# Patient Record
Sex: FEMALE | Race: WHITE
[De-identification: names, ages, dates, MRNs, and addresses within clinical notes are randomized per-mention and may not be internally consistent; named-entity substitution may affect disease eponyms.]

---

## 2022-10-29 ENCOUNTER — HOSPITAL ENCOUNTER (EMERGENCY)
Dept: HOSPITAL 97 - ER | Age: 52
Discharge: HOME | End: 2022-10-29
Payer: COMMERCIAL

## 2022-10-29 VITALS — OXYGEN SATURATION: 97 % | TEMPERATURE: 97.7 F | DIASTOLIC BLOOD PRESSURE: 99 MMHG | SYSTOLIC BLOOD PRESSURE: 160 MMHG

## 2022-10-29 DIAGNOSIS — Z91.14: ICD-10-CM

## 2022-10-29 DIAGNOSIS — Z88.8: ICD-10-CM

## 2022-10-29 DIAGNOSIS — F17.210: ICD-10-CM

## 2022-10-29 DIAGNOSIS — Z88.0: ICD-10-CM

## 2022-10-29 DIAGNOSIS — F31.9: ICD-10-CM

## 2022-10-29 DIAGNOSIS — F41.9: Primary | ICD-10-CM

## 2022-10-29 PROCEDURE — 99283 EMERGENCY DEPT VISIT LOW MDM: CPT

## 2022-10-29 NOTE — ER
Nurse's Notes                                                                                     

 [facility]                                                                 

Name: Alka Judd                                                                               

Age: 52 yrs                                                                                       

Sex: Female                                                                                       

: 1970                                                                                   

MRN: [id number]                                                                                   

Arrival Date: 10/29/2022                                                                          

Time: 19:56                                                                                       

Account#: [id number]                                                                            

Bed DIS3                                                                                          

Private MD:                                                                                       

Diagnosis: Anxiety;Bipolar disorder;Medication non-compliance                                     

                                                                                                  

Presentation:                                                                                     

10/29                                                                                             

20:12 Chief complaint: Patient states: "I have been off my Lithium for five months, I missed  hb  

      by [facility] appointment, I am here for some Lithium to ge through until I can see         

      them please." Pt reports auditory and visual hallucinations, denies SI/HI. Coronavirus      

      screen: At this time, the client does not indicate any symptoms associated with             

      coronavirus-19. Ebola Screen: No symptoms or risks identified at this time. Risk            

      Assessment: Do you want to hurt yourself or someone else? Patient reports no desire to      

      harm self or others. Onset of symptoms was 2022.                                

20:12 Method Of Arrival: Ambulatory                                                           hb  

20:12 Acuity: TEMO 3                                                                           hb  

                                                                                                  

Triage Assessment:                                                                                

20:13 General: Appears in no apparent distress. Behavior is cooperative, anxious. Pain:       hb  

      Denies pain. Neuro: Level of Consciousness is awake, alert, obeys commands, Oriented to     

      person, place, time, situation. Cardiovascular: Patient's skin is warm and dry.             

      Respiratory: Respiratory effort is even, unlabored, Respiratory pattern is regular,         

      symmetrical.                                                                                

                                                                                                  

Historical:                                                                                       

- Allergies:                                                                                      

20:14 Risperdal;                                                                              hb  

20:14 PENICILLINS;                                                                            hb  

- PMHx:                                                                                           

20:14 Bipolar disorder;                                                                       hb  

                                                                                                  

- Immunization history:: Adult Immunizations up to date.                                          

- Social history:: Smoking status: Patient reports the use of cigarette tobacco                   

  products, smokes one pack cigarettes per day.                                                   

                                                                                                  

                                                                                                  

Screenin:39 Abuse screen: Denies threats or abuse. Denies injuries from another. Nutritional        hb  

      screening: No deficits noted. Tuberculosis screening: No symptoms or risk factors           

      identified. Fall Risk None identified.                                                      

                                                                                                  

Assessment:                                                                                       

20:39 General: SEE TRIAGE ASSESSMENT.                                                         hb  

                                                                                                  

Vital Signs:                                                                                      

20:12  / 99; Pulse 88; Resp 16; Temp 97.7; Pulse Ox 97% on R/A; Weight 97.52 kg; Height hb  

      5 ft. 2 in. (157.48 cm); Pain 0/10;                                                         

20:12 Body Mass Index 39.32 (97.52 kg, 157.48 cm)                                             hb  

                                                                                                  

ED Course:                                                                                        

19:56 Patient arrived in ED.                                                                  bp1 

20:05 Carmen Elder MD is Attending Physician.                                           sd2 

20:14 Triage completed.                                                                       hb  

20:14 Arm band placed on.                                                                     hb  

20:33 Patient's name was called from ER lobby. No response.                                   hb  

20:38 Lolly Hamilton, RN is Primary Nurse.                                                   hb  

20:39 Patient has correct armband on for positive identification.                             hb  

20:39 No provider procedures requiring assistance completed. Patient did not have IV access   hb  

      during this emergency room visit.                                                           

                                                                                                  

Administered Medications:                                                                         

20:38 Drug: Ativan (LORazepam) 1 mg Route: PO;                                                hb  

                                                                                                  

                                                                                                  

Medication:                                                                                       

20:39 VIS not applicable for this client.                                                     hb  

                                                                                                  

Outcome:                                                                                          

20:38 Discharge ordered by MD.                                                                sd2 

20:39 Discharged to home ambulatory.                                                          hb  

20:39 Condition: stable                                                                           

20:39 Discharge instructions given to patient, Instructed on discharge instructions, follow       

      up and referral plans. medication usage, Demonstrated understanding of instructions,        

      follow-up care, medications, Prescriptions given X 1.                                       

20:41 Patient left the ED.                                                                    hb  

                                                                                                  

Signatures:                                                                                       

Lolly Hamilton, RN                     RN                                                      

Julia Gaines                           bp1                                                  

Carmen Elder MD MD   sd2                                                  

                                                                                                  

**************************************************************************************************

## 2022-10-29 NOTE — EDPHYS
Physician Documentation                                                                           

 [facility]                                                                 

Name: Alka Judd                                                                               

Age: 52 yrs                                                                                       

Sex: Female                                                                                       

: 1970                                                                                   

MRN: [id number]                                                                                   

Arrival Date: 10/29/2022                                                                          

Time: 19:56                                                                                       

Account#: [id number]                                                                            

Bed DIS3                                                                                          

Private MD:                                                                                       

DB Physician Carmen Elder                                                                    

HPI:                                                                                              

10/29                                                                                             

20:33 This 52 yrs old Female presents to ER via Ambulatory with complaints of Hallucinations. sd2 

20:33 51 yo F presents with CC of wanting to be restarted on her lithium. Reports she was in  sd2 

      residential for 16 months on lithium which controlled her bipolar well. She has been out for 5     

      months and started reusing drugs and has been off her lithium during this time as well.     

      Reports last drug use was last week and she is no longer using. Reports her prior dose      

      was 450 mg twice daily. She missed her first appointment with [facility] and has            

      another appointment scheduled for 11/15. She is having some auditory and visual             

      hallucinations that are non-intrusive and has been anxious. She denies any SI or HI and     

      is able to speak to me rationally throughout our conversation..                             

                                                                                                  

Historical:                                                                                       

- Allergies:                                                                                      

20:14 Risperdal;                                                                              hb  

20:14 PENICILLINS;                                                                            hb  

- PMHx:                                                                                           

20:14 Bipolar disorder;                                                                       hb  

                                                                                                  

- Immunization history:: Adult Immunizations up to date.                                          

- Social history:: Smoking status: Patient reports the use of cigarette tobacco                   

  products, smokes one pack cigarettes per day.                                                   

                                                                                                  

                                                                                                  

ROS:                                                                                              

20:33 Constitutional: Negative for fever, chills, and weight loss, Eyes: Negative for injury, sd2 

      pain, redness, and discharge, Cardiovascular: Negative for chest pain, palpitations,        

      and edema, Respiratory: Negative for shortness of breath, cough, wheezing. Abdomen/GI:      

      Negative for abdominal pain, nausea, vomiting, diarrhea. MS/Extremity: Negative for         

      injury and deformity, Skin: Negative for injury, rash, and discoloration, Psych:            

      Negative for depression, suicide ideation, homicidal ideation, and positive for anxiety     

      and hallucinations.                                                                         

                                                                                                  

Exam:                                                                                             

20:33 Constitutional:  This is a well developed, well nourished patient who is awake, alert,  sd2 

      and in no acute distress. Head/Face:  Normocephalic, atraumatic. Eyes:  EOMI, normal        

      conjunctiva bilaterally Skin:  Warm, dry with normal turgor.  Normal color with no          

      rashes, no lesions, and no evidence of cellulitis. MS/ Extremity:  Pulses equal, no         

      cyanosis.  Neurovascular intact.  Full, normal range of motion. Ambulatory without          

      difficulty. Psych:  Awake, alert, with orientation to person, place and time.               

      Behavior, mood, and affect are within normal limits but patient does appear                 

      intermittently anxious and fidgety.                                                         

                                                                                                  

Vital Signs:                                                                                      

20:12  / 99; Pulse 88; Resp 16; Temp 97.7; Pulse Ox 97% on R/A; Weight 97.52 kg; Height hb  

      5 ft. 2 in. (157.48 cm); Pain 0/10;                                                         

20:12 Body Mass Index 39.32 (97.52 kg, 157.48 cm)                                             hb  

                                                                                                  

MDM:                                                                                              

20:32 Patient medically screened.                                                             sd2 

20:33 Differential Diagnosis anxiety, bipolar, SI, HI, substance abuse, medication            sd2 

      non-compliance among others. Data reviewed: vital signs, nurses notes. Counseling: I        

      had a detailed discussion with the patient and/or guardian regarding: the historical        

      points, exam findings, and any diagnostic results supporting the discharge/admit            

      diagnosis, the need for outpatient follow up, to return to the emergency department if      

      symptoms worsen or persist or if there are any questions or concerns that arise at          

      home. Medical screen evaluation completed. EMTALA emergency medical condition absent.       

      ED course: Pt is awake, alert and oriented x4. Speaking rationally although she does        

      endorse hallucinations. Unable to restart lithium from the ER due to the close              

      monitoring needed and patient has been off of the medication for a significant period       

      of time. Pt verbalizes understanding and will continue to call [facility] to see about      

      getting an earlier appointment. She does not appear to be a harm to herself or others       

      at this time. Will give Ativan to help with patient's anxiety and discharge with            

      hydroxyzine as a temporizing measure until patient can see [facility]. She verbalizes       

      understanding of strict return precautions and was given resources. She reports she         

      knows all of her crisis lines to call in case of further issue..                            

                                                                                                  

Administered Medications:                                                                         

20:38 Drug: Ativan (LORazepam) 1 mg Route: PO;                                                hb  

                                                                                                  

                                                                                                  

Disposition Summary:                                                                              

10/29/22 20:38                                                                                    

Discharge Ordered                                                                                 

      Location: Home                                                                          sd2 

      Problem: an acute exacerbation                                                          sd2 

      Symptoms: are unchanged                                                                 sd2 

      Condition: Stable                                                                       sd2 

      Diagnosis                                                                                   

        - Anxiety                                                                             sd2 

        - Bipolar disorder                                                                    sd2 

        - Medication non-compliance                                                           sd2 

      Followup:                                                                               sd2 

        - With: Private Physician                                                                  

        - When: 2 - 3 days                                                                         

        - Reason: Recheck today's complaints, Continuance of care, Re-evaluation by your           

      physician                                                                                   

      Discharge Instructions:                                                                     

        - Discharge Summary Sheet                                                             sd2 

        - Managing Bipolar Disorder                                                           sd2 

        - Managing Anxiety, Adult                                                             sd2 

      Forms:                                                                                      

        - Medication Reconciliation Form                                                      sd2 

        - Thank You Letter                                                                    sd2 

        - Antibiotic Education                                                                sd2 

        - Prescription Opioid Use                                                             sd2 

      Prescriptions:                                                                              

        - Hydroxyzine HCl 25 mg Oral Tablet                                                        

            - take 1 tablet by ORAL route every 6 hours As needed Take 1-2 tablets as needed  sd2 

      for anxiety; 20 tablet; Refills: 0, Product Selection Permitted                             

Signatures:                                                                                       

Lolly Hamilton RN RN                                                      

Carmen Elder MD MD   sd2                                                  

                                                                                                  

**************************************************************************************************

## 2022-10-29 NOTE — XMS REPORT
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   0.50-1.04                 



             4024931404)                                         

 

             TOTAL BILI (test code = 1.3 mg/dL    0.1-1.1      H            



             9216908468)                                         

 

             CALCIUM (test code = 4925619788) 9.5 mg/dL    8.6-10.6             

     

 

             T PROTEIN (test code = 0013063022) 7.4 g/dL     6.3-8.2            

       

 

             ALBUMIN (test code = 2085934065) 4.2 g/dL     3.5-5.0              

     

 

             ALK PHOS (test code = 9690411715) 100 U/L                    

      

 

             ALTv (test code = 1742-6) 16 U/L       5-35                      

 

             AST(SGOT) (test code = 6808143547) 27 U/L       13-40              

       

 

             eGFR (test code = 5553578695)              mL/min/1.73m2           

   

 

             ELENA (test code = ELENA) [employer] of Glomerular                    

       



                          Filtration Rate (GFR) and Staging                     

      



                          of Kidney Disease*                           



                          +-----------------------+--------                     

      



                          -------------+-------------------                     

      



                          ------+| GFR (mL/min/1.73 m2) ?|                      

     



                          With Kidney Damage ?| ?Without                        

   



                          Kidney                                 



                          Damage+-----------------------+--                     

      



                          -------------------+-------------                     

      



                          ------------+| ?>90 ? ? ? ? ? ? ?                     

      



                          ? ?| ?Stage one ? ? ? ? ?| ?                          

 



                          Normal ? ? ? ? ? ? ?                           



                          ?+-----------------------+-------                     

      



                          --------------+------------------                     

      



                          -------+| ?60-89 ? ? ? ? ? ? ? ?|                     

      



                          ?Stage two ? ? ? ? ?| ? Decreased                     

      



                          GFR ? ? ? ?                            



                          +-----------------------+--------                     

      



                          -------------+-------------------                     

      



                          ------+| ?30-59 ? ? ? ? ? ? ? ?|                      

     



                          ?Stage three ? ? ? ?| ? Stage                         

  



                          three ? ? ? ? ?                           



                          +-----------------------+--------                     

      



                          -------------+-------------------                     

      



                          ------+| ?15-29 ? ? ? ? ? ? ? ?|                      

     



                          ?Stage four ? ? ? ? | ? Stage                         

  



                          four ? ? ? ? ?                           



                          ?+-----------------------+-------                     

      



                          --------------+------------------                     

      



                          -------+| ?<15 (or dialysis) ? ?|                     

      



                          ?Stage five ? ? ? ? | ? Stage                         

  



                          five ? ? ? ? ?                           



                          ?+-----------------------+-------                     

      



                          --------------+------------------                     

      



                          -------+ *Each stage assumes the                      

     



                          associated GFR level has been in                      

     



                          effect for at least three months.                     

      



                          ?Stages 1 to 5, with or without                       

    



                          kidney disease, indicate chronic                      

     



                          kidney disease. Notes:                           



                          Determination of stages one and                       

    



                          two (with eGFR >59mL/min/1.73 m2)                     

      



                          requires estimation of kidney                         

  



                          damage for at least three months                      

     



                          as defined by structural or                           



                          functional abnormalities of the                       

    



                          kidney, manifested by                           



                          either:Pathological abnormalities                     

      



                          or Markers of kidney damage                           



                          (including abnormalities in the                       

    



                          composition of the blood or urine                     

      



                          or abnormalities in imaging                           



                          tests).                                

 

             Lab Interpretation (test code = Abnormal                           

    



             61162-2)                                            



[facility] WITH [id number] 19:22:40





             Test Item    Value        Reference Range Interpretation Comments

 

             WBC (test code =              See_Comment  H             [Automated



             6690-2)                                             message] The sy

stem



                                                                 which generated



                                                                 this result



                                                                 transmitted



                                                                 reference range

:



                                                                 4.30 - 11.10



                                                                 10*3/?L. The



                                                                 reference range

 was



                                                                 not used to



                                                                 interpret this



                                                                 result as



                                                                 normal/abnormal

.

 

             RBC (test code =              See_Comment                [Automated



             789-8)                                              message] The sy

stem



                                                                 which generated



                                                                 this result



                                                                 transmitted



                                                                 reference range

:



                                                                 3.93 - 5.25



                                                                 10*6/?L. The



                                                                 reference range

 was



                                                                 not used to



                                                                 interpret this



                                                                 result as



                                                                 normal/abnormal

.

 

             HGB (test code = 13.0 g/dL    11.6-15.0                 



             718-7)                                              

 

             HCT (test code = 38.4 %       35.7-45.2                 



             4544-3)                                             

 

             MCV (test code = 89.9 fL      80.6-95.5                 



             787-2)                                              

 

             MCH (test code = 30.4 pg      25.9-32.8                 



             785-6)                                              

 

             MCHC (test code = 33.9 g/dL    31.6-35.1                 



             786-4)                                              

 

             RDW-SD (test code = 39.3 fL      39.0-49.9                 



             05557-4)                                            

 

             RDW-CV (test code = 12.2 %       12.0-15.5                 



             788-0)                                              

 

             PLT (test code =              See_Comment                [Automated



             777-3)                                              message] The sy

stem



                                                                 which generated



                                                                 this result



                                                                 transmitted



                                                                 reference range

:



                                                                 166 - 358 10*3/

?L.



                                                                 The reference r

jihan



                                                                 was not used to



                                                                 interpret this



                                                                 result as



                                                                 normal/abnormal

.

 

             MPV (test code = 10.1 fL      9.5-12.9                  



             87274-8)                                            

 

             NRBC/100 WBC (test              See_Comment                [Automat

ed



             code = 3611395234)                                        message] 

The system



                                                                 which generated



                                                                 this result



                                                                 transmitted



                                                                 reference range

:



                                                                 0.0 - 10.0 /100



                                                                 WBCs. The refer

ence



                                                                 range was not u

sed



                                                                 to interpret th

is



                                                                 result as



                                                                 normal/abnormal

.

 

             NRBC x10^3 (test code <0.01        See_Comment                [Auto

mated



             = 3268676623)                                        message] The s

ystem



                                                                 which generated



                                                                 this result



                                                                 transmitted



                                                                 reference range

:



                                                                 10*3/?L. The



                                                                 reference range

 was



                                                                 not used to



                                                                 interpret this



                                                                 result as



                                                                 normal/abnormal

.

 

             GRAN MAT (NEUT) % 79.4 %                                 



             (test code = 770-8)                                        

 

             IMM GRAN % (test code 0.50 %                                 



             = 5846633676)                                        

 

             LYMPH % (test code = 9.5 %                                  



             736-9)                                              

 

             MONO % (test code = 9.7 %                                  



             5905-5)                                             

 

             EOS % (test code = 0.5 %                                  



             713-8)                                              

 

             BASO % (test code = 0.4 %                                  



             706-2)                                              

 

             GRAN MAT x10^3(ANC) 9.77 10*3/uL 1.88-7.09    H            



             (test code =                                        



             4552712747)                                         

 

             IMM GRAN x10^3 (test 0.06 10*3/uL 0.00-0.06                 



             code = 3859927399)                                        

 

             LYMPH x10^3 (test code 1.17 10*3/uL 1.32-3.29    L            



             = 731-0)                                            

 

             MONO x10^3 (test code 1.19 10*3/uL 0.33-0.92    H            



             = 742-7)                                            

 

             EOS x10^3 (test code = 0.06 10*3/uL 0.03-0.39                 



             711-2)                                              

 

             BASO x10^3 (test code 0.05 10*3/uL 0.01-0.07                 



             = 704-7)                                            

 

             Lab Interpretation Abnormal                               



             (test code = 48069-1)                                        



[facility] [id number] 05:36:00





             Test Item    Value        Reference Range Interpretation Comments

 

             SODIUM (test code = NA) 143 mmol/L   134-147      N            

 

             POTASSIUM (test code = 4.2 mmol/L   3.4-5.0      N            



             K)                                                  

 

             CHLORIDE (test code = 114 mmol/L   100-108      H            



             CL)                                                 

 

             CARBON DIOXIDE (test 24 mmol/L    21-32        N            



             code = CO2)                                         

 

             ANION GAP (test code = 5.0 GAP calc 4.0-15.0     N            



             GAP)                                                

 

             GLUCOSE (test code = 89 MG/DL            N            



             GLU)                                                

 

             BLOOD UREA NITROGEN 17 MG/DL     7-18         N            



             (test code = BUN)                                        

 

             GLOMERULAR FILTRATION >=60 max estimate >60                       



             RATE (test code = GFR) estGFR                                 

 

             CREATININE (test code = 0.9 MG/DL    0.6-1.0      N            



             CREAT)                                              

 

             CALCIUM (test code = CA) 8.3 MG/DL    8.5-10.1     L            



CBC W/AUTO [id number] 05:21:00





             Test Item    Value        Reference Range Interpretation Comments

 

             WHITE BLOOD CELL (test code = 15.2 K/mm3   3.5-11.0     H          

  



             WBC)                                                

 

             RED BLOOD CELL (test code = RBC) 3.67 M/mm3   4.70-6.10    L       

     

 

             HEMOGLOBIN (test code = HGB) 11.3 G/DL    10.4-14.9    N           

 

 

             HEMATOCRIT (test code = HCT) 35.5 %       31.5-44.1    N           

 

 

             MEAN CELL VOLUME (test code = 96.7 Fl      84.5-98.6    N          

  



             MCV)                                                

 

             MEAN CELL HGB (test code = MCH) 30.8 pg      27.0-34.2    N        

    

 

             MEAN CELL HGB CONCETRATION (test 31.8 G/DL    31.5-34.0    N       

     



             code = MCHC)                                        

 

             RED CELL DISTRIBUTION WIDTH (test 14.2 SD      11.5-14.5    N      

      



             code = RDW)                                         

 

             PLATELET COUNT (test code = PLT) 242.0 K/mm3  150-450      N       

     

 

             MEAN PLATELET VOLUME (test code = 10.20 fL     7.0-10.5     N      

      



             MPV)                                                

 

             NEUTROPHIL % (test code = NT%) 78.8 %       40-76        H         

   

 

             LYMPHOCYTE % (test code = LY%) 13.1 %       20.5-51.1    L         

   

 

             MONOCYTE % (test code = MO%) 6.2 %        1.7-9.3      N           

 

 

             EOSINOPHIL % (test code = EO%) 1.6 %        0.0-6.0      N         

   

 

             BASOPHIL % (test code = BA%) 0.3 %        0.0-2.0      N           

 

 

             NEUTROPHIL # (test code = NT#) 11.94 K/mm3  1.8-7.6      H         

   

 

             LYMPHOCYTE # (test code = LY#) 2.0 K/mm3    0.6-3.2      N         

   

 

             MONOCYTE # (test code = MO#) 0.9 K/mm3    0.3-1.1      N           

 

 

             EOSINOPHIL # (test code = EO#) 0.2 K/mm3    0.0-0.4      N         

   

 

             BASOPHIL # (test code = BA#) 0.1 K/mm3    0.0-0.1      N           

 

 

             MANUAL DIFF REQUIRED (test code = NO DIFF/SCN  CRITERIA            

      



             MDIFF)                                              



[id number] 14:09:00





             Test Item    Value        Reference Range Interpretation Comments

 

             LITHIUM (test 0.5 mmol/L   0.6-1.2      L             Detection Lopez

it = 0.1



             code = LITH)                                        <0.1 indicates 

None



                                                                 DetectedPerform

ed At: 



                                                                 [facility] [address]



                                                                 [id number] 

Kyle L MD



                                                                 Ph:[phone]



[id number] 13:35:00





             Test Item    Value        Reference Range Interpretation Comments

 

             TROPONIN-I (test 0.436 NG/ML  0.000-0.045  HH           Negative: <

/= 0.045



             code = TROPI)                                        Positive: >/= 

0.046



                                                                 Correlation wit

h serial



                                                                 results, other 

cardiac



                                                                 markers, and cl

inical



                                                                 findings is nec

essary to



                                                                 determine the c

linical



                                                                 significance of

 this



                                                                 result. Quantit

ative



                                                                 results using d

ifferent



                                                                 methodologies s

hould not



                                                                 be compared to 

one



                                                                 another as nume

rical



                                                                 results may daniel

yby



                                                                 method.



Completed by Nursing: [id number] 10:33:00





             Test Item    Value        Reference Range Interpretation Comments

 

             TROPONIN-I (test 0.360 NG/ML  0.000-0.045  HH           Negative: <

/= 0.045



             code = TROPI)                                        Positive: >/= 

0.046



                                                                 Correlation wit

h serial



                                                                 results, other 

cardiac



                                                                 markers, and cl

inical



                                                                 findings is nec

essary to



                                                                 determine the c

linical



                                                                 significance of

 this



                                                                 result. Quantit

ative



                                                                 results using d

ifferent



                                                                 methodologies s

hould not



                                                                 be compared to 

one



                                                                 another as nume

rical



                                                                 results may daniel

yby



                                                                 method.



Completed by Nursing: NOLast Dose Date: 20Las Dose Time: 1200TROPONIN-I
2020 07:23:00





             Test Item    Value        Reference Range Interpretation Comments

 

             TROPONIN-I (test 0.399 NG/ML  0.000-0.045  HH           Negative: <

/= 0.045



             code = TROPI)                                        Positive: >/= 

0.046



                                                                 Correlation wit

h serial



                                                                 results, other 

cardiac



                                                                 markers, and cl

inical



                                                                 findings is nec

essary to



                                                                 determine the c

linical



                                                                 significance of

 this



                                                                 result. Quantit

ative



                                                                 results using d

ifferent



                                                                 methodologies s

hould not



                                                                 be compared to 

one



                                                                 another as nume

rical



                                                                 results may daniel

yby



                                                                 method.



Completed by Nursing: UZHTQEKORN--98-28 05:00:00





             Test Item    Value        Reference Range Interpretation Comments

 

             TROPONIN-I (test 0.555 NG/ML  0.000-0.045  HH           Negative: <

/= 0.045



             code = TROPI)                                        Positive: >/= 

0.046



                                                                 Correlation wit

h serial



                                                                 results, other 

cardiac



                                                                 markers, and cl

inical



                                                                 findings is nec

essary to



                                                                 determine the c

linical



                                                                 significance of

 this



                                                                 result. Quantit

ative



                                                                 results using d

ifferent



                                                                 methodologies s

hould not



                                                                 be compared to 

one



                                                                 another as nume

rical



                                                                 results may daniel

yby



                                                                 method.



Completed by Nursing: NO- XR CHEST 1 -57-28 04:33:00 Name: TYLER EDGE Jal : 1970 Age/S: 49 / F [id number] [address] Unit #: [id number]
2 Loc: [place] [address] Phys: Kristy Quiros MD Acct: [id number] Dis Date: 
Status: REG ER PHONE #: [phone] Exam Date: 2020 043 FAX #: Reason: 
POST CENTRAL PLACEMENT; RIGHT IJ EXAMS:  CPT: [id number] XR CHEST 1 V 62214 
Fluoro Time: DAP (Gy m2): Air Kerma (mGy): HISTORY: Post central lineplacement, 
hypotension Location code: B2 FINDINGS: Frontal view of the chest demonstrates a
mildly enlarged cardiomediastinal silhouette. The trachea is midline. The lungs 
are clear. There is no effusion or pneumothorax. The bones are intact. Right IJ 
catheter in good position. IMPRESSION: Mild cardiomegaly, without acute 
pulmonary process. ** Electronically Signed by ISABEL March on 2020 at 0
433 ** Reported and signed by: Shree March M.D.  CC: Kristy Quiros MD PAGE 1 
Signed Report Name: TYLER EDGE Jal : 1970 Age/S: 49 / F 
[id number] [address] Unit #: [id number] Loc: [place] [address] Phys: 
Kristy Quiros MD Acct: [id number] Dis Date: Status: REG ER  PHONE #: [phone].
[id number] Exam Date: 2020 0432 FAX #: Reason: POST CENTRAL PLACEMENT; RIGHT IJ 
EXAMS: CPT: [id number] XR CHEST 1 V 61159 Fluoro Time: DAP (Gy m2): Air Kerma 
(mGy):  (Continued) Technologist: Duncan Ellis RT(R)(CT) Trnscb Date/Time: 
2020 (0433) [id number] Orig Print D/T: S: 2020 (0436)  PAGE 2 Signed 
ReportC W/AUTO [id number] 04:15:00





             Test Item    Value        Reference Range Interpretation Comments

 

             WHITE BLOOD CELL (test 24.2 K/mm3   3.5-11.0     H            



             code = WBC)                                         

 

             RED BLOOD CELL (test 3.75 M/mm3   4.70-6.10    L            



             code = RBC)                                         

 

             HEMOGLOBIN (test code 11.5 G/DL    10.4-14.9    N            



             = HGB)                                              

 

             HEMATOCRIT (test code 35.2 %       31.5-44.1    N            



             = HCT)                                              

 

             MEAN CELL VOLUME (test 93.9 Fl      84.5-98.6    N            



             code = MCV)                                         

 

             MEAN CELL HGB (test 30.7 pg      27.0-34.2    N            



             code = MCH)                                         

 

             MEAN CELL HGB 32.7 G/DL    31.5-34.0    N            



             CONCETRATION (test                                        



             code = MCHC)                                        

 

             RED CELL DISTRIBUTION 13.8 SD      11.5-14.5    N            



             WIDTH (test code =                                        



             RDW)                                                

 

             PLATELET COUNT (test 234.0 K/mm3  150-450      N            



             code = PLT)                                         

 

             MEAN PLATELET VOLUME 9.80 fL      7.0-10.5     N            



             (test code = MPV)                                        

 

             NEUTROPHIL % (test 82.9 %       40-76        H            



             code = NT%)                                         

 

             LYMPHOCYTE % (test 8.3 %        20.5-51.1    L            



             code = LY%)                                         

 

             MONOCYTE % (test code 7.3 %        1.7-9.3      N            



             = MO%)                                              

 

             EOSINOPHIL % (test 1.4 %        0.0-6.0      N            



             code = EO%)                                         

 

             BASOPHIL % (test code 0.1 %        0.0-2.0      N            



             = BA%)                                              

 

             NEUTROPHIL # (test 20.08 K/mm3  1.8-7.6      H            



             code = NT#)                                         

 

             LYMPHOCYTE # (test 2.0 K/mm3    0.6-3.2      N            



             code = LY#)                                         

 

             MONOCYTE # (test code 1.8 K/mm3    0.3-1.1      H            



             = MO#)                                              

 

             EOSINOPHIL # (test 0.3 K/mm3    0.0-0.4      N            



             code = EO#)                                         

 

             BASOPHIL # (test code 0.0 K/mm3    0.0-0.1      N            



             = BA#)                                              

 

             MANUAL DIFF REQUIRED NO DIFF/SCN  CRITERIA                  SLIDE R

EVIEW



             (test code = MDIFF)                                        CONSISTA

NT WITH AUTO



                                                                 DIFFERENTIAL.



BASIC METABOLIC [id number] 04:11:00





             Test Item    Value        Reference Range Interpretation Comments

 

             SODIUM (test code = NA) 135 mmol/L   134-147      N            

 

             POTASSIUM (test code = K) 4.0 mmol/L   3.4-5.0      N            

 

             CHLORIDE (test code = CL) 106 mmol/L   100-108      N            

 

             CARBON DIOXIDE (test code = CO2) 22 mmol/L    21-32        N       

     

 

             ANION GAP (test code = GAP) 7.0 GAP calc 4.0-15.0     N            

 

             GLUCOSE (test code = GLU) 97 MG/DL            N            

 

             BLOOD UREA NITROGEN (test code = 34 MG/DL     7-18         H       

     



             BUN)                                                

 

             GLOMERULAR FILTRATION RATE (test 39 estGFR    >60          L       

     



             code = GFR)                                         

 

             CREATININE (test code = CREAT) 1.5 MG/DL    0.6-1.0      H         

   

 

             CALCIUM (test code = CA) 8.9 MG/DL    8.5-10.1     N            



LACTIC [id number] 04:11:00





             Test Item    Value        Reference Range Interpretation Comments

 

             LACTIC ACID (test code = LACT) 0.8 mmol/L   0.4-2.0      N         

   



CBC W/AUTO [id number] 03:54:00





             Test Item    Value        Reference Range Interpretation Comments

 

             WHITE BLOOD CELL (test code = 24.2 K/mm3   3.5-11.0     H          

  



             WBC)                                                

 

             RED BLOOD CELL (test code = RBC) 3.75 M/mm3   4.70-6.10    L       

     

 

             HEMOGLOBIN (test code = HGB) 11.5 G/DL    10.4-14.9    N           

 

 

             HEMATOCRIT (test code = HCT) 35.2 %       31.5-44.1    N           

 

 

             MEAN CELL VOLUME (test code = 93.9 Fl      84.5-98.6    N          

  



             MCV)                                                

 

             MEAN CELL HGB (test code = MCH) 30.7 pg      27.0-34.2    N        

    

 

             MEAN CELL HGB CONCETRATION (test 32.7 G/DL    31.5-34.0    N       

     



             code = MCHC)                                        

 

             RED CELL DISTRIBUTION WIDTH (test 13.8 SD      11.5-14.5    N      

      



             code = RDW)                                         

 

             PLATELET COUNT (test code = PLT) 234.0 K/mm3  150-450      N       

     

 

             MEAN PLATELET VOLUME (test code = 9.80 fL      7.0-10.5     N      

      



             MPV)                                                

 

             NEUTROPHIL % (test code = NT%)  %           40-76        H         

   

 

             LYMPHOCYTE % (test code = LY%)  %           20.5-51.1    L         

   

 

             MONOCYTE % (test code = MO%)  %           1.7-9.3      N           

 

 

             EOSINOPHIL % (test code = EO%)  %           0.0-6.0      N         

   

 

             BASOPHIL % (test code = BA%)  %           0.0-2.0      N           

 

 

             NEUTROPHIL # (test code = NT#)  K/mm3       1.8-7.6      H         

   

 

             LYMPHOCYTE # (test code = LY#)  K/mm3       0.6-3.2      N         

   

 

             MONOCYTE # (test code = MO#)  K/mm3       0.3-1.1      H           

 

 

             EOSINOPHIL # (test code = EO#)  K/mm3       0.0-0.4      N         

   

 

             BASOPHIL # (test code = BA#)  K/mm3       0.0-0.1      N           

 

 

             MANUAL DIFF REQUIRED (test code =  DIFF/SCN    CRITERIA            

      



             MDIFF)                                              



Basic Metabolic [id number] 07:54:48





             Test Item    Value        Reference Range Interpretation Comments

 

             Sodium Level (test code = Sodium 142.0 mmol/L 135.0-145.0          

     



             Level)                                              

 

             Potassium Level (test code = 4.8 mmol/L   3.5-5.1                  

 



             Potassium Level)                                        

 

             Chloride Level (test code = 105 mmol/L                       



             Chloride Level)                                        

 

             CO2 (test code = CO2) 25 mmol/L    22-29                     

 

             Anion Gap (test code = Anion 12 mmol/L    7-16                     

 



             Gap)                                                

 

             BUN (test code = BUN) 19.60 mg/dL  6.00-20.00                

 

             Creatinine Level (test code = 0.80 mg/dL   0.50-0.90               

  



             Creatinine Level)                                        

 

             BUN/Creat Ratio (test code = 24                        N           

 



             BUN/Creat Ratio)                                        

 

             Glucose Level (test code = 86 mg/dL                         



             Glucose Level)                                        

 

             Calcium Level (test code = 10.1 mg/dL   8.3-10.5                  



             Calcium Level)                                        



Basic Metabolic [id number] 07:54:48





             Test Item    Value        Reference Range Interpretation Comments

 

             Sodium Level (test 142.0 mmol/L 135.0-145.0               



             code = Sodium Level)                                        

 

             Potassium Level 4.8 mmol/L   3.5-5.1                   



             (test code =                                        



             Potassium Level)                                        

 

             Chloride Level (test 105 mmol/L                       



             code = Chloride                                        



             Level)                                              

 

             CO2 (test code = 25 mmol/L    22-29                     



             CO2)                                                

 

             Anion Gap (test code 12 mmol/L    7-16                      



             = Anion Gap)                                        

 

             BUN (test code = 19.60 mg/dL  6.00-20.00                



             BUN)                                                

 

             Creatinine Level 0.80 mg/dL   0.50-0.90                 



             (test code =                                        



             Creatinine Level)                                        

 

             BUN/Creat Ratio 24                        N            



             (test code =                                        



             BUN/Creat Ratio)                                        

 

             Glucose Level (test 86 mg/dL                         



             code = Glucose                                        



             Level)                                              

 

             Calcium Level (test 10.1 mg/dL   8.3-10.5                  



             code = Calcium                                        



             Level)                                              

 

             eGFR AA (test code = >60                       N            eGFR (e

stimated



             eGFR AA)     mL/min/1.73 m2                           Glomerular



                                                                 Filtration Rate

) is



                                                                 an estimated va

lue,



                                                                 calculated from

 the



                                                                 patient's serum



                                                                 creatinine usin

g the



                                                                 MDRD equation. 

It is



                                                                 NOT the patient

's



                                                                 actual GFR. The

 eGFR



                                                                 provides a more



                                                                 clinically usef

ul



                                                                 measure of kidn

ey



                                                                 disease than se

rum



                                                                 creatinine



                                                                 alone.***This



                                                                 calculation rimma

es



                                                                 sex and race in

to



                                                                 account, if the



                                                                 information is



                                                                 provided. If th

e



                                                                 race is not



                                                                 provided, and t

he



                                                                 patient is



                                                                 -Crystal

n,



                                                                 multiply by 1.2

12.



                                                                 If sex is not



                                                                 provided, and t

he



                                                                 patient is fema

le,



                                                                 multiply by 0.7

42.



                                                                 Results for pat

ients



                                                                 <18 years of ag

e



                                                                 have not been



                                                                 validated by th

e



                                                                 MDRD study and



                                                                 should be



                                                                 interpreted wit

h



                                                                 caution. eGFR R

esult



                                                                 Interpretation:

eGFR



                                                                 > or = 60 is in

 the



                                                                 Normal RangeeGF

R <



                                                                 60 may mean kid

hang



                                                                 diseaseeGFR < 1

5 may



                                                                 mean kidney



                                                                 failure*** Rang

es



                                                                 recommended by 

the



                                                                 [employer]



                                                                 [employer],



                                                                 http://nkdep.ni

h.gov



Basic Metabolic [id number] 07:54:48





             Test Item    Value        Reference Range Interpretation Comments

 

             Sodium Level (test 142.0 mmol/L 135.0-145.0               



             code = Sodium Level)                                        

 

             Potassium Level 4.8 mmol/L   3.5-5.1                   



             (test code =                                        



             Potassium Level)                                        

 

             Chloride Level (test 105 mmol/L                       



             code = Chloride                                        



             Level)                                              

 

             CO2 (test code = 25 mmol/L    22-29                     



             CO2)                                                

 

             Anion Gap (test code 12 mmol/L    7-16                      



             = Anion Gap)                                        

 

             BUN (test code = 19.60 mg/dL  6.00-20.00                



             BUN)                                                

 

             Creatinine Level 0.80 mg/dL   0.50-0.90                 



             (test code =                                        



             Creatinine Level)                                        

 

             BUN/Creat Ratio 24                        N            



             (test code =                                        



             BUN/Creat Ratio)                                        

 

             Glucose Level (test 86 mg/dL                         



             code = Glucose                                        



             Level)                                              

 

             Calcium Level (test 10.1 mg/dL   8.3-10.5                  



             code = Calcium                                        



             Level)                                              

 

             eGFR AA (test code = >60                       N            eGFR (e

stimated



             eGFR AA)     mL/min/1.73 m2                           Glomerular



                                                                 Filtration Rate

) is



                                                                 an estimated va

lue,



                                                                 calculated from

 the



                                                                 patient's serum



                                                                 creatinine usin

g the



                                                                 MDRD equation. 

It is



                                                                 NOT the patient

's



                                                                 actual GFR. The

 eGFR



                                                                 provides a more



                                                                 clinically usef

ul



                                                                 measure of kidn

ey



                                                                 disease than se

rum



                                                                 creatinine



                                                                 alone.***This



                                                                 calculation rimma

es



                                                                 sex and race in

to



                                                                 account, if the



                                                                 information is



                                                                 provided. If th

e



                                                                 race is not



                                                                 provided, and t

he



                                                                 patient is



                                                                 -Crystal

n,



                                                                 multiply by 1.2

12.



                                                                 If sex is not



                                                                 provided, and t

he



                                                                 patient is fema

le,



                                                                 multiply by 0.7

42.



                                                                 Results for pat

ients



                                                                 <18 years of ag

e



                                                                 have not been



                                                                 validated by [facility]



                                                                 MDRD study and



                                                                 should be



                                                                 interpreted wit

h



                                                                 caution. eGFR R

esult



                                                                 Interpretation:

eGFR



                                                                 > or = 60 is in

 the



                                                                 Normal RangeeGF

R <



                                                                 60 may mean kid

hang



                                                                 diseaseeGFR < 1

5 may



                                                                 mean kidney



                                                                 failure*** Rang

es



                                                                 recommended by 

the



                                                                 [employer]



                                                                 [employer],



                                                                 http://nkdep.ni

h.gov

 

             eGFR Non-AA (test >60.00                    N            eGFR (sheba

mated



             code = eGFR Non-AA) mL/min/1.73 m2                           [facility]

ular



                                                                 Filtration Rate

) is



                                                                 an estimated va

lue,



                                                                 calculated from

 the



                                                                 patient's serum



                                                                 creatinine usin

g the



                                                                 MDRD equation. 

It is



                                                                 NOT the patient

's



                                                                 actual GFR. The

 eGFR



                                                                 provides a more



                                                                 clinically usef

ul



                                                                 measure of kidn

ey



                                                                 disease than se

rum



                                                                 creatinine



                                                                 alone.***This



                                                                 calculation rimma

es



                                                                 sex and race in

to



                                                                 account, if the



                                                                 information is



                                                                 provided. If th

e



                                                                 race is not



                                                                 provided, and t

he



                                                                 patient is



                                                                 -Crystal

n,



                                                                 multiply by 1.2

12.



                                                                 If sex is not



                                                                 provided, and t

he



                                                                 patient is fema

le,



                                                                 multiply by 0.7

42.



                                                                 Results for pat

ients



                                                                 <18 years of ag

e



                                                                 have not been



                                                                 validated by [facility]



                                                                 MDRD study and



                                                                 should be



                                                                 interpreted wit

h



                                                                 caution. eGFR R

esult



                                                                 Interpretation:

eGFR



                                                                 > or = 60 is in

 the



                                                                 Normal RangeeGF

R <



                                                                 60 may mean kid

hang



                                                                 diseaseeGFR < 1

5 may



                                                                 mean kidney



                                                                 failure*** Rang

es



                                                                 recommended by 

the



                                                                 [employer]



                                                                 [employer],



                                                                 http://nkdep.ni

h.gov



Complete Blood Count with [id number] 07:29:42





             Test Item    Value        Reference Range Interpretation Comments

 

             WBC (test code = WBC) 9.3 x10      4.4-10.5                  

 

             RBC (test code = RBC) 4.71 x10     3.75-5.20                 

 

             Hgb (test code = Hgb) 14.4 g/dL    12.2-14.8                 

 

             MCV (test code = MCV) 92.10 fL     80..00              

 

             Hct (test code = Hct) 43.4 %       36.5-44.4                 

 

             MCHC (test code = 33.20 g/dL   32.00-37.50               



             MCHC)                                               

 

             RDW CV (test code = 13.4 %       11.5-14.5                 



             RDW CV)                                             

 

             MCH (test code = MCH) 30.6 pg      27.0-32.5                 

 

             Platelets (test code = 325.0 x10    140.0-440.0               



             Platelets)                                          

 

             MPV (test code = MPV) 9.8 fL                    N            

 

             Slide Review (test Auto         Auto                      Result cr

eated by



             code = Slide Review)                                        [email or web address]_

SLIDE_REV_AUTO

 

             nRBC (test code = 0                         N            



             nRBC)                                               

 

             NRBC Abs (test code = 0.00 x10                  N            



             NRBC Abs)                                           

 

             IPF (test code = IPF) 0 %                       N            



Automated [id number] 07:29:42





             Test Item    Value        Reference Range Interpretation Comments

 

             Neutro Auto (test code = Neutro 66.2 %       36.0-70.0             

    



             Auto)                                               

 

             Lymph Auto (test code = Lymph Auto) 21.1 %       12.0-44.0         

        

 

             Mono Auto (test code = Mono Auto) 6.8 %        0.0-11.0            

      

 

             Eos, Auto (test code = Eos, Auto) 4.8 %        0.0-7.0             

      

 

             Basophil Auto (test code = Basophil 0.9 %        0.0-2.0           

        



             Auto)                                               

 

             Neutro Absolute (test code = Neutro 6.2 x10      1.6-7.4           

        



             Absolute)                                           

 

             Lymph Absolute (test code = Lymph 1.97 x10     .50-4.60            

      



             Absolute)                                           

 

             Mono Absolute (test code = Mono .63 x10      .00-1.20              

    



             Absolute)                                           

 

             Eos Absolute (test code = Eos 0.45 x10     0.00-0.74               

  



             Absolute)                                           

 

             Baso Absolute (test code = Baso 0.08 x10     0.00-0.21             

    



             Absolute)                                           



IG [id number] 07:29:42





             Test Item    Value        Reference Range Interpretation Comments

 

             IG (test code = IG) 0.2 %        0.0-5.0                   

 

             IG Abs (test code = IG Abs) 0 x10                     N            



Thyroid Stimulating [id number] 04:30:30





             Test Item    Value        Reference Range Interpretation Comments

 

             TSH (test code = TSH) 1.360 mIU/mL 0.270-4.200               



RPR [id number] 04:06:17





             Test Item    Value        Reference Range Interpretation Comments

 

             RPR Qual (test code = RPR Qual) Non-Reactive Non-Reactive          

    

 

             Reactive Control (test code = Reactive                             

  



             Reactive Control)                                        

 

             Weak Reactive Control (test Weak Reactive                          

 



             code = Weak Reactive Control)                                      

  

 

             Non-Reactive Control (test code Non-Reactive                       

    



             = Non-Reactive Control)                                        

 

             Lot # (test code = Lot #) [id number]                    N            

 

             Expiration Dt (test code = 10-                N            



             Expiration Dt)                                        



Hemoglobin A1c2019-11-15 18:37:54





             Test Item    Value        Reference Range Interpretation Comments

 

             Hemoglobin A1c (test code 4.7 %        4.8-5.9      L            No

n Diabetic



             = Hemoglobin A1c)                                        4.8-5.9%Di

abetic <7.0%



Lipid Panel2019-11-15 18:08:58





             Test Item    Value        Reference Range Interpretation Comments

 

             Cholesterol Total 189 mg/dL    0-200                     RISK OF HE

ART



             (test code =                                        DISEASEPublishe

d by



             Cholesterol Total)                                        American 

Heart



                                                                 Association Judith

lyte



                                                                 Optimal Borderl

ine



                                                                 Increased RiskC

HOL <200



                                                                 200-239 >240TRI

G <150



                                                                 150-199 >200HDL

 Male



                                                                 >60 <40HDL Fema

le >60



                                                                 <50LDL <100 130

-159



                                                                 >160LDL Near op

timal is



                                                                 100-129

 

             Triglycerides (test 112 mg/dL    9-200                     



             code = Triglycerides)                                        

 

             HDL (test code = HDL) 44 mg/dL     50-60        L            

 

             LDL (test code = LDL) 122 mg/dL    0-130                     The eq

uation being used



                                                                 in this calcula

tion is



                                                                 LDL = (Chol - H

DL) -



                                                                 (Trig / 5)

 

             VLDL (test code = 22 mg/dL     5-40                      The equati

on being used



             VLDL)                                               in this calcula

tion is



                                                                 VLDL = Trig / 5

 

             Chol/HDL (test code = 4.3 ratio    0.0-4.4                   



             Chol/HDL)                                           

 

             LDL/HDL Ratio (test 3                         N            The equa

tion being used



             code = LDL/HDL Ratio)                                        in thi

s calculation is



                                                                 LDL/HDL Ratio=L

DL



                                                                 Calc/HDL Chol



Complete Blood Count with Differential2019-11-15 07:51:57





             Test Item    Value        Reference Range Interpretation Comments

 

             WBC (test code = WBC) 10.6 x10     4.4-10.5     H            

 

             RBC (test code = RBC) 4.45 x10     3.75-5.20                 

 

             Hgb (test code = Hgb) 13.5 g/dL    12.2-14.8                 

 

             MCV (test code = MCV) 93.30 fL     80..00              

 

             Hct (test code = Hct) 41.5 %       36.5-44.4                 

 

             MCHC (test code = 32.50 g/dL   32.00-37.50               



             MCHC)                                               

 

             RDW CV (test code = 13.7 %       11.5-14.5                 



             RDW CV)                                             

 

             MCH (test code = MCH) 30.3 pg      27.0-32.5                 

 

             Platelets (test code = 356.0 x10    140.0-440.0               



             Platelets)                                          

 

             MPV (test code = MPV) 9.7 fL                    N            

 

             Slide Review (test Auto         Auto                      Result cr

eated by



             code = Slide Review)                                        GL_SJM_

SLIDE_REV_AUTO

 

             nRBC (test code = 0                         N            



             nRBC)                                               

 

             NRBC Abs (test code = 0.00 x10                  N            



             NRBC Abs)                                           

 

             IPF (test code = IPF) 0 %                       N            



Automated Differential2019-11-15 07:51:57





             Test Item    Value        Reference Range Interpretation Comments

 

             Neutro Auto (test code = Neutro 65.4 %       36.0-70.0             

    



             Auto)                                               

 

             Lymph Auto (test code = Lymph Auto) 23.5 %       12.0-44.0         

        

 

             Mono Auto (test code = Mono Auto) 5.7 %        0.0-11.0            

      

 

             Eos, Auto (test code = Eos, Auto) 4.4 %        0.0-7.0             

      

 

             Basophil Auto (test code = Basophil 0.8 %        0.0-2.0           

        



             Auto)                                               

 

             Neutro Absolute (test code = Neutro 6.9 x10      1.6-7.4           

        



             Absolute)                                           

 

             Lymph Absolute (test code = Lymph 2.49 x10     .50-4.60            

      



             Absolute)                                           

 

             Mono Absolute (test code = Mono .60 x10      .00-1.20              

    



             Absolute)                                           

 

             Eos Absolute (test code = Eos 0.47 x10     0.00-0.74               

  



             Absolute)                                           

 

             Baso Absolute (test code = Baso 0.08 x10     0.00-0.21             

    



             Absolute)                                           



IG Flags2019-11-15 07:51:57





             Test Item    Value        Reference Range Interpretation Comments

 

             IG (test code = IG) 0.2 %        0.0-5.0                   

 

             IG Abs (test code = IG Abs) 0 x10                     N            



Urine [id number] 10:18:52





             Test Item    Value        Reference Range Interpretation Comments

 

             ORGANISM (test code = Escherichia coli                           



             ORGANISM)                                           

 

             Amikacin (test code =                           S            



             Amik)                                               

 

             Ampicillin (test code =                           S            



             Amp)                                                

 

             Ampicillin/Sulbactam                           S            



             (test code = Amp/Sul)                                        

 

             Cefazolin (test code =                           S            



             Cefaz)                                              

 

             Cefepime (test code =                           S            



             Cefep)                                              

 

             Cefotaxime (test code =                           S            



             Cefo)                                               

 

             Ceftazidime (test code                           S            



             = Ceftaz)                                           

 

             Ceftriaxone (test code                           S            



             = Ceftri)                                           

 

             Cefuroxime (test code =                           S            



             Cefur)                                              

 

             Ciprofloxacin (test                           S            



             code = Cipro)                                        

 

             Gentamicin (test code =                           S            



             Gent)                                               

 

             Imipenem (test code =                           S            



             Imi)                                                

 

             Levofloxacin (test code                           S            



             = Levo)                                             

 

             Meropenem (test code =                           S            



             Sindi)                                               

 

             Nitrofurantoin (test                           S            



             code = Nitro)                                        

 

             Piperacillin/Tazobactam                           S            



             (test code = Pip/Blaine)                                        

 

             Tobramycin (test code =                           S            



             Tobra)                                              

 

             Trimethoprim/Sulfa                           S            



             (test code = SXT)                                        

 

             Final Report (test code >=100,000 cfu/ml                           



             = Final Report) Escherichia coli                           



                          >=100,000 cfu/ml Alpha                           



                          hemolytic                              



                          Streptococcus (not                           



                          Group D or                             



                          Enterococcus)                           



 C Urine Added by [id number] [id number] 09:18:25





             Test Item    Value        Reference Range Interpretation Comments

 

             Lithium Level (test code = 0.58 mmol/L  0.60-1.20    L            



             Lithium Level)                                        



Urine Drug [id number] 01:36:44





             Test Item    Value        Reference Range Interpretation Comments

 

             Amphetamine Screen Ur POSITIVE     Negative     A            



             (test code = Amphetamine                                        



             Screen Ur)                                          

 

             Barbiturate Screen Ur Negative     Negative                  



             (test code = Barbiturate                                        



             Screen Ur)                                          

 

             Benzodiazepines Ur (test Negative     Negative                  



             code = Benzodiazepines                                        



             Ur)                                                 

 

             Cocaine Screen Ur (test Negative     Negative                  



             code = Cocaine Screen                                        



             Ur)                                                 

 

             U Methadone Scr (test Negative     Negative                  



             code = U Methadone Scr)                                        

 

             Opiate Screen Ur (test Negative     Negative                  



             code = Opiate Screen Ur)                                        

 

             U PCP Scrn (test code = Negative     Negative                  



             U PCP Scrn)                                         

 

             Cannabinoid Screen Ur Negative     Negative                  



             (test code = Cannabinoid                                        



             Screen Ur)                                          

 

             U TCA (test code = U Negative     Negative                  The res

ults of all



             TCA)                                                drug screen elinor

ts are



                                                                 only preliminar

y.



                                                                 Clinical



                                                                 consideration a

nd



                                                                 professional ju

dgment



                                                                 should be appli

ed to



                                                                 any drug of abu

se



                                                                 test result,



                                                                 particularly wh

en



                                                                 preliminary pos

itive



                                                                 results are obt

ained.



                                                                 Please order a



                                                                 separate confir

matory



                                                                 test if desired

.



HCG Qualitative [id number] 01:36:43





             Test Item    Value        Reference Range Interpretation Comments

 

             hCG Ur (test code = Negative                               If the r

esult is



             hCG Ur)                                             "Negative" in p

atients



                                                                 suspected to be



                                                                 pregnant, recom

mend



                                                                 retest with a s

ample



                                                                 obtained 48 to 

72



                                                                 hours later, or

 by



                                                                 ordering a



                                                                 quantitative as

say. If



                                                                 the result is



                                                                 "Borderline" te

sting



                                                                 should be repea

gianfranco in



                                                                 48 to 72 hours.

 

             Lot # (test code = [id number]                    N            



             Lot #)                                              

 

             Expiration Dt (test 2020                N            



             code = Expiration Dt)                                        

 

             Neg Control (test Negative                               



             code = Neg Control)                                        

 

             Pos Control (test Positive                               



             code = Pos Control)                                        

 

             Internal QC (test Acceptable                             



             code = Internal QC)                                        



Urinalysis [id number] 01:36:03





             Test Item    Value        Reference Range Interpretation Comments

 

             UA WBC (test code = UA WBC) 0-5          0-5                       

 

             UA RBC (test code = UA RBC) None Seen    0-5                       

 

             UA Bacteria (test code = UA Moderate                  A            



             Bacteria)                                           

 

             UA Squam Epithelial (test code = UA 0-5                            

        



             Squam Epithelial)                                        



Comprehensive Metabolic [id number] 01:23:40





             Test Item    Value        Reference Range Interpretation Comments

 

             Sodium Level (test code = Sodium 139.0 mmol/L 135.0-145.0          

     



             Level)                                              

 

             Potassium Level (test code = 4.4 mmol/L   3.5-5.1                  

 



             Potassium Level)                                        

 

             Chloride Level (test code = 102 mmol/L                       



             Chloride Level)                                        

 

             CO2 (test code = CO2) 23 mmol/L    22-29                     

 

             Anion Gap (test code = Anion 14 mmol/L    7-16                     

 



             Gap)                                                

 

             BUN (test code = BUN) 9.10 mg/dL   6.00-20.00                

 

             Creatinine Level (test code = 1.00 mg/dL   0.50-0.90    H          

  



             Creatinine Level)                                        

 

             BUN/Creat Ratio (test code = 9                         N           

 



             BUN/Creat Ratio)                                        

 

             Glucose Level (test code = 115 mg/dL                        



             Glucose Level)                                        

 

             Calcium Level (test code = 10.1 mg/dL   8.3-10.5                  



             Calcium Level)                                        

 

             Alk Phos (test code = Alk Phos) 106 U/L             H        

    

 

             Bilirubin Total (test code = 0.4 mg/dL    0.1-0.9                  

 



             Bilirubin Total)                                        

 

             Albumin Level (test code = 4.6 g/dL     3.5-5.2                   



             Albumin Level)                                        

 

             Protein Total (test code = 7.7 g/dL     6.4-8.3                   



             Protein Total)                                        

 

             ALT (test code = ALT) 12 U/L       1-33                      

 

             AST (test code = AST) 18 U/L       1-32                      

 

             Globulin (test code = Globulin) 3.1 g/dL     2.9-3.1               

    

 

             A/G Ratio (test code = A/G 1.5 ratio                 N            



             Ratio)                                              



Comprehensive Metabolic [id number] 01:23:40





             Test Item    Value        Reference Range Interpretation Comments

 

             Sodium Level (test 139.0 mmol/L 135.0-145.0               



             code = Sodium Level)                                        

 

             Potassium Level 4.4 mmol/L   3.5-5.1                   



             (test code =                                        



             Potassium Level)                                        

 

             Chloride Level (test 102 mmol/L                       



             code = Chloride                                        



             Level)                                              

 

             CO2 (test code = 23 mmol/L    22-29                     



             CO2)                                                

 

             Anion Gap (test code 14 mmol/L    7-16                      



             = Anion Gap)                                        

 

             BUN (test code = 9.10 mg/dL   6.00-20.00                



             BUN)                                                

 

             Creatinine Level 1.00 mg/dL   0.50-0.90    H            



             (test code =                                        



             Creatinine Level)                                        

 

             BUN/Creat Ratio 9                         N            



             (test code =                                        



             BUN/Creat Ratio)                                        

 

             Glucose Level (test 115 mg/dL                        



             code = Glucose                                        



             Level)                                              

 

             Calcium Level (test 10.1 mg/dL   8.3-10.5                  



             code = Calcium                                        



             Level)                                              

 

             Alk Phos (test code 106 U/L             H            



             = Alk Phos)                                         

 

             Bilirubin Total 0.4 mg/dL    0.1-0.9                   



             (test code =                                        



             Bilirubin Total)                                        

 

             Albumin Level (test 4.6 g/dL     3.5-5.2                   



             code = Albumin                                        



             Level)                                              

 

             Protein Total (test 7.7 g/dL     6.4-8.3                   



             code = Protein                                        



             Total)                                              

 

             ALT (test code = 12 U/L       1-33                      



             ALT)                                                

 

             AST (test code = 18 U/L       1-32                      



             AST)                                                

 

             Globulin (test code 3.1 g/dL     2.9-3.1                   



             = Globulin)                                         

 

             A/G Ratio (test code 1.5 ratio                 N            



             = A/G Ratio)                                        

 

             eGFR AA (test code = >60                       N            eGFR (e

stimated



             eGFR AA)     mL/min/1.73 m2                           Glomerular



                                                                 Filtration Rate

) is



                                                                 an estimated va

lue,



                                                                 calculated from

 the



                                                                 patient's serum



                                                                 creatinine usin

g the



                                                                 MDRD equation. 

It is



                                                                 NOT the patient

's



                                                                 actual GFR. The

 eGFR



                                                                 provides a more



                                                                 clinically usef

ul



                                                                 measure of kidn

ey



                                                                 disease than se

rum



                                                                 creatinine



                                                                 alone.***This



                                                                 calculation rimma

es



                                                                 sex and race in

to



                                                                 account, if the



                                                                 information is



                                                                 provided. If th

e



                                                                 race is not



                                                                 provided, and t

he



                                                                 patient is



                                                                 -Crystal

n,



                                                                 multiply by 1.2

12.



                                                                 If sex is not



                                                                 provided, and t

he



                                                                 patient is fema

le,



                                                                 multiply by 0.7

42.



                                                                 Results for pat

ients



                                                                 <18 years of ag

e



                                                                 have not been



                                                                 validated by th

e



                                                                 MDRD study and



                                                                 should be



                                                                 interpreted wit

h



                                                                 caution. eGFR R

esult



                                                                 Interpretation:

eGFR



                                                                 > or = 60 is in

 the



                                                                 Normal RangeeGF

R <



                                                                 60 may mean kid

hang



                                                                 diseaseeGFR < 1

5 may



                                                                 mean kidney



                                                                 failure*** Rang

es



                                                                 recommended by 

the



                                                                 [employer]



                                                                 [employer],



                                                                 http://nkdep.ni

h.gov



Comprehensive Metabolic [id number] 01:23:40





             Test Item    Value        Reference Range Interpretation Comments

 

             Sodium Level (test 139.0 mmol/L 135.0-145.0               



             code = Sodium Level)                                        

 

             Potassium Level 4.4 mmol/L   3.5-5.1                   



             (test code =                                        



             Potassium Level)                                        

 

             Chloride Level (test 102 mmol/L                       



             code = Chloride                                        



             Level)                                              

 

             CO2 (test code = 23 mmol/L    22-29                     



             CO2)                                                

 

             Anion Gap (test code 14 mmol/L    7-16                      



             = Anion Gap)                                        

 

             BUN (test code = 9.10 mg/dL   6.00-20.00                



             BUN)                                                

 

             Creatinine Level 1.00 mg/dL   0.50-0.90    H            



             (test code =                                        



             Creatinine Level)                                        

 

             BUN/Creat Ratio 9                         N            



             (test code =                                        



             BUN/Creat Ratio)                                        

 

             Glucose Level (test 115 mg/dL                        



             code = Glucose                                        



             Level)                                              

 

             Calcium Level (test 10.1 mg/dL   8.3-10.5                  



             code = Calcium                                        



             Level)                                              

 

             Alk Phos (test code 106 U/L             H            



             = Alk Phos)                                         

 

             Bilirubin Total 0.4 mg/dL    0.1-0.9                   



             (test code =                                        



             Bilirubin Total)                                        

 

             Albumin Level (test 4.6 g/dL     3.5-5.2                   



             code = Albumin                                        



             Level)                                              

 

             Protein Total (test 7.7 g/dL     6.4-8.3                   



             code = Protein                                        



             Total)                                              

 

             ALT (test code = 12 U/L       1-33                      



             ALT)                                                

 

             AST (test code = 18 U/L       1-32                      



             AST)                                                

 

             Globulin (test code 3.1 g/dL     2.9-3.1                   



             = Globulin)                                         

 

             A/G Ratio (test code 1.5 ratio                 N            



             = A/G Ratio)                                        

 

             eGFR AA (test code = >60                       N            eGFR (e

stimated



             eGFR AA)     mL/min/1.73 m2                           Glomerular



                                                                 Filtration Rate

) is



                                                                 an estimated va

lue,



                                                                 calculated from

 the



                                                                 patient's serum



                                                                 creatinine usin

g the



                                                                 MDRD equation. 

It is



                                                                 NOT the patient

's



                                                                 actual GFR. The

 eGFR



                                                                 provides a more



                                                                 clinically usef

ul



                                                                 measure of kidn

ey



                                                                 disease than se

rum



                                                                 creatinine



                                                                 alone.***This



                                                                 calculation rimma

es



                                                                 sex and race in

to



                                                                 account, if the



                                                                 information is



                                                                 provided. If th

e



                                                                 race is not



                                                                 provided, and t

he



                                                                 patient is



                                                                 -Crystal

n,



                                                                 multiply by 1.2

12.



                                                                 If sex is not



                                                                 provided, and t

he



                                                                 patient is fema

le,



                                                                 multiply by 0.7

42.



                                                                 Results for pat

ients



                                                                 <18 years of ag

e



                                                                 have not been



                                                                 validated by th

e



                                                                 MDRD study and



                                                                 should be



                                                                 interpreted wit

h



                                                                 caution. eGFR R

esult



                                                                 Interpretation:

eGFR



                                                                 > or = 60 is in

 the



                                                                 Normal RangeeGF

R <



                                                                 60 may mean kid

hang



                                                                 diseaseeGFR < 1

5 may



                                                                 mean kidney



                                                                 failure*** Rang

es



                                                                 recommended by 

the



                                                                 [employer]



                                                                 [employer],



                                                                 http://nkdep.ni

h.gov

 

             eGFR Non-AA (test 58.45                     N            eGFR (sheba

mated



             code = eGFR Non-AA) mL/min/1.73 m2                           Glomer

ular



                                                                 Filtration Rate

) is



                                                                 an estimated va

lue,



                                                                 calculated from

 the



                                                                 patient's serum



                                                                 creatinine usin

g the



                                                                 MDRD equation. 

It is



                                                                 NOT the patient

's



                                                                 actual GFR. The

 eGFR



                                                                 provides a more



                                                                 clinically usef

ul



                                                                 measure of kidn

ey



                                                                 disease than se

rum



                                                                 creatinine



                                                                 alone.***This



                                                                 calculation rimma

es



                                                                 sex and race in

to



                                                                 account, if the



                                                                 information is



                                                                 provided. If th

e



                                                                 race is not



                                                                 provided, and t

he



                                                                 patient is



                                                                 -Crystal

n,



                                                                 multiply by 1.2

12.



                                                                 If sex is not



                                                                 provided, and t

he



                                                                 patient is fema

le,



                                                                 multiply by 0.7

42.



                                                                 Results for pat

ients



                                                                 <18 years of ag

e



                                                                 have not been



                                                                 validated by th

e



                                                                 MDRD study and



                                                                 should be



                                                                 interpreted wit

h



                                                                 caution. eGFR R

esult



                                                                 Interpretation:

eGFR



                                                                 > or = 60 is in

 the



                                                                 Normal RangeeGF

R <



                                                                 60 may mean kid

hang



                                                                 diseaseeGFR < 1

5 may



                                                                 mean kidney



                                                                 failure*** Rang

es



                                                                 recommended by 

the



                                                                 [employer]



                                                                 [employer],



                                                                 http://nkdep.ni

h.gov



Alcohol [id number] 01:23:31





             Test Item    Value        Reference Range Interpretation Comments

 

             Ethanol Level (test <0.00 g/dL   0.00-0.01                 [facility]

gianfranco 0.080 g/dL



             code = Ethanol                                        or more



             Level)                                              

 

             Ethanol Inst (test <0                        N            



             code = Ethanol Inst)                                        



Complete Blood Count with [id number] 00:56:12





             Test Item    Value        Reference Range Interpretation Comments

 

             WBC (test code = WBC) 19.4 x10     4.4-10.5     H            

 

             RBC (test code = RBC) 4.53 x10     3.75-5.20                 

 

             Hgb (test code = Hgb) 13.5 g/dL    12.2-14.8                 

 

             Hct (test code = Hct) 41.3 %       36.5-44.4                 

 

             MCV (test code = MCV) 91.20 fL     80..00              

 

             MCHC (test code = 32.70 g/dL   32.00-37.50               



             MCHC)                                               

 

             RDW CV (test code = 13.5 %       11.5-14.5                 



             RDW CV)                                             

 

             MCH (test code = MCH) 29.8 pg      27.0-32.5                 

 

             Platelets (test code = 462.0 x10    140.0-440.0  H            



             Platelets)                                          

 

             MPV (test code = MPV) 9.9 fL                    N            

 

             Slide Review (test Auto         Auto                      Result cr

eated by



             code = Slide Review)                                        [email or web address]_

[email or web address]_AUTO

 

             nRBC (test code = 0                         N            



             nRBC)                                               

 

             NRBC Abs (test code = 0.00 x10                  N            



             NRBC Abs)                                           

 

             IPF (test code = IPF) 0 %                       N            



Automated [id number] 00:56:12





             Test Item    Value        Reference Range Interpretation Comments

 

             Neutro Auto (test code = Neutro 83.7 %       36.0-70.0    H        

    



             Auto)                                               

 

             Lymph Auto (test code = Lymph Auto) 8.9 %        12.0-44.0    L    

        

 

             Mono Auto (test code = Mono Auto) 5.0 %        0.0-11.0            

      

 

             Eos, Auto (test code = Eos, Auto) 1.4 %        0.0-7.0             

      

 

             Basophil Auto (test code = Basophil 0.7 %        0.0-2.0           

        



             Auto)                                               

 

             Neutro Absolute (test code = Neutro 16.2 x10     1.6-7.4      H    

        



             Absolute)                                           

 

             Lymph Absolute (test code = Lymph 1.73 x10     .50-4.60            

      



             Absolute)                                           

 

             Mono Absolute (test code = Mono .96 x10      .00-1.20              

    



             Absolute)                                           

 

             Eos Absolute (test code = Eos 0.27 x10     0.00-0.74               

  



             Absolute)                                           

 

             Baso Absolute (test code = Baso 0.13 x10     0.00-0.21             

    



             Absolute)                                           



IG [id number] 00:56:12





             Test Item    Value        Reference Range Interpretation Comments

 

             IG (test code = IG) 0.3 %        0.0-5.0                   

 

             IG Abs (test code = IG Abs) 0 x10                     N            



Urinalysis with Culture, if [id number] 00:55:34





             Test Item    Value        Reference Range Interpretation Comments

 

             UA Color (test code = STRAW        Yellow                    



             UA Color)                                           

 

             UA Appear (test code = CLEAR        Clear                     



             UA Appear)                                          

 

             UA pH (test code = UA 5                         N            



             pH)                                                 

 

             UA Spec Grav (test code 1.001        1.001-1.035               



             = UA Spec Grav)                                        

 

             UA Glucose (test code = NEG          Negative                  



             UA Glucose)                                         

 

             UA Bili (test code = UA NEG          Negative                  



             Bili)                                               

 

             UA Ketones (test code = NEG          Negative                  



             UA Ketones)                                         

 

             UA Blood (test code = NEG          Negative                  



             UA Blood)                                           

 

             UA Protein (test code = NEG          Negative                  



             UA Protein)                                         

 

             UA Urobilinogen (test 0.2 mg/dL                 N            



             code = UA Urobilinogen)                                        

 

             UA Nitrite (test code = POS          Negative     A            



             UA Nitrite)                                         

 

             UA Leuk Est (test code 25 cells/mcL Negative     A            



             = UA Leuk Est)                                        

 

             UA Micro Ind? (test Indicated    Not Indicated A            Result 

created by



             code = UA Micro Ind?)                                        rule



                                                                 [email or web address]_MICRO

_IN



                                                                 D



Urine [id number] 08:13:23





             Test Item    Value        Reference Range Interpretation Comments

 

             ORGANISM (test code = Escherichia coli                           



             ORGANISM)                                           

 

             Amikacin (test code =                           S            



             Amik)                                               

 

             Ampicillin (test code =                           S            



             Amp)                                                

 

             Ampicillin/Sulbactam                           S            



             (test code = Amp/Sul)                                        

 

             Cefazolin (test code =                           S            



             Cefaz)                                              

 

             Cefepime (test code =                           S            



             Cefep)                                              

 

             Cefotaxime (test code =                           S            



             Cefo)                                               

 

             Ceftazidime (test code =                           S            



             Ceftaz)                                             

 

             Ceftriaxone (test code =                           S            



             Ceftri)                                             

 

             Cefuroxime (test code =                           S            



             Cefur)                                              

 

             Ciprofloxacin (test code                           S            



             = Cipro)                                            

 

             Gentamicin (test code =                           S            



             Gent)                                               

 

             Imipenem (test code =                           S            



             Imi)                                                

 

             Levofloxacin (test code                           S            



             = Levo)                                             

 

             Meropenem (test code =                           S            



             Sindi)                                               

 

             Nitrofurantoin (test                           S            



             code = Nitro)                                        

 

             Piperacillin/Tazobactam                           S            



             (test code = Pip/Blaine)                                        

 

             Tobramycin (test code =                           S            



             Tobra)                                              

 

             Trimethoprim/Sulfa (test                           S            



             code = SXT)                                         

 

             Final Report (test code 30,000 cfu/ml                           



             = Final Report) Escherichia coli                           



                          20,000 cfu/ml                           



                          Diphtheroids                           



 C Urine Added by [email or web address] Qualitative2019-09-15 04:57:25





             Test Item    Value        Reference Range Interpretation Comments

 

             RPR Qual (test code = RPR Qual) Non-Reactive Non-Reactive          

    

 

             Reactive Control (test code = Reactive                             

  



             Reactive Control)                                        

 

             Weak Reactive Control (test Weak Reactive                          

 



             code = Weak Reactive Control)                                      

  

 

             Non-Reactive Control (test code Non-Reactive                       

    



             = Non-Reactive Control)                                        

 

             Lot # (test code = Lot #) [id number]                    N            

 

             Expiration Dt (test code = 10.31.2020                N            



             Expiration Dt)                                        



Thyroid Stimulating Hormone2019-09-15 01:22:43





             Test Item    Value        Reference Range Interpretation Comments

 

             TSH (test code = TSH) 3.370 mIU/mL 0.270-4.200               



Lipid Panel2019-09-15 01:17:51





             Test Item    Value        Reference Range Interpretation Comments

 

             Cholesterol Total 168 mg/dL    0-200                     RISK OF HE

ART



             (test code =                                        DISEASEPublishe

d by



             Cholesterol Total)                                        American 

Heart



                                                                 Association Judith

lyte



                                                                 Optimal Borderl

ine



                                                                 Increased RiskC

HOL <200



                                                                 200-239 >240TRI

G <150



                                                                 150-199 >200HDL

 Male



                                                                 >60 <40HDL Fema

le >60



                                                                 <50LDL <100 130

-159



                                                                 >160LDL Near op

timal is



                                                                 100-129

 

             Triglycerides (test 108 mg/dL    9-200                     



             code = Triglycerides)                                        

 

             HDL (test code = HDL) 46 mg/dL     50-60        L            

 

             LDL (test code = LDL) 100 mg/dL    0-130                     The eq

uation being used



                                                                 in this calcula

tion is



                                                                 LDL = (Chol - H

DL) -



                                                                 (Trig / 5)

 

             VLDL (test code = 22 mg/dL     5-40                      The equati

on being used



             VLDL)                                               in this calcula

tion is



                                                                 VLDL = Trig / 5

 

             Chol/HDL (test code = 3.7 ratio    0.0-4.4                   



             Chol/HDL)                                           

 

             LDL/HDL Ratio (test 2                         N            The equa

tion being used



             code = LDL/HDL Ratio)                                        in thi

s calculation is



                                                                 LDL/HDL Ratio=L

DL



                                                                 Calc/HDL Chol



Lithium Level2019-09-15 01:17:51





             Test Item    Value        Reference Range Interpretation Comments

 

             Lithium Level (test code = 0.81 mmol/L  0.60-1.20                 



             Lithium Level)                                        



Comprehensive Metabolic Panel2019-09-15 00:04:54





             Test Item    Value        Reference Range Interpretation Comments

 

             Sodium Level (test code = Sodium 137.0 mmol/L 135.0-145.0          

     



             Level)                                              

 

             Potassium Level (test code = 4.0 mmol/L   3.5-5.1                  

 



             Potassium Level)                                        

 

             Chloride Level (test code = 103 mmol/L                       



             Chloride Level)                                        

 

             CO2 (test code = CO2) 23 mmol/L    22-29                     

 

             Anion Gap (test code = Anion 11 mmol/L    7-16                     

 



             Gap)                                                

 

             BUN (test code = BUN) 11.50 mg/dL  6.00-20.00                

 

             Creatinine Level (test code = 1.00 mg/dL   0.50-0.90    H          

  



             Creatinine Level)                                        

 

             BUN/Creat Ratio (test code = 12                        N           

 



             BUN/Creat Ratio)                                        

 

             Glucose Level (test code = 108 mg/dL                        



             Glucose Level)                                        

 

             Calcium Level (test code = 10.3 mg/dL   8.3-10.5                  



             Calcium Level)                                        

 

             Alk Phos (test code = Alk Phos) 93 U/L                       

    

 

             Bilirubin Total (test code = 0.5 mg/dL    0.1-0.9                  

 



             Bilirubin Total)                                        

 

             Albumin Level (test code = 4.4 g/dL     3.5-5.2                   



             Albumin Level)                                        

 

             Protein Total (test code = 7.2 g/dL     6.4-8.3                   



             Protein Total)                                        

 

             ALT (test code = ALT) 12 U/L       1-33                      

 

             AST (test code = AST) 17 U/L       1-32                      

 

             Globulin (test code = Globulin) 2.8 g/dL     2.9-3.1      L        

    

 

             A/G Ratio (test code = A/G 1.6 ratio                 N            



             Ratio)                                              



Alcohol Level2019-09-15 00:04:54





             Test Item    Value        Reference Range Interpretation Comments

 

             Ethanol Level (test <0.00 g/dL   0.00-0.01                 Intoxica

gianfranco 0.080 g/dL



             code = Ethanol                                        or more



             Level)                                              

 

             Ethanol Inst (test <0                        N            



             code = Ethanol Inst)                                        



Comprehensive Metabolic Panel2019-09-15 00:04:54





             Test Item    Value        Reference Range Interpretation Comments

 

             Sodium Level (test 137.0 mmol/L 135.0-145.0               



             code = Sodium Level)                                        

 

             Potassium Level 4.0 mmol/L   3.5-5.1                   



             (test code =                                        



             Potassium Level)                                        

 

             Chloride Level (test 103 mmol/L                       



             code = Chloride                                        



             Level)                                              

 

             CO2 (test code = 23 mmol/L    22-29                     



             CO2)                                                

 

             Anion Gap (test code 11 mmol/L    7-16                      



             = Anion Gap)                                        

 

             BUN (test code = 11.50 mg/dL  6.00-20.00                



             BUN)                                                

 

             Creatinine Level 1.00 mg/dL   0.50-0.90    H            



             (test code =                                        



             Creatinine Level)                                        

 

             BUN/Creat Ratio 12                        N            



             (test code =                                        



             BUN/Creat Ratio)                                        

 

             Glucose Level (test 108 mg/dL                        



             code = Glucose                                        



             Level)                                              

 

             Calcium Level (test 10.3 mg/dL   8.3-10.5                  



             code = Calcium                                        



             Level)                                              

 

             Alk Phos (test code 93 U/L                           



             = Alk Phos)                                         

 

             Bilirubin Total 0.5 mg/dL    0.1-0.9                   



             (test code =                                        



             Bilirubin Total)                                        

 

             Albumin Level (test 4.4 g/dL     3.5-5.2                   



             code = Albumin                                        



             Level)                                              

 

             Protein Total (test 7.2 g/dL     6.4-8.3                   



             code = Protein                                        



             Total)                                              

 

             ALT (test code = 12 U/L       1-33                      



             ALT)                                                

 

             AST (test code = 17 U/L       1-32                      



             AST)                                                

 

             Globulin (test code 2.8 g/dL     2.9-3.1      L            



             = Globulin)                                         

 

             A/G Ratio (test code 1.6 ratio                 N            



             = A/G Ratio)                                        

 

             eGFR AA (test code = >60                       N            eGFR (e

stimated



             eGFR AA)     mL/min/1.73 m2                           Glomerular



                                                                 Filtration Rate

) is



                                                                 an estimated va

lue,



                                                                 calculated from

 the



                                                                 patient's serum



                                                                 creatinine usin

g the



                                                                 MDRD equation. 

It is



                                                                 NOT the patient

's



                                                                 actual GFR. The

 eGFR



                                                                 provides a more



                                                                 clinically usef

ul



                                                                 measure of kidn

ey



                                                                 disease than se

rum



                                                                 creatinine



                                                                 alone.***This



                                                                 calculation rimma

es



                                                                 sex and race in

to



                                                                 account, if the



                                                                 information is



                                                                 provided. If th

e



                                                                 race is not



                                                                 provided, and t

he



                                                                 patient is



                                                                 -Crystal

n,



                                                                 multiply by 1.2

12.



                                                                 If sex is not



                                                                 provided, and t

he



                                                                 patient is fema

le,



                                                                 multiply by 0.7

42.



                                                                 Results for pat

ients



                                                                 <18 years of ag

e



                                                                 have not been



                                                                 validated by th

e



                                                                 MDRD study and



                                                                 should be



                                                                 interpreted wit

h



                                                                 caution. eGFR R

esult



                                                                 Interpretation:

eGFR



                                                                 > or = 60 is in

 the



                                                                 Normal RangeeGF

R <



                                                                 60 may mean kid

hang



                                                                 diseaseeGFR < 1

5 may



                                                                 mean kidney



                                                                 failure*** Rang

es



                                                                 recommended by 

the



                                                                 [employer]



                                                                 Foundation,



                                                                 http://nkdep.ni

h.gov



Comprehensive Metabolic Panel9-09-15 00:04:54





             Test Item    Value        Reference Range Interpretation Comments

 

             Sodium Level (test 137.0 mmol/L 135.0-145.0               



             code = Sodium Level)                                        

 

             Potassium Level 4.0 mmol/L   3.5-5.1                   



             (test code =                                        



             Potassium Level)                                        

 

             Chloride Level (test 103 mmol/L                       



             code = Chloride                                        



             Level)                                              

 

             CO2 (test code = 23 mmol/L    22-29                     



             CO2)                                                

 

             Anion Gap (test code 11 mmol/L    7-16                      



             = Anion Gap)                                        

 

             BUN (test code = 11.50 mg/dL  6.00-20.00                



             BUN)                                                

 

             Creatinine Level 1.00 mg/dL   0.50-0.90    H            



             (test code =                                        



             Creatinine Level)                                        

 

             BUN/Creat Ratio 12                        N            



             (test code =                                        



             BUN/Creat Ratio)                                        

 

             Glucose Level (test 108 mg/dL                        



             code = Glucose                                        



             Level)                                              

 

             Calcium Level (test 10.3 mg/dL   8.3-10.5                  



             code = Calcium                                        



             Level)                                              

 

             Alk Phos (test code 93 U/L                           



             = Alk Phos)                                         

 

             Bilirubin Total 0.5 mg/dL    0.1-0.9                   



             (test code =                                        



             Bilirubin Total)                                        

 

             Albumin Level (test 4.4 g/dL     3.5-5.2                   



             code = Albumin                                        



             Level)                                              

 

             Protein Total (test 7.2 g/dL     6.4-8.3                   



             code = Protein                                        



             Total)                                              

 

             ALT (test code = 12 U/L       1-33                      



             ALT)                                                

 

             AST (test code = 17 U/L       1-32                      



             AST)                                                

 

             Globulin (test code 2.8 g/dL     2.9-3.1      L            



             = Globulin)                                         

 

             A/G Ratio (test code 1.6 ratio                 N            



             = A/G Ratio)                                        

 

             eGFR AA (test code = >60                       N            eGFR (e

stimated



             eGFR AA)     mL/min/1.73 m2                           Glomerular



                                                                 Filtration Rate

) is



                                                                 an estimated va

lue,



                                                                 calculated from

 the



                                                                 patient's serum



                                                                 creatinine usin

g the



                                                                 MDRD equation. 

It is



                                                                 NOT the patient

's



                                                                 actual GFR. The

 eGFR



                                                                 provides a more



                                                                 clinically usef

ul



                                                                 measure of kidn

ey



                                                                 disease than se

rum



                                                                 creatinine



                                                                 alone.***This



                                                                 calculation rimma

es



                                                                 sex and race in

to



                                                                 account, if the



                                                                 information is



                                                                 provided. If th

e



                                                                 race is not



                                                                 provided, and t

he



                                                                 patient is



                                                                 -Crystal

n,



                                                                 multiply by 1.2

12.



                                                                 If sex is not



                                                                 provided, and t

he



                                                                 patient is fema

le,



                                                                 multiply by 0.7

42.



                                                                 Results for pat

ients



                                                                 <18 years of ag

e



                                                                 have not been



                                                                 validated by th

e



                                                                 MDRD study and



                                                                 should be



                                                                 interpreted wit

h



                                                                 caution. eGFR R

esult



                                                                 Interpretation:

eGFR



                                                                 > or = 60 is in

 the



                                                                 Normal RangeeGF

R <



                                                                 60 may mean kid

hang



                                                                 diseaseeGFR < 1

5 may



                                                                 mean kidney



                                                                 failure*** Rang

es



                                                                 recommended by 

the



                                                                 [employer]



                                                                 [employer],



                                                                 http://nkdep.ni

h.gov

 

             eGFR Non-AA (test 58.45                     N            eGFR (sheba

mated



             code = eGFR Non-AA) mL/min/1.73 m2                           Glomer

ular



                                                                 Filtration Rate

) is



                                                                 an estimated va

lue,



                                                                 calculated from

 the



                                                                 patient's serum



                                                                 creatinine usin

g the



                                                                 MDRD equation. 

It is



                                                                 NOT the patient

's



                                                                 actual GFR. The

 eGFR



                                                                 provides a more



                                                                 clinically usef

ul



                                                                 measure of kidn

ey



                                                                 disease than se

rum



                                                                 creatinine



                                                                 alone.***This



                                                                 calculation rimma

es



                                                                 sex and race in

to



                                                                 account, if the



                                                                 information is



                                                                 provided. If th

e



                                                                 race is not



                                                                 provided, and t

he



                                                                 patient is



                                                                 -Crystal

n,



                                                                 multiply by 1.2

12.



                                                                 If sex is not



                                                                 provided, and t

he



                                                                 patient is fema

le,



                                                                 multiply by 0.7

42.



                                                                 Results for pat

ients



                                                                 <18 years of ag

e



                                                                 have not been



                                                                 validated by th

e



                                                                 MDRD study and



                                                                 should be



                                                                 interpreted wit

h



                                                                 caution. eGFR R

esult



                                                                 Interpretation:

eGFR



                                                                 > or = 60 is in

 the



                                                                 Normal RangeeGF

R <



                                                                 60 may mean kid

hang



                                                                 diseaseeGFR < 1

5 may



                                                                 mean kidney



                                                                 failure*** Rang

es



                                                                 recommended by 

the



                                                                 [employer]



                                                                 [employer],



                                                                 http://nkdep.ni

h.gov



Urinalysis Microscopic2019-09-15 00:02:53





             Test Item    Value        Reference Range Interpretation Comments

 

             UA WBC (test code = UA WBC) 6-10         0-5          A            

 

             UA RBC (test code = UA RBC) 0-5          0-5                       

 

             UA Bacteria (test code = UA Bacteria) Many                      A  

          

 

             UA Squam Epithelial (test code = UA TNTC                      A    

        



             Squam Epithelial)                                        



Urine Drug [id number] 23:59:42





             Test Item    Value        Reference Range Interpretation Comments

 

             Amphetamine Screen Ur POSITIVE     Negative     A            



             (test code = Amphetamine                                        



             Screen Ur)                                          

 

             Barbiturate Screen Ur Negative     Negative                  



             (test code = Barbiturate                                        



             Screen Ur)                                          

 

             Benzodiazepines Ur (test POSITIVE     Negative     A            



             code = Benzodiazepines                                        



             Ur)                                                 

 

             Cocaine Screen Ur (test Negative     Negative                  



             code = Cocaine Screen                                        



             Ur)                                                 

 

             U Methadone Scr (test Negative     Negative                  



             code = U Methadone Scr)                                        

 

             Opiate Screen Ur (test Negative     Negative                  



             code = Opiate Screen Ur)                                        

 

             U PCP Scrn (test code = Negative     Negative                  



             U PCP Scrn)                                         

 

             Cannabinoid Screen Ur Negative     Negative                  



             (test code = Cannabinoid                                        



             Screen Ur)                                          

 

             U TCA (test code = U Negative     Negative                  The res

ults of all



             TCA)                                                drug screen elinor

ts are



                                                                 only preliminar

y.



                                                                 Clinical



                                                                 consideration a

nd



                                                                 professional ju

dgment



                                                                 should be appli

ed to



                                                                 any drug of abu

se



                                                                 test result,



                                                                 particularly wh

en



                                                                 preliminary pos

itive



                                                                 results are obt

ained.



                                                                 Please order a



                                                                 separate confir

matory



                                                                 test if desired

.



HCG Qualitative [id number] 23:54:43





             Test Item    Value        Reference Range Interpretation Comments

 

             hCG Ur (test code = Negative                               If the r

esult is



             hCG Ur)                                             "Negative" in p

atients



                                                                 suspected to be



                                                                 pregnant, recom

mend



                                                                 retest with a s

ample



                                                                 obtained 48 to 

72



                                                                 hours later, or

 by



                                                                 ordering a



                                                                 quantitative as

say. If



                                                                 the result is



                                                                 "Borderline" te

sting



                                                                 should be repea

gianfranco in



                                                                 48 to 72 hours.

 

             Lot # (test code = [id number]                    N            



             Lot #)                                              

 

             Expiration Dt (test [id number]                  N            



             code = Expiration Dt)                                        

 

             Neg Control (test Negative                               



             code = Neg Control)                                        

 

             Pos Control (test Positive                               



             code = Pos Control)                                        

 

             Internal QC (test Acceptable                             



             code = Internal QC)                                        



Urinalysis with Culture, if [id number] 23:52:04





             Test Item    Value        Reference Range Interpretation Comments

 

             UA Color (test code = UA YELLO        Yellow                    



             Color)                                              

 

             UA Appear (test code = SCLD         Clear        A            



             UA Appear)                                          

 

             UA pH (test code = UA 6.5                                    



             pH)                                                 

 

             UA Spec Grav (test code 1.011        1.001-1.035               



             = UA Spec Grav)                                        

 

             UA Glucose (test code = NEG          Negative                  



             UA Glucose)                                         

 

             UA Bili (test code = UA NEG          Negative                  



             Bili)                                               

 

             UA Ketones (test code = NEG          Negative                  



             UA Ketones)                                         

 

             UA Blood (test code = UA NEG          Negative                  



             Blood)                                              

 

             UA Protein (test code = NEG          Negative                  



             UA Protein)                                         

 

             UA Urobilinogen (test 1 mg/dL      >0.2         A            



             code = UA Urobilinogen)                                        

 

             UA Nitrite (test code = POS          Negative     A            



             UA Nitrite)                                         

 

             UA Leuk Est (test code = NEG          Negative                  



             UA Leuk Est)                                        

 

             UA Micro Ind? (test code Indicated    Not Indicated A            Re

sult created by



             = UA Micro Ind?)                                        rule



                                                                 [id number]

_IND



Automated [id number] 23:48:39





             Test Item    Value        Reference Range Interpretation Comments

 

             Neutro Auto (test code = Neutro 75.7 %       36.0-70.0    H        

    



             Auto)                                               

 

             Lymph Auto (test code = Lymph Auto) 14.1 %       12.0-44.0         

        

 

             Mono Auto (test code = Mono Auto) 7.6 %        0.0-11.0            

      

 

             Eos, Auto (test code = Eos, Auto) 1.8 %        0.0-7.0             

      

 

             Basophil Auto (test code = Basophil 0.5 %        0.0-2.0           

        



             Auto)                                               

 

             Neutro Absolute (test code = Neutro 12.7 x10     1.6-7.4      H    

        



             Absolute)                                           

 

             Lymph Absolute (test code = Lymph 2.38 x10     .50-4.60            

      



             Absolute)                                           

 

             Mono Absolute (test code = Mono 1.28 x10     .00-1.20     H        

    



             Absolute)                                           

 

             Eos Absolute (test code = Eos 0.31 x10     0.00-0.74               

  



             Absolute)                                           

 

             Baso Absolute (test code = Baso 0.09 x10     0.00-0.21             

    



             Absolute)                                           



IG [id number] 23:48:39





             Test Item    Value        Reference Range Interpretation Comments

 

             IG (test code = IG) 0.3 %        0.0-5.0                   

 

             IG Abs (test code = IG Abs) 0 x10                     N            



Complete Blood Count with [id number] 23:48:38





             Test Item    Value        Reference Range Interpretation Comments

 

             WBC (test code = WBC) 16.8 x10     4.4-10.5     H            

 

             RBC (test code = RBC) 4.06 x10     3.75-5.20                 

 

             Hgb (test code = Hgb) 12.7 g/dL    12.2-14.8                 

 

             MCV (test code = MCV) 94.10 fL     80..00              

 

             Hct (test code = Hct) 38.2 %       36.5-44.4                 

 

             MCHC (test code = 33.20 g/dL   32.00-37.50               



             MCHC)                                               

 

             RDW CV (test code = 13.2 %       11.5-14.5                 



             RDW CV)                                             

 

             MCH (test code = MCH) 31.3 pg      27.0-32.5                 

 

             Platelets (test code = 363.0 x10    140.0-440.0               



             Platelets)                                          

 

             MPV (test code = MPV) 10.0 fL                   N            

 

             Slide Review (test Auto         Auto                      Result cr

eated by



             code = Slide Review)                                        [email or web address]

[email or web address]_AUTO

 

             nRBC (test code = 0                         N            



             nRBC)                                               

 

             NRBC Abs (test code = 0.00 x10                  N            



             NRBC Abs)                                           

 

             IPF (test code = IPF) 0 %                       N            



RPR, [id number] 05:53:00





             Test Item    Value        Reference Range Interpretation Comments

 

             RPR (test code = RPR) Non-Reactive Non-Reactive N            



BHCG, Serum, [id number] 01:39:00





             Test Item    Value        Reference Range Interpretation Comments

 

             Preg Qual [Se] (test code = BSHCG) Negative     Negative     N     

       



BHCG, Serum, [id number] 01:09:00





             Test Item    Value        Reference Range Interpretation Comments

 

             B hCG, Quant (test <1 mIU/mL                              Weeks of 

Gestation



             code = BHCGQT)                                        Ranges (mIU/m

L)3 weeks



                                                                 5.40 - 72.04 we

eks 10.2



                                                                 - 7085 weeks 21

7 - 55254



                                                                 weeks 152 - 321

777 weeks



                                                                 4059 - 4271311 

weeks



                                                                 76941 - 5775298

 weeks



                                                                 78258 - 9588339

0 weeks



                                                                 09376 - 2279977

2 weeks



                                                                 08424 - 3908300

4 weeks



                                                                 51424 - 8499236

 weeks



                                                                 52570 - 1010298

 weeks



                                                                 1404 - 4535322 

weeks



                                                                 8240 - 8633678 

weeks



                                                                 1449 - 71188



Thyroid Stimulating Hormone (TSH)2017 01:09:00





             Test Item    Value        Reference Range Interpretation Comments

 

             TSH (test code = TSH) 0.93 mIU/mL  0.270-4.200  N            



Lipid [id number] 01:05:00





             Test Item    Value        Reference Range Interpretation Comments

 

             Cholesterol (test 163 mg/dL    0-200        N            



             code = CHOL)                                        

 

             Triglycerides (test 108 mg/dL    9-200        N            



             code = TRIG)                                        

 

             HDL (test code = 41 mg/dL     50-60        L            



             HDL)                                                

 

             Chol/HDL (test code 4.0 Ratio    0.0-4.4      N            



             = CHOLPHDL)                                         

 

             LDL, Calculated 100          0-130        N            (NOTE)RISK O

F HEART



             (test code = LDLC)                                        DISEASEPu

blished by



                                                                 [employer] [facility]



                                                                 AssociationAnal

yte



                                                                 Optimal Boderli

ne



                                                                 Increased RiskC

HOL <200



                                                                 200-239 >240TRI

G <150



                                                                 150-199 >200HDL

 Male:



                                                                 >60 <40HDL Fema

le: >60



                                                                 <50LDL <100  13

0-159



                                                                 >160LDL NEAR [facility] IS



                                                                 100-129

 

             VLDL (test code = 22 mg/dL     5-40         N            



             VLDL)                                               

 

             LDL/HDL (test code = 2                                      



             LDLPHDL)                                            



Comprehensive Metabolic [id number] 21:02:00





             Test Item    Value        Reference Range Interpretation Comments

 

             Sodium (test code = 140 mmol/L   135-145      N            



             NA)                                                 

 

             Potassium (test 3.6 mmol/L   3.5-5.1      N            



             code = K)                                           

 

             Chloride (test code 103 mmol/L          N            



             = CL)                                               

 

             Carbon Dioxide 26 mmol/L    22-29        N            



             (test code = CO2)                                        

 

             Glucose (test code 89 mg/dL            N            



             = GLU)                                              

 

             Blood Urea Nitrogen 13 mg/dL     6-20         N            



             (test code = BUN)                                        

 

             Creatinine (test 0.8 mg/dL    0.5-0.9      N            



             code = CREAT)                                        

 

             Calcium (test code 10.0 mg/dL   8.3-10.5     N            



             = CA)                                               

 

             Prot Total (test 7.0 g/dL     6.4-8.3      N            



             code = TP)                                          

 

             Albumin (test code 4.2 g/dL     3.5-5.2      N            



             = ALB)                                              

 

             A/G Ratio (test 1.5 Ratio                              



             code = AGRATIO)                                        

 

             Globulin (test code 2.8          2.9-3.1      L            



             = GLOB)                                             

 

             Bili Total (test 0.6 mg/dL    0.1-0.9      N            



             code = TBIL)                                        

 

             Alk Phos (test code 91 U/L              N            



             = APHOS)                                            

 

             AST (test code = 19 U/L       1-32         N            



             AST)                                                

 

             ALT (test code = 14 U/L       1-33         N            



             ALT)                                                

 

             BUN/Creatinine 16.3                                   



             Ratio (test code =                                        



             BCRATIO)                                            

 

             Anion Gap (test 11 mmol/L    7-16         N            



             code = AGAP)                                        

 

             Estimated GFR (test >60                                    eGFR (es

timated



             code = GFR)  mL/min/1.73m2                           Glomerular Nguyễn

tration



                                                                 Rate) is an est

imated



                                                                 value,calculate

d from



                                                                 the patient's s

harman



                                                                 creatinine usin

g the



                                                                 MDRD equation.I

t is



                                                                 NOT the patient

's



                                                                 actual GFR. The

 eGFR



                                                                 provides a more



                                                                 clinicallyusefu

l



                                                                 measure of kidn

ey



                                                                 disease than se

rum



                                                                 creatinine



                                                                 alone.***This



                                                                 calculation rimam

es sex



                                                                 and race into



                                                                 account, if the



                                                                 informationis



                                                                 provided. If th

e race



                                                                 is not provided

, and



                                                                 the patient



                                                                 isAfrican-Ameri

can,



                                                                 multiply by 1.2

12. If



                                                                 sex is not prov

ided,



                                                                 and thepatient 

is



                                                                 female, multipl

y by



                                                                 0.742. Results 

for



                                                                 patients <18 ye

ars



                                                                 ofage have not 

been



                                                                 validated by th

e MDRD



                                                                 study and shoul

d be



                                                                 interpretedwith



                                                                 caution.eGFR Re

sult



                                                                 Interpretation:

eGFR >



                                                                 or = 60 is in t

he



                                                                 Normal RangeeGF

R < 60



                                                                 may mean kidney



                                                                 diseaseeGFR < 1

5 may



                                                                 mean kidney



                                                                 failure***Range

s



                                                                 recommended by 

the



                                                                 [employer]



                                                                 Foundation,http

://nkd



                                                                 ep.nih.gov



Alcohol/Ethanol, [id number] 21:02:00





             Test Item    Value        Reference Range Interpretation Comments

 

             Alcohol, Ethyl <0.01 g/dL   0.00-0.01    N            Intoxicated 0

.080 g/dL



             (test code = ETOH)                                        or more



CBC with [id number] 20:35:00





             Test Item    Value        Reference Range Interpretation Comments

 

             WBC (test code = WBC) 10.3 K/cumm  4.4-10.5     N            

 

             RBC (test code = RBC) 4.37 M/cumm  3.75-5.20    N            

 

             Hemoglobin (test code = HGB) 13.1 gm/dL   12.2-14.8    N           

 

 

             Hematocrit (test code = HCT) 41.5 %       36.5-44.4    N           

 

 

             MCV (test code = MCV) 94.9 fL             N            

 

             MCH (test code = MCH) 30.1 pg      27.0-32.5    N            

 

             MCHC (test code = MCHC) 31.7 g/dL    32.0-37.5    L            

 

             RDW (test code = RDW) 12.9 %       11.5-14.5    N            

 

             Platelet Count (test code = 327 K/cumm   140-440      N            



             PLTCT)                                              

 

             MPV (test code = MPV) 7.0 fL                                 

 

             Diff Method (test code = DIFFM) Auto                               

    

 

             Neutrophil (test code = NEUT) 74.0 %       36-70        H          

  

 

             Lymphocyte (test code = LYMPH) 17.6 %       12-44        N         

   

 

             Monocyte (test code = MONO) 6.4 %        0-11         N            

 

             Eosinophil (test code = EOS) 1.5 %        0-7          N           

 

 

             Basophil (test code = BASO) 0.5 %        0-2          N            

 

             Neutro Abs (test code = ANEUT) 7.6 K/cumm   1.6-7.4      H         

   

 

             Lymph Abs (test code = ALYMPH) 1.8 K/cumm   0.5-4.6      N         

   

 

             Mono Abs (test code = AMONO) 0.7 K/cumm   0.0-1.2      N           

 

 

             Eos Abs (test code = AEOS) 0.16 K/cumm  0.00-0.74    N            

 

             Baso Abs (test code = ABASO) 0.1 K/cumm   0.00-0.21    N           

 



[id number] 20:33:00





             Test Item    Value        Reference Range Interpretation Comments

 

             Amphetamine (test code POSITIVE     Negative     A            For d

iagnostic purposes



             = AMPH)                                             only, positive 

results



                                                                 should always b

e



                                                                 assessedin



                                                                 conjunctionwith

 the



                                                                 patient's medic

al



                                                                 history,clinica

l



                                                                 examination and



                                                                 otherfindings.T

o



                                                                 fulfill legal



                                                                 requirements, a

 more



                                                                 specific altern

ate



                                                                 chemical method

must be



                                                                 used inorder to

 obtain



                                                                 a Confirmed judith

lytical



                                                                 result. GC/MS i

s the



                                                                 preferred confi

rmatory



                                                                 method.

 

             Barbiturates (test Negative     Negative     N            



             code = GENEVIEVE)                                        

 

             Benzodiazepine (test Negative     Negative     N            



             code = IRENE)                                        

 

             Cocaine (test code = Negative     Negative     N            



             COCA)                                               

 

             Methadone (test code = Negative     Negative     N            



             MTHD)                                               

 

             Opiates (test code = Negative     Negative     N            



             OPIA)                                               

 

             PCP (test code = PCP) Negative     Negative     N            

 

             Propoxyphene (test Negative     Negative     N            



             code = PROPOX)                                        

 

             THC (test code = THC) Negative     Negative     N            



Urinalysis [id number] 20:23:00





             Test Item    Value        Reference Range Interpretation Comments

 

             Color (test code = COLOR) Yellow       Yellow,Straw,Pl N           

 



                                       yellow                    

 

             Clarity (test code = Sl Cloudy    Clear        A            



             CLAR)                                               

 

             Specific Gravity (test 1.007        1.001-1.035  N            



             code = SPGR)                                        

 

             pH (test code = PH) 6.0          5.0-9.0      N            

 

             Ketone (test code = KET) Negative mg/dL Negative     N            

 

             Glucose (test code = Negative mg/dL Negative     N            



             GLUCUR)                                             

 

             Protein (test code = Negative mg/dL Negative     N            



             PROT)                                               

 

             Bilirubin (test code = Negative mg/dL Negative     N            



             BILI)                                               

 

             Occult Blood (test code = Moderate     Negative     A            



             UDOB)                                               

 

             Urobilinogen (test code = 0.2 mg/dL    0.2-1.0      N            



             UROB)                                               

 

             Nitrite (test code = NIT) Positive     Negative     A            

 

             Leuk Esterase (test code Moderate     Negative     A            



             = LEUK)                                             

 

             Micros Exam (test code = Indicated                              



             MEXAM)                                              

 

             Epithelial Cells (test 50+ /LPF     0-30         A            



             code = EPI)                                         

 

             WBC, Urine (test code = 41-50 /HPF   0-5          A            



             UWBC)                                               

 

             RBC, Urine (test code = 3-5 /HPF     0-5          A            



             URBC)                                               

 

             Bacteria (test code = Many /HPF                              



             BACT)                                               



[id number] 16:21:00





             Test Item    Value        Reference Range Interpretation Comments

 

             Lithium (test code = LI) 0.6 mmol/l   0.6-1.2                   



[facility] (Ultra Sensitive)2017 16:21:00





             Test Item    Value        Reference Range Interpretation Comments

 

             TSH (test code = TSH) 2.14 mIU/L   0.47-4.68                 



[facility]





             Test Item    Value        Reference Range Interpretation Comments

 

             Glucose (test code 77 mg/dl                         



             = GLU)                                              

 

             BUN (test code = 9.0 mg/dl    6.0-17.0                  



             BUN)                                                

 

             Creatinine (test 0.7 mg/dl    0.4-1.2                   



             code = CREA)                                        

 

             Sodium (test code = 139 mmol/l   137-145                   



             NA)                                                 

 

             Potassium (test 4.7 mmol/l   3.5-5.0                   



             code = K)                                           

 

             Chloride (test code 107 mmol/l                       



             = CL)                                               

 

             CO2 (test code = 22 mmol/l    22-30                     



             CO2)                                                

 

             Anion Gap (test 11                                     



             code = GAP)                                         

 

             Calcium (test code 9.8 mg/dl    8.4-10.2                  



             = CALC)                                             

 

             T Protein (test 8.0 gm/dl    5.1-8.7                   



             code = TP)                                          

 

             Albumin (test code 4.4 gm/dl    3.5-4.6                   



             = ALB)                                              

 

             A/G Ratio (test 1.2 %        1.1-2.2                   



             code = AGRAT)                                        

 

             AST (SGOT) (test 20 U/L       11-36                     



             code = AST)                                         

 

             ALT (SGPT) (test 29 U/L       11-40                     



             code = ALT)                                         

 

             Alkaline Phos (test 108 U/L                          



             code = ALKP)                                        

 

             Total Bilirubin 0.6 mg/dl    0.2-1.2                   



             (test code = TBIL)                                        

 

             Globulin (test code 3.6 gm/dl    2.3-3.5      H            



             = GLOBU)                                            

 

             Calcium, Corrected 9.5 mg/dl    8.4-10.2                  Various f

ormulas exist



             (test code =                                        for corrected s

harman



             CALCCORR)                                           calcium results

, each



                                                                 yielding differ

ent



                                                                 values. This co

rrected



                                                                 result was base

d on



                                                                 the formula: Co

rrected



                                                                 Calcium = Serum

Calcium



                                                                 + [0.8 * ( 4 -



                                                                 SerumAlbumin)]

 

             EGFR if  >60                                    



             American (test code mL/min/1.73m\                           



             = EGFRAA)    S\2                                    

 

             EGFR if Non- >60                                    Estimate

d Glomerular



             American (test code mL/min/1.73m\                           Filtrat

ion Rate (eGFR)



             = EGFRNA)    S\2                                    Reference Inter

vals



                                                                 Decision Points

 for 18



                                                                 years and older

 and



                                                                 average body ma

ss: >=



                                                                 60 Does not exc

lude



                                                                 kidney disease.

 30 -



                                                                 59 Suggests mod

erate



                                                                 chronic kidney 

disease



                                                                 and indicates t

he need



                                                                 for further



                                                                 investigation



                                                                 including asses

sment



                                                                 of proteinuria 

and



                                                                 cardiovascular



                                                                 factors. < 30 U

sually



                                                                 indicates a nee

d for



                                                                 referral for



                                                                 assessment and



                                                                 management of c

hronic



                                                                 kidney failure.



[facility]

## 2022-11-05 ENCOUNTER — HOSPITAL ENCOUNTER (EMERGENCY)
Dept: HOSPITAL 97 - ER | Age: 52
LOS: 1 days | Discharge: HOME | End: 2022-11-06
Payer: COMMERCIAL

## 2022-11-05 DIAGNOSIS — Z88.0: ICD-10-CM

## 2022-11-05 DIAGNOSIS — F17.210: ICD-10-CM

## 2022-11-05 DIAGNOSIS — F41.1: Primary | ICD-10-CM

## 2022-11-05 DIAGNOSIS — J20.9: ICD-10-CM

## 2022-11-05 DIAGNOSIS — R05.9: ICD-10-CM

## 2022-11-05 DIAGNOSIS — Z88.8: ICD-10-CM

## 2022-11-05 PROCEDURE — 99283 EMERGENCY DEPT VISIT LOW MDM: CPT

## 2022-11-05 NOTE — XMS REPORT
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                                                 [id number] [facility]

 

        2022         Outpatient                 [facility]     [id number]

 [place]



        15:13:02                                                 [id number] [facility]

 

        2020         Inpatient                 [facility]   YAMILA    [id number] 

[facility]



        03:17:00                                                 56      [facility]

 

        2022 Inpatient         SANCHEZ, [facility]    MED       

  [facility]



        19:24:00 19:40:00                 LEXI                         

 

        2022 Emergency E       HEATHER, [facility]     

   [facility]



        14:56:00 18:09:00                 DEMARIO                           

 

        2022 Emergency X       SINGER, [facility]    ERT     [id number]

15 [facility]



        12:56:00 15:05:00                 FLY brock [facility]

 

        2022 Emergency         Singer, [facility]    [id number]

76[address] [facility]



        12:56:00 15:05:00                 Fly HAMM 350.1.13.10         i

ty of



                                                [place] 4.2.7.2.686         [place]

[facility]  [phone]         Medi

staci



                                                        [address]             [place]

 

        2022 Office          Elena  [facility]    [id number]

13 [facility]



        10:00:00 10:30:00 Visit           [facility]  350.1.13.10         it

y of



                                                [place] 4.2.7.2.686         [place]

as



                                                SKYLAR?BLEA [phone]         Me

chloé ROQUE    [address]             [place]



                                                MEDICAL                 



                                                OFFICE                  



                                                BUILDING                 

 

        2022 Outpatient DIANA REYES  [facility]    [id number]

329 [facility]



        10:00:00 10:00:00                 ESCOBAR brock [facility]

 

        2022 Orders          Doctor  [place]    [id number]

07 [facility]



        00:00:00 00:00:00 Only            Unassigned, SHONA   350.1.13.10       

  ity of



                                        [facility] 4.2.7.2.686         [place]

as



                                                        [phone]         Medi

staci



                                                        [address]             [place]

 

        2020 Outpatient         ERNESTINE Ferrara   OUTD    [id number]

220 [facility]



        08:38:00 08:38:00                 [employer]                 75      Clear



                                                                        [facility]

 

        2019 Inpatient 1       Robin Barrios [facility]    PSY     [address]

[id number]



        23:01:00 13:16:00                 Robin Barrios                         

[facility]

 

        2017 Outpatient DIANA LOPEZ  [facility]    NUT     [id number]

[address] [facility]



        00:00:00 00:00:00                 VENKATA brock [facility]







Results







           Test Description Test Time  Test Comments Results    Result Comments 

Source









                    COMP. METABOLIC PANEL (64888) 2022 19:59:50 









                      Test Item  Value      Reference Range Interpretation Comme

nts









             NA (test code = 1084888342) 138 mmol/L   135-145                   

 

             K (test code = 6596147262) 4.3 mmol/L   3.5-5.0                   

 

             CL (test code = 9248458945) 102 mmol/L                       

 

             CO2 TOTAL (test code = 9621857146) 23 mmol/L    23-31              

       

 

             AGAP (test code = 1620254738)              2-16                    

  

 

             BUN (test code = 7450282728) 18 mg/dL     7-23                     

 

 

             GLUCOSE (test code = 3862352968) 110 mg/dL                   

     

 

             CREATININE (test code = 0.70 mg/dL   0.50-1.04                 



             2023206003)                                         

 

             TOTAL BILI (test code = 1.3 mg/dL    0.1-1.1      H            



             0123461148)                                         

 

             CALCIUM (test code = 8547235474) 9.5 mg/dL    8.6-10.6             

     

 

             T PROTEIN (test code = 9199701357) 7.4 g/dL     6.3-8.2            

       

 

             ALBUMIN (test code = 3787036061) 4.2 g/dL     3.5-5.0              

     

 

             ALK PHOS (test code = 0825167549) 100 U/L                    

      

 

             ALTv (test code = 1742-6) 16 U/L       5-35                      

 

             AST(SGOT) (test code = 2252216663) 27 U/L       13-40              

       

 

             eGFR (test code = 7100626014)              mL/min/1.73m2           

   

 

             ELENA (test code = ELENA) [employer] of Glomerular                    

       



                          Filtration Rate (GFR) and Staging                     

      



                          of Kidney Disease*                           



                          +-----------------------+--------                     

      



                          -------------+-------------------                     

      



                          ------+| GFR (mL/min/1.73 m2) ?|                      

     



                          With Kidney Damage ?| ?Without                        

   



                          Kidney                                 



                          Damage+-----------------------+--                     

      



                          -------------------+-------------                     

      



                          ------------+| ?>90 ? ? ? ? ? ? ?                     

      



                          ? ?| ?Stage one ? ? ? ? ?| ?                          

 



                          Normal ? ? ? ? ? ? ?                           



                          ?+-----------------------+-------                     

      



                          --------------+------------------                     

      



                          -------+| ?60-89 ? ? ? ? ? ? ? ?|                     

      



                          ?Stage two ? ? ? ? ?| ? Decreased                     

      



                          GFR ? ? ? ?                            



                          +-----------------------+--------                     

      



                          -------------+-------------------                     

      



                          ------+| ?30-59 ? ? ? ? ? ? ? ?|                      

     



                          ?Stage three ? ? ? ?| ? Stage                         

  



                          three ? ? ? ? ?                           



                          +-----------------------+--------                     

      



                          -------------+-------------------                     

      



                          ------+| ?15-29 ? ? ? ? ? ? ? ?|                      

     



                          ?Stage four ? ? ? ? | ? Stage                         

  



                          four ? ? ? ? ?                           



                          ?+-----------------------+-------                     

      



                          --------------+------------------                     

      



                          -------+| ?<15 (or dialysis) ? ?|                     

      



                          ?Stage five ? ? ? ? | ? Stage                         

  



                          five ? ? ? ? ?                           



                          ?+-----------------------+-------                     

      



                          --------------+------------------                     

      



                          -------+ *Each stage assumes the                      

     



                          associated GFR level has been in                      

     



                          effect for at least three months.                     

      



                          ?Stages 1 to 5, with or without                       

    



                          kidney disease, indicate chronic                      

     



                          kidney disease. Notes:                           



                          Determination of stages one and                       

    



                          two (with eGFR >59mL/min/1.73 m2)                     

      



                          requires estimation of kidney                         

  



                          damage for at least three months                      

     



                          as defined by structural or                           



                          functional abnormalities of the                       

    



                          kidney, manifested by                           



                          either:Pathological abnormalities                     

      



                          or Markers of kidney damage                           



                          (including abnormalities in the                       

    



                          composition of the blood or urine                     

      



                          or abnormalities in imaging                           



                          tests).                                

 

             Lab Interpretation (test code = Abnormal                           

    



             43618-9)                                            



[facility] WITH [id number] 19:22:40





             Test Item    Value        Reference Range Interpretation Comments

 

             WBC (test code =              See_Comment  H             [Automated



             5866-2)                                             message] The sy

stem



                                                                 which generated



                                                                 this result



                                                                 transmitted



                                                                 reference range

:



                                                                 4.30 - 11.10



                                                                 10*3/?L. The



                                                                 reference range

 was



                                                                 not used to



                                                                 interpret this



                                                                 result as



                                                                 normal/abnormal

.

 

             RBC (test code =              See_Comment                [Automated



             678-8)                                              message] The sy

stem



                                                                 which generated



                                                                 this result



                                                                 transmitted



                                                                 reference range

:



                                                                 3.93 - 5.25



                                                                 10*6/?L. The



                                                                 reference range

 was



                                                                 not used to



                                                                 interpret this



                                                                 result as



                                                                 normal/abnormal

.

 

             HGB (test code = 13.0 g/dL    11.6-15.0                 



             718-7)                                              

 

             HCT (test code = 38.4 %       35.7-45.2                 



             4544-3)                                             

 

             MCV (test code = 89.9 fL      80.6-95.5                 



             787-2)                                              

 

             MCH (test code = 30.4 pg      25.9-32.8                 



             785-6)                                              

 

             MCHC (test code = 33.9 g/dL    31.6-35.1                 



             786-4)                                              

 

             RDW-SD (test code = 39.3 fL      39.0-49.9                 



             08101-8)                                            

 

             RDW-CV (test code = 12.2 %       12.0-15.5                 



             788-0)                                              

 

             PLT (test code =              See_Comment                [Automated



             777-3)                                              message] The sy

stem



                                                                 which generated



                                                                 this result



                                                                 transmitted



                                                                 reference range

:



                                                                 166 - 358 10*3/

?L.



                                                                 The reference r

jihan



                                                                 was not used to



                                                                 interpret this



                                                                 result as



                                                                 normal/abnormal

.

 

             MPV (test code = 10.1 fL      9.5-12.9                  



             89698-6)                                            

 

             NRBC/100 WBC (test              See_Comment                [Automat

ed



             code = 5231446850)                                        message] 

The system



                                                                 which generated



                                                                 this result



                                                                 transmitted



                                                                 reference range

:



                                                                 0.0 - 10.0 /100



                                                                 WBCs. The refer

ence



                                                                 range was not u

sed



                                                                 to interpret th

is



                                                                 result as



                                                                 normal/abnormal

.

 

             NRBC x10^3 (test code <0.01        See_Comment                [Auto

mated



             = 8163436542)                                        message] The s

ystem



                                                                 which generated



                                                                 this result



                                                                 transmitted



                                                                 reference range

:



                                                                 10*3/?L. The



                                                                 reference range

 was



                                                                 not used to



                                                                 interpret this



                                                                 result as



                                                                 normal/abnormal

.

 

             GRAN MAT (NEUT) % 79.4 %                                 



             (test code = 770-8)                                        

 

             IMM GRAN % (test code 0.50 %                                 



             = 3197739920)                                        

 

             LYMPH % (test code = 9.5 %                                  



             736-9)                                              

 

             MONO % (test code = 9.7 %                                  



             5905-5)                                             

 

             EOS % (test code = 0.5 %                                  



             713-8)                                              

 

             BASO % (test code = 0.4 %                                  



             706-2)                                              

 

             GRAN MAT x10^3(ANC) 9.77 10*3/uL 1.88-7.09    H            



             (test code =                                        



             5003988460)                                         

 

             IMM GRAN x10^3 (test 0.06 10*3/uL 0.00-0.06                 



             code = 3556636441)                                        

 

             LYMPH x10^3 (test code 1.17 10*3/uL 1.32-3.29    L            



             = 731-0)                                            

 

             MONO x10^3 (test code 1.19 10*3/uL 0.33-0.92    H            



             = 742-7)                                            

 

             EOS x10^3 (test code = 0.06 10*3/uL 0.03-0.39                 



             711-2)                                              

 

             BASO x10^3 (test code 0.05 10*3/uL 0.01-0.07                 



             = 704-7)                                            

 

             Lab Interpretation Abnormal                               



             (test code = 77064-9)                                        



[facility] [id number] 05:36:00





             Test Item    Value        Reference Range Interpretation Comments

 

             SODIUM (test code = NA) 143 mmol/L   134-147      N            

 

             POTASSIUM (test code = 4.2 mmol/L   3.4-5.0      N            



             K)                                                  

 

             CHLORIDE (test code = 114 mmol/L   100-108      H            



             CL)                                                 

 

             CARBON DIOXIDE (test 24 mmol/L    21-32        N            



             code = CO2)                                         

 

             ANION GAP (test code = 5.0 GAP calc 4.0-15.0     N            



             GAP)                                                

 

             GLUCOSE (test code = 89 MG/DL            N            



             GLU)                                                

 

             BLOOD UREA NITROGEN 17 MG/DL     7-18         N            



             (test code = BUN)                                        

 

             GLOMERULAR FILTRATION >=60 max estimate >60                       



             RATE (test code = GFR) estGFR                                 

 

             CREATININE (test code = 0.9 MG/DL    0.6-1.0      N            



             CREAT)                                              

 

             CALCIUM (test code = CA) 8.3 MG/DL    8.5-10.1     L            



CBC W/AUTO [id number] 05:21:00





             Test Item    Value        Reference Range Interpretation Comments

 

             WHITE BLOOD CELL (test code = 15.2 K/mm3   3.5-11.0     H          

  



             WBC)                                                

 

             RED BLOOD CELL (test code = RBC) 3.67 M/mm3   4.70-6.10    L       

     

 

             HEMOGLOBIN (test code = HGB) 11.3 G/DL    10.4-14.9    N           

 

 

             HEMATOCRIT (test code = HCT) 35.5 %       31.5-44.1    N           

 

 

             MEAN CELL VOLUME (test code = 96.7 Fl      84.5-98.6    N          

  



             MCV)                                                

 

             MEAN CELL HGB (test code = MCH) 30.8 pg      27.0-34.2    N        

    

 

             MEAN CELL HGB CONCETRATION (test 31.8 G/DL    31.5-34.0    N       

     



             code = MCHC)                                        

 

             RED CELL DISTRIBUTION WIDTH (test 14.2 SD      11.5-14.5    N      

      



             code = RDW)                                         

 

             PLATELET COUNT (test code = PLT) 242.0 K/mm3  150-450      N       

     

 

             MEAN PLATELET VOLUME (test code = 10.20 fL     7.0-10.5     N      

      



             MPV)                                                

 

             NEUTROPHIL % (test code = NT%) 78.8 %       40-76        H         

   

 

             LYMPHOCYTE % (test code = LY%) 13.1 %       20.5-51.1    L         

   

 

             MONOCYTE % (test code = MO%) 6.2 %        1.7-9.3      N           

 

 

             EOSINOPHIL % (test code = EO%) 1.6 %        0.0-6.0      N         

   

 

             BASOPHIL % (test code = BA%) 0.3 %        0.0-2.0      N           

 

 

             NEUTROPHIL # (test code = NT#) 11.94 K/mm3  1.8-7.6      H         

   

 

             LYMPHOCYTE # (test code = LY#) 2.0 K/mm3    0.6-3.2      N         

   

 

             MONOCYTE # (test code = MO#) 0.9 K/mm3    0.3-1.1      N           

 

 

             EOSINOPHIL # (test code = EO#) 0.2 K/mm3    0.0-0.4      N         

   

 

             BASOPHIL # (test code = BA#) 0.1 K/mm3    0.0-0.1      N           

 

 

             MANUAL DIFF REQUIRED (test code = NO DIFF/SCN  CRITERIA            

      



             MDIFF)                                              



[id number] 14:09:00





             Test Item    Value        Reference Range Interpretation Comments

 

             LITHIUM (test 0.5 mmol/L   0.6-1.2      L             Detection Lopez

it = 0.1



             code = LITH)                                        <0.1 indicates 

None



                                                                 DetectedPerform

ed At: 



                                                                 [facility] [address]



                                                                 [id number] 

Jeffry KIMBROUGH MD



                                                                 Ph:[phone]



[id number] 13:35:00





             Test Item    Value        Reference Range Interpretation Comments

 

             TROPONIN-I (test 0.436 NG/ML  0.000-0.045  HH           Negative: <

/= 0.045



             code = TROPI)                                        Positive: >/= 

0.046



                                                                 Correlation wit

h serial



                                                                 results, other 

cardiac



                                                                 markers, and cl

inical



                                                                 findings is nec

essary to



                                                                 determine the c

linical



                                                                 significance of

 this



                                                                 result. Quantit

ative



                                                                 results using d

ifferent



                                                                 methodologies s

hould not



                                                                 be compared to 

one



                                                                 another as nume

rical



                                                                 results may daniel

yby



                                                                 method.



Completed by Nursing: RFZJULERDH--17-28 10:33:00





             Test Item    Value        Reference Range Interpretation Comments

 

             TROPONIN-I (test 0.360 NG/ML  0.000-0.045  HH           Negative: <

/= 0.045



             code = TROPI)                                        Positive: >/= 

0.046



                                                                 Correlation wit

h serial



                                                                 results, other 

cardiac



                                                                 markers, and cl

inical



                                                                 findings is nec

essary to



                                                                 determine the c

linical



                                                                 significance of

 this



                                                                 result. Quantit

ative



                                                                 results using d

ifferent



                                                                 methodologies s

hould not



                                                                 be compared to 

one



                                                                 another as nume

rical



                                                                 results may daniel

yby



                                                                 method.



Completed by Nursing: NOLast Dose Date: 20Last Dose Time: 1200TROPONIN-I
2020 07:23:00





             Test Item    Value        Reference Range Interpretation Comments

 

             TROPONIN-I (test 0.399 NG/ML  0.000-0.045  HH           Negative: <

/= 0.045



             code = TROPI)                                        Positive: >/= 

0.046



                                                                 Correlation wit

h serial



                                                                 results, other 

cardiac



                                                                 markers, and cl

inical



                                                                 findings is nec

essary to



                                                                 determine the c

linical



                                                                 significance of

 this



                                                                 result. Quantit

ative



                                                                 results using d

ifferent



                                                                 methodologies s

hould not



                                                                 be compared to 

one



                                                                 another as nume

rical



                                                                 results may daniel

yby



                                                                 method.



Completed by Nursing: HUVLHMDFRK--04-28 05:00:00





             Test Item    Value        Reference Range Interpretation Comments

 

             TROPONIN-I (test 0.555 NG/ML  0.000-0.045  HH           Negative: <

/= 0.045



             code = TROPI)                                        Positive: >/= 

0.046



                                                                 Correlation wit

h serial



                                                                 results, other 

cardiac



                                                                 markers, and cl

inical



                                                                 findings is nec

essary to



                                                                 determine the c

linical



                                                                 significance of

 this



                                                                 result. Quantit

ative



                                                                 results using d

ifferent



                                                                 methodologies s

hould not



                                                                 be compared to 

one



                                                                 another as nume

rical



                                                                 results may daniel

yby



                                                                 method.



Completed by Nursing: NO- XR CHEST 1 -52-28 04:33:00 Name: TYLER EDGE 
[facility] : 1970 Age/S: 49 / F [id number] Unit #: [id number]
2 Loc: [place] [address] Phys: Kristy Quiros MD Acct: [id number] Dis Date:  
Status: REG ER PHONE#: [phone] Exam Date: 2020 043 FAX #: Reason: 
POST CENTRAL PLACEMENT; RIGHT IJ EXAMS: CPT: [id number] XR CHEST 1 V 14154 Fluoro
Time: DAP (Gy m2):  Air Kerma (mGy): HISTORY: Post central line placement, 
hypotension Location code: B2 FINDINGS: Frontal view of the chest demonstrates a
mildly enlarged cardiomediastinal silhouette. The trachea is midline. The lungs 
are clear. There is no effusion or pneumothorax. The bones are intact. Right IJ 
catheter in good position.  IMPRESSION: Mild cardiomegaly, without acute 
pulmonary process. ** Electronically Signed by ISABEL March on 2020 at 
0433 ** Reported and signed by: Shree March M.D. CC: Kristy Quiros MD  PAGE 1 
Signed Report Name: TYLER EDGE [place] : 1970 Age/S: 49 / F 
[id number] Unit #: [id number] Loc: [place] [address] Phys: 
Kristy Quiros MD  Acct: [id number] Dis Date: Status: REG ER PHONE #: [phone]
[phone] Exam Date: 2020 FAX #:  Reason: POST CENTRAL PLACEMENT; RIGHT 
IJ EXAMS: CPT: [id number] XR CHEST 1 V 91776  Fluoro Time: DAP (Gy m2): Air Kerma
(mGy): (Continued) Technologist: Duncan Ellis, RT(R)(CT) Trnscb Date/Time: 
2020 (433) [id number] Orig Print D/T: S: 2020 (043) PAGE 2 Signed 
ReportCBC W/AUTO [id number] 04:15:00





             Test Item    Value        Reference Range Interpretation Comments

 

             WHITE BLOOD CELL (test 24.2 K/mm3   3.5-11.0     H            



             code = WBC)                                         

 

             RED BLOOD CELL (test 3.75 M/mm3   4.70-6.10    L            



             code = RBC)                                         

 

             HEMOGLOBIN (test code 11.5 G/DL    10.4-14.9    N            



             = HGB)                                              

 

             HEMATOCRIT (test code 35.2 %       31.5-44.1    N            



             = HCT)                                              

 

             MEAN CELL VOLUME (test 93.9 Fl      84.5-98.6    N            



             code = MCV)                                         

 

             MEAN CELL HGB (test 30.7 pg      27.0-34.2    N            



             code = MCH)                                         

 

             MEAN CELL HGB 32.7 G/DL    31.5-34.0    N            



             CONCETRATION (test                                        



             code = MCHC)                                        

 

             RED CELL DISTRIBUTION 13.8 SD      11.5-14.5    N            



             WIDTH (test code =                                        



             RDW)                                                

 

             PLATELET COUNT (test 234.0 K/mm3  150-450      N            



             code = PLT)                                         

 

             MEAN PLATELET VOLUME 9.80 fL      7.0-10.5     N            



             (test code = MPV)                                        

 

             NEUTROPHIL % (test 82.9 %       40-76        H            



             code = NT%)                                         

 

             LYMPHOCYTE % (test 8.3 %        20.5-51.1    L            



             code = LY%)                                         

 

             MONOCYTE % (test code 7.3 %        1.7-9.3      N            



             = MO%)                                              

 

             EOSINOPHIL % (test 1.4 %        0.0-6.0      N            



             code = EO%)                                         

 

             BASOPHIL % (test code 0.1 %        0.0-2.0      N            



             = BA%)                                              

 

             NEUTROPHIL # (test 20.08 K/mm3  1.8-7.6      H            



             code = NT#)                                         

 

             LYMPHOCYTE # (test 2.0 K/mm3    0.6-3.2      N            



             code = LY#)                                         

 

             MONOCYTE # (test code 1.8 K/mm3    0.3-1.1      H            



             = MO#)                                              

 

             EOSINOPHIL # (test 0.3 K/mm3    0.0-0.4      N            



             code = EO#)                                         

 

             BASOPHIL # (test code 0.0 K/mm3    0.0-0.1      N            



             = BA#)                                              

 

             MANUAL DIFF REQUIRED NO DIFF/SCN  CRITERIA                  SLIDE R

EVIEW



             (test code = MDIFF)                                        CONSISTA

NT WITH AUTO



                                                                 DIFFERENTIAL.



BASIC METABOLIC [id number] 04:11:00





             Test Item    Value        Reference Range Interpretation Comments

 

             SODIUM (test code = NA) 135 mmol/L   134-147      N            

 

             POTASSIUM (test code = K) 4.0 mmol/L   3.4-5.0      N            

 

             CHLORIDE (test code = CL) 106 mmol/L   100-108      N            

 

             CARBON DIOXIDE (test code = CO2) 22 mmol/L    21-32        N       

     

 

             ANION GAP (test code = GAP) 7.0 GAP calc 4.0-15.0     N            

 

             GLUCOSE (test code = GLU) 97 MG/DL            N            

 

             BLOOD UREA NITROGEN (test code = 34 MG/DL     7-18         H       

     



             BUN)                                                

 

             GLOMERULAR FILTRATION RATE (test 39 estGFR    >60          L       

     



             code = GFR)                                         

 

             CREATININE (test code = CREAT) 1.5 MG/DL    0.6-1.0      H         

   

 

             CALCIUM (test code = CA) 8.9 MG/DL    8.5-10.1     N            



LACTIC [id number] 04:11:00





             Test Item    Value        Reference Range Interpretation Comments

 

             LACTIC ACID (test code = LACT) 0.8 mmol/L   0.4-2.0      N         

   



CBC W/AUTO [id number] 03:54:00





             Test Item    Value        Reference Range Interpretation Comments

 

             WHITE BLOOD CELL (test code = 24.2 K/mm3   3.5-11.0     H          

  



             WBC)                                                

 

             RED BLOOD CELL (test code = RBC) 3.75 M/mm3   4.70-6.10    L       

     

 

             HEMOGLOBIN (test code = HGB) 11.5 G/DL    10.4-14.9    N           

 

 

             HEMATOCRIT (test code = HCT) 35.2 %       31.5-44.1    N           

 

 

             MEAN CELL VOLUME (test code = 93.9 Fl      84.5-98.6    N          

  



             MCV)                                                

 

             MEAN CELL HGB (test code = MCH) 30.7 pg      27.0-34.2    N        

    

 

             MEAN CELL HGB CONCETRATION (test 32.7 G/DL    31.5-34.0    N       

     



             code = MCHC)                                        

 

             RED CELL DISTRIBUTION WIDTH (test 13.8 SD      11.5-14.5    N      

      



             code = RDW)                                         

 

             PLATELET COUNT (test code = PLT) 234.0 K/mm3  150-450      N       

     

 

             MEAN PLATELET VOLUME (test code = 9.80 fL      7.0-10.5     N      

      



             MPV)                                                

 

             NEUTROPHIL % (test code = NT%)  %           40-76        H         

   

 

             LYMPHOCYTE % (test code = LY%)  %           20.5-51.1    L         

   

 

             MONOCYTE % (test code = MO%)  %           1.7-9.3      N           

 

 

             EOSINOPHIL % (test code = EO%)  %           0.0-6.0      N         

   

 

             BASOPHIL % (test code = BA%)  %           0.0-2.0      N           

 

 

             NEUTROPHIL # (test code = NT#)  K/mm3       1.8-7.6      H         

   

 

             LYMPHOCYTE # (test code = LY#)  K/mm3       0.6-3.2      N         

   

 

             MONOCYTE # (test code = MO#)  K/mm3       0.3-1.1      H           

 

 

             EOSINOPHIL # (test code = EO#)  K/mm3       0.0-0.4      N         

   

 

             BASOPHIL # (test code = BA#)  K/mm3       0.0-0.1      N           

 

 

             MANUAL DIFF REQUIRED (test code =  DIFF/SCN    CRITERIA            

      



             MDIFF)                                              



Basic Metabolic [id number] 07:54:48





             Test Item    Value        Reference Range Interpretation Comments

 

             Sodium Level (test code = Sodium 142.0 mmol/L 135.0-145.0          

     



             Level)                                              

 

             Potassium Level (test code = 4.8 mmol/L   3.5-5.1                  

 



             Potassium Level)                                        

 

             Chloride Level (test code = 105 mmol/L                       



             Chloride Level)                                        

 

             CO2 (test code = CO2) 25 mmol/L    22-29                     

 

             Anion Gap (test code = Anion 12 mmol/L    7-16                     

 



             Gap)                                                

 

             BUN (test code = BUN) 19.60 mg/dL  6.00-20.00                

 

             Creatinine Level (test code = 0.80 mg/dL   0.50-0.90               

  



             Creatinine Level)                                        

 

             BUN/Creat Ratio (test code = 24                        N           

 



             BUN/Creat Ratio)                                        

 

             Glucose Level (test code = 86 mg/dL                         



             Glucose Level)                                        

 

             Calcium Level (test code = 10.1 mg/dL   8.3-10.5                  



             Calcium Level)                                        



Basic Metabolic [id number] 07:54:48





             Test Item    Value        Reference Range Interpretation Comments

 

             Sodium Level (test 142.0 mmol/L 135.0-145.0               



             code = Sodium Level)                                        

 

             Potassium Level 4.8 mmol/L   3.5-5.1                   



             (test code =                                        



             Potassium Level)                                        

 

             Chloride Level (test 105 mmol/L                       



             code = Chloride                                        



             Level)                                              

 

             CO2 (test code = 25 mmol/L    22-29                     



             CO2)                                                

 

             Anion Gap (test code 12 mmol/L    7-16                      



             = Anion Gap)                                        

 

             BUN (test code = 19.60 mg/dL  6.00-20.00                



             BUN)                                                

 

             Creatinine Level 0.80 mg/dL   0.50-0.90                 



             (test code =                                        



             Creatinine Level)                                        

 

             BUN/Creat Ratio 24                        N            



             (test code =                                        



             BUN/Creat Ratio)                                        

 

             Glucose Level (test 86 mg/dL                         



             code = Glucose                                        



             Level)                                              

 

             Calcium Level (test 10.1 mg/dL   8.3-10.5                  



             code = Calcium                                        



             Level)                                              

 

             eGFR AA (test code = >60                       N            eGFR (e

stimated



             eGFR AA)     mL/min/1.73 m2                           Glomerular



                                                                 Filtration Rate

) is



                                                                 an estimated va

lue,



                                                                 calculated from

 the



                                                                 patient's serum



                                                                 creatinine usin

g the



                                                                 MDRD equation. 

It is



                                                                 NOT the patient

's



                                                                 actual GFR. The

 eGFR



                                                                 provides a more



                                                                 clinically usef

ul



                                                                 measure of kidn

ey



                                                                 disease than se

rum



                                                                 creatinine



                                                                 alone.***This



                                                                 calculation rimma

es



                                                                 sex and race in

to



                                                                 account, if the



                                                                 information is



                                                                 provided. If th

e



                                                                 race is not



                                                                 provided, and t

he



                                                                 patient is



                                                                 -Crystal

n,



                                                                 multiply by 1.2

12.



                                                                 If sex is not



                                                                 provided, and t

he



                                                                 patient is fema

le,



                                                                 multiply by 0.7

42.



                                                                 Results for pat

ients



                                                                 <18 years of ag

e



                                                                 have not been



                                                                 validated by th

e



                                                                 MDRD study and



                                                                 should be



                                                                 interpreted wit

h



                                                                 caution. eGFR R

esult



                                                                 Interpretation:

eGFR



                                                                 > or = 60 is in

 the



                                                                 Normal RangeeGF

R <



                                                                 60 may mean kid

hang



                                                                 diseaseeGFR < 1

5 may



                                                                 mean kidney



                                                                 failure*** Rang

es



                                                                 recommended by 

the



                                                                 [employer]



                                                                 Foundation,



                                                                 http://nkdep.ni

h.gov



Basic Metabolic [id number] 07:54:48





             Test Item    Value        Reference Range Interpretation Comments

 

             Sodium Level (test 142.0 mmol/L 135.0-145.0               



             code = Sodium Level)                                        

 

             Potassium Level 4.8 mmol/L   3.5-5.1                   



             (test code =                                        



             Potassium Level)                                        

 

             Chloride Level (test 105 mmol/L                       



             code = Chloride                                        



             Level)                                              

 

             CO2 (test code = 25 mmol/L    22-29                     



             CO2)                                                

 

             Anion Gap (test code 12 mmol/L    7-16                      



             = Anion Gap)                                        

 

             BUN (test code = 19.60 mg/dL  6.00-20.00                



             BUN)                                                

 

             Creatinine Level 0.80 mg/dL   0.50-0.90                 



             (test code =                                        



             Creatinine Level)                                        

 

             BUN/Creat Ratio 24                        N            



             (test code =                                        



             BUN/Creat Ratio)                                        

 

             Glucose Level (test 86 mg/dL                         



             code = Glucose                                        



             Level)                                              

 

             Calcium Level (test 10.1 mg/dL   8.3-10.5                  



             code = Calcium                                        



             Level)                                              

 

             eGFR AA (test code = >60                       N            eGFR (e

stimated



             eGFR AA)     mL/min/1.73 m2                           Glomerular



                                                                 Filtration Rate

) is



                                                                 an estimated va

lue,



                                                                 calculated from

 the



                                                                 patient's serum



                                                                 creatinine usin

g the



                                                                 MDRD equation. 

It is



                                                                 NOT the patient

's



                                                                 actual GFR. The

 eGFR



                                                                 provides a more



                                                                 clinically usef

ul



                                                                 measure of kidn

ey



                                                                 disease than se

rum



                                                                 creatinine



                                                                 alone.***This



                                                                 calculation rimma

es



                                                                 sex and race in

to



                                                                 account, if the



                                                                 information is



                                                                 provided. If th

e



                                                                 race is not



                                                                 provided, and t

he



                                                                 patient is



                                                                 -Crystal

n,



                                                                 multiply by 1.2

12.



                                                                 If sex is not



                                                                 provided, and t

he



                                                                 patient is fema

le,



                                                                 multiply by 0.7

42.



                                                                 Results for pat

ients



                                                                 <18 years of ag

e



                                                                 have not been



                                                                 validated by [facility]



                                                                 MDRD study and



                                                                 should be



                                                                 interpreted wit

h



                                                                 caution. eGFR R

esult



                                                                 Interpretation:

eGFR



                                                                 > or = 60 is in

 the



                                                                 Normal RangeeGF

R <



                                                                 60 may mean kid

hang



                                                                 diseaseeGFR < 1

5 may



                                                                 mean kidney



                                                                 failure*** Rang

es



                                                                 recommended by 

the



                                                                 [employer]



                                                                 Foundation,



                                                                 http://nkdep.ni

h.gov

 

             eGFR Non-AA (test >60.00                    N            eGFR (sheba

mated



             code = eGFR Non-AA) mL/min/1.73 m2                           Glomer

ular



                                                                 Filtration Rate

) is



                                                                 an estimated va

lue,



                                                                 calculated from

 the



                                                                 patient's serum



                                                                 creatinine usin

g the



                                                                 MDRD equation. 

It is



                                                                 NOT the patient

's



                                                                 actual GFR. The

 eGFR



                                                                 provides a more



                                                                 clinically usef

ul



                                                                 measure of kidn

ey



                                                                 disease than se

rum



                                                                 creatinine



                                                                 alone.***This



                                                                 calculation rimma

es



                                                                 sex and race in

to



                                                                 account, if the



                                                                 information is



                                                                 provided. If th

e



                                                                 race is not



                                                                 provided, and t

he



                                                                 patient is



                                                                 -Crystal

n,



                                                                 multiply by 1.2

12.



                                                                 If sex is not



                                                                 provided, and t

he



                                                                 patient is fema

le,



                                                                 multiply by 0.7

42.



                                                                 Results for pat

ients



                                                                 <18 years of ag

e



                                                                 have not been



                                                                 validated by [facility]



                                                                 MDRD study and



                                                                 should be



                                                                 interpreted wit

h



                                                                 caution. eGFR R

esult



                                                                 Interpretation:

eGFR



                                                                 > or = 60 is in

 the



                                                                 Normal RangeeGF

R <



                                                                 60 may mean kid

hang



                                                                 diseaseeGFR < 1

5 may



                                                                 mean kidney



                                                                 failure*** Rang

es



                                                                 recommended by 

the



                                                                 [employer]



                                                                 Foundation,



                                                                 http://nkdep.ni

h.gov



Complete Blood Count with [id number] 07:29:42





             Test Item    Value        Reference Range Interpretation Comments

 

             WBC (test code = WBC) 9.3 x10      4.4-10.5                  

 

             RBC (test code = RBC) 4.71 x10     3.75-5.20                 

 

             Hgb (test code = Hgb) 14.4 g/dL    12.2-14.8                 

 

             MCV (test code = MCV) 92.10 fL     80..00              

 

             Hct (test code = Hct) 43.4 %       36.5-44.4                 

 

             MCHC (test code = 33.20 g/dL   32.00-37.50               



             MCHC)                                               

 

             RDW CV (test code = 13.4 %       11.5-14.5                 



             RDW CV)                                             

 

             MCH (test code = MCH) 30.6 pg      27.0-32.5                 

 

             Platelets (test code = 325.0 x10    140.0-440.0               



             Platelets)                                          

 

             MPV (test code = MPV) 9.8 fL                    N            

 

             Slide Review (test Auto         Auto                      Result cr

eated by



             code = Slide Review)                                        [email or web address]_

SLIDE_REV_AUTO

 

             nRBC (test code = 0                         N            



             nRBC)                                               

 

             NRBC Abs (test code = 0.00 x10                  N            



             NRBC Abs)                                           

 

             IPF (test code = IPF) 0 %                       N            



Automated [id number] 07:29:42





             Test Item    Value        Reference Range Interpretation Comments

 

             Neutro Auto (test code = Neutro 66.2 %       36.0-70.0             

    



             Auto)                                               

 

             Lymph Auto (test code = Lymph Auto) 21.1 %       12.0-44.0         

        

 

             Mono Auto (test code = Mono Auto) 6.8 %        0.0-11.0            

      

 

             Eos, Auto (test code = Eos, Auto) 4.8 %        0.0-7.0             

      

 

             Basophil Auto (test code = Basophil 0.9 %        0.0-2.0           

        



             Auto)                                               

 

             Neutro Absolute (test code = Neutro 6.2 x10      1.6-7.4           

        



             Absolute)                                           

 

             Lymph Absolute (test code = Lymph 1.97 x10     .50-4.60            

      



             Absolute)                                           

 

             Mono Absolute (test code = Mono .63 x10      .00-1.20              

    



             Absolute)                                           

 

             Eos Absolute (test code = Eos 0.45 x10     0.00-0.74               

  



             Absolute)                                           

 

             Baso Absolute (test code = Baso 0.08 x10     0.00-0.21             

    



             Absolute)                                           



IG [id number] 07:29:42





             Test Item    Value        Reference Range Interpretation Comments

 

             IG (test code = IG) 0.2 %        0.0-5.0                   

 

             IG Abs (test code = IG Abs) 0 x10                     N            



Thyroid Stimulating [id number] 04:30:30





             Test Item    Value        Reference Range Interpretation Comments

 

             TSH (test code = TSH) 1.360 mIU/mL 0.270-4.200               



RPR [id number] 04:06:17





             Test Item    Value        Reference Range Interpretation Comments

 

             RPR Qual (test code = RPR Qual) Non-Reactive Non-Reactive          

    

 

             Reactive Control (test code = Reactive                             

  



             Reactive Control)                                        

 

             Weak Reactive Control (test Weak Reactive                          

 



             code = Weak Reactive Control)                                      

  

 

             Non-Reactive Control (test code Non-Reactive                       

    



             = Non-Reactive Control)                                        

 

             Lot # (test code = Lot #) [id number]                    N            

 

             Expiration Dt (test code = 10-                N            



             Expiration Dt)                                        



Hemoglobin A1c2019-11-15 18:37:54





             Test Item    Value        Reference Range Interpretation Comments

 

             Hemoglobin A1c (test code 4.7 %        4.8-5.9      L            No

n Diabetic



             = Hemoglobin A1c)                                        4.8-5.9%Di

abetic <7.0%



Lipid Panel2019-11-15 18:08:58





             Test Item    Value        Reference Range Interpretation Comments

 

             Cholesterol Total 189 mg/dL    0-200                     RISK OF HE

ART



             (test code =                                        DISEASEPublishe

d by



             Cholesterol Total)                                        American 

Heart



                                                                 Association Judith

lyte



                                                                 Optimal Borderl

ine



                                                                 Increased RiskC

HOL <200



                                                                 200-239 >240TRI

G <150



                                                                 150-199 >200HDL

 Male



                                                                 >60 <40HDL Fema

le >60



                                                                 <50LDL <100 130

-159



                                                                 >160LDL Near op

timal is



                                                                 100-129

 

             Triglycerides (test 112 mg/dL    9-200                     



             code = Triglycerides)                                        

 

             HDL (test code = HDL) 44 mg/dL     50-60        L            

 

             LDL (test code = LDL) 122 mg/dL    0-130                     The eq

uation being used



                                                                 in this calcula

tion is



                                                                 LDL = (Chol - H

DL) -



                                                                 (Trig / 5)

 

             VLDL (test code = 22 mg/dL     5-40                      The equati

on being used



             VLDL)                                               in this calcula

tion is



                                                                 VLDL = Trig / 5

 

             Chol/HDL (test code = 4.3 ratio    0.0-4.4                   



             Chol/HDL)                                           

 

             LDL/HDL Ratio (test 3                         N            The equa

tion being used



             code = LDL/HDL Ratio)                                        in thi

s calculation is



                                                                 LDL/HDL Ratio=L

DL



                                                                 Calc/HDL Chol



Complete Blood Count with Differential2019-11-15 07:51:57





             Test Item    Value        Reference Range Interpretation Comments

 

             WBC (test code = WBC) 10.6 x10     4.4-10.5     H            

 

             RBC (test code = RBC) 4.45 x10     3.75-5.20                 

 

             Hgb (test code = Hgb) 13.5 g/dL    12.2-14.8                 

 

             MCV (test code = MCV) 93.30 fL     80..00              

 

             Hct (test code = Hct) 41.5 %       36.5-44.4                 

 

             MCHC (test code = 32.50 g/dL   32.00-37.50               



             MCHC)                                               

 

             RDW CV (test code = 13.7 %       11.5-14.5                 



             RDW CV)                                             

 

             MCH (test code = MCH) 30.3 pg      27.0-32.5                 

 

             Platelets (test code = 356.0 x10    140.0-440.0               



             Platelets)                                          

 

             MPV (test code = MPV) 9.7 fL                    N            

 

             Slide Review (test Auto         Auto                      Result cr

eated by



             code = Slide Review)                                        GL_SJM_

SLIDE_REV_AUTO

 

             nRBC (test code = 0                         N            



             nRBC)                                               

 

             NRBC Abs (test code = 0.00 x10                  N            



             NRBC Abs)                                           

 

             IPF (test code = IPF) 0 %                       N            



Automated Differential2019-11-15 07:51:57





             Test Item    Value        Reference Range Interpretation Comments

 

             Neutro Auto (test code = Neutro 65.4 %       36.0-70.0             

    



             Auto)                                               

 

             Lymph Auto (test code = Lymph Auto) 23.5 %       12.0-44.0         

        

 

             Mono Auto (test code = Mono Auto) 5.7 %        0.0-11.0            

      

 

             Eos, Auto (test code = Eos, Auto) 4.4 %        0.0-7.0             

      

 

             Basophil Auto (test code = Basophil 0.8 %        0.0-2.0           

        



             Auto)                                               

 

             Neutro Absolute (test code = Neutro 6.9 x10      1.6-7.4           

        



             Absolute)                                           

 

             Lymph Absolute (test code = Lymph 2.49 x10     .50-4.60            

      



             Absolute)                                           

 

             Mono Absolute (test code = Mono .60 x10      .00-1.20              

    



             Absolute)                                           

 

             Eos Absolute (test code = Eos 0.47 x10     0.00-0.74               

  



             Absolute)                                           

 

             Baso Absolute (test code = Baso 0.08 x10     0.00-0.21             

    



             Absolute)                                           



IG Flags2019-11-15 07:51:57





             Test Item    Value        Reference Range Interpretation Comments

 

             IG (test code = IG) 0.2 %        0.0-5.0                   

 

             IG Abs (test code = IG Abs) 0 x10                     N            



Urine [id number] 10:18:52





             Test Item    Value        Reference Range Interpretation Comments

 

             ORGANISM (test code = Escherichia coli                           



             ORGANISM)                                           

 

             Amikacin (test code =                           S            



             Amik)                                               

 

             Ampicillin (test code =                           S            



             Amp)                                                

 

             Ampicillin/Sulbactam                           S            



             (test code = Amp/Sul)                                        

 

             Cefazolin (test code =                           S            



             Cefaz)                                              

 

             Cefepime (test code =                           S            



             Cefep)                                              

 

             Cefotaxime (test code =                           S            



             Cefo)                                               

 

             Ceftazidime (test code                           S            



             = Ceftaz)                                           

 

             Ceftriaxone (test code                           S            



             = Ceftri)                                           

 

             Cefuroxime (test code =                           S            



             Cefur)                                              

 

             Ciprofloxacin (test                           S            



             code = Cipro)                                        

 

             Gentamicin (test code =                           S            



             Gent)                                               

 

             Imipenem (test code =                           S            



             Imi)                                                

 

             Levofloxacin (test code                           S            



             = Levo)                                             

 

             Meropenem (test code =                           S            



             Sindi)                                               

 

             Nitrofurantoin (test                           S            



             code = Nitro)                                        

 

             Piperacillin/Tazobactam                           S            



             (test code = Pip/Blaine)                                        

 

             Tobramycin (test code =                           S            



             Tobra)                                              

 

             Trimethoprim/Sulfa                           S            



             (test code = SXT)                                        

 

             Final Report (test code >=100,000 cfu/ml                           



             = Final Report) Escherichia coli                           



                          >=100,000 cfu/ml Alpha                           



                          hemolytic                              



                          Streptococcus (not                           



                          Group D or                             



                          Enterococcus)                           



 C Urine Added by [id number] [id number] 09:18:25





             Test Item    Value        Reference Range Interpretation Comments

 

             Lithium Level (test code = 0.58 mmol/L  0.60-1.20    L            



             Lithium Level)                                        



Urine Drug [id number] 01:36:44





             Test Item    Value        Reference Range Interpretation Comments

 

             Amphetamine Screen Ur POSITIVE     Negative     A            



             (test code = Amphetamine                                        



             Screen Ur)                                          

 

             Barbiturate Screen Ur Negative     Negative                  



             (test code = Barbiturate                                        



             Screen Ur)                                          

 

             Benzodiazepines Ur (test Negative     Negative                  



             code = Benzodiazepines                                        



             Ur)                                                 

 

             Cocaine Screen Ur (test Negative     Negative                  



             code = Cocaine Screen                                        



             Ur)                                                 

 

             U Methadone Scr (test Negative     Negative                  



             code = U Methadone Scr)                                        

 

             Opiate Screen Ur (test Negative     Negative                  



             code = Opiate Screen Ur)                                        

 

             U PCP Scrn (test code = Negative     Negative                  



             U PCP Scrn)                                         

 

             Cannabinoid Screen Ur Negative     Negative                  



             (test code = Cannabinoid                                        



             Screen Ur)                                          

 

             U TCA (test code = U Negative     Negative                  The res

ults of all



             TCA)                                                drug screen elinor

ts are



                                                                 only preliminar

y.



                                                                 Clinical



                                                                 consideration a

nd



                                                                 professional ju

dgment



                                                                 should be appli

ed to



                                                                 any drug of abu

se



                                                                 test result,



                                                                 particularly wh

en



                                                                 preliminary pos

itive



                                                                 results are obt

ained.



                                                                 Please order a



                                                                 separate confir

matory



                                                                 test if desired

.



HCG Qualitative [id number] 01:36:43





             Test Item    Value        Reference Range Interpretation Comments

 

             hCG Ur (test code = Negative                               If the r

esult is



             hCG Ur)                                             "Negative" in p

atients



                                                                 suspected to be



                                                                 pregnant, recom

mend



                                                                 retest with a s

ample



                                                                 obtained 48 to 

72



                                                                 hours later, or

 by



                                                                 ordering a



                                                                 quantitative as

say. If



                                                                 the result is



                                                                 "Borderline" te

sting



                                                                 should be repea

gianfranco in



                                                                 48 to 72 hours.

 

             Lot # (test code = [id number]                    N            



             Lot #)                                              

 

             Expiration Dt (test 2020                N            



             code = Expiration Dt)                                        

 

             Neg Control (test Negative                               



             code = Neg Control)                                        

 

             Pos Control (test Positive                               



             code = Pos Control)                                        

 

             Internal QC (test Acceptable                             



             code = Internal QC)                                        



Urinalysis [id number] 01:36:03





             Test Item    Value        Reference Range Interpretation Comments

 

             UA WBC (test code = UA WBC) 0-5          0-5                       

 

             UA RBC (test code = UA RBC) None Seen    0-5                       

 

             UA Bacteria (test code = UA Moderate                  A            



             Bacteria)                                           

 

             UA Squam Epithelial (test code = UA 0-5                            

        



             Squam Epithelial)                                        



Comprehensive Metabolic [id number] 01:23:40





             Test Item    Value        Reference Range Interpretation Comments

 

             Sodium Level (test code = Sodium 139.0 mmol/L 135.0-145.0          

     



             Level)                                              

 

             Potassium Level (test code = 4.4 mmol/L   3.5-5.1                  

 



             Potassium Level)                                        

 

             Chloride Level (test code = 102 mmol/L                       



             Chloride Level)                                        

 

             CO2 (test code = CO2) 23 mmol/L    22-29                     

 

             Anion Gap (test code = Anion 14 mmol/L    7-16                     

 



             Gap)                                                

 

             BUN (test code = BUN) 9.10 mg/dL   6.00-20.00                

 

             Creatinine Level (test code = 1.00 mg/dL   0.50-0.90    H          

  



             Creatinine Level)                                        

 

             BUN/Creat Ratio (test code = 9                         N           

 



             BUN/Creat Ratio)                                        

 

             Glucose Level (test code = 115 mg/dL                        



             Glucose Level)                                        

 

             Calcium Level (test code = 10.1 mg/dL   8.3-10.5                  



             Calcium Level)                                        

 

             Alk Phos (test code = Alk Phos) 106 U/L             H        

    

 

             Bilirubin Total (test code = 0.4 mg/dL    0.1-0.9                  

 



             Bilirubin Total)                                        

 

             Albumin Level (test code = 4.6 g/dL     3.5-5.2                   



             Albumin Level)                                        

 

             Protein Total (test code = 7.7 g/dL     6.4-8.3                   



             Protein Total)                                        

 

             ALT (test code = ALT) 12 U/L       1-33                      

 

             AST (test code = AST) 18 U/L       1-32                      

 

             Globulin (test code = Globulin) 3.1 g/dL     2.9-3.1               

    

 

             A/G Ratio (test code = A/G 1.5 ratio                 N            



             Ratio)                                              



Comprehensive Metabolic [id number] 01:23:40





             Test Item    Value        Reference Range Interpretation Comments

 

             Sodium Level (test 139.0 mmol/L 135.0-145.0               



             code = Sodium Level)                                        

 

             Potassium Level 4.4 mmol/L   3.5-5.1                   



             (test code =                                        



             Potassium Level)                                        

 

             Chloride Level (test 102 mmol/L                       



             code = Chloride                                        



             Level)                                              

 

             CO2 (test code = 23 mmol/L    22-29                     



             CO2)                                                

 

             Anion Gap (test code 14 mmol/L    7-16                      



             = Anion Gap)                                        

 

             BUN (test code = 9.10 mg/dL   6.00-20.00                



             BUN)                                                

 

             Creatinine Level 1.00 mg/dL   0.50-0.90    H            



             (test code =                                        



             Creatinine Level)                                        

 

             BUN/Creat Ratio 9                         N            



             (test code =                                        



             BUN/Creat Ratio)                                        

 

             Glucose Level (test 115 mg/dL                        



             code = Glucose                                        



             Level)                                              

 

             Calcium Level (test 10.1 mg/dL   8.3-10.5                  



             code = Calcium                                        



             Level)                                              

 

             Alk Phos (test code 106 U/L             H            



             = Alk Phos)                                         

 

             Bilirubin Total 0.4 mg/dL    0.1-0.9                   



             (test code =                                        



             Bilirubin Total)                                        

 

             Albumin Level (test 4.6 g/dL     3.5-5.2                   



             code = Albumin                                        



             Level)                                              

 

             Protein Total (test 7.7 g/dL     6.4-8.3                   



             code = Protein                                        



             Total)                                              

 

             ALT (test code = 12 U/L       1-33                      



             ALT)                                                

 

             AST (test code = 18 U/L       1-32                      



             AST)                                                

 

             Globulin (test code 3.1 g/dL     2.9-3.1                   



             = Globulin)                                         

 

             A/G Ratio (test code 1.5 ratio                 N            



             = A/G Ratio)                                        

 

             eGFR AA (test code = >60                       N            eGFR (e

stimated



             eGFR AA)     mL/min/1.73 m2                           Glomerular



                                                                 Filtration Rate

) is



                                                                 an estimated va

lue,



                                                                 calculated from

 the



                                                                 patient's serum



                                                                 creatinine usin

g the



                                                                 MDRD equation. 

It is



                                                                 NOT the patient

's



                                                                 actual GFR. The

 eGFR



                                                                 provides a more



                                                                 clinically usef

ul



                                                                 measure of kidn

ey



                                                                 disease than se

rum



                                                                 creatinine



                                                                 alone.***This



                                                                 calculation rimma

es



                                                                 sex and race in

to



                                                                 account, if the



                                                                 information is



                                                                 provided. If th

e



                                                                 race is not



                                                                 provided, and t

he



                                                                 patient is



                                                                 -Crystal

n,



                                                                 multiply by 1.2

12.



                                                                 If sex is not



                                                                 provided, and t

he



                                                                 patient is fema

le,



                                                                 multiply by 0.7

42.



                                                                 Results for pat

ients



                                                                 <18 years of ag

e



                                                                 have not been



                                                                 validated by th

e



                                                                 MDRD study and



                                                                 should be



                                                                 interpreted wit

h



                                                                 caution. eGFR R

esult



                                                                 Interpretation:

eGFR



                                                                 > or = 60 is in

 the



                                                                 Normal RangeeGF

R <



                                                                 60 may mean kid

hang



                                                                 diseaseeGFR < 1

5 may



                                                                 mean kidney



                                                                 failure*** Rang

es



                                                                 recommended by 

the



                                                                 [employer]



                                                                 [employer],



                                                                 http://nkdep.ni

h.gov



Comprehensive Metabolic [id number] 01:23:40





             Test Item    Value        Reference Range Interpretation Comments

 

             Sodium Level (test 139.0 mmol/L 135.0-145.0               



             code = Sodium Level)                                        

 

             Potassium Level 4.4 mmol/L   3.5-5.1                   



             (test code =                                        



             Potassium Level)                                        

 

             Chloride Level (test 102 mmol/L                       



             code = Chloride                                        



             Level)                                              

 

             CO2 (test code = 23 mmol/L    22-29                     



             CO2)                                                

 

             Anion Gap (test code 14 mmol/L    7-16                      



             = Anion Gap)                                        

 

             BUN (test code = 9.10 mg/dL   6.00-20.00                



             BUN)                                                

 

             Creatinine Level 1.00 mg/dL   0.50-0.90    H            



             (test code =                                        



             Creatinine Level)                                        

 

             BUN/Creat Ratio 9                         N            



             (test code =                                        



             BUN/Creat Ratio)                                        

 

             Glucose Level (test 115 mg/dL                        



             code = Glucose                                        



             Level)                                              

 

             Calcium Level (test 10.1 mg/dL   8.3-10.5                  



             code = Calcium                                        



             Level)                                              

 

             Alk Phos (test code 106 U/L             H            



             = Alk Phos)                                         

 

             Bilirubin Total 0.4 mg/dL    0.1-0.9                   



             (test code =                                        



             Bilirubin Total)                                        

 

             Albumin Level (test 4.6 g/dL     3.5-5.2                   



             code = Albumin                                        



             Level)                                              

 

             Protein Total (test 7.7 g/dL     6.4-8.3                   



             code = Protein                                        



             Total)                                              

 

             ALT (test code = 12 U/L       1-33                      



             ALT)                                                

 

             AST (test code = 18 U/L       1-32                      



             AST)                                                

 

             Globulin (test code 3.1 g/dL     2.9-3.1                   



             = Globulin)                                         

 

             A/G Ratio (test code 1.5 ratio                 N            



             = A/G Ratio)                                        

 

             eGFR AA (test code = >60                       N            eGFR (e

stimated



             eGFR AA)     mL/min/1.73 m2                           Glomerular



                                                                 Filtration Rate

) is



                                                                 an estimated va

lue,



                                                                 calculated from

 the



                                                                 patient's serum



                                                                 creatinine usin

g the



                                                                 MDRD equation. 

It is



                                                                 NOT the patient

's



                                                                 actual GFR. The

 eGFR



                                                                 provides a more



                                                                 clinically usef

ul



                                                                 measure of kidn

ey



                                                                 disease than se

rum



                                                                 creatinine



                                                                 alone.***This



                                                                 calculation rimma

es



                                                                 sex and race in

to



                                                                 account, if the



                                                                 information is



                                                                 provided. If th

e



                                                                 race is not



                                                                 provided, and t

he



                                                                 patient is



                                                                 -Crystal

n,



                                                                 multiply by 1.2

12.



                                                                 If sex is not



                                                                 provided, and t

he



                                                                 patient is fema

le,



                                                                 multiply by 0.7

42.



                                                                 Results for pat

ients



                                                                 <18 years of ag

e



                                                                 have not been



                                                                 validated by [facility]



                                                                 MDRD study and



                                                                 should be



                                                                 interpreted wit

h



                                                                 caution. eGFR R

esult



                                                                 Interpretation:

eGFR



                                                                 > or = 60 is in

 the



                                                                 Normal RangeeGF

R <



                                                                 60 may mean kid

hang



                                                                 diseaseeGFR < 1

5 may



                                                                 mean kidney



                                                                 failure*** Rang

es



                                                                 recommended by 

the



                                                                 [employer]



                                                                 [employer],



                                                                 http://nkdep.ni

h.gov

 

             eGFR Non-AA (test 58.45                     N            eGFR (sheba

mated



             code = eGFR Non-AA) mL/min/1.73 m2                           Glomer

ular



                                                                 Filtration Rate

) is



                                                                 an estimated va

lue,



                                                                 calculated from

 the



                                                                 patient's serum



                                                                 creatinine usin

g the



                                                                 MDRD equation. 

It is



                                                                 NOT the patient

's



                                                                 actual GFR. The

 eGFR



                                                                 provides a more



                                                                 clinically usef

ul



                                                                 measure of kidn

ey



                                                                 disease than se

rum



                                                                 creatinine



                                                                 alone.***This



                                                                 calculation rimma

es



                                                                 sex and race in

to



                                                                 account, if the



                                                                 information is



                                                                 provided. If th

e



                                                                 race is not



                                                                 provided, and t

he



                                                                 patient is



                                                                 -Crystal

n,



                                                                 multiply by 1.2

12.



                                                                 If sex is not



                                                                 provided, and t

he



                                                                 patient is fema

le,



                                                                 multiply by 0.7

42.



                                                                 Results for pat

ients



                                                                 <18 years of ag

e



                                                                 have not been



                                                                 validated by [facility]



                                                                 MDRD study and



                                                                 should be



                                                                 interpreted wit

h



                                                                 caution. eGFR R

esult



                                                                 Interpretation:

eGFR



                                                                 > or = 60 is in

 the



                                                                 Normal RangeeGF

R <



                                                                 60 may mean kid

hang



                                                                 diseaseeGFR < 1

5 may



                                                                 mean kidney



                                                                 failure*** Rang

es



                                                                 recommended by 

the



                                                                 [employer]



                                                                 Foundation,



                                                                 http://nkdep.ni

h.gov



Alcohol [id number] 01:23:31





             Test Item    Value        Reference Range Interpretation Comments

 

             Ethanol Level (test <0.00 g/dL   0.00-0.01                 Intoxica

gianfranco 0.080 g/dL



             code = Ethanol                                        or more



             Level)                                              

 

             Ethanol Inst (test <0                        N            



             code = Ethanol Inst)                                        



Complete Blood Count with [id number] 00:56:12





             Test Item    Value        Reference Range Interpretation Comments

 

             WBC (test code = WBC) 19.4 x10     4.4-10.5     H            

 

             RBC (test code = RBC) 4.53 x10     3.75-5.20                 

 

             Hgb (test code = Hgb) 13.5 g/dL    12.2-14.8                 

 

             Hct (test code = Hct) 41.3 %       36.5-44.4                 

 

             MCV (test code = MCV) 91.20 fL     80..00              

 

             MCHC (test code = 32.70 g/dL   32.00-37.50               



             MCHC)                                               

 

             RDW CV (test code = 13.5 %       11.5-14.5                 



             RDW CV)                                             

 

             MCH (test code = MCH) 29.8 pg      27.0-32.5                 

 

             Platelets (test code = 462.0 x10    140.0-440.0  H            



             Platelets)                                          

 

             MPV (test code = MPV) 9.9 fL                    N            

 

             Slide Review (test Auto         Auto                      Result cr

eated by



             code = Slide Review)                                        [email or web address]

[email or web address]_AUTO

 

             nRBC (test code = 0                         N            



             nRBC)                                               

 

             NRBC Abs (test code = 0.00 x10                  N            



             NRBC Abs)                                           

 

             IPF (test code = IPF) 0 %                       N            



Automated [id number] 00:56:12





             Test Item    Value        Reference Range Interpretation Comments

 

             Neutro Auto (test code = Neutro 83.7 %       36.0-70.0    H        

    



             Auto)                                               

 

             Lymph Auto (test code = Lymph Auto) 8.9 %        12.0-44.0    L    

        

 

             Mono Auto (test code = Mono Auto) 5.0 %        0.0-11.0            

      

 

             Eos, Auto (test code = Eos, Auto) 1.4 %        0.0-7.0             

      

 

             Basophil Auto (test code = Basophil 0.7 %        0.0-2.0           

        



             Auto)                                               

 

             Neutro Absolute (test code = Neutro 16.2 x10     1.6-7.4      H    

        



             Absolute)                                           

 

             Lymph Absolute (test code = Lymph 1.73 x10     .50-4.60            

      



             Absolute)                                           

 

             Mono Absolute (test code = Mono .96 x10      .00-1.20              

    



             Absolute)                                           

 

             Eos Absolute (test code = Eos 0.27 x10     0.00-0.74               

  



             Absolute)                                           

 

             Baso Absolute (test code = Baso 0.13 x10     0.00-0.21             

    



             Absolute)                                           



IG [id number] 00:56:12





             Test Item    Value        Reference Range Interpretation Comments

 

             IG (test code = IG) 0.3 %        0.0-5.0                   

 

             IG Abs (test code = IG Abs) 0 x10                     N            



Urinalysis with Culture, if [id number] 00:55:34





             Test Item    Value        Reference Range Interpretation Comments

 

             UA Color (test code = STRAW        Yellow                    



             UA Color)                                           

 

             UA Appear (test code = CLEAR        Clear                     



             UA Appear)                                          

 

             UA pH (test code = UA 5                         N            



             pH)                                                 

 

             UA Spec Grav (test code 1.001        1.001-1.035               



             = UA Spec Grav)                                        

 

             UA Glucose (test code = NEG          Negative                  



             UA Glucose)                                         

 

             UA Bili (test code = UA NEG          Negative                  



             Bili)                                               

 

             UA Ketones (test code = NEG          Negative                  



             UA Ketones)                                         

 

             UA Blood (test code = NEG          Negative                  



             UA Blood)                                           

 

             UA Protein (test code = NEG          Negative                  



             UA Protein)                                         

 

             UA Urobilinogen (test 0.2 mg/dL                 N            



             code = UA Urobilinogen)                                        

 

             UA Nitrite (test code = POS          Negative     A            



             UA Nitrite)                                         

 

             UA Leuk Est (test code 25 cells/mcL Negative     A            



             = UA Leuk Est)                                        

 

             UA Micro Ind? (test Indicated    Not Indicated A            Result 

created by



             code = UA Micro Ind?)                                        rule



                                                                 [email or web address]_MICRO

_IN



                                                                 D



Urine [id number] 08:13:23





             Test Item    Value        Reference Range Interpretation Comments

 

             ORGANISM (test code = Escherichia coli                           



             ORGANISM)                                           

 

             Amikacin (test code =                           S            



             Amik)                                               

 

             Ampicillin (test code =                           S            



             Amp)                                                

 

             Ampicillin/Sulbactam                           S            



             (test code = Amp/Sul)                                        

 

             Cefazolin (test code =                           S            



             Cefaz)                                              

 

             Cefepime (test code =                           S            



             Cefep)                                              

 

             Cefotaxime (test code =                           S            



             Cefo)                                               

 

             Ceftazidime (test code =                           S            



             Ceftaz)                                             

 

             Ceftriaxone (test code =                           S            



             Ceftri)                                             

 

             Cefuroxime (test code =                           S            



             Cefur)                                              

 

             Ciprofloxacin (test code                           S            



             = Cipro)                                            

 

             Gentamicin (test code =                           S            



             Gent)                                               

 

             Imipenem (test code =                           S            



             Imi)                                                

 

             Levofloxacin (test code                           S            



             = Levo)                                             

 

             Meropenem (test code =                           S            



             Sindi)                                               

 

             Nitrofurantoin (test                           S            



             code = Nitro)                                        

 

             Piperacillin/Tazobactam                           S            



             (test code = Pip/Blaine)                                        

 

             Tobramycin (test code =                           S            



             Tobra)                                              

 

             Trimethoprim/Sulfa (test                           S            



             code = SXT)                                         

 

             Final Report (test code 30,000 cfu/ml                           



             = Final Report) Escherichia coli                           



                          20,000 cfu/ml                           



                          Diphtheroids                           



 C Urine Added by [email or web address] Qualitative2019-09-15 04:57:25





             Test Item    Value        Reference Range Interpretation Comments

 

             RPR Qual (test code = RPR Qual) Non-Reactive Non-Reactive          

    

 

             Reactive Control (test code = Reactive                             

  



             Reactive Control)                                        

 

             Weak Reactive Control (test Weak Reactive                          

 



             code = Weak Reactive Control)                                      

  

 

             Non-Reactive Control (test code Non-Reactive                       

    



             = Non-Reactive Control)                                        

 

             Lot # (test code = Lot #) [id number]                    N            

 

             Expiration Dt (test code = 10..                N            



             Expiration Dt)                                        



Thyroid Stimulating Hormone2019-09-15 01:22:43





             Test Item    Value        Reference Range Interpretation Comments

 

             TSH (test code = TSH) 3.370 mIU/mL 0.270-4.200               



Lipid Panel2019-09-15 01:17:51





             Test Item    Value        Reference Range Interpretation Comments

 

             Cholesterol Total 168 mg/dL    0-200                     RISK OF HE

ART



             (test code =                                        DISEASEPublishe

d by



             Cholesterol Total)                                        American 

Heart



                                                                 Association Judith

lyte



                                                                 Optimal Borderl

ine



                                                                 Increased RiskC

HOL <200



                                                                 200-239 >240TRI

G <150



                                                                 150-199 >200HDL

 Male



                                                                 >60 <40HDL Fema

le >60



                                                                 <50LDL <100 130

-159



                                                                 >160LDL Near op

timal is



                                                                 100-129

 

             Triglycerides (test 108 mg/dL    9-200                     



             code = Triglycerides)                                        

 

             HDL (test code = HDL) 46 mg/dL     50-60        L            

 

             LDL (test code = LDL) 100 mg/dL    0-130                     The eq

uation being used



                                                                 in this calcula

tion is



                                                                 LDL = (Chol - H

DL) -



                                                                 (Trig / 5)

 

             VLDL (test code = 22 mg/dL     5-40                      The equati

on being used



             VLDL)                                               in this calcula

tion is



                                                                 VLDL = Trig / 5

 

             Chol/HDL (test code = 3.7 ratio    0.0-4.4                   



             Chol/HDL)                                           

 

             LDL/HDL Ratio (test 2                         N            The equa

tion being used



             code = LDL/HDL Ratio)                                        in thi

s calculation is



                                                                 LDL/HDL Ratio=L

DL



                                                                 Calc/HDL Chol



Lithium Level2019-09-15 01:17:51





             Test Item    Value        Reference Range Interpretation Comments

 

             Lithium Level (test code = 0.81 mmol/L  0.60-1.20                 



             Lithium Level)                                        



Comprehensive Metabolic Panel2019-09-15 00:04:54





             Test Item    Value        Reference Range Interpretation Comments

 

             Sodium Level (test code = Sodium 137.0 mmol/L 135.0-145.0          

     



             Level)                                              

 

             Potassium Level (test code = 4.0 mmol/L   3.5-5.1                  

 



             Potassium Level)                                        

 

             Chloride Level (test code = 103 mmol/L                       



             Chloride Level)                                        

 

             CO2 (test code = CO2) 23 mmol/L    22-29                     

 

             Anion Gap (test code = Anion 11 mmol/L    7-16                     

 



             Gap)                                                

 

             BUN (test code = BUN) 11.50 mg/dL  6.00-20.00                

 

             Creatinine Level (test code = 1.00 mg/dL   0.50-0.90    H          

  



             Creatinine Level)                                        

 

             BUN/Creat Ratio (test code = 12                        N           

 



             BUN/Creat Ratio)                                        

 

             Glucose Level (test code = 108 mg/dL                        



             Glucose Level)                                        

 

             Calcium Level (test code = 10.3 mg/dL   8.3-10.5                  



             Calcium Level)                                        

 

             Alk Phos (test code = Alk Phos) 93 U/L                       

    

 

             Bilirubin Total (test code = 0.5 mg/dL    0.1-0.9                  

 



             Bilirubin Total)                                        

 

             Albumin Level (test code = 4.4 g/dL     3.5-5.2                   



             Albumin Level)                                        

 

             Protein Total (test code = 7.2 g/dL     6.4-8.3                   



             Protein Total)                                        

 

             ALT (test code = ALT) 12 U/L       1-33                      

 

             AST (test code = AST) 17 U/L       1-32                      

 

             Globulin (test code = Globulin) 2.8 g/dL     2.9-3.1      L        

    

 

             A/G Ratio (test code = A/G 1.6 ratio                 N            



             Ratio)                                              



Alcohol Level2019-09-15 00:04:54





             Test Item    Value        Reference Range Interpretation Comments

 

             Ethanol Level (test <0.00 g/dL   0.00-0.01                 [facility]tox[facility]

gianfranco 0.080 g/dL



             code = Ethanol                                        or more



             Level)                                              

 

             Ethanol Inst (test <0                        N            



             code = Ethanol Inst)                                        



Comprehensive Metabolic Panel2019-09-15 00:04:54





             Test Item    Value        Reference Range Interpretation Comments

 

             Sodium Level (test 137.0 mmol/L 135.0-145.0               



             code = Sodium Level)                                        

 

             Potassium Level 4.0 mmol/L   3.5-5.1                   



             (test code =                                        



             Potassium Level)                                        

 

             Chloride Level (test 103 mmol/L                       



             code = Chloride                                        



             Level)                                              

 

             CO2 (test code = 23 mmol/L    22-29                     



             CO2)                                                

 

             Anion Gap (test code 11 mmol/L    7-16                      



             = Anion Gap)                                        

 

             BUN (test code = 11.50 mg/dL  6.00-20.00                



             BUN)                                                

 

             Creatinine Level 1.00 mg/dL   0.50-0.90    H            



             (test code =                                        



             Creatinine Level)                                        

 

             BUN/Creat Ratio 12                        N            



             (test code =                                        



             BUN/Creat Ratio)                                        

 

             Glucose Level (test 108 mg/dL                        



             code = Glucose                                        



             Level)                                              

 

             Calcium Level (test 10.3 mg/dL   8.3-10.5                  



             code = Calcium                                        



             Level)                                              

 

             Alk Phos (test code 93 U/L                           



             = Alk Phos)                                         

 

             Bilirubin Total 0.5 mg/dL    0.1-0.9                   



             (test code =                                        



             Bilirubin Total)                                        

 

             Albumin Level (test 4.4 g/dL     3.5-5.2                   



             code = Albumin                                        



             Level)                                              

 

             Protein Total (test 7.2 g/dL     6.4-8.3                   



             code = Protein                                        



             Total)                                              

 

             ALT (test code = 12 U/L       1-33                      



             ALT)                                                

 

             AST (test code = 17 U/L       1-32                      



             AST)                                                

 

             Globulin (test code 2.8 g/dL     2.9-3.1      L            



             = Globulin)                                         

 

             A/G Ratio (test code 1.6 ratio                 N            



             = A/G Ratio)                                        

 

             eGFR AA (test code = >60                       N            eGFR (e

stimated



             eGFR AA)     mL/min/1.73 m2                           Glomerular



                                                                 Filtration Rate

) is



                                                                 an estimated va

lue,



                                                                 calculated from

 the



                                                                 patient's serum



                                                                 creatinine usin

g the



                                                                 MDRD equation. 

It is



                                                                 NOT the patient

's



                                                                 actual GFR. The

 eGFR



                                                                 provides a more



                                                                 clinically usef

ul



                                                                 measure of kidn

ey



                                                                 disease than se

rum



                                                                 creatinine



                                                                 alone.***This



                                                                 calculation rimma

es



                                                                 sex and race in

to



                                                                 account, if the



                                                                 information is



                                                                 provided. If th

e



                                                                 race is not



                                                                 provided, and t

he



                                                                 patient is



                                                                 -Crystal

n,



                                                                 multiply by 1.2

12.



                                                                 If sex is not



                                                                 provided, and t

he



                                                                 patient is fema

le,



                                                                 multiply by 0.7

42.



                                                                 Results for pat

ients



                                                                 <18 years of ag

e



                                                                 have not been



                                                                 validated by th

e



                                                                 MDRD study and



                                                                 should be



                                                                 interpreted wit

h



                                                                 caution. eGFR R

esult



                                                                 Interpretation:

eGFR



                                                                 > or = 60 is in

 the



                                                                 Normal RangeeGF

R <



                                                                 60 may mean kid

hang



                                                                 diseaseeGFR < 1

5 may



                                                                 mean kidney



                                                                 failure*** Rang

es



                                                                 recommended by 

the



                                                                 [employer]



                                                                 [employer],



                                                                 http://nkdep.ni

h.gov



Comprehensive Metabolic Panel2019-09-15 00:04:54





             Test Item    Value        Reference Range Interpretation Comments

 

             Sodium Level (test 137.0 mmol/L 135.0-145.0               



             code = Sodium Level)                                        

 

             Potassium Level 4.0 mmol/L   3.5-5.1                   



             (test code =                                        



             Potassium Level)                                        

 

             Chloride Level (test 103 mmol/L                       



             code = Chloride                                        



             Level)                                              

 

             CO2 (test code = 23 mmol/L    22-29                     



             CO2)                                                

 

             Anion Gap (test code 11 mmol/L    7-16                      



             = Anion Gap)                                        

 

             BUN (test code = 11.50 mg/dL  6.00-20.00                



             BUN)                                                

 

             Creatinine Level 1.00 mg/dL   0.50-0.90    H            



             (test code =                                        



             Creatinine Level)                                        

 

             BUN/Creat Ratio 12                        N            



             (test code =                                        



             BUN/Creat Ratio)                                        

 

             Glucose Level (test 108 mg/dL                        



             code = Glucose                                        



             Level)                                              

 

             Calcium Level (test 10.3 mg/dL   8.3-10.5                  



             code = Calcium                                        



             Level)                                              

 

             Alk Phos (test code 93 U/L                           



             = Alk Phos)                                         

 

             Bilirubin Total 0.5 mg/dL    0.1-0.9                   



             (test code =                                        



             Bilirubin Total)                                        

 

             Albumin Level (test 4.4 g/dL     3.5-5.2                   



             code = Albumin                                        



             Level)                                              

 

             Protein Total (test 7.2 g/dL     6.4-8.3                   



             code = Protein                                        



             Total)                                              

 

             ALT (test code = 12 U/L       1-33                      



             ALT)                                                

 

             AST (test code = 17 U/L       1-32                      



             AST)                                                

 

             Globulin (test code 2.8 g/dL     2.9-3.1      L            



             = Globulin)                                         

 

             A/G Ratio (test code 1.6 ratio                 N            



             = A/G Ratio)                                        

 

             eGFR AA (test code = >60                       N            eGFR (e

stimated



             eGFR AA)     mL/min/1.73 m2                           Glomerular



                                                                 Filtration Rate

) is



                                                                 an estimated va

lue,



                                                                 calculated from

 the



                                                                 patient's serum



                                                                 creatinine usin

g the



                                                                 MDRD equation. 

It is



                                                                 NOT the patient

's



                                                                 actual GFR. The

 eGFR



                                                                 provides a more



                                                                 clinically usef

ul



                                                                 measure of kidn

ey



                                                                 disease than se

rum



                                                                 creatinine



                                                                 alone.***This



                                                                 calculation rimma

es



                                                                 sex and race in

to



                                                                 account, if the



                                                                 information is



                                                                 provided. If th

e



                                                                 race is not



                                                                 provided, and t

he



                                                                 patient is



                                                                 -Crystal

n,



                                                                 multiply by 1.2

12.



                                                                 If sex is not



                                                                 provided, and t

he



                                                                 patient is fema

le,



                                                                 multiply by 0.7

42.



                                                                 Results for pat

ients



                                                                 <18 years of ag

e



                                                                 have not been



                                                                 validated by [facility]



                                                                 MDRD study and



                                                                 should be



                                                                 interpreted wit

h



                                                                 caution. eGFR R

esult



                                                                 Interpretation:

eGFR



                                                                 > or = 60 is in

 the



                                                                 Normal RangeeGF

R <



                                                                 60 may mean kid

hang



                                                                 diseaseeGFR < 1

5 may



                                                                 mean kidney



                                                                 failure*** Rang

es



                                                                 recommended by 

the



                                                                 [employer]



                                                                 [employer],



                                                                 http://nkdep.ni

h.gov

 

             eGFR Non-AA (test 58.45                     N            eGFR (sheba

mated



             code = eGFR Non-AA) mL/min/1.73 m2                           Glomer

ular



                                                                 Filtration Rate

) is



                                                                 an estimated va

lue,



                                                                 calculated from

 the



                                                                 patient's serum



                                                                 creatinine usin

g the



                                                                 MDRD equation. 

It is



                                                                 NOT the patient

's



                                                                 actual GFR. The

 eGFR



                                                                 provides a more



                                                                 clinically usef

ul



                                                                 measure of kidn

ey



                                                                 disease than se

rum



                                                                 creatinine



                                                                 alone.***This



                                                                 calculation rimma

es



                                                                 sex and race in

to



                                                                 account, if the



                                                                 information is



                                                                 provided. If th

e



                                                                 race is not



                                                                 provided, and t

he



                                                                 patient is



                                                                 -Crystal

n,



                                                                 multiply by 1.2

12.



                                                                 If sex is not



                                                                 provided, and t

he



                                                                 patient is fema

le,



                                                                 multiply by 0.7

42.



                                                                 Results for pat

ients



                                                                 <18 years of ag

e



                                                                 have not been



                                                                 validated by [facility]



                                                                 MDRD study and



                                                                 should be



                                                                 interpreted wit

h



                                                                 caution. eGFR R

esult



                                                                 Interpretation:

eGFR



                                                                 > or = 60 is in

 the



                                                                 Normal RangeeGF

R <



                                                                 60 may mean kid

hang



                                                                 diseaseeGFR < 1

5 may



                                                                 mean kidney



                                                                 failure*** Rang

es



                                                                 recommended by 

the



                                                                 [employer]



                                                                 [employer],



                                                                 http://nkdep.ni

h.gov



Urinalysis Microscopic2019-09-15 00:02:53





             Test Item    Value        Reference Range Interpretation Comments

 

             UA WBC (test code = UA WBC) 6-10         0-5          A            

 

             UA RBC (test code = UA RBC) 0-5          0-5                       

 

             UA Bacteria (test code = UA Bacteria) Many                      A  

          

 

             UA Squam Epithelial (test code = UA TNTC                      A    

        



             Squam Epithelial)                                        



Urine Drug [id number] 23:59:42





             Test Item    Value        Reference Range Interpretation Comments

 

             Amphetamine Screen Ur POSITIVE     Negative     A            



             (test code = Amphetamine                                        



             Screen Ur)                                          

 

             Barbiturate Screen Ur Negative     Negative                  



             (test code = Barbiturate                                        



             Screen Ur)                                          

 

             Benzodiazepines Ur (test POSITIVE     Negative     A            



             code = Benzodiazepines                                        



             Ur)                                                 

 

             Cocaine Screen Ur (test Negative     Negative                  



             code = Cocaine Screen                                        



             Ur)                                                 

 

             U Methadone Scr (test Negative     Negative                  



             code = U Methadone Scr)                                        

 

             Opiate Screen Ur (test Negative     Negative                  



             code = Opiate Screen Ur)                                        

 

             U PCP Scrn (test code = Negative     Negative                  



             U PCP Scrn)                                         

 

             Cannabinoid Screen Ur Negative     Negative                  



             (test code = Cannabinoid                                        



             Screen Ur)                                          

 

             U TCA (test code = U Negative     Negative                  The res

ults of all



             TCA)                                                drug screen elinor

ts are



                                                                 only preliminar

y.



                                                                 Clinical



                                                                 consideration a

nd



                                                                 professional ju

dgment



                                                                 should be appli

ed to



                                                                 any drug of abu

se



                                                                 test result,



                                                                 particularly wh

en



                                                                 preliminary pos

itive



                                                                 results are obt

ained.



                                                                 Please order a



                                                                 separate confir

matory



                                                                 test if desired

.



HCG Qualitative [id number] 23:54:43





             Test Item    Value        Reference Range Interpretation Comments

 

             hCG Ur (test code = Negative                               If the r

esult is



             hCG Ur)                                             "Negative" in p

atients



                                                                 suspected to be



                                                                 pregnant, recom

mend



                                                                 retest with a s

ample



                                                                 obtained 48 to 

72



                                                                 hours later, or

 by



                                                                 ordering a



                                                                 quantitative as

say. If



                                                                 the result is



                                                                 "Borderline" te

sting



                                                                 should be repea

gianfranco in



                                                                 48 to 72 hours.

 

             Lot # (test code = [id number]                    N            



             Lot #)                                              

 

             Expiration Dt (test 2020                  N            



             code = Expiration Dt)                                        

 

             Neg Control (test Negative                               



             code = Neg Control)                                        

 

             Pos Control (test Positive                               



             code = Pos Control)                                        

 

             Internal QC (test Acceptable                             



             code = Internal QC)                                        



Urinalysis with Culture, if [id number] 23:52:04





             Test Item    Value        Reference Range Interpretation Comments

 

             UA Color (test code = UA YELLO        Yellow                    



             Color)                                              

 

             UA Appear (test code = SCLD         Clear        A            



             UA Appear)                                          

 

             UA pH (test code = UA 6.5                                    



             pH)                                                 

 

             UA Spec Grav (test code 1.011        1.001-1.035               



             = UA Spec Grav)                                        

 

             UA Glucose (test code = NEG          Negative                  



             UA Glucose)                                         

 

             UA Bili (test code = UA NEG          Negative                  



             Bili)                                               

 

             UA Ketones (test code = NEG          Negative                  



             UA Ketones)                                         

 

             UA Blood (test code = UA NEG          Negative                  



             Blood)                                              

 

             UA Protein (test code = NEG          Negative                  



             UA Protein)                                         

 

             UA Urobilinogen (test 1 mg/dL      >0.2         A            



             code = UA Urobilinogen)                                        

 

             UA Nitrite (test code = POS          Negative     A            



             UA Nitrite)                                         

 

             UA Leuk Est (test code = NEG          Negative                  



             UA Leuk Est)                                        

 

             UA Micro Ind? (test code Indicated    Not Indicated A            Re

sult created by



             = UA Micro Ind?)                                        rule



                                                                 [id number]

_IND



Automated [id number] 23:48:39





             Test Item    Value        Reference Range Interpretation Comments

 

             Neutro Auto (test code = Neutro 75.7 %       36.0-70.0    H        

    



             Auto)                                               

 

             Lymph Auto (test code = Lymph Auto) 14.1 %       12.0-44.0         

        

 

             Mono Auto (test code = Mono Auto) 7.6 %        0.0-11.0            

      

 

             Eos, Auto (test code = Eos, Auto) 1.8 %        0.0-7.0             

      

 

             Basophil Auto (test code = Basophil 0.5 %        0.0-2.0           

        



             Auto)                                               

 

             Neutro Absolute (test code = Neutro 12.7 x10     1.6-7.4      H    

        



             Absolute)                                           

 

             Lymph Absolute (test code = Lymph 2.38 x10     .50-4.60            

      



             Absolute)                                           

 

             Mono Absolute (test code = Mono 1.28 x10     .00-1.20     H        

    



             Absolute)                                           

 

             Eos Absolute (test code = Eos 0.31 x10     0.00-0.74               

  



             Absolute)                                           

 

             Baso Absolute (test code = Baso 0.09 x10     0.00-0.21             

    



             Absolute)                                           



IG [id number] 23:48:39





             Test Item    Value        Reference Range Interpretation Comments

 

             IG (test code = IG) 0.3 %        0.0-5.0                   

 

             IG Abs (test code = IG Abs) 0 x10                     N            



Complete Blood Count with [id number] 23:48:38





             Test Item    Value        Reference Range Interpretation Comments

 

             WBC (test code = WBC) 16.8 x10     4.4-10.5     H            

 

             RBC (test code = RBC) 4.06 x10     3.75-5.20                 

 

             Hgb (test code = Hgb) 12.7 g/dL    12.2-14.8                 

 

             MCV (test code = MCV) 94.10 fL     80..00              

 

             Hct (test code = Hct) 38.2 %       36.5-44.4                 

 

             MCHC (test code = 33.20 g/dL   32.00-37.50               



             MCHC)                                               

 

             RDW CV (test code = 13.2 %       11.5-14.5                 



             RDW CV)                                             

 

             MCH (test code = MCH) 31.3 pg      27.0-32.5                 

 

             Platelets (test code = 363.0 x10    140.0-440.0               



             Platelets)                                          

 

             MPV (test code = MPV) 10.0 fL                   N            

 

             Slide Review (test Auto         Auto                      Result cr

eated by



             code = Slide Review)                                        [email or web address]_

SLIDE_REV_AUTO

 

             nRBC (test code = 0                         N            



             nRBC)                                               

 

             NRBC Abs (test code = 0.00 x10                  N            



             NRBC Abs)                                           

 

             IPF (test code = IPF) 0 %                       N            



RPR, [id number] 05:53:00





             Test Item    Value        Reference Range Interpretation Comments

 

             RPR (test code = RPR) Non-Reactive Non-Reactive N            



BHCG, Serum, [id number] 01:39:00





             Test Item    Value        Reference Range Interpretation Comments

 

             Preg Qual [Se] (test code = BSHCG) Negative     Negative     N     

       



BHCG, Serum, [id number] 01:09:00





             Test Item    Value        Reference Range Interpretation Comments

 

             B hCG, Quant (test <1 mIU/mL                              Weeks of 

Gestation



             code = BHCGQT)                                        Ranges (mIU/m

L)3 weeks



                                                                 5.40 - 72.04 we

eks 10.2



                                                                 - 7085 weeks 21

7 - 15723



                                                                 weeks  152 - 32

1777



                                                                 weeks 4059 - 15

[id number]



                                                                 weeks 49087 - 1

[id number]



                                                                 weeks 04241 - 1

[id number]



                                                                 weeks 72316 - 1

[id number]



                                                                 weeks 66741 - 2

[id number]



                                                                 weeks 21999 - 9

[id number]



                                                                 weeks 09043 - 6

[id number]



                                                                 weeks 8904 - 55

[id number]



                                                                 weeks 8240 - 51

91414



                                                                 weeks 9649 - 55

271



Thyroid Stimulating Hormone (TSH)2017 01:09:00





             Test Item    Value        Reference Range Interpretation Comments

 

             TSH (test code = TSH) 0.93 mIU/mL  0.270-4.200  N            



Lipid [id number] 01:05:00





             Test Item    Value        Reference Range Interpretation Comments

 

             Cholesterol (test 163 mg/dL    0-200        N            



             code = CHOL)                                        

 

             Triglycerides (test 108 mg/dL    9-200        N            



             code = TRIG)                                        

 

             HDL (test code = 41 mg/dL     50-60        L            



             HDL)                                                

 

             Chol/HDL (test code 4.0 Ratio    0.0-4.4      N            



             = CHOLPHDL)                                         

 

             LDL, Calculated 100          0-130        N            (NOTE)RISK O

F HEART



             (test code = LDLC)                                        DISEASEPu

blished by



                                                                 American Heart



                                                                 AssociationAnal

yte



                                                                 Optimal Boderli

ne



                                                                 Increased RiskC

HOL <200



                                                                 200-239 >240TRI

G  <150



                                                                 150-199 >200HDL

 Male:



                                                                 >60 <40HDL Fema

le: >60



                                                                 <50LDL <100 130

-159



                                                                 >160LDL NEAR OP

TIMAL IS



                                                                 100-129

 

             VLDL (test code = 22 mg/dL     5-40         N            



             VLDL)                                               

 

             LDL/HDL (test code = 2                                      



             LDLPHDL)                                            



Comprehensive Metabolic [id number] 21:02:00





             Test Item    Value        Reference Range Interpretation Comments

 

             Sodium (test code = 140 mmol/L   135-145      N            



             NA)                                                 

 

             Potassium (test 3.6 mmol/L   3.5-5.1      N            



             code = K)                                           

 

             Chloride (test code 103 mmol/L          N            



             = CL)                                               

 

             Carbon Dioxide 26 mmol/L    22-29        N            



             (test code = CO2)                                        

 

             Glucose (test code 89 mg/dL            N            



             = GLU)                                              

 

             Blood Urea Nitrogen 13 mg/dL     6-20         N            



             (test code = BUN)                                        

 

             Creatinine (test 0.8 mg/dL    0.5-0.9      N            



             code = CREAT)                                        

 

             Calcium (test code 10.0 mg/dL   8.3-10.5     N            



             = CA)                                               

 

             Prot Total (test 7.0 g/dL     6.4-8.3      N            



             code = TP)                                          

 

             Albumin (test code 4.2 g/dL     3.5-5.2      N            



             = ALB)                                              

 

             A/G Ratio (test 1.5 Ratio                              



             code = AGRATIO)                                        

 

             Globulin (test code 2.8          2.9-3.1      L            



             = GLOB)                                             

 

             Bili Total (test 0.6 mg/dL    0.1-0.9      N            



             code = TBIL)                                        

 

             Alk Phos (test code 91 U/L              N            



             = APHOS)                                            

 

             AST (test code = 19 U/L       1-32         N            



             AST)                                                

 

             ALT (test code = 14 U/L       1-33         N            



             ALT)                                                

 

             BUN/Creatinine 16.3                                   



             Ratio (test code =                                        



             BCRATIO)                                            

 

             Anion Gap (test 11 mmol/L    7-16         N            



             code = AGAP)                                        

 

             Estimated GFR (test >60                                    eGFR (es

timated



             code = GFR)  mL/min/1.73m2                           Glomerular Nguyễn

tration



                                                                 Rate) is an est

imated



                                                                 value,calculate

d from



                                                                 the patient's s

harman



                                                                 creatinine usin

g the



                                                                 MDRD equation.I

t is



                                                                 NOT the patient

's



                                                                 actual GFR. The

 eGFR



                                                                 provides a more



                                                                 clinicallyusefu

l



                                                                 measure of kidn

ey



                                                                 disease than se

rum



                                                                 creatinine



                                                                 alone.***This



                                                                 calculation rimma

es sex



                                                                 and race into



                                                                 account, if the



                                                                 informationis



                                                                 provided. If th

e race



                                                                 is not provided

, and



                                                                 the patient



                                                                 isAfrican-Ameri

can,



                                                                 multiply by 1.2

12. If



                                                                 sex is not prov

ided,



                                                                 and thepatient 

is



                                                                 female, multipl

y by



                                                                 0.742. Results 

for



                                                                 patients <18 ye

ars



                                                                 ofage have not 

been



                                                                 validated by th

e MDRD



                                                                 study and shoul

d be



                                                                 interpretedwith



                                                                 caution.eGFR Re

sult



                                                                 Interpretation:

eGFR >



                                                                 or = 60 is in t

he



                                                                 Normal RangeeGF

R < 60



                                                                 may mean kidney



                                                                 diseaseeGFR < 1

5 may



                                                                 mean kidney



                                                                 failure***Range

s



                                                                 recommended by 

the



                                                                 [employer]



                                                                 Foundation,http

://nkd



                                                                 ep.nih.gov



Alcohol/Ethanol, [id number] 21:02:00





             Test Item    Value        Reference Range Interpretation Comments

 

             Alcohol, Ethyl <0.01 g/dL   0.00-0.01    N            Intoxicated 0

.080 g/dL



             (test code = ETOH)                                        or more



CBC with [id number] 20:35:00





             Test Item    Value        Reference Range Interpretation Comments

 

             WBC (test code = WBC) 10.3 K/cumm  4.4-10.5     N            

 

             RBC (test code = RBC) 4.37 M/cumm  3.75-5.20    N            

 

             Hemoglobin (test code = HGB) 13.1 gm/dL   12.2-14.8    N           

 

 

             Hematocrit (test code = HCT) 41.5 %       36.5-44.4    N           

 

 

             MCV (test code = MCV) 94.9 fL             N            

 

             MCH (test code = MCH) 30.1 pg      27.0-32.5    N            

 

             MCHC (test code = MCHC) 31.7 g/dL    32.0-37.5    L            

 

             RDW (test code = RDW) 12.9 %       11.5-14.5    N            

 

             Platelet Count (test code = 327 K/cumm   140-440      N            



             PLTCT)                                              

 

             MPV (test code = MPV) 7.0 fL                                 

 

             Diff Method (test code = DIFFM) Auto                               

    

 

             Neutrophil (test code = NEUT) 74.0 %       36-70        H          

  

 

             Lymphocyte (test code = LYMPH) 17.6 %       12-44        N         

   

 

             Monocyte (test code = MONO) 6.4 %        0-11         N            

 

             Eosinophil (test code = EOS) 1.5 %        0-7          N           

 

 

             Basophil (test code = BASO) 0.5 %        0-2          N            

 

             Neutro Abs (test code = ANEUT) 7.6 K/cumm   1.6-7.4      H         

   

 

             Lymph Abs (test code = ALYMPH) 1.8 K/cumm   0.5-4.6      N         

   

 

             Mono Abs (test code = AMONO) 0.7 K/cumm   0.0-1.2      N           

 

 

             Eos Abs (test code = AEOS) 0.16 K/cumm  0.00-0.74    N            

 

             Baso Abs (test code = ABASO) 0.1 K/cumm   0.00-0.21    N           

 



[id number] 20:33:00





             Test Item    Value        Reference Range Interpretation Comments

 

             Amphetamine (test code POSITIVE     Negative     A            For d

iagnostic purposes



             = AMPH)                                             only, positive 

results



                                                                 should always b

e



                                                                 assessedin



                                                                 conjunctionwith

 the



                                                                 patient's medic

al



                                                                 history,clinica

l



                                                                 examination and



                                                                 otherfindings.T

o



                                                                 fulfill legal



                                                                 requirements, a

 more



                                                                 specific altern

ate



                                                                 chemical method

must be



                                                                 used inorder to

 obtain



                                                                 a Confirmed judith

lytical



                                                                 result. GC/MS i

s the



                                                                 preferred confi

rmatory



                                                                 method.

 

             Barbiturates (test Negative     Negative     N            



             code = GENEVIEVE)                                        

 

             Benzodiazepine (test Negative     Negative     N            



             code = IRENE)                                        

 

             Cocaine (test code = Negative     Negative     N            



             COCA)                                               

 

             Methadone (test code = Negative     Negative     N            



             MTHD)                                               

 

             Opiates (test code = Negative     Negative     N            



             OPIA)                                               

 

             PCP (test code = PCP) Negative     Negative     N            

 

             Propoxyphene (test Negative     Negative     N            



             code = PROPOX)                                        

 

             THC (test code = THC) Negative     Negative     N            



Urinalysis [id number] 20:23:00





             Test Item    Value        Reference Range Interpretation Comments

 

             Color (test code = COLOR) Yellow       Yellow,Straw,Pl N           

 



                                       yellow                    

 

             Clarity (test code = Sl Cloudy    Clear        A            



             CLAR)                                               

 

             Specific Gravity (test 1.007        1.001-1.035  N            



             code = SPGR)                                        

 

             pH (test code = PH) 6.0          5.0-9.0      N            

 

             Ketone (test code = KET) Negative mg/dL Negative     N            

 

             Glucose (test code = Negative mg/dL Negative     N            



             GLUCUR)                                             

 

             Protein (test code = Negative mg/dL Negative     N            



             PROT)                                               

 

             Bilirubin (test code = Negative mg/dL Negative     N            



             BILI)                                               

 

             Occult Blood (test code = Moderate     Negative     A            



             UDOB)                                               

 

             Urobilinogen (test code = 0.2 mg/dL    0.2-1.0      N            



             UROB)                                               

 

             Nitrite (test code = NIT) Positive     Negative     A            

 

             Leuk Esterase (test code Moderate     Negative     A            



             = LEUK)                                             

 

             Micros Exam (test code = Indicated                              



             MEXAM)                                              

 

             Epithelial Cells (test 50+ /LPF     0-30         A            



             code = EPI)                                         

 

             WBC, Urine (test code = 41-50 /HPF   0-5          A            



             UWBC)                                               

 

             RBC, Urine (test code = 3-5 /HPF     0-5          A            



             URBC)                                               

 

             Bacteria (test code = Many /HPF                              



             BACT)                                               



[id number] 16:21:00





             Test Item    Value        Reference Range Interpretation Comments

 

             Lithium (test code = LI) 0.6 mmol/l   0.6-1.2                   



[facility] (Ultra Sensitive)2017 16:21:00





             Test Item    Value        Reference Range Interpretation Comments

 

             TSH (test code = TSH) 2.14 mIU/L   0.47-4.68                 



[facility]





             Test Item    Value        Reference Range Interpretation Comments

 

             Glucose (test code 77 mg/dl                         



             = GLU)                                              

 

             BUN (test code = 9.0 mg/dl    6.0-17.0                  



             BUN)                                                

 

             Creatinine (test 0.7 mg/dl    0.4-1.2                   



             code = CREA)                                        

 

             Sodium (test code = 139 mmol/l   137-145                   



             NA)                                                 

 

             Potassium (test 4.7 mmol/l   3.5-5.0                   



             code = K)                                           

 

             Chloride (test code 107 mmol/l                       



             = CL)                                               

 

             CO2 (test code = 22 mmol/l    22-30                     



             CO2)                                                

 

             Anion Gap (test 11                                     



             code = GAP)                                         

 

             Calcium (test code 9.8 mg/dl    8.4-10.2                  



             = CALC)                                             

 

             T Protein (test 8.0 gm/dl    5.1-8.7                   



             code = TP)                                          

 

             Albumin (test code 4.4 gm/dl    3.5-4.6                   



             = ALB)                                              

 

             A/G Ratio (test 1.2 %        1.1-2.2                   



             code = AGRAT)                                        

 

             AST (SGOT) (test 20 U/L       11-36                     



             code = AST)                                         

 

             ALT (SGPT) (test 29 U/L       11-40                     



             code = ALT)                                         

 

             Alkaline Phos (test 108 U/L                          



             code = ALKP)                                        

 

             Total Bilirubin 0.6 mg/dl    0.2-1.2                   



             (test code = TBIL)                                        

 

             Globulin (test code 3.6 gm/dl    2.3-3.5      H            



             = GLOBU)                                            

 

             Calcium, Corrected 9.5 mg/dl    8.4-10.2                  Various f

ormulas exist



             (test code =                                        for corrected s

harman



             CALCCORR)                                           calcium results

, each



                                                                 yielding differ

ent



                                                                 values. This co

rrected



                                                                 result was base

d on



                                                                 the formula: Co

rrected



                                                                 Calcium = Serum

Calcium



                                                                 + [0.8 * ( 4 -



                                                                 SerumAlbumin)]

 

             EGFR if  >60                                    



             American (test code mL/min/1.73m\                           



             = EGFRAA)    S\2                                    

 

             EGFR if Non- >60                                    Estimate

d Glomerular



             American (test code mL/min/1.73m\                           Filtrat

ion Rate (eGFR)



             = EGFRNA)    S\2                                    Reference Inter

vals



                                                                 Decision Points

 for 18



                                                                 years and older

 and



                                                                 average body ma

ss:  >=



                                                                 60 Does not exc

lude



                                                                 kidney disease.

 30 -



                                                                 57 Suggests mod

erate



                                                                 chronic kidney 

disease



                                                                 and indicates t

he need



                                                                 for further



                                                                 investigation



                                                                 including asses

sment



                                                                 of proteinuria 

and



                                                                 cardiovascular



                                                                 factors. < 30 U

sually



                                                                 indicates a nee

d for



                                                                 referral for



                                                                 assessment and



                                                                 management of c

hronic



                                                                 kidney failure.



[facility]

## 2022-11-06 VITALS — TEMPERATURE: 98.4 F | OXYGEN SATURATION: 100 % | SYSTOLIC BLOOD PRESSURE: 137 MMHG | DIASTOLIC BLOOD PRESSURE: 87 MMHG

## 2022-11-06 NOTE — ER
Nurse's Notes                                                                                     

 [facility]                                                                 

Name: Alka Judd                                                                               

Age: 52 yrs                                                                                       

Sex: Female                                                                                       

: 1970                                                                                   

MRN: [id number]                                                                                   

Arrival Date: 2022                                                                          

Time: 23:16                                                                                       

Account#: [id number]                                                                            

Bed 11                                                                                            

Private MD:                                                                                       

Diagnosis: Cough;Acute bronchitis, unspecified;Generalized anxiety disorder                       

                                                                                                  

Presentation:                                                                                     

                                                                                             

23:23 Chief complaint: EMS states: [place] [facility] [place] and audio   hb  

      and visual hallucinations. Pt reported recent eviction, has no place to stay, and does      

      not have a vehicle. Denies SI/HI. Coronavirus screen: At this time, the client does not     

      indicate any symptoms associated with coronavirus-19. Ebola Screen: No symptoms or          

      risks identified at this time. Initial Sepsis Screen: Does the patient meet any 2           

      criteria? No. Patient's initial sepsis screen is negative. Does the patient have a          

      suspected source of infection? No. Patient's initial sepsis screen is negative. Risk        

      Assessment: Do you want to hurt yourself or someone else? Patient reports no desire to      

      harm self or others. Onset of symptoms was 2022.                               

23:23 Method Of Arrival: EMS: [facility]                                                    hb  

23:23 Acuity: TEMO 4                                                                           hb  

                                                                                                  

Triage Assessment:                                                                                

23:26 General: Appears in no apparent distress. Behavior is cooperative, anxious. Pain:       hb  

      Denies pain. Neuro: Level of Consciousness is awake, alert, obeys commands, Oriented to     

      person, place, time, situation. Cardiovascular: Patient's skin is warm and dry.             

      Respiratory: Respiratory effort is even, unlabored, Respiratory pattern is regular,         

      symmetrical.                                                                                

                                                                                                  

Historical:                                                                                       

- Allergies:                                                                                      

23:26 PENICILLINS;                                                                            hb  

23:26 Risperdal;                                                                              hb  

- PMHx:                                                                                           

23:26 Bipolar disorder;                                                                       hb  

                                                                                                  

- Immunization history:: Adult Immunizations up to date.                                          

- Social history:: Smoking status: Patient reports the use of cigarette tobacco                   

  products, smokes one-half pack cigarettes per day.                                              

                                                                                                  

                                                                                                  

Screenin:27 Abuse screen: Denies threats or abuse. Denies injuries from another. Nutritional        hb  

      screening: No deficits noted. Tuberculosis screening: No symptoms or risk factors           

      identified. Fall Risk None identified.                                                      

                                                                                                  

Assessment:                                                                                       

23:27 General: See triage assessment .                                                        hb  

23:58 General: Pt sleeping, no s/s of distress.                                               kb3 

                                                                                                  

Vital Signs:                                                                                      

23:23  / 87; Pulse 92; Resp 16; Temp 98.4; Pulse Ox 100% on R/A; Weight 97.52 kg;       hb  

      Height 5 ft. 2 in. (157.48 cm); Pain 0/10;                                                  

23:23 Body Mass Index 39.32 (97.52 kg, 157.48 cm)                                             hb  

                                                                                                  

ED Course:                                                                                        

23:16 Patient arrived in ED.                                                                  ja2 

23:26 Triage completed.                                                                       hb  

23:26 Arm band placed on.                                                                     hb  

23:27 Patient has correct armband on for positive identification.                             hb  

23:29 Yusra Cuenca, RN is Primary Nurse.                                                  kb3 

                                                                                             

00:16 Fred Valdez MD is Attending Physician.                                              kdr 

01:17 No provider procedures requiring assistance completed. Patient did not have IV access   tw5 

      during this emergency room visit.                                                           

                                                                                                  

Administered Medications:                                                                         

No medications were administered                                                                  

                                                                                                  

                                                                                                  

Medication:                                                                                       

                                                                                             

23:27 VIS not applicable for this client.                                                     hb  

                                                                                                  

Outcome:                                                                                          

                                                                                             

00:46 Discharge ordered by MD.                                                                kdr 

01:17 Discharged to home ambulatory.                                                          tw5 

01:17 Condition: good                                                                             

01:17 Discharge instructions given to patient, Instructed on discharge instructions, follow       

      up and referral plans. medication usage, Demonstrated understanding of instructions,        

      follow-up care, medications, Prescriptions given X 1.                                       

01:17 Patient left the ED.                                                                    tw5 

                                                                                                  

Signatures:                                                                                       

Fred Valdez MD MD   [facility]                                                  

Lolly Hamilton, RN                     JÚNIOR                                                      

Samantha Messer Tiffany                                tw5                                                  

Yusra Cuenca, RN                    RN   kb3                                                  

                                                                                                  

**************************************************************************************************

## 2022-11-06 NOTE — EDPHYS
Physician Documentation                                                                           

 [facility]                                                                 

Name: Alka Judd                                                                               

Age: 52 yrs                                                                                       

Sex: Female                                                                                       

: 1970                                                                                   

MRN: [id number]                                                                                   

Arrival Date: 2022                                                                          

Time: 23:16                                                                                       

Account#: [id number]                                                                            

Bed 11                                                                                            

Private MD:                                                                                       

ED Physician Fred Valdez                                                                       

HPI:                                                                                              

                                                                                             

05:08 This 52 yrs old Female presents to ER via EMS with complaints of Anxiety.               kdr 

05:08 Patient called 911 from [facility] where she had been standing when she became very anxious kdr 

      and claimed that she was having both audio and visual hallucinations. Patient was           

      reportedly recently evicted and currently has no place to stay. She also does not have      

      a vehicle. She denies suicidal or homicidal ideation. Patient simply states she had a       

      panic attack today. She also relates she has a smokers hack and cough with congestion.      

      Onset: The symptoms/episode began/occurred suddenly, just prior to arrival. Severity of     

      symptoms: At their worst the symptoms were moderate severe in the emergency department      

      the symptoms have improved markedly. The patient has not experienced similar symptoms       

      in the past. The patient has not recently seen a physician.                                 

                                                                                                  

Historical:                                                                                       

- Allergies:                                                                                      

                                                                                             

23:26 PENICILLINS;                                                                            hb  

23:26 Risperdal;                                                                              hb  

- PMHx:                                                                                           

23:26 Bipolar disorder;                                                                       hb  

                                                                                                  

- Immunization history:: Adult Immunizations up to date.                                          

- Social history:: Smoking status: Patient reports the use of cigarette tobacco                   

  products, smokes one-half pack cigarettes per day.                                              

                                                                                                  

                                                                                                  

ROS:                                                                                              

                                                                                             

05:08 Constitutional: Negative for fever, chills, and weight loss, Eyes: Negative for injury, kdr 

      pain, redness, and discharge, ENT: Negative for injury, pain, and discharge, Neck:          

      Negative for injury, pain, and swelling, Cardiovascular: Negative for chest pain,           

      palpitations, and edema, Respiratory: Negative for shortness of breath, cough,              

      wheezing, and pleuritic chest pain, Abdomen/GI: Negative for abdominal pain, nausea,        

      vomiting, diarrhea, and constipation, Back: Negative for injury and pain, : Negative      

      for injury, bleeding, discharge, and swelling, MS/Extremity: Negative for injury and        

      deformity, Skin: Negative for injury, rash, and discoloration, Neuro: Negative for          

      headache, weakness, numbness, tingling, and seizure activity. Allergy/Immunology:           

      Negative for hives, rash, and allergies, Endocrine: Negative for neck swelling,             

      polydipsia, polyuria, polyphagia, and marked weight changes, Hematologic/Lymphatic:         

      Negative for swollen nodes, abnormal bleeding, and unusual bruising.                        

      Psych: Positive for anxiety, depression.                                                    

                                                                                                  

Exam:                                                                                             

05:08 Constitutional:  This is a well developed, well nourished patient who is awake, alert,  kdr 

      and in no acute distress. Head/Face:  Normocephalic, atraumatic. Eyes:  Pupils equal        

      round and reactive to light, extra-ocular motions intact.  Lids and lashes normal.          

      Conjunctiva and sclera are non-icteric and not injected.  Cornea within normal limits.      

      Periorbital areas with no swelling, redness, or edema. Neck:  Trachea midline, no           

      thyromegaly or masses palpated, and no cervical lymphadenopathy.  Supple, full range of     

      motion without nuchal rigidity, or vertebral point tenderness.  No Meningismus.             

      Chest/axilla:  Normal chest wall appearance and motion.  Nontender with no deformity.       

      No lesions are appreciated. Cardiovascular:  Regular rate and rhythm with a normal S1       

      and S2.  No gallops, murmurs, or rubs.  Normal PMI, no JVD.  No pulse deficits.             

      Respiratory:  Lungs have equal breath sounds bilaterally, clear to auscultation and         

      percussion.  No rales, rhonchi or wheezes noted.  No increased work of breathing, no        

      retractions or nasal flaring. Abdomen/GI:  Soft, non-tender, with normal bowel sounds.      

      No distension or tympany.  No guarding or rebound.  No evidence of tenderness               

      throughout. Back:  No spinal tenderness.  No costovertebral tenderness.  Full range of      

      motion. Skin:  Warm, dry with normal turgor.  Normal color with no rashes, no lesions,      

      and no evidence of cellulitis. MS/ Extremity:  Pulses equal, no cyanosis.                   

      Neurovascular intact.  Full, normal range of motion. Neuro:  Awake and alert, GCS 15,       

      oriented to person, place, time, and situation.  Cranial nerves II-XII grossly intact.      

      Motor strength 5/5 in all extremities.  Sensory grossly intact.  Cerebellar exam            

      normal.  Normal gait. Psych:  Awake, alert, with orientation to person, place and time.     

       Behavior, mood, and affect are within normal limits.                                       

                                                                                                  

Vital Signs:                                                                                      

                                                                                             

23:23  / 87; Pulse 92; Resp 16; Temp 98.4; Pulse Ox 100% on R/A; Weight 97.52 kg;       hb  

      Height 5 ft. 2 in. (157.48 cm); Pain 0/10;                                                  

23:23 Body Mass Index 39.32 (97.52 kg, 157.48 cm)                                             hb  

                                                                                                  

MDM:                                                                                              

                                                                                             

00:46 Patient medically screened.                                                             kdr 

05:08 Data reviewed: vital signs, nurses notes, lab test result(s), radiologic studies.       kdr 

      Counseling: I had a detailed discussion with the patient and/or guardian regarding: the     

      historical points, exam findings, and any diagnostic results supporting the                 

      discharge/admit diagnosis, lab results, radiology results, the need for outpatient          

      follow up.                                                                                  

                                                                                                  

Administered Medications:                                                                         

No medications were administered                                                                  

                                                                                                  

                                                                                                  

Disposition Summary:                                                                              

22 00:46                                                                                    

Discharge Ordered                                                                                 

      Location: Home                                                                          kdr 

      Problem: new                                                                            kdr 

      Symptoms: have improved                                                                 kdr 

      Condition: Stable                                                                       kdr 

      Diagnosis                                                                                   

        - Cough                                                                               kdr 

        - Acute bronchitis, unspecified                                                       kdr 

        - Generalized anxiety disorder                                                        kdr 

      Followup:                                                                               kdr 

        - With: Private Physician                                                                  

        - When: 2 - 3 days                                                                         

        - Reason: If symptoms return, Further diagnostic work-up, Recheck today's complaints,      

      Continuance of care, Re-evaluation by your physician                                        

      Discharge Instructions:                                                                     

        - Discharge Summary Sheet                                                             kdr 

        - Acute Bronchitis, Adult                                                             kdr 

        - Panic Attack                                                                        kdr 

        - Cough, Adult, Easy-to-Read                                                          kdr 

      Forms:                                                                                      

        - Medication Reconciliation Form                                                      kdr 

        - Thank You Letter                                                                    kdr 

      Prescriptions:                                                                              

        - Ventolin HFA 90 mcg/actuation Inhalation HFA aerosol inhaler                             

            - inhale 2 puff by INHALATION route every 4 hours; 2 canister; Refills: 0,        kdr 

      Product Selection Permitted                                                                 

Signatures:                                                                                       

Fred Valdez MD MD   kdr                                                  

Lolly Hamilton, RN                     RN   hb                                                   

                                                                                                  

**************************************************************************************************

## 2022-11-08 ENCOUNTER — HOSPITAL ENCOUNTER (EMERGENCY)
Dept: HOSPITAL 97 - ER | Age: 52
Discharge: HOME | End: 2022-11-08
Payer: COMMERCIAL

## 2022-11-08 VITALS — DIASTOLIC BLOOD PRESSURE: 90 MMHG | SYSTOLIC BLOOD PRESSURE: 152 MMHG | OXYGEN SATURATION: 97 %

## 2022-11-08 VITALS — TEMPERATURE: 97.8 F

## 2022-11-08 DIAGNOSIS — F17.210: ICD-10-CM

## 2022-11-08 DIAGNOSIS — Z88.0: ICD-10-CM

## 2022-11-08 DIAGNOSIS — F41.9: Primary | ICD-10-CM

## 2022-11-08 PROCEDURE — 93005 ELECTROCARDIOGRAM TRACING: CPT

## 2022-11-08 PROCEDURE — 99284 EMERGENCY DEPT VISIT MOD MDM: CPT

## 2022-11-08 NOTE — ER
Nurse's Notes                                                                                     

 [facility]                                                                 

Name: Alka Judd                                                                               

Age: 52 yrs                                                                                       

Sex: Female                                                                                       

: 1970                                                                                   

MRN: [id number]                                                                                   

Arrival Date: 2022                                                                          

Time: 10:06                                                                                       

Account#: [id number]                                                                            

Bed 18                                                                                            

Private MD:                                                                                       

Diagnosis: Anxiety disorder, unspecified                                                          

                                                                                                  

Presentation:                                                                                     

                                                                                             

10:07 Chief complaint: Patient states: she had an anxiety attack this morning and is feeling  mb8 

      depressed. Denies SI or HI. Reports she is not on her psych meds due to no access to        

      them. Coronavirus screen: Vaccine status: Patient reports being unvaccinated. Ebola         

      Screen: Patient negative for fever greater than or equal to 101.5 degrees Fahrenheit,       

      and additional compatible Ebola Virus Disease symptoms Patient denies exposure to           

      infectious person. Patient denies travel to an Ebola-affected area in the 21 days           

      before illness onset. Initial Sepsis Screen: Does the patient meet any 2 criteria? No.      

      Patient's initial sepsis screen is negative. Does the patient have a suspected source       

      of infection? No. Patient's initial sepsis screen is negative. Risk Assessment: Do you      

      want to hurt yourself or someone else? Patient reports no desire to harm self or            

      others. Onset of symptoms is unknown.                                                       

10:07 Method Of Arrival: EMS: [facility]                                                    mb8 

10:07 Acuity: TEMO 3                                                                           mb8 

                                                                                                  

Triage Assessment:                                                                                

10:08 General: Appears in no apparent distress. Behavior is cooperative, appropriate for age, mb8 

      anxious. Pain: Denies pain.                                                                 

                                                                                                  

Historical:                                                                                       

- Allergies:                                                                                      

10:07 PENICILLINS;                                                                            mb8 

10:07 Risperdal;                                                                              mb8 

- PMHx:                                                                                           

10:07 Bipolar disorder; Hypertensive disorder;                                                mb8 

                                                                                                  

- Social history:: Smoking status: Patient reports the use of cigarette tobacco                   

  products.                                                                                       

- Family history:: not pertinent.                                                                 

- Hospitalizations: : No recent hospitalization is reported.                                      

                                                                                                  

                                                                                                  

Screening:                                                                                        

10:09 Abuse screen: Denies threats or abuse. Denies injuries from another. Nutritional        mb8 

      screening: No deficits noted. Tuberculosis screening: No symptoms or risk factors           

      identified. Fall Risk None identified.                                                      

                                                                                                  

Assessment:                                                                                       

10:09 General: Patient feeling anxious, denies CP or SOB. Cardiovascular: Denies chest pain,  mb8 

      shortness of breath.                                                                        

11:19 Reassessment: Patient and/or family updated on plan of care and expected duration. Pain mb8 

      level reassessed. Patient is alert, oriented x 3, equal unlabored respirations, skin        

      warm/dry/pink.                                                                              

                                                                                                  

Psych:                                                                                            

10:10 [place] Suicide Severity Screening: In the past month, have you wished you were dead   mb8 

      or wished you could go to sleep and not wake up? Patient responds "No." "In the past        

      month, have you actually had any thoughts of killing yourself?" Patient responds "no."      

      "In your lifetime, have you ever done anything, started to do anything, or prepared to      

      do anything to end your life?" Patient responds "no.". Subjective: Patient's mood is        

      anxious Delusions are denied, Hallucinations are denied. Objective: Patient is              

      restless, Speech is normal, Affect is appropriate. Pt denies substance abuse.               

                                                                                                  

Vital Signs:                                                                                      

10:06  / 83; Pulse 80; Resp 22; Temp 97.8; Pulse Ox 99% on R/A; Weight 95 kg; Height 5  mb8 

      ft. 2 in. (157.48 cm); Pain 0/10;                                                           

11:19  / 90; Pulse 76; Resp 18; Pulse Ox 97% on R/A;                                    mb8 

10:06 Body Mass Index 38.31 (95.00 kg, 157.48 cm)                                             mb8 

                                                                                                  

ED Course:                                                                                        

10:06 Patient arrived in ED.                                                                  mb8 

10:06 Jagjit De Los Santos MD is Attending Physician.                                                rn  

10:08 Triage completed.                                                                       mb8 

10:09 Arm band placed on.                                                                     mb8 

10:09 Patient has correct armband on for positive identification. Placed in gown. Bed in low  mb8 

      position. Call light in reach. Side rails up X2. Client placed on continuous cardiac        

      and pulse oximetry monitoring. NIBP monitoring applied.                                     

10:10 No provider procedures requiring assistance completed.                                  mb8 

10:25 Ace wrap to right wrist.                                                                mb8 

10:32 Irineo Grissom, RN is Primary Nurse.                                                    mb8 

11:19 Awaiting re-evaluation by ER provider.                                                  mb8 

12:24 Patient did not have IV access during this emergency room visit.                        mb8 

                                                                                                  

Administered Medications:                                                                         

10:25 Drug: Valium (diazepam) 2 mg Route: PO;                                                 mb8 

                                                                                                  

                                                                                                  

Medication:                                                                                       

10:09 VIS not applicable for this client.                                                     mb8 

                                                                                                  

Outcome:                                                                                          

11:55 Discharge ordered by MD.                                                                rn  

12:25 Discharged to home ambulatory.                                                          mb8 

12:25 Condition: stable                                                                           

12:25 Discharge instructions given to patient, Instructed on discharge instructions, follow       

      up and referral plans. medication usage, Demonstrated understanding of instructions,        

      follow-up care, medications, Prescriptions given X 1.                                       

12:25 Patient left the ED.                                                                    mb8 

                                                                                                  

Signatures:                                                                                       

Jagjit De Los Santos MD MD rn Bates, Michael, RN RN mb8                                                  

                                                                                                  

**************************************************************************************************

## 2022-11-08 NOTE — EDPHYS
Physician Documentation                                                                           

 [facility]                                                                 

Name: Alka Judd                                                                               

Age: 52 yrs                                                                                       

Sex: Female                                                                                       

: 1970                                                                                   

MRN: [id number]                                                                                   

Arrival Date: 2022                                                                          

Time: 10:06                                                                                       

Account#: [id number]                                                                            

Bed 18                                                                                            

Private MD:                                                                                       

ED Physician Jagjit De Los Santos                                                                         

HPI:                                                                                              

                                                                                             

11:52 This 52 yrs old Female presents to ER via EMS with complaints of Anxiety.               rn  

11:52 The patient presents to the emergency department with anxiety. Onset: The               rn  

      symptoms/episode began/occurred 4 month(s) ago. Severity of symptoms: At their worst        

      the symptoms were moderate in the emergency department the symptoms have improved. The      

      patient has experienced similar episodes in the past. The patient has not recently seen     

      a physician. Pt reports depression and anxiety for a long time, worse over last 4           

      months, no suicidal or homicidal ideations. Has appt with psychiatry but not for            

      another 2 weeks. .                                                                          

                                                                                                  

Historical:                                                                                       

- Allergies:                                                                                      

10:07 PENICILLINS;                                                                            mb8 

10:07 Risperdal;                                                                              mb8 

- PMHx:                                                                                           

10:07 Bipolar disorder; Hypertensive disorder;                                                mb8 

                                                                                                  

- Social history:: Smoking status: Patient reports the use of cigarette tobacco                   

  products.                                                                                       

- Family history:: not pertinent.                                                                 

- Hospitalizations: : No recent hospitalization is reported.                                      

                                                                                                  

                                                                                                  

ROS:                                                                                              

11:52 Constitutional: Negative for fever, chills, and weight loss, Eyes: Negative for injury, rn  

      pain, redness, and discharge, Neck: Negative for injury, pain, and swelling,                

      Cardiovascular: Negative for chest pain, palpitations, and edema, Respiratory: Negative     

      for shortness of breath, cough, wheezing, and pleuritic chest pain, Abdomen/GI:             

      Negative for abdominal pain, nausea, vomiting, diarrhea, and constipation, Back:            

      Negative for injury and pain, MS/Extremity: Negative for injury and deformity, Skin:        

      Negative for injury, rash, and discoloration, Neuro: Negative for headache, weakness,       

      numbness, tingling, and seizure.                                                            

                                                                                                  

Exam:                                                                                             

11:52 Constitutional:  This is a well developed, well nourished patient who is awake, alert,  rn  

      seems anxious Head/Face:  Normocephalic, atraumatic. Eyes:  Pupils equal round and          

      reactive to light, extra-ocular motions intact.  Lids and lashes normal.  Conjunctiva       

      and sclera are non-icteric and not injected.  Cornea within normal limits.  Periorbital     

      areas with no swelling, redness, or edema. Cardiovascular:  Regular rate and rhythm.        

      No pulse deficits. Respiratory:  No increased work of breathing, no retractions or          

      nasal flaring. Abdomen/GI:  Soft, non-tender Skin:  Warm, dry with normal turgor.           

      Normal color with no rashes, no lesions, and no evidence of cellulitis. MS/ Extremity:      

      Pulses equal, no cyanosis.  Neurovascular intact.  Full, normal range of motion.  Equal     

      circumference. Neuro:  Awake and alert, GCS 15, oriented to person, place, time, and        

      situation.  Cranial nerves II-XII grossly intact.  Motor strength 5/5 in all                

      extremities.  Sensory grossly intact.  Cerebellar exam normal.  Normal gait.                

12:38 ECG was reviewed by the Attending Physician.                                            rn  

                                                                                                  

Vital Signs:                                                                                      

10:06  / 83; Pulse 80; Resp 22; Temp 97.8; Pulse Ox 99% on R/A; Weight 95 kg; Height 5  mb8 

      ft. 2 in. (157.48 cm); Pain 0/10;                                                           

11:19  / 90; Pulse 76; Resp 18; Pulse Ox 97% on R/A;                                    mb8 

10:06 Body Mass Index 38.31 (95.00 kg, 157.48 cm)                                             mb8 

                                                                                                  

MDM:                                                                                              

10:06 Patient medically screened.                                                             rn  

11:52 Differential diagnosis: depression, anxiety. Data reviewed: vital signs, nurses notes,  rn  

      EKG, and as a result, I will discharge patient. Counseling: I had a detailed discussion     

      with the patient and/or guardian regarding: the historical points, exam findings, and       

      any diagnostic results supporting the discharge/admit diagnosis, the need for               

      outpatient follow up, to return to the emergency department if symptoms worsen or           

      persist or if there are any questions or concerns that arise at home. Response to           

      treatment: the patient's symptoms have markedly improved after treatment, and as a          

      result, I will discharge patient. Special discussion: I discussed with the                  

      patient/guardian in detail that at this point there is no indication for admission to       

      the hospital. It is understood, however, that if the symptoms persist or worsen the         

      patient needs to return immediately for re-evaluation. Special discussion: Based on the     

      history and exam findings, there is no indication for further emergent testing or           

      inpatient evaluation. I discussed with the patient/guardian the need to see the             

      psychiatrist for further evaluation of the symptoms.                                        

11:52 ED course: Pt does not feel like can wait for psychiatry appt, will prescribe           rn  

      anti-depressant. .                                                                          

                                                                                                  

                                                                                             

10:20 Order name: EKG; Complete Time: 10:21                                                   rn  

                                                                                             

10:20 Order name: Ace wrap-joint; Complete Time: 10:24                                        rn  

                                                                                             

10:20 Order name: EKG - Nurse/Tech; Complete Time: 10:45                                      rn  

                                                                                                  

EC:38 Rate is 74 beats/min. Rhythm is regular. QRS Axis is Normal. MD interval is normal. QRS rn  

      interval is normal. QT interval is normal. No Q waves. T waves are Normal. No ST            

      changes noted. Clinical impression: NSR w/ Non-specific ST/T Changes. Interpreted by        

      me. Reviewed by me.                                                                         

                                                                                                  

Administered Medications:                                                                         

10:25 Drug: Valium (diazepam) 2 mg Route: PO;                                                 mb8 

                                                                                                  

                                                                                                  

Disposition Summary:                                                                              

22 11:55                                                                                    

Discharge Ordered                                                                                 

      Location: Home                                                                          rn  

      Problem: an ongoing problem                                                             rn  

      Symptoms: have improved                                                                 rn  

      Condition: Stable                                                                       rn  

      Diagnosis                                                                                   

        - Anxiety disorder, unspecified                                                       rn  

      Followup:                                                                               rn  

        - With: Private Physician                                                                  

        - When: As needed                                                                          

        - Reason: Recheck today's complaints, Re-evaluation by your physician                      

      Discharge Instructions:                                                                     

        - Discharge Summary Sheet                                                             rn  

        - Generalized Anxiety Disorder, Adult                                                 rn  

        - Managing Anxiety, Adult                                                             rn  

      Forms:                                                                                      

        - Medication Reconciliation Form                                                      rn  

        - Thank You Letter                                                                    rn  

        - Antibiotic Education                                                                rn  

        - Prescription Opioid Use                                                             rn  

      Prescriptions:                                                                              

        - Celexa 20 mg Oral Tablet                                                                 

            - take 1 tablet by ORAL route once daily; 30 tablet; Refills: 0, Product          rn  

      Selection Permitted                                                                         

Signatures:                                                                                       

Jagjit De Los Santos MD MD rn Bates, Michael, RN RN   mb8                                                  

                                                                                                  

**************************************************************************************************

## 2022-11-08 NOTE — XMS REPORT
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              



                                       cervix (procedure)              



                                       [code = 756259808]              

 

             Future Scheduled Test 1998 00:00:00 Screening for            

  [facility]



                                       malignant neoplasm of              



                                       cervix (procedure)              



                                       [code = 109303515]              

 

             Future Scheduled Test 1998 00:00:00 Screening for            

  [facility]



                                       malignant neoplasm of              



                                       cervix (procedure)              



                                       [code = 361975596]              

 

             Future Scheduled Test 1998 00:00:00 Screening for            

  [facility]



                                       malignant neoplasm of              



                                       cervix (procedure)              



                                       [code = 308521919]              

 

             Future Scheduled Test 1998 00:00:00 Screening for            

  [facility]



                                       malignant neoplasm of              



                                       cervix (procedure)              



                                       [code = 669492204]              

 

             Future Scheduled Test 1969 00:00:00 COVID-19 Vaccine (#1)    

          [facility]



                                       [code = COVID-19              



                                       Vaccine (#1)]              

 

             Future Scheduled Test 1969 00:00:00 COVID-19 Vaccine (#1)    

          [facility]



                                       [code = COVID-19              



                                       Vaccine (#1)]              

 

             Future Scheduled Test 1969 00:00:00 COVID-19 Vaccine (#1)    

          [facility]



                                       [code = COVID-19              



                                       Vaccine (#1)]              







Encounters







        Start   End     Encounter Admission Attending Care    Care    Encounter 

Source



        Date/Time Date/Time Type    Type    Clinicians Facility Department ID   

   

 

        2022         Outpatient         SARATH, [facility]     [id number]

-2 [place]



        01:05:17                         MELANIE                  [id number] [facility]

 

        2022         Outpatient         VIRGIE,  [facility]     [id number]

 [place]



        03:15:41                         JOAQUIN                 [id number] [facility]

 

        [facility]     [id number]

 [place]



        15:19:55                         JOAQUIN                 [id number] [facility]

 

        2022         Outpatient                 [facility]     [id number]

 [place]



        15:28:25                                                 [id number] [facility]

 

        2022         Outpatient                 [facility]     [id number]

 [place]



        12:00:51                                                 [id number] [facility]

 

        2022         Outpatient                 [facility]     [id number]

 [place]



        15:13:02                                                 [id number] [facility]

 

        2020         Inpatient                 [facility]   YAMILA    [id number] 

[facility]



        03:17:00                                                 56      [facility]

 

        2022 Inpatient         SANCHEZ, [facility]    MED       

  [facility]



        19:24:00 19:40:00                 LEXI                         

 

        2022 Emergency E       HEATHER, [facility]     

   [facility]



        14:56:00 18:09:00                 DEMARIO                           

 

        2022 Emergency X       SINGER, [facility]    ERT     [id number]

15 [facility]



        12:56:00 15:05:00                 FLY brock [facility]

 

        2022 [facility]    [id number]

[address] [facility]



        12:56:00 15:05:00                 Fly HAMM 350.1.13.10         i

ty of



                                                JENNIFER 4.2.7.2.686         [facility]  [phone]         Medi

staci



                                                        [address]             [place]

 

        2022 [facility]          [facility]    [id number]

13 [facility]



        10:00:00 10:30:00 Visit           [facility]  350.1.13.10         it

y [facility]



                                                [facility] 4.2.7.2.686         [place]

as



                                                SKYLAR?BLEA [phone]         Me

[address]



                                                MEDICAL                 



                                                OFFICE                  



                                                BUILDING                 

 

        2022 Outpatient DIANA REYES  [facility]    [id number]

329 [facility]



        10:00:00 10:00:00                 [facility]

 

        2022 Orders          Doctor  [place]    [id number]

07 [facility]



        00:00:00 00:00:00 Only            Unassigned, SHONA   350.1.13.10       

  ity of



                                        [facility] 4.2.7.2.686         [place]

as



                                                        [phone]         Medi

staci



                                                        [address]             [address]

 

        2020 Outpatient         ERNESTINE Ferrara   [facility]    [id number]

220 [facility]



        08:38:00 08:38:00                 [employer]                 75      [facility]

 

        2019 Inpatient 1       Robin Barrios [facility]    PSY     [id number]



        23:01:00 13:16:00                 Robin Barrios                         

[facility]

 

        2017 Outpatient DIANA LOPEZ  [facility]    NUT     [id number]

565 [facility]



        00:00:00 00:00:00                 VENKATA                         [facility]







Results







           Test Description Test Time  Test Comments Results    Result Comments 

Source









                    COMP. METABOLIC PANEL (23781) 2022 19:59:50 









                      Test Item  Value      Reference Range Interpretation Comme

nts









             NA (test code = 6383118283) 138 mmol/L   135-145                   

 

             K (test code = 3908218094) 4.3 mmol/L   3.5-5.0                   

 

             CL (test code = 4598963723) 102 mmol/L                       

 

             CO2 TOTAL (test code = 1331677266) 23 mmol/L    23-31              

       

 

             AGAP (test code = 4306918537)              2-16                    

  

 

             BUN (test code = 7594342036) 18 mg/dL     7-23                     

 

 

             GLUCOSE (test code = 5501539452) 110 mg/dL                   

     

 

             CREATININE (test code = 0.70 mg/dL   0.50-1.04                 



             9722312637)                                         

 

             TOTAL BILI (test code = 1.3 mg/dL    0.1-1.1      H            



             8604708280)                                         

 

             CALCIUM (test code = 2223289795) 9.5 mg/dL    8.6-10.6             

     

 

             T PROTEIN (test code = 6914982285) 7.4 g/dL     6.3-8.2            

       

 

             ALBUMIN (test code = 2628459176) 4.2 g/dL     3.5-5.0              

     

 

             ALK PHOS (test code = 1392064546) 100 U/L                    

      

 

             ALTv (test code = 1742-6) 16 U/L       5-35                      

 

             AST(SGOT) (test code = 6921724278) 27 U/L       13-40              

       

 

             eGFR (test code = 2191153135)              mL/min/1.73m2           

   

 

             ELENA (test code = ELENA) [employer] of Glomerular                    

       



                          Filtration Rate (GFR) and Staging                     

      



                          of Kidney Disease*                           



                          +-----------------------+--------                     

      



                          -------------+-------------------                     

      



                          ------+| GFR (mL/min/1.73 m2) ?|                      

     



                          With Kidney Damage ?| ?Without                        

   



                          Kidney                                 



                          Damage+-----------------------+--                     

      



                          -------------------+-------------                     

      



                          ------------+| ?>90 ? ? ? ? ? ? ?                     

      



                          ? ?| ?Stage one ? ? ? ? ?| ?                          

 



                          Normal ? ? ? ? ? ? ?                           



                          ?+-----------------------+-------                     

      



                          --------------+------------------                     

      



                          -------+| ?60-89 ? ? ? ? ? ? ? ?|                     

      



                          ?Stage two ? ? ? ? ?| ? Decreased                     

      



                          GFR ? ? ? ?                            



                          +-----------------------+--------                     

      



                          -------------+-------------------                     

      



                          ------+| ?30-59 ? ? ? ? ? ? ? ?|                      

     



                          ?Stage three ? ? ? ?| ? Stage                         

  



                          three ? ? ? ? ?                           



                          +-----------------------+--------                     

      



                          -------------+-------------------                     

      



                          ------+| ?15-29 ? ? ? ? ? ? ? ?|                      

     



                          ?Stage four ? ? ? ? | ? Stage                         

  



                          four ? ? ? ? ?                           



                          ?+-----------------------+-------                     

      



                          --------------+------------------                     

      



                          -------+| ?<15 (or dialysis) ? ?|                     

      



                          ?Stage five ? ? ? ? | ? Stage                         

  



                          five ? ? ? ? ?                           



                          ?+-----------------------+-------                     

      



                          --------------+------------------                     

      



                          -------+ *Each stage assumes the                      

     



                          associated GFR level has been in                      

     



                          effect for at least three months.                     

      



                          ?Stages 1 to 5, with or without                       

    



                          kidney disease, indicate chronic                      

     



                          kidney disease. Notes:                           



                          Determination of stages one and                       

    



                          two (with eGFR >59mL/min/1.73 m2)                     

      



                          requires estimation of kidney                         

  



                          damage for at least three months                      

     



                          as defined by structural or                           



                          functional abnormalities of the                       

    



                          kidney, manifested by                           



                          either:Pathological abnormalities                     

      



                          or Markers of kidney damage                           



                          (including abnormalities in the                       

    



                          composition of the blood or urine                     

      



                          or abnormalities in imaging                           



                          tests).                                

 

             Lab Interpretation (test code = Abnormal                           

    



             91467-3)                                            



[facility] WITH [id number] 19:22:40





             Test Item    Value        Reference Range Interpretation Comments

 

             WBC (test code =              See_Comment  H             [Automated



             6690-2)                                             message] The sy

stem



                                                                 which generated



                                                                 this result



                                                                 transmitted



                                                                 reference range

:



                                                                 4.30 - 11.10



                                                                 10*3/?L. The



                                                                 reference range

 was



                                                                 not used to



                                                                 interpret this



                                                                 result as



                                                                 normal/abnormal

.

 

             RBC (test code =              See_Comment                [Automated



             789-8)                                              message] The sy

stem



                                                                 which generated



                                                                 this result



                                                                 transmitted



                                                                 reference range

:



                                                                 3.93 - 5.25



                                                                 10*6/?L. The



                                                                 reference range

 was



                                                                 not used to



                                                                 interpret this



                                                                 result as



                                                                 normal/abnormal

.

 

             HGB (test code = 13.0 g/dL    11.6-15.0                 



             718-7)                                              

 

             HCT (test code = 38.4 %       35.7-45.2                 



             4544-3)                                             

 

             MCV (test code = 89.9 fL      80.6-95.5                 



             787-2)                                              

 

             MCH (test code = 30.4 pg      25.9-32.8                 



             785-6)                                              

 

             MCHC (test code = 33.9 g/dL    31.6-35.1                 



             786-4)                                              

 

             RDW-SD (test code = 39.3 fL      39.0-49.9                 



             00164-4)                                            

 

             RDW-CV (test code = 12.2 %       12.0-15.5                 



             788-0)                                              

 

             PLT (test code =              See_Comment                [Automated



             777-3)                                              message] The sy

stem



                                                                 which generated



                                                                 this result



                                                                 transmitted



                                                                 reference range

:



                                                                 166 - 358 10*3/

?L.



                                                                 The reference r

jihan



                                                                 was not used to



                                                                 interpret this



                                                                 result as



                                                                 normal/abnormal

.

 

             MPV (test code = 10.1 fL      9.5-12.9                  



             70526-5)                                            

 

             NRBC/100 WBC (test              See_Comment                [Automat

ed



             code = 6503694325)                                        message] 

The system



                                                                 which generated



                                                                 this result



                                                                 transmitted



                                                                 reference range

:



                                                                 0.0 - 10.0 /100



                                                                 WBCs. The refer

ence



                                                                 range was not u

sed



                                                                 to interpret th

is



                                                                 result as



                                                                 normal/abnormal

.

 

             NRBC x10^3 (test code <0.01        See_Comment                [Auto

mated



             = 4586531014)                                        message] The s

ystem



                                                                 which generated



                                                                 this result



                                                                 transmitted



                                                                 reference range

:



                                                                 10*3/?L. The



                                                                 reference range

 was



                                                                 not used to



                                                                 interpret this



                                                                 result as



                                                                 normal/abnormal

.

 

             GRAN MAT (NEUT) % 79.4 %                                 



             (test code = 770-8)                                        

 

             IMM GRAN % (test code 0.50 %                                 



             = 2453504178)                                        

 

             LYMPH % (test code = 9.5 %                                  



             736-9)                                              

 

             MONO % (test code = 9.7 %                                  



             5905-5)                                             

 

             EOS % (test code = 0.5 %                                  



             713-8)                                              

 

             BASO % (test code = 0.4 %                                  



             706-2)                                              

 

             GRAN MAT x10^3(ANC) 9.77 10*3/uL 1.88-7.09    H            



             (test code =                                        



             2196641365)                                         

 

             IMM GRAN x10^3 (test 0.06 10*3/uL 0.00-0.06                 



             code = 6543895875)                                        

 

             LYMPH x10^3 (test code 1.17 10*3/uL 1.32-3.29    L            



             = 731-0)                                            

 

             MONO x10^3 (test code 1.19 10*3/uL 0.33-0.92    H            



             = 742-7)                                            

 

             EOS x10^3 (test code = 0.06 10*3/uL 0.03-0.39                 



             711-2)                                              

 

             BASO x10^3 (test code 0.05 10*3/uL 0.01-0.07                 



             = 704-7)                                            

 

             Lab Interpretation Abnormal                               



             (test code = 69433-7)                                        



[id number]





             Test Item    Value        Reference Range Interpretation Comments

 

             SODIUM (test code = NA) 143 mmol/L   134-147      N            

 

             POTASSIUM (test code = 4.2 mmol/L   3.4-5.0      N            



             K)                                                  

 

             CHLORIDE (test code = 114 mmol/L   100-108      H            



             CL)                                                 

 

             CARBON DIOXIDE (test 24 mmol/L    21-32        N            



             code = CO2)                                         

 

             ANION GAP (test code = 5.0 GAP calc 4.0-15.0     N            



             GAP)                                                

 

             GLUCOSE (test code = 89 MG/DL            N            



             GLU)                                                

 

             BLOOD UREA NITROGEN 17 MG/DL     7-18         N            



             (test code = BUN)                                        

 

             GLOMERULAR FILTRATION >=60 max estimate >60                       



             RATE (test code = GFR) estGFR                                 

 

             CREATININE (test code = 0.9 MG/DL    0.6-1.0      N            



             CREAT)                                              

 

             CALCIUM (test code = CA) 8.3 MG/DL    8.5-10.1     L            



CBC W/AUTO [id number] 05:21:00





             Test Item    Value        Reference Range Interpretation Comments

 

             WHITE BLOOD CELL (test code = 15.2 K/mm3   3.5-11.0     H          

  



             WBC)                                                

 

             RED BLOOD CELL (test code = RBC) 3.67 M/mm3   4.70-6.10    L       

     

 

             HEMOGLOBIN (test code = HGB) 11.3 G/DL    10.4-14.9    N           

 

 

             HEMATOCRIT (test code = HCT) 35.5 %       31.5-44.1    N           

 

 

             MEAN CELL VOLUME (test code = 96.7 Fl      84.5-98.6    N          

  



             MCV)                                                

 

             MEAN CELL HGB (test code = MCH) 30.8 pg      27.0-34.2    N        

    

 

             MEAN CELL HGB CONCETRATION (test 31.8 G/DL    31.5-34.0    N       

     



             code = MCHC)                                        

 

             RED CELL DISTRIBUTION WIDTH (test 14.2 SD      11.5-14.5    N      

      



             code = RDW)                                         

 

             PLATELET COUNT (test code = PLT) 242.0 K/mm3  150-450      N       

     

 

             MEAN PLATELET VOLUME (test code = 10.20 fL     7.0-10.5     N      

      



             MPV)                                                

 

             NEUTROPHIL % (test code = NT%) 78.8 %       40-76        H         

   

 

             LYMPHOCYTE % (test code = LY%) 13.1 %       20.5-51.1    L         

   

 

             MONOCYTE % (test code = MO%) 6.2 %        1.7-9.3      N           

 

 

             EOSINOPHIL % (test code = EO%) 1.6 %        0.0-6.0      N         

   

 

             BASOPHIL % (test code = BA%) 0.3 %        0.0-2.0      N           

 

 

             NEUTROPHIL # (test code = NT#) 11.94 K/mm3  1.8-7.6      H         

   

 

             LYMPHOCYTE # (test code = LY#) 2.0 K/mm3    0.6-3.2      N         

   

 

             MONOCYTE # (test code = MO#) 0.9 K/mm3    0.3-1.1      N           

 

 

             EOSINOPHIL # (test code = EO#) 0.2 K/mm3    0.0-0.4      N         

   

 

             BASOPHIL # (test code = BA#) 0.1 K/mm3    0.0-0.1      N           

 

 

             MANUAL DIFF REQUIRED (test code = NO DIFF/SCN  CRITERIA            

      



             MDIFF)                                              



[id number] 14:09:00





             Test Item    Value        Reference Range Interpretation Comments

 

             LITHIUM (test 0.5 mmol/L   0.6-1.2      L             Detection Lopez

it = 0.1



             code = LITH)                                        <0.1 indicates 

None



                                                                 DetectedPerform

ed At: [facility]



                                                                 [facility] [address]



                                                                 [id number] 

Jeffry KIMBROUGH MD



                                                                 Ph:[phone]



[id number]--[id number] 13:35:00





             Test Item    Value        Reference Range Interpretation Comments

 

             TROPONIN-I (test 0.436 NG/ML  0.000-0.045  HH           Negative: <

/= 0.045



             code = TROPI)                                        Positive: >/= 

0.046



                                                                 Correlation wit

h serial



                                                                 results, other 

cardiac



                                                                 markers, and cl

inical



                                                                 findings is nec

essary to



                                                                 determine the c

linical



                                                                 significance of

 this



                                                                 result. Quantit

ative



                                                                 results using d

ifferent



                                                                 methodologies s

hould not



                                                                 be compared to 

one



                                                                 another as nume

rical



                                                                 results may daniel

yby



                                                                 method.



Completed by Nursing: TAXYJAQCHH--22-28 10:33:00





             Test Item    Value        Reference Range Interpretation Comments

 

             TROPONIN-I (test 0.360 NG/ML  0.000-0.045  HH           Negative: <

/= 0.045



             code = TROPI)                                        Positive: >/= 

0.046



                                                                 Correlation wit

h serial



                                                                 results, other 

cardiac



                                                                 markers, and cl

inical



                                                                 findings is nec

essary to



                                                                 determine the c

linical



                                                                 significance of

 this



                                                                 result. Quantit

ative



                                                                 results using d

ifferent



                                                                 methodologies s

hould not



                                                                 be compared to 

one



                                                                 another as nume

rical



                                                                 results may daniel

yby



                                                                 method.



Completed by Nursing: NOLast Dose Date: 20Last Dose Time: 1200TROPONIN-I
2020 07:23:00





             Test Item    Value        Reference Range Interpretation Comments

 

             TROPONIN-I (test 0.399 NG/ML  0.000-0.045  HH           Negative: <

/= 0.045



             code = TROPI)                                        Positive: >/= 

0.046



                                                                 Correlation wit

h serial



                                                                 results, other 

cardiac



                                                                 markers, and cl

inical



                                                                 findings is nec

essary to



                                                                 determine the c

linical



                                                                 significance of

 this



                                                                 result. Quantit

ative



                                                                 results using d

ifferent



                                                                 methodologies s

hould not



                                                                 be compared to 

one



                                                                 another as nume

rical



                                                                 results may daniel

yby



                                                                 method.



Completed by Nursing: [id number]--[id number] 05:00:00





             Test Item    Value        Reference Range Interpretation Comments

 

             TROPONIN-I (test 0.555 NG/ML  0.000-0.045  HH           Negative: <

/= 0.045



             code = TROPI)                                        Positive: >/= 

0.046



                                                                 Correlation wit

h serial



                                                                 results, other 

cardiac



                                                                 markers, and cl

inical



                                                                 findings is nec

essary to



                                                                 determine the c

linical



                                                                 significance of

 this



                                                                 result. Quantit

ative



                                                                 results using d

ifferent



                                                                 methodologies s

hould not



                                                                 be compared to 

one



                                                                 another as nume

rical



                                                                 results may daniel

yby



                                                                 method.



Completed by Nursing: NO- XR CHEST 1 -60-22 04:33:00 Name: TLYER EDGE  
[facility] : 1970 Age/S: 49 / F [address] [facility] Unit #: [id number]
32 Loc: [place] [address] Phys: Kristy Quiros MD Acct: [id number] Dis Date: 
Status: REG ER PHONE#: [phone] Exam Date: 2020 FAX #: Reason: 
POST CENTRAL PLACEMENT; RIGHT IJ EXAMS: CPT: [id number] XR CHEST 1 V 43184 Fluoro
Time: DAP (Gy m2): Air Kerma (mGy): HISTORY: Post central lineplacement, 
hypotension  Location code: B2 FINDINGS: Frontal view of the chest demonstrates 
a mildly enlarged cardiomediastinal silhouette. The trachea is midline. The 
lungs are clear. There is no effusion or pneumothorax. The bones are intact. 
Right IJ catheter in good position. IMPRESSION: Mild cardiomegaly, without acute
pulmonary process. ** Electronically Signed by ISABEL March on 2020 at
0433 ** Reported and signed by: Shree March M.D. CC: Kristy Quiros MD PAGE 1 
Signed Report  Name: TYLER EDGE [facility] : 1970 Age/S: 49 / F 
[id number] [address] [place] Unit #: [id number] Loc: [place] [address] Phys: 
Kristy Quiros MD Acct: [id number] Dis Date: Status: REG ER PHONE #: [phone].
[phone] Exam Date: 2020 FAX #: Reason: POST CENTRAL PLACEMENT; RIGHT IJ 
EXAMS:  CPT: [id number] XR CHEST 1 V 22778 Fluoro Time: DAP (Gy m2): Air Kerma 
(mGy): (Continued) Technologist: Duncan Ellis, RT(R)(CT) Trnscb Date/Time: 
2020 (433) [id number] Orig Print D/T: S: 2020 (436)  PAGE 2 Signed 
ReportC W/AUTO [id number] 04:15:00





             Test Item    Value        Reference Range Interpretation Comments

 

             WHITE BLOOD CELL (test 24.2 K/mm3   3.5-11.0     H            



             code = WBC)                                         

 

             RED BLOOD CELL (test 3.75 M/mm3   4.70-6.10    L            



             code = RBC)                                         

 

             HEMOGLOBIN (test code 11.5 G/DL    10.4-14.9    N            



             = HGB)                                              

 

             HEMATOCRIT (test code 35.2 %       31.5-44.1    N            



             = HCT)                                              

 

             MEAN CELL VOLUME (test 93.9 Fl      84.5-98.6    N            



             code = MCV)                                         

 

             MEAN CELL HGB (test 30.7 pg      27.0-34.2    N            



             code = MCH)                                         

 

             MEAN CELL HGB 32.7 G/DL    31.5-34.0    N            



             CONCETRATION (test                                        



             code = MCHC)                                        

 

             RED CELL DISTRIBUTION 13.8 SD      11.5-14.5    N            



             WIDTH (test code =                                        



             RDW)                                                

 

             PLATELET COUNT (test 234.0 K/mm3  150-450      N            



             code = PLT)                                         

 

             MEAN PLATELET VOLUME 9.80 fL      7.0-10.5     N            



             (test code = MPV)                                        

 

             NEUTROPHIL % (test 82.9 %       40-76        H            



             code = NT%)                                         

 

             LYMPHOCYTE % (test 8.3 %        20.5-51.1    L            



             code = LY%)                                         

 

             MONOCYTE % (test code 7.3 %        1.7-9.3      N            



             = MO%)                                              

 

             EOSINOPHIL % (test 1.4 %        0.0-6.0      N            



             code = EO%)                                         

 

             BASOPHIL % (test code 0.1 %        0.0-2.0      N            



             = BA%)                                              

 

             NEUTROPHIL # (test 20.08 K/mm3  1.8-7.6      H            



             code = NT#)                                         

 

             LYMPHOCYTE # (test 2.0 K/mm3    0.6-3.2      N            



             code = LY#)                                         

 

             MONOCYTE # (test code 1.8 K/mm3    0.3-1.1      H            



             = MO#)                                              

 

             EOSINOPHIL # (test 0.3 K/mm3    0.0-0.4      N            



             code = EO#)                                         

 

             BASOPHIL # (test code 0.0 K/mm3    0.0-0.1      N            



             = BA#)                                              

 

             MANUAL DIFF REQUIRED NO DIFF/SCN  CRITERIA                  SLIDE R

EDNAW



             (test code = MDIFF)                                        CONSISTA

NT WITH AUTO



                                                                 DIFFERENTIAL.



BASIC METABOLIC [id number] 04:11:00





             Test Item    Value        Reference Range Interpretation Comments

 

             SODIUM (test code = NA) 135 mmol/L   134-147      N            

 

             POTASSIUM (test code = K) 4.0 mmol/L   3.4-5.0      N            

 

             CHLORIDE (test code = CL) 106 mmol/L   100-108      N            

 

             CARBON DIOXIDE (test code = CO2) 22 mmol/L    21-32        N       

     

 

             ANION GAP (test code = GAP) 7.0 GAP calc 4.0-15.0     N            

 

             GLUCOSE (test code = GLU) 97 MG/DL            N            

 

             BLOOD UREA NITROGEN (test code = 34 MG/DL     7-18         H       

     



             BUN)                                                

 

             GLOMERULAR FILTRATION RATE (test 39 estGFR    >60          L       

     



             code = GFR)                                         

 

             CREATININE (test code = CREAT) 1.5 MG/DL    0.6-1.0      H         

   

 

             CALCIUM (test code = CA) 8.9 MG/DL    8.5-10.1     N            



LACTIC [id number] 04:11:00





             Test Item    Value        Reference Range Interpretation Comments

 

             LACTIC ACID (test code = LACT) 0.8 mmol/L   0.4-2.0      N         

   



CBC W/AUTO [id number] 03:54:00





             Test Item    Value        Reference Range Interpretation Comments

 

             WHITE BLOOD CELL (test code = 24.2 K/mm3   3.5-11.0     H          

  



             WBC)                                                

 

             RED BLOOD CELL (test code = RBC) 3.75 M/mm3   4.70-6.10    L       

     

 

             HEMOGLOBIN (test code = HGB) 11.5 G/DL    10.4-14.9    N           

 

 

             HEMATOCRIT (test code = HCT) 35.2 %       31.5-44.1    N           

 

 

             MEAN CELL VOLUME (test code = 93.9 Fl      84.5-98.6    N          

  



             MCV)                                                

 

             MEAN CELL HGB (test code = MCH) 30.7 pg      27.0-34.2    N        

    

 

             MEAN CELL HGB CONCETRATION (test 32.7 G/DL    31.5-34.0    N       

     



             code = MCHC)                                        

 

             RED CELL DISTRIBUTION WIDTH (test 13.8 SD      11.5-14.5    N      

      



             code = RDW)                                         

 

             PLATELET COUNT (test code = PLT) 234.0 K/mm3  150-450      N       

     

 

             MEAN PLATELET VOLUME (test code = 9.80 fL      7.0-10.5     N      

      



             MPV)                                                

 

             NEUTROPHIL % (test code = NT%)  %           40-76        H         

   

 

             LYMPHOCYTE % (test code = LY%)  %           20.5-51.1    L         

   

 

             MONOCYTE % (test code = MO%)  %           1.7-9.3      N           

 

 

             EOSINOPHIL % (test code = EO%)  %           0.0-6.0      N         

   

 

             BASOPHIL % (test code = BA%)  %           0.0-2.0      N           

 

 

             NEUTROPHIL # (test code = NT#)  K/mm3       1.8-7.6      H         

   

 

             LYMPHOCYTE # (test code = LY#)  K/mm3       0.6-3.2      N         

   

 

             MONOCYTE # (test code = MO#)  K/mm3       0.3-1.1      H           

 

 

             EOSINOPHIL # (test code = EO#)  K/mm3       0.0-0.4      N         

   

 

             BASOPHIL # (test code = BA#)  K/mm3       0.0-0.1      N           

 

 

             MANUAL DIFF REQUIRED (test code =  DIFF/SCN    CRITERIA            

      



             MDIFF)                                              



Basic Metabolic [id number] 07:54:48





             Test Item    Value        Reference Range Interpretation Comments

 

             Sodium Level (test code = Sodium 142.0 mmol/L 135.0-145.0          

     



             Level)                                              

 

             Potassium Level (test code = 4.8 mmol/L   3.5-5.1                  

 



             Potassium Level)                                        

 

             Chloride Level (test code = 105 mmol/L                       



             Chloride Level)                                        

 

             CO2 (test code = CO2) 25 mmol/L    22-29                     

 

             Anion Gap (test code = Anion 12 mmol/L    7-16                     

 



             Gap)                                                

 

             BUN (test code = BUN) 19.60 mg/dL  6.00-20.00                

 

             Creatinine Level (test code = 0.80 mg/dL   0.50-0.90               

  



             Creatinine Level)                                        

 

             BUN/Creat Ratio (test code = 24                        N           

 



             BUN/Creat Ratio)                                        

 

             Glucose Level (test code = 86 mg/dL                         



             Glucose Level)                                        

 

             Calcium Level (test code = 10.1 mg/dL   8.3-10.5                  



             Calcium Level)                                        



Basic Metabolic [id number] 07:54:48





             Test Item    Value        Reference Range Interpretation Comments

 

             Sodium Level (test 142.0 mmol/L 135.0-145.0               



             code = Sodium Level)                                        

 

             Potassium Level 4.8 mmol/L   3.5-5.1                   



             (test code =                                        



             Potassium Level)                                        

 

             Chloride Level (test 105 mmol/L                       



             code = Chloride                                        



             Level)                                              

 

             CO2 (test code = 25 mmol/L    22-29                     



             CO2)                                                

 

             Anion Gap (test code 12 mmol/L    7-16                      



             = Anion Gap)                                        

 

             BUN (test code = 19.60 mg/dL  6.00-20.00                



             BUN)                                                

 

             Creatinine Level 0.80 mg/dL   0.50-0.90                 



             (test code =                                        



             Creatinine Level)                                        

 

             BUN/Creat Ratio 24                        N            



             (test code =                                        



             BUN/Creat Ratio)                                        

 

             Glucose Level (test 86 mg/dL                         



             code = Glucose                                        



             Level)                                              

 

             Calcium Level (test 10.1 mg/dL   8.3-10.5                  



             code = Calcium                                        



             Level)                                              

 

             eGFR AA (test code = >60                       N            eGFR (e

stimated



             eGFR AA)     mL/min/1.73 m2                           Glomerular



                                                                 Filtration Rate

) is



                                                                 an estimated va

lue,



                                                                 calculated from

 the



                                                                 patient's serum



                                                                 creatinine usin

g the



                                                                 MDRD equation. 

It is



                                                                 NOT the patient

's



                                                                 actual GFR. The

 eGFR



                                                                 provides a more



                                                                 clinically usef

ul



                                                                 measure of kidn

ey



                                                                 disease than se

rum



                                                                 creatinine



                                                                 alone.***This



                                                                 calculation rimma

es



                                                                 sex and race in

to



                                                                 account, if the



                                                                 information is



                                                                 provided. If th

e



                                                                 race is not



                                                                 provided, and t

he



                                                                 patient is



                                                                 -[place]rystal

n,



                                                                 multiply by 1.2

12.



                                                                 If sex is not



                                                                 provided, and t

he



                                                                 patient is fema

le,



                                                                 multiply by 0.7

42.



                                                                 Results for pat

ients



                                                                 <18 years of ag

e



                                                                 have not been



                                                                 validated by th

e



                                                                 MDRD study and



                                                                 should be



                                                                 interpreted wit

h



                                                                 caution. eGFR R

esult



                                                                 Interpretation:

eGFR



                                                                 > or = 60 is in

 the



                                                                 Normal RangeeGF

R <



                                                                 60 may mean kid

hang



                                                                 diseaseeGFR < 1

5 may



                                                                 mean kidney



                                                                 failure*** Rang

es



                                                                 recommended by 

the



                                                                 [employer]



                                                                 [employer],



                                                                 http://nkdep.ni

h.gov



Basic Metabolic [id number] 07:54:48





             Test Item    Value        Reference Range Interpretation Comments

 

             Sodium Level (test 142.0 mmol/L 135.0-145.0               



             code = Sodium Level)                                        

 

             Potassium Level 4.8 mmol/L   3.5-5.1                   



             (test code =                                        



             Potassium Level)                                        

 

             Chloride Level (test 105 mmol/L                       



             code = Chloride                                        



             Level)                                              

 

             CO2 (test code = 25 mmol/L    22-29                     



             CO2)                                                

 

             Anion Gap (test code 12 mmol/L    7-16                      



             = Anion Gap)                                        

 

             BUN (test code = 19.60 mg/dL  6.00-20.00                



             BUN)                                                

 

             Creatinine Level 0.80 mg/dL   0.50-0.90                 



             (test code =                                        



             Creatinine Level)                                        

 

             BUN/Creat Ratio 24                        N            



             (test code =                                        



             BUN/Creat Ratio)                                        

 

             Glucose Level (test 86 mg/dL                         



             code = Glucose                                        



             Level)                                              

 

             Calcium Level (test 10.1 mg/dL   8.3-10.5                  



             code = Calcium                                        



             Level)                                              

 

             eGFR AA (test code = >60                       N            eGFR (e

stimated



             eGFR AA)     mL/min/1.73 m2                           Glomerular



                                                                 Filtration Rate

) is



                                                                 an estimated va

lue,



                                                                 calculated from

 the



                                                                 patient's serum



                                                                 creatinine usin

g the



                                                                 MDRD equation. 

It is



                                                                 NOT the patient

's



                                                                 actual GFR. The

 eGFR



                                                                 provides a more



                                                                 clinically usef

ul



                                                                 measure of kidn

ey



                                                                 disease than se

rum



                                                                 creatinine



                                                                 alone.***This



                                                                 calculation rimma

es



                                                                 sex and race in

to



                                                                 account, if the



                                                                 information is



                                                                 provided. If th

e



                                                                 race is not



                                                                 provided, and t

he



                                                                 patient is



                                                                 -Crystal

n,



                                                                 multiply by 1.2

12.



                                                                 If sex is not



                                                                 provided, and t

he



                                                                 patient is fema

le,



                                                                 multiply by 0.7

42.



                                                                 Results for pat

ients



                                                                 <18 years of ag

e



                                                                 have not been



                                                                 validated by th

e



                                                                 MDRD study and



                                                                 should be



                                                                 interpreted wit

h



                                                                 caution. eGFR R

esult



                                                                 Interpretation:

eGFR



                                                                 > or = 60 is in

 the



                                                                 Normal RangeeGF

R <



                                                                 60 may mean kid

hang



                                                                 diseaseeGFR < 1

5 may



                                                                 mean kidney



                                                                 failure*** Rang

es



                                                                 recommended by 

the



                                                                 [employer]



                                                                 [employer],



                                                                 http://nkdep.ni

h.gov

 

             eGFR Non-AA (test >60.00                    N            eGFR (sheba

mated



             code = eGFR Non-AA) mL/min/1.73 m2                           Glomer

ular



                                                                 Filtration Rate

) is



                                                                 an estimated va

lue,



                                                                 calculated from

 the



                                                                 patient's serum



                                                                 creatinine usin

g the



                                                                 MDRD equation. 

It is



                                                                 NOT the patient

's



                                                                 actual GFR. The

 eGFR



                                                                 provides a more



                                                                 clinically usef

ul



                                                                 measure of kidn

ey



                                                                 disease than se

rum



                                                                 creatinine



                                                                 alone.***This



                                                                 calculation rimma

es



                                                                 sex and race in

to



                                                                 account, if the



                                                                 information is



                                                                 provided. If th

e



                                                                 race is not



                                                                 provided, and t

he



                                                                 patient is



                                                                 -Crystal

n,



                                                                 multiply by 1.2

12.



                                                                 If sex is not



                                                                 provided, and t

he



                                                                 patient is fema

le,



                                                                 multiply by 0.7

42.



                                                                 Results for pat

ients



                                                                 <18 years of ag

e



                                                                 have not been



                                                                 validated by th

e



                                                                 MDRD study and



                                                                 should be



                                                                 interpreted wit

h



                                                                 caution. eGFR R

esult



                                                                 Interpretation:

eGFR



                                                                 > or = 60 is in

 the



                                                                 Normal RangeeGF

R <



                                                                 60 may mean kid

hang



                                                                 diseaseeGFR < 1

5 may



                                                                 mean kidney



                                                                 failure*** Rang

es



                                                                 recommended by 

the



                                                                 [employer]



                                                                 [employer],



                                                                 http://nkdep.ni

h.gov



Complete Blood Count with [id number] 07:29:42





             Test Item    Value        Reference Range Interpretation Comments

 

             WBC (test code = WBC) 9.3 x10      4.4-10.5                  

 

             RBC (test code = RBC) 4.71 x10     3.75-5.20                 

 

             Hgb (test code = Hgb) 14.4 g/dL    12.2-14.8                 

 

             MCV (test code = MCV) 92.10 fL     80..00              

 

             Hct (test code = Hct) 43.4 %       36.5-44.4                 

 

             MCHC (test code = 33.20 g/dL   32.00-37.50               



             MCHC)                                               

 

             RDW CV (test code = 13.4 %       11.5-14.5                 



             RDW CV)                                             

 

             MCH (test code = MCH) 30.6 pg      27.0-32.5                 

 

             Platelets (test code = 325.0 x10    140.0-440.0               



             Platelets)                                          

 

             MPV (test code = MPV) 9.8 fL                    N            

 

             Slide Review (test Auto         Auto                      Result cr

eated by



             code = Slide Review)                                        [email or web address]_

SLIDE_REV_AUTO

 

             nRBC (test code = 0                         N            



             nRBC)                                               

 

             NRBC Abs (test code = 0.00 x10                  N            



             NRBC Abs)                                           

 

             IPF (test code = IPF) 0 %                       N            



Automated [id number] 07:29:42





             Test Item    Value        Reference Range Interpretation Comments

 

             Neutro Auto (test code = Neutro 66.2 %       36.0-70.0             

    



             Auto)                                               

 

             Lymph Auto (test code = Lymph Auto) 21.1 %       12.0-44.0         

        

 

             Mono Auto (test code = Mono Auto) 6.8 %        0.0-11.0            

      

 

             Eos, Auto (test code = Eos, Auto) 4.8 %        0.0-7.0             

      

 

             Basophil Auto (test code = Basophil 0.9 %        0.0-2.0           

        



             Auto)                                               

 

             Neutro Absolute (test code = Neutro 6.2 x10      1.6-7.4           

        



             Absolute)                                           

 

             Lymph Absolute (test code = Lymph 1.97 x10     .50-4.60            

      



             Absolute)                                           

 

             Mono Absolute (test code = Mono .63 x10      .00-1.20              

    



             Absolute)                                           

 

             Eos Absolute (test code = Eos 0.45 x10     0.00-0.74               

  



             Absolute)                                           

 

             Baso Absolute (test code = Baso 0.08 x10     0.00-0.21             

    



             Absolute)                                           



IG [id number] 07:29:42





             Test Item    Value        Reference Range Interpretation Comments

 

             IG (test code = IG) 0.2 %        0.0-5.0                   

 

             IG Abs (test code = IG Abs) 0 x10                     N            



Thyroid Stimulating [id number] 04:30:30





             Test Item    Value        Reference Range Interpretation Comments

 

             TSH (test code = TSH) 1.360 mIU/mL 0.270-4.200               



RPR [id number] 04:06:17





             Test Item    Value        Reference Range Interpretation Comments

 

             RPR Qual (test code = RPR Qual) Non-Reactive Non-Reactive          

    

 

             Reactive Control (test code = Reactive                             

  



             Reactive Control)                                        

 

             Weak Reactive Control (test Weak Reactive                          

 



             code = Weak Reactive Control)                                      

  

 

             Non-Reactive Control (test code Non-Reactive                       

    



             = Non-Reactive Control)                                        

 

             Lot # (test code = Lot #) [id number]                    N            

 

             Expiration Dt (test code = 10-                N            



             Expiration Dt)                                        



Hemoglobin A1c2019-11-15 18:37:54





             Test Item    Value        Reference Range Interpretation Comments

 

             Hemoglobin A1c (test code 4.7 %        4.8-5.9      L            No

n Diabetic



             = Hemoglobin A1c)                                        4.8-5.9%Di

abetic <7.0%



Lipid Panel2019-11-15 18:08:58





             Test Item    Value        Reference Range Interpretation Comments

 

             Cholesterol Total 189 mg/dL    0-200                     RISK OF HE

ART



             (test code =                                        DISEASEPublishe

d by



             Cholesterol Total)                                        American 

Heart



                                                                 Association Judith

lyte



                                                                 Optimal Borderl

ine



                                                                 Increased RiskC

HOL <200



                                                                 200-239 >240TRI

G <150



                                                                 150-199 >200HDL

 Male



                                                                 >60 <40HDL Fema

le >60



                                                                 <50LDL <100 130

-159



                                                                 >160LDL Near op

[facility]



                                                                 100-129

 

             Triglycerides (test 112 mg/dL    9-200                     



             code = Triglycerides)                                        

 

             HDL (test code = HDL) 44 mg/dL     50-60        L            

 

             LDL (test code = LDL) 122 mg/dL    0-130                     The eq

uation being used



                                                                 in this calcula

tion is



                                                                 LDL = (Chol - H

DL) -



                                                                 (Trig / 5)

 

             VLDL (test code = 22 mg/dL     5-40                      The equati

on being used



             VLDL)                                               in this calcula

tion is



                                                                 VLDL = Trig / 5

 

             Chol/HDL (test code = 4.3 ratio    0.0-4.4                   



             Chol/HDL)                                           

 

             LDL/HDL Ratio (test 3                         N            The equa

tion being used



             code = LDL/HDL Ratio)                                        in thi

s calculation is



                                                                 LDL/HDL Ratio=L

DL



                                                                 Calc/HDL Chol



Complete Blood Count with Differential2019-11-15 07:51:57





             Test Item    Value        Reference Range Interpretation Comments

 

             WBC (test code = WBC) 10.6 x10     4.4-10.5     H            

 

             RBC (test code = RBC) 4.45 x10     3.75-5.20                 

 

             Hgb (test code = Hgb) 13.5 g/dL    12.2-14.8                 

 

             MCV (test code = MCV) 93.30 fL     80..00              

 

             Hct (test code = Hct) 41.5 %       36.5-44.4                 

 

             MCHC (test code = 32.50 g/dL   32.00-37.50               



             MCHC)                                               

 

             RDW CV (test code = 13.7 %       11.5-14.5                 



             RDW CV)                                             

 

             MCH (test code = MCH) 30.3 pg      27.0-32.5                 

 

             Platelets (test code = 356.0 x10    140.0-440.0               



             Platelets)                                          

 

             MPV (test code = MPV) 9.7 fL                    N            

 

             Slide Review (test Auto         Auto                      Result cr

eated by



             code = Slide Review)                                        GL_SJM_

SLIDE_REV_AUTO

 

             nRBC (test code = 0                         N            



             nRBC)                                               

 

             NRBC Abs (test code = 0.00 x10                  N            



             NRBC Abs)                                           

 

             IPF (test code = IPF) 0 %                       N            



Automated Differential2019-11-15 07:51:57





             Test Item    Value        Reference Range Interpretation Comments

 

             Neutro Auto (test code = Neutro 65.4 %       36.0-70.0             

    



             Auto)                                               

 

             Lymph Auto (test code = Lymph Auto) 23.5 %       12.0-44.0         

        

 

             Mono Auto (test code = Mono Auto) 5.7 %        0.0-11.0            

      

 

             Eos, Auto (test code = Eos, Auto) 4.4 %        0.0-7.0             

      

 

             Basophil Auto (test code = Basophil 0.8 %        0.0-2.0           

        



             Auto)                                               

 

             Neutro Absolute (test code = Neutro 6.9 x10      1.6-7.4           

        



             Absolute)                                           

 

             Lymph Absolute (test code = Lymph 2.49 x10     .50-4.60            

      



             Absolute)                                           

 

             Mono Absolute (test code = Mono .60 x10      .00-1.20              

    



             Absolute)                                           

 

             Eos Absolute (test code = Eos 0.47 x10     0.00-0.74               

  



             Absolute)                                           

 

             Baso Absolute (test code = Baso 0.08 x10     0.00-0.21             

    



             Absolute)                                           



IG Flags2019-11-15 07:51:57





             Test Item    Value        Reference Range Interpretation Comments

 

             IG (test code = IG) 0.2 %        0.0-5.0                   

 

             IG Abs (test code = IG Abs) 0 x10                     N            



Urine [id number] 10:18:52





             Test Item    Value        Reference Range Interpretation Comments

 

             ORGANISM (test code = Escherichia coli                           



             ORGANISM)                                           

 

             Amikacin (test code =                           S            



             Amik)                                               

 

             Ampicillin (test code =                           S            



             Amp)                                                

 

             Ampicillin/Sulbactam                           S            



             (test code = Amp/Sul)                                        

 

             Cefazolin (test code =                           S            



             Cefaz)                                              

 

             Cefepime (test code =                           S            



             Cefep)                                              

 

             Cefotaxime (test code =                           S            



             Cefo)                                               

 

             Ceftazidime (test code                           S            



             = Ceftaz)                                           

 

             Ceftriaxone (test code                           S            



             = Ceftri)                                           

 

             Cefuroxime (test code =                           S            



             Cefur)                                              

 

             Ciprofloxacin (test                           S            



             code = Cipro)                                        

 

             Gentamicin (test code =                           S            



             Gent)                                               

 

             Imipenem (test code =                           S            



             Imi)                                                

 

             Levofloxacin (test code                           S            



             = Levo)                                             

 

             Meropenem (test code =                           S            



             Sindi)                                               

 

             Nitrofurantoin (test                           S            



             code = Nitro)                                        

 

             Piperacillin/Tazobactam                           S            



             (test code = Pip/Blaine)                                        

 

             Tobramycin (test code =                           S            



             Tobra)                                              

 

             Trimethoprim/Sulfa                           S            



             (test code = SXT)                                        

 

             Final Report (test code >=100,000 cfu/ml                           



             = Final Report) Escherichia coli                           



                          >=100,000 cfu/ml Alpha                           



                          hemolytic                              



                          Streptococcus (not                           



                          Group D or                             



                          Enterococcus)                           



 C Urine Added by [id number] 09:18:25





             Test Item    Value        Reference Range Interpretation Comments

 

             Lithium Level (test code = 0.58 mmol/L  0.60-1.20    L            



             Lithium Level)                                        



Urine Drug [id number] 01:36:44





             Test Item    Value        Reference Range Interpretation Comments

 

             Amphetamine Screen Ur POSITIVE     Negative     A            



             (test code = Amphetamine                                        



             Screen Ur)                                          

 

             Barbiturate Screen Ur Negative     Negative                  



             (test code = Barbiturate                                        



             Screen Ur)                                          

 

             Benzodiazepines Ur (test Negative     Negative                  



             code = Benzodiazepines                                        



             Ur)                                                 

 

             Cocaine Screen Ur (test Negative     Negative                  



             code = Cocaine Screen                                        



             Ur)                                                 

 

             U Methadone Scr (test Negative     Negative                  



             code = U Methadone Scr)                                        

 

             Opiate Screen Ur (test Negative     Negative                  



             code = Opiate Screen Ur)                                        

 

             U PCP Scrn (test code = Negative     Negative                  



             U PCP Scrn)                                         

 

             Cannabinoid Screen Ur Negative     Negative                  



             (test code = Cannabinoid                                        



             Screen Ur)                                          

 

             U TCA (test code = U Negative     Negative                  The res

ults of all



             TCA)                                                drug screen elinor

ts are



                                                                 only preliminar

y.



                                                                 Clinical



                                                                 consideration a

nd



                                                                 professional ju

dgment



                                                                 should be appli

ed to



                                                                 any drug of abu

se



                                                                 test result,



                                                                 particularly wh

en



                                                                 preliminary pos

itive



                                                                 results are obt

ained.



                                                                 Please order a



                                                                 separate confir

matory



                                                                 test if desired

.



HCG Qualitative [id number] 01:36:43





             Test Item    Value        Reference Range Interpretation Comments

 

             hCG Ur (test code = Negative                               If the r

esult is



             hCG Ur)                                             "Negative" in p

atients



                                                                 suspected to be



                                                                 pregnant, recom

mend



                                                                 retest with a s

ample



                                                                 obtained 48 to 

72



                                                                 hours later, or

 by



                                                                 ordering a



                                                                 quantitative as

say. If



                                                                 the result is



                                                                 "Borderline" te

sting



                                                                 should be repea

gianfranco in



                                                                 48 to 72 hours.

 

             Lot # (test code = [id number]                    N            



             Lot #)                                              

 

             Expiration Dt (test 2020                N            



             code = Expiration Dt)                                        

 

             Neg Control (test Negative                               



             code = Neg Control)                                        

 

             Pos Control (test Positive                               



             code = Pos Control)                                        

 

             Internal QC (test Acceptable                             



             code = Internal QC)                                        



Urinalysis [id number] 01:36:03





             Test Item    Value        Reference Range Interpretation Comments

 

             UA WBC (test code = UA WBC) 0-5          0-5                       

 

             UA RBC (test code = UA RBC) None Seen    0-5                       

 

             UA Bacteria (test code = UA Moderate                  A            



             Bacteria)                                           

 

             UA Squam Epithelial (test code = UA 0-5                            

        



             Squam Epithelial)                                        



Comprehensive Metabolic [id number] 01:23:40





             Test Item    Value        Reference Range Interpretation Comments

 

             Sodium Level (test code = Sodium 139.0 mmol/L 135.0-145.0          

     



             Level)                                              

 

             Potassium Level (test code = 4.4 mmol/L   3.5-5.1                  

 



             Potassium Level)                                        

 

             Chloride Level (test code = 102 mmol/L                       



             Chloride Level)                                        

 

             CO2 (test code = CO2) 23 mmol/L    22-29                     

 

             Anion Gap (test code = Anion 14 mmol/L    7-16                     

 



             Gap)                                                

 

             BUN (test code = BUN) 9.10 mg/dL   6.00-20.00                

 

             Creatinine Level (test code = 1.00 mg/dL   0.50-0.90    H          

  



             Creatinine Level)                                        

 

             BUN/Creat Ratio (test code = 9                         N           

 



             BUN/Creat Ratio)                                        

 

             Glucose Level (test code = 115 mg/dL                        



             Glucose Level)                                        

 

             Calcium Level (test code = 10.1 mg/dL   8.3-10.5                  



             Calcium Level)                                        

 

             Alk Phos (test code = Alk Phos) 106 U/L             H        

    

 

             Bilirubin Total (test code = 0.4 mg/dL    0.1-0.9                  

 



             Bilirubin Total)                                        

 

             Albumin Level (test code = 4.6 g/dL     3.5-5.2                   



             Albumin Level)                                        

 

             Protein Total (test code = 7.7 g/dL     6.4-8.3                   



             Protein Total)                                        

 

             ALT (test code = ALT) 12 U/L       1-33                      

 

             AST (test code = AST) 18 U/L       1-32                      

 

             Globulin (test code = Globulin) 3.1 g/dL     2.9-3.1               

    

 

             A/G Ratio (test code = A/G 1.5 ratio                 N            



             Ratio)                                              



Comprehensive Metabolic [id number] 01:23:40





             Test Item    Value        Reference Range Interpretation Comments

 

             Sodium Level (test 139.0 mmol/L 135.0-145.0               



             code = Sodium Level)                                        

 

             Potassium Level 4.4 mmol/L   3.5-5.1                   



             (test code =                                        



             Potassium Level)                                        

 

             Chloride Level (test 102 mmol/L                       



             code = Chloride                                        



             Level)                                              

 

             CO2 (test code = 23 mmol/L    22-29                     



             CO2)                                                

 

             Anion Gap (test code 14 mmol/L    7-16                      



             = Anion Gap)                                        

 

             BUN (test code = 9.10 mg/dL   6.00-20.00                



             BUN)                                                

 

             Creatinine Level 1.00 mg/dL   0.50-0.90    H            



             (test code =                                        



             Creatinine Level)                                        

 

             BUN/Creat Ratio 9                         N            



             (test code =                                        



             BUN/Creat Ratio)                                        

 

             Glucose Level (test 115 mg/dL                        



             code = Glucose                                        



             Level)                                              

 

             Calcium Level (test 10.1 mg/dL   8.3-10.5                  



             code = Calcium                                        



             Level)                                              

 

             Alk Phos (test code 106 U/L             H            



             = Alk Phos)                                         

 

             Bilirubin Total 0.4 mg/dL    0.1-0.9                   



             (test code =                                        



             Bilirubin Total)                                        

 

             Albumin Level (test 4.6 g/dL     3.5-5.2                   



             code = Albumin                                        



             Level)                                              

 

             Protein Total (test 7.7 g/dL     6.4-8.3                   



             code = Protein                                        



             Total)                                              

 

             ALT (test code = 12 U/L       1-33                      



             ALT)                                                

 

             AST (test code = 18 U/L       1-32                      



             AST)                                                

 

             Globulin (test code 3.1 g/dL     2.9-3.1                   



             = Globulin)                                         

 

             A/G Ratio (test code 1.5 ratio                 N            



             = A/G Ratio)                                        

 

             eGFR AA (test code = >60                       N            eGFR (e

stimated



             eGFR AA)     mL/min/1.73 m2                           Glomerular



                                                                 Filtration Rate

) is



                                                                 an estimated va

lue,



                                                                 calculated from

 the



                                                                 patient's serum



                                                                 creatinine usin

g the



                                                                 MDRD equation. 

It is



                                                                 NOT the patient

's



                                                                 actual GFR. The

 eGFR



                                                                 provides a more



                                                                 clinically usef

ul



                                                                 measure of kidn

ey



                                                                 disease than se

rum



                                                                 creatinine



                                                                 alone.***This



                                                                 calculation rimma

es



                                                                 sex and race in

to



                                                                 account, if the



                                                                 information is



                                                                 provided. If th

e



                                                                 race is not



                                                                 provided, and t

he



                                                                 patient is



                                                                 -Crystal

n,



                                                                 multiply by 1.2

12.



                                                                 If sex is not



                                                                 provided, and t

he



                                                                 patient is fema

le,



                                                                 multiply by 0.7

42.



                                                                 Results for pat

ients



                                                                 <18 years of ag

e



                                                                 have not been



                                                                 validated by th

e



                                                                 MDRD study and



                                                                 should be



                                                                 interpreted wit

h



                                                                 caution. eGFR R

esult



                                                                 Interpretation:

eGFR



                                                                 > or = 60 is in

 the



                                                                 Normal RangeeGF

R <



                                                                 60 may mean kid

hang



                                                                 diseaseeGFR < 1

5 may



                                                                 mean kidney



                                                                 failure*** Rang

es



                                                                 recommended by 

the



                                                                 [employer]



                                                                 [employer],



                                                                 http://nkdep.ni

h.gov



Comprehensive Metabolic [id number] 01:23:40





             Test Item    Value        Reference Range Interpretation Comments

 

             Sodium Level (test 139.0 mmol/L 135.0-145.0               



             code = Sodium Level)                                        

 

             Potassium Level 4.4 mmol/L   3.5-5.1                   



             (test code =                                        



             Potassium Level)                                        

 

             Chloride Level (test 102 mmol/L                       



             code = Chloride                                        



             Level)                                              

 

             CO2 (test code = 23 mmol/L    22-29                     



             CO2)                                                

 

             Anion Gap (test code 14 mmol/L    7-16                      



             = Anion Gap)                                        

 

             BUN (test code = 9.10 mg/dL   6.00-20.00                



             BUN)                                                

 

             Creatinine Level 1.00 mg/dL   0.50-0.90    H            



             (test code =                                        



             Creatinine Level)                                        

 

             BUN/Creat Ratio 9                         N            



             (test code =                                        



             BUN/Creat Ratio)                                        

 

             Glucose Level (test 115 mg/dL                        



             code = Glucose                                        



             Level)                                              

 

             Calcium Level (test 10.1 mg/dL   8.3-10.5                  



             code = Calcium                                        



             Level)                                              

 

             Alk Phos (test code 106 U/L             H            



             = Alk Phos)                                         

 

             Bilirubin Total 0.4 mg/dL    0.1-0.9                   



             (test code =                                        



             Bilirubin Total)                                        

 

             Albumin Level (test 4.6 g/dL     3.5-5.2                   



             code = Albumin                                        



             Level)                                              

 

             Protein Total (test 7.7 g/dL     6.4-8.3                   



             code = Protein                                        



             Total)                                              

 

             ALT (test code = 12 U/L       1-33                      



             ALT)                                                

 

             AST (test code = 18 U/L       1-32                      



             AST)                                                

 

             Globulin (test code 3.1 g/dL     2.9-3.1                   



             = Globulin)                                         

 

             A/G Ratio (test code 1.5 ratio                 N            



             = A/G Ratio)                                        

 

             eGFR AA (test code = >60                       N            eGFR (e

stimated



             eGFR AA)     mL/min/1.73 m2                           Glomerular



                                                                 Filtration Rate

) is



                                                                 an estimated va

lue,



                                                                 calculated from

 the



                                                                 patient's serum



                                                                 creatinine usin

g the



                                                                 MDRD equation. 

It is



                                                                 NOT the patient

's



                                                                 actual GFR. The

 eGFR



                                                                 provides a more



                                                                 clinically usef

ul



                                                                 measure of kidn

ey



                                                                 disease than se

rum



                                                                 creatinine



                                                                 alone.***This



                                                                 calculation rimma

es



                                                                 sex and race in

to



                                                                 account, if the



                                                                 information is



                                                                 provided. If th

e



                                                                 race is not



                                                                 provided, and t

he



                                                                 patient is



                                                                 -Crystal

n,



                                                                 multiply by 1.2

12.



                                                                 If sex is not



                                                                 provided, and t

he



                                                                 patient is fema

le,



                                                                 multiply by 0.7

42.



                                                                 Results for pat

ients



                                                                 <18 years of ag

e



                                                                 have not been



                                                                 validated by [facility]



                                                                 MDRD study and



                                                                 should be



                                                                 interpreted wit

h



                                                                 caution. eGFR R

esult



                                                                 Interpretation:

eGFR



                                                                 > or = 60 is in

 the



                                                                 Normal RangeeGF

R <



                                                                 60 may mean kid

hang



                                                                 diseaseeGFR < 1

5 may



                                                                 mean kidney



                                                                 failure*** Rang

es



                                                                 recommended by 

the



                                                                 [employer]



                                                                 Foundation,



                                                                 http://nkdep.ni

h.gov

 

             eGFR Non-AA (test 58.45                     N            eGFR (sheba

mated



             code = eGFR Non-AA) mL/min/1.73 m2                           Glomer

ular



                                                                 Filtration Rate

) is



                                                                 an estimated va

lue,



                                                                 calculated from

 the



                                                                 patient's serum



                                                                 creatinine usin

g the



                                                                 MDRD equation. 

It is



                                                                 NOT the patient

's



                                                                 actual GFR. The

 eGFR



                                                                 provides a more



                                                                 clinically usef

ul



                                                                 measure of kidn

ey



                                                                 disease than se

rum



                                                                 creatinine



                                                                 alone.***This



                                                                 calculation rimma

es



                                                                 sex and race in

to



                                                                 account, if the



                                                                 information is



                                                                 provided. If 

e



                                                                 race is not



                                                                 provided, and t

he



                                                                 patient is



                                                                 -Crystal

n,



                                                                 multiply by 1.2

12.



                                                                 If sex is not



                                                                 provided, and t

he



                                                                 patient is fema

le,



                                                                 multiply by 0.7

42.



                                                                 Results for pat

ients



                                                                 <18 years of ag

e



                                                                 have not been



                                                                 validated by [facility]



                                                                 MDRD study and



                                                                 should be



                                                                 interpreted wit

h



                                                                 caution. eGFR R

esult



                                                                 Interpretation:

eGFR



                                                                 > or = 60 is in

 the



                                                                 Normal RangeeGF

R <



                                                                 60 may mean kid

hang



                                                                 diseaseeGFR < 1

5 may



                                                                 mean kidney



                                                                 failure*** Rang

es



                                                                 recommended by 

the



                                                                 [employer]



                                                                 Foundation,



                                                                 http://nkdep.ni

h.gov



Alcohol [id number] 01:23:31





             Test Item    Value        Reference Range Interpretation Comments

 

             Ethanol Level (test <0.00 g/dL   0.00-0.01                 Intoxica

gianfranco 0.080 g/dL



             code = Ethanol                                        or more



             Level)                                              

 

             Ethanol Inst (test <0                        N            



             code = Ethanol Inst)                                        



Complete Blood Count with [id number] 00:56:12





             Test Item    Value        Reference Range Interpretation Comments

 

             WBC (test code = WBC) 19.4 x10     4.4-10.5     H            

 

             RBC (test code = RBC) 4.53 x10     3.75-5.20                 

 

             Hgb (test code = Hgb) 13.5 g/dL    12.2-14.8                 

 

             Hct (test code = Hct) 41.3 %       36.5-44.4                 

 

             MCV (test code = MCV) 91.20 fL     80..00              

 

             MCHC (test code = 32.70 g/dL   32.00-37.50               



             MCHC)                                               

 

             RDW CV (test code = 13.5 %       11.5-14.5                 



             RDW CV)                                             

 

             MCH (test code = MCH) 29.8 pg      27.0-32.5                 

 

             Platelets (test code = 462.0 x10    140.0-440.0  H            



             Platelets)                                          

 

             MPV (test code = MPV) 9.9 fL                    N            

 

             Slide Review (test Auto         Auto                      Result cr

eated by



             code = Slide Review)                                        [email or web address]

[email or web address]_AUTO

 

             nRBC (test code = 0                         N            



             nRBC)                                               

 

             NRBC Abs (test code = 0.00 x10                  N            



             NRBC Abs)                                           

 

             IPF (test code = IPF) 0 %                       N            



Automated [id number] 00:56:12





             Test Item    Value        Reference Range Interpretation Comments

 

             Neutro Auto (test code = Neutro 83.7 %       36.0-70.0    H        

    



             Auto)                                               

 

             Lymph Auto (test code = Lymph Auto) 8.9 %        12.0-44.0    L    

        

 

             Mono Auto (test code = Mono Auto) 5.0 %        0.0-11.0            

      

 

             Eos, Auto (test code = Eos, Auto) 1.4 %        0.0-7.0             

      

 

             Basophil Auto (test code = Basophil 0.7 %        0.0-2.0           

        



             Auto)                                               

 

             Neutro Absolute (test code = Neutro 16.2 x10     1.6-7.4      H    

        



             Absolute)                                           

 

             Lymph Absolute (test code = Lymph 1.73 x10     .50-4.60            

      



             Absolute)                                           

 

             Mono Absolute (test code = Mono .96 x10      .00-1.20              

    



             Absolute)                                           

 

             Eos Absolute (test code = Eos 0.27 x10     0.00-0.74               

  



             Absolute)                                           

 

             Baso Absolute (test code = Baso 0.13 x10     0.00-0.21             

    



             Absolute)                                           



IG [id number] 00:56:12





             Test Item    Value        Reference Range Interpretation Comments

 

             IG (test code = IG) 0.3 %        0.0-5.0                   

 

             IG Abs (test code = IG Abs) 0 x10                     N            



Urinalysis with Culture, if [id number] 00:55:34





             Test Item    Value        Reference Range Interpretation Comments

 

             UA Color (test code = STRAW        Yellow                    



             UA Color)                                           

 

             UA Appear (test code = CLEAR        Clear                     



             UA Appear)                                          

 

             UA pH (test code = UA 5                         N            



             pH)                                                 

 

             UA Spec Grav (test code 1.001        1.001-1.035               



             = UA Spec Grav)                                        

 

             UA Glucose (test code = NEG          Negative                  



             UA Glucose)                                         

 

             UA Bili (test code = UA NEG          Negative                  



             Bili)                                               

 

             UA Ketones (test code = NEG          Negative                  



             UA Ketones)                                         

 

             UA Blood (test code = NEG          Negative                  



             UA Blood)                                           

 

             UA Protein (test code = NEG          Negative                  



             UA Protein)                                         

 

             UA Urobilinogen (test 0.2 mg/dL                 N            



             code = UA Urobilinogen)                                        

 

             UA Nitrite (test code = POS          Negative     A            



             UA Nitrite)                                         

 

             UA Leuk Est (test code 25 cells/mcL Negative     A            



             = UA Leuk Est)                                        

 

             UA Micro Ind? (test Indicated    Not Indicated A            Result 

created by



             code = UA Micro Ind?)                                        rule



                                                                 [email or web address]

_IN



                                                                 D



Urine [id number] 08:13:23





             Test Item    Value        Reference Range Interpretation Comments

 

             ORGANISM (test code = Escherichia coli                           



             ORGANISM)                                           

 

             Amikacin (test code =                           S            



             Amik)                                               

 

             Ampicillin (test code =                           S            



             Amp)                                                

 

             Ampicillin/Sulbactam                           S            



             (test code = Amp/Sul)                                        

 

             Cefazolin (test code =                           S            



             Cefaz)                                              

 

             Cefepime (test code =                           S            



             Cefep)                                              

 

             Cefotaxime (test code =                           S            



             Cefo)                                               

 

             Ceftazidime (test code =                           S            



             Ceftaz)                                             

 

             Ceftriaxone (test code =                           S            



             Ceftri)                                             

 

             Cefuroxime (test code =                           S            



             Cefur)                                              

 

             Ciprofloxacin (test code                           S            



             = Cipro)                                            

 

             Gentamicin (test code =                           S            



             Gent)                                               

 

             Imipenem (test code =                           S            



             Imi)                                                

 

             Levofloxacin (test code                           S            



             = Levo)                                             

 

             Meropenem (test code =                           S            



             Sindi)                                               

 

             Nitrofurantoin (test                           S            



             code = Nitro)                                        

 

             Piperacillin/Tazobactam                           S            



             (test code = Pip/Blaine)                                        

 

             Tobramycin (test code =                           S            



             Tobra)                                              

 

             Trimethoprim/Sulfa (test                           S            



             code = SXT)                                         

 

             Final Report (test code 30,000 cfu/ml                           



             = Final Report) Escherichia coli                           



                          20,000 cfu/ml                           



                          Diphtheroids                           



 C Urine Added by [email or web address] Qualitative2019-09-15 04:57:25





             Test Item    Value        Reference Range Interpretation Comments

 

             RPR Qual (test code = RPR Qual) Non-Reactive Non-Reactive          

    

 

             Reactive Control (test code = Reactive                             

  



             Reactive Control)                                        

 

             Weak Reactive Control (test Weak Reactive                          

 



             code = Weak Reactive Control)                                      

  

 

             Non-Reactive Control (test code Non-Reactive                       

    



             = Non-Reactive Control)                                        

 

             Lot # (test code = Lot #) [id number]                    N            

 

             Expiration Dt (test code = 10.31.2020                N            



             Expiration Dt)                                        



Thyroid Stimulating Hormone2019-09-15 01:22:43





             Test Item    Value        Reference Range Interpretation Comments

 

             TSH (test code = TSH) 3.370 mIU/mL 0.270-4.200               



Lipid Panel2019-09-15 01:17:51





             Test Item    Value        Reference Range Interpretation Comments

 

             Cholesterol Total 168 mg/dL    0-200                     RISK OF HE

ART



             (test code =                                        DISEASEPublishe

d by



             Cholesterol Total)                                        American 

Heart



                                                                 Association Judith

lyte



                                                                 Optimal Borderl

ine



                                                                 Increased RiskC

HOL <200



                                                                 200-239 >240TRI

G <150



                                                                 150-199 >200HDL

 Male



                                                                 >60 <40HDL Fema

le >60



                                                                 <50LDL <100 130

-159



                                                                 >160LDL [facility] [place] is



                                                                 100-129

 

             Triglycerides (test 108 mg/dL    9-200                     



             code = Triglycerides)                                        

 

             HDL (test code = HDL) 46 mg/dL     50-60        L            

 

             LDL (test code = LDL) 100 mg/dL    0-130                     The eq

uation being used



                                                                 in this calcula

tion is



                                                                 LDL = (Chol - H

DL) -



                                                                 (Trig / 5)

 

             VLDL (test code = 22 mg/dL     5-40                      The equati

on being used



             VLDL)                                               in this calcula

tion is



                                                                 VLDL = Trig / 5

 

             Chol/HDL (test code = 3.7 ratio    0.0-4.4                   



             Chol/HDL)                                           

 

             LDL/HDL Ratio (test 2                         N            The equa

tion being used



             code = LDL/HDL Ratio)                                        in thi

s calculation is



                                                                 LDL/HDL Ratio=L

DL



                                                                 Calc/HDL Chol



Lithium Level2019-09-15 01:17:51





             Test Item    Value        Reference Range Interpretation Comments

 

             Lithium Level (test code = 0.81 mmol/L  0.60-1.20                 



             Lithium Level)                                        



Comprehensive Metabolic Panel2019-09-15 00:04:54





             Test Item    Value        Reference Range Interpretation Comments

 

             Sodium Level (test code = Sodium 137.0 mmol/L 135.0-145.0          

     



             Level)                                              

 

             Potassium Level (test code = 4.0 mmol/L   3.5-5.1                  

 



             Potassium Level)                                        

 

             Chloride Level (test code = 103 mmol/L                       



             Chloride Level)                                        

 

             CO2 (test code = CO2) 23 mmol/L    22-29                     

 

             Anion Gap (test code = Anion 11 mmol/L    7-16                     

 



             Gap)                                                

 

             BUN (test code = BUN) 11.50 mg/dL  6.00-20.00                

 

             Creatinine Level (test code = 1.00 mg/dL   0.50-0.90    H          

  



             Creatinine Level)                                        

 

             BUN/Creat Ratio (test code = 12                        N           

 



             BUN/Creat Ratio)                                        

 

             Glucose Level (test code = 108 mg/dL                        



             Glucose Level)                                        

 

             Calcium Level (test code = 10.3 mg/dL   8.3-10.5                  



             Calcium Level)                                        

 

             Alk Phos (test code = Alk Phos) 93 U/L                       

    

 

             Bilirubin Total (test code = 0.5 mg/dL    0.1-0.9                  

 



             Bilirubin Total)                                        

 

             Albumin Level (test code = 4.4 g/dL     3.5-5.2                   



             Albumin Level)                                        

 

             Protein Total (test code = 7.2 g/dL     6.4-8.3                   



             Protein Total)                                        

 

             ALT (test code = ALT) 12 U/L       1-33                      

 

             AST (test code = AST) 17 U/L       1-32                      

 

             Globulin (test code = Globulin) 2.8 g/dL     2.9-3.1      L        

    

 

             A/G Ratio (test code = A/G 1.6 ratio                 N            



             Ratio)                                              



Alcohol Level2019-09-15 00:04:54





             Test Item    Value        Reference Range Interpretation Comments

 

             Ethanol Level (test <0.00 g/dL   0.00-0.01                 [facility]tox[facility]

gianfranco 0.080 g/dL



             code = Ethanol                                        or more



             Level)                                              

 

             Ethanol Inst (test <0                        N            



             code = Ethanol Inst)                                        



Comprehensive Metabolic Panel2019-09-15 00:04:54





             Test Item    Value        Reference Range Interpretation Comments

 

             Sodium Level (test 137.0 mmol/L 135.0-145.0               



             code = Sodium Level)                                        

 

             Potassium Level 4.0 mmol/L   3.5-5.1                   



             (test code =                                        



             Potassium Level)                                        

 

             Chloride Level (test 103 mmol/L                       



             code = Chloride                                        



             Level)                                              

 

             CO2 (test code = 23 mmol/L    22-29                     



             CO2)                                                

 

             Anion Gap (test code 11 mmol/L    7-16                      



             = Anion Gap)                                        

 

             BUN (test code = 11.50 mg/dL  6.00-20.00                



             BUN)                                                

 

             Creatinine Level 1.00 mg/dL   0.50-0.90    H            



             (test code =                                        



             Creatinine Level)                                        

 

             BUN/Creat Ratio 12                        N            



             (test code =                                        



             BUN/Creat Ratio)                                        

 

             Glucose Level (test 108 mg/dL                        



             code = Glucose                                        



             Level)                                              

 

             Calcium Level (test 10.3 mg/dL   8.3-10.5                  



             code = Calcium                                        



             Level)                                              

 

             Alk Phos (test code 93 U/L                           



             = Alk Phos)                                         

 

             Bilirubin Total 0.5 mg/dL    0.1-0.9                   



             (test code =                                        



             Bilirubin Total)                                        

 

             Albumin Level (test 4.4 g/dL     3.5-5.2                   



             code = Albumin                                        



             Level)                                              

 

             Protein Total (test 7.2 g/dL     6.4-8.3                   



             code = Protein                                        



             Total)                                              

 

             ALT (test code = 12 U/L       1-33                      



             ALT)                                                

 

             AST (test code = 17 U/L       1-32                      



             AST)                                                

 

             Globulin (test code 2.8 g/dL     2.9-3.1      L            



             = Globulin)                                         

 

             A/G Ratio (test code 1.6 ratio                 N            



             = A/G Ratio)                                        

 

             eGFR AA (test code = >60                       N            eGFR (e

stimated



             eGFR AA)     mL/min/1.73 m2                           Glomerular



                                                                 Filtration Rate

) is



                                                                 an estimated va

lue,



                                                                 calculated from

 the



                                                                 patient's serum



                                                                 creatinine usin

g the



                                                                 MDRD equation. 

It is



                                                                 NOT the patient

's



                                                                 actual GFR. The

 eGFR



                                                                 provides a more



                                                                 clinically usef

ul



                                                                 measure of kidn

ey



                                                                 disease than se

rum



                                                                 creatinine



                                                                 alone.***This



                                                                 calculation rimma

es



                                                                 sex and race in

to



                                                                 account, if the



                                                                 information is



                                                                 provided. If th

e



                                                                 race is not



                                                                 provided, and t

he



                                                                 patient is



                                                                 -Crystal

n,



                                                                 multiply by 1.2

12.



                                                                 If sex is not



                                                                 provided, and t

he



                                                                 patient is fema

le,



                                                                 multiply by 0.7

42.



                                                                 Results for pat

ients



                                                                 <18 years of ag

e



                                                                 have not been



                                                                 validated by th

e



                                                                 MDRD study and



                                                                 should be



                                                                 interpreted wit

h



                                                                 caution. eGFR R

esult



                                                                 Interpretation:

eGFR



                                                                 > or = 60 is in

 the



                                                                 Normal RangeeGF

R <



                                                                 60 may mean kid

hang



                                                                 diseaseeGFR < 1

5 may



                                                                 mean kidney



                                                                 failure*** Rang

es



                                                                 recommended by 

the



                                                                 [employer]



                                                                 [employer],



                                                                 http://nkdep.ni

h.gov



Comprehensive Metabolic Panel9--15 00:04:54





             Test Item    Value        Reference Range Interpretation Comments

 

             Sodium Level (test 137.0 mmol/L 135.0-145.0               



             code = Sodium Level)                                        

 

             Potassium Level 4.0 mmol/L   3.5-5.1                   



             (test code =                                        



             Potassium Level)                                        

 

             Chloride Level (test 103 mmol/L                       



             code = Chloride                                        



             Level)                                              

 

             CO2 (test code = 23 mmol/L    22-29                     



             CO2)                                                

 

             Anion Gap (test code 11 mmol/L    7-16                      



             = Anion Gap)                                        

 

             BUN (test code = 11.50 mg/dL  6.00-20.00                



             BUN)                                                

 

             Creatinine Level 1.00 mg/dL   0.50-0.90    H            



             (test code =                                        



             Creatinine Level)                                        

 

             BUN/Creat Ratio 12                        N            



             (test code =                                        



             BUN/Creat Ratio)                                        

 

             Glucose Level (test 108 mg/dL                        



             code = Glucose                                        



             Level)                                              

 

             Calcium Level (test 10.3 mg/dL   8.3-10.5                  



             code = Calcium                                        



             Level)                                              

 

             Alk Phos (test code 93 U/L                           



             = Alk Phos)                                         

 

             Bilirubin Total 0.5 mg/dL    0.1-0.9                   



             (test code =                                        



             Bilirubin Total)                                        

 

             Albumin Level (test 4.4 g/dL     3.5-5.2                   



             code = Albumin                                        



             Level)                                              

 

             Protein Total (test 7.2 g/dL     6.4-8.3                   



             code = Protein                                        



             Total)                                              

 

             ALT (test code = 12 U/L       1-33                      



             ALT)                                                

 

             AST (test code = 17 U/L       1-32                      



             AST)                                                

 

             Globulin (test code 2.8 g/dL     2.9-3.1      L            



             = Globulin)                                         

 

             A/G Ratio (test code 1.6 ratio                 N            



             = A/G Ratio)                                        

 

             eGFR AA (test code = >60                       N            eGFR (e

stimated



             eGFR AA)     mL/min/1.73 m2                           Glomerular



                                                                 Filtration Rate

) is



                                                                 an estimated va

lue,



                                                                 calculated from

 the



                                                                 patient's serum



                                                                 creatinine usin

g the



                                                                 MDRD equation. 

It is



                                                                 NOT the patient

's



                                                                 actual GFR. The

 eGFR



                                                                 provides a more



                                                                 clinically usef

ul



                                                                 measure of kidn

ey



                                                                 disease than se

rum



                                                                 creatinine



                                                                 alone.***This



                                                                 calculation rimma

es



                                                                 sex and race in

to



                                                                 account, if the



                                                                 information is



                                                                 provided. If th

e



                                                                 race is not



                                                                 provided, and t

he



                                                                 patient is



                                                                 -Crystal

n,



                                                                 multiply by 1.2

12.



                                                                 If sex is not



                                                                 provided, and t

he



                                                                 patient is fema

le,



                                                                 multiply by 0.7

42.



                                                                 Results for pat

ients



                                                                 <18 years of ag

e



                                                                 have not been



                                                                 validated by th

e



                                                                 MDRD study and



                                                                 should be



                                                                 interpreted wit

h



                                                                 caution. eGFR R

esult



                                                                 Interpretation:

eGFR



                                                                 > or = 60 is in

 the



                                                                 Normal RangeeGF

R <



                                                                 60 may mean kid

hang



                                                                 diseaseeGFR < 1

5 may



                                                                 mean kidney



                                                                 failure*** Rang

es



                                                                 recommended by 

the



                                                                 [employer]



                                                                 [employer],



                                                                 http://nkdep.ni

h.gov

 

             eGFR Non-AA (test 58.45                     N            eGFR (sheba

mated



             code = eGFR Non-AA) mL/min/1.73 m2                           Glomer

ular



                                                                 Filtration Rate

) is



                                                                 an estimated va

lue,



                                                                 calculated from

 the



                                                                 patient's serum



                                                                 creatinine usin

g the



                                                                 MDRD equation. 

It is



                                                                 NOT the patient

's



                                                                 actual GFR. The

 eGFR



                                                                 provides a more



                                                                 clinically usef

ul



                                                                 measure of kidn

ey



                                                                 disease than se

rum



                                                                 creatinine



                                                                 alone.***This



                                                                 calculation rimma

es



                                                                 sex and race in

to



                                                                 account, if the



                                                                 information is



                                                                 provided. If 

e



                                                                 race is not



                                                                 provided, and t

he



                                                                 patient is



                                                                 -Crystal

n,



                                                                 multiply by 1.2

12.



                                                                 If sex is not



                                                                 provided, and t

he



                                                                 patient is fema

le,



                                                                 multiply by 0.7

42.



                                                                 Results for pat

ients



                                                                 <18 years of ag

e



                                                                 have not been



                                                                 validated by [facility]



                                                                 MDRD study and



                                                                 should be



                                                                 interpreted wit

h



                                                                 caution. eGFR R

esult



                                                                 Interpretation:

eGFR



                                                                 > or = 60 is in

 the



                                                                 Normal RangeeGF

R <



                                                                 60 may mean kid

hang



                                                                 diseaseeGFR < 1

5 may



                                                                 mean kidney



                                                                 failure*** Rang

es



                                                                 recommended by 

the



                                                                 [employer]



                                                                 [employer],



                                                                 http://nkdep.ni

h.gov



Urinalysis Microscopic2019-09-15 00:02:53





             Test Item    Value        Reference Range Interpretation Comments

 

             UA WBC (test code = UA WBC) 6-10         0-5          A            

 

             UA RBC (test code = UA RBC) 0-5          0-5                       

 

             UA Bacteria (test code = UA Bacteria) Many                      A  

          

 

             UA Squam Epithelial (test code = UA TNTC                      A    

        



             Squam Epithelial)                                        



Urine Drug [id number] 23:59:42





             Test Item    Value        Reference Range Interpretation Comments

 

             Amphetamine Screen Ur POSITIVE     Negative     A            



             (test code = Amphetamine                                        



             Screen Ur)                                          

 

             Barbiturate Screen Ur Negative     Negative                  



             (test code = Barbiturate                                        



             Screen Ur)                                          

 

             Benzodiazepines Ur (test POSITIVE     Negative     A            



             code = Benzodiazepines                                        



             Ur)                                                 

 

             Cocaine Screen Ur (test Negative     Negative                  



             code = Cocaine Screen                                        



             Ur)                                                 

 

             U Methadone Scr (test Negative     Negative                  



             code = U Methadone Scr)                                        

 

             Opiate Screen Ur (test Negative     Negative                  



             code = Opiate Screen Ur)                                        

 

             U PCP Scrn (test code = Negative     Negative                  



             U PCP Scrn)                                         

 

             Cannabinoid Screen Ur Negative     Negative                  



             (test code = Cannabinoid                                        



             Screen Ur)                                          

 

             U TCA (test code = U Negative     Negative                  The res

ults of all



             TCA)                                                drug screen elinor

ts are



                                                                 only preliminar

y.



                                                                 Clinical



                                                                 consideration a

nd



                                                                 professional ju

dgment



                                                                 should be appli

ed to



                                                                 any drug of abu

se



                                                                 test result,



                                                                 particularly wh

en



                                                                 preliminary pos

itive



                                                                 results are obt

ained.



                                                                 Please order a



                                                                 separate confir

matory



                                                                 test if desired

.



HCG Qualitative [id number] 23:54:43





             Test Item    Value        Reference Range Interpretation Comments

 

             hCG Ur (test code = Negative                               If the r

esult is



             hCG Ur)                                             "Negative" in p

atients



                                                                 suspected to be



                                                                 pregnant, recom

mend



                                                                 retest with a s

ample



                                                                 obtained 48 to 

72



                                                                 hours later, or

 by



                                                                 ordering a



                                                                 quantitative as

say. If



                                                                 the result is



                                                                 "Borderline" te

sting



                                                                 should be repea

gianfranco in



                                                                 48 to 72 hours.

 

             Lot # (test code = [id number]                    N            



             Lot #)                                              

 

             Expiration Dt (test [id number]                  N            



             code = Expiration Dt)                                        

 

             Neg Control (test Negative                               



             code = Neg Control)                                        

 

             Pos Control (test Positive                               



             code = Pos Control)                                        

 

             Internal QC (test Acceptable                             



             code = Internal QC)                                        



Urinalysis with Culture, if [id number] 23:52:04





             Test Item    Value        Reference Range Interpretation Comments

 

             UA Color (test code = UA YELLO        Yellow                    



             Color)                                              

 

             UA Appear (test code = SCLD         Clear        A            



             UA Appear)                                          

 

             UA pH (test code = UA 6.5                                    



             pH)                                                 

 

             UA Spec Grav (test code 1.011        1.001-1.035               



             = UA Spec Grav)                                        

 

             UA Glucose (test code = NEG          Negative                  



             UA Glucose)                                         

 

             UA Bili (test code = UA NEG          Negative                  



             Bili)                                               

 

             UA Ketones (test code = NEG          Negative                  



             UA Ketones)                                         

 

             UA Blood (test code = UA NEG          Negative                  



             Blood)                                              

 

             UA Protein (test code = NEG          Negative                  



             UA Protein)                                         

 

             UA Urobilinogen (test 1 mg/dL      >0.2         A            



             code = UA Urobilinogen)                                        

 

             UA Nitrite (test code = POS          Negative     A            



             UA Nitrite)                                         

 

             UA Leuk Est (test code = NEG          Negative                  



             UA Leuk Est)                                        

 

             UA Micro Ind? (test code Indicated    Not Indicated A            Re

sult created by



             = UA Micro Ind?)                                        rule



                                                                 [id number]

_IND



Automated [id number] 23:48:39





             Test Item    Value        Reference Range Interpretation Comments

 

             Neutro Auto (test code = Neutro 75.7 %       36.0-70.0    H        

    



             Auto)                                               

 

             Lymph Auto (test code = Lymph Auto) 14.1 %       12.0-44.0         

        

 

             Mono Auto (test code = Mono Auto) 7.6 %        0.0-11.0            

      

 

             Eos, Auto (test code = Eos, Auto) 1.8 %        0.0-7.0             

      

 

             Basophil Auto (test code = Basophil 0.5 %        0.0-2.0           

        



             Auto)                                               

 

             Neutro Absolute (test code = Neutro 12.7 x10     1.6-7.4      H    

        



             Absolute)                                           

 

             Lymph Absolute (test code = Lymph 2.38 x10     .50-4.60            

      



             Absolute)                                           

 

             Mono Absolute (test code = Mono 1.28 x10     .00-1.20     H        

    



             Absolute)                                           

 

             Eos Absolute (test code = Eos 0.31 x10     0.00-0.74               

  



             Absolute)                                           

 

             Baso Absolute (test code = Baso 0.09 x10     0.00-0.21             

    



             Absolute)                                           



IG [id number] 23:48:39





             Test Item    Value        Reference Range Interpretation Comments

 

             IG (test code = IG) 0.3 %        0.0-5.0                   

 

             IG Abs (test code = IG Abs) 0 x10                     N            



Complete Blood Count with [id number] 23:48:38





             Test Item    Value        Reference Range Interpretation Comments

 

             WBC (test code = WBC) 16.8 x10     4.4-10.5     H            

 

             RBC (test code = RBC) 4.06 x10     3.75-5.20                 

 

             Hgb (test code = Hgb) 12.7 g/dL    12.2-14.8                 

 

             MCV (test code = MCV) 94.10 fL     80..00              

 

             Hct (test code = Hct) 38.2 %       36.5-44.4                 

 

             MCHC (test code = 33.20 g/dL   32.00-37.50               



             MCHC)                                               

 

             RDW CV (test code = 13.2 %       11.5-14.5                 



             RDW CV)                                             

 

             MCH (test code = MCH) 31.3 pg      27.0-32.5                 

 

             Platelets (test code = 363.0 x10    140.0-440.0               



             Platelets)                                          

 

             MPV (test code = MPV) 10.0 fL                   N            

 

             Slide Review (test Auto         Auto                      Result cr

eated by



             code = Slide Review)                                        GL_SJM_

SLIDE_REV_AUTO

 

             nRBC (test code = 0                         N            



             nRBC)                                               

 

             NRBC Abs (test code = 0.00 x10                  N            



             NRBC Abs)                                           

 

             IPF (test code = IPF) 0 %                       N            



RPR, [id number] 05:53:00





             Test Item    Value        Reference Range Interpretation Comments

 

             RPR (test code = RPR) Non-Reactive Non-Reactive N            



BHCG, Serum, [id number] 01:39:00





             Test Item    Value        Reference Range Interpretation Comments

 

             Preg Qual [Se] (test code = BSHCG) Negative     Negative     N     

       



BHCG, Serum, [id number] 01:09:00





             Test Item    Value        Reference Range Interpretation Comments

 

             B hCG, Quant (test <1 mIU/mL                              Weeks of 

Gestation



             code = BHCGQT)                                        Ranges (mIU/m

L)3 weeks



                                                                 5.40 - 72.04 we

eks 10.2



                                                                 - 7085 weeks 21

7 - 28828



                                                                 weeks 152 - 321

777 weeks



                                                                 4059 - 0722260 

weeks



                                                                 08339 - 2670834

 weeks



                                                                 08674 - 9072558

0 weeks



                                                                 70660 - 8174160

2 weeks



                                                                 00436 - 2099649

4 weeks



                                                                 58013 - 0119320

 weeks



                                                                 93521 - 5573210

 weeks



                                                                 8904 - 0398658 

weeks



                                                                 8240 - 9495454 

weeks



                                                                 9649 - 97718



Thyroid Stimulating Hormone (TSH)2017 01:09:00





             Test Item    Value        Reference Range Interpretation Comments

 

             TSH (test code = TSH) 0.93 mIU/mL  0.270-4.200  N            



Lipid [id number] 01:05:00





             Test Item    Value        Reference Range Interpretation Comments

 

             Cholesterol (test 163 mg/dL    0-200        N            



             code = CHOL)                                        

 

             Triglycerides (test 108 mg/dL    9-200        N            



             code = TRIG)                                        

 

             HDL (test code = 41 mg/dL     50-60        L            



             HDL)                                                

 

             Chol/HDL (test code 4.0 Ratio    0.0-4.4      N            



             = CHOLPHDL)                                         

 

             LDL, Calculated 100          0-130        N            (NOTE)RISK O

F HEART



             (test code = LDLC)                                        DISEASEPu

blished by



                                                                 American Heart



                                                                 AssociationAnal

yte



                                                                 Optimal Boderli

ne



                                                                 Increased RiskC

HOL <200



                                                                 200-239 >240TRI

G <150



                                                                 150-199 >200HDL

 Male:



                                                                 >60  <40HDL Fem

ronny: >60



                                                                 <50LDL <100 130

-159



                                                                 >160LDL NEAR OP

TIMAL IS



                                                                 100-129

 

             VLDL (test code = 22 mg/dL     5-40         N            



             VLDL)                                               

 

             LDL/HDL (test code = 2                                      



             LDLPHDL)                                            



Comprehensive Metabolic [id number] 21:02:00





             Test Item    Value        Reference Range Interpretation Comments

 

             Sodium (test code = 140 mmol/L   135-145      N            



             NA)                                                 

 

             Potassium (test 3.6 mmol/L   3.5-5.1      N            



             code = K)                                           

 

             Chloride (test code 103 mmol/L          N            



             = CL)                                               

 

             Carbon Dioxide 26 mmol/L    22-29        N            



             (test code = CO2)                                        

 

             Glucose (test code 89 mg/dL            N            



             = GLU)                                              

 

             Blood Urea Nitrogen 13 mg/dL     6-20         N            



             (test code = BUN)                                        

 

             Creatinine (test 0.8 mg/dL    0.5-0.9      N            



             code = CREAT)                                        

 

             Calcium (test code 10.0 mg/dL   8.3-10.5     N            



             = CA)                                               

 

             Prot Total (test 7.0 g/dL     6.4-8.3      N            



             code = TP)                                          

 

             Albumin (test code 4.2 g/dL     3.5-5.2      N            



             = ALB)                                              

 

             A/G Ratio (test 1.5 Ratio                              



             code = AGRATIO)                                        

 

             Globulin (test code 2.8          2.9-3.1      L            



             = GLOB)                                             

 

             Bili Total (test 0.6 mg/dL    0.1-0.9      N            



             code = TBIL)                                        

 

             Alk Phos (test code 91 U/L              N            



             = APHOS)                                            

 

             AST (test code = 19 U/L       1-32         N            



             AST)                                                

 

             ALT (test code = 14 U/L       1-33         N            



             ALT)                                                

 

             BUN/Creatinine 16.3                                   



             Ratio (test code =                                        



             BCRATIO)                                            

 

             Anion Gap (test 11 mmol/L    7-16         N            



             code = AGAP)                                        

 

             Estimated GFR (test >60                                    eGFR (es

timated



             code = GFR)  mL/min/1.73m2                           Glomerular Nguyễn

tration



                                                                 Rate) is an est

imated



                                                                 value,calculate

d from



                                                                 the patient's s

harman



                                                                 creatinine usin

g the



                                                                 MDRD equation.I

t is



                                                                 NOT the patient

's



                                                                 actual GFR. The

 eGFR



                                                                 provides a more



                                                                 clinicallyusefu

l



                                                                 measure of kidn

ey



                                                                 disease than se

rum



                                                                 creatinine



                                                                 alone.***This



                                                                 calculation rimma

es sex



                                                                 and race into



                                                                 account, if the



                                                                 informationis



                                                                 provided. If th

e race



                                                                 is not provided

, and



                                                                 the patient



                                                                 isAfrican-Amraf

can,



                                                                 multiply by 1.2

12. If



                                                                 sex is not prov

ided,



                                                                 and thepatient 

is



                                                                 female, multipl

y by



                                                                 0.742. Results 

for



                                                                 patients <18 ye

ars



                                                                 ofage have not 

been



                                                                 validated by 

e MDRD



                                                                 study and renee

d be



                                                                 interpretedwith



                                                                 caution.eGFR Re

sult



                                                                 Interpretation:

eGFR >



                                                                 or = 60 is in t

he



                                                                 Normal RangeeGF

R < 60



                                                                 may mean kidney



                                                                 diseaseeGFR < 1

5 may



                                                                 mean kidney



                                                                 failure***Range

s



                                                                 recommended by 

the



                                                                 [employer]



                                                                 Foundation,http

://nkd



                                                                 ep.nih.gov



Alcohol/Ethanol, [id number] 21:02:00





             Test Item    Value        Reference Range Interpretation Comments

 

             Alcohol, Ethyl <0.01 g/dL   0.00-0.01    N            Intoxicated 0

.080 g/dL



             (test code = ETOH)                                        or more



CBC with [id number] 20:35:00





             Test Item    Value        Reference Range Interpretation Comments

 

             WBC (test code = WBC) 10.3 K/cumm  4.4-10.5     N            

 

             RBC (test code = RBC) 4.37 M/cumm  3.75-5.20    N            

 

             Hemoglobin (test code = HGB) 13.1 gm/dL   12.2-14.8    N           

 

 

             Hematocrit (test code = HCT) 41.5 %       36.5-44.4    N           

 

 

             MCV (test code = MCV) 94.9 fL             N            

 

             MCH (test code = MCH) 30.1 pg      27.0-32.5    N            

 

             MCHC (test code = MCHC) 31.7 g/dL    32.0-37.5    L            

 

             RDW (test code = RDW) 12.9 %       11.5-14.5    N            

 

             Platelet Count (test code = 327 K/cumm   140-440      N            



             PLTCT)                                              

 

             MPV (test code = MPV) 7.0 fL                                 

 

             Diff Method (test code = DIFFM) Auto                               

    

 

             Neutrophil (test code = NEUT) 74.0 %       36-70        H          

  

 

             Lymphocyte (test code = LYMPH) 17.6 %       12-44        N         

   

 

             Monocyte (test code = MONO) 6.4 %        0-11         N            

 

             Eosinophil (test code = EOS) 1.5 %        0-7          N           

 

 

             Basophil (test code = BASO) 0.5 %        0-2          N            

 

             Neutro Abs (test code = ANEUT) 7.6 K/cumm   1.6-7.4      H         

   

 

             Lymph Abs (test code = ALYMPH) 1.8 K/cumm   0.5-4.6      N         

   

 

             Mono Abs (test code = AMONO) 0.7 K/cumm   0.0-1.2      N           

 

 

             Eos Abs (test code = AEOS) 0.16 K/cumm  0.00-0.74    N            

 

             Baso Abs (test code = ABASO) 0.1 K/cumm   0.00-0.21    N           

 



[id number] 20:33:00





             Test Item    Value        Reference Range Interpretation Comments

 

             Amphetamine (test code POSITIVE     Negative     A            For d

iagnostic purposes



             = AMPH)                                             only, positive 

results



                                                                 should always b

e



                                                                 assessedin



                                                                 conjunctionwith

 the



                                                                 patient's medic

al



                                                                 history,clinica

l



                                                                 examination and



                                                                 otherfindings.T

o



                                                                 fulfill legal



                                                                 requirements, a

 more



                                                                 specific altern

ate



                                                                 chemical method

must be



                                                                 used inorder to

 obtain



                                                                 a Confirmed judith

lytical



                                                                 result. GC/MS i

s the



                                                                 preferred confi

rmatory



                                                                 method.

 

             Barbiturates (test Negative     Negative     N            



             code = GENEVIEVE)                                        

 

             Benzodiazepine (test Negative     Negative     N            



             code = IRENE)                                        

 

             Cocaine (test code = Negative     Negative     N            



             COCA)                                               

 

             Methadone (test code = Negative     Negative     N            



             MTHD)                                               

 

             Opiates (test code = Negative     Negative     N            



             OPIA)                                               

 

             PCP (test code = PCP) Negative     Negative     N            

 

             Propoxyphene (test Negative     Negative     N            



             code = PROPOX)                                        

 

             THC (test code = THC) Negative     Negative     N            



Urinalysis [id number] 20:23:00





             Test Item    Value        Reference Range Interpretation Comments

 

             Color (test code = COLOR) Yellow       Yellow,Straw,Pl N           

 



                                       yellow                    

 

             Clarity (test code = Sl Cloudy    Clear        A            



             CLAR)                                               

 

             Specific Gravity (test 1.007        1.001-1.035  N            



             code = SPGR)                                        

 

             pH (test code = PH) 6.0          5.0-9.0      N            

 

             Ketone (test code = KET) Negative mg/dL Negative     N            

 

             Glucose (test code = Negative mg/dL Negative     N            



             GLUCUR)                                             

 

             Protein (test code = Negative mg/dL Negative     N            



             PROT)                                               

 

             Bilirubin (test code = Negative mg/dL Negative     N            



             BILI)                                               

 

             Occult Blood (test code = Moderate     Negative     A            



             UDOB)                                               

 

             Urobilinogen (test code = 0.2 mg/dL    0.2-1.0      N            



             UROB)                                               

 

             Nitrite (test code = NIT) Positive     Negative     A            

 

             Leuk Esterase (test code Moderate     Negative     A            



             = LEUK)                                             

 

             Micros Exam (test code = Indicated                              



             MEXAM)                                              

 

             Epithelial Cells (test 50+ /LPF     0-30         A            



             code = EPI)                                         

 

             WBC, Urine (test code = 41-50 /HPF   0-5          A            



             UWBC)                                               

 

             RBC, Urine (test code = 3-5 /HPF     0-5          A            



             URBC)                                               

 

             Bacteria (test code = Many /HPF                              



             BACT)                                               



[id number] 16:21:00





             Test Item    Value        Reference Range Interpretation Comments

 

             Lithium (test code = LI) 0.6 mmol/l   0.6-1.2                   



[facility] (Ultra Sensitive)2017 16:21:00





             Test Item    Value        Reference Range Interpretation Comments

 

             TSH (test code = TSH) 2.14 mIU/L   0.47-4.68                 



[facility]





             Test Item    Value        Reference Range Interpretation Comments

 

             Glucose (test code 77 mg/dl                         



             = GLU)                                              

 

             BUN (test code = 9.0 mg/dl    6.0-17.0                  



             BUN)                                                

 

             Creatinine (test 0.7 mg/dl    0.4-1.2                   



             code = CREA)                                        

 

             Sodium (test code = 139 mmol/l   137-145                   



             NA)                                                 

 

             Potassium (test 4.7 mmol/l   3.5-5.0                   



             code = K)                                           

 

             Chloride (test code 107 mmol/l                       



             = CL)                                               

 

             CO2 (test code = 22 mmol/l    22-30                     



             CO2)                                                

 

             Anion Gap (test 11                                     



             code = GAP)                                         

 

             Calcium (test code 9.8 mg/dl    8.4-10.2                  



             = CALC)                                             

 

             T Protein (test 8.0 gm/dl    5.1-8.7                   



             code = TP)                                          

 

             Albumin (test code 4.4 gm/dl    3.5-4.6                   



             = ALB)                                              

 

             A/G Ratio (test 1.2 %        1.1-2.2                   



             code = AGRAT)                                        

 

             AST (SGOT) (test 20 U/L       11-36                     



             code = AST)                                         

 

             ALT (SGPT) (test 29 U/L       11-40                     



             code = ALT)                                         

 

             Alkaline Phos (test 108 U/L                          



             code = ALKP)                                        

 

             Total Bilirubin 0.6 mg/dl    0.2-1.2                   



             (test code = TBIL)                                        

 

             Globulin (test code 3.6 gm/dl    2.3-3.5      H            



             = GLOBU)                                            

 

             Calcium, Corrected 9.5 mg/dl    8.4-10.2                  Various f

ormulas exist



             (test code =                                        for corrected s

harman



             CALCCORR)                                           calcium results

, each



                                                                 yielding differ

ent



                                                                 values. This co

rrected



                                                                 result was base

d on



                                                                 the formula: Co

rrected



                                                                 Calcium = Serum

Calcium



                                                                 + [0.8 * ( 4 -



                                                                 SerumAlbumin)]

 

             EGFR if  >60                                    



             American (test code mL/min/1.73m\                           



             = EGFRAA)    S\2                                    

 

             EGFR if Non- >60                                    Estimate

d Glomerular



             American (test code mL/min/1.73m\                           Filtrat

ion Rate (eGFR)



             = EGFRNA)    S\2                                    Reference Inter

vals



                                                                 Decision Points

 for 18



                                                                 years and older

 and



                                                                 average body ma

ss: >=



                                                                 60 Does not exc

lude



                                                                 kidney disease.

 30 -



                                                                 59 Suggests mod

erate



                                                                 chronic kidney 

disease



                                                                 and indicates t

he need



                                                                 for further



                                                                 investigation



                                                                 including asses

sment



                                                                 of proteinuria 

and



                                                                 cardiovascular



                                                                 factors. < 30 U

sually



                                                                 indicates a nee

d for



                                                                 referral for



                                                                 assessment and



                                                                 management of c

hronic



                                                                 kidney failure.



[facility]

## 2022-11-10 NOTE — EKG
Test Date:    2022-11-08               Test Time:    10:32:47

Technician:   ALAN                                     

                                                     

MEASUREMENT RESULTS:                                       

Intervals:                                           

Rate:         74                                     

IN:           150                                    

QRSD:         82                                     

QT:           404                                    

QTc:          448                                    

Axis:                                                

P:            52                                     

IN:           150                                    

QRS:          51                                     

T:            60                                     

                                                     

INTERPRETIVE STATEMENTS:                                       

                                                     

Normal sinus rhythm

Possible Left atrial enlargement

Cannot rule out Anterior infarct, age undetermined

Abnormal ECG

Compared to ECG 07/16/2014 14:20:24

Myocardial infarct finding now present

T-wave abnormality no longer present



Electronically Signed On 11-10-22 06:31:00 CST by Ashok Orta

## 2022-11-15 ENCOUNTER — HOSPITAL ENCOUNTER (EMERGENCY)
Dept: HOSPITAL 97 - ER | Age: 52
Discharge: PSYCHIATRIC OR SUBSTANCE USE FACILITY | End: 2022-11-15
Payer: COMMERCIAL

## 2022-11-15 VITALS — TEMPERATURE: 98.2 F

## 2022-11-15 VITALS — DIASTOLIC BLOOD PRESSURE: 93 MMHG | SYSTOLIC BLOOD PRESSURE: 111 MMHG

## 2022-11-15 VITALS — OXYGEN SATURATION: 100 %

## 2022-11-15 DIAGNOSIS — Z88.8: ICD-10-CM

## 2022-11-15 DIAGNOSIS — F23: Primary | ICD-10-CM

## 2022-11-15 DIAGNOSIS — F31.9: ICD-10-CM

## 2022-11-15 DIAGNOSIS — F17.210: ICD-10-CM

## 2022-11-15 DIAGNOSIS — Z20.822: ICD-10-CM

## 2022-11-15 DIAGNOSIS — Z88.0: ICD-10-CM

## 2022-11-15 LAB
ALBUMIN SERPL BCP-MCNC: 3.8 G/DL (ref 3.4–5)
ALP SERPL-CCNC: 126 U/L (ref 45–117)
ALT SERPL W P-5'-P-CCNC: 23 U/L (ref 12–78)
AST SERPL W P-5'-P-CCNC: 20 U/L (ref 15–37)
BUN BLD-MCNC: 19 MG/DL (ref 7–18)
GLUCOSE SERPLBLD-MCNC: 103 MG/DL (ref 74–106)
HCT VFR BLD CALC: 43.3 % (ref 36–45)
INR BLD: 0.9
LYMPHOCYTES # SPEC AUTO: 1.2 K/UL (ref 0.7–4.9)
MCV RBC: 89.9 FL (ref 80–100)
METHAMPHET UR QL SCN: NEGATIVE
PMV BLD: 8.3 FL (ref 7.6–11.3)
POTASSIUM SERPL-SCNC: 4 MMOL/L (ref 3.5–5.1)
RBC # BLD: 4.82 M/UL (ref 3.86–4.86)
SP GR UR: 1.01 (ref 1–1.03)
THC SERPL-MCNC: NEGATIVE NG/ML

## 2022-11-15 PROCEDURE — 87811 SARS-COV-2 COVID19 W/OPTIC: CPT

## 2022-11-15 PROCEDURE — 80320 DRUG SCREEN QUANTALCOHOLS: CPT

## 2022-11-15 PROCEDURE — 99284 EMERGENCY DEPT VISIT MOD MDM: CPT

## 2022-11-15 PROCEDURE — 85025 COMPLETE CBC W/AUTO DIFF WBC: CPT

## 2022-11-15 PROCEDURE — 85610 PROTHROMBIN TIME: CPT

## 2022-11-15 PROCEDURE — 85730 THROMBOPLASTIN TIME PARTIAL: CPT

## 2022-11-15 PROCEDURE — 36415 COLL VENOUS BLD VENIPUNCTURE: CPT

## 2022-11-15 PROCEDURE — 80329 ANALGESICS NON-OPIOID 1 OR 2: CPT

## 2022-11-15 PROCEDURE — 80048 BASIC METABOLIC PNL TOTAL CA: CPT

## 2022-11-15 PROCEDURE — 93005 ELECTROCARDIOGRAM TRACING: CPT

## 2022-11-15 PROCEDURE — 80076 HEPATIC FUNCTION PANEL: CPT

## 2022-11-15 PROCEDURE — 81003 URINALYSIS AUTO W/O SCOPE: CPT

## 2022-11-15 PROCEDURE — 81025 URINE PREGNANCY TEST: CPT

## 2022-11-15 PROCEDURE — 80307 DRUG TEST PRSMV CHEM ANLYZR: CPT

## 2022-11-15 NOTE — ER
Nurse's Notes                                                                                     

 [facility]                                                                 

Name: Alka Judd                                                                               

Age: 52 yrs                                                                                       

Sex: Female                                                                                       

: 1970                                                                                   

MRN: [id number]                                                                                   

Arrival Date: 11/15/2022                                                                          

Time: 15:56                                                                                       

Account#: [id number]                                                                            

Bed 20                                                                                            

Private MD:                                                                                       

Diagnosis: Acute Psychosis - Auditory and Visual Hallucinations                                   

                                                                                                  

Presentation:                                                                                     

11/15                                                                                             

15:56 Chief complaint: EMS states: [place] [employer] stopped client when she was found running      kc6 

      [place] the [place] in [place]. Client told [place] [employer] that she saw three people           

      following her and stated she wants to be transferred to a mental health hospital.           

      Coronavirus screen: Vaccine status: Patient reports being unvaccinated. At this time,       

      the client does not indicate any symptoms associated with coronavirus-19. Ebola Screen:     

      No symptoms or risks identified at this time. Initial Sepsis Screen: Does the patient       

      meet any 2 criteria? No. Patient's initial sepsis screen is negative. Does the patient      

      have a suspected source of infection? No. Patient's initial sepsis screen is negative.      

      Risk Assessment: Do you want to hurt yourself or someone else? Patient reports no           

      desire to harm self or others. Onset of symptoms was November 15, 2022.                     

15:56 Method Of Arrival: EMS: [facility]                                                    kc6 

15:56 Acuity: TEMO 3                                                                           kc6 

                                                                                                  

Triage Assessment:                                                                                

15:59 General: Appears in no apparent distress. comfortable, Behavior is calm, cooperative,   kc6 

      appropriate for age. Pain: Denies pain. EENT: No signs and/or symptoms were reported        

      regarding the EENT system. Neuro: Olivares Agitation-Sedation Scale (RASS): 0 - Alert       

      and Calm Level of Consciousness is awake, alert, obeys commands, Oriented to person,        

      place, time, situation, Appropriate for age. Cardiovascular: Heart tones S1 S2 present      

      Capillary refill < 3 seconds. Respiratory: Airway is patent Trachea midline Respiratory     

      effort is even, unlabored, Respiratory pattern is regular, symmetrical, Breath sounds       

      are clear bilaterally. GI: No signs and/or symptoms were reported involving the             

      gastrointestinal system. : No signs and/or symptoms were reported regarding the           

      genitourinary system. Derm: No signs and/or symptoms reported regarding the                 

      dermatologic system. Skin is intact, Skin is pink, warm \T\ dry. Musculoskeletal: No        

      signs and/or symptoms reported regarding the musculoskeletal system. Circulation,           

      motion, and sensation intact. Capillary refill < 3 seconds, Range of motion: intact in      

      all extremities.                                                                            

                                                                                                  

Historical:                                                                                       

- Allergies:                                                                                      

16:31 PENICILLINS;                                                                            kc6 

16:31 Risperdal;                                                                              6 

- Home Meds:                                                                                      

17:18 None [Active];                                                                          kc6 

- PMHx:                                                                                           

17:18 Bipolar disorder; Hypertensive disorder;                                                kc6 

- PSHx:                                                                                           

17:18 righ hip surgery;                                                                       kc6 

                                                                                                  

- Immunization history:: Adult Immunizations not up to date, Client reports having NOT            

  received the Covid vaccine. Flu vaccine is not up to date.                                      

- Social history:: Smoking status: Patient reports the use of cigarette tobacco                   

  products, smokes two packs cigarettes per day. Patient uses alcohol, occasionally.              

                                                                                                  

                                                                                                  

Screenin:20 Abuse screen: Denies threats or abuse. Denies injuries from another. Nutritional        [facility] 

      screening: No deficits noted. Tuberculosis screening: No symptoms or risk factors           

      identified. Fall Risk No fall in past 12 months (0 pts). No secondary diagnosis (0          

      pts). IV access (20 points). Ambulatory Aid- None/Bed Rest/Nurse Assist (0 pts). Gait-      

      Normal/Bed Rest/Wheelchair (0 pts) Mental Status- Overestimates/Forgets Limitations (15     

      pts.). Total Torres Fall Scale indicates Low Risk Score (25-44 pts). Fall prevention         

      measures have been instituted. Side Rails Up X 2 Placed close to Nursing Station            

      Frequent Obs/Assesments occuring Family Present and informed to notify staff if they        

      need to leave bedside As available Patient and Family Educated on Fall Prevention           

      Program and strategies.                                                                     

                                                                                                  

Assessment:                                                                                       

16:56 Reassessment: Patient appears in no apparent distress at this time. No changes from     kc6 

      previously documented assessment. Patient and/or family updated on plan of care and         

      expected duration. Pain level reassessed. Patient is alert, oriented x 3, equal             

      unlabored respirations, skin warm/dry/pink. client sitting in bed calmly, but cursing       

      to self. report hearing auditory hallucinations.                                            

17:56 Reassessment: Patient appears in no apparent distress at this time. No changes from     kc6 

      previously documented assessment. Patient and/or family updated on plan of care and         

      expected duration. Pain level reassessed. Patient is alert, oriented x 3, equal             

      unlabored respirations, skin warm/dry/pink. client sitting in bed calmly, but cursing       

      to self. continues to hear auditory hallucinations.                                         

18:56 Reassessment: Patient appears in no apparent distress at this time. No changes from     [facility] 

      previously documented assessment. Patient and/or family updated on plan of care and         

      expected duration. Pain level reassessed. Patient is alert, oriented x 3, equal             

      unlabored respirations, skin warm/dry/pink. client sitting in bed calmly, but cursing       

      to self. continues to hear auditory hallucinations. accepted to [facility].             

19:14 General: Appears comfortable, Behavior is cooperative. Pain: Denies pain. Neuro: Level  kc6 

      of Consciousness is awake, obeys commands, Oriented to person, place, Reports               

      hallucinations. . Neuro: pt. talking to voices. . Cardiovascular: Patient's skin is         

      warm and dry. Respiratory: Respiratory effort is even, unlabored, Respiratory pattern       

      is regular, symmetrical. GI: No signs and/or symptoms were reported involving the           

      gastrointestinal system. Abdomen is flat, non-distended, Bowel sounds present X 4           

      quads. : No signs and/or symptoms were reported regarding the genitourinary system.       

      Derm: Skin is pink, warm \T\ dry. Musculoskeletal: Circulation, motion, and sensation       

      intact.                                                                                     

                                                                                                  

Vital Signs:                                                                                      

15:56  / 117; Pulse 96; Resp 18 S; Temp 98.2(O); Pulse Ox 98% on R/A; Weight 97.52 kg   kc 

      (R); Height 5 ft. 2 in. (157.48 cm) (R); Pain 0/10;                                         

17:25  / 124; Pulse 84; Resp 18 S; Pulse Ox 100% on R/A; Pain 0/10;                     kc6 

18:13  / 98; Pulse 92; Resp 18 S; Pulse Ox 100% on R/A; Pain 0/10;                      kc6 

19:18  / 93; Pulse 90; Resp 18 S; Pulse Ox 100% on R/A;                                 kc6 

15:56 Body Mass Index 39.32 (97.52 kg, 157.48 cm)                                             [facility] 

                                                                                                  

ED Course:                                                                                        

15:56 Patient arrived in ED.                                                                  6 

15:56 Lissa Lea, RN is Primary Nurse.                                                 6 

15:57 João Hollingsworth PA is PHCP.                                                              [facility] 

15:57 Jagjit De Los Santos MD is Attending Physician.                                                [facility] 

15:59 Triage completed.                                                                       kc6 

16:02 Arm band placed on.                                                                     kc6 

16:30 Inserted saline lock: 22 gauge in right antecubital area, using aseptic technique.      jd3 

      Blood collected.                                                                            

17:17 SARS RAPID Sent.                                                                        kc6 

17:17 Urine Drug Screen Sent.                                                                 kc6 

17:17 Salicylate Sent.                                                                        kc6 

17:21 Patient has correct armband on for positive identification. Bed in low position. Call   [facility] 

      light in reach. Side rails up X 1.                                                          

17:30 [facility]  

      behavioral.                                                                                 

19:33 No provider procedures requiring assistance completed. IV discontinued, intact,         ha1 

      bleeding controlled, No redness/swelling at site. Pressure dressing applied.                

                                                                                                  

Administered Medications:                                                                         

16:28 Drug: Ativan (LORazepam) 1 mg Route: PO;                                                kc6 

17:28 Follow up: Response: No adverse reaction; Anxiety unchanged; RASS: Alert and Calm (0)   kc6 

18:07 Drug: hydrALAZINE 10 mg Route: PO;                                                      kc6 

19:07 Follow up: Response: No adverse reaction; Blood pressure is lowered                     kc6 

19:20 Drug: Bactrim (trimethoprim-sulfamethoxazole) (160 mg-800 mg (DS) 1 tablet Route: PO;   ha1 

19:36 Follow up: Response: No adverse reaction                                                ha1 

                                                                                                  

                                                                                                  

Medication:                                                                                       

17:21 VIS not applicable for this client.                                                     kc6 

                                                                                                  

Outcome:                                                                                          

19:20 ER care complete, transfer ordered by MD.                                               stanley 

19:33 Discharged to [facility]. transported by [facility]                   ha1 

19:33 Condition: stable                                                                           

19:33 Discharge instructions given to patient, Instructed on the need for transfer,               

      Demonstrated understanding of instructions.                                                 

19:36 Patient left the ED.                                                                    ha1 

                                                                                                  

Signatures:                                                                                       

Sandrine Michelle Joel, PA PA jmm Davies, Jonathon, RN RN jd3 Ayala, Heidy, RN RN   ha1                                                  

Lissa Lea RN                   RN   kc                                                  

                                                                                                  

**************************************************************************************************

## 2022-11-15 NOTE — XMS REPORT
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 (procedure)              



                                       [code = 783712738]              

 

             Future Scheduled Test 2018 00:00:00 Screening for            

  [facility]



                                       malignant neoplasm of              



                                       colon (procedure)              



                                       [code = 000875110]              

 

             Future Scheduled Test 2008 00:00:00 Breast Cancer Scrn       

       [facility]



                                       (Yearly) [code =              



                                       Breast Cancer Scrn              



                                       (Yearly)]                 

 

             Future Scheduled Test 2008 00:00:00 Breast Cancer Scrn       

       [facility]



                                       (Yearly) [code =              



                                       Breast Cancer Scrn              



                                       (Yearly)]                 

 

             Future Scheduled Test 2008 00:00:00 Breast Cancer Scrn       

       [facility]



                                       (Yearly) [code =              



                                       Breast Cancer Scrn              



                                       (Yearly)]                 

 

             Future Scheduled Test 2008 00:00:00 Breast Cancer Scrn       

       [facility]



                                       (Yearly) [code =              



                                       Breast Cancer Scrn              



                                       (Yearly)]                 

 

             Future Scheduled Test 1998 00:00:00 Screening for            

  [facility]



                                       malignant neoplasm of              



                                       cervix (procedure)              



                                       [code = 976393388]              

 

             Future Scheduled Test 1998 00:00:00 Screening for            

  [facility]



                                       malignant neoplasm of              



                                       cervix (procedure)              



                                       [code = 523117405]              

 

             Future Scheduled Test 1998 00:00:00 Screening for            

  [facility]



                                       malignant neoplasm of              



                                       cervix (procedure)              



                                       [code = 438892559]              

 

             Future Scheduled Test 1998 00:00:00 Screening for            

  [facility]



                                       malignant neoplasm of              



                                       cervix (procedure)              



                                       [code = 305080982]              

 

             Future Scheduled Test 1998 00:00:00 Screening for            

  [facility]



                                       malignant neoplasm of              



                                       cervix (procedure)              



                                       [code = 473253688]              

 

             Future Scheduled Test 1998 00:00:00 Screening for            

  [facility]



                                       malignant neoplasm of              



                                       cervix (procedure)              



                                       [code = 178020357]              

 

             Future Scheduled Test 1998 00:00:00 Screening for            

  [facility]



                                       malignant neoplasm of              



                                       cervix (procedure)              



                                       [code = 615399316]              

 

             Future Scheduled Test 1998 00:00:00 Screening for            

  [facility]



                                       malignant neoplasm of              



                                       cervix (procedure)              



                                       [code = 576987795]              

 

             Future Scheduled Test 1969 00:00:00 COVID-19 Vaccine (#1)    

          [facility]



                                       [code = COVID-19              



                                       Vaccine (#1)]              

 

             Future Scheduled Test 1969 00:00:00 COVID-19 Vaccine (#1)    

          [facility]



                                       [code = COVID-19              



                                       Vaccine (#1)]              

 

             Future Scheduled Test 1969 00:00:00 COVID-19 Vaccine (#1)    

          [facility]



                                       [code = COVID-19              



                                       Vaccine (#1)]              

 

             Future Scheduled Test 1969 00:00:00 COVID-19 Vaccine (#1)    

          [facility]



                                       [code = COVID-19              



                                       Vaccine (#1)]              







Encounters







        Start   End     Encounter Admission Attending Care    Care    Encounter 

Source



        Date/Time Date/Time Type    Type    Clinicians Facility Department ID   

   

 

        2022         Outpatient         SARATH, [facility]     [id number]

-2 [place]



        01:05:17                         MELANIE                  [id number] [facility]

 

        2022         Outpatient         VIRGIE,  [facility]     [id number]

 [place]



        03:15:41                         JOAQUIN                 [id number] [facility]

 

        2022         Outpatient         VIRGIE,  [facility]     [id number]

 [place]



        15:19:55                         JOAQUIN                 [id number] [facility]

 

        2022         Outpatient                 [facility]     [id number]

 [place]



        15:28:25                                                 [id number] [facility]

 

        2022         Outpatient                 [facility]     [id number]

 [place]



        12:00:51                                                 [id number] [facility]

 

        2022         Outpatient                 [facility]     [id number]

 [place]



        15:13:02                                                 [id number] [facility]

 

        2020         Inpatient                 [facility]   YAMILA    [id number] 

[facility]



        03:17:00                                                 56      [facility]

 

        2022 Inpatient         [facility]    [facility]       

  [facility]



        19:24:00 19:40:00                 LEXI                         

 

        2022 Emergency E       HEATHER, [facility]     

   [facility]



        14:56:00 18:09:00                 DEMARIO                           

 

        2022 Emergency X       SINGER, [facility]    ERT     [id number]

15 [facility]



        12:56:00 15:05:00                 FLY brock of



                                                                        [facility]

 

        2022 [facility]    [id number]

[id number] [facility]



        12:56:00 15:05:00                 Fly HAMM 350.1.13.10         i

ty of



                                                JENNIFER 4.2.7.2.686         [place]

[facility]  [phone]         [address]

 

        2022 Office          Elena  [facility]    [id number]

13 [facility]



        10:00:00 10:30:00 Visit           [facility]  350.1.13.10         it

y of



                                                NORIS 4.2.7.2.686         Roly

as



                                                SKYLAR?BLEA [phone]         Me

chloé ORQUE    [address] [place] [address]



                                                MEDICAL                 



                                                OFFICE                  



                                                BUILDING                 

 

        2022 Outpatient DIANA REYES  [facility]    [id number]

[address] [facility]



        10:00:00 10:00:00                 [facility]CO[facility]

 

        2022 Orders          Doctor  [place]    [id number]

07 [facility]



        00:00:00 00:00:00 Only            Unassigned, SHONA   350.1.13.10       

  ity 



                                        [facility] 4.2.7.2.686         [place]

as



                                                        [phone]         [address]

 

        2020 Outpatient         Josias, HERMILACL   OUTD    [id number]

[facility]



        08:38:00 08:38:00                 Musaddiq                 73      [facility]

 

        2019 Inpatient 1       Robin Barrios [facility]    PSY     [address]

[id number]



        23:01:00 13:16:00                 oRbin Barrios                         

[facility]

 

        2017 Outpatient DIANA LOPEZ  [facility]    NUT     [id number]

[address] [facility]



        00:00:00 00:00:00                 VENKATA                         [facility]







Results







           Test Description Test Time  Test Comments Results    Result Comments 

Source









                    COMP. METABOLIC PANEL (51813) 2022 19:59:50 









                      Test Item  Value      Reference Range Interpretation Comme

nts









             NA (test code = 2173875523) 138 mmol/L   135-145                   

 

             K (test code = 2452806370) 4.3 mmol/L   3.5-5.0                   

 

             CL (test code = 2055064611) 102 mmol/L                       

 

             CO2 TOTAL (test code = 8467194076) 23 mmol/L    23-31              

       

 

             AGAP (test code = 7637098611)              2-16                    

  

 

             BUN (test code = 7112683795) 18 mg/dL     7-23                     

 

 

             GLUCOSE (test code = 5549324251) 110 mg/dL                   

     

 

             CREATININE (test code = 0.70 mg/dL   0.50-1.04                 



             6989729343)                                         

 

             TOTAL BILI (test code = 1.3 mg/dL    0.1-1.1      H            



             6609660980)                                         

 

             CALCIUM (test code = 7319651585) 9.5 mg/dL    8.6-10.6             

     

 

             T PROTEIN (test code = 6582153731) 7.4 g/dL     6.3-8.2            

       

 

             ALBUMIN (test code = 1685944947) 4.2 g/dL     3.5-5.0              

     

 

             ALK PHOS (test code = 5721002709) 100 U/L                    

      

 

             ALTv (test code = 1742-6) 16 U/L       5-35                      

 

             AST(SGOT) (test code = 1852895789) 27 U/L       13-40              

       

 

             eGFR (test code = 8486971191)              mL/min/1.73m2           

   

 

             ELENA (test code = ELENA) [employer] of [employer]omer[employer]                    

       



                          Filtration Rate (GFR) and Staging                     

      



                          of Kidney Disease*                           



                          +-----------------------+--------                     

      



                          -------------+-------------------                     

      



                          ------+| GFR (mL/min/1.73 m2) ?|                      

     



                          With Kidney Damage ?| ?Without                        

   



                          Kidney                                 



                          Damage+-----------------------+--                     

      



                          -------------------+-------------                     

      



                          ------------+| ?>90 ? ? ? ? ? ? ?                     

      



                          ? ?| ?Stage one ? ? ? ? ?| ?                          

 



                          Normal ? ? ? ? ? ? ?                           



                          ?+-----------------------+-------                     

      



                          --------------+------------------                     

      



                          -------+| ?60-89 ? ? ? ? ? ? ? ?|                     

      



                          ?Stage two ? ? ? ? ?| ? Decreased                     

      



                          GFR ? ? ? ?                            



                          +-----------------------+--------                     

      



                          -------------+-------------------                     

      



                          ------+| ?30-59 ? ? ? ? ? ? ? ?|                      

     



                          ?Stage three ? ? ? ?| ? Stage                         

  



                          three ? ? ? ? ?                           



                          +-----------------------+--------                     

      



                          -------------+-------------------                     

      



                          ------+| ?15-29 ? ? ? ? ? ? ? ?|                      

     



                          ?Stage four ? ? ? ? | ? Stage                         

  



                          four ? ? ? ? ?                           



                          ?+-----------------------+-------                     

      



                          --------------+------------------                     

      



                          -------+| ?<15 (or dialysis) ? ?|                     

      



                          ?Stage five ? ? ? ? | ? Stage                         

  



                          five ? ? ? ? ?                           



                          ?+-----------------------+-------                     

      



                          --------------+------------------                     

      



                          -------+ *Each stage assumes the                      

     



                          associated GFR level has been in                      

     



                          effect for at least three months.                     

      



                          ?Stages 1 to 5, with or without                       

    



                          kidney disease, indicate chronic                      

     



                          kidney disease. Notes:                           



                          Determination of stages one and                       

    



                          two (with eGFR >59mL/min/1.73 m2)                     

      



                          requires estimation of kidney                         

  



                          damage for at least three months                      

     



                          as defined by structural or                           



                          functional abnormalities of the                       

    



                          kidney, manifested by                           



                          either:Pathological abnormalities                     

      



                          or Markers of kidney damage                           



                          (including abnormalities in the                       

    



                          composition of the blood or urine                     

      



                          or abnormalities in imaging                           



                          tests).                                

 

             Lab Interpretation (test code = Abnormal                           

    



             66905-1)                                            



[facility] WITH [id number] 19:22:40





             Test Item    Value        Reference Range Interpretation Comments

 

             WBC (test code =              See_Comment  H             [Automated



             6690-2)                                             message] The sy

stem



                                                                 which generated



                                                                 this result



                                                                 transmitted



                                                                 reference range

:



                                                                 4.30 - 11.10



                                                                 10*3/?L. The



                                                                 reference range

 was



                                                                 not used to



                                                                 interpret this



                                                                 result as



                                                                 normal/abnormal

.

 

             RBC (test code =              See_Comment                [Automated



             789-8)                                              message] The sy

stem



                                                                 which generated



                                                                 this result



                                                                 transmitted



                                                                 reference range

:



                                                                 3.93 - 5.25



                                                                 10*6/?L. The



                                                                 reference range

 was



                                                                 not used to



                                                                 interpret this



                                                                 result as



                                                                 normal/abnormal

.

 

             HGB (test code = 13.0 g/dL    11.6-15.0                 



             718-7)                                              

 

             HCT (test code = 38.4 %       35.7-45.2                 



             4544-3)                                             

 

             MCV (test code = 89.9 fL      80.6-95.5                 



             787-2)                                              

 

             MCH (test code = 30.4 pg      25.9-32.8                 



             785-6)                                              

 

             MCHC (test code = 33.9 g/dL    31.6-35.1                 



             786-4)                                              

 

             RDW-SD (test code = 39.3 fL      39.0-49.9                 



             69861-0)                                            

 

             RDW-CV (test code = 12.2 %       12.0-15.5                 



             788-0)                                              

 

             PLT (test code =              See_Comment                [Automated



             777-3)                                              message] The sy

stem



                                                                 which generated



                                                                 this result



                                                                 transmitted



                                                                 reference range

:



                                                                 166 - 358 10*3/

?L.



                                                                 The reference r

jihan



                                                                 was not used to



                                                                 interpret this



                                                                 result as



                                                                 normal/abnormal

.

 

             MPV (test code = 10.1 fL      9.5-12.9                  



             81386-4)                                            

 

             NRBC/100 WBC (test              See_Comment                [Automat

ed



             code = 5454160320)                                        message] 

The system



                                                                 which generated



                                                                 this result



                                                                 transmitted



                                                                 reference range

:



                                                                 0.0 - 10.0 /100



                                                                 WBCs. The refer

ence



                                                                 range was not u

sed



                                                                 to interpret th

is



                                                                 result as



                                                                 normal/abnormal

.

 

             NRBC x10^3 (test code <0.01        See_Comment                [Auto

mated



             = 0946273899)                                        message] The s

ystem



                                                                 which generated



                                                                 this result



                                                                 transmitted



                                                                 reference range

:



                                                                 10*3/?L. The



                                                                 reference range

 was



                                                                 not used to



                                                                 interpret this



                                                                 result as



                                                                 normal/abnormal

.

 

             GRAN MAT (NEUT) % 79.4 %                                 



             (test code = 770-8)                                        

 

             IMM GRAN % (test code 0.50 %                                 



             = [id number])                                        

 

             LYMPH % (test code = 9.5 %                                  



             736-9)                                              

 

             MONO % (test code = 9.7 %                                  



             5905-5)                                             

 

             EOS % (test code = 0.5 %                                  



             713-8)                                              

 

             BASO % (test code = 0.4 %                                  



             706-2)                                              

 

             GRAN MAT x10^3(ANC) 9.77 10*3/uL 1.88-7.09    H            



             (test code =                                        



             5409281795)                                         

 

             IMM GRAN x10^3 (test 0.06 10*3/uL 0.00-0.06                 



             code = [id number])                                        

 

             LYMPH x10^3 (test code 1.17 10*3/uL 1.32-3.29    L            



             = 731-0)                                            

 

             MONO x10^3 (test code 1.19 10*3/uL 0.33-0.92    H            



             = 742-7)                                            

 

             EOS x10^3 (test code = 0.06 10*3/uL 0.03-0.39                 



             711-2)                                              

 

             BASO x10^3 (test code 0.05 10*3/uL 0.01-0.07                 



             = 704-7)                                            

 

             Lab Interpretation Abnormal                               



             (test code = 68985-9)                                        



[facility] [id number] 05:36:00





             Test Item    Value        Reference Range Interpretation Comments

 

             SODIUM (test code = NA) 143 mmol/L   134-147      N            

 

             POTASSIUM (test code = 4.2 mmol/L   3.4-5.0      N            



             K)                                                  

 

             CHLORIDE (test code = 114 mmol/L   100-108      H            



             CL)                                                 

 

             CARBON DIOXIDE (test 24 mmol/L    21-32        N            



             code = CO2)                                         

 

             ANION GAP (test code = 5.0 GAP calc 4.0-15.0     N            



             GAP)                                                

 

             GLUCOSE (test code = 89 MG/DL            N            



             GLU)                                                

 

             BLOOD UREA NITROGEN 17 MG/DL     7-18         N            



             (test code = BUN)                                        

 

             GLOMERULAR FILTRATION >=60 max estimate >60                       



             RATE (test code = GFR) estGFR                                 

 

             CREATININE (test code = 0.9 MG/DL    0.6-1.0      N            



             CREAT)                                              

 

             CALCIUM (test code = CA) 8.3 MG/DL    8.5-10.1     L            



CBC W/AUTO [id number] 05:21:00





             Test Item    Value        Reference Range Interpretation Comments

 

             WHITE BLOOD CELL (test code = 15.2 K/mm3   3.5-11.0     H          

  



             WBC)                                                

 

             RED BLOOD CELL (test code = RBC) 3.67 M/mm3   4.70-6.10    L       

     

 

             HEMOGLOBIN (test code = HGB) 11.3 G/DL    10.4-14.9    N           

 

 

             HEMATOCRIT (test code = HCT) 35.5 %       31.5-44.1    N           

 

 

             MEAN CELL VOLUME (test code = 96.7 Fl      84.5-98.6    N          

  



             MCV)                                                

 

             MEAN CELL HGB (test code = MCH) 30.8 pg      27.0-34.2    N        

    

 

             MEAN CELL HGB CONCETRATION (test 31.8 G/DL    31.5-34.0    N       

     



             code = MCHC)                                        

 

             RED CELL DISTRIBUTION WIDTH (test 14.2 SD      11.5-14.5    N      

      



             code = RDW)                                         

 

             PLATELET COUNT (test code = PLT) 242.0 K/mm3  150-450      N       

     

 

             MEAN PLATELET VOLUME (test code = 10.20 fL     7.0-10.5     N      

      



             MPV)                                                

 

             NEUTROPHIL % (test code = NT%) 78.8 %       40-76        H         

   

 

             LYMPHOCYTE % (test code = LY%) 13.1 %       20.5-51.1    L         

   

 

             MONOCYTE % (test code = MO%) 6.2 %        1.7-9.3      N           

 

 

             EOSINOPHIL % (test code = EO%) 1.6 %        0.0-6.0      N         

   

 

             BASOPHIL % (test code = BA%) 0.3 %        0.0-2.0      N           

 

 

             NEUTROPHIL # (test code = NT#) 11.94 K/mm3  1.8-7.6      H         

   

 

             LYMPHOCYTE # (test code = LY#) 2.0 K/mm3    0.6-3.2      N         

   

 

             MONOCYTE # (test code = MO#) 0.9 K/mm3    0.3-1.1      N           

 

 

             EOSINOPHIL # (test code = EO#) 0.2 K/mm3    0.0-0.4      N         

   

 

             BASOPHIL # (test code = BA#) 0.1 K/mm3    0.0-0.1      N           

 

 

             MANUAL DIFF REQUIRED (test code = NO DIFF/SCN  CRITERIA            

      



             MDIFF)                                              



[id number] 14:09:00





             Test Item    Value        Reference Range Interpretation Comments

 

             LITHIUM (test 0.5 mmol/L   0.6-1.2      L             Detection Lopez

it = 0.1



             code = LITH)                                        <0.1 indicates 

None



                                                                 DetectedPerform

ed At: 



                                                                 [facility]



                                                                 [id number] 

Jeffry KIMBROUGH MD



                                                                 Ph:[phone]



[id number]--[id number] 13:35:00





             Test Item    Value        Reference Range Interpretation Comments

 

             TROPONIN-I (test 0.436 NG/ML  0.000-0.045  HH           Negative: <

/= 0.045



             code = TROPI)                                        Positive: >/= 

0.046



                                                                 Correlation wit

h serial



                                                                 results, other 

cardiac



                                                                 markers, and cl

inical



                                                                 findings is nec

essary to



                                                                 determine the c

linical



                                                                 significance of

 this



                                                                 result. Quantit

ative



                                                                 results using d

ifferent



                                                                 methodologies s

hould not



                                                                 be compared to 

one



                                                                 another as nume

rical



                                                                 results may daniel

yby



                                                                 method.



Completed by Nursing: QPPBSICJXW--11-28 10:33:00





             Test Item    Value        Reference Range Interpretation Comments

 

             TROPONIN-I (test 0.360 NG/ML  0.000-0.045  HH           Negative: <

/= 0.045



             code = TROPI)                                        Positive: >/= 

0.046



                                                                 Correlation wit

h serial



                                                                 results, other 

cardiac



                                                                 markers, and cl

inical



                                                                 findings is nec

essary to



                                                                 determine the c

linical



                                                                 significance of

 this



                                                                 result. Quantit

ative



                                                                 results using d

ifferent



                                                                 methodologies s

hould not



                                                                 be compared to 

one



                                                                 another as nume

rical



                                                                 results may daniel

yby



                                                                 method.



Completed by Nursing: NOLast Dose Date: 20Las Dose Time: 1200TROPONIN-I
2020 07:23:00





             Test Item    Value        Reference Range Interpretation Comments

 

             TROPONIN-I (test 0.399 NG/ML  0.000-0.045  HH           Negative: <

/= 0.045



             code = TROPI)                                        Positive: >/= 

0.046



                                                                 Correlation wit

h serial



                                                                 results, other 

cardiac



                                                                 markers, and cl

inical



                                                                 findings is nec

essary to



                                                                 determine the c

linical



                                                                 significance of

 this



                                                                 result. Quantit

ative



                                                                 results using d

ifferent



                                                                 methodologies s

hould not



                                                                 be compared to 

one



                                                                 another as nume

rical



                                                                 results may daniel

yby



                                                                 method.



Completed by Nursing: [id number]--84-28 05:00:00





             Test Item    Value        Reference Range Interpretation Comments

 

             TROPONIN-I (test 0.555 NG/ML  0.000-0.045  HH           Negative: <

/= 0.045



             code = TROPI)                                        Positive: >/= 

0.046



                                                                 Correlation wit

h serial



                                                                 results, other 

cardiac



                                                                 markers, and cl

inical



                                                                 findings is nec

essary to



                                                                 determine the c

linical



                                                                 significance of

 this



                                                                 result. Quantit

ative



                                                                 results using d

ifferent



                                                                 methodologies s

hould not



                                                                 be compared to 

one



                                                                 another as nume

rical



                                                                 results may daniel

yby



                                                                 method.



Completed by Nursing: NO- XR CHEST 1 -26-50 04:33:00 Name: TYLER EDGE Essex : 1970 Age/S: 49 / F [id number] Unit #: [id number]
32 Loc: [place] [address]  Phys: Kristy Quiros MD Acct: [id number] Dis Date:
Status: REG ER PHONE #: [phone] Exam Date: 2020 FAX #: Reason: 
POST CENTRAL PLACEMENT; RIGHT IJ EXAMS: CPT:  [id number] XR CHEST 1 V 71686 
Fluoro Time: DAP (Gy m2): Air Kerma (mGy): HISTORY: Post central line placement,
hypotension Location code: B2 FINDINGS: Frontal view of the chest demonstrates a
mildlyenlarged cardiomediastinal silhouette. The trachea is midline. The lungs 
are  clear. There is no effusion or pneumothorax. The bones are intact. Right IJ
catheter in good position. IMPRESSION: Mild cardiomegaly, without acute 
pulmonary process.  ** Electronically Signed by ISABEL March on 2020at 
0433 ** Reported and signed by: Shree March M.D.  CC: Kristy Quiros MD PAGE 1 
Signed Report  Name: TYLER EDGE Essex : 1970 Age/S: 49 / F 
[id number] [address] Unit #: [id number] Loc: [place] [address] Phys: 
Kristy Quiros MD Acct: [id number] Dis Date: Status: REG ER  PHONE #: [phone]
[phone] Exam Date: 2020 FAX #: Reason: POST CENTRAL PLACEMENT; 
RIGHT IJ EXAMS:  CPT: [id number] XR CHEST 1 V 94842 Fluoro Time: DAP (Gy m2): Air
Kerma (mGy): (Continued) Technologist: Duncan Ellis, RT(R)(CT) Trnscb Date/Time: 
2020 (0433) DanielRK5 Orig Print D/T: S: 2020 (4414)  PAGE 2 Signed 
ReportCBC W/AUTO [id number] 04:15:00





             Test Item    Value        Reference Range Interpretation Comments

 

             WHITE BLOOD CELL (test 24.2 K/mm3   3.5-11.0     H            



             code = WBC)                                         

 

             RED BLOOD CELL (test 3.75 M/mm3   4.70-6.10    L            



             code = RBC)                                         

 

             HEMOGLOBIN (test code 11.5 G/DL    10.4-14.9    N            



             = HGB)                                              

 

             HEMATOCRIT (test code 35.2 %       31.5-44.1    N            



             = HCT)                                              

 

             MEAN CELL VOLUME (test 93.9 Fl      84.5-98.6    N            



             code = MCV)                                         

 

             MEAN CELL HGB (test 30.7 pg      27.0-34.2    N            



             code = MCH)                                         

 

             MEAN CELL HGB 32.7 G/DL    31.5-34.0    N            



             CONCETRATION (test                                        



             code = MCHC)                                        

 

             RED CELL DISTRIBUTION 13.8 SD      11.5-14.5    N            



             WIDTH (test code =                                        



             RDW)                                                

 

             PLATELET COUNT (test 234.0 K/mm3  150-450      N            



             code = PLT)                                         

 

             MEAN PLATELET VOLUME 9.80 fL      7.0-10.5     N            



             (test code = MPV)                                        

 

             NEUTROPHIL % (test 82.9 %       40-76        H            



             code = NT%)                                         

 

             LYMPHOCYTE % (test 8.3 %        20.5-51.1    L            



             code = LY%)                                         

 

             MONOCYTE % (test code 7.3 %        1.7-9.3      N            



             = MO%)                                              

 

             EOSINOPHIL % (test 1.4 %        0.0-6.0      N            



             code = EO%)                                         

 

             BASOPHIL % (test code 0.1 %        0.0-2.0      N            



             = BA%)                                              

 

             NEUTROPHIL # (test 20.08 K/mm3  1.8-7.6      H            



             code = NT#)                                         

 

             LYMPHOCYTE # (test 2.0 K/mm3    0.6-3.2      N            



             code = LY#)                                         

 

             MONOCYTE # (test code 1.8 K/mm3    0.3-1.1      H            



             = MO#)                                              

 

             EOSINOPHIL # (test 0.3 K/mm3    0.0-0.4      N            



             code = EO#)                                         

 

             BASOPHIL # (test code 0.0 K/mm3    0.0-0.1      N            



             = BA#)                                              

 

             MANUAL DIFF REQUIRED NO DIFF/SCN  CRITERIA                  SLIDE R

EVIEW



             (test code = MDIFF)                                        CONSISTA

NT WITH AUTO



                                                                 DIFFERENTIAL.



BASIC METABOLIC [id number] 04:11:00





             Test Item    Value        Reference Range Interpretation Comments

 

             SODIUM (test code = NA) 135 mmol/L   134-147      N            

 

             POTASSIUM (test code = K) 4.0 mmol/L   3.4-5.0      N            

 

             CHLORIDE (test code = CL) 106 mmol/L   100-108      N            

 

             CARBON DIOXIDE (test code = CO2) 22 mmol/L    21-32        N       

     

 

             ANION GAP (test code = GAP) 7.0 GAP calc 4.0-15.0     N            

 

             GLUCOSE (test code = GLU) 97 MG/DL            N            

 

             BLOOD UREA NITROGEN (test code = 34 MG/DL     7-18         H       

     



             BUN)                                                

 

             GLOMERULAR FILTRATION RATE (test 39 estGFR    >60          L       

     



             code = GFR)                                         

 

             CREATININE (test code = CREAT) 1.5 MG/DL    0.6-1.0      H         

   

 

             CALCIUM (test code = CA) 8.9 MG/DL    8.5-10.1     N            



LACTIC [id number] 04:11:00





             Test Item    Value        Reference Range Interpretation Comments

 

             LACTIC ACID (test code = LACT) 0.8 mmol/L   0.4-2.0      N         

   



CBC W/AUTO [id number] 03:54:00





             Test Item    Value        Reference Range Interpretation Comments

 

             WHITE BLOOD CELL (test code = 24.2 K/mm3   3.5-11.0     H          

  



             WBC)                                                

 

             RED BLOOD CELL (test code = RBC) 3.75 M/mm3   4.70-6.10    L       

     

 

             HEMOGLOBIN (test code = HGB) 11.5 G/DL    10.4-14.9    N           

 

 

             HEMATOCRIT (test code = HCT) 35.2 %       31.5-44.1    N           

 

 

             MEAN CELL VOLUME (test code = 93.9 Fl      84.5-98.6    N          

  



             MCV)                                                

 

             MEAN CELL HGB (test code = MCH) 30.7 pg      27.0-34.2    N        

    

 

             MEAN CELL HGB CONCETRATION (test 32.7 G/DL    31.5-34.0    N       

     



             code = MCHC)                                        

 

             RED CELL DISTRIBUTION WIDTH (test 13.8 SD      11.5-14.5    N      

      



             code = RDW)                                         

 

             PLATELET COUNT (test code = PLT) 234.0 K/mm3  150-450      N       

     

 

             MEAN PLATELET VOLUME (test code = 9.80 fL      7.0-10.5     N      

      



             MPV)                                                

 

             NEUTROPHIL % (test code = NT%)  %           40-76        H         

   

 

             LYMPHOCYTE % (test code = LY%)  %           20.5-51.1    L         

   

 

             MONOCYTE % (test code = MO%)  %           1.7-9.3      N           

 

 

             EOSINOPHIL % (test code = EO%)  %           0.0-6.0      N         

   

 

             BASOPHIL % (test code = BA%)  %           0.0-2.0      N           

 

 

             NEUTROPHIL # (test code = NT#)  K/mm3       1.8-7.6      H         

   

 

             LYMPHOCYTE # (test code = LY#)  K/mm3       0.6-3.2      N         

   

 

             MONOCYTE # (test code = MO#)  K/mm3       0.3-1.1      H           

 

 

             EOSINOPHIL # (test code = EO#)  K/mm3       0.0-0.4      N         

   

 

             BASOPHIL # (test code = BA#)  K/mm3       0.0-0.1      N           

 

 

             MANUAL DIFF REQUIRED (test code =  DIFF/SCN    CRITERIA            

      



             MDIFF)                                              



Basic Metabolic [id number] 07:54:48





             Test Item    Value        Reference Range Interpretation Comments

 

             Sodium Level (test code = Sodium 142.0 mmol/L 135.0-145.0          

     



             Level)                                              

 

             Potassium Level (test code = 4.8 mmol/L   3.5-5.1                  

 



             Potassium Level)                                        

 

             Chloride Level (test code = 105 mmol/L                       



             Chloride Level)                                        

 

             CO2 (test code = CO2) 25 mmol/L    22-29                     

 

             Anion Gap (test code = Anion 12 mmol/L    7-16                     

 



             Gap)                                                

 

             BUN (test code = BUN) 19.60 mg/dL  6.00-20.00                

 

             Creatinine Level (test code = 0.80 mg/dL   0.50-0.90               

  



             Creatinine Level)                                        

 

             BUN/Creat Ratio (test code = 24                        N           

 



             BUN/Creat Ratio)                                        

 

             Glucose Level (test code = 86 mg/dL                         



             Glucose Level)                                        

 

             Calcium Level (test code = 10.1 mg/dL   8.3-10.5                  



             Calcium Level)                                        



Basic Metabolic [id number] 07:54:48





             Test Item    Value        Reference Range Interpretation Comments

 

             Sodium Level (test 142.0 mmol/L 135.0-145.0               



             code = Sodium Level)                                        

 

             Potassium Level 4.8 mmol/L   3.5-5.1                   



             (test code =                                        



             Potassium Level)                                        

 

             Chloride Level (test 105 mmol/L                       



             code = Chloride                                        



             Level)                                              

 

             CO2 (test code = 25 mmol/L    22-29                     



             CO2)                                                

 

             Anion Gap (test code 12 mmol/L    7-16                      



             = Anion Gap)                                        

 

             BUN (test code = 19.60 mg/dL  6.00-20.00                



             BUN)                                                

 

             Creatinine Level 0.80 mg/dL   0.50-0.90                 



             (test code =                                        



             Creatinine Level)                                        

 

             BUN/Creat Ratio 24                        N            



             (test code =                                        



             BUN/Creat Ratio)                                        

 

             Glucose Level (test 86 mg/dL                         



             code = Glucose                                        



             Level)                                              

 

             Calcium Level (test 10.1 mg/dL   8.3-10.5                  



             code = Calcium                                        



             Level)                                              

 

             eGFR AA (test code = >60                       N            eGFR (e

stimated



             eGFR AA)     mL/min/1.73 m2                           Glomerular



                                                                 Filtration Rate

) is



                                                                 an estimated va

lue,



                                                                 calculated from

 the



                                                                 patient's serum



                                                                 creatinine usin

g the



                                                                 MDRD equation. 

It is



                                                                 NOT the patient

's



                                                                 actual GFR. The

 eGFR



                                                                 provides a more



                                                                 clinically usef

ul



                                                                 measure of kidn

ey



                                                                 disease than se

rum



                                                                 creatinine



                                                                 alone.***This



                                                                 calculation rimma

es



                                                                 sex and race in

to



                                                                 account, if the



                                                                 information is



                                                                 provided. If th

e



                                                                 race is not



                                                                 provided, and t

he



                                                                 patient is



                                                                 -Crystal

n,



                                                                 multiply by 1.2

12.



                                                                 If sex is not



                                                                 provided, and t

he



                                                                 patient is fema

le,



                                                                 multiply by 0.7

42.



                                                                 Results for pat

ients



                                                                 <18 years of ag

e



                                                                 have not been



                                                                 validated by th

e



                                                                 MDRD study and



                                                                 should be



                                                                 interpreted wit

h



                                                                 caution. eGFR R

esult



                                                                 Interpretation:

eGFR



                                                                 > or = 60 is in

 the



                                                                 Normal RangeeGF

R <



                                                                 60 may mean kid

hang



                                                                 diseaseeGFR < 1

5 may



                                                                 mean kidney



                                                                 failure*** Rang

es



                                                                 recommended by 

the



                                                                 [employer]



                                                                 [employer],



                                                                 http://nkdep.ni

h.gov



Basic Metabolic [id number] 07:54:48





             Test Item    Value        Reference Range Interpretation Comments

 

             Sodium Level (test 142.0 mmol/L 135.0-145.0               



             code = Sodium Level)                                        

 

             Potassium Level 4.8 mmol/L   3.5-5.1                   



             (test code =                                        



             Potassium Level)                                        

 

             Chloride Level (test 105 mmol/L                       



             code = Chloride                                        



             Level)                                              

 

             CO2 (test code = 25 mmol/L    22-29                     



             CO2)                                                

 

             Anion Gap (test code 12 mmol/L    7-16                      



             = Anion Gap)                                        

 

             BUN (test code = 19.60 mg/dL  6.00-20.00                



             BUN)                                                

 

             Creatinine Level 0.80 mg/dL   0.50-0.90                 



             (test code =                                        



             Creatinine Level)                                        

 

             BUN/Creat Ratio 24                        N            



             (test code =                                        



             BUN/Creat Ratio)                                        

 

             Glucose Level (test 86 mg/dL                         



             code = Glucose                                        



             Level)                                              

 

             Calcium Level (test 10.1 mg/dL   8.3-10.5                  



             code = Calcium                                        



             Level)                                              

 

             eGFR AA (test code = >60                       N            eGFR (e

stimated



             eGFR AA)     mL/min/1.73 m2                           Glomerular



                                                                 Filtration Rate

) is



                                                                 an estimated va

lue,



                                                                 calculated from

 the



                                                                 patient's serum



                                                                 creatinine usin

g the



                                                                 MDRD equation. 

It is



                                                                 NOT the patient

's



                                                                 actual GFR. The

 eGFR



                                                                 provides a more



                                                                 clinically usef

ul



                                                                 measure of kidn

ey



                                                                 disease than se

rum



                                                                 creatinine



                                                                 alone.***This



                                                                 calculation rimma

es



                                                                 sex and race in

to



                                                                 account, if the



                                                                 information is



                                                                 provided. If th

e



                                                                 race is not



                                                                 provided, and t

he



                                                                 patient is



                                                                 -Crystal

n,



                                                                 multiply by 1.2

12.



                                                                 If sex is not



                                                                 provided, and t

he



                                                                 patient is fema

le,



                                                                 multiply by 0.7

42.



                                                                 Results for pat

ients



                                                                 <18 years of ag

e



                                                                 have not been



                                                                 validated by [facility]



                                                                 MDRD study and



                                                                 should be



                                                                 interpreted wit

h



                                                                 caution. eGFR R

esult



                                                                 Interpretation:

eGFR



                                                                 > or = 60 is in

 the



                                                                 Normal RangeeGF

R <



                                                                 60 may mean kid

hang



                                                                 diseaseeGFR < 1

5 may



                                                                 mean kidney



                                                                 failure*** Rang

es



                                                                 recommended by 

the



                                                                 [employer]



                                                                 [employer],



                                                                 http://nkdep.ni

h.gov

 

             eGFR Non-AA (test >60.00                    N            eGFR (sheba

mated



             code = eGFR Non-AA) mL/min/1.73 m2                           Glomer

ular



                                                                 Filtration Rate

) is



                                                                 an estimated va

lue,



                                                                 calculated from

 the



                                                                 patient's serum



                                                                 creatinine usin

g the



                                                                 MDRD equation. 

It is



                                                                 NOT the patient

's



                                                                 actual GFR. The

 eGFR



                                                                 provides a more



                                                                 clinically usef

ul



                                                                 measure of kidn

ey



                                                                 disease than se

rum



                                                                 creatinine



                                                                 alone.***This



                                                                 calculation rimma

es



                                                                 sex and race in

to



                                                                 account, if the



                                                                 information is



                                                                 provided. If th

e



                                                                 race is not



                                                                 provided, and t

he



                                                                 patient is



                                                                 -Crystal

n,



                                                                 multiply by 1.2

12.



                                                                 If sex is not



                                                                 provided, and t

he



                                                                 patient is fema

le,



                                                                 multiply by 0.7

42.



                                                                 Results for pat

ients



                                                                 <18 years of ag

e



                                                                 have not been



                                                                 validated by [facility]



                                                                 MDRD study and



                                                                 should be



                                                                 interpreted wit

h



                                                                 caution. eGFR R

esult



                                                                 Interpretation:

eGFR



                                                                 > or = 60 is in

 the



                                                                 Normal RangeeGF

R <



                                                                 60 may mean kid

hang



                                                                 diseaseeGFR < 1

5 may



                                                                 mean kidney



                                                                 failure*** Rang

es



                                                                 recommended by 

the



                                                                 [employer]



                                                                 [employer],



                                                                 http://nkdep.ni

h.gov



Complete Blood Count with [id number] 07:29:42





             Test Item    Value        Reference Range Interpretation Comments

 

             WBC (test code = WBC) 9.3 x10      4.4-10.5                  

 

             RBC (test code = RBC) 4.71 x10     3.75-5.20                 

 

             Hgb (test code = Hgb) 14.4 g/dL    12.2-14.8                 

 

             MCV (test code = MCV) 92.10 fL     80..00              

 

             Hct (test code = Hct) 43.4 %       36.5-44.4                 

 

             MCHC (test code = 33.20 g/dL   32.00-37.50               



             MCHC)                                               

 

             RDW CV (test code = 13.4 %       11.5-14.5                 



             RDW CV)                                             

 

             MCH (test code = MCH) 30.6 pg      27.0-32.5                 

 

             Platelets (test code = 325.0 x10    140.0-440.0               



             Platelets)                                          

 

             MPV (test code = MPV) 9.8 fL                    N            

 

             Slide Review (test Auto         Auto                      Result cr

eated by



             code = Slide Review)                                        [email or web address]

SLIDE_REV_AUTO

 

             nRBC (test code = 0                         N            



             nRBC)                                               

 

             NRBC Abs (test code = 0.00 x10                  N            



             NRBC Abs)                                           

 

             IPF (test code = IPF) 0 %                       N            



Automated [id number] 07:29:42





             Test Item    Value        Reference Range Interpretation Comments

 

             Neutro Auto (test code = Neutro 66.2 %       36.0-70.0             

    



             Auto)                                               

 

             Lymph Auto (test code = Lymph Auto) 21.1 %       12.0-44.0         

        

 

             Mono Auto (test code = Mono Auto) 6.8 %        0.0-11.0            

      

 

             Eos, Auto (test code = Eos, Auto) 4.8 %        0.0-7.0             

      

 

             Basophil Auto (test code = Basophil 0.9 %        0.0-2.0           

        



             Auto)                                               

 

             Neutro Absolute (test code = Neutro 6.2 x10      1.6-7.4           

        



             Absolute)                                           

 

             Lymph Absolute (test code = Lymph 1.97 x10     .50-4.60            

      



             Absolute)                                           

 

             Mono Absolute (test code = Mono .63 x10      .00-1.20              

    



             Absolute)                                           

 

             Eos Absolute (test code = Eos 0.45 x10     0.00-0.74               

  



             Absolute)                                           

 

             Baso Absolute (test code = Baso 0.08 x10     0.00-0.21             

    



             Absolute)                                           



IG [id number] 07:29:42





             Test Item    Value        Reference Range Interpretation Comments

 

             IG (test code = IG) 0.2 %        0.0-5.0                   

 

             IG Abs (test code = IG Abs) 0 x10                     N            



Thyroid Stimulating [id number] 04:30:30





             Test Item    Value        Reference Range Interpretation Comments

 

             TSH (test code = TSH) 1.360 mIU/mL 0.270-4.200               



RPR [id number] 04:06:17





             Test Item    Value        Reference Range Interpretation Comments

 

             RPR Qual (test code = RPR Qual) Non-Reactive Non-Reactive          

    

 

             Reactive Control (test code = Reactive                             

  



             Reactive Control)                                        

 

             Weak Reactive Control (test Weak Reactive                          

 



             code = Weak Reactive Control)                                      

  

 

             Non-Reactive Control (test code Non-Reactive                       

    



             = Non-Reactive Control)                                        

 

             Lot # (test code = Lot #) [id number]                    N            

 

             Expiration Dt (test code = 10-                N            



             Expiration Dt)                                        



Hemoglobin A1c2019-11-15 18:37:54





             Test Item    Value        Reference Range Interpretation Comments

 

             Hemoglobin A1c (test code 4.7 %        4.8-5.9      L            No

n Diabetic



             = Hemoglobin A1c)                                        4.8-5.9%Di

abetic <7.0%



Lipid Panel2019-11-15 18:08:58





             Test Item    Value        Reference Range Interpretation Comments

 

             Cholesterol Total 189 mg/dL    0-200                     RISK OF HE

ART



             (test code =                                        DISEASEPublishe

d by



             Cholesterol Total)                                        American 

Heart



                                                                 Association Judith

lyte



                                                                 Optimal Borderl

ine



                                                                 Increased RiskC

HOL <200



                                                                 200-239 >240TRI

G <150



                                                                 150-199 >200HDL

 Male



                                                                 >60 <40HDL Fema

le >60



                                                                 <50LDL <100 130

-159



                                                                 >160LDL Near op

timal is



                                                                 100-129

 

             Triglycerides (test 112 mg/dL    9-200                     



             code = Triglycerides)                                        

 

             HDL (test code = HDL) 44 mg/dL     50-60        L            

 

             LDL (test code = LDL) 122 mg/dL    0-130                     The eq

uation being used



                                                                 in this calcula

tion is



                                                                 LDL = (Chol - H

DL) -



                                                                 (Trig / 5)

 

             VLDL (test code = 22 mg/dL     5-40                      The equati

on being used



             VLDL)                                               in this calcula

tion is



                                                                 VLDL = Trig / 5

 

             Chol/HDL (test code = 4.3 ratio    0.0-4.4                   



             Chol/HDL)                                           

 

             LDL/HDL Ratio (test 3                         N            The equa

tion being used



             code = LDL/HDL Ratio)                                        in thi

s calculation is



                                                                 LDL/HDL Ratio=L

DL



                                                                 Calc/HDL Chol



Complete Blood Count with Differential2019-11-15 07:51:57





             Test Item    Value        Reference Range Interpretation Comments

 

             WBC (test code = WBC) 10.6 x10     4.4-10.5     H            

 

             RBC (test code = RBC) 4.45 x10     3.75-5.20                 

 

             Hgb (test code = Hgb) 13.5 g/dL    12.2-14.8                 

 

             MCV (test code = MCV) 93.30 fL     80..00              

 

             Hct (test code = Hct) 41.5 %       36.5-44.4                 

 

             MCHC (test code = 32.50 g/dL   32.00-37.50               



             MCHC)                                               

 

             RDW CV (test code = 13.7 %       11.5-14.5                 



             RDW CV)                                             

 

             MCH (test code = MCH) 30.3 pg      27.0-32.5                 

 

             Platelets (test code = 356.0 x10    140.0-440.0               



             Platelets)                                          

 

             MPV (test code = MPV) 9.7 fL                    N            

 

             Slide Review (test Auto         Auto                      Result cr

eated by



             code = Slide Review)                                        [email or web address]_

SLIDE_REV_AUTO

 

             nRBC (test code = 0                         N            



             nRBC)                                               

 

             NRBC Abs (test code = 0.00 x10                  N            



             NRBC Abs)                                           

 

             IPF (test code = IPF) 0 %                       N            



Automated Differential2019-11-15 07:51:57





             Test Item    Value        Reference Range Interpretation Comments

 

             Neutro Auto (test code = Neutro 65.4 %       36.0-70.0             

    



             Auto)                                               

 

             Lymph Auto (test code = Lymph Auto) 23.5 %       12.0-44.0         

        

 

             Mono Auto (test code = Mono Auto) 5.7 %        0.0-11.0            

      

 

             Eos, Auto (test code = Eos, Auto) 4.4 %        0.0-7.0             

      

 

             Basophil Auto (test code = Basophil 0.8 %        0.0-2.0           

        



             Auto)                                               

 

             Neutro Absolute (test code = Neutro 6.9 x10      1.6-7.4           

        



             Absolute)                                           

 

             Lymph Absolute (test code = Lymph 2.49 x10     .50-4.60            

      



             Absolute)                                           

 

             Mono Absolute (test code = Mono .60 x10      .00-1.20              

    



             Absolute)                                           

 

             Eos Absolute (test code = Eos 0.47 x10     0.00-0.74               

  



             Absolute)                                           

 

             Baso Absolute (test code = Baso 0.08 x10     0.00-0.21             

    



             Absolute)                                           



IG [id number] 07:51:57





             Test Item    Value        Reference Range Interpretation Comments

 

             IG (test code = IG) 0.2 %        0.0-5.0                   

 

             IG Abs (test code = IG Abs) 0 x10                     N            



Urine [id number] 10:18:52





             Test Item    Value        Reference Range Interpretation Comments

 

             ORGANISM (test code = Escherichia coli                           



             ORGANISM)                                           

 

             Amikacin (test code =                           S            



             Amik)                                               

 

             Ampicillin (test code =                           S            



             Amp)                                                

 

             Ampicillin/Sulbactam                           S            



             (test code = Amp/Sul)                                        

 

             Cefazolin (test code =                           S            



             Cefaz)                                              

 

             Cefepime (test code =                           S            



             Cefep)                                              

 

             Cefotaxime (test code =                           S            



             Cefo)                                               

 

             Ceftazidime (test code                           S            



             = Ceftaz)                                           

 

             Ceftriaxone (test code                           S            



             = Ceftri)                                           

 

             Cefuroxime (test code =                           S            



             Cefur)                                              

 

             Ciprofloxacin (test                           S            



             code = Cipro)                                        

 

             Gentamicin (test code =                           S            



             Gent)                                               

 

             Imipenem (test code =                           S            



             Imi)                                                

 

             Levofloxacin (test code                           S            



             = Levo)                                             

 

             Meropenem (test code =                           S            



             Sindi)                                               

 

             Nitrofurantoin (test                           S            



             code = Nitro)                                        

 

             Piperacillin/Tazobactam                           S            



             (test code = Pip/Blaine)                                        

 

             Tobramycin (test code =                           S            



             Tobra)                                              

 

             Trimethoprim/Sulfa                           S            



             (test code = SXT)                                        

 

             Final Report (test code >=100,000 cfu/ml                           



             = Final Report) Escherichia coli                           



                          >=100,000 cfu/ml Alpha                           



                          hemolytic                              



                          Streptococcus (not                           



                          Group D or                             



                          Enterococcus)                           



 C Urine Added by [id number] 09:18:25





             Test Item    Value        Reference Range Interpretation Comments

 

             Lithium Level (test code = 0.58 mmol/L  0.60-1.20    L            



             Lithium Level)                                        



Urine Drug [id number] 01:36:44





             Test Item    Value        Reference Range Interpretation Comments

 

             Amphetamine Screen Ur POSITIVE     Negative     A            



             (test code = Amphetamine                                        



             Screen Ur)                                          

 

             Barbiturate Screen Ur Negative     Negative                  



             (test code = Barbiturate                                        



             Screen Ur)                                          

 

             Benzodiazepines Ur (test Negative     Negative                  



             code = Benzodiazepines                                        



             Ur)                                                 

 

             Cocaine Screen Ur (test Negative     Negative                  



             code = Cocaine Screen                                        



             Ur)                                                 

 

             U Methadone Scr (test Negative     Negative                  



             code = U Methadone Scr)                                        

 

             Opiate Screen Ur (test Negative     Negative                  



             code = Opiate Screen Ur)                                        

 

             U PCP Scrn (test code = Negative     Negative                  



             U PCP Scrn)                                         

 

             Cannabinoid Screen Ur Negative     Negative                  



             (test code = Cannabinoid                                        



             Screen Ur)                                          

 

             U TCA (test code = U Negative     Negative                  The res

ults of all



             TCA)                                                drug screen elinor

ts are



                                                                 only preliminar

y.



                                                                 Clinical



                                                                 consideration a

nd



                                                                 professional ju

dgment



                                                                 should be appli

ed to



                                                                 any drug of abu

se



                                                                 test result,



                                                                 particularly wh

en



                                                                 preliminary pos

itive



                                                                 results are obt

ained.



                                                                 Please order a



                                                                 separate confir

matory



                                                                 test if desired

.



HCG Qualitative [id number] 01:36:43





             Test Item    Value        Reference Range Interpretation Comments

 

             hCG Ur (test code = Negative                               If the r

esult is



             hCG Ur)                                             "Negative" in p

atients



                                                                 suspected to be



                                                                 pregnant, recom

mend



                                                                 retest with a s

ample



                                                                 obtained 48 to 

72



                                                                 hours later, or

 by



                                                                 ordering a



                                                                 quantitative as

say. If



                                                                 the result is



                                                                 "Borderline" te

sting



                                                                 should be repea

gianfranco in



                                                                 48 to 72 hours.

 

             Lot # (test code = [id number]                    N            



             Lot #)                                              

 

             Expiration Dt (test 2020                N            



             code = Expiration Dt)                                        

 

             Neg Control (test Negative                               



             code = Neg Control)                                        

 

             Pos Control (test Positive                               



             code = Pos Control)                                        

 

             Internal QC (test Acceptable                             



             code = Internal QC)                                        



Urinalysis [id number] 01:36:03





             Test Item    Value        Reference Range Interpretation Comments

 

             UA WBC (test code = UA WBC) 0-5          0-5                       

 

             UA RBC (test code = UA RBC) None Seen    0-5                       

 

             UA Bacteria (test code = UA Moderate                  A            



             Bacteria)                                           

 

             UA Squam Epithelial (test code = UA 0-5                            

        



             Squam Epithelial)                                        



Comprehensive Metabolic [id number] 01:23:40





             Test Item    Value        Reference Range Interpretation Comments

 

             Sodium Level (test code = Sodium 139.0 mmol/L 135.0-145.0          

     



             Level)                                              

 

             Potassium Level (test code = 4.4 mmol/L   3.5-5.1                  

 



             Potassium Level)                                        

 

             Chloride Level (test code = 102 mmol/L                       



             Chloride Level)                                        

 

             CO2 (test code = CO2) 23 mmol/L    22-29                     

 

             Anion Gap (test code = Anion 14 mmol/L    7-16                     

 



             Gap)                                                

 

             BUN (test code = BUN) 9.10 mg/dL   6.00-20.00                

 

             Creatinine Level (test code = 1.00 mg/dL   0.50-0.90    H          

  



             Creatinine Level)                                        

 

             BUN/Creat Ratio (test code = 9                         N           

 



             BUN/Creat Ratio)                                        

 

             Glucose Level (test code = 115 mg/dL                        



             Glucose Level)                                        

 

             Calcium Level (test code = 10.1 mg/dL   8.3-10.5                  



             Calcium Level)                                        

 

             Alk Phos (test code = Alk Phos) 106 U/L             H        

    

 

             Bilirubin Total (test code = 0.4 mg/dL    0.1-0.9                  

 



             Bilirubin Total)                                        

 

             Albumin Level (test code = 4.6 g/dL     3.5-5.2                   



             Albumin Level)                                        

 

             Protein Total (test code = 7.7 g/dL     6.4-8.3                   



             Protein Total)                                        

 

             ALT (test code = ALT) 12 U/L       1-33                      

 

             AST (test code = AST) 18 U/L       1-32                      

 

             Globulin (test code = Globulin) 3.1 g/dL     2.9-3.1               

    

 

             A/G Ratio (test code = A/G 1.5 ratio                 N            



             Ratio)                                              



Comprehensive Metabolic [id number] 01:23:40





             Test Item    Value        Reference Range Interpretation Comments

 

             Sodium Level (test 139.0 mmol/L 135.0-145.0               



             code = Sodium Level)                                        

 

             Potassium Level 4.4 mmol/L   3.5-5.1                   



             (test code =                                        



             Potassium Level)                                        

 

             Chloride Level (test 102 mmol/L                       



             code = Chloride                                        



             Level)                                              

 

             CO2 (test code = 23 mmol/L    22-29                     



             CO2)                                                

 

             Anion Gap (test code 14 mmol/L    7-16                      



             = Anion Gap)                                        

 

             BUN (test code = 9.10 mg/dL   6.00-20.00                



             BUN)                                                

 

             Creatinine Level 1.00 mg/dL   0.50-0.90    H            



             (test code =                                        



             Creatinine Level)                                        

 

             BUN/Creat Ratio 9                         N            



             (test code =                                        



             BUN/Creat Ratio)                                        

 

             Glucose Level (test 115 mg/dL                        



             code = Glucose                                        



             Level)                                              

 

             Calcium Level (test 10.1 mg/dL   8.3-10.5                  



             code = Calcium                                        



             Level)                                              

 

             Alk Phos (test code 106 U/L             H            



             = Alk Phos)                                         

 

             Bilirubin Total 0.4 mg/dL    0.1-0.9                   



             (test code =                                        



             Bilirubin Total)                                        

 

             Albumin Level (test 4.6 g/dL     3.5-5.2                   



             code = Albumin                                        



             Level)                                              

 

             Protein Total (test 7.7 g/dL     6.4-8.3                   



             code = Protein                                        



             Total)                                              

 

             ALT (test code = 12 U/L       1-33                      



             ALT)                                                

 

             AST (test code = 18 U/L       1-32                      



             AST)                                                

 

             Globulin (test code 3.1 g/dL     2.9-3.1                   



             = Globulin)                                         

 

             A/G Ratio (test code 1.5 ratio                 N            



             = A/G Ratio)                                        

 

             eGFR AA (test code = >60                       N            eGFR (e

stimated



             eGFR AA)     mL/min/1.73 m2                           Glomerular



                                                                 Filtration Rate

) is



                                                                 an estimated va

lue,



                                                                 calculated from

 the



                                                                 patient's serum



                                                                 creatinine usin

g the



                                                                 MDRD equation. 

It is



                                                                 NOT the patient

's



                                                                 actual GFR. The

 eGFR



                                                                 provides a more



                                                                 clinically usef

ul



                                                                 measure of kidn

ey



                                                                 disease than se

rum



                                                                 creatinine



                                                                 alone.***This



                                                                 calculation rimma

es



                                                                 sex and race in

to



                                                                 account, if the



                                                                 information is



                                                                 provided. If th

e



                                                                 race is not



                                                                 provided, and t

he



                                                                 patient is



                                                                 -Crystal

n,



                                                                 multiply by 1.2

12.



                                                                 If sex is not



                                                                 provided, and t

he



                                                                 patient is fema

le,



                                                                 multiply by 0.7

42.



                                                                 Results for pat

ients



                                                                 <18 years of ag

e



                                                                 have not been



                                                                 validated by th

e



                                                                 MDRD study and



                                                                 should be



                                                                 interpreted wit

h



                                                                 caution. eGFR R

esult



                                                                 Interpretation:

eGFR



                                                                 > or = 60 is in

 the



                                                                 Normal RangeeGF

R <



                                                                 60 may mean kid

hang



                                                                 diseaseeGFR < 1

5 may



                                                                 mean kidney



                                                                 failure*** Rang

es



                                                                 recommended by 

the



                                                                 [employer]



                                                                 Foundation,



                                                                 http://nkdep.ni

h.gov



Comprehensive Metabolic [id number] 01:23:40





             Test Item    Value        Reference Range Interpretation Comments

 

             Sodium Level (test 139.0 mmol/L 135.0-145.0               



             code = Sodium Level)                                        

 

             Potassium Level 4.4 mmol/L   3.5-5.1                   



             (test code =                                        



             Potassium Level)                                        

 

             Chloride Level (test 102 mmol/L                       



             code = Chloride                                        



             Level)                                              

 

             CO2 (test code = 23 mmol/L    22-29                     



             CO2)                                                

 

             Anion Gap (test code 14 mmol/L    7-16                      



             = Anion Gap)                                        

 

             BUN (test code = 9.10 mg/dL   6.00-20.00                



             BUN)                                                

 

             Creatinine Level 1.00 mg/dL   0.50-0.90    H            



             (test code =                                        



             Creatinine Level)                                        

 

             BUN/Creat Ratio 9                         N            



             (test code =                                        



             BUN/Creat Ratio)                                        

 

             Glucose Level (test 115 mg/dL                        



             code = Glucose                                        



             Level)                                              

 

             Calcium Level (test 10.1 mg/dL   8.3-10.5                  



             code = Calcium                                        



             Level)                                              

 

             Alk Phos (test code 106 U/L             H            



             = Alk Phos)                                         

 

             Bilirubin Total 0.4 mg/dL    0.1-0.9                   



             (test code =                                        



             Bilirubin Total)                                        

 

             Albumin Level (test 4.6 g/dL     3.5-5.2                   



             code = Albumin                                        



             Level)                                              

 

             Protein Total (test 7.7 g/dL     6.4-8.3                   



             code = Protein                                        



             Total)                                              

 

             ALT (test code = 12 U/L       1-33                      



             ALT)                                                

 

             AST (test code = 18 U/L       1-32                      



             AST)                                                

 

             Globulin (test code 3.1 g/dL     2.9-3.1                   



             = Globulin)                                         

 

             A/G Ratio (test code 1.5 ratio                 N            



             = A/G Ratio)                                        

 

             eGFR AA (test code = >60                       N            eGFR (e

stimated



             eGFR AA)     mL/min/1.73 m2                           Glomerular



                                                                 Filtration Rate

) is



                                                                 an estimated va

lue,



                                                                 calculated from

 the



                                                                 patient's serum



                                                                 creatinine usin

g the



                                                                 MDRD equation. 

It is



                                                                 NOT the patient

's



                                                                 actual GFR. The

 eGFR



                                                                 provides a more



                                                                 clinically usef

ul



                                                                 measure of kidn

ey



                                                                 disease than se

rum



                                                                 creatinine



                                                                 alone.***This



                                                                 calculation rimma

es



                                                                 sex and race in

to



                                                                 account, if the



                                                                 information is



                                                                 provided. If th

e



                                                                 race is not



                                                                 provided, and t

he



                                                                 patient is



                                                                 -Crystal

n,



                                                                 multiply by 1.2

12.



                                                                 If sex is not



                                                                 provided, and t

he



                                                                 patient is fema

le,



                                                                 multiply by 0.7

42.



                                                                 Results for pat

ients



                                                                 <18 years of ag

e



                                                                 have not been



                                                                 validated by th

e



                                                                 MDRD study and



                                                                 should be



                                                                 interpreted wit

h



                                                                 caution. eGFR R

esult



                                                                 Interpretation:

eGFR



                                                                 > or = 60 is in

 the



                                                                 Normal RangeeGF

R <



                                                                 60 may mean kid

hang



                                                                 diseaseeGFR < 1

5 may



                                                                 mean kidney



                                                                 failure*** Rang

es



                                                                 recommended by 

the



                                                                 [employer]



                                                                 Foundation,



                                                                 http://nkdep.ni

h.gov

 

             eGFR Non-AA (test 58.45                     N            eGFR (sheba

mated



             code = eGFR Non-AA) mL/min/1.73 m2                           Glomer

ular



                                                                 Filtration Rate

) is



                                                                 an estimated va

lue,



                                                                 calculated from

 the



                                                                 patient's serum



                                                                 creatinine usin

g the



                                                                 MDRD equation. 

It is



                                                                 NOT the patient

's



                                                                 actual GFR. The

 eGFR



                                                                 provides a more



                                                                 clinically usef

ul



                                                                 measure of kidn

ey



                                                                 disease than se

rum



                                                                 creatinine



                                                                 alone.***This



                                                                 calculation rimma

es



                                                                 sex and race in

to



                                                                 account, if the



                                                                 information is



                                                                 provided. If th

e



                                                                 race is not



                                                                 provided, and t

he



                                                                 patient is



                                                                 -Crystal

n,



                                                                 multiply by 1.2

12.



                                                                 If sex is not



                                                                 provided, and t

he



                                                                 patient is fema

le,



                                                                 multiply by 0.7

42.



                                                                 Results for pat

ients



                                                                 <18 years of ag

e



                                                                 have not been



                                                                 validated by th

e



                                                                 MDRD study and



                                                                 should be



                                                                 interpreted wit

h



                                                                 caution. eGFR R

esult



                                                                 Interpretation:

eGFR



                                                                 > or = 60 is in

 the



                                                                 Normal RangeeGF

R <



                                                                 60 may mean kid

hang



                                                                 diseaseeGFR < 1

5 may



                                                                 mean kidney



                                                                 failure*** Rang

es



                                                                 recommended by 

the



                                                                 [employer]



                                                                 Foundation,



                                                                 http://nkdep.ni

h.gov



Alcohol [id number] 01:23:31





             Test Item    Value        Reference Range Interpretation Comments

 

             Ethanol Level (test <0.00 g/dL   0.00-0.01                 [facility]

gianfranco 0.080 g/dL



             code = Ethanol                                        or more



             Level)                                              

 

             Ethanol Inst (test <0                        N            



             code = Ethanol Inst)                                        



Complete Blood Count with [id number] 00:56:12





             Test Item    Value        Reference Range Interpretation Comments

 

             WBC (test code = WBC) 19.4 x10     4.4-10.5     H            

 

             RBC (test code = RBC) 4.53 x10     3.75-5.20                 

 

             Hgb (test code = Hgb) 13.5 g/dL    12.2-14.8                 

 

             Hct (test code = Hct) 41.3 %       36.5-44.4                 

 

             MCV (test code = MCV) 91.20 fL     80..00              

 

             MCHC (test code = 32.70 g/dL   32.00-37.50               



             MCHC)                                               

 

             RDW CV (test code = 13.5 %       11.5-14.5                 



             RDW CV)                                             

 

             MCH (test code = MCH) 29.8 pg      27.0-32.5                 

 

             Platelets (test code = 462.0 x10    140.0-440.0  H            



             Platelets)                                          

 

             MPV (test code = MPV) 9.9 fL                    N            

 

             Slide Review (test Auto         Auto                      Result cr

eated by



             code = Slide Review)                                        [email or web address]

[email or web address]_AUTO

 

             nRBC (test code = 0                         N            



             nRBC)                                               

 

             NRBC Abs (test code = 0.00 x10                  N            



             NRBC Abs)                                           

 

             IPF (test code = IPF) 0 %                       N            



Automated [id number] 00:56:12





             Test Item    Value        Reference Range Interpretation Comments

 

             Neutro Auto (test code = Neutro 83.7 %       36.0-70.0    H        

    



             Auto)                                               

 

             Lymph Auto (test code = Lymph Auto) 8.9 %        12.0-44.0    L    

        

 

             Mono Auto (test code = Mono Auto) 5.0 %        0.0-11.0            

      

 

             Eos, Auto (test code = Eos, Auto) 1.4 %        0.0-7.0             

      

 

             Basophil Auto (test code = Basophil 0.7 %        0.0-2.0           

        



             Auto)                                               

 

             Neutro Absolute (test code = Neutro 16.2 x10     1.6-7.4      H    

        



             Absolute)                                           

 

             Lymph Absolute (test code = Lymph 1.73 x10     .50-4.60            

      



             Absolute)                                           

 

             Mono Absolute (test code = Mono .96 x10      .00-1.20              

    



             Absolute)                                           

 

             Eos Absolute (test code = Eos 0.27 x10     0.00-0.74               

  



             Absolute)                                           

 

             Baso Absolute (test code = Baso 0.13 x10     0.00-0.21             

    



             Absolute)                                           



IG [id number] 00:56:12





             Test Item    Value        Reference Range Interpretation Comments

 

             IG (test code = IG) 0.3 %        0.0-5.0                   

 

             IG Abs (test code = IG Abs) 0 x10                     N            



Urinalysis with Culture, if [id number] 00:55:34





             Test Item    Value        Reference Range Interpretation Comments

 

             UA Color (test code = STRAW        Yellow                    



             UA Color)                                           

 

             UA Appear (test code = CLEAR        Clear                     



             UA Appear)                                          

 

             UA pH (test code = UA 5                         N            



             pH)                                                 

 

             UA Spec Grav (test code 1.001        1.001-1.035               



             = UA Spec Grav)                                        

 

             UA Glucose (test code = NEG          Negative                  



             UA Glucose)                                         

 

             UA Bili (test code = UA NEG          Negative                  



             Bili)                                               

 

             UA Ketones (test code = NEG          Negative                  



             UA Ketones)                                         

 

             UA Blood (test code = NEG          Negative                  



             UA Blood)                                           

 

             UA Protein (test code = NEG          Negative                  



             UA Protein)                                         

 

             UA Urobilinogen (test 0.2 mg/dL                 N            



             code = UA Urobilinogen)                                        

 

             UA Nitrite (test code = POS          Negative     A            



             UA Nitrite)                                         

 

             UA Leuk Est (test code 25 cells/mcL Negative     A            



             = UA Leuk Est)                                        

 

             UA Micro Ind? (test Indicated    Not Indicated A            Result 

created by



             code = UA Micro Ind?)                                        rule



                                                                 [email or web address]_MICRO

_IN



                                                                 D



Urine [id number] 08:13:23





             Test Item    Value        Reference Range Interpretation Comments

 

             ORGANISM (test code = Escherichia coli                           



             ORGANISM)                                           

 

             Amikacin (test code =                           S            



             Amik)                                               

 

             Ampicillin (test code =                           S            



             Amp)                                                

 

             Ampicillin/Sulbactam                           S            



             (test code = Amp/Sul)                                        

 

             Cefazolin (test code =                           S            



             Cefaz)                                              

 

             Cefepime (test code =                           S            



             Cefep)                                              

 

             Cefotaxime (test code =                           S            



             Cefo)                                               

 

             Ceftazidime (test code =                           S            



             Ceftaz)                                             

 

             Ceftriaxone (test code =                           S            



             Ceftri)                                             

 

             Cefuroxime (test code =                           S            



             Cefur)                                              

 

             Ciprofloxacin (test code                           S            



             = Cipro)                                            

 

             Gentamicin (test code =                           S            



             Gent)                                               

 

             Imipenem (test code =                           S            



             Imi)                                                

 

             Levofloxacin (test code                           S            



             = Levo)                                             

 

             Meropenem (test code =                           S            



             Sindi)                                               

 

             Nitrofurantoin (test                           S            



             code = Nitro)                                        

 

             Piperacillin/Tazobactam                           S            



             (test code = Pip/Blaine)                                        

 

             Tobramycin (test code =                           S            



             Tobra)                                              

 

             Trimethoprim/Sulfa (test                           S            



             code = SXT)                                         

 

             Final Report (test code 30,000 cfu/ml                           



             = Final Report) Escherichia coli                           



                          20,000 cfu/ml                           



                          Diphtheroids                           



 C Urine Added by [email or web address] Qualitative2019-09-15 04:57:25





             Test Item    Value        Reference Range Interpretation Comments

 

             RPR Qual (test code = RPR Qual) Non-Reactive Non-Reactive          

    

 

             Reactive Control (test code = Reactive                             

  



             Reactive Control)                                        

 

             Weak Reactive Control (test Weak Reactive                          

 



             code = Weak Reactive Control)                                      

  

 

             Non-Reactive Control (test code Non-Reactive                       

    



             = Non-Reactive Control)                                        

 

             Lot # (test code = Lot #) [id number]                    N            

 

             Expiration Dt (test code = 10.31.2020                N            



             Expiration Dt)                                        



Thyroid Stimulating Hormone2019-09-15 01:22:43





             Test Item    Value        Reference Range Interpretation Comments

 

             TSH (test code = TSH) 3.370 mIU/mL 0.270-4.200               



Lipid Panel2019-09-15 01:17:51





             Test Item    Value        Reference Range Interpretation Comments

 

             Cholesterol Total 168 mg/dL    0-200                     RISK OF HE

ART



             (test code =                                        DISEASEPublishe

d by



             Cholesterol Total)                                        American 

Heart



                                                                 Association Judith

lyte



                                                                 Optimal Borderl

ine



                                                                 Increased RiskC

HOL <200



                                                                 200-239 >240TRI

G <150



                                                                 150-199 >200HDL

 Male



                                                                 >60 <40HDL Fema

le >60



                                                                 <50LDL <100 130

-159



                                                                 >160LDL Near op

timal is



                                                                 100-129

 

             Triglycerides (test 108 mg/dL    9-200                     



             code = Triglycerides)                                        

 

             HDL (test code = HDL) 46 mg/dL     50-60        L            

 

             LDL (test code = LDL) 100 mg/dL    0-130                     The eq

uation being used



                                                                 in this calcula

tion is



                                                                 LDL = (Chol - H

DL) -



                                                                 (Trig / 5)

 

             VLDL (test code = 22 mg/dL     5-40                      The equati

on being used



             VLDL)                                               in this calcula

tion is



                                                                 VLDL = Trig / 5

 

             Chol/HDL (test code = 3.7 ratio    0.0-4.4                   



             Chol/HDL)                                           

 

             LDL/HDL Ratio (test 2                         N            The equa

tion being used



             code = LDL/HDL Ratio)                                        in thi

s calculation is



                                                                 LDL/HDL Ratio=L

DL



                                                                 Calc/HDL Chol



Lithium Level2019-09-15 01:17:51





             Test Item    Value        Reference Range Interpretation Comments

 

             Lithium Level (test code = 0.81 mmol/L  0.60-1.20                 



             Lithium Level)                                        



Comprehensive Metabolic Panel2019-09-15 00:04:54





             Test Item    Value        Reference Range Interpretation Comments

 

             Sodium Level (test code = Sodium 137.0 mmol/L 135.0-145.0          

     



             Level)                                              

 

             Potassium Level (test code = 4.0 mmol/L   3.5-5.1                  

 



             Potassium Level)                                        

 

             Chloride Level (test code = 103 mmol/L                       



             Chloride Level)                                        

 

             CO2 (test code = CO2) 23 mmol/L    22-29                     

 

             Anion Gap (test code = Anion 11 mmol/L    7-16                     

 



             Gap)                                                

 

             BUN (test code = BUN) 11.50 mg/dL  6.00-20.00                

 

             Creatinine Level (test code = 1.00 mg/dL   0.50-0.90    H          

  



             Creatinine Level)                                        

 

             BUN/Creat Ratio (test code = 12                        N           

 



             BUN/Creat Ratio)                                        

 

             Glucose Level (test code = 108 mg/dL                        



             Glucose Level)                                        

 

             Calcium Level (test code = 10.3 mg/dL   8.3-10.5                  



             Calcium Level)                                        

 

             Alk Phos (test code = Alk Phos) 93 U/L                       

    

 

             Bilirubin Total (test code = 0.5 mg/dL    0.1-0.9                  

 



             Bilirubin Total)                                        

 

             Albumin Level (test code = 4.4 g/dL     3.5-5.2                   



             Albumin Level)                                        

 

             Protein Total (test code = 7.2 g/dL     6.4-8.3                   



             Protein Total)                                        

 

             ALT (test code = ALT) 12 U/L       1-33                      

 

             AST (test code = AST) 17 U/L       1-32                      

 

             Globulin (test code = Globulin) 2.8 g/dL     2.9-3.1      L        

    

 

             A/G Ratio (test code = A/G 1.6 ratio                 N            



             Ratio)                                              



Alcohol Level2019-09-15 00:04:54





             Test Item    Value        Reference Range Interpretation Comments

 

             Ethanol Level (test <0.00 g/dL   0.00-0.01                 [facility]

gianfranco 0.080 g/dL



             code = Ethanol                                        or more



             Level)                                              

 

             Ethanol Inst (test <0                        N            



             code = Ethanol Inst)                                        



Comprehensive Metabolic Panel2019-09-15 00:04:54





             Test Item    Value        Reference Range Interpretation Comments

 

             Sodium Level (test 137.0 mmol/L 135.0-145.0               



             code = Sodium Level)                                        

 

             Potassium Level 4.0 mmol/L   3.5-5.1                   



             (test code =                                        



             Potassium Level)                                        

 

             Chloride Level (test 103 mmol/L                       



             code = Chloride                                        



             Level)                                              

 

             CO2 (test code = 23 mmol/L    22-29                     



             CO2)                                                

 

             Anion Gap (test code 11 mmol/L    7-16                      



             = Anion Gap)                                        

 

             BUN (test code = 11.50 mg/dL  6.00-20.00                



             BUN)                                                

 

             Creatinine Level 1.00 mg/dL   0.50-0.90    H            



             (test code =                                        



             Creatinine Level)                                        

 

             BUN/Creat Ratio 12                        N            



             (test code =                                        



             BUN/Creat Ratio)                                        

 

             Glucose Level (test 108 mg/dL                        



             code = Glucose                                        



             Level)                                              

 

             Calcium Level (test 10.3 mg/dL   8.3-10.5                  



             code = Calcium                                        



             Level)                                              

 

             Alk Phos (test code 93 U/L                           



             = Alk Phos)                                         

 

             Bilirubin Total 0.5 mg/dL    0.1-0.9                   



             (test code =                                        



             Bilirubin Total)                                        

 

             Albumin Level (test 4.4 g/dL     3.5-5.2                   



             code = Albumin                                        



             Level)                                              

 

             Protein Total (test 7.2 g/dL     6.4-8.3                   



             code = Protein                                        



             Total)                                              

 

             ALT (test code = 12 U/L       1-33                      



             ALT)                                                

 

             AST (test code = 17 U/L       1-32                      



             AST)                                                

 

             Globulin (test code 2.8 g/dL     2.9-3.1      L            



             = Globulin)                                         

 

             A/G Ratio (test code 1.6 ratio                 N            



             = A/G Ratio)                                        

 

             eGFR AA (test code = >60                       N            eGFR (e

stimated



             eGFR AA)     mL/min/1.73 m2                           Glomerular



                                                                 Filtration Rate

) is



                                                                 an estimated va

lue,



                                                                 calculated from

 the



                                                                 patient's serum



                                                                 creatinine usin

g the



                                                                 MDRD equation. 

It is



                                                                 NOT the patient

's



                                                                 actual GFR. The

 eGFR



                                                                 provides a more



                                                                 clinically usef

ul



                                                                 measure of kidn

ey



                                                                 disease than se

rum



                                                                 creatinine



                                                                 alone.***This



                                                                 calculation rimma

es



                                                                 sex and race in

to



                                                                 account, if the



                                                                 information is



                                                                 provided. If th

e



                                                                 race is not



                                                                 provided, and t

he



                                                                 patient is



                                                                 -Crystal

n,



                                                                 multiply by 1.2

12.



                                                                 If sex is not



                                                                 provided, and t

he



                                                                 patient is fema

le,



                                                                 multiply by 0.7

42.



                                                                 Results for pat

ients



                                                                 <18 years of ag

e



                                                                 have not been



                                                                 validated by th

e



                                                                 MDRD study and



                                                                 should be



                                                                 interpreted wit

h



                                                                 caution. eGFR R

esult



                                                                 Interpretation:

eGFR



                                                                 > or = 60 is in

 the



                                                                 Normal RangeeGF

R <



                                                                 60 may mean kid

hang



                                                                 diseaseeGFR < 1

5 may



                                                                 mean kidney



                                                                 failure*** Rang

es



                                                                 recommended by 

the



                                                                 [employer]



                                                                 [employer],



                                                                 http://nkdep.ni

h.gov



Comprehensive Metabolic Panel2019-09-15 00:04:54





             Test Item    Value        Reference Range Interpretation Comments

 

             Sodium Level (test 137.0 mmol/L 135.0-145.0               



             code = Sodium Level)                                        

 

             Potassium Level 4.0 mmol/L   3.5-5.1                   



             (test code =                                        



             Potassium Level)                                        

 

             Chloride Level (test 103 mmol/L                       



             code = Chloride                                        



             Level)                                              

 

             CO2 (test code = 23 mmol/L    22-29                     



             CO2)                                                

 

             Anion Gap (test code 11 mmol/L    7-16                      



             = Anion Gap)                                        

 

             BUN (test code = 11.50 mg/dL  6.00-20.00                



             BUN)                                                

 

             Creatinine Level 1.00 mg/dL   0.50-0.90    H            



             (test code =                                        



             Creatinine Level)                                        

 

             BUN/Creat Ratio 12                        N            



             (test code =                                        



             BUN/Creat Ratio)                                        

 

             Glucose Level (test 108 mg/dL                        



             code = Glucose                                        



             Level)                                              

 

             Calcium Level (test 10.3 mg/dL   8.3-10.5                  



             code = Calcium                                        



             Level)                                              

 

             Alk Phos (test code 93 U/L                           



             = Alk Phos)                                         

 

             Bilirubin Total 0.5 mg/dL    0.1-0.9                   



             (test code =                                        



             Bilirubin Total)                                        

 

             Albumin Level (test 4.4 g/dL     3.5-5.2                   



             code = Albumin                                        



             Level)                                              

 

             Protein Total (test 7.2 g/dL     6.4-8.3                   



             code = Protein                                        



             Total)                                              

 

             ALT (test code = 12 U/L       1-33                      



             ALT)                                                

 

             AST (test code = 17 U/L       1-32                      



             AST)                                                

 

             Globulin (test code 2.8 g/dL     2.9-3.1      L            



             = Globulin)                                         

 

             A/G Ratio (test code 1.6 ratio                 N            



             = A/G Ratio)                                        

 

             eGFR AA (test code = >60                       N            eGFR (e

stimated



             eGFR AA)     mL/min/1.73 m2                           Glomerular



                                                                 Filtration Rate

) is



                                                                 an estimated va

lue,



                                                                 calculated from

 the



                                                                 patient's serum



                                                                 creatinine usin

g the



                                                                 MDRD equation. 

It is



                                                                 NOT the patient

's



                                                                 actual GFR. The

 eGFR



                                                                 provides a more



                                                                 clinically usef

ul



                                                                 measure of kidn

ey



                                                                 disease than se

rum



                                                                 creatinine



                                                                 alone.***This



                                                                 calculation rimma

es



                                                                 sex and race in

to



                                                                 account, if the



                                                                 information is



                                                                 provided. If th

e



                                                                 race is not



                                                                 provided, and t

he



                                                                 patient is



                                                                 -Crystal

n,



                                                                 multiply by 1.2

12.



                                                                 If sex is not



                                                                 provided, and t

he



                                                                 patient is fema

le,



                                                                 multiply by 0.7

42.



                                                                 Results for pat

ients



                                                                 <18 years of ag

e



                                                                 have not been



                                                                 validated by th

e



                                                                 MDRD study and



                                                                 should be



                                                                 interpreted wit

h



                                                                 caution. eGFR R

esult



                                                                 Interpretation:

eGFR



                                                                 > or = 60 is in

 the



                                                                 Normal RangeeGF

R <



                                                                 60 may mean kid

hang



                                                                 diseaseeGFR < 1

5 may



                                                                 mean kidney



                                                                 failure*** Rang

es



                                                                 recommended by 

the



                                                                 [employer]



                                                                 [employer],



                                                                 http://nkdep.ni

h.gov

 

             eGFR Non-AA (test 58.45                     N            eGFR (sheba

mated



             code = eGFR Non-AA) mL/min/1.73 m2                           Glomer

ular



                                                                 Filtration Rate

) is



                                                                 an estimated va

lue,



                                                                 calculated from

 the



                                                                 patient's serum



                                                                 creatinine usin

g the



                                                                 MDRD equation. 

It is



                                                                 NOT the patient

's



                                                                 actual GFR. The

 eGFR



                                                                 provides a more



                                                                 clinically usef

ul



                                                                 measure of kidn

ey



                                                                 disease than se

rum



                                                                 creatinine



                                                                 alone.***This



                                                                 calculation rimma

es



                                                                 sex and race in

to



                                                                 account, if the



                                                                 information is



                                                                 provided. If th

e



                                                                 race is not



                                                                 provided, and t

he



                                                                 patient is



                                                                 -Crystal

n,



                                                                 multiply by 1.2

12.



                                                                 If sex is not



                                                                 provided, and t

he



                                                                 patient is fema

le,



                                                                 multiply by 0.7

42.



                                                                 Results for pat

ients



                                                                 <18 years of ag

e



                                                                 have not been



                                                                 validated by th

e



                                                                 MDRD study and



                                                                 should be



                                                                 interpreted wit

h



                                                                 caution. eGFR R

esult



                                                                 Interpretation:

eGFR



                                                                 > or = 60 is in

 the



                                                                 Normal RangeeGF

R <



                                                                 60 may mean kid

hang



                                                                 diseaseeGFR < 1

5 may



                                                                 mean kidney



                                                                 failure*** Rang

es



                                                                 recommended by 

the



                                                                 [employer]



                                                                 [employer],



                                                                 http://nkdep.ni

h.gov



Urinalysis Microscopic2019-09-15 00:02:53





             Test Item    Value        Reference Range Interpretation Comments

 

             UA WBC (test code = UA WBC) 6-10         0-5          A            

 

             UA RBC (test code = UA RBC) 0-5          0-5                       

 

             UA Bacteria (test code = UA Bacteria) Many                      A  

          

 

             UA Squam Epithelial (test code = UA TNTC                      A    

        



             Squam Epithelial)                                        



Urine Drug [id number] 23:59:42





             Test Item    Value        Reference Range Interpretation Comments

 

             Amphetamine Screen Ur POSITIVE     Negative     A            



             (test code = Amphetamine                                        



             Screen Ur)                                          

 

             Barbiturate Screen Ur Negative     Negative                  



             (test code = Barbiturate                                        



             Screen Ur)                                          

 

             Benzodiazepines Ur (test POSITIVE     Negative     A            



             code = Benzodiazepines                                        



             Ur)                                                 

 

             Cocaine Screen Ur (test Negative     Negative                  



             code = Cocaine Screen                                        



             Ur)                                                 

 

             U Methadone Scr (test Negative     Negative                  



             code = U Methadone Scr)                                        

 

             Opiate Screen Ur (test Negative     Negative                  



             code = Opiate Screen Ur)                                        

 

             U PCP Scrn (test code = Negative     Negative                  



             U PCP Scrn)                                         

 

             Cannabinoid Screen Ur Negative     Negative                  



             (test code = Cannabinoid                                        



             Screen Ur)                                          

 

             U TCA (test code = U Negative     Negative                  The res

ults of all



             TCA)                                                drug screen elinor

ts are



                                                                 only preliminar

y.



                                                                 Clinical



                                                                 consideration a

nd



                                                                 professional ju

dgment



                                                                 should be appli

ed to



                                                                 any drug of abu

se



                                                                 test result,



                                                                 particularly wh

en



                                                                 preliminary pos

itive



                                                                 results are obt

ained.



                                                                 Please order a



                                                                 separate confir

matory



                                                                 test if desired

.



HCG Qualitative [id number] 23:54:43





             Test Item    Value        Reference Range Interpretation Comments

 

             hCG Ur (test code = Negative                               If the r

esult is



             hCG Ur)                                             "Negative" in p

atients



                                                                 suspected to be



                                                                 pregnant, recom

mend



                                                                 retest with a s

ample



                                                                 obtained 48 to 

72



                                                                 hours later, or

 by



                                                                 ordering a



                                                                 quantitative as

say. If



                                                                 the result is



                                                                 "Borderline" te

sting



                                                                 should be repea

gianfranco in



                                                                 48 to 72 hours.

 

             Lot # (test code = [id number]                    N            



             Lot #)                                              

 

             Expiration Dt (test 2020                  N            



             code = Expiration Dt)                                        

 

             Neg Control (test Negative                               



             code = Neg Control)                                        

 

             Pos Control (test Positive                               



             code = Pos Control)                                        

 

             Internal QC (test Acceptable                             



             code = Internal QC)                                        



Urinalysis with Culture, if [id number] 23:52:04





             Test Item    Value        Reference Range Interpretation Comments

 

             UA Color (test code = UA YELLO        Yellow                    



             Color)                                              

 

             UA Appear (test code = SCLD         Clear        A            



             UA Appear)                                          

 

             UA pH (test code = UA 6.5                                    



             pH)                                                 

 

             UA Spec Grav (test code 1.011        1.001-1.035               



             = UA Spec Grav)                                        

 

             UA Glucose (test code = NEG          Negative                  



             UA Glucose)                                         

 

             UA Bili (test code = UA NEG          Negative                  



             Bili)                                               

 

             UA Ketones (test code = NEG          Negative                  



             UA Ketones)                                         

 

             UA Blood (test code = UA NEG          Negative                  



             Blood)                                              

 

             UA Protein (test code = NEG          Negative                  



             UA Protein)                                         

 

             UA Urobilinogen (test 1 mg/dL      >0.2         A            



             code = UA Urobilinogen)                                        

 

             UA Nitrite (test code = POS          Negative     A            



             UA Nitrite)                                         

 

             UA Leuk Est (test code = NEG          Negative                  



             UA Leuk Est)                                        

 

             UA Micro Ind? (test code Indicated    Not Indicated A            Re

sult created by



             = UA Micro Ind?)                                        rule



                                                                 [id number]

_IND



Automated [id number] 23:48:39





             Test Item    Value        Reference Range Interpretation Comments

 

             Neutro Auto (test code = Neutro 75.7 %       36.0-70.0    H        

    



             Auto)                                               

 

             Lymph Auto (test code = Lymph Auto) 14.1 %       12.0-44.0         

        

 

             Mono Auto (test code = Mono Auto) 7.6 %        0.0-11.0            

      

 

             Eos, Auto (test code = Eos, Auto) 1.8 %        0.0-7.0             

      

 

             Basophil Auto (test code = Basophil 0.5 %        0.0-2.0           

        



             Auto)                                               

 

             Neutro Absolute (test code = Neutro 12.7 x10     1.6-7.4      H    

        



             Absolute)                                           

 

             Lymph Absolute (test code = Lymph 2.38 x10     .50-4.60            

      



             Absolute)                                           

 

             Mono Absolute (test code = Mono 1.28 x10     .00-1.20     H        

    



             Absolute)                                           

 

             Eos Absolute (test code = Eos 0.31 x10     0.00-0.74               

  



             Absolute)                                           

 

             Baso Absolute (test code = Baso 0.09 x10     0.00-0.21             

    



             Absolute)                                           



IG [id number] 23:48:39





             Test Item    Value        Reference Range Interpretation Comments

 

             IG (test code = IG) 0.3 %        0.0-5.0                   

 

             IG Abs (test code = IG Abs) 0 x10                     N            



Complete Blood Count with [id number] 23:48:38





             Test Item    Value        Reference Range Interpretation Comments

 

             WBC (test code = WBC) 16.8 x10     4.4-10.5     H            

 

             RBC (test code = RBC) 4.06 x10     3.75-5.20                 

 

             Hgb (test code = Hgb) 12.7 g/dL    12.2-14.8                 

 

             MCV (test code = MCV) 94.10 fL     80..00              

 

             Hct (test code = Hct) 38.2 %       36.5-44.4                 

 

             MCHC (test code = 33.20 g/dL   32.00-37.50               



             MCHC)                                               

 

             RDW CV (test code = 13.2 %       11.5-14.5                 



             RDW CV)                                             

 

             MCH (test code = MCH) 31.3 pg      27.0-32.5                 

 

             Platelets (test code = 363.0 x10    140.0-440.0               



             Platelets)                                          

 

             MPV (test code = MPV) 10.0 fL                   N            

 

             Slide Review (test Auto         Auto                      Result cr

eated by



             code = Slide Review)                                        GL_SJM_

SLIDE_REV_AUTO

 

             nRBC (test code = 0                         N            



             nRBC)                                               

 

             NRBC Abs (test code = 0.00 x10                  N            



             NRBC Abs)                                           

 

             IPF (test code = IPF) 0 %                       N            



RPR, [id number] 05:53:00





             Test Item    Value        Reference Range Interpretation Comments

 

             RPR (test code = RPR) Non-Reactive Non-Reactive N            



BHCG, Serum, [id number] 01:39:00





             Test Item    Value        Reference Range Interpretation Comments

 

             Preg Qual [Se] (test code = BSHCG) Negative     Negative     N     

       



BHCG, Serum, [id number] 01:09:00





             Test Item    Value        Reference Range Interpretation Comments

 

             B hCG, Quant (test <1 mIU/mL                              Weeks of 

Gestation



             code = BHCGQT)                                        Ranges (mIU/m

L)3 weeks



                                                                 5.40 - 72.04 we

eks 10.2



                                                                 - 7085 weeks 21

7 - 68487



                                                                 weeks 152 - 321

777 weeks



                                                                 4059 - 3035004 

weeks



                                                                 22311 - 5134642

 weeks



                                                                 17986 - 0474666

0 weeks



                                                                 11317 - 0556411

2 weeks



                                                                 19791 - 1385384

4 weeks



                                                                 29719 - 9781170

 weeks



                                                                 67286 - 9879514

 weeks



                                                                 4404 - 5044464 

weeks



                                                                 8240 - 0364458 

weeks



                                                                 0749 - 70613



Thyroid Stimulating Hormone (TSH)2017 01:09:00





             Test Item    Value        Reference Range Interpretation Comments

 

             TSH (test code = TSH) 0.93 mIU/mL  0.270-4.200  N            



Lipid [id number] 01:05:00





             Test Item    Value        Reference Range Interpretation Comments

 

             Cholesterol (test 163 mg/dL    0-200        N            



             code = CHOL)                                        

 

             Triglycerides (test 108 mg/dL    9-200        N            



             code = TRIG)                                        

 

             HDL (test code = 41 mg/dL     50-60        L            



             HDL)                                                

 

             Chol/HDL (test code 4.0 Ratio    0.0-4.4      N            



             = CHOLPHDL)                                         

 

             LDL, Calculated 100          0-130        N            (NOTE)RISK O

F HEART



             (test code = LDLC)                                        DISEASEPu

blished by



                                                                 American Heart



                                                                 AssociationAnal

yte



                                                                 Optimal Boderli

ne



                                                                 Increased RiskC

HOL <200



                                                                 200-239 >240TRI

G <150



                                                                 150-199 >200HDL

 Male:



                                                                 >60 <40HDL Fema

le: >60



                                                                 <50LDL <100 130

-159



                                                                 >160LDL NEAR [facility] IS



                                                                 100-129

 

             VLDL (test code = 22 mg/dL     5-40         N            



             VLDL)                                               

 

             LDL/HDL (test code = 2                                      



             LDLPHDL)                                            



Comprehensive Metabolic [id number] 21:02:00





             Test Item    Value        Reference Range Interpretation Comments

 

             Sodium (test code = 140 mmol/L   135-145      N            



             NA)                                                 

 

             Potassium (test 3.6 mmol/L   3.5-5.1      N            



             code = K)                                           

 

             Chloride (test code 103 mmol/L          N            



             = CL)                                               

 

             Carbon Dioxide 26 mmol/L    22-29        N            



             (test code = CO2)                                        

 

             Glucose (test code 89 mg/dL            N            



             = GLU)                                              

 

             Blood Urea Nitrogen 13 mg/dL     6-20         N            



             (test code = BUN)                                        

 

             Creatinine (test 0.8 mg/dL    0.5-0.9      N            



             code = CREAT)                                        

 

             Calcium (test code 10.0 mg/dL   8.3-10.5     N            



             = CA)                                               

 

             Prot Total (test 7.0 g/dL     6.4-8.3      N            



             code = TP)                                          

 

             Albumin (test code 4.2 g/dL     3.5-5.2      N            



             = ALB)                                              

 

             A/G Ratio (test 1.5 Ratio                              



             code = AGRATIO)                                        

 

             Globulin (test code 2.8          2.9-3.1      L            



             = GLOB)                                             

 

             Bili Total (test 0.6 mg/dL    0.1-0.9      N            



             code = TBIL)                                        

 

             Alk Phos (test code 91 U/L              N            



             = APHOS)                                            

 

             AST (test code = 19 U/L       1-32         N            



             AST)                                                

 

             ALT (test code = 14 U/L       1-33         N            



             ALT)                                                

 

             BUN/Creatinine 16.3                                   



             Ratio (test code =                                        



             BCRATIO)                                            

 

             Anion Gap (test 11 mmol/L    7-16         N            



             code = AGAP)                                        

 

             Estimated GFR (test >60                                    eGFR (es

timated



             code = GFR)  mL/min/1.73m2                           Glomerular Nguyễn

tration



                                                                 Rate) is an est

imated



                                                                 value,calculate

d from



                                                                 the patient's s

harman



                                                                 creatinine usin

g the



                                                                 MDRD equation.I

t is



                                                                 NOT the patient

's



                                                                 actual GFR. The

 eGFR



                                                                 provides a more



                                                                 clinicallyusefu

l



                                                                 measure of kidn

ey



                                                                 disease than se

rum



                                                                 creatinine



                                                                 alone.***This



                                                                 calculation rimma

es sex



                                                                 and race into



                                                                 account, if the



                                                                 informationis



                                                                 provided. If th

e race



                                                                 is not provided

, and



                                                                 the patient



                                                                 isAfrican-Ameri

can,



                                                                 multiply by 1.2

12. If



                                                                 sex is not prov

ided,



                                                                 and thepatient 

is



                                                                 female, multipl

y by



                                                                 0.742. Results 

for



                                                                 patients <18 ye

ars



                                                                 ofage have not 

been



                                                                 validated by th

e MDRD



                                                                 study and shoul

d be



                                                                 interpretedwith



                                                                 caution.eGFR Re

sult



                                                                 Interpretation:

eGFR >



                                                                 or = 60 is in t

he



                                                                 Normal RangeeGF

R < 60



                                                                 may mean kidney



                                                                 diseaseeGFR < 1

5 may



                                                                 mean kidney



                                                                 failure***Range

s



                                                                 recommended by 

the



                                                                 [employer]



                                                                 Foundation,http

://nkd



                                                                 ep.nih.gov



Alcohol/Ethanol, [id number] 21:02:00





             Test Item    Value        Reference Range Interpretation Comments

 

             Alcohol, Ethyl <0.01 g/dL   0.00-0.01    N            Intoxicated 0

.080 g/dL



             (test code = ETOH)                                        or more



CBC with [id number] 20:35:00





             Test Item    Value        Reference Range Interpretation Comments

 

             WBC (test code = WBC) 10.3 K/cumm  4.4-10.5     N            

 

             RBC (test code = RBC) 4.37 M/cumm  3.75-5.20    N            

 

             Hemoglobin (test code = HGB) 13.1 gm/dL   12.2-14.8    N           

 

 

             Hematocrit (test code = HCT) 41.5 %       36.5-44.4    N           

 

 

             MCV (test code = MCV) 94.9 fL             N            

 

             MCH (test code = MCH) 30.1 pg      27.0-32.5    N            

 

             MCHC (test code = MCHC) 31.7 g/dL    32.0-37.5    L            

 

             RDW (test code = RDW) 12.9 %       11.5-14.5    N            

 

             Platelet Count (test code = 327 K/cumm   140-440      N            



             PLTCT)                                              

 

             MPV (test code = MPV) 7.0 fL                                 

 

             Diff Method (test code = DIFFM) Auto                               

    

 

             Neutrophil (test code = NEUT) 74.0 %       36-70        H          

  

 

             Lymphocyte (test code = LYMPH) 17.6 %       12-44        N         

   

 

             Monocyte (test code = MONO) 6.4 %        0-11         N            

 

             Eosinophil (test code = EOS) 1.5 %        0-7          N           

 

 

             Basophil (test code = BASO) 0.5 %        0-2          N            

 

             Neutro Abs (test code = ANEUT) 7.6 K/cumm   1.6-7.4      H         

   

 

             Lymph Abs (test code = ALYMPH) 1.8 K/cumm   0.5-4.6      N         

   

 

             Mono Abs (test code = AMONO) 0.7 K/cumm   0.0-1.2      N           

 

 

             Eos Abs (test code = AEOS) 0.16 K/cumm  0.00-0.74    N            

 

             Baso Abs (test code = ABASO) 0.1 K/cumm   0.00-0.21    N           

 



[id number] 20:33:00





             Test Item    Value        Reference Range Interpretation Comments

 

             Amphetamine (test code POSITIVE     Negative     A            For d

iagnostic purposes



             = AMPH)                                             only, positive 

results



                                                                 should always b

e



                                                                 assessedin



                                                                 conjunctionwith

 the



                                                                 patient's medic

al



                                                                 history,clinica

l



                                                                 examination and



                                                                 otherfindings.T

o



                                                                 fulfill legal



                                                                 requirements, a

 more



                                                                 specific altern

ate



                                                                 chemical method

must be



                                                                 used inorder to

 obtain



                                                                 a Confirmed judith

lytical



                                                                 result. GC/MS i

s the



                                                                 preferred confi

rmatory



                                                                 method.

 

             Barbiturates (test Negative     Negative     N            



             code = GENEVIEVE)                                        

 

             Benzodiazepine (test Negative     Negative     N            



             code = IRENE)                                        

 

             Cocaine (test code = Negative     Negative     N            



             COCA)                                               

 

             Methadone (test code = Negative     Negative     N            



             MTHD)                                               

 

             Opiates (test code = Negative     Negative     N            



             OPIA)                                               

 

             PCP (test code = PCP) Negative     Negative     N            

 

             Propoxyphene (test Negative     Negative     N            



             code = PROPOX)                                        

 

             THC (test code = THC) Negative     Negative     N            



Urinalysis [id number] 20:23:00





             Test Item    Value        Reference Range Interpretation Comments

 

             Color (test code = COLOR) Yellow       Yellow,Straw,Pl N           

 



                                       yellow                    

 

             Clarity (test code = Sl Cloudy    Clear        A            



             CLAR)                                               

 

             Specific Gravity (test 1.007        1.001-1.035  N            



             code = SPGR)                                        

 

             pH (test code = PH) 6.0          5.0-9.0      N            

 

             Ketone (test code = KET) Negative mg/dL Negative     N            

 

             Glucose (test code = Negative mg/dL Negative     N            



             GLUCUR)                                             

 

             Protein (test code = Negative mg/dL Negative     N            



             PROT)                                               

 

             Bilirubin (test code = Negative mg/dL Negative     N            



             BILI)                                               

 

             Occult Blood (test code = Moderate     Negative     A            



             UDOB)                                               

 

             Urobilinogen (test code = 0.2 mg/dL    0.2-1.0      N            



             UROB)                                               

 

             Nitrite (test code = NIT) Positive     Negative     A            

 

             Leuk Esterase (test code Moderate     Negative     A            



             = LEUK)                                             

 

             Micros Exam (test code = Indicated                              



             MEXAM)                                              

 

             Epithelial Cells (test 50+ /LPF     0-30         A            



             code = EPI)                                         

 

             WBC, Urine (test code = 41-50 /HPF   0-5          A            



             UWBC)                                               

 

             RBC, Urine (test code = 3-5 /HPF     0-5          A            



             URBC)                                               

 

             Bacteria (test code = Many /HPF                              



             BACT)                                               



[id number] 16:21:00





             Test Item    Value        Reference Range Interpretation Comments

 

             Lithium (test code = LI) 0.6 mmol/l   0.6-1.2                   



[facility] (Ultra Sensitive)2017 16:21:00





             Test Item    Value        Reference Range Interpretation Comments

 

             TSH (test code = TSH) 2.14 mIU/L   0.47-4.68                 



[facility]





             Test Item    Value        Reference Range Interpretation Comments

 

             Glucose (test code 77 mg/dl                         



             = GLU)                                              

 

             BUN (test code = 9.0 mg/dl    6.0-17.0                  



             BUN)                                                

 

             Creatinine (test 0.7 mg/dl    0.4-1.2                   



             code = CREA)                                        

 

             Sodium (test code = 139 mmol/l   137-145                   



             NA)                                                 

 

             Potassium (test 4.7 mmol/l   3.5-5.0                   



             code = K)                                           

 

             Chloride (test code 107 mmol/l                       



             = CL)                                               

 

             CO2 (test code = 22 mmol/l    22-30                     



             CO2)                                                

 

             Anion Gap (test 11                                     



             code = GAP)                                         

 

             Calcium (test code 9.8 mg/dl    8.4-10.2                  



             = CALC)                                             

 

             T Protein (test 8.0 gm/dl    5.1-8.7                   



             code = TP)                                          

 

             Albumin (test code 4.4 gm/dl    3.5-4.6                   



             = ALB)                                              

 

             A/G Ratio (test 1.2 %        1.1-2.2                   



             code = AGRAT)                                        

 

             AST (SGOT) (test 20 U/L       11-36                     



             code = AST)                                         

 

             ALT (SGPT) (test 29 U/L       11-40                     



             code = ALT)                                         

 

             Alkaline Phos (test 108 U/L                          



             code = ALKP)                                        

 

             Total Bilirubin 0.6 mg/dl    0.2-1.2                   



             (test code = TBIL)                                        

 

             Globulin (test code 3.6 gm/dl    2.3-3.5      H            



             = GLOBU)                                            

 

             Calcium, Corrected 9.5 mg/dl    8.4-10.2                  Various f

ormulas exist



             (test code =                                        for corrected s

harman



             CALCCORR)                                           calcium results

, each



                                                                 yielding differ

ent



                                                                 values. This co

rrected



                                                                 result was base

d on



                                                                 the formula: Co

rrected



                                                                 Calcium = Serum

Calcium



                                                                 + [0.8 * ( 4 -



                                                                 SerumAlbumin)]

 

             EGFR if  >60                                    



             American (test code mL/min/1.73m\                           



             = EGFRAA)    S\2                                    

 

             EGFR if Non- >60                                    Estimate

d Glomerular



             American (test code mL/min/1.73m\                           Filtrat

ion Rate (eGFR)



             = EGFRNA)    S\2                                    Reference Inter

vals



                                                                 Decision Points

 for 18



                                                                 years and older

 and



                                                                 average body ma

ss: >=



                                                                 60 Does not exc

lude



                                                                 kidney disease.

 30 -



                                                                 59 Suggests mod

erate



                                                                 chronic kidney 

disease



                                                                 and indicates t

he need



                                                                 for further



                                                                 investigation



                                                                 including asses

sment



                                                                 of proteinuria 

and



                                                                 cardiovascular



                                                                 factors. < 30 U

sually



                                                                 indicates a nee

d for



                                                                 referral for



                                                                 assessment and



                                                                 management of c

hronic



                                                                 kidney failure.



[facility]

## 2022-11-15 NOTE — EDPHYS
Physician Documentation                                                                           

 [facility]                                                                 

Name: Alka Judd                                                                               

Age: 52 yrs                                                                                       

Sex: Female                                                                                       

: 1970                                                                                   

MRN: [id number]                                                                                   

Arrival Date: 11/15/2022                                                                          

Time: 15:56                                                                                       

Account#: [id number]                                                                            

Bed 20                                                                                            

Private MD:                                                                                       

ED Physician Jagjit De Los Santos                                                                         

HPI:                                                                                              

11/15                                                                                             

16:12 This 52 yrs old Female presents to ER via EMS with complaints of anxiety,               jmm 

      hallucinations.                                                                             

16:12 Onset: The symptoms/episode began/occurred gradually. This is a 52 year old female with jmm 

      a history of bipolar/anxiety that presents to the ED with complaints of anxiety and         

      hallucinations. Patient was picked up by PD after running across a street. Patient had      

      told PD she was being followed by 3 people. Patient states having similar episodes          

      previously. Patient has appt with [facility] on the . .                                 

                                                                                                  

Historical:                                                                                       

- Allergies:                                                                                      

16:31 PENICILLINS;                                                                            kc6 

16:31 Risperdal;                                                                              kc6 

- Home Meds:                                                                                      

17:18 None [Active];                                                                          kc6 

- PMHx:                                                                                           

17:18 Bipolar disorder; Hypertensive disorder;                                                kc6 

- PSHx:                                                                                           

17:18 righ hip surgery;                                                                       kc6 

                                                                                                  

- Immunization history:: Adult Immunizations not up to date, Client reports having NOT            

  received the Covid vaccine. Flu vaccine is not up to date.                                      

- Social history:: Smoking status: Patient reports the use of cigarette tobacco                   

  products, smokes two packs cigarettes per day. Patient uses alcohol, occasionally.              

                                                                                                  

                                                                                                  

ROS:                                                                                              

16:12 Constitutional: Negative for fever, chills, and weight loss, Cardiovascular: Negative   jmm 

      for chest pain, palpitations, and edema, Respiratory: Negative for shortness of breath,     

      cough, wheezing, and pleuritic chest pain.                                                  

16:12 Psych: Positive for anxiety, visual hallucinations.                                         

16:12 All other systems are negative.                                                             

                                                                                                  

Exam:                                                                                             

16:12 Constitutional:  This is a well developed, well nourished patient who is awake, alert,  jmm 

      and in no acute distress. Head/Face:  atraumatic. Eyes:  EOMI, no conjunctival erythema     

      appreciated ENT:  Moist Mucus Membranes Neck:  Trachea midline, Supple Chest/axilla:        

      Normal chest wall appearance and motion.   Cardiovascular:  Regular rate and rhythm.        

      No edema appreciated Respiratory:  Normal respirations, no respiratory distress             

      appreciated Abdomen/GI:  Non distended Back:  Normal ROM Skin:  General appearance          

      color normal MS/ Extremity:  Moves all extremities, no obvious deformities appreciated,     

      no edema noted to the lower extremities  Neuro:  Awake and alert                            

16:12 Psych: Behavior/mood is pleasant, cooperative, anxious, Delusions/hallucinations            

                                                                                                  

Vital Signs:                                                                                      

15:56  / 117; Pulse 96; Resp 18 S; Temp 98.2(O); Pulse Ox 98% on R/A; Weight 97.52 kg   kc6 

      (R); Height 5 ft. 2 in. (157.48 cm) (R); Pain 0/10;                                         

17:25  / 124; Pulse 84; Resp 18 S; Pulse Ox 100% on R/A; Pain 0/10;                     kc6 

18:13  / 98; Pulse 92; Resp 18 S; Pulse Ox 100% on R/A; Pain 0/10;                      kc6 

19:18  / 93; Pulse 90; Resp 18 S; Pulse Ox 100% on R/A;                                 kc6 

15:56 Body Mass Index 39.32 (97.52 kg, 157.48 cm)                                             [facility] 

                                                                                                  

MDM:                                                                                              

15:57 Patient medically screened.                                                             [facility] 

19:18 Data reviewed: vital signs, nurses notes. Counseling: I had a detailed discussion with  [facility] 

      the patient and/or guardian regarding: the historical points, exam findings, and any        

      diagnostic results supporting the discharge/admit diagnosis, lab results, the need to       

      transfer to another facility. ED course: [facility] accepted to [facility] for             

      psychosis. .                                                                                

                                                                                                  

11/15                                                                                             

15:57 Order name: Acetaminophen; Complete Time: 16:55                                         [facility] 

11/15                                                                                             

15:57 Order name: Basic Metabolic Panel; Complete Time: 16:55                                 [facility] 

11/15                                                                                             

15:57 Order name: CBC with Diff; Complete Time: 16:55                                         [facility] 

11/15                                                                                             

15:57 Order name: ETOH Level; Complete Time: 17:04                                            [facility] 

11/15                                                                                             

15:57 Order name: Hepatic Function; Complete Time: 16:55                                      [facility] 

11/15                                                                                             

15:57 Order name: PT-INR; Complete Time: 16:55                                                [facility] 

11/15                                                                                             

15:57 Order name: Ptt, Activated; Complete Time: 16:55                                        [facility] 

11/15                                                                                             

15:57 Order name: Salicylate; Complete Time: 18:31                                            [facility] 

11/15                                                                                             

15:57 Order name: Urine Drug Screen; Complete Time: 18:07                                     [facility] 

11/15                                                                                             

17:04 Order name: SARS RAPID; Complete Time: 18:07                                            [facility] 

11/15                                                                                             

17:16 Order name: Urine Dipstick-Ancillary; Complete Time: 17:17                              [facility]

11/15                                                                                             

17:18 Order name: Urine Pregnancy--Ancillary (enter results); Complete Time: 17:46            bd  

11/15                                                                                             

15:57 Order name: EKG - Nurse/Tech; Complete Time: 17:17                                      [facility] 

11/15                                                                                             

15:57 Order name: IV Saline Lock; Complete Time: 16:28                                        [facility] 

11/15                                                                                             

15:57 Order name: Labs collected and sent; Complete Time: 16:28                               [facility] 

11/15                                                                                             

15:57 Order name: Suicide Screening ([place]); Complete Time: 17:17                          [facility] 

11/15                                                                                             

15:57 Order name: Urine Dipstick-Ancillary (obtain specimen); Complete Time: 17:17            [facility] 

11/15                                                                                             

15:57 Order name: Urine Pregnancy Test (obtain specimen); Complete Time: 17:17                [facility] 

                                                                                                  

Administered Medications:                                                                         

16:28 Drug: Ativan (LORazepam) 1 mg Route: PO;                                                kc6 

17:28 Follow up: Response: No adverse reaction; Anxiety unchanged; RASS: Alert and Calm (0)   kc6 

18:07 Drug: hydrALAZINE 10 mg Route: PO;                                                      kc6 

19:07 Follow up: Response: No adverse reaction; Blood pressure is lowered                     kc6 

19:20 Drug: Bactrim (trimethoprim-sulfamethoxazole) (160 mg-800 mg (DS) 1 tablet Route: PO;   ha1 

19:36 Follow up: Response: No adverse reaction                                                ha1 

                                                                                                  

                                                                                                  

Disposition Summary:                                                                              

11/15/22 19:20                                                                                    

Transfer Ordered                                                                                  

      Transfer Location: [facility]                                                       jmm 

      Reason: Higher level of care                                                            jmm 

      Condition: Stable                                                                       jmm 

      Problem: an acute exacerbation                                                          jmm 

      Symptoms: are unchanged                                                                 jmm 

      Accepting Physician: [facility](11/15/22 19:36)                                     ha1 

      Diagnosis                                                                                   

        - Acute Psychosis - Auditory and Visual Hallucinations                                jmm 

      Forms:                                                                                      

        - Medication Reconciliation Form                                                      jmm 

        - SBAR form                                                                           jmm 

Signatures:                                                                                       

Dispatcher [employer]                           EDJoão Taylor PA PA jmm Ayala, Heidy RN                        RN   ha1                                                  

Lissa Lea RN                   RN   kc6                                                  

                                                                                                  

Corrections: (The following items were deleted from the chart)                                    

19:36 19:20 [facility] jmm                                                                ha1 

                                                                                                  

**************************************************************************************************

## 2022-11-19 NOTE — EKG
Test Date:    2022-11-15               Test Time:    17:17:22

Technician:   CASIE                                    

                                                     

MEASUREMENT RESULTS:                                       

Intervals:                                           

Rate:         75                                     

ME:           182                                    

QRSD:         82                                     

QT:           398                                    

QTc:          444                                    

Axis:                                                

P:            73                                     

ME:           182                                    

QRS:          63                                     

T:            62                                     

                                                     

INTERPRETIVE STATEMENTS:                                       

                                                     

Sinus rhythm with marked sinus arrhythmia

Right atrial enlargement

Cannot rule out Anterior infarct, age undetermined

Abnormal ECG

Compared to ECG 11/08/2022 10:32:47

No significant changes



Electronically Signed On 11-19-22 19:11:35 CST by Ashok Orta

## 2023-01-28 ENCOUNTER — HOSPITAL ENCOUNTER (EMERGENCY)
Dept: HOSPITAL 97 - ER | Age: 53
Discharge: HOME | End: 2023-01-28
Payer: COMMERCIAL

## 2023-01-28 DIAGNOSIS — N39.0: Primary | ICD-10-CM

## 2023-01-28 DIAGNOSIS — Z88.0: ICD-10-CM

## 2023-01-28 DIAGNOSIS — Z88.8: ICD-10-CM

## 2023-01-28 DIAGNOSIS — I10: ICD-10-CM

## 2023-01-28 LAB
ALBUMIN SERPL BCP-MCNC: 3.5 G/DL (ref 3.4–5)
ALP SERPL-CCNC: 114 U/L (ref 45–117)
ALT SERPL W P-5'-P-CCNC: 19 U/L (ref 13–56)
AST SERPL W P-5'-P-CCNC: 13 U/L (ref 15–37)
BUN BLD-MCNC: 12 MG/DL (ref 7–18)
GLUCOSE SERPLBLD-MCNC: 131 MG/DL (ref 74–106)
HCT VFR BLD CALC: 39.7 % (ref 36–45)
LIPASE SERPL-CCNC: 121 U/L (ref 73–393)
LYMPHOCYTES # SPEC AUTO: 1.4 K/UL (ref 0.7–4.9)
MCV RBC: 92.1 FL (ref 80–100)
PMV BLD: 8 FL (ref 7.6–11.3)
POTASSIUM SERPL-SCNC: 3.7 MMOL/L (ref 3.5–5.1)
RBC # BLD: 4.31 M/UL (ref 3.86–4.86)

## 2023-01-28 PROCEDURE — 96374 THER/PROPH/DIAG INJ IV PUSH: CPT

## 2023-01-28 PROCEDURE — 87086 URINE CULTURE/COLONY COUNT: CPT

## 2023-01-28 PROCEDURE — 83690 ASSAY OF LIPASE: CPT

## 2023-01-28 PROCEDURE — 36415 COLL VENOUS BLD VENIPUNCTURE: CPT

## 2023-01-28 PROCEDURE — 81003 URINALYSIS AUTO W/O SCOPE: CPT

## 2023-01-28 PROCEDURE — 87088 URINE BACTERIA CULTURE: CPT

## 2023-01-28 PROCEDURE — 87186 SC STD MICRODIL/AGAR DIL: CPT

## 2023-01-28 PROCEDURE — 85025 COMPLETE CBC W/AUTO DIFF WBC: CPT

## 2023-01-28 PROCEDURE — 80053 COMPREHEN METABOLIC PANEL: CPT

## 2023-01-28 PROCEDURE — 99284 EMERGENCY DEPT VISIT MOD MDM: CPT

## 2023-01-28 PROCEDURE — 96361 HYDRATE IV INFUSION ADD-ON: CPT

## 2023-01-28 PROCEDURE — 81015 MICROSCOPIC EXAM OF URINE: CPT

## 2023-01-28 PROCEDURE — 87077 CULTURE AEROBIC IDENTIFY: CPT

## 2023-01-28 PROCEDURE — 74177 CT ABD & PELVIS W/CONTRAST: CPT

## 2023-01-28 NOTE — ER
Nurse's Notes                                                                                     

 [facility]                                                                 

Name: Alka Judd                                                                               

Age: 52 yrs                                                                                       

Sex: Female                                                                                       

: 1970                                                                                   

MRN: [id number]                                                                                   

Arrival Date: 2023                                                                          

Time: 14:31                                                                                       

Account#: [id number]                                                                            

Bed 8                                                                                             

Private MD:                                                                                       

Diagnosis: UTI/ Urinary tract infection, site not specified;Abdominal pain, Generalized           

                                                                                                  

Presentation:                                                                                     

                                                                                             

14:37 Chief complaint: Abdominal swelling x 2 months, upper abdominal pain and indigestion x  hb  

      1 week. Reports she is postmenopausal but is concerned she may be pregnant. Coronavirus     

      screen: At this time, the client does not indicate any symptoms associated with             

      coronavirus-19. Ebola Screen: No symptoms or risks identified at this time. Initial         

      Sepsis Screen: Does the patient meet any 2 criteria? No. Patient's initial sepsis           

      screen is negative. Does the patient have a suspected source of infection? No.              

      Patient's initial sepsis screen is negative. Risk Assessment: Do you want to hurt           

      yourself or someone else? Patient reports no desire to harm self or others. Onset of        

      symptoms was 2023.                                                                 

14:37 Method Of Arrival: Ambulatory                                                           hb  

14:37 Acuity: TEMO 3                                                                           hb  

                                                                                                  

Historical:                                                                                       

- Allergies:                                                                                      

14:41 PENICILLINS;                                                                            mb9 

14:41 Risperdal;                                                                              mb9 

- Home Meds:                                                                                      

14:41 None [Active];                                                                          mb9 

- PMHx:                                                                                           

14:41 Bipolar disorder; Hypertensive disorder;                                                mb9 

- PSHx:                                                                                           

14:41 righ hip surgery;                                                                       mb9 

                                                                                                  

- Immunization history:: Adult Immunizations unknown.                                             

- Social history:: Smoking status: Patient reports the use of cigarette tobacco                   

  products.                                                                                       

                                                                                                  

                                                                                                  

Screenin:41 [facility] ED Fall Risk Assessment (Adult) History of falling in the last 3 months,       ph  

      including since admission No falls in past 3 months (0 pts) Confusion or Disorientation     

      No (0 pts) Intoxicated or Sedated No (0 pts) Impaired Gait No (0 pts) Mobility Assist       

      Device Used No (0 pt) Altered Elimination No (0 pt) Score/Fall Risk Level 0 - 2 = Low       

      Risk Oriented to surroundings, Maintained a safe environment, Hourly rounding (assess       

      needs \T\ fall precautionary measures) done. Abuse screen: Denies threats or abuse.         

      Denies injuries from another. Nutritional screening: No deficits noted. Tuberculosis        

      screening: No symptoms or risk factors identified.                                          

                                                                                                  

Assessment:                                                                                       

15:00 General: Appears in no apparent distress. comfortable, Behavior is cooperative,         ph  

      appropriate for age. Pain: Complains of pain in abdomen. Neuro: Level of Consciousness      

      is awake, alert, obeys commands, Oriented to person, place, time, situation.                

      Cardiovascular: Capillary refill < 3 seconds in bilateral fingers Patient's skin is         

      warm and dry. Respiratory: Airway is patent Respiratory effort is even, unlabored,          

      Respiratory pattern is regular, symmetrical. GI: Abdomen is non-distended, Reports          

      upper abdominal pain, Patient currently denies vomiting. Derm: Skin is healthy with         

      good turgor, Skin is pink, warm \T\ dry. Musculoskeletal: Circulation, motion, and          

      sensation intact. Range of motion: intact in all extremities.                               

16:00 Reassessment: Patient appears in no apparent distress at this time. Patient and/or      ph  

      family updated on plan of care and expected duration. Pain level reassessed. Patient is     

      alert, oriented x 3, equal unlabored respirations, skin warm/dry/pink.                      

17:00 Reassessment: Patient appears in no apparent distress at this time. Patient and/or      ph  

      family updated on plan of care and expected duration. Pain level reassessed. Patient is     

      alert, oriented x 3, equal unlabored respirations, skin warm/dry/pink.                      

18:13 Reassessment: Patient appears in no apparent distress at this time. Patient and/or      ph  

      family updated on plan of care and expected duration. Pain level reassessed. Patient is     

      alert, oriented x 3, equal unlabored respirations, skin warm/dry/pink.                      

                                                                                                  

Vital Signs:                                                                                      

14:37  / 79; Pulse 88; Resp 16; Temp 97.4; Pulse Ox 100% on R/A; Weight 100.24 kg;      hb  

      Height 5 ft. 2 in. (157.48 cm); Pain 7/10;                                                  

16:03  / 78; Pulse 78; Resp 18; Pulse Ox 100% on R/A;                                   ph  

18:09  / 106; Pulse 70; Resp 18; Pulse Ox 100% on R/A;                                  ph  

19:10  / 98; Pulse 71; Resp 18; Temp 97.9; Pulse Ox 100% on R/A;                        ph  

14:37 Body Mass Index 40.42 (100.24 kg, 157.48 cm)                                              

                                                                                                  

ED Course:                                                                                        

14:31 Patient arrived in ED.                                                                  rg4 

14:33 Calderon, Dorothy, RN is Primary Nurse.                                                    ph  

14:40 Triage completed.                                                                       hb  

14:40 Fred Valdez MD is Attending Physician.                                              kdr 

14:41 Arm band placed on.                                                                     mb9 

14:42 Patient has correct armband on for positive identification. Placed in gown. Bed in low  ph  

      position. Call light in reach. Side rails up X 1. Pulse ox on. NIBP on.                     

15:53 Initial lab(s) drawn, by me, sent to lab. Inserted saline lock: 20 gauge in right       ph  

      antecubital area, using aseptic technique. Blood collected.                                 

16:27 CT Abd/Pelvis - IV Contrast Only In Process Unspecified.                                EDMS

19:10 No provider procedures requiring assistance completed. IV discontinued, intact,         ph  

      bleeding controlled, No redness/swelling at site. Pressure dressing applied.                

                                                                                                  

Administered Medications:                                                                         

16:02 Drug: NS 0.9% 1000 ml Route: IV; Rate: 1 bolus; Site: right antecubital;                ph  

17:30 Follow up: Response: No adverse reaction; IV Status: Completed infusion; IV Intake:     ph  

      1000ml                                                                                      

16:02 Drug: Pepcid (famotidine) 20 mg Route: IVP; Site: right antecubital;                    ph  

19:10 Follow up: Response: No adverse reaction                                                ph  

                                                                                                  

                                                                                                  

Medication:                                                                                       

14:42 VIS not applicable for this client.                                                     ph  

                                                                                                  

Intake:                                                                                           

17:30 IV: 1000ml; Total: 1000ml.                                                              ph  

                                                                                                  

Outcome:                                                                                          

18:47 Discharge ordered by MD.                                                                kdr 

19:10 Discharged to home ambulatory.                                                          ph  

19:10 Condition: good                                                                             

19:10 Discharge instructions given to patient, Instructed on discharge instructions, follow       

      up and referral plans. medication usage, Demonstrated understanding of instructions,        

      follow-up care, medications, Prescriptions given X 1.                                       

19:11 Patient left the ED.                                                                    ph  

                                                                                                  

Addendum:                                                                                         

2023                                                                                        

     10:23 Addendum: Culture Results: Positive urine culture. Phone call Attempt #1 phone no       a
[phone]

           longer in service message received.                                                    

                                                                                                  

Signatures:                                                                                       

Dispatcher [employer]                           EDMS                                                 

Fred Valdez MD MD kdr Calderon, Audri, RN                     RN   aa5                                                  

Dorothy Calderon, RN                      RN                                                      

Lolly Hamilton RN RN hb Garcia, Rubi                                 rg4                                                  

Hanna Leon RN                 RN   mb9                                                  

                                                                                                  

**************************************************************************************************

## 2023-01-28 NOTE — RAD REPORT
EXAM DESCRIPTION:  CT - Abdomen   Pelvis W Contrast - 1/28/2023 4:25 pm

 

CLINICAL HISTORY:  Abdominal pain

 

COMPARISON:  none.

 

TECHNIQUE:  Computed axial tomography of the abdomen pelvis was obtained. 100 cc Isovue-300 was admin
istered intravenously. Oral contrast was not requested which limits evaluation of bowel and appendix

 

All CT scans are performed using dose optimization technique as appropriate and may include automated
 exposure control or mA/KV adjustment according to patient size.

 

FINDINGS:  A cholecystectomy

 

Right hip arthroplasty

 

The liver, spleen, pancreas, adrenal and kidneys appear unremarkable.

 

There is no evidence of diverticulitis.

 

No adnexal mass. Small umbilical hernia

 

IMPRESSION:  No acute abnormality is displayed.

## 2023-01-28 NOTE — EDPHYS
Physician Documentation                                                                           

 [facility]                                                                 

Name: Alka Judd                                                                               

Age: 52 yrs                                                                                       

Sex: Female                                                                                       

: 1970                                                                                   

MRN: [id number]                                                                                   

Arrival Date: 2023                                                                          

Time: 14:31                                                                                       

Account#: [id number]                                                                            

Bed 8                                                                                             

Private MD:                                                                                       

ED Physician Fred Valdez                                                                       

HPI:                                                                                              

                                                                                             

19:36 This 52 yrs old Female presents to ER via Ambulatory with complaints of Abdominal Pain. kdr 

19:36 Patient complains of abdominal swelling for the last 2 months and upper abdominal pain  kdr 

      and indigestion for about a week. She reports that she is postmenopausal but is             

      concerned that she may be pregnant.. Onset: The symptoms/episode began/occurred             

      gradually, 3 day(s) ago. Severity of symptoms: At their worst the symptoms were             

      moderate in the emergency department the symptoms have improved moderately. The patient     

      has experienced similar episodes in the past, a few times. The patient has not recently     

      seen a physician.                                                                           

                                                                                                  

Historical:                                                                                       

- Allergies:                                                                                      

14:41 PENICILLINS;                                                                            mb9 

14:41 Risperdal;                                                                              mb9 

- Home Meds:                                                                                      

14:41 None [Active];                                                                          mb9 

- PMHx:                                                                                           

14:41 Bipolar disorder; Hypertensive disorder;                                                mb9 

- PSHx:                                                                                           

14:41 righ hip surgery;                                                                       mb9 

                                                                                                  

- Immunization history:: Adult Immunizations unknown.                                             

- Social history:: Smoking status: Patient reports the use of cigarette tobacco                   

  products.                                                                                       

                                                                                                  

                                                                                                  

ROS:                                                                                              

19:36 Constitutional: Negative for fever, chills, and weight loss, Eyes: Negative for injury, kdr 

      pain, redness, and discharge, ENT: Negative for injury, pain, and discharge, Neck:          

      Negative for injury, pain, and swelling, Cardiovascular: Negative for chest pain,           

      palpitations, and edema, Respiratory: Negative for shortness of breath, cough,              

      wheezing, and pleuritic chest pain, Back: Negative for injury and pain, : Negative        

      for injury, bleeding, discharge, and swelling, MS/Extremity: Negative for injury and        

      deformity, Skin: Negative for injury, rash, and discoloration, Neuro: Negative for          

      headache, weakness, numbness, tingling, and seizure activity. Psych: Negative for           

      depression, anxiety, suicide ideation, homicidal ideation, and hallucinations,              

      Allergy/Immunology: Negative for hives, rash, and allergies, Endocrine: Negative for        

      neck swelling, polydipsia, polyuria, polyphagia, and marked weight changes,                 

      Hematologic/Lymphatic: Negative for swollen nodes, abnormal bleeding, and unusual           

      bruising.                                                                                   

19:36 Abdomen/GI: Positive for abdominal pain, nausea, abdominal cramps, Negative for             

      vomiting, diarrhea, abdominal distension, anorexia, dysphagia, hematemesis, black/tarry     

      stool, rectal pain, rectal bleeding, bowel incontinence.                                    

                                                                                                  

Exam:                                                                                             

19:36 Constitutional:  This is a well developed, well nourished patient who is awake, alert,  kdr 

      and in no acute distress. Head/Face:  Normocephalic, atraumatic. Eyes:  Pupils equal        

      round and reactive to light, extra-ocular motions intact.  Lids and lashes normal.          

      Conjunctiva and sclera are non-icteric and not injected.  Cornea within normal limits.      

      Periorbital areas with no swelling, redness, or edema. Neck:  Trachea midline, no           

      thyromegaly or masses palpated, and no cervical lymphadenopathy.  Supple, full range of     

      motion without nuchal rigidity, or vertebral point tenderness.  No Meningismus.             

      Chest/axilla:  Normal chest wall appearance and motion.  Nontender with no deformity.       

      No lesions are appreciated. Cardiovascular:  Regular rate and rhythm with a normal S1       

      and S2.  No gallops, murmurs, or rubs.  Normal PMI, no JVD.  No pulse deficits.             

      Respiratory:  Lungs have equal breath sounds bilaterally, clear to auscultation and         

      percussion.  No rales, rhonchi or wheezes noted.  No increased work of breathing, no        

      retractions or nasal flaring. Back:  No spinal tenderness.  No costovertebral               

      tenderness.  Full range of motion. Skin:  Warm, dry with normal turgor.  Normal color       

      with no rashes, no lesions, and no evidence of cellulitis. MS/ Extremity:  Pulses           

      equal, no cyanosis.  Neurovascular intact.  Full, normal range of motion. Neuro:  Awake     

      and alert, GCS 15, oriented to person, place, time, and situation.  Cranial nerves          

      II-XII grossly intact.  Motor strength 5/5 in all extremities.  Sensory grossly intact.     

       Cerebellar exam normal.  Normal gait. Psych:  Awake, alert, with orientation to            

      person, place and time.  Behavior, mood, and affect are within normal limits.               

19:36 Abdomen/GI: Inspection: obese Bowel sounds: active, diminished, in all quadrants,           

      Palpation: soft, nontender.                                                                 

                                                                                                  

Vital Signs:                                                                                      

14:37  / 79; Pulse 88; Resp 16; Temp 97.4; Pulse Ox 100% on R/A; Weight 100.24 kg;      hb  

      Height 5 ft. 2 in. (157.48 cm); Pain 7/10;                                                  

16:03  / 78; Pulse 78; Resp 18; Pulse Ox 100% on R/A;                                   ph  

18:09  / 106; Pulse 70; Resp 18; Pulse Ox 100% on R/A;                                  ph  

19:10  / 98; Pulse 71; Resp 18; Temp 97.9; Pulse Ox 100% on R/A;                        ph  

14:37 Body Mass Index 40.42 (100.24 kg, 157.48 cm)                                            hb  

                                                                                                  

MDM:                                                                                              

18:47 Patient medically screened.                                                             kdr 

19:36 Data reviewed: vital signs, nurses notes, lab test result(s), radiologic studies.       kdr 

                                                                                                  

                                                                                             

15:29 Order name: CBC with Diff; Complete Time: 18:45                                         kdr 

                                                                                             

15:29 Order name: CMP; Complete Time: 18:45                                                   kdr 

                                                                                             

15:29 Order name: Lipase; Complete Time: 18:45                                                kdr 

                                                                                             

15:41 Order name: Urine Microscopic Only; Complete Time: 18:45                                hb  

                                                                                             

15:41 Order name: Urine Culture                                                               hb  

                                                                                             

15:41 Order name: Urine Dipstick-Ancillary; Complete Time: 18:45                              EDMS

                                                                                             

15:29 Order name: CT Abd/Pelvis - IV Contrast Only; Complete Time: 18:45                      kdr 

                                                                                             

15:29 Order name: IV Saline Lock; Complete Time: 15:53                                        kdr 

                                                                                             

15:29 Order name: Labs collected and sent; Complete Time: 15:53                               kdr 

                                                                                                  

Administered Medications:                                                                         

16:02 Drug: NS 0.9% 1000 ml Route: IV; Rate: 1 bolus; Site: right antecubital;                ph  

17:30 Follow up: Response: No adverse reaction; IV Status: Completed infusion; IV Intake:     ph  

      1000ml                                                                                      

16:02 Drug: Pepcid (famotidine) 20 mg Route: IVP; Site: right antecubital;                    ph  

19:10 Follow up: Response: No adverse reaction                                                ph  

                                                                                                  

                                                                                                  

Disposition Summary:                                                                              

23 18:47                                                                                    

Discharge Ordered                                                                                 

      Location: Home                                                                          kdr 

      Problem: new                                                                            kdr 

      Symptoms: have improved                                                                 kdr 

      Condition: Stable                                                                       kdr 

      Diagnosis                                                                                   

        - UTI/ Urinary tract infection, site not specified                                    kdr 

        - Abdominal pain, Generalized                                                         kdr 

      Followup:                                                                               kdr 

        - With: Private Physician                                                                  

        - When: 2 - 3 days                                                                         

        - Reason: If symptoms return, Further diagnostic work-up, Recheck today's complaints,      

      Continuance of care, Re-evaluation by your physician                                        

      Discharge Instructions:                                                                     

        - Discharge Summary Sheet                                                             kdr 

        - Urinary Tract Infection, Adult, Easy-to-Read                                        kdr 

        - Abdominal Pain, Adult, Easy-to-Read                                                 kdr 

      Forms:                                                                                      

        - Medication Reconciliation Form                                                      kdr 

        - Thank You Letter                                                                    kdr 

        - Antibiotic Education                                                                kdr 

      Prescriptions:                                                                              

        - Bactrim -160 mg Oral Tablet                                                        

            - take 1 tablet by ORAL route every 12 hours for 7 days; 14 tablet; Refills: 0,   kdr 

      Product Selection Permitted                                                                 

Signatures:                                                                                       

Dispatcher [employer]                           EDMS                                                 

Fred Valdez MD MD kdr Hall, Patricia RN                      RN   Hanna Manning RN                 RN   mb9                                                  

                                                                                                  

**************************************************************************************************

## 2023-01-28 NOTE — XMS REPORT
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



                                                                 [place]

 

             Body weight  2022 15:29:00 90.719 kg                 Universi

ty [facility]

 

             BMI          2022 15:29:00 35.43 kg/m2               Universi

[facility]

 

             Height/Length 2022 08:36:33 160 cm                    



             Measured                                            

 

             Weight Dosing 2022 08:36:33 105.00 kg                 







Procedures







                Procedure       Date / Time Performed Performing Clinician [facility]

e

 

                ASSIGNMENT OF BENEFITS 2023 19:39:54 Doctor Unassigned, Nimisha

 [facility]



                                                Name            [facility]

 

                XR CHEST 1 VW   2022 18:39:34 Singer, [facility]

 

                XR PELVIS <3 VW 2022 18:39:34 Singer, [facility]

 

                URINALYSIS      2022 18:19:00 Singer, [facility]

 

                COMP. METABOLIC PANEL 2022 18:08:00 Fly Olmos

[facility]



                ([id number])                                         [facility]

 

                CBC WITH DIFF   2022 18:08:00 Singer, [facility]

 

                COVID-19 (ID NOW RAPID 2022 18:08:00 Fly Olmos

[facility]



                TESTING)                                        Medical Branch

 

                REFERRAL-       2022 05:01:00 Doctor UnassignedNimisha [facility]



                REQUEST/RESPONSE                 Name            Medical Branch

 

                [id number]         2020 00:00:00 CANAL.02        [facility]







Plan of Care







             Planned Activity Planned Date Details      Comments     Source

 

             Future Scheduled Test 2022-10-01 00:00:00 IMM Influenza            

  [facility]



                                       Seasonal (>/= 19 yrs)              



                                       [code = IMM Influenza              



                                       Seasonal (>/= 19              



                                       yrs)]                     

 

             Future Scheduled Test 2022-10-01 00:00:00 IMM Influenza            

  [facility]



                                       Seasonal (>/= 19 yrs)              



                                       [code = IMM Influenza              



                                       Seasonal (>/= 19              



                                       yrs)]                     

 

             Future Scheduled Test 2022-10-01 00:00:00 IMM Influenza            

  [facility]



                                       Seasonal (>/= 19 yrs)              



                                       [code = IMM Influenza              



                                       Seasonal (>/= 19              



                                       yrs)]                     

 

             Future Scheduled Test 2022-10-01 00:00:00 IMM Influenza            

  [facility]



                                       Seasonal (>/= 19 yrs)              



                                       [code = IMM Influenza              



                                       Seasonal (>/= 19              



                                       yrs)]                     

 

             Future Scheduled Test 2022-10-01 00:00:00 IMM Influenza            

  [facility]



                                       Seasonal (>/= 19 yrs)              



                                       [code = IMM Influenza              



                                       Seasonal (>/= 19              



                                       yrs)]                     

 

             Future Scheduled Test 2018 00:00:00 Screening for            

  [facility]



                                       malignant neoplasm of              



                                       colon (procedure)              



                                       [code = 691818150]              

 

             Future Scheduled Test 2018 00:00:00 Screening for            

  [facility]



                                       malignant neoplasm of              



                                       colon (procedure)              



                                       [code = 007609959]              

 

             Future Scheduled Test 2018 00:00:00 Screening for            

  [facility]



                                       malignant neoplasm of              



                                       colon (procedure)              



                                       [code = 962087467]              

 

             Future Scheduled Test 2018 00:00:00 Screening for            

  [facility]



                                       malignant neoplasm of              



                                       colon (procedure)              



                                       [code = 296457743]              

 

             Future Scheduled Test 2018 00:00:00 Screening for            

  [facility]



                                       malignant neoplasm of              



                                       colon (procedure)              



                                       [code = 451947382]              

 

             Future Scheduled Test 2008 00:00:00 Breast Cancer Scrn       

       [facility]



                                       (Yearly) [code =              



                                       Breast Cancer Scrn              



                                       (Yearly)]                 

 

             Future Scheduled Test 2008 00:00:00 Breast Cancer Scrn       

       [facility]



                                       (Yearly) [code =              



                                       Breast Cancer Scrn              



                                       (Yearly)]                 

 

             Future Scheduled Test 2008 00:00:00 Breast Cancer Scrn       

       [facility]



                                       (Yearly) [code =              



                                       Breast Cancer Scrn              



                                       (Yearly)]                 

 

             Future Scheduled Test 2008 00:00:00 Breast Cancer Scrn       

       [facility]



                                       (Yearly) [code =              



                                       Breast Cancer Scrn              



                                       (Yearly)]                 

 

             Future Scheduled Test 2008 00:00:00 Breast Cancer Scrn       

       [facility]



                                       (Yearly) [code =              



                                       Breast Cancer Scrn              



                                       (Yearly)]                 

 

             Future Scheduled Test 1998 00:00:00 Screening for            

  [facility]



                                       malignant neoplasm of              



                                       cervix (procedure)              



                                       [code = 667658196]              

 

             Future Scheduled Test 1998 00:00:00 Screening for            

  [facility]



                                       malignant neoplasm of              



                                       cervix (procedure)              



                                       [code = 247222455]              

 

             Future Scheduled Test 1998 00:00:00 Screening for            

  [facility]



                                       malignant neoplasm of              



                                       cervix (procedure)              



                                       [code = 465756435]              

 

             Future Scheduled Test 1998 00:00:00 Screening for            

  [facility]



                                       malignant neoplasm of              



                                       cervix (procedure)              



                                       [code = 151779153]              

 

             Future Scheduled Test 1998 00:00:00 Screening for            

  [facility]



                                       malignant neoplasm of              



                                       cervix (procedure)              



                                       [code = 822805163]              

 

             Future Scheduled Test 1998 00:00:00 Screening for            

  [facility]



                                       malignant neoplasm of              



                                       cervix (procedure)              



                                       [code = 743925908]              

 

             Future Scheduled Test 1998 00:00:00 Screening for            

  [facility]



                                       malignant neoplasm of              



                                       cervix (procedure)              



                                       [code = 894896138]              

 

             Future Scheduled Test 1998 00:00:00 Screening for            

  [facility]



                                       malignant neoplasm of              



                                       cervix (procedure)              



                                       [code = 874949731]              

 

             Future Scheduled Test 1998 00:00:00 Screening for            

  [facility]



                                       malignant neoplasm of              



                                       cervix (procedure)              



                                       [code = 298951906]              

 

             Future Scheduled Test 1998 00:00:00 Screening for            

  [facility]



                                       malignant neoplasm of              



                                       cervix (procedure)              



                                       [code = 196021277]              

 

             Future Scheduled Test 1969 00:00:00 COVID-19 Vaccine (#1)    

          [facility]



                                       [code = COVID-19              



                                       Vaccine (#1)]              

 

             Future Scheduled Test 1969 00:00:00 COVID-19 Vaccine (#1)    

          [facility]



                                       [code = COVID-19              



                                       Vaccine (#1)]              

 

             Future Scheduled Test 1969 00:00:00 COVID-19 Vaccine (#1)    

          [facility]



                                       [code = COVID-19              



                                       Vaccine (#1)]              

 

             Future Scheduled Test 1969 00:00:00 COVID-19 Vaccine (#1)    

          [facility]



                                       [code = COVID-19              



                                       Vaccine (#1)]              

 

             Future Scheduled Test 1969 00:00:00 COVID-19 Vaccine (#1)    

          [facility]



                                       [code = COVID-19              



                                       Vaccine (#1)]              







Encounters







        Start   End     Encounter Admission Attending Care    Care    Encounter 

Source



        Date/Time Date/Time Type    Type    Clinicians Facility Department ID   

   

 

        2022         Outpatient         SARATH, [facility]     [id number]

-2 [place]



        01:05:17                         MELANIE                  [id number] [facility]

 

        2022         Outpatient         VIRGIE,  [facility]     [id number]

 [place]



        03:15:41                         JOAQUIN                 [id number] [facility]

 

        2022         Outpatient         VIRGIE,  [facility]     [id number]

 [place]



        15:19:55                         JOAQUIN                 [id number] [facility]

 

        2022         Outpatient                 [facility]     [id number]

 [place]



        15:28:25                                                 [id number] [facility]

 

        2022         Outpatient                 [facility]     [id number]

 [place]



        12:00:51                                                 [id number] [facility]

 

        2022         Outpatient                 [facility]     [id number]

 [place]



        15:13:02                                                 [id number] [facility]

 

        2020         Inpatient                 [facility]   YAMILA    [id number] 

[facility]



        03:17:00                                                 56      [facility]

 

        2023 Outpatient DIANA NOLAN  [facility]    [id number]

467 [facility]



        14:00:00 14:00:00                 ANCELMO brock [facility]

 

        2023 Orders          Doctor  [facility]    [id number]

447 Univers



        00:00:00 00:00:00 Only            Unassigned, SHONA   350.1.13.10       

  ity of



                                        [place] [facility] [place] [facility]\"" 4.2.7.2.686         [place]

as



                                                        [phone]         [address]

 

        2022 Inpatient         SANCHEZ, [facility]    MED       

  [facility]



        19:24:00 19:40:00                 LEXI                         

 

        2022 Emergency E       HEATHER, [facility]     

   [facility]



        14:56:00 18:09:00                 DEMARIO                           

 

        2022 Emergency X       SINGER, [facility]    ERT     [id number]

15 Univers



        12:56:00 15:05:00                 FLY brock [facility]

 

        2022 Emergency         Singer, [facility]    [id number]

[id number] Univers



        12:56:00 15:05:00                 Fly HAMM 350.1.13.10         i

ty of



                                                JENNIFER 4.2.7.2.686         [place]

[facility]  [phone]         Medi

staci



                                                        [address]             [place]

 

        2022 Office          Elena  [facility]    [id number]

13 Univers



        10:00:00 10:30:00 Visit           [facility]  350.1.13.10         it

y of



                                                NORIS 4.2.7.2.686         [place]

as



                                                SKYLAR?BLEA [phone]         Me

dical



                                                [address]



                                                MEDICAL                 



                                                OFFICE                  



                                                BUILDING                 

 

        2022 Outpatient DIANA REYES  [facility]    [id number]

329 Univers



        10:00:00 10:00:00                 ESCOBAR                           [facility]

 

        2022 Orders          Doctor  [facility]    [id number]

07 Univers



        00:00:00 00:00:00 Only            Unassigned, SHONA   350.1.13.10       

  ity of



                                        [place] \[facility] [place] [facility]\"" 4.2.7.2.686         [place]

as



                                                        [phone]         Medi

staci



                                                        [address]             Branch

 

        2020 Outpatient         ERNESTINE Ferrara   [facility]    [id number]

[address] [facility]



        08:38:00 08:38:00                 [employer]                 75      [facility]

 

        2019 Inpatient 1       Robin Barrios [facility]    PSY     [address]

[id number]



        23:01:00 13:16:00                 Robin Barrios                         

[facility]

 

        2017 Outpatient DIANA LOPEZ  [facility]    NUT     [id number]

565 Univers



        00:00:00 00:00:00                 VENKATA                         jannie [facility]







Results







           Test Description Test Time  Test Comments Results    Result Comments 

Source









                    COMP. METABOLIC PANEL (58079) 2022 19:59:50 









                      Test Item  Value      Reference Range Interpretation Comme

nts









             NA (test code = 7532192243) 138 mmol/L   135-145                   

 

             K (test code = 4805291908) 4.3 mmol/L   3.5-5.0                   

 

             CL (test code = 8564419303) 102 mmol/L                       

 

             CO2 TOTAL (test code = 2075189198) 23 mmol/L    23-31              

       

 

             AGAP (test code = 6503956338)              2-16                    

  

 

             BUN (test code = 4774806463) 18 mg/dL     7-23                     

 

 

             GLUCOSE (test code = 4496264326) 110 mg/dL                   

     

 

             CREATININE (test code = 0.70 mg/dL   0.50-1.04                 



             [id number])                                         

 

             TOTAL BILI (test code = 1.3 mg/dL    0.1-1.1      H            



             7612949420)                                         

 

             CALCIUM (test code = 0503241278) 9.5 mg/dL    8.6-10.6             

     

 

             T PROTEIN (test code = 5267659016) 7.4 g/dL     6.3-8.2            

       

 

             ALBUMIN (test code = 7802083140) 4.2 g/dL     3.5-5.0              

     

 

             ALK PHOS (test code = 1670234588) 100 U/L                    

      

 

             ALTv (test code = 1742-6) 16 U/L       5-35                      

 

             AST(SGOT) (test code = 1920224191) 27 U/L       13-40              

       

 

             eGFR (test code = 7762929296)              mL/min/1.73m2           

   

 

             ELENA (test code = ELENA) Association of Glomerular                    

       



                          Filtration Rate (GFR) and Staging                     

      



                          of Kidney Disease*                           



                          +-----------------------+--------                     

      



                          -------------+-------------------                     

      



                          ------+| GFR (mL/min/1.73 m2) ?|                      

     



                          With Kidney Damage ?| ?Without                        

   



                          Kidney                                 



                          Damage+-----------------------+--                     

      



                          -------------------+-------------                     

      



                          ------------+| ?>90 ? ? ? ? ? ? ?                     

      



                          ? ?| ?Stage one ? ? ? ? ?| ?                          

 



                          Normal ? ? ? ? ? ? ?                           



                          ?+-----------------------+-------                     

      



                          --------------+------------------                     

      



                          -------+| ?60-89 ? ? ? ? ? ? ? ?|                     

      



                          ?Stage two ? ? ? ? ?| ? Decreased                     

      



                          GFR ? ? ? ?                            



                          +-----------------------+--------                     

      



                          -------------+-------------------                     

      



                          ------+| ?30-59 ? ? ? ? ? ? ? ?|                      

     



                          ?Stage three ? ? ? ?| ? Stage                         

  



                          three ? ? ? ? ?                           



                          +-----------------------+--------                     

      



                          -------------+-------------------                     

      



                          ------+| ?15-29 ? ? ? ? ? ? ? ?|                      

     



                          ?Stage four ? ? ? ? | ? Stage                         

  



                          four ? ? ? ? ?                           



                          ?+-----------------------+-------                     

      



                          --------------+------------------                     

      



                          -------+| ?<15 (or dialysis) ? ?|                     

      



                          ?Stage five ? ? ? ? | ? Stage                         

  



                          five ? ? ? ? ?                           



                          ?+-----------------------+-------                     

      



                          --------------+------------------                     

      



                          -------+ *Each stage assumes the                      

     



                          associated GFR level has been in                      

     



                          effect for at least three months.                     

      



                          ?Stages 1 to 5, with or without                       

    



                          kidney disease, indicate chronic                      

     



                          kidney disease. Notes:                           



                          Determination of stages one and                       

    



                          two (with eGFR >59mL/min/1.73 m2)                     

      



                          requires estimation of kidney                         

  



                          damage for at least three months                      

     



                          as defined by structural or                           



                          functional abnormalities of the                       

    



                          kidney, manifested by                           



                          either:Pathological abnormalities                     

      



                          or Markers of kidney damage                           



                          (including abnormalities in the                       

    



                          composition of the blood or urine                     

      



                          or abnormalities in imaging                           



                          tests).                                

 

             Lab Interpretation (test code = Abnormal                           

    



             42871-9)                                            



[facility] WITH [id number] 19:22:40





             Test Item    Value        Reference Range Interpretation Comments

 

             WBC (test code =              See_Comment  H             [Automated



             6690-2)                                             message] The sy

stem



                                                                 which generated



                                                                 this result



                                                                 transmitted



                                                                 reference range

:



                                                                 4.30 - 11.10



                                                                 10*3/?L. The



                                                                 reference range

 was



                                                                 not used to



                                                                 interpret this



                                                                 result as



                                                                 normal/abnormal

.

 

             RBC (test code =              See_Comment                [Automated



             789-8)                                              message] The sy

stem



                                                                 which generated



                                                                 this result



                                                                 transmitted



                                                                 reference range

:



                                                                 3.93 - 5.25



                                                                 10*6/?L. The



                                                                 reference range

 was



                                                                 not used to



                                                                 interpret this



                                                                 result as



                                                                 normal/abnormal

.

 

             HGB (test code = 13.0 g/dL    11.6-15.0                 



             718-7)                                              

 

             HCT (test code = 38.4 %       35.7-45.2                 



             4544-3)                                             

 

             MCV (test code = 89.9 fL      80.6-95.5                 



             787-2)                                              

 

             MCH (test code = 30.4 pg      25.9-32.8                 



             785-6)                                              

 

             MCHC (test code = 33.9 g/dL    31.6-35.1                 



             786-4)                                              

 

             RDW-SD (test code = 39.3 fL      39.0-49.9                 



             35322-5)                                            

 

             RDW-CV (test code = 12.2 %       12.0-15.5                 



             788-0)                                              

 

             PLT (test code =              See_Comment                [Automated



             777-3)                                              message] The sy

stem



                                                                 which generated



                                                                 this result



                                                                 transmitted



                                                                 reference range

:



                                                                 166 - 358 10*3/

?L.



                                                                 The reference r

jihan



                                                                 was not used to



                                                                 interpret this



                                                                 result as



                                                                 normal/abnormal

.

 

             MPV (test code = 10.1 fL      9.5-12.9                  



             30160-6)                                            

 

             NRBC/100 WBC (test              See_Comment                [Automat

ed



             code = 4987003953)                                        message] 

The system



                                                                 which generated



                                                                 this result



                                                                 transmitted



                                                                 reference range

:



                                                                 0.0 - 10.0 /100



                                                                 WBCs. The refer

ence



                                                                 range was not u

sed



                                                                 to interpret th

is



                                                                 result as



                                                                 normal/abnormal

.

 

             NRBC x10^3 (test code <0.01        See_Comment                [Auto

mated



             = [id number])                                        message] The s

ystem



                                                                 which generated



                                                                 this result



                                                                 transmitted



                                                                 reference range

:



                                                                 10*3/?L. The



                                                                 reference range

 was



                                                                 not used to



                                                                 interpret this



                                                                 result as



                                                                 normal/abnormal

.

 

             GRAN MAT (NEUT) % 79.4 %                                 



             (test code = 770-8)                                        

 

             IMM GRAN % (test code 0.50 %                                 



             = [id number])                                        

 

             LYMPH % (test code = 9.5 %                                  



             736-9)                                              

 

             MONO % (test code = 9.7 %                                  



             5905-5)                                             

 

             EOS % (test code = 0.5 %                                  



             713-8)                                              

 

             BASO % (test code = 0.4 %                                  



             706-2)                                              

 

             GRAN MAT x10^3(ANC) 9.77 10*3/uL 1.88-7.09    H            



             (test code =                                        



             2951627825)                                         

 

             IMM GRAN x10^3 (test 0.06 10*3/uL 0.00-0.06                 



             code = [id number])                                        

 

             LYMPH x10^3 (test code 1.17 10*3/uL 1.32-3.29    L            



             = 731-0)                                            

 

             MONO x10^3 (test code 1.19 10*3/uL 0.33-0.92    H            



             = 742-7)                                            

 

             EOS x10^3 (test code = 0.06 10*3/uL 0.03-0.39                 



             711-2)                                              

 

             BASO x10^3 (test code 0.05 10*3/uL 0.01-0.07                 



             = 704-7)                                            

 

             Lab Interpretation Abnormal                               



             (test code = 79439-7)                                        



[facility] [id number] 05:36:00





             Test Item    Value        Reference Range Interpretation Comments

 

             SODIUM (test code = NA) 143 mmol/L   134-147      N            

 

             POTASSIUM (test code = 4.2 mmol/L   3.4-5.0      N            



             K)                                                  

 

             CHLORIDE (test code = 114 mmol/L   100-108      H            



             CL)                                                 

 

             CARBON DIOXIDE (test 24 mmol/L    21-32        N            



             code = CO2)                                         

 

             ANION GAP (test code = 5.0 GAP calc 4.0-15.0     N            



             GAP)                                                

 

             GLUCOSE (test code = 89 MG/DL            N            



             GLU)                                                

 

             BLOOD UREA NITROGEN 17 MG/DL     7-18         N            



             (test code = BUN)                                        

 

             GLOMERULAR FILTRATION >=60 max estimate >60                       



             RATE (test code = GFR) estGFR                                 

 

             CREATININE (test code = 0.9 MG/DL    0.6-1.0      N            



             CREAT)                                              

 

             CALCIUM (test code = CA) 8.3 MG/DL    8.5-10.1     L            



CBC W/AUTO [id number] 05:21:00





             Test Item    Value        Reference Range Interpretation Comments

 

             WHITE BLOOD CELL (test code = 15.2 K/mm3   3.5-11.0     H          

  



             WBC)                                                

 

             RED BLOOD CELL (test code = RBC) 3.67 M/mm3   4.70-6.10    L       

     

 

             HEMOGLOBIN (test code = HGB) 11.3 G/DL    10.4-14.9    N           

 

 

             HEMATOCRIT (test code = HCT) 35.5 %       31.5-44.1    N           

 

 

             MEAN CELL VOLUME (test code = 96.7 Fl      84.5-98.6    N          

  



             MCV)                                                

 

             MEAN CELL HGB (test code = MCH) 30.8 pg      27.0-34.2    N        

    

 

             MEAN CELL HGB CONCETRATION (test 31.8 G/DL    31.5-34.0    N       

     



             code = MCHC)                                        

 

             RED CELL DISTRIBUTION WIDTH (test 14.2 SD      11.5-14.5    N      

      



             code = RDW)                                         

 

             PLATELET COUNT (test code = PLT) 242.0 K/mm3  150-450      N       

     

 

             MEAN PLATELET VOLUME (test code = 10.20 fL     7.0-10.5     N      

      



             MPV)                                                

 

             NEUTROPHIL % (test code = NT%) 78.8 %       40-76        H         

   

 

             LYMPHOCYTE % (test code = LY%) 13.1 %       20.5-51.1    L         

   

 

             MONOCYTE % (test code = MO%) 6.2 %        1.7-9.3      N           

 

 

             EOSINOPHIL % (test code = EO%) 1.6 %        0.0-6.0      N         

   

 

             BASOPHIL % (test code = BA%) 0.3 %        0.0-2.0      N           

 

 

             NEUTROPHIL # (test code = NT#) 11.94 K/mm3  1.8-7.6      H         

   

 

             LYMPHOCYTE # (test code = LY#) 2.0 K/mm3    0.6-3.2      N         

   

 

             MONOCYTE # (test code = MO#) 0.9 K/mm3    0.3-1.1      N           

 

 

             EOSINOPHIL # (test code = EO#) 0.2 K/mm3    0.0-0.4      N         

   

 

             BASOPHIL # (test code = BA#) 0.1 K/mm3    0.0-0.1      N           

 

 

             MANUAL DIFF REQUIRED (test code = NO DIFF/SCN  CRITERIA            

      



             MDIFF)                                              



[id number] 14:09:00





             Test Item    Value        Reference Range Interpretation Comments

 

             LITHIUM (test 0.5 mmol/L   0.6-1.2      L             Detection Lopez

it = 0.1



             code = LITH)                                        <0.1 indicates 

None



                                                                 DetectedPerform

ed At: HD



                                                                 [facility] [place] [place], [place]



                                                                 [id number] 

Jeffry KIMBROUGH MD



                                                                 Ph:[phone]



[id number] 13:35:00





             Test Item    Value        Reference Range Interpretation Comments

 

             TROPONIN-I (test 0.436 NG/ML  0.000-0.045  HH           Negative: <

/= 0.045



             code = TROPI)                                        Positive: >/= 

0.046



                                                                 Correlation wit

h serial



                                                                 results, other 

cardiac



                                                                 markers, and cl

inical



                                                                 findings is nec

essary to



                                                                 determine the c

linical



                                                                 significance of

 this



                                                                 result. Quantit

ative



                                                                 results using d

ifferent



                                                                 methodologies s

hould not



                                                                 be compared to 

one



                                                                 another as nume

rical



                                                                 results may daniel

yby



                                                                 method.



Completed by Nursing: AUNZJYAZYD--70-28 10:33:00





             Test Item    Value        Reference Range Interpretation Comments

 

             TROPONIN-I (test 0.360 NG/ML  0.000-0.045  HH           Negative: <

/= 0.045



             code = TROPI)                                        Positive: >/= 

0.046



                                                                 Correlation wit

h serial



                                                                 results, other 

cardiac



                                                                 markers, and cl

inical



                                                                 findings is nec

essary to



                                                                 determine the c

linical



                                                                 significance of

 this



                                                                 result. Quantit

ative



                                                                 results using d

ifferent



                                                                 methodologies s

hould not



                                                                 be compared to 

one



                                                                 another as nume

rical



                                                                 results may daniel

yby



                                                                 method.



Completed by Nursing: NOLast Dose Date: 20Last Dose Time: 1200TROPONIN-I
2020 07:23:00





             Test Item    Value        Reference Range Interpretation Comments

 

             TROPONIN-I (test 0.399 NG/ML  0.000-0.045  HH           Negative: <

/= 0.045



             code = TROPI)                                        Positive: >/= 

0.046



                                                                 Correlation wit

h serial



                                                                 results, other 

cardiac



                                                                 markers, and cl

inical



                                                                 findings is nec

essary to



                                                                 determine the c

linical



                                                                 significance of

 this



                                                                 result. Quantit

ative



                                                                 results using d

ifferent



                                                                 methodologies s

hould not



                                                                 be compared to 

one



                                                                 another as nume

rical



                                                                 results may daniel

yby



                                                                 method.



Completed by Nursing: PKVJADPJHC--75-28 05:00:00





             Test Item    Value        Reference Range Interpretation Comments

 

             TROPONIN-I (test 0.555 NG/ML  0.000-0.045  HH           Negative: <

/= 0.045



             code = TROPI)                                        Positive: >/= 

0.046



                                                                 Correlation wit

h serial



                                                                 results, other 

cardiac



                                                                 markers, and cl

inical



                                                                 findings is nec

essary to



                                                                 determine the c

linical



                                                                 significance of

 this



                                                                 result. Quantit

ative



                                                                 results using d

ifferent



                                                                 methodologies s

hould not



                                                                 be compared to 

one



                                                                 another as nume

rical



                                                                 results may daniel

yby



                                                                 method.



Completed by Nursing: NO- XR CHEST 1 -62-32 04:33:00 Name: TYLER EDGE 
[facility] ED : 1970 Age/S: 49 / F [id number] Unit #: [id number]
2 Loc: [place] [address] Phys: Kristy Quiros MD Acct: [id number] Dis Date: 
Status: REG ER PHONE #: [phone] Exam Date: 2020 0432 FAX #: Reason: 
POST CENTRAL PLACEMENT; RIGHT IJ EXAMS: CPT: [id number] XR CHEST 1 V 38507 Fluoro
Time: DAP (Gy m2): Air Kerma (mGy): HISTORY: Post central line placement, 
hypotension Location code: B2 FINDINGS: Frontal view of the chest demonstrates a
mildly enlarged cardiomediastinal silhouette. The trachea is midline. The lungs 
are clear. There is no effusion or pneumothorax. The bones are intact. Right IJ 
catheter in good position. IMPRESSION: Mild cardiomegaly, without acute 
pulmonary process. ** Electronically Signed by ISABEL March on 2020 at 
0433 ** Reported and signed by: Shree March M.D. CC: Kristy Quiros MD  PAGE 1 
Signed Report Name: TYLER EDGE [facility] ED : 1970 Age/S: 49 / F 
[id number] Unit #: [id number] Loc: [place] [address] Phys: 
Kristy Quiros MD  Acct: [id number] Dis Date: Status: REG ER PHONE #: 
[phone] Exam Date: 2020 FAX #:  Reason: POST CENTRAL PLACEMENT; 
RIGHT IJ EXAMS: CPT: [id number] XR CHEST 1 V 55313 Fluoro Time: DAP (Gy m2): Air 
Kerma (mGy): (Continued) Technologist: Duncan Ellis, RT(R)(CT) Trnscb Date/Time: 
2020 (433) DanielRK5 Orig Print D/T: S: 2020 (436) PAGE 2 Signed 
ReportC W/AUTO [id number] 04:15:00





             Test Item    Value        Reference Range Interpretation Comments

 

             WHITE BLOOD CELL (test 24.2 K/mm3   3.5-11.0     H            



             code = WBC)                                         

 

             RED BLOOD CELL (test 3.75 M/mm3   4.70-6.10    L            



             code = RBC)                                         

 

             HEMOGLOBIN (test code 11.5 G/DL    10.4-14.9    N            



             = HGB)                                              

 

             HEMATOCRIT (test code 35.2 %       31.5-44.1    N            



             = HCT)                                              

 

             MEAN CELL VOLUME (test 93.9 Fl      84.5-98.6    N            



             code = MCV)                                         

 

             MEAN CELL HGB (test 30.7 pg      27.0-34.2    N            



             code = MCH)                                         

 

             MEAN CELL HGB 32.7 G/DL    31.5-34.0    N            



             CONCETRATION (test                                        



             code = MCHC)                                        

 

             RED CELL DISTRIBUTION 13.8 SD      11.5-14.5    N            



             WIDTH (test code =                                        



             RDW)                                                

 

             PLATELET COUNT (test 234.0 K/mm3  150-450      N            



             code = PLT)                                         

 

             MEAN PLATELET VOLUME 9.80 fL      7.0-10.5     N            



             (test code = MPV)                                        

 

             NEUTROPHIL % (test 82.9 %       40-76        H            



             code = NT%)                                         

 

             LYMPHOCYTE % (test 8.3 %        20.5-51.1    L            



             code = LY%)                                         

 

             MONOCYTE % (test code 7.3 %        1.7-9.3      N            



             = MO%)                                              

 

             EOSINOPHIL % (test 1.4 %        0.0-6.0      N            



             code = EO%)                                         

 

             BASOPHIL % (test code 0.1 %        0.0-2.0      N            



             = BA%)                                              

 

             NEUTROPHIL # (test 20.08 K/mm3  1.8-7.6      H            



             code = NT#)                                         

 

             LYMPHOCYTE # (test 2.0 K/mm3    0.6-3.2      N            



             code = LY#)                                         

 

             MONOCYTE # (test code 1.8 K/mm3    0.3-1.1      H            



             = MO#)                                              

 

             EOSINOPHIL # (test 0.3 K/mm3    0.0-0.4      N            



             code = EO#)                                         

 

             BASOPHIL # (test code 0.0 K/mm3    0.0-0.1      N            



             = BA#)                                              

 

             MANUAL DIFF REQUIRED NO DIFF/SCN  CRITERIA                  SLIDE R

EDNAW



             (test code = MDIFF)                                        CONSISTA

NT WITH AUTO



                                                                 DIFFERENTIAL.



BASIC METABOLIC [id number] 04:11:00





             Test Item    Value        Reference Range Interpretation Comments

 

             SODIUM (test code = NA) 135 mmol/L   134-147      N            

 

             POTASSIUM (test code = K) 4.0 mmol/L   3.4-5.0      N            

 

             CHLORIDE (test code = CL) 106 mmol/L   100-108      N            

 

             CARBON DIOXIDE (test code = CO2) 22 mmol/L    21-32        N       

     

 

             ANION GAP (test code = GAP) 7.0 GAP calc 4.0-15.0     N            

 

             GLUCOSE (test code = GLU) 97 MG/DL            N            

 

             BLOOD UREA NITROGEN (test code = 34 MG/DL     7-18         H       

     



             BUN)                                                

 

             GLOMERULAR FILTRATION RATE (test 39 estGFR    >60          L       

     



             code = GFR)                                         

 

             CREATININE (test code = CREAT) 1.5 MG/DL    0.6-1.0      H         

   

 

             CALCIUM (test code = CA) 8.9 MG/DL    8.5-10.1     N            



LACTIC [id number] 04:11:00





             Test Item    Value        Reference Range Interpretation Comments

 

             LACTIC ACID (test code = LACT) 0.8 mmol/L   0.4-2.0      N         

   



CBC W/AUTO [id number] 03:54:00





             Test Item    Value        Reference Range Interpretation Comments

 

             WHITE BLOOD CELL (test code = 24.2 K/mm3   3.5-11.0     H          

  



             WBC)                                                

 

             RED BLOOD CELL (test code = RBC) 3.75 M/mm3   4.70-6.10    L       

     

 

             HEMOGLOBIN (test code = HGB) 11.5 G/DL    10.4-14.9    N           

 

 

             HEMATOCRIT (test code = HCT) 35.2 %       31.5-44.1    N           

 

 

             MEAN CELL VOLUME (test code = 93.9 Fl      84.5-98.6    N          

  



             MCV)                                                

 

             MEAN CELL HGB (test code = MCH) 30.7 pg      27.0-34.2    N        

    

 

             MEAN CELL HGB CONCETRATION (test 32.7 G/DL    31.5-34.0    N       

     



             code = MCHC)                                        

 

             RED CELL DISTRIBUTION WIDTH (test 13.8 SD      11.5-14.5    N      

      



             code = RDW)                                         

 

             PLATELET COUNT (test code = PLT) 234.0 K/mm3  150-450      N       

     

 

             MEAN PLATELET VOLUME (test code = 9.80 fL      7.0-10.5     N      

      



             MPV)                                                

 

             NEUTROPHIL % (test code = NT%)  %           40-76        H         

   

 

             LYMPHOCYTE % (test code = LY%)  %           20.5-51.1    L         

   

 

             MONOCYTE % (test code = MO%)  %           1.7-9.3      N           

 

 

             EOSINOPHIL % (test code = EO%)  %           0.0-6.0      N         

   

 

             BASOPHIL % (test code = BA%)  %           0.0-2.0      N           

 

 

             NEUTROPHIL # (test code = NT#)  K/mm3       1.8-7.6      H         

   

 

             LYMPHOCYTE # (test code = LY#)  K/mm3       0.6-3.2      N         

   

 

             MONOCYTE # (test code = MO#)  K/mm3       0.3-1.1      H           

 

 

             EOSINOPHIL # (test code = EO#)  K/mm3       0.0-0.4      N         

   

 

             BASOPHIL # (test code = BA#)  K/mm3       0.0-0.1      N           

 

 

             MANUAL DIFF REQUIRED (test code =  DIFF/SCN    CRITERIA            

      



             MDIFF)                                              



Basic Metabolic [id number] 07:54:48





             Test Item    Value        Reference Range Interpretation Comments

 

             Sodium Level (test code = Sodium 142.0 mmol/L 135.0-145.0          

     



             Level)                                              

 

             Potassium Level (test code = 4.8 mmol/L   3.5-5.1                  

 



             Potassium Level)                                        

 

             Chloride Level (test code = 105 mmol/L                       



             Chloride Level)                                        

 

             CO2 (test code = CO2) 25 mmol/L    22-29                     

 

             Anion Gap (test code = Anion 12 mmol/L    7-16                     

 



             Gap)                                                

 

             BUN (test code = BUN) 19.60 mg/dL  6.00-20.00                

 

             Creatinine Level (test code = 0.80 mg/dL   0.50-0.90               

  



             Creatinine Level)                                        

 

             BUN/Creat Ratio (test code = 24                        N           

 



             BUN/Creat Ratio)                                        

 

             Glucose Level (test code = 86 mg/dL                         



             Glucose Level)                                        

 

             Calcium Level (test code = 10.1 mg/dL   8.3-10.5                  



             Calcium Level)                                        



Basic Metabolic [id number] 07:54:48





             Test Item    Value        Reference Range Interpretation Comments

 

             Sodium Level (test 142.0 mmol/L 135.0-145.0               



             code = Sodium Level)                                        

 

             Potassium Level 4.8 mmol/L   3.5-5.1                   



             (test code =                                        



             Potassium Level)                                        

 

             Chloride Level (test 105 mmol/L                       



             code = Chloride                                        



             Level)                                              

 

             CO2 (test code = 25 mmol/L    22-29                     



             CO2)                                                

 

             Anion Gap (test code 12 mmol/L    7-16                      



             = Anion Gap)                                        

 

             BUN (test code = 19.60 mg/dL  6.00-20.00                



             BUN)                                                

 

             Creatinine Level 0.80 mg/dL   0.50-0.90                 



             (test code =                                        



             Creatinine Level)                                        

 

             BUN/Creat Ratio 24                        N            



             (test code =                                        



             BUN/Creat Ratio)                                        

 

             Glucose Level (test 86 mg/dL                         



             code = Glucose                                        



             Level)                                              

 

             Calcium Level (test 10.1 mg/dL   8.3-10.5                  



             code = Calcium                                        



             Level)                                              

 

             eGFR AA (test code = >60                       N            eGFR (e

stimated



             eGFR AA)     mL/min/1.73 m2                           Glomerular



                                                                 Filtration Rate

) is



                                                                 an estimated va

lue,



                                                                 calculated from

 the



                                                                 patient's serum



                                                                 creatinine usin

g the



                                                                 MDRD equation. 

It is



                                                                 NOT the patient

's



                                                                 actual GFR. The

 eGFR



                                                                 provides a more



                                                                 clinically usef

ul



                                                                 measure of kidn

ey



                                                                 disease than se

rum



                                                                 creatinine



                                                                 alone.***This



                                                                 calculation rimma

es



                                                                 sex and race in

to



                                                                 account, if the



                                                                 information is



                                                                 provided. If th

e



                                                                 race is not



                                                                 provided, and t

he



                                                                 patient is



                                                                 -Crystal

n,



                                                                 multiply by 1.2

12.



                                                                 If sex is not



                                                                 provided, and t

he



                                                                 patient is fema

le,



                                                                 multiply by 0.7

42.



                                                                 Results for pat

ients



                                                                 <18 years of ag

e



                                                                 have not been



                                                                 validated by th

e



                                                                 MDRD study and



                                                                 should be



                                                                 interpreted wit

h



                                                                 caution. eGFR R

esult



                                                                 Interpretation:

eGFR



                                                                 > or = 60 is in

 the



                                                                 Normal RangeeGF

R <



                                                                 60 may mean kid

hang



                                                                 diseaseeGFR < 1

5 may



                                                                 mean kidney



                                                                 failure*** Rang

es



                                                                 recommended by 

the



                                                                 [employer]



                                                                 [employer],



                                                                 http://nkdep.ni

h.gov



Basic Metabolic [id number] 07:54:48





             Test Item    Value        Reference Range Interpretation Comments

 

             Sodium Level (test 142.0 mmol/L 135.0-145.0               



             code = Sodium Level)                                        

 

             Potassium Level 4.8 mmol/L   3.5-5.1                   



             (test code =                                        



             Potassium Level)                                        

 

             Chloride Level (test 105 mmol/L                       



             code = Chloride                                        



             Level)                                              

 

             CO2 (test code = 25 mmol/L    22-29                     



             CO2)                                                

 

             Anion Gap (test code 12 mmol/L    7-16                      



             = Anion Gap)                                        

 

             BUN (test code = 19.60 mg/dL  6.00-20.00                



             BUN)                                                

 

             Creatinine Level 0.80 mg/dL   0.50-0.90                 



             (test code =                                        



             Creatinine Level)                                        

 

             BUN/Creat Ratio 24                        N            



             (test code =                                        



             BUN/Creat Ratio)                                        

 

             Glucose Level (test 86 mg/dL                         



             code = Glucose                                        



             Level)                                              

 

             Calcium Level (test 10.1 mg/dL   8.3-10.5                  



             code = Calcium                                        



             Level)                                              

 

             eGFR AA (test code = >60                       N            eGFR (e

stimated



             eGFR AA)     mL/min/1.73 m2                           Glomerular



                                                                 Filtration Rate

) is



                                                                 an estimated va

lue,



                                                                 calculated from

 the



                                                                 patient's serum



                                                                 creatinine usin

g the



                                                                 MDRD equation. 

It is



                                                                 NOT the patient

's



                                                                 actual GFR. The

 eGFR



                                                                 provides a more



                                                                 clinically usef

ul



                                                                 measure of kidn

ey



                                                                 disease than se

rum



                                                                 creatinine



                                                                 alone.***This



                                                                 calculation rimma

es



                                                                 sex and race in

to



                                                                 account, if the



                                                                 information is



                                                                 provided. If th

e



                                                                 race is not



                                                                 provided, and t

he



                                                                 patient is



                                                                 -Crystal

n,



                                                                 multiply by 1.2

12.



                                                                 If sex is not



                                                                 provided, and t

he



                                                                 patient is fema

le,



                                                                 multiply by 0.7

42.



                                                                 Results for pat

ients



                                                                 <18 years of ag

e



                                                                 have not been



                                                                 validated by th

e



                                                                 MDRD study and



                                                                 should be



                                                                 interpreted wit

h



                                                                 caution. eGFR R

esult



                                                                 Interpretation:

eGFR



                                                                 > or = 60 is in

 the



                                                                 Normal RangeeGF

R <



                                                                 60 may mean kid

hang



                                                                 diseaseeGFR < 1

5 may



                                                                 mean kidney



                                                                 failure*** Rang

es



                                                                 recommended by 

the



                                                                 [employer]



                                                                 [employer],



                                                                 http://nkdep.ni

h.gov

 

             eGFR Non-AA (test >60.00                    N            eGFR (sheba

mated



             code = eGFR Non-AA) mL/min/1.73 m2                           Glomer

ular



                                                                 Filtration Rate

) is



                                                                 an estimated va

lue,



                                                                 calculated from

 the



                                                                 patient's serum



                                                                 creatinine usin

g the



                                                                 MDRD equation. 

It is



                                                                 NOT the patient

's



                                                                 actual GFR. The

 eGFR



                                                                 provides a more



                                                                 clinically usef

ul



                                                                 measure of kidn

ey



                                                                 disease than se

rum



                                                                 creatinine



                                                                 alone.***This



                                                                 calculation rimma

es



                                                                 sex and race in

to



                                                                 account, if the



                                                                 information is



                                                                 provided. If th

e



                                                                 race is not



                                                                 provided, and t

he



                                                                 patient is



                                                                 -Crystal

n,



                                                                 multiply by 1.2

12.



                                                                 If sex is not



                                                                 provided, and t

he



                                                                 patient is fema

le,



                                                                 multiply by 0.7

42.



                                                                 Results for pat

ients



                                                                 <18 years of ag

e



                                                                 have not been



                                                                 validated by th

e



                                                                 MDRD study and



                                                                 should be



                                                                 interpreted wit

h



                                                                 caution. eGFR R

esult



                                                                 Interpretation:

eGFR



                                                                 > or = 60 is in

 the



                                                                 Normal RangeeGF

R <



                                                                 60 may mean kid

hang



                                                                 diseaseeGFR < 1

5 may



                                                                 mean kidney



                                                                 failure*** Rang

es



                                                                 recommended by 

the



                                                                 [employer]



                                                                 [employer],



                                                                 http://nkdep.ni

h.gov



Complete Blood Count with [id number] 07:29:42





             Test Item    Value        Reference Range Interpretation Comments

 

             WBC (test code = WBC) 9.3 x10      4.4-10.5                  

 

             RBC (test code = RBC) 4.71 x10     3.75-5.20                 

 

             Hgb (test code = Hgb) 14.4 g/dL    12.2-14.8                 

 

             MCV (test code = MCV) 92.10 fL     80..00              

 

             Hct (test code = Hct) 43.4 %       36.5-44.4                 

 

             MCHC (test code = 33.20 g/dL   32.00-37.50               



             MCHC)                                               

 

             RDW CV (test code = 13.4 %       11.5-14.5                 



             RDW CV)                                             

 

             MCH (test code = MCH) 30.6 pg      27.0-32.5                 

 

             Platelets (test code = 325.0 x10    140.0-440.0               



             Platelets)                                          

 

             MPV (test code = MPV) 9.8 fL                    N            

 

             Slide Review (test Auto         Auto                      Result cr

eated by



             code = Slide Review)                                        [id number]

[id number]_REV_AUTO

 

             nRBC (test code = 0                         N            



             nRBC)                                               

 

             NRBC Abs (test code = 0.00 x10                  N            



             NRBC Abs)                                           

 

             IPF (test code = IPF) 0 %                       N            



Automated [id number] 07:29:42





             Test Item    Value        Reference Range Interpretation Comments

 

             Neutro Auto (test code = Neutro 66.2 %       36.0-70.0             

    



             Auto)                                               

 

             Lymph Auto (test code = Lymph Auto) 21.1 %       12.0-44.0         

        

 

             Mono Auto (test code = Mono Auto) 6.8 %        0.0-11.0            

      

 

             Eos, Auto (test code = Eos, Auto) 4.8 %        0.0-7.0             

      

 

             Basophil Auto (test code = Basophil 0.9 %        0.0-2.0           

        



             Auto)                                               

 

             Neutro Absolute (test code = Neutro 6.2 x10      1.6-7.4           

        



             Absolute)                                           

 

             Lymph Absolute (test code = Lymph 1.97 x10     .50-4.60            

      



             Absolute)                                           

 

             Mono Absolute (test code = Mono .63 x10      .00-1.20              

    



             Absolute)                                           

 

             Eos Absolute (test code = Eos 0.45 x10     0.00-0.74               

  



             Absolute)                                           

 

             Baso Absolute (test code = Baso 0.08 x10     0.00-0.21             

    



             Absolute)                                           



IG [id number] 07:29:42





             Test Item    Value        Reference Range Interpretation Comments

 

             IG (test code = IG) 0.2 %        0.0-5.0                   

 

             IG Abs (test code = IG Abs) 0 x10                     N            



Thyroid Stimulating [id number] 04:30:30





             Test Item    Value        Reference Range Interpretation Comments

 

             TSH (test code = TSH) 1.360 mIU/mL 0.270-4.200               



RPR [id number] 04:06:17





             Test Item    Value        Reference Range Interpretation Comments

 

             RPR Qual (test code = RPR Qual) Non-Reactive Non-Reactive          

    

 

             Reactive Control (test code = Reactive                             

  



             Reactive Control)                                        

 

             Weak Reactive Control (test Weak Reactive                          

 



             code = Weak Reactive Control)                                      

  

 

             Non-Reactive Control (test code Non-Reactive                       

    



             = Non-Reactive Control)                                        

 

             Lot # (test code = Lot #) [id number]                    N            

 

             Expiration Dt (test code = 10-                N            



             Expiration Dt)                                        



Hemoglobin A1c2019-11-15 18:37:54





             Test Item    Value        Reference Range Interpretation Comments

 

             Hemoglobin A1c (test code 4.7 %        4.8-5.9      L            No

n Diabetic



             = Hemoglobin A1c)                                        4.8-5.9%Di

abetic <7.0%



Lipid Panel2019-11-15 18:08:58





             Test Item    Value        Reference Range Interpretation Comments

 

             Cholesterol Total 189 mg/dL    0-200                     RISK OF HE

ART



             (test code =                                        DISEASEPublishe

d by



             Cholesterol Total)                                        American 

Heart



                                                                 Association Judith

lyte



                                                                 Optimal Borderl

ine



                                                                 Increased RiskC

HOL <200



                                                                 200-239 >240TRI

G <150



                                                                 150-199 >200HDL

 Male



                                                                 >60 <40HDL Fema

le >60



                                                                 <50LDL <100 130

-159



                                                                 >160LDL Near op

timal is



                                                                 100-129

 

             Triglycerides (test 112 mg/dL    9-200                     



             code = Triglycerides)                                        

 

             HDL (test code = HDL) 44 mg/dL     50-60        L            

 

             LDL (test code = LDL) 122 mg/dL    0-130                     The eq

uation being used



                                                                 in this calcula

tion is



                                                                 LDL = (Chol - H

DL) -



                                                                 (Trig / 5)

 

             VLDL (test code = 22 mg/dL     5-40                      The equati

on being used



             VLDL)                                               in this calcula

tion is



                                                                 VLDL = Trig / 5

 

             Chol/HDL (test code = 4.3 ratio    0.0-4.4                   



             Chol/HDL)                                           

 

             LDL/HDL Ratio (test 3                         N            The equa

tion being used



             code = LDL/HDL Ratio)                                        in thi

s calculation is



                                                                 LDL/HDL Ratio=L

DL



                                                                 Calc/HDL Chol



Complete Blood Count with Differential2019-11-15 07:51:57





             Test Item    Value        Reference Range Interpretation Comments

 

             WBC (test code = WBC) 10.6 x10     4.4-10.5     H            

 

             RBC (test code = RBC) 4.45 x10     3.75-5.20                 

 

             Hgb (test code = Hgb) 13.5 g/dL    12.2-14.8                 

 

             MCV (test code = MCV) 93.30 fL     80..00              

 

             Hct (test code = Hct) 41.5 %       36.5-44.4                 

 

             MCHC (test code = 32.50 g/dL   32.00-37.50               



             MCHC)                                               

 

             RDW CV (test code = 13.7 %       11.5-14.5                 



             RDW CV)                                             

 

             MCH (test code = MCH) 30.3 pg      27.0-32.5                 

 

             Platelets (test code = 356.0 x10    140.0-440.0               



             Platelets)                                          

 

             MPV (test code = MPV) 9.7 fL                    N            

 

             Slide Review (test Auto         Auto                      Result cr

eated by



             code = Slide Review)                                        [email or web address]

[email or web address]

 

             nRBC (test code = 0                         N            



             nRBC)                                               

 

             NRBC Abs (test code = 0.00 x10                  N            



             NRBC Abs)                                           

 

             IPF (test code = IPF) 0 %                       N            



Automated Differential2019-11-15 07:51:57





             Test Item    Value        Reference Range Interpretation Comments

 

             Neutro Auto (test code = Neutro 65.4 %       36.0-70.0             

    



             Auto)                                               

 

             Lymph Auto (test code = Lymph Auto) 23.5 %       12.0-44.0         

        

 

             Mono Auto (test code = Mono Auto) 5.7 %        0.0-11.0            

      

 

             Eos, Auto (test code = Eos, Auto) 4.4 %        0.0-7.0             

      

 

             Basophil Auto (test code = Basophil 0.8 %        0.0-2.0           

        



             Auto)                                               

 

             Neutro Absolute (test code = Neutro 6.9 x10      1.6-7.4           

        



             Absolute)                                           

 

             Lymph Absolute (test code = Lymph 2.49 x10     .50-4.60            

      



             Absolute)                                           

 

             Mono Absolute (test code = Mono .60 x10      .00-1.20              

    



             Absolute)                                           

 

             Eos Absolute (test code = Eos 0.47 x10     0.00-0.74               

  



             Absolute)                                           

 

             Baso Absolute (test code = Baso 0.08 x10     0.00-0.21             

    



             Absolute)                                           



IG Flags2019-11-15 07:51:57





             Test Item    Value        Reference Range Interpretation Comments

 

             IG (test code = IG) 0.2 %        0.0-5.0                   

 

             IG Abs (test code = IG Abs) 0 x10                     N            



Urine [id number] 10:18:52





             Test Item    Value        Reference Range Interpretation Comments

 

             ORGANISM (test code = Escherichia coli                           



             ORGANISM)                                           

 

             Amikacin (test code =                           S            



             Amik)                                               

 

             Ampicillin (test code =                           S            



             Amp)                                                

 

             Ampicillin/Sulbactam                           S            



             (test code = Amp/Sul)                                        

 

             Cefazolin (test code =                           S            



             Cefaz)                                              

 

             Cefepime (test code =                           S            



             Cefep)                                              

 

             Cefotaxime (test code =                           S            



             Cefo)                                               

 

             Ceftazidime (test code                           S            



             = Ceftaz)                                           

 

             Ceftriaxone (test code                           S            



             = Ceftri)                                           

 

             Cefuroxime (test code =                           S            



             Cefur)                                              

 

             Ciprofloxacin (test                           S            



             code = Cipro)                                        

 

             Gentamicin (test code =                           S            



             Gent)                                               

 

             Imipenem (test code =                           S            



             Imi)                                                

 

             Levofloxacin (test code                           S            



             = Levo)                                             

 

             Meropenem (test code =                           S            



             Sindi)                                               

 

             Nitrofurantoin (test                           S            



             code = Nitro)                                        

 

             Piperacillin/Tazobactam                           S            



             (test code = Pip/Blaine)                                        

 

             Tobramycin (test code =                           S            



             Tobra)                                              

 

             Trimethoprim/Sulfa                           S            



             (test code = SXT)                                        

 

             Final Report (test code >=100,000 cfu/ml                           



             = Final Report) Escherichia coli                           



                          >=100,000 cfu/ml Alpha                           



                          hemolytic                              



                          Streptococcus (not                           



                          Group D or                             



                          Enterococcus)                           



 C Urine Added by [email or web address] [id number] 09:18:25





             Test Item    Value        Reference Range Interpretation Comments

 

             Lithium Level (test code = 0.58 mmol/L  0.60-1.20    L            



             Lithium Level)                                        



Urine Drug [id number] 01:36:44





             Test Item    Value        Reference Range Interpretation Comments

 

             Amphetamine Screen Ur POSITIVE     Negative     A            



             (test code = Amphetamine                                        



             Screen Ur)                                          

 

             Barbiturate Screen Ur Negative     Negative                  



             (test code = Barbiturate                                        



             Screen Ur)                                          

 

             Benzodiazepines Ur (test Negative     Negative                  



             code = Benzodiazepines                                        



             Ur)                                                 

 

             Cocaine Screen Ur (test Negative     Negative                  



             code = Cocaine Screen                                        



             Ur)                                                 

 

             U Methadone Scr (test Negative     Negative                  



             code = U Methadone Scr)                                        

 

             Opiate Screen Ur (test Negative     Negative                  



             code = Opiate Screen Ur)                                        

 

             U PCP Scrn (test code = Negative     Negative                  



             U PCP Scrn)                                         

 

             Cannabinoid Screen Ur Negative     Negative                  



             (test code = Cannabinoid                                        



             Screen Ur)                                          

 

             U TCA (test code = U Negative     Negative                  The res

ults of all



             TCA)                                                drug screen elinor

ts are



                                                                 only preliminar

y.



                                                                 Clinical



                                                                 consideration a

nd



                                                                 professional ju

dgment



                                                                 should be appli

ed to



                                                                 any drug of abu

se



                                                                 test result,



                                                                 particularly wh

en



                                                                 preliminary pos

itive



                                                                 results are obt

ained.



                                                                 Please order a



                                                                 separate confir

matory



                                                                 test if desired

.



HCG Qualitative [id number] 01:36:43





             Test Item    Value        Reference Range Interpretation Comments

 

             hCG Ur (test code = Negative                               If the r

esult is



             hCG Ur)                                             "Negative" in p

atients



                                                                 suspected to be



                                                                 pregnant, recom

mend



                                                                 retest with a s

ample



                                                                 obtained 48 to 

72



                                                                 hours later, or

 by



                                                                 ordering a



                                                                 quantitative as

say. If



                                                                 the result is



                                                                 "Borderline" te

sting



                                                                 should be repea

gianfranco in



                                                                 48 to 72 hours.

 

             Lot # (test code = [id number]                    N            



             Lot #)                                              

 

             Expiration Dt (test 2020                N            



             code = Expiration Dt)                                        

 

             Neg Control (test Negative                               



             code = Neg Control)                                        

 

             Pos Control (test Positive                               



             code = Pos Control)                                        

 

             Internal QC (test Acceptable                             



             code = Internal QC)                                        



Urinalysis [id number] 01:36:03





             Test Item    Value        Reference Range Interpretation Comments

 

             UA WBC (test code = UA WBC) 0-5          0-5                       

 

             UA RBC (test code = UA RBC) None Seen    0-5                       

 

             UA Bacteria (test code = UA Moderate                  A            



             Bacteria)                                           

 

             UA Squam Epithelial (test code = UA 0-5                            

        



             Squam Epithelial)                                        



Comprehensive Metabolic [id number] 01:23:40





             Test Item    Value        Reference Range Interpretation Comments

 

             Sodium Level (test code = Sodium 139.0 mmol/L 135.0-145.0          

     



             Level)                                              

 

             Potassium Level (test code = 4.4 mmol/L   3.5-5.1                  

 



             Potassium Level)                                        

 

             Chloride Level (test code = 102 mmol/L                       



             Chloride Level)                                        

 

             CO2 (test code = CO2) 23 mmol/L    22-29                     

 

             Anion Gap (test code = Anion 14 mmol/L    7-16                     

 



             Gap)                                                

 

             BUN (test code = BUN) 9.10 mg/dL   6.00-20.00                

 

             Creatinine Level (test code = 1.00 mg/dL   0.50-0.90    H          

  



             Creatinine Level)                                        

 

             BUN/Creat Ratio (test code = 9                         N           

 



             BUN/Creat Ratio)                                        

 

             Glucose Level (test code = 115 mg/dL                        



             Glucose Level)                                        

 

             Calcium Level (test code = 10.1 mg/dL   8.3-10.5                  



             Calcium Level)                                        

 

             Alk Phos (test code = Alk Phos) 106 U/L             H        

    

 

             Bilirubin Total (test code = 0.4 mg/dL    0.1-0.9                  

 



             Bilirubin Total)                                        

 

             Albumin Level (test code = 4.6 g/dL     3.5-5.2                   



             Albumin Level)                                        

 

             Protein Total (test code = 7.7 g/dL     6.4-8.3                   



             Protein Total)                                        

 

             ALT (test code = ALT) 12 U/L       1-33                      

 

             AST (test code = AST) 18 U/L       1-32                      

 

             Globulin (test code = Globulin) 3.1 g/dL     2.9-3.1               

    

 

             A/G Ratio (test code = A/G 1.5 ratio                 N            



             Ratio)                                              



Comprehensive Metabolic [id number] 01:23:40





             Test Item    Value        Reference Range Interpretation Comments

 

             Sodium Level (test 139.0 mmol/L 135.0-145.0               



             code = Sodium Level)                                        

 

             Potassium Level 4.4 mmol/L   3.5-5.1                   



             (test code =                                        



             Potassium Level)                                        

 

             Chloride Level (test 102 mmol/L                       



             code = Chloride                                        



             Level)                                              

 

             CO2 (test code = 23 mmol/L    22-29                     



             CO2)                                                

 

             Anion Gap (test code 14 mmol/L    7-16                      



             = Anion Gap)                                        

 

             BUN (test code = 9.10 mg/dL   6.00-20.00                



             BUN)                                                

 

             Creatinine Level 1.00 mg/dL   0.50-0.90    H            



             (test code =                                        



             Creatinine Level)                                        

 

             BUN/Creat Ratio 9                         N            



             (test code =                                        



             BUN/Creat Ratio)                                        

 

             Glucose Level (test 115 mg/dL                        



             code = Glucose                                        



             Level)                                              

 

             Calcium Level (test 10.1 mg/dL   8.3-10.5                  



             code = Calcium                                        



             Level)                                              

 

             Alk Phos (test code 106 U/L             H            



             = Alk Phos)                                         

 

             Bilirubin Total 0.4 mg/dL    0.1-0.9                   



             (test code =                                        



             Bilirubin Total)                                        

 

             Albumin Level (test 4.6 g/dL     3.5-5.2                   



             code = Albumin                                        



             Level)                                              

 

             Protein Total (test 7.7 g/dL     6.4-8.3                   



             code = Protein                                        



             Total)                                              

 

             ALT (test code = 12 U/L       1-33                      



             ALT)                                                

 

             AST (test code = 18 U/L       1-32                      



             AST)                                                

 

             Globulin (test code 3.1 g/dL     2.9-3.1                   



             = Globulin)                                         

 

             A/G Ratio (test code 1.5 ratio                 N            



             = A/G Ratio)                                        

 

             eGFR AA (test code = >60                       N            eGFR (e

stimated



             eGFR AA)     mL/min/1.73 m2                           Glomerular



                                                                 Filtration Rate

) is



                                                                 an estimated va

lue,



                                                                 calculated from

 the



                                                                 patient's serum



                                                                 creatinine usin

g the



                                                                 MDRD equation. 

It is



                                                                 NOT the patient

's



                                                                 actual GFR. The

 eGFR



                                                                 provides a more



                                                                 clinically usef

ul



                                                                 measure of kidn

ey



                                                                 disease than se

rum



                                                                 creatinine



                                                                 alone.***This



                                                                 calculation rimma

es



                                                                 sex and race in

to



                                                                 account, if the



                                                                 information is



                                                                 provided. If th

e



                                                                 race is not



                                                                 provided, and t

he



                                                                 patient is



                                                                 -Crystal

n,



                                                                 multiply by 1.2

12.



                                                                 If sex is not



                                                                 provided, and t

he



                                                                 patient is fema

le,



                                                                 multiply by 0.7

42.



                                                                 Results for pat

ients



                                                                 <18 years of ag

e



                                                                 have not been



                                                                 validated by th

e



                                                                 MDRD study and



                                                                 should be



                                                                 interpreted wit

h



                                                                 caution. eGFR R

esult



                                                                 Interpretation:

eGFR



                                                                 > or = 60 is in

 the



                                                                 Normal RangeeGF

R <



                                                                 60 may mean kid

hang



                                                                 diseaseeGFR < 1

5 may



                                                                 mean kidney



                                                                 failure*** Rang

es



                                                                 recommended by 

the



                                                                 [employer]



                                                                 [employer],



                                                                 http://nkdep.ni

h.gov



Comprehensive Metabolic [id number] 01:23:40





             Test Item    Value        Reference Range Interpretation Comments

 

             Sodium Level (test 139.0 mmol/L 135.0-145.0               



             code = Sodium Level)                                        

 

             Potassium Level 4.4 mmol/L   3.5-5.1                   



             (test code =                                        



             Potassium Level)                                        

 

             Chloride Level (test 102 mmol/L                       



             code = Chloride                                        



             Level)                                              

 

             CO2 (test code = 23 mmol/L    22-29                     



             CO2)                                                

 

             Anion Gap (test code 14 mmol/L    7-16                      



             = Anion Gap)                                        

 

             BUN (test code = 9.10 mg/dL   6.00-20.00                



             BUN)                                                

 

             Creatinine Level 1.00 mg/dL   0.50-0.90    H            



             (test code =                                        



             Creatinine Level)                                        

 

             BUN/Creat Ratio 9                         N            



             (test code =                                        



             BUN/Creat Ratio)                                        

 

             Glucose Level (test 115 mg/dL                        



             code = Glucose                                        



             Level)                                              

 

             Calcium Level (test 10.1 mg/dL   8.3-10.5                  



             code = Calcium                                        



             Level)                                              

 

             Alk Phos (test code 106 U/L             H            



             = Alk Phos)                                         

 

             Bilirubin Total 0.4 mg/dL    0.1-0.9                   



             (test code =                                        



             Bilirubin Total)                                        

 

             Albumin Level (test 4.6 g/dL     3.5-5.2                   



             code = Albumin                                        



             Level)                                              

 

             Protein Total (test 7.7 g/dL     6.4-8.3                   



             code = Protein                                        



             Total)                                              

 

             ALT (test code = 12 U/L       1-33                      



             ALT)                                                

 

             AST (test code = 18 U/L       1-32                      



             AST)                                                

 

             Globulin (test code 3.1 g/dL     2.9-3.1                   



             = Globulin)                                         

 

             A/G Ratio (test code 1.5 ratio                 N            



             = A/G Ratio)                                        

 

             eGFR AA (test code = >60                       N            eGFR (e

stimated



             eGFR AA)     mL/min/1.73 m2                           Glomerular



                                                                 Filtration Rate

) is



                                                                 an estimated va

lue,



                                                                 calculated from

 the



                                                                 patient's serum



                                                                 creatinine usin

g the



                                                                 MDRD equation. 

It is



                                                                 NOT the patient

's



                                                                 actual GFR. The

 eGFR



                                                                 provides a more



                                                                 clinically usef

ul



                                                                 measure of kidn

ey



                                                                 disease than se

rum



                                                                 creatinine



                                                                 alone.***This



                                                                 calculation rimma

es



                                                                 sex and race in

to



                                                                 account, if the



                                                                 information is



                                                                 provided. If th

e



                                                                 race is not



                                                                 provided, and t

he



                                                                 patient is



                                                                 -Crystal

n,



                                                                 multiply by 1.2

12.



                                                                 If sex is not



                                                                 provided, and t

he



                                                                 patient is fema

le,



                                                                 multiply by 0.7

42.



                                                                 Results for pat

ients



                                                                 <18 years of ag

e



                                                                 have not been



                                                                 validated by [facility]



                                                                 MDRD study and



                                                                 should be



                                                                 interpreted wit

h



                                                                 caution. eGFR R

esult



                                                                 Interpretation:

eGFR



                                                                 > or = 60 is in

 the



                                                                 Normal RangeeGF

R <



                                                                 60 may mean kid

hang



                                                                 diseaseeGFR < 1

5 may



                                                                 mean kidney



                                                                 failure*** Rang

es



                                                                 recommended by 

the



                                                                 [employer]



                                                                 [employer],



                                                                 http://nkdep.ni

h.gov

 

             eGFR Non-AA (test 58.45                     N            eGFR (sheba

mated



             code = eGFR Non-AA) mL/min/1.73 m2                           Glomer

ular



                                                                 Filtration Rate

) is



                                                                 an estimated va

lue,



                                                                 calculated from

 the



                                                                 patient's serum



                                                                 creatinine usin

g the



                                                                 MDRD equation. 

It is



                                                                 NOT the patient

's



                                                                 actual GFR. The

 eGFR



                                                                 provides a more



                                                                 clinically usef

ul



                                                                 measure of kidn

ey



                                                                 disease than se

rum



                                                                 creatinine



                                                                 alone.***This



                                                                 calculation rimma

es



                                                                 sex and race in

to



                                                                 account, if the



                                                                 information is



                                                                 provided. If 

e



                                                                 race is not



                                                                 provided, and t

he



                                                                 patient is



                                                                 -Crystal

n,



                                                                 multiply by 1.2

12.



                                                                 If sex is not



                                                                 provided, and t

he



                                                                 patient is fema

le,



                                                                 multiply by 0.7

42.



                                                                 Results for pat

ients



                                                                 <18 years of ag

e



                                                                 have not been



                                                                 validated by [facility]



                                                                 MDRD study and



                                                                 should be



                                                                 interpreted wit

h



                                                                 caution. eGFR R

esult



                                                                 Interpretation:

eGFR



                                                                 > or = 60 is in

 the



                                                                 Normal RangeeGF

R <



                                                                 60 may mean kid

hang



                                                                 diseaseeGFR < 1

5 may



                                                                 mean kidney



                                                                 failure*** Rang

es



                                                                 recommended by 

the



                                                                 [employer]



                                                                 [employer],



                                                                 http://nkdep.ni

h.gov



Alcohol [id number] 01:23:31





             Test Item    Value        Reference Range Interpretation Comments

 

             Ethanol Level (test <0.00 g/dL   0.00-0.01                 [facility]

gianfranco 0.080 g/dL



             code = Ethanol                                        or more



             Level)                                              

 

             Ethanol Inst (test <0                        N            



             code = Ethanol Inst)                                        



Complete Blood Count with [id number] 00:56:12





             Test Item    Value        Reference Range Interpretation Comments

 

             WBC (test code = WBC) 19.4 x10     4.4-10.5     H            

 

             RBC (test code = RBC) 4.53 x10     3.75-5.20                 

 

             Hgb (test code = Hgb) 13.5 g/dL    12.2-14.8                 

 

             Hct (test code = Hct) 41.3 %       36.5-44.4                 

 

             MCV (test code = MCV) 91.20 fL     80..00              

 

             MCHC (test code = 32.70 g/dL   32.00-37.50               



             MCHC)                                               

 

             RDW CV (test code = 13.5 %       11.5-14.5                 



             RDW CV)                                             

 

             MCH (test code = MCH) 29.8 pg      27.0-32.5                 

 

             Platelets (test code = 462.0 x10    140.0-440.0  H            



             Platelets)                                          

 

             MPV (test code = MPV) 9.9 fL                    N            

 

             Slide Review (test Auto         Auto                      Result cr

eated by



             code = Slide Review)                                        [email or web address]

[email or web address]_AUTO

 

             nRBC (test code = 0                         N            



             nRBC)                                               

 

             NRBC Abs (test code = 0.00 x10                  N            



             NRBC Abs)                                           

 

             IPF (test code = IPF) 0 %                       N            



Automated [id number] 00:56:12





             Test Item    Value        Reference Range Interpretation Comments

 

             Neutro Auto (test code = Neutro 83.7 %       36.0-70.0    H        

    



             Auto)                                               

 

             Lymph Auto (test code = Lymph Auto) 8.9 %        12.0-44.0    L    

        

 

             Mono Auto (test code = Mono Auto) 5.0 %        0.0-11.0            

      

 

             Eos, Auto (test code = Eos, Auto) 1.4 %        0.0-7.0             

      

 

             Basophil Auto (test code = Basophil 0.7 %        0.0-2.0           

        



             Auto)                                               

 

             Neutro Absolute (test code = Neutro 16.2 x10     1.6-7.4      H    

        



             Absolute)                                           

 

             Lymph Absolute (test code = Lymph 1.73 x10     .50-4.60            

      



             Absolute)                                           

 

             Mono Absolute (test code = Mono .96 x10      .00-1.20              

    



             Absolute)                                           

 

             Eos Absolute (test code = Eos 0.27 x10     0.00-0.74               

  



             Absolute)                                           

 

             Baso Absolute (test code = Baso 0.13 x10     0.00-0.21             

    



             Absolute)                                           



IG [id number] 00:56:12





             Test Item    Value        Reference Range Interpretation Comments

 

             IG (test code = IG) 0.3 %        0.0-5.0                   

 

             IG Abs (test code = IG Abs) 0 x10                     N            



Urinalysis with Culture, if [id number] 00:55:34





             Test Item    Value        Reference Range Interpretation Comments

 

             UA Color (test code = STRAW        Yellow                    



             UA Color)                                           

 

             UA Appear (test code = CLEAR        Clear                     



             UA Appear)                                          

 

             UA pH (test code = UA 5                         N            



             pH)                                                 

 

             UA Spec Grav (test code 1.001        1.001-1.035               



             = UA Spec Grav)                                        

 

             UA Glucose (test code = NEG          Negative                  



             UA Glucose)                                         

 

             UA Bili (test code = UA NEG          Negative                  



             Bili)                                               

 

             UA Ketones (test code = NEG          Negative                  



             UA Ketones)                                         

 

             UA Blood (test code = NEG          Negative                  



             UA Blood)                                           

 

             UA Protein (test code = NEG          Negative                  



             UA Protein)                                         

 

             UA Urobilinogen (test 0.2 mg/dL                 N            



             code = UA Urobilinogen)                                        

 

             UA Nitrite (test code = POS          Negative     A            



             UA Nitrite)                                         

 

             UA Leuk Est (test code 25 cells/mcL Negative     A            



             = UA Leuk Est)                                        

 

             UA Micro Ind? (test Indicated    Not Indicated A            Result 

created by



             code = UA Micro Ind?)                                        rule



                                                                 [email or web address]

_IN



                                                                 D



Urine [id number] 08:13:23





             Test Item    Value        Reference Range Interpretation Comments

 

             ORGANISM (test code = Escherichia coli                           



             ORGANISM)                                           

 

             Amikacin (test code =                           S            



             Amik)                                               

 

             Ampicillin (test code =                           S            



             Amp)                                                

 

             Ampicillin/Sulbactam                           S            



             (test code = Amp/Sul)                                        

 

             Cefazolin (test code =                           S            



             Cefaz)                                              

 

             Cefepime (test code =                           S            



             Cefep)                                              

 

             Cefotaxime (test code =                           S            



             Cefo)                                               

 

             Ceftazidime (test code =                           S            



             Ceftaz)                                             

 

             Ceftriaxone (test code =                           S            



             Ceftri)                                             

 

             Cefuroxime (test code =                           S            



             Cefur)                                              

 

             Ciprofloxacin (test code                           S            



             = Cipro)                                            

 

             Gentamicin (test code =                           S            



             Gent)                                               

 

             Imipenem (test code =                           S            



             Imi)                                                

 

             Levofloxacin (test code                           S            



             = Levo)                                             

 

             Meropenem (test code =                           S            



             Sindi)                                               

 

             Nitrofurantoin (test                           S            



             code = Nitro)                                        

 

             Piperacillin/Tazobactam                           S            



             (test code = Pip/Blaine)                                        

 

             Tobramycin (test code =                           S            



             Tobra)                                              

 

             Trimethoprim/Sulfa (test                           S            



             code = SXT)                                         

 

             Final Report (test code 30,000 cfu/ml                           



             = Final Report) Escherichia coli                           



                          20,000 cfu/ml                           



                          Diphtheroids                           



 C Urine Added by [email or web address] Qualitative2019-09-15 04:57:25





             Test Item    Value        Reference Range Interpretation Comments

 

             RPR Qual (test code = RPR Qual) Non-Reactive Non-Reactive          

    

 

             Reactive Control (test code = Reactive                             

  



             Reactive Control)                                        

 

             Weak Reactive Control (test Weak Reactive                          

 



             code = Weak Reactive Control)                                      

  

 

             Non-Reactive Control (test code Non-Reactive                       

    



             = Non-Reactive Control)                                        

 

             Lot # (test code = Lot #) [id number]                    N            

 

             Expiration Dt (test code = 10                N            



             Expiration Dt)                                        



Thyroid Stimulating Hormone2019-09-15 01:22:43





             Test Item    Value        Reference Range Interpretation Comments

 

             TSH (test code = TSH) 3.370 mIU/mL 0.270-4.200               



Lipid Panel2019-09-15 01:17:51





             Test Item    Value        Reference Range Interpretation Comments

 

             Cholesterol Total 168 mg/dL    0-200                     RISK OF HE

ART



             (test code =                                        DISEASEPublishe

d by



             Cholesterol Total)                                        American 

Heart



                                                                 Association Judith

lyte



                                                                 Optimal Borderl

ine



                                                                 Increased RiskC

HOL <200



                                                                 200-239 >240TRI

G <150



                                                                 150-199 >200HDL

 Male



                                                                 >60 <40HDL Fema

le >60



                                                                 <50LDL <100 130

-159



                                                                 >160LDL [facility] is



                                                                 100-129

 

             Triglycerides (test 108 mg/dL    9-200                     



             code = Triglycerides)                                        

 

             HDL (test code = HDL) 46 mg/dL     50-60        L            

 

             LDL (test code = LDL) 100 mg/dL    0-130                     The eq

uation being used



                                                                 in this calcula

tion is



                                                                 LDL = (Chol - H

DL) -



                                                                 (Trig / 5)

 

             VLDL (test code = 22 mg/dL     5-40                      The equati

on being used



             VLDL)                                               in this calcula

tion is



                                                                 VLDL = Trig / 5

 

             Chol/HDL (test code = 3.7 ratio    0.0-4.4                   



             Chol/HDL)                                           

 

             LDL/HDL Ratio (test 2                         N            The equa

tion being used



             code = LDL/HDL Ratio)                                        in thi

s calculation is



                                                                 LDL/HDL Ratio=L

DL



                                                                 Calc/HDL Chol



Lithium Level2019-09-15 01:17:51





             Test Item    Value        Reference Range Interpretation Comments

 

             Lithium Level (test code = 0.81 mmol/L  0.60-1.20                 



             Lithium Level)                                        



Comprehensive Metabolic Panel2019-09-15 00:04:54





             Test Item    Value        Reference Range Interpretation Comments

 

             Sodium Level (test code = Sodium 137.0 mmol/L 135.0-145.0          

     



             Level)                                              

 

             Potassium Level (test code = 4.0 mmol/L   3.5-5.1                  

 



             Potassium Level)                                        

 

             Chloride Level (test code = 103 mmol/L                       



             Chloride Level)                                        

 

             CO2 (test code = CO2) 23 mmol/L    22-29                     

 

             Anion Gap (test code = Anion 11 mmol/L    7-16                     

 



             Gap)                                                

 

             BUN (test code = BUN) 11.50 mg/dL  6.00-20.00                

 

             Creatinine Level (test code = 1.00 mg/dL   0.50-0.90    H          

  



             Creatinine Level)                                        

 

             BUN/Creat Ratio (test code = 12                        N           

 



             BUN/Creat Ratio)                                        

 

             Glucose Level (test code = 108 mg/dL                        



             Glucose Level)                                        

 

             Calcium Level (test code = 10.3 mg/dL   8.3-10.5                  



             Calcium Level)                                        

 

             Alk Phos (test code = Alk Phos) 93 U/L                       

    

 

             Bilirubin Total (test code = 0.5 mg/dL    0.1-0.9                  

 



             Bilirubin Total)                                        

 

             Albumin Level (test code = 4.4 g/dL     3.5-5.2                   



             Albumin Level)                                        

 

             Protein Total (test code = 7.2 g/dL     6.4-8.3                   



             Protein Total)                                        

 

             ALT (test code = ALT) 12 U/L       1-33                      

 

             AST (test code = AST) 17 U/L       1-32                      

 

             Globulin (test code = Globulin) 2.8 g/dL     2.9-3.1      L        

    

 

             A/G Ratio (test code = A/G 1.6 ratio                 N            



             Ratio)                                              



Alcohol Level2019-09-15 00:04:54





             Test Item    Value        Reference Range Interpretation Comments

 

             Ethanol Level (test <0.00 g/dL   0.00-0.01                 Intoxica

gianfranco 0.080 g/dL



             code = Ethanol                                        or more



             Level)                                              

 

             Ethanol Inst (test <0                        N            



             code = Ethanol Inst)                                        



Comprehensive Metabolic Panel2019-09-15 00:04:54





             Test Item    Value        Reference Range Interpretation Comments

 

             Sodium Level (test 137.0 mmol/L 135.0-145.0               



             code = Sodium Level)                                        

 

             Potassium Level 4.0 mmol/L   3.5-5.1                   



             (test code =                                        



             Potassium Level)                                        

 

             Chloride Level (test 103 mmol/L                       



             code = Chloride                                        



             Level)                                              

 

             CO2 (test code = 23 mmol/L    22-29                     



             CO2)                                                

 

             Anion Gap (test code 11 mmol/L    7-16                      



             = Anion Gap)                                        

 

             BUN (test code = 11.50 mg/dL  6.00-20.00                



             BUN)                                                

 

             Creatinine Level 1.00 mg/dL   0.50-0.90    H            



             (test code =                                        



             Creatinine Level)                                        

 

             BUN/Creat Ratio 12                        N            



             (test code =                                        



             BUN/Creat Ratio)                                        

 

             Glucose Level (test 108 mg/dL                        



             code = Glucose                                        



             Level)                                              

 

             Calcium Level (test 10.3 mg/dL   8.3-10.5                  



             code = Calcium                                        



             Level)                                              

 

             Alk Phos (test code 93 U/L                           



             = Alk Phos)                                         

 

             Bilirubin Total 0.5 mg/dL    0.1-0.9                   



             (test code =                                        



             Bilirubin Total)                                        

 

             Albumin Level (test 4.4 g/dL     3.5-5.2                   



             code = Albumin                                        



             Level)                                              

 

             Protein Total (test 7.2 g/dL     6.4-8.3                   



             code = Protein                                        



             Total)                                              

 

             ALT (test code = 12 U/L       1-33                      



             ALT)                                                

 

             AST (test code = 17 U/L       1-32                      



             AST)                                                

 

             Globulin (test code 2.8 g/dL     2.9-3.1      L            



             = Globulin)                                         

 

             A/G Ratio (test code 1.6 ratio                 N            



             = A/G Ratio)                                        

 

             eGFR AA (test code = >60                       N            eGFR (e

stimated



             eGFR AA)     mL/min/1.73 m2                           Glomerular



                                                                 Filtration Rate

) is



                                                                 an estimated va

lue,



                                                                 calculated from

 the



                                                                 patient's serum



                                                                 creatinine usin

g the



                                                                 MDRD equation. 

It is



                                                                 NOT the patient

's



                                                                 actual GFR. The

 eGFR



                                                                 provides a more



                                                                 clinically usef

ul



                                                                 measure of kidn

ey



                                                                 disease than se

rum



                                                                 creatinine



                                                                 alone.***This



                                                                 calculation rimma

es



                                                                 sex and race in

to



                                                                 account, if the



                                                                 information is



                                                                 provided. If th

e



                                                                 race is not



                                                                 provided, and t

he



                                                                 patient is



                                                                 -Crystal

n,



                                                                 multiply by 1.2

12.



                                                                 If sex is not



                                                                 provided, and t

he



                                                                 patient is fema

le,



                                                                 multiply by 0.7

42.



                                                                 Results for pat

ients



                                                                 <18 years of ag

e



                                                                 have not been



                                                                 validated by th

e



                                                                 MDRD study and



                                                                 should be



                                                                 interpreted wit

h



                                                                 caution. eGFR R

esult



                                                                 Interpretation:

eGFR



                                                                 > or = 60 is in

 the



                                                                 Normal RangeeGF

R <



                                                                 60 may mean kid

hang



                                                                 diseaseeGFR < 1

5 may



                                                                 mean kidney



                                                                 failure*** Rang

es



                                                                 recommended by 

the



                                                                 [employer]



                                                                 Foundation,



                                                                 http://nkdep.ni

h.gov



Comprehensive Metabolic Panel915 00:04:54





             Test Item    Value        Reference Range Interpretation Comments

 

             Sodium Level (test 137.0 mmol/L 135.0-145.0               



             code = Sodium Level)                                        

 

             Potassium Level 4.0 mmol/L   3.5-5.1                   



             (test code =                                        



             Potassium Level)                                        

 

             Chloride Level (test 103 mmol/L                       



             code = Chloride                                        



             Level)                                              

 

             CO2 (test code = 23 mmol/L    22-29                     



             CO2)                                                

 

             Anion Gap (test code 11 mmol/L    7-16                      



             = Anion Gap)                                        

 

             BUN (test code = 11.50 mg/dL  6.00-20.00                



             BUN)                                                

 

             Creatinine Level 1.00 mg/dL   0.50-0.90    H            



             (test code =                                        



             Creatinine Level)                                        

 

             BUN/Creat Ratio 12                        N            



             (test code =                                        



             BUN/Creat Ratio)                                        

 

             Glucose Level (test 108 mg/dL                        



             code = Glucose                                        



             Level)                                              

 

             Calcium Level (test 10.3 mg/dL   8.3-10.5                  



             code = Calcium                                        



             Level)                                              

 

             Alk Phos (test code 93 U/L                           



             = Alk Phos)                                         

 

             Bilirubin Total 0.5 mg/dL    0.1-0.9                   



             (test code =                                        



             Bilirubin Total)                                        

 

             Albumin Level (test 4.4 g/dL     3.5-5.2                   



             code = Albumin                                        



             Level)                                              

 

             Protein Total (test 7.2 g/dL     6.4-8.3                   



             code = Protein                                        



             Total)                                              

 

             ALT (test code = 12 U/L       1-33                      



             ALT)                                                

 

             AST (test code = 17 U/L       1-32                      



             AST)                                                

 

             Globulin (test code 2.8 g/dL     2.9-3.1      L            



             = Globulin)                                         

 

             A/G Ratio (test code 1.6 ratio                 N            



             = A/G Ratio)                                        

 

             eGFR AA (test code = >60                       N            eGFR (e

stimated



             eGFR AA)     mL/min/1.73 m2                           Glomerular



                                                                 Filtration Rate

) is



                                                                 an estimated va

lue,



                                                                 calculated from

 the



                                                                 patient's serum



                                                                 creatinine usin

g the



                                                                 MDRD equation. 

It is



                                                                 NOT the patient

's



                                                                 actual GFR. The

 eGFR



                                                                 provides a more



                                                                 clinically usef

ul



                                                                 measure of kidn

ey



                                                                 disease than se

rum



                                                                 creatinine



                                                                 alone.***This



                                                                 calculation rimma

es



                                                                 sex and race in

to



                                                                 account, if the



                                                                 information is



                                                                 provided. If th

e



                                                                 race is not



                                                                 provided, and t

he



                                                                 patient is



                                                                 -Crystal

n,



                                                                 multiply by 1.2

12.



                                                                 If sex is not



                                                                 provided, and t

he



                                                                 patient is fema

le,



                                                                 multiply by 0.7

42.



                                                                 Results for pat

ients



                                                                 <18 years of ag

e



                                                                 have not been



                                                                 validated by th

e



                                                                 MDRD study and



                                                                 should be



                                                                 interpreted wit

h



                                                                 caution. eGFR R

esult



                                                                 Interpretation:

eGFR



                                                                 > or = 60 is in

 the



                                                                 Normal RangeeGF

R <



                                                                 60 may mean kid

hang



                                                                 diseaseeGFR < 1

5 may



                                                                 mean kidney



                                                                 failure*** Rang

es



                                                                 recommended by 

the



                                                                 [employer]



                                                                 [employer],



                                                                 http://nkdep.ni

h.gov

 

             eGFR Non-AA (test 58.45                     N            eGFR (sheba

mated



             code = eGFR Non-AA) mL/min/1.73 m2                           Glomer

ular



                                                                 Filtration Rate

) is



                                                                 an estimated va

lue,



                                                                 calculated from

 the



                                                                 patient's serum



                                                                 creatinine usin

g the



                                                                 MDRD equation. 

It is



                                                                 NOT the patient

's



                                                                 actual GFR. The

 eGFR



                                                                 provides a more



                                                                 clinically usef

ul



                                                                 measure of kidn

ey



                                                                 disease than se

rum



                                                                 creatinine



                                                                 alone.***This



                                                                 calculation rimma

es



                                                                 sex and race in

to



                                                                 account, if the



                                                                 information is



                                                                 provided. If 

e



                                                                 race is not



                                                                 provided, and t

he



                                                                 patient is



                                                                 -Crystal

n,



                                                                 multiply by 1.2

12.



                                                                 If sex is not



                                                                 provided, and t

he



                                                                 patient is fema

le,



                                                                 multiply by 0.7

42.



                                                                 Results for pat

ients



                                                                 <18 years of ag

e



                                                                 have not been



                                                                 validated by th

e



                                                                 MDRD study and



                                                                 should be



                                                                 interpreted wit

h



                                                                 caution. eGFR R

esult



                                                                 Interpretation:

eGFR



                                                                 > or = 60 is in

 the



                                                                 Normal RangeeGF

R <



                                                                 60 may mean kid

hang



                                                                 diseaseeGFR < 1

5 may



                                                                 mean kidney



                                                                 failure*** Rang

es



                                                                 recommended by 

the



                                                                 [employer]



                                                                 [employer],



                                                                 http://nkdep.ni

h.gov



Urinalysis Microscopic2019-09-15 00:02:53





             Test Item    Value        Reference Range Interpretation Comments

 

             UA WBC (test code = UA WBC) 6-10         0-5          A            

 

             UA RBC (test code = UA RBC) 0-5          0-5                       

 

             UA Bacteria (test code = UA Bacteria) Many                      A  

          

 

             UA Squam Epithelial (test code = UA TNTC                      A    

        



             Squam Epithelial)                                        



Urine Drug [id number] 23:59:42





             Test Item    Value        Reference Range Interpretation Comments

 

             Amphetamine Screen Ur POSITIVE     Negative     A            



             (test code = Amphetamine                                        



             Screen Ur)                                          

 

             Barbiturate Screen Ur Negative     Negative                  



             (test code = Barbiturate                                        



             Screen Ur)                                          

 

             Benzodiazepines Ur (test POSITIVE     Negative     A            



             code = Benzodiazepines                                        



             Ur)                                                 

 

             Cocaine Screen Ur (test Negative     Negative                  



             code = Cocaine Screen                                        



             Ur)                                                 

 

             U Methadone Scr (test Negative     Negative                  



             code = U Methadone Scr)                                        

 

             Opiate Screen Ur (test Negative     Negative                  



             code = Opiate Screen Ur)                                        

 

             U PCP Scrn (test code = Negative     Negative                  



             U PCP Scrn)                                         

 

             Cannabinoid Screen Ur Negative     Negative                  



             (test code = Cannabinoid                                        



             Screen Ur)                                          

 

             U TCA (test code = U Negative     Negative                  The res

ults of all



             TCA)                                                drug screen elinor

ts are



                                                                 only preliminar

y.



                                                                 Clinical



                                                                 consideration a

nd



                                                                 professional ju

dgrosa



                                                                 should be appli

ed to



                                                                 any drug of abu

se



                                                                 test result,



                                                                 particularly wh

en



                                                                 preliminary pos

itive



                                                                 results are obt

ained.



                                                                 Please order a



                                                                 separate confir

matory



                                                                 test if desired

.



HCG Qualitative [id number] 23:54:43





             Test Item    Value        Reference Range Interpretation Comments

 

             hCG Ur (test code = Negative                               If the r

esult is



             hCG Ur)                                             "Negative" in p

atients



                                                                 suspected to be



                                                                 pregnant, recom

mend



                                                                 retest with a s

ample



                                                                 obtained 48 to 

72



                                                                 hours later, or

 by



                                                                 ordering a



                                                                 quantitative as

say. If



                                                                 the result is



                                                                 "Borderline" te

sting



                                                                 should be repea

gianfranco in



                                                                 48 to 72 hours.

 

             Lot # (test code = [id number]                    N            



             Lot #)                                              

 

             Expiration Dt (test [id number]                  N            



             code = Expiration Dt)                                        

 

             Neg Control (test Negative                               



             code = Neg Control)                                        

 

             Pos Control (test Positive                               



             code = Pos Control)                                        

 

             Internal QC (test Acceptable                             



             code = Internal QC)                                        



Urinalysis with Culture, if [id number] 23:52:04





             Test Item    Value        Reference Range Interpretation Comments

 

             UA Color (test code = UA YELLO        Yellow                    



             Color)                                              

 

             UA Appear (test code = SCLD         Clear        A            



             UA Appear)                                          

 

             UA pH (test code = UA 6.5                                    



             pH)                                                 

 

             UA Spec Grav (test code 1.011        1.001-1.035               



             = UA Spec Grav)                                        

 

             UA Glucose (test code = NEG          Negative                  



             UA Glucose)                                         

 

             UA Bili (test code = UA NEG          Negative                  



             Bili)                                               

 

             UA Ketones (test code = NEG          Negative                  



             UA Ketones)                                         

 

             UA Blood (test code = UA NEG          Negative                  



             Blood)                                              

 

             UA Protein (test code = NEG          Negative                  



             UA Protein)                                         

 

             UA Urobilinogen (test 1 mg/dL      >0.2         A            



             code = UA Urobilinogen)                                        

 

             UA Nitrite (test code = POS          Negative     A            



             UA Nitrite)                                         

 

             UA Leuk Est (test code = NEG          Negative                  



             UA Leuk Est)                                        

 

             UA Micro Ind? (test code Indicated    Not Indicated A            Re

sult created by



             = UA Micro Ind?)                                        rule



                                                                 [id number]

_IND



Automated [id number] 23:48:39





             Test Item    Value        Reference Range Interpretation Comments

 

             Neutro Auto (test code = Neutro 75.7 %       36.0-70.0    H        

    



             Auto)                                               

 

             Lymph Auto (test code = Lymph Auto) 14.1 %       12.0-44.0         

        

 

             Mono Auto (test code = Mono Auto) 7.6 %        0.0-11.0            

      

 

             Eos, Auto (test code = Eos, Auto) 1.8 %        0.0-7.0             

      

 

             Basophil Auto (test code = Basophil 0.5 %        0.0-2.0           

        



             Auto)                                               

 

             Neutro Absolute (test code = Neutro 12.7 x10     1.6-7.4      H    

        



             Absolute)                                           

 

             Lymph Absolute (test code = Lymph 2.38 x10     .50-4.60            

      



             Absolute)                                           

 

             Mono Absolute (test code = Mono 1.28 x10     .00-1.20     H        

    



             Absolute)                                           

 

             Eos Absolute (test code = Eos 0.31 x10     0.00-0.74               

  



             Absolute)                                           

 

             Baso Absolute (test code = Baso 0.09 x10     0.00-0.21             

    



             Absolute)                                           



IG [id number] 23:48:39





             Test Item    Value        Reference Range Interpretation Comments

 

             IG (test code = IG) 0.3 %        0.0-5.0                   

 

             IG Abs (test code = IG Abs) 0 x10                     N            



Complete Blood Count with [id number] 23:48:38





             Test Item    Value        Reference Range Interpretation Comments

 

             WBC (test code = WBC) 16.8 x10     4.4-10.5     H            

 

             RBC (test code = RBC) 4.06 x10     3.75-5.20                 

 

             Hgb (test code = Hgb) 12.7 g/dL    12.2-14.8                 

 

             MCV (test code = MCV) 94.10 fL     80..00              

 

             Hct (test code = Hct) 38.2 %       36.5-44.4                 

 

             MCHC (test code = 33.20 g/dL   32.00-37.50               



             MCHC)                                               

 

             RDW CV (test code = 13.2 %       11.5-14.5                 



             RDW CV)                                             

 

             MCH (test code = MCH) 31.3 pg      27.0-32.5                 

 

             Platelets (test code = 363.0 x10    140.0-440.0               



             Platelets)                                          

 

             MPV (test code = MPV) 10.0 fL                   N            

 

             Slide Review (test Auto         Auto                      Result cr

eated by



             code = Slide Review)                                        GL_SJM_

SLIDE_REV_AUTO

 

             nRBC (test code = 0                         N            



             nRBC)                                               

 

             NRBC Abs (test code = 0.00 x10                  N            



             NRBC Abs)                                           

 

             IPF (test code = IPF) 0 %                       N            



RPR, [id number] 05:53:00





             Test Item    Value        Reference Range Interpretation Comments

 

             RPR (test code = RPR) Non-Reactive Non-Reactive N            



BHCG, Serum, [id number] 01:39:00





             Test Item    Value        Reference Range Interpretation Comments

 

             Preg Qual [Se] (test code = BSHCG) Negative     Negative     N     

       



BHCG, Serum, [id number] 01:09:00





             Test Item    Value        Reference Range Interpretation Comments

 

             B hCG, Quant (test <1 mIU/mL                              Weeks of 

Gestation



             code = BHCGQT)                                        Ranges (mIU/m

L)3 weeks



                                                                 5.40 - 72.04 we

eks 10.2



                                                                 - 7085 weeks 21

7 - 26908



                                                                 weeks  152 - 32

1777



                                                                 weeks 4059 - 15

32623



                                                                 weeks 32011 - 1

[id number]



                                                                 weeks 64789 - 1

[id number]



                                                                 weeks 56365 - 1

[id number]



                                                                 weeks 86389 - 2

[id number]



                                                                 weeks 51501 - 9

[id number]



                                                                 weeks 73378 - 6

081674



                                                                 weeks 8904 - 55

74526



                                                                 weeks 8240 - 51

80556



                                                                 weeks 9649 - 55

271



Thyroid Stimulating Hormone (TSH)2017 01:09:00





             Test Item    Value        Reference Range Interpretation Comments

 

             TSH (test code = TSH) 0.93 mIU/mL  0.270-4.200  N            



Lipid [id number] 01:05:00





             Test Item    Value        Reference Range Interpretation Comments

 

             Cholesterol (test 163 mg/dL    0-200        N            



             code = CHOL)                                        

 

             Triglycerides (test 108 mg/dL    9-200        N            



             code = TRIG)                                        

 

             HDL (test code = 41 mg/dL     50-60        L            



             HDL)                                                

 

             Chol/HDL (test code 4.0 Ratio    0.0-4.4      N            



             = CHOLPHDL)                                         

 

             LDL, Calculated 100          0-130        N            (NOTE)RISK O

F HEART



             (test code = LDLC)                                        DISEASEPu

blished by



                                                                 American Heart



                                                                 AssociationAnal

yte



                                                                 Optimal Boderli

ne



                                                                 Increased RiskC

HOL <200



                                                                 200-239 >240TRI

G  <150



                                                                 150-199 >200HDL

 Male:



                                                                 >60 <40HDL Fema

le: >60



                                                                 <50LDL <100 130

-159



                                                                 >160LDL NEAR OP

TIMAL IS



                                                                 100-129

 

             VLDL (test code = 22 mg/dL     5-40         N            



             VLDL)                                               

 

             LDL/HDL (test code = 2                                      



             LDLPHDL)                                            



Comprehensive Metabolic [id number] 21:02:00





             Test Item    Value        Reference Range Interpretation Comments

 

             Sodium (test code = 140 mmol/L   135-145      N            



             NA)                                                 

 

             Potassium (test 3.6 mmol/L   3.5-5.1      N            



             code = K)                                           

 

             Chloride (test code 103 mmol/L          N            



             = CL)                                               

 

             Carbon Dioxide 26 mmol/L    22-29        N            



             (test code = CO2)                                        

 

             Glucose (test code 89 mg/dL            N            



             = GLU)                                              

 

             Blood Urea Nitrogen 13 mg/dL     6-20         N            



             (test code = BUN)                                        

 

             Creatinine (test 0.8 mg/dL    0.5-0.9      N            



             code = CREAT)                                        

 

             Calcium (test code 10.0 mg/dL   8.3-10.5     N            



             = CA)                                               

 

             Prot Total (test 7.0 g/dL     6.4-8.3      N            



             code = TP)                                          

 

             Albumin (test code 4.2 g/dL     3.5-5.2      N            



             = ALB)                                              

 

             A/G Ratio (test 1.5 Ratio                              



             code = AGRATIO)                                        

 

             Globulin (test code 2.8          2.9-3.1      L            



             = GLOB)                                             

 

             Bili Total (test 0.6 mg/dL    0.1-0.9      N            



             code = TBIL)                                        

 

             Alk Phos (test code 91 U/L              N            



             = APHOS)                                            

 

             AST (test code = 19 U/L       1-32         N            



             AST)                                                

 

             ALT (test code = 14 U/L       1-33         N            



             ALT)                                                

 

             BUN/Creatinine 16.3                                   



             Ratio (test code =                                        



             BCRATIO)                                            

 

             Anion Gap (test 11 mmol/L    7-16         N            



             code = AGAP)                                        

 

             Estimated GFR (test >60                                    eGFR (es

timated



             code = GFR)  mL/min/1.73m2                           Glomerular Nguyễn

tration



                                                                 Rate) is an est

imated



                                                                 value,calculate

d from



                                                                 the patient's s

harman



                                                                 creatinine usin

g the



                                                                 MDRD equation.I

t is



                                                                 NOT the patient

's



                                                                 actual GFR. The

 eGFR



                                                                 provides a more



                                                                 clinicallyusefu

l



                                                                 measure of kidn

ey



                                                                 disease than se

rum



                                                                 creatinine



                                                                 alone.***This



                                                                 calculation rimma

es sex



                                                                 and race into



                                                                 account, if the



                                                                 informationis



                                                                 provided. If th

e race



                                                                 is not provided

, and



                                                                 the patient



                                                                 isAfrican-Ameri

can,



                                                                 multiply by 1.2

12. If



                                                                 sex is not prov

ided,



                                                                 and thepatient 

is



                                                                 female, multipl

y by



                                                                 0.742. Results 

for



                                                                 patients <18 ye

ars



                                                                 ofage have not 

been



                                                                 validated by [place] MDRD



                                                                 study and renee dowd be



                                                                 interpretedwith



                                                                 caution.eGFR Re

sult



                                                                 Interpretation:

eGFR >



                                                                 or = 60 is in t

he



                                                                 Normal RangeeGF

R < 60



                                                                 may mean kidney



                                                                 diseaseeGFR < 1

5 may



                                                                 mean kidney



                                                                 failure***Range

s



                                                                 recommended by 

the



                                                                 [employer]



                                                                 Foundation,http

://nkd



                                                                 ep.nih.gov



Alcohol/Ethanol, [id number] 21:02:00





             Test Item    Value        Reference Range Interpretation Comments

 

             Alcohol, Ethyl <0.01 g/dL   0.00-0.01    N            Intoxicated 0

.080 g/dL



             (test code = ETOH)                                        or more



CBC with [id number] 20:35:00





             Test Item    Value        Reference Range Interpretation Comments

 

             WBC (test code = WBC) 10.3 K/cumm  4.4-10.5     N            

 

             RBC (test code = RBC) 4.37 M/cumm  3.75-5.20    N            

 

             Hemoglobin (test code = HGB) 13.1 gm/dL   12.2-14.8    N           

 

 

             Hematocrit (test code = HCT) 41.5 %       36.5-44.4    N           

 

 

             MCV (test code = MCV) 94.9 fL             N            

 

             MCH (test code = MCH) 30.1 pg      27.0-32.5    N            

 

             MCHC (test code = MCHC) 31.7 g/dL    32.0-37.5    L            

 

             RDW (test code = RDW) 12.9 %       11.5-14.5    N            

 

             Platelet Count (test code = 327 K/cumm   140-440      N            



             PLTCT)                                              

 

             MPV (test code = MPV) 7.0 fL                                 

 

             Diff Method (test code = DIFFM) Auto                               

    

 

             Neutrophil (test code = NEUT) 74.0 %       36-70        H          

  

 

             Lymphocyte (test code = LYMPH) 17.6 %       12-44        N         

   

 

             Monocyte (test code = MONO) 6.4 %        0-11         N            

 

             Eosinophil (test code = EOS) 1.5 %        0-7          N           

 

 

             Basophil (test code = BASO) 0.5 %        0-2          N            

 

             Neutro Abs (test code = ANEUT) 7.6 K/cumm   1.6-7.4      H         

   

 

             Lymph Abs (test code = ALYMPH) 1.8 K/cumm   0.5-4.6      N         

   

 

             Mono Abs (test code = AMONO) 0.7 K/cumm   0.0-1.2      N           

 

 

             Eos Abs (test code = AEOS) 0.16 K/cumm  0.00-0.74    N            

 

             Baso Abs (test code = ABASO) 0.1 K/cumm   0.00-0.21    N           

 



[id number] 20:33:00





             Test Item    Value        Reference Range Interpretation Comments

 

             Amphetamine (test code POSITIVE     Negative     A            For d

iagnostic purposes



             = AMPH)                                             only, positive 

results



                                                                 should always b

e



                                                                 assessedin



                                                                 conjunctionwith

 the



                                                                 patient's medic

al



                                                                 history,clinica

l



                                                                 examination and



                                                                 otherfindings.T

o



                                                                 fulfill legal



                                                                 requirements, a

 more



                                                                 specific altern

ate



                                                                 chemical method

must be



                                                                 used inorder to

 obtain



                                                                 a Confirmed judith

lytical



                                                                 result. GC/MS i

s the



                                                                 preferred confi

rmatory



                                                                 method.

 

             Barbiturates (test Negative     Negative     N            



             code = GENEVIEVE)                                        

 

             Benzodiazepine (test Negative     Negative     N            



             code = IRENE)                                        

 

             Cocaine (test code = Negative     Negative     N            



             COCA)                                               

 

             Methadone (test code = Negative     Negative     N            



             MTHD)                                               

 

             Opiates (test code = Negative     Negative     N            



             OPIA)                                               

 

             PCP (test code = PCP) Negative     Negative     N            

 

             Propoxyphene (test Negative     Negative     N            



             code = PROPOX)                                        

 

             THC (test code = THC) Negative     Negative     N            



Urinalysis [id number] 20:23:00





             Test Item    Value        Reference Range Interpretation Comments

 

             Color (test code = COLOR) Yellow       Yellow,Straw,Pl N           

 



                                       yellow                    

 

             Clarity (test code = Sl Cloudy    Clear        A            



             CLAR)                                               

 

             Specific Gravity (test 1.007        1.001-1.035  N            



             code = SPGR)                                        

 

             pH (test code = PH) 6.0          5.0-9.0      N            

 

             Ketone (test code = KET) Negative mg/dL Negative     N            

 

             Glucose (test code = Negative mg/dL Negative     N            



             GLUCUR)                                             

 

             Protein (test code = Negative mg/dL Negative     N            



             PROT)                                               

 

             Bilirubin (test code = Negative mg/dL Negative     N            



             BILI)                                               

 

             Occult Blood (test code = Moderate     Negative     A            



             UDOB)                                               

 

             Urobilinogen (test code = 0.2 mg/dL    0.2-1.0      N            



             UROB)                                               

 

             Nitrite (test code = NIT) Positive     Negative     A            

 

             Leuk Esterase (test code Moderate     Negative     A            



             = LEUK)                                             

 

             Micros Exam (test code = Indicated                              



             MEXAM)                                              

 

             Epithelial Cells (test 50+ /LPF     0-30         A            



             code = EPI)                                         

 

             WBC, Urine (test code = 41-50 /HPF   0-5          A            



             UWBC)                                               

 

             RBC, Urine (test code = 3-5 /HPF     0-5          A            



             URBC)                                               

 

             Bacteria (test code = Many /HPF                              



             BACT)                                               



[id number] 16:21:00





             Test Item    Value        Reference Range Interpretation Comments

 

             Lithium (test code = LI) 0.6 mmol/l   0.6-1.2                   



[facility] (Ultra Sensitive)2017 16:21:00





             Test Item    Value        Reference Range Interpretation Comments

 

             TSH (test code = TSH) 2.14 mIU/L   0.47-4.68                 



[facility]





             Test Item    Value        Reference Range Interpretation Comments

 

             Glucose (test code 77 mg/dl                         



             = GLU)                                              

 

             BUN (test code = 9.0 mg/dl    6.0-17.0                  



             BUN)                                                

 

             Creatinine (test 0.7 mg/dl    0.4-1.2                   



             code = CREA)                                        

 

             Sodium (test code = 139 mmol/l   137-145                   



             NA)                                                 

 

             Potassium (test 4.7 mmol/l   3.5-5.0                   



             code = K)                                           

 

             Chloride (test code 107 mmol/l                       



             = CL)                                               

 

             CO2 (test code = 22 mmol/l    22-30                     



             CO2)                                                

 

             Anion Gap (test 11                                     



             code = GAP)                                         

 

             Calcium (test code 9.8 mg/dl    8.4-10.2                  



             = CALC)                                             

 

             T Protein (test 8.0 gm/dl    5.1-8.7                   



             code = TP)                                          

 

             Albumin (test code 4.4 gm/dl    3.5-4.6                   



             = ALB)                                              

 

             A/G Ratio (test 1.2 %        1.1-2.2                   



             code = AGRAT)                                        

 

             AST (SGOT) (test 20 U/L       11-36                     



             code = AST)                                         

 

             ALT (SGPT) (test 29 U/L       11-40                     



             code = ALT)                                         

 

             Alkaline Phos (test 108 U/L                          



             code = ALKP)                                        

 

             Total Bilirubin 0.6 mg/dl    0.2-1.2                   



             (test code = TBIL)                                        

 

             Globulin (test code 3.6 gm/dl    2.3-3.5      H            



             = GLOBU)                                            

 

             Calcium, Corrected 9.5 mg/dl    8.4-10.2                  Various f

ormulas exist



             (test code =                                        for corrected s

harman



             CALCCORR)                                           calcium results

, each



                                                                 yielding differ

ent



                                                                 values. This co

rrected



                                                                 result was base

d on



                                                                 the formula: Co

rrected



                                                                 Calcium = Serum

Calcium



                                                                 + [0.8 * ( 4 -



                                                                 SerumAlbumin)]

 

             EGFR if  >60                                    



             American (test code mL/min/1.73m\                           



             = EGFRAA)    S\2                                    

 

             EGFR if Non- >60                                    Estimate

d Glomerular



             American (test code mL/min/1.73m\                           Filtrat

ion Rate (eGFR)



             = EGFRNA)    S\2                                    Reference Inter

vals



                                                                 Decision Points

 for 18



                                                                 years and older

 and



                                                                 average body ma

ss: >=



                                                                 60 Does not exc

lude



                                                                 kidney disease.

 30 -



                                                                 59 Suggests mod

erate



                                                                 chronic kidney 

disease



                                                                 and indicates t

he need



                                                                 for further



                                                                 investigation



                                                                 including asses

sment



                                                                 of proteinuria 

and



                                                                 cardiovascular



                                                                 factors. < 30 U

sually



                                                                 indicates a nee

d for



                                                                 referral for



                                                                 assessment and



                                                                 management of c

hronic



                                                                 kidney failure.



[facility]

## 2023-01-29 ENCOUNTER — HOSPITAL ENCOUNTER (EMERGENCY)
Dept: HOSPITAL 97 - ER | Age: 53
Discharge: HOME | End: 2023-01-29
Payer: COMMERCIAL

## 2023-01-29 VITALS — DIASTOLIC BLOOD PRESSURE: 78 MMHG | TEMPERATURE: 98.3 F | OXYGEN SATURATION: 98 % | SYSTOLIC BLOOD PRESSURE: 130 MMHG

## 2023-01-29 DIAGNOSIS — F31.9: Primary | ICD-10-CM

## 2023-01-29 DIAGNOSIS — Z88.0: ICD-10-CM

## 2023-01-29 DIAGNOSIS — Z20.2: ICD-10-CM

## 2023-01-29 DIAGNOSIS — N39.0: ICD-10-CM

## 2023-01-29 DIAGNOSIS — F17.210: ICD-10-CM

## 2023-01-29 DIAGNOSIS — I10: ICD-10-CM

## 2023-01-29 DIAGNOSIS — Z88.8: ICD-10-CM

## 2023-01-29 LAB
METHAMPHET UR QL SCN: NEGATIVE
THC SERPL-MCNC: NEGATIVE NG/ML

## 2023-01-29 PROCEDURE — 96372 THER/PROPH/DIAG INJ SC/IM: CPT

## 2023-01-29 PROCEDURE — 99283 EMERGENCY DEPT VISIT LOW MDM: CPT

## 2023-01-29 PROCEDURE — 80307 DRUG TEST PRSMV CHEM ANLYZR: CPT
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
## 2023-01-29 NOTE — EDPHYS
Physician Documentation                                                                           

 [facility]                                                                 

Name: Alka Judd                                                                               

Age: 52 yrs                                                                                       

Sex: Female                                                                                       

: 1970                                                                                   

MRN: [id number]                                                                                   

Arrival Date: 2023                                                                          

Time: 16:07                                                                                       

Account#: [id number]                                                                            

Bed 13                                                                                            

Private MD:                                                                                       

ED Physician Raúl Chavez                                                                      

HPI:                                                                                              

                                                                                             

19:10 This 52 yrs old  Female presents to ER via EMS with complaints of Psych        suleiman 

      Problem.                                                                                    

19:10 The patient presents to the emergency department with depression, bipolar. Onset: The   suleiman 

      symptoms/episode began/occurred 5 day(s) ago. Past psychiatric history: Prior               

      diagnosis: bipolar disorder. Associated signs and symptoms: The patient has no apparent     

      associated signs or symptoms. Severity of symptoms: At their worst the symptoms were        

      mild in the emergency department the symptoms are unchanged.                                

                                                                                                  

OB/GYN:                                                                                           

16:18 LMP N/A - Irregular menses                                                              ko1 

                                                                                                  

Historical:                                                                                       

- Allergies:                                                                                      

16:18 PENICILLINS;                                                                            ko1 

16:18 Risperdal;                                                                              ko1 

- PMHx:                                                                                           

16:18 Bipolar disorder; Hypertensive disorder;                                                ko1 

- PSHx:                                                                                           

16:18 righ hip surgery;                                                                       ko1 

                                                                                                  

- Immunization history:: Adult Immunizations unknown.                                             

- Social history:: Smoking status: Patient reports the use of cigarette tobacco                   

  products, smokes one-half pack cigarettes per day.                                              

                                                                                                  

                                                                                                  

ROS:                                                                                              

19:10 Constitutional: Negative for fever, chills, and weight loss, Eyes: Negative for injury, suleiman 

      pain, redness, and discharge, ENT: Negative for injury, pain, and discharge, Neck:          

      Negative for injury, pain, and swelling, Cardiovascular: Negative for chest pain,           

      palpitations, and edema, Respiratory: Negative for shortness of breath, cough,              

      wheezing, and pleuritic chest pain, Abdomen/GI: Negative for abdominal pain, nausea,        

      vomiting, diarrhea, and constipation, Back: Negative for injury and pain, MS/Extremity:     

      Negative for injury and deformity, Skin: Negative for injury, rash, and discoloration,      

      Neuro: Negative for headache, weakness, numbness, tingling, and seizure,                    

      Allergy/Immunology: Negative for hives, rash, and allergies, Endocrine: Negative for        

      neck swelling, polydipsia, polyuria, polyphagia, and marked weight changes,                 

      Hematologic/Lymphatic: Negative for swollen nodes, abnormal bleeding, and unusual           

      bruising.                                                                                   

19:10 : Positive for urinary symptoms, pelvic pain, burning with urination, foul smelling       

      urine, vaginal discharge, vaginal itching.                                                  

                                                                                                  

Exam:                                                                                             

19:10 Constitutional:  This is a well developed, well nourished patient who is awake, alert,  suleiman 

      and in no acute distress. Head/Face:  Normocephalic, atraumatic. Eyes:  Pupils equal        

      round and reactive to light, extra-ocular motions intact.  Lids and lashes normal.          

      Conjunctiva and sclera are non-icteric and not injected.  Cornea within normal limits.      

      Periorbital areas with no swelling, redness, or edema. ENT:  Nares patent. No nasal         

      discharge, no septal abnormalities noted.  Tympanic membranes are normal and external       

      auditory canals are clear.  Oropharynx with no redness, swelling, or masses, exudates,      

      or evidence of obstruction, uvula midline.  Mucous membranes moist. Neck:  Trachea          

      midline, no thyromegaly or masses palpated, and no cervical lymphadenopathy.  Supple,       

      full range of motion without nuchal rigidity, or vertebral point tenderness.  No            

      Meningismus. Chest/axilla:  Normal chest wall appearance and motion.  Nontender with no     

      deformity.  No lesions are appreciated. Cardiovascular:  Regular rate and rhythm with a     

      normal S1 and S2.  No gallops, murmurs, or rubs.  Normal PMI, no JVD.  No pulse             

      deficits. Respiratory:  Lungs have equal breath sounds bilaterally, clear to                

      auscultation and percussion.  No rales, rhonchi or wheezes noted.  No increased work of     

      breathing, no retractions or nasal flaring. Abdomen/GI:  Soft, non-tender, with normal      

      bowel sounds.  No distension or tympany.  No guarding or rebound.  No evidence of           

      tenderness throughout. Back:  No spinal tenderness.  No costovertebral tenderness.          

      Full range of motion. Skin:  Warm, dry with normal turgor.  Normal color with no            

      rashes, no lesions, and no evidence of cellulitis. MS/ Extremity:  Pulses equal, no         

      cyanosis.  Neurovascular intact.  Full, normal range of motion. Neuro:  Awake and           

      alert, GCS 15, oriented to person, place, time, and situation.  Cranial nerves II-XII       

      grossly intact.  Motor strength 5/5 in all extremities.  Sensory grossly intact.            

      Cerebellar exam normal.  Normal gait. Psych:  Awake, alert, with orientation to person,     

      place and time.  Behavior, mood, and affect are within normal limits.                       

                                                                                                  

Vital Signs:                                                                                      

16:14  / 85; Pulse 86; Resp 16; Temp 98.4; Pulse Ox 95% ; Weight 100.24 kg; Height 5    ko1 

      ft. 2 in. (157.48 cm);                                                                      

20:20  / 78; Pulse 80; Resp 19; Temp 98.3; Pulse Ox 98% ; Pain 0/10;                    ke1 

16:14 Body Mass Index 40.42 (100.24 kg, 157.48 cm)                                            ko1 

                                                                                                  

MDM:                                                                                              

17:51 Patient medically screened.                                                             suleiman 

17:51 Patient medically screened.                                                             suleiman 

19:13 Differential diagnosis: psychosis secondary to non-compliance, cervicitis, Neoplasm.    [facility] 

      Data reviewed: vital signs, nurses notes, lab test result(s), radiologic studies, CT        

      scan. Consideration of Admission/Observation Patient was admitted/placed on                 

      observation. Escalation of care including admission/observation considered. I               

      considered the following discharge prescriptions or medication management in the            

      emergency department Medications were administered in the Emergency Department. See         

      MAR. Test considered but Not performed: Labs: no repeat labs today. Care significantly      

      affected by the following chronic conditions: Hypertension, bipolar.                        

                                                                                                  

                                                                                             

17:52 Order name: Urine Drug Screen                                                           [facility] 

                                                                                             

19:26 Order name: Urine Culture                                                               [facility] 

                                                                                             

17:52 Order name: IV Saline Lock                                                              [facility] 

                                                                                             

17:52 Order name: Labs collected and sent                                                     [facility] 

                                                                                             

17:52 Order name: Suicide Screening ([place])                                                [facility] 

                                                                                             

17:52 Order name: Urine Dipstick-Ancillary (obtain specimen)                                  [facility] 

                                                                                             

17:53 Order name: Urine Pregnancy Test (obtain specimen)                                      [facility] 

                                                                                             

19:10 Order name: Pelvic Exam Setup; Complete Time: 19:17                                     [facility] 

                                                                                             

20:13 Order name: Misc. Order: if pregnancy test is negative, dc home                         [facility] 

                                                                                                  

Administered Medications:                                                                         

19:32 Not Given (Physician Discretion): NS 0.9% 1000 ml IV at 1 bolus Per protocol; 1000 mL   ke1 

      bolus                                                                                       

19:32 Drug: Rocephin (cefTRIAXone) 1 grams Route: IM; Site: left deltoid;                     ke1 

19:32 Drug: Flagyl (metroNIDAZOLE) 2 grams Route: PO;                                         ke1 

19:32 Drug: Doxycycline 200 mg Route: PO;                                                     ke1 

                                                                                                  

                                                                                                  

Disposition Summary:                                                                              

23 19:19                                                                                    

Discharge Ordered                                                                                 

      Location: Home                                                                          [facility] 

      Problem: new                                                                            suleiman 

      Symptoms: have improved                                                                 suleiman 

      Condition: Stable                                                                       [facility] 

      Diagnosis                                                                                   

        - UTI/ Urinary tract infection, site not specified                                    suleiman 

        - Contact with and (suspected) exposure to infections with a predominantly sexual     suleiman 

      mode of transmission                                                                        

        - Bipolar disorder, unspecified                                                       suleiman 

      Followup:                                                                               suleiman 

        - With: Private Physician                                                                  

        - When: 2 - 3 days                                                                         

        - Reason: Recheck today's complaints, Continuance of care, Re-evaluation by your           

      physician                                                                                   

      Followup:                                                                               suleiman 

        - With: Tika Patel MD                                                              

        - When: 2 - 3 days                                                                         

        - Reason: Recheck today's complaints, Continuance of care, Re-evaluation by your           

      physician                                                                                   

      Followup:                                                                               suleiman 

        - With: Usman Montgomery MD                                                             

        - When: 2 - 3 days                                                                         

        - Reason: Recheck today's complaints, Re-evaluation by your physician                      

      Discharge Instructions:                                                                     

        - Discharge Summary Sheet                                                             suleiman 

        - Dysuria                                                                             suleiman 

        - Urinary Tract Infection, Adult                                                      [facility] 

        - Urinary Tract Infection, Adult, Easy-to-Read                                        [facility] 

        - Preventing Sexually Transmitted Infections, Adult                                   [facility] 

      Forms:                                                                                      

        - Medication Reconciliation Form                                                      [facility] 

        - Thank You Letter                                                                    suleiman 

        - Antibiotic Education                                                                [facility] 

        - Prescription Opioid Use                                                             [facility] 

      Prescriptions:                                                                              

        - Cipro 250 mg Oral Tablet                                                                 

            - take 1 tablet by ORAL route every 12 hours; 14 tablet; Refills: 0, Product      [facility] 

      Selection Permitted                                                                         

        - Doxycycline Hyclate 100 mg Oral Tablet                                                   

            - take 1 tablet by ORAL route every 12 hours; 14 tablet; Refills: 0, Product      [facility] 

      Selection Permitted                                                                         

        - Bactrim -160 mg Oral Tablet                                                        

            - take 1 tablet by ORAL route every 12 hours for 7 days; 14 tablet; Refills: 0,   [facility] 

      Product Selection Permitted                                                                 

Signatures:                                                                                       

Dispatcher [employer]                           Raúl Reed MD MD cha Ebrottie, Kouassi RN                   RN   ke1                                                  

Nallely Jiménez RN                       RN   ko1                                                  

                                                                                                  

Corrections: (The following items were deleted from the chart)                                    

19:33 17:52 EKG - Nurse/Tech ordered. suleiman                                                     ke1 

                                                                                                  

**************************************************************************************************

## 2023-01-29 NOTE — XMS REPORT
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                   [facility]

 

             Respiratory rate 2022 19:30:00 11 /min                   [facility]

 

             Oxygen saturation in 2022 19:30:00 95 /min                   

[facility]



             Arterial blood by                                        [place] [facility]

staci



             Pulse oximetry                                        Br[facility]

 

             Body temperature 2022 17:54:00 37.22 Danisha                 [facility]

 

             Body height  2022 17:54:00 157.5 cm                  [facility]

 

             Body weight  2022 17:54:00 90.719 kg                 [facility]

 

             BMI          2022 17:54:00 36.58 kg/m2               [facility]

 

             Body height  2022 15:29:00 160 cm                    [facility]

 

             Body weight  2022 15:29:00 90.719 kg                 [facility]

 

             BMI          2022 15:29:00 35.43 kg/m2               [facility]

 

             Height/Length 2022 08:36:33 160 cm                    



             Measured                                            

 

             Weight Dosing 2022 08:36:33 105.00 kg                 







Procedures







                Procedure       Date / Time Performed Performing Clinician [facility]

e

 

                ASSIGNMENT OF BENEFITS 2023 19:39:54 Doctor Unassigned, No

 [facility]

 

                XR CHEST 1 VW   [facility]39[facility]34 [facility]

 

                XR PELVIS <3 VW 2022 18:39:34 [facility]

 

                URINALYSIS      2022 18:19:00 Singer [facility]

 

                COMP. METABOLIC PANEL 2022 18:08:00 Fly Olmos

[facility]



                ([id number])                                         [facility] Branch

 

                CBC WITH DIFF   2022 18:08:00 Fly Olmos o

f [place]



                                                                [facility]

 

                COVID-19 (ID NOW RAPID 2022 18:08:00 Fly Olmos

[facility]



                TESTING)                                        Medical Branch

 

                REFERRAL-       2022 05:01:00 Doctor Unassigned, Nimisha [facility]



                REQUEST/RESPONSE                 Name            Medical Branch

 

                [id number]         2020 00:00:00 CANAL.02        [facility]







Plan of Care







             Planned Activity Planned Date Details      Comments     Source

 

             Future Scheduled Test 2022-10-01 00:00:00 IMM Influenza            

  [facility]



                                       Seasonal (>/= 19 yrs)              



                                       [code = IMM Influenza              



                                       Seasonal (>/= 19              



                                       yrs)]                     

 

             Future Scheduled Test 2022-10-01 00:00:00 IMM Influenza            

  [facility]



                                       Seasonal (>/= 19 yrs)              



                                       [code = IMM Influenza              



                                       Seasonal (>/= 19              



                                       yrs)]                     

 

             Future Scheduled Test 2022-10-01 00:00:00 IMM Influenza            

  [facility]



                                       Seasonal (>/= 19 yrs)              



                                       [code = IMM Influenza              



                                       Seasonal (>/= 19              



                                       yrs)]                     

 

             Future Scheduled Test 2022-10-01 00:00:00 IMM Influenza            

  [facility]



                                       Seasonal (>/= 19 yrs)              



                                       [code = IMM Influenza              



                                       Seasonal (>/= 19              



                                       yrs)]                     

 

             Future Scheduled Test 2022-10-01 00:00:00 IMM Influenza            

  [facility]



                                       Seasonal (>/= 19 yrs)              



                                       [code = IMM Influenza              



                                       Seasonal (>/= 19              



                                       yrs)]                     

 

             Future Scheduled Test 2022-10-01 00:00:00 IMM Influenza            

  [facility]



                                       Seasonal (>/= 19 yrs)              



                                       [code = IMM Influenza              



                                       Seasonal (>/= 19              



                                       yrs)]                     

 

             Future Scheduled Test 2018 00:00:00 Screening for            

  [facility]



                                       malignant neoplasm of              



                                       colon (procedure)              



                                       [code = 622247081]              

 

             Future Scheduled Test 2018 00:00:00 Screening for            

  [facility]



                                       malignant neoplasm of              



                                       colon (procedure)              



                                       [code = 009483573]              

 

             Future Scheduled Test 2018 00:00:00 Screening for            

  [facility]



                                       malignant neoplasm of              



                                       colon (procedure)              



                                       [code = 090172880]              

 

             Future Scheduled Test 2018 00:00:00 Screening for            

  [facility]



                                       malignant neoplasm of              



                                       colon (procedure)              



                                       [code = 987558547]              

 

             Future Scheduled Test 2018 00:00:00 Screening for            

  [facility]



                                       malignant neoplasm of              



                                       colon (procedure)              



                                       [code = 446502770]              

 

             Future Scheduled Test 2018 00:00:00 Screening for            

  [facility]



                                       malignant neoplasm of              



                                       colon (procedure)              



                                       [code = 498245057]              

 

             Future Scheduled Test 2008 00:00:00 Breast Cancer Scrn       

       [facility]



                                       (Yearly) [code =              



                                       Breast Cancer Scrn              



                                       (Yearly)]                 

 

             Future Scheduled Test 2008 00:00:00 Breast Cancer Scrn       

       [facility]



                                       (Yearly) [code =              



                                       Breast Cancer Scrn              



                                       (Yearly)]                 

 

             Future Scheduled Test 2008 00:00:00 Breast Cancer Scrn       

       [facility]



                                       (Yearly) [code =              



                                       Breast Cancer Scrn              



                                       (Yearly)]                 

 

             Future Scheduled Test 2008 00:00:00 Breast Cancer Scrn       

       [facility]



                                       (Yearly) [code =              



                                       Breast Cancer Scrn              



                                       (Yearly)]                 

 

             Future Scheduled Test 2008 00:00:00 Breast Cancer Scrn       

       [facility]



                                       (Yearly) [code =              



                                       Breast Cancer Scrn              



                                       (Yearly)]                 

 

             Future Scheduled Test 2008 00:00:00 Breast Cancer Scrn       

       [facility]



                                       (Yearly) [code =              



                                       Breast Cancer Scrn              



                                       (Yearly)]                 

 

             Future Scheduled Test 1998 00:00:00 Screening for            

  [facility]



                                       malignant neoplasm of              



                                       cervix (procedure)              



                                       [code = 922992463]              

 

             Future Scheduled Test 1998 00:00:00 Screening for            

  [facility]



                                       malignant neoplasm of              



                                       cervix (procedure)              



                                       [code = 171073277]              

 

             Future Scheduled Test 1998 00:00:00 Screening for            

  [facility]



                                       malignant neoplasm of              



                                       cervix (procedure)              



                                       [code = 716658955]              

 

             Future Scheduled Test 1998 00:00:00 Screening for            

  [facility]



                                       malignant neoplasm of              



                                       cervix (procedure)              



                                       [code = 339167680]              

 

             Future Scheduled Test 1998 00:00:00 Screening for            

  [facility]



                                       malignant neoplasm of              



                                       cervix (procedure)              



                                       [code = 840971547]              

 

             Future Scheduled Test 1998 00:00:00 Screening for            

  [facility]



                                       malignant neoplasm of              



                                       cervix (procedure)              



                                       [code = 326340415]              

 

             Future Scheduled Test 1998 00:00:00 Screening for            

  [facility]



                                       malignant neoplasm of              



                                       cervix (procedure)              



                                       [code = 473892567]              

 

             Future Scheduled Test 1998 00:00:00 Screening for            

  [facility]



                                       malignant neoplasm of              



                                       cervix (procedure)              



                                       [code = 373454232]              

 

             Future Scheduled Test 1998 00:00:00 Screening for            

  [facility]



                                       malignant neoplasm of              



                                       cervix (procedure)              



                                       [code = 142178726]              

 

             Future Scheduled Test 1998 00:00:00 Screening for            

  [facility]



                                       malignant neoplasm of              



                                       cervix (procedure)              



                                       [code = 854183049]              

 

             Future Scheduled Test 1998 00:00:00 Screening for            

  [facility]



                                       malignant neoplasm of              



                                       cervix (procedure)              



                                       [code = 333440482]              

 

             Future Scheduled Test 1998 00:00:00 Screening for            

  [facility]



                                       malignant neoplasm of              



                                       cervix (procedure)              



                                       [code = 216732186]              

 

             Future Scheduled Test 1969 00:00:00 COVID-19 Vaccine (#1)    

          [facility]



                                       [code = COVID-19              



                                       Vaccine (#1)]              

 

             Future Scheduled Test 1969 00:00:00 COVID-19 Vaccine (#1)    

          [facility]



                                       [code = COVID-19              



                                       Vaccine (#1)]              

 

             Future Scheduled Test 1969 00:00:00 COVID-19 Vaccine (#1)    

          [facility]



                                       [code = COVID-19              



                                       Vaccine (#1)]              

 

             Future Scheduled Test 1969 00:00:00 COVID-19 Vaccine (#1)    

          [facility]



                                       [code = COVID-19              



                                       Vaccine (#1)]              

 

             Future Scheduled Test 1969 00:00:00 COVID-19 Vaccine (#1)    

          [facility]



                                       [code = COVID-19              



                                       Vaccine (#1)]              

 

             Future Scheduled Test 1969 00:00:00 COVID-19 Vaccine (#1)    

          [facility]



                                       [code = COVID-19              



                                       Vaccine (#1)]              







Encounters







        Start   End     Encounter Admission Attending Care    Care    Encounter 

Source



        Date/Time Date/Time Type    Type    Clinicians Facility Department ID   

   

 

        2022         Outpatient         SARATH, [facility]     [id number]

-2 [place]



        01:05:17                         MELANIE                  [id number] [facility]

 

        2022         Outpatient         VIRGIE,  [facility]     [id number]

 [place]



        03:15:41                         JOAQUIN                 [id number] [facility]

 

        2022         Outpatient         VIRGIE,  [facility]     [id number]

 [place]



        15:19:55                         JOAQUIN                 [id number] [facility]

 

        2022         Outpatient                 [facility]     [id number]

 [place]



        15:28:25                                                 [id number] [facility]

 

        2022         Outpatient                 [facility]     [id number]

 [place]



        12:00:51                                                 [id number] [facility]

 

        2022         Outpatient                 [facility]     [id number]

 [place]



        15:13:02                                                 [id number] [facility]

 

        2020         Inpatient                 [facility]   YAMILA    [id number] 

[facility]



        03:17:00                                                 58      [facility]

 

        2023 Outpatient DIANA NOLAN,  [facility]    [id number]

[address] [facility]



        14:00:00 14:00:00                 ANCELMO beltranjannie [facility]

 

        2023 Orders          Doctor  [place]    [id number]

[address] [facility]



        00:00:00 00:00:00 Only            Unassigned, SHONA   350.1.13.10       

  ity of



                                        [facility] 4.2.7.2.686         [place]oly

as



                                                        [phone]         Medi

staci



                                                        [address]             [address]

 

        2022 Inpatient         SANCHEZ, [facility]    MED       

  [facility]



        19:24:00 19:40:00                 LEXI                         

 

        2022 Emergency E       HEATHER, [facility]     

   [facility]



        14:56:00 18:09:00                 DMEARIO                           

 

        2022 Emergency X       [facility]    [facility]     [id number]

15 [facility]



        12:56:00 15:05:00                 FLY brock [facility]

 

        2022 [facility]    [id number]

[id number] [facility]



        12:56:00 15:05:00                 Fly HAMM 350.1.13.10         i

ty [facility]



                                                [facility] 4.2.7.2.686         [place]

[facility]  [phone]         Medi

staci



                                                        [address]             [address]

 

        2022 Office          Amy  [facility]    [id number]

13 [facility]



        10:00:00 10:30:00 Visit           [facility]  350.1.13.10         it

y of



                                                NORIS 4.2.7.2.686         [place]

as



                                                SKYLAR?BLEA [phone]         Me

chloé



                                                [address]



                                                MEDICAL                 



                                                OFFICE                  



                                                BUILDING                 

 

        2022 Outpatient DIANA       AMY  [facility]    [id number]

[address] [facility]



        10:00:00 10:00:00                 ESCOBAR                           [facility]

 

        2022 Orders          Doctor  [place]    [id number]

07 [facility]



        00:00:00 00:00:00 Only            Unassigned, SHONA   350.1.13.10       

  ity of



                                        [facility] 4.2.7.2.686         [place]

as



                                                        [phone]         [address]

 

        2020 Outpatient         Josias, HERMILACL   OUTD    [id number]

220 [facility]



        08:38:00 08:38:00                 Musaddiq                 75      [facility]

 

        2019 Inpatient 1       Robin Barrios [facility]    PSY     [address]

[id number]



        23:01:00 13:16:00                 Robin Barrios                         

[facility]

 

        [facility]     [id number]

[address] [facility]



        00:00:00 00:00:00                 VENKATA                         [facility]







Results







           Test Description Test Time  Test Comments Results    Result Comments 

Source









                    COMP. METABOLIC PANEL (83877) 2022 19:59:50 









                      Test Item  Value      Reference Range Interpretation Comme

nts









             NA (test code = 8342468305) 138 mmol/L   135-145                   

 

             K (test code = 7202036604) 4.3 mmol/L   3.5-5.0                   

 

             CL (test code = 8285559202) 102 mmol/L                       

 

             CO2 TOTAL (test code = 6034630172) 23 mmol/L    23-31              

       

 

             AGAP (test code = 6888746974)              2-16                    

  

 

             BUN (test code = 5713726451) 18 mg/dL     7-23                     

 

 

             GLUCOSE (test code = 7873280389) 110 mg/dL                   

     

 

             CREATININE (test code = 0.70 mg/dL   0.50-1.04                 



             [id number])                                         

 

             TOTAL BILI (test code = 1.3 mg/dL    0.1-1.1      H            



             [id number])                                         

 

             CALCIUM (test code = 1701968442) 9.5 mg/dL    8.6-10.6             

     

 

             T PROTEIN (test code = 1926004537) 7.4 g/dL     6.3-8.2            

       

 

             ALBUMIN (test code = 0948908811) 4.2 g/dL     3.5-5.0              

     

 

             ALK PHOS (test code = 4549153362) 100 U/L                    

      

 

             ALTv (test code = 1742-6) 16 U/L       5-35                      

 

             AST(SGOT) (test code = 3706135681) 27 U/L       13-40              

       

 

             eGFR (test code = 9041197636)              mL/min/1.73m2           

   

 

             ELENA (test code = ELENA) Association of Glomerular                    

       



                          Filtration Rate (GFR) and Staging                     

      



                          of Kidney Disease*                           



                          +-----------------------+--------                     

      



                          -------------+-------------------                     

      



                          ------+| GFR (mL/min/1.73 m2) ?|                      

     



                          With Kidney Damage ?| ?Without                        

   



                          Kidney                                 



                          Damage+-----------------------+--                     

      



                          -------------------+-------------                     

      



                          ------------+| ?>90 ? ? ? ? ? ? ?                     

      



                          ? ?| ?Stage one ? ? ? ? ?| ?                          

 



                          Normal ? ? ? ? ? ? ?                           



                          ?+-----------------------+-------                     

      



                          --------------+------------------                     

      



                          -------+| ?60-89 ? ? ? ? ? ? ? ?|                     

      



                          ?Stage two ? ? ? ? ?| ? Decreased                     

      



                          GFR ? ? ? ?                            



                          +-----------------------+--------                     

      



                          -------------+-------------------                     

      



                          ------+| ?30-59 ? ? ? ? ? ? ? ?|                      

     



                          ?Stage three ? ? ? ?| ? Stage                         

  



                          three ? ? ? ? ?                           



                          +-----------------------+--------                     

      



                          -------------+-------------------                     

      



                          ------+| ?15-29 ? ? ? ? ? ? ? ?|                      

     



                          ?Stage four ? ? ? ? | ? Stage                         

  



                          four ? ? ? ? ?                           



                          ?+-----------------------+-------                     

      



                          --------------+------------------                     

      



                          -------+| ?<15 (or dialysis) ? ?|                     

      



                          ?Stage five ? ? ? ? | ? Stage                         

  



                          five ? ? ? ? ?                           



                          ?+-----------------------+-------                     

      



                          --------------+------------------                     

      



                          -------+ *Each stage assumes the                      

     



                          associated GFR level has been in                      

     



                          effect for at least three months.                     

      



                          ?Stages 1 to 5, with or without                       

    



                          kidney disease, indicate chronic                      

     



                          kidney disease. Notes:                           



                          Determination of stages one and                       

    



                          two (with eGFR >59mL/min/1.73 m2)                     

      



                          requires estimation of kidney                         

  



                          damage for at least three months                      

     



                          as defined by structural or                           



                          functional abnormalities of the                       

    



                          kidney, manifested by                           



                          either:Pathological abnormalities                     

      



                          or Markers of kidney damage                           



                          (including abnormalities in the                       

    



                          composition of the blood or urine                     

      



                          or abnormalities in imaging                           



                          tests).                                

 

             Lab Interpretation (test code = Abnormal                           

    



             30060-5)                                            



[facility] WITH [id number] 19:22:40





             Test Item    Value        Reference Range Interpretation Comments

 

             WBC (test code =              See_Comment  H             [Automated



             2690-2)                                             message] The sy

stem



                                                                 which generated



                                                                 this result



                                                                 transmitted



                                                                 reference range

:



                                                                 4.30 - 11.10



                                                                 10*3/?L. The



                                                                 reference range

 was



                                                                 not used to



                                                                 interpret this



                                                                 result as



                                                                 normal/abnormal

.

 

             RBC (test code =              See_Comment                [Automated



             789-8)                                              message] The sy

stem



                                                                 which generated



                                                                 this result



                                                                 transmitted



                                                                 reference range

:



                                                                 3.93 - 5.25



                                                                 10*6/?L. The



                                                                 reference range

 was



                                                                 not used to



                                                                 interpret this



                                                                 result as



                                                                 normal/abnormal

.

 

             HGB (test code = 13.0 g/dL    11.6-15.0                 



             718-7)                                              

 

             HCT (test code = 38.4 %       35.7-45.2                 



             4544-3)                                             

 

             MCV (test code = 89.9 fL      80.6-95.5                 



             787-2)                                              

 

             MCH (test code = 30.4 pg      25.9-32.8                 



             785-6)                                              

 

             MCHC (test code = 33.9 g/dL    31.6-35.1                 



             786-4)                                              

 

             RDW-SD (test code = 39.3 fL      39.0-49.9                 



             52735-5)                                            

 

             RDW-CV (test code = 12.2 %       12.0-15.5                 



             788-0)                                              

 

             PLT (test code =              See_Comment                [Automated



             777-3)                                              message] The sy

stem



                                                                 which generated



                                                                 this result



                                                                 transmitted



                                                                 reference range

:



                                                                 166 - 358 10*3/

?L.



                                                                 The reference r

jihan



                                                                 was not used to



                                                                 interpret this



                                                                 result as



                                                                 normal/abnormal

.

 

             MPV (test code = 10.1 fL      9.5-12.9                  



             98276-5)                                            

 

             NRBC/100 WBC (test              See_Comment                [Automat

ed



             code = [id number])                                        message] 

The system



                                                                 which generated



                                                                 this result



                                                                 transmitted



                                                                 reference range

:



                                                                 0.0 - 10.0 /100



                                                                 WBCs. The refer

ence



                                                                 range was not u

sed



                                                                 to interpret th

is



                                                                 result as



                                                                 normal/abnormal

.

 

             NRBC x10^3 (test code <0.01        See_Comment                [Auto

mated



             = [id number])                                        message] The s

ystem



                                                                 which generated



                                                                 this result



                                                                 transmitted



                                                                 reference range

:



                                                                 10*3/?L. The



                                                                 reference range

 was



                                                                 not used to



                                                                 interpret this



                                                                 result as



                                                                 normal/abnormal

.

 

             GRAN MAT (NEUT) % 79.4 %                                 



             (test code = 770-8)                                        

 

             IMM GRAN % (test code 0.50 %                                 



             = 2757717928)                                        

 

             LYMPH % (test code = 9.5 %                                  



             736-9)                                              

 

             MONO % (test code = 9.7 %                                  



             5905-5)                                             

 

             EOS % (test code = 0.5 %                                  



             713-8)                                              

 

             BASO % (test code = 0.4 %                                  



             706-2)                                              

 

             GRAN MAT x10^3(ANC) 9.77 10*3/uL 1.88-7.09    H            



             (test code =                                        



             8078038080)                                         

 

             IMM GRAN x10^3 (test 0.06 10*3/uL 0.00-0.06                 



             code = [id number])                                        

 

             LYMPH x10^3 (test code 1.17 10*3/uL 1.32-3.29    L            



             = 731-0)                                            

 

             MONO x10^3 (test code 1.19 10*3/uL 0.33-0.92    H            



             = 742-7)                                            

 

             EOS x10^3 (test code = 0.06 10*3/uL 0.03-0.39                 



             711-2)                                              

 

             BASO x10^3 (test code 0.05 10*3/uL 0.01-0.07                 



             = 704-7)                                            

 

             Lab Interpretation Abnormal                               



             (test code = 32322-6)                                        



[facility] METABOLIC [id number] 05:36:00





             Test Item    Value        Reference Range Interpretation Comments

 

             SODIUM (test code = NA) 143 mmol/L   134-147      N            

 

             POTASSIUM (test code = 4.2 mmol/L   3.4-5.0      N            



             K)                                                  

 

             CHLORIDE (test code = 114 mmol/L   100-108      H            



             CL)                                                 

 

             CARBON DIOXIDE (test 24 mmol/L    21-32        N            



             code = CO2)                                         

 

             ANION GAP (test code = 5.0 GAP calc 4.0-15.0     N            



             GAP)                                                

 

             GLUCOSE (test code = 89 MG/DL            N            



             GLU)                                                

 

             BLOOD UREA NITROGEN 17 MG/DL     7-18         N            



             (test code = BUN)                                        

 

             GLOMERULAR FILTRATION >=60 max estimate >60                       



             RATE (test code = GFR) estGFR                                 

 

             CREATININE (test code = 0.9 MG/DL    0.6-1.0      N            



             CREAT)                                              

 

             CALCIUM (test code = CA) 8.3 MG/DL    8.5-10.1     L            



CBC W/AUTO [id number] 05:21:00





             Test Item    Value        Reference Range Interpretation Comments

 

             WHITE BLOOD CELL (test code = 15.2 K/mm3   3.5-11.0     H          

  



             WBC)                                                

 

             RED BLOOD CELL (test code = RBC) 3.67 M/mm3   4.70-6.10    L       

     

 

             HEMOGLOBIN (test code = HGB) 11.3 G/DL    10.4-14.9    N           

 

 

             HEMATOCRIT (test code = HCT) 35.5 %       31.5-44.1    N           

 

 

             MEAN CELL VOLUME (test code = 96.7 Fl      84.5-98.6    N          

  



             MCV)                                                

 

             MEAN CELL HGB (test code = MCH) 30.8 pg      27.0-34.2    N        

    

 

             MEAN CELL HGB CONCETRATION (test 31.8 G/DL    31.5-34.0    N       

     



             code = MCHC)                                        

 

             RED CELL DISTRIBUTION WIDTH (test 14.2 SD      11.5-14.5    N      

      



             code = RDW)                                         

 

             PLATELET COUNT (test code = PLT) 242.0 K/mm3  150-450      N       

     

 

             MEAN PLATELET VOLUME (test code = 10.20 fL     7.0-10.5     N      

      



             MPV)                                                

 

             NEUTROPHIL % (test code = NT%) 78.8 %       40-76        H         

   

 

             LYMPHOCYTE % (test code = LY%) 13.1 %       20.5-51.1    L         

   

 

             MONOCYTE % (test code = MO%) 6.2 %        1.7-9.3      N           

 

 

             EOSINOPHIL % (test code = EO%) 1.6 %        0.0-6.0      N         

   

 

             BASOPHIL % (test code = BA%) 0.3 %        0.0-2.0      N           

 

 

             NEUTROPHIL # (test code = NT#) 11.94 K/mm3  1.8-7.6      H         

   

 

             LYMPHOCYTE # (test code = LY#) 2.0 K/mm3    0.6-3.2      N         

   

 

             MONOCYTE # (test code = MO#) 0.9 K/mm3    0.3-1.1      N           

 

 

             EOSINOPHIL # (test code = EO#) 0.2 K/mm3    0.0-0.4      N         

   

 

             BASOPHIL # (test code = BA#) 0.1 K/mm3    0.0-0.1      N           

 

 

             MANUAL DIFF REQUIRED (test code = NO DIFF/SCN  CRITERIA            

      



             MDIFF)                                              



[id number] 14:09:00





             Test Item    Value        Reference Range Interpretation Comments

 

             LITHIUM (test 0.5 mmol/L   0.6-1.2      L             Detection Lopez

it = 0.1



             code = LITH)                                        <0.1 indicates 

None



                                                                 DetectedPerform

ed At: 



                                                                 [facility] [address]



                                                                 [id number] 

Kyle L MD



                                                                 Ph:[phone]



YOHMKHLZ--06-28 13:35:00





             Test Item    Value        Reference Range Interpretation Comments

 

             TROPONIN-I (test 0.436 NG/ML  0.000-0.045  HH           Negative: <

/= 0.045



             code = TROPI)                                        Positive: >/= 

0.046



                                                                 Correlation wit

h serial



                                                                 results, other 

cardiac



                                                                 markers, and cl

inical



                                                                 findings is nec

essary to



                                                                 determine the c

linical



                                                                 significance of

 this



                                                                 result. Quantit

ative



                                                                 results using d

ifferent



                                                                 methodologies s

hould not



                                                                 be compared to 

one



                                                                 another as nume

rical



                                                                 results may daniel

yby



                                                                 method.



Completed by Nursing: JYBXKTNMLD--30-28 10:33:00





             Test Item    Value        Reference Range Interpretation Comments

 

             TROPONIN-I (test 0.360 NG/ML  0.000-0.045  HH           Negative: <

/= 0.045



             code = TROPI)                                        Positive: >/= 

0.046



                                                                 Correlation wit

h serial



                                                                 results, other 

cardiac



                                                                 markers, and cl

inical



                                                                 findings is nec

essary to



                                                                 determine the c

linical



                                                                 significance of

 this



                                                                 result. Quantit

ative



                                                                 results using d

ifferent



                                                                 methodologies s

hould not



                                                                 be compared to 

one



                                                                 another as nume

rical



                                                                 results may daniel

yby



                                                                 method.



Completed by Nursing: NOLast Dose Date: 20Last Dose Time: 1200TROPONIN-I
2020 07:23:00





             Test Item    Value        Reference Range Interpretation Comments

 

             TROPONIN-I (test 0.399 NG/ML  0.000-0.045  HH           Negative: <

/= 0.045



             code = TROPI)                                        Positive: >/= 

0.046



                                                                 Correlation wit

h serial



                                                                 results, other 

cardiac



                                                                 markers, and cl

inical



                                                                 findings is nec

essary to



                                                                 determine the c

linical



                                                                 significance of

 this



                                                                 result. Quantit

ative



                                                                 results using d

ifferent



                                                                 methodologies s

hould not



                                                                 be compared to 

one



                                                                 another as nume

rical



                                                                 results may daniel

yby



                                                                 method.



Completed by Nursing: ETOGIXBPTS--27-28 05:00:00





             Test Item    Value        Reference Range Interpretation Comments

 

             TROPONIN-I (test 0.555 NG/ML  0.000-0.045  HH           Negative: <

/= 0.045



             code = TROPI)                                        Positive: >/= 

0.046



                                                                 Correlation wit

h serial



                                                                 results, other 

cardiac



                                                                 markers, and cl

inical



                                                                 findings is nec

essary to



                                                                 determine the c

linical



                                                                 significance of

 this



                                                                 result. Quantit

ative



                                                                 results using d

ifferent



                                                                 methodologies s

hould not



                                                                 be compared to 

one



                                                                 another as nume

rical



                                                                 results may daniel

yby



                                                                 method.



Completed by Nursing: NO- XR CHEST 1 -90-47 04:33:00 Name: TYLER EDGE Nazlini : 1970 Age/S: 49 / F [id number] [address] Unit #: [id number]
2 Loc: [place] [address] Phys: Kristy Quiros MD Acct: [id number] Dis Date: 
Status: REG ER PHONE #: [phone] Exam Date: 2020 FAX #: Reason: 
POST CENTRAL PLACEMENT; RIGHT IJ EXAMS: CPT: [id number] XR CHEST 1 V 79193 Fluoro
Time: DAP (Gy m2): Air Kerma (mGy): HISTORY: Post central lineplacement, 
hypotension Location code: B2 FINDINGS: Frontal view of the chest demonstrates a
mildly enlarged cardiomediastinal silhouette. The trachea is midline. The lungs 
are clear. There is no effusion or pneumothorax. The bones are intact. Right IJ 
catheter in good position. IMPRESSION: Mild cardiomegaly, without acute 
pulmonary process. ** Electronically Signed by ISABEL Mrach on 2020 at 0
433 ** Reported and signed by: Shree March M.D. CC: Kristy Quiros MD PAGE 1 
Signed Report  Name: TYLER EDGE Nazlini : 1970 Age/S: 49 / F 
[id number] [address] Unit #: [id number] Loc: [place] [address] Phys: 
Kristy Quiros MD Acct: [id number] Dis Date: Status: REG ER PHONE #: [phone]
128 Exam Date: 2020 FAX #: Reason: POST CENTRAL PLACEMENT; RIGHT IJ 
EXAMS:  CPT: [id number] XR CHEST 1 V 92238 Fluoro Time: DAP (Gy m2): Air Kerma 
(mGy): (Continued) Technologist: Duncan Ellis RT(R)(CT) Trnscb Date/Time: 
2020 (433) DanielRK5 Orig Print D/T: S: 2020 (8436)  PAGE 2 Signed 
ReportCBC W/AUTO [id number] 04:15:00





             Test Item    Value        Reference Range Interpretation Comments

 

             WHITE BLOOD CELL (test 24.2 K/mm3   3.5-11.0     H            



             code = WBC)                                         

 

             RED BLOOD CELL (test 3.75 M/mm3   4.70-6.10    L            



             code = RBC)                                         

 

             HEMOGLOBIN (test code 11.5 G/DL    10.4-14.9    N            



             = HGB)                                              

 

             HEMATOCRIT (test code 35.2 %       31.5-44.1    N            



             = HCT)                                              

 

             MEAN CELL VOLUME (test 93.9 Fl      84.5-98.6    N            



             code = MCV)                                         

 

             MEAN CELL HGB (test 30.7 pg      27.0-34.2    N            



             code = MCH)                                         

 

             MEAN CELL HGB 32.7 G/DL    31.5-34.0    N            



             CONCETRATION (test                                        



             code = MCHC)                                        

 

             RED CELL DISTRIBUTION 13.8 SD      11.5-14.5    N            



             WIDTH (test code =                                        



             RDW)                                                

 

             PLATELET COUNT (test 234.0 K/mm3  150-450      N            



             code = PLT)                                         

 

             MEAN PLATELET VOLUME 9.80 fL      7.0-10.5     N            



             (test code = MPV)                                        

 

             NEUTROPHIL % (test 82.9 %       40-76        H            



             code = NT%)                                         

 

             LYMPHOCYTE % (test 8.3 %        20.5-51.1    L            



             code = LY%)                                         

 

             MONOCYTE % (test code 7.3 %        1.7-9.3      N            



             = MO%)                                              

 

             EOSINOPHIL % (test 1.4 %        0.0-6.0      N            



             code = EO%)                                         

 

             BASOPHIL % (test code 0.1 %        0.0-2.0      N            



             = BA%)                                              

 

             NEUTROPHIL # (test 20.08 K/mm3  1.8-7.6      H            



             code = NT#)                                         

 

             LYMPHOCYTE # (test 2.0 K/mm3    0.6-3.2      N            



             code = LY#)                                         

 

             MONOCYTE # (test code 1.8 K/mm3    0.3-1.1      H            



             = MO#)                                              

 

             EOSINOPHIL # (test 0.3 K/mm3    0.0-0.4      N            



             code = EO#)                                         

 

             BASOPHIL # (test code 0.0 K/mm3    0.0-0.1      N            



             = BA#)                                              

 

             MANUAL DIFF REQUIRED NO DIFF/SCN  CRITERIA                  SLIDE R

EVIEW



             (test code = MDIFF)                                        CONSISTA

NT WITH AUTO



                                                                 DIFFERENTIAL.



BASIC METABOLIC [id number] 04:11:00





             Test Item    Value        Reference Range Interpretation Comments

 

             SODIUM (test code = NA) 135 mmol/L   134-147      N            

 

             POTASSIUM (test code = K) 4.0 mmol/L   3.4-5.0      N            

 

             CHLORIDE (test code = CL) 106 mmol/L   100-108      N            

 

             CARBON DIOXIDE (test code = CO2) 22 mmol/L    21-32        N       

     

 

             ANION GAP (test code = GAP) 7.0 GAP calc 4.0-15.0     N            

 

             GLUCOSE (test code = GLU) 97 MG/DL            N            

 

             BLOOD UREA NITROGEN (test code = 34 MG/DL     7-18         H       

     



             BUN)                                                

 

             GLOMERULAR FILTRATION RATE (test 39 estGFR    >60          L       

     



             code = GFR)                                         

 

             CREATININE (test code = CREAT) 1.5 MG/DL    0.6-1.0      H         

   

 

             CALCIUM (test code = CA) 8.9 MG/DL    8.5-10.1     N            



LACTIC [id number] 04:11:00





             Test Item    Value        Reference Range Interpretation Comments

 

             LACTIC ACID (test code = LACT) 0.8 mmol/L   0.4-2.0      N         

   



CBC W/AUTO [id number] 03:54:00





             Test Item    Value        Reference Range Interpretation Comments

 

             WHITE BLOOD CELL (test code = 24.2 K/mm3   3.5-11.0     H          

  



             WBC)                                                

 

             RED BLOOD CELL (test code = RBC) 3.75 M/mm3   4.70-6.10    L       

     

 

             HEMOGLOBIN (test code = HGB) 11.5 G/DL    10.4-14.9    N           

 

 

             HEMATOCRIT (test code = HCT) 35.2 %       31.5-44.1    N           

 

 

             MEAN CELL VOLUME (test code = 93.9 Fl      84.5-98.6    N          

  



             MCV)                                                

 

             MEAN CELL HGB (test code = MCH) 30.7 pg      27.0-34.2    N        

    

 

             MEAN CELL HGB CONCETRATION (test 32.7 G/DL    31.5-34.0    N       

     



             code = MCHC)                                        

 

             RED CELL DISTRIBUTION WIDTH (test 13.8 SD      11.5-14.5    N      

      



             code = RDW)                                         

 

             PLATELET COUNT (test code = PLT) 234.0 K/mm3  150-450      N       

     

 

             MEAN PLATELET VOLUME (test code = 9.80 fL      7.0-10.5     N      

      



             MPV)                                                

 

             NEUTROPHIL % (test code = NT%)  %           40-76        H         

   

 

             LYMPHOCYTE % (test code = LY%)  %           20.5-51.1    L         

   

 

             MONOCYTE % (test code = MO%)  %           1.7-9.3      N           

 

 

             EOSINOPHIL % (test code = EO%)  %           0.0-6.0      N         

   

 

             BASOPHIL % (test code = BA%)  %           0.0-2.0      N           

 

 

             NEUTROPHIL # (test code = NT#)  K/mm3       1.8-7.6      H         

   

 

             LYMPHOCYTE # (test code = LY#)  K/mm3       0.6-3.2      N         

   

 

             MONOCYTE # (test code = MO#)  K/mm3       0.3-1.1      H           

 

 

             EOSINOPHIL # (test code = EO#)  K/mm3       0.0-0.4      N         

   

 

             BASOPHIL # (test code = BA#)  K/mm3       0.0-0.1      N           

 

 

             MANUAL DIFF REQUIRED (test code =  DIFF/SCN    CRITERIA            

      



             MDIFF)                                              



Basic Metabolic [id number] 07:54:48





             Test Item    Value        Reference Range Interpretation Comments

 

             Sodium Level (test code = Sodium 142.0 mmol/L 135.0-145.0          

     



             Level)                                              

 

             Potassium Level (test code = 4.8 mmol/L   3.5-5.1                  

 



             Potassium Level)                                        

 

             Chloride Level (test code = 105 mmol/L                       



             Chloride Level)                                        

 

             CO2 (test code = CO2) 25 mmol/L    22-29                     

 

             Anion Gap (test code = Anion 12 mmol/L    7-16                     

 



             Gap)                                                

 

             BUN (test code = BUN) 19.60 mg/dL  6.00-20.00                

 

             Creatinine Level (test code = 0.80 mg/dL   0.50-0.90               

  



             Creatinine Level)                                        

 

             BUN/Creat Ratio (test code = 24                        N           

 



             BUN/Creat Ratio)                                        

 

             Glucose Level (test code = 86 mg/dL                         



             Glucose Level)                                        

 

             Calcium Level (test code = 10.1 mg/dL   8.3-10.5                  



             Calcium Level)                                        



Basic Metabolic [id number] 07:54:48





             Test Item    Value        Reference Range Interpretation Comments

 

             Sodium Level (test 142.0 mmol/L 135.0-145.0               



             code = Sodium Level)                                        

 

             Potassium Level 4.8 mmol/L   3.5-5.1                   



             (test code =                                        



             Potassium Level)                                        

 

             Chloride Level (test 105 mmol/L                       



             code = Chloride                                        



             Level)                                              

 

             CO2 (test code = 25 mmol/L    22-29                     



             CO2)                                                

 

             Anion Gap (test code 12 mmol/L    7-16                      



             = Anion Gap)                                        

 

             BUN (test code = 19.60 mg/dL  6.00-20.00                



             BUN)                                                

 

             Creatinine Level 0.80 mg/dL   0.50-0.90                 



             (test code =                                        



             Creatinine Level)                                        

 

             BUN/Creat Ratio 24                        N            



             (test code =                                        



             BUN/Creat Ratio)                                        

 

             Glucose Level (test 86 mg/dL                         



             code = Glucose                                        



             Level)                                              

 

             Calcium Level (test 10.1 mg/dL   8.3-10.5                  



             code = Calcium                                        



             Level)                                              

 

             eGFR AA (test code = >60                       N            eGFR (e

stimated



             eGFR AA)     mL/min/1.73 m2                           Glomerular



                                                                 Filtration Rate

) is



                                                                 an estimated va

lue,



                                                                 calculated from

 the



                                                                 patient's serum



                                                                 creatinine usin

g the



                                                                 MDRD equation. 

It is



                                                                 NOT the patient

's



                                                                 actual GFR. The

 eGFR



                                                                 provides a more



                                                                 clinically usef

ul



                                                                 measure of kidn

ey



                                                                 disease than se

rum



                                                                 creatinine



                                                                 alone.***This



                                                                 calculation rimma

es



                                                                 sex and race in

to



                                                                 account, if the



                                                                 information is



                                                                 provided. If th

e



                                                                 race is not



                                                                 provided, and t

he



                                                                 patient is



                                                                 -Crystal

n,



                                                                 multiply by 1.2

12.



                                                                 If sex is not



                                                                 provided, and t

he



                                                                 patient is fema

le,



                                                                 multiply by 0.7

42.



                                                                 Results for pat

ients



                                                                 <18 years of ag

e



                                                                 have not been



                                                                 validated by th

e



                                                                 MDRD study and



                                                                 should be



                                                                 interpreted wit

h



                                                                 caution. eGFR R

esult



                                                                 Interpretation:

eGFR



                                                                 > or = 60 is in

 the



                                                                 Normal RangeeGF

R <



                                                                 60 may mean kid

hang



                                                                 diseaseeGFR < 1

5 may



                                                                 mean kidney



                                                                 failure*** Rang

es



                                                                 recommended by 

the



                                                                 [employer]



                                                                 [employer],



                                                                 http://nkdep.ni

h.gov



Basic Metabolic [id number] 07:54:48





             Test Item    Value        Reference Range Interpretation Comments

 

             Sodium Level (test 142.0 mmol/L 135.0-145.0               



             code = Sodium Level)                                        

 

             Potassium Level 4.8 mmol/L   3.5-5.1                   



             (test code =                                        



             Potassium Level)                                        

 

             Chloride Level (test 105 mmol/L                       



             code = Chloride                                        



             Level)                                              

 

             CO2 (test code = 25 mmol/L    22-29                     



             CO2)                                                

 

             Anion Gap (test code 12 mmol/L    7-16                      



             = Anion Gap)                                        

 

             BUN (test code = 19.60 mg/dL  6.00-20.00                



             BUN)                                                

 

             Creatinine Level 0.80 mg/dL   0.50-0.90                 



             (test code =                                        



             Creatinine Level)                                        

 

             BUN/Creat Ratio 24                        N            



             (test code =                                        



             BUN/Creat Ratio)                                        

 

             Glucose Level (test 86 mg/dL                         



             code = Glucose                                        



             Level)                                              

 

             Calcium Level (test 10.1 mg/dL   8.3-10.5                  



             code = Calcium                                        



             Level)                                              

 

             eGFR AA (test code = >60                       N            eGFR (e

stimated



             eGFR AA)     mL/min/1.73 m2                           Glomerular



                                                                 Filtration Rate

) is



                                                                 an estimated va

lue,



                                                                 calculated from

 the



                                                                 patient's serum



                                                                 creatinine usin

g the



                                                                 MDRD equation. 

It is



                                                                 NOT the patient

's



                                                                 actual GFR. The

 eGFR



                                                                 provides a more



                                                                 clinically usef

ul



                                                                 measure of kidn

ey



                                                                 disease than se

rum



                                                                 creatinine



                                                                 alone.***This



                                                                 calculation rimma

es



                                                                 sex and race in

to



                                                                 account, if the



                                                                 information is



                                                                 provided. If th

e



                                                                 race is not



                                                                 provided, and t

he



                                                                 patient is



                                                                 -Crystal

n,



                                                                 multiply by 1.2

12.



                                                                 If sex is not



                                                                 provided, and t

he



                                                                 patient is fema

le,



                                                                 multiply by 0.7

42.



                                                                 Results for pat

ients



                                                                 <18 years of ag

e



                                                                 have not been



                                                                 validated by [facility]



                                                                 MDRD study and



                                                                 should be



                                                                 interpreted wit

h



                                                                 caution. eGFR R

esult



                                                                 Interpretation:

eGFR



                                                                 > or = 60 is in

 the



                                                                 Normal RangeeGF

R <



                                                                 60 may mean kid

hang



                                                                 diseaseeGFR < 1

5 may



                                                                 mean kidney



                                                                 failure*** Rang

es



                                                                 recommended by 

the



                                                                 [employer]



                                                                 [employer],



                                                                 http://nkdep.ni

h.gov

 

             eGFR Non-AA (test >60.00                    N            eGFR (sheba

mated



             code = eGFR Non-AA) mL/min/1.73 m2                           Glomer

ular



                                                                 Filtration Rate

) is



                                                                 an estimated va

lue,



                                                                 calculated from

 the



                                                                 patient's serum



                                                                 creatinine usin

g the



                                                                 MDRD equation. 

It is



                                                                 NOT the patient

's



                                                                 actual GFR. The

 eGFR



                                                                 provides a more



                                                                 clinically usef

ul



                                                                 measure of kidn

ey



                                                                 disease than se

rum



                                                                 creatinine



                                                                 alone.***This



                                                                 calculation rimma

es



                                                                 sex and race in

to



                                                                 account, if the



                                                                 information is



                                                                 provided. If th

e



                                                                 race is not



                                                                 provided, and t

he



                                                                 patient is



                                                                 -Crystal

n,



                                                                 multiply by 1.2

12.



                                                                 If sex is not



                                                                 provided, and t

he



                                                                 patient is fema

le,



                                                                 multiply by 0.7

42.



                                                                 Results for pat

ients



                                                                 <18 years of ag

e



                                                                 have not been



                                                                 validated by [facility]



                                                                 MDRD study and



                                                                 should be



                                                                 interpreted wit

h



                                                                 caution. eGFR R

esult



                                                                 Interpretation:

eGFR



                                                                 > or = 60 is in

 the



                                                                 Normal RangeeGF

R <



                                                                 60 may mean kid

hang



                                                                 diseaseeGFR < 1

5 may



                                                                 mean kidney



                                                                 failure*** Rang

es



                                                                 recommended by 

the



                                                                 [employer]



                                                                 [employer],



                                                                 http://nkdep.ni

h.gov



Complete Blood Count with [id number] 07:29:42





             Test Item    Value        Reference Range Interpretation Comments

 

             WBC (test code = WBC) 9.3 x10      4.4-10.5                  

 

             RBC (test code = RBC) 4.71 x10     3.75-5.20                 

 

             Hgb (test code = Hgb) 14.4 g/dL    12.2-14.8                 

 

             MCV (test code = MCV) 92.10 fL     80..00              

 

             Hct (test code = Hct) 43.4 %       36.5-44.4                 

 

             MCHC (test code = 33.20 g/dL   32.00-37.50               



             MCHC)                                               

 

             RDW CV (test code = 13.4 %       11.5-14.5                 



             RDW CV)                                             

 

             MCH (test code = MCH) 30.6 pg      27.0-32.5                 

 

             Platelets (test code = 325.0 x10    140.0-440.0               



             Platelets)                                          

 

             MPV (test code = MPV) 9.8 fL                    N            

 

             Slide Review (test Auto         Auto                      Result cr

eated by



             code = Slide Review)                                        [email or web address]_

SLIDE_REV_AUTO

 

             nRBC (test code = 0                         N            



             nRBC)                                               

 

             NRBC Abs (test code = 0.00 x10                  N            



             NRBC Abs)                                           

 

             IPF (test code = IPF) 0 %                       N            



Automated [id number] 07:29:42





             Test Item    Value        Reference Range Interpretation Comments

 

             Neutro Auto (test code = Neutro 66.2 %       36.0-70.0             

    



             Auto)                                               

 

             Lymph Auto (test code = Lymph Auto) 21.1 %       12.0-44.0         

        

 

             Mono Auto (test code = Mono Auto) 6.8 %        0.0-11.0            

      

 

             Eos, Auto (test code = Eos, Auto) 4.8 %        0.0-7.0             

      

 

             Basophil Auto (test code = Basophil 0.9 %        0.0-2.0           

        



             Auto)                                               

 

             Neutro Absolute (test code = Neutro 6.2 x10      1.6-7.4           

        



             Absolute)                                           

 

             Lymph Absolute (test code = Lymph 1.97 x10     .50-4.60            

      



             Absolute)                                           

 

             Mono Absolute (test code = Mono .63 x10      .00-1.20              

    



             Absolute)                                           

 

             Eos Absolute (test code = Eos 0.45 x10     0.00-0.74               

  



             Absolute)                                           

 

             Baso Absolute (test code = Baso 0.08 x10     0.00-0.21             

    



             Absolute)                                           



IG [id number] 07:29:42





             Test Item    Value        Reference Range Interpretation Comments

 

             IG (test code = IG) 0.2 %        0.0-5.0                   

 

             IG Abs (test code = IG Abs) 0 x10                     N            



Thyroid Stimulating [id number] 04:30:30





             Test Item    Value        Reference Range Interpretation Comments

 

             TSH (test code = TSH) 1.360 mIU/mL 0.270-4.200               



RPR [id number] 04:06:17





             Test Item    Value        Reference Range Interpretation Comments

 

             RPR Qual (test code = RPR Qual) Non-Reactive Non-Reactive          

    

 

             Reactive Control (test code = Reactive                             

  



             Reactive Control)                                        

 

             Weak Reactive Control (test Weak Reactive                          

 



             code = Weak Reactive Control)                                      

  

 

             Non-Reactive Control (test code Non-Reactive                       

    



             = Non-Reactive Control)                                        

 

             Lot # (test code = Lot #) [id number]                    N            

 

             Expiration Dt (test code = 10-                N            



             Expiration Dt)                                        



Hemoglobin A1c2019-11-15 18:37:54





             Test Item    Value        Reference Range Interpretation Comments

 

             Hemoglobin A1c (test code 4.7 %        4.8-5.9      L            No

n Diabetic



             = Hemoglobin A1c)                                        4.8-5.9%Di

abetic <7.0%



Lipid Panel2019-11-15 18:08:58





             Test Item    Value        Reference Range Interpretation Comments

 

             Cholesterol Total 189 mg/dL    0-200                     RISK OF HE

ART



             (test code =                                        DISEASEPublishe

d by



             Cholesterol Total)                                        American 

Heart



                                                                 Association Judith

lyte



                                                                 Optimal Borderl

ine



                                                                 Increased RiskC

HOL <200



                                                                 200-239 >240TRI

G <150



                                                                 150-199 >200HDL

 Male



                                                                 >60 <40HDL Fema

le >60



                                                                 <50LDL <100 130

-159



                                                                 >160LDL Near op

timal is



                                                                 100-129

 

             Triglycerides (test 112 mg/dL    9-200                     



             code = Triglycerides)                                        

 

             HDL (test code = HDL) 44 mg/dL     50-60        L            

 

             LDL (test code = LDL) 122 mg/dL    0-130                     The eq

uation being used



                                                                 in this calcula

tion is



                                                                 LDL = (Chol - H

DL) -



                                                                 (Trig / 5)

 

             VLDL (test code = 22 mg/dL     5-40                      The equati

on being used



             VLDL)                                               in this calcula

tion is



                                                                 VLDL = Trig / 5

 

             Chol/HDL (test code = 4.3 ratio    0.0-4.4                   



             Chol/HDL)                                           

 

             LDL/HDL Ratio (test 3                         N            The equa

tion being used



             code = LDL/HDL Ratio)                                        in thi

s calculation is



                                                                 LDL/HDL Ratio=L

DL



                                                                 Calc/HDL Chol



Complete Blood Count with Differential2019-11-15 07:51:57





             Test Item    Value        Reference Range Interpretation Comments

 

             WBC (test code = WBC) 10.6 x10     4.4-10.5     H            

 

             RBC (test code = RBC) 4.45 x10     3.75-5.20                 

 

             Hgb (test code = Hgb) 13.5 g/dL    12.2-14.8                 

 

             MCV (test code = MCV) 93.30 fL     80..00              

 

             Hct (test code = Hct) 41.5 %       36.5-44.4                 

 

             MCHC (test code = 32.50 g/dL   32.00-37.50               



             MCHC)                                               

 

             RDW CV (test code = 13.7 %       11.5-14.5                 



             RDW CV)                                             

 

             MCH (test code = MCH) 30.3 pg      27.0-32.5                 

 

             Platelets (test code = 356.0 x10    140.0-440.0               



             Platelets)                                          

 

             MPV (test code = MPV) 9.7 fL                    N            

 

             Slide Review (test Auto         Auto                      Result cr

eated by



             code = Slide Review)                                        GL_SJM_

SLIDE_REV_AUTO

 

             nRBC (test code = 0                         N            



             nRBC)                                               

 

             NRBC Abs (test code = 0.00 x10                  N            



             NRBC Abs)                                           

 

             IPF (test code = IPF) 0 %                       N            



Automated Differential2019-11-15 07:51:57





             Test Item    Value        Reference Range Interpretation Comments

 

             Neutro Auto (test code = Neutro 65.4 %       36.0-70.0             

    



             Auto)                                               

 

             Lymph Auto (test code = Lymph Auto) 23.5 %       12.0-44.0         

        

 

             Mono Auto (test code = Mono Auto) 5.7 %        0.0-11.0            

      

 

             Eos, Auto (test code = Eos, Auto) 4.4 %        0.0-7.0             

      

 

             Basophil Auto (test code = Basophil 0.8 %        0.0-2.0           

        



             Auto)                                               

 

             Neutro Absolute (test code = Neutro 6.9 x10      1.6-7.4           

        



             Absolute)                                           

 

             Lymph Absolute (test code = Lymph 2.49 x10     .50-4.60            

      



             Absolute)                                           

 

             Mono Absolute (test code = Mono .60 x10      .00-1.20              

    



             Absolute)                                           

 

             Eos Absolute (test code = Eos 0.47 x10     0.00-0.74               

  



             Absolute)                                           

 

             Baso Absolute (test code = Baso 0.08 x10     0.00-0.21             

    



             Absolute)                                           



IG Flags2019-11-15 07:51:57





             Test Item    Value        Reference Range Interpretation Comments

 

             IG (test code = IG) 0.2 %        0.0-5.0                   

 

             IG Abs (test code = IG Abs) 0 x10                     N            



Urine [id number] 10:18:52





             Test Item    Value        Reference Range Interpretation Comments

 

             ORGANISM (test code = Escherichia coli                           



             ORGANISM)                                           

 

             Amikacin (test code =                           S            



             Amik)                                               

 

             Ampicillin (test code =                           S            



             Amp)                                                

 

             Ampicillin/Sulbactam                           S            



             (test code = Amp/Sul)                                        

 

             Cefazolin (test code =                           S            



             Cefaz)                                              

 

             Cefepime (test code =                           S            



             Cefep)                                              

 

             Cefotaxime (test code =                           S            



             Cefo)                                               

 

             Ceftazidime (test code                           S            



             = Ceftaz)                                           

 

             Ceftriaxone (test code                           S            



             = Ceftri)                                           

 

             Cefuroxime (test code =                           S            



             Cefur)                                              

 

             Ciprofloxacin (test                           S            



             code = Cipro)                                        

 

             Gentamicin (test code =                           S            



             Gent)                                               

 

             Imipenem (test code =                           S            



             Imi)                                                

 

             Levofloxacin (test code                           S            



             = Levo)                                             

 

             Meropenem (test code =                           S            



             Sindi)                                               

 

             Nitrofurantoin (test                           S            



             code = Nitro)                                        

 

             Piperacillin/Tazobactam                           S            



             (test code = Pip/Blaine)                                        

 

             Tobramycin (test code =                           S            



             Tobra)                                              

 

             Trimethoprim/Sulfa                           S            



             (test code = SXT)                                        

 

             Final Report (test code >=100,000 cfu/ml                           



             = Final Report) Escherichia coli                           



                          >=100,000 cfu/ml Alpha                           



                          hemolytic                              



                          Streptococcus (not                           



                          Group D or                             



                          Enterococcus)                           



 C Urine Added by [email or web address] [id number] 09:18:25





             Test Item    Value        Reference Range Interpretation Comments

 

             Lithium Level (test code = 0.58 mmol/L  0.60-1.20    L            



             Lithium Level)                                        



Urine Drug [id number] 01:36:44





             Test Item    Value        Reference Range Interpretation Comments

 

             Amphetamine Screen Ur POSITIVE     Negative     A            



             (test code = Amphetamine                                        



             Screen Ur)                                          

 

             Barbiturate Screen Ur Negative     Negative                  



             (test code = Barbiturate                                        



             Screen Ur)                                          

 

             Benzodiazepines Ur (test Negative     Negative                  



             code = Benzodiazepines                                        



             Ur)                                                 

 

             Cocaine Screen Ur (test Negative     Negative                  



             code = Cocaine Screen                                        



             Ur)                                                 

 

             U Methadone Scr (test Negative     Negative                  



             code = U Methadone Scr)                                        

 

             Opiate Screen Ur (test Negative     Negative                  



             code = Opiate Screen Ur)                                        

 

             U PCP Scrn (test code = Negative     Negative                  



             U PCP Scrn)                                         

 

             Cannabinoid Screen Ur Negative     Negative                  



             (test code = Cannabinoid                                        



             Screen Ur)                                          

 

             U TCA (test code = U Negative     Negative                  The res

ults of all



             TCA)                                                drug screen elinor

ts are



                                                                 only preliminar

y.



                                                                 Clinical



                                                                 consideration a

nd



                                                                 professional ju

dgment



                                                                 should be appli

ed to



                                                                 any drug of abu

se



                                                                 test result,



                                                                 particularly wh

en



                                                                 preliminary pos

itive



                                                                 results are obt

ained.



                                                                 Please order a



                                                                 separate confir

matory



                                                                 test if desired

.



HCG Qualitative [id number] 01:36:43





             Test Item    Value        Reference Range Interpretation Comments

 

             hCG Ur (test code = Negative                               If the r

esult is



             hCG Ur)                                             "Negative" in p

atients



                                                                 suspected to be



                                                                 pregnant, recom

mend



                                                                 retest with a s

ample



                                                                 obtained 48 to 

72



                                                                 hours later, or

 by



                                                                 ordering a



                                                                 quantitative as

say. If



                                                                 the result is



                                                                 "Borderline" te

sting



                                                                 should be repea

gianfranco in



                                                                 48 to 72 hours.

 

             Lot # (test code = [id number]                    N            



             Lot #)                                              

 

             Expiration Dt (test 2020                N            



             code = Expiration Dt)                                        

 

             Neg Control (test Negative                               



             code = Neg Control)                                        

 

             Pos Control (test Positive                               



             code = Pos Control)                                        

 

             Internal QC (test Acceptable                             



             code = Internal QC)                                        



Urinalysis [id number] 01:36:03





             Test Item    Value        Reference Range Interpretation Comments

 

             UA WBC (test code = UA WBC) 0-5          0-5                       

 

             UA RBC (test code = UA RBC) None Seen    0-5                       

 

             UA Bacteria (test code = UA Moderate                  A            



             Bacteria)                                           

 

             UA Squam Epithelial (test code = UA 0-5                            

        



             Squam Epithelial)                                        



Comprehensive Metabolic [id number] 01:23:40





             Test Item    Value        Reference Range Interpretation Comments

 

             Sodium Level (test code = Sodium 139.0 mmol/L 135.0-145.0          

     



             Level)                                              

 

             Potassium Level (test code = 4.4 mmol/L   3.5-5.1                  

 



             Potassium Level)                                        

 

             Chloride Level (test code = 102 mmol/L                       



             Chloride Level)                                        

 

             CO2 (test code = CO2) 23 mmol/L    22-29                     

 

             Anion Gap (test code = Anion 14 mmol/L    7-16                     

 



             Gap)                                                

 

             BUN (test code = BUN) 9.10 mg/dL   6.00-20.00                

 

             Creatinine Level (test code = 1.00 mg/dL   0.50-0.90    H          

  



             Creatinine Level)                                        

 

             BUN/Creat Ratio (test code = 9                         N           

 



             BUN/Creat Ratio)                                        

 

             Glucose Level (test code = 115 mg/dL                        



             Glucose Level)                                        

 

             Calcium Level (test code = 10.1 mg/dL   8.3-10.5                  



             Calcium Level)                                        

 

             Alk Phos (test code = Alk Phos) 106 U/L             H        

    

 

             Bilirubin Total (test code = 0.4 mg/dL    0.1-0.9                  

 



             Bilirubin Total)                                        

 

             Albumin Level (test code = 4.6 g/dL     3.5-5.2                   



             Albumin Level)                                        

 

             Protein Total (test code = 7.7 g/dL     6.4-8.3                   



             Protein Total)                                        

 

             ALT (test code = ALT) 12 U/L       1-33                      

 

             AST (test code = AST) 18 U/L       1-32                      

 

             Globulin (test code = Globulin) 3.1 g/dL     2.9-3.1               

    

 

             A/G Ratio (test code = A/G 1.5 ratio                 N            



             Ratio)                                              



Comprehensive Metabolic [id number] 01:23:40





             Test Item    Value        Reference Range Interpretation Comments

 

             Sodium Level (test 139.0 mmol/L 135.0-145.0               



             code = Sodium Level)                                        

 

             Potassium Level 4.4 mmol/L   3.5-5.1                   



             (test code =                                        



             Potassium Level)                                        

 

             Chloride Level (test 102 mmol/L                       



             code = Chloride                                        



             Level)                                              

 

             CO2 (test code = 23 mmol/L    22-29                     



             CO2)                                                

 

             Anion Gap (test code 14 mmol/L    7-16                      



             = Anion Gap)                                        

 

             BUN (test code = 9.10 mg/dL   6.00-20.00                



             BUN)                                                

 

             Creatinine Level 1.00 mg/dL   0.50-0.90    H            



             (test code =                                        



             Creatinine Level)                                        

 

             BUN/Creat Ratio 9                         N            



             (test code =                                        



             BUN/Creat Ratio)                                        

 

             Glucose Level (test 115 mg/dL                        



             code = Glucose                                        



             Level)                                              

 

             Calcium Level (test 10.1 mg/dL   8.3-10.5                  



             code = Calcium                                        



             Level)                                              

 

             Alk Phos (test code 106 U/L             H            



             = Alk Phos)                                         

 

             Bilirubin Total 0.4 mg/dL    0.1-0.9                   



             (test code =                                        



             Bilirubin Total)                                        

 

             Albumin Level (test 4.6 g/dL     3.5-5.2                   



             code = Albumin                                        



             Level)                                              

 

             Protein Total (test 7.7 g/dL     6.4-8.3                   



             code = Protein                                        



             Total)                                              

 

             ALT (test code = 12 U/L       1-33                      



             ALT)                                                

 

             AST (test code = 18 U/L       1-32                      



             AST)                                                

 

             Globulin (test code 3.1 g/dL     2.9-3.1                   



             = Globulin)                                         

 

             A/G Ratio (test code 1.5 ratio                 N            



             = A/G Ratio)                                        

 

             eGFR AA (test code = >60                       N            eGFR (e

stimated



             eGFR AA)     mL/min/1.73 m2                           Glomerular



                                                                 Filtration Rate

) is



                                                                 an estimated va

lue,



                                                                 calculated from

 the



                                                                 patient's serum



                                                                 creatinine usin

g the



                                                                 MDRD equation. 

It is



                                                                 NOT the patient

's



                                                                 actual GFR. The

 eGFR



                                                                 provides a more



                                                                 clinically usef

ul



                                                                 measure of kidn

ey



                                                                 disease than se

rum



                                                                 creatinine



                                                                 alone.***This



                                                                 calculation rimma

es



                                                                 sex and race in

to



                                                                 account, if the



                                                                 information is



                                                                 provided. If th

e



                                                                 race is not



                                                                 provided, and t

he



                                                                 patient is



                                                                 -Crystal

n,



                                                                 multiply by 1.2

12.



                                                                 If sex is not



                                                                 provided, and t

he



                                                                 patient is fema

le,



                                                                 multiply by 0.7

42.



                                                                 Results for pat

ients



                                                                 <18 years of ag

e



                                                                 have not been



                                                                 validated by th

e



                                                                 MDRD study and



                                                                 should be



                                                                 interpreted wit

h



                                                                 caution. eGFR R

esult



                                                                 Interpretation:

eGFR



                                                                 > or = 60 is in

 the



                                                                 Normal RangeeGF

R <



                                                                 60 may mean kid

hang



                                                                 diseaseeGFR < 1

5 may



                                                                 mean kidney



                                                                 failure*** Rang

es



                                                                 recommended by 

the



                                                                 [employer]



                                                                 [employer],



                                                                 http://nkdep.ni

h.gov



Comprehensive Metabolic [id number] 01:23:40





             Test Item    Value        Reference Range Interpretation Comments

 

             Sodium Level (test 139.0 mmol/L 135.0-145.0               



             code = Sodium Level)                                        

 

             Potassium Level 4.4 mmol/L   3.5-5.1                   



             (test code =                                        



             Potassium Level)                                        

 

             Chloride Level (test 102 mmol/L                       



             code = Chloride                                        



             Level)                                              

 

             CO2 (test code = 23 mmol/L    22-29                     



             CO2)                                                

 

             Anion Gap (test code 14 mmol/L    7-16                      



             = Anion Gap)                                        

 

             BUN (test code = 9.10 mg/dL   6.00-20.00                



             BUN)                                                

 

             Creatinine Level 1.00 mg/dL   0.50-0.90    H            



             (test code =                                        



             Creatinine Level)                                        

 

             BUN/Creat Ratio 9                         N            



             (test code =                                        



             BUN/Creat Ratio)                                        

 

             Glucose Level (test 115 mg/dL                        



             code = Glucose                                        



             Level)                                              

 

             Calcium Level (test 10.1 mg/dL   8.3-10.5                  



             code = Calcium                                        



             Level)                                              

 

             Alk Phos (test code 106 U/L             H            



             = Alk Phos)                                         

 

             Bilirubin Total 0.4 mg/dL    0.1-0.9                   



             (test code =                                        



             Bilirubin Total)                                        

 

             Albumin Level (test 4.6 g/dL     3.5-5.2                   



             code = Albumin                                        



             Level)                                              

 

             Protein Total (test 7.7 g/dL     6.4-8.3                   



             code = Protein                                        



             Total)                                              

 

             ALT (test code = 12 U/L       1-33                      



             ALT)                                                

 

             AST (test code = 18 U/L       1-32                      



             AST)                                                

 

             Globulin (test code 3.1 g/dL     2.9-3.1                   



             = Globulin)                                         

 

             A/G Ratio (test code 1.5 ratio                 N            



             = A/G Ratio)                                        

 

             eGFR AA (test code = >60                       N            eGFR (e

stimated



             eGFR AA)     mL/min/1.73 m2                           Glomerular



                                                                 Filtration Rate

) is



                                                                 an estimated va

lue,



                                                                 calculated from

 the



                                                                 patient's serum



                                                                 creatinine usin

g the



                                                                 MDRD equation. 

It is



                                                                 NOT the patient

's



                                                                 actual GFR. The

 eGFR



                                                                 provides a more



                                                                 clinically usef

ul



                                                                 measure of kidn

ey



                                                                 disease than se

rum



                                                                 creatinine



                                                                 alone.***This



                                                                 calculation rimma

es



                                                                 sex and race in

to



                                                                 account, if the



                                                                 information is



                                                                 provided. If th

e



                                                                 race is not



                                                                 provided, and t

he



                                                                 patient is



                                                                 -Crystal

n,



                                                                 multiply by 1.2

12.



                                                                 If sex is not



                                                                 provided, and t

he



                                                                 patient is fema

le,



                                                                 multiply by 0.7

42.



                                                                 Results for pat

ients



                                                                 <18 years of ag

e



                                                                 have not been



                                                                 validated by th

e



                                                                 MDRD study and



                                                                 should be



                                                                 interpreted wit

h



                                                                 caution. eGFR R

esult



                                                                 Interpretation:

eGFR



                                                                 > or = 60 is in

 the



                                                                 Normal RangeeGF

R <



                                                                 60 may mean kid

hang



                                                                 diseaseeGFR < 1

5 may



                                                                 mean kidney



                                                                 failure*** Rang

es



                                                                 recommended by 

the



                                                                 [employer]



                                                                 [employer],



                                                                 http://nkdep.ni

h.gov

 

             eGFR Non-AA (test 58.45                     N            eGFR (sheba

mated



             code = eGFR Non-AA) mL/min/1.73 m2                           Glomer

ular



                                                                 Filtration Rate

) is



                                                                 an estimated va

lue,



                                                                 calculated from

 the



                                                                 patient's serum



                                                                 creatinine usin

g the



                                                                 MDRD equation. 

It is



                                                                 NOT the patient

's



                                                                 actual GFR. The

 eGFR



                                                                 provides a more



                                                                 clinically usef

ul



                                                                 measure of kidn

ey



                                                                 disease than se

rum



                                                                 creatinine



                                                                 alone.***This



                                                                 calculation rimma

es



                                                                 sex and race in

to



                                                                 account, if the



                                                                 information is



                                                                 provided. If th

e



                                                                 race is not



                                                                 provided, and t

he



                                                                 patient is



                                                                 -Crystal

n,



                                                                 multiply by 1.2

12.



                                                                 If sex is not



                                                                 provided, and t

he



                                                                 patient is fema

le,



                                                                 multiply by 0.7

42.



                                                                 Results for pat

ients



                                                                 <18 years of ag

e



                                                                 have not been



                                                                 validated by th

e



                                                                 MDRD study and



                                                                 should be



                                                                 interpreted wit

h



                                                                 caution. eGFR R

esult



                                                                 Interpretation:

eGFR



                                                                 > or = 60 is in

 the



                                                                 Normal RangeeGF

R <



                                                                 60 may mean kid

hang



                                                                 diseaseeGFR < 1

5 may



                                                                 mean kidney



                                                                 failure*** Rang

es



                                                                 recommended by 

the



                                                                 [employer]



                                                                 [employer],



                                                                 http://nkdep.ni

h.gov



Alcohol [id number] 01:23:31





             Test Item    Value        Reference Range Interpretation Comments

 

             Ethanol Level (test <0.00 g/dL   0.00-0.01                 [facility]

gianfranco 0.080 g/dL



             code = Ethanol                                        or more



             Level)                                              

 

             Ethanol Inst (test <0                        N            



             code = Ethanol Inst)                                        



Complete Blood Count with [id number] 00:56:12





             Test Item    Value        Reference Range Interpretation Comments

 

             WBC (test code = WBC) 19.4 x10     4.4-10.5     H            

 

             RBC (test code = RBC) 4.53 x10     3.75-5.20                 

 

             Hgb (test code = Hgb) 13.5 g/dL    12.2-14.8                 

 

             Hct (test code = Hct) 41.3 %       36.5-44.4                 

 

             MCV (test code = MCV) 91.20 fL     80..00              

 

             MCHC (test code = 32.70 g/dL   32.00-37.50               



             MCHC)                                               

 

             RDW CV (test code = 13.5 %       11.5-14.5                 



             RDW CV)                                             

 

             MCH (test code = MCH) 29.8 pg      27.0-32.5                 

 

             Platelets (test code = 462.0 x10    140.0-440.0  H            



             Platelets)                                          

 

             MPV (test code = MPV) 9.9 fL                    N            

 

             Slide Review (test Auto         Auto                      Result cr

eated by



             code = Slide Review)                                        [email or web address]

[email or web address]_AUTO

 

             nRBC (test code = 0                         N            



             nRBC)                                               

 

             NRBC Abs (test code = 0.00 x10                  N            



             NRBC Abs)                                           

 

             IPF (test code = IPF) 0 %                       N            



Automated [id number] 00:56:12





             Test Item    Value        Reference Range Interpretation Comments

 

             Neutro Auto (test code = Neutro 83.7 %       36.0-70.0    H        

    



             Auto)                                               

 

             Lymph Auto (test code = Lymph Auto) 8.9 %        12.0-44.0    L    

        

 

             Mono Auto (test code = Mono Auto) 5.0 %        0.0-11.0            

      

 

             Eos, Auto (test code = Eos, Auto) 1.4 %        0.0-7.0             

      

 

             Basophil Auto (test code = Basophil 0.7 %        0.0-2.0           

        



             Auto)                                               

 

             Neutro Absolute (test code = Neutro 16.2 x10     1.6-7.4      H    

        



             Absolute)                                           

 

             Lymph Absolute (test code = Lymph 1.73 x10     .50-4.60            

      



             Absolute)                                           

 

             Mono Absolute (test code = Mono .96 x10      .00-1.20              

    



             Absolute)                                           

 

             Eos Absolute (test code = Eos 0.27 x10     0.00-0.74               

  



             Absolute)                                           

 

             Baso Absolute (test code = Baso 0.13 x10     0.00-0.21             

    



             Absolute)                                           



IG [id number] 00:56:12





             Test Item    Value        Reference Range Interpretation Comments

 

             IG (test code = IG) 0.3 %        0.0-5.0                   

 

             IG Abs (test code = IG Abs) 0 x10                     N            



Urinalysis with Culture, if [id number] 00:55:34





             Test Item    Value        Reference Range Interpretation Comments

 

             UA Color (test code = STRAW        Yellow                    



             UA Color)                                           

 

             UA Appear (test code = CLEAR        Clear                     



             UA Appear)                                          

 

             UA pH (test code = UA 5                         N            



             pH)                                                 

 

             UA Spec Grav (test code 1.001        1.001-1.035               



             = UA Spec Grav)                                        

 

             UA Glucose (test code = NEG          Negative                  



             UA Glucose)                                         

 

             UA Bili (test code = UA NEG          Negative                  



             Bili)                                               

 

             UA Ketones (test code = NEG          Negative                  



             UA Ketones)                                         

 

             UA Blood (test code = NEG          Negative                  



             UA Blood)                                           

 

             UA Protein (test code = NEG          Negative                  



             UA Protein)                                         

 

             UA Urobilinogen (test 0.2 mg/dL                 N            



             code = UA Urobilinogen)                                        

 

             UA Nitrite (test code = POS          Negative     A            



             UA Nitrite)                                         

 

             UA Leuk Est (test code 25 cells/mcL Negative     A            



             = UA Leuk Est)                                        

 

             UA Micro Ind? (test Indicated    Not Indicated A            Result 

created by



             code = UA Micro Ind?)                                        rule



                                                                 [email or web address]

_IN



                                                                 D



Urine [id number] 08:13:23





             Test Item    Value        Reference Range Interpretation Comments

 

             ORGANISM (test code = Escherichia coli                           



             ORGANISM)                                           

 

             Amikacin (test code =                           S            



             Amik)                                               

 

             Ampicillin (test code =                           S            



             Amp)                                                

 

             Ampicillin/Sulbactam                           S            



             (test code = Amp/Sul)                                        

 

             Cefazolin (test code =                           S            



             Cefaz)                                              

 

             Cefepime (test code =                           S            



             Cefep)                                              

 

             Cefotaxime (test code =                           S            



             Cefo)                                               

 

             Ceftazidime (test code =                           S            



             Ceftaz)                                             

 

             Ceftriaxone (test code =                           S            



             Ceftri)                                             

 

             Cefuroxime (test code =                           S            



             Cefur)                                              

 

             Ciprofloxacin (test code                           S            



             = Cipro)                                            

 

             Gentamicin (test code =                           S            



             Gent)                                               

 

             Imipenem (test code =                           S            



             Imi)                                                

 

             Levofloxacin (test code                           S            



             = Levo)                                             

 

             Meropenem (test code =                           S            



             Sindi)                                               

 

             Nitrofurantoin (test                           S            



             code = Nitro)                                        

 

             Piperacillin/Tazobactam                           S            



             (test code = Pip/Blaine)                                        

 

             Tobramycin (test code =                           S            



             Tobra)                                              

 

             Trimethoprim/Sulfa (test                           S            



             code = SXT)                                         

 

             Final Report (test code 30,000 cfu/ml                           



             = Final Report) Escherichia coli                           



                          20,000 cfu/ml                           



                          Diphtheroids                           



 C Urine Added by [email or web address] Qualitative2019-09-15 04:57:25





             Test Item    Value        Reference Range Interpretation Comments

 

             RPR Qual (test code = RPR Qual) Non-Reactive Non-Reactive          

    

 

             Reactive Control (test code = Reactive                             

  



             Reactive Control)                                        

 

             Weak Reactive Control (test Weak Reactive                          

 



             code = Weak Reactive Control)                                      

  

 

             Non-Reactive Control (test code Non-Reactive                       

    



             = Non-Reactive Control)                                        

 

             Lot # (test code = Lot #) [id number]                    N            

 

             Expiration Dt (test code = 10.31.2020                N            



             Expiration Dt)                                        



Thyroid Stimulating Hormone2019-09-15 01:22:43





             Test Item    Value        Reference Range Interpretation Comments

 

             TSH (test code = TSH) 3.370 mIU/mL 0.270-4.200               



Lipid Panel2019-09-15 01:17:51





             Test Item    Value        Reference Range Interpretation Comments

 

             Cholesterol Total 168 mg/dL    0-200                     RISK OF HE

ART



             (test code =                                        DISEASEPublishe

d by



             Cholesterol Total)                                        American 

Heart



                                                                 Association Judith

lyte



                                                                 Optimal Borderl

ine



                                                                 Increased RiskC

HOL <200



                                                                 200-239 >240TRI

G <150



                                                                 150-199 >200HDL

 Male



                                                                 >60 <40HDL Fema

le >60



                                                                 <50LDL <100 130

-159



                                                                 >160LDL Near op

timal is



                                                                 100-129

 

             Triglycerides (test 108 mg/dL    9-200                     



             code = Triglycerides)                                        

 

             HDL (test code = HDL) 46 mg/dL     50-60        L            

 

             LDL (test code = LDL) 100 mg/dL    0-130                     The eq

uation being used



                                                                 in this calcula

tion is



                                                                 LDL = (Chol - H

DL) -



                                                                 (Trig / 5)

 

             VLDL (test code = 22 mg/dL     5-40                      The equati

on being used



             VLDL)                                               in this calcula

tion is



                                                                 VLDL = Trig / 5

 

             Chol/HDL (test code = 3.7 ratio    0.0-4.4                   



             Chol/HDL)                                           

 

             LDL/HDL Ratio (test 2                         N            The equa

tion being used



             code = LDL/HDL Ratio)                                        in thi

s calculation is



                                                                 LDL/HDL Ratio=L

DL



                                                                 Calc/HDL Chol



Lithium Level2019-09-15 01:17:51





             Test Item    Value        Reference Range Interpretation Comments

 

             Lithium Level (test code = 0.81 mmol/L  0.60-1.20                 



             Lithium Level)                                        



Comprehensive Metabolic Panel2019-09-15 00:04:54





             Test Item    Value        Reference Range Interpretation Comments

 

             Sodium Level (test code = Sodium 137.0 mmol/L 135.0-145.0          

     



             Level)                                              

 

             Potassium Level (test code = 4.0 mmol/L   3.5-5.1                  

 



             Potassium Level)                                        

 

             Chloride Level (test code = 103 mmol/L                       



             Chloride Level)                                        

 

             CO2 (test code = CO2) 23 mmol/L    22-29                     

 

             Anion Gap (test code = Anion 11 mmol/L    7-16                     

 



             Gap)                                                

 

             BUN (test code = BUN) 11.50 mg/dL  6.00-20.00                

 

             Creatinine Level (test code = 1.00 mg/dL   0.50-0.90    H          

  



             Creatinine Level)                                        

 

             BUN/Creat Ratio (test code = 12                        N           

 



             BUN/Creat Ratio)                                        

 

             Glucose Level (test code = 108 mg/dL                        



             Glucose Level)                                        

 

             Calcium Level (test code = 10.3 mg/dL   8.3-10.5                  



             Calcium Level)                                        

 

             Alk Phos (test code = Alk Phos) 93 U/L                       

    

 

             Bilirubin Total (test code = 0.5 mg/dL    0.1-0.9                  

 



             Bilirubin Total)                                        

 

             Albumin Level (test code = 4.4 g/dL     3.5-5.2                   



             Albumin Level)                                        

 

             Protein Total (test code = 7.2 g/dL     6.4-8.3                   



             Protein Total)                                        

 

             ALT (test code = ALT) 12 U/L       1-33                      

 

             AST (test code = AST) 17 U/L       1-32                      

 

             Globulin (test code = Globulin) 2.8 g/dL     2.9-3.1      L        

    

 

             A/G Ratio (test code = A/G 1.6 ratio                 N            



             Ratio)                                              



Alcohol Level2019-09-15 00:04:54





             Test Item    Value        Reference Range Interpretation Comments

 

             Ethanol Level (test <0.00 g/dL   0.00-0.01                 Intoxica

gianfranco 0.080 g/dL



             code = Ethanol                                        or more



             Level)                                              

 

             Ethanol Inst (test <0                        N            



             code = Ethanol Inst)                                        



Comprehensive Metabolic Panel2019-09-15 00:04:54





             Test Item    Value        Reference Range Interpretation Comments

 

             Sodium Level (test 137.0 mmol/L 135.0-145.0               



             code = Sodium Level)                                        

 

             Potassium Level 4.0 mmol/L   3.5-5.1                   



             (test code =                                        



             Potassium Level)                                        

 

             Chloride Level (test 103 mmol/L                       



             code = Chloride                                        



             Level)                                              

 

             CO2 (test code = 23 mmol/L    22-29                     



             CO2)                                                

 

             Anion Gap (test code 11 mmol/L    7-16                      



             = Anion Gap)                                        

 

             BUN (test code = 11.50 mg/dL  6.00-20.00                



             BUN)                                                

 

             Creatinine Level 1.00 mg/dL   0.50-0.90    H            



             (test code =                                        



             Creatinine Level)                                        

 

             BUN/Creat Ratio 12                        N            



             (test code =                                        



             BUN/Creat Ratio)                                        

 

             Glucose Level (test 108 mg/dL                        



             code = Glucose                                        



             Level)                                              

 

             Calcium Level (test 10.3 mg/dL   8.3-10.5                  



             code = Calcium                                        



             Level)                                              

 

             Alk Phos (test code 93 U/L                           



             = Alk Phos)                                         

 

             Bilirubin Total 0.5 mg/dL    0.1-0.9                   



             (test code =                                        



             Bilirubin Total)                                        

 

             Albumin Level (test 4.4 g/dL     3.5-5.2                   



             code = Albumin                                        



             Level)                                              

 

             Protein Total (test 7.2 g/dL     6.4-8.3                   



             code = Protein                                        



             Total)                                              

 

             ALT (test code = 12 U/L       1-33                      



             ALT)                                                

 

             AST (test code = 17 U/L       1-32                      



             AST)                                                

 

             Globulin (test code 2.8 g/dL     2.9-3.1      L            



             = Globulin)                                         

 

             A/G Ratio (test code 1.6 ratio                 N            



             = A/G Ratio)                                        

 

             eGFR AA (test code = >60                       N            eGFR (e

stimated



             eGFR AA)     mL/min/1.73 m2                           Glomerular



                                                                 Filtration Rate

) is



                                                                 an estimated va

lue,



                                                                 calculated from

 the



                                                                 patient's serum



                                                                 creatinine usin

g the



                                                                 MDRD equation. 

It is



                                                                 NOT the patient

's



                                                                 actual GFR. The

 eGFR



                                                                 provides a more



                                                                 clinically usef

ul



                                                                 measure of kidn

ey



                                                                 disease than se

rum



                                                                 creatinine



                                                                 alone.***This



                                                                 calculation rimma

es



                                                                 sex and race in

to



                                                                 account, if the



                                                                 information is



                                                                 provided. If th

e



                                                                 race is not



                                                                 provided, and t

he



                                                                 patient is



                                                                 -Crystal

n,



                                                                 multiply by 1.2

12.



                                                                 If sex is not



                                                                 provided, and t

he



                                                                 patient is fema

le,



                                                                 multiply by 0.7

42.



                                                                 Results for pat

ients



                                                                 <18 years of ag

e



                                                                 have not been



                                                                 validated by th

e



                                                                 MDRD study and



                                                                 should be



                                                                 interpreted wit

h



                                                                 caution. eGFR R

esult



                                                                 Interpretation:

eGFR



                                                                 > or = 60 is in

 the



                                                                 Normal RangeeGF

R <



                                                                 60 may mean kid

hang



                                                                 diseaseeGFR < 1

5 may



                                                                 mean kidney



                                                                 failure*** Rang

es



                                                                 recommended by 

the



                                                                 [employer]



                                                                 Foundation,



                                                                 http://nkdep.ni

h.gov



Comprehensive Metabolic Panel2019-09-15 00:04:54





             Test Item    Value        Reference Range Interpretation Comments

 

             Sodium Level (test 137.0 mmol/L 135.0-145.0               



             code = Sodium Level)                                        

 

             Potassium Level 4.0 mmol/L   3.5-5.1                   



             (test code =                                        



             Potassium Level)                                        

 

             Chloride Level (test 103 mmol/L                       



             code = Chloride                                        



             Level)                                              

 

             CO2 (test code = 23 mmol/L    22-29                     



             CO2)                                                

 

             Anion Gap (test code 11 mmol/L    7-16                      



             = Anion Gap)                                        

 

             BUN (test code = 11.50 mg/dL  6.00-20.00                



             BUN)                                                

 

             Creatinine Level 1.00 mg/dL   0.50-0.90    H            



             (test code =                                        



             Creatinine Level)                                        

 

             BUN/Creat Ratio 12                        N            



             (test code =                                        



             BUN/Creat Ratio)                                        

 

             Glucose Level (test 108 mg/dL                        



             code = Glucose                                        



             Level)                                              

 

             Calcium Level (test 10.3 mg/dL   8.3-10.5                  



             code = Calcium                                        



             Level)                                              

 

             Alk Phos (test code 93 U/L                           



             = Alk Phos)                                         

 

             Bilirubin Total 0.5 mg/dL    0.1-0.9                   



             (test code =                                        



             Bilirubin Total)                                        

 

             Albumin Level (test 4.4 g/dL     3.5-5.2                   



             code = Albumin                                        



             Level)                                              

 

             Protein Total (test 7.2 g/dL     6.4-8.3                   



             code = Protein                                        



             Total)                                              

 

             ALT (test code = 12 U/L       1-33                      



             ALT)                                                

 

             AST (test code = 17 U/L       1-32                      



             AST)                                                

 

             Globulin (test code 2.8 g/dL     2.9-3.1      L            



             = Globulin)                                         

 

             A/G Ratio (test code 1.6 ratio                 N            



             = A/G Ratio)                                        

 

             eGFR AA (test code = >60                       N            eGFR (e

stimated



             eGFR AA)     mL/min/1.73 m2                           Glomerular



                                                                 Filtration Rate

) is



                                                                 an estimated va

lue,



                                                                 calculated from

 the



                                                                 patient's serum



                                                                 creatinine usin

g the



                                                                 MDRD equation. 

It is



                                                                 NOT the patient

's



                                                                 actual GFR. The

 eGFR



                                                                 provides a more



                                                                 clinically usef

ul



                                                                 measure of kidn

ey



                                                                 disease than se

rum



                                                                 creatinine



                                                                 alone.***This



                                                                 calculation rimma

es



                                                                 sex and race in

to



                                                                 account, if the



                                                                 information is



                                                                 provided. If th

e



                                                                 race is not



                                                                 provided, and t

he



                                                                 patient is



                                                                 -Crystal

n,



                                                                 multiply by 1.2

12.



                                                                 If sex is not



                                                                 provided, and t

he



                                                                 patient is fema

le,



                                                                 multiply by 0.7

42.



                                                                 Results for pat

ients



                                                                 <18 years of ag

e



                                                                 have not been



                                                                 validated by th

e



                                                                 MDRD study and



                                                                 should be



                                                                 interpreted wit

h



                                                                 caution. eGFR R

esult



                                                                 Interpretation:

eGFR



                                                                 > or = 60 is in

 the



                                                                 Normal RangeeGF

R <



                                                                 60 may mean kid

hang



                                                                 diseaseeGFR < 1

5 may



                                                                 mean kidney



                                                                 failure*** Rang

es



                                                                 recommended by 

the



                                                                 [employer]



                                                                 [employer],



                                                                 http://nkdep.ni

h.gov

 

             eGFR Non-AA (test 58.45                     N            eGFR (sheba

mated



             code = eGFR Non-AA) mL/min/1.73 m2                           Glomer

ular



                                                                 Filtration Rate

) is



                                                                 an estimated va

lue,



                                                                 calculated from

 the



                                                                 patient's serum



                                                                 creatinine usin

g the



                                                                 MDRD equation. 

It is



                                                                 NOT the patient

's



                                                                 actual GFR. The

 eGFR



                                                                 provides a more



                                                                 clinically usef

ul



                                                                 measure of kidn

ey



                                                                 disease than se

rum



                                                                 creatinine



                                                                 alone.***This



                                                                 calculation rimma

es



                                                                 sex and race in

to



                                                                 account, if the



                                                                 information is



                                                                 provided. If th

e



                                                                 race is not



                                                                 provided, and t

he



                                                                 patient is



                                                                 -Crystal

n,



                                                                 multiply by 1.2

12.



                                                                 If sex is not



                                                                 provided, and t

he



                                                                 patient is fema

le,



                                                                 multiply by 0.7

42.



                                                                 Results for pat

ients



                                                                 <18 years of ag

e



                                                                 have not been



                                                                 validated by th

e



                                                                 MDRD study and



                                                                 should be



                                                                 interpreted wit

h



                                                                 caution. eGFR R

esult



                                                                 Interpretation:

eGFR



                                                                 > or = 60 is in

 the



                                                                 Normal RangeeGF

R <



                                                                 60 may mean kid

hang



                                                                 diseaseeGFR < 1

5 may



                                                                 mean kidney



                                                                 failure*** Rang

es



                                                                 recommended by 

the



                                                                 [employer]



                                                                 [employer],



                                                                 http://nkdep.ni

h.gov



Urinalysis Microscopic2019-09-15 00:02:53





             Test Item    Value        Reference Range Interpretation Comments

 

             UA WBC (test code = UA WBC) 6-10         0-5          A            

 

             UA RBC (test code = UA RBC) 0-5          0-5                       

 

             UA Bacteria (test code = UA Bacteria) Many                      A  

          

 

             UA Squam Epithelial (test code = UA TNTC                      A    

        



             Squam Epithelial)                                        



Urine Drug [id number] 23:59:42





             Test Item    Value        Reference Range Interpretation Comments

 

             Amphetamine Screen Ur POSITIVE     Negative     A            



             (test code = Amphetamine                                        



             Screen Ur)                                          

 

             Barbiturate Screen Ur Negative     Negative                  



             (test code = Barbiturate                                        



             Screen Ur)                                          

 

             Benzodiazepines Ur (test POSITIVE     Negative     A            



             code = Benzodiazepines                                        



             Ur)                                                 

 

             Cocaine Screen Ur (test Negative     Negative                  



             code = Cocaine Screen                                        



             Ur)                                                 

 

             U Methadone Scr (test Negative     Negative                  



             code = U Methadone Scr)                                        

 

             Opiate Screen Ur (test Negative     Negative                  



             code = Opiate Screen Ur)                                        

 

             U PCP Scrn (test code = Negative     Negative                  



             U PCP Scrn)                                         

 

             Cannabinoid Screen Ur Negative     Negative                  



             (test code = Cannabinoid                                        



             Screen Ur)                                          

 

             U TCA (test code = U Negative     Negative                  The res

ults of all



             TCA)                                                drug screen elinor

ts are



                                                                 only preliminar

y.



                                                                 Clinical



                                                                 consideration a

nd



                                                                 professional ju

dgment



                                                                 should be appli

ed to



                                                                 any drug of abu

se



                                                                 test result,



                                                                 particularly wh

en



                                                                 preliminary pos

itive



                                                                 results are obt

ained.



                                                                 Please order a



                                                                 separate confir

matory



                                                                 test if desired

.



HCG Qualitative [id number] 23:54:43





             Test Item    Value        Reference Range Interpretation Comments

 

             hCG Ur (test code = Negative                               If the r

esult is



             hCG Ur)                                             "Negative" in p

atients



                                                                 suspected to be



                                                                 pregnant, recom

mend



                                                                 retest with a s

ample



                                                                 obtained 48 to 

72



                                                                 hours later, or

 by



                                                                 ordering a



                                                                 quantitative as

say. If



                                                                 the result is



                                                                 "Borderline" te

sting



                                                                 should be repea

gianfranco in



                                                                 48 to 72 hours.

 

             Lot # (test code = [id number]                    N            



             Lot #)                                              

 

             Expiration Dt (test [id number]                  N            



             code = Expiration Dt)                                        

 

             Neg Control (test Negative                               



             code = Neg Control)                                        

 

             Pos Control (test Positive                               



             code = Pos Control)                                        

 

             Internal QC (test Acceptable                             



             code = Internal QC)                                        



Urinalysis with Culture, if [id number] 23:52:04





             Test Item    Value        Reference Range Interpretation Comments

 

             UA Color (test code = UA YELLO        Yellow                    



             Color)                                              

 

             UA Appear (test code = SCLD         Clear        A            



             UA Appear)                                          

 

             UA pH (test code = UA 6.5                                    



             pH)                                                 

 

             UA Spec Grav (test code 1.011        1.001-1.035               



             = UA Spec Grav)                                        

 

             UA Glucose (test code = NEG          Negative                  



             UA Glucose)                                         

 

             UA Bili (test code = UA NEG          Negative                  



             Bili)                                               

 

             UA Ketones (test code = NEG          Negative                  



             UA Ketones)                                         

 

             UA Blood (test code = UA NEG          Negative                  



             Blood)                                              

 

             UA Protein (test code = NEG          Negative                  



             UA Protein)                                         

 

             UA Urobilinogen (test 1 mg/dL      >0.2         A            



             code = UA Urobilinogen)                                        

 

             UA Nitrite (test code = POS          Negative     A            



             UA Nitrite)                                         

 

             UA Leuk Est (test code = NEG          Negative                  



             UA Leuk Est)                                        

 

             UA Micro Ind? (test code Indicated    Not Indicated A            Re

sult created by



             = UA Micro Ind?)                                        rule



                                                                 [id number]

_IND



Automated [id number] 23:48:39





             Test Item    Value        Reference Range Interpretation Comments

 

             Neutro Auto (test code = Neutro 75.7 %       36.0-70.0    H        

    



             Auto)                                               

 

             Lymph Auto (test code = Lymph Auto) 14.1 %       12.0-44.0         

        

 

             Mono Auto (test code = Mono Auto) 7.6 %        0.0-11.0            

      

 

             Eos, Auto (test code = Eos, Auto) 1.8 %        0.0-7.0             

      

 

             Basophil Auto (test code = Basophil 0.5 %        0.0-2.0           

        



             Auto)                                               

 

             Neutro Absolute (test code = Neutro 12.7 x10     1.6-7.4      H    

        



             Absolute)                                           

 

             Lymph Absolute (test code = Lymph 2.38 x10     .50-4.60            

      



             Absolute)                                           

 

             Mono Absolute (test code = Mono 1.28 x10     .00-1.20     H        

    



             Absolute)                                           

 

             Eos Absolute (test code = Eos 0.31 x10     0.00-0.74               

  



             Absolute)                                           

 

             Baso Absolute (test code = Baso 0.09 x10     0.00-0.21             

    



             Absolute)                                           



IG [id number] 23:48:39





             Test Item    Value        Reference Range Interpretation Comments

 

             IG (test code = IG) 0.3 %        0.0-5.0                   

 

             IG Abs (test code = IG Abs) 0 x10                     N            



Complete Blood Count with [id number] 23:48:38





             Test Item    Value        Reference Range Interpretation Comments

 

             WBC (test code = WBC) 16.8 x10     4.4-10.5     H            

 

             RBC (test code = RBC) 4.06 x10     3.75-5.20                 

 

             Hgb (test code = Hgb) 12.7 g/dL    12.2-14.8                 

 

             MCV (test code = MCV) 94.10 fL     80..00              

 

             Hct (test code = Hct) 38.2 %       36.5-44.4                 

 

             MCHC (test code = 33.20 g/dL   32.00-37.50               



             MCHC)                                               

 

             RDW CV (test code = 13.2 %       11.5-14.5                 



             RDW CV)                                             

 

             MCH (test code = MCH) 31.3 pg      27.0-32.5                 

 

             Platelets (test code = 363.0 x10    140.0-440.0               



             Platelets)                                          

 

             MPV (test code = MPV) 10.0 fL                   N            

 

             Slide Review (test Auto         Auto                      Result cr

eated by



             code = Slide Review)                                        [email or web address]_

SLIDE_REV_AUTO

 

             nRBC (test code = 0                         N            



             nRBC)                                               

 

             NRBC Abs (test code = 0.00 x10                  N            



             NRBC Abs)                                           

 

             IPF (test code = IPF) 0 %                       N            



RPR, [id number] 05:53:00





             Test Item    Value        Reference Range Interpretation Comments

 

             RPR (test code = RPR) Non-Reactive Non-Reactive N            



BHCG, Serum, [id number] 01:39:00





             Test Item    Value        Reference Range Interpretation Comments

 

             Preg Qual [Se] (test code = BSHCG) Negative     Negative     N     

       



BHCG, Serum, [id number] 01:09:00





             Test Item    Value        Reference Range Interpretation Comments

 

             B hCG, Quant (test <1 mIU/mL                              Weeks of 

Gestation



             code = BHCGQT)                                        Ranges (mIU/m

L)3 weeks



                                                                 5.40 - 72.04 we

eks 10.2



                                                                 - 7085 weeks 21

7 - 52599



                                                                 weeks 152 - 321

777 weeks



                                                                 4059 - 0587805 

weeks



                                                                 21835 - 7666001

 weeks



                                                                 19461 - 5900189

0 weeks



                                                                 78203 - 4498540

2 weeks



                                                                 21885 - 1403247

4 weeks



                                                                 13619 - 5520636

 weeks



                                                                 67593 - 6844536

 weeks



                                                                 0104 - 8550400 

weeks



                                                                 8240 - 1793565 

weeks



                                                                 3149 - 09275



Thyroid Stimulating Hormone (TSH)2017 01:09:00





             Test Item    Value        Reference Range Interpretation Comments

 

             TSH (test code = TSH) 0.93 mIU/mL  0.270-4.200  N            



Lipid [id number] 01:05:00





             Test Item    Value        Reference Range Interpretation Comments

 

             Cholesterol (test 163 mg/dL    0-200        N            



             code = CHOL)                                        

 

             Triglycerides (test 108 mg/dL    9-200        N            



             code = TRIG)                                        

 

             HDL (test code = 41 mg/dL     50-60        L            



             HDL)                                                

 

             Chol/HDL (test code 4.0 Ratio    0.0-4.4      N            



             = CHOLPHDL)                                         

 

             LDL, Calculated 100          0-130        N            (NOTE)RISK O

F HEART



             (test code = LDLC)                                        DISEASEPu

blished by



                                                                 American Heart



                                                                 AssociationAnal

yte



                                                                 Optimal Boderli

ne



                                                                 Increased RiskC

HOL  <200



                                                                 200-239 >240TRI

G <150



                                                                 150-199 >200HDL

 Male:



                                                                 >60  <40HDL Fem

ronyn:



                                                                 &gt;60 <50LDL <

100



                                                                 130-159 >160LDL

 NEAR



                                                                 OPTIMAL -

129

 

             VLDL (test code = 22 mg/dL     5-40         N            



             VLDL)                                               

 

             LDL/HDL (test code = 2                                      



             LDLPHDL)                                            



Comprehensive Metabolic [id number] 21:02:00





             Test Item    Value        Reference Range Interpretation Comments

 

             Sodium (test code = 140 mmol/L   135-145      N            



             NA)                                                 

 

             Potassium (test 3.6 mmol/L   3.5-5.1      N            



             code = K)                                           

 

             Chloride (test code 103 mmol/L          N            



             = CL)                                               

 

             Carbon Dioxide 26 mmol/L    22-29        N            



             (test code = CO2)                                        

 

             Glucose (test code 89 mg/dL            N            



             = GLU)                                              

 

             Blood Urea Nitrogen 13 mg/dL     6-20         N            



             (test code = BUN)                                        

 

             Creatinine (test 0.8 mg/dL    0.5-0.9      N            



             code = CREAT)                                        

 

             Calcium (test code 10.0 mg/dL   8.3-10.5     N            



             = CA)                                               

 

             Prot Total (test 7.0 g/dL     6.4-8.3      N            



             code = TP)                                          

 

             Albumin (test code 4.2 g/dL     3.5-5.2      N            



             = ALB)                                              

 

             A/G Ratio (test 1.5 Ratio                              



             code = AGRATIO)                                        

 

             Globulin (test code 2.8          2.9-3.1      L            



             = GLOB)                                             

 

             Bili Total (test 0.6 mg/dL    0.1-0.9      N            



             code = TBIL)                                        

 

             Alk Phos (test code 91 U/L              N            



             = APHOS)                                            

 

             AST (test code = 19 U/L       1-32         N            



             AST)                                                

 

             ALT (test code = 14 U/L       1-33         N            



             ALT)                                                

 

             BUN/Creatinine 16.3                                   



             Ratio (test code =                                        



             BCRATIO)                                            

 

             Anion Gap (test 11 mmol/L    7-16         N            



             code = AGAP)                                        

 

             Estimated GFR (test >60                                    eGFR (es

timated



             code = GFR)  mL/min/1.73m2                           Glomerular [facility]guy[facility]n

tration



                                                                 Rate) is an est

imated



                                                                 value,calculate

d from



                                                                 the patient's s

harman



                                                                 creatinine usin

g the



                                                                 MDRD equation.I

t is



                                                                 NOT the patient

's



                                                                 actual GFR. The

 eGFR



                                                                 provides a more



                                                                 clinicallyusefu

l



                                                                 measure of kidn

ey



                                                                 disease than se

rum



                                                                 creatinine



                                                                 alone.***This



                                                                 calculation rimma

es sex



                                                                 and race into



                                                                 account, if the



                                                                 informationis



                                                                 provided. If th

e race



                                                                 is not provided

, and



                                                                 the patient



                                                                 isAfrican-Ameri

can,



                                                                 multiply by 1.2

12. If



                                                                 sex is not prov

ided,



                                                                 and thepatient 

is



                                                                 female, multipl

y by



                                                                 0.742. Results 

for



                                                                 patients <18 ye

ars



                                                                 ofage have not 

been



                                                                 validated by th

e MDRD



                                                                 study and shoul

d be



                                                                 interpretedwith



                                                                 caution.eGFR Re

sult



                                                                 Interpretation:

eGFR >



                                                                 or = 60 is in t

he



                                                                 Normal RangeeGF

R < 60



                                                                 may mean kidney



                                                                 diseaseeGFR < 1

5 may



                                                                 mean kidney



                                                                 failure***Range

s



                                                                 recommended by 

the



                                                                 [employer]



                                                                 Foundation,http

://nkd



                                                                 ep.nih.gov



Alcohol/Ethanol, [id number] 21:02:00





             Test Item    Value        Reference Range Interpretation Comments

 

             Alcohol, Ethyl <0.01 g/dL   0.00-0.01    N            Intoxicated 0

.080 g/dL



             (test code = ETOH)                                        or more



CBC with [id number] 20:35:00





             Test Item    Value        Reference Range Interpretation Comments

 

             WBC (test code = WBC) 10.3 K/cumm  4.4-10.5     N            

 

             RBC (test code = RBC) 4.37 M/cumm  3.75-5.20    N            

 

             Hemoglobin (test code = HGB) 13.1 gm/dL   12.2-14.8    N           

 

 

             Hematocrit (test code = HCT) 41.5 %       36.5-44.4    N           

 

 

             MCV (test code = MCV) 94.9 fL             N            

 

             MCH (test code = MCH) 30.1 pg      27.0-32.5    N            

 

             MCHC (test code = MCHC) 31.7 g/dL    32.0-37.5    L            

 

             RDW (test code = RDW) 12.9 %       11.5-14.5    N            

 

             Platelet Count (test code = 327 K/cumm   140-440      N            



             PLTCT)                                              

 

             MPV (test code = MPV) 7.0 fL                                 

 

             Diff Method (test code = DIFFM) Auto                               

    

 

             Neutrophil (test code = NEUT) 74.0 %       36-70        H          

  

 

             Lymphocyte (test code = LYMPH) 17.6 %       12-44        N         

   

 

             Monocyte (test code = MONO) 6.4 %        0-11         N            

 

             Eosinophil (test code = EOS) 1.5 %        0-7          N           

 

 

             Basophil (test code = BASO) 0.5 %        0-2          N            

 

             Neutro Abs (test code = ANEUT) 7.6 K/cumm   1.6-7.4      H         

   

 

             Lymph Abs (test code = ALYMPH) 1.8 K/cumm   0.5-4.6      N         

   

 

             Mono Abs (test code = AMONO) 0.7 K/cumm   0.0-1.2      N           

 

 

             Eos Abs (test code = AEOS) 0.16 K/cumm  0.00-0.74    N            

 

             Baso Abs (test code = ABASO) 0.1 K/cumm   0.00-0.21    N           

 



[id number] 20:33:00





             Test Item    Value        Reference Range Interpretation Comments

 

             Amphetamine (test code POSITIVE     Negative     A            For d

iagnostic purposes



             = AMPH)                                             only, positive 

results



                                                                 should always b

e



                                                                 assessedin



                                                                 conjunctionwith

 the



                                                                 patient's medic

al



                                                                 history,clinica

l



                                                                 examination and



                                                                 otherfindings.T

o



                                                                 fulfill legal



                                                                 requirements, a

 more



                                                                 specific altern

ate



                                                                 chemical method

must be



                                                                 used inorder to

 obtain



                                                                 a Confirmed judith

lytical



                                                                 result. GC/MS i

s the



                                                                 preferred confi

rmatory



                                                                 method.

 

             Barbiturates (test Negative     Negative     N            



             code = GENEVIEVE)                                        

 

             Benzodiazepine (test Negative     Negative     N            



             code = IRENE)                                        

 

             Cocaine (test code = Negative     Negative     N            



             COCA)                                               

 

             Methadone (test code = Negative     Negative     N            



             MTHD)                                               

 

             Opiates (test code = Negative     Negative     N            



             OPIA)                                               

 

             PCP (test code = PCP) Negative     Negative     N            

 

             Propoxyphene (test Negative     Negative     N            



             code = PROPOX)                                        

 

             THC (test code = THC) Negative     Negative     N            



Urinalysis [id number] 20:23:00





             Test Item    Value        Reference Range Interpretation Comments

 

             Color (test code = COLOR) Yellow       Yellow,Straw,Pl N           

 



                                       yellow                    

 

             Clarity (test code = Sl Cloudy    Clear        A            



             CLAR)                                               

 

             Specific Gravity (test 1.007        1.001-1.035  N            



             code = SPGR)                                        

 

             pH (test code = PH) 6.0          5.0-9.0      N            

 

             Ketone (test code = KET) Negative mg/dL Negative     N            

 

             Glucose (test code = Negative mg/dL Negative     N            



             GLUCUR)                                             

 

             Protein (test code = Negative mg/dL Negative     N            



             PROT)                                               

 

             Bilirubin (test code = Negative mg/dL Negative     N            



             BILI)                                               

 

             Occult Blood (test code = Moderate     Negative     A            



             UDOB)                                               

 

             Urobilinogen (test code = 0.2 mg/dL    0.2-1.0      N            



             UROB)                                               

 

             Nitrite (test code = NIT) Positive     Negative     A            

 

             Leuk Esterase (test code Moderate     Negative     A            



             = LEUK)                                             

 

             Micros Exam (test code = Indicated                              



             MEXAM)                                              

 

             Epithelial Cells (test 50+ /LPF     0-30         A            



             code = EPI)                                         

 

             WBC, Urine (test code = 41-50 /HPF   0-5          A            



             UWBC)                                               

 

             RBC, Urine (test code = 3-5 /HPF     0-5          A            



             URBC)                                               

 

             Bacteria (test code = Many /HPF                              



             BACT)                                               



[id number] 16:21:00





             Test Item    Value        Reference Range Interpretation Comments

 

             Lithium (test code = LI) 0.6 mmol/l   0.6-1.2                   



[facility] [place] (Ultra Sensitive)2017 16:21:00





             Test Item    Value        Reference Range Interpretation Comments

 

             TSH (test code = TSH) 2.14 mIU/L   0.47-4.68                 



[facility]





             Test Item    Value        Reference Range Interpretation Comments

 

             Glucose (test code 77 mg/dl                         



             = GLU)                                              

 

             BUN (test code = 9.0 mg/dl    6.0-17.0                  



             BUN)                                                

 

             Creatinine (test 0.7 mg/dl    0.4-1.2                   



             code = CREA)                                        

 

             Sodium (test code = 139 mmol/l   137-145                   



             NA)                                                 

 

             Potassium (test 4.7 mmol/l   3.5-5.0                   



             code = K)                                           

 

             Chloride (test code 107 mmol/l                       



             = CL)                                               

 

             CO2 (test code = 22 mmol/l    22-30                     



             CO2)                                                

 

             Anion Gap (test 11                                     



             code = GAP)                                         

 

             Calcium (test code 9.8 mg/dl    8.4-10.2                  



             = CALC)                                             

 

             T Protein (test 8.0 gm/dl    5.1-8.7                   



             code = TP)                                          

 

             Albumin (test code 4.4 gm/dl    3.5-4.6                   



             = ALB)                                              

 

             A/G Ratio (test 1.2 %        1.1-2.2                   



             code = AGRAT)                                        

 

             AST (SGOT) (test 20 U/L       11-36                     



             code = AST)                                         

 

             ALT (SGPT) (test 29 U/L       11-40                     



             code = ALT)                                         

 

             Alkaline Phos (test 108 U/L                          



             code = ALKP)                                        

 

             Total Bilirubin 0.6 mg/dl    0.2-1.2                   



             (test code = TBIL)                                        

 

             Globulin (test code 3.6 gm/dl    2.3-3.5      H            



             = GLOBU)                                            

 

             Calcium, Corrected 9.5 mg/dl    8.4-10.2                  Various f

ormulas exist



             (test code =                                        for corrected s

harman



             CALCCORR)                                           calcium results

, each



                                                                 yielding differ

ent



                                                                 values. This co

rrected



                                                                 result was base

d on



                                                                 the formula: Co

rrected



                                                                 Calcium = Serum

Calcium



                                                                 + [0.8 * ( 4 -



                                                                 SerumAlbumin)]

 

             EGFR if  >60                                    



             American (test code mL/min/1.73m\                           



             = EGFRAA)    S\2                                    

 

             EGFR if Non- >60                                    Estimate

d Glomerular



             American (test code mL/min/1.73m\                           Filtrat

ion Rate (eGFR)



             = EGFRNA)    S\2                                    Reference Inter

vals



                                                                 Decision Points

 for 18



                                                                 years and older

 and



                                                                 average body ma

ss: >=



                                                                 60 Does not exc

lude



                                                                 kidney disease.

 30 -



                                                                 59 Suggests mod

erate



                                                                 chronic kidney 

disease



                                                                 and indicates t

he need



                                                                 for further



                                                                 investigation



                                                                 including asses

sment



                                                                 of proteinuria 

and



                                                                 cardiovascular



                                                                 factors. < 30 U

sually



                                                                 indicates a nee

d for



                                                                 referral for



                                                                 assessment and



                                                                 management of c

hronic



                                                                 kidney failure.



[facility]

## 2023-02-02 ENCOUNTER — HOSPITAL ENCOUNTER (EMERGENCY)
Dept: HOSPITAL 97 - ER | Age: 53
Discharge: PSYCHIATRIC OR SUBSTANCE USE FACILITY | End: 2023-02-02
Payer: COMMERCIAL

## 2023-02-02 VITALS — SYSTOLIC BLOOD PRESSURE: 101 MMHG | TEMPERATURE: 98.1 F | OXYGEN SATURATION: 97 % | DIASTOLIC BLOOD PRESSURE: 62 MMHG

## 2023-02-02 DIAGNOSIS — R45.851: Primary | ICD-10-CM

## 2023-02-02 DIAGNOSIS — Z20.822: ICD-10-CM

## 2023-02-02 DIAGNOSIS — F17.210: ICD-10-CM

## 2023-02-02 DIAGNOSIS — F31.9: ICD-10-CM

## 2023-02-02 DIAGNOSIS — F15.10: ICD-10-CM

## 2023-02-02 LAB
ALBUMIN SERPL BCP-MCNC: 4 G/DL (ref 3.4–5)
ALP SERPL-CCNC: 122 U/L (ref 45–117)
ALT SERPL W P-5'-P-CCNC: 25 U/L (ref 13–56)
AST SERPL W P-5'-P-CCNC: 25 U/L (ref 15–37)
BUN BLD-MCNC: 24 MG/DL (ref 7–18)
GLUCOSE SERPLBLD-MCNC: 95 MG/DL (ref 74–106)
HCT VFR BLD CALC: 42.4 % (ref 36–45)
LYMPHOCYTES # SPEC AUTO: 2 K/UL (ref 0.7–4.9)
MCV RBC: 92.9 FL (ref 80–100)
METHAMPHET UR QL SCN: POSITIVE
PMV BLD: 8.3 FL (ref 7.6–11.3)
POTASSIUM SERPL-SCNC: 4.1 MMOL/L (ref 3.5–5.1)
RBC # BLD: 4.57 M/UL (ref 3.86–4.86)
THC SERPL-MCNC: NEGATIVE NG/ML

## 2023-02-02 PROCEDURE — 36415 COLL VENOUS BLD VENIPUNCTURE: CPT

## 2023-02-02 PROCEDURE — 93005 ELECTROCARDIOGRAM TRACING: CPT

## 2023-02-02 PROCEDURE — 80053 COMPREHEN METABOLIC PANEL: CPT

## 2023-02-02 PROCEDURE — 87811 SARS-COV-2 COVID19 W/OPTIC: CPT

## 2023-02-02 PROCEDURE — 99285 EMERGENCY DEPT VISIT HI MDM: CPT

## 2023-02-02 PROCEDURE — 81003 URINALYSIS AUTO W/O SCOPE: CPT

## 2023-02-02 PROCEDURE — 85025 COMPLETE CBC W/AUTO DIFF WBC: CPT

## 2023-02-02 PROCEDURE — 80307 DRUG TEST PRSMV CHEM ANLYZR: CPT

## 2023-02-02 NOTE — XMS REPORT
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           



                                                  needed for           



                                                  Pain.           

 

     ibuprofen            Yes       Dental 800mg      Take 1           Jeff

ris



     (MOTRIN)      7-16                abscess           tablet by           Hea

lth



     800 mg      00:00:                               mouth           



     tablet      00                                 every 8           



                                                  hours as           



                                                  needed for           



                                                  Pain.           

 

     ibuprofen            Yes       Dental 800mg      Take 1           Jeff

ris



     (MOTRIN)      7-16                abscess           tablet by           Hea

lth



     800 mg      00:00:                               mouth           



     tablet      00                                 every 8           



                                                  hours as           



                                                  needed for           



                                                  Pain.           







Vital Signs







             Vital Name   Observation Time Observation Value Comments     Source

 

             Systolic blood 2022 19:30:00 176 mm[Hg]                [facility]

[facility]

 

             Diastolic [facility] 2022 19:30:00 94 [facility]Hg[facility]                 [facility]

 

             Heart rate   2022 19:30:00 76 /min                   [facility]

 

             Respiratory rate 2022 19:30:00 11 /min                   [facility]

 

             Oxygen saturation in 2022 19:30:00 95 /min                   

[facility]



             Arterial blood by                                        [place] [facility]

staci



             Pulse oximetry                                        Branch

 

             Body temperature 2022 17:54:00 37.22 Danisha                 [facility]

 

             Body height  2022 17:54:00 157.5 cm                  [facility]

 

             Body weight  2022 17:54:00 90.719 kg                 [facility]

 

             BMI          2022 17:54:00 36.58 kg/m2               [facility]

 

             Body height  2022 15:29:00 160 cm                    [facility]

 

             Body weight  2022 15:29:00 90.719 kg                 [facility]

 

             BMI          2022 15:29:00 35.43 kg/m2               [facility]

 

             Height/Length 2022 08:36:33 160 cm                    



             Measured                                            

 

             Weight Dosing 2022 08:36:33 105.00 kg                 







Procedures







                Procedure       Date / Time Performed Performing Clinician Sourc

e

 

                ASSIGNMENT OF BENEFITS 2023 19:39:54 Doctor Unassigned, No

 [facility]



                                                Name            Medical Branch

 

                XR CHEST 1 VW   2022 18:39:34 Singer, [facility]

 

                XR PELVIS <3 VW 2022 18:39:34 Singer, [facility]

 

                URINALYSIS      2022 18:19:00 Singer, [facility]

 

                COMP. METABOLIC PANEL 2022 18:08:00 Fly Olmos [facility]

[facility] [place]



                ([id number])                                         [facility] Branch

 

                [facility]   [facility]

 

                COVID-19 (ID NOW RAPID 2022 18:08:00 [facility]



                TESTING)                                        [facility] Branch

 

                REFERRAL-       2022 05:01:00 Doctor Unassigned, Nimisha [facility]



                REQUEST/RESPONSE                 Name            Medical Branch

 

                [id number]         2020 00:00:00 CANAL.02        [facility]







Plan of Care







             Planned Activity Planned Date Details      Comments     Source

 

             Future Scheduled Test 2022-10-01 00:00:00 IMM Influenza            

  [facility]



                                       Seasonal (>/= 19 yrs)              



                                       [code = IMM Influenza              



                                       Seasonal (>/= 19              



                                       yrs)]                     

 

             Future Scheduled Test 2022-10-01 00:00:00 IMM Influenza            

  [facility]



                                       Seasonal (>/= 19 yrs)              



                                       [code = IMM Influenza              



                                       Seasonal (>/= 19              



                                       yrs)]                     

 

             Future Scheduled Test 2022-10-01 00:00:00 IMM Influenza            

  [facility]



                                       Seasonal (>/= 19 yrs)              



                                       [code = IMM Influenza              



                                       Seasonal (>/= 19              



                                       yrs)]                     

 

             Future Scheduled Test 2022-10-01 00:00:00 IMM Influenza            

  [facility]



                                       Seasonal (>/= 19 yrs)              



                                       [code = IMM Influenza              



                                       Seasonal (>/= 19              



                                       yrs)]                     

 

             Future Scheduled Test 2022-10-01 00:00:00 IMM Influenza            

  [facility]



                                       Seasonal (>/= 19 yrs)              



                                       [code = IMM Influenza              



                                       Seasonal (>/= 19              



                                       yrs)]                     

 

             Future Scheduled Test 2022-10-01 00:00:00 IMM Influenza            

  [facility]



                                       Seasonal (>/= 19 yrs)              



                                       [code = IMM Influenza              



                                       Seasonal (>/= 19              



                                       yrs)]                     

 

             Future Scheduled Test 2022-10-01 00:00:00 IMM Influenza            

  [facility]



                                       Seasonal (>/= 19 yrs)              



                                       [code = IMM Influenza              



                                       Seasonal (>/= 19              



                                       yrs)]                     

 

             Future Scheduled Test 2018 00:00:00 Screening for            

  [facility]



                                       malignant neoplasm of              



                                       colon (procedure)              



                                       [code = 945524384]              

 

             Future Scheduled Test 2018 00:00:00 Screening for            

  [facility]



                                       malignant neoplasm of              



                                       colon (procedure)              



                                       [code = 525652290]              

 

             Future Scheduled Test 2018 00:00:00 Screening for            

  [facility]



                                       malignant neoplasm of              



                                       colon (procedure)              



                                       [code = 247052907]              

 

             Future Scheduled Test 2018 00:00:00 Screening for            

  [facility]



                                       malignant neoplasm of              



                                       colon (procedure)              



                                       [code = 177690352]              

 

             Future Scheduled Test 2018 00:00:00 Screening for            

  [facility]



                                       malignant neoplasm of              



                                       colon (procedure)              



                                       [code = 955505386]              

 

             Future Scheduled Test 2018 00:00:00 Screening for            

  [facility]



                                       malignant neoplasm of              



                                       colon (procedure)              



                                       [code = 546813188]              

 

             Future Scheduled Test 2018 00:00:00 Screening for            

  [facility]



                                       malignant neoplasm of              



                                       colon (procedure)              



                                       [code = 749932914]              

 

             Future Scheduled Test 2008 00:00:00 Breast Cancer Scrn       

       [facility]



                                       (Yearly) [code =              



                                       Breast Cancer Scrn              



                                       (Yearly)]                 

 

             Future Scheduled Test 2008 00:00:00 Breast Cancer Scrn       

       [facility]



                                       (Yearly) [code =              



                                       Breast Cancer Scrn              



                                       (Yearly)]                 

 

             Future Scheduled Test 2008 00:00:00 Breast Cancer Scrn       

       [facility]



                                       (Yearly) [code =              



                                       Breast Cancer Scrn              



                                       (Yearly)]                 

 

             Future Scheduled Test 2008 00:00:00 Breast Cancer Scrn       

       [facility]



                                       (Yearly) [code =              



                                       Breast Cancer Scrn              



                                       (Yearly)]                 

 

             Future Scheduled Test 2008 00:00:00 Breast Cancer Scrn       

       [facility]



                                       (Yearly) [code =              



                                       Breast Cancer Scrn              



                                       (Yearly)]                 

 

             Future Scheduled Test 2008 00:00:00 Breast Cancer Scrn       

       [facility]



                                       (Yearly) [code =              



                                       Breast Cancer Scrn              



                                       (Yearly)]                 

 

             Future Scheduled Test 2008 00:00:00 Breast Cancer Scrn       

       [facility]



                                       (Yearly) [code =              



                                       Breast Cancer Scrn              



                                       (Yearly)]                 

 

             Future Scheduled Test 1998 00:00:00 Screening for            

  [facility]



                                       malignant neoplasm of              



                                       cervix (procedure)              



                                       [code = 843478939]              

 

             Future Scheduled Test 1998 00:00:00 Screening for            

  [facility]



                                       malignant neoplasm of              



                                       cervix (procedure)              



                                       [code = 951351837]              

 

             Future Scheduled Test 1998 00:00:00 Screening for            

  [facility]



                                       malignant neoplasm of              



                                       cervix (procedure)              



                                       [code = 973750647]              

 

             Future Scheduled Test 1998 00:00:00 Screening for            

  [facility]



                                       malignant neoplasm of              



                                       cervix (procedure)              



                                       [code = 129182355]              

 

             Future Scheduled Test 1998 00:00:00 Screening for            

  [facility]



                                       malignant neoplasm of              



                                       cervix (procedure)              



                                       [code = 452298260]              

 

             Future Scheduled Test 1998 00:00:00 Screening for            

  [facility]



                                       malignant neoplasm of              



                                       cervix (procedure)              



                                       [code = 356854003]              

 

             Future Scheduled Test 1998 00:00:00 Screening for            

  [facility]



                                       malignant neoplasm of              



                                       cervix (procedure)              



                                       [code = 110455258]              

 

             Future Scheduled Test 1998 00:00:00 Screening for            

  [facility]



                                       malignant neoplasm of              



                                       cervix (procedure)              



                                       [code = 138310428]              

 

             Future Scheduled Test 1998 00:00:00 Screening for            

  [facility]



                                       malignant neoplasm of              



                                       cervix (procedure)              



                                       [code = 324399659]              

 

             Future Scheduled Test 1998 00:00:00 Screening for            

  [facility]



                                       malignant neoplasm of              



                                       cervix (procedure)              



                                       [code = 546687177]              

 

             Future Scheduled Test 1998 00:00:00 Screening for            

  [facility]



                                       malignant neoplasm of              



                                       cervix (procedure)              



                                       [code = 559930131]              

 

             Future Scheduled Test 1998 00:00:00 Screening for            

  [facility]



                                       malignant neoplasm of              



                                       cervix (procedure)              



                                       [code = 455214134]              

 

             Future Scheduled Test 1998 00:00:00 Screening for            

  [facility]



                                       malignant neoplasm of              



                                       cervix (procedure)              



                                       [code = 825576294]              

 

             Future Scheduled Test 1998 00:00:00 Screening for            

  [facility]



                                       malignant neoplasm of              



                                       cervix (procedure)              



                                       [code = 413335199]              

 

             Future Scheduled Test 1969 00:00:00 COVID-19 Vaccine (#1)    

          [facility]



                                       [code = COVID-19              



                                       Vaccine (#1)]              

 

             Future Scheduled Test 1969 00:00:00 COVID-19 Vaccine (#1)    

          [facility]



                                       [code = COVID-19              



                                       Vaccine (#1)]              

 

             Future Scheduled Test 1969 00:00:00 COVID-19 Vaccine (#1)    

          [facility]



                                       [code = COVID-19              



                                       Vaccine (#1)]              

 

             Future Scheduled Test 1969 00:00:00 COVID-19 Vaccine (#1)    

          [facility]



                                       [code = COVID-19              



                                       Vaccine (#1)]              

 

             Future Scheduled Test 1969 00:00:00 COVID-19 Vaccine (#1)    

          [facility]



                                       [code = COVID-19              



                                       Vaccine (#1)]              

 

             Future Scheduled Test 1969 00:00:00 COVID-19 Vaccine (#1)    

          [facility]



                                       [code = COVID-19              



                                       Vaccine (#1)]              

 

             Future Scheduled Test 1969 00:00:00 COVID-19 Vaccine (#1)    

          [facility]



                                       [code = COVID-19              



                                       Vaccine (#1)]              







Encounters







        Start   End     Encounter Admission Attending Care    Care    Encounter 

Source



        Date/Time Date/Time Type    Type    Clinicians Facility Department ID   

   

 

        2022         Outpatient         SARATH [facility]     [id number]

-2 [place]



        01:05:17                         MELANIE                  [id number] [facility]

 

        2022         Outpatient         VIRGIE,  [facility]     [id number]

 [place]



        03:15:41                         JOAQUIN                 [id number] [facility]

 

        2022         Outpatient         VIRGIE,  [facility]     [id number]

 [place]



        15:19:55                         JOAQUIN                 [id number] [facility]

 

        2022         Outpatient                 [facility]     [id number]

 [place]



        15:28:25                                                 [id number] [facility]

 

        2022         Outpatient                 [facility]     [id number]

 [place]



        12:00:51                                                 [id number] [facility]

 

        2022         Outpatient                 [facility]     [id number]

 [place]



        15:13:02                                                 [id number] [facility]

 

        2020         Inpatient                 [facility]   YAMILA    [id number] 

[facility]



        03:17:00                                                 58      [facility]

 

        2023 Outpatient DIANA NOLAN  [facility]    [id number]

467 [facility]



        14:00:00 14:00:00                 ANCELMO brock [facility]

 

        2023 Orders          Doctor GARIBAY    [id number]

447 [facility]



        00:00:00 00:00:00 Only            Unassigned, SHONA   [id number]       

  ity of



                                        [facility] [id number]         Roly

as



                                                        [phone]         Medi

staci



                                                        [address]             [place]

 

        2022 Inpatient         SANCHEZ [facility]    MED       

  [facility]



        19:24:00 19:40:00                 LEXI                         

 

        2022 Emergency E       HEATHER, [facility]     

   [facility]



        14:56:00 18:09:00                 DEMARIO                           

 

        2022 [facility]     [id number]

15 [facility]



        12:56:00 15:05:00                 FLY                         delmy [facility]

 

        2022 Emergency         [facility]    [id number]

[id number] [facility]



        12:56:00 15:05:00                 [facility] [id number]         i

ty [facility] [id number]         [place]

[facility]  [phone]         Medi

staci



                                                        [address]             [place]

 

        2022 Office          [facility]    [id number]

13 Univers



        10:00:00 10:30:00 Visit           [facility]  [id number]         it

y of



                                                [place] [id number]         Roly

as



                                                SKYLAR?BLEA [phone]         [address]



                                                MEDICAL                 



                                                OFFICE                  



                                                [facility]                 

 

        2022 Outpatient R       AMY  [facility]    [id number]

329 Univers



        10:00:00 10:00:00                 [facility]

 

        2022 Orders          Doctor GARIBAY    [id number]

07 Univers



        00:00:00 00:00:00 Only            Unassigned, SHONA   [id number]       

  ity of



                                        [facility] [id number]         [place]

as



                                                        [phone]         Medi

staci



                                                        [address]             [place]

 

        2020 Outpatient         ERNESTINE Ferrara   [facility]    [id number]

220 [facility]



        08:38:00 08:38:00                 [employer]                 75      [facility]

 

        2019 Inpatient 1       Robin Barrios [facility]    PSY     [id number]



        23:01:00 13:16:00                 Robin Barrios                         

[facility]

 

        2017 Outpatient DIANA LOPEZ  [facility]    NUT     [id number]

565 [facility]



        00:00:00 00:00:00                 VENKATA brock of



                                                                        [facility]







Results







           Test Description Test Time  Test Comments Results    Result Comments 

Source









                    COMP. METABOLIC PANEL (44445) 2022 19:59:50 









                      Test Item  Value      Reference Range Interpretation Comme

nts









             NA (test code = 6088285537) 138 mmol/L   135-145                   

 

             K (test code = 9373619091) 4.3 mmol/L   3.5-5.0                   

 

             CL (test code = 9637801989) 102 mmol/L                       

 

             CO2 TOTAL (test code = 4932820814) 23 mmol/L    23-31              

       

 

             AGAP (test code = 9762214054)              2-16                    

  

 

             BUN (test code = 4325310971) 18 mg/dL     7-23                     

 

 

             GLUCOSE (test code = 0511766464) 110 mg/dL                   

     

 

             CREATININE (test code = 0.70 mg/dL   0.50-1.04                 



             6406993011)                                         

 

             TOTAL BILI (test code = 1.3 mg/dL    0.1-1.1      H            



             5823924065)                                         

 

             CALCIUM (test code = 9626875941) 9.5 mg/dL    8.6-10.6             

     

 

             T PROTEIN (test code = 9595936945) 7.4 g/dL     6.3-8.2            

       

 

             ALBUMIN (test code = 9724417694) 4.2 g/dL     3.5-5.0              

     

 

             ALK PHOS (test code = 1530349384) 100 U/L                    

      

 

             ALTv (test code = 1742-6) 16 U/L       5-35                      

 

             AST(SGOT) (test code = 5518479758) 27 U/L       13-40              

       

 

             eGFR (test code = 9077561580)              mL/min/1.73m2           

   

 

             ELENA (test code = ELENA) [employer]                    

       



                          Filtration Rate (GFR) and Staging                     

      



                          of Kidney Disease*                           



                          +-----------------------+--------                     

      



                          -------------+-------------------                     

      



                          ------+| GFR (mL/min/1.73 m2) ?|                      

     



                          With Kidney Damage ?| ?Without                        

   



                          Kidney                                 



                          Damage+-----------------------+--                     

      



                          -------------------+-------------                     

      



                          ------------+| ?>90 ? ? ? ? ? ? ?                     

      



                          ? ?| ?Stage one ? ? ? ? ?| ?                          

 



                          Normal ? ? ? ? ? ? ?                           



                          ?+-----------------------+-------                     

      



                          --------------+------------------                     

      



                          -------+| ?60-89 ? ? ? ? ? ? ? ?|                     

      



                          ?Stage two ? ? ? ? ?| ? Decreased                     

      



                          GFR ? ? ? ?                            



                          +-----------------------+--------                     

      



                          -------------+-------------------                     

      



                          ------+| ?30-59 ? ? ? ? ? ? ? ?|                      

     



                          ?Stage three ? ? ? ?| ? Stage                         

  



                          three ? ? ? ? ?                           



                          +-----------------------+--------                     

      



                          -------------+-------------------                     

      



                          ------+| ?15-29 ? ? ? ? ? ? ? ?|                      

     



                          ?Stage four ? ? ? ? | ? Stage                         

  



                          four ? ? ? ? ?                           



                          ?+-----------------------+-------                     

      



                          --------------+------------------                     

      



                          -------+| ?<15 (or dialysis) ? ?|                     

      



                          ?Stage five ? ? ? ? | ? Stage                         

  



                          five ? ? ? ? ?                           



                          ?+-----------------------+-------                     

      



                          --------------+------------------                     

      



                          -------+ *Each stage assumes the                      

     



                          associated GFR level has been in                      

     



                          effect for at least three months.                     

      



                          ?Stages 1 to 5, with or without                       

    



                          kidney disease, indicate chronic                      

     



                          kidney disease. Notes:                           



                          Determination of stages one and                       

    



                          two (with eGFR >59mL/min/1.73 m2)                     

      



                          requires estimation of kidney                         

  



                          damage for at least three months                      

     



                          as defined by structural or                           



                          functional abnormalities of the                       

    



                          kidney, manifested by                           



                          either:Pathological abnormalities                     

      



                          or Markers of kidney damage                           



                          (including abnormalities in the                       

    



                          composition of the blood or urine                     

      



                          or abnormalities in imaging                           



                          tests).                                

 

             Lab Interpretation (test code = Abnormal                           

    



             64236-6)                                            



[facility] WITH [id number] 19:22:40





             Test Item    Value        Reference Range Interpretation Comments

 

             WBC (test code =              See_Comment  H             [Automated



             2265-2)                                             message] The sy

stem



                                                                 which generated



                                                                 this result



                                                                 transmitted



                                                                 reference range

:



                                                                 4.30 - 11.10



                                                                 10*3/?L. The



                                                                 reference range

 was



                                                                 not used to



                                                                 interpret this



                                                                 result as



                                                                 normal/abnormal

.

 

             RBC (test code =              See_Comment                [Automated



             733-8)                                              message] The sy

stem



                                                                 which generated



                                                                 this result



                                                                 transmitted



                                                                 reference range

:



                                                                 3.93 - 5.25



                                                                 10*6/?L. The



                                                                 reference range

 was



                                                                 not used to



                                                                 interpret this



                                                                 result as



                                                                 normal/abnormal

.

 

             HGB (test code = 13.0 g/dL    11.6-15.0                 



             718-7)                                              

 

             HCT (test code = 38.4 %       35.7-45.2                 



             4544-3)                                             

 

             MCV (test code = 89.9 fL      80.6-95.5                 



             787-2)                                              

 

             MCH (test code = 30.4 pg      25.9-32.8                 



             785-6)                                              

 

             MCHC (test code = 33.9 g/dL    31.6-35.1                 



             786-4)                                              

 

             RDW-SD (test code = 39.3 fL      39.0-49.9                 



             01189-0)                                            

 

             RDW-CV (test code = 12.2 %       12.0-15.5                 



             788-0)                                              

 

             PLT (test code =              See_Comment                [Automated



             777-3)                                              message] The sy

stem



                                                                 which generated



                                                                 this result



                                                                 transmitted



                                                                 reference range

:



                                                                 166 - 358 10*3/

?L.



                                                                 The reference r

jihan



                                                                 was not used to



                                                                 interpret this



                                                                 result as



                                                                 normal/abnormal

.

 

             MPV (test code = 10.1 fL      9.5-12.9                  



             99977-4)                                            

 

             NRBC/100 WBC (test              See_Comment                [Automat

ed



             code = 8910445653)                                        message] 

The system



                                                                 which generated



                                                                 this result



                                                                 transmitted



                                                                 reference range

:



                                                                 0.0 - 10.0 /100



                                                                 WBCs. The refer

ence



                                                                 range was not u

sed



                                                                 to interpret th

is



                                                                 result as



                                                                 normal/abnormal

.

 

             NRBC x10^3 (test code <0.01        See_Comment                [Auto

mated



             = 2339156310)                                        message] The s

ystem



                                                                 which generated



                                                                 this result



                                                                 transmitted



                                                                 reference range

:



                                                                 10*3/?L. The



                                                                 reference range

 was



                                                                 not used to



                                                                 interpret this



                                                                 result as



                                                                 normal/abnormal

.

 

             GRAN MAT (NEUT) % 79.4 %                                 



             (test code = 770-8)                                        

 

             IMM GRAN % (test code 0.50 %                                 



             = 2219061245)                                        

 

             LYMPH % (test code = 9.5 %                                  



             736-9)                                              

 

             MONO % (test code = 9.7 %                                  



             5905-5)                                             

 

             EOS % (test code = 0.5 %                                  



             713-8)                                              

 

             BASO % (test code = 0.4 %                                  



             706-2)                                              

 

             GRAN MAT x10^3(ANC) 9.77 10*3/uL 1.88-7.09    H            



             (test code =                                        



             0413713506)                                         

 

             IMM GRAN x10^3 (test 0.06 10*3/uL 0.00-0.06                 



             code = 3882027079)                                        

 

             LYMPH x10^3 (test code 1.17 10*3/uL 1.32-3.29    L            



             = 731-0)                                            

 

             MONO x10^3 (test code 1.19 10*3/uL 0.33-0.92    H            



             = 742-7)                                            

 

             EOS x10^3 (test code = 0.06 10*3/uL 0.03-0.39                 



             711-2)                                              

 

             BASO x10^3 (test code 0.05 10*3/uL 0.01-0.07                 



             = 704-7)                                            

 

             Lab Interpretation Abnormal                               



             (test code = 95120-0)                                        



[facility] [id number] 05[facility]36[facility]00





             Test Item    Value        Reference Range Interpretation Comments

 

             SODIUM (test code = NA) 143 mmol/L   134-147      N            

 

             POTASSIUM (test code = 4.2 mmol/L   3.4-5.0      N            



             K)                                                  

 

             CHLORIDE (test code = 114 mmol/L   100-108      H            



             CL)                                                 

 

             CARBON DIOXIDE (test 24 mmol/L    21-32        N            



             code = CO2)                                         

 

             ANION GAP (test code = 5.0 GAP calc 4.0-15.0     N            



             GAP)                                                

 

             GLUCOSE (test code = 89 MG/DL            N            



             GLU)                                                

 

             BLOOD UREA NITROGEN 17 MG/DL     7-18         N            



             (test code = BUN)                                        

 

             GLOMERULAR FILTRATION >=60 max estimate >60                       



             RATE (test code = GFR) estGFR                                 

 

             CREATININE (test code = 0.9 MG/DL    0.6-1.0      N            



             CREAT)                                              

 

             CALCIUM (test code = CA) 8.3 MG/DL    8.5-10.1     L            



CBC W/AUTO [id number] 05:21:00





             Test Item    Value        Reference Range Interpretation Comments

 

             WHITE BLOOD CELL (test code = 15.2 K/mm3   3.5-11.0     H          

  



             WBC)                                                

 

             RED BLOOD CELL (test code = RBC) 3.67 M/mm3   4.70-6.10    L       

     

 

             HEMOGLOBIN (test code = HGB) 11.3 G/DL    10.4-14.9    N           

 

 

             HEMATOCRIT (test code = HCT) 35.5 %       31.5-44.1    N           

 

 

             MEAN CELL VOLUME (test code = 96.7 Fl      84.5-98.6    N          

  



             MCV)                                                

 

             MEAN CELL HGB (test code = MCH) 30.8 pg      27.0-34.2    N        

    

 

             MEAN CELL HGB CONCETRATION (test 31.8 G/DL    31.5-34.0    N       

     



             code = MCHC)                                        

 

             RED CELL DISTRIBUTION WIDTH (test 14.2 SD      11.5-14.5    N      

      



             code = RDW)                                         

 

             PLATELET COUNT (test code = PLT) 242.0 K/mm3  150-450      N       

     

 

             MEAN PLATELET VOLUME (test code = 10.20 fL     7.0-10.5     N      

      



             MPV)                                                

 

             NEUTROPHIL % (test code = NT%) 78.8 %       40-76        H         

   

 

             LYMPHOCYTE % (test code = LY%) 13.1 %       20.5-51.1    L         

   

 

             MONOCYTE % (test code = MO%) 6.2 %        1.7-9.3      N           

 

 

             EOSINOPHIL % (test code = EO%) 1.6 %        0.0-6.0      N         

   

 

             BASOPHIL % (test code = BA%) 0.3 %        0.0-2.0      N           

 

 

             NEUTROPHIL # (test code = NT#) 11.94 K/mm3  1.8-7.6      H         

   

 

             LYMPHOCYTE # (test code = LY#) 2.0 K/mm3    0.6-3.2      N         

   

 

             MONOCYTE # (test code = MO#) 0.9 K/mm3    0.3-1.1      N           

 

 

             EOSINOPHIL # (test code = EO#) 0.2 K/mm3    0.0-0.4      N         

   

 

             BASOPHIL # (test code = BA#) 0.1 K/mm3    0.0-0.1      N           

 

 

             MANUAL DIFF REQUIRED (test code = NO DIFF/SCN  CRITERIA            

      



             MDIFF)                                              



[id number] 14:09:00





             Test Item    Value        Reference Range Interpretation Comments

 

             LITHIUM (test 0.5 mmol/L   0.6-1.2      L             Detection Lopez

it = 0.1



             code = LITH)                                        <0.1 indicates 

None



                                                                 DetectedPerform

ed At: 



                                                                 [facility] [address]



                                                                 [id number] 

Jeffry KIMBROUGH MD



                                                                 Ph:[phone]



ZCXYJOLF--09-28 13:35:00





             Test Item    Value        Reference Range Interpretation Comments

 

             TROPONIN-I (test 0.436 NG/ML  0.000-0.045             Negative: <

/= 0.045



             code = TROPI)                                        Positive: >/= 

0.046



                                                                 Correlation wit

h serial



                                                                 results, other 

cardiac



                                                                 markers, and cl

inical



                                                                 findings is nec

essary to



                                                                 determine the c

linical



                                                                 significance of

 this



                                                                 result. Quantit

ative



                                                                 results using d

ifferent



                                                                 methodologies s

hould not



                                                                 be compared to 

one



                                                                 another as nume

rical



                                                                 results may daniel

yby



                                                                 method.



Completed by Nursing: ECZPCDYHNC--10-28 10:33:00





             Test Item    Value        Reference Range Interpretation Comments

 

             TROPONIN-I (test 0.360 NG/ML  0.000-0.045             Negative: <

/= 0.045



             code = TROPI)                                        Positive: >/= 

0.046



                                                                 Correlation wit

h serial



                                                                 results, other 

cardiac



                                                                 markers, and cl

inical



                                                                 findings is nec

essary to



                                                                 determine the c

linical



                                                                 significance of

 this



                                                                 result. Quantit

ative



                                                                 results using d

ifferent



                                                                 methodologies s

hould not



                                                                 be compared to 

one



                                                                 another as nume

rical



                                                                 results may daniel

yby



                                                                 method.



Completed by Nursing: NOLast Dose Date: 20Las Dose Time: 1200TROPONIN-I
2020 07:23:00





             Test Item    Value        Reference Range Interpretation Comments

 

             TROPONIN-I (test 0.399 NG/ML  0.000-0.045  HH           Negative: <

/= 0.045



             code = TROPI)                                        Positive: >/= 

0.046



                                                                 Correlation wit

h serial



                                                                 results, other 

cardiac



                                                                 markers, and cl

inical



                                                                 findings is nec

essary to



                                                                 determine the c

linical



                                                                 significance of

 this



                                                                 result. Quantit

ative



                                                                 results using d

ifferent



                                                                 methodologies s

hould not



                                                                 be compared to 

one



                                                                 another as nume

rical



                                                                 results may daniel

yby



                                                                 method.



Completed by Nursing: DJOHIWUZZD--98-28 05:00:00





             Test Item    Value        Reference Range Interpretation Comments

 

             TROPONIN-I (test 0.555 NG/ML  0.000-0.045  HH           Negative: <

/= 0.045



             code = TROPI)                                        Positive: >/= 

0.046



                                                                 Correlation wit

h serial



                                                                 results, other 

cardiac



                                                                 markers, and cl

inical



                                                                 findings is nec

essary to



                                                                 determine the c

linical



                                                                 significance of

 this



                                                                 result. Quantit

ative



                                                                 results using d

ifferent



                                                                 methodologies s

hould not



                                                                 be compared to 

one



                                                                 another as nume

rical



                                                                 results may daniel

yby



                                                                 method.



Completed by Nursing: NO- XR CHEST 1 -57-23 04:33:00 Name: TYLER EDGE 
Formerly [facility] : 1970 Age/S: 49 / F [id number]  Unit #: [id number]
32 Loc: [place] [address] Phys: Kritsy Quiros MD Acct: [id number] Dis Date: 
Status: REG ER PHONE#: [phone] Exam Date: 2020 0432 FAX #: Reason: 
POST CENTRAL PLACEMENT; RIGHT IJ  EXAMS: CPT: [id number] XR CHEST 1 V 42097 
Fluoro Time: DAP (Gy m2): Air Kerma (mGy): HISTORY: Post central line placement,
hypotension Location code: B2 FINDINGS: Frontal view of the chest demonstrates a
mildly enlarged  cardiomediastinal silhouette. The trachea is midline. The lungs
are clear. There is no effusion or pneumothorax. The bones are intact. Right IJ 
catheter in good position.  IMPRESSION: Mild cardiomegaly, without acute 
pulmonary process. ** Electronically Signed by ISABEL March on 2020 at 
0433 ** Reported and signed by: Shree March M.D. CC: Kristy Quiros MD  PAGE 1 
Signed Report Name: TYLER EDGE [facility]ly [facility] : 1970 Age/S: 49  / F 
[id number] Unit #: [id number] Loc:[place] [address] Phys: 
Kristy Quiros MD Acct: [id number] Dis Date: Status: REG ER PHONE #: [phone]
[phone] Exam Date: 2020 FAX #: Reason: POST CENTRAL PLACEMENT; RIGHT 
IJ EXAMS: CPT: [id number] XR CHEST 1 V 82219 Fluoro Time: DAP (Gy m2): Air Kerma 
(mGy): (Continued) Technologist: Duncan Ellis, RT(R)(CT) Trnscb Date/Time: 
2020 (433) DanielRK5  Orig Print D/T: S: 2020 (436) PAGE 2 Signed 
ReportCBC W/AUTO [id number] 04:15:00





             Test Item    Value        Reference Range Interpretation Comments

 

             WHITE BLOOD CELL (test 24.2 K/mm3   3.5-11.0     H            



             code = WBC)                                         

 

             RED BLOOD CELL (test 3.75 M/mm3   4.70-6.10    L            



             code = RBC)                                         

 

             HEMOGLOBIN (test code 11.5 G/DL    10.4-14.9    N            



             = HGB)                                              

 

             HEMATOCRIT (test code 35.2 %       31.5-44.1    N            



             = HCT)                                              

 

             MEAN CELL VOLUME (test 93.9 Fl      84.5-98.6    N            



             code = MCV)                                         

 

             MEAN CELL HGB (test 30.7 pg      27.0-34.2    N            



             code = MCH)                                         

 

             MEAN CELL HGB 32.7 G/DL    31.5-34.0    N            



             CONCETRATION (test                                        



             code = MCHC)                                        

 

             RED CELL DISTRIBUTION 13.8 SD      11.5-14.5    N            



             WIDTH (test code =                                        



             RDW)                                                

 

             PLATELET COUNT (test 234.0 K/mm3  150-450      N            



             code = PLT)                                         

 

             MEAN PLATELET VOLUME 9.80 fL      7.0-10.5     N            



             (test code = MPV)                                        

 

             NEUTROPHIL % (test 82.9 %       40-76        H            



             code = NT%)                                         

 

             LYMPHOCYTE % (test 8.3 %        20.5-51.1    L            



             code = LY%)                                         

 

             MONOCYTE % (test code 7.3 %        1.7-9.3      N            



             = MO%)                                              

 

             EOSINOPHIL % (test 1.4 %        0.0-6.0      N            



             code = EO%)                                         

 

             BASOPHIL % (test code 0.1 %        0.0-2.0      N            



             = BA%)                                              

 

             NEUTROPHIL # (test 20.08 K/mm3  1.8-7.6      H            



             code = NT#)                                         

 

             LYMPHOCYTE # (test 2.0 K/mm3    0.6-3.2      N            



             code = LY#)                                         

 

             MONOCYTE # (test code 1.8 K/mm3    0.3-1.1      H            



             = MO#)                                              

 

             EOSINOPHIL # (test 0.3 K/mm3    0.0-0.4      N            



             code = EO#)                                         

 

             BASOPHIL # (test code 0.0 K/mm3    0.0-0.1      N            



             = BA#)                                              

 

             MANUAL DIFF REQUIRED NO DIFF/SCN  CRITERIA                  SLIDE R

EVIEW



             (test code = MDIFF)                                        CONSISTA

NT WITH AUTO



                                                                 DIFFERENTIAL.



BASIC METABOLIC [id number] 04:11:00





             Test Item    Value        Reference Range Interpretation Comments

 

             SODIUM (test code = NA) 135 mmol/L   134-147      N            

 

             POTASSIUM (test code = K) 4.0 mmol/L   3.4-5.0      N            

 

             CHLORIDE (test code = CL) 106 mmol/L   100-108      N            

 

             CARBON DIOXIDE (test code = CO2) 22 mmol/L    21-32        N       

     

 

             ANION GAP (test code = GAP) 7.0 GAP calc 4.0-15.0     N            

 

             GLUCOSE (test code = GLU) 97 MG/DL            N            

 

             BLOOD UREA NITROGEN (test code = 34 MG/DL     7-18         H       

     



             BUN)                                                

 

             GLOMERULAR FILTRATION RATE (test 39 estGFR    >60          L       

     



             code = GFR)                                         

 

             CREATININE (test code = CREAT) 1.5 MG/DL    0.6-1.0      H         

   

 

             CALCIUM (test code = CA) 8.9 MG/DL    8.5-10.1     N            



LACTIC [id number] 04:11:00





             Test Item    Value        Reference Range Interpretation Comments

 

             LACTIC ACID (test code = LACT) 0.8 mmol/L   0.4-2.0      N         

   



CBC W/AUTO [id number] 03:54:00





             Test Item    Value        Reference Range Interpretation Comments

 

             WHITE BLOOD CELL (test code = 24.2 K/mm3   3.5-11.0     H          

  



             WBC)                                                

 

             RED BLOOD CELL (test code = RBC) 3.75 M/mm3   4.70-6.10    L       

     

 

             HEMOGLOBIN (test code = HGB) 11.5 G/DL    10.4-14.9    N           

 

 

             HEMATOCRIT (test code = HCT) 35.2 %       31.5-44.1    N           

 

 

             MEAN CELL VOLUME (test code = 93.9 Fl      84.5-98.6    N          

  



             MCV)                                                

 

             MEAN CELL HGB (test code = MCH) 30.7 pg      27.0-34.2    N        

    

 

             MEAN CELL HGB CONCETRATION (test 32.7 G/DL    31.5-34.0    N       

     



             code = MCHC)                                        

 

             RED CELL DISTRIBUTION WIDTH (test 13.8 SD      11.5-14.5    N      

      



             code = RDW)                                         

 

             PLATELET COUNT (test code = PLT) 234.0 K/mm3  150-450      N       

     

 

             MEAN PLATELET VOLUME (test code = 9.80 fL      7.0-10.5     N      

      



             MPV)                                                

 

             NEUTROPHIL % (test code = NT%)  %           40-76        H         

   

 

             LYMPHOCYTE % (test code = LY%)  %           20.5-51.1    L         

   

 

             MONOCYTE % (test code = MO%)  %           1.7-9.3      N           

 

 

             EOSINOPHIL % (test code = EO%)  %           0.0-6.0      N         

   

 

             BASOPHIL % (test code = BA%)  %           0.0-2.0      N           

 

 

             NEUTROPHIL # (test code = NT#)  K/mm3       1.8-7.6      H         

   

 

             LYMPHOCYTE # (test code = LY#)  K/mm3       0.6-3.2      N         

   

 

             MONOCYTE # (test code = MO#)  K/mm3       0.3-1.1      H           

 

 

             EOSINOPHIL # (test code = EO#)  K/mm3       0.0-0.4      N         

   

 

             BASOPHIL # (test code = BA#)  K/mm3       0.0-0.1      N           

 

 

             MANUAL DIFF REQUIRED (test code =  DIFF/SCN    CRITERIA            

      



             MDIFF)                                              



Basic Metabolic [id number] 07:54:48





             Test Item    Value        Reference Range Interpretation Comments

 

             Sodium Level (test code = Sodium 142.0 mmol/L 135.0-145.0          

     



             Level)                                              

 

             Potassium Level (test code = 4.8 mmol/L   3.5-5.1                  

 



             Potassium Level)                                        

 

             Chloride Level (test code = 105 mmol/L                       



             Chloride Level)                                        

 

             CO2 (test code = CO2) 25 mmol/L    22-29                     

 

             Anion Gap (test code = Anion 12 mmol/L    7-16                     

 



             Gap)                                                

 

             BUN (test code = BUN) 19.60 mg/dL  6.00-20.00                

 

             Creatinine Level (test code = 0.80 mg/dL   0.50-0.90               

  



             Creatinine Level)                                        

 

             BUN/Creat Ratio (test code = 24                        N           

 



             BUN/Creat Ratio)                                        

 

             Glucose Level (test code = 86 mg/dL                         



             Glucose Level)                                        

 

             Calcium Level (test code = 10.1 mg/dL   8.3-10.5                  



             Calcium Level)                                        



Basic Metabolic [id number] 07:54:48





             Test Item    Value        Reference Range Interpretation Comments

 

             Sodium Level (test 142.0 mmol/L 135.0-145.0               



             code = Sodium Level)                                        

 

             Potassium Level 4.8 mmol/L   3.5-5.1                   



             (test code =                                        



             Potassium Level)                                        

 

             Chloride Level (test 105 mmol/L                       



             code = Chloride                                        



             Level)                                              

 

             CO2 (test code = 25 mmol/L    22-29                     



             CO2)                                                

 

             Anion Gap (test code 12 mmol/L    7-16                      



             = Anion Gap)                                        

 

             BUN (test code = 19.60 mg/dL  6.00-20.00                



             BUN)                                                

 

             Creatinine Level 0.80 mg/dL   0.50-0.90                 



             (test code =                                        



             Creatinine Level)                                        

 

             BUN/Creat Ratio 24                        N            



             (test code =                                        



             BUN/Creat Ratio)                                        

 

             Glucose Level (test 86 mg/dL                         



             code = Glucose                                        



             Level)                                              

 

             Calcium Level (test 10.1 mg/dL   8.3-10.5                  



             code = Calcium                                        



             Level)                                              

 

             eGFR AA (test code = >60                       N            eGFR (e

stimated



             eGFR AA)     mL/min/1.73 m2                           Glomerular



                                                                 Filtration Rate

) is



                                                                 an estimated va

lue,



                                                                 calculated from

 the



                                                                 patient's serum



                                                                 creatinine usin

g the



                                                                 MDRD equation. 

It is



                                                                 NOT the patient

's



                                                                 actual GFR. The

 eGFR



                                                                 provides a more



                                                                 clinically usef

ul



                                                                 measure of kidn

ey



                                                                 disease than se

rum



                                                                 creatinine



                                                                 alone.***This



                                                                 calculation rimma

es



                                                                 sex and race in

to



                                                                 account, if the



                                                                 information is



                                                                 provided. If th

e



                                                                 race is not



                                                                 provided, and t

he



                                                                 patient is



                                                                 -Crystal

n,



                                                                 multiply by 1.2

12.



                                                                 If sex is not



                                                                 provided, and t

he



                                                                 patient is fema

le,



                                                                 multiply by 0.7

42.



                                                                 Results for pat

ients



                                                                 <18 years of ag

e



                                                                 have not been



                                                                 validated by th

e



                                                                 MDRD study and



                                                                 should be



                                                                 interpreted wit

h



                                                                 caution. eGFR R

esult



                                                                 Interpretation:

eGFR



                                                                 > or = 60 is in

 the



                                                                 Normal RangeeGF

R <



                                                                 60 may mean kid

hang



                                                                 diseaseeGFR < 1

5 may



                                                                 mean kidney



                                                                 failure*** Rang

es



                                                                 recommended by 

the



                                                                 [employer]



                                                                 Foundation,



                                                                 http://nkdep.ni

h.gov



Basic Metabolic [id number] 07:54:48





             Test Item    Value        Reference Range Interpretation Comments

 

             Sodium Level (test 142.0 mmol/L 135.0-145.0               



             code = Sodium Level)                                        

 

             Potassium Level 4.8 mmol/L   3.5-5.1                   



             (test code =                                        



             Potassium Level)                                        

 

             Chloride Level (test 105 mmol/L                       



             code = Chloride                                        



             Level)                                              

 

             CO2 (test code = 25 mmol/L    22-29                     



             CO2)                                                

 

             Anion Gap (test code 12 mmol/L    7-16                      



             = Anion Gap)                                        

 

             BUN (test code = 19.60 mg/dL  6.00-20.00                



             BUN)                                                

 

             Creatinine Level 0.80 mg/dL   0.50-0.90                 



             (test code =                                        



             Creatinine Level)                                        

 

             BUN/Creat Ratio 24                        N            



             (test code =                                        



             BUN/Creat Ratio)                                        

 

             Glucose Level (test 86 mg/dL                         



             code = Glucose                                        



             Level)                                              

 

             Calcium Level (test 10.1 mg/dL   8.3-10.5                  



             code = Calcium                                        



             Level)                                              

 

             eGFR AA (test code = >60                       N            eGFR (e

stimated



             eGFR AA)     mL/min/1.73 m2                           Glomerular



                                                                 Filtration Rate

) is



                                                                 an estimated va

lue,



                                                                 calculated from

 the



                                                                 patient's serum



                                                                 creatinine usin

g the



                                                                 MDRD equation. 

It is



                                                                 NOT the patient

's



                                                                 actual GFR. The

 eGFR



                                                                 provides a more



                                                                 clinically usef

ul



                                                                 measure of kidn

ey



                                                                 disease than se

rum



                                                                 creatinine



                                                                 alone.***This



                                                                 calculation rimma

es



                                                                 sex and race in

to



                                                                 account, if the



                                                                 information is



                                                                 provided. If th

e



                                                                 race is not



                                                                 provided, and t

he



                                                                 patient is



                                                                 -Crystal

n,



                                                                 multiply by 1.2

12.



                                                                 If sex is not



                                                                 provided, and t

he



                                                                 patient is fema

le,



                                                                 multiply by 0.7

42.



                                                                 Results for pat

ients



                                                                 <18 years of ag

e



                                                                 have not been



                                                                 validated by [facility]



                                                                 MDRD study and



                                                                 should be



                                                                 interpreted wit

h



                                                                 caution. eGFR R

esult



                                                                 Interpretation:

eGFR



                                                                 > or = 60 is in

 the



                                                                 Normal RangeeGF

R <



                                                                 60 may mean kid

hang



                                                                 diseaseeGFR < 1

5 may



                                                                 mean kidney



                                                                 failure*** Rang

es



                                                                 recommended by 

the



                                                                 [employer]



                                                                 Foundation,



                                                                 http://nkdep.ni

h.gov

 

             eGFR Non-AA (test >60.00                    N            eGFR (sheba

mated



             code = eGFR Non-AA) mL/min/1.73 m2                           Glomer

ular



                                                                 Filtration Rate

) is



                                                                 an estimated va

lue,



                                                                 calculated from

 the



                                                                 patient's serum



                                                                 creatinine usin

g the



                                                                 MDRD equation. 

It is



                                                                 NOT the patient

's



                                                                 actual GFR. The

 eGFR



                                                                 provides a more



                                                                 clinically usef

ul



                                                                 measure of kidn

ey



                                                                 disease than se

rum



                                                                 creatinine



                                                                 alone.***This



                                                                 calculation rimma

es



                                                                 sex and race in

to



                                                                 account, if the



                                                                 information is



                                                                 provided. If th

e



                                                                 race is not



                                                                 provided, and t

he



                                                                 patient is



                                                                 -Crystal

n,



                                                                 multiply by 1.2

12.



                                                                 If sex is not



                                                                 provided, and t

he



                                                                 patient is fema

le,



                                                                 multiply by 0.7

42.



                                                                 Results for pat

ients



                                                                 <18 years of ag

e



                                                                 have not been



                                                                 validated by [facility]



                                                                 MDRD study and



                                                                 should be



                                                                 interpreted wit

h



                                                                 caution. eGFR R

esult



                                                                 Interpretation:

eGFR



                                                                 > or = 60 is in

 the



                                                                 Normal RangeeGF

R <



                                                                 60 may mean kid

hang



                                                                 diseaseeGFR < 1

5 may



                                                                 mean kidney



                                                                 failure*** Rang

es



                                                                 recommended by 

the



                                                                 [employer]



                                                                 Foundation,



                                                                 http://nkdep.ni

h.gov



Complete Blood Count with [id number] 07:29:42





             Test Item    Value        Reference Range Interpretation Comments

 

             WBC (test code = WBC) 9.3 x10      4.4-10.5                  

 

             RBC (test code = RBC) 4.71 x10     3.75-5.20                 

 

             Hgb (test code = Hgb) 14.4 g/dL    12.2-14.8                 

 

             MCV (test code = MCV) 92.10 fL     80..00              

 

             Hct (test code = Hct) 43.4 %       36.5-44.4                 

 

             MCHC (test code = 33.20 g/dL   32.00-37.50               



             MCHC)                                               

 

             RDW CV (test code = 13.4 %       11.5-14.5                 



             RDW CV)                                             

 

             MCH (test code = MCH) 30.6 pg      27.0-32.5                 

 

             Platelets (test code = 325.0 x10    140.0-440.0               



             Platelets)                                          

 

             MPV (test code = MPV) 9.8 fL                    N            

 

             Slide Review (test Auto         Auto                      Result cr

eated by



             code = Slide Review)                                        [email or web address]_

SLIDE_REV_AUTO

 

             nRBC (test code = 0                         N            



             nRBC)                                               

 

             NRBC Abs (test code = 0.00 x10                  N            



             NRBC Abs)                                           

 

             IPF (test code = IPF) 0 %                       N            



Automated [id number] 07:29:42





             Test Item    Value        Reference Range Interpretation Comments

 

             Neutro Auto (test code = Neutro 66.2 %       36.0-70.0             

    



             Auto)                                               

 

             Lymph Auto (test code = Lymph Auto) 21.1 %       12.0-44.0         

        

 

             Mono Auto (test code = Mono Auto) 6.8 %        0.0-11.0            

      

 

             Eos, Auto (test code = Eos, Auto) 4.8 %        0.0-7.0             

      

 

             Basophil Auto (test code = Basophil 0.9 %        0.0-2.0           

        



             Auto)                                               

 

             Neutro Absolute (test code = Neutro 6.2 x10      1.6-7.4           

        



             Absolute)                                           

 

             Lymph Absolute (test code = Lymph 1.97 x10     .50-4.60            

      



             Absolute)                                           

 

             Mono Absolute (test code = Mono .63 x10      .00-1.20              

    



             Absolute)                                           

 

             Eos Absolute (test code = Eos 0.45 x10     0.00-0.74               

  



             Absolute)                                           

 

             Baso Absolute (test code = Baso 0.08 x10     0.00-0.21             

    



             Absolute)                                           



IG [id number] 07:29:42





             Test Item    Value        Reference Range Interpretation Comments

 

             IG (test code = IG) 0.2 %        0.0-5.0                   

 

             IG Abs (test code = IG Abs) 0 x10                     N            



Thyroid Stimulating [id number] 04:30:30





             Test Item    Value        Reference Range Interpretation Comments

 

             TSH (test code = TSH) 1.360 mIU/mL 0.270-4.200               



RPR [id number] 04:06:17





             Test Item    Value        Reference Range Interpretation Comments

 

             RPR Qual (test code = RPR Qual) Non-Reactive Non-Reactive          

    

 

             Reactive Control (test code = Reactive                             

  



             Reactive Control)                                        

 

             Weak Reactive Control (test Weak Reactive                          

 



             code = Weak Reactive Control)                                      

  

 

             Non-Reactive Control (test code Non-Reactive                       

    



             = Non-Reactive Control)                                        

 

             Lot # (test code = Lot #) [id number]                    N            

 

             Expiration Dt (test code = 10-                N            



             Expiration Dt)                                        



Hemoglobin A1c2019-11-15 18:37:54





             Test Item    Value        Reference Range Interpretation Comments

 

             Hemoglobin A1c (test code 4.7 %        4.8-5.9      L            No

n Diabetic



             = Hemoglobin A1c)                                        4.8-5.9%Di

abetic <7.0%



Lipid Panel2019-11-15 18:08:58





             Test Item    Value        Reference Range Interpretation Comments

 

             Cholesterol Total 189 mg/dL    0-200                     RISK OF HE

ART



             (test code =                                        DISEASEPublishe

d by



             Cholesterol Total)                                        American 

Heart



                                                                 Association Judith

lyte



                                                                 Optimal Borderl

ine



                                                                 Increased RiskC

HOL <200



                                                                 200-239 >240TRI

G <150



                                                                 150-199 >200HDL

 Male



                                                                 >60 <40HDL Fema

le >60



                                                                 <50LDL <100 130

-159



                                                                 >160LDL [facility] is



                                                                 100-129

 

             Triglycerides (test 112 mg/dL    9-200                     



             code = Triglycerides)                                        

 

             HDL (test code = HDL) 44 mg/dL     50-60        L            

 

             LDL (test code = LDL) 122 mg/dL    0-130                     The eq

uation being used



                                                                 in this calcula

tion is



                                                                 LDL = (Chol - H

DL) -



                                                                 (Trig / 5)

 

             VLDL (test code = 22 mg/dL     5-40                      The equati

on being used



             VLDL)                                               in this calcula

tion is



                                                                 VLDL = Trig / 5

 

             Chol/HDL (test code = 4.3 ratio    0.0-4.4                   



             Chol/HDL)                                           

 

             LDL/HDL Ratio (test 3                         N            The equa

tion being used



             code = LDL/HDL Ratio)                                        in thi

s calculation is



                                                                 LDL/HDL Ratio=L

DL



                                                                 Calc/HDL Chol



Complete Blood Count with Differential2019-11-15 07:51:57





             Test Item    Value        Reference Range Interpretation Comments

 

             WBC (test code = WBC) 10.6 x10     4.4-10.5     H            

 

             RBC (test code = RBC) 4.45 x10     3.75-5.20                 

 

             Hgb (test code = Hgb) 13.5 g/dL    12.2-14.8                 

 

             MCV (test code = MCV) 93.30 fL     80..00              

 

             Hct (test code = Hct) 41.5 %       36.5-44.4                 

 

             MCHC (test code = 32.50 g/dL   32.00-37.50               



             MCHC)                                               

 

             RDW CV (test code = 13.7 %       11.5-14.5                 



             RDW CV)                                             

 

             MCH (test code = MCH) 30.3 pg      27.0-32.5                 

 

             Platelets (test code = 356.0 x10    140.0-440.0               



             Platelets)                                          

 

             MPV (test code = MPV) 9.7 fL                    N            

 

             Slide Review (test Auto         Auto                      Result cr

eated by



             code = Slide Review)                                        GL_SJM_

SLIDE_REV_AUTO

 

             nRBC (test code = 0                         N            



             nRBC)                                               

 

             NRBC Abs (test code = 0.00 x10                  N            



             NRBC Abs)                                           

 

             IPF (test code = IPF) 0 %                       N            



Automated Differential2019-11-15 07:51:57





             Test Item    Value        Reference Range Interpretation Comments

 

             Neutro Auto (test code = Neutro 65.4 %       36.0-70.0             

    



             Auto)                                               

 

             Lymph Auto (test code = Lymph Auto) 23.5 %       12.0-44.0         

        

 

             Mono Auto (test code = Mono Auto) 5.7 %        0.0-11.0            

      

 

             Eos, Auto (test code = Eos, Auto) 4.4 %        0.0-7.0             

      

 

             Basophil Auto (test code = Basophil 0.8 %        0.0-2.0           

        



             Auto)                                               

 

             Neutro Absolute (test code = Neutro 6.9 x10      1.6-7.4           

        



             Absolute)                                           

 

             Lymph Absolute (test code = Lymph 2.49 x10     .50-4.60            

      



             Absolute)                                           

 

             Mono Absolute (test code = Mono .60 x10      .00-1.20              

    



             Absolute)                                           

 

             Eos Absolute (test code = Eos 0.47 x10     0.00-0.74               

  



             Absolute)                                           

 

             Baso Absolute (test code = Baso 0.08 x10     0.00-0.21             

    



             Absolute)                                           



IG Flags2019-11-15 07:51:57





             Test Item    Value        Reference Range Interpretation Comments

 

             IG (test code = IG) 0.2 %        0.0-5.0                   

 

             IG Abs (test code = IG Abs) 0 x10                     N            



Urine [id number] 10:18:52





             Test Item    Value        Reference Range Interpretation Comments

 

             ORGANISM (test code = Escherichia coli                           



             ORGANISM)                                           

 

             Amikacin (test code =                           S            



             Amik)                                               

 

             Ampicillin (test code =                           S            



             Amp)                                                

 

             Ampicillin/Sulbactam                           S            



             (test code = Amp/Sul)                                        

 

             Cefazolin (test code =                           S            



             Cefaz)                                              

 

             Cefepime (test code =                           S            



             Cefep)                                              

 

             Cefotaxime (test code =                           S            



             Cefo)                                               

 

             Ceftazidime (test code                           S            



             = Ceftaz)                                           

 

             Ceftriaxone (test code                           S            



             = Ceftri)                                           

 

             Cefuroxime (test code =                           S            



             Cefur)                                              

 

             Ciprofloxacin (test                           S            



             code = Cipro)                                        

 

             Gentamicin (test code =                           S            



             Gent)                                               

 

             Imipenem (test code =                           S            



             Imi)                                                

 

             Levofloxacin (test code                           S            



             = Levo)                                             

 

             Meropenem (test code =                           S            



             Sindi)                                               

 

             Nitrofurantoin (test                           S            



             code = Nitro)                                        

 

             Piperacillin/Tazobactam                           S            



             (test code = Pip/Blaine)                                        

 

             Tobramycin (test code =                           S            



             Tobra)                                              

 

             Trimethoprim/Sulfa                           S            



             (test code = SXT)                                        

 

             Final Report (test code >=100,000 cfu/ml                           



             = Final Report) Escherichia coli                           



                          >=100,000 cfu/ml Alpha                           



                          hemolytic                              



                          Streptococcus (not                           



                          Group D or                             



                          Enterococcus)                           



 C Urine Added by [email or web address] [id number] 09:18:25





             Test Item    Value        Reference Range Interpretation Comments

 

             Lithium Level (test code = 0.58 mmol/L  0.60-1.20    L            



             Lithium Level)                                        



Urine Drug [id number] 01:36:44





             Test Item    Value        Reference Range Interpretation Comments

 

             Amphetamine Screen Ur POSITIVE     Negative     A            



             (test code = Amphetamine                                        



             Screen Ur)                                          

 

             Barbiturate Screen Ur Negative     Negative                  



             (test code = Barbiturate                                        



             Screen Ur)                                          

 

             Benzodiazepines Ur (test Negative     Negative                  



             code = Benzodiazepines                                        



             Ur)                                                 

 

             Cocaine Screen Ur (test Negative     Negative                  



             code = Cocaine Screen                                        



             Ur)                                                 

 

             U Methadone Scr (test Negative     Negative                  



             code = U Methadone Scr)                                        

 

             Opiate Screen Ur (test Negative     Negative                  



             code = Opiate Screen Ur)                                        

 

             U PCP Scrn (test code = Negative     Negative                  



             U PCP Scrn)                                         

 

             Cannabinoid Screen Ur Negative     Negative                  



             (test code = Cannabinoid                                        



             Screen Ur)                                          

 

             U TCA (test code = U Negative     Negative                  The res

ults of all



             TCA)                                                drug screen elinor

ts are



                                                                 only preliminar

y.



                                                                 Clinical



                                                                 consideration a

nd



                                                                 professional ju

dgment



                                                                 should be appli

ed to



                                                                 any drug of abu

se



                                                                 test result,



                                                                 particularly wh

en



                                                                 preliminary pos

itive



                                                                 results are obt

ained.



                                                                 Please order a



                                                                 separate confir

matory



                                                                 test if desired

.



HCG Qualitative [id number] 01:36:43





             Test Item    Value        Reference Range Interpretation Comments

 

             hCG Ur (test code = Negative                               If the r

esult is



             hCG Ur)                                             "Negative" in p

atients



                                                                 suspected to be



                                                                 pregnant, recom

mend



                                                                 retest with a s

ample



                                                                 obtained 48 to 

72



                                                                 hours later, or

 by



                                                                 ordering a



                                                                 quantitative as

say. If



                                                                 the result is



                                                                 "Borderline" te

sting



                                                                 should be repea

gianfranco in



                                                                 48 to 72 hours.

 

             Lot # (test code = [id number]                    N            



             Lot #)                                              

 

             Expiration Dt (test 2020                N            



             code = Expiration Dt)                                        

 

             Neg Control (test Negative                               



             code = Neg Control)                                        

 

             Pos Control (test Positive                               



             code = Pos Control)                                        

 

             Internal QC (test Acceptable                             



             code = Internal QC)                                        



Urinalysis [id number] 01:36:03





             Test Item    Value        Reference Range Interpretation Comments

 

             UA WBC (test code = UA WBC) 0-5          0-5                       

 

             UA RBC (test code = UA RBC) None Seen    0-5                       

 

             UA Bacteria (test code = UA Moderate                  A            



             Bacteria)                                           

 

             UA Squam Epithelial (test code = UA 0-5                            

        



             Squam Epithelial)                                        



Comprehensive Metabolic [id number] 01:23:40





             Test Item    Value        Reference Range Interpretation Comments

 

             Sodium Level (test code = Sodium 139.0 mmol/L 135.0-145.0          

     



             Level)                                              

 

             Potassium Level (test code = 4.4 mmol/L   3.5-5.1                  

 



             Potassium Level)                                        

 

             Chloride Level (test code = 102 mmol/L                       



             Chloride Level)                                        

 

             CO2 (test code = CO2) 23 mmol/L    22-29                     

 

             Anion Gap (test code = Anion 14 mmol/L    7-16                     

 



             Gap)                                                

 

             BUN (test code = BUN) 9.10 mg/dL   6.00-20.00                

 

             Creatinine Level (test code = 1.00 mg/dL   0.50-0.90    H          

  



             Creatinine Level)                                        

 

             BUN/Creat Ratio (test code = 9                         N           

 



             BUN/Creat Ratio)                                        

 

             Glucose Level (test code = 115 mg/dL                        



             Glucose Level)                                        

 

             Calcium Level (test code = 10.1 mg/dL   8.3-10.5                  



             Calcium Level)                                        

 

             Alk Phos (test code = Alk Phos) 106 U/L             H        

    

 

             Bilirubin Total (test code = 0.4 mg/dL    0.1-0.9                  

 



             Bilirubin Total)                                        

 

             Albumin Level (test code = 4.6 g/dL     3.5-5.2                   



             Albumin Level)                                        

 

             Protein Total (test code = 7.7 g/dL     6.4-8.3                   



             Protein Total)                                        

 

             ALT (test code = ALT) 12 U/L       1-33                      

 

             AST (test code = AST) 18 U/L       1-32                      

 

             Globulin (test code = Globulin) 3.1 g/dL     2.9-3.1               

    

 

             A/G Ratio (test code = A/G 1.5 ratio                 N            



             Ratio)                                              



Comprehensive Metabolic [id number] 01:23:40





             Test Item    Value        Reference Range Interpretation Comments

 

             Sodium Level (test 139.0 mmol/L 135.0-145.0               



             code = Sodium Level)                                        

 

             Potassium Level 4.4 mmol/L   3.5-5.1                   



             (test code =                                        



             Potassium Level)                                        

 

             Chloride Level (test 102 mmol/L                       



             code = Chloride                                        



             Level)                                              

 

             CO2 (test code = 23 mmol/L    22-29                     



             CO2)                                                

 

             Anion Gap (test code 14 mmol/L    7-16                      



             = Anion Gap)                                        

 

             BUN (test code = 9.10 mg/dL   6.00-20.00                



             BUN)                                                

 

             Creatinine Level 1.00 mg/dL   0.50-0.90    H            



             (test code =                                        



             Creatinine Level)                                        

 

             BUN/Creat Ratio 9                         N            



             (test code =                                        



             BUN/Creat Ratio)                                        

 

             Glucose Level (test 115 mg/dL                        



             code = Glucose                                        



             Level)                                              

 

             Calcium Level (test 10.1 mg/dL   8.3-10.5                  



             code = Calcium                                        



             Level)                                              

 

             Alk Phos (test code 106 U/L             H            



             = Alk Phos)                                         

 

             Bilirubin Total 0.4 mg/dL    0.1-0.9                   



             (test code =                                        



             Bilirubin Total)                                        

 

             Albumin Level (test 4.6 g/dL     3.5-5.2                   



             code = Albumin                                        



             Level)                                              

 

             Protein Total (test 7.7 g/dL     6.4-8.3                   



             code = Protein                                        



             Total)                                              

 

             ALT (test code = 12 U/L       1-33                      



             ALT)                                                

 

             AST (test code = 18 U/L       1-32                      



             AST)                                                

 

             Globulin (test code 3.1 g/dL     2.9-3.1                   



             = Globulin)                                         

 

             A/G Ratio (test code 1.5 ratio                 N            



             = A/G Ratio)                                        

 

             eGFR AA (test code = >60                       N            eGFR (e

stimated



             eGFR AA)     mL/min/1.73 m2                           Glomerular



                                                                 Filtration Rate

) is



                                                                 an estimated va

lue,



                                                                 calculated from

 the



                                                                 patient's serum



                                                                 creatinine usin

g the



                                                                 MDRD equation. 

It is



                                                                 NOT the patient

's



                                                                 actual GFR. The

 eGFR



                                                                 provides a more



                                                                 clinically usef

ul



                                                                 measure of kidn

ey



                                                                 disease than se

rum



                                                                 creatinine



                                                                 alone.***This



                                                                 calculation rimma

es



                                                                 sex and race in

to



                                                                 account, if the



                                                                 information is



                                                                 provided. If th

e



                                                                 race is not



                                                                 provided, and t

he



                                                                 patient is



                                                                 -Crystal

n,



                                                                 multiply by 1.2

12.



                                                                 If sex is not



                                                                 provided, and t

he



                                                                 patient is fema

le,



                                                                 multiply by 0.7

42.



                                                                 Results for pat

ients



                                                                 <18 years of ag

e



                                                                 have not been



                                                                 validated by th

e



                                                                 MDRD study and



                                                                 should be



                                                                 interpreted wit

h



                                                                 caution. eGFR R

esult



                                                                 Interpretation:

eGFR



                                                                 > or = 60 is in

 the



                                                                 Normal RangeeGF

R <



                                                                 60 may mean kid

hang



                                                                 diseaseeGFR < 1

5 may



                                                                 mean kidney



                                                                 failure*** Rang

es



                                                                 recommended by 

the



                                                                 [employer]



                                                                 [employer],



                                                                 http://nkdep.ni

h.gov



Comprehensive Metabolic [id number] 01:23:40





             Test Item    Value        Reference Range Interpretation Comments

 

             Sodium Level (test 139.0 mmol/L 135.0-145.0               



             code = Sodium Level)                                        

 

             Potassium Level 4.4 mmol/L   3.5-5.1                   



             (test code =                                        



             Potassium Level)                                        

 

             Chloride Level (test 102 mmol/L                       



             code = Chloride                                        



             Level)                                              

 

             CO2 (test code = 23 mmol/L    22-29                     



             CO2)                                                

 

             Anion Gap (test code 14 mmol/L    7-16                      



             = Anion Gap)                                        

 

             BUN (test code = 9.10 mg/dL   6.00-20.00                



             BUN)                                                

 

             Creatinine Level 1.00 mg/dL   0.50-0.90    H            



             (test code =                                        



             Creatinine Level)                                        

 

             BUN/Creat Ratio 9                         N            



             (test code =                                        



             BUN/Creat Ratio)                                        

 

             Glucose Level (test 115 mg/dL                        



             code = Glucose                                        



             Level)                                              

 

             Calcium Level (test 10.1 mg/dL   8.3-10.5                  



             code = Calcium                                        



             Level)                                              

 

             Alk Phos (test code 106 U/L             H            



             = Alk Phos)                                         

 

             Bilirubin Total 0.4 mg/dL    0.1-0.9                   



             (test code =                                        



             Bilirubin Total)                                        

 

             Albumin Level (test 4.6 g/dL     3.5-5.2                   



             code = Albumin                                        



             Level)                                              

 

             Protein Total (test 7.7 g/dL     6.4-8.3                   



             code = Protein                                        



             Total)                                              

 

             ALT (test code = 12 U/L       1-33                      



             ALT)                                                

 

             AST (test code = 18 U/L       1-32                      



             AST)                                                

 

             Globulin (test code 3.1 g/dL     2.9-3.1                   



             = Globulin)                                         

 

             A/G Ratio (test code 1.5 ratio                 N            



             = A/G Ratio)                                        

 

             eGFR AA (test code = >60                       N            eGFR (e

stimated



             eGFR AA)     mL/min/1.73 m2                           Glomerular



                                                                 Filtration Rate

) is



                                                                 an estimated va

lue,



                                                                 calculated from

 the



                                                                 patient's serum



                                                                 creatinine usin

g the



                                                                 MDRD equation. 

It is



                                                                 NOT the patient

's



                                                                 actual GFR. The

 eGFR



                                                                 provides a more



                                                                 clinically usef

ul



                                                                 measure of kidn

ey



                                                                 disease than se

rum



                                                                 creatinine



                                                                 alone.***This



                                                                 calculation rimma

es



                                                                 sex and race in

to



                                                                 account, if the



                                                                 information is



                                                                 provided. If th

e



                                                                 race is not



                                                                 provided, and t

he



                                                                 patient is



                                                                 -Crystal

n,



                                                                 multiply by 1.2

12.



                                                                 If sex is not



                                                                 provided, and t

he



                                                                 patient is fema

le,



                                                                 multiply by 0.7

42.



                                                                 Results for pat

ients



                                                                 <18 years of ag

e



                                                                 have not been



                                                                 validated by [facility]



                                                                 MDRD study and



                                                                 should be



                                                                 interpreted wit

h



                                                                 caution. eGFR R

esult



                                                                 Interpretation:

eGFR



                                                                 > or = 60 is in

 the



                                                                 Normal RangeeGF

R <



                                                                 60 may mean kid

hang



                                                                 diseaseeGFR < 1

5 may



                                                                 mean kidney



                                                                 failure*** Rang

es



                                                                 recommended by 

the



                                                                 [employer]



                                                                 [employer],



                                                                 http://nkdep.ni

h.gov

 

             eGFR Non-AA (test 58.45                     N            eGFR (sheba

mated



             code = eGFR Non-AA) mL/min/1.73 m2                           Glomer

ular



                                                                 Filtration Rate

) is



                                                                 an estimated va

lue,



                                                                 calculated from

 the



                                                                 patient's serum



                                                                 creatinine usin

g the



                                                                 MDRD equation. 

It is



                                                                 NOT the patient

's



                                                                 actual GFR. The

 eGFR



                                                                 provides a more



                                                                 clinically usef

ul



                                                                 measure of kidn

ey



                                                                 disease than se

rum



                                                                 creatinine



                                                                 alone.***This



                                                                 calculation rimma

es



                                                                 sex and race in

to



                                                                 account, if the



                                                                 information is



                                                                 provided. If th

e



                                                                 race is not



                                                                 provided, and t

he



                                                                 patient is



                                                                 -Crystal

n,



                                                                 multiply by 1.2

12.



                                                                 If sex is not



                                                                 provided, and t

he



                                                                 patient is fema

le,



                                                                 multiply by 0.7

42.



                                                                 Results for pat

ients



                                                                 <18 years of ag

e



                                                                 have not been



                                                                 validated by [facility]



                                                                 MDRD study and



                                                                 should be



                                                                 interpreted wit

h



                                                                 caution. eGFR R

esult



                                                                 Interpretation:

eGFR



                                                                 > or = 60 is in

 the



                                                                 Normal RangeeGF

R <



                                                                 60 may mean kid

hang



                                                                 diseaseeGFR < 1

5 may



                                                                 mean kidney



                                                                 failure*** Rang

es



                                                                 recommended by 

the



                                                                 [employer]



                                                                 [employer],



                                                                 http://nkdep.ni

h.gov



Alcohol [id number] 01:23:31





             Test Item    Value        Reference Range Interpretation Comments

 

             Ethanol Level (test <0.00 g/dL   0.00-0.01                 [facility]

gianfranco 0.080 g/dL



             code = Ethanol                                        or more



             Level)                                              

 

             Ethanol Inst (test <0                        N            



             code = Ethanol Inst)                                        



Complete Blood Count with [id number] 00:56:12





             Test Item    Value        Reference Range Interpretation Comments

 

             WBC (test code = WBC) 19.4 x10     4.4-10.5     H            

 

             RBC (test code = RBC) 4.53 x10     3.75-5.20                 

 

             Hgb (test code = Hgb) 13.5 g/dL    12.2-14.8                 

 

             Hct (test code = Hct) 41.3 %       36.5-44.4                 

 

             MCV (test code = MCV) 91.20 fL     80..00              

 

             MCHC (test code = 32.70 g/dL   32.00-37.50               



             MCHC)                                               

 

             RDW CV (test code = 13.5 %       11.5-14.5                 



             RDW CV)                                             

 

             MCH (test code = MCH) 29.8 pg      27.0-32.5                 

 

             Platelets (test code = 462.0 x10    140.0-440.0  H            



             Platelets)                                          

 

             MPV (test code = MPV) 9.9 fL                    N            

 

             Slide Review (test Auto         Auto                      Result cr

eated by



             code = Slide Review)                                        [email or web address]

[email or web address]_AUTO

 

             nRBC (test code = 0                         N            



             nRBC)                                               

 

             NRBC Abs (test code = 0.00 x10                  N            



             NRBC Abs)                                           

 

             IPF (test code = IPF) 0 %                       N            



Automated [id number] 00:56:12





             Test Item    Value        Reference Range Interpretation Comments

 

             Neutro Auto (test code = Neutro 83.7 %       36.0-70.0    H        

    



             Auto)                                               

 

             Lymph Auto (test code = Lymph Auto) 8.9 %        12.0-44.0    L    

        

 

             Mono Auto (test code = Mono Auto) 5.0 %        0.0-11.0            

      

 

             Eos, Auto (test code = Eos, Auto) 1.4 %        0.0-7.0             

      

 

             Basophil Auto (test code = Basophil 0.7 %        0.0-2.0           

        



             Auto)                                               

 

             Neutro Absolute (test code = Neutro 16.2 x10     1.6-7.4      H    

        



             Absolute)                                           

 

             Lymph Absolute (test code = Lymph 1.73 x10     .50-4.60            

      



             Absolute)                                           

 

             Mono Absolute (test code = Mono .96 x10      .00-1.20              

    



             Absolute)                                           

 

             Eos Absolute (test code = Eos 0.27 x10     0.00-0.74               

  



             Absolute)                                           

 

             Baso Absolute (test code = Baso 0.13 x10     0.00-0.21             

    



             Absolute)                                           



IG [id number] 00:56:12





             Test Item    Value        Reference Range Interpretation Comments

 

             IG (test code = IG) 0.3 %        0.0-5.0                   

 

             IG Abs (test code = IG Abs) 0 x10                     N            



Urinalysis with Culture, if [id number] 00:55:34





             Test Item    Value        Reference Range Interpretation Comments

 

             UA Color (test code = STRAW        Yellow                    



             UA Color)                                           

 

             UA Appear (test code = CLEAR        Clear                     



             UA Appear)                                          

 

             UA pH (test code = UA 5                         N            



             pH)                                                 

 

             UA Spec Grav (test code 1.001        1.001-1.035               



             = UA Spec Grav)                                        

 

             UA Glucose (test code = NEG          Negative                  



             UA Glucose)                                         

 

             UA Bili (test code = UA NEG          Negative                  



             Bili)                                               

 

             UA Ketones (test code = NEG          Negative                  



             UA Ketones)                                         

 

             UA Blood (test code = NEG          Negative                  



             UA Blood)                                           

 

             UA Protein (test code = NEG          Negative                  



             UA Protein)                                         

 

             UA Urobilinogen (test 0.2 mg/dL                 N            



             code = UA Urobilinogen)                                        

 

             UA Nitrite (test code = POS          Negative     A            



             UA Nitrite)                                         

 

             UA Leuk Est (test code 25 cells/mcL Negative     A            



             = UA Leuk Est)                                        

 

             UA Micro Ind? (test Indicated    Not Indicated A            Result 

created by



             code = UA Micro Ind?)                                        rule



                                                                 [email or web address]

_IN



                                                                 D



Urine [id number] 08:13:23





             Test Item    Value        Reference Range Interpretation Comments

 

             ORGANISM (test code = Escherichia coli                           



             ORGANISM)                                           

 

             Amikacin (test code =                           S            



             Amik)                                               

 

             Ampicillin (test code =                           S            



             Amp)                                                

 

             Ampicillin/Sulbactam                           S            



             (test code = Amp/Sul)                                        

 

             Cefazolin (test code =                           S            



             Cefaz)                                              

 

             Cefepime (test code =                           S            



             Cefep)                                              

 

             Cefotaxime (test code =                           S            



             Cefo)                                               

 

             Ceftazidime (test code =                           S            



             Ceftaz)                                             

 

             Ceftriaxone (test code =                           S            



             Ceftri)                                             

 

             Cefuroxime (test code =                           S            



             Cefur)                                              

 

             Ciprofloxacin (test code                           S            



             = Cipro)                                            

 

             Gentamicin (test code =                           S            



             Gent)                                               

 

             Imipenem (test code =                           S            



             Imi)                                                

 

             Levofloxacin (test code                           S            



             = Levo)                                             

 

             Meropenem (test code =                           S            



             Sindi)                                               

 

             Nitrofurantoin (test                           S            



             code = Nitro)                                        

 

             Piperacillin/Tazobactam                           S            



             (test code = Pip/Blaine)                                        

 

             Tobramycin (test code =                           S            



             Tobra)                                              

 

             Trimethoprim/Sulfa (test                           S            



             code = SXT)                                         

 

             Final Report (test code 30,000 cfu/ml                           



             = Final Report) Escherichia coli                           



                          20,000 cfu/ml                           



                          Diphtheroids                           



 C Urine Added by [email or web address] Qualitative2019-09-15 04:57:25





             Test Item    Value        Reference Range Interpretation Comments

 

             RPR Qual (test code = RPR Qual) Non-Reactive Non-Reactive          

    

 

             Reactive Control (test code = Reactive                             

  



             Reactive Control)                                        

 

             Weak Reactive Control (test Weak Reactive                          

 



             code = Weak Reactive Control)                                      

  

 

             Non-Reactive Control (test code Non-Reactive                       

    



             = Non-Reactive Control)                                        

 

             Lot # (test code = Lot #) [id number]                    N            

 

             Expiration Dt (test code = 10.31.2020                N            



             Expiration Dt)                                        



Thyroid Stimulating Hormone2019-09-15 01:22:43





             Test Item    Value        Reference Range Interpretation Comments

 

             TSH (test code = TSH) 3.370 mIU/mL 0.270-4.200               



Lipid Panel2019-09-15 01:17:51





             Test Item    Value        Reference Range Interpretation Comments

 

             Cholesterol Total 168 mg/dL    0-200                     RISK OF HE

ART



             (test code =                                        DISEASEPublishe

d by



             Cholesterol Total)                                        American 

Heart



                                                                 Association Judith

lyte



                                                                 Optimal Borderl

ine



                                                                 Increased RiskC

HOL <200



                                                                 200-239 >240TRI

G <150



                                                                 150-199 >200HDL

 Male



                                                                 >60 <40HDL Fema

le >60



                                                                 <50LDL <100 130

-159



                                                                 >160LDL Near op

timal is



                                                                 100-129

 

             Triglycerides (test 108 mg/dL    9-200                     



             code = Triglycerides)                                        

 

             HDL (test code = HDL) 46 mg/dL     50-60        L            

 

             LDL (test code = LDL) 100 mg/dL    0-130                     The eq

uation being used



                                                                 in this calcula

tion is



                                                                 LDL = (Chol - H

DL) -



                                                                 (Trig / 5)

 

             VLDL (test code = 22 mg/dL     5-40                      The equati

on being used



             VLDL)                                               in this calcula

tion is



                                                                 VLDL = Trig / 5

 

             Chol/HDL (test code = 3.7 ratio    0.0-4.4                   



             Chol/HDL)                                           

 

             LDL/HDL Ratio (test 2                         N            The equa

tion being used



             code = LDL/HDL Ratio)                                        in thi

s calculation is



                                                                 LDL/HDL Ratio=L

DL



                                                                 Calc/HDL Chol



Lithium Level2019-09-15 01:17:51





             Test Item    Value        Reference Range Interpretation Comments

 

             Lithium Level (test code = 0.81 mmol/L  0.60-1.20                 



             Lithium Level)                                        



Comprehensive Metabolic Panel2019-09-15 00:04:54





             Test Item    Value        Reference Range Interpretation Comments

 

             Sodium Level (test code = Sodium 137.0 mmol/L 135.0-145.0          

     



             Level)                                              

 

             Potassium Level (test code = 4.0 mmol/L   3.5-5.1                  

 



             Potassium Level)                                        

 

             Chloride Level (test code = 103 mmol/L                       



             Chloride Level)                                        

 

             CO2 (test code = CO2) 23 mmol/L    22-29                     

 

             Anion Gap (test code = Anion 11 mmol/L    7-16                     

 



             Gap)                                                

 

             BUN (test code = BUN) 11.50 mg/dL  6.00-20.00                

 

             Creatinine Level (test code = 1.00 mg/dL   0.50-0.90    H          

  



             Creatinine Level)                                        

 

             BUN/Creat Ratio (test code = 12                        N           

 



             BUN/Creat Ratio)                                        

 

             Glucose Level (test code = 108 mg/dL                        



             Glucose Level)                                        

 

             Calcium Level (test code = 10.3 mg/dL   8.3-10.5                  



             Calcium Level)                                        

 

             Alk Phos (test code = Alk Phos) 93 U/L                       

    

 

             Bilirubin Total (test code = 0.5 mg/dL    0.1-0.9                  

 



             Bilirubin Total)                                        

 

             Albumin Level (test code = 4.4 g/dL     3.5-5.2                   



             Albumin Level)                                        

 

             Protein Total (test code = 7.2 g/dL     6.4-8.3                   



             Protein Total)                                        

 

             ALT (test code = ALT) 12 U/L       1-33                      

 

             AST (test code = AST) 17 U/L       1-32                      

 

             Globulin (test code = Globulin) 2.8 g/dL     2.9-3.1      L        

    

 

             A/G Ratio (test code = A/G 1.6 ratio                 N            



             Ratio)                                              



Alcohol Level2019-09-15 00:04:54





             Test Item    Value        Reference Range Interpretation Comments

 

             Ethanol Level (test <0.00 g/dL   0.00-0.01                 Intoxica

gianfranco 0.080 g/dL



             code = Ethanol                                        or more



             Level)                                              

 

             Ethanol Inst (test <0                        N            



             code = Ethanol Inst)                                        



Comprehensive Metabolic Panel2019-09-15 00:04:54





             Test Item    Value        Reference Range Interpretation Comments

 

             Sodium Level (test 137.0 mmol/L 135.0-145.0               



             code = Sodium Level)                                        

 

             Potassium Level 4.0 mmol/L   3.5-5.1                   



             (test code =                                        



             Potassium Level)                                        

 

             Chloride Level (test 103 mmol/L                       



             code = Chloride                                        



             Level)                                              

 

             CO2 (test code = 23 mmol/L    22-29                     



             CO2)                                                

 

             Anion Gap (test code 11 mmol/L    7-16                      



             = Anion Gap)                                        

 

             BUN (test code = 11.50 mg/dL  6.00-20.00                



             BUN)                                                

 

             Creatinine Level 1.00 mg/dL   0.50-0.90    H            



             (test code =                                        



             Creatinine Level)                                        

 

             BUN/Creat Ratio 12                        N            



             (test code =                                        



             BUN/Creat Ratio)                                        

 

             Glucose Level (test 108 mg/dL                        



             code = Glucose                                        



             Level)                                              

 

             Calcium Level (test 10.3 mg/dL   8.3-10.5                  



             code = Calcium                                        



             Level)                                              

 

             Alk Phos (test code 93 U/L                           



             = Alk Phos)                                         

 

             Bilirubin Total 0.5 mg/dL    0.1-0.9                   



             (test code =                                        



             Bilirubin Total)                                        

 

             Albumin Level (test 4.4 g/dL     3.5-5.2                   



             code = Albumin                                        



             Level)                                              

 

             Protein Total (test 7.2 g/dL     6.4-8.3                   



             code = Protein                                        



             Total)                                              

 

             ALT (test code = 12 U/L       1-33                      



             ALT)                                                

 

             AST (test code = 17 U/L       1-32                      



             AST)                                                

 

             Globulin (test code 2.8 g/dL     2.9-3.1      L            



             = Globulin)                                         

 

             A/G Ratio (test code 1.6 ratio                 N            



             = A/G Ratio)                                        

 

             eGFR AA (test code = >60                       N            eGFR (e

stimated



             eGFR AA)     mL/min/1.73 m2                           Glomerular



                                                                 Filtration Rate

) is



                                                                 an estimated va

lue,



                                                                 calculated from

 the



                                                                 patient's serum



                                                                 creatinine usin

g the



                                                                 MDRD equation. 

It is



                                                                 NOT the patient

's



                                                                 actual GFR. The

 eGFR



                                                                 provides a more



                                                                 clinically usef

ul



                                                                 measure of kidn

ey



                                                                 disease than se

rum



                                                                 creatinine



                                                                 alone.***This



                                                                 calculation rimma

es



                                                                 sex and race in

to



                                                                 account, if the



                                                                 information is



                                                                 provided. If th

e



                                                                 race is not



                                                                 provided, and t

he



                                                                 patient is



                                                                 -Crystal

n,



                                                                 multiply by 1.2

12.



                                                                 If sex is not



                                                                 provided, and t

he



                                                                 patient is fema

le,



                                                                 multiply by 0.7

42.



                                                                 Results for pat

ients



                                                                 <18 years of ag

e



                                                                 have not been



                                                                 validated by 

e



                                                                 MDRD study and



                                                                 should be



                                                                 interpreted wit

h



                                                                 caution. eGFR R

esult



                                                                 Interpretation:

eGFR



                                                                 > or = 60 is in

 the



                                                                 Normal RangeeGF

R <



                                                                 60 may mean kid

hang



                                                                 diseaseeGFR < 1

5 may



                                                                 mean kidney



                                                                 failure*** Rang

es



                                                                 recommended by 

the



                                                                 [employer]



                                                                 Foundation,



                                                                 http://nkdep.ni

h.gov



Comprehensive Metabolic Panel2019-09-15 00:04:54





             Test Item    Value        Reference Range Interpretation Comments

 

             Sodium Level (test 137.0 mmol/L 135.0-145.0               



             code = Sodium Level)                                        

 

             Potassium Level 4.0 mmol/L   3.5-5.1                   



             (test code =                                        



             Potassium Level)                                        

 

             Chloride Level (test 103 mmol/L                       



             code = Chloride                                        



             Level)                                              

 

             CO2 (test code = 23 mmol/L    22-29                     



             CO2)                                                

 

             Anion Gap (test code 11 mmol/L    7-16                      



             = Anion Gap)                                        

 

             BUN (test code = 11.50 mg/dL  6.00-20.00                



             BUN)                                                

 

             Creatinine Level 1.00 mg/dL   0.50-0.90    H            



             (test code =                                        



             Creatinine Level)                                        

 

             BUN/Creat Ratio 12                        N            



             (test code =                                        



             BUN/Creat Ratio)                                        

 

             Glucose Level (test 108 mg/dL                        



             code = Glucose                                        



             Level)                                              

 

             Calcium Level (test 10.3 mg/dL   8.3-10.5                  



             code = Calcium                                        



             Level)                                              

 

             Alk Phos (test code 93 U/L                           



             = Alk Phos)                                         

 

             Bilirubin Total 0.5 mg/dL    0.1-0.9                   



             (test code =                                        



             Bilirubin Total)                                        

 

             Albumin Level (test 4.4 g/dL     3.5-5.2                   



             code = Albumin                                        



             Level)                                              

 

             Protein Total (test 7.2 g/dL     6.4-8.3                   



             code = Protein                                        



             Total)                                              

 

             ALT (test code = 12 U/L       1-33                      



             ALT)                                                

 

             AST (test code = 17 U/L       1-32                      



             AST)                                                

 

             Globulin (test code 2.8 g/dL     2.9-3.1      L            



             = Globulin)                                         

 

             A/G Ratio (test code 1.6 ratio                 N            



             = A/G Ratio)                                        

 

             eGFR AA (test code = >60                       N            eGFR (e

stimated



             eGFR AA)     mL/min/1.73 m2                           Glomerular



                                                                 Filtration Rate

) is



                                                                 an estimated va

lue,



                                                                 calculated from

 the



                                                                 patient's serum



                                                                 creatinine usin

g the



                                                                 MDRD equation. 

It is



                                                                 NOT the patient

's



                                                                 actual GFR. The

 eGFR



                                                                 provides a more



                                                                 clinically usef

ul



                                                                 measure of kidn

ey



                                                                 disease than se

rum



                                                                 creatinine



                                                                 alone.***This



                                                                 calculation rimma

es



                                                                 sex and race in

to



                                                                 account, if the



                                                                 information is



                                                                 provided. If th

e



                                                                 race is not



                                                                 provided, and t

he



                                                                 patient is



                                                                 -Crystal

n,



                                                                 multiply by 1.2

12.



                                                                 If sex is not



                                                                 provided, and t

he



                                                                 patient is fema

le,



                                                                 multiply by 0.7

42.



                                                                 Results for pat

ients



                                                                 <18 years of ag

e



                                                                 have not been



                                                                 validated by [facility]



                                                                 MDRD study and



                                                                 should be



                                                                 interpreted wit

h



                                                                 caution. eGFR R

esult



                                                                 Interpretation:

eGFR



                                                                 > or = 60 is in

 the



                                                                 Normal RangeeGF

R <



                                                                 60 may mean kid

hang



                                                                 diseaseeGFR < 1

5 may



                                                                 mean kidney



                                                                 failure*** Rang

es



                                                                 recommended by 

the



                                                                 [employer]



                                                                 Foundation,



                                                                 http://nkdep.ni

h.gov

 

             eGFR Non-AA (test 58.45                     N            eGFR (sheba

mated



             code = eGFR Non-AA) mL/min/1.73 m2                           Glomer

ular



                                                                 Filtration Rate

) is



                                                                 an estimated va

lue,



                                                                 calculated from

 the



                                                                 patient's serum



                                                                 creatinine usin

g the



                                                                 MDRD equation. 

It is



                                                                 NOT the patient

's



                                                                 actual GFR. The

 eGFR



                                                                 provides a more



                                                                 clinically usef

ul



                                                                 measure of kidn

ey



                                                                 disease than se

rum



                                                                 creatinine



                                                                 alone.***This



                                                                 calculation rimma

es



                                                                 sex and race in

to



                                                                 account, if the



                                                                 information is



                                                                 provided. If th

e



                                                                 race is not



                                                                 provided, and t

he



                                                                 patient is



                                                                 -Crystal

n,



                                                                 multiply by 1.2

12.



                                                                 If sex is not



                                                                 provided, and t

he



                                                                 patient is fema

le,



                                                                 multiply by 0.7

42.



                                                                 Results for pat

ients



                                                                 <18 years of ag

e



                                                                 have not been



                                                                 validated by [facility]



                                                                 MDRD study and



                                                                 should be



                                                                 interpreted wit

h



                                                                 caution. eGFR R

esult



                                                                 Interpretation:

eGFR



                                                                 > or = 60 is in

 the



                                                                 Normal RangeeGF

R <



                                                                 60 may mean kid

hang



                                                                 diseaseeGFR < 1

5 may



                                                                 mean kidney



                                                                 failure*** Rang

es



                                                                 recommended by 

the



                                                                 [employer]



                                                                 Foundation,



                                                                 http://nkdep.ni

h.gov



Urinalysis [id number] 00:02:53





             Test Item    Value        Reference Range Interpretation Comments

 

             UA WBC (test code = UA WBC) 6-10         0-5          A            

 

             UA RBC (test code = UA RBC) 0-5          0-5                       

 

             UA Bacteria (test code = UA Bacteria) Many                      A  

          

 

             UA Squam Epithelial (test code = UA TNTC                      A    

        



             Squam Epithelial)                                        



Urine Drug [id number] 23:59:42





             Test Item    Value        Reference Range Interpretation Comments

 

             Amphetamine Screen Ur POSITIVE     Negative     A            



             (test code = Amphetamine                                        



             Screen Ur)                                          

 

             Barbiturate Screen Ur Negative     Negative                  



             (test code = Barbiturate                                        



             Screen Ur)                                          

 

             Benzodiazepines Ur (test POSITIVE     Negative     A            



             code = Benzodiazepines                                        



             Ur)                                                 

 

             Cocaine Screen Ur (test Negative     Negative                  



             code = Cocaine Screen                                        



             Ur)                                                 

 

             U Methadone Scr (test Negative     Negative                  



             code = U Methadone Scr)                                        

 

             Opiate Screen Ur (test Negative     Negative                  



             code = Opiate Screen Ur)                                        

 

             U PCP Scrn (test code = Negative     Negative                  



             U PCP Scrn)                                         

 

             Cannabinoid Screen Ur Negative     Negative                  



             (test code = Cannabinoid                                        



             Screen Ur)                                          

 

             U TCA (test code = U Negative     Negative                  The res

ults of all



             TCA)                                                drug screen elinor

ts are



                                                                 only preliminar

y.



                                                                 Clinical



                                                                 consideration a

nd



                                                                 professional ju

dgment



                                                                 should be appli

ed to



                                                                 any drug of abu

se



                                                                 test result,



                                                                 particularly wh

en



                                                                 preliminary pos

itive



                                                                 results are obt

ained.



                                                                 Please order a



                                                                 separate confir

matory



                                                                 test if desired

.



HCG Qualitative [id number] 23:54:43





             Test Item    Value        Reference Range Interpretation Comments

 

             hCG Ur (test code = Negative                               If the r

esult is



             hCG Ur)                                             "Negative" in p

atients



                                                                 suspected to be



                                                                 pregnant, recom

mend



                                                                 retest with a s

ample



                                                                 obtained 48 to 

72



                                                                 hours later, or

 by



                                                                 ordering a



                                                                 quantitative as

say. If



                                                                 the result is



                                                                 "Borderline" te

sting



                                                                 should be repea

gianfranco in



                                                                 48 to 72 hours.

 

             Lot # (test code = [id number]                    N            



             Lot #)                                              

 

             Expiration Dt (test [id number]                  N            



             code = Expiration Dt)                                        

 

             Neg Control (test Negative                               



             code = Neg Control)                                        

 

             Pos Control (test Positive                               



             code = Pos Control)                                        

 

             Internal QC (test Acceptable                             



             code = Internal QC)                                        



Urinalysis with Culture, if [id number] 23:52:04





             Test Item    Value        Reference Range Interpretation Comments

 

             UA Color (test code = UA YELLO        Yellow                    



             Color)                                              

 

             UA Appear (test code = SCLD         Clear        A            



             UA Appear)                                          

 

             UA pH (test code = UA 6.5                                    



             pH)                                                 

 

             UA Spec Grav (test code 1.011        1.001-1.035               



             = UA Spec Grav)                                        

 

             UA Glucose (test code = NEG          Negative                  



             UA Glucose)                                         

 

             UA Bili (test code = UA NEG          Negative                  



             Bili)                                               

 

             UA Ketones (test code = NEG          Negative                  



             UA Ketones)                                         

 

             UA Blood (test code = UA NEG          Negative                  



             Blood)                                              

 

             UA Protein (test code = NEG          Negative                  



             UA Protein)                                         

 

             UA Urobilinogen (test 1 mg/dL      >0.2         A            



             code = UA Urobilinogen)                                        

 

             UA Nitrite (test code = POS          Negative     A            



             UA Nitrite)                                         

 

             UA Leuk Est (test code = NEG          Negative                  



             UA Leuk Est)                                        

 

             UA Micro Ind? (test code Indicated    Not Indicated A            Re

sult created by



             = UA Micro Ind?)                                        rule



                                                                 [id number]

_IND



Automated [id number] 23:48:39





             Test Item    Value        Reference Range Interpretation Comments

 

             Neutro Auto (test code = Neutro 75.7 %       36.0-70.0    H        

    



             Auto)                                               

 

             Lymph Auto (test code = Lymph Auto) 14.1 %       12.0-44.0         

        

 

             Mono Auto (test code = Mono Auto) 7.6 %        0.0-11.0            

      

 

             Eos, Auto (test code = Eos, Auto) 1.8 %        0.0-7.0             

      

 

             Basophil Auto (test code = Basophil 0.5 %        0.0-2.0           

        



             Auto)                                               

 

             Neutro Absolute (test code = Neutro 12.7 x10     1.6-7.4      H    

        



             Absolute)                                           

 

             Lymph Absolute (test code = Lymph 2.38 x10     .50-4.60            

      



             Absolute)                                           

 

             Mono Absolute (test code = Mono 1.28 x10     .00-1.20     H        

    



             Absolute)                                           

 

             Eos Absolute (test code = Eos 0.31 x10     0.00-0.74               

  



             Absolute)                                           

 

             Baso Absolute (test code = Baso 0.09 x10     0.00-0.21             

    



             Absolute)                                           



IG [id number] 23:48:39





             Test Item    Value        Reference Range Interpretation Comments

 

             IG (test code = IG) 0.3 %        0.0-5.0                   

 

             IG Abs (test code = IG Abs) 0 x10                     N            



Complete Blood Count with [id number] 23:48:38





             Test Item    Value        Reference Range Interpretation Comments

 

             WBC (test code = WBC) 16.8 x10     4.4-10.5     H            

 

             RBC (test code = RBC) 4.06 x10     3.75-5.20                 

 

             Hgb (test code = Hgb) 12.7 g/dL    12.2-14.8                 

 

             MCV (test code = MCV) 94.10 fL     80..00              

 

             Hct (test code = Hct) 38.2 %       36.5-44.4                 

 

             MCHC (test code = 33.20 g/dL   32.00-37.50               



             MCHC)                                               

 

             RDW CV (test code = 13.2 %       11.5-14.5                 



             RDW CV)                                             

 

             MCH (test code = MCH) 31.3 pg      27.0-32.5                 

 

             Platelets (test code = 363.0 x10    140.0-440.0               



             Platelets)                                          

 

             MPV (test code = MPV) 10.0 fL                   N            

 

             Slide Review (test Auto         Auto                      Result cr

eated by



             code = Slide Review)                                        [email or web address]_

SLIDE_REV_AUTO

 

             nRBC (test code = 0                         N            



             nRBC)                                               

 

             NRBC Abs (test code = 0.00 x10                  N            



             NRBC Abs)                                           

 

             IPF (test code = IPF) 0 %                       N            



RPR, [id number] 05:53:00





             Test Item    Value        Reference Range Interpretation Comments

 

             RPR (test code = RPR) Non-Reactive Non-Reactive N            



BHCG, Serum, [id number] 01:39:00





             Test Item    Value        Reference Range Interpretation Comments

 

             Preg Qual [Se] (test code = BSHCG) Negative     Negative     N     

       



BHCG, Serum, [id number] 01:09:00





             Test Item    Value        Reference Range Interpretation Comments

 

             B hCG, Quant (test <1 mIU/mL                              Weeks of 

Gestation



             code = BHCGQT)                                        Ranges (mIU/m

L)3 weeks



                                                                 5.40 - 72.04 we

eks 10.2



                                                                 - 7085 weeks 21

7 - 04213



                                                                 weeks 152 - 321

777 weeks



                                                                 4059 - 5094050 

weeks



                                                                 10429 - 5955792

 weeks



                                                                 91243 - 6976274

0 weeks



                                                                 50770 - 8726396

2 weeks



                                                                 37166 - 6985886

4 weeks



                                                                 67527 - 3005645

 weeks



                                                                 71818 - 1247562

 weeks



                                                                 5504 - 0529938 

weeks



                                                                 7040 - 7124579 

weeks



                                                                 8365 - 31737



Thyroid Stimulating Hormone (TSH)2017 01:09:00





             Test Item    Value        Reference Range Interpretation Comments

 

             TSH (test code = TSH) 0.93 mIU/mL  0.270-4.200  N            



Lipid [id number] 01:05:00





             Test Item    Value        Reference Range Interpretation Comments

 

             Cholesterol (test 163 mg/dL    0-200        N            



             code = CHOL)                                        

 

             Triglycerides (test 108 mg/dL    9-200        N            



             code = TRIG)                                        

 

             HDL (test code = 41 mg/dL     50-60        L            



             HDL)                                                

 

             Chol/HDL (test code 4.0 Ratio    0.0-4.4      N            



             = CHOLPHDL)                                         

 

             LDL, Calculated 100          0-130        N            (NOTE)RISK O

F HEART



             (test code = LDLC)                                        DISEASEPu

blished by



                                                                 American Heart



                                                                 AssociationAnal

yte



                                                                 Optimal Boderli

ne



                                                                 Increased RiskC

HOL <200



                                                                 200-239 >240TRI

G <150



                                                                 150-199 >200HDL

 Male:



                                                                 >60 <40HDL Fema

le: >60



                                                                 <50LDL <100 130

-159



                                                                 >160LDL NEAR OP

TIMAL IS



                                                                 100-129

 

             VLDL (test code = 22 mg/dL     5-40         N            



             VLDL)                                               

 

             LDL/HDL (test code = 2                                      



             LDLPHDL)                                            



Comprehensive Metabolic [id number] 21:02:00





             Test Item    Value        Reference Range Interpretation Comments

 

             Sodium (test code = 140 mmol/L   135-145      N            



             NA)                                                 

 

             Potassium (test 3.6 mmol/L   3.5-5.1      N            



             code = K)                                           

 

             Chloride (test code 103 mmol/L          N            



             = CL)                                               

 

             Carbon Dioxide 26 mmol/L    22-29        N            



             (test code = CO2)                                        

 

             Glucose (test code 89 mg/dL            N            



             = GLU)                                              

 

             Blood Urea Nitrogen 13 mg/dL     6-20         N            



             (test code = BUN)                                        

 

             Creatinine (test 0.8 mg/dL    0.5-0.9      N            



             code = CREAT)                                        

 

             Calcium (test code 10.0 mg/dL   8.3-10.5     N            



             = CA)                                               

 

             Prot Total (test 7.0 g/dL     6.4-8.3      N            



             code = TP)                                          

 

             Albumin (test code 4.2 g/dL     3.5-5.2      N            



             = ALB)                                              

 

             A/G Ratio (test 1.5 Ratio                              



             code = AGRATIO)                                        

 

             Globulin (test code 2.8          2.9-3.1      L            



             = GLOB)                                             

 

             Bili Total (test 0.6 mg/dL    0.1-0.9      N            



             code = TBIL)                                        

 

             Alk Phos (test code 91 U/L              N            



             = APHOS)                                            

 

             AST (test code = 19 U/L       1-32         N            



             AST)                                                

 

             ALT (test code = 14 U/L       1-33         N            



             ALT)                                                

 

             BUN/Creatinine 16.3                                   



             Ratio (test code =                                        



             BCRATIO)                                            

 

             Anion Gap (test 11 mmol/L    7-16         N            



             code = AGAP)                                        

 

             Estimated GFR (test >60                                    eGFR (es

timated



             code = GFR)  mL/min/1.73m2                           Glomerular Nguyễn

tration



                                                                 Rate) is an est

imated



                                                                 value,calculate

d from



                                                                 the patient's s

harman



                                                                 creatinine usin

g the



                                                                 MDRD equation.I

t is



                                                                 NOT the patient

's



                                                                 actual GFR. The

 eGFR



                                                                 provides a more



                                                                 clinicallyusefu

l



                                                                 measure of kidn

ey



                                                                 disease than se

rum



                                                                 creatinine



                                                                 alone.***This



                                                                 calculation rimma

es sex



                                                                 and race into



                                                                 account, if the



                                                                 informationis



                                                                 provided. If th

e race



                                                                 is not provided

, and



                                                                 the patient



                                                                 isAfrican-Ameri

can,



                                                                 multiply by 1.2

12. If



                                                                 sex is not prov

ided,



                                                                 and thepatient 

is



                                                                 female, multipl

y by



                                                                 0.742. Results 

for



                                                                 patients <18 ye

ars



                                                                 ofage have not 

been



                                                                 validated by th

e MDRD



                                                                 study and shoul

d be



                                                                 interpretedwith



                                                                 caution.eGFR Re

sult



                                                                 Interpretation:

eGFR >



                                                                 or = 60 is in t

he



                                                                 Normal RangeeGF

R < 60



                                                                 may mean kidney



                                                                 diseaseeGFR < 1

5 may



                                                                 mean kidney



                                                                 failure***Range

s



                                                                 recommended by 

the



                                                                 [employer]



                                                                 Foundation,http

://nkd



                                                                 ep.nih.gov



Alcohol/Ethanol, [id number] 21:02:00





             Test Item    Value        Reference Range Interpretation Comments

 

             Alcohol, Ethyl <0.01 g/dL   0.00-0.01    N            Intoxicated 0

.080 g/dL



             (test code = ETOH)                                        or more



CBC with [id number] 20:35:00





             Test Item    Value        Reference Range Interpretation Comments

 

             WBC (test code = WBC) 10.3 K/cumm  4.4-10.5     N            

 

             RBC (test code = RBC) 4.37 M/cumm  3.75-5.20    N            

 

             Hemoglobin (test code = HGB) 13.1 gm/dL   12.2-14.8    N           

 

 

             Hematocrit (test code = HCT) 41.5 %       36.5-44.4    N           

 

 

             MCV (test code = MCV) 94.9 fL             N            

 

             MCH (test code = MCH) 30.1 pg      27.0-32.5    N            

 

             MCHC (test code = MCHC) 31.7 g/dL    32.0-37.5    L            

 

             RDW (test code = RDW) 12.9 %       11.5-14.5    N            

 

             Platelet Count (test code = 327 K/cumm   140-440      N            



             PLTCT)                                              

 

             MPV (test code = MPV) 7.0 fL                                 

 

             Diff Method (test code = DIFFM) Auto                               

    

 

             Neutrophil (test code = NEUT) 74.0 %       36-70        H          

  

 

             Lymphocyte (test code = LYMPH) 17.6 %       12-44        N         

   

 

             Monocyte (test code = MONO) 6.4 %        0-11         N            

 

             Eosinophil (test code = EOS) 1.5 %        0-7          N           

 

 

             Basophil (test code = BASO) 0.5 %        0-2          N            

 

             Neutro Abs (test code = ANEUT) 7.6 K/cumm   1.6-7.4      H         

   

 

             Lymph Abs (test code = ALYMPH) 1.8 K/cumm   0.5-4.6      N         

   

 

             Mono Abs (test code = AMONO) 0.7 K/cumm   0.0-1.2      N           

 

 

             Eos Abs (test code = AEOS) 0.16 K/cumm  0.00-0.74    N            

 

             Baso Abs (test code = ABASO) 0.1 K/cumm   0.00-0.21    N           

 



[id number] 20:33:00





             Test Item    Value        Reference Range Interpretation Comments

 

             Amphetamine (test code POSITIVE     Negative     A            For d

iagnostic purposes



             = AMPH)                                             only, positive 

results



                                                                 should always b

e



                                                                 assessedin



                                                                 conjunctionwith

 the



                                                                 patient's medic

al



                                                                 history,clinica

l



                                                                 examination and



                                                                 otherfindings.T

o



                                                                 fulfill legal



                                                                 requirements, a

 more



                                                                 specific altern

ate



                                                                 chemical method

must be



                                                                 used inorder to

 obtain



                                                                 a Confirmed judith

lytical



                                                                 result. GC/MS i

s the



                                                                 preferred confi

rmatory



                                                                 method.

 

             Barbiturates (test Negative     Negative     N            



             code = GENEVIEVE)                                        

 

             Benzodiazepine (test Negative     Negative     N            



             code = IRENE)                                        

 

             Cocaine (test code = Negative     Negative     N            



             COCA)                                               

 

             Methadone (test code = Negative     Negative     N            



             MTHD)                                               

 

             Opiates (test code = Negative     Negative     N            



             OPIA)                                               

 

             PCP (test code = PCP) Negative     Negative     N            

 

             Propoxyphene (test Negative     Negative     N            



             code = PROPOX)                                        

 

             THC (test code = THC) Negative     Negative     N            



Urinalysis [id number] 20:23:00





             Test Item    Value        Reference Range Interpretation Comments

 

             Color (test code = COLOR) Yellow       Yellow,Straw,Pl N           

 



                                       yellow                    

 

             Clarity (test code = Sl Cloudy    Clear        A            



             CLAR)                                               

 

             Specific Gravity (test 1.007        1.001-1.035  N            



             code = SPGR)                                        

 

             pH (test code = PH) 6.0          5.0-9.0      N            

 

             Ketone (test code = KET) Negative mg/dL Negative     N            

 

             Glucose (test code = Negative mg/dL Negative     N            



             GLUCUR)                                             

 

             Protein (test code = Negative mg/dL Negative     N            



             PROT)                                               

 

             Bilirubin (test code = Negative mg/dL Negative     N            



             BILI)                                               

 

             Occult Blood (test code = Moderate     Negative     A            



             UDOB)                                               

 

             Urobilinogen (test code = 0.2 mg/dL    0.2-1.0      N            



             UROB)                                               

 

             Nitrite (test code = NIT) Positive     Negative     A            

 

             Leuk Esterase (test code Moderate     Negative     A            



             = LEUK)                                             

 

             Micros Exam (test code = Indicated                              



             MEXAM)                                              

 

             Epithelial Cells (test 50+ /LPF     0-30         A            



             code = EPI)                                         

 

             WBC, Urine (test code = 41-50 /HPF   0-5          A            



             UWBC)                                               

 

             RBC, Urine (test code = 3-5 /HPF     0-5          A            



             URBC)                                               

 

             Bacteria (test code = Many /HPF                              



             BACT)                                               



[id number] 16:21:00





             Test Item    Value        Reference Range Interpretation Comments

 

             Lithium (test code = LI) 0.6 mmol/l   0.6-1.2                   



[facility] ([facility])2017 16:21:00





             Test Item    Value        Reference Range Interpretation Comments

 

             TSH (test code = TSH) 2.14 mIU/L   0.47-4.68                 



[facility]





             Test Item    Value        Reference Range Interpretation Comments

 

             Glucose (test code 77 mg/dl                         



             = GLU)                                              

 

             BUN (test code = 9.0 mg/dl    6.0-17.0                  



             BUN)                                                

 

             Creatinine (test 0.7 mg/dl    0.4-1.2                   



             code = CREA)                                        

 

             Sodium (test code = 139 mmol/l   137-145                   



             NA)                                                 

 

             Potassium (test 4.7 mmol/l   3.5-5.0                   



             code = K)                                           

 

             Chloride (test code 107 mmol/l                       



             = CL)                                               

 

             CO2 (test code = 22 mmol/l    22-30                     



             CO2)                                                

 

             Anion Gap (test 11                                     



             code = GAP)                                         

 

             Calcium (test code 9.8 mg/dl    8.4-10.2                  



             = CALC)                                             

 

             T Protein (test 8.0 gm/dl    5.1-8.7                   



             code = TP)                                          

 

             Albumin (test code 4.4 gm/dl    3.5-4.6                   



             = ALB)                                              

 

             A/G Ratio (test 1.2 %        1.1-2.2                   



             code = AGRAT)                                        

 

             AST (SGOT) (test 20 U/L       11-36                     



             code = AST)                                         

 

             ALT (SGPT) (test 29 U/L       11-40                     



             code = ALT)                                         

 

             Alkaline Phos (test 108 U/L                          



             code = ALKP)                                        

 

             Total Bilirubin 0.6 mg/dl    0.2-1.2                   



             (test code = TBIL)                                        

 

             Globulin (test code 3.6 gm/dl    2.3-3.5      H            



             = GLOBU)                                            

 

             Calcium, Corrected 9.5 mg/dl    8.4-10.2                  Various f

ormulas exist



             (test code =                                        for corrected s

harman



             CALCCORR)                                           calcium results

, each



                                                                 yielding differ

ent



                                                                 values. This co

rrected



                                                                 result was base

d on



                                                                 the formula: Co

rrected



                                                                 Calcium = Serum

Calcium



                                                                 + [0.8 * ( 4 -



                                                                 SerumAlbumin)]

 

             EGFR if  >60                                    



             American (test code mL/min/1.73m\                           



             = EGFRAA)    S\2                                    

 

             EGFR if Non- >60                                    Estimate

d Glomerular



             American (test code mL/min/1.73m\                           Filtrat

ion Rate (eGFR)



             = EGFRNA)    S\2                                    Reference Inter

vals



                                                                 Decision Points

 for 18



                                                                 years and older

 and



                                                                 average body ma

ss: >=



                                                                 60 Does not exc

lude



                                                                 kidney disease.

 30 -



                                                                 59 Suggests mod

erate



                                                                 chronic kidney 

disease



                                                                 and indicates t

he need



                                                                 for further



                                                                 investigation



                                                                 including asses

sment



                                                                 of proteinuria 

and



                                                                 cardiovascular



                                                                 factors. < 30 U

sually



                                                                 indicates a nee

d for



                                                                 referral for



                                                                 assessment  and



                                                                 management of c

hronic



                                                                 kidney failure.



[facility]

## 2023-02-02 NOTE — EDPHYS
Physician Documentation                                                                           

 [facility]                                                                 

Name: Alka Judd                                                                               

Age: 52 yrs                                                                                       

Sex: Female                                                                                       

: 1970                                                                                   

MRN: [id number]                                                                                   

Arrival Date: 2023                                                                          

Time: 00:15                                                                                       

Account#: [id number]                                                                            

Bed 17                                                                                            

Private MD:                                                                                       

ED Physician Chi Tong                                                                         

HPI:                                                                                              

                                                                                             

01:22 This 52 yrs old Female presents to ER via Ambulatory with complaints of Mental          rt  

      Eveluation.                                                                                 

01:22 Patient presents to the ED on an emergency intermediate order for suicidal ideation.       rt  

      Patient reportedly stated that she did not want to live anymore and that she wished         

      that the police would kill her. She attributed this to her breaking up with her             

      significant other. The patient denies suicidal thoughts in the ED but the officer           

      confirms that she was recently suicidal. Denies homicidal ideation or physical              

      complaints. Symptoms are moderate in severity, no other aggravating or alleviating          

      factors..                                                                                   

                                                                                                  

OB/GYN:                                                                                           

01:07 LMP N/A - Post-menopause                                                                ll3 

                                                                                                  

Historical:                                                                                       

- Allergies:                                                                                      

01:07 PENICILLINS;                                                                            ll3 

01:07 Risperdal;                                                                              ll3 

- PMHx:                                                                                           

01:07 Bipolar disorder; Hypertensive disorder;                                                ll3 

- PSHx:                                                                                           

01:07 rig hip surgery;                                                                       ll3 

                                                                                                  

- Immunization history:: Client reports having NOT received the Covid vaccine.                    

- Social history:: Smoking status: Patient reports the use of cigarette tobacco                   

  products, smokes one pack cigarettes per day.                                                   

- Family history:: not pertinent.                                                                 

                                                                                                  

                                                                                                  

ROS:                                                                                              

01:22 Constitutional: Negative for fever, chills, and weight loss, Eyes: Negative for injury, rt  

      pain, redness, and discharge, Cardiovascular: Negative for chest pain, palpitations,        

      and edema, Respiratory: Negative for shortness of breath, cough, wheezing, and              

      pleuritic chest pain, Abdomen/GI: Negative for abdominal pain, nausea, vomiting,            

      diarrhea, and constipation, MS/Extremity: Negative for injury and deformity, Skin:          

      Negative for injury, rash, and discoloration, Neuro: Negative for headache, weakness,       

      numbness, tingling, and seizure.                                                            

01:22 Psych: Positive for depression, suicidal ideation, Negative for                             

                                                                                                  

Exam:                                                                                             

01:22 Constitutional:  This is a well developed, well nourished patient who is awake, alert,  rt  

      and in no acute distress. Head/Face:  Normocephalic, atraumatic. Chest/axilla:  Normal      

      chest wall appearance and motion.  Nontender with no deformity.  No lesions are             

      appreciated. Cardiovascular:  Regular rate and rhythm with a normal S1 and S2.  No          

      gallops, murmurs, or rubs.  Normal PMI, no JVD.  No pulse deficits. Respiratory:  Lungs     

      have equal breath sounds bilaterally, clear to auscultation and percussion.  No rales,      

      rhonchi or wheezes noted.  No increased work of breathing, no retractions or nasal          

      flaring. Abdomen/GI:  Soft, non-tender, with normal bowel sounds.  No distension or         

      tympany.  No guarding or rebound.  No evidence of tenderness throughout. MS/ Extremity:     

       Pulses equal, no cyanosis.  Neurovascular intact.  Full, normal range of motion.           

      Neuro:  Awake and alert, GCS 15, oriented to person, place, time, and situation.            

      Cranial nerves II-XII grossly intact.  Motor strength 5/5 in all extremities.  Sensory      

      grossly intact.  Cerebellar exam normal.  Normal gait.                                      

01:22 Psych: Denies SI in the room, fidgeting, appears to be responding to internal stimuli.      

01:35 ECG was reviewed by the Attending Physician.                                            rt  

                                                                                                  

Vital Signs:                                                                                      

01:03 Pulse 78; Resp 18; Temp 97.9(O); Pulse Ox 100% on R/A; Weight 102.97 kg (R); Height 5   ll3 

      ft. 2 in. (157.48 cm) (R); Pain 0/10;                                                       

02:03  / 98; Pulse 78; Resp 18; Temp 98.0; Pulse Ox 99% ;                               ds4 

08:09 BP 96 / 55; Pulse 77; Resp 18 S; Temp 98(O); Pulse Ox 99% ; Pain 8/10;                  db  

11:45  / 64; Pulse 84; Resp 18; Temp 98.2(O); Pulse Ox 99% on R/A;                      db  

16:45  / 62; Pulse 70; Resp 18; Temp 98.1; Pulse Ox 97% on R/A;                         db  

01:03 Body Mass Index 41.52 (102.97 kg, 157.48 cm)                                            ll3 

                                                                                                  

MDM:                                                                                              

01:07 Patient medically screened.                                                             rt  

07:00 Transition of care: Care assumed from Markos Urbina MD.                               ms3 

08:37 Differential diagnosis: drug withdrawal. acute psychotic break, depression, psychosis   ms3 

      secondary to non-compliance. Data reviewed: vital signs, nurses notes, lab test             

      result(s). Management of patient was discussed with the following: Behavioral Health        

      Provider: [facility]. I considered the following discharge prescriptions or medication      

      management in the emergency department Medications were administered in the Emergency       

      Department. See MAR. Historians other than the Patient: Law enforcement: .                  

                                                                                                  

                                                                                             

01:16 Order name: CBC with Diff; Complete Time: 03:02                                         rt  

                                                                                             

01:16 Order name: CMP; Complete Time: 03:02                                                   rt  

                                                                                             

01:16 Order name: UDS; Complete Time: 03:38                                                   rt  

                                                                                             

01:16 Order name: Alcohol Level; Complete Time: 03:02                                         rt  

                                                                                             

01:16 Order name: Acetaminophen; Complete Time: 03:02                                         rt  

                                                                                             

01:16 Order name: Salicylate; Complete Time: 03:02                                            rt  

                                                                                             

01:16 Order name: Urine Dipstick-Ancillary (obtain specimen); Complete Time: 03:12            rt  

                                                                                             

01:16 Order name: SARS RAPID; Complete Time: 03:02                                            rt  

                                                                                             

03:10 Order name: Urine Dipstick-Ancillary; Complete Time: 03:38                              EDMS

                                                                                             

06:47 Order name: Diet Finger Food; Complete Time: 06:47                                      ha1 

                                                                                             

07:12 Order name: EKG Electrocardiogram                                                       EDMS

                                                                                             

10:22 Order name: Diet Finger Food; Complete Time: 10:22                                      db  

                                                                                             

15:27 Order name: Diet Finger Food; Complete Time: 15:27                                      db  

                                                                                                  

EC:35 Rate is 66 beats/min. Rhythm is regular, Normal Sinus Rhythm with No ectopy. QRS Axis   rt  

      is Normal. CO interval is normal. QRS interval is normal. QT interval is normal. No Q       

      waves. T waves are Normal. No ST changes noted.                                             

                                                                                                  

Administered Medications:                                                                         

06:10 Not Given (Patient Refused): Geodon (ziprasidone) 10 mg IM once                         kd3 

                                                                                                  

                                                                                                  

Disposition Summary:                                                                              

23 08:36                                                                                    

Transfer Ordered                                                                                  

      Transfer Location: [facility]                                                       ms3 

      Reason: Higher level of care                                                            ms3 

      Condition: Stable                                                                       ms3 

      Problem: new                                                                            ms3 

      Symptoms: are unchanged                                                                 ms3 

      Accepting Physician: (23 19:23)                                                 [id number] 

      Diagnosis                                                                                   

        - Suicidal ideations                                                                  ms3 

        - Amphetamine abuse                                                                   ms3 

      Forms:                                                                                      

        - Medication Reconciliation Form                                                      ms3 

        - SBAR form                                                                           ms3 

Signatures:                                                                                       

Dispatcher [employer]                           Michael Toledo RN                       RN   jb4                                                  

Chi Tong DO DO ms3                                                  

Tosha Cabrera RN                      RN   ll3                                                  

Markos Urbina MD MD   rt                                                   

Ellen Amado RN   kd3                                                  

                                                                                                  

Corrections: (The following items were deleted from the chart)                                    

19:23 08:36 Dr [email or web address]                                                                            [email or web address] 

                                                                                                  

**************************************************************************************************

## 2023-02-02 NOTE — ER
Nurse's Notes                                                                                     

 [facility]                                                                 

Name: Alka Judd                                                                               

Age: 52 yrs                                                                                       

Sex: Female                                                                                       

: 1970                                                                                   

MRN: [id number]                                                                                   

Arrival Date: 2023                                                                          

Time: 00:15                                                                                       

Account#: [id number]                                                                            

Bed 17                                                                                            

Private MD:                                                                                       

Diagnosis: Suicidal ideations;Amphetamine abuse                                                   

                                                                                                  

Presentation:                                                                                     

                                                                                             

01:03 Chief complaint: Patient states: "I feel like I want to take my life right now, I just  ll3 

      broke up with my boyfriend", Told [place] [employer] that she no longer wanted to be in this          

      world anymore, PD states  placed pt on an ADRIAN. Coronavirus screen: Vaccine status:     

      Patient reports being unvaccinated. At this time, the client does not indicate any          

      symptoms associated with coronavirus-19. Ebola Screen: No symptoms or risks identified      

      at this time. Initial Sepsis Screen: Does the patient meet any 2 criteria? No.              

      Patient's initial sepsis screen is negative. Does the patient have a suspected source       

      of infection? No. Patient's initial sepsis screen is negative. Risk Assessment: Do you      

      want to hurt yourself or someone else? Patient reports desire/thoughts of hurting           

      themselves or someone else. Provider notified. Onset of symptoms was 2023      

      at 23:30.                                                                                   

01:03 Method Of Arrival: Ambulatory                                                           ll3 

01:03 Acuity: TEMO 3                                                                           ll3 

                                                                                                  

Triage Assessment:                                                                                

01:07 General: Appears comfortable, Behavior is appropriate for age, agitated, anxious. Pain: ll3 

      Denies pain. Neuro: Level of Consciousness is awake, alert, obeys commands, Oriented to     

      person, place, time, situation. Respiratory: Respiratory effort is even, unlabored,         

      Respiratory pattern is regular, symmetrical. Derm: Wound noted palmar aspect of distal      

      phalanx of left thumb.                                                                      

                                                                                                  

OB/GYN:                                                                                           

01:07 LMP N/A - Post-menopause                                                                ll3 

                                                                                                  

Historical:                                                                                       

- Allergies:                                                                                      

01:07 PENICILLINS;                                                                            ll3 

01:07 Risperdal;                                                                              ll3 

- PMHx:                                                                                           

01:07 Bipolar disorder; Hypertensive disorder;                                                ll3 

- PSHx:                                                                                           

01:07 righ hip surgery;                                                                       ll3 

                                                                                                  

- Immunization history:: Client reports having NOT received the Covid vaccine.                    

- Social history:: Smoking status: Patient reports the use of cigarette tobacco                   

  products, smokes one pack cigarettes per day.                                                   

- Family history:: not pertinent.                                                                 

                                                                                                  

                                                                                                  

Screenin:07 Abuse screen: Denies threats or abuse. Denies injuries from another. Nutritional        ha1 

      screening: No deficits noted. Tuberculosis screening: No symptoms or risk factors           

      identified.                                                                                 

01:07 [facility] ED Fall Risk Assessment (Adult).                                               ha1 

                                                                                                  

Assessment:                                                                                       

01:07 General: Appears uncomfortable, Behavior is cooperative, Reports hearing voices. Pain:  ha1 

      Denies pain. Neuro: Level of Consciousness is awake, alert, obeys commands, Oriented to     

      person, place, Reports feeling like wanting to harm herself.                                

01:07 Cardiovascular: Capillary refill < 3 seconds Patient's skin is warm and dry.            ha1 

      Respiratory: Airway is patent Respiratory effort is even, unlabored, Respiratory            

      pattern is regular, symmetrical. GI: No signs and/or symptoms were reported involving       

      the gastrointestinal system. Abdomen is round non-distended. : No signs and/or            

      symptoms were reported regarding the genitourinary system. EENT: No signs and/or            

      symptoms were reported regarding the EENT system. Derm: Skin is pink, warm \T\ dry.         

      Musculoskeletal: Circulation, motion, and sensation intact. Range of motion: intact in      

      all extremities.                                                                            

02:00 Reassessment: Patient and/or family updated on plan of care and expected duration. Pain ha1 

      level reassessed. Patient is alert, oriented x 3, equal unlabored respirations, skin        

      warm/dry/pink. Patient denies pain at this time.                                            

03:00 Reassessment: eyes closed. Respiratory: Respiratory effort is even, unlabored,          ha1 

      Respiratory pattern is regular, symmetrical.                                                

04:00 Reassessment: eyes closed. Respiratory: Respiratory effort is even, unlabored,          ha1 

      Respiratory pattern is regular, symmetrical.                                                

05:00 Reassessment: eyes closed. Respiratory: Respiratory effort is even, unlabored,          ha1 

      Respiratory pattern is regular, symmetrical.                                                

07:15 Reassessment: Patient appears in no apparent distress at this time. Patient and/or      db  

      family updated on plan of care and expected duration. Pain level reassessed. Patient is     

      alert, oriented x 3, equal unlabored respirations, skin warm/dry/pink. patient states       

      it burns.                                                                                   

08:42 Reassessment: [facility]  at patient bedside.                                   db  

11:25 Reassessment: Patient appears in no apparent distress at this time. Patient and/or      db  

      family updated on plan of care and expected duration. Pain level reassessed. Patient is     

      alert, oriented x 3, equal unlabored respirations, skin warm/dry/pink. General: Appears     

      in no apparent distress. comfortable, Behavior is calm, cooperative. Pain: Denies pain.     

      Neuro: No deficits noted. Level of Consciousness is awake, alert, obeys commands,           

      Oriented to person, place, time, situation, patient reoriented to location.                 

12:30 Reassessment: Patient appears in no apparent distress at this time. No changes from     db  

      previously documented assessment. Patient and/or family updated on plan of care and         

      expected duration. Pain level reassessed. Patient is alert, oriented x 3, equal             

      unlabored respirations, skin warm/dry/pink. patient has small thumb wound to right          

      thumb. patient states does not know how she got it. Placed bandaid on it.                   

16:46 Reassessment: Patient appears in no apparent distress at this time. Patient talking to  db  

      self in room.                                                                               

16:49 Reassessment: [facility] will take patient tomorrow. Bed will be            

      available tomorrow.                                                                         

17:32 Reassessment: patient randomly yelling "go away" "that's my money" then goes back to      

      sleep. No one is in room. Patient is having auditory hallucinations.                        

                                                                                                  

Psych:                                                                                            

08:12 [place] Suicide Severity Screening: In the past month, have you wished you were dead   db  

      or wished you could go to sleep and not wake up? Patient responds "No." "In the past        

      month, have you actually had any thoughts of killing yourself?" Patient responds "no."      

      "In your lifetime, have you ever done anything, started to do anything, or prepared to      

      do anything to end your life?" Patient responds "yes." Patient reports suicidal intent      

      within 3 past months. patient denies. Subjective: Patient's mood is irritable.              

      Objective: Patient is cooperative, irritable, Speech is normal. Interventions: Searched     

      person for dangerous items. upon shift change patient in street clothes. patient            

      searched. Safety Checks: sitter for patient. Patient uses methamphetamines. Commitment:     

      Commitment papers completed.                                                                

10:23 Interventions: Removed personal items and placed in bag. Patient placed in hospital     db  

      gown. Searched person for dangerous items. Patient reassessed during use of restraints.     

      Patient is physically safe. Safety Checks: Personal items have been removed. Door is        

      open. No visitors are present at this time.                                                 

                                                                                                  

Vital Signs:                                                                                      

01:03 Pulse 78; Resp 18; Temp 97.9(O); Pulse Ox 100% on R/A; Weight 102.97 kg (R); Height 5   ll3 

      ft. 2 in. (157.48 cm) (R); Pain 0/10;                                                       

02:03  / 98; Pulse 78; Resp 18; Temp 98.0; Pulse Ox 99% ;                               ds4 

08:09 BP 96 / 55; Pulse 77; Resp 18 S; Temp 98(O); Pulse Ox 99% ; Pain 8/10;                  db  

11:45  / 64; Pulse 84; Resp 18; Temp 98.2(O); Pulse Ox 99% on R/A;                      db  

16:45  / 62; Pulse 70; Resp 18; Temp 98.1; Pulse Ox 97% on R/A;                         db  

01:03 Body Mass Index 41.52 (102.97 kg, 157.48 cm)                                            ll3 

                                                                                                  

ED Course:                                                                                        

00:15 Patient arrived in ED.                                                                  ja2 

00:41 Markos Urbina MD is Attending Physician.                                            rt  

01:07 Triage completed.                                                                       ll3 

01:07 Arm band placed on right wrist.                                                         ll3 

01:12 Uzma Guevara RN is Primary Nurse.                                                      ha1 

03:12 UDS Sent.                                                                               ds4 

08:09 Patient has correct armband on for positive identification. Bed in low position. Call   db  

      light in reach. Side rails up X 1.                                                          

08:34 Attending Physician role handed off by Markos Urbina MD                             ms3 

08:34 Chi Tong DO is Attending Physician.                                                ms3 

11:23 Report given to JÚNIOR Nguyen at [facility].                                          db  

11:46 Inserted saline lock: 20 gauge in right antecubital area, using aseptic technique.      db  

      ,using aseptic technique. placed by previous shift RN.                                      

14:59 Pt clinical information faxed to [facility]. [facility]    em1 

      returned our call and conducted nurse to nurse and doc to doc and placement was             

      approved with AOC given at 1135. Pt was asked to sign her transfer paper work and she       

      refused, stating "It's [employer] or nothing." [facility] was called at 1200 to         

      inquire about pt clinical review and [facility] intake department said they could     

      have a bed for her by the morning of 2/3/23, however, as of this time there has been no     

      nurse to nurse or doc to doc report given.                                                  

16:40 Transfer warrant obtained to transport pt to [facility] via [facility]         em1 

      [facility]                              

16:43 Report given to JÚNIOR Beck at [facility].                                        db  

18:30 [facility] deputy to arrive in 30 minutes.                               em1 

19:21 No provider procedures requiring assistance completed. IV discontinued, intact,         ha1 

      bleeding controlled, No redness/swelling at site. Pressure dressing applied.                

                                                                                                  

Administered Medications:                                                                         

06:10 Not Given (Patient Refused): Geodon (ziprasidone) 10 mg IM once                         kd3 

                                                                                                  

                                                                                                  

Medication:                                                                                       

08:09 VIS not applicable for this client.                                                     db  

                                                                                                  

Outcome:                                                                                          

08:36 ER care complete, transfer ordered by MD.                                               ms3 

19:22 Transferred Transfer form completed.                                                    ha1 

19:22 Condition: stable                                                                           

19:23 Patient left the ED.                                                                    jb4 

                                                                                                  

Signatures:                                                                                       

Lennox Solano                               em1                                                  

Jerry Stephens                             ds4                                                  

Michael Osorio, RN                       RN   jb4                                                  

Chi Tong DO DO   ms3                                                  

Samantha Messer                           ja2                                                  

Tosha Caberra RN                      RN   ll3                                                  

Uzma Guevara RN                        RN   ha1                                                  

Kira Hebert, JÚNIOR                    RN   db                                                   

Markos Urbina MD MD   rt                                                   

Ellen Amado RN   kd3                                                  

                                                                                                  

**************************************************************************************************

## 2023-02-24 ENCOUNTER — HOSPITAL ENCOUNTER (EMERGENCY)
Dept: HOSPITAL 97 - ER | Age: 53
Discharge: HOME | End: 2023-02-24
Payer: COMMERCIAL

## 2023-02-24 VITALS — SYSTOLIC BLOOD PRESSURE: 123 MMHG | OXYGEN SATURATION: 97 % | DIASTOLIC BLOOD PRESSURE: 55 MMHG

## 2023-02-24 VITALS — TEMPERATURE: 97.9 F

## 2023-02-24 DIAGNOSIS — Z88.0: ICD-10-CM

## 2023-02-24 DIAGNOSIS — F17.210: ICD-10-CM

## 2023-02-24 DIAGNOSIS — Z88.8: ICD-10-CM

## 2023-02-24 DIAGNOSIS — M79.605: Primary | ICD-10-CM

## 2023-02-24 PROCEDURE — 99284 EMERGENCY DEPT VISIT MOD MDM: CPT

## 2023-02-24 NOTE — ER
Nurse's Notes                                                                                     

 [facility]                                                                 

Name: Alka Judd                                                                               

Age: 52 yrs                                                                                       

Sex: Female                                                                                       

: 1970                                                                                   

MRN: [id number]                                                                                   

Arrival Date: 2023                                                                          

Time: 04:47                                                                                       

Account#: [id number]                                                                            

Bed 4                                                                                             

Private MD:                                                                                       

Diagnosis: Pain in left leg                                                                       

                                                                                                  

Presentation:                                                                                     

                                                                                             

04:47 Chief complaint: EMS states: pt states that she was just walking really fast and she    kd3 

      feels like she might have pulled a muscle in the left leg. She feels better when she        

      stands on it. Coronavirus screen: Vaccine status: Patient reports being unvaccinated.       

      [place] Screen: No symptoms or risks identified at this time. Initial Sepsis Screen: Does     

      the patient meet any 2 criteria? No. Patient's initial sepsis screen is negative. Does      

      the patient have a suspected source of infection? No. Patient's initial sepsis screen       

      is negative. Risk Assessment: Do you want to hurt yourself or someone else? Patient         

      reports no desire to harm self or others. Onset of symptoms was 2023.          

04:47 Method Of Arrival: EMS: [facility]                                                       kd3 

04:47 Acuity: TEMO 4                                                                           kd3 

                                                                                                  

Triage Assessment:                                                                                

04:49 General: Appears in no apparent distress. Behavior is calm, cooperative. Pain:          kd3 

      Complains of pain in left femoral area and left hip. Neuro: Level of Consciousness is       

      awake, alert, obeys commands, Oriented to person, place, time, situation. Respiratory:      

      Airway is patent Trachea midline Respiratory effort is even, unlabored, Respiratory         

      pattern is regular, symmetrical. Musculoskeletal: Circulation, motion, and sensation        

      intact. Capillary refill < 3 seconds, in bilateral toes.                                    

                                                                                                  

Historical:                                                                                       

- Allergies:                                                                                      

04:49 PENICILLINS;                                                                            kd3 

04:49 Risperdal;                                                                              kd3 

- PMHx:                                                                                           

04:49 Bipolar disorder; Hypertensive disorder;                                                kd3 

- PSHx:                                                                                           

04:49 righ hip surgery;                                                                       kd3 

                                                                                                  

- Immunization history:: Adult Immunizations up to date.                                          

- Social history:: Smoking status: Patient reports the use of cigarette tobacco                   

  products, smokes one pack cigarettes per day.                                                   

- Family history:: not pertinent.                                                                 

- Hospitalizations: : No recent hospitalization is reported.                                      

                                                                                                  

                                                                                                  

Screenin:09 [facility] ED Fall Risk Assessment (Adult) Score/Fall Risk Level 0 - 2 = Low Risk. Abuse  as6 

      screen: Denies threats or abuse. Denies injuries from another. Nutritional screening:       

      No deficits noted. Tuberculosis screening: No symptoms or risk factors identified.          

                                                                                                  

Vital Signs:                                                                                      

04:47  / 81; Pulse 78; Resp 19; Temp 97.9(O); Pulse Ox 99% ; Height 5 ft. 2 in. (157.48 kd3 

      cm); Pain 8/10;                                                                             

04:47 Weight 97.52 kg;                                                                        kd3 

06:09  / 55; Pulse 71; Resp 18 S; Pulse Ox 97% on R/A;                                  as6 

                                                                                                  

ED Course:                                                                                        

04:47 Patient arrived in ED.                                                                  kd3 

04:48 Jagjit De Los Santos MD is Attending Physician.                                                rn  

04:49 Triage completed.                                                                       kd3 

04:49 Arm band placed on.                                                                     kd3 

05:00 Ellen Amado, RN is Primary Nurse.                                                    kd3 

05:20 XRAY Hip LEFT 2 view In Process Unspecified.                                            EDMS

06:09 Bed in low position. Call light in reach. Side rails up X2.                             as6 

06:09 No provider procedures requiring assistance completed. Patient did not have IV access   as6 

      during this emergency room visit.                                                           

                                                                                                  

Administered Medications:                                                                         

05:02 Drug: HYDROcodone-acetaminophen 5 mg-325 mg 1 tabs Route: PO;                           kd3 

06:09 Follow up: Response: No adverse reaction                                                as6 

                                                                                                  

                                                                                                  

Medication:                                                                                       

06:09 VIS not applicable for this client.                                                     as6 

                                                                                                  

Outcome:                                                                                          

06:00 Discharge ordered by MD.                                                                rn  

06:09 Discharged to home ambulatory.                                                          as6 

06:09 Condition: stable                                                                           

06:09 Discharge instructions given to patient, Instructed on discharge instructions, follow       

      up and referral plans. medication usage, Demonstrated understanding of instructions,        

      follow-up care, medications, Prescriptions given X 2.                                       

06:10 Patient left the ED.                                                                    as6 

                                                                                                  

Signatures:                                                                                       

Dispatcher [employer]                           EDMS                                                 

Jagjit De Los Santos MD MD rn Slawson, Ashby, RN RN   as6                                                  

Ellen Amado, JÚNIOR CALLEJAS   kd3                                                  

                                                                                                  

**************************************************************************************************

## 2023-02-24 NOTE — EDPHYS
Physician Documentation                                                                           

 [facility]                                                                 

Name: Alka Judd                                                                               

Age: 52 yrs                                                                                       

Sex: Female                                                                                       

: 1970                                                                                   

MRN: [id number]                                                                                   

Arrival Date: 2023                                                                          

Time: 04:47                                                                                       

Account#: [id number]                                                                            

Bed 4                                                                                             

Private MD:                                                                                       

ED Physician Jagjit De Los Santos                                                                         

HPI:                                                                                              

                                                                                             

05:02 This 52 yrs old Female presents to ER via EMS with complaints of Leg Pain.              rn  

05:02 The patient presents with pain. The complaints affect the left upper thigh. Onset: The  rn  

      symptoms/episode began/occurred yesterday. Modifying factors: The symptoms are              

      alleviated by nothing. the symptoms are aggravated by movement, weight bearing.             

      Associated signs and symptoms: Pertinent negatives fever, swelling, warmth, weakness.       

      Severity of symptoms: At their worst the symptoms were moderate, in the emergency           

      department the symptoms are unchanged. The patient has not experienced similar symptoms     

      in the past. The patient has not recently seen a physician. Pt reports has chronic          

      right leg pain, now starting to have left leg pain. NO fall or direct trauma. No fever.     

      .                                                                                           

                                                                                                  

Historical:                                                                                       

- Allergies:                                                                                      

04:49 PENICILLINS;                                                                            kd3 

04:49 Risperdal;                                                                              kd3 

- PMHx:                                                                                           

04:49 Bipolar disorder; Hypertensive disorder;                                                kd3 

- PSHx:                                                                                           

04:49 righ hip surgery;                                                                       kd3 

                                                                                                  

- Immunization history:: Adult Immunizations up to date.                                          

- Social history:: Smoking status: Patient reports the use of cigarette tobacco                   

  products, smokes one pack cigarettes per day.                                                   

- Family history:: not pertinent.                                                                 

- Hospitalizations: : No recent hospitalization is reported.                                      

                                                                                                  

                                                                                                  

ROS:                                                                                              

05:02 Constitutional: Negative for fever, chills, and weight loss, Cardiovascular: Negative   rn  

      for chest pain, palpitations, and edema, Respiratory: Negative for shortness of breath,     

      cough, wheezing, and pleuritic chest pain, Abdomen/GI: Negative for abdominal pain,         

      nausea, vomiting, diarrhea, and constipation, Back: Negative for injury and pain,           

      MS/Extremity: + left hip pain Skin: Negative for injury, rash, and discoloration,           

      Neuro: Negative for headache, weakness, numbness, tingling, and seizure.                    

                                                                                                  

Exam:                                                                                             

05:02 Constitutional:  This is a well developed, well nourished patient who is awake, alert,  rn  

      and in no acute distress. Cardiovascular:  Regular rate and rhythm.  No pulse deficits.     

      Respiratory:  No increased work of breathing, no retractions or nasal flaring.              

      Abdomen/GI:  soft, non-tender Skin:  Warm, dry with normal turgor.  Normal color with       

      no rashes, no lesions, and no evidence of cellulitis. MS/ Extremity:  Pulses equal, no      

      cyanosis.  Neurovascular intact. Painful ROM left hip/groin.  Neuro:  Awake and alert,      

      GCS 15                                                                                      

                                                                                                  

Vital Signs:                                                                                      

04:47  / 81; Pulse 78; Resp 19; Temp 97.9(O); Pulse Ox 99% ; Height 5 ft. 2 in. (157.48 kd3 

      cm); Pain 8/10;                                                                             

04:47 Weight 97.52 kg;                                                                        kd3 

06:09  / 55; Pulse 71; Resp 18 S; Pulse Ox 97% on R/A;                                  as6 

                                                                                                  

MDM:                                                                                              

04:48 Patient medically screened.                                                             rn  

05:58 Differential diagnosis: closed fracture, tendonitis, muscle strain, arthritis,          rn  

      compensatory pain. Data reviewed: vital signs, nurses notes, radiologic studies, plain      

      films, and as a result, I will discharge patient. Independent interpretation of the         

      following test(s) in the Emergency Department X-Ray: My interpretation is Xray left hip     

      images neg for fracture/dislocation. Counseling: I had a detailed discussion with the       

      patient and/or guardian regarding: the historical points, exam findings, and any            

      diagnostic results supporting the discharge/admit diagnosis, radiology results, the         

      need for outpatient follow up, to return to the emergency department if symptoms worsen     

      or persist or if there are any questions or concerns that arise at home. Response to        

      treatment: the patient's symptoms have mildly improved after treatment, and as a            

      result, I will discharge patient. Special discussion: I discussed with the                  

      patient/guardian in detail that at this point there is no indication for admission to       

      the hospital. It is understood, however, that if the symptoms persist or worsen the         

      patient needs to return immediately for re-evaluation.                                      

                                                                                                  

                                                                                             

04:58 Order name: XRAY Hip LEFT 2 view                                                        rn  

                                                                                                  

Administered Medications:                                                                         

05:02 Drug: HYDROcodone-acetaminophen 5 mg-325 mg 1 tabs Route: PO;                           kd3 

06:09 Follow up: Response: No adverse reaction                                                as6 

                                                                                                  

                                                                                                  

Disposition Summary:                                                                              

23 06:00                                                                                    

Discharge Ordered                                                                                 

      Location: Home                                                                          rn  

      Problem: new                                                                            rn  

      Symptoms: have improved                                                                 rn  

      Condition: Stable                                                                       rn  

      Diagnosis                                                                                   

        - Pain in left leg                                                                    rn  

      Followup:                                                                               rn  

        - With: Private Physician                                                                  

        - When: As needed                                                                          

        - Reason: Recheck today's complaints, Re-evaluation by your physician                      

      Discharge Instructions:                                                                     

        - Discharge Summary Sheet                                                             rn  

        - Musculoskeletal Pain                                                                rn  

        - Heat Therapy                                                                        rn  

      Forms:                                                                                      

        - Medication Reconciliation Form                                                      rn  

        - Thank You Letter                                                                    rn  

        - Antibiotic Education                                                                rn  

        - Prescription Opioid Use                                                             rn  

      Prescriptions:                                                                              

        - Ibuprofen 800 mg Oral Tablet                                                             

            - take 1 tablet by ORAL route every 12 hours As needed take with food; 20 tablet; rn  

      Refills: 0, Product Selection Permitted                                                     

        - Cyclobenzaprine 5 mg Oral Tablet                                                         

            - take 1 tablet by ORAL route 3 times per day As needed; 15 tablet; Refills: 0,   rn  

      Product Selection Permitted                                                                 

Signatures:                                                                                       

Dispatcher [employer]                           Jagjit Reis MD MD rn Doucette, Kyli, RN                      RN   kd3                                                  

Ramone Montgomery RN   as6                                                  

                                                                                                  

**************************************************************************************************

## 2023-02-24 NOTE — XMS REPORT
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                          (four)           Hos

chris



     NE-DM)      00   :00                           times a           l



     6.25-15                                         day as           



     mg/5 mL                                         needed for           



     syrup                                         cough for           



                                                  up to 30           



                                                  days.           

 

     ibuprofen      2023- Yes            600mg Q6H  Take 1           [facility]

[facility]



     (ADVIL) 600      16 -19                          tablet           st



     MG tablet      00:00: 04:59                          (600 mg           [facility]

[facility]



               00   :00                           total) by           l



                                                  mouth           



                                                  every 6           



                                                  (six)           



                                                  hours as           



                                                  needed for           



                                                  mild pain,           



                                                  headaches           



                                                  or fever           



                                                  for up to           



                                                  30 days.           

 

     omeprazole      2023- Yes            40mg QD   Take 1           [facility]



     (PriLOSEC)      2-15 03-17                          capsule           [facility]



     40 MG      00:00: 23:59                          (40 mg           [facility]



     capsule      00   :00                           total) by           Center



                                                  mouth           



                                                  daily for           



                                                  30 days.           

 

     omeprazole      2023- No             40mg QD   Take 1           [facility]



     (PriLOSEC)      2-15 02-15                          capsule           [facility]



     40 MG      00:00: 00:00                          (40 mg           Medical



     capsule      00   :00                           total) by           Center



                                                  mouth           



                                                  daily for           



                                                  30 days.           

 

     Pepcid 20            Yes                      20 mg = 1           [facility]

[facility]



     mg oral      2-14                               tab, PO,           l



     tablet      22:50:                               BID, # 60           Daniel

n



               00                                 tab, 0           



                                                  Refill(s)           

 

     Bentyl 10            Yes                      10 mg = 1           [facility]

[facility]



     mg oral      2-14                               cap, PO,           l



     capsule      22:50:                               QID-Before           [facility]

ruddy



               00                                 Meals, #           



                                                  40 cap, 0           



                                                  Refill(s)           

 

     ondansetron      2022- No             4mg       4 mg, Slow          

 [facility]



     (ZOFRAN      3-31 03-31                          IV Push,           ity of



     (PF))      20:15: 19:31                          ONCE, 1           [place]



     injection 4      00   :00                           dose, On           Medi

staci



     mg                                           Thu            [facility]



                                                  3/31/22 at           



                                                  1515,           



                                                  Routine           

 

     morpHINE      2022- No             4mg       4 mg, Slow           Un

donita



     injection 4      3-31 03-31                          IV Push,           ity

 of



     mg        20:15: 19:33                          ONCE, 1           [place]



               00   :00                           dose, On           Medical



                                                  Thu            [facility]



                                                  3/31/22 at           



                                                  1515, STAT           

 

     No known            No                                      [facility]



     medications      3-31                                              ity of



               11:41:                                              [place]



               00                                                Medical



                                                                 [facility]

 

     lithium            Yes                      2 (two)           [facility]



     carbonate      3-31                               times a           ity of



      mg      10:29:                               day            [place]



     SR tablet      56                                                [facility]

 

     ziprasidone            Yes                      1 (one)           Uni

vers



     80 mg      3-31                               time each           ity of



     capsule      10:29:                               day            [place]



               56                                                [facility]

 

     lithium            Yes                      2 (two)           [facility]



     carbonate      3-31                               times a           ity of



      mg      10:29:                               day            [place]



     SR tablet      56                                                [facility]

 

     ziprasidone            Yes                      1 (one)           Uni

vers



     80 mg      3-31                               time each           ity of



     capsule      10:29:                               day            [address]

 

     lithium            Yes                      2 (two)           [facility]



     carbonate      3-31                               times a           ity of



      mg      10:29:                               day            [place]



     SR tablet      56                                                [facility]

 

     ziprasidone      0      Yes                      1 (one)           Uni

vers



     80 mg      3-31                               time each           ity of



     capsule      10:29:                               day            [address]

 

     atorvastati      2021- No             10mg      Take 10 mg          

 [facility]



     n 10 mg      - 11-17                          by mouth.           ity of



     tablet      00:00: 05:59                                         [place]



               00   :00                                          [facility]

 

     atorvastati      2021- No             10mg      Take 10 mg          

 [facility]



     n 10 mg       11-17                          by mouth.           ity of



     tablet      00:00: 05:59                                         [place]



               00   :00                                          [facility]

 

     lithium            Yes            150mg Q.[id number] Take 150          

 [facility]



     carbonate      7-16                          [id number] mg by           [facility]



     ([facility])      20:13:                          3D   mouth 3           



     150 mg      54                                 times           



     capsule                                         daily with           



                                                  meals.           

 

     DULoxetine      0      Yes                 QD   Take by           [facility]

is



     (CYMBALTA)      7-16                               mouth           Health



     20 mg      20:13:                               daily.           



     delayed      54                                                



     release                                                        



     capsule                                                        

 

     lithium      0      Yes            150mg Q.[id number] Take 150          

 [place]



     carbonate      7-16                          [id number] mg by           [facility]



     (ESLI)      20:13:                          3D   mouth 3           



     150 mg      54                                 times           



     capsule                                         daily with           



                                                  meals.           

 

     DULoxetine      -0      Yes                 QD   Take by           [facility]

is



     (CYMBALTA)      7-16                               mouth           Health



     20 mg      20:13:                               daily.           



     delayed      54                                                



     release                                                        



     capsule                                                        

 

     lithium      -0      Yes            150mg Q.[id number] Take 150          

 [facility]



     carbonate      7-16                          [id number] mg by           [facility]



     (ESKALI)      20:13:                          3D   mouth 3           



     150 mg      54                                 times           



     capsule                                         daily with           



                                                  meals.           

 

     DULoxetine      -0      Yes                 QD   Take by           [facility]

is



     (CYMBALTA)      7-16                               mouth           Health



     20 mg      20:13:                               daily.           



     delayed      54                                                



     release                                                        



     capsule                                                        

 

     lithium            Yes            150mg Q.[id number] Take 150          

 [facility]



     carbonate      7-16                          [id number] mg by           [facility]



     (ESKALITH)      20:13:                          3D   mouth 3           



     150 mg      54                                 times           



     capsule                                         daily with           



                                                  meals.           

 

     DULoxetine      -0      Yes                 QD   Take by           [facility]

is



     (CYMBALTA)      7-16                               mouth           Health



     20 mg      20:13:                               daily.           



     delayed      54                                                



     release                                                        



     capsule                                                        

 

     lithium            Yes            150mg Q.[id number] Take 150          

 [facility]



     carbonate      7-16                          [id number] mg by           [facility]



     (ESKALITH)      20:13:                          3D   mouth 3           



     150 mg      54                                 times           



     capsule                                         daily with           



                                                  meals.           

 

     DULoxetine      -0      Yes                 QD   Take by           [facility]

is



     (CYMBALTA)      7-16                               mouth           Health



     20 mg      20:13:                               daily.           



     delayed      54                                                



     release                                                        



     capsule                                                        

 

     lithium            Yes            150mg Q.[id number] Take 150          

 [facility]



     carbonate      7-16                          [id number] mg by           [facility]



     (ESKALITH)      20:13:                          3D   mouth 3           



     150 mg      54                                 times           



     capsule                                         daily with           



                                                  meals.           

 

     DULoxetine      -0      Yes                 QD   Take by           [facility]

is



     (CYMBALTA)      7-16                               mouth           Health



     20 mg      20:13:                               daily.           



     delayed      54                                                



     release                                                        



     capsule                                                        

 

     lithium            Yes            150mg Q.[id number] Take 150          

 [facility]



     carbonate      7-16                          [id number] mg by           [facility]



     (ESKALITH)      20:13:                          3D   mouth 3           



     150 mg      54                                 times           



     capsule                                         daily with           



                                                  meals.           

 

     DULoxetine      -0      Yes                 QD   Take by           [facility]

is



     (CYMBALTA)      7-16                               mouth           Health



     20 mg      20:13:                               daily.           



     delayed      54                                                



     release                                                        



     capsule                                                        

 

     lithium            Yes            150mg Q.[id number] Take 150          

 [place]



     carbonate      7-16                          [id number] mg by           [facility]



     (ESKALITH)      20:13:                          3D   mouth 3           



     150 mg      54                                 times           



     capsule                                         daily with           



                                                  meals.           

 

     DULoxetine      -0      Yes                 QD   Take by           [facility]

is



     (CYMBALTA)      7-16                               mouth           Health



     20 mg      20:13:                               daily.           



     delayed      54                                                



     release                                                        



     capsule                                                        

 

     ibuprofen            Yes       Dental 800mg      Take 1           [facility]

ris



     (MOTRIN)      7-16                abscess           tablet by           [facility]



     800 mg      00:00:                               mouth           



     tablet      00                                 every 8           



                                                  hours as           



                                                  needed for           



                                                  Pain.           

 

     ibuprofen            Yes       Dental 800mg      Take 1           [facility]

ris



     (MOTRIN)      7-16                abscess           tablet by           [facility]

[facility]



     800 mg      00:00:                               mouth           



     tablet      00                                 every 8           



                                                  hours as           



                                                  needed for           



                                                  Pain.           

 

     ibuprofen            Yes       Dental 800mg      Take 1           [facility]

ris



     (MOTRIN)      7-16                abscess           tablet by           [facility]

[facility]



     800 mg      00:00:                               mouth           



     tablet      00                                 every 8           



                                                  hours as           



                                                  needed for           



                                                  Pain.           

 

     ibuprofen            Yes       Dental 800mg      Take 1           [facility]

ris



     (MOTRIN)      7-16                abscess           tablet by           [facility]

[facility]



     800 mg      00:00:                               mouth           



     tablet      00                                 every 8           



                                                  hours as           



                                                  needed for           



                                                  Pain.           

 

     ibuprofen            Yes       Dental 800mg      Take 1           [facility]

ris



     (MOTRIN)      7-16                abscess           tablet by           [facility]

[facility]



     800 mg      00:00:                               mouth           



     tablet      00                                 every 8           



                                                  hours as           



                                                  needed for           



                                                  Pain.           

 

     ibuprofen            Yes       Dental 800mg      Take 1           [facility]

ris



     (MOTRIN)      7-16                abscess           tablet by           [facility]

l[facility]



     800 mg      00:00:                               mouth           



     tablet      00                                 every 8           



                                                  hours as           



                                                  needed for           



                                                  Pain.           

 

     ibuprofen            Yes       Dental 800mg      Take 1           [facility]

ris



     (MOTRIN)      7-16                abscess           tablet by           [facility]

[facility]



     800 mg      00:00:                               mouth           



     tablet      00                                 every 8           



                                                  hours as           



                                                  needed for           



                                                  Pain.           

 

     ibuprofen            Yes       Dental 800mg      Take 1           [facility]

ris



     (MOTRIN)      7-16                abscess           tablet by           [facility]

[facility]



     800 mg      00:00:                               mouth           



     tablet      00                                 every 8           



                                                  hours as           



                                                  needed for           



                                                  Pain.           







Vital Signs







             Vital Name   Observation Time Observation Value Comments     Source

 

             HEIGHT       2023-02-15 16:56:00 157.5 cm                  

 

             WEIGHT       2023-02-15 16:56:00 100.245 kg                

 

             HEIGHT       2023-02-15 16:56:00 157.5 cm                  

 

             WEIGHT       2023-02-15 16:56:00 100.245 kg                

 

             Systolic blood 2022 19:30:00 176 mm[Hg]                Univer

sity of



             pressure                                            [facility]

 

             Diastolic blood 2022 19:30:00 94 mm[Hg]                 Unive

rsity of



             [facility]

 

             Heart rate   2022 19:30:00 76 /min                   [facility]

[facility]

 

             Respiratory rate 2022 19:30:00 11 /min                   [facility]

[facility]

 

             Oxygen saturation in 2022 19:30:00 95 /min                   

[facility]



             Arterial blood by                                        [place] [facility]

st[facility]



             Pulse oximetry                                        [facility]

 

             Body temperature 2022 17:54:00 37.22 Danisha                 [facility]

 

             Body height  2022 17:54:00 157.5 cm                  [facility]

[facility]

 

             Body weight  2022 17:54:00 90.719 kg                 [facility]

[facility]

 

             BMI          2022 17:54:00 36.58 kg/m2               [facility]

[facility]

 

             Body height  2022 15:29:00 160 cm                    [facility]

[facility]

 

             Body weight  2022 15:29:00 90.719 kg                 [facility]

[facility]

 

             BMI          2022 15:29:00 35.43 kg/m2               [facility]

[facility]

 

             Height/Length 2022 08:36:33 160 cm                    



             Measured                                            

 

             Weight Dosing 2022 08:36:33 105.00 kg                 

 

             Body height  2023 08:42:00 157.5 cm                  [facility]

 

             Body weight  2023 08:42:00 100.245 kg                [facility]

 

             BMI          2023 08:42:00 40.42 kg/m2               [facility]

 

             Systolic blood 2023 08:29:26 114 mm[Hg]                [facility]



             pressure                                            

 

             Diastolic blood 2023 08:29:26 76 mm[Hg]                 [facility]



             pressure                                            

 

             Heart rate   2023 08:29:26 86 /min                   [facility]

 

             Body temperature 2023 08:29:26 36.78 Danisha                 [facility] [place]

 

             Respiratory rate 2023 08:29:26 18 /min                   [facility] [place]

 

             Oxygen saturation in 2023 08:29:26 97 /min                   

[facility]



             Arterial blood by                                        



             Pulse oximetry                                        

 

             Systolic blood 2023-02-15 22:01:00 124 mm[Hg]                [facility]

 

             Diastolic blood 2023-02-15 22:01:00 66 mm[Hg]                 [facility]

[facility]

 

             Heart rate   2023-02-15 22:01:00 88 /min                   [facility]

 

             Body temperature 2023-02-15 22:01:00 37.17 Danisha                 [facility]

 

             Respiratory rate 2023-02-15 22:01:00 16 /min                   [facility]

 

             Oxygen saturation in 2023-02-15 22:01:00 100 /min                  

[facility]



             Arterial blood by                                        Medical Ce

nter



             Pulse oximetry                                        

 

             Body height  2023-02-15 16:56:00 157.5 cm                  [facility]

 

             Body weight  2023-02-15 16:56:00 100.245 kg                [facility]

 

             BMI          2023-02-15 16:56:00 40.42 kg/m2               [facility]

 

             Heart Rate   2023 22:58:16                           [facility]

 

             Systolic (mm Hg) 2023 22:58:00                           [facility]

[facility]

 

             Diastolic (mm Hg) 2023 22:58:00                           [facility]

 

             Temperature Oral (F) 2023 18:24:49 98.5 F                    

[facility]

 

             Height       2023 14:59:00 5 [ft_i]                  [facility]

 [place]

 

             BMI Calculated 2023 14:59:00                           [facility]

[facility]

 

             Weight       2023 14:59:00                           [facility]

 [facility]







Procedures







                Procedure       Date / Time     Performing Clinician Source



                                Performed                       

 

                COVID-19, INFLUENZA 2023 10:39:00 Alejo Escalante [facility]



                A&B, AND RSV                    [facility]          



                QUALITATIVE RT-PCR                                 

 

                XR CHEST 1 VW PORTABLE 2023 09:16:40 Alejo Escalante [facility]

[facility]



                                                Gianni          

 

                ASSIGNMENT OF BENEFITS 2023 19:39:54 Doctor Unassigned, No

 [facility]



                                                Name            Medical Branch

 

                XR CHEST 1 VW   2022 18:39:34 Singer, [facility]

 

                XR PELVIS <3 VW 2022 18:39:34 Singer, [facility]

 

                URINALYSIS      2022 18:19:00 Singer, [facility]

 

                COMP. METABOLIC PANEL 2022 18:08:00 [facility]

[facility] [place]



                ([id number])                                         Medical Branch

 

                CBC WITH DIFF   2022 18:08:00 Singer, [facility]

 

                COVID-19 (ID NOW RAPID 2022 18:08:00 Jack Olmos

[facility]



                TESTING)                                        Medical Branch

 

                REFERRAL-       2022 05:01:00 Doctor Unassigned, [facility] [place]



                REQUEST/RESPONSE                 Name            Medical Branch

 

                [id number]         2020 00:00:00 CANAL.02        [facility]







Plan of Care







             Planned Activity Planned Date Details      Comments     Source

 

             Future Scheduled 2023   COVID-19 VACCINE (#1)              [facility]



             Test         19:28:05     [code = COVID-19              



                                       VACCINE (#1)]              

 

             Future Scheduled 2023   Pneumococcal Vaccine:              [facility]



             Test         19:28:05     Pediatrics (0 to 5              



                                       Years) and At-Risk              



                                       Patients (6 to 64              



                                       Years) (1 - PCV) [code              



                                       = Pneumococcal              



                                       Vaccine: Pediatrics (0              



                                       to 5 Years) and              



                                       At-Risk Patients (6 to              



                                       64 Years) (1 - PCV)]              

 

             Future Scheduled 2023   Hepatitis C screening              [facility]



             Test         19:28:05     (procedure) [code =              



                                       311792167]                

 

             Future Scheduled 2023   COLONOSCOPY SCREENING              [facility]



             Test         19:28:05     [code = COLONOSCOPY              



                                       SCREENING]                

 

             Future Scheduled 2023   SHINGLES VACCINES (1              Met

[facility]



             Test         19:28:05     of 2) [code = SHINGLES              



                                       VACCINES (1 of 2)]              

 

             Future Scheduled 2023   [facility]FLU[facility]ZA VACCINE              [facility]



             Test         19:28:05     [code = INFLUENZA              



                                       VACCINE]                  

 

             Future Scheduled 2023   Medicare IPPE (WELCOME              C

[facility]



             Test         00:00:00     TO MEDICARE) [code =              Medical

 Center



                                       Medicare IPPE (WELCOME              



                                       TO MEDICARE)]              

 

             Future Scheduled 2023   DEPRESSION SCREENING              [facility]



             Test         00:00:00     (12+) [code =              Medical Center



                                       DEPRESSION SCREENING              



                                       (12+)]                    

 

             Future Scheduled 2022-10-01   IMM Influenza Seasonal              [facility]



             Test         00:00:00     (>/= 19 yrs) [code =              



                                       IMM Influenza Seasonal              



                                       (>/= 19 yrs)]              

 

             Future Scheduled 2022-10-01   IMM Influenza Seasonal              [facility]

[facility]



             Test         00:00:00     (>/= 19 yrs) [code =              



                                       IMM Influenza Seasonal              



                                       (>/= 19 yrs)]              

 

             Future Scheduled 2022-10-01   IMM Influenza Seasonal              [facility]



             Test         00:00:00     (>/= 19 yrs) [code =              



                                       IMM Influenza Seasonal              



                                       (>/= 19 yrs)]              

 

             Future Scheduled 2022-10-01   IMM Influenza Seasonal              [facility]

[facility]



             Test         00:00:00     (>/= 19 yrs) [code =              



                                       IMM Influenza Seasonal              



                                       (>/= 19 yrs)]              

 

             Future Scheduled 2022-10-01   IMM Influenza Seasonal              [facility]



             Test         00:00:00     (>/= 19 yrs) [code =              



                                       IMM Influenza Seasonal              



                                       (>/= 19 yrs)]              

 

             Future Scheduled 2022-10-01   IMM Influenza Seasonal              [facility]



             Test         00:00:00     (>/= 19 yrs) [code =              



                                       IMM Influenza Seasonal              



                                       (>/= 19 yrs)]              

 

             Future Scheduled 2022-10-01   IMM Influenza Seasonal              [facility]



             Test         00:00:00     (>/= 19 yrs) [code =              



                                       IMM Influenza Seasonal              



                                       (>/= 19 yrs)]              

 

             Future Scheduled 2022-10-01   IMM Influenza Seasonal              [facility]



             Test         00:00:00     (>/= 19 yrs) [code =              



                                       IMM Influenza Seasonal              



                                       (>/= 19 yrs)]              

 

             Future Scheduled 2022   INFLUENZA VACCINE (#1)              [facility]

[facility]



             Test         00:00:00     [code = INFLUENZA              Medical Ce

nter



                                       VACCINE (#1)]              

 

             Future Scheduled 2018   Screening for              [facility]

[facility]



             Test         00:00:00     malignant neoplasm of              



                                       colon (procedure)              



                                       [code = 010357358]              

 

             Future Scheduled 2018   Screening for              [facility]

[facility]



             Test         00:00:00     malignant neoplasm of              



                                       colon (procedure)              



                                       [code = 364664345]              

 

             Future Scheduled 2018   Screening for              [facility]

[facility]



             Test         00:00:00     malignant neoplasm of              



                                       colon (procedure)              



                                       [code = 234345341]              

 

             Future Scheduled 2018   Screening for              [facility]

[facility]



             Test         00:00:00     malignant neoplasm of              



                                       colon (procedure)              



                                       [code = 271715975]              

 

             Future Scheduled 2018   Screening for              [facility]

[facility]



             Test         00:00:00     malignant neoplasm of              



                                       colon (procedure)              



                                       [code = 937475515]              

 

             Future Scheduled 2018   Screening for              [facility]

[facility]



             Test         00:00:00     malignant neoplasm of              



                                       colon (procedure)              



                                       [code = 504360721]              

 

             Future Scheduled 2018   Screening for              [facility]

[facility]



             Test         00:00:00     malignant neoplasm of              



                                       colon (procedure)              



                                       [code = 608977239]              

 

             Future Scheduled 2018   SHINGLES VACCINES (1              [facility]



             Test         00:00:00     of 2) [code = SHINGLES              Medic

al Center



                                       VACCINES (1 of 2)]              

 

             Future Scheduled 2018   Screening for              [facility]

[facility]



             Test         00:00:00     malignant neoplasm of              



                                       colon (procedure)              



                                       [code = 686006097]              

 

             Future Scheduled 2013   Lipid panel               [facility]

s



             Test         00:00:00     (procedure) [code =              [facility] 

Center



                                       [id number]]                 

 

             Future Scheduled 2008   Breast Cancer Scrn              [facility]



             Test         00:00:00     (Yearly) [code =              



                                       Breast Cancer Scrn              



                                       (Yearly)]                 

 

             Future Scheduled 2008   Breast Cancer Scrn              [facility]



             Test         00:00:00     (Yearly) [code =              



                                       Breast Cancer Scrn              



                                       (Yearly)]                 

 

             Future Scheduled 2008   Breast Cancer Scrn              [facility]



             Test         00:00:00     (Yearly) [code =              



                                       Breast Cancer Scrn              



                                       (Yearly)]                 

 

             Future Scheduled 2008   Breast Cancer Scrn              [facility]



             Test         00:00:00     (Yearly) [code =              



                                       Breast Cancer Scrn              



                                       (Yearly)]                 

 

             Future Scheduled 2008   Breast Cancer Scrn              [facility]



             Test         00:00:00     (Yearly) [code =              



                                       Breast Cancer Scrn              



                                       (Yearly)]                 

 

             Future Scheduled 2008   Breast Cancer Scrn              [facility]



             Test         00:00:00     (Yearly) [code =              



                                       Breast Cancer Scrn              



                                       (Yearly)]                 

 

             Future Scheduled 2008   Breast Cancer Scrn              [facility]



             Test         00:00:00     (Yearly) [code =              



                                       Breast Cancer Scrn              



                                       (Yearly)]                 

 

             Future Scheduled 2008   Breast Cancer Scrn              [facility]



             Test         00:00:00     (Yearly) [code =              



                                       Breast Cancer Scrn              



                                       (Yearly)]                 

 

             Future Scheduled 1998   Screening for              [facility]

[facility]



             Test         00:00:00     malignant neoplasm of              



                                       cervix (procedure)              



                                       [code = 075736787]              

 

             Future Scheduled 1998   Screening for              [facility]

[facility]



             Test         00:00:00     malignant neoplasm of              



                                       cervix (procedure)              



                                       [code = 677992004]              

 

             Future Scheduled 1998   Screening for              [facility]

[facility]



             Test         00:00:00     malignant neoplasm of              



                                       cervix (procedure)              



                                       [code = 183834338]              

 

             Future Scheduled 1998   Screening for              [facility]

[facility]



             Test         00:00:00     malignant neoplasm of              



                                       cervix (procedure)              



                                       [code = 949319650]              

 

             Future Scheduled 1998   Screening for              [facility]

[facility]



             Test         00:00:00     malignant neoplasm of              



                                       cervix (procedure)              



                                       [code = 473880545]              

 

             Future Scheduled 1998   Screening for              [facility]

[facility]



             Test         00:00:00     malignant neoplasm of              



                                       cervix (procedure)              



                                       [code = 156705068]              

 

             Future Scheduled 1998   Screening for              [facility]

[facility]



             Test         00:00:00     malignant neoplasm of              



                                       cervix (procedure)              



                                       [code = 667533486]              

 

             Future Scheduled 1998   Screening for              [facility]

[facility]



             Test         00:00:00     malignant neoplasm of              



                                       cervix (procedure)              



                                       [code = 235498952]              

 

             Future Scheduled 1998   Screening for              [facility]

[facility]



             Test         00:00:00     malignant neoplasm of              



                                       cervix (procedure)              



                                       [code = 506120845]              

 

             Future Scheduled 1998   Screening for              [facility]

[facility]



             Test         00:00:00     malignant neoplasm of              



                                       cervix (procedure)              



                                       [code = 201229351]              

 

             Future Scheduled 1998   Screening for              [facility]

[facility]



             Test         00:00:00     malignant neoplasm of              



                                       cervix (procedure)              



                                       [code = 661730647]              

 

             Future Scheduled 1998   Screening for              [facility]

[facility]



             Test         00:00:00     malignant neoplasm of              



                                       cervix (procedure)              



                                       [code = 060644418]              

 

             Future Scheduled 1998   Screening for              [facility]

[facility]



             Test         00:00:00     malignant neoplasm of              



                                       cervix (procedure)              



                                       [code = 620315758]              

 

             Future Scheduled 1998   Screening for              [facility]

[facility]



             Test         00:00:00     malignant neoplasm of              



                                       cervix (procedure)              



                                       [code = 352122190]              

 

             Future Scheduled 1998   Screening for              [facility]

[facility]



             Test         00:00:00     malignant neoplasm of              



                                       cervix (procedure)              



                                       [code = 862513675]              

 

             Future Scheduled 1998   Screening for              [facility]

[facility]



             Test         00:00:00     malignant neoplasm of              



                                       cervix (procedure)              



                                       [code = 296064580]              

 

             Future Scheduled 1989   Screening for              [facility]

es



             Test         00:00:00     malignant neoplasm of              [facility]

[facility] Center



                                       cervix (procedure)              



                                       [code = 859062805]              

 

             Future Scheduled 1987   DTAP/TDAP/TD VACCINES              [facility]



             Test         00:00:00     (1 - Tdap) [code =              Medical C

enter



                                       DTAP/TDAP/TD VACCINES              



                                       (1 - Tdap)]               

 

             Future Scheduled 1986   HEPATITIS C SCREENING              [facility]



             Test         00:00:00     [code = HEPATITIS C              Medical 

Center



                                       SCREENING]                

 

             Future Scheduled 1980   Tobacco Cessation              [facility]



             Test         00:00:00     Counseling and              Medical Cente

r



                                       Screening (12+) [code              



                                       = Tobacco Cessation              



                                       Counseling and              



                                       Screening (12+)]              

 

             Future Scheduled 1974   PNEUMOCOCCAL VACCINE              [facility]



             Test         00:00:00     0-64 YRS (1 - PCV)              Medical C

enter



                                       [code = PNEUMOCOCCAL              



                                       VACCINE 0-64 YRS (1 -              



                                       PCV)]                     

 

             Future Scheduled 1969   COVID-19 Vaccine (#1)              [facility]



             Test         00:00:00     [code = COVID-19              



                                       Vaccine (#1)]              

 

             Future Scheduled 1969   COVID-19 Vaccine (#1)              [facility]



             Test         00:00:00     [code = COVID-19              



                                       Vaccine (#1)]              

 

             Future Scheduled 1969   COVID-19 Vaccine (#1)              [facility]



             Test         00:00:00     [code = COVID-19              



                                       Vaccine (#1)]              

 

             Future Scheduled 1969   COVID-19 Vaccine (#1)              [facility]



             Test         00:00:00     [code = COVID-19              



                                       Vaccine (#1)]              

 

             Future Scheduled 1969   COVID-19 Vaccine (#1)              [facility]



             Test         00:00:00     [code = COVID-19              



                                       Vaccine (#1)]              

 

             Future Scheduled 1969   COVID-19 Vaccine (#1)              [facility]



             Test         00:00:00     [code = COVID-19              



                                       Vaccine (#1)]              

 

             Future Scheduled 1969   COVID-19 Vaccine (#1)              [facility]



             Test         00:00:00     [code = COVID-19              



                                       Vaccine (#1)]              

 

             Future Scheduled 1969   COVID-19 Vaccine (#1)              [facility]



             Test         00:00:00     [code = COVID-19              



                                       Vaccine (#1)]              

 

             Future Scheduled 1969   COVID-19 VACCINE (#1)              [facility]



             Test         00:00:00     [code = COVID-19              [facility]

ter



                                       VACCINE (#1)]              

 

             Future Scheduled 1968   Screening for              [facility]

es



             Test         00:00:00     malignant neoplasm of              [facility]

l Center



                                       breast (procedure)              



                                       [code = 479316863]              

 

             Future Scheduled 1968   CT Colon[facility]              [facility]



             Test         00:00:00     (combo) [code = CT              Medical C

enter



                                       Colonography (combo)]              

 

             Future Scheduled 1968   Screening for              [facility]

es



             Test         00:00:00     malignant neoplasm of              [facility]

l Center



                                       colon (procedure)              



                                       [code = 555959531]              

 

             Future Scheduled 1968   Screening for              [facility]

es



             Test         00:00:00     malignant neoplasm of              [facility]

l Center



                                       colon (procedure)              



                                       [code = 774712449]              

 

             Future Scheduled 1968   Screening for              [facility]

es



             Test         00:00:00     malignant neoplasm of              [facility]

l Center



                                       colon (procedure)              



                                       [code = [id number]]              

 

             Future Scheduled 1968   Screening for              [facility]

es



             Test         00:00:00     malignant neoplasm of              [facility]

[facility] Center



                                       colon (procedure)              



                                       [code = 878973501]              

 

             Future Scheduled 1968   Sigmoidoscopy [code =              [facility]

[facility]



             Test         00:00:00     Sigmoidoscopy]              [facility]

r







Encounters







        Start   End     Encounter Admission Attending Care    Care    Encounter 

Source



        Date/Time Date/Time Type    Type    Clinicians Facility Department ID   

   

 

        2023         Emergency                 HFD     [facility]     [id number] 

PIERRE -



        09:45:36                                                         [facility]



                                                                        ent

 

        2022         Outpatient         SARATH, [facility]     [id number]

-2 [place]



        01:05:17                         MELANIE                  [id number] [facility]

 

        2022         Outpatient         VIRGIE,  [facility]     [id number]

 [place]



        03:15:41                         JOAQUIN                 [id number] [facility]

 

        2022         Outpatient         VIRGIE,  [facility]     [id number]

 [place]



        15:19:55                         JOAQUIN                 [id number] [facility]

 

        2022         Outpatient                 [facility]     [id number]

 [place]



        15:28:25                                                 [id number] [facility]

 

        2022         Outpatient                 [facility]     [id number]

 [place]



        12:00:51                                                 [id number] [facility]

 

        2022         Outpatient                 [facility]     [id number]

 [place]



        15:13:02                                                 [id number] [facility]

 

        2020         Inpatient                 [facility]    [id number] 

[facility]



        03:17:00                                                 58      [facility]

 

        2023 Emergency         Boris, 1.2.840.1 [id number]

[id number] Methodi



        02:46:00 05:35:00                 Alejo 83059.1.1         [address]     [address]



                                        Gianni  3.430.2.7                 Hospit

a



                                                .3.539946                 l



                                                .8                      

 

        2023-02-15 2023 Emergency ER      [facility]IN [facility]IT[facility]ER [facility]    Emergency 20

[id number] [facility]



        17:58:00 00:07:00                                                 

 

        2023-02-15 2023 Emergency         Justin Whitaker [facility]   [id number]

[id number] [facility]



        17:58:00 00:07:00                 [facility]

 

        2023 [facility]     [facility]     [id number]

[id number]



        00:00:00 00:00:00                 ALEJO                 931     [address]

di



                                                                        st

 

        2023 Documentat         Dangelo, 1.2.840.1 [id number]

[id number] Methodi



        00:00:00 00:00:00 ion             Arnaldo 51813.1.1         [address]     st



                                                3.430.2.7                 Hospit

a



                                                .3.523390                 l



                                                .8                      

 

        2023 Travel                  1.2.840.1 1.2.070.363 [id number]

[id number] Methodi



        00:00:00 00:00:00                         46364.1.1 350.1.13.43 977     

st



                                                3.430.2.7 0.2.7.3.698         Ho

spita



                                                .3.420945 084.8           l



                                                .8                      

 

        2023 2023-02-15 Emergency Emergency John Melton [facility]   JM0

[id number] 

[facility]



        19:56:00 06:17:00                                         79      

 

        2023 Emergency                 [facility] [id number]

975 [facility]



        14:56:57 23:07:00                                 [address]

 

        2023 Emergency                 [facility]   [id number]

[id number] [facility]



        19:56:00 19:56:00                                         79      

 

        2023 Outpatient         Swati [facility]   [id number]

[id number] 



        08:56:57 17:07:00                 Ishan                  Cecilia Guzman                          

 

        2023 Emergency E       SWATI [facility]    7500   

 [facility]



        08:56:00 17:07:00                 ISHAN                          

 

        2023 Outpatient R       OVIDIO  [facility]    [id number]

[id number] [facility]



        14:00:00 14:00:00                 ANCELMO brock [facility]

 

        2023 Orders          Doctor  [place]    1.2.840.114 [id number]

[id number] [facility]



        00:00:00 00:00:00 Only            Unassigned, SHONA   350.1.13.10       

  ity of



                                        [facility] 4.2.7.2.686         Roly

as



                                                        [phone]         Medi

staci



                                                        009             Branch

 

        2022 Inpatient         SANCHEZ, [facility]    MED       

  [facility]



        19:24:00 19:40:00                 LEXI                         

 

        2022 Emergency E       HEATHER, [facility]     

   [facility]



        14:56:00 18:09:00                 DEMARIO                           

 

        2022 [facility]     [id number]

[id number] [facility]



        12:56:00 15:05:00                 JACK brock [facility]

 

        2022 [facility]         [facility]    [id number] [id number]

7614 [facility]



        12:56:00 15:05:00                 Jack HAMM 350.1.13.10         i

ty of



                                                [place] 4.2.7.2.686         [facility]  [phone]         Medi

staci



                                                        084             Branch

 

        2022 Office          Elena  [facility]    1.2.840.114 [id number]

13 [facility]



        10:00:00 10:30:00 Visit           [facility]  350.1.13.10         it

y of



                                                NORIS 4.2.7.2.686         Roly

as



                                                SKYLAR?BLEA [phone]         Me

chloé



                                                [address]



                                                MEDICAL                 



                                                OFFICE                  



                                                BUILDING                 

 

        2022 Outpatient DIANA REYES  [facility]    [id number]

[id number] [facility]



        10:00:00 10:00:00                 ESCOBAR                           [facility]

 

        2022 Orders          Doctor  [place]    1.2.840.114 [id number]

07 [facility]



        00:00:00 00:00:00 Only            Unassigned, SHONA   350.1.13.10       

  ity of



                                        [facility] 4.2.7.2.686         Roly

as



                                                        [phone]         [address]

 

        2020 Outpatient         [facility]    [id number]

[id number] [facility]



        08:38:00 08:38:00                 Musaddiq                 75      [facility]

 

        2019 Inpatient 1       Robin Barrios [facility]    PSY     12

[id number]



        23:01:00 13:16:00                 Robin Barrios                         

[facility]

 

        2017 Outpatient DIANA LOPEZ  [facility]    NUT     [id number]

565 [facility]



        00:00:00 00:00:00                 VENKATA                         [facility]







Results







           Test Description Test Time  Test Comments Results    Result Comments 

Source









                    Influenza virus A and B and 2023 05:23:37 









                      Test Item  Value      Reference Range Interpretation Comme

nts









             SARS-CoV-2 (COVID-19) RNA [Presence] in Respiratory specimen by CECIL

 Detected                               



             with probe detection (test code = 22947-6)                         

               

 

             Whether patient resides in a congregate care setting (test code = N

o                                     



             80714-3)                                            

 

             Date and time of symptom onset (test code = 33812-7) Unknown       

                         

 

             Whether the patient was hospitalized for condition of interest (elinor

t No                                     



             code = 91374-0)                                        

 

             Whether the patient was admitted to intensive care unit (ICU) for N

o                                     



             condition of interest (test code = 36911-4)                        

                

 

             Whether patient is employed in a healthcare setting (test code = No

                                     



             17954-6)                                            

 

             Whether the patient has symptoms related to condition of interest N

o                                     



             (test code = 51298-2)                                        

 

             Pregnancy status (test code = 82810-3) No                          

           



MAX ROMO, Urinalysis Rflx Cult/[id number] 20:35:00





             Test Item    Value        Reference Range Interpretation Comments

 

             Color,Urine (test code = UCOL) Yellow       Yellow                 

   

 

             Clarity,Urine (test code = Clear        Clear                     



             UCLAR)                                              

 

             Ph, Urine (test code = UPH) 6.5          5.0-9.0      N            

 

             Specific Gravity,Urine (test 1.015        1.005-1.030  N           

 



             code = USG)                                         

 

             Blood,Urine (test code = UBLD) Negative mg/dL Negative             

     

 

             Protein,Urine (test code = Negative mg/dL Negative                 

 



             UPRO)                                               

 

             Glucose,Urine (UA) (test code Negative mg/dL Negative              

    



             = UGLU)                                             

 

             Ketones,Urine (test code = Negative mg/dL Negative                 

 



             UKET)                                               

 

             Nitrate,Urine (test code = Negative     Negative                  



             UNIT)                                               

 

             Bilirubin,Urine (test code = Negative mg/dL Negative               

   



             UBIL)                                               

 

             Urobilinogen,Urine (test code 1.0 E.U./dL  Normal                  

  



             = UURO)                                             

 

             Leukocyte Esterase,Urine (test Trace mg/dL  Negative     A         

   



             code = ULEU)                                        



Drug Screen,[id number] 20:35:00





             Test Item    Value        Reference Range Interpretation Comments

 

             PCP Phencyclidine Screen,Urine (test Negative     Negative         

         



             code = PCPU)                                        

 

             Amphetamine Screen,Urine (test code Negative     Negative          

        



             = AMPU)                                             

 

             Methadone Screen,Urine (test code = Negative     Negative          

        



             METHU)                                              

 

             Opiate Screen,Urine (test code = Negative     Negative             

     



             UOPIS)                                              

 

             Barbituates Screen,Urine (test code Negative     Negative          

        



             = BARBU)                                            

 

             Benzodiazepines Screen,Urine (test Negative     Negative           

       



             code = UBENZS)                                        

 

             Cocaine Screen,Urine (test code = Negative     Negative            

      



             UCOCS)                                              

 

             Cannabinoid Screen,Urine (test code Negative     Negative          

        



             = UTHCS)                                            

 

             Propoxyphene Screen, Urine (test Negative     Negative             

     



             code = UPROP)                                        



Urine [id number] 20:35:00





             Test Item    Value        Reference Range Interpretation Comments

 

             RBC,Urine (test code = URBC.NP) 0-2 /HPF     0-2                   

    

 

             WBC,Urine (test code = UWBC.NP) 0-5 /HPF     0-5                   

    

 

             Bacteria,Urine (test code = Few /HPF     None Seen    A            



             UBACT.NP)                                           

 

             Squamous Epithelial Cell,Urine 6-10 /HPF    0-5          A         

   



             (test code = USQEPI.NP)                                        

 

             Amorphous Crystals,Urine (test code Few /HPF     None Seen    A    

        



             = UAMSE)                                            

 

             Hyaline Casts,Urine (test code = 0-5 /HPF     None Seen    A       

     



             [facility].XX)                                          



Complete Blood Count Auto [id number] 20:19:00





             Test Item    Value        Reference Range Interpretation Comments

 

             White Blood Count (test code = 7.9 x10 3/uL 4.4-10.5     N         

   



             WBCT)                                               

 

             Red Blood Count (test code = 4.27 x10 6/uL 3.75-5.20    N          

  



             RBC)                                                

 

             Hemoglobin (test code = HGBT) 12.9 g/dL    12.2-14.8    N          

  

 

             Hematocrit (test code = HCTT) 40.2 %       36.5-44.4    N          

  

 

             Mean Corpuscular Volume (test 94.10 fL     80..00 N          

  



             code = MCV)                                         

 

             Mean Corpuscular Hemoglobin 30.2 pg      27.0-32.5    N            



             (test code = MCH)                                        

 

             Mean Corpuscular HGB Conc 32.10 g/dL   32.00-37.50  N            



             (test code = MCHC)                                        

 

             RDW Coefficient of Variation 12.4 %       11.5-14.5    N           

 



             (test code = RDWCV)                                        

 

             Platelet Count (test code = 211.0 x10 3/uL 140.0-440.0  N          

  



             PLTT)                                               

 

             Mean Platelet Volume (test 11.1 fL                                



             code = MPV)                                         

 

             Immature Granulocytes % (Auto) 0.3 %        0.0-5.0      N         

   



             (test code = IMMGRAN%)                                        

 

             Neutrophils % (Auto) (test 75.8 %       36.0-70.0    H            



             code = NE%)                                         

 

             Lymphocytes % (Auto) (test 10.6 %       12.0-44.0    L            



             code = LY%)                                         

 

             Monocytes % (Auto) (test code 11.8 %       0.0-11.0     H          

  



             = MO%)                                              

 

             Eosinophils % (Auto) (test 0.9 %        0.0-7.0      N            



             code = EO%)                                         

 

             Basophils % (Auto) (test code 0.6 %        0.0-2.0      N          

  



             = BA%)                                              

 

             Immature Granulocytes # (Auto) 0.02 x10 3/uL                       

    



             (test code = IMMGRAN#)                                        

 

             Neutrophils # (Auto) (test 6.0 x10 3/uL 1.6-7.4      N            



             code = NE#)                                         

 

             Lymphocytes # (Auto) (test 0.83 x10 3/uL 0.50-4.60    N            



             code = LY#)                                         

 

             Monocytes # (Auto) (test code 0.93 x10 3/uL 0.00-1.20    N         

   



             = MO#)                                              

 

             Eosinophils # (Auto) (test 0.07 x10 3/uL 0.00-0.74    N            



             code = EO#)                                         

 

             Basophils # (Auto) (test code 0.05 x10 3/uL 0.00-0.21    N         

   



             = BA#)                                              

 

             nRBC Abs (test code = NRBCA) 0                                     

 

 

             nRBC Pct (test code = NRBCP) 0 %                                   

 



Comprehensive Metabolic [id number] 20:19:00





             Test Item    Value        Reference Range Interpretation Comments

 

             SODIUM (test code = NA) 142.0 mmol/L 136.0-145.0  N            

 

             Potassium,K (test code = 4.2 mmol/L   3.0-5.1      N            



             K)                                                  

 

             Chloride (test code = 110 mmol/L          H            



             CL)                                                 

 

             Carbon Dioxide (test 26 mmol/L    20-31        N            



             code = CO2)                                         

 

             Anion Gap (test code = 6 mmol/L     5-15         N            



             GAP)                                                

 

             Blood Urea Nitrogen 17 mg/dL     9-23         N            



             (test code = BUN)                                        

 

             Creatinine (test code = 0.88 mg/dL   0.55-1.02    N            



             CREATT)                                             

 

             Creatinine Clr Calc 80.94 mL/min                           



             Pharmacy (test code =                                        



             CRCLPHA)                                            

 

             Estimated Glomerular 78           See_Comment  L            Reporte

d eGFR is



             Filt Rate (test code =                                        based

 on the



             EGFR.XX)                                            CKD-EPI 



                                                                 equation thatdo

es



                                                                 not use a race



                                                                 coefficient.



                                                                 Additional



                                                                 information can

be



                                                                 found



                                                                 at:[id number]_c

cb_



                                                                 egfr_summary_fl

andry



                                                                 5.pdf (kidney.o

rg)



                                                                 [Automated



                                                                 message] The



                                                                 system which



                                                                 generated this



                                                                 result transmit

gianfranco



                                                                 reference range

:



                                                                 >=90



                                                                 ml/min/1.73m2. 

The



                                                                 reference range



                                                                 was not used to



                                                                 interpret this



                                                                 result as



                                                                 normal/abnormal

.

 

             BUN/Creatinine Ratio 19 ratio     10-20        N            



             (test code = BCRATIO)                                        

 

             Glucose (test code = 92 mg/dL            N            



             GLU)                                                

 

             Osmolality,Calculated 295.0                                  



             (test code = OSMOC)                                        

 

             Calcium (test code = CA) 9.1 mg/dL    8.3-10.6     N            

 

             Bilirubin,Total (test 0.3 mg/dL    0.2-1.1      N            



             code = BILIT)                                        

 

             Aspartate Amino 22 U/L       0-34         N            



             Transferase (test code =                                        



             AST)                                                

 

             Alanine Aminotransferase 15 U/L       10-49        N            



             (test code = ALT)                                        

 

             Total Protein (test code 6.9 g/dL     5.7-8.2      N            



             = TP)                                               

 

             Albumin Level (test code 3.9 g/dL     3.2-4.8      N            



             = ALB)                                              

 

             Globulin (test code = 3.0 mg/dL    2.3-3.5      N            



             GLOB)                                               

 

             Albumin/Globulin Ratio 1.3 ratio    0.8-2.0      N            



             (test code = AGRATIO)                                        

 

             Alkaline Phosphatase 109 U/L             N            



             (test code = ALP)                                        



Ethanol [id number] 20:19:00





             Test Item    Value        Reference Range Interpretation Comments

 

             Ethanol (test code < 3 mg/dL                              The pharm

acological



             = ETOH)                                             response to blo

od alcohol



                                                                 levels mayvary 

from



                                                                 individual to i

ndividual.



                                                                 The fatal larisa

ntrationhas



                                                                 been reported t

o be



                                                                 >400mg/dL.



HCG, Serum Qual (LAB)2023 20:19:00





             Test Item    Value        Reference Range Interpretation Comments

 

             HCG, Serum Qual (LAB) (test code = Negative     Negative           

       



             HCGQ)                                               



URINE AND [id number] 15:40:00





             Test Item    Value        Reference Range Interpretation Comments

 

             UA Sq Epi (test code = UA Sq Epi) Many /LPF                        

      



[facility] AND [id number] 15:40:00





             Test Item    Value        Reference Range Interpretation Comments

 

             UA WBC (test code = 9            See_Comment                [Automa

gianfranco message] The



             UA WBC)                                             system which ge

nerated this



                                                                 result transmit

gianfranco



                                                                 reference range

: <=5. The



                                                                 reference range

 was not



                                                                 used to interpr

et this



                                                                 result as xenia

l/abnormal.



[facility] [id number] 15:40:00





             Test Item    Value        Reference Range Interpretation Comments

 

             UA Bacteria (test code = UA Occasional /HPF                        

   



             Bacteria)                                           



[facility] [id number] 15:40:00





             Test Item    Value        Reference Range Interpretation Comments

 

             UA Amorph Delmis (test code = Occasional /HPF                        

   



             UA Amorph Delmis)                                        



[facility] [id number] 15:40:00





             Test Item    Value        Reference Range Interpretation Comments

 

             UA CaOx Delmis (test code = UA Occasional /HPF                       

    



             CaOx Delmis)                                          



[facility] AND [id number] 15:40:00





             Test Item    Value        Reference Range Interpretation Comments

 

             UA Color (test code = Yellow *NA*(23                          

 



             UA Color)    9:40 AM)                               



[facility] [id number] 15:40:00





             Test Item    Value        Reference Range Interpretation Comments

 

             UA Turbidity (test code = Clear (23 9:40                      

     



             UA Turbidity) AM)                                    



[facility] [id number] 15:40:00





             Test Item    Value        Reference Range Interpretation Comments

 

             UA Spec Grav (test code = UA Spec 1.010 1                          

      



             Grav)                                               



[facility] AND [id number] 15:40:00





             Test Item    Value        Reference Range Interpretation Comments

 

             UA pH (test code = UA pH) 6.0 1        5.0-8.0                   



[facility] [id number] 15:40:00





             Test Item    Value        Reference Range Interpretation Comments

 

             UA Protein (test code Negative (23 9:40                       

    



             = UA Protein) AM)                                    



[facility] AND [id number] 15:40:00





             Test Item    Value        Reference Range Interpretation Comments

 

             UA Glucose (test code Negative (23 9:40                       

    



             = UA Glucose) AM)                                    



[facility] AND [id number] 15:40:00





             Test Item    Value        Reference Range Interpretation Comments

 

             UA Ketones (test code Negative *NA*(23                        

   



             = UA Ketones) 9:40 AM)                               



[facility] AND [id number] 15:40:00





             Test Item    Value        Reference Range Interpretation Comments

 

             UA Bili (test code = Negative *NA*(23                         

  



             UA Bili)     9:40 AM)                               



[facility] AND [id number] 15:40:00





             Test Item    Value        Reference Range Interpretation Comments

 

             UA Blood (test code = Negative (23 9:40                       

    



             UA Blood)    AM)                                    



[facility] AND [id number] 15:40:00





             Test Item    Value        Reference Range Interpretation Comments

 

             UA Urobilinogen (test code = UA 0.2          0.1-1.0               

    



             Urobilinogen)                                        



[facility] [id number] 15:40:00





             Test Item    Value        Reference Range Interpretation Comments

 

             UA Nitrite (test code Negative (23 9:40                       

    



             = UA Nitrite) AM)                                    



[facility] [id number] 15:40:00





             Test Item    Value        Reference Range Interpretation Comments

 

             UA Leuk Est (test code Small *ABN*(23                         

  



             = UA Leuk Est) 9:40 AM)                               



[facility] [id number] 15:40:00





             Test Item    Value        Reference Range Interpretation Comments

 

             UA RBC (test code = 2            See_Comment                [Automa

gianfranco message] The



             UA RBC)                                             system which ge

nerated this



                                                                 result transmit

gianfranco



                                                                 reference range

: <=2. The



                                                                 reference range

 was not



                                                                 used to interpr

et this



                                                                 result as xenia

l/abnormal.



[id number]:24





             Test Item    Value        Reference Range Interpretation Comments

 

             Glucose Lvl (test code = Glucose Lvl) 93           70-99           

          



[id number] 15:25:24





             Test Item    Value        Reference Range Interpretation Comments

 

             BUN (test code = BUN) 19           7-                      



[facility] HospitalOtnrppwSWQIGNKKX1094-67-85 15:25:24





             Test Item    Value        Reference Range Interpretation Comments

 

             Creatinine Lvl (test code = Creatinine 0.85         0.50-1.40      

           



             Lvl)                                                



[facility] HospitalAgprwjpUDCZGDJYU6699-56-19 15:25:24





             Test Item    Value        Reference Range Interpretation Comments

 

             Sodium Lvl (test code = Sodium Lvl) 142          135-145           

        



[facility] HospitalIpczsoiJJORMSCSL1904-22-78 15:25:24





             Test Item    Value        Reference Range Interpretation Comments

 

             Potassium Lvl (test code = Potassium 4.4          3.5-5.1          

         



             Lvl)                                                



[facility] HospitalEotkmreBXDTBYRAM3789-58-24 15:25:24





             Test Item    Value        Reference Range Interpretation Comments

 

             Chloride Lvl (test code = Chloride Lvl) 108                  

            



[facility] HospitalQnovzgnLEVMVLUWH9097-56-96 15:25:24





             Test Item    Value        Reference Range Interpretation Comments

 

             CO2 (test code = CO2) 28           24-32                     



[facility] Hospi[id number] 15:25:24





             Test Item    Value        Reference Range Interpretation Comments

 

             Calcium Lvl (test code = Calcium Lvl) 10.0         8.5-10.5        

          



[facility] HospitalWwzxvgyQCAHEPYGI6777-02-25 15:25:24





             Test Item    Value        Reference Range Interpretation Comments

 

             AGAP (test code = AGAP) 10.4         10.0-20.0                 



[facility] Ho[id number] 15:25:24





             Test Item    Value        Reference Range Interpretation Comments

 

             eGFR (test code = eGFR) 81                                     



[facility] HospitalWxljoqoPQSMLZMVS6079-83-04 15:25:24





             Test Item    Value        Reference Range Interpretation Comments

 

             Lipase Lvl (test code = Lipase Lvl) 123                      

        



[facility]ysgdvuIIBGUYXJK7056-95-94 15:25:24





             Test Item    Value        Reference Range Interpretation Comments

 

             Total Protein (test code = Total 8.0          6.4-8.4              

     



             Protein)                                            



[facility] HospitalXuojojdWPQPBQCUJ5552-26-57 15:25:24





             Test Item    Value        Reference Range Interpretation Comments

 

             Albumin Lvl (test code = Albumin Lvl) 3.7          3.5-5.0         

          



[facility] HospitalNauakvgLUKCMDGDM1986-04-17 15:25:24





             Test Item    Value        Reference Range Interpretation Comments

 

             Globulin (test code = Globulin) 4.3          2.7-4.2               

    



[facility] HospitalYuauxnvTWWIZFMMK3039-83-81 15:25:24





             Test Item    Value        Reference Range Interpretation Comments

 

             A/G Ratio (test code = A/G Ratio) 0.9 1        0.7-1.6             

      



[facility] HospitalOiapzukWVBQDHWPH3406-75-26 15:25:24





             Test Item    Value        Reference Range Interpretation Comments

 

             ALANINE AMINOTRANSFERASE 20           See_Comment                [A

utomated message]



             (test code = ALANINE                                        The sys

tem which



             AMINOTRANSFERASE)                                        generated 

this result



                                                                 transmitted ref

erence



                                                                 range: <=65. Th

e



                                                                 reference range

 was



                                                                 not used to int

erpret



                                                                 this result as



                                                                 normal/abnormal

.



[facility] Ho[id number] 15:25:24





             Test Item    Value        Reference Range Interpretation Comments

 

             AST (test code = AST) 19           See_Comment                [Auto

mated message] The



                                                                 system which ge

nerated this



                                                                 result transmit

gianfranco



                                                                 reference range

: <=37. The



                                                                 reference range

 was not



                                                                 used to interpr

et this



                                                                 result as xenia

l/abnormal.



[facility] [id number] 15:25:24





             Test Item    Value        Reference Range Interpretation Comments

 

             Alk Phos (test code = Alk Phos) 123                          

    



[facility] HospitalObiuvnhEOYYGMAUT1666-64-12 15:25:24





             Test Item    Value        Reference Range Interpretation Comments

 

             Bili Total (test code = Bili Total) 0.3          0.2-1.3           

        



[facility] HospitalOnpeuogXYDJXCNVJ6630-85-06 15:25:24





             Test Item    Value        Reference Range Interpretation Comments

 

             Bili Direct (test code no gt        See_Comment                [Aut

omated message] The



             = Bili Direct)                                        system which 

generated



                                                                 this result tra

nsmitted



                                                                 reference range

: <=0.3.



                                                                 The reference r

jihan was



                                                                 not used to int

erpret this



                                                                 result as xenia

l/abnormal.



[facility] [id number] 15:25:24





             Test Item    Value        Reference Range Interpretation Comments

 

             Bili Indirect Unable to    See_Comment                [Automated



             (test code = Bili Calculate                              message] T

he system



             Indirect)                                           which generated



                                                                 this result



                                                                 transmitted



                                                                 reference range

:



                                                                 <=1.0. The



                                                                 reference range

 was



                                                                 not used to



                                                                 interpret this



                                                                 result as



                                                                 normal/abnormal

.



[id number] 15:25:24





             Test Item    Value        Reference Range Interpretation Comments

 

             WBC X 10x3 (test code = WBC X 10x3) 9.4          3.7-10.4          

        



[place] [id number] 15:25:24





             Test Item    Value        Reference Range Interpretation Comments

 

             RBC X 10x6 (test code = RBC X 10x6) 4.59         4.20-5.40         

        



[place] [id number] 15:25:24





             Test Item    Value        Reference Range Interpretation Comments

 

             Hgb (test code = Hgb) 14.1         12.0-16.0                 



[facility] CenterZvjgtoyDYSINFCQNP9625-71-77 15:25:24





             Test Item    Value        Reference Range Interpretation Comments

 

             Hct (test code = Hct) 42.8         36.0-48.0                 



[facility] CenterKvhhtzsJBYXFTJJTO9946-49-74 15:25:24





             Test Item    Value        Reference Range Interpretation Comments

 

             MCV (test code = MCV) 93.2         80.0-98.0                 



[place] [id number] 15:25:24





             Test Item    Value        Reference Range Interpretation Comments

 

             MCH (test code = MCH) 30.8 pg      27.0-31.0                 



[facility] CenterStfqgrwAPFVNPCKGB4025-46-05 15:25:24





             Test Item    Value        Reference Range Interpretation Comments

 

             MCHC (test code = MCHC) 33.0         32.0-36.0                 



[place] [id number] 15:25:24





             Test Item    Value        Reference Range Interpretation Comments

 

             RDW (test code = RDW) 13.0         11.5-14.5                 



[facility] CenterKwljtapPSBHBCYGLZ2542-47-18 15:25:24





             Test Item    Value        Reference Range Interpretation Comments

 

             Platelet (test code = Platelet) 200          133-450               

    



[facility] CenterVnbyyeqODEDIVABEI0461-65-53 15:25:24





             Test Item    Value        Reference Range Interpretation Comments

 

             MPV (test code = MPV) 8.6          7.4-10.4                  



[place] [address]-02-14 15:25:24





             Test Item    Value        Reference Range Interpretation Comments

 

             Segs (test code = Segs) 86.0         45.0-75.0                 



[place] [address]-02-14 15:25:24





             Test Item    Value        Reference Range Interpretation Comments

 

             Lymphocytes (test code = Lymphocytes) 5.5          20.0-40.0       

          



[place] [address]-02-14 15:25:24





             Test Item    Value        Reference Range Interpretation Comments

 

             Monocytes (test code = Monocytes) 7.0          2.0-12.0            

      



[place] [address]-02-14 15:25:24





             Test Item    Value        Reference Range Interpretation Comments

 

             Eosinophils (test code = 1.0          See_Comment                [A

utomated message] The



             Eosinophils)                                        system which ge

nerated



                                                                 this result tra

nsmitted



                                                                 reference range

: <=4.0.



                                                                 The reference r

jihan was



                                                                 not used to int

erpret



                                                                 this result as



                                                                 normal/abnormal

.



[place] [address]-02-14 15:25:24





             Test Item    Value        Reference Range Interpretation Comments

 

             Basophils (test code = 0.5          See_Comment                [Aut

omated message] The



             Basophils)                                          system which ge

nerated



                                                                 this result tra

nsmitted



                                                                 reference range

: <=1.0.



                                                                 The reference r

jihan was



                                                                 not used to int

erpret



                                                                 this result as



                                                                 normal/abnormal

.



[facility] CenterZchbxgpCGCEKRUJZP0299-06-94 15:25:24





             Test Item    Value        Reference Range Interpretation Comments

 

             Neutrophils # (test code = Neutrophils 8.1          1.5-8.1        

           



             #)                                                  



[place] [address]-02-14 15:25:24





             Test Item    Value        Reference Range Interpretation Comments

 

             Lymphocytes # (test code = Lymphocytes 0.5          1.0-5.5        

           



             #)                                                  



[place] [address]-02-14 15:25:24





             Test Item    Value        Reference Range Interpretation Comments

 

             Monocytes # (test code 0.7          See_Comment                [Aut

omated message] The



             = Monocytes #)                                        system which 

generated



                                                                 this result tra

nsmitted



                                                                 reference range

: <=0.8.



                                                                 The reference r

jihan was



                                                                 not used to int

erpret



                                                                 this result as



                                                                 normal/abnormal

.



[place] [address]-02-14 15:25:24





             Test Item    Value        Reference Range Interpretation Comments

 

             Eosinophils # (test code 0.1          See_Comment                [A

utomated message] The



             = Eosinophils #)                                        system [place] generated



                                                                 this result tra

nsmitted



                                                                 reference range

: <=0.5.



                                                                 The reference r

jihan was



                                                                 not used to int

erpret



                                                                 this result as



                                                                 normal/abnormal

.



[facility] METABOLIC PANEL (10000)2022 19:59:50





             Test Item    Value        Reference Range Interpretation Comments

 

             NA (test code = 138 mmol/L   135-145                   



             [id number])                                         

 

             K (test code = 4.3 mmol/L   3.5-5.0                   



             [id number])                                         

 

             CL (test code = 102 mmol/L                       



             1256334227)                                         

 

             CO2 TOTAL (test code = 23 mmol/L    23-31                     



             [id number])                                         

 

             AGAP (test code =              2-16                      



             [id number])                                         

 

             BUN (test code = 18 mg/dL     7-23                      



             [id number])                                         

 

             GLUCOSE (test code = 110 mg/dL                        



             [id number])                                         

 

             CREATININE (test code = 0.70 mg/dL   0.50-1.04                 



             [id number])                                         

 

             TOTAL BILI (test code = 1.3 mg/dL    0.1-1.1      H            



             [id number])                                         

 

             CALCIUM (test code = 9.5 mg/dL    8.6-10.6                  



             [id number])                                         

 

             T PROTEIN (test code = 7.4 g/dL     6.3-8.2                   



             8536794462)                                         

 

             ALBUMIN (test code = 4.2 g/dL     3.5-5.0                   



             [id number])                                         

 

             ALK PHOS (test code = 100 U/L                          



             [id number])                                         

 

             ALTv (test code = 16 U/L       5-35                      



             1742-6)                                             

 

             AST(SGOT) (test code = 27 U/L       13-40                     



             [id number])                                         

 

             eGFR (test code =              mL/min/1.73m2              



             [id number])                                         

 

             ELENA (test code = ELENA) Association of                           



                          Glomerular Filtration                           



                          Rate (GFR) and Staging                           



                          of Kidney Disease*                           



                          +---------------------                           



                          --+-------------------                           



                          --+-------------------                           



                          ------+| GFR                           



                          (mL/min/1.73 m2) ?|                           



                          With Kidney Damage ?|                           



                          ?Without Kidney                           



                          Damage+---------------                           



                          --------+-------------                           



                          --------+-------------                           



                          ------------+| ?>90 ?                           



                          ? ? ? ? ? ? ? ?|                           



                          ?Stage one ? ? ? ? ?|                           



                          ? Normal ? ? ? ? ? ? ?                           



                          ?+--------------------                           



                          ---+------------------                           



                          ---+------------------                           



                          -------+| ?60-89 ? ? ?                           



                          ? ? ? ? ?| ?Stage two                           



                          ? ? ? ? ?| ? Decreased                           



                          GFR ? ? ? ?                            



                          +---------------------                           



                          --+-------------------                           



                          --+-------------------                           



                          ------+| ?30-59 ? ? ?                           



                          ? ? ? ? ?| ?Stage                           



                          three ? ? ? ?| ? Stage                           



                          three ? ? ? ? ?                           



                          +---------------------                           



                          --+-------------------                           



                          --+-------------------                           



                          ------+| ?15-29 ? ? ?                           



                          ? ? ? ? ?| ?Stage four                           



                          ? ? ? ? | ? Stage four                           



                          ? ? ? ? ?                              



                          ?+--------------------                           



                          ---+------------------                           



                          ---+------------------                           



                          -------+| ?<15 (or                           



                          dialysis) ? ?| ?Stage                           



                          five ? ? ? ? | ? Stage                           



                          five ? ? ? ? ?                           



                          ?+--------------------                           



                          ---+------------------                           



                          ---+------------------                           



                          -------+ *Each stage                           



                          assumes the associated                           



                          GFR level has been in                           



                          effect for at least                           



                          three months. ?Stages                           



                          1 to 5, with or                           



                          without kidney                           



                          disease, indicate                           



                          chronic kidney                           



                          disease. Notes:                           



                          Determination of                           



                          stages one and two                           



                          (with eGFR                             



                          >59mL/min/1.73 m2)                           



                          requires estimation of                           



                          kidney damage for at                           



                          least three months as                           



                          defined by structural                           



                          or functional                           



                          abnormalities of the                           



                          kidney, manifested by                           



                          either:Pathological                           



                          abnormalities or                           



                          Markers of kidney                           



                          damage (including                           



                          abnormalities in the                           



                          composition of the                           



                          blood or urine or                           



                          abnormalities in                           



                          imaging tests).                           

 

             Lab Interpretation Abnormal                               



             (test code = 27404-6)                                        



[facility] WITH [id number] 19:22:40





             Test Item    Value        Reference Range Interpretation Comments

 

             WBC (test code =              See_Comment  H             [Automated



             1471-2)                                             message] The sy

stem



                                                                 which generated



                                                                 this result



                                                                 transmitted



                                                                 reference range

:



                                                                 4.30 - 11.10



                                                                 10*3/?L. The



                                                                 reference range

 was



                                                                 not used to



                                                                 interpret this



                                                                 result as



                                                                 normal/abnormal

.

 

             RBC (test code =              See_Comment                [Automated



             583-8)                                              message] The sy

stem



                                                                 which generated



                                                                 this result



                                                                 transmitted



                                                                 reference range

:



                                                                 3.93 - 5.25



                                                                 10*6/?L. The



                                                                 reference range

 was



                                                                 not used to



                                                                 interpret this



                                                                 result as



                                                                 normal/abnormal

.

 

             HGB (test code = 13.0 g/dL    11.6-15.0                 



             718-7)                                              

 

             HCT (test code = 38.4 %       35.7-45.2                 



             4544-3)                                             

 

             MCV (test code = 89.9 fL      80.6-95.5                 



             787-2)                                              

 

             MCH (test code = 30.4 pg      25.9-32.8                 



             785-6)                                              

 

             MCHC (test code = 33.9 g/dL    31.6-35.1                 



             786-4)                                              

 

             RDW-SD (test code = 39.3 fL      39.0-49.9                 



             10677-5)                                            

 

             RDW-CV (test code = 12.2 %       12.0-15.5                 



             788-0)                                              

 

             PLT (test code =              See_Comment                [Automated



             777-3)                                              message] The sy

stem



                                                                 which generated



                                                                 this result



                                                                 transmitted



                                                                 reference range

:



                                                                 166 - 358 10*3/

?L.



                                                                 The reference r

jihan



                                                                 was not used to



                                                                 interpret this



                                                                 result as



                                                                 normal/abnormal

.

 

             MPV (test code = 10.1 fL      9.5-12.9                  



             24012-9)                                            

 

             NRBC/100 WBC (test              See_Comment                [Automat

ed



             code = [id number])                                        message] 

The system



                                                                 which generated



                                                                 this result



                                                                 transmitted



                                                                 reference range

:



                                                                 0.0 - 10.0 /100



                                                                 WBCs. The refer

ence



                                                                 range was not u

sed



                                                                 to interpret th

is



                                                                 result as



                                                                 normal/abnormal

.

 

             NRBC x10^3 (test code <0.01        See_Comment                [Auto

mated



             = [id number])                                        message] The s

ystem



                                                                 which generated



                                                                 this result



                                                                 transmitted



                                                                 reference range

:



                                                                 10*3/?L. The



                                                                 reference range

 was



                                                                 not used to



                                                                 interpret this



                                                                 result as



                                                                 normal/abnormal

.

 

             GRAN MAT (NEUT) % 79.4 %                                 



             (test code = 770-8)                                        

 

             IMM GRAN % (test code 0.50 %                                 



             = 2965252985)                                        

 

             LYMPH % (test code = 9.5 %                                  



             736-9)                                              

 

             MONO % (test code = 9.7 %                                  



             5905-5)                                             

 

             EOS % (test code = 0.5 %                                  



             713-8)                                              

 

             BASO % (test code = 0.4 %                                  



             706-2)                                              

 

             GRAN MAT x10^3(ANC) 9.77 10*3/uL 1.88-7.09    H            



             (test code =                                        



             [id number])                                         

 

             IMM GRAN x10^3 (test 0.06 10*3/uL 0.00-0.06                 



             code = 4403205556)                                        

 

             LYMPH x10^3 (test code 1.17 10*3/uL 1.32-3.29    L            



             = 731-0)                                            

 

             MONO x10^3 (test code 1.19 10*3/uL 0.33-0.92    H            



             = 742-7)                                            

 

             EOS x10^3 (test code = 0.06 10*3/uL 0.03-0.39                 



             711-2)                                              

 

             BASO x10^3 (test code 0.05 10*3/uL 0.01-0.07                 



             = 704-7)                                            

 

             Lab Interpretation Abnormal                               



             (test code = 67680-8)                                        



[id number]





             Test Item    Value        Reference Range Interpretation Comments

 

             SODIUM (test code = NA) 143 mmol/L   134-147      N            

 

             POTASSIUM (test code = 4.2 mmol/L   3.4-5.0      N            



             K)                                                  

 

             CHLORIDE (test code = 114 mmol/L   100-108      H            



             CL)                                                 

 

             CARBON DIOXIDE (test 24 mmol/L    21-32        N            



             code = CO2)                                         

 

             ANION GAP (test code = 5.0 GAP calc 4.0-15.0     N            



             GAP)                                                

 

             GLUCOSE (test code = 89 MG/DL            N            



             GLU)                                                

 

             BLOOD UREA NITROGEN 17 MG/DL     7-18         N            



             (test code = BUN)                                        

 

             GLOMERULAR FILTRATION >=60 max estimate >60                       



             RATE (test code = GFR) estGFR                                 

 

             CREATININE (test code = 0.9 MG/DL    0.6-1.0      N            



             CREAT)                                              

 

             CALCIUM (test code = CA) 8.3 MG/DL    8.5-10.1     L            



CBC W/AUTO [id number] 05:21:00





             Test Item    Value        Reference Range Interpretation Comments

 

             WHITE BLOOD CELL (test code = 15.2 K/mm3   3.5-11.0     H          

  



             WBC)                                                

 

             RED BLOOD CELL (test code = RBC) 3.67 M/mm3   4.70-6.10    L       

     

 

             HEMOGLOBIN (test code = HGB) 11.3 G/DL    10.4-14.9    N           

 

 

             HEMATOCRIT (test code = HCT) 35.5 %       31.5-44.1    N           

 

 

             MEAN CELL VOLUME (test code = 96.7 Fl      84.5-98.6    N          

  



             MCV)                                                

 

             MEAN CELL HGB (test code = MCH) 30.8 pg      27.0-34.2    N        

    

 

             MEAN CELL HGB CONCETRATION (test 31.8 G/DL    31.5-34.0    N       

     



             code = MCHC)                                        

 

             RED CELL DISTRIBUTION WIDTH (test 14.2 SD      11.5-14.5    N      

      



             code = RDW)                                         

 

             PLATELET COUNT (test code = PLT) 242.0 K/mm3  150-450      N       

     

 

             MEAN PLATELET VOLUME (test code = 10.20 fL     7.0-10.5     N      

      



             MPV)                                                

 

             NEUTROPHIL % (test code = NT%) 78.8 %       40-76        H         

   

 

             LYMPHOCYTE % (test code = LY%) 13.1 %       20.5-51.1    L         

   

 

             MONOCYTE % (test code = MO%) 6.2 %        1.7-9.3      N           

 

 

             EOSINOPHIL % (test code = EO%) 1.6 %        0.0-6.0      N         

   

 

             BASOPHIL % (test code = BA%) 0.3 %        0.0-2.0      N           

 

 

             NEUTROPHIL # (test code = NT#) 11.94 K/mm3  1.8-7.6      H         

   

 

             LYMPHOCYTE # (test code = LY#) 2.0 K/mm3    0.6-3.2      N         

   

 

             MONOCYTE # (test code = MO#) 0.9 K/mm3    0.3-1.1      N           

 

 

             EOSINOPHIL # (test code = EO#) 0.2 K/mm3    0.0-0.4      N         

   

 

             BASOPHIL # (test code = BA#) 0.1 K/mm3    0.0-0.1      N           

 

 

             MANUAL DIFF REQUIRED (test code = NO DIFF/SCN  CRITERIA            

      



             MDIFF)                                              



[id number] 14:09:00





             Test Item    Value        Reference Range Interpretation Comments

 

             LITHIUM (test 0.5 mmol/L   0.6-1.2      L             Detection Lopez

it = 0.1



             code = LITH)                                        <0.1 indicates 

None



                                                                 DetectedPerform

ed At: 



                                                                 [facility] [address]



                                                                 [id number] 

Jeffry KIMBROUGH MD



                                                                 Ph:[phone]



EDSTYXQO--[id number] 13:35:00





             Test Item    Value        Reference Range Interpretation Comments

 

             TROPONIN-I (test 0.436 NG/ML  0.000-0.045             Negative: <

/= 0.045



             code = TROPI)                                        Positive: >/= 

0.046



                                                                 Correlation wit

h serial



                                                                 results, other 

cardiac



                                                                 markers, and cl

inical



                                                                 findings is nec

essary to



                                                                 determine the c

linical



                                                                 significance of

 this



                                                                 result. Quantit

ative



                                                                 results using d

ifferent



                                                                 methodologies s

hould not



                                                                 be compared to 

one



                                                                 another as nume

rical



                                                                 results may daniel

yby



                                                                 method.



Completed by Nursing: JQHELWFHLE--47-28 10:33:00





             Test Item    Value        Reference Range Interpretation Comments

 

             TROPONIN-I (test 0.360 NG/ML  0.000-0.045  HH           Negative: <

/= 0.045



             code = TROPI)                                        Positive: >/= 

0.046



                                                                 Correlation wit

h serial



                                                                 results, other 

cardiac



                                                                 markers, and cl

inical



                                                                 findings is nec

essary to



                                                                 determine the c

linical



                                                                 significance of

 this



                                                                 result. Quantit

ative



                                                                 results using d

ifferent



                                                                 methodologies s

hould not



                                                                 be compared to 

one



                                                                 another as nume

rical



                                                                 results may daniel

yby



                                                                 method.



Completed by Nursing: NOLast Dose Date: 20Last Dose Time: 1200TROPONIN-I
2020 07:23:00





             Test Item    Value        Reference Range Interpretation Comments

 

             TROPONIN-I (test 0.399 NG/ML  0.000-0.045  HH           Negative: <

/= 0.045



             code = TROPI)                                        Positive: >/= 

0.046



                                                                 Correlation wit

h serial



                                                                 results, other 

cardiac



                                                                 markers, and cl

inical



                                                                 findings is nec

essary to



                                                                 determine the c

linical



                                                                 significance of

 this



                                                                 result. Quantit

ative



                                                                 results using d

ifferent



                                                                 methodologies s

hould not



                                                                 be compared to 

one



                                                                 another as nume

rical



                                                                 results may daniel

yby



                                                                 method.



Completed by Nursing: GSOAXUPCEV--23-28 05:00:00





             Test Item    Value        Reference Range Interpretation Comments

 

             TROPONIN-I (test 0.555 NG/ML  0.000-0.045  HH           Negative: <

/= 0.045



             code = TROPI)                                        Positive: >/= 

0.046



                                                                 Correlation wit

h serial



                                                                 results, other 

cardiac



                                                                 markers, and cl

inical



                                                                 findings is nec

essary to



                                                                 determine the c

linical



                                                                 significance of

 this



                                                                 result. Quantit

ative



                                                                 results using d

ifferent



                                                                 methodologies s

hould not



                                                                 be compared to 

one



                                                                 another as nume

rical



                                                                 results may daniel

yby



                                                                 method.



Completed by Nursing: NO- XR CHEST 1 -37-34 04:33:00 Name: TYLER EDGE  
[facility] : 1970 Age/S: 49 / F [id number] Unit #: [id number]
32 Loc: [place] [address] Phys: Kristy Quiros MD Acct: [id number] Dis Date: 
Status: REG ER PHONE#: [phone] Exam Date: 2020 0432 FAX #: Reason: 
POST CENTRAL PLACEMENT; RIGHT IJ EXAMS: CPT: [id number] XR CHEST 1 V 06479 Fluoro
Time: DAP (Gy m2): Air Kerma (mGy): HISTORY: Post central lineplacement, 
hypotension  Location code: B2 FINDINGS: Frontal view of the chest demonstrates 
a mildly enlarged cardiomediastinal silhouette. The trachea is midline. The 
lungs are clear. There is no effusion or pneumothorax. The bones are intact. 
Right IJ catheter in good position. IMPRESSION: Mild cardiomegaly, without acute
pulmonary process. ** Electronically Signed by ISABEL March on 2020 at
0433 ** Reported and signed by: Shree March M.D. CC: Kristy Quiros MD PAGE 1 
Signed Report  [facility] 1970 Age/S: 49 / F 
[id number] Unit #: [id number] Loc: [place] [address] Phys: 
Kristy Quiros MD Acct: [id number] Dis Date: Status: REG ER  PHONE #: [phone]
[phone] Exam Date: 2020 FAX #: Reason: POST CENTRAL PLACEMENT; RIGHT IJ
EXAMS:  CPT: [id number] XR CHEST 1 V 75176 Fluoro Time: DAP (Gy m2): Air Kerma 
(mGy): (Continued) Technologist: Duncan Ellis,RT(R)(CT) Trnscb Date/Time: 2020
(433) [id number] Orig Print D/T: S: 2020 (0436)  PAGE 2Signed ReportCBC 
W/AUTO [id number] 04:15:00





             Test Item    Value        Reference Range Interpretation Comments

 

             WHITE BLOOD CELL (test 24.2 K/mm3   3.5-11.0     H            



             code = WBC)                                         

 

             RED BLOOD CELL (test 3.75 M/mm3   4.70-6.10    L            



             code = RBC)                                         

 

             HEMOGLOBIN (test code 11.5 G/DL    10.4-14.9    N            



             = HGB)                                              

 

             HEMATOCRIT (test code 35.2 %       31.5-44.1    N            



             = HCT)                                              

 

             MEAN CELL VOLUME (test 93.9 Fl      84.5-98.6    N            



             code = MCV)                                         

 

             MEAN CELL HGB (test 30.7 pg      27.0-34.2    N            



             code = MCH)                                         

 

             MEAN CELL HGB 32.7 G/DL    31.5-34.0    N            



             CONCETRATION (test                                        



             code = MCHC)                                        

 

             RED CELL DISTRIBUTION 13.8 SD      11.5-14.5    N            



             WIDTH (test code =                                        



             RDW)                                                

 

             PLATELET COUNT (test 234.0 K/mm3  150-450      N            



             code = PLT)                                         

 

             MEAN PLATELET VOLUME 9.80 fL      7.0-10.5     N            



             (test code = MPV)                                        

 

             NEUTROPHIL % (test 82.9 %       40-76        H            



             code = NT%)                                         

 

             LYMPHOCYTE % (test 8.3 %        20.5-51.1    L            



             code = LY%)                                         

 

             MONOCYTE % (test code 7.3 %        1.7-9.3      N            



             = MO%)                                              

 

             EOSINOPHIL % (test 1.4 %        0.0-6.0      N            



             code = EO%)                                         

 

             BASOPHIL % (test code 0.1 %        0.0-2.0      N            



             = BA%)                                              

 

             NEUTROPHIL # (test 20.08 K/mm3  1.8-7.6      H            



             code = NT#)                                         

 

             LYMPHOCYTE # (test 2.0 K/mm3    0.6-3.2      N            



             code = LY#)                                         

 

             MONOCYTE # (test code 1.8 K/mm3    0.3-1.1      H            



             = MO#)                                              

 

             EOSINOPHIL # (test 0.3 K/mm3    0.0-0.4      N            



             code = EO#)                                         

 

             BASOPHIL # (test code 0.0 K/mm3    0.0-0.1      N            



             = BA#)                                              

 

             MANUAL DIFF REQUIRED NO DIFF/SCN  CRITERIA                  SLIDE R

EVIEW



             (test code = MDIFF)                                        CONSISTA

NT WITH AUTO



                                                                 DIFFERENTIAL.



BASIC METABOLIC [id number] 04:11:00





             Test Item    Value        Reference Range Interpretation Comments

 

             SODIUM (test code = NA) 135 mmol/L   134-147      N            

 

             POTASSIUM (test code = K) 4.0 mmol/L   3.4-5.0      N            

 

             CHLORIDE (test code = CL) 106 mmol/L   100-108      N            

 

             CARBON DIOXIDE (test code = CO2) 22 mmol/L    21-32        N       

     

 

             ANION GAP (test code = GAP) 7.0 GAP calc 4.0-15.0     N            

 

             GLUCOSE (test code = GLU) 97 MG/DL            N            

 

             BLOOD UREA NITROGEN (test code = 34 MG/DL     7-18         H       

     



             BUN)                                                

 

             GLOMERULAR FILTRATION RATE (test 39 estGFR    >60          L       

     



             code = GFR)                                         

 

             CREATININE (test code = CREAT) 1.5 MG/DL    0.6-1.0      H         

   

 

             CALCIUM (test code = CA) 8.9 MG/DL    8.5-10.1     N            



LACTIC [id number] 04:11:00





             Test Item    Value        Reference Range Interpretation Comments

 

             LACTIC ACID (test code = LACT) 0.8 mmol/L   0.4-2.0      N         

   



CBC W/AUTO [id number] 03:54:00





             Test Item    Value        Reference Range Interpretation Comments

 

             WHITE BLOOD CELL (test code = 24.2 K/mm3   3.5-11.0     H          

  



             WBC)                                                

 

             RED BLOOD CELL (test code = RBC) 3.75 M/mm3   4.70-6.10    L       

     

 

             HEMOGLOBIN (test code = HGB) 11.5 G/DL    10.4-14.9    N           

 

 

             HEMATOCRIT (test code = HCT) 35.2 %       31.5-44.1    N           

 

 

             MEAN CELL VOLUME (test code = 93.9 Fl      84.5-98.6    N          

  



             MCV)                                                

 

             MEAN CELL HGB (test code = MCH) 30.7 pg      27.0-34.2    N        

    

 

             MEAN CELL HGB CONCETRATION (test 32.7 G/DL    31.5-34.0    N       

     



             code = MCHC)                                        

 

             RED CELL DISTRIBUTION WIDTH (test 13.8 SD      11.5-14.5    N      

      



             code = RDW)                                         

 

             PLATELET COUNT (test code = PLT) 234.0 K/mm3  150-450      N       

     

 

             MEAN PLATELET VOLUME (test code = 9.80 fL      7.0-10.5     N      

      



             MPV)                                                

 

             NEUTROPHIL % (test code = NT%)  %           40-76        H         

   

 

             LYMPHOCYTE % (test code = LY%)  %           20.5-51.1    L         

   

 

             MONOCYTE % (test code = MO%)  %           1.7-9.3      N           

 

 

             EOSINOPHIL % (test code = EO%)  %           0.0-6.0      N         

   

 

             BASOPHIL % (test code = BA%)  %           0.0-2.0      N           

 

 

             NEUTROPHIL # (test code = NT#)  K/mm3       1.8-7.6      H         

   

 

             LYMPHOCYTE # (test code = LY#)  K/mm3       0.6-3.2      N         

   

 

             MONOCYTE # (test code = MO#)  K/mm3       0.3-1.1      H           

 

 

             EOSINOPHIL # (test code = EO#)  K/mm3       0.0-0.4      N         

   

 

             BASOPHIL # (test code = BA#)  K/mm3       0.0-0.1      N           

 

 

             MANUAL DIFF REQUIRED (test code =  DIFF/SCN    CRITERIA            

      



             MDIFF)                                              



Basic Metabolic [id number] 07:54:48





             Test Item    Value        Reference Range Interpretation Comments

 

             Sodium Level (test code = Sodium 142.0 mmol/L 135.0-145.0          

     



             Level)                                              

 

             Potassium Level (test code = 4.8 mmol/L   3.5-5.1                  

 



             Potassium Level)                                        

 

             Chloride Level (test code = 105 mmol/L                       



             Chloride Level)                                        

 

             CO2 (test code = CO2) 25 mmol/L    22-29                     

 

             Anion Gap (test code = Anion 12 mmol/L    7-16                     

 



             Gap)                                                

 

             BUN (test code = BUN) 19.60 mg/dL  6.00-20.00                

 

             Creatinine Level (test code = 0.80 mg/dL   0.50-0.90               

  



             Creatinine Level)                                        

 

             BUN/Creat Ratio (test code = 24                        N           

 



             BUN/Creat Ratio)                                        

 

             Glucose Level (test code = 86 mg/dL                         



             Glucose Level)                                        

 

             Calcium Level (test code = 10.1 mg/dL   8.3-10.5                  



             Calcium Level)                                        



Basic Metabolic [id number] 07:54:48





             Test Item    Value        Reference Range Interpretation Comments

 

             Sodium Level (test 142.0 mmol/L 135.0-145.0               



             code = Sodium Level)                                        

 

             Potassium Level 4.8 mmol/L   3.5-5.1                   



             (test code =                                        



             Potassium Level)                                        

 

             Chloride Level (test 105 mmol/L                       



             code = Chloride                                        



             Level)                                              

 

             CO2 (test code = 25 mmol/L    22-29                     



             CO2)                                                

 

             Anion Gap (test code 12 mmol/L    7-16                      



             = Anion Gap)                                        

 

             BUN (test code = 19.60 mg/dL  6.00-20.00                



             BUN)                                                

 

             Creatinine Level 0.80 mg/dL   0.50-0.90                 



             (test code =                                        



             Creatinine Level)                                        

 

             BUN/Creat Ratio 24                        N            



             (test code =                                        



             BUN/Creat Ratio)                                        

 

             Glucose Level (test 86 mg/dL                         



             code = Glucose                                        



             Level)                                              

 

             Calcium Level (test 10.1 mg/dL   8.3-10.5                  



             code = Calcium                                        



             Level)                                              

 

             eGFR AA (test code = >60                       N            eGFR (e

stimated



             eGFR AA)     mL/min/1.73 m2                           Glomerular



                                                                 Filtration Rate

) is



                                                                 an estimated va

lue,



                                                                 calculated from

 the



                                                                 patient's serum



                                                                 creatinine usin

g the



                                                                 MDRD equation. 

It is



                                                                 NOT the patient

's



                                                                 actual GFR. The

 eGFR



                                                                 provides a more



                                                                 clinically usef

ul



                                                                 measure of kidn

ey



                                                                 disease than se

rum



                                                                 creatinine



                                                                 alone.***This



                                                                 calculation rimma

es



                                                                 sex and race in

to



                                                                 account, if the



                                                                 information is



                                                                 provided. If th

e



                                                                 race is not



                                                                 provided, and t

he



                                                                 patient is



                                                                 -Crystal

n,



                                                                 multiply by 1.2

12.



                                                                 If sex is not



                                                                 provided, and t

he



                                                                 patient is fema

le,



                                                                 multiply by 0.7

42.



                                                                 Results for pat

ients



                                                                 <18 years of ag

e



                                                                 have not been



                                                                 validated by [facility]



                                                                 MDRD study and



                                                                 should be



                                                                 interpreted wit

h



                                                                 caution. eGFR R

esult



                                                                 Interpretation:

eGFR



                                                                 > or = 60 is in

 the



                                                                 Normal RangeeGF

R <



                                                                 60 may mean kid

hang



                                                                 diseaseeGFR < 1

5 may



                                                                 mean kidney



                                                                 failure*** Rang

es



                                                                 recommended by 

the



                                                                 [employer]



                                                                 Foundation,



                                                                 http://nkdep.ni

h.gov



Basic Metabolic [id number] 07:54:48





             Test Item    Value        Reference Range Interpretation Comments

 

             Sodium Level (test 142.0 mmol/L 135.0-145.0               



             code = Sodium Level)                                        

 

             Potassium Level 4.8 mmol/L   3.5-5.1                   



             (test code =                                        



             Potassium Level)                                        

 

             Chloride Level (test 105 mmol/L                       



             code = Chloride                                        



             Level)                                              

 

             CO2 (test code = 25 mmol/L    22-29                     



             CO2)                                                

 

             Anion Gap (test code 12 mmol/L    7-16                      



             = Anion Gap)                                        

 

             BUN (test code = 19.60 mg/dL  6.00-20.00                



             BUN)                                                

 

             Creatinine Level 0.80 mg/dL   0.50-0.90                 



             (test code =                                        



             Creatinine Level)                                        

 

             BUN/Creat Ratio 24                        N            



             (test code =                                        



             BUN/Creat Ratio)                                        

 

             Glucose Level (test 86 mg/dL                         



             code = Glucose                                        



             Level)                                              

 

             Calcium Level (test 10.1 mg/dL   8.3-10.5                  



             code = Calcium                                        



             Level)                                              

 

             eGFR AA (test code = >60                       N            eGFR (e

stimated



             eGFR AA)     mL/min/1.73 m2                           Glomerular



                                                                 Filtration Rate

) is



                                                                 an estimated va

lue,



                                                                 calculated from

 the



                                                                 patient's serum



                                                                 creatinine usin

g the



                                                                 MDRD equation. 

It is



                                                                 NOT the patient

's



                                                                 actual GFR. The

 eGFR



                                                                 provides a more



                                                                 clinically usef

ul



                                                                 measure of kidn

ey



                                                                 disease than se

rum



                                                                 creatinine



                                                                 alone.***This



                                                                 calculation rimma

es



                                                                 sex and race in

to



                                                                 account, if the



                                                                 information is



                                                                 provided. If th

e



                                                                 race is not



                                                                 provided, and t

he



                                                                 patient is



                                                                 -Crystal

n,



                                                                 multiply by 1.2

12.



                                                                 If sex is not



                                                                 provided, and t

he



                                                                 patient is fema

le,



                                                                 multiply by 0.7

42.



                                                                 Results for pat

ients



                                                                 <18 years of ag

e



                                                                 have not been



                                                                 validated by [facility]



                                                                 MDRD study and



                                                                 should be



                                                                 interpreted wit

h



                                                                 caution. eGFR R

esult



                                                                 Interpretation:

eGFR



                                                                 > or = 60 is in

 the



                                                                 Normal RangeeGF

R <



                                                                 60 may mean kid

hang



                                                                 diseaseeGFR < 1

5 may



                                                                 mean kidney



                                                                 failure*** Rang

es



                                                                 recommended by 

the



                                                                 [employer]



                                                                 [employer],



                                                                 http://nkdep.ni

h.gov

 

             eGFR Non-AA (test >60.00                    N            eGFR (sheba

mated



             code = eGFR Non-AA) mL/min/1.73 m2                           Glomer

ular



                                                                 Filtration Rate

) is



                                                                 an estimated va

lue,



                                                                 calculated from

 the



                                                                 patient's serum



                                                                 creatinine usin

g the



                                                                 MDRD equation. 

It is



                                                                 NOT the patient

's



                                                                 actual GFR. The

 eGFR



                                                                 provides a more



                                                                 clinically usef

ul



                                                                 measure of kidn

ey



                                                                 disease than se

rum



                                                                 creatinine



                                                                 alone.***This



                                                                 calculation rimma

es



                                                                 sex and race in

to



                                                                 account, if the



                                                                 information is



                                                                 provided. If 

e



                                                                 race is not



                                                                 provided, and t

he



                                                                 patient is



                                                                 -Crystal

n,



                                                                 multiply by 1.2

12.



                                                                 If sex is not



                                                                 provided, and t

he



                                                                 patient is fema

le,



                                                                 multiply by 0.7

42.



                                                                 Results for pat

ients



                                                                 <18 years of ag

e



                                                                 have not been



                                                                 validated by 

e



                                                                 MDRD study and



                                                                 should be



                                                                 interpreted wit





                                                                 caution. eGFR R

esult



                                                                 Interpretation:

eGFR



                                                                 > or = 60 is in

 the



                                                                 Normal RangeeGF

R <



                                                                 60 may mean kid

hang



                                                                 diseaseeGFR < 1

5 may



                                                                 mean kidney



                                                                 failure*** Rang

es



                                                                 recommended by 

the



                                                                 [employer]



                                                                 Foundation,



                                                                 http://nkdep.ni

h.gov



Complete Blood Count with [id number] 07:29:42





             Test Item    Value        Reference Range Interpretation Comments

 

             WBC (test code = WBC) 9.3 x10      4.4-10.5                  

 

             RBC (test code = RBC) 4.71 x10     3.75-5.20                 

 

             Hgb (test code = Hgb) 14.4 g/dL    12.2-14.8                 

 

             MCV (test code = MCV) 92.10 fL     80..00              

 

             Hct (test code = Hct) 43.4 %       36.5-44.4                 

 

             MCHC (test code = 33.20 g/dL   32.00-37.50               



             MCHC)                                               

 

             RDW CV (test code = 13.4 %       11.5-14.5                 



             RDW CV)                                             

 

             MCH (test code = MCH) 30.6 pg      27.0-32.5                 

 

             Platelets (test code = 325.0 x10    140.0-440.0               



             Platelets)                                          

 

             MPV (test code = MPV) 9.8 fL                    N            

 

             Slide Review (test Auto         Auto                      Result cr

eated by



             code = Slide Review)                                        [email or web address]_

SLIDE_REV_AUTO

 

             nRBC (test code = 0                         N            



             nRBC)                                               

 

             NRBC Abs (test code = 0.00 x10                  N            



             NRBC Abs)                                           

 

             IPF (test code = IPF) 0 %                       N            



Automated [id number] 07:29:42





             Test Item    Value        Reference Range Interpretation Comments

 

             Neutro Auto (test code = Neutro 66.2 %       36.0-70.0             

    



             Auto)                                               

 

             Lymph Auto (test code = Lymph Auto) 21.1 %       12.0-44.0         

        

 

             Mono Auto (test code = Mono Auto) 6.8 %        0.0-11.0            

      

 

             Eos, Auto (test code = Eos, Auto) 4.8 %        0.0-7.0             

      

 

             Basophil Auto (test code = Basophil 0.9 %        0.0-2.0           

        



             Auto)                                               

 

             Neutro Absolute (test code = Neutro 6.2 x10      1.6-7.4           

        



             Absolute)                                           

 

             Lymph Absolute (test code = Lymph 1.97 x10     .50-4.60            

      



             Absolute)                                           

 

             Mono Absolute (test code = Mono .63 x10      .00-1.20              

    



             Absolute)                                           

 

             Eos Absolute (test code = Eos 0.45 x10     0.00-0.74               

  



             Absolute)                                           

 

             Baso Absolute (test code = Baso 0.08 x10     0.00-0.21             

    



             Absolute)                                           



IG [id number] 07:29:42





             Test Item    Value        Reference Range Interpretation Comments

 

             IG (test code = IG) 0.2 %        0.0-5.0                   

 

             IG Abs (test code = IG Abs) 0 x10                     N            



Thyroid Stimulating [id number] 04:30:30





             Test Item    Value        Reference Range Interpretation Comments

 

             TSH (test code = TSH) 1.360 mIU/mL 0.270-4.200               



RPR [id number] 04:06:17





             Test Item    Value        Reference Range Interpretation Comments

 

             RPR Qual (test code = RPR Qual) Non-Reactive Non-Reactive          

    

 

             Reactive Control (test code = Reactive                             

  



             Reactive Control)                                        

 

             Weak Reactive Control (test Weak Reactive                          

 



             code = Weak Reactive Control)                                      

  

 

             Non-Reactive Control (test code Non-Reactive                       

    



             = Non-Reactive Control)                                        

 

             Lot # (test code = Lot #) [id number]                    N            

 

             Expiration Dt (test code = 10-                N            



             Expiration Dt)                                        



Hemoglobin A1c2019-11-15 18:37:54





             Test Item    Value        Reference Range Interpretation Comments

 

             Hemoglobin A1c (test code 4.7 %        4.8-5.9      L            No

n Diabetic



             = Hemoglobin A1c)                                        4.8-5.9%Di

abetic <7.0%



Lipid Panel2019-11-15 18:08:58





             Test Item    Value        Reference Range Interpretation Comments

 

             Cholesterol Total 189 mg/dL    0-200                     RISK OF HE

ART



             (test code =                                        DISEASEPublishe

d by



             Cholesterol Total)                                        American 

Heart



                                                                 Association Judith

lyte



                                                                 Optimal Borderl

ine



                                                                 Increased RiskC

HOL <200



                                                                 200-239 >240TRI

G <150



                                                                 150-199 >200HDL

 Male



                                                                 >60 <40HDL Fema

le >60



                                                                 <50LDL <100 130

-159



                                                                 >160LDL Near op

timal is



                                                                 100-129

 

             Triglycerides (test 112 mg/dL    9-200                     



             code = Triglycerides)                                        

 

             HDL (test code = HDL) 44 mg/dL     50-60        L            

 

             LDL (test code = LDL) 122 mg/dL    0-130                     The eq

uation being used



                                                                 in this calcula

tion is



                                                                 LDL = (Chol - H

DL) -



                                                                 (Trig / 5)

 

             VLDL (test code = 22 mg/dL     5-40                      The equati

on being used



             VLDL)                                               in this calcula

tion is



                                                                 VLDL = Trig / 5

 

             Chol/HDL (test code = 4.3 ratio    0.0-4.4                   



             Chol/HDL)                                           

 

             LDL/HDL Ratio (test 3                         N            The equa

tion being used



             code = LDL/HDL Ratio)                                        in thi

s calculation is



                                                                 LDL/HDL Ratio=L

DL



                                                                 Calc/HDL Chol



Complete Blood Count with Differential2019-11-15 07:51:57





             Test Item    Value        Reference Range Interpretation Comments

 

             WBC (test code = WBC) 10.6 x10     4.4-10.5     H            

 

             RBC (test code = RBC) 4.45 x10     3.75-5.20                 

 

             Hgb (test code = Hgb) 13.5 g/dL    12.2-14.8                 

 

             MCV (test code = MCV) 93.30 fL     80..00              

 

             Hct (test code = Hct) 41.5 %       36.5-44.4                 

 

             MCHC (test code = 32.50 g/dL   32.00-37.50               



             MCHC)                                               

 

             RDW CV (test code = 13.7 %       11.5-14.5                 



             RDW CV)                                             

 

             MCH (test code = MCH) 30.3 pg      27.0-32.5                 

 

             Platelets (test code = 356.0 x10    140.0-440.0               



             Platelets)                                          

 

             MPV (test code = MPV) 9.7 fL                    N            

 

             Slide Review (test Auto         Auto                      Result cr

eated by



             code = Slide Review)                                        GL_SJM_

SLIDE_REV_AUTO

 

             nRBC (test code = 0                         N            



             nRBC)                                               

 

             NRBC Abs (test code = 0.00 x10                  N            



             NRBC Abs)                                           

 

             IPF (test code = IPF) 0 %                       N            



Automated Differential2019-11-15 07:51:57





             Test Item    Value        Reference Range Interpretation Comments

 

             Neutro Auto (test code = Neutro 65.4 %       36.0-70.0             

    



             Auto)                                               

 

             Lymph Auto (test code = Lymph Auto) 23.5 %       12.0-44.0         

        

 

             Mono Auto (test code = Mono Auto) 5.7 %        0.0-11.0            

      

 

             Eos, Auto (test code = Eos, Auto) 4.4 %        0.0-7.0             

      

 

             Basophil Auto (test code = Basophil 0.8 %        0.0-2.0           

        



             Auto)                                               

 

             Neutro Absolute (test code = Neutro 6.9 x10      1.6-7.4           

        



             Absolute)                                           

 

             Lymph Absolute (test code = Lymph 2.49 x10     .50-4.60            

      



             Absolute)                                           

 

             Mono Absolute (test code = Mono .60 x10      .00-1.20              

    



             Absolute)                                           

 

             Eos Absolute (test code = Eos 0.47 x10     0.00-0.74               

  



             Absolute)                                           

 

             Baso Absolute (test code = Baso 0.08 x10     0.00-0.21             

    



             Absolute)                                           



IG Flags2019-11-15 07:51:57





             Test Item    Value        Reference Range Interpretation Comments

 

             IG (test code = IG) 0.2 %        0.0-5.0                   

 

             IG Abs (test code = IG Abs) 0 x10                     N            



Urine [id number] 10:18:52





             Test Item    Value        Reference Range Interpretation Comments

 

             ORGANISM (test code = Escherichia coli                           



             ORGANISM)                                           

 

             Amikacin (test code =                           S            



             Amik)                                               

 

             Ampicillin (test code =                           S            



             Amp)                                                

 

             Ampicillin/Sulbactam                           S            



             (test code = Amp/Sul)                                        

 

             Cefazolin (test code =                           S            



             Cefaz)                                              

 

             Cefepime (test code =                           S            



             Cefep)                                              

 

             Cefotaxime (test code =                           S            



             Cefo)                                               

 

             Ceftazidime (test code                           S            



             = Ceftaz)                                           

 

             Ceftriaxone (test code                           S            



             = Ceftri)                                           

 

             Cefuroxime (test code =                           S            



             Cefur)                                              

 

             Ciprofloxacin (test                           S            



             code = Cipro)                                        

 

             Gentamicin (test code =                           S            



             Gent)                                               

 

             Imipenem (test code =                           S            



             Imi)                                                

 

             Levofloxacin (test code                           S            



             = Levo)                                             

 

             Meropenem (test code =                           S            



             Sindi)                                               

 

             Nitrofurantoin (test                           S            



             code = Nitro)                                        

 

             Piperacillin/Tazobactam                           S            



             (test code = Pip/Blaine)                                        

 

             Tobramycin (test code =                           S            



             Tobra)                                              

 

             Trimethoprim/Sulfa                           S            



             (test code = SXT)                                        

 

             Final Report (test code >=100,000 cfu/ml                           



             = Final Report) Escherichia coli                           



                          >=100,000 cfu/ml Alpha                           



                          hemolytic                              



                          Streptococcus (not                           



                          Group D or                             



                          Enterococcus)                           



 C Urine Added by [email or web address] [id number] 09:18:25





             Test Item    Value        Reference Range Interpretation Comments

 

             Lithium Level (test code = 0.58 mmol/L  0.60-1.20    L            



             Lithium Level)                                        



Urine Drug [id number] 01:36:44





             Test Item    Value        Reference Range Interpretation Comments

 

             Amphetamine Screen Ur POSITIVE     Negative     A            



             (test code = Amphetamine                                        



             Screen Ur)                                          

 

             Barbiturate Screen Ur Negative     Negative                  



             (test code = Barbiturate                                        



             Screen Ur)                                          

 

             Benzodiazepines Ur (test Negative     Negative                  



             code = Benzodiazepines                                        



             Ur)                                                 

 

             Cocaine Screen Ur (test Negative     Negative                  



             code = Cocaine Screen                                        



             Ur)                                                 

 

             U Methadone Scr (test Negative     Negative                  



             code = U Methadone Scr)                                        

 

             Opiate Screen Ur (test Negative     Negative                  



             code = Opiate Screen Ur)                                        

 

             U PCP Scrn (test code = Negative     Negative                  



             U PCP Scrn)                                         

 

             Cannabinoid Screen Ur Negative     Negative                  



             (test code = Cannabinoid                                        



             Screen Ur)                                          

 

             U TCA (test code = U Negative     Negative                  The res

ults of all



             TCA)                                                drug screen elinor

ts are



                                                                 only preliminar

y.



                                                                 Clinical



                                                                 consideration a

nd



                                                                 professional ju

dgment



                                                                 should be appli

ed to



                                                                 any drug of abu

se



                                                                 test result,



                                                                 particularly wh

en



                                                                 preliminary pos

itive



                                                                 results are obt

ained.



                                                                 Please order a



                                                                 separate confir

matory



                                                                 test if desired

.



HCG Qualitative [id number] 01:36:43





             Test Item    Value        Reference Range Interpretation Comments

 

             hCG Ur (test code = Negative                               If the r

esult is



             hCG Ur)                                             "Negative" in p

atients



                                                                 suspected to be



                                                                 pregnant, recom

mend



                                                                 retest with a s

ample



                                                                 obtained 48 to 

72



                                                                 hours later, or

 by



                                                                 ordering a



                                                                 quantitative as

say. If



                                                                 the result is



                                                                 "Borderline" te

sting



                                                                 should be repea

gianfranco in



                                                                 48 to 72 hours.

 

             Lot # (test code = [id number]                    N            



             Lot #)                                              

 

             Expiration Dt (test 2020                N            



             code = Expiration Dt)                                        

 

             Neg Control (test Negative                               



             code = Neg Control)                                        

 

             Pos Control (test Positive                               



             code = Pos Control)                                        

 

             Internal QC (test Acceptable                             



             code = Internal QC)                                        



Urinalysis [id number] 01:36:03





             Test Item    Value        Reference Range Interpretation Comments

 

             UA WBC (test code = UA WBC) 0-5          0-5                       

 

             UA RBC (test code = UA RBC) None Seen    0-5                       

 

             UA Bacteria (test code = UA Moderate                  A            



             Bacteria)                                           

 

             UA Squam Epithelial (test code = UA 0-5                            

        



             Squam Epithelial)                                        



Comprehensive Metabolic [id number] 01:23:40





             Test Item    Value        Reference Range Interpretation Comments

 

             Sodium Level (test code = Sodium 139.0 mmol/L 135.0-145.0          

     



             Level)                                              

 

             Potassium Level (test code = 4.4 mmol/L   3.5-5.1                  

 



             Potassium Level)                                        

 

             Chloride Level (test code = 102 mmol/L                       



             Chloride Level)                                        

 

             CO2 (test code = CO2) 23 mmol/L    22-29                     

 

             Anion Gap (test code = Anion 14 mmol/L    7-16                     

 



             Gap)                                                

 

             BUN (test code = BUN) 9.10 mg/dL   6.00-20.00                

 

             Creatinine Level (test code = 1.00 mg/dL   0.50-0.90    H          

  



             Creatinine Level)                                        

 

             BUN/Creat Ratio (test code = 9                         N           

 



             BUN/Creat Ratio)                                        

 

             Glucose Level (test code = 115 mg/dL                        



             Glucose Level)                                        

 

             Calcium Level (test code = 10.1 mg/dL   8.3-10.5                  



             Calcium Level)                                        

 

             Alk Phos (test code = Alk Phos) 106 U/L             H        

    

 

             Bilirubin Total (test code = 0.4 mg/dL    0.1-0.9                  

 



             Bilirubin Total)                                        

 

             Albumin Level (test code = 4.6 g/dL     3.5-5.2                   



             Albumin Level)                                        

 

             Protein Total (test code = 7.7 g/dL     6.4-8.3                   



             Protein Total)                                        

 

             ALT (test code = ALT) 12 U/L       1-33                      

 

             AST (test code = AST) 18 U/L       1-32                      

 

             Globulin (test code = Globulin) 3.1 g/dL     2.9-3.1               

    

 

             A/G Ratio (test code = A/G 1.5 ratio                 N            



             Ratio)                                              



Comprehensive Metabolic [id number] 01:23:40





             Test Item    Value        Reference Range Interpretation Comments

 

             Sodium Level (test 139.0 mmol/L 135.0-145.0               



             code = Sodium Level)                                        

 

             Potassium Level 4.4 mmol/L   3.5-5.1                   



             (test code =                                        



             Potassium Level)                                        

 

             Chloride Level (test 102 mmol/L                       



             code = Chloride                                        



             Level)                                              

 

             CO2 (test code = 23 mmol/L    22-29                     



             CO2)                                                

 

             Anion Gap (test code 14 mmol/L    7-16                      



             = Anion Gap)                                        

 

             BUN (test code = 9.10 mg/dL   6.00-20.00                



             BUN)                                                

 

             Creatinine Level 1.00 mg/dL   0.50-0.90    H            



             (test code =                                        



             Creatinine Level)                                        

 

             BUN/Creat Ratio 9                         N            



             (test code =                                        



             BUN/Creat Ratio)                                        

 

             Glucose Level (test 115 mg/dL                        



             code = Glucose                                        



             Level)                                              

 

             Calcium Level (test 10.1 mg/dL   8.3-10.5                  



             code = Calcium                                        



             Level)                                              

 

             Alk Phos (test code 106 U/L             H            



             = Alk Phos)                                         

 

             Bilirubin Total 0.4 mg/dL    0.1-0.9                   



             (test code =                                        



             Bilirubin Total)                                        

 

             Albumin Level (test 4.6 g/dL     3.5-5.2                   



             code = Albumin                                        



             Level)                                              

 

             Protein Total (test 7.7 g/dL     6.4-8.3                   



             code = Protein                                        



             Total)                                              

 

             ALT (test code = 12 U/L       1-33                      



             ALT)                                                

 

             AST (test code = 18 U/L       1-32                      



             AST)                                                

 

             Globulin (test code 3.1 g/dL     2.9-3.1                   



             = Globulin)                                         

 

             A/G Ratio (test code 1.5 ratio                 N            



             = A/G Ratio)                                        

 

             eGFR AA (test code = >60                       N            eGFR (e

stimated



             eGFR AA)     mL/min/1.73 m2                           Glomerular



                                                                 Filtration Rate

) is



                                                                 an estimated va

lue,



                                                                 calculated from

 the



                                                                 patient's serum



                                                                 creatinine usin

g the



                                                                 MDRD equation. 

It is



                                                                 NOT the patient

's



                                                                 actual GFR. The

 eGFR



                                                                 provides a more



                                                                 clinically usef

ul



                                                                 measure of kidn

ey



                                                                 disease than se

rum



                                                                 creatinine



                                                                 alone.***This



                                                                 calculation rimma

es



                                                                 sex and race in

to



                                                                 account, if the



                                                                 information is



                                                                 provided. If th

e



                                                                 race is not



                                                                 provided, and t

he



                                                                 patient is



                                                                 -Crystal

n,



                                                                 multiply by 1.2

12.



                                                                 If sex is not



                                                                 provided, and t

he



                                                                 patient is fema

le,



                                                                 multiply by 0.7

42.



                                                                 Results for pat

ients



                                                                 <18 years of ag

e



                                                                 have not been



                                                                 validated by 

e



                                                                 MDRD study and



                                                                 should be



                                                                 interpreted wit

h



                                                                 caution. eGFR R

esult



                                                                 Interpretation:

eGFR



                                                                 > or = 60 is in

 the



                                                                 Normal RangeeGF

R <



                                                                 60 may mean kid

hang



                                                                 diseaseeGFR < 1

5 may



                                                                 mean kidney



                                                                 failure*** Rang

es



                                                                 recommended by 

the



                                                                 [employer]



                                                                 [employer],



                                                                 http://nkdep.ni

h.gov



Comprehensive Metabolic [id number] 01:23:40





             Test Item    Value        Reference Range Interpretation Comments

 

             Sodium Level (test 139.0 mmol/L 135.0-145.0               



             code = Sodium Level)                                        

 

             Potassium Level 4.4 mmol/L   3.5-5.1                   



             (test code =                                        



             Potassium Level)                                        

 

             Chloride Level (test 102 mmol/L                       



             code = Chloride                                        



             Level)                                              

 

             CO2 (test code = 23 mmol/L    22-29                     



             CO2)                                                

 

             Anion Gap (test code 14 mmol/L    7-16                      



             = Anion Gap)                                        

 

             BUN (test code = 9.10 mg/dL   6.00-20.00                



             BUN)                                                

 

             Creatinine Level 1.00 mg/dL   0.50-0.90    H            



             (test code =                                        



             Creatinine Level)                                        

 

             BUN/Creat Ratio 9                         N            



             (test code =                                        



             BUN/Creat Ratio)                                        

 

             Glucose Level (test 115 mg/dL                        



             code = Glucose                                        



             Level)                                              

 

             Calcium Level (test 10.1 mg/dL   8.3-10.5                  



             code = Calcium                                        



             Level)                                              

 

             Alk Phos (test code 106 U/L             H            



             = Alk Phos)                                         

 

             Bilirubin Total 0.4 mg/dL    0.1-0.9                   



             (test code =                                        



             Bilirubin Total)                                        

 

             Albumin Level (test 4.6 g/dL     3.5-5.2                   



             code = Albumin                                        



             Level)                                              

 

             Protein Total (test 7.7 g/dL     6.4-8.3                   



             code = Protein                                        



             Total)                                              

 

             ALT (test code = 12 U/L       1-33                      



             ALT)                                                

 

             AST (test code = 18 U/L       1-32                      



             AST)                                                

 

             Globulin (test code 3.1 g/dL     2.9-3.1                   



             = Globulin)                                         

 

             A/G Ratio (test code 1.5 ratio                 N            



             = A/G Ratio)                                        

 

             eGFR AA (test code = >60                       N            eGFR (e

stimated



             eGFR AA)     mL/min/1.73 m2                           Glomerular



                                                                 Filtration Rate

) is



                                                                 an estimated va

lue,



                                                                 calculated from

 the



                                                                 patient's serum



                                                                 creatinine usin

g the



                                                                 MDRD equation. 

It is



                                                                 NOT the patient

's



                                                                 actual GFR. The

 eGFR



                                                                 provides a more



                                                                 clinically usef

ul



                                                                 measure of kidn

ey



                                                                 disease than se

rum



                                                                 creatinine



                                                                 alone.***This



                                                                 calculation rimma

es



                                                                 sex and race in

to



                                                                 account, if the



                                                                 information is



                                                                 provided. If th

e



                                                                 race is not



                                                                 provided, and t

he



                                                                 patient is



                                                                 -Crystal

n,



                                                                 multiply by 1.2

12.



                                                                 If sex is not



                                                                 provided, and t

he



                                                                 patient is fema

le,



                                                                 multiply by 0.7

42.



                                                                 Results for pat

ients



                                                                 <18 years of ag

e



                                                                 have not been



                                                                 validated by [facility]



                                                                 MDRD study and



                                                                 should be



                                                                 interpreted wit

h



                                                                 caution. eGFR R

esult



                                                                 Interpretation:

eGFR



                                                                 > or = 60 is in

 the



                                                                 Normal RangeeGF

R <



                                                                 60 may mean kid

hang



                                                                 diseaseeGFR < 1

5 may



                                                                 mean kidney



                                                                 failure*** Rang

es



                                                                 recommended by 

the



                                                                 [employer]



                                                                 [employer],



                                                                 http://nkdep.ni

h.gov

 

             eGFR Non-AA (test 58.45                     N            eGFR (sheba

mated



             code = eGFR Non-AA) mL/min/1.73 m2                           Glomer

ular



                                                                 Filtration Rate

) is



                                                                 an estimated va

lue,



                                                                 calculated from

 the



                                                                 patient's serum



                                                                 creatinine usin

g the



                                                                 MDRD equation. 

It is



                                                                 NOT the patient

's



                                                                 actual GFR. The

 eGFR



                                                                 provides a more



                                                                 clinically usef

ul



                                                                 measure of kidn

ey



                                                                 disease than se

rum



                                                                 creatinine



                                                                 alone.***This



                                                                 calculation rimma

es



                                                                 sex and race in

to



                                                                 account, if the



                                                                 information is



                                                                 provided. If th

e



                                                                 race is not



                                                                 provided, and t

he



                                                                 patient is



                                                                 -Crystal

n,



                                                                 multiply by 1.2

12.



                                                                 If sex is not



                                                                 provided, and t

he



                                                                 patient is fema

le,



                                                                 multiply by 0.7

42.



                                                                 Results for pat

ients



                                                                 <18 years of ag

e



                                                                 have not been



                                                                 validated by [facility]



                                                                 MDRD study and



                                                                 should be



                                                                 interpreted wit

h



                                                                 caution. eGFR R

esult



                                                                 Interpretation:

eGFR



                                                                 > or = 60 is in

 the



                                                                 Normal RangeeGF

R <



                                                                 60 may mean kid

hang



                                                                 diseaseeGFR < 1

5 may



                                                                 mean kidney



                                                                 failure*** Rang

es



                                                                 recommended by 

the



                                                                 [employer]



                                                                 [employer],



                                                                 http://nkdep.ni

h.gov



Alcohol [id number] 01:23:31





             Test Item    Value        Reference Range Interpretation Comments

 

             Ethanol Level (test <0.00 g/dL   0.00-0.01                 [facility]

gianfranco 0.080 g/dL



             code = Ethanol                                        or more



             Level)                                              

 

             Ethanol Inst (test <0                        N            



             code = Ethanol Inst)                                        



Complete Blood Count with [id number] 00:56:12





             Test Item    Value        Reference Range Interpretation Comments

 

             WBC (test code = WBC) 19.4 x10     4.4-10.5     H            

 

             RBC (test code = RBC) 4.53 x10     3.75-5.20                 

 

             Hgb (test code = Hgb) 13.5 g/dL    12.2-14.8                 

 

             Hct (test code = Hct) 41.3 %       36.5-44.4                 

 

             MCV (test code = MCV) 91.20 fL     80..00              

 

             MCHC (test code = 32.70 g/dL   32.00-37.50               



             MCHC)                                               

 

             RDW CV (test code = 13.5 %       11.5-14.5                 



             RDW CV)                                             

 

             MCH (test code = MCH) 29.8 pg      27.0-32.5                 

 

             Platelets (test code = 462.0 x10    140.0-440.0  H            



             Platelets)                                          

 

             MPV (test code = MPV) 9.9 fL                    N            

 

             Slide Review (test Auto         Auto                      Result cr

eated by



             code = Slide Review)                                        [email or web address]

[email or web address]_AUTO

 

             nRBC (test code = 0                         N            



             nRBC)                                               

 

             NRBC Abs (test code = 0.00 x10                  N            



             NRBC Abs)                                           

 

             IPF (test code = IPF) 0 %                       N            



Automated [id number] 00:56:12





             Test Item    Value        Reference Range Interpretation Comments

 

             Neutro Auto (test code = Neutro 83.7 %       36.0-70.0    H        

    



             Auto)                                               

 

             Lymph Auto (test code = Lymph Auto) 8.9 %        12.0-44.0    L    

        

 

             Mono Auto (test code = Mono Auto) 5.0 %        0.0-11.0            

      

 

             Eos, Auto (test code = Eos, Auto) 1.4 %        0.0-7.0             

      

 

             Basophil Auto (test code = Basophil 0.7 %        0.0-2.0           

        



             Auto)                                               

 

             Neutro Absolute (test code = Neutro 16.2 x10     1.6-7.4      H    

        



             Absolute)                                           

 

             Lymph Absolute (test code = Lymph 1.73 x10     .50-4.60            

      



             Absolute)                                           

 

             Mono Absolute (test code = Mono .96 x10      .00-1.20              

    



             Absolute)                                           

 

             Eos Absolute (test code = Eos 0.27 x10     0.00-0.74               

  



             Absolute)                                           

 

             Baso Absolute (test code = Baso 0.13 x10     0.00-0.21             

    



             Absolute)                                           



IG [id number] 00:56:12





             Test Item    Value        Reference Range Interpretation Comments

 

             IG (test code = IG) 0.3 %        0.0-5.0                   

 

             IG Abs (test code = IG Abs) 0 x10                     N            



Urinalysis with Culture, if [id number] 00:55:34





             Test Item    Value        Reference Range Interpretation Comments

 

             UA Color (test code = STRAW        Yellow                    



             UA Color)                                           

 

             UA Appear (test code = CLEAR        Clear                     



             UA Appear)                                          

 

             UA pH (test code = UA 5                         N            



             pH)                                                 

 

             UA Spec Grav (test code 1.001        1.001-1.035               



             = UA Spec Grav)                                        

 

             UA Glucose (test code = NEG          Negative                  



             UA Glucose)                                         

 

             UA Bili (test code = UA NEG          Negative                  



             Bili)                                               

 

             UA Ketones (test code = NEG          Negative                  



             UA Ketones)                                         

 

             UA Blood (test code = NEG          Negative                  



             UA Blood)                                           

 

             UA Protein (test code = NEG          Negative                  



             UA Protein)                                         

 

             UA Urobilinogen (test 0.2 mg/dL                 N            



             code = UA Urobilinogen)                                        

 

             UA Nitrite (test code = POS          Negative     A            



             UA Nitrite)                                         

 

             UA Leuk Est (test code 25 cells/mcL Negative     A            



             = UA Leuk Est)                                        

 

             UA Micro Ind? (test Indicated    Not Indicated A            Result 

created by



             code = UA Micro Ind?)                                        rule



                                                                 [email or web address]

_IN



                                                                 D



Urine [id number] 08:13:23





             Test Item    Value        Reference Range Interpretation Comments

 

             ORGANISM (test code = Escherichia coli                           



             ORGANISM)                                           

 

             Amikacin (test code =                           S            



             Amik)                                               

 

             Ampicillin (test code =                           S            



             Amp)                                                

 

             Ampicillin/Sulbactam                           S            



             (test code = Amp/Sul)                                        

 

             Cefazolin (test code =                           S            



             Cefaz)                                              

 

             Cefepime (test code =                           S            



             Cefep)                                              

 

             Cefotaxime (test code =                           S            



             Cefo)                                               

 

             Ceftazidime (test code =                           S            



             Ceftaz)                                             

 

             Ceftriaxone (test code =                           S            



             Ceftri)                                             

 

             Cefuroxime (test code =                           S            



             Cefur)                                              

 

             Ciprofloxacin (test code                           S            



             = Cipro)                                            

 

             Gentamicin (test code =                           S            



             Gent)                                               

 

             Imipenem (test code =                           S            



             Imi)                                                

 

             Levofloxacin (test code                           S            



             = Levo)                                             

 

             Meropenem (test code =                           S            



             Sindi)                                               

 

             Nitrofurantoin (test                           S            



             code = Nitro)                                        

 

             Piperacillin/Tazobactam                           S            



             (test code = Pip/Blaine)                                        

 

             Tobramycin (test code =                           S            



             Tobra)                                              

 

             Trimethoprim/Sulfa (test                           S            



             code = SXT)                                         

 

             Final Report (test code 30,000 cfu/ml                           



             = Final Report) Escherichia coli                           



                          20,000 cfu/ml                           



                          Diphtheroids                           



 C Urine Added by [email or web address] Qualitative2019-09-15 04:57:25





             Test Item    Value        Reference Range Interpretation Comments

 

             RPR Qual (test code = RPR Qual) Non-Reactive Non-Reactive          

    

 

             Reactive Control (test code = Reactive                             

  



             Reactive Control)                                        

 

             Weak Reactive Control (test Weak Reactive                          

 



             code = Weak Reactive Control)                                      

  

 

             Non-Reactive Control (test code Non-Reactive                       

    



             = Non-Reactive Control)                                        

 

             Lot # (test code = Lot #) [id number]                    N            

 

             Expiration Dt (test code = 10                N            



             Expiration Dt)                                        



Thyroid Stimulating Hormone2019-09-15 01:22:43





             Test Item    Value        Reference Range Interpretation Comments

 

             TSH (test code = TSH) 3.370 mIU/mL 0.270-4.200               



Lipid Panel2019-09-15 01:17:51





             Test Item    Value        Reference Range Interpretation Comments

 

             Cholesterol Total 168 mg/dL    0-200                     RISK OF HE

ART



             (test code =                                        DISEASEPublishe

d by



             Cholesterol Total)                                        American 

Heart



                                                                 Association Judith

lyte



                                                                 Optimal Borderl

ine



                                                                 Increased RiskC

HOL <200



                                                                 200-239 >240TRI

G <150



                                                                 150-199 >200HDL

 Male



                                                                 >60 <40HDL Fema

le >60



                                                                 <50LDL <100 130

-159



                                                                 >160LDL Near [facility] is



                                                                 100-129

 

             Triglycerides (test 108 mg/dL    9-200                     



             code = Triglycerides)                                        

 

             HDL (test code = HDL) 46 mg/dL     50-60        L            

 

             LDL (test code = LDL) 100 mg/dL    0-130                     The eq

uation being used



                                                                 in this calcula

tion is



                                                                 LDL = (Chol - H

DL) -



                                                                 (Trig / 5)

 

             VLDL (test code = 22 mg/dL     5-40                      The equati

on being used



             VLDL)                                               in this calcula

tion is



                                                                 VLDL = Trig / 5

 

             Chol/HDL (test code = 3.7 ratio    0.0-4.4                   



             Chol/HDL)                                           

 

             LDL/HDL Ratio (test 2                         N            The equa

tion being used



             code = LDL/HDL Ratio)                                        in thi

s calculation is



                                                                 LDL/HDL Ratio=L

DL



                                                                 Calc/HDL Chol



Lithium Level2019-09-15 01:17:51





             Test Item    Value        Reference Range Interpretation Comments

 

             Lithium Level (test code = 0.81 mmol/L  0.60-1.20                 



             Lithium Level)                                        



Comprehensive Metabolic Panel2019-09-15 00:04:54





             Test Item    Value        Reference Range Interpretation Comments

 

             Sodium Level (test code = Sodium 137.0 mmol/L 135.0-145.0          

     



             Level)                                              

 

             Potassium Level (test code = 4.0 mmol/L   3.5-5.1                  

 



             Potassium Level)                                        

 

             Chloride Level (test code = 103 mmol/L                       



             Chloride Level)                                        

 

             CO2 (test code = CO2) 23 mmol/L    22-29                     

 

             Anion Gap (test code = Anion 11 mmol/L    7-16                     

 



             Gap)                                                

 

             BUN (test code = BUN) 11.50 mg/dL  6.00-20.00                

 

             Creatinine Level (test code = 1.00 mg/dL   0.50-0.90    H          

  



             Creatinine Level)                                        

 

             BUN/Creat Ratio (test code = 12                        N           

 



             BUN/Creat Ratio)                                        

 

             Glucose Level (test code = 108 mg/dL                        



             Glucose Level)                                        

 

             Calcium Level (test code = 10.3 mg/dL   8.3-10.5                  



             Calcium Level)                                        

 

             Alk Phos (test code = Alk Phos) 93 U/L                       

    

 

             Bilirubin Total (test code = 0.5 mg/dL    0.1-0.9                  

 



             Bilirubin Total)                                        

 

             Albumin Level (test code = 4.4 g/dL     3.5-5.2                   



             Albumin Level)                                        

 

             Protein Total (test code = 7.2 g/dL     6.4-8.3                   



             Protein Total)                                        

 

             ALT (test code = ALT) 12 U/L       1-33                      

 

             AST (test code = AST) 17 U/L       1-32                      

 

             Globulin (test code = Globulin) 2.8 g/dL     2.9-3.1      L        

    

 

             A/G Ratio (test code = A/G 1.6 ratio                 N            



             Ratio)                                              



Alcohol Level2019-09-15 00:04:54





             Test Item    Value        Reference Range Interpretation Comments

 

             Ethanol Level (test <0.00 g/dL   0.00-0.01                 Intoxica

gianfranco 0.080 g/dL



             code = Ethanol                                        or more



             Level)                                              

 

             Ethanol Inst (test <0                        N            



             code = Ethanol Inst)                                        



Comprehensive Metabolic Panel2019-09-15 00:04:54





             Test Item    Value        Reference Range Interpretation Comments

 

             Sodium Level (test 137.0 mmol/L 135.0-145.0               



             code = Sodium Level)                                        

 

             Potassium Level 4.0 mmol/L   3.5-5.1                   



             (test code =                                        



             Potassium Level)                                        

 

             Chloride Level (test 103 mmol/L                       



             code = Chloride                                        



             Level)                                              

 

             CO2 (test code = 23 mmol/L    22-29                     



             CO2)                                                

 

             Anion Gap (test code 11 mmol/L    7-16                      



             = Anion Gap)                                        

 

             BUN (test code = 11.50 mg/dL  6.00-20.00                



             BUN)                                                

 

             Creatinine Level 1.00 mg/dL   0.50-0.90    H            



             (test code =                                        



             Creatinine Level)                                        

 

             BUN/Creat Ratio 12                        N            



             (test code =                                        



             BUN/Creat Ratio)                                        

 

             Glucose Level (test 108 mg/dL                        



             code = Glucose                                        



             Level)                                              

 

             Calcium Level (test 10.3 mg/dL   8.3-10.5                  



             code = Calcium                                        



             Level)                                              

 

             Alk Phos (test code 93 U/L                           



             = Alk Phos)                                         

 

             Bilirubin Total 0.5 mg/dL    0.1-0.9                   



             (test code =                                        



             Bilirubin Total)                                        

 

             Albumin Level (test 4.4 g/dL     3.5-5.2                   



             code = Albumin                                        



             Level)                                              

 

             Protein Total (test 7.2 g/dL     6.4-8.3                   



             code = Protein                                        



             Total)                                              

 

             ALT (test code = 12 U/L       1-33                      



             ALT)                                                

 

             AST (test code = 17 U/L       1-32                      



             AST)                                                

 

             Globulin (test code 2.8 g/dL     2.9-3.1      L            



             = Globulin)                                         

 

             A/G Ratio (test code 1.6 ratio                 N            



             = A/G Ratio)                                        

 

             eGFR AA (test code = >60                       N            eGFR (e

stimated



             eGFR AA)     mL/min/1.73 m2                           Glomerular



                                                                 Filtration Rate

) is



                                                                 an estimated va

lue,



                                                                 calculated from

 the



                                                                 patient's serum



                                                                 creatinine usin

g the



                                                                 MDRD equation. 

It is



                                                                 NOT the patient

's



                                                                 actual GFR. The

 eGFR



                                                                 provides a more



                                                                 clinically usef

ul



                                                                 measure of kidn

ey



                                                                 disease than se

rum



                                                                 creatinine



                                                                 alone.***This



                                                                 calculation rimma

es



                                                                 sex and race in

to



                                                                 account, if the



                                                                 information is



                                                                 provided. If th

e



                                                                 race is not



                                                                 provided, and t

he



                                                                 patient is



                                                                 -Crystal

n,



                                                                 multiply by 1.2

12.



                                                                 If sex is not



                                                                 provided, and t

he



                                                                 patient is fema

le,



                                                                 multiply by 0.7

42.



                                                                 Results for pat

ients



                                                                 <18 years of ag

e



                                                                 have not been



                                                                 validated by 

e



                                                                 MDRD study and



                                                                 should be



                                                                 interpreted wit

h



                                                                 caution. eGFR R

esult



                                                                 Interpretation:

eGFR



                                                                 > or = 60 is in

 the



                                                                 Normal RangeeGF

R <



                                                                 60 may mean kid

hang



                                                                 diseaseeGFR < 1

5 may



                                                                 mean kidney



                                                                 failure*** Rang

es



                                                                 recommended by 

the



                                                                 [employer]



                                                                 Foundation,



                                                                 http://nkdep.ni

h.gov



Comprehensive Metabolic Panel2019-09-15 00:04:54





             Test Item    Value        Reference Range Interpretation Comments

 

             Sodium Level (test 137.0 mmol/L 135.0-145.0               



             code = Sodium Level)                                        

 

             Potassium Level 4.0 mmol/L   3.5-5.1                   



             (test code =                                        



             Potassium Level)                                        

 

             Chloride Level (test 103 mmol/L                       



             code = Chloride                                        



             Level)                                              

 

             CO2 (test code = 23 mmol/L    22-29                     



             CO2)                                                

 

             Anion Gap (test code 11 mmol/L    7-16                      



             = Anion Gap)                                        

 

             BUN (test code = 11.50 mg/dL  6.00-20.00                



             BUN)                                                

 

             Creatinine Level 1.00 mg/dL   0.50-0.90    H            



             (test code =                                        



             Creatinine Level)                                        

 

             BUN/Creat Ratio 12                        N            



             (test code =                                        



             BUN/Creat Ratio)                                        

 

             Glucose Level (test 108 mg/dL                        



             code = Glucose                                        



             Level)                                              

 

             Calcium Level (test 10.3 mg/dL   8.3-10.5                  



             code = Calcium                                        



             Level)                                              

 

             Alk Phos (test code 93 U/L                           



             = Alk Phos)                                         

 

             Bilirubin Total 0.5 mg/dL    0.1-0.9                   



             (test code =                                        



             Bilirubin Total)                                        

 

             Albumin Level (test 4.4 g/dL     3.5-5.2                   



             code = Albumin                                        



             Level)                                              

 

             Protein Total (test 7.2 g/dL     6.4-8.3                   



             code = Protein                                        



             Total)                                              

 

             ALT (test code = 12 U/L       1-33                      



             ALT)                                                

 

             AST (test code = 17 U/L       1-32                      



             AST)                                                

 

             Globulin (test code 2.8 g/dL     2.9-3.1      L            



             = Globulin)                                         

 

             A/G Ratio (test code 1.6 ratio                 N            



             = A/G Ratio)                                        

 

             eGFR AA (test code = >60                       N            eGFR (e

stimated



             eGFR AA)     mL/min/1.73 m2                           Glomerular



                                                                 Filtration Rate

) is



                                                                 an estimated va

lue,



                                                                 calculated from

 the



                                                                 patient's serum



                                                                 creatinine usin

g the



                                                                 MDRD equation. 

It is



                                                                 NOT the patient

's



                                                                 actual GFR. The

 eGFR



                                                                 provides a more



                                                                 clinically usef

ul



                                                                 measure of kidn

ey



                                                                 disease than se

rum



                                                                 creatinine



                                                                 alone.***This



                                                                 calculation rimma

es



                                                                 sex and race in

to



                                                                 account, if the



                                                                 information is



                                                                 provided. If th

e



                                                                 race is not



                                                                 provided, and t

he



                                                                 patient is



                                                                 -Crystal

n,



                                                                 multiply by 1.2

12.



                                                                 If sex is not



                                                                 provided, and t

he



                                                                 patient is fema

le,



                                                                 multiply by 0.7

42.



                                                                 Results for pat

ients



                                                                 <18 years of ag

e



                                                                 have not been



                                                                 validated by [facility]



                                                                 MDRD study and



                                                                 should be



                                                                 interpreted wit

h



                                                                 caution. eGFR R

esult



                                                                 Interpretation:

eGFR



                                                                 > or = 60 is in

 the



                                                                 Normal RangeeGF

R <



                                                                 60 may mean kid

hang



                                                                 diseaseeGFR < 1

5 may



                                                                 mean kidney



                                                                 failure*** Rang

es



                                                                 recommended by 

the



                                                                 [employer]



                                                                 [employer],



                                                                 http://nkdep.ni

h.gov

 

             eGFR Non-AA (test 58.45                     N            eGFR (sheba

mated



             code = eGFR Non-AA) mL/min/1.73 m2                           Glomer

ular



                                                                 Filtration Rate

) is



                                                                 an estimated va

lue,



                                                                 calculated from

 the



                                                                 patient's serum



                                                                 creatinine usin

g the



                                                                 MDRD equation. 

It is



                                                                 NOT the patient

's



                                                                 actual GFR. The

 eGFR



                                                                 provides a more



                                                                 clinically usef

ul



                                                                 measure of kidn

ey



                                                                 disease than se

rum



                                                                 creatinine



                                                                 alone.***This



                                                                 calculation rimma

es



                                                                 sex and race in

to



                                                                 account, if the



                                                                 information is



                                                                 provided. If th

e



                                                                 race is not



                                                                 provided, and t

he



                                                                 patient is



                                                                 -Crystal

n,



                                                                 multiply by 1.2

12.



                                                                 If sex is not



                                                                 provided, and t

he



                                                                 patient is fema

le,



                                                                 multiply by 0.7

42.



                                                                 Results for pat

ients



                                                                 <18 years of ag

e



                                                                 have not been



                                                                 validated by [facility]



                                                                 MDRD study and



                                                                 should be



                                                                 interpreted wit

h



                                                                 caution. eGFR R

esult



                                                                 Interpretation:

eGFR



                                                                 > or = 60 is in

 the



                                                                 Normal RangeeGF

R <



                                                                 60 may mean kid

hang



                                                                 diseaseeGFR < 1

5 may



                                                                 mean kidney



                                                                 failure*** Rang

es



                                                                 recommended by 

the



                                                                 [employer]



                                                                 [employer],



                                                                 http://nkdep.ni

h.gov



Urinalysis Microscopic2019-09-15 00:02:53





             Test Item    Value        Reference Range Interpretation Comments

 

             UA WBC (test code = UA WBC) 6-10         0-5          A            

 

             UA RBC (test code = UA RBC) 0-5          0-5                       

 

             UA Bacteria (test code = UA Bacteria) Many                      A  

          

 

             UA Squam Epithelial (test code = UA TNTC                      A    

        



             Squam Epithelial)                                        



Urine Drug [id number] 23:59:42





             Test Item    Value        Reference Range Interpretation Comments

 

             Amphetamine Screen Ur POSITIVE     Negative     A            



             (test code = Amphetamine                                        



             Screen Ur)                                          

 

             Barbiturate Screen Ur Negative     Negative                  



             (test code = Barbiturate                                        



             Screen Ur)                                          

 

             Benzodiazepines Ur (test POSITIVE     Negative     A            



             code = Benzodiazepines                                        



             Ur)                                                 

 

             Cocaine Screen Ur (test Negative     Negative                  



             code = Cocaine Screen                                        



             Ur)                                                 

 

             U Methadone Scr (test Negative     Negative                  



             code = U Methadone Scr)                                        

 

             Opiate Screen Ur (test Negative     Negative                  



             code = Opiate Screen Ur)                                        

 

             U PCP Scrn (test code = Negative     Negative                  



             U PCP Scrn)                                         

 

             Cannabinoid Screen Ur Negative     Negative                  



             (test code = Cannabinoid                                        



             Screen Ur)                                          

 

             U TCA (test code = U Negative     Negative                  The res

ults of all



             TCA)                                                drug screen elinor

ts are



                                                                 only preliminar

y.



                                                                 Clinical



                                                                 consideration a

nd



                                                                 professional ju

dgment



                                                                 should be appli

ed to



                                                                 any drug of abu

se



                                                                 test result,



                                                                 particularly wh

en



                                                                 preliminary pos

itive



                                                                 results are obt

ained.



                                                                 Please order a



                                                                 separate confir

matory



                                                                 test if desired

.



HCG Qualitative [id number] 23:54:43





             Test Item    Value        Reference Range Interpretation Comments

 

             hCG Ur (test code = Negative                               If the r

esult is



             hCG Ur)                                             "Negative" in p

atients



                                                                 suspected to be



                                                                 pregnant, recom

mend



                                                                 retest with a s

ample



                                                                 obtained 48 to 

72



                                                                 hours later, or

 by



                                                                 ordering a



                                                                 quantitative as

say. If



                                                                 the result is



                                                                 "Borderline" te

sting



                                                                 should be repea

gianfranco in



                                                                 48 to 72 hours.

 

             Lot # (test code = [id number]                    N            



             Lot #)                                              

 

             Expiration Dt (test [id number]                  N            



             code = Expiration Dt)                                        

 

             Neg Control (test Negative                               



             code = Neg Control)                                        

 

             Pos Control (test Positive                               



             code = Pos Control)                                        

 

             Internal QC (test Acceptable                             



             code = Internal QC)                                        



Urinalysis with Culture, if [id number] 23:52:04





             Test Item    Value        Reference Range Interpretation Comments

 

             UA Color (test code = UA YELLO        Yellow                    



             Color)                                              

 

             UA Appear (test code = SCLD         Clear        A            



             UA Appear)                                          

 

             UA pH (test code = UA 6.5                                    



             pH)                                                 

 

             UA Spec Grav (test code 1.011        1.001-1.035               



             = UA Spec Grav)                                        

 

             UA Glucose (test code = NEG          Negative                  



             UA Glucose)                                         

 

             UA Bili (test code = UA NEG          Negative                  



             Bili)                                               

 

             UA Ketones (test code = NEG          Negative                  



             UA Ketones)                                         

 

             UA Blood (test code = UA NEG          Negative                  



             Blood)                                              

 

             UA Protein (test code = NEG          Negative                  



             UA Protein)                                         

 

             UA Urobilinogen (test 1 mg/dL      >0.2         A            



             code = UA Urobilinogen)                                        

 

             UA Nitrite (test code = POS          Negative     A            



             UA Nitrite)                                         

 

             UA Leuk Est (test code = NEG          Negative                  



             UA Leuk Est)                                        

 

             UA Micro Ind? (test code Indicated    Not Indicated A            Re

sult created by



             = UA Micro Ind?)                                        rule



                                                                 [id number]

_IND



Automated [id number] 23:48:39





             Test Item    Value        Reference Range Interpretation Comments

 

             Neutro Auto (test code = Neutro 75.7 %       36.0-70.0    H        

    



             Auto)                                               

 

             Lymph Auto (test code = Lymph Auto) 14.1 %       12.0-44.0         

        

 

             Mono Auto (test code = Mono Auto) 7.6 %        0.0-11.0            

      

 

             Eos, Auto (test code = Eos, Auto) 1.8 %        0.0-7.0             

      

 

             Basophil Auto (test code = Basophil 0.5 %        0.0-2.0           

        



             Auto)                                               

 

             Neutro Absolute (test code = Neutro 12.7 x10     1.6-7.4      H    

        



             Absolute)                                           

 

             Lymph Absolute (test code = Lymph 2.38 x10     .50-4.60            

      



             Absolute)                                           

 

             Mono Absolute (test code = Mono 1.28 x10     .00-1.20     H        

    



             Absolute)                                           

 

             Eos Absolute (test code = Eos 0.31 x10     0.00-0.74               

  



             Absolute)                                           

 

             Baso Absolute (test code = Baso 0.09 x10     0.00-0.21             

    



             Absolute)                                           



IG [id number] 23:48:39





             Test Item    Value        Reference Range Interpretation Comments

 

             IG (test code = IG) 0.3 %        0.0-5.0                   

 

             IG Abs (test code = IG Abs) 0 x10                     N            



Complete Blood Count with [id number] 23:48:38





             Test Item    Value        Reference Range Interpretation Comments

 

             WBC (test code = WBC) 16.8 x10     4.4-10.5     H            

 

             RBC (test code = RBC) 4.06 x10     3.75-5.20                 

 

             Hgb (test code = Hgb) 12.7 g/dL    12.2-14.8                 

 

             MCV (test code = MCV) 94.10 fL     80..00              

 

             Hct (test code = Hct) 38.2 %       36.5-44.4                 

 

             MCHC (test code = 33.20 g/dL   32.00-37.50               



             MCHC)                                               

 

             RDW CV (test code = 13.2 %       11.5-14.5                 



             RDW CV)                                             

 

             MCH (test code = MCH) 31.3 pg      27.0-32.5                 

 

             Platelets (test code = 363.0 x10    140.0-440.0               



             Platelets)                                          

 

             MPV (test code = MPV) 10.0 fL                   N            

 

             Slide Review (test Auto         Auto                      Result cr

eated by



             code = Slide Review)                                        [email or web address]

SLIDE_REV_AUTO

 

             nRBC (test code = 0                         N            



             nRBC)                                               

 

             NRBC Abs (test code = 0.00 x10                  N            



             NRBC Abs)                                           

 

             IPF (test code = IPF) 0 %                       N            



RPR, [id number] 05:53:00





             Test Item    Value        Reference Range Interpretation Comments

 

             RPR (test code = RPR) Non-Reactive Non-Reactive N            



BHCG, Serum, [id number] 01:39:00





             Test Item    Value        Reference Range Interpretation Comments

 

             Preg Qual [Se] (test code = BSHCG) Negative     Negative     N     

       



BHCG, Serum, [id number] 01:09:00





             Test Item    Value        Reference Range Interpretation Comments

 

             B hCG, Quant (test <1 mIU/mL                              Weeks of 

Gestation



             code = BHCGQT)                                        Ranges (mIU/m

L)3 weeks



                                                                 5.40 - 72.04 we

eks 10.2



                                                                 - 7085 weeks 21

7 - 21137



                                                                 weeks  152 - 32

1777



                                                                 weeks 4059 - 15

[id number]



                                                                 weeks 47448 - 1

[id number]



                                                                 weeks 13181 - 1

[id number]



                                                                 weeks 26940 - 1

[id number]



                                                                 weeks 13373 - 2

[id number]



                                                                 weeks 34505 - 9

[id number]



                                                                 weeks 32254 - 6

[id number]



                                                                 weeks 8904 - 55

87060



                                                                 weeks 8240 - 51

15027



                                                                 weeks 9649 - 55

271



Thyroid Stimulating Hormone (TSH)2017 01:09:00





             Test Item    Value        Reference Range Interpretation Comments

 

             TSH (test code = TSH) 0.93 mIU/mL  0.270-4.200  N            



Lipid [id number] 01:05:00





             Test Item    Value        Reference Range Interpretation Comments

 

             Cholesterol (test 163 mg/dL    0-200        N            



             code = CHOL)                                        

 

             Triglycerides (test 108 mg/dL    9-200        N            



             code = TRIG)                                        

 

             HDL (test code = 41 mg/dL     50-60        L            



             HDL)                                                

 

             Chol/HDL (test code 4.0 Ratio    0.0-4.4      N            



             = CHOLPHDL)                                         

 

             LDL, Calculated 100          0-130        N            (NOTE)RISK O

F HEART



             (test code = LDLC)                                        DISEASEPu

blished by



                                                                 American Heart



                                                                 AssociationAnal

yte



                                                                 Optimal Boderli

ne



                                                                 Increased RiskC

HOL <200



                                                                 200-239 >240TRI

G <150



                                                                 150-199 >200HDL

 Male:



                                                                 >60 <40HDL Fema

le: >60



                                                                 <50LDL <100 130

-159



                                                                 >160LDL NEAR OP

TIMAL IS



                                                                 100-129

 

             VLDL (test code = 22 mg/dL     5-40         N            



             VLDL)                                               

 

             LDL/HDL (test code = 2                                      



             LDLPHDL)                                            



Comprehensive Metabolic [id number] 21:02:00





             Test Item    Value        Reference Range Interpretation Comments

 

             Sodium (test code = 140 mmol/L   135-145      N            



             NA)                                                 

 

             Potassium (test 3.6 mmol/L   3.5-5.1      N            



             code = K)                                           

 

             Chloride (test code 103 mmol/L          N            



             = CL)                                               

 

             Carbon Dioxide 26 mmol/L    22-29        N            



             (test code = CO2)                                        

 

             Glucose (test code 89 mg/dL            N            



             = GLU)                                              

 

             Blood Urea Nitrogen 13 mg/dL     6-20         N            



             (test code = BUN)                                        

 

             Creatinine (test 0.8 mg/dL    0.5-0.9      N            



             code = CREAT)                                        

 

             Calcium (test code 10.0 mg/dL   8.3-10.5     N            



             = CA)                                               

 

             Prot Total (test 7.0 g/dL     6.4-8.3      N            



             code = TP)                                          

 

             Albumin (test code 4.2 g/dL     3.5-5.2      N            



             = ALB)                                              

 

             A/G Ratio (test 1.5 Ratio                              



             code = AGRATIO)                                        

 

             Globulin (test code 2.8          2.9-3.1      L            



             = GLOB)                                             

 

             Bili Total (test 0.6 mg/dL    0.1-0.9      N            



             code = TBIL)                                        

 

             Alk Phos (test code 91 U/L              N            



             = APHOS)                                            

 

             AST (test code = 19 U/L       1-32         N            



             AST)                                                

 

             ALT (test code = 14 U/L       1-33         N            



             ALT)                                                

 

             BUN/Creatinine 16.3                                   



             Ratio (test code =                                        



             BCRATIO)                                            

 

             Anion Gap (test 11 mmol/L    7-16         N            



             code = AGAP)                                        

 

             Estimated GFR (test >60                                    eGFR (es

timated



             code = GFR)  mL/min/1.73m2                           Glomerular Nguyễn

tration



                                                                 Rate) is an est

imated



                                                                 value,calculate

d from



                                                                 the patient's s

harman



                                                                 creatinine usin

g the



                                                                 MDRD equation.I

t is



                                                                 NOT the patient

's



                                                                 actual GFR. The

 eGFR



                                                                 provides a more



                                                                 clinicallyusefu

l



                                                                 measure of kidn

ey



                                                                 disease than se

rum



                                                                 creatinine



                                                                 alone.***This



                                                                 calculation rimma

es sex



                                                                 and race into



                                                                 account, if the



                                                                 informationis



                                                                 provided. If th

e race



                                                                 is not provided

, and



                                                                 the patient



                                                                 isAfrican-Ameri

can,



                                                                 multiply by 1.2

12. If



                                                                 sex is not prov

ided,



                                                                 and thepatient 

is



                                                                 female, multipl

y by



                                                                 0.742. Results 

for



                                                                 patients <18 ye

ars



                                                                 ofage have not 

been



                                                                 validated by 

e MDRD



                                                                 study and maria eugeniaul

d be



                                                                 interpretedwith



                                                                 caution.eGFR Re

sult



                                                                 Interpretation:

eGFR >



                                                                 or = 60 is in t

he



                                                                 Normal RangeeGF

R < 60



                                                                 may mean kidney



                                                                 diseaseeGFR < 1

5 may



                                                                 mean kidney



                                                                 failure***Range

s



                                                                 recommended by 

the



                                                                 [employer]



                                                                 Foundation,http

://nkd



                                                                 ep.nih.gov



Alcohol/Ethanol, [id number] 21:02:00





             Test Item    Value        Reference Range Interpretation Comments

 

             Alcohol, Ethyl <0.01 g/dL   0.00-0.01    N            Intoxicated 0

.080 g/dL



             (test code = ETOH)                                        or more



CBC with [id number] 20:35:00





             Test Item    Value        Reference Range Interpretation Comments

 

             WBC (test code = WBC) 10.3 K/cumm  4.4-10.5     N            

 

             RBC (test code = RBC) 4.37 M/cumm  3.75-5.20    N            

 

             Hemoglobin (test code = HGB) 13.1 gm/dL   12.2-14.8    N           

 

 

             Hematocrit (test code = HCT) 41.5 %       36.5-44.4    N           

 

 

             MCV (test code = MCV) 94.9 fL             N            

 

             MCH (test code = MCH) 30.1 pg      27.0-32.5    N            

 

             MCHC (test code = MCHC) 31.7 g/dL    32.0-37.5    L            

 

             RDW (test code = RDW) 12.9 %       11.5-14.5    N            

 

             Platelet Count (test code = 327 K/cumm   140-440      N            



             PLTCT)                                              

 

             MPV (test code = MPV) 7.0 fL                                 

 

             Diff Method (test code = DIFFM) Auto                               

    

 

             Neutrophil (test code = NEUT) 74.0 %       36-70        H          

  

 

             Lymphocyte (test code = LYMPH) 17.6 %       12-44        N         

   

 

             Monocyte (test code = MONO) 6.4 %        0-11         N            

 

             Eosinophil (test code = EOS) 1.5 %        0-7          N           

 

 

             Basophil (test code = BASO) 0.5 %        0-2          N            

 

             Neutro Abs (test code = ANEUT) 7.6 K/cumm   1.6-7.4      H         

   

 

             Lymph Abs (test code = ALYMPH) 1.8 K/cumm   0.5-4.6      N         

   

 

             Mono Abs (test code = AMONO) 0.7 K/cumm   0.0-1.2      N           

 

 

             Eos Abs (test code = AEOS) 0.16 K/cumm  0.00-0.74    N            

 

             Baso Abs (test code = ABASO) 0.1 K/cumm   0.00-0.21    N           

 



[id number] 20:33:00





             Test Item    Value        Reference Range Interpretation Comments

 

             Amphetamine (test code POSITIVE     Negative     A            For d

iagnostic purposes



             = AMPH)                                             only, positive 

results



                                                                 should always b

e



                                                                 assessedin



                                                                 conjunctionwith

 the



                                                                 patient's medic

al



                                                                 history,clinica

l



                                                                 examination and



                                                                 otherfindings.T

o



                                                                 fulfill legal



                                                                 requirements, a

 more



                                                                 specific altern

ate



                                                                 chemical method

must be



                                                                 used inorder to

 obtain



                                                                 a Confirmed judith

lytical



                                                                 result. GC/MS i

s the



                                                                 preferred confi

rmatory



                                                                 method.

 

             Barbiturates (test Negative     Negative     N            



             code = GENEVIEVE)                                        

 

             Benzodiazepine (test Negative     Negative     N            



             code = IRENE)                                        

 

             Cocaine (test code = Negative     Negative     N            



             COCA)                                               

 

             Methadone (test code = Negative     Negative     N            



             MTHD)                                               

 

             Opiates (test code = Negative     Negative     N            



             OPIA)                                               

 

             PCP (test code = PCP) Negative     Negative     N            

 

             Propoxyphene (test Negative     Negative     N            



             code = PROPOX)                                        

 

             THC (test code = THC) Negative     Negative     N            



Urinalysis [id number] 20:23:00





             Test Item    Value        Reference Range Interpretation Comments

 

             Color (test code = COLOR) Yellow       Yellow,Straw,Pl N           

 



                                       yellow                    

 

             Clarity (test code = Sl Cloudy    Clear        A            



             CLAR)                                               

 

             Specific Gravity (test 1.007        1.001-1.035  N            



             code = SPGR)                                        

 

             pH (test code = PH) 6.0          5.0-9.0      N            

 

             Ketone (test code = KET) Negative mg/dL Negative     N            

 

             Glucose (test code = Negative mg/dL Negative     N            



             GLUCUR)                                             

 

             Protein (test code = Negative mg/dL Negative     N            



             PROT)                                               

 

             Bilirubin (test code = Negative mg/dL Negative     N            



             BILI)                                               

 

             Occult Blood (test code = Moderate     Negative     A            



             UDOB)                                               

 

             Urobilinogen (test code = 0.2 mg/dL    0.2-1.0      N            



             UROB)                                               

 

             Nitrite (test code = NIT) Positive     Negative     A            

 

             Leuk Esterase (test code Moderate     Negative     A            



             = LEUK)                                             

 

             Micros Exam (test code = Indicated                              



             MEXAM)                                              

 

             Epithelial Cells (test 50+ /LPF     0-30         A            



             code = EPI)                                         

 

             WBC, Urine (test code = 41-50 /HPF   0-5          A            



             UWBC)                                               

 

             RBC, Urine (test code = 3-5 /HPF     0-5          A            



             URBC)                                               

 

             Bacteria (test code = Many /HPF                              



             BACT)                                               



[id number] 16:21:00





             Test Item    Value        Reference Range Interpretation Comments

 

             Lithium (test code = LI) 0.6 mmol/l   0.6-1.2                   



[facility] (Ultra Sensitive)2017 16:21:00





             Test Item    Value        Reference Range Interpretation Comments

 

             TSH (test code = TSH) 2.14 mIU/L   0.47-4.68                 



[facility]





             Test Item    Value        Reference Range Interpretation Comments

 

             Glucose (test code 77 mg/dl                         



             = GLU)                                              

 

             BUN (test code = 9.0 mg/dl    6.0-17.0                  



             BUN)                                                

 

             Creatinine (test 0.7 mg/dl    0.4-1.2                   



             code = CREA)                                        

 

             Sodium (test code = 139 mmol/l   137-145                   



             NA)                                                 

 

             Potassium (test 4.7 mmol/l   3.5-5.0                   



             code = K)                                           

 

             Chloride (test code 107 mmol/l                       



             = CL)                                               

 

             CO2 (test code = 22 mmol/l    22-30                     



             CO2)                                                

 

             Anion Gap (test 11                                     



             code = GAP)                                         

 

             Calcium (test code 9.8 mg/dl    8.4-10.2                  



             = CALC)                                             

 

             T Protein (test 8.0 gm/dl    5.1-8.7                   



             code = TP)                                          

 

             Albumin (test code 4.4 gm/dl    3.5-4.6                   



             = ALB)                                              

 

             A/G Ratio (test 1.2 %        1.1-2.2                   



             code = AGRAT)                                        

 

             AST (SGOT) (test 20 U/L       11-36                     



             code = AST)                                         

 

             ALT (SGPT) (test 29 U/L       11-40                     



             code = ALT)                                         

 

             Alkaline Phos (test 108 U/L                          



             code = ALKP)                                        

 

             Total Bilirubin 0.6 mg/dl    0.2-1.2                   



             (test code = TBIL)                                        

 

             Globulin (test code 3.6 gm/dl    2.3-3.5      H            



             = GLOBU)                                            

 

             Calcium, Corrected 9.5 mg/dl    8.4-10.2                  Various f

ormulas exist



             (test code =                                        for corrected s

harman



             CALCCORR)                                           calcium results

, each



                                                                 yielding differ

ent



                                                                 values. This co

rrected



                                                                 result was base

d on



                                                                 the formula: Co

rrected



                                                                 Calcium = Serum

Calcium



                                                                 + [0.8 * ( 4 -



                                                                 SerumAlbumin)]

 

             EGFR if  >60                                    



             American (test code mL/min/1.73m\                           



             = EGFRAA)    S\2                                    

 

             EGFR if Non- >60                                    Estimate

d Glomerular



             American (test code mL/min/1.73m\                           Filtrat

ion Rate (eGFR)



             = EGFRNA)    S\2                                    Reference Inter

vals



                                                                 Decision Points

 for 18



                                                                 years and older

 and



                                                                 average body ma

ss: >=



                                                                 60 Does not exc

lude



                                                                 kidney disease.

 30 -



                                                                 59 Suggests mod

erate



                                                                 chronic kidney 

disease



                                                                 and indicates t

he need



                                                                 for further



                                                                 investigation



                                                                 including asses

sment



                                                                 of proteinuria 

and



                                                                 cardiovascular



                                                                 factors. < 30 U

sually



                                                                 indicates a nee

d for



                                                                 referral for



                                                                 assessment and



                                                                 management of c

hronic



                                                                 kidney failure.



[facility] [place]

## 2023-02-24 NOTE — RAD REPORT
EXAM DESCRIPTION:  RAD - Hip Left 2 View - 2/24/2023 5:18 am

 

CLINICAL HISTORY:  The patient is 52 years old and is Female; PAIN

 

TECHNIQUE:  Two views of the left hip with pelvis when performed.

 

COMPARISON:  No relevant prior studies available.

 

FINDINGS:  Bones/joints:   No significant arthropathy.

         No acute fracture.   No dislocation.

   Soft tissues:   Unremarkable.

 

IMPRESSION:  No acute osseous findings.

 

Electronically signed by:   Carmen Crespo MD   2/24/2023 5:25 AM CST Workstation: [id number]

 

 

Due to temporary technical issues with the PACS/Fluency reporting system, reports are being signed by
 the in house radiologists without review as a courtesy to insure prompt reporting. The interpreting 
radiologist is fully responsible for the content of the report.

## 2023-04-19 ENCOUNTER — HOSPITAL ENCOUNTER (INPATIENT)
Dept: HOSPITAL 97 - ER | Age: 53
LOS: 3 days | Discharge: HOME | DRG: 758 | End: 2023-04-22
Attending: HOSPITALIST | Admitting: HOSPITALIST
Payer: COMMERCIAL

## 2023-04-19 VITALS — BODY MASS INDEX: 33.9 KG/M2

## 2023-04-19 DIAGNOSIS — Z88.0: ICD-10-CM

## 2023-04-19 DIAGNOSIS — Z59.00: ICD-10-CM

## 2023-04-19 DIAGNOSIS — F31.9: ICD-10-CM

## 2023-04-19 DIAGNOSIS — A59.01: Primary | ICD-10-CM

## 2023-04-19 DIAGNOSIS — I10: ICD-10-CM

## 2023-04-19 DIAGNOSIS — N30.01: ICD-10-CM

## 2023-04-19 DIAGNOSIS — B37.9: ICD-10-CM

## 2023-04-19 LAB
ALBUMIN SERPL BCP-MCNC: 3 G/DL (ref 3.4–5)
ALP SERPL-CCNC: 122 U/L (ref 45–117)
ALT SERPL W P-5'-P-CCNC: 17 U/L (ref 13–56)
AST SERPL W P-5'-P-CCNC: 11 U/L (ref 15–37)
BLD SMEAR INTERP: (no result)
BUN BLD-MCNC: 11 MG/DL (ref 7–18)
GLUCOSE SERPLBLD-MCNC: 107 MG/DL (ref 74–106)
HCT VFR BLD CALC: 38.4 % (ref 36–45)
LYMPHOCYTES # SPEC AUTO: 2 K/UL (ref 0.7–4.9)
MCV RBC: 92.2 FL (ref 80–100)
MORPHOLOGY BLD-IMP: (no result)
PMV BLD: 8.4 FL (ref 7.6–11.3)
POTASSIUM SERPL-SCNC: 3.9 MEQ/L (ref 3.5–5.1)
RBC # BLD: 4.17 M/UL (ref 3.86–4.86)

## 2023-04-19 PROCEDURE — 83735 ASSAY OF MAGNESIUM: CPT

## 2023-04-19 PROCEDURE — 87086 URINE CULTURE/COLONY COUNT: CPT

## 2023-04-19 PROCEDURE — 96367 TX/PROPH/DG ADDL SEQ IV INF: CPT

## 2023-04-19 PROCEDURE — 87210 SMEAR WET MOUNT SALINE/INK: CPT

## 2023-04-19 PROCEDURE — 84100 ASSAY OF PHOSPHORUS: CPT

## 2023-04-19 PROCEDURE — 74177 CT ABD & PELVIS W/CONTRAST: CPT

## 2023-04-19 PROCEDURE — 96375 TX/PRO/DX INJ NEW DRUG ADDON: CPT

## 2023-04-19 PROCEDURE — 81001 URINALYSIS AUTO W/SCOPE: CPT

## 2023-04-19 PROCEDURE — 96372 THER/PROPH/DIAG INJ SC/IM: CPT

## 2023-04-19 PROCEDURE — 87590 N.GONORRHOEAE DNA DIR PROB: CPT

## 2023-04-19 PROCEDURE — 99285 EMERGENCY DEPT VISIT HI MDM: CPT

## 2023-04-19 PROCEDURE — 87255 GENET VIRUS ISOLATE HSV: CPT

## 2023-04-19 PROCEDURE — 96365 THER/PROPH/DIAG IV INF INIT: CPT

## 2023-04-19 PROCEDURE — 87088 URINE BACTERIA CULTURE: CPT

## 2023-04-19 PROCEDURE — 85025 COMPLETE CBC W/AUTO DIFF WBC: CPT

## 2023-04-19 PROCEDURE — 36415 COLL VENOUS BLD VENIPUNCTURE: CPT

## 2023-04-19 PROCEDURE — 80053 COMPREHEN METABOLIC PANEL: CPT

## 2023-04-19 PROCEDURE — 80048 BASIC METABOLIC PNL TOTAL CA: CPT

## 2023-04-19 PROCEDURE — 81025 URINE PREGNANCY TEST: CPT

## 2023-04-19 PROCEDURE — 87490 CHLMYD TRACH DNA DIR PROBE: CPT

## 2023-04-19 RX ADMIN — SODIUM CHLORIDE SCH MLS: 0.9 INJECTION, SOLUTION INTRAVENOUS at 23:00

## 2023-04-19 SDOH — ECONOMIC STABILITY - HOUSING INSECURITY: HOMELESSNESS UNSPECIFIED: Z59.00

## 2023-04-19 NOTE — ER
Nurse's Notes                                                                                     

 [facility]                                                                 

Name: Alka Judd                                                                               

Age: 52 yrs                                                                                       

Sex: Female                                                                                       

: 1970                                                                                   

MRN: [id number]                                                                                   

Arrival Date: 2023                                                                          

Time: 17:21                                                                                       

Account#: [id number]                                                                            

Bed 20                                                                                            

Private MD:                                                                                       

Diagnosis: Other specified noninflammatory disorders of vagina;Vaginitis, vulvitis and            

  vulvovaginitis in diseases classified elsewhere;UTI/ Urinary tract infection, site              

  not specified                                                                                   

                                                                                                  

Presentation:                                                                                     

                                                                                             

17:43 Chief complaint: Patient states: vaginal irritation x 4-5 days. Pt states, "I think I   ss  

      have syphilis. It's oozing really bad.". Coronavirus screen: Client denies travel out       

      of the [place] in the last 14 days. Ebola Screen: Patient denies exposure to infectious        

      person. Patient denies travel to an Ebola-affected area in the 21 days before illness       

      onset. Initial Sepsis Screen: Does the patient meet any 2 criteria? No. Patient's           

      initial sepsis screen is negative. Does the patient have a suspected source of              

      infection? No. Patient's initial sepsis screen is negative. Risk Assessment: Do you         

      want to hurt yourself or someone else? Patient reports no desire to harm self or            

      others. Onset of symptoms was April 15, 2023.                                               

17:43 Method Of Arrival: Ambulatory                                                           ss  

17:43 Acuity: TEMO 3                                                                           ss  

                                                                                                  

Historical:                                                                                       

- Allergies:                                                                                      

17:45 PENICILLINS;                                                                            ss  

17:45 Risperdal;                                                                              ss  

- PMHx:                                                                                           

17:45 Bipolar disorder; Hypertensive disorder;                                                ss  

- PSHx:                                                                                           

17:45 righ hip surgery;                                                                       ss  

                                                                                                  

- Family history:: not pertinent.                                                                 

                                                                                                  

                                                                                                  

Screenin:14 [facility] ED Fall Risk Assessment (Adult) History of falling in the last 3 months,       mb9 

      including since admission No falls in past 3 months (0 pts) Confusion or Disorientation     

      No (0 pts) Intoxicated or Sedated No (0 pts) Impaired Gait No (0 pts) Mobility Assist       

      Device Used No (0 pt) Altered Elimination No (0 pt) Score/Fall Risk Level 0 - 2 = Low       

      Risk Oriented to surroundings, Maintained a safe environment, Educated pt \T\ family on     

      fall prevention, incl call for assistance when getting out of bed. Abuse screen: Denies     

      threats or abuse. Nutritional screening: No deficits noted. Tuberculosis screening: No      

      symptoms or risk factors identified.                                                        

                                                                                                  

Assessment:                                                                                       

18:15 General: Appears uncomfortable, Behavior is cooperative. Pain: Complains of pain in     mb9 

      pelvis. Neuro: Level of Consciousness is awake, alert, obeys commands, Oriented to          

      person, place, time, situation, Appropriate for age. Respiratory: Airway is patent          

      Respiratory effort is even, unlabored, Respiratory pattern is regular, symmetrical. :     

      Urine is cloudy, Reports discharge, from vagina that is green, white, yellow. : :       

      Blisters noted at urinary meatus on labia at vaginal opening. Derm: Skin is pink, warm      

      \T\ dry.                                                                                    

                                                                                                  

Vital Signs:                                                                                      

17:43  / 64; Pulse 84; Resp 24; Temp 98.6(TE); Pulse Ox 99% on R/A; Weight 95.25 kg;    ss  

      Height 5 ft. 2 in. ; Pain 10/10;                                                            

20:40  / 67; Pulse 85; Resp 18; Temp 98.2(O); Pulse Ox 98% on R/A;                      ha1 

21:15 BP 99 / 65; Pulse 80; Resp 17 S; Pulse Ox 96% on R/A;                                   ha1 

17:43 Body Mass Index 38.41 (95.25 kg, 157.48 cm)                                             ss  

17:43 Pain Scale: Adult                                                                       ss  

                                                                                                  

ED Course:                                                                                        

17:24 Patient arrived in ED.                                                                  mr  

17:25 Raúl Chavez MD is Attending Physician.                                             suleiman 

17:40 Hanna Leon RN is Primary Nurse.                                               mb9 

17:45 Triage completed.                                                                       ss  

17:45 Arm band placed on right wrist.                                                         ss  

17:45 Placed in gown. Bed in low position. Call light in reach. Side rails up X 1. Client     mb9 

      placed on continuous cardiac and pulse oximetry monitoring. NIBP monitoring applied.        

      Cardiac monitor on.                                                                         

18:17 PREGU Sent.                                                                             mb9 

18:17 Urinalysis w/ reflexes Sent.                                                            mb9 

19:41 Elliott Mena MD is Hospitalizing Provider.                                           suleiman 

19:50 Assist provider with pelvic exam: Set up pelvic tray. Performed by Raúl Chavez MD    mb9 

      Patient tolerated well.                                                                     

20:03 Armand Villa is Hospitalizing Provider.                                               sb4 

20:13 Wet Prep Sent.                                                                          mb9 

20:13 GC (GONORR/CHLAMYDIA) Probe Sent.                                                       mb9 

20:35 Report received from JÚNIOR Shook.                                                      ha1 

20:51 Inserted saline lock: 22 gauge in left forearm, using aseptic technique. Blood          ds4 

      collected.                                                                                  

21:49 CT Abd/Pelvis - IV Contrast Only In Process Unspecified.                                EDMS

22:00 Patient admitted, IV remains in place.                                                  ha1 

                                                                                             

07:36 Primary Nurse role handed off by Hanna Leon, RN                                em1 

                                                                                                  

Administered Medications:                                                                         

                                                                                             

18:10 Drug: metroNIDAZOLE PO 2 grams Route: PO;                                               mb9 

20:14 Follow up: Response: No adverse reaction                                                mb9 

18:12 Drug: Doxycycline  mg Route: PO;                                                  mb9 

20:14 Follow up: Response: No adverse reaction                                                mb9 

18:16 Drug: AZITHromycin PO 1 grams Route: PO;                                                mb9 

20:14 Follow up: Response: No adverse reaction                                                mb9 

18:17 Drug: Rocephin (cefTRIAXone)  mg Route: IM; Site: left gluteus;                   mb9 

20:14 Follow up: Response: No adverse reaction                                                mb9 

20:50 Drug: NS 0.9% IV 1000 ml Route: IV; Rate: 1 bolus; Site: left forearm;                  ha1 

22:00 Follow up: Response: No adverse reaction; IV Status: Completed infusion; IV Intake:     ha1 

      1000ml                                                                                      

20:52 Drug: morphine IVP or IV 4 mg Route: IVP; Infused Over: 4 mins; Site: left forearm;     ha1 

21:15 Follow up: Response: No adverse reaction; Pain is decreased; RASS: Alert and Calm (0)   ha1 

20:55 Drug: Ondansetron IVP 4 mg Route: IVP; Site: left forearm;                              ha1 

21:30 Follow up: Response: No adverse reaction                                                ha1 

21:00 Drug: Fluconazole  mg Route: PO;                                                  ha1 

21:30 Follow up: Response: No adverse reaction                                                ha1 

21:00 Drug: Rocephin IV 1 grams Route: IV; Rate: per protocol; Site: left forearm;            ha1 

21:30 Follow up: Response: No adverse reaction; IV Status: Completed infusion; IV Intake: [id number] 

21:10 Drug: Valtrex PO 1000 mg Route: PO;                                                     ha1 

21:30 Follow up: Response: No adverse reaction                                                ha1 

21:35 Drug: Clindamycin IVPB 900 mg Route: IVPB; Infused Over: 30 mins; Site: left forearm;   ha1 

22:00 Follow up: Response: No adverse reaction; IV Status: Completed infusion; IV Intake:     ha1 

      100ml                                                                                       

                                                                                                  

                                                                                                  

Medication:                                                                                       

22:00 VIS not applicable for this client.                                                     ha1 

                                                                                                  

Intake:                                                                                           

21:30 IV: 50ml; Total: 50ml.                                                                  ha1 

22:00 IV: 100ml; Total: 150ml.                                                                ha1 

22:00 IV: 1000ml; Total: 1150ml.                                                              ha1 

                                                                                                  

Outcome:                                                                                          

19:42 Decision to Hospitalize by Provider.                                                    [facility] 

22:00 Admitted to ER Hold.  Please see [facility] for further documentation.                    ha1 

22:00 Condition: stable                                                                           

22:00 Discharge instructions given to patient, Instructed on the need for admit, Demonstrated ha1 

      understanding of instructions.                                                              

                                                                                             

16:58 Admitted to Med/surg accompanied by tech, via wheelchair, room 213, with chart, Report  eh3 

      called to  [place]                                                                           

16:58 Patient left the ED.                                                                    [facility] 

                                                                                                  

Signatures:                                                                                       

Dispatcher [employer]                           EDMS                                                 

Raúl Chavez MD MD cha Rivera, Hanna Solano, Lennox                               em1                                                  

Lalita Hoffmann, RN                      RN                                                      

Jerry Stephens                             4                                                  

Meliza Calderon RN RN   3                                                  

Uzma Guevara, RN                        RN   ha1                                                  

Marita Castro, PA-C                     PA-C [email or web address]                                                  

Hanna Leon, RN                 RN   mb9                                                  

                                                                                                  

Corrections: (The following items were deleted from the chart)                                    

                                                                                             

19:33 18:16 No provider procedures requiring assistance completed. mb9                        mb9 

19:33 18:16 Patient did not have IV access during this emergency room visit. mb9              mb9 

19:51 18:15 : diana                                                                           mb9 

                                                                                                  

**************************************************************************************************

## 2023-04-19 NOTE — P.HP
Certification for Inpatient


Patient admitted to: Observation


With expected LOS: <2 Midnights


Patient will require the following post-hospital care: None


Practitioner: I am a practitioner with admitting privileges, knowledge of 

patient current condition, hospital course, and medical plan of care.


Services: Services provided to patient in accordance with Admission requirements

found in Title 42 Section 412.3 of the Code of Federal Regulations





Patient History


Date of Service: 04/19/23


Reason for admission: UTI, Vulvovaginitis


History of Present Illness: 


Patient is a 52 year old female with past medical history of hypertension and 

bipolar disorder who presented to the emergency department with complaints of 

vaginal pain, itching, swelling, and discharge. No significant lab 

abnormalities. CT abdomen pelvis negative. Cellulitus noted to external 

genitalia with cervicitis and foul yellow discharge on speculum exam. Wet prep 

positive for trichomonas. She received several medications in the emergency 

department- doxycycline, metronidazole, azithromycin, ceftriaxone, valacyclovir,

clindamycin, and fluconazole. Patient is homeless and has very poor hygeine. ED 

provider wishes to admit patient for further management. 





Allergies





Penicillins Allergy (Severe, Verified 05/24/13 16:03)


   Anaphylaxis





Home medications list reviewed: Yes





- Past Medical/Surgical History


Diabetic: No


-: Hypertension


-: Bipolar Disorder


-: Right hip arthroplasty


Psychosocial/ Personal History: Patient is homeless.





- Family History


Family History: Reviewed- Non-Contributory





- Social History


Place of Residence: Homeless





Review of Systems


Genitourinary: Dysuria, Frequency, As per HPI





Physical Examination





- Vital Signs


Temperature: 98.2 F


Blood Pressure: 103/67


Pulse: 85


Respirations: 18


Pulse Ox (%): 98





- Physical Exam


General: Alert, In no apparent distress


HEENT: Atraumatic, EOMI, Sclerae nonicteric


Neck: Supple, 2+ carotid pulse no bruit


Respiratory: Clear to auscultation bilaterally, Normal air movement


Cardiovascular: Regular rate/rhythm, Normal S1 S2


Gastrointestinal: Normal bowel sounds, No tenderness


Musculoskeletal: No tenderness


Integumentary: No rashes


Neurological: Normal speech, Normal affect





- Studies


Laboratory Data (last 24 hrs)





04/19/23 20:43: Sodium 138, Potassium 3.9, BUN 11, Creatinine 0.89, Glucose 107 

H, Total Bilirubin 0.5, AST 11 L, ALT 17, Alkaline Phosphatase 122 H


04/19/23 20:43: WBC 10.60, Hgb 13.1, Hct 38.4, Plt Count 278





Microbiology Data (last 24 hrs): 








04/19/23 19:57   Cervical   Wet Prep - Final








Assessment and Plan





- Problems (Diagnosis)


(1) UTI (urinary tract infection)


Current Visit: Yes   Status: Acute   


Qualifiers: 


   Urinary tract infection type: acute cystitis   Hematuria presence: with 

hematuria   Qualified Code(s): N30.01 - Acute cystitis with hematuria   





(2) Vulvovaginitis


Current Visit: Yes   Status: Acute   





(3) Trichomonas infection


Current Visit: Yes   Status: Acute   





(4) Hypertension


Current Visit: Yes   Status: Chronic   


Qualifiers: 


   Hypertension type: primary hypertension   Qualified Code(s): I10 - Essential 

(primary) hypertension   





(5) Bipolar disorder


Current Visit: Yes   Status: Chronic   


Qualifiers: 


   Active/Remission status: remission status unspecified   Qualified Code(s): 

F31.9 - Bipolar disorder, unspecified   





- Plan


Sepsis criteria is not met at this time.


Continue metronidazole for trichomonas. 


Continue rocephin for UTI. Urine culture obtained.


Pend gonorrhea, chlamydia, HSV testing.


Wet prep negative for yeast. 


Dr. Patel consulted.


Continue home medications.





Discharge Plan: Home


Plan to discharge in: 24 Hours





- Advance Directives


Does patient have a Living Will: No


Does patient have a Durable POA for Healthcare: No





- Code Status/Comfort Care


Code Status Assessed: Yes


Code Status: Full Code


Physician Review: Patient Assessed, Agree with Above Assessment and Plan


Critical Care: No


Time Spent Managing Pts Care (In Minutes): 50

## 2023-04-19 NOTE — EDPHYS
Physician Documentation                                                                           

 [facility]                                                                 

Name: Alka Judd                                                                               

Age: 52 yrs                                                                                       

Sex: Female                                                                                       

: 1970                                                                                   

MRN: [id number]                                                                                   

Arrival Date: 2023                                                                          

Time: 17:21                                                                                       

Account#: [id number]                                                                            

Bed 20                                                                                            

Private MD:                                                                                       

ED Physician Raúl Chavez                                                                      

HPI:                                                                                              

                                                                                             

19:31 This 52 yrs old  Female presents to ER via Ambulatory with complaints of STD   suleiman 

      Exposure.                                                                                   

19:31 The patient presents with cellulitis of the groin. Description: erythematous. Onset:    suleiman 

      The symptoms/episode began/occurred 1 week(s) ago. Possible cause(s): unknown. The          

      patient presents with a possible exposure to a sexually transmitted disease, urinary        

      symptoms. Onset: The symptoms/episode began/occurred 1 week(s) ago. Modifying factors:      

      The symptoms are alleviated by nothing, the symptoms are aggravated by movement,            

      pressure, walking, urinating. Associated signs and symptoms: Pertinent positives:           

      dysuria, urinary frequency, vaginal bleeding, vaginal discharge. Severity of symptoms:      

      At their worst the symptoms were moderate, in the emergency department the symptoms are     

      unchanged. The patient is sexually active, unk. It is unknown whether or not the            

      patient has had similar symptoms in the past.                                               

                                                                                                  

Historical:                                                                                       

- Allergies:                                                                                      

17:45 PENICILLINS;                                                                            ss  

17:45 Risperdal;                                                                              ss  

- PMHx:                                                                                           

17:45 Bipolar disorder; Hypertensive disorder;                                                ss  

- PSHx:                                                                                           

17:45 righ hip surgery;                                                                       ss  

                                                                                                  

- Family history:: not pertinent.                                                                 

                                                                                                  

                                                                                                  

ROS:                                                                                              

19:31 Constitutional: Negative for fever, chills, and weight loss, Eyes: Negative for injury, suleiman 

      pain, redness, and discharge, ENT: Negative for injury, pain, and discharge, Neck:          

      Negative for injury, pain, and swelling, Cardiovascular: Negative for chest pain,           

      palpitations, and edema, Respiratory: Negative for shortness of breath, cough,              

      wheezing, and pleuritic chest pain, Abdomen/GI: Negative for abdominal pain, nausea,        

      vomiting, diarrhea, and constipation, Back: Negative for injury and pain, MS/Extremity:     

      Negative for injury and deformity, Neuro: Negative for headache, weakness, numbness,        

      tingling, and seizure, Psych: Negative for depression, anxiety, suicide ideation,           

      homicidal ideation, and hallucinations, Allergy/Immunology: Negative for hives, rash,       

      and allergies, Endocrine: Negative for neck swelling, polydipsia, polyuria, polyphagia,     

      and marked weight changes, Hematologic/Lymphatic: Negative for swollen nodes, abnormal      

      bleeding, and unusual bruising.                                                             

19:31 : Positive for urinary symptoms, urinary frequency, small amounts, hematuria, burning     

      with urination, difficulty urinating, vaginal itching.                                      

19:31 Skin: Positive for cellulitis, erythema, rash, swelling, of the mons pubis, prepuce,        

      perineum, right labia majora and left labia majora.                                         

                                                                                                  

Exam:                                                                                             

19:36 Constitutional:  This is a well developed, well nourished patient who is awake, alert,  suleiman 

      and in no acute distress. Head/Face:  Normocephalic, atraumatic. Eyes:  Pupils equal        

      round and reactive to light, extra-ocular motions intact.  Lids and lashes normal.          

      Conjunctiva and sclera are non-icteric and not injected.  Cornea within normal limits.      

      Periorbital areas with no swelling, redness, or edema. ENT:  Nares patent. No nasal         

      discharge, no septal abnormalities noted.  Tympanic membranes are normal and external       

      auditory canals are clear.  Oropharynx with no redness, swelling, or masses, exudates,      

      or evidence of obstruction, uvula midline.  Mucous membranes moist. Neck:  Trachea          

      midline, no thyromegaly or masses palpated, and no cervical lymphadenopathy.  Supple,       

      full range of motion without nuchal rigidity, or vertebral point tenderness.  No            

      Meningismus. Chest/axilla:  Normal chest wall appearance and motion.  Nontender with no     

      deformity.  No lesions are appreciated. Cardiovascular:  Regular rate and rhythm with a     

      normal S1 and S2.  No gallops, murmurs, or rubs.  Normal PMI, no JVD.  No pulse             

      deficits. Respiratory:  Lungs have equal breath sounds bilaterally, clear to                

      auscultation and percussion.  No rales, rhonchi or wheezes noted.  No increased work of     

      breathing, no retractions or nasal flaring. Abdomen/GI:  Soft, non-tender, with normal      

      bowel sounds.  No distension or tympany.  No guarding or rebound.  No evidence of           

      tenderness throughout. Back:  No spinal tenderness.  No costovertebral tenderness.          

      Full range of motion. MS/ Extremity:  Pulses equal, no cyanosis.  Neurovascular intact.     

       Full, normal range of motion. Neuro:  Awake and alert, GCS 15, oriented to person,         

      place, time, and situation.  Cranial nerves II-XII grossly intact.  Motor strength 5/5      

      in all extremities.  Sensory grossly intact.  Cerebellar exam normal.  Normal gait.         

      Psych:  Awake, alert, with orientation to person, place and time.  Behavior, mood, and      

      affect are within normal limits.                                                            

19:36 : CVA tenderness, is absent, Pelvic Exam: External exam: erythema is noted,               

      excoriation noted, reveals ulcerations on external genitalia, Bladder: is normal,           

      Sexual behavior: the patient is sexually active, unk.                                       

19:36 : Pelvic Exam: Speculum exam: cervicitis present, os that is closed, discharge,           

      white, the nurse was present for the exam.                                                  

19:36 Skin: Appearance: Color: erythematous, Temperature: warm, Moisture: normal moisture,        

      petechiae, not noted, ecchymosis, not noted.                                                

19:36 Neuro: Orientation: is normal, appropriate for stated age, no acute changes, Mentation:     

      is normal, appropriate for stated age, no acute changes, Cerebellar function: is            

      grossly normal, Motor: moves all fours, Sensation: is normal, Gait: seizure activity,       

      is not displayed by the patient.                                                            

19:50 : Pelvic Exam: Speculum exam: THICK YELLOW, FOUL.                                     [facility] 

                                                                                                  

Vital Signs:                                                                                      

17:43  / 64; Pulse 84; Resp 24; Temp 98.6(TE); Pulse Ox 99% on R/A; Weight 95.25 kg;    ss  

      Height 5 ft. 2 in. ; Pain 10/10;                                                            

20:40  / 67; Pulse 85; Resp 18; Temp 98.2(O); Pulse Ox 98% on R/A;                      ha1 

21:15 BP 99 / 65; Pulse 80; Resp 17 S; Pulse Ox 96% on R/A;                                   ha1 

17:43 Body Mass Index 38.41 (95.25 kg, 157.48 cm)                                               

17:43 Pain Scale: Adult                                                                       ss  

                                                                                                  

MDM:                                                                                              

17:25 Patient medically screened.                                                             [facility] 

19:39 Differential diagnosis: abscess, cellulitis, candida infection, malignancy, nonspecific suleiman 

      abdominal pain, urinary tract infection, vaginosis. Data reviewed: vital signs, nurses      

      notes, lab test result(s), radiologic studies, CT scan. Consideration of                    

      Admission/Observation Patient was admitted/placed on observation. Escalation of care        

      including admission/observation considered. I considered the following discharge            

      prescriptions or medication management in the emergency department Medications were         

      administered in the Emergency Department. See MAR. Test considered but Not performed:       

      Ultrasound no pelvic usg. Care significantly affected by the following chronic              

      conditions: Hypertension, bipolar. Counseling: I had a detailed discussion with the         

      patient and/or guardian regarding: the historical points, exam findings, and any            

      diagnostic results supporting the discharge/admit diagnosis, lab results, radiology         

      results, the need for further work-up and treatment in the hospital.                        

19:41 Management of patient was discussed with the following: Hospitalist: TANVI CASTRO.      [facility] 

                                                                                                  

                                                                                             

17:26 Order name: Urinalysis w/ reflexes; Complete Time: 19:27                                [facility] 

                                                                                             

17:26 Order name: PREGU; Complete Time: 19:27                                                 [facility] 

                                                                                             

18:40 Order name: Urine Culture                                                               [facility]

                                                                                             

19:28 Order name: CBC with Diff; Complete Time: 23:17                                         [facility] 

                                                                                             

19:28 Order name: Comprehensive Metabolic Panel; Complete Time: 21:37                         [facility] 

                                                                                             

19:53 Order name: GC (GONORR/CHLAMYDIA) Probe                                                 [facility] 

                                                                                             

19:53 Order name: Wet Prep; Complete Time: 21:37                                              [facility] 

                                                                                             

20:47 Order name: HSV Culture and Typing                                                      [facility]

                                                                                             

22:21 Order name: CBC Smear Scan; Complete Time: 23:17                                        [facility]

                                                                                             

02:43 Order name: CBC with Automated Diff                                                     [facility]

                                                                                             

02:57 Order name: Basic Metabolic Panel                                                       [facility]

                                                                                             

02:57 Order name: Phosphorus                                                                  [facility]

                                                                                             

02:57 Order name: Magnesium                                                                   [facility]

                                                                                             

19:37 Order name: CT Abd/Pelvis - IV Contrast Only; Complete Time: 22:12                      [facility] 

                                                                                             

19:39 Order name: Misc. Order: zinc oxide barrier oint; Complete Time: 06:37                  [facility] 

                                                                                             

19:39 Order name: Misc. Order: shower; Complete Time: 20:13                                   [facility] 

                                                                                                  

Administered Medications:                                                                         

18:10 Drug: metroNIDAZOLE PO 2 grams Route: PO;                                               mb9 

20:14 Follow up: Response: No adverse reaction                                                mb9 

18:12 Drug: Doxycycline  mg Route: PO;                                                  mb9 

20:14 Follow up: Response: No adverse reaction                                                mb9 

18:16 Drug: AZITHromycin PO 1 grams Route: PO;                                                mb9 

20:14 Follow up: Response: No adverse reaction                                                mb9 

18:17 Drug: Rocephin (cefTRIAXone)  mg Route: IM; Site: left gluteus;                   mb9 

20:14 Follow up: Response: No adverse reaction                                                mb9 

20:50 Drug: NS 0.9% IV 1000 ml Route: IV; Rate: 1 bolus; Site: left forearm;                  ha1 

22:00 Follow up: Response: No adverse reaction; IV Status: Completed infusion; IV Intake:     ha1 

      1000ml                                                                                      

20:52 Drug: morphine IVP or IV 4 mg Route: IVP; Infused Over: 4 mins; Site: left forearm;     ha1 

21:15 Follow up: Response: No adverse reaction; Pain is decreased; RASS: Alert and Calm (0)   ha1 

20:55 Drug: Ondansetron IVP 4 mg Route: IVP; Site: left forearm;                              ha1 

21:30 Follow up: Response: No adverse reaction                                                ha1 

21:00 Drug: Fluconazole  mg Route: PO;                                                  ha1 

21:30 Follow up: Response: No adverse reaction                                                ha1 

21:00 Drug: Rocephin IV 1 grams Route: IV; Rate: per protocol; Site: left forearm;            ha1 

21:30 Follow up: Response: No adverse reaction; IV Status: Completed infusion; IV Intake: [id number] 

21:10 Drug: Valtrex PO 1000 mg Route: PO;                                                     ha1 

21:30 Follow up: Response: No adverse reaction                                                ha1 

21:35 Drug: Clindamycin IVPB 900 mg Route: IVPB; Infused Over: 30 mins; Site: left forearm;   ha1 

22:00 Follow up: Response: No adverse reaction; IV Status: Completed infusion; IV Intake:     ha1 

      100ml                                                                                       

                                                                                                  

                                                                                                  

Disposition Summary:                                                                              

23 19:42                                                                                    

Hospitalization Ordered                                                                           

      Hospitalization Status: Observation                                                     suleiman 

      Condition: Fair                                                                         suleiman 

      Problem: new                                                                            suleiman 

      Symptoms: have improved                                                                 suleiman 

      Bed/Room Type: Standard                                                                 suleiman 

      Provider: Armand Villa(23 20:03)                                                sb4 

      Location: Telemetry/[facility] (observation)(23 14:55)                               dw  

      Room Assignment: [facility](23 14:55)                                                    dw  

      Diagnosis                                                                                   

        - Other specified noninflammatory disorders of vagina                                 suleiman 

        - Vaginitis, vulvitis and vulvovaginitis in diseases classified elsewhere             suleiman 

        - UTI/ Urinary tract infection, site not specified                                    suleiman 

      Forms:                                                                                      

        - Medication Reconciliation Form                                                      suleiman 

        - SBAR form                                                                           [facility] 

Signatures:                                                                                       

Dispatcher [employer]                           EDMS                                                 

Treasure Roberts, RN                        RN   dw                                                   

Raúl Chavez MD MD cha Martinez, Lennox                               em1                                                  

Lalita Hoffmann, Clary Mancia RN, RN RN   cg                                                   

Uzma Guevara, RN                        RN   ha1                                                  

Tanvi Castro, PADUSTY                     PADUSTY sb4                                                  

Hanna Leon RN                 RN   mb9                                                  

                                                                                                  

Corrections: (The following items were deleted from the chart)                                    

20:03 19:42 Elliott Mena suleiman                                                                sb4 

20:31 19:53 Miscellaneous Lab Test+R.LAB.BRZ ordered. EDMS                                    EDMS

20:46 20:31 HSV Culture and Typing ordered. EDMS                                              EDMS

20:50 19:42 Telemetry/MedSurg (observation) [facility]  

20:50 19:42 [facility]  

                                                                                             

14:55  20:50 [facility] ER HOLD cg                                                             em1 

                                                                                             

14:55  20:50 ERHOLD- cg                                                                  em1 

                                                                                             

14:55 14:55 Telemetry/MedSurg (observation) em1                                               dw  

14:55 14:55 213 em1                                                                           dw  

                                                                                                  

**************************************************************************************************

## 2023-04-19 NOTE — XMS REPORT
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           



     delayed      54                                                



     release                                                        



     capsule                                                        

 

     lithium      2014-0      Yes            150mg [id number] Take 150          

 [facility]



     carbonate      7-16                          [id number] mg by           [facility]



     (ESKALITH)      20:13:                          3D   mouth 3           



     150 mg      54                                 times           



     capsule                                         daily with           



                                                  meals.           

 

     DULoxetine      2014-0      Yes                 QD   Take by           Joan

is



     (CYMBALTA)      7-16                               mouth           Health



     20 mg      20:13:                               daily.           



     delayed      54                                                



     release                                                        



     capsule                                                        

 

     lithium      2014-0      Yes            150mg [id number] Take 150          

 [facility]



     carbonate      7-16                          [id number] mg by           [facility]



     (ESKALITH)      20:13:                          3D   mouth 3           



     150 mg      54                                 times           



     capsule                                         daily with           



                                                  meals.           

 

     DULoxetine            Yes                 QD   Take by           [facility]



     (CYMBALTA)      7-16                               mouth           Health



     20 mg      20:13:                               daily.           



     delayed      54                                                



     release                                                        



     capsule                                                        

 

     ibuprofen            Yes       Dental 800mg      Take 1           Jeff

ris



     (MOTRIN)      7-16                abscess           tablet by           [facility]



     800 mg      00:00:                               mouth           



     tablet      00                                 every 8           



                                                  hours as           



                                                  needed for           



                                                  Pain.           

 

     ibuprofen            Yes       Dental 800mg      Take 1           Jeff

ris



     (MOTRIN)      7-16                abscess           tablet by           [facility]



     800 mg      00:00:                               mouth           



     tablet      00                                 every 8           



                                                  hours as           



                                                  needed for           



                                                  Pain.           

 

     ibuprofen            Yes       Dental 800mg      Take 1           Jeff

ris



     (MOTRIN)      7-16                abscess           tablet by           [facility]



     800 mg      00:00:                               mouth           



     tablet      00                                 every 8           



                                                  hours as           



                                                  needed for           



                                                  Pain.           

 

     ibuprofen            Yes       Dental 800mg      Take 1           Jeff

ris



     (MOTRIN)      7-16                abscess           tablet by           [facility]



     800 mg      00:00:                               mouth           



     tablet      00                                 every 8           



                                                  hours as           



                                                  needed for           



                                                  Pain.           

 

     ibuprofen            Yes       Dental 800mg      Take 1           Jeff

ris



     (MOTRIN)      7-16                abscess           tablet by           [facility]



     800 mg      00:00:                               mouth           



     tablet      00                                 every 8           



                                                  hours as           



                                                  needed for           



                                                  Pain.           

 

     ibuprofen            Yes       Dental 800mg      Take 1           Jeff

ris



     (MOTRIN)      7-16                abscess           tablet by           [facility]



     800 mg      00:00:                               mouth           



     tablet      00                                 every 8           



                                                  hours as           



                                                  needed for           



                                                  Pain.           

 

     ibuprofen            Yes       Dental 800mg      Take 1           Jeff

ris



     (MOTRIN)      7-16                abscess           tablet by           [facility]



     800 mg      00:00:                               mouth           



     tablet      00                                 every 8           



                                                  hours as           



                                                  needed for           



                                                  Pain.           

 

     ibuprofen            Yes       Dental 800mg      Take 1           Jeff

ris



     (MOTRIN)      7-16                abscess           tablet by           [facility]



     800 mg      00:00:                               mouth           



     tablet      00                                 every 8           



                                                  hours as           



                                                  needed for           



                                                  Pain.           







Vital Signs







             Vital Name   Observation Time Observation Value Comments     Source

 

             Systolic blood 2023 20:00:00 152 mm[Hg]                [facility]

[facility]

 

             Diastolic blood 2023 20:00:00 64 mm[Hg]                 Unive

[facility]

 

             Heart rate   2023 20:00:00 60 /min                   [facility]

 

             Respiratory rate 2023 20:00:00 21 /min                   [facility]

[facility]



                                                                 [facility]



                                                                 Branch

 

             Oxygen saturation in 2023 20:00:00 96 /min                   

[facility]



             Arterial blood by                                        [facility]

[facility]



             Pulse oximetry                                        Branch

 

             Body temperature 2023 15:06:00 37.72 Danisha                 [facility]

[facility]



                                                                 [facility]



                                                                 Branch

 

             Body height  2023 15:06:00 157.5 cm                  [facility]

[facility]



                                                                 [facility]



                                                                 Branch

 

             Body weight  2023 15:06:00 113.399 kg                [facility]

[facility]



                                                                 [facility]



                                                                 Branch

 

             BMI          2023 15:06:00 45.73 kg/m2               [facility]

[facility]



                                                                 [facility]



                                                                 Branch

 

             HEIGHT       2023-02-15 16:56:00 157.5 cm                  

 

             WEIGHT       2023-02-15 16:56:00 100.245 kg                

 

             HEIGHT       2023-02-15 16:56:00 157.5 cm                  

 

             WEIGHT       2023-02-15 16:56:00 100.245 kg                

 

             HEIGHT       2023-02-15 16:56:00 157.5 cm                  

 

             WEIGHT       2023-02-15 16:56:00 100.245 kg                

 

             Systolic blood 2022 19:30:00 176 mm[Hg]                Univer

sity of



             pressure                                            [facility]



                                                                 Branch

 

             Diastolic blood 2022 19:30:00 94 mm[Hg]                 Unive

rsity of



             pressure                                            [facility]



                                                                 Branch

 

             Heart rate   2022 19:30:00 76 /min                   [facility]

ty of



                                                                 [facility]



                                                                 Branch

 

             Respiratory rate 2022 19:30:00 11 /min                   Univ

ersity of



                                                                 [facility]

 

             Oxygen saturation in 2022 19:30:00 95 /min                   

[facility]



             Arterial blood by                                        [facility]

staci



             Pulse oximetry                                        Branch

 

             Body temperature 2022 17:54:00 37.22 Danisha                 Un[facility]

[facility] of



                                                                 [facility]



                                                                 Branch

 

             Body height  2022 17:54:00 157.5 cm                  [facility]

ty of



                                                                 [facility]



                                                                 Branch

 

             Body weight  2022 17:54:00 90.719 kg                 [facility]

ty of



                                                                 [facility]



                                                                 Branch

 

             BMI          2022 17:54:00 36.58 kg/m2               [facility]

[facility] of



                                                                 [facility]



                                                                 Branch

 

             Body height  2022 15:29:00 160 cm                    [facility]

[facility]



                                                                 [facility]



                                                                 Branch

 

             Body weight  2022 15:29:00 90.719 kg                 [facility]

 

             BMI          2022 15:29:00 35.43 kg/m2               [facility]

 

             Height/Length 2022 08:36:33 160 cm                    



             Measured                                            

 

             Weight Dosing 2022 08:36:33 105.00 kg                 

 

             Body height  2023 08:42:00 157.5 cm                  [facility]

 

             Body weight  2023 08:42:00 100.245 kg                [facility]

 

             BMI          2023 08:42:00 40.42 kg/m2               [facility]

 

             Systolic blood 2023 08:29:26 114 mm[Hg]                [facility]



             pressure                                            

 

             Diastolic blood 2023 08:29:26 76 mm[Hg]                 [facility]



             pressure                                            

 

             Heart rate   2023 08:29:26 86 /min                   [facility]

 

             Body temperature 2023 08:29:26 36.78 Danisha                 [facility]

 

             Respiratory rate 2023 08:29:26 18 /min                   [facility]

 

             Oxygen saturation in 2023 08:29:26 97 /min                   

[facility]



             Arterial blood by                                        



             Pulse oximetry                                        

 

             Systolic blood 2023-02-15 22:01:00 124 mm[Hg]                [facility]

 

             Diastolic blood 2023-02-15 22:01:00 66 mm[Hg]                 [facility]

 

             Heart rate   2023-02-15 22:01:00 88 /min                   [facility]

 

             Body temperature 2023-02-15 22:01:00 37.17 Danisha                 [facility]

 

             Respiratory rate 2023-02-15 22:01:00 16 /min                   [facility]

 

             Oxygen saturation in 2023-02-15 22:01:00 100 /min                  

[facility]



             Arterial blood by                                        Medical Ce

nter



             Pulse oximetry                                        

 

             Body height  2023-02-15 16:56:00 157.5 cm                  [facility]

 

             Body weight  2023-02-15 16:56:00 100.245 kg                [facility]

 

             BMI          2023-02-15 16:56:00 40.42 kg/m2               [facility]

 

             Heart Rate   2023 22:58:16                           [facility]

 

             Systolic (mm Hg) 2023 22:58:00                           Danis

riaaure SchreiberElie

 

             Diastolic (mm Hg) 2023 22:58:00                           [facility]

 

             Temperature Oral (F) 2023 18:24:49 98.5 F                    

Sergio Delgadillo

 

             Height       2023 14:59:00 5 [ft_i]                  [facility]

 [facility]

 

             BMI Calculated 2023 14:59:00                           [facility]

al [facility]

 

             Weight       2023 14:59:00                           Sergio Delgadillo







Procedures







                Procedure       Date / Time     Performing Clinician Source



                                Performed                       

 

                CT              2023 16:56:55 Singer, [facility]



                MAXILLOFACIAL/MANDIBLE                                 Medical B

ranch



                W CONTRAST                                      

 

                LACTIC ACID WHOLE BLOOD 2023 15:57:00 Singer, [facility]

 

                COMP. METABOLIC PANEL 2023 15:56:00 Singer, Jack [facility]

[facility] [place]



                ([id number])                                         [facility]

 

                CBC WITH DIFF   2023 15:56:00 Singer, [facility]

 

                COVID-19, INFLUENZA 2023 10:39:00 Alejo Escalante [facility]



                A&B, AND RSV                    Gianni          



                QUALITATIVE RT-PCR                                 

 

                XR CHEST 1 VW PORTABLE 2023 09:16:40 Alejo Escalante [facility]



                                                Gianni          

 

                ASSIGNMENT OF BENEFITS 2023 19:39:54 Doctor Unassigned, No

 [facility]



                                                Name            Medical Branch

 

                XR CHEST 1 VW   [facility]22 18[facility]39[facility]

 

                XR PELVIS <3 VW 2022 18:39:34 [facility]

 

                URINALYSIS      2022 18:19:00 [facility]

 

                COMP. METABOLIC PANEL 2022 18:08:00 Singer, Jack [facility]

[facility] [place]



                ([id number])                                         [facility]

 

                CBC WITH DIFF   2022 18:08[facility]

 

                COVID-19 (ID NOW RAPID 2022 18:08:00 Jack Olmos

[facility]



                TESTING)                                        Medical Branch

 

                REFERRAL-       2022 05:01:00 Doctor Unassigned, Nimisha Elam

[facility]



                REQUEST/RESPONSE                 Name            Medical Branch

 

                [id number]         2020 00:00:00 CANAL.02        [facility] [place]







Plan of Care







             Planned Activity Planned Date Details      Comments     Source

 

             Future Scheduled 2023   INFLUENZA VACCINE              [facility]



             Test         00:00:00     (Season Ended) [code =              [facility]



                                       INFLUENZA VACCINE              



                                       (Season Ended)]              

 

             Future Scheduled 2023   COVID-19 VACCINE (#1)              [facility]



             Test         20:12:18     [code = COVID-19              



                                       VACCINE (#1)]              

 

             Future Scheduled 2023   Pneumococcal Vaccine:              [facility]



             Test         20:12:18     Pediatrics (0 to 5              



                                       Years) and At-Risk              



                                       Patients (6 to 64              



                                       Years) (1 - PCV) [code              



                                       = Pneumococcal              



                                       Vaccine: Pediatrics (0              



                                       to 5 Years) and              



                                       At-Risk Patients (6 to              



                                       64 Years) (1 - PCV)]              

 

             Future Scheduled 2023   Hepatitis C screening              [facility]



             Test         20:12:18     (procedure) [code =              



                                       [id number]]                

 

             Future Scheduled 2023   COLONOSCOPY SCREENING              [facility]



             Test         20:12:18     [code = COLONOSCOPY              



                                       SCREENING]                

 

             Future Scheduled 2023   SHINGLES VACCINES (1              Met

[facility]



             Test         20:12:18     of 2) [code = SHINGLES              



                                       VACCINES (1 of 2)]              

 

             Future Scheduled 2023   INFLUENZA VACCINE              [facility]



             Test         20:12:18     [code = INFLUENZA              



                                       VACCINE]                  

 

             Future Scheduled 2023   COVID-19 VACCINE (#1)              [facility]



             Test         19:28:05     [code = COVID-19              



                                       VACCINE (#1)]              

 

             Future Scheduled 2023   Pneumococcal Vaccine:              [facility]



             Test         19:28:05     Pediatrics (0 to 5              



                                       Years) and At-Risk              



                                       Patients (6 to 64              



                                       Years) (1 - PCV) [code              



                                       = Pneumococcal              



                                       Vaccine: Pediatrics (0              



                                       to 5 Years) and              



                                       At-Risk Patients (6 to              



                                       64 Years) (1 - PCV)]              

 

             Future Scheduled 2023   Hepatitis C screening              [facility]



             Test         19:28:05     (procedure) [code =              



                                       [id number]]                

 

             Future Scheduled 2023   COLONOSCOPY SCREENING              [facility]



             Test         19:28:05     [code = COLONOSCOPY              



                                       SCREENING]                

 

             Future Scheduled 2023   SHINGLES VACCINES (1              Met

[facility]



             Test         19:28:05     of 2) [code = SHINGLES              



                                       VACCINES (1 of 2)]              

 

             Future Scheduled 2023   INFLUENZA VACCINE              [facility]

[facility]



             Test         19:28:05     [code = INFLUENZA              



                                       VACCINE]                  

 

             Future Scheduled 2023   Medicare IPPE (WELCOME              C

[facility]



             Test         00:00:00     TO MEDICARE) [code =              Medical

 Center



                                       Medicare IPPE (WELCOME              



                                       TO MEDICARE)]              

 

             Future Scheduled 2023   Medicare IPPE (WELCOME              C

[facility]



             Test         00:00:00     TO MEDICARE) [code =              Medical

 Center



                                       Medicare IPPE (WELCOME              



                                       TO MEDICARE)]              

 

             Future Scheduled 2023   DEPRESSION SCREENING              [facility]



             Test         00:00:00     (12+) [code =              Medical Center



                                       DEPRESSION SCREENING              



                                       (12+)]                    

 

             Future Scheduled 2023   DEPRESSION SCREENING              [facility]



             Test         00:00:00     (12+) [code =              Medical Center



                                       DEPRESSION SCREENING              



                                       (12+)]                    

 

             Future Scheduled 2022-10-01   IMM Influenza Seasonal              [facility]



             Test         00:00:00     (>/= 19 yrs) [code =              



                                       IMM Influenza Seasonal              



                                       (>/= 19 yrs)]              

 

             Future Scheduled 2022-10-01   IMM Influenza Seasonal              [facility]

[facility]



             Test         00:00:00     (>/= 19 yrs) [code =              



                                       IMM Influenza Seasonal              



                                       (>/= 19 yrs)]              

 

             Future Scheduled 2022-10-01   IMM Influenza Seasonal              [facility]



             Test         00:00:00     (>/= 19 yrs) [code =              



                                       IMM Influenza Seasonal              



                                       (>/= 19 yrs)]              

 

             Future Scheduled 2022-10-01   IMM Influenza Seasonal              [facility]

[facility]



             Test         00:00:00     (>/= 19 yrs) [code =              



                                       IMM Influenza Seasonal              



                                       (>/= 19 yrs)]              

 

             Future Scheduled 2022-10-01   IMM Influenza Seasonal              [facility]

[facility]



             Test         00:00:00     (>/= 19 yrs) [code =              



                                       IMM Influenza Seasonal              



                                       (>/= 19 yrs)]              

 

             Future Scheduled 2022-10-01   IMM Influenza Seasonal              [facility]

[facility]



             Test         00:00:00     (>/= 19 yrs) [code =              



                                       IMM Influenza Seasonal              



                                       (>/= 19 yrs)]              

 

             Future Scheduled 2022-10-01   IMM Influenza Seasonal              [facility]

[facility]



             Test         00:00:00     (>/= 19 yrs) [code =              



                                       IMM Influenza Seasonal              



                                       (>/= 19 yrs)]              

 

             Future Scheduled 2022-10-01   IMM Influenza Seasonal              [facility]

[facility]



             Test         00:00:00     (>/= 19 yrs) [code =              



                                       IMM Influenza Seasonal              



                                       (>/= 19 yrs)]              

 

             Future Scheduled 2022   INFLUENZA VACCINE (#1)              [facility]

[facility]



             Test         00:00:00     [code = INFLUENZA              Medical Ce

nter



                                       VACCINE (#1)]              

 

             Future Scheduled 2018   Screening for              [facility]



             Test         00:00:00     malignant neoplasm of              



                                       colon (procedure)              



                                       [code = 308815016]              

 

             Future Scheduled 2018   Screening for              [facility]

[facility]



             Test         00:00:00     malignant neoplasm of              



                                       colon (procedure)              



                                       [code = 962796093]              

 

             Future Scheduled 2018   Screening for              [facility]

[facility]



             Test         00:00:00     malignant neoplasm of              



                                       colon (procedure)              



                                       [code = 563254884]              

 

             Future Scheduled 2018   Screening for              [facility]

[facility]



             Test         00:00:00     malignant neoplasm of              



                                       colon (procedure)              



                                       [code = 908144848]              

 

             Future Scheduled 2018   Screening for              [facility]

[facility]



             Test         00:00:00     malignant neoplasm of              



                                       colon (procedure)              



                                       [code = 961792661]              

 

             Future Scheduled 2018   Screening for              [facility]

[facility]



             Test         00:00:00     malignant neoplasm of              



                                       colon (procedure)              



                                       [code = 336679224]              

 

             Future Scheduled 2018   Screening for              [facility]

[facility]



             Test         00:00:00     malignant neoplasm of              



                                       colon (procedure)              



                                       [code = 829370366]              

 

             Future Scheduled 2018   Screening for              [facility]

[facility]



             Test         00:00:00     malignant neoplasm of              



                                       colon (procedure)              



                                       [code = 672355750]              

 

             Future Scheduled 2018   SHINGLES VACCINES (1              [facility]



             Test         00:00:00     of 2) [code = SHINGLES              [facility]

[facility]



                                       VACCINES (1 of 2)]              

 

             Future Scheduled 2018   SHINGLES VACCINES (1              [facility]



             Test         00:00:00     of 2) [code = SHINGLES              [facility]

[facility] Center



                                       VACCINES (1 of 2)]              

 

             Future Scheduled 2013   Lipid panel               [facility]

s



             Test         00:00:00     (procedure) [code =              [facility] 

Center



                                       [id number]]                 

 

             Future Scheduled 2013   Lipid panel               [facility]

s



             Test         00:00:00     (procedure) [code =              [facility] 

Center



                                       [id number]]                 

 

             Future Scheduled 2008   Breast Cancer Scrn              [facility]



             Test         00:00:00     (Yearly) [code =              



                                       Breast Cancer Scrn              



                                       (Yearly)]                 

 

             Future Scheduled 2008   Breast Cancer Scrn              [facility]



             Test         00:00:00     (Yearly) [code =              



                                       Breast Cancer Scrn              



                                       (Yearly)]                 

 

             Future Scheduled 2008   Breast Cancer Scrn              [facility]



             Test         00:00:00     (Yearly) [code =              



                                       Breast Cancer Scrn              



                                       (Yearly)]                 

 

             Future Scheduled 2008   Breast Cancer Scrn              [facility]



             Test         00:00:00     (Yearly) [code =              



                                       Breast Cancer Scrn              



                                       (Yearly)]                 

 

             Future Scheduled 2008   Breast Cancer Scrn              [facility]



             Test         00:00:00     (Yearly) [code =              



                                       Breast Cancer Scrn              



                                       (Yearly)]                 

 

             Future Scheduled 2008   Breast Cancer Scrn              [facility]



             Test         00:00:00     (Yearly) [code =              



                                       Breast Cancer Scrn              



                                       (Yearly)]                 

 

             Future Scheduled 2008   Breast Cancer Scrn              [facility]



             Test         00:00:00     (Yearly) [code =              



                                       Breast Cancer Scrn              



                                       (Yearly)]                 

 

             Future Scheduled 2008   Breast Cancer Scrn              [facility]



             Test         00:00:00     (Yearly) [code =              



                                       Breast Cancer Scrn              



                                       (Yearly)]                 

 

             Future Scheduled 1998   Screening for              [facility]

[facility]



             Test         00:00:00     malignant neoplasm of              



                                       cervix (procedure)              



                                       [code = 228868997]              

 

             Future Scheduled 1998   Screening for              [facility]

[facility]



             Test         00:00:00     malignant neoplasm of              



                                       cervix (procedure)              



                                       [code = 509915090]              

 

             Future Scheduled 1998   Screening for              [facility]

[facility]



             Test         00:00:00     malignant neoplasm of              



                                       cervix (procedure)              



                                       [code = 015947594]              

 

             Future Scheduled 1998   Screening for              [facility]

[facility]



             Test         00:00:00     malignant neoplasm of              



                                       cervix (procedure)              



                                       [code = 715195777]              

 

             Future Scheduled 1998   Screening for              [facility]

[facility]



             Test         00:00:00     malignant neoplasm of              



                                       cervix (procedure)              



                                       [code = 260631740]              

 

             Future Scheduled 1998   Screening for              [facility]

[facility]



             Test         00:00:00     malignant neoplasm of              



                                       cervix (procedure)              



                                       [code = 778150473]              

 

             Future Scheduled 1998   Screening for              [facility]

[facility]



             Test         00:00:00     malignant neoplasm of              



                                       cervix (procedure)              



                                       [code = 060191153]              

 

             Future Scheduled 1998   Screening for              [facility]

[facility]



             Test         00:00:00     malignant neoplasm of              



                                       cervix (procedure)              



                                       [code = 023798074]              

 

             Future Scheduled 1998   Screening for              [facility]

[facility]



             Test         00:00:00     malignant neoplasm of              



                                       cervix (procedure)              



                                       [code = 340102695]              

 

             Future Scheduled 1998   Screening for              [facility]

[facility]



             Test         00:00:00     malignant neoplasm of              



                                       cervix (procedure)              



                                       [code = 920713122]              

 

             Future Scheduled 1998   Screening for              [facility]

[facility]



             Test         00:00:00     malignant neoplasm of              



                                       cervix (procedure)              



                                       [code = 468634640]              

 

             Future Scheduled 1998   Screening for              [facility]

[facility]



             Test         00:00:00     malignant neoplasm of              



                                       cervix (procedure)              



                                       [code = 314013316]              

 

             Future Scheduled 1998   Screening for              [facility]

[facility]



             Test         00:00:00     malignant neoplasm of              



                                       cervix (procedure)              



                                       [code = 799484136]              

 

             Future Scheduled 1998   Screening for              [facility]

[facility]



             Test         00:00:00     malignant neoplasm of              



                                       cervix (procedure)              



                                       [code = 942271783]              

 

             Future Scheduled 1998   Screening for              [facility]

[facility]



             Test         00:00:00     malignant neoplasm of              



                                       cervix (procedure)              



                                       [code = 159981255]              

 

             Future Scheduled 1998   Screening for              [facility]

[facility]



             Test         00:00:00     malignant neoplasm of              



                                       cervix (procedure)              



                                       [code = 647625434]              

 

             Future Scheduled 1989   Screening for              [facility]

es



             Test         00:00:00     malignant neoplasm of              [facility]

[facility]



                                       cervix (procedure)              



                                       [code = 248041315]              

 

             Future Scheduled 1989   Screening for              [facility]

es



             Test         00:00:00     malignant neoplasm of              [facility]

[facility]



                                       cervix (procedure)              



                                       [code = 056405003]              

 

             Future Scheduled 1987   DTAP/TDAP/TD VACCINES              [facility]



             Test         00:00:00     (1 - Tdap) [code =              Medical C

enter



                                       DTAP/TDAP/TD VACCINES              



                                       (1 - Tdap)]               

 

             Future Scheduled 1987   DTAP/TDAP/TD VACCINES              [facility]



             Test         00:00:00     (1 - Tdap) [code =              Medical C

enter



                                       DTAP/TDAP/TD VACCINES              



                                       (1 - Tdap)]               

 

             Future Scheduled 1986   HEPATITIS C SCREENING              [facility]



             Test         00:00:00     [code = HEPATITIS C              Medical 

Center



                                       SCREENING]                

 

             Future Scheduled 1986   HEPATITIS C SCREENING              [facility]



             Test         00:00:00     [code = HEPATITIS C              Medical 

Center



                                       SCREENING]                

 

             Future Scheduled 1980   Tobacco Cessation              [facility]



             Test         00:00:00     Counseling and              [facility]

r



                                       Screening (12+) [code              



                                       = Tobacco Cessation              



                                       Counseling and              



                                       Screening (12+)]              

 

             Future Scheduled 1980   Tobacco Cessation              [facility]



             Test         00:00:00     Counseling and              [facility] Cent[facility]

r



                                       Screening (12+) [code              



                                       = Tobacco Cessation              



                                       Counseling and              



                                       Screening (12+)]              

 

             Future Scheduled 1974   PNEUMOCOCCAL VACCINE              [facility]



             Test         00:00:00     0-64 YRS (1 - PCV)              Medical C

enter



                                       [code = PNEUMOCOCCAL              



                                       VACCINE 0-64 YRS (1 -              



                                       PCV)]                     

 

             Future Scheduled 1974   PNEUMOCOCCAL VACCINE              [facility]



             Test         00:00:00     0-64 YRS (1 - PCV)              Medical C

enter



                                       [code = PNEUMOCOCCAL              



                                       VACCINE 0-64 YRS (1 -              



                                       PCV)]                     

 

             Future Scheduled 1969   COVID-19 Vaccine (#1)              [facility]



             Test         00:00:00     [code = COVID-19              



                                       Vaccine (#1)]              

 

             Future Scheduled 1969   COVID-19 Vaccine (#1)              [facility]



             Test         00:00:00     [code = COVID-19              



                                       Vaccine (#1)]              

 

             Future Scheduled 1969   COVID-19 Vaccine (#1)              [facility]



             Test         00:00:00     [code = COVID-19              



                                       Vaccine (#1)]              

 

             Future Scheduled 1969   COVID-19 Vaccine (#1)              [facility]



             Test         00:00:00     [code = COVID-19              



                                       Vaccine (#1)]              

 

             Future Scheduled 1969   COVID-19 Vaccine (#1)              [facility]



             Test         00:00:00     [code = COVID-19              



                                       Vaccine (#1)]              

 

             Future Scheduled 1969   COVID-19 Vaccine (#1)              [facility]



             Test         00:00:00     [code = COVID-19              



                                       Vaccine (#1)]              

 

             Future Scheduled 1969   COVID-19 Vaccine (#1)              [facility]



             Test         00:00:00     [code = COVID-19              



                                       Vaccine (#1)]              

 

             Future Scheduled 1969   COVID-19 Vaccine (#1)              [facility]



             Test         00:00:00     [code = COVID-19              



                                       Vaccine (#1)]              

 

             Future Scheduled 1969   COVID-19 VACCINE (#1)              [facility]



             Test         00:00:00     [code = COVID-19              [facility]

ter



                                       VACCINE (#1)]              

 

             Future Scheduled 1969   COVID-19 VACCINE (#1)              [facility]



             Test         00:00:00     [code = COVID-19              [facility]

ter



                                       VACCINE (#1)]              

 

             Future Scheduled 1968   Screening for              [facility]

es



             Test         00:00:00     malignant neoplasm of              [facility]

[facility] Center



                                       breast (procedure)              



                                       [code = 876548169]              

 

             Future Scheduled 1968   [facility]



             Test         00:00:00     (combo) [code = CT              Medical C

enter



                                       Colonography (combo)]              

 

             Future Scheduled 1968   Screening for              [facility]

es



             Test         00:00:00     malignant neoplasm of              [facility]

[facility]



                                       colon (procedure)              



                                       [code = 389655983]              

 

             Future Scheduled 1968   Screening for              [facility]

es



             Test         00:00:00     malignant neoplasm of              [facility]

[facility]



                                       colon (procedure)              



                                       [code = 052474275]              

 

             Future Scheduled 1968   Screening for              [facility]

es



             Test         00:00:00     malignant neoplasm of              [facility]

[facility]



                                       colon (procedure)              



                                       [code = 880791515]              

 

             Future Scheduled 1968   Screening for              [facility]

es



             Test         00:00:00     malignant neoplasm of              [facility]

[facility]



                                       colon (procedure)              



                                       [code = 498006238]              

 

             Future Scheduled 1968   Sigmoidoscopy [code =              [facility]



             Test         00:00:00     Sigmoidoscopy]              [facility] Cent[facility]

r

 

             Future Scheduled 1968   Screening for              [facility]

es



             Test         00:00:00     malignant neoplasm of              [facility]

[facility]



                                       breast (procedure)              



                                       [code = 427494601]              

 

             Future Scheduled 1968   CT [facility]



             Test         00:00:00     (combo) [code = CT              Medical C

enter



                                       Colonography (combo)]              

 

             Future Scheduled 1968   Screening for              [facility]

es



             Test         00:00:00     malignant neoplasm of              [facility]

[facility]



                                       colon (procedure)              



                                       [code = 030636446]              

 

             Future Scheduled 1968   Screening for              [facility]

es



             Test         00:00:00     malignant neoplasm of              [facility]

[facility]



                                       colon (procedure)              



                                       [code = 525662461]              

 

             Future Scheduled 1968   Screening for              [facility]

es



             Test         00:00:00     malignant neoplasm of              [facility]

[facility]



                                       colon (procedure)              



                                       [code = 789735767]              

 

             Future Scheduled 1968   Screening for              [facility]

es



             Test         00:00:00     malignant neoplasm of              [facility]

l Center



                                       colon (procedure)              



                                       [code = 269479891]              

 

             Future Scheduled 1968   Sigmoidoscopy [code =              [facility]



             Test         00:00:00     Sigmoidoscopy]              [facility] Cent[facility]

r







Encounters







        Start   End     Encounter Admission Attending Care    Care    Encounter 

Source



        Date/Time Date/Time Type    Type    Clinicians Facility Department ID   

   

 

        2023         Outpatient                 [facility]     [id number]

 [place]



        14:16:19                                                 [id number] [facility]

 

        2023         Emergency                 [facility]     [id number] 

[facility] -



        09:45:36                                                         [facility]



                                                                        ent

 

        2022         Outpatient         SARATH, [facility]     [id number]

-2 [place]



        01:05:17                         MELANIE                  [id number] [facility]

 

        2022         Outpatient         VIRGIE,  [facility]     [id number]

 [place]



        03:15:41                         JOAQUIN                 [id number] [facility]

 

        2022         Outpatient         VIRGIE,  [facility]     [id number]

 [place]



        15:19:55                         JOAQUIN                 [id number] [facility]

 

        2022         Outpatient                 [facility]     [id number]

 [place]



        15:28:25                                                 [id number] [facility]

 

        2022         Outpatient                 [facility]     [id number]

 [place]



        12:00:51                                                 [id number] [facility]

 

        2022         Outpatient                 [facility]     [id number]

 [place]



        15:13:02                                                 [id number] [facility]

 

        2020         Inpatient                 [facility]    [id number] 

[facility]



        03:17:00                                                 [address] [place] [address]

 

        2023 Emergency X       SINGER, [facility]    ERT     [id number]

81 Univers



        09:01:00 14:52:00                 JACK brock of



                                                                        [facility]

 

        2023 Emergency         [facility]    1.2.429.302 [id number]

[id number] Univers



        09:01:00 14:52:00                 Jack HAMM 350.1.13.10         i

[facility] 4.2.7.2.686         [facility]  [phone]         [address]

 

        2023 Emergency         Boris, 1.2.840.1 [id number]

[id number] Methodi



        02:46:00 05:35:00                 Alejo 92339.1.1         [address]



                                        Gianni  3.430.2.7                 Hospit

a



                                                .3.972110                 l



                                                .8                      

 

        2023-02-15 2023 Emergency ER      EVELYN JUSTIN [facility]    Emergency 20

[id number] SLE



        17:58:00 00:07:00                                                 

 

        2023-02-15 2023 Emergency         Justin Whitaker [facility]   [id number]

[id number] [facility]



        17:58:00 00:07:00                 [facility]

 

        2023-02-15 2023 Emergency         Justin Whitaker [facility]   [id number]

[id number] [facility]



        17:58:00 00:07:00                 [facility]

 

        2023 Emergency         [facility]     064     [id number]

[address]



        00:00:00 00:00:00                 ALEJO                 [address]     [address]

di



                                                                        st

 

        2023 Documentat         Dangelo, 1.2.840.1 [id number]

[id number] Methodi



        00:00:00 00:00:00 ion             Arnaldo 43768.1.1         [address]     st



                                                3.430.2.7                 Hospit

a



                                                .3.205643                 l



                                                .8                      

 

        2023 Travel                  1.2.840.1 1.2.041.951 6785

[id number] Methodi



        00:00:00 00:00:00                         94379.1.1 350.1.13.43 977     

st



                                                3.430.2.7 0.2.7.3.698         Ho

spita



                                                .3.752190 084.8           l



                                                .8                      

 

        2023 Documentat         Dangelo, 1.2.840.1 [id number]

[id number] Methodi



        00:00:00 00:00:00 ion             Smithosh 94643.1.1         [address]     st



                                                3.430.2.7                 Hospit

a



                                                .3.166382                 l



                                                .8                      

 

        2023 Travel                  1.2.840.1 1.2.087.993 4970

[id number] Methodi



        00:00:00 00:00:00                         05117.1.1 350.1.13.43 977     

st



                                                3.430.2.7 0.2.7.3.698         Ho

spita



                                                .3.566035 084.8           l



                                                .8                      

 

        2023 2023-02-15 Emergency Emergency John Melton [facility]   JM0

[id number] 

[facility]



        19:56:00 06:17:00                                         79      

 

        2023 Emergency                 [facility] [id number]

[id number]



        14:56:57 23:07:00                                 [address]

 

        2023 Emergency                 [facility] [id number]

[id number]



        14:56:57 23:07:00                                 [address]

 

        2023 Emergency                 [facility]   [id number]

[id number] [facility]



        19:56:00 19:56:00                                         79      

 

        2023 Outpatient         Mateo [facility]   [id number]

[id number] 



        08:56:57 17:07:00                 Ishan Guzman                          

 

        2023 Emergency ANGIE LONGORIA [facility]    7500   

 [facility]



        08:56:00 17:07:00                 ISHAN                          

 

        2023 Outpatient DIANA NOLAN  [facility]    [id number]

[id number] Univers



        14:00:00 14:00:00                 ANCELMO brock [facility]

 

        2023 Orders          Doctor  [place]    1.2.840.114 [id number]

447 Univers



        00:00:00 00:00:00 Only            Unassigned, SHONA   350.1.13.10       

  ity of



                                        [facility] 4.2.7.2.686         Roly

as



                                                        [phone]         [address]

 

        2022 Inpatient         SANCHEZ, [facility]    MED       

  [facility]



        19:24:00 19:40:00                 LEXI                         

 

        2022 Emergency E       HEATHER, [facility]     

   [facility]



        14:56:00 18:09:00                 DEMARIO                           

 

        2022 Emergency X       SINGER, [facility]    ERT     [id number]

15 Univers



        12:56:00 15:05:00                 JACK brock [facility]

 

        2022 Emergency         Singer, [facility]    1.2.592.955 [id number]

7614 Univers



        12:56:00 15:05:00                 Jack HAMM 350.1.13.10         i

ty of



                                                [place] 4.2.7.2.686         [facility]  [phone]         Medi

staci



                                                        [address]             [place]

 

        2022 Office          [facility]    1.2.840.114 [id number]

13 Univers



        10:00:00 10:30:00 Visit           [facility]  350.1.13.10         it

y of



                                                [place] 4.2.7.2.686         Roly

as



                                                SKYLAR?BLEA [phone]         Me

chloé



                                                [address]



                                                MEDICAL                 



                                                OFFICE                  



                                                [facility]                 

 

        2022 Outpatient R       AMY  [facility]    [id number]

329 Univers



        10:00:00 10:00:00                 ESCOBAR brock [facility]

 

        2022 Orders          Doctor GARIBAY    1.2.840.114 [id number]

07 Univers



        00:00:00 00:00:00 Only            Unassigned, SHONA   350.1.13.10       

  ity of



                                        [facility] 4.2.7.2.686         Roly

as



                                                        [phone]         [address]

 

        2020 Outpatient         ERNESTINE Ferrara   [facility]    [id number]

220 [facility]



        08:38:00 08:38:00                 [employer]                 75      Clear



                                                                        [facility]

 

        2019 Inpatient 1       Robin Barrios [place] [facility]    PSY     12

[id number] St[address]



        23:01:00 13:16:00                 Diogo Barrioskeel                         

[facility]

 

        2017 Outpatient DIANA LOPEZ  [facility]    NUT     [id number]

565 Univers



        00:00:00 00:00:00                 VENKATA brock [facility]







Results







           Test Description Test Time  Test Comments Results    Result Comments 

Source









                    COMP. METABOLIC PANEL (13731) 2023 16:28:41 









                      Test Item  Value      Reference Range Interpretation Comme

nts









             NA (test code = 3190378676) 137 mmol/L   135-145                   

 

             K (test code = 1432005799) 4.1 mmol/L   3.5-5.0                   

 

             CL (test code = 5424110096) 104 mmol/L                       

 

             CO2 TOTAL (test code = 5238084137) 24 mmol/L    23-31              

       

 

             AGAP (test code = 4248761572) 9            2-16                    

  

 

             BUN (test code = 4890713671) 14 mg/dL     7-23                     

 

 

             GLUCOSE (test code = 5042354538) 114 mg/dL           H       

     

 

             CREATININE (test code = 0.79 mg/dL   0.50-1.04                 



             2079950554)                                         

 

             TOTAL BILI (test code = 1.3 mg/dL    0.1-1.1      H            



             0305792586)                                         

 

             CALCIUM (test code = 5319564535) 9.5 mg/dL    8.6-10.6             

     

 

             T PROTEIN (test code = 9427545758) 7.7 g/dL     6.3-8.2            

       

 

             ALBUMIN (test code = 9944680943) 4.2 g/dL     3.5-5.0              

     

 

             ALK PHOS (test code = 0445252165) 102 U/L                    

      

 

             ALTv (test code = 1742-6) 15 U/L       5-35                      

 

             AST(SGOT) (test code = 6243815102) 19 U/L       13-40              

       

 

             eGFR (test code = 4085926573) 75.8         mL/min/1.73m2           

   

 

             ELENA (test code = ELENA) Association of [employer]omerular                    

       



                          Filtration Rate (GFR) and Staging                     

      



                          of Kidney Disease*                           



                          +-----------------------+--------                     

      



                          -------------+-------------------                     

      



                          ------+| GFR (mL/min/1.73 m2) ?|                      

     



                          With Kidney Damage ?| ?Without                        

   



                          Kidney                                 



                          Damage+-----------------------+--                     

      



                          -------------------+-------------                     

      



                          ------------+| ?>90 ? ? ? ? ? ? ?                     

      



                          ? ?| ?Stage one ? ? ? ? ?| ?                          

 



                          Normal ? ? ? ? ? ? ?                           



                          ?+-----------------------+-------                     

      



                          --------------+------------------                     

      



                          -------+| ?60-89 ? ? ? ? ? ? ? ?|                     

      



                          ?Stage two ? ? ? ? ?| ? Decreased                     

      



                          GFR ? ? ? ?                            



                          +-----------------------+--------                     

      



                          -------------+-------------------                     

      



                          ------+| ?30-59 ? ? ? ? ? ? ? ?|                      

     



                          ?Stage three ? ? ? ?| ? Stage                         

  



                          three ? ? ? ? ?                           



                          +-----------------------+--------                     

      



                          -------------+-------------------                     

      



                          ------+| ?15-29 ? ? ? ? ? ? ? ?|                      

     



                          ?Stage four ? ? ? ? | ? Stage                         

  



                          four ? ? ? ? ?                           



                          ?+-----------------------+-------                     

      



                          --------------+------------------                     

      



                          -------+| ?<15 (or dialysis) ? ?|                     

      



                          ?Stage five ? ? ? ? | ? Stage                         

  



                          five ? ? ? ? ?                           



                          ?+-----------------------+-------                     

      



                          --------------+------------------                     

      



                          -------+ *Each stage assumes the                      

     



                          associated GFR level has been in                      

     



                          effect for at least three months.                     

      



                          ?Stages 1 to 5, with or without                       

    



                          kidney disease, indicate chronic                      

     



                          kidney disease. Notes:                           



                          Determination of stages one and                       

    



                          two (with eGFR >59mL/min/1.73 m2)                     

      



                          requires estimation of kidney                         

  



                          damage for at least three months                      

     



                          as defined by structural or                           



                          functional abnormalities of the                       

    



                          kidney, manifested by                           



                          either:Pathological abnormalities                     

      



                          or Markers of kidney damage                           



                          (including abnormalities in the                       

    



                          composition of the blood or urine                     

      



                          or abnormalities in imaging                           



                          tests).                                

 

             Lab Interpretation (test code = Abnormal                           

    



             67597-9)                                            



[facility] WITH [id number] 16:26:23





             Test Item    Value        Reference Range Interpretation Comments

 

             WBC (test code = 14.08        See_Comment  H             [Automated



             7906-2)                                             message] The



                                                                 system which



                                                                 generated this



                                                                 result transmit

gianfranco



                                                                 reference range

:



                                                                 4.30 - 11.10



                                                                 10*3/?L. The



                                                                 reference range



                                                                 was not used to



                                                                 interpret this



                                                                 result as



                                                                 normal/abnormal

.

 

             RBC (test code = 4.39         See_Comment                [Automated



             865-1)                                              message] The



                                                                 system which



                                                                 generated this



                                                                 result transmit

gianfranco



                                                                 reference range

:



                                                                 3.93 - 5.25



                                                                 10*6/?L. The



                                                                 reference range



                                                                 was not used to



                                                                 interpret this



                                                                 result as



                                                                 normal/abnormal

.

 

             HGB (test code = 13.6 g/dL    11.6-15.0                 



             718-7)                                              

 

             HCT (test code = 40.5 %       35.7-45.2                 



             4544-3)                                             

 

             MCV (test code = 92.3 fL      80.6-95.5                 



             787-2)                                              

 

             MCH (test code = 31.0 pg      25.9-32.8                 



             785-6)                                              

 

             MCHC (test code = 33.6 g/dL    31.6-35.1                 



             786-4)                                              

 

             RDW-SD (test code = 41.2 fL      39.0-49.9                 



             87047-2)                                            

 

             RDW-CV (test code = 12.2 %       12.0-15.5                 



             788-0)                                              

 

             PLT (test code = 221          See_Comment                [Automated



             777-3)                                              message] The



                                                                 system which



                                                                 generated this



                                                                 result transmit

gianfranco



                                                                 reference range

:



                                                                 166 - 358 10*3/

?L.



                                                                 The reference



                                                                 range was not u

sed



                                                                 to interpret th

is



                                                                 result as



                                                                 normal/abnormal

.

 

             MPV (test code = 10.1 fL      9.5-12.9                  



             23726-6)                                            

 

             NRBC/100 WBC (test 0.0          See_Comment                [Automat

ed



             code = 7232130751)                                        message] 

The



                                                                 system which



                                                                 generated this



                                                                 result transmit

gianfranco



                                                                 reference range

:



                                                                 0.0 - 10.0 /100



                                                                 WBCs. The



                                                                 reference range



                                                                 was not used to



                                                                 interpret this



                                                                 result as



                                                                 normal/abnormal

.

 

             NRBC x10^3 (test code              See_Comment                [Auto

mated



             = 5497955946)                                        message] The



                                                                 system which



                                                                 generated this



                                                                 result transmit

gianfranco



                                                                 reference range

:



                                                                 10*3/?L. The



                                                                 reference range



                                                                 was not used to



                                                                 interpret this



                                                                 result as



                                                                 normal/abnormal

.

 

             GRAN MAT (NEUT) % 86.9 %                                 



             (test code = 770-8)                                        

 

             IMM GRAN % (test code 0.60 %                                 



             = 5606186008)                                        

 

             LYMPH % (test code = 7.6 %                                  



             736-9)                                              

 

             MONO % (test code = 3.8 %                                  



             5905-5)                                             

 

             EOS % (test code = 0.6 %                                  



             713-8)                                              

 

             BASO % (test code = 0.5 %                                  



             706-2)                                              

 

             GRAN MAT x10^3(ANC) 12.23 10*3/uL 1.88-7.09    H            



             (test code =                                        



             7553107979)                                         

 

             IMM GRAN x10^3 (test 0.09 10*3/uL 0.00-0.06    H            



             code = 2135182971)                                        

 

             LYMPH x10^3 (test code 1.07 10*3/uL 1.32-3.29    L            



             = 731-0)                                            

 

             MONO x10^3 (test code 0.54 10*3/uL 0.33-0.92                 



             = 742-7)                                            

 

             EOS x10^3 (test code = 0.08 10*3/uL 0.03-0.39                 



             711-2)                                              

 

             BASO x10^3 (test code 0.07 10*3/uL 0.01-0.07                 



             = 704-7)                                            

 

             Lab Interpretation Abnormal                               



             (test code = 77679-7)                                        



[facility] [id number] 05:23:37





             Test Item    Value        Reference Range Interpretation Comments

 

             SARS-CoV-2 (COVID-19) RNA [Presence] Detected                      

         



             in Respiratory specimen by CECIL with                                

        



             probe detection (test code =                                       

 



             12253-6)                                            

 

             Whether patient resides in a No                                    

 



             congregate care setting (test code =                               

         



             70832-6)                                            

 

             Date and time of symptom onset (test Unknown                       

         



             code = 37076-0)                                        

 

             Whether the patient was hospitalized No                            

         



             for condition of interest (test code                               

         



             = 97626-6)                                          

 

             Whether the patient was admitted to No                             

        



             intensive care unit (ICU) for                                      

  



             condition of interest (test code =                                 

       



             09353-0)                                            

 

             Whether patient is employed in a No                                

     



             healthcare setting (test code =                                    

    



             23033-4)                                            

 

             Whether the patient has symptoms No                                

     



             related to condition of interest                                   

     



             (test code = 62711-0)                                        

 

             Pregnancy status (test code = No                                   

  



             89883-2)                                            



MAX ROMO, Urinalysis Rflx Cult/[id number] 20:35:00





             Test Item    Value        Reference Range Interpretation Comments

 

             Color,Urine (test code = UCOL) Yellow       Yellow                 

   

 

             Clarity,Urine (test code = Clear        Clear                     



             UCLAR)                                              

 

             Ph, Urine (test code = UPH) 6.5          5.0-9.0      N            

 

             Specific Gravity,Urine (test 1.015        1.005-1.030  N           

 



             code = USG)                                         

 

             Blood,Urine (test code = UBLD) Negative mg/dL Negative             

     

 

             Protein,Urine (test code = Negative mg/dL Negative                 

 



             UPRO)                                               

 

             Glucose,Urine (UA) (test code Negative mg/dL Negative              

    



             = UGLU)                                             

 

             Ketones,Urine (test code = Negative mg/dL Negative                 

 



             UKET)                                               

 

             Nitrate,Urine (test code = Negative     Negative                  



             UNIT)                                               

 

             Bilirubin,Urine (test code = Negative mg/dL Negative               

   



             UBIL)                                               

 

             Urobilinogen,Urine (test code 1.0 E.U./dL  Normal                  

  



             = UURO)                                             

 

             Leukocyte Esterase,Urine (test Trace mg/dL  Negative     A         

   



             code = ULEU)                                        



Drug Screen,[id number] 20:35:00





             Test Item    Value        Reference Range Interpretation Comments

 

             PCP Phencyclidine Screen,Urine (test Negative     Negative         

         



             code = PCPU)                                        

 

             Amphetamine Screen,Urine (test code Negative     Negative          

        



             = AMPU)                                             

 

             Methadone Screen,Urine (test code = Negative     Negative          

        



             METHU)                                              

 

             Opiate Screen,Urine (test code = Negative     Negative             

     



             UOPIS)                                              

 

             Barbituates Screen,Urine (test code Negative     Negative          

        



             = BARBU)                                            

 

             Benzodiazepines Screen,Urine (test Negative     Negative           

       



             code = UBENZS)                                        

 

             Cocaine Screen,Urine (test code = Negative     Negative            

      



             UCOCS)                                              

 

             Cannabinoid Screen,Urine (test code Negative     Negative          

        



             = UTHCS)                                            

 

             Propoxyphene Screen, Urine (test Negative     Negative             

     



             code = UPROP)                                        



Urine [id number] 20:35:00





             Test Item    Value        Reference Range Interpretation Comments

 

             RBC,Urine (test code = URBC.NP) 0-2 /HPF     0-2                   

    

 

             WBC,Urine (test code = UWBC.NP) 0-5 /HPF     0-5                   

    

 

             Bacteria,Urine (test code = Few /HPF     None Seen    A            



             UBACT.NP)                                           

 

             Squamous Epithelial Cell,Urine 6-10 /HPF    0-5          A         

   



             (test code = USQEPI.NP)                                        

 

             Amorphous Crystals,Urine (test code Few /HPF     None Seen    A    

        



             = UAMSE)                                            

 

             Hyaline Casts,Urine (test code = 0-5 /HPF     None Seen    A       

     



             UHYALC.XX)                                          



Complete Blood Count Auto [id number] 20:19:00





             Test Item    Value        Reference Range Interpretation Comments

 

             White Blood Count (test code = 7.9 x10 3/uL 4.4-10.5     N         

   



             WBCT)                                               

 

             Red Blood Count (test code = 4.27 x10 6/uL 3.75-5.20    N          

  



             RBC)                                                

 

             Hemoglobin (test code = HGBT) 12.9 g/dL    12.2-14.8    N          

  

 

             Hematocrit (test code = HCTT) 40.2 %       36.5-44.4    N          

  

 

             Mean Corpuscular Volume (test 94.10 fL     80..00 N          

  



             code = MCV)                                         

 

             Mean Corpuscular Hemoglobin 30.2 pg      27.0-32.5    N            



             (test code = MCH)                                        

 

             Mean Corpuscular HGB Conc 32.10 g/dL   32.00-37.50  N            



             (test code = MCHC)                                        

 

             RDW Coefficient of Variation 12.4 %       11.5-14.5    N           

 



             (test code = RDWCV)                                        

 

             Platelet Count (test code = 211.0 x10 3/uL 140.0-440.0  N          

  



             PLTT)                                               

 

             Mean Platelet Volume (test 11.1 fL                                



             code = MPV)                                         

 

             Immature Granulocytes % (Auto) 0.3 %        0.0-5.0      N         

   



             (test code = IMMGRAN%)                                        

 

             Neutrophils % (Auto) (test 75.8 %       36.0-70.0    H            



             code = NE%)                                         

 

             Lymphocytes % (Auto) (test 10.6 %       12.0-44.0    L            



             code = LY%)                                         

 

             Monocytes % (Auto) (test code 11.8 %       0.0-11.0     H          

  



             = MO%)                                              

 

             Eosinophils % (Auto) (test 0.9 %        0.0-7.0      N            



             code = EO%)                                         

 

             Basophils % (Auto) (test code 0.6 %        0.0-2.0      N          

  



             = BA%)                                              

 

             Immature Granulocytes # (Auto) 0.02 x10 3/uL                       

    



             (test code = IMMGRAN#)                                        

 

             Neutrophils # (Auto) (test 6.0 x10 3/uL 1.6-7.4      N            



             code = NE#)                                         

 

             Lymphocytes # (Auto) (test 0.83 x10 3/uL 0.50-4.60    N            



             code = LY#)                                         

 

             Monocytes # (Auto) (test code 0.93 x10 3/uL 0.00-1.20    N         

   



             = MO#)                                              

 

             Eosinophils # (Auto) (test 0.07 x10 3/uL 0.00-0.74    N            



             code = EO#)                                         

 

             Basophils # (Auto) (test code 0.05 x10 3/uL 0.00-0.21    N         

   



             = BA#)                                              

 

             nRBC Abs (test code = NRBCA) 0                                     

 

 

             nRBC Pct (test code = NRBCP) 0 %                                   

 



Comprehensive Metabolic [id number] 20:19:00





             Test Item    Value        Reference Range Interpretation Comments

 

             SODIUM (test code = NA) 142.0 mmol/L 136.0-145.0  N            

 

             Potassium,K (test code = 4.2 mmol/L   3.0-5.1      N            



             K)                                                  

 

             Chloride (test code = 110 mmol/L          H            



             CL)                                                 

 

             Carbon Dioxide (test 26 mmol/L    20-31        N            



             code = CO2)                                         

 

             Anion Gap (test code = 6 mmol/L     5-15         N            



             GAP)                                                

 

             Blood Urea Nitrogen 17 mg/dL     9-23         N            



             (test code = BUN)                                        

 

             Creatinine (test code = 0.88 mg/dL   0.55-1.02    N            



             CREATT)                                             

 

             Creatinine Clr Calc 80.94 mL/min                           



             Pharmacy (test code =                                        



             CRCLPHA)                                            

 

             Estimated Glomerular 78           See_Comment  L            Reporte

d eGFR is



             Filt Rate (test code =                                        based

 on the



             EGFR.XX)                                            CKD-EPI 



                                                                 equation thatdo

es



                                                                 not use a race



                                                                 coefficient.



                                                                 Additional



                                                                 information can

be



                                                                 found



                                                                 at:[id number]

cb_



                                                                 egfr_summary_fl

andry



                                                                 5.pdf (kidney.o

rg)



                                                                 [Automated



                                                                 message] The



                                                                 system which



                                                                 generated this



                                                                 result transmit

gianfranco



                                                                 reference range

:



                                                                 >=90



                                                                 ml/min/1.73m2. 

The



                                                                 reference range



                                                                 was not used to



                                                                 interpret this



                                                                 result as



                                                                 normal/abnormal

.

 

             BUN/Creatinine Ratio 19 ratio     10-20        N            



             (test code = BCRATIO)                                        

 

             Glucose (test code = 92 mg/dL            N            



             GLU)                                                

 

             Osmolality,Calculated 295.0                                  



             (test code = OSMOC)                                        

 

             Calcium (test code = CA) 9.1 mg/dL    8.3-10.6     N            

 

             Bilirubin,Total (test 0.3 mg/dL    0.2-1.1      N            



             code = BILIT)                                        

 

             Aspartate Amino 22 U/L       0-34         N            



             Transferase (test code =                                        



             AST)                                                

 

             Alanine Aminotransferase 15 U/L       10-49        N            



             (test code = ALT)                                        

 

             Total Protein (test code 6.9 g/dL     5.7-8.2      N            



             = TP)                                               

 

             Albumin Level (test code 3.9 g/dL     3.2-4.8      N            



             = ALB)                                              

 

             Globulin (test code = 3.0 mg/dL    2.3-3.5      N            



             GLOB)                                               

 

             Albumin/Globulin Ratio 1.3 ratio    0.8-2.0      N            



             (test code = AGRATIO)                                        

 

             Alkaline Phosphatase 109 U/L             N            



             (test code = ALP)                                        



Ethanol [id number] 20:19:00





             Test Item    Value        Reference Range Interpretation Comments

 

             Ethanol (test code < 3 mg/dL                              The pharm

acological



             = ETOH)                                             response to blo

od alcohol



                                                                 levels mayvary 

from



                                                                 individual to i

ndividual.



                                                                 The fatal larisa

ntrationhas



                                                                 been reported t

o be



                                                                 >400mg/dL.



HCG, Serum Qual (LAB)2023 20:19:00





             Test Item    Value        Reference Range Interpretation Comments

 

             HCG, Serum Qual (LAB) (test code = Negative     Negative           

       



             HCGQ)                                               



URINE AND [id number] 15:40:00





             Test Item    Value        Reference Range Interpretation Comments

 

             UA Sq Epi (test code = UA Sq Epi) Many /LPF                        

      



[facility] AND [id number] 15:40:00





             Test Item    Value        Reference Range Interpretation Comments

 

             UA WBC (test code = 9            See_Comment                [Automa

gianfranco message] The



             UA WBC)                                             system which ge

nerated this



                                                                 result transmit

gianfranco



                                                                 reference range

: <=5. The



                                                                 reference range

 was not



                                                                 used to interpr

et this



                                                                 result as xenia

l/abnormal.



[facility] [id number] 15:40:00





             Test Item    Value        Reference Range Interpretation Comments

 

             UA Bacteria (test code = UA Occasional /HPF                        

   



             Bacteria)                                           



[facility] [id number] 15:40:00





             Test Item    Value        Reference Range Interpretation Comments

 

             UA Amorph Delmis (test code = Occasional /HPF                        

   



             UA Amorph Delmis)                                        



[facility] AND [id number] 15:40:00





             Test Item    Value        Reference Range Interpretation Comments

 

             UA CaOx Delmis (test code = UA Occasional /HPF                       

    



             CaOx Delmis)                                          



[facility] AND [id number] 15:40:00





             Test Item    Value        Reference Range Interpretation Comments

 

             UA Color (test code = Yellow *NA*(23                          

 



             UA Color)    9:40 AM)                               



[facility] [id number] 15:40:00





             Test Item    Value        Reference Range Interpretation Comments

 

             UA Turbidity (test code = Clear (23 9:40                      

     



             UA Turbidity) AM)                                    



[facility] AND [id number] 15:40:00





             Test Item    Value        Reference Range Interpretation Comments

 

             UA Spec Grav (test code = UA Spec 1.010 1                          

      



             Grav)                                               



[facility] [id number] 15:40:00





             Test Item    Value        Reference Range Interpretation Comments

 

             UA pH (test code = UA pH) 6.0 1        5.0-8.0                   



[facility] [id number] 15:40:00





             Test Item    Value        Reference Range Interpretation Comments

 

             UA Protein (test code Negative (23 9:40                       

    



             = UA Protein) AM)                                    



[facility] [id number] 15:40:00





             Test Item    Value        Reference Range Interpretation Comments

 

             UA Glucose (test code Negative (23 9:40                       

    



             = UA Glucose) AM)                                    



[facility] [id number] 15:40:00





             Test Item    Value        Reference Range Interpretation Comments

 

             UA Ketones (test code Negative *NA*(23                        

   



             = UA Ketones) 9:40 AM)                               



[facility] [id number] 15:40:00





             Test Item    Value        Reference Range Interpretation Comments

 

             UA Bili (test code = Negative *NA*(23                         

  



             UA Bili)     9:40 AM)                               



[facility] [id number] 15:40:00





             Test Item    Value        Reference Range Interpretation Comments

 

             UA Blood (test code = Negative (23 9:40                       

    



             UA Blood)    AM)                                    



[facility] AND [id number] 15:40:00





             Test Item    Value        Reference Range Interpretation Comments

 

             UA Urobilinogen (test code = UA 0.2          0.1-1.0               

    



             Urobilinogen)                                        



[facility] AND [id number] 15:40:00





             Test Item    Value        Reference Range Interpretation Comments

 

             UA Nitrite (test code Negative (23 9:40                       

    



             = UA Nitrite) AM)                                    



[facility] AND [id number] 15:40:00





             Test Item    Value        Reference Range Interpretation Comments

 

             UA Leuk Est (test code Small *ABN*(23                         

  



             = UA Leuk Est) 9:40 AM)                               



[facility] AND [id number] 15:40:00





             Test Item    Value        Reference Range Interpretation Comments

 

             UA RBC (test code = 2            See_Comment                [Automa

gianfranco message] The



             UA RBC)                                             system which ge

nerated this



                                                                 result transmit

gianfranco



                                                                 reference range

: <=2. The



                                                                 reference range

 was not



                                                                 used to interpr

et this



                                                                 result as xenia

l/abnormal.



[facility] AND [id number] 15:40:00





             Test Item    Value        Reference Range Interpretation Comments

 

             UA Sq Epi (test code = UA Sq Epi) Many /LPF                        

      



[facility] AND [id number] 15:40:00





             Test Item    Value        Reference Range Interpretation Comments

 

             UA WBC (test code = 9            See_Comment                [Automa

gianfranco message] The



             UA WBC)                                             system which ge

nerated this



                                                                 result transmit

gianfranco



                                                                 reference range

: <=5. The



                                                                 reference range

 was not



                                                                 used to interpr

et this



                                                                 result as xenia

l/abnormal.



[facility] AND [id number] 15:40:00





             Test Item    Value        Reference Range Interpretation Comments

 

             UA Bacteria (test code = UA Occasional /HPF                        

   



             Bacteria)                                           



[facility] AND [id number] 15:40:00





             Test Item    Value        Reference Range Interpretation Comments

 

             UA Amorph Delmis (test code = Occasional /HPF                        

   



             UA Amorph Delmis)                                        



[facility] AND [id number] 15:40:00





             Test Item    Value        Reference Range Interpretation Comments

 

             UA CaOx Delmis (test code = UA Occasional /HPF                       

    



             CaOx Delmis)                                          



[facility] AND [id number] 15:40:00





             Test Item    Value        Reference Range Interpretation Comments

 

             UA Color (test code = Yellow *NA*(23                          

 



             UA Color)    9:40 AM)                               



[facility] AND [id number] 15:40:00





             Test Item    Value        Reference Range Interpretation Comments

 

             UA Turbidity (test code = Clear (23 9:40                      

     



             UA Turbidity) AM)                                    



[facility] AND [id number] 15:40:00





             Test Item    Value        Reference Range Interpretation Comments

 

             UA Spec Grav (test code = UA Spec 1.010 1                          

      



             Grav)                                               



[facility] AND [id number] 15:40:00





             Test Item    Value        Reference Range Interpretation Comments

 

             UA pH (test code = UA pH) 6.0 1        5.0-8.0                   



[facility] AND [id number] 15:40:00





             Test Item    Value        Reference Range Interpretation Comments

 

             UA Protein (test code Negative (23 9:40                       

    



             = UA Protein) AM)                                    



[facility] AND [id number] 15:40:00





             Test Item    Value        Reference Range Interpretation Comments

 

             UA Glucose (test code Negative (23 9:40                       

    



             = UA Glucose) AM)                                    



[facility] AND [id number] 15:40:00





             Test Item    Value        Reference Range Interpretation Comments

 

             UA Ketones (test code Negative *NA*(23                        

   



             = UA Ketones) 9:40 AM)                               



[facility] AND [id number] 15:40:00





             Test Item    Value        Reference Range Interpretation Comments

 

             UA Bili (test code = Negative *NA*(23                         

  



             UA Bili)     9:40 AM)                               



[facility] [id number] 15:40:00





             Test Item    Value        Reference Range Interpretation Comments

 

             UA Blood (test code = Negative (23 9:40                       

    



             UA Blood)    AM)                                    



[facility] [id number] 15:40:00





             Test Item    Value        Reference Range Interpretation Comments

 

             UA Urobilinogen (test code = UA 0.2          0.1-1.0               

    



             Urobilinogen)                                        



[facility] [id number] 15:40:00





             Test Item    Value        Reference Range Interpretation Comments

 

             UA Nitrite (test code Negative (23 9:40                       

    



             = UA Nitrite) AM)                                    



[facility] [id number] 15:40:00





             Test Item    Value        Reference Range Interpretation Comments

 

             UA Leuk Est (test code Small *ABN*(23                         

  



             = UA Leuk Est) 9:40 AM)                               



[facility] [id number] 15:40:00





             Test Item    Value        Reference Range Interpretation Comments

 

             UA RBC (test code = 2            See_Comment                [Automa

gianfranco message] The



             UA RBC)                                             system which ge

nerated this



                                                                 result transmit

gianfranco



                                                                 reference range

: <=2. The



                                                                 reference range

 was not



                                                                 used to interpr

et this



                                                                 result as xenia

l/abnormal.



[facility] [id number] 15:40:00





             Test Item    Value        Reference Range Interpretation Comments

 

             UA Sq Epi (test code = UA Sq Epi) Many /LPF                        

      



[facility] [id number] 15:40:00





             Test Item    Value        Reference Range Interpretation Comments

 

             UA WBC (test code = 9            See_Comment                [Automa

gianfranco message] The



             UA WBC)                                             system which ge

nerated this



                                                                 result transmit

gianfranco



                                                                 reference range

: <=5. The



                                                                 reference range

 was not



                                                                 used to interpr

et this



                                                                 result as xenia

l/abnormal.



[facility] [id number] 15:40:00





             Test Item    Value        Reference Range Interpretation Comments

 

             UA Bacteria (test code = UA Occasional /HPF                        

   



             Bacteria)                                           



[facility] [id number] 15:40:00





             Test Item    Value        Reference Range Interpretation Comments

 

             UA Amorph Delmis (test code = Occasional /HPF                        

   



             UA Amorph Delmis)                                        



[facility] [id number] 15:40:00





             Test Item    Value        Reference Range Interpretation Comments

 

             UA CaOx Delmis (test code = UA Occasional /HPF                       

    



             CaOx Delmis)                                          



[facility] AND [id number] 15:40:00





             Test Item    Value        Reference Range Interpretation Comments

 

             UA Color (test code = Yellow *NA*(23                          

 



             UA Color)    9:40 AM)                               



[facility] AND [id number] 15:40:00





             Test Item    Value        Reference Range Interpretation Comments

 

             UA Turbidity (test code = Clear (23 9:40                      

     



             UA Turbidity) AM)                                    



[facility] AND [id number] 15:40:00





             Test Item    Value        Reference Range Interpretation Comments

 

             UA Spec Grav (test code = UA Spec 1.010 1                          

      



             Grav)                                               



[facility] AND [id number] 15:40:00





             Test Item    Value        Reference Range Interpretation Comments

 

             UA pH (test code = UA pH) 6.0 1        5.0-8.0                   



[facility] AND [id number] 15:40:00





             Test Item    Value        Reference Range Interpretation Comments

 

             UA Protein (test code Negative (23 9:40                       

    



             = UA Protein) AM)                                    



[facility] AND [id number] 15:40:00





             Test Item    Value        Reference Range Interpretation Comments

 

             UA Glucose (test code Negative (23 9:40                       

    



             = UA Glucose) AM)                                    



[facility] AND [id number] 15:40:00





             Test Item    Value        Reference Range Interpretation Comments

 

             UA Ketones (test code Negative *NA*(23                        

   



             = UA Ketones) 9:40 AM)                               



[facility] AND [id number] 15:40:00





             Test Item    Value        Reference Range Interpretation Comments

 

             UA Bili (test code = Negative *NA*(23                         

  



             UA Bili)     9:40 AM)                               



[facility] AND [id number] 15:40:00





             Test Item    Value        Reference Range Interpretation Comments

 

             UA Blood (test code = Negative (23 9:40                       

    



             UA Blood)    AM)                                    



[facility] AND [id number] 15:40:00





             Test Item    Value        Reference Range Interpretation Comments

 

             UA Urobilinogen (test code = UA 0.2          0.1-1.0               

    



             Urobilinogen)                                        



[facility] AND [id number] 15:40:00





             Test Item    Value        Reference Range Interpretation Comments

 

             UA Nitrite (test code Negative (23 9:40                       

    



             = UA Nitrite) AM)                                    



[facility] AND [id number] 15:40:00





             Test Item    Value        Reference Range Interpretation Comments

 

             UA Leuk Est (test code Small *ABN*(23                         

  



             = UA Leuk Est) 9:40 AM)                               



[facility] [id number] 15:40:00





             Test Item    Value        Reference Range Interpretation Comments

 

             UA RBC (test code = 2            See_Comment                [Automa

gianfranco message] The



             UA RBC)                                             system which ge

nerated this



                                                                 result transmit

gianfranco



                                                                 reference range

: <=2. The



                                                                 reference range

 was not



                                                                 used to interpr

et this



                                                                 result as xenia

l/abnormal.



[id number] 15:25:24





             Test Item    Value        Reference Range Interpretation Comments

 

             Potassium Lvl (test code = Potassium 4.4          3.5-5.1          

         



             Lvl)                                                



[facility] [id number] 15:25:24





             Test Item    Value        Reference Range Interpretation Comments

 

             Chloride Lvl (test code = Chloride Lvl) 108                  

            



[facility] [id number] 15:25:24





             Test Item    Value        Reference Range Interpretation Comments

 

             CO2 (test code = CO2) 28           24-32                     



[facility] [id number] 15:25:24





             Test Item    Value        Reference Range Interpretation Comments

 

             Calcium Lvl (test code = Calcium Lvl) 10.0         8.5-10.5        

          



[facility] [id number] 15:25:24





             Test Item    Value        Reference Range Interpretation Comments

 

             AGAP (test code = AGAP) 10.4         10.0-20.0                 



[facility] HospitalGvqtljrZHJITNOQP3028-28-10 15:25:24





             Test Item    Value        Reference Range Interpretation Comments

 

             eGFR (test code = eGFR) 81                                     



[facility] Ho[id number] 15:25:24





             Test Item    Value        Reference Range Interpretation Comments

 

             Lipase Lvl (test code = Lipase Lvl) 123                      

        



[facility] [id number] 15:25:24





             Test Item    Value        Reference Range Interpretation Comments

 

             Total Protein (test code = Total 8.0          6.4-8.4              

     



             Protein)                                            



[facility] [id number] 15:25:24





             Test Item    Value        Reference Range Interpretation Comments

 

             Albumin Lvl (test code = Albumin Lvl) 3.7          3.5-5.0         

          



[id number] 15[facility]25:24





             Test Item    Value        Reference Range Interpretation Comments

 

             Globulin (test code = Globulin) 4.3          2.7-4.2               

    



[facility] [id number] 15:25:24





             Test Item    Value        Reference Range Interpretation Comments

 

             A/G Ratio (test code = A/G Ratio) 0.9 1        0.7-1.6             

      



[facility] [id number] 15:25:24





             Test Item    Value        Reference Range Interpretation Comments

 

             ALANINE AMINOTRANSFERASE 20           See_Comment                [A

utomated message]



             (test code = ALANINE                                        The sys

tem which



             AMINOTRANSFERASE)                                        generated 

this result



                                                                 transmitted ref

erence



                                                                 range: <=65. Th

e



                                                                 reference range

 was



                                                                 not used to int

erpret



                                                                 this result as



                                                                 normal/abnormal

.



[facility] [id number] 15:25:24





             Test Item    Value        Reference Range Interpretation Comments

 

             AST (test code = AST) 19           See_Comment                [Auto

mated message] The



                                                                 system which ge

nerated this



                                                                 result transmit

gianfranco



                                                                 reference range

: <=37. The



                                                                 reference range

 was not



                                                                 used to interpr

et this



                                                                 result as xenia

l/abnormal.



[facility] [id number] 15:25:24





             Test Item    Value        Reference Range Interpretation Comments

 

             Alk Phos (test code = Alk Phos) 123                          

    



[facility] [id number] 15:25:24





             Test Item    Value        Reference Range Interpretation Comments

 

             Bili Total (test code = Bili Total) 0.3          0.2-1.3           

        



[facility] [id number] 15:25:24





             Test Item    Value        Reference Range Interpretation Comments

 

             Bili Direct (test code no gt        See_Comment                [Aut

omated message] The



             = Bili Direct)                                        system which 

generated



                                                                 this result tra

nsmitted



                                                                 reference range

: <=0.3.



                                                                 The reference r

jihan was



                                                                 not used to int

erpret this



                                                                 result as xenia

l/abnormal.



[facility] [id number] 15:25:24





             Test Item    Value        Reference Range Interpretation Comments

 

             Bili Indirect Unable to    See_Comment                [Automated



             (test code = Bili Calculate                              message] T

he system



             Indirect)                                           which generated



                                                                 this result



                                                                 transmitted



                                                                 reference range

:



                                                                 <=1.0. The



                                                                 reference range

 was



                                                                 not used to



                                                                 interpret this



                                                                 result as



                                                                 normal/abnormal

.



[facility] CenterOohazbgDVLEDCQQDI1267-39-23 15:25:24





             Test Item    Value        Reference Range Interpretation Comments

 

             WBC X 10x3 (test code = WBC X 10x3) 9.4          3.7-10.4          

        



[id number] 15:25:24





             Test Item    Value        Reference Range Interpretation Comments

 

             RBC X 10x6 (test code = RBC X 10x6) 4.59         4.20-5.40         

        



[facility] Ho[id number] 15:25:24





             Test Item    Value        Reference Range Interpretation Comments

 

             Hgb (test code = Hgb) 14.1         12.0-16.0                 



[place] 198633-02-14 15:25:24





             Test Item    Value        Reference Range Interpretation Comments

 

             Hct (test code = Hct) 42.8         36.0-48.0                 



[facility] HospitalKfrtztvZBTTKAVXVB2138-06-58 15:25:24





             Test Item    Value        Reference Range Interpretation Comments

 

             MCV (test code = MCV) 93.2         80.0-98.0                 



[facility] HospitalUafqjncXSDMZGISRM8096-77-04 15:25:24





             Test Item    Value        Reference Range Interpretation Comments

 

             MCH (test code = MCH) 30.8 pg      27.0-31.0                 



[facility] HospitalFxpydscSUUVQCJHWT6425-46-60 15:25:24





             Test Item    Value        Reference Range Interpretation Comments

 

             MCHC (test code = MCHC) 33.0         32.0-36.0                 



[facility] HospitalVplktflSXYHEMGNQT2511-94-24 15:25:24





             Test Item    Value        Reference Range Interpretation Comments

 

             RDW (test code = RDW) 13.0         11.5-14.5                 



[facility] HospitalDfzbounAEIWZFZZRY0092-29-10 15:25:24





             Test Item    Value        Reference Range Interpretation Comments

 

             Platelet (test code = Platelet) 200          133-450               

    



[facility] HospitalNuxjfyaMWHJSBIKIR2428-68-01 15:25:24





             Test Item    Value        Reference Range Interpretation Comments

 

             MPV (test code = MPV) 8.6          7.4-10.4                  



[facility] HospitalNdlibxbPNWBYOMUNC4882-69-99 15:25:24





             Test Item    Value        Reference Range Interpretation Comments

 

             Segs (test code = Segs) 86.0         45.0-75.0                 



[facility] HospitalThcvrbqFIRJSLIRSG8083-39-56 15:25:24





             Test Item    Value        Reference Range Interpretation Comments

 

             Lymphocytes (test code = Lymphocytes) 5.5          20.0-40.0       

          



[facility] HospitalLcitlfoLYWHJWXUOV2692-61-09 15:25:24





             Test Item    Value        Reference Range Interpretation Comments

 

             Monocytes (test code = Monocytes) 7.0          2.0-12.0            

      



[place] 198633-02-14 15:25:24





             Test Item    Value        Reference Range Interpretation Comments

 

             Eosinophils (test code = 1.0          See_Comment                [A

utomated message] The



             Eosinophils)                                        system which ge

nerated



                                                                 this result tra

nsmitted



                                                                 reference range

: <=4.0.



                                                                 The reference r

jihan was



                                                                 not used to int

erpret



                                                                 this result as



                                                                 normal/abnormal

.



[facility] HospitalGruvhclWBCYLZHRNX5441-16-30 15:25:24





             Test Item    Value        Reference Range Interpretation Comments

 

             Basophils (test code = 0.5          See_Comment                [Aut

omated message] The



             Basophils)                                          system which ge

nerated



                                                                 this result tra

nsmitted



                                                                 reference range

: <=1.0.



                                                                 The reference r

jihan was



                                                                 not used to int

erpret



                                                                 this result as



                                                                 normal/abnormal

.



[facility] HospitalBudhdphZXBWLPCTKV4778-08-08 15:25:24





             Test Item    Value        Reference Range Interpretation Comments

 

             Neutrophils # (test code = Neutrophils 8.1          1.5-8.1        

           



             #)                                                  



[facility] HospitalLdefvwrSZPNLRZGVV7428-35-93 15:25:24





             Test Item    Value        Reference Range Interpretation Comments

 

             Lymphocytes # (test code = Lymphocytes 0.5          1.0-5.5        

           



             #)                                                  



[facility] HospitalXgqnoltAJQYVJMPJN7091-01-97 15:25:24





             Test Item    Value        Reference Range Interpretation Comments

 

             Monocytes # (test code 0.7          See_Comment                [Aut

omated message] The



             = Monocytes #)                                        system which 

generated



                                                                 this result tra

nsmitted



                                                                 reference range

: <=0.8.



                                                                 The reference r

jihan was



                                                                 not used to int

erpret



                                                                 this result as



                                                                 normal/abnormal

.



[place] 198633-02-14 15:25:24





             Test Item    Value        Reference Range Interpretation Comments

 

             Eosinophils # (test code 0.1          See_Comment                [A

utomated message] The



             = Eosinophils #)                                        system whic

h generated



                                                                 this result tra

nsmitted



                                                                 reference range

: <=0.5.



                                                                 The reference r

jihan was



                                                                 not used to int

erpret



                                                                 this result as



                                                                 normal/abnormal

.



[place] 328933-02-14 15:25:24





             Test Item    Value        Reference Range Interpretation Comments

 

             Glucose Lvl (test code = Glucose Lvl) 93           70-99           

          



[place] 328933-02-14 15:25:24





             Test Item    Value        Reference Range Interpretation Comments

 

             BUN (test code = BUN) 19           7-22                      



[facility]FvecqslXEGBTPAFU0427-97-51 [facility]25:24





             Test Item    Value        Reference Range Interpretation Comments

 

             Creatinine Lvl (test code = Creatinine 0.85         0.50-1.40      

           



             Lvl)                                                



[facility] HospitalGiitqrzZCNBLFLRG6933-74-59 15:25:24





             Test Item    Value        Reference Range Interpretation Comments

 

             Sodium Lvl (test code = Sodium Lvl) 142          135-145           

        



[facility] HospitalSkzvdwrLTABORTGB3744-83-92 15:25:24





             Test Item    Value        Reference Range Interpretation Comments

 

             Potassium Lvl (test code = Potassium 4.4          3.5-5.1          

         



             Lvl)                                                



[facility] HospitalDushqceESGKJUFQD9785-50-17 15:25:24





             Test Item    Value        Reference Range Interpretation Comments

 

             Chloride Lvl (test code = Chloride Lvl) 108                  

            



[facility] HospitalBwqenzhWAXFZKLLI5517-51-44 15:25:24





             Test Item    Value        Reference Range Interpretation Comments

 

             CO2 (test code = CO2) 28           24-32                     



[facility] HospitalQrkirsyZRCRITEOU1543-26-42 15:25:24





             Test Item    Value        Reference Range Interpretation Comments

 

             Calcium Lvl (test code = Calcium Lvl) 10.0         8.5-10.5        

          



[facility] HospitalYaeqbhmFLZNOWJBD1406-96-92 15:25:24





             Test Item    Value        Reference Range Interpretation Comments

 

             AGAP (test code = AGAP) 10.4         10.0-20.0                 



[facility] HospitalVgekrmvARXRXGLGI4075-56-27 15:25:24





             Test Item    Value        Reference Range Interpretation Comments

 

             eGFR (test code = eGFR) 81                                     



[facility] HospitalBiwdzrdKIXUDJUUU8645-80-39 15:25:24





             Test Item    Value        Reference Range Interpretation Comments

 

             Lipase Lvl (test code = Lipase Lvl) 123                      

        



[facility] HospitalNearguiCARSTXOHW4276-43-02 15:25:24





             Test Item    Value        Reference Range Interpretation Comments

 

             Total Protein (test code = Total 8.0          6.4-8.4              

     



             Protein)                                            



[facility] HospitalVzixqfvVYCARWXBX7347-55-23 15:25:24





             Test Item    Value        Reference Range Interpretation Comments

 

             Albumin Lvl (test code = Albumin Lvl) 3.7          3.5-5.0         

          



[facility] HospitalDwqwoxgMROEPLAYI8874-78-62 15:25:24





             Test Item    Value        Reference Range Interpretation Comments

 

             Globulin (test code = Globulin) 4.3          2.7-4.2               

    



[facility] HospitalGkjhoerQCYGTWFXL3623-46-09 15:25:24





             Test Item    Value        Reference Range Interpretation Comments

 

             A/G Ratio (test code = A/G Ratio) 0.9 1        0.7-1.6             

      



[facility] CenterBcdhfhsWLKMDGKFS8185-19-[facility] 15:25:24





             Test Item    Value        Reference Range Interpretation Comments

 

             ALANINE AMINOTRANSFERASE 20           See_Comment                [A

utomated message]



             (test code = ALANINE                                        The sys

tem which



             AMINOTRANSFERASE)                                        generated 

this result



                                                                 transmitted ref

erence



                                                                 range: <=65. Th

e



                                                                 reference range

 was



                                                                 not used to int

erpret



                                                                 this result as



                                                                 normal/abnormal

.



[facility] CenterPnwhwcaCFUQTVPPS8060-77-72 15:25:24





             Test Item    Value        Reference Range Interpretation Comments

 

             AST (test code = AST) 19           See_Comment                [Auto

mated message] The



                                                                 system which ge

nerated this



                                                                 result transmit

gianfranco



                                                                 reference range

: <=37. The



                                                                 reference range

 was not



                                                                 used to interpr

et this



                                                                 result as xenia

l/abnormal.



[facility] CenterEjcnhswQWQDUMNNY4633-86-51 15:25:24





             Test Item    Value        Reference Range Interpretation Comments

 

             Alk Phos (test code = Alk Phos) 123                          

    



[facility] CenterGpvxgccRQGHNOFJU5089-82-95 15:25:24





             Test Item    Value        Reference Range Interpretation Comments

 

             Bili Total (test code = Bili Total) 0.3          0.2-1.3           

        



[facility] CenterJpqyhlfNMGMOURQT8840-86-82 15:25:24





             Test Item    Value        Reference Range Interpretation Comments

 

             Bili Direct (test code no gt        See_Comment                [Aut

omated message] The



             = Bili Direct)                                        system which 

generated



                                                                 this result tra

nsmitted



                                                                 reference range

: <=0.3.



                                                                 The reference r

jihan was



                                                                 not used to int

erpret this



                                                                 result as xenia

l/abnormal.



[facility] CenterNhnxrwgRJEGTYBBT6029-41-24 15:25:24





             Test Item    Value        Reference Range Interpretation Comments

 

             Bili Indirect Unable to    See_Comment                [Automated



             (test code = Bili Calculate                              message] T

he system



             Indirect)                                           which generated



                                                                 this result



                                                                 transmitted



                                                                 reference range

:



                                                                 <=1.0. The



                                                                 reference range

 was



                                                                 not used to



                                                                 interpret this



                                                                 result as



                                                                 normal/abnormal

.



[facility] CenterWlthtxhVDTEVUQDLR6705-11-79 15:25:24





             Test Item    Value        Reference Range Interpretation Comments

 

             WBC X 10x3 (test code = WBC X 10x3) 9.4          3.7-10.4          

        



[facility] CenterWfteiucTWJOVHYXOC6953-52-94 15:25:24





             Test Item    Value        Reference Range Interpretation Comments

 

             RBC X 10x6 (test code = RBC X 10x6) 4.59         4.20-5.40         

        



[place] 198633-02-14 15:25:24





             Test Item    Value        Reference Range Interpretation Comments

 

             Hgb (test code = Hgb) 14.1         12.0-16.0                 



[place] 198633-02-14 15:25:24





             Test Item    Value        Reference Range Interpretation Comments

 

             Hct (test code = Hct) 42.8         36.0-48.0                 



[facility] HospitalNwhafalPDMKRNXHYS6228-87-06 15:25:24





             Test Item    Value        Reference Range Interpretation Comments

 

             MCV (test code = MCV) 93.2         80.0-98.0                 



[place] 198633-02-14 15:25:24





             Test Item    Value        Reference Range Interpretation Comments

 

             MCH (test code = MCH) 30.8 pg      27.0-31.0                 



[facility] HospitalTvcvufgXXBSHBORGA3759-29-07 15:25:24





             Test Item    Value        Reference Range Interpretation Comments

 

             MCHC (test code = MCHC) 33.0         32.0-36.0                 



[facility] HospitalOfotmykQFBJPZKXAS7968-20-98 15:25:24





             Test Item    Value        Reference Range Interpretation Comments

 

             RDW (test code = RDW) 13.0         11.5-14.5                 



[facility] HospitalRrcvhkbWRISIXPSUE7534-79-99 15:25:24





             Test Item    Value        Reference Range Interpretation Comments

 

             Platelet (test code = Platelet) 200          133-450               

    



[facility] HospitalVmwwfqjHVDWWBSMMV6374-82-58 15:25:24





             Test Item    Value        Reference Range Interpretation Comments

 

             MPV (test code = MPV) 8.6          7.4-10.4                  



[facility] HospitalIrutntcBRJRPWJKOV2206-38-63 15:25:24





             Test Item    Value        Reference Range Interpretation Comments

 

             Segs (test code = Segs) 86.0         45.0-75.0                 



[place] 198633-02-14 15:25:24





             Test Item    Value        Reference Range Interpretation Comments

 

             Lymphocytes (test code = Lymphocytes) 5.5          20.0-40.0       

          



[place] 198633-02-14 15:25:24





             Test Item    Value        Reference Range Interpretation Comments

 

             Monocytes (test code = Monocytes) 7.0          2.0-12.0            

      



[facility] HospitalRsjknizYFRKAAKZMX1170-76-57 15:25:24





             Test Item    Value        Reference Range Interpretation Comments

 

             Eosinophils (test code = 1.0          See_Comment                [A

utomated message] The



             Eosinophils)                                        system which ge

nerated



                                                                 this result tra

nsmitted



                                                                 reference range

: <=4.0.



                                                                 The reference r

jihan was



                                                                 not used to int

erpret



                                                                 this result as



                                                                 normal/abnormal

.



[facility] HospitalWkzqhckBIRNGAUEAJ0456-36-47 15:25:24





             Test Item    Value        Reference Range Interpretation Comments

 

             Basophils (test code = 0.5          See_Comment                [Aut

omated message] The



             Basophils)                                          system which ge

nerated



                                                                 this result tra

nsmitted



                                                                 reference range

: <=1.0.



                                                                 The reference r

jihan was



                                                                 not used to int

erpret



                                                                 this result as



                                                                 normal/abnormal

.



[facility] HospitalEdolxyeLVQASPNRQE6693-30-80 15:25:24





             Test Item    Value        Reference Range Interpretation Comments

 

             Neutrophils # (test code = Neutrophils 8.1          1.5-8.1        

           



             #)                                                  



[facility] HospitalSynlqmeOPKFUJLPJD4614-55-98 15:25:24





             Test Item    Value        Reference Range Interpretation Comments

 

             Lymphocytes # (test code = Lymphocytes 0.5          1.0-5.5        

           



             #)                                                  



[facility] HospitalEwkwhfxKDRFHQBTWO8447-25-55 15:25:24





             Test Item    Value        Reference Range Interpretation Comments

 

             Monocytes # (test code 0.7          See_Comment                [Aut

omated message] The



             = Monocytes #)                                        system which 

generated



                                                                 this result tra

nsmitted



                                                                 reference range

: <=0.8.



                                                                 The reference r

jihan was



                                                                 not used to int

erpret



                                                                 this result as



                                                                 normal/abnormal

.



[facility] HospitalLdfbpvtAOOMEFKSIU4663-52-12 15:25:24





             Test Item    Value        Reference Range Interpretation Comments

 

             Eosinophils # (test code 0.1          See_Comment                [A

utomated message] The



             = Eosinophils #)                                        system [facility]

h generated



                                                                 this result tra

nsmitted



                                                                 reference range

: <=0.5.



                                                                 The reference r

jihan was



                                                                 not used to int

erpret



                                                                 this result as



                                                                 normal/abnormal

.



[facility]YuskheyOSQDFNIDX2527-[facility]:25:24





             Test Item    Value        Reference Range Interpretation Comments

 

             Glucose Lvl (test code = Glucose Lvl) 93           70-99           

          



[facility] HospitalXvuqvvhZUGCENQOT9225-42-02 15:25:24





             Test Item    Value        Reference Range Interpretation Comments

 

             BUN (test code = BUN) 19           7-22                      



[facility] HospitalNytijpgIOORIAUGW4677-39-11 15:25:24





             Test Item    Value        Reference Range Interpretation Comments

 

             Creatinine Lvl (test code = Creatinine 0.85         0.50-1.40      

           



             Lvl)                                                



[facility] HospitalPudrqrmBPXAHQVAG2451-02-39 15:25:24





             Test Item    Value        Reference Range Interpretation Comments

 

             Sodium Lvl (test code = Sodium Lvl) 142          135-145           

        



[facility] HospitalLnxmznlILFNJWADA2057-09-91 15:25:24





             Test Item    Value        Reference Range Interpretation Comments

 

             Potassium Lvl (test code = Potassium 4.4          3.5-5.1          

         



             Lvl)                                                



[facility] HospitalFccjnduCDSYFFCGP3211-89-13 15:25:24





             Test Item    Value        Reference Range Interpretation Comments

 

             Chloride Lvl (test code = Chloride Lvl) 108                  

            



[facility] HospitalWazxafpVODKSATFZ6296-00-89 15:25:24





             Test Item    Value        Reference Range Interpretation Comments

 

             CO2 (test code = CO2) 28           24-32                     



[facility] HospitalQxxszulQPTBDSTTW1460-29-41 15:25:24





             Test Item    Value        Reference Range Interpretation Comments

 

             Calcium Lvl (test code = Calcium Lvl) 10.0         8.5-10.5        

          



[facility] HospitalDhosolsBKGUDWZUZ9297-90-14 15:25:24





             Test Item    Value        Reference Range Interpretation Comments

 

             AGAP (test code = AGAP) 10.4         10.0-20.0                 



[facility] HospitalNhiobcdWKMFODDXP4903-81-62 15:25:24





             Test Item    Value        Reference Range Interpretation Comments

 

             eGFR (test code = eGFR) 81                                     



[facility] HospitalOdnvdytGMQRTYNTR5461-57-26 15:25:24





             Test Item    Value        Reference Range Interpretation Comments

 

             Lipase Lvl (test code = Lipase Lvl) 123                      

        



[facility] HospitalYbehccsTEADTWXHC5939-82-29 15:25:24





             Test Item    Value        Reference Range Interpretation Comments

 

             Total Protein (test code = Total 8.0          6.4-8.4              

     



             Protein)                                            



[facility] HospitalYjeqknfHHFZJRPVU7631-99-79 15:25:24





             Test Item    Value        Reference Range Interpretation Comments

 

             Albumin Lvl (test code = Albumin Lvl) 3.7          3.5-5.0         

          



[facility] HospitalBwslhivNOXYHWKXK6943-27-63 15:25:24





             Test Item    Value        Reference Range Interpretation Comments

 

             Globulin (test code = Globulin) 4.3          2.7-4.2               

    



[place] 328933-02-14 15:25:24





             Test Item    Value        Reference Range Interpretation Comments

 

             A/G Ratio (test code = A/G Ratio) 0.9 1        0.7-1.6             

      



[place] 328933-02-14 15:25:24





             Test Item    Value        Reference Range Interpretation Comments

 

             ALANINE AMINOTRANSFERASE 20           See_Comment                [A

utomated message]



             (test code = ALANINE                                        The sys

tem which



             AMINOTRANSFERASE)                                        generated 

this result



                                                                 transmitted ref

erence



                                                                 range: <=65. Th

e



                                                                 reference range

 was



                                                                 not used to int

erpret



                                                                 this result as



                                                                 normal/abnormal

.



[facility] CenterDatvusbKDLLZOQPE8826-49-06 15:25:24





             Test Item    Value        Reference Range Interpretation Comments

 

             AST (test code = AST) 19           See_Comment                [Auto

mated message] The



                                                                 system which ge

nerated this



                                                                 result transmit

gianfranco



                                                                 reference range

: <=37. The



                                                                 reference range

 was not



                                                                 used to interpr

et this



                                                                 result as xenia

l/abnormal.



[facility] CenterFgrzmjqOATLWGVZE7998-54-15 15:25:24





             Test Item    Value        Reference Range Interpretation Comments

 

             Alk Phos (test code = Alk Phos) 123                          

    



[facility] CenterUfoaftuQUTPIRRDH9119-96-78 15:25:24





             Test Item    Value        Reference Range Interpretation Comments

 

             Bili Total (test code = Bili Total) 0.3          0.2-1.3           

        



[facility] CenterCfmwudjIADIZRDUA4646-12-04 15:25:24





             Test Item    Value        Reference Range Interpretation Comments

 

             Bili Direct (test code no gt        See_Comment                [Aut

omated message] The



             = Bili Direct)                                        system which 

generated



                                                                 this result tra

nsmitted



                                                                 reference range

: <=0.3.



                                                                 The reference r

jihan was



                                                                 not used to int

erpret this



                                                                 result as xenia

l/abnormal.



[facility] CenterFbysurcKQZOVTGNN4657-71-16 15:25:24





             Test Item    Value        Reference Range Interpretation Comments

 

             Bili Indirect Unable to    See_Comment                [Automated



             (test code = Bili Calculate                              message] T

he system



             Indirect)                                           which generated



                                                                 this result



                                                                 transmitted



                                                                 reference range

:



                                                                 <=1.0. The



                                                                 reference range

 was



                                                                 not used to



                                                                 interpret this



                                                                 result as



                                                                 normal/abnormal

.



[facility] CenterEdtezdkOJCPUMLWIB0740-23-40 15:25:24





             Test Item    Value        Reference Range Interpretation Comments

 

             WBC X 10x3 (test code = WBC X 10x3) 9.4          3.7-10.4          

        



[facility]40[facility]24





             Test Item    Value        Reference Range Interpretation Comments

 

             RBC X 10x6 (test code = RBC X 10x6) 4.59         4.20-5.40         

        



[facility]udlVDYJOOWQFS4033[facility]18-69 15:25:24





             Test Item    Value        Reference Range Interpretation Comments

 

             Hgb (test code = Hgb) 14.1         12.0-16.0                 



[facility]Wg[facility]obbUMHFYETOFL7110-40-42 15:25:24





             Test Item    Value        Reference Range Interpretation Comments

 

             Hct (test code = Hct) 42.8         36.0-48.0                 



[facility] HospitalSqtxnhdWRDGPUUJPC4727-22-07 15:25:24





             Test Item    Value        Reference Range Interpretation Comments

 

             MCV (test code = MCV) 93.2         80.0-98.0                 



[facility] HospitalWnxahyyYNFAULQFDM7124-87-18 15:25:24





             Test Item    Value        Reference Range Interpretation Comments

 

             MCH (test code = MCH) 30.8 pg      27.0-31.0                 



[facility] HospitalUjwtkhmMHUXLYJJYL3976-89-37 15:25:24





             Test Item    Value        Reference Range Interpretation Comments

 

             MCHC (test code = MCHC) 33.0         32.0-36.0                 



[facility] HospitalWptpkvqIBKTLGARZH9476-11-40 15:25:24





             Test Item    Value        Reference Range Interpretation Comments

 

             RDW (test code = RDW) 13.0         11.5-14.5                 



[facility] HospitalVdhqzxiJKPLMSMUEV6287-12-14 15:25:24





             Test Item    Value        Reference Range Interpretation Comments

 

             Platelet (test code = Platelet) 200          133-450               

    



[facility] HospitalAgmhujeITFAQEOLMS0340-98-44 15:25:24





             Test Item    Value        Reference Range Interpretation Comments

 

             MPV (test code = MPV) 8.6          7.4-10.4                  



[facility] HospitalVfjgfnePXPZHAHWXR4097-77-98 15:25:24





             Test Item    Value        Reference Range Interpretation Comments

 

             Segs (test code = Segs) 86.0         45.0-75.0                 



[facility] HospitalSvjdxewWOUYKYEUVH7924-54-49 15:25:24





             Test Item    Value        Reference Range Interpretation Comments

 

             Lymphocytes (test code = Lymphocytes) 5.5          20.0-40.0       

          



[place] 198633-02-14 15:25:24





             Test Item    Value        Reference Range Interpretation Comments

 

             Monocytes (test code = Monocytes) 7.0          2.0-12.0            

      



[facility] HospitalUaybvgsQVPEGNHEZZ0992-35-42 15:25:24





             Test Item    Value        Reference Range Interpretation Comments

 

             Eosinophils (test code = 1.0          See_Comment                [A

utomated message] The



             Eosinophils)                                        system which ge

nerated



                                                                 this result tra

nsmitted



                                                                 reference range

: <=4.0.



                                                                 The reference r

jihan was



                                                                 not used to int

erpret



                                                                 this result as



                                                                 normal/abnormal

.



[place] 198633-02-14 15:25:24





             Test Item    Value        Reference Range Interpretation Comments

 

             Basophils (test code = 0.5          See_Comment                [Aut

omated message] The



             Basophils)                                          system which ge

nerated



                                                                 this result tra

nsmitted



                                                                 reference range

: <=1.0.



                                                                 The reference r

jihan was



                                                                 not used to int

erpret



                                                                 this result as



                                                                 normal/abnormal

.



[facility] HospitalLmhkdheFYHCAYIKBL5056-34-23 15:25:24





             Test Item    Value        Reference Range Interpretation Comments

 

             Neutrophils # (test code = Neutrophils 8.1          1.5-8.1        

           



             #)                                                  



[facility] HospitalOigjfxdMTKIXPWEAC5943-10-13 15:25:24





             Test Item    Value        Reference Range Interpretation Comments

 

             Lymphocytes # (test code = Lymphocytes 0.5          1.0-5.5        

           



             #)                                                  



[facility] HospitalXawwzzdYCEHBWVAMG2344-80-13 15:25:24





             Test Item    Value        Reference Range Interpretation Comments

 

             Monocytes # (test code 0.7          See_Comment                [Aut

omated message] The



             = Monocytes #)                                        system which 

generated



                                                                 this result tra

nsmitted



                                                                 reference range

: <=0.8.



                                                                 The reference r

jihan was



                                                                 not used to int

erpret



                                                                 this result as



                                                                 normal/abnormal

.



[facility] HospitalEibejrkNOWDZJKRJS1456-03-18 15:25:24





             Test Item    Value        Reference Range Interpretation Comments

 

             Eosinophils # (test code 0.1          See_Comment                [A

utomated message] The



             = Eosinophils #)                                        system whic

h generated



                                                                 this result tra

nsmitted



                                                                 reference range

: <=0.5.



                                                                 The reference r

jihan was



                                                                 not used to int

erpret



                                                                 this result as



                                                                 normal/abnormal

.



[facility]FuezastFEWJOLOHU8654[facility]71-31 [facility]:24





             Test Item    Value        Reference Range Interpretation Comments

 

             Glucose Lvl (test code = Glucose Lvl) 93           70-99           

          



[facility] HospitalNlicnezYTLFLSMXY8523-80-59 15:25:24





             Test Item    Value        Reference Range Interpretation Comments

 

             BUN (test code = BUN) 19           7-22                      



[place] [address]-02-14 15:25:24





             Test Item    Value        Reference Range Interpretation Comments

 

             Creatinine Lvl (test code = Creatinine 0.85         0.50-1.40      

           



             Lvl)                                                



[facility] HospitalNzuqzdvWURTOLZAK0782-48-32 15:25:24





             Test Item    Value        Reference Range Interpretation Comments

 

             Sodium Lvl (test code = Sodium Lvl) 142          135-145           

        



[facility] METABOLIC PANEL ([id number])2022 19:59:50





             Test Item    Value        Reference Range Interpretation Comments

 

             NA (test code = 138 mmol/L   135-145                   



             [id number])                                         

 

             K (test code = 4.3 mmol/L   3.5-5.0                   



             5124856164)                                         

 

             CL (test code = 102 mmol/L                       



             6552499454)                                         

 

             CO2 TOTAL (test code = 23 mmol/L    23-31                     



             8187443608)                                         

 

             AGAP (test code =              2-16                      



             0540735943)                                         

 

             BUN (test code = 18 mg/dL     7-23                      



             0489576466)                                         

 

             GLUCOSE (test code = 110 mg/dL                        



             2285833820)                                         

 

             CREATININE (test code = 0.70 mg/dL   0.50-1.04                 



             7060477746)                                         

 

             TOTAL BILI (test code = 1.3 mg/dL    0.1-1.1      H            



             2325221601)                                         

 

             CALCIUM (test code = 9.5 mg/dL    8.6-10.6                  



             2207892278)                                         

 

             T PROTEIN (test code = 7.4 g/dL     6.3-8.2                   



             3579560421)                                         

 

             ALBUMIN (test code = 4.2 g/dL     3.5-5.0                   



             8037503537)                                         

 

             ALK PHOS (test code = 100 U/L                          



             3753666118)                                         

 

             ALTv (test code = 16 U/L       5-35                      



             2-6)                                             

 

             AST(SGOT) (test code = 27 U/L       13-40                     



             9203462255)                                         

 

             eGFR (test code =              mL/min/1.73m2              



             9157100783)                                         

 

             ELENA (test code = ELENA) Association of                           



                          Glomerular Filtration                           



                          Rate (GFR) and Staging                           



                          of Kidney Disease*                           



                          +---------------------                           



                          --+-------------------                           



                          --+-------------------                           



                          ------+| GFR                           



                          (mL/min/1.73 m2) ?|                           



                          With Kidney Damage ?|                           



                          ?Without Kidney                           



                          Damage+---------------                           



                          --------+-------------                           



                          --------+-------------                           



                          ------------+| ?>90 ?                           



                          ? ? ? ? ? ? ? ?|                           



                          ?Stage one ? ? ? ? ?|                           



                          ? Normal ? ? ? ? ? ? ?                           



                          ?+--------------------                           



                          ---+------------------                           



                          ---+------------------                           



                          -------+| ?60-89 ? ? ?                           



                          ? ? ? ? ?| ?Stage two                           



                          ? ? ? ? ?| ? Decreased                           



                          GFR ? ? ? ?                            



                          +---------------------                           



                          --+-------------------                           



                          --+-------------------                           



                          ------+| ?30-59 ? ? ?                           



                          ? ? ? ? ?| ?Stage                           



                          three ? ? ? ?| ? Stage                           



                          three ? ? ? ? ?                           



                          +---------------------                           



                          --+-------------------                           



                          --+-------------------                           



                          ------+| ?15-29 ? ? ?                           



                          ? ? ? ? ?| ?Stage four                           



                          ? ? ? ? | ? Stage four                           



                          ? ? ? ? ?                              



                          ?+--------------------                           



                          ---+------------------                           



                          ---+------------------                           



                          -------+| ?<15 (or                           



                          dialysis) ? ?| ?Stage                           



                          five ? ? ? ? | ? Stage                           



                          five ? ? ? ? ?                           



                          ?+--------------------                           



                          ---+------------------                           



                          ---+------------------                           



                          -------+ *Each stage                           



                          assumes the associated                           



                          GFR level has been in                           



                          effect for at least                           



                          three months. ?Stages                           



                          1 to 5, with or                           



                          without kidney                           



                          disease, indicate                           



                          chronic kidney                           



                          disease. Notes:                           



                          Determination of                           



                          stages one and two                           



                          (with eGFR                             



                          >59mL/min/1.73 m2)                           



                          requires estimation of                           



                          kidney damage for at                           



                          least three months as                           



                          defined by structural                           



                          or functional                           



                          abnormalities of the                           



                          kidney, manifested by                           



                          either:Pathological                           



                          abnormalities or                           



                          Markers of kidney                           



                          damage (including                           



                          abnormalities in the                           



                          composition of the                           



                          blood or urine or                           



                          abnormalities in                           



                          imaging tests).                           

 

             Lab Interpretation Abnormal                               



             (test code = 70150-6)                                        



[facility] WITH HOOO2684-01-13 19:22:40





             Test Item    Value        Reference Range Interpretation Comments

 

             WBC (test code =              See_Comment  H             [Automated



             1849-2)                                             message] The sy

stem



                                                                 which generated



                                                                 this result



                                                                 transmitted



                                                                 reference range

:



                                                                 4.30 - 11.10



                                                                 10*3/?L. The



                                                                 reference range

 was



                                                                 not used to



                                                                 interpret this



                                                                 result as



                                                                 normal/abnormal

.

 

             RBC (test code =              See_Comment                [Automated



             559-8)                                              message] The sy

stem



                                                                 which generated



                                                                 this result



                                                                 transmitted



                                                                 reference range

:



                                                                 3.93 - 5.25



                                                                 10*6/?L. The



                                                                 reference range

 was



                                                                 not used to



                                                                 interpret this



                                                                 result as



                                                                 normal/abnormal

.

 

             HGB (test code = 13.0 g/dL    11.6-15.0                 



             718-7)                                              

 

             HCT (test code = 38.4 %       35.7-45.2                 



             4544-3)                                             

 

             MCV (test code = 89.9 fL      80.6-95.5                 



             787-2)                                              

 

             MCH (test code = 30.4 pg      25.9-32.8                 



             785-6)                                              

 

             MCHC (test code = 33.9 g/dL    31.6-35.1                 



             786-4)                                              

 

             RDW-SD (test code = 39.3 fL      39.0-49.9                 



             86405-9)                                            

 

             RDW-CV (test code = 12.2 %       12.0-15.5                 



             788-0)                                              

 

             PLT (test code =              See_Comment                [Automated



             777-3)                                              message] The sy

stem



                                                                 which generated



                                                                 this result



                                                                 transmitted



                                                                 reference range

:



                                                                 166 - 358 10*3/

?L.



                                                                 The reference r

jihan



                                                                 was not used to



                                                                 interpret this



                                                                 result as



                                                                 normal/abnormal

.

 

             MPV (test code = 10.1 fL      9.5-12.9                  



             59081-7)                                            

 

             NRBC/100 WBC (test              See_Comment                [Automat

ed



             code = [id number])                                        message] 

The system



                                                                 which generated



                                                                 this result



                                                                 transmitted



                                                                 reference range

:



                                                                 0.0 - 10.0 /100



                                                                 WBCs. The refer

ence



                                                                 range was not u

sed



                                                                 to interpret th

is



                                                                 result as



                                                                 normal/abnormal

.

 

             NRBC x10^3 (test code <0.01        See_Comment                [Auto

mated



             = [id number])                                        message] The s

ystem



                                                                 which generated



                                                                 this result



                                                                 transmitted



                                                                 reference range

:



                                                                 10*3/?L. The



                                                                 reference range

 was



                                                                 not used to



                                                                 interpret this



                                                                 result as



                                                                 normal/abnormal

.

 

             GRAN MAT (NEUT) % 79.4 %                                 



             (test code = 770-8)                                        

 

             IMM GRAN % (test code 0.50 %                                 



             = [id number])                                        

 

             LYMPH % (test code = 9.5 %                                  



             736-9)                                              

 

             MONO % (test code = 9.7 %                                  



             5905-5)                                             

 

             EOS % (test code = 0.5 %                                  



             713-8)                                              

 

             BASO % (test code = 0.4 %                                  



             706-2)                                              

 

             GRAN MAT x10^3(ANC) 9.77 10*3/uL 1.88-7.09    H            



             (test code =                                        



             5123552030)                                         

 

             IMM GRAN x10^3 (test 0.06 10*3/uL 0.00-0.06                 



             code = 1443529520)                                        

 

             LYMPH x10^3 (test code 1.17 10*3/uL 1.32-3.29    L            



             = 731-0)                                            

 

             MONO x10^3 (test code 1.19 10*3/uL 0.33-0.92    H            



             = 742-7)                                            

 

             EOS x10^3 (test code = 0.06 10*3/uL 0.03-0.39                 



             711-2)                                              

 

             BASO x10^3 (test code 0.05 10*3/uL 0.01-0.07                 



             = 704-7)                                            

 

             Lab Interpretation Abnormal                               



             (test code = 14065-3)                                        



[facility] [id number] 05:36:00





             Test Item    Value        Reference Range Interpretation Comments

 

             SODIUM (test code = NA) 143 mmol/L   134-147      N            

 

             POTASSIUM (test code = 4.2 mmol/L   3.4-5.0      N            



             K)                                                  

 

             CHLORIDE (test code = 114 mmol/L   100-108      H            



             CL)                                                 

 

             CARBON DIOXIDE (test 24 mmol/L    21-32        N            



             code = CO2)                                         

 

             ANION GAP (test code = 5.0 GAP calc 4.0-15.0     N            



             GAP)                                                

 

             GLUCOSE (test code = 89 MG/DL            N            



             GLU)                                                

 

             BLOOD UREA NITROGEN 17 MG/DL     7-18         N            



             (test code = BUN)                                        

 

             GLOMERULAR FILTRATION >=60 max estimate >60                       



             RATE (test code = GFR) estGFR                                 

 

             CREATININE (test code = 0.9 MG/DL    0.6-1.0      N            



             CREAT)                                              

 

             CALCIUM (test code = CA) 8.3 MG/DL    8.5-10.1     L            



CBC W/AUTO [id number] 05:21:00





             Test Item    Value        Reference Range Interpretation Comments

 

             WHITE BLOOD CELL (test code = 15.2 K/mm3   3.5-11.0     H          

  



             WBC)                                                

 

             RED BLOOD CELL (test code = RBC) 3.67 M/mm3   4.70-6.10    L       

     

 

             HEMOGLOBIN (test code = HGB) 11.3 G/DL    10.4-14.9    N           

 

 

             HEMATOCRIT (test code = HCT) 35.5 %       31.5-44.1    N           

 

 

             MEAN CELL VOLUME (test code = 96.7 Fl      84.5-98.6    N          

  



             MCV)                                                

 

             MEAN CELL HGB (test code = MCH) 30.8 pg      27.0-34.2    N        

    

 

             MEAN CELL HGB CONCETRATION (test 31.8 G/DL    31.5-34.0    N       

     



             code = MCHC)                                        

 

             RED CELL DISTRIBUTION WIDTH (test 14.2 SD      11.5-14.5    N      

      



             code = RDW)                                         

 

             PLATELET COUNT (test code = PLT) 242.0 K/mm3  150-450      N       

     

 

             MEAN PLATELET VOLUME (test code = 10.20 fL     7.0-10.5     N      

      



             MPV)                                                

 

             NEUTROPHIL % (test code = NT%) 78.8 %       40-76        H         

   

 

             LYMPHOCYTE % (test code = LY%) 13.1 %       20.5-51.1    L         

   

 

             MONOCYTE % (test code = MO%) 6.2 %        1.7-9.3      N           

 

 

             EOSINOPHIL % (test code = EO%) 1.6 %        0.0-6.0      N         

   

 

             BASOPHIL % (test code = BA%) 0.3 %        0.0-2.0      N           

 

 

             NEUTROPHIL # (test code = NT#) 11.94 K/mm3  1.8-7.6      H         

   

 

             LYMPHOCYTE # (test code = LY#) 2.0 K/mm3    0.6-3.2      N         

   

 

             MONOCYTE # (test code = MO#) 0.9 K/mm3    0.3-1.1      N           

 

 

             EOSINOPHIL # (test code = EO#) 0.2 K/mm3    0.0-0.4      N         

   

 

             BASOPHIL # (test code = BA#) 0.1 K/mm3    0.0-0.1      N           

 

 

             MANUAL DIFF REQUIRED (test code = NO DIFF/SCN  CRITERIA            

      



             MDIFF)                                              



[id number] 14:09:00





             Test Item    Value        Reference Range Interpretation Comments

 

             LITHIUM (test 0.5 mmol/L   0.6-1.2      L             Detection Lopez

it = 0.1



             code = LITH)                                        <0.1 indicates 

None



                                                                 DetectedPerform

ed At: [facility]



                                                                 [facility] [address]



                                                                 [id number] 

Jeffry KIMBROUGH MD



                                                                 Ph:[phone]



IREAMVOA--[id number] 13:35:00





             Test Item    Value        Reference Range Interpretation Comments

 

             TROPONIN-I (test 0.436 NG/ML  0.000-0.045  HH           Negative: <

/= 0.045



             code = TROPI)                                        Positive: >/= 

0.046



                                                                 Correlation wit

h serial



                                                                 results, other 

cardiac



                                                                 markers, and cl

inical



                                                                 findings is nec

essary to



                                                                 determine the c

linical



                                                                 significance of

 this



                                                                 result. Quantit

ative



                                                                 results using d

ifferent



                                                                 methodologies s

hould not



                                                                 be compared to 

one



                                                                 another as nume

rical



                                                                 results may daniel

yby



                                                                 method.



Completed by Nursing: OWSLJSCCTE--88-28 10:33:00





             Test Item    Value        Reference Range Interpretation Comments

 

             TROPONIN-I (test 0.360 NG/ML  0.000-0.045  HH           Negative: <

/= 0.045



             code = TROPI)                                        Positive: >/= 

0.046



                                                                 Correlation wit

h serial



                                                                 results, other 

cardiac



                                                                 markers, and cl

inical



                                                                 findings is nec

essary to



                                                                 determine the c

linical



                                                                 significance of

 this



                                                                 result. Quantit

ative



                                                                 results using d

ifferent



                                                                 methodologies s

hould not



                                                                 be compared to 

one



                                                                 another as nume

rical



                                                                 results may daniel

yby



                                                                 method.



Completed by Nursing: NOLast Dose Date: 20Last Dose Time: 1200TROPONIN-I
2020 07:23:00





             Test Item    Value        Reference Range Interpretation Comments

 

             TROPONIN-I (test 0.399 NG/ML  0.000-0.045  HH           Negative: <

/= 0.045



             code = TROPI)                                        Positive: >/= 

0.046



                                                                 Correlation wit

h serial



                                                                 results, other 

cardiac



                                                                 markers, and cl

inical



                                                                 findings is nec

essary to



                                                                 determine the c

linical



                                                                 significance of

 this



                                                                 result. Quantit

ative



                                                                 results using d

ifferent



                                                                 methodologies s

hould not



                                                                 be compared to 

one



                                                                 another as nume

rical



                                                                 results may daniel

yby



                                                                 method.



Completed by Nursing: ZKRMZECQUO--[id number] 05:00:00





             Test Item    Value        Reference Range Interpretation Comments

 

             TROPONIN-I (test 0.555 NG/ML  0.000-0.045  HH           Negative: <

/= 0.045



             code = TROPI)                                        Positive: >/= 

0.046



                                                                 Correlation wit

h serial



                                                                 results, other 

cardiac



                                                                 markers, and cl

inical



                                                                 findings is nec

essary to



                                                                 determine the c

linical



                                                                 significance of

 this



                                                                 result. Quantit

ative



                                                                 results using d

ifferent



                                                                 methodologies s

hould not



                                                                 be compared to 

one



                                                                 another as nume

rical



                                                                 results may daniel

yby



                                                                 method.



Completed by Nursing: NO- XR CHEST 1 -12-25 04:33:00 Name: TYLER EDGE 
[facility] : 1970 Age/S: 49 / F [id number] Unit #: [id number]
2 Loc: [place] [address] Phys: Kristy Quiros MD  Acct: [id number] Dis Date: 
Status: REG ER PHONE#: [phone] Exam Date: 2020 0432 FAX #: Reason: 
POST CENTRAL PLACEMENT; RIGHT IJ EXAMS: CPT: [id number] XR CHEST 1 V 05532  
Fluoro Time: DAP (Gy m2): Air Kerma (mGy): HISTORY: Post central line placement,
hypotension Location code: B2 FINDINGS: Frontal view of the chest demonstrates a
mildly enlarged cardiomediastinal silhouette. The trachea is midline. The lungs 
are clear. There is no effusion or pneumothorax. The bones are intact.  Right IJ
catheter in good position. IMPRESSION: Mild cardiomegaly, without acute 
pulmonary process. ** Electronically Signed by ISABEL March on 2020 at 
0433 ** Reported and signed by: Shree March M.D. CC: Kristy Quiros MD  PAGE 1 
Signed Report Name: [place]ER ED[place]land : 1970 Age/S: 49 / F 
[id number] [address] Unit #: [id number] Loc: [place] [address] Phys: 
Kristy Quiros MD Acct: [id number] Dis Date: Status: REG ER PHONE #: [phone]
.[phone] Exam Date: 2020  FAX #: Reason: POST CENTRAL PLACEMENT; RIGHT 
IJ EXAMS: CPT: [id number] XR CHEST 1 V  80247 Fluoro Time: DAP (Gy m2): Air Kerma
(mGy): (Continued) Technologist: Duncan Ellis, RT(R)(CT)  Trnscb Date/Time: 
2020 (433) [id number] Orig Print D/T: S: 2020 (436) PAGE 2 Signed 
ReportCBC W/AUTO [id number] 04:15:00





             Test Item    Value        Reference Range Interpretation Comments

 

             WHITE BLOOD CELL (test 24.2 K/mm3   3.5-11.0     H            



             code = WBC)                                         

 

             RED BLOOD CELL (test 3.75 M/mm3   4.70-6.10    L            



             code = RBC)                                         

 

             HEMOGLOBIN (test code 11.5 G/DL    10.4-14.9    N            



             = HGB)                                              

 

             HEMATOCRIT (test code 35.2 %       31.5-44.1    N            



             = HCT)                                              

 

             MEAN CELL VOLUME (test 93.9 Fl      84.5-98.6    N            



             code = MCV)                                         

 

             MEAN CELL HGB (test 30.7 pg      27.0-34.2    N            



             code = MCH)                                         

 

             MEAN CELL HGB 32.7 G/DL    31.5-34.0    N            



             CONCETRATION (test                                        



             code = MCHC)                                        

 

             RED CELL DISTRIBUTION 13.8 SD      11.5-14.5    N            



             WIDTH (test code =                                        



             RDW)                                                

 

             PLATELET COUNT (test 234.0 K/mm3  150-450      N            



             code = PLT)                                         

 

             MEAN PLATELET VOLUME 9.80 fL      7.0-10.5     N            



             (test code = MPV)                                        

 

             NEUTROPHIL % (test 82.9 %       40-76        H            



             code = NT%)                                         

 

             LYMPHOCYTE % (test 8.3 %        20.5-51.1    L            



             code = LY%)                                         

 

             MONOCYTE % (test code 7.3 %        1.7-9.3      N            



             = MO%)                                              

 

             EOSINOPHIL % (test 1.4 %        0.0-6.0      N            



             code = EO%)                                         

 

             BASOPHIL % (test code 0.1 %        0.0-2.0      N            



             = BA%)                                              

 

             NEUTROPHIL # (test 20.08 K/mm3  1.8-7.6      H            



             code = NT#)                                         

 

             LYMPHOCYTE # (test 2.0 K/mm3    0.6-3.2      N            



             code = LY#)                                         

 

             MONOCYTE # (test code 1.8 K/mm3    0.3-1.1      H            



             = MO#)                                              

 

             EOSINOPHIL # (test 0.3 K/mm3    0.0-0.4      N            



             code = EO#)                                         

 

             BASOPHIL # (test code 0.0 K/mm3    0.0-0.1      N            



             = BA#)                                              

 

             MANUAL DIFF REQUIRED NO DIFF/SCN  CRITERIA                  SLIDE R

EVIEW



             (test code = MDIFF)                                        CONSISTA

NT WITH AUTO



                                                                 DIFFERENTIAL.



BASIC METABOLIC [id number] 04:11:00





             Test Item    Value        Reference Range Interpretation Comments

 

             SODIUM (test code = NA) 135 mmol/L   134-147      N            

 

             POTASSIUM (test code = K) 4.0 mmol/L   3.4-5.0      N            

 

             CHLORIDE (test code = CL) 106 mmol/L   100-108      N            

 

             CARBON DIOXIDE (test code = CO2) 22 mmol/L    21-32        N       

     

 

             ANION GAP (test code = GAP) 7.0 GAP calc 4.0-15.0     N            

 

             GLUCOSE (test code = GLU) 97 MG/DL            N            

 

             BLOOD UREA NITROGEN (test code = 34 MG/DL     7-18         H       

     



             BUN)                                                

 

             GLOMERULAR FILTRATION RATE (test 39 estGFR    >60          L       

     



             code = GFR)                                         

 

             CREATININE (test code = CREAT) 1.5 MG/DL    0.6-1.0      H         

   

 

             CALCIUM (test code = CA) 8.9 MG/DL    8.5-10.1     N            



LACTIC [id number] 04:11:00





             Test Item    Value        Reference Range Interpretation Comments

 

             LACTIC ACID (test code = LACT) 0.8 mmol/L   0.4-2.0      N         

   



CBC W/AUTO [id number] 03:54:00





             Test Item    Value        Reference Range Interpretation Comments

 

             WHITE BLOOD CELL (test code = 24.2 K/mm3   3.5-11.0     H          

  



             WBC)                                                

 

             RED BLOOD CELL (test code = RBC) 3.75 M/mm3   4.70-6.10    L       

     

 

             HEMOGLOBIN (test code = HGB) 11.5 G/DL    10.4-14.9    N           

 

 

             HEMATOCRIT (test code = HCT) 35.2 %       31.5-44.1    N           

 

 

             MEAN CELL VOLUME (test code = 93.9 Fl      84.5-98.6    N          

  



             MCV)                                                

 

             MEAN CELL HGB (test code = MCH) 30.7 pg      27.0-34.2    N        

    

 

             MEAN CELL HGB CONCETRATION (test 32.7 G/DL    31.5-34.0    N       

     



             code = MCHC)                                        

 

             RED CELL DISTRIBUTION WIDTH (test 13.8 SD      11.5-14.5    N      

      



             code = RDW)                                         

 

             PLATELET COUNT (test code = PLT) 234.0 K/mm3  150-450      N       

     

 

             MEAN PLATELET VOLUME (test code = 9.80 fL      7.0-10.5     N      

      



             MPV)                                                

 

             NEUTROPHIL % (test code = NT%)  %           40-76        H         

   

 

             LYMPHOCYTE % (test code = LY%)  %           20.5-51.1    L         

   

 

             MONOCYTE % (test code = MO%)  %           1.7-9.3      N           

 

 

             EOSINOPHIL % (test code = EO%)  %           0.0-6.0      N         

   

 

             BASOPHIL % (test code = BA%)  %           0.0-2.0      N           

 

 

             NEUTROPHIL # (test code = NT#)  K/mm3       1.8-7.6      H         

   

 

             LYMPHOCYTE # (test code = LY#)  K/mm3       0.6-3.2      N         

   

 

             MONOCYTE # (test code = MO#)  K/mm3       0.3-1.1      H           

 

 

             EOSINOPHIL # (test code = EO#)  K/mm3       0.0-0.4      N         

   

 

             BASOPHIL # (test code = BA#)  K/mm3       0.0-0.1      N           

 

 

             MANUAL DIFF REQUIRED (test code =  DIFF/SCN    CRITERIA            

      



             MDIFF)                                              



Basic Metabolic [id number] 07:54:48





             Test Item    Value        Reference Range Interpretation Comments

 

             Sodium Level (test code = Sodium 142.0 mmol/L 135.0-145.0          

     



             Level)                                              

 

             Potassium Level (test code = 4.8 mmol/L   3.5-5.1                  

 



             Potassium Level)                                        

 

             Chloride Level (test code = 105 mmol/L                       



             Chloride Level)                                        

 

             CO2 (test code = CO2) 25 mmol/L    22-29                     

 

             Anion Gap (test code = Anion 12 mmol/L    7-16                     

 



             Gap)                                                

 

             BUN (test code = BUN) 19.60 mg/dL  6.00-20.00                

 

             Creatinine Level (test code = 0.80 mg/dL   0.50-0.90               

  



             Creatinine Level)                                        

 

             BUN/Creat Ratio (test code = 24                        N           

 



             BUN/Creat Ratio)                                        

 

             Glucose Level (test code = 86 mg/dL                         



             Glucose Level)                                        

 

             Calcium Level (test code = 10.1 mg/dL   8.3-10.5                  



             Calcium Level)                                        



Basic Metabolic [id number] 07:54:48





             Test Item    Value        Reference Range Interpretation Comments

 

             Sodium Level (test 142.0 mmol/L 135.0-145.0               



             code = Sodium Level)                                        

 

             Potassium Level 4.8 mmol/L   3.5-5.1                   



             (test code =                                        



             Potassium Level)                                        

 

             Chloride Level (test 105 mmol/L                       



             code = Chloride                                        



             Level)                                              

 

             CO2 (test code = 25 mmol/L    22-29                     



             CO2)                                                

 

             Anion Gap (test code 12 mmol/L    7-16                      



             = Anion Gap)                                        

 

             BUN (test code = 19.60 mg/dL  6.00-20.00                



             BUN)                                                

 

             Creatinine Level 0.80 mg/dL   0.50-0.90                 



             (test code =                                        



             Creatinine Level)                                        

 

             BUN/Creat Ratio 24                        N            



             (test code =                                        



             BUN/Creat Ratio)                                        

 

             Glucose Level (test 86 mg/dL                         



             code = Glucose                                        



             Level)                                              

 

             Calcium Level (test 10.1 mg/dL   8.3-10.5                  



             code = Calcium                                        



             Level)                                              

 

             eGFR AA (test code = >60                       N            eGFR (e

stimated



             eGFR AA)     mL/min/1.73 m2                           Glomerular



                                                                 Filtration Rate

) is



                                                                 an estimated va

lue,



                                                                 calculated from

 the



                                                                 patient's serum



                                                                 creatinine usin

g the



                                                                 MDRD equation. 

It is



                                                                 NOT the patient

's



                                                                 actual GFR. The

 eGFR



                                                                 provides a more



                                                                 clinically usef

ul



                                                                 measure of kidn

ey



                                                                 disease than se

rum



                                                                 creatinine



                                                                 alone.***This



                                                                 calculation rimma

es



                                                                 sex and race in

to



                                                                 account, if the



                                                                 information is



                                                                 provided. If th

e



                                                                 race is not



                                                                 provided, and t

he



                                                                 patient is



                                                                 -Crystal

n,



                                                                 multiply by 1.2

12.



                                                                 If sex is not



                                                                 provided, and t

he



                                                                 patient is fema

le,



                                                                 multiply by 0.7

42.



                                                                 Results for pat

ients



                                                                 <18 years of ag

e



                                                                 have not been



                                                                 validated by th

e



                                                                 MDRD study and



                                                                 should be



                                                                 interpreted wit

h



                                                                 caution. eGFR R

esult



                                                                 Interpretation:

eGFR



                                                                 > or = 60 is in

 the



                                                                 Normal RangeeGF

R <



                                                                 60 may mean kid

hang



                                                                 diseaseeGFR < 1

5 may



                                                                 mean kidney



                                                                 failure*** Rang

es



                                                                 recommended by 

the



                                                                 [employer]



                                                                 [employer],



                                                                 http://nkdep.ni

h.gov



Basic Metabolic [id number] 07:54:48





             Test Item    Value        Reference Range Interpretation Comments

 

             Sodium Level (test 142.0 mmol/L 135.0-145.0               



             code = Sodium Level)                                        

 

             Potassium Level 4.8 mmol/L   3.5-5.1                   



             (test code =                                        



             Potassium Level)                                        

 

             Chloride Level (test 105 mmol/L                       



             code = Chloride                                        



             Level)                                              

 

             CO2 (test code = 25 mmol/L    22-29                     



             CO2)                                                

 

             Anion Gap (test code 12 mmol/L    7-16                      



             = Anion Gap)                                        

 

             BUN (test code = 19.60 mg/dL  6.00-20.00                



             BUN)                                                

 

             Creatinine Level 0.80 mg/dL   0.50-0.90                 



             (test code =                                        



             Creatinine Level)                                        

 

             BUN/Creat Ratio 24                        N            



             (test code =                                        



             BUN/Creat Ratio)                                        

 

             Glucose Level (test 86 mg/dL                         



             code = Glucose                                        



             Level)                                              

 

             Calcium Level (test 10.1 mg/dL   8.3-10.5                  



             code = Calcium                                        



             Level)                                              

 

             eGFR AA (test code = >60                       N            eGFR (e

stimated



             eGFR AA)     mL/min/1.73 m2                           Glomerular



                                                                 Filtration Rate

) is



                                                                 an estimated va

lue,



                                                                 calculated from

 the



                                                                 patient's serum



                                                                 creatinine usin

g the



                                                                 MDRD equation. 

It is



                                                                 NOT the patient

's



                                                                 actual GFR. The

 eGFR



                                                                 provides a more



                                                                 clinically usef

ul



                                                                 measure of kidn

ey



                                                                 disease than se

rum



                                                                 creatinine



                                                                 alone.***This



                                                                 calculation rimma

es



                                                                 sex and race in

to



                                                                 account, if the



                                                                 information is



                                                                 provided. If th

e



                                                                 race is not



                                                                 provided, and t

he



                                                                 patient is



                                                                 -Crystal

n,



                                                                 multiply by 1.2

12.



                                                                 If sex is not



                                                                 provided, and t

he



                                                                 patient is fema

le,



                                                                 multiply by 0.7

42.



                                                                 Results for pat

ients



                                                                 <18 years of ag

e



                                                                 have not been



                                                                 validated by th

e



                                                                 MDRD study and



                                                                 should be



                                                                 interpreted wit

h



                                                                 caution. eGFR R

esult



                                                                 Interpretation:

eGFR



                                                                 > or = 60 is in

 the



                                                                 Normal RangeeGF

R <



                                                                 60 may mean kid

hang



                                                                 diseaseeGFR < 1

5 may



                                                                 mean kidney



                                                                 failure*** Rang

es



                                                                 recommended by 

the



                                                                 [employer]



                                                                 [employer],



                                                                 http://nkdep.ni

h.gov

 

             eGFR Non-AA (test >60.00                    N            eGFR (sheba

mated



             code = eGFR Non-AA) mL/min/1.73 m2                           Glomer

ular



                                                                 Filtration Rate

) is



                                                                 an estimated va

lue,



                                                                 calculated from

 the



                                                                 patient's serum



                                                                 creatinine usin

g the



                                                                 MDRD equation. 

It is



                                                                 NOT the patient

's



                                                                 actual GFR. The

 eGFR



                                                                 provides a more



                                                                 clinically usef

ul



                                                                 measure of kidn

ey



                                                                 disease than se

rum



                                                                 creatinine



                                                                 alone.***This



                                                                 calculation rimma

es



                                                                 sex and race in

to



                                                                 account, if the



                                                                 information is



                                                                 provided. If th

e



                                                                 race is not



                                                                 provided, and t

he



                                                                 patient is



                                                                 -Crystal

n,



                                                                 multiply by 1.2

12.



                                                                 If sex is not



                                                                 provided, and t

he



                                                                 patient is fema

le,



                                                                 multiply by 0.7

42.



                                                                 Results for pat

ients



                                                                 <18 years of ag

e



                                                                 have not been



                                                                 validated by 

e



                                                                 MDRD study and



                                                                 should be



                                                                 interpreted wit

h



                                                                 caution. eGFR R

esult



                                                                 Interpretation:

eGFR



                                                                 > or = 60 is in

 the



                                                                 Normal RangeeGF

R <



                                                                 60 may mean kid

hang



                                                                 diseaseeGFR < 1

5 may



                                                                 mean kidney



                                                                 failure*** Rang

es



                                                                 recommended by 

the



                                                                 [employer]



                                                                 [employer],



                                                                 http://nkdep.ni

h.gov



Complete Blood Count with [id number] 07:29:42





             Test Item    Value        Reference Range Interpretation Comments

 

             WBC (test code = WBC) 9.3 x10      4.4-10.5                  

 

             RBC (test code = RBC) 4.71 x10     3.75-5.20                 

 

             Hgb (test code = Hgb) 14.4 g/dL    12.2-14.8                 

 

             MCV (test code = MCV) 92.10 fL     80..00              

 

             Hct (test code = Hct) 43.4 %       36.5-44.4                 

 

             MCHC (test code = 33.20 g/dL   32.00-37.50               



             MCHC)                                               

 

             RDW CV (test code = 13.4 %       11.5-14.5                 



             RDW CV)                                             

 

             MCH (test code = MCH) 30.6 pg      27.0-32.5                 

 

             Platelets (test code = 325.0 x10    140.0-440.0               



             Platelets)                                          

 

             MPV (test code = MPV) 9.8 fL                    N            

 

             Slide Review (test Auto         Auto                      Result cr

eated by



             code = Slide Review)                                        [email or web address]

SLIDE_REV_AUTO

 

             nRBC (test code = 0                         N            



             nRBC)                                               

 

             NRBC Abs (test code = 0.00 x10                  N            



             NRBC Abs)                                           

 

             IPF (test code = IPF) 0 %                       N            



Automated [id number] 07:29:42





             Test Item    Value        Reference Range Interpretation Comments

 

             Neutro Auto (test code = Neutro 66.2 %       36.0-70.0             

    



             Auto)                                               

 

             Lymph Auto (test code = Lymph Auto) 21.1 %       12.0-44.0         

        

 

             Mono Auto (test code = Mono Auto) 6.8 %        0.0-11.0            

      

 

             Eos, Auto (test code = Eos, Auto) 4.8 %        0.0-7.0             

      

 

             Basophil Auto (test code = Basophil 0.9 %        0.0-2.0           

        



             Auto)                                               

 

             Neutro Absolute (test code = Neutro 6.2 x10      1.6-7.4           

        



             Absolute)                                           

 

             Lymph Absolute (test code = Lymph 1.97 x10     .50-4.60            

      



             Absolute)                                           

 

             Mono Absolute (test code = Mono .63 x10      .00-1.20              

    



             Absolute)                                           

 

             Eos Absolute (test code = Eos 0.45 x10     0.00-0.74               

  



             Absolute)                                           

 

             Baso Absolute (test code = Baso 0.08 x10     0.00-0.21             

    



             Absolute)                                           



IG [id number] 07:29:42





             Test Item    Value        Reference Range Interpretation Comments

 

             IG (test code = IG) 0.2 %        0.0-5.0                   

 

             IG Abs (test code = IG Abs) 0 x10                     N            



Thyroid Stimulating [id number] 04:30:30





             Test Item    Value        Reference Range Interpretation Comments

 

             TSH (test code = TSH) 1.360 mIU/mL 0.270-4.200               



RPR [id number] 04:06:17





             Test Item    Value        Reference Range Interpretation Comments

 

             RPR Qual (test code = RPR Qual) Non-Reactive Non-Reactive          

    

 

             Reactive Control (test code = Reactive                             

  



             Reactive Control)                                        

 

             Weak Reactive Control (test Weak Reactive                          

 



             code = Weak Reactive Control)                                      

  

 

             Non-Reactive Control (test code Non-Reactive                       

    



             = Non-Reactive Control)                                        

 

             Lot # (test code = Lot #) [id number]                    N            

 

             Expiration Dt (test code = 10-                N            



             Expiration Dt)                                        



Hemoglobin A1c2019-11-15 18:37:54





             Test Item    Value        Reference Range Interpretation Comments

 

             Hemoglobin A1c (test code 4.7 %        4.8-5.9      L            No

n Diabetic



             = Hemoglobin A1c)                                        4.8-5.9%Di

abetic <7.0%



Lipid Panel2019-11-15 18:08:58





             Test Item    Value        Reference Range Interpretation Comments

 

             Cholesterol Total 189 mg/dL    0-200                     RISK OF HE

ART



             (test code =                                        DISEASEPublishe

d by



             Cholesterol Total)                                        [employer] 

Heart



                                                                 Association Judith

lyte



                                                                 Optimal Borderl

ine



                                                                 Increased RiskC

HOL <200



                                                                 200-239 >240TRI

G <150



                                                                 150-199 >200HDL

 Male



                                                                 >60 <40HDL Fema

le >60



                                                                 <50LDL <100 130

-159



                                                                 >160LDL Near op

timal is



                                                                 100-129

 

             Triglycerides (test 112 mg/dL    9-200                     



             code = Triglycerides)                                        

 

             HDL (test code = HDL) 44 mg/dL     50-60        L            

 

             LDL (test code = LDL) 122 mg/dL    0-130                     The eq

uation being used



                                                                 in this calcula

tion is



                                                                 LDL = (Chol - H

DL) -



                                                                 (Trig / 5)

 

             VLDL (test code = 22 mg/dL     5-40                      The equati

on being used



             VLDL)                                               in this calcula

tion is



                                                                 VLDL = Trig / 5

 

             Chol/HDL (test code = 4.3 ratio    0.0-4.4                   



             Chol/HDL)                                           

 

             LDL/HDL Ratio (test 3                         N            The equa

tion being used



             code = LDL/HDL Ratio)                                        in thi

s calculation is



                                                                 LDL/HDL Ratio=L

DL



                                                                 Calc/HDL Chol



Complete Blood Count with Differential2019-11-15 07:51:57





             Test Item    Value        Reference Range Interpretation Comments

 

             WBC (test code = WBC) 10.6 x10     4.4-10.5     H            

 

             RBC (test code = RBC) 4.45 x10     3.75-5.20                 

 

             Hgb (test code = Hgb) 13.5 g/dL    12.2-14.8                 

 

             MCV (test code = MCV) 93.30 fL     80..00              

 

             Hct (test code = Hct) 41.5 %       36.5-44.4                 

 

             MCHC (test code = 32.50 g/dL   32.00-37.50               



             MCHC)                                               

 

             RDW CV (test code = 13.7 %       11.5-14.5                 



             RDW CV)                                             

 

             MCH (test code = MCH) 30.3 pg      27.0-32.5                 

 

             Platelets (test code = 356.0 x10    140.0-440.0               



             Platelets)                                          

 

             MPV (test code = MPV) 9.7 fL                    N            

 

             Slide Review (test Auto         Auto                      Result cr

eated by



             code = Slide Review)                                        GL_SJM_

SLIDE_REV_AUTO

 

             nRBC (test code = 0                         N            



             nRBC)                                               

 

             NRBC Abs (test code = 0.00 x10                  N            



             NRBC Abs)                                           

 

             IPF (test code = IPF) 0 %                       N            



Automated Differential2019-11-15 07:51:57





             Test Item    Value        Reference Range Interpretation Comments

 

             Neutro Auto (test code = Neutro 65.4 %       36.0-70.0             

    



             Auto)                                               

 

             Lymph Auto (test code = Lymph Auto) 23.5 %       12.0-44.0         

        

 

             Mono Auto (test code = Mono Auto) 5.7 %        0.0-11.0            

      

 

             Eos, Auto (test code = Eos, Auto) 4.4 %        0.0-7.0             

      

 

             Basophil Auto (test code = Basophil 0.8 %        0.0-2.0           

        



             Auto)                                               

 

             Neutro Absolute (test code = Neutro 6.9 x10      1.6-7.4           

        



             Absolute)                                           

 

             Lymph Absolute (test code = Lymph 2.49 x10     .50-4.60            

      



             Absolute)                                           

 

             Mono Absolute (test code = Mono .60 x10      .00-1.20              

    



             Absolute)                                           

 

             Eos Absolute (test code = Eos 0.47 x10     0.00-0.74               

  



             Absolute)                                           

 

             Baso Absolute (test code = Baso 0.08 x10     0.00-0.21             

    



             Absolute)                                           



IG [id number] 07:51:57





             Test Item    Value        Reference Range Interpretation Comments

 

             IG (test code = IG) 0.2 %        0.0-5.0                   

 

             IG Abs (test code = IG Abs) 0 x10                     N            



Urine [id number] 10:18:52





             Test Item    Value        Reference Range Interpretation Comments

 

             ORGANISM (test code = Escherichia coli                           



             ORGANISM)                                           

 

             Amikacin (test code =                           S            



             Amik)                                               

 

             Ampicillin (test code =                           S            



             Amp)                                                

 

             Ampicillin/Sulbactam                           S            



             (test code = Amp/Sul)                                        

 

             Cefazolin (test code =                           S            



             Cefaz)                                              

 

             Cefepime (test code =                           S            



             Cefep)                                              

 

             Cefotaxime (test code =                           S            



             Cefo)                                               

 

             Ceftazidime (test code                           S            



             = Ceftaz)                                           

 

             Ceftriaxone (test code                           S            



             = Ceftri)                                           

 

             Cefuroxime (test code =                           S            



             Cefur)                                              

 

             Ciprofloxacin (test                           S            



             code = Cipro)                                        

 

             Gentamicin (test code =                           S            



             Gent)                                               

 

             Imipenem (test code =                           S            



             Imi)                                                

 

             Levofloxacin (test code                           S            



             = Levo)                                             

 

             Meropenem (test code =                           S            



             Sindi)                                               

 

             Nitrofurantoin (test                           S            



             code = Nitro)                                        

 

             Piperacillin/Tazobactam                           S            



             (test code = Pip/Blaine)                                        

 

             Tobramycin (test code =                           S            



             Tobra)                                              

 

             Trimethoprim/Sulfa                           S            



             (test code = SXT)                                        

 

             Final Report (test code >=100,000 cfu/ml                           



             = Final Report) Escherichia coli                           



                          >=100,000 cfu/ml Alpha                           



                          hemolytic                              



                          Streptococcus (not                           



                          Group D or                             



                          Enterococcus)                           



 C Urine Added by [id number] 09:18:25





             Test Item    Value        Reference Range Interpretation Comments

 

             Lithium Level (test code = 0.58 mmol/L  0.60-1.20    L            



             Lithium Level)                                        



Urine Drug [id number] 01:36:44





             Test Item    Value        Reference Range Interpretation Comments

 

             Amphetamine Screen Ur POSITIVE     Negative     A            



             (test code = Amphetamine                                        



             Screen Ur)                                          

 

             Barbiturate Screen Ur Negative     Negative                  



             (test code = Barbiturate                                        



             Screen Ur)                                          

 

             Benzodiazepines Ur (test Negative     Negative                  



             code = Benzodiazepines                                        



             Ur)                                                 

 

             Cocaine Screen Ur (test Negative     Negative                  



             code = Cocaine Screen                                        



             Ur)                                                 

 

             U Methadone Scr (test Negative     Negative                  



             code = U Methadone Scr)                                        

 

             Opiate Screen Ur (test Negative     Negative                  



             code = Opiate Screen Ur)                                        

 

             U PCP Scrn (test code = Negative     Negative                  



             U PCP Scrn)                                         

 

             Cannabinoid Screen Ur Negative     Negative                  



             (test code = Cannabinoid                                        



             Screen Ur)                                          

 

             U TCA (test code = U Negative     Negative                  The res

ults of all



             TCA)                                                drug screen elinor

ts are



                                                                 only preliminar

y.



                                                                 Clinical



                                                                 consideration a

nd



                                                                 professional ju

dgment



                                                                 should be appli

ed to



                                                                 any drug of abu

se



                                                                 test result,



                                                                 particularly wh

en



                                                                 preliminary pos

itive



                                                                 results are obt

ained.



                                                                 Please order a



                                                                 separate confir

matory



                                                                 test if desired

.



HCG Qualitative [id number] 01:36:43





             Test Item    Value        Reference Range Interpretation Comments

 

             hCG Ur (test code = Negative                               If the r

esult is



             hCG Ur)                                             "Negative" in p

atients



                                                                 suspected to be



                                                                 pregnant, recom

mend



                                                                 retest with a s

ample



                                                                 obtained 48 to 

72



                                                                 hours later, or

 by



                                                                 ordering a



                                                                 quantitative as

say. If



                                                                 the result is



                                                                 "Borderline" te

sting



                                                                 should be repea

gianfranco in



                                                                 48 to 72 hours.

 

             Lot # (test code = [id number]                    N            



             Lot #)                                              

 

             Expiration Dt (test 2020                N            



             code = Expiration Dt)                                        

 

             Neg Control (test Negative                               



             code = Neg Control)                                        

 

             Pos Control (test Positive                               



             code = Pos Control)                                        

 

             Internal QC (test Acceptable                             



             code = Internal QC)                                        



Urinalysis [id number] 01:36:03





             Test Item    Value        Reference Range Interpretation Comments

 

             UA WBC (test code = UA WBC) 0-5          0-5                       

 

             UA RBC (test code = UA RBC) None Seen    0-5                       

 

             UA Bacteria (test code = UA Moderate                  A            



             Bacteria)                                           

 

             UA Squam Epithelial (test code = UA 0-5                            

        



             Squam Epithelial)                                        



Comprehensive Metabolic [id number] 01:23:40





             Test Item    Value        Reference Range Interpretation Comments

 

             Sodium Level (test code = Sodium 139.0 mmol/L 135.0-145.0          

     



             Level)                                              

 

             Potassium Level (test code = 4.4 mmol/L   3.5-5.1                  

 



             Potassium Level)                                        

 

             Chloride Level (test code = 102 mmol/L                       



             Chloride Level)                                        

 

             CO2 (test code = CO2) 23 mmol/L    22-29                     

 

             Anion Gap (test code = Anion 14 mmol/L    7-16                     

 



             Gap)                                                

 

             BUN (test code = BUN) 9.10 mg/dL   6.00-20.00                

 

             Creatinine Level (test code = 1.00 mg/dL   0.50-0.90    H          

  



             Creatinine Level)                                        

 

             BUN/Creat Ratio (test code = 9                         N           

 



             BUN/Creat Ratio)                                        

 

             Glucose Level (test code = 115 mg/dL                        



             Glucose Level)                                        

 

             Calcium Level (test code = 10.1 mg/dL   8.3-10.5                  



             Calcium Level)                                        

 

             Alk Phos (test code = Alk Phos) 106 U/L             H        

    

 

             Bilirubin Total (test code = 0.4 mg/dL    0.1-0.9                  

 



             Bilirubin Total)                                        

 

             Albumin Level (test code = 4.6 g/dL     3.5-5.2                   



             Albumin Level)                                        

 

             Protein Total (test code = 7.7 g/dL     6.4-8.3                   



             Protein Total)                                        

 

             ALT (test code = ALT) 12 U/L       1-33                      

 

             AST (test code = AST) 18 U/L       1-32                      

 

             Globulin (test code = Globulin) 3.1 g/dL     2.9-3.1               

    

 

             A/G Ratio (test code = A/G 1.5 ratio                 N            



             Ratio)                                              



Comprehensive Metabolic [id number] 01:23:40





             Test Item    Value        Reference Range Interpretation Comments

 

             Sodium Level (test 139.0 mmol/L 135.0-145.0               



             code = Sodium Level)                                        

 

             Potassium Level 4.4 mmol/L   3.5-5.1                   



             (test code =                                        



             Potassium Level)                                        

 

             Chloride Level (test 102 mmol/L                       



             code = Chloride                                        



             Level)                                              

 

             CO2 (test code = 23 mmol/L    22-29                     



             CO2)                                                

 

             Anion Gap (test code 14 mmol/L    7-16                      



             = Anion Gap)                                        

 

             BUN (test code = 9.10 mg/dL   6.00-20.00                



             BUN)                                                

 

             Creatinine Level 1.00 mg/dL   0.50-0.90    H            



             (test code =                                        



             Creatinine Level)                                        

 

             BUN/Creat Ratio 9                         N            



             (test code =                                        



             BUN/Creat Ratio)                                        

 

             Glucose Level (test 115 mg/dL                        



             code = Glucose                                        



             Level)                                              

 

             Calcium Level (test 10.1 mg/dL   8.3-10.5                  



             code = Calcium                                        



             Level)                                              

 

             Alk Phos (test code 106 U/L             H            



             = Alk Phos)                                         

 

             Bilirubin Total 0.4 mg/dL    0.1-0.9                   



             (test code =                                        



             Bilirubin Total)                                        

 

             Albumin Level (test 4.6 g/dL     3.5-5.2                   



             code = Albumin                                        



             Level)                                              

 

             Protein Total (test 7.7 g/dL     6.4-8.3                   



             code = Protein                                        



             Total)                                              

 

             ALT (test code = 12 U/L       1-33                      



             ALT)                                                

 

             AST (test code = 18 U/L       1-32                      



             AST)                                                

 

             Globulin (test code 3.1 g/dL     2.9-3.1                   



             = Globulin)                                         

 

             A/G Ratio (test code 1.5 ratio                 N            



             = A/G Ratio)                                        

 

             eGFR AA (test code = >60                       N            eGFR (e

stimated



             eGFR AA)     mL/min/1.73 m2                           Glomerular



                                                                 Filtration Rate

) is



                                                                 an estimated va

lue,



                                                                 calculated from

 the



                                                                 patient's serum



                                                                 creatinine usin

g the



                                                                 MDRD equation. 

It is



                                                                 NOT the patient

's



                                                                 actual GFR. The

 eGFR



                                                                 provides a more



                                                                 clinically usef

ul



                                                                 measure of kidn

ey



                                                                 disease than se

rum



                                                                 creatinine



                                                                 alone.***This



                                                                 calculation rimma

es



                                                                 sex and race in

to



                                                                 account, if the



                                                                 information is



                                                                 provided. If th

e



                                                                 race is not



                                                                 provided, and t

he



                                                                 patient is



                                                                 -Crystal

n,



                                                                 multiply by 1.2

12.



                                                                 If sex is not



                                                                 provided, and t

he



                                                                 patient is fema

le,



                                                                 multiply by 0.7

42.



                                                                 Results for pat

ients



                                                                 <18 years of ag

e



                                                                 have not been



                                                                 validated by th

e



                                                                 MDRD study and



                                                                 should be



                                                                 interpreted wit

h



                                                                 caution. eGFR R

esult



                                                                 Interpretation:

eGFR



                                                                 > or = 60 is in

 the



                                                                 Normal RangeeGF

R <



                                                                 60 may mean kid

hang



                                                                 diseaseeGFR < 1

5 may



                                                                 mean kidney



                                                                 failure*** Rang

es



                                                                 recommended by 

the



                                                                 [employer]



                                                                 [employer],



                                                                 http://nkdep.ni

h.gov



Comprehensive Metabolic [id number] 01:23:40





             Test Item    Value        Reference Range Interpretation Comments

 

             Sodium Level (test 139.0 mmol/L 135.0-145.0               



             code = Sodium Level)                                        

 

             Potassium Level 4.4 mmol/L   3.5-5.1                   



             (test code =                                        



             Potassium Level)                                        

 

             Chloride Level (test 102 mmol/L                       



             code = Chloride                                        



             Level)                                              

 

             CO2 (test code = 23 mmol/L    22-29                     



             CO2)                                                

 

             Anion Gap (test code 14 mmol/L    7-16                      



             = Anion Gap)                                        

 

             BUN (test code = 9.10 mg/dL   6.00-20.00                



             BUN)                                                

 

             Creatinine Level 1.00 mg/dL   0.50-0.90    H            



             (test code =                                        



             Creatinine Level)                                        

 

             BUN/Creat Ratio 9                         N            



             (test code =                                        



             BUN/Creat Ratio)                                        

 

             Glucose Level (test 115 mg/dL                        



             code = Glucose                                        



             Level)                                              

 

             Calcium Level (test 10.1 mg/dL   8.3-10.5                  



             code = Calcium                                        



             Level)                                              

 

             Alk Phos (test code 106 U/L             H            



             = Alk Phos)                                         

 

             Bilirubin Total 0.4 mg/dL    0.1-0.9                   



             (test code =                                        



             Bilirubin Total)                                        

 

             Albumin Level (test 4.6 g/dL     3.5-5.2                   



             code = Albumin                                        



             Level)                                              

 

             Protein Total (test 7.7 g/dL     6.4-8.3                   



             code = Protein                                        



             Total)                                              

 

             ALT (test code = 12 U/L       1-33                      



             ALT)                                                

 

             AST (test code = 18 U/L       1-32                      



             AST)                                                

 

             Globulin (test code 3.1 g/dL     2.9-3.1                   



             = Globulin)                                         

 

             A/G Ratio (test code 1.5 ratio                 N            



             = A/G Ratio)                                        

 

             eGFR AA (test code = >60                       N            eGFR (e

stimated



             eGFR AA)     mL/min/1.73 m2                           Glomerular



                                                                 Filtration Rate

) is



                                                                 an estimated va

lue,



                                                                 calculated from

 the



                                                                 patient's serum



                                                                 creatinine usin

g the



                                                                 MDRD equation. 

It is



                                                                 NOT the patient

's



                                                                 actual GFR. The

 eGFR



                                                                 provides a more



                                                                 clinically usef

ul



                                                                 measure of kidn

ey



                                                                 disease than se

rum



                                                                 creatinine



                                                                 alone.***This



                                                                 calculation rimma

es



                                                                 sex and race in

to



                                                                 account, if the



                                                                 information is



                                                                 provided. If th

e



                                                                 race is not



                                                                 provided, and t

he



                                                                 patient is



                                                                 -Crystal

n,



                                                                 multiply by 1.2

12.



                                                                 If sex is not



                                                                 provided, and t

he



                                                                 patient is fema

le,



                                                                 multiply by 0.7

42.



                                                                 Results for pat

ients



                                                                 <18 years of ag

e



                                                                 have not been



                                                                 validated by [facility]



                                                                 MDRD study and



                                                                 should be



                                                                 interpreted wit

h



                                                                 caution. eGFR R

esult



                                                                 Interpretation:

eGFR



                                                                 > or = 60 is in

 the



                                                                 Normal RangeeGF

R <



                                                                 60 may mean kid

hang



                                                                 diseaseeGFR < 1

5 may



                                                                 mean kidney



                                                                 failure*** Rang

es



                                                                 recommended by 

the



                                                                 [employer]



                                                                 Foundation,



                                                                 http://nkdep.ni

h.gov

 

             eGFR Non-AA (test 58.45                     N            eGFR (sheba

mated



             code = eGFR Non-AA) mL/min/1.73 m2                           Glomer

ular



                                                                 Filtration Rate

) is



                                                                 an estimated va

lue,



                                                                 calculated from

 the



                                                                 patient's serum



                                                                 creatinine usin

g the



                                                                 MDRD equation. 

It is



                                                                 NOT the patient

's



                                                                 actual GFR. The

 eGFR



                                                                 provides a more



                                                                 clinically usef

ul



                                                                 measure of kidn

ey



                                                                 disease than se

rum



                                                                 creatinine



                                                                 alone.***This



                                                                 calculation rimma

es



                                                                 sex and race in

to



                                                                 account, if the



                                                                 information is



                                                                 provided. If th

e



                                                                 race is not



                                                                 provided, and t

he



                                                                 patient is



                                                                 -Crystal

n,



                                                                 multiply by 1.2

12.



                                                                 If sex is not



                                                                 provided, and t

he



                                                                 patient is fema

le,



                                                                 multiply by 0.7

42.



                                                                 Results for pat

ients



                                                                 <18 years of ag

e



                                                                 have not been



                                                                 validated by [facility]



                                                                 MDRD study and



                                                                 should be



                                                                 interpreted wit

h



                                                                 caution. eGFR R

esult



                                                                 Interpretation:

eGFR



                                                                 > or = 60 is in

 the



                                                                 Normal RangeeGF

R <



                                                                 60 may mean kid

hang



                                                                 diseaseeGFR < 1

5 may



                                                                 mean kidney



                                                                 failure*** Rang

es



                                                                 recommended by 

the



                                                                 [employer]



                                                                 Foundation,



                                                                 http://nkdep.ni

h.gov



Alcohol [id number] 01:23:31





             Test Item    Value        Reference Range Interpretation Comments

 

             Ethanol Level (test <0.00 g/dL   0.00-0.01                 [facility]

gianfranco 0.080 g/dL



             code = Ethanol                                        or more



             Level)                                              

 

             Ethanol Inst (test <0                        N            



             code = Ethanol Inst)                                        



Complete Blood Count with [id number] 00:56:12





             Test Item    Value        Reference Range Interpretation Comments

 

             WBC (test code = WBC) 19.4 x10     4.4-10.5     H            

 

             RBC (test code = RBC) 4.53 x10     3.75-5.20                 

 

             Hgb (test code = Hgb) 13.5 g/dL    12.2-14.8                 

 

             Hct (test code = Hct) 41.3 %       36.5-44.4                 

 

             MCV (test code = MCV) 91.20 fL     80..00              

 

             MCHC (test code = 32.70 g/dL   32.00-37.50               



             MCHC)                                               

 

             RDW CV (test code = 13.5 %       11.5-14.5                 



             RDW CV)                                             

 

             MCH (test code = MCH) 29.8 pg      27.0-32.5                 

 

             Platelets (test code = 462.0 x10    140.0-440.0  H            



             Platelets)                                          

 

             MPV (test code = MPV) 9.9 fL                    N            

 

             Slide Review (test Auto         Auto                      Result cr

eated by



             code = Slide Review)                                        [email or web address]

[email or web address]_AUTO

 

             nRBC (test code = 0                         N            



             nRBC)                                               

 

             NRBC Abs (test code = 0.00 x10                  N            



             NRBC Abs)                                           

 

             IPF (test code = IPF) 0 %                       N            



Automated [id number] 00:56:12





             Test Item    Value        Reference Range Interpretation Comments

 

             Neutro Auto (test code = Neutro 83.7 %       36.0-70.0    H        

    



             Auto)                                               

 

             Lymph Auto (test code = Lymph Auto) 8.9 %        12.0-44.0    L    

        

 

             Mono Auto (test code = Mono Auto) 5.0 %        0.0-11.0            

      

 

             Eos, Auto (test code = Eos, Auto) 1.4 %        0.0-7.0             

      

 

             Basophil Auto (test code = Basophil 0.7 %        0.0-2.0           

        



             Auto)                                               

 

             Neutro Absolute (test code = Neutro 16.2 x10     1.6-7.4      H    

        



             Absolute)                                           

 

             Lymph Absolute (test code = Lymph 1.73 x10     .50-4.60            

      



             Absolute)                                           

 

             Mono Absolute (test code = Mono .96 x10      .00-1.20              

    



             Absolute)                                           

 

             Eos Absolute (test code = Eos 0.27 x10     0.00-0.74               

  



             Absolute)                                           

 

             Baso Absolute (test code = Baso 0.13 x10     0.00-0.21             

    



             Absolute)                                           



IG [id number] 00:56:12





             Test Item    Value        Reference Range Interpretation Comments

 

             IG (test code = IG) 0.3 %        0.0-5.0                   

 

             IG Abs (test code = IG Abs) 0 x10                     N            



Urinalysis with Culture, if [id number] 00:55:34





             Test Item    Value        Reference Range Interpretation Comments

 

             UA Color (test code = STRAW        Yellow                    



             UA Color)                                           

 

             UA Appear (test code = CLEAR        Clear                     



             UA Appear)                                          

 

             UA pH (test code = UA 5                         N            



             pH)                                                 

 

             UA Spec Grav (test code 1.001        1.001-1.035               



             = UA Spec Grav)                                        

 

             UA Glucose (test code = NEG          Negative                  



             UA Glucose)                                         

 

             UA Bili (test code = UA NEG          Negative                  



             Bili)                                               

 

             UA Ketones (test code = NEG          Negative                  



             UA Ketones)                                         

 

             UA Blood (test code = NEG          Negative                  



             UA Blood)                                           

 

             UA Protein (test code = NEG          Negative                  



             UA Protein)                                         

 

             UA Urobilinogen (test 0.2 mg/dL                 N            



             code = UA Urobilinogen)                                        

 

             UA Nitrite (test code = POS          Negative     A            



             UA Nitrite)                                         

 

             UA Leuk Est (test code 25 cells/mcL Negative     A            



             = UA Leuk Est)                                        

 

             UA Micro Ind? (test Indicated    Not Indicated A            Result 

created by



             code = UA Micro Ind?)                                        rule



                                                                 [email or web address]_MICRO

_IN



                                                                 D



Urine [id number] 08:13:23





             Test Item    Value        Reference Range Interpretation Comments

 

             ORGANISM (test code = Escherichia coli                           



             ORGANISM)                                           

 

             Amikacin (test code =                           S            



             Amik)                                               

 

             Ampicillin (test code =                           S            



             Amp)                                                

 

             Ampicillin/Sulbactam                           S            



             (test code = Amp/Sul)                                        

 

             Cefazolin (test code =                           S            



             Cefaz)                                              

 

             Cefepime (test code =                           S            



             Cefep)                                              

 

             Cefotaxime (test code =                           S            



             Cefo)                                               

 

             Ceftazidime (test code =                           S            



             Ceftaz)                                             

 

             Ceftriaxone (test code =                           S            



             Ceftri)                                             

 

             Cefuroxime (test code =                           S            



             Cefur)                                              

 

             Ciprofloxacin (test code                           S            



             = Cipro)                                            

 

             Gentamicin (test code =                           S            



             Gent)                                               

 

             Imipenem (test code =                           S            



             Imi)                                                

 

             Levofloxacin (test code                           S            



             = Levo)                                             

 

             Meropenem (test code =                           S            



             Sindi)                                               

 

             Nitrofurantoin (test                           S            



             code = Nitro)                                        

 

             Piperacillin/Tazobactam                           S            



             (test code = Pip/Blaine)                                        

 

             Tobramycin (test code =                           S            



             Tobra)                                              

 

             Trimethoprim/Sulfa (test                           S            



             code = SXT)                                         

 

             Final Report (test code 30,000 cfu/ml                           



             = Final Report) Escherichia coli                           



                          20,000 cfu/ml                           



                          Diphtheroids                           



 C Urine Added by [email or web address] Qualitative2019-09-15 04:57:25





             Test Item    Value        Reference Range Interpretation Comments

 

             RPR Qual (test code = RPR Qual) Non-Reactive Non-Reactive          

    

 

             Reactive Control (test code = Reactive                             

  



             Reactive Control)                                        

 

             Weak Reactive Control (test Weak Reactive                          

 



             code = Weak Reactive Control)                                      

  

 

             Non-Reactive Control (test code Non-Reactive                       

    



             = Non-Reactive Control)                                        

 

             Lot # (test code = Lot #) [id number]                    N            

 

             Expiration Dt (test code = 10.31.2020                N            



             Expiration Dt)                                        



Thyroid Stimulating Hormone2019-09-15 01:22:43





             Test Item    Value        Reference Range Interpretation Comments

 

             TSH (test code = TSH) 3.370 mIU/mL 0.270-4.200               



Lipid Panel2019-09-15 01:17:51





             Test Item    Value        Reference Range Interpretation Comments

 

             Cholesterol Total 168 mg/dL    0-200                     RISK OF HE

ART



             (test code =                                        DISEASEPublishe

d by



             Cholesterol Total)                                        American 

Heart



                                                                 Association Judith

lyte



                                                                 Optimal Borderl

ine



                                                                 Increased RiskC

HOL <200



                                                                 200-239 >240TRI

G <150



                                                                 150-199 >200HDL

 Male



                                                                 >60 <40HDL Fema

le >60



                                                                 <50LDL <100 130

-159



                                                                 >160LDL Near op

[facility] is



                                                                 100-129

 

             Triglycerides (test 108 mg/dL    9-200                     



             code = Triglycerides)                                        

 

             HDL (test code = HDL) 46 mg/dL     50-60        L            

 

             LDL (test code = LDL) 100 mg/dL    0-130                     The eq

uation being used



                                                                 in this calcula

tion is



                                                                 LDL = (Chol - H

DL) -



                                                                 (Trig / 5)

 

             VLDL (test code = 22 mg/dL     5-40                      The equati

on being used



             VLDL)                                               in this calcula

tion is



                                                                 VLDL = Trig / 5

 

             Chol/HDL (test code = 3.7 ratio    0.0-4.4                   



             Chol/HDL)                                           

 

             LDL/HDL Ratio (test 2                         N            The equa

tion being used



             code = LDL/HDL Ratio)                                        in thi

s calculation is



                                                                 LDL/HDL Ratio=L

DL



                                                                 Calc/HDL Chol



Lithium Level2019-09-15 01:17:51





             Test Item    Value        Reference Range Interpretation Comments

 

             Lithium Level (test code = 0.81 mmol/L  0.60-1.20                 



             Lithium Level)                                        



Comprehensive Metabolic Panel2019-09-15 00:04:54





             Test Item    Value        Reference Range Interpretation Comments

 

             Sodium Level (test code = Sodium 137.0 mmol/L 135.0-145.0          

     



             Level)                                              

 

             Potassium Level (test code = 4.0 mmol/L   3.5-5.1                  

 



             Potassium Level)                                        

 

             Chloride Level (test code = 103 mmol/L                       



             Chloride Level)                                        

 

             CO2 (test code = CO2) 23 mmol/L    22-29                     

 

             Anion Gap (test code = Anion 11 mmol/L    7-16                     

 



             Gap)                                                

 

             BUN (test code = BUN) 11.50 mg/dL  6.00-20.00                

 

             Creatinine Level (test code = 1.00 mg/dL   0.50-0.90    H          

  



             Creatinine Level)                                        

 

             BUN/Creat Ratio (test code = 12                        N           

 



             BUN/Creat Ratio)                                        

 

             Glucose Level (test code = 108 mg/dL                        



             Glucose Level)                                        

 

             Calcium Level (test code = 10.3 mg/dL   8.3-10.5                  



             Calcium Level)                                        

 

             Alk Phos (test code = Alk Phos) 93 U/L                       

    

 

             Bilirubin Total (test code = 0.5 mg/dL    0.1-0.9                  

 



             Bilirubin Total)                                        

 

             Albumin Level (test code = 4.4 g/dL     3.5-5.2                   



             Albumin Level)                                        

 

             Protein Total (test code = 7.2 g/dL     6.4-8.3                   



             Protein Total)                                        

 

             ALT (test code = ALT) 12 U/L       1-33                      

 

             AST (test code = AST) 17 U/L       1-32                      

 

             Globulin (test code = Globulin) 2.8 g/dL     2.9-3.1      L        

    

 

             A/G Ratio (test code = A/G 1.6 ratio                 N            



             Ratio)                                              



Alcohol Level2019-09-15 00:04:54





             Test Item    Value        Reference Range Interpretation Comments

 

             Ethanol Level (test <0.00 g/dL   0.00-0.01                 [facility]

gianfranco 0.080 g/dL



             code = Ethanol                                        or more



             Level)                                              

 

             Ethanol Inst (test <0                        N            



             code = Ethanol Inst)                                        



Comprehensive Metabolic Panel2019-09-15 00:04:54





             Test Item    Value        Reference Range Interpretation Comments

 

             Sodium Level (test 137.0 mmol/L 135.0-145.0               



             code = Sodium Level)                                        

 

             Potassium Level 4.0 mmol/L   3.5-5.1                   



             (test code =                                        



             Potassium Level)                                        

 

             Chloride Level (test 103 mmol/L                       



             code = Chloride                                        



             Level)                                              

 

             CO2 (test code = 23 mmol/L    22-29                     



             CO2)                                                

 

             Anion Gap (test code 11 mmol/L    7-16                      



             = Anion Gap)                                        

 

             BUN (test code = 11.50 mg/dL  6.00-20.00                



             BUN)                                                

 

             Creatinine Level 1.00 mg/dL   0.50-0.90    H            



             (test code =                                        



             Creatinine Level)                                        

 

             BUN/Creat Ratio 12                        N            



             (test code =                                        



             BUN/Creat Ratio)                                        

 

             Glucose Level (test 108 mg/dL                        



             code = Glucose                                        



             Level)                                              

 

             Calcium Level (test 10.3 mg/dL   8.3-10.5                  



             code = Calcium                                        



             Level)                                              

 

             Alk Phos (test code 93 U/L                           



             = Alk Phos)                                         

 

             Bilirubin Total 0.5 mg/dL    0.1-0.9                   



             (test code =                                        



             Bilirubin Total)                                        

 

             Albumin Level (test 4.4 g/dL     3.5-5.2                   



             code = Albumin                                        



             Level)                                              

 

             Protein Total (test 7.2 g/dL     6.4-8.3                   



             code = Protein                                        



             Total)                                              

 

             ALT (test code = 12 U/L       1-33                      



             ALT)                                                

 

             AST (test code = 17 U/L       1-32                      



             AST)                                                

 

             Globulin (test code 2.8 g/dL     2.9-3.1      L            



             = Globulin)                                         

 

             A/G Ratio (test code 1.6 ratio                 N            



             = A/G Ratio)                                        

 

             eGFR AA (test code = >60                       N            eGFR (e

stimated



             eGFR AA)     mL/min/1.73 m2                           Glomerular



                                                                 Filtration Rate

) is



                                                                 an estimated va

lue,



                                                                 calculated from

 the



                                                                 patient's serum



                                                                 creatinine usin

g the



                                                                 MDRD equation. 

It is



                                                                 NOT the patient

's



                                                                 actual GFR. The

 eGFR



                                                                 provides a more



                                                                 clinically usef

ul



                                                                 measure of kidn

ey



                                                                 disease than se

rum



                                                                 creatinine



                                                                 alone.***This



                                                                 calculation rimma

es



                                                                 sex and race in

to



                                                                 account, if the



                                                                 information is



                                                                 provided. If th

e



                                                                 race is not



                                                                 provided, and t

he



                                                                 patient is



                                                                 -Crystal

n,



                                                                 multiply by 1.2

12.



                                                                 If sex is not



                                                                 provided, and t

he



                                                                 patient is fema

le,



                                                                 multiply by 0.7

42.



                                                                 Results for pat

ients



                                                                 <18 years of ag

e



                                                                 have not been



                                                                 validated by th

e



                                                                 MDRD study and



                                                                 should be



                                                                 interpreted wit

h



                                                                 caution. eGFR R

esult



                                                                 Interpretation:

eGFR



                                                                 > or = 60 is in

 the



                                                                 Normal RangeeGF

R <



                                                                 60 may mean kid

hang



                                                                 diseaseeGFR < 1

5 may



                                                                 mean kidney



                                                                 failure*** Rang

es



                                                                 recommended by 

the



                                                                 [employer]



                                                                 [employer],



                                                                 http://nkdep.ni

h.gov



Comprehensive Metabolic Panel2019-09-15 00:04:54





             Test Item    Value        Reference Range Interpretation Comments

 

             Sodium Level (test 137.0 mmol/L 135.0-145.0               



             code = Sodium Level)                                        

 

             Potassium Level 4.0 mmol/L   3.5-5.1                   



             (test code =                                        



             Potassium Level)                                        

 

             Chloride Level (test 103 mmol/L                       



             code = Chloride                                        



             Level)                                              

 

             CO2 (test code = 23 mmol/L    22-29                     



             CO2)                                                

 

             Anion Gap (test code 11 mmol/L    7-16                      



             = Anion Gap)                                        

 

             BUN (test code = 11.50 mg/dL  6.00-20.00                



             BUN)                                                

 

             Creatinine Level 1.00 mg/dL   0.50-0.90    H            



             (test code =                                        



             Creatinine Level)                                        

 

             BUN/Creat Ratio 12                        N            



             (test code =                                        



             BUN/Creat Ratio)                                        

 

             Glucose Level (test 108 mg/dL                        



             code = Glucose                                        



             Level)                                              

 

             Calcium Level (test 10.3 mg/dL   8.3-10.5                  



             code = Calcium                                        



             Level)                                              

 

             Alk Phos (test code 93 U/L                           



             = Alk Phos)                                         

 

             Bilirubin Total 0.5 mg/dL    0.1-0.9                   



             (test code =                                        



             Bilirubin Total)                                        

 

             Albumin Level (test 4.4 g/dL     3.5-5.2                   



             code = Albumin                                        



             Level)                                              

 

             Protein Total (test 7.2 g/dL     6.4-8.3                   



             code = Protein                                        



             Total)                                              

 

             ALT (test code = 12 U/L       1-33                      



             ALT)                                                

 

             AST (test code = 17 U/L       1-32                      



             AST)                                                

 

             Globulin (test code 2.8 g/dL     2.9-3.1      L            



             = Globulin)                                         

 

             A/G Ratio (test code 1.6 ratio                 N            



             = A/G Ratio)                                        

 

             eGFR AA (test code = >60                       N            eGFR (e

stimated



             eGFR AA)     mL/min/1.73 m2                           Glomerular



                                                                 Filtration Rate

) is



                                                                 an estimated va

lue,



                                                                 calculated from

 the



                                                                 patient's serum



                                                                 creatinine usin

g the



                                                                 MDRD equation. 

It is



                                                                 NOT the patient

's



                                                                 actual GFR. The

 eGFR



                                                                 provides a more



                                                                 clinically usef

ul



                                                                 measure of kidn

ey



                                                                 disease than se

rum



                                                                 creatinine



                                                                 alone.***This



                                                                 calculation rimma

es



                                                                 sex and race in

to



                                                                 account, if the



                                                                 information is



                                                                 provided. If th

e



                                                                 race is not



                                                                 provided, and t

he



                                                                 patient is



                                                                 -Crystal

n,



                                                                 multiply by 1.2

12.



                                                                 If sex is not



                                                                 provided, and t

he



                                                                 patient is fema

le,



                                                                 multiply by 0.7

42.



                                                                 Results for pat

ients



                                                                 <18 years of ag

e



                                                                 have not been



                                                                 validated by [facility]



                                                                 MDRD study and



                                                                 should be



                                                                 interpreted wit

h



                                                                 caution. eGFR R

esult



                                                                 Interpretation:

eGFR



                                                                 > or = 60 is in

 the



                                                                 Normal RangeeGF

R <



                                                                 60 may mean kid

hang



                                                                 diseaseeGFR < 1

5 may



                                                                 mean kidney



                                                                 failure*** Rang

es



                                                                 recommended by 

the



                                                                 [employer]



                                                                 [employer],



                                                                 http://nkdep.ni

h.gov

 

             eGFR Non-AA (test 58.45                     N            eGFR (sheba

mated



             code = eGFR Non-AA) mL/min/1.73 m2                           Glomer

ular



                                                                 Filtration Rate

) is



                                                                 an estimated va

lue,



                                                                 calculated from

 the



                                                                 patient's serum



                                                                 creatinine usin

g the



                                                                 MDRD equation. 

It is



                                                                 NOT the patient

's



                                                                 actual GFR. The

 eGFR



                                                                 provides a more



                                                                 clinically usef

ul



                                                                 measure of kidn

ey



                                                                 disease than se

rum



                                                                 creatinine



                                                                 alone.***This



                                                                 calculation rimma

es



                                                                 sex and race in

to



                                                                 account, if the



                                                                 information is



                                                                 provided. If 

e



                                                                 race is not



                                                                 provided, and t

he



                                                                 patient is



                                                                 -Crystal

n,



                                                                 multiply by 1.2

12.



                                                                 If sex is not



                                                                 provided, and t

he



                                                                 patient is fema

le,



                                                                 multiply by 0.7

42.



                                                                 Results for pat

ients



                                                                 <18 years of ag

e



                                                                 have not been



                                                                 validated by [facility]



                                                                 MDRD study and



                                                                 should be



                                                                 interpreted wit

h



                                                                 caution. eGFR R

esult



                                                                 Interpretation:

eGFR



                                                                 > or = 60 is in

 the



                                                                 Normal RangeeGF

R <



                                                                 60 may mean kid

hang



                                                                 diseaseeGFR < 1

5 may



                                                                 mean kidney



                                                                 failure*** Rang

es



                                                                 recommended by 

the



                                                                 [employer]



                                                                 [employer],



                                                                 http://nkdep.ni

h.gov



Urinalysis Microscopic2019-09-15 00:02:53





             Test Item    Value        Reference Range Interpretation Comments

 

             UA WBC (test code = UA WBC) 6-10         0-5          A            

 

             UA RBC (test code = UA RBC) 0-5          0-5                       

 

             UA Bacteria (test code = UA Bacteria) Many                      A  

          

 

             UA Squam Epithelial (test code = UA TNTC                      A    

        



             Squam Epithelial)                                        



Urine Drug [id number] 23:59:42





             Test Item    Value        Reference Range Interpretation Comments

 

             Amphetamine Screen Ur POSITIVE     Negative     A            



             (test code = Amphetamine                                        



             Screen Ur)                                          

 

             Barbiturate Screen Ur Negative     Negative                  



             (test code = Barbiturate                                        



             Screen Ur)                                          

 

             Benzodiazepines Ur (test POSITIVE     Negative     A            



             code = Benzodiazepines                                        



             Ur)                                                 

 

             Cocaine Screen Ur (test Negative     Negative                  



             code = Cocaine Screen                                        



             Ur)                                                 

 

             U Methadone Scr (test Negative     Negative                  



             code = U Methadone Scr)                                        

 

             Opiate Screen Ur (test Negative     Negative                  



             code = Opiate Screen Ur)                                        

 

             U PCP Scrn (test code = Negative     Negative                  



             U PCP Scrn)                                         

 

             Cannabinoid Screen Ur Negative     Negative                  



             (test code = Cannabinoid                                        



             Screen Ur)                                          

 

             U TCA (test code = U Negative     Negative                  The res

ults of all



             TCA)                                                drug screen elinor

ts are



                                                                 only preliminar

y.



                                                                 Clinical



                                                                 consideration a

nd



                                                                 professional ju

dgment



                                                                 should be appli

ed to



                                                                 any drug of abu

se



                                                                 test result,



                                                                 particularly wh

en



                                                                 preliminary pos

itive



                                                                 results are obt

ained.



                                                                 Please order a



                                                                 separate confir

matory



                                                                 test if desired

.



HCG Qualitative [id number] 23:54:43





             Test Item    Value        Reference Range Interpretation Comments

 

             hCG Ur (test code = Negative                               If the r

esult is



             hCG Ur)                                             "Negative" in p

atients



                                                                 suspected to be



                                                                 pregnant, recom

mend



                                                                 retest with a s

ample



                                                                 obtained 48 to 

72



                                                                 hours later, or

 by



                                                                 ordering a



                                                                 quantitative as

say. If



                                                                 the result is



                                                                 "Borderline" te

sting



                                                                 should be repea

gianfranco in



                                                                 48 to 72 hours.

 

             Lot # (test code = [id number]                    N            



             Lot #)                                              

 

             Expiration Dt (test 2020                  N            



             code = Expiration Dt)                                        

 

             Neg Control (test Negative                               



             code = Neg Control)                                        

 

             Pos Control (test Positive                               



             code = Pos Control)                                        

 

             Internal QC (test Acceptable                             



             code = Internal QC)                                        



Urinalysis with Culture, if [id number] 23:52:04





             Test Item    Value        Reference Range Interpretation Comments

 

             UA Color (test code = UA YELLO        Yellow                    



             Color)                                              

 

             UA Appear (test code = SCLD         Clear        A            



             UA Appear)                                          

 

             UA pH (test code = UA 6.5                                    



             pH)                                                 

 

             UA Spec Grav (test code 1.011        1.001-1.035               



             = UA Spec Grav)                                        

 

             UA Glucose (test code = NEG          Negative                  



             UA Glucose)                                         

 

             UA Bili (test code = UA NEG          Negative                  



             Bili)                                               

 

             UA Ketones (test code = NEG          Negative                  



             UA Ketones)                                         

 

             UA Blood (test code = UA NEG          Negative                  



             Blood)                                              

 

             UA Protein (test code = NEG          Negative                  



             UA Protein)                                         

 

             UA Urobilinogen (test 1 mg/dL      >0.2         A            



             code = UA Urobilinogen)                                        

 

             UA Nitrite (test code = POS          Negative     A            



             UA Nitrite)                                         

 

             UA Leuk Est (test code = NEG          Negative                  



             UA Leuk Est)                                        

 

             UA Micro Ind? (test code Indicated    Not Indicated A            Re

sult created by



             = UA Micro Ind?)                                        rule



                                                                 [id number]

_IND



Automated [id number] 23:48:39





             Test Item    Value        Reference Range Interpretation Comments

 

             Neutro Auto (test code = Neutro 75.7 %       36.0-70.0    H        

    



             Auto)                                               

 

             Lymph Auto (test code = Lymph Auto) 14.1 %       12.0-44.0         

        

 

             Mono Auto (test code = Mono Auto) 7.6 %        0.0-11.0            

      

 

             Eos, Auto (test code = Eos, Auto) 1.8 %        0.0-7.0             

      

 

             Basophil Auto (test code = Basophil 0.5 %        0.0-2.0           

        



             Auto)                                               

 

             Neutro Absolute (test code = Neutro 12.7 x10     1.6-7.4      H    

        



             Absolute)                                           

 

             Lymph Absolute (test code = Lymph 2.38 x10     .50-4.60            

      



             Absolute)                                           

 

             Mono Absolute (test code = Mono 1.28 x10     .00-1.20     H        

    



             Absolute)                                           

 

             Eos Absolute (test code = Eos 0.31 x10     0.00-0.74               

  



             Absolute)                                           

 

             Baso Absolute (test code = Baso 0.09 x10     0.00-0.21             

    



             Absolute)                                           



IG [id number] 23:48:39





             Test Item    Value        Reference Range Interpretation Comments

 

             IG (test code = IG) 0.3 %        0.0-5.0                   

 

             IG Abs (test code = IG Abs) 0 x10                     N            



Complete Blood Count with [id number] 23:48:38





             Test Item    Value        Reference Range Interpretation Comments

 

             WBC (test code = WBC) 16.8 x10     4.4-10.5     H            

 

             RBC (test code = RBC) 4.06 x10     3.75-5.20                 

 

             Hgb (test code = Hgb) 12.7 g/dL    12.2-14.8                 

 

             MCV (test code = MCV) 94.10 fL     80..00              

 

             Hct (test code = Hct) 38.2 %       36.5-44.4                 

 

             MCHC (test code = 33.20 g/dL   32.00-37.50               



             MCHC)                                               

 

             RDW CV (test code = 13.2 %       11.5-14.5                 



             RDW CV)                                             

 

             MCH (test code = MCH) 31.3 pg      27.0-32.5                 

 

             Platelets (test code = 363.0 x10    140.0-440.0               



             Platelets)                                          

 

             MPV (test code = MPV) 10.0 fL                   N            

 

             Slide Review (test Auto         Auto                      Result cr

eated by



             code = Slide Review)                                        GL_SJM_

SLIDE_REV_AUTO

 

             nRBC (test code = 0                         N            



             nRBC)                                               

 

             NRBC Abs (test code = 0.00 x10                  N            



             NRBC Abs)                                           

 

             IPF (test code = IPF) 0 %                       N            



RPR, [id number] 05:53:00





             Test Item    Value        Reference Range Interpretation Comments

 

             RPR (test code = RPR) Non-Reactive Non-Reactive N            



BHCG, Serum, [id number] 01:39:00





             Test Item    Value        Reference Range Interpretation Comments

 

             Preg Qual [Se] (test code = BSHCG) Negative     Negative     N     

       



BHCG, Serum, [id number] 01:09:00





             Test Item    Value        Reference Range Interpretation Comments

 

             B hCG, Quant (test <1 mIU/mL                              Weeks of 

Gestation



             code = BHCGQT)                                        Ranges (mIU/m

L)3 weeks



                                                                 5.40 - 72.04 we

eks 10.2



                                                                 - 7085 weeks 21

7 - 43378



                                                                 weeks 152 - 321

777 weeks



                                                                 4059 - 9018992 

weeks



                                                                 97072 - 1874990

 weeks



                                                                 71771 - 2701548

0 weeks



                                                                 66560 - 9926488

2 weeks



                                                                 14577 - 0914918

4 weeks



                                                                 70700 - 0871652

 weeks



                                                                 80798 - 5118839

 weeks



                                                                 8904 - 4015995 

weeks



                                                                 8240 - 8740069 

weeks



                                                                 9649 - 56060



Thyroid Stimulating Hormone (TSH)2017 01:09:00





             Test Item    Value        Reference Range Interpretation Comments

 

             TSH (test code = TSH) 0.93 mIU/mL  0.270-4.200  N            



Lipid [id number] 01:05:00





             Test Item    Value        Reference Range Interpretation Comments

 

             Cholesterol (test 163 mg/dL    0-200        N            



             code = CHOL)                                        

 

             Triglycerides (test 108 mg/dL    9-200        N            



             code = TRIG)                                        

 

             HDL (test code = 41 mg/dL     50-60        L            



             HDL)                                                

 

             Chol/HDL (test code 4.0 Ratio    0.0-4.4      N            



             = CHOLPHDL)                                         

 

             LDL, Calculated 100          0-130        N            (NOTE)RISK O

F HEART



             (test code = LDLC)                                        DISEASEPu

blished by



                                                                 American Heart



                                                                 AssociationAnal

yte



                                                                 Optimal Boderli

ne



                                                                 Increased RiskC

HOL <200



                                                                 200-239  >240TR

IG <150



                                                                 150-199 >200HDL

 Male:



                                                                 >60 <40HDL Fema

le: >60



                                                                 <50LDL <100 130

-159



                                                                 >160LDL NEAR OP

TIMAL IS



                                                                 100-129

 

             VLDL (test code = 22 mg/dL     5-40         N            



             VLDL)                                               

 

             LDL/HDL (test code = 2                                      



             LDLPHDL)                                            



Comprehensive Metabolic [id number] 21:02:00





             Test Item    Value        Reference Range Interpretation Comments

 

             Sodium (test code = 140 mmol/L   135-145      N            



             NA)                                                 

 

             Potassium (test 3.6 mmol/L   3.5-5.1      N            



             code = K)                                           

 

             Chloride (test code 103 mmol/L          N            



             = CL)                                               

 

             Carbon Dioxide 26 mmol/L    22-29        N            



             (test code = CO2)                                        

 

             Glucose (test code 89 mg/dL            N            



             = GLU)                                              

 

             Blood Urea Nitrogen 13 mg/dL     6-20         N            



             (test code = BUN)                                        

 

             Creatinine (test 0.8 mg/dL    0.5-0.9      N            



             code = CREAT)                                        

 

             Calcium (test code 10.0 mg/dL   8.3-10.5     N            



             = CA)                                               

 

             Prot Total (test 7.0 g/dL     6.4-8.3      N            



             code = TP)                                          

 

             Albumin (test code 4.2 g/dL     3.5-5.2      N            



             = ALB)                                              

 

             A/G Ratio (test 1.5 Ratio                              



             code = AGRATIO)                                        

 

             Globulin (test code 2.8          2.9-3.1      L            



             = GLOB)                                             

 

             Bili Total (test 0.6 mg/dL    0.1-0.9      N            



             code = TBIL)                                        

 

             Alk Phos (test code 91 U/L              N            



             = APHOS)                                            

 

             AST (test code = 19 U/L       1-32         N            



             AST)                                                

 

             ALT (test code = 14 U/L       1-33         N            



             ALT)                                                

 

             BUN/Creatinine 16.3                                   



             Ratio (test code =                                        



             BCRATIO)                                            

 

             Anion Gap (test 11 mmol/L    7-16         N            



             code = AGAP)                                        

 

             Estimated GFR (test >60                                    eGFR (es

timated



             code = GFR)  mL/min/1.73m2                           Glomerular Nguyễn

tration



                                                                 Rate) is an est

imated



                                                                 value,calculate

d from



                                                                 the patient's s

harman



                                                                 creatinine usin

g the



                                                                 MDRD equation.I

t is



                                                                 NOT the patient

's



                                                                 actual GFR. The

 eGFR



                                                                 provides a more



                                                                 clinicallyusefu

l



                                                                 measure of kidn

ey



                                                                 disease than se

rum



                                                                 creatinine



                                                                 alone.***This



                                                                 calculation rimma

es sex



                                                                 and race into



                                                                 account, if the



                                                                 informationis



                                                                 provided. If th

e race



                                                                 is not provided

, and



                                                                 the patient



                                                                 isAfrican-Ameri

can,



                                                                 multiply by 1.2

12. If



                                                                 sex is not prov

ided,



                                                                 and thepatient 

is



                                                                 female, multipl

y by



                                                                 0.742. Results 

for



                                                                 patients <18 ye

ars



                                                                 ofage have not 

been



                                                                 validated by 

e MDRD



                                                                 study and renee dowd be



                                                                 interpretedwith



                                                                 caution.eGFR Re

sult



                                                                 Interpretation:

eGFR >



                                                                 or = 60 is in t

he



                                                                 Normal RangeeGF

R < 60



                                                                 may mean kidney



                                                                 diseaseeGFR < 1

5 may



                                                                 mean kidney



                                                                 failure***Range

s



                                                                 recommended by 

the



                                                                 [employer]



                                                                 Foundation,http

://nkd



                                                                 ep.nih.gov



Alcohol/Ethanol, [id number] 21:02:00





             Test Item    Value        Reference Range Interpretation Comments

 

             Alcohol, Ethyl <0.01 g/dL   0.00-0.01    N            Intoxicated 0

.080 g/dL



             (test code = ETOH)                                        or more



CBC with [id number] 20:35:00





             Test Item    Value        Reference Range Interpretation Comments

 

             WBC (test code = WBC) 10.3 K/cumm  4.4-10.5     N            

 

             RBC (test code = RBC) 4.37 M/cumm  3.75-5.20    N            

 

             Hemoglobin (test code = HGB) 13.1 gm/dL   12.2-14.8    N           

 

 

             Hematocrit (test code = HCT) 41.5 %       36.5-44.4    N           

 

 

             MCV (test code = MCV) 94.9 fL             N            

 

             MCH (test code = MCH) 30.1 pg      27.0-32.5    N            

 

             MCHC (test code = MCHC) 31.7 g/dL    32.0-37.5    L            

 

             RDW (test code = RDW) 12.9 %       11.5-14.5    N            

 

             Platelet Count (test code = 327 K/cumm   140-440      N            



             PLTCT)                                              

 

             MPV (test code = MPV) 7.0 fL                                 

 

             Diff Method (test code = DIFFM) Auto                               

    

 

             Neutrophil (test code = NEUT) 74.0 %       36-70        H          

  

 

             Lymphocyte (test code = LYMPH) 17.6 %       12-44        N         

   

 

             Monocyte (test code = MONO) 6.4 %        0-11         N            

 

             Eosinophil (test code = EOS) 1.5 %        0-7          N           

 

 

             Basophil (test code = BASO) 0.5 %        0-2          N            

 

             Neutro Abs (test code = ANEUT) 7.6 K/cumm   1.6-7.4      H         

   

 

             Lymph Abs (test code = ALYMPH) 1.8 K/cumm   0.5-4.6      N         

   

 

             Mono Abs (test code = AMONO) 0.7 K/cumm   0.0-1.2      N           

 

 

             Eos Abs (test code = AEOS) 0.16 K/cumm  0.00-0.74    N            

 

             Baso Abs (test code = ABASO) 0.1 K/cumm   0.00-0.21    N           

 



[id number] 20:33:00





             Test Item    Value        Reference Range Interpretation Comments

 

             Amphetamine (test code POSITIVE     Negative     A            For d

iagnostic purposes



             = AMPH)                                             only, positive 

results



                                                                 should always b

e



                                                                 assessedin



                                                                 conjunctionwith

 the



                                                                 patient's medic

al



                                                                 history,clinica

l



                                                                 examination and



                                                                 otherfindings.T

o



                                                                 fulfill legal



                                                                 requirements, a

 more



                                                                 specific altern

ate



                                                                 chemical method

must be



                                                                 used inorder to

 obtain



                                                                 a Confirmed judith

lytical



                                                                 result. GC/MS i

s the



                                                                 preferred confi

rmatory



                                                                 method.

 

             Barbiturates (test Negative     Negative     N            



             code = GENEVIEVE)                                        

 

             Benzodiazepine (test Negative     Negative     N            



             code = IRENE)                                        

 

             Cocaine (test code = Negative     Negative     N            



             COCA)                                               

 

             Methadone (test code = Negative     Negative     N            



             MTHD)                                               

 

             Opiates (test code = Negative     Negative     N            



             OPIA)                                               

 

             PCP (test code = PCP) Negative     Negative     N            

 

             Propoxyphene (test Negative     Negative     N            



             code = PROPOX)                                        

 

             THC (test code = THC) Negative     Negative     N            



Urinalysis [id number] 20:23:00





             Test Item    Value        Reference Range Interpretation Comments

 

             Color (test code = COLOR) Yellow       Yellow,Straw,Pl N           

 



                                       yellow                    

 

             Clarity (test code = Sl Cloudy    Clear        A            



             CLAR)                                               

 

             Specific Gravity (test 1.007        1.001-1.035  N            



             code = SPGR)                                        

 

             pH (test code = PH) 6.0          5.0-9.0      N            

 

             Ketone (test code = KET) Negative mg/dL Negative     N            

 

             Glucose (test code = Negative mg/dL Negative     N            



             GLUCUR)                                             

 

             Protein (test code = Negative mg/dL Negative     N            



             PROT)                                               

 

             Bilirubin (test code = Negative mg/dL Negative     N            



             BILI)                                               

 

             Occult Blood (test code = Moderate     Negative     A            



             UDOB)                                               

 

             Urobilinogen (test code = 0.2 mg/dL    0.2-1.0      N            



             UROB)                                               

 

             Nitrite (test code = NIT) Positive     Negative     A            

 

             Leuk Esterase (test code Moderate     Negative     A            



             = LEUK)                                             

 

             Micros Exam (test code = Indicated                              



             MEXAM)                                              

 

             Epithelial Cells (test 50+ /LPF     0-30         A            



             code = EPI)                                         

 

             WBC, Urine (test code = 41-50 /HPF   0-5          A            



             UWBC)                                               

 

             RBC, Urine (test code = 3-5 /HPF     0-5          A            



             URBC)                                               

 

             Bacteria (test code = Many /HPF                              



             BACT)                                               



[id number] 16:21:00





             Test Item    Value        Reference Range Interpretation Comments

 

             Lithium (test code = LI) 0.6 mmol/l   0.6-1.2                   



[facility]– [place] (Ultra Sensitive)2017 16:21:00





             Test Item    Value        Reference Range Interpretation Comments

 

             TSH (test code = TSH) 2.14 mIU/L   0.47-4.68                 



[facility]





             Test Item    Value        Reference Range Interpretation Comments

 

             Glucose (test code 77 mg/dl                         



             = GLU)                                              

 

             BUN (test code = 9.0 mg/dl    6.0-17.0                  



             BUN)                                                

 

             Creatinine (test 0.7 mg/dl    0.4-1.2                   



             code = CREA)                                        

 

             Sodium (test code = 139 mmol/l   137-145                   



             NA)                                                 

 

             Potassium (test 4.7 mmol/l   3.5-5.0                   



             code = K)                                           

 

             Chloride (test code 107 mmol/l                       



             = CL)                                               

 

             CO2 (test code = 22 mmol/l    22-30                     



             CO2)                                                

 

             Anion Gap (test 11                                     



             code = GAP)                                         

 

             Calcium (test code 9.8 mg/dl    8.4-10.2                  



             = CALC)                                             

 

             T Protein (test 8.0 gm/dl    5.1-8.7                   



             code = TP)                                          

 

             Albumin (test code 4.4 gm/dl    3.5-4.6                   



             = ALB)                                              

 

             A/G Ratio (test 1.2 %        1.1-2.2                   



             code = AGRAT)                                        

 

             AST (SGOT) (test 20 U/L       11-36                     



             code = AST)                                         

 

             ALT (SGPT) (test 29 U/L       11-40                     



             code = ALT)                                         

 

             Alkaline Phos (test 108 U/L                          



             code = ALKP)                                        

 

             Total Bilirubin 0.6 mg/dl    0.2-1.2                   



             (test code = TBIL)                                        

 

             Globulin (test code 3.6 gm/dl    2.3-3.5      H            



             = GLOBU)                                            

 

             Calcium, Corrected 9.5 mg/dl    8.4-10.2                  Various f

ormulas exist



             (test code =                                        for corrected s

harman



             CALCCORR)                                           calcium results

, each



                                                                 yielding differ

ent



                                                                 values. This co

rrected



                                                                 result was base

d on



                                                                 the formula: Co

rrected



                                                                 Calcium = Serum

Calcium



                                                                 + [0.8 * ( 4 -



                                                                 SerumAlbumin)]

 

             EGFR if  >60                                    



             American (test code mL/min/1.73m\                           



             = EGFRAA)    S\2                                    

 

             EGFR if Non- >60                                    Estimate

d Glomerular



             American (test code mL/min/1.73m\                           Filtrat

ion Rate (eGFR)



             = EGFRNA)    S\2                                    Reference Inter

vals



                                                                 Decision Points

 for 18



                                                                 years and older

 and



                                                                 average body ma

ss: >=



                                                                 60 Does not exc

lude



                                                                 kidney disease.

 30 -



                                                                 59 Suggests mod

erate



                                                                 chronic kidney 

disease



                                                                 and indicates t

he need



                                                                 for further



                                                                 investigation



                                                                 including asses

sment



                                                                 of proteinuria 

and



                                                                 cardiovascular



                                                                 factors. < 30 U

sually



                                                                 indicates a nee

d for



                                                                 referral for



                                                                 assessment and



                                                                 management of c

hronic



                                                                 kidney failure.



[facility] [place] [facility]– [place]

## 2023-04-19 NOTE — RAD REPORT
EXAM DESCRIPTION:  CTAbdomen   Pelvis W Contrast - 4/19/2023 9:47 pm

 

CLINICAL HISTORY:

ABD PAIN

 

COMPARISON:  CT 01/28/2023

 

TECHNIQUE:  CT of the abdomen and pelvis was performed.

 

All CT scans are performed using dose optimization technique as appropriate and may include automated
 exposure control or mA/KV adjustment according to patient size.

 

FINDINGS:  Lower chest: No acute abnormality.

Liver: No acute abnormality or suspicious lesions.

Biliary: No biliary ductal dilatation. Cholecystectomy

Stomach: Distended otherwise unremarkable.

Duodenum: No significant focal abnormality.

Pancreas: No significant abnormality.

Spleen: No significant abnormality.

Adrenal: No suspicious lesions.

Kidney/ureter: No hydronephrosis. No renal calculi.

Retroperitoneum: No retroperitoneal adenopathy.

Vascular: No aneurysm. Atherosclerosis.

Bowel: Diverticulosis. No evidence of acute diverticulitis. Normal appendix..

Peritoneum: No ascites or free air.

Bladder: Grossly unremarkable.

Reproductive: No adnexal masses.

Bones: No acute fracture. Right hip arthroplasty.

Other: n/a

 

IMPRESSION:  No acute intra-abdominal or pelvic finding. Normal appendix.

## 2023-04-20 LAB
BUN BLD-MCNC: 12 MG/DL (ref 7–18)
GLUCOSE SERPLBLD-MCNC: 107 MG/DL (ref 74–106)
HCT VFR BLD CALC: 33 % (ref 36–45)
LYMPHOCYTES # SPEC AUTO: 1.6 K/UL (ref 0.7–4.9)
MAGNESIUM SERPL-MCNC: 1.8 MG/DL (ref 1.6–2.4)
MCV RBC: 91.9 FL (ref 80–100)
PMV BLD: 7.7 FL (ref 7.6–11.3)
POTASSIUM SERPL-SCNC: 3.7 MEQ/L (ref 3.5–5.1)
RBC # BLD: 3.59 M/UL (ref 3.86–4.86)

## 2023-04-20 RX ADMIN — Medication SCH ML: at 08:40

## 2023-04-20 RX ADMIN — Medication SCH: at 21:00

## 2023-04-20 RX ADMIN — SODIUM CHLORIDE SCH MLS: 0.9 INJECTION, SOLUTION INTRAVENOUS at 09:00

## 2023-04-20 RX ADMIN — SODIUM CHLORIDE SCH MLS: 0.9 INJECTION, SOLUTION INTRAVENOUS at 21:33

## 2023-04-20 RX ADMIN — CEFTRIAXONE SCH MLS: 1 INJECTION, POWDER, FOR SOLUTION INTRAMUSCULAR; INTRAVENOUS at 08:40

## 2023-04-20 NOTE — P.PN
Subjective


Date of Service: 04/20/23


Chief Complaint: UTI, Vulvovaginitis


Patient has no new complain.


No recorded fever since admit.


Good oral intake.








Physical Examination





- Vital Signs


Temperature: 98.4 F


Blood Pressure: 115/67


Pulse: 69


Respirations: 18


Pulse Ox (%): 99





- Studies


Laboratory Data (last 24 hrs)





04/19/23 20:43: Sodium 138, Potassium 3.9, BUN 11, Creatinine 0.89, Glucose 107 

H, Total Bilirubin 0.5, AST 11 L, ALT 17, Alkaline Phosphatase 122 H


04/19/23 20:43: WBC 10.60, Hgb 13.1, Hct 38.4, Plt Count 278





Microbiology Data (last 24 hrs): 








04/19/23 19:57   Cervical   Wet Prep - Final








Assessment And Plan





- Plan


 Physical Exam


General: Alert, In no apparent distress, morbidly obese.


Neck: Supple.


Respiratory: Clear to auscultation bilaterally, Normal air movement


Cardiovascular: Regular rate/rhythm, Normal S1 S2


Gastrointestinal: Normal bowel sounds, No tenderness


Musculoskeletal: No tenderness


Integumentary: No rashes


Neurological: Normal speech, Normal affect








Plan:


Continue current antibiotics.


Supportive measures-pain management as needed.


Obs& GYN Dr. Patel consulted regarding the vulvovaginitis.


Follow-up STD screen results


Activity as tolerated.


Social service consulted for homelessness.

## 2023-04-21 RX ADMIN — Medication SCH ML: at 08:04

## 2023-04-21 RX ADMIN — SODIUM CHLORIDE SCH MLS: 0.9 INJECTION, SOLUTION INTRAVENOUS at 07:40

## 2023-04-21 RX ADMIN — Medication SCH: at 21:00

## 2023-04-21 RX ADMIN — SODIUM CHLORIDE SCH MLS: 0.9 INJECTION, SOLUTION INTRAVENOUS at 17:26

## 2023-04-21 RX ADMIN — CEFTRIAXONE SCH MLS: 1 INJECTION, POWDER, FOR SOLUTION INTRAMUSCULAR; INTRAVENOUS at 08:02

## 2023-04-21 NOTE — P.PN
Subjective


Date of Service: 04/21/23


Chief Complaint: UTI, Vulvovaginitis


No new complaint.











Physical Examination





- Vital Signs


Temperature: 97.7 F


Blood Pressure: 123/65


Pulse: 64


Respirations: 16


Pulse Ox (%): 97





- Studies


Microbiology Data (last 24 hrs): 








04/19/23 18:08   Clean Catch Urine   [place] Count - Final


                            <10,000 CFU/ML.


04/19/23 18:08   Clean Catch Urine    - Final


                            MIXED KARMEN.








Assessment And Plan





- Plan


 Physical Exam


General: Alert, In no apparent distress, morbidly obese.


Respiratory: Clear to auscultation bilaterally, Normal air movement


Cardiovascular: Regular rate/rhythm, Normal S1 S2


Gastrointestinal: Normal bowel sounds, No tenderness


Musculoskeletal: No tenderness


Neurological: Normal speech, Normal affect








Plan:


Continue current antibiotics.


Supportive measures-pain management as needed.


Obs& GYN Dr. Patel consulted regarding the vulvovaginitis.  Awaiting her 

input


Follow-up STD screen results


Activity as tolerated.

## 2023-04-22 VITALS — DIASTOLIC BLOOD PRESSURE: 71 MMHG | SYSTOLIC BLOOD PRESSURE: 126 MMHG | TEMPERATURE: 97.8 F

## 2023-04-22 VITALS — OXYGEN SATURATION: 96 %

## 2023-04-22 RX ADMIN — SODIUM CHLORIDE SCH MLS: 0.9 INJECTION, SOLUTION INTRAVENOUS at 05:41

## 2023-04-22 RX ADMIN — Medication SCH ML: at 08:08

## 2023-04-22 RX ADMIN — SODIUM CHLORIDE SCH: 0.9 INJECTION, SOLUTION INTRAVENOUS at 01:00

## 2023-04-22 RX ADMIN — CEFTRIAXONE SCH MLS: 1 INJECTION, POWDER, FOR SOLUTION INTRAMUSCULAR; INTRAVENOUS at 08:07

## 2023-04-22 NOTE — P.DS
Admission Date: 04/22/23


Discharge Date: 04/22/23


Discharge Condition: FAIR


Reason for Admission: UTI, Vulvovaginitis





- Problems


(1) Trichomonas infection


Current Visit: Yes   Status: Acute   





(2) Vulvovaginitis


Current Visit: Yes   Status: Acute   





(3) Hypertension


Current Visit: Yes   Status: Chronic   


Qualifiers: 


   Hypertension type: primary hypertension   Qualified Code(s): I10 - Essential 

(primary) hypertension   


Brief History of Present Illness: 


Patient is a 52 year old female with past medical history of hypertension and 

bipolar disorder who presented to the emergency department with complaints of 

vaginal pain, itching, swelling, and discharge. No significant lab 

abnormalities. CT abdomen pelvis negative. Cellulitus noted to external 

genitalia with cervicitis and foul yellow discharge on speculum exam. Wet prep 

positive for trichomonas. She received several medications in the emergency 

department- doxycycline, metronidazole, azithromycin, ceftriaxone, valacyclovir,

clindamycin, and fluconazole. Patient is homeless and has very poor hygeine.  

Patient admitted for further management.








Hospital Course: 


Patient admitted to the medical floor and treated with oral Flagyl and IV 

Rocephin.  Cervical wet prep was positive for trichomonas.  Chlamydia, Neisseria

gonococcal and HSV screen are still pending.  Patient states that she feels 

better.  She has been asymptomatic during the hospital stay.  Case discussed 

with gynecology Dr. Patel who recommended metronidazole 2 g and Zithromax 1 g

which were given in the ED. patient is clinically stable.  She is discharged to 

continue oral metronidazole.  She is also discharged with a single dose of 

fluconazole to treat any possible candidiasis.





Vital Signs/Physical Exam: 














Temp Pulse Resp BP Pulse Ox


 


 97.8 F   60   18   126/71   96 


 


 04/22/23 08:00  04/22/23 08:00  04/22/23 08:00  04/22/23 08:00  04/22/23 08:00








General: Alert, In no apparent distress, Oriented x3


HEENT: Mucous membr. moist/pink


Neck: JVD not distended


Respiratory: Clear to auscultation bilaterally, Normal air movement


Cardiovascular: Regular rate/rhythm, Normal S1 S2


Gastrointestinal: Soft and benign, Non-distended


Musculoskeletal: No swelling, No tenderness


Integumentary: No cyanosis


Neurological: Normal strength at 5/5 x4 extr


Laboratory Data at Discharge: 














WBC  7.10 thou/uL (4.3-10.9)   04/20/23  02:05    


 


Hgb  11.5 g/dL (12.0-15.0)  L D 04/20/23  02:05    


 


Hct  33.0 % (36.0-45.0)  L  04/20/23  02:05    


 


Plt Count  289 thou/uL (152-406)   04/20/23  02:05    


 


Sodium  140 mEq/L (136-145)   04/20/23  02:05    


 


Potassium  3.7 mEq/L (3.5-5.1)   04/20/23  02:05    


 


BUN  12 mg/dL (7-18)   04/20/23  02:05    


 


Creatinine  0.86 mg/dL (0.55-1.02)   04/20/23  02:05    


 


Glucose  107 mg/dL ()  H  04/20/23  02:05    


 


Phosphorus  3.4 mg/dL (2.5-4.9)   04/20/23  02:05    


 


Magnesium  1.8 mg/dL (1.6-2.4)   04/20/23  02:05    


 


Total Bilirubin  0.5 mg/dL (0.2-1.0)   04/19/23  20:43    


 


AST  11 U/L (15-37)  L  04/19/23  20:43    


 


ALT  17 U/L (13-56)   04/19/23  20:43    


 


Alkaline Phosphatase  122 U/L ()  H  04/19/23  20:43    








Home Medications: 








Fluconazole 200 mg PO ONCE #1 tab 04/22/23 


metroNIDAZOLE [Flagyl*] 500 mg PO Q8H #15 tab 04/22/23 





New Medications: 


metroNIDAZOLE [Flagyl*] 500 mg PO Q8H #15 tab


Fluconazole 200 mg PO ONCE #1 tab


Followup: 


NONE,NONE [Primary Care Provider] - 


Time spent managing pt's care (in minutes): 28

## 2023-05-01 ENCOUNTER — HOSPITAL ENCOUNTER (EMERGENCY)
Dept: HOSPITAL 97 - ER | Age: 53
Discharge: HOME | End: 2023-05-01
Payer: COMMERCIAL

## 2023-05-01 VITALS — SYSTOLIC BLOOD PRESSURE: 121 MMHG | OXYGEN SATURATION: 99 % | DIASTOLIC BLOOD PRESSURE: 81 MMHG

## 2023-05-01 DIAGNOSIS — Z71.6: ICD-10-CM

## 2023-05-01 DIAGNOSIS — Z88.8: ICD-10-CM

## 2023-05-01 DIAGNOSIS — Z88.0: ICD-10-CM

## 2023-05-01 DIAGNOSIS — F41.9: ICD-10-CM

## 2023-05-01 DIAGNOSIS — Z72.0: ICD-10-CM

## 2023-05-01 DIAGNOSIS — N39.0: ICD-10-CM

## 2023-05-01 DIAGNOSIS — J06.9: Primary | ICD-10-CM

## 2023-05-01 DIAGNOSIS — F31.9: ICD-10-CM

## 2023-05-01 LAB
ALBUMIN SERPL BCP-MCNC: 3.5 G/DL (ref 3.4–5)
ALP SERPL-CCNC: 102 U/L (ref 45–117)
ALT SERPL W P-5'-P-CCNC: 22 U/L (ref 13–56)
AST SERPL W P-5'-P-CCNC: 15 U/L (ref 15–37)
BUN BLD-MCNC: 14 MG/DL (ref 7–18)
GLUCOSE SERPLBLD-MCNC: 156 MG/DL (ref 74–106)
HCT VFR BLD CALC: 41.3 % (ref 36–45)
INR BLD: 1.02
LYMPHOCYTES # SPEC AUTO: 1.6 K/UL (ref 0.7–4.9)
MAGNESIUM SERPL-MCNC: 1.8 MG/DL (ref 1.6–2.4)
MCV RBC: 90.1 FL (ref 80–100)
METHAMPHET UR QL SCN: NEGATIVE
NT-PROBNP SERPL-MCNC: 413 PG/ML (ref ?–125)
PMV BLD: 7.4 FL (ref 7.6–11.3)
POTASSIUM SERPL-SCNC: 3.7 MEQ/L (ref 3.5–5.1)
RBC # BLD: 4.59 M/UL (ref 3.86–4.86)
THC SERPL-MCNC: NEGATIVE NG/ML
TROPONIN I SERPL HS-MCNC: 15.1 PG/ML (ref ?–58.9)

## 2023-05-01 PROCEDURE — 36415 COLL VENOUS BLD VENIPUNCTURE: CPT

## 2023-05-01 PROCEDURE — 81025 URINE PREGNANCY TEST: CPT

## 2023-05-01 PROCEDURE — 84484 ASSAY OF TROPONIN QUANT: CPT

## 2023-05-01 PROCEDURE — 96361 HYDRATE IV INFUSION ADD-ON: CPT

## 2023-05-01 PROCEDURE — 71045 X-RAY EXAM CHEST 1 VIEW: CPT

## 2023-05-01 PROCEDURE — 85379 FIBRIN DEGRADATION QUANT: CPT

## 2023-05-01 PROCEDURE — 85730 THROMBOPLASTIN TIME PARTIAL: CPT

## 2023-05-01 PROCEDURE — 85610 PROTHROMBIN TIME: CPT

## 2023-05-01 PROCEDURE — 83880 ASSAY OF NATRIURETIC PEPTIDE: CPT

## 2023-05-01 PROCEDURE — 80076 HEPATIC FUNCTION PANEL: CPT

## 2023-05-01 PROCEDURE — 83735 ASSAY OF MAGNESIUM: CPT

## 2023-05-01 PROCEDURE — 80307 DRUG TEST PRSMV CHEM ANLYZR: CPT

## 2023-05-01 PROCEDURE — 99284 EMERGENCY DEPT VISIT MOD MDM: CPT

## 2023-05-01 PROCEDURE — 85025 COMPLETE CBC W/AUTO DIFF WBC: CPT

## 2023-05-01 PROCEDURE — 93005 ELECTROCARDIOGRAM TRACING: CPT

## 2023-05-01 PROCEDURE — 81001 URINALYSIS AUTO W/SCOPE: CPT

## 2023-05-01 PROCEDURE — 80048 BASIC METABOLIC PNL TOTAL CA: CPT

## 2023-05-01 PROCEDURE — 96365 THER/PROPH/DIAG IV INF INIT: CPT

## 2023-05-01 NOTE — RAD REPORT
EXAM DESCRIPTION:  [place] Single View5/1/2023 1:54 pm

 

CLINICAL HISTORY:  DYSPNEA

 

COMPARISON:  CHEST SINGLE VIEW dated 7/16/2014; CHEST PA AND LAT 2 VIEW dated 11/11/2013; CHEST PA AN
D LAT 2 VIEW dated 5/5/2008

 

TECHNIQUE:   Portable AP view of the chest.

 

FINDINGS:  The lungs are clear. No pneumothorax or effusion. The cardiomediastinal contours are unrem
arkable.

 

IMPRESSION:  No acute cardiopulmonary process.

## 2023-05-01 NOTE — XMS REPORT
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                               day            [place]



     SR tablet      56                                                [facility]

 

     ziprasidone            Yes                      1 (one)           Uni

vers



     80 mg      3-31                               time each           ity of



     capsule      10:29:                               day            [address]

 

     lithium            Yes                      2 (two)           [facility]



     carbonate      3-31                               times a           ity of



      mg      10:29:                               day            [place]



     SR tablet      [address] [place] [address]

 

     ziprasidone            Yes                      1 (one)           Uni

vers



     80 mg      3-31                               time each           ity of



     capsule      10:29:                               day            [place]



               56                                                Medical



                                                                 Branch

 

     lithium      -0      Yes                      2 (two)           [facility]



     carbonate      3-31                               times a           ity of



      mg      10:29:                               day            [place]



     SR tablet      56                                                Medical



                                                                 Branch

 

     ziprasidone            Yes                      1 (one)           Uni

vers



     80 mg      3-31                               time each           ity of



     capsule      10:29:                               day            [place]



               56                                                Medical



                                                                 Branch

 

     atorvastati      2021- No             10mg      Take 10 mg          

 [facility]



     n 10 mg       11-17                          by mouth.           ity of



     tablet      00:00: 05:59                                         [place]



               00   :00                                          [facility]

 

     atorvastati      2021- No             10mg      Take 10 mg          

 [facility]



     n 10 mg      - 11-17                          by mouth.           ity of



     tablet      00:00: 05:59                                         [place]



               00   :00                                          [facility]

 

     lithium            Yes            150mg Q.[id number] Take 150          

 [facility]



     carbonate      7-16                          [id number] mg by           [facility]



     (ESKALITH)      20:13:                          3D   mouth 3           



     150 mg      54                                 times           



     capsule                                         daily with           



                                                  meals.           

 

     DULoxetine      -0      Yes                 QD   Take by           [facility]

is



     (CYMBALTA)      7-16                               mouth           Health



     20 mg      20:13:                               daily.           



     delayed      54                                                



     release                                                        



     capsule                                                        

 

     lithium            Yes            150mg Q.[id number] Take 150          

 [facility]



     carbonate      7-16                          [id number] mg by           [facility]



     (ESKALITH)      20:13:                          3D   mouth 3           



     150 mg      54                                 times           



     capsule                                         daily with           



                                                  meals.           

 

     DULoxetine      -0      Yes                 QD   Take by           [facility]

is



     (CYMBALTA)      7-16                               mouth           Health



     20 mg      20:13:                               daily.           



     delayed      54                                                



     release                                                        



     capsule                                                        

 

     lithium            Yes            150mg Q.[id number] Take 150          

 [facility]



     carbonate      7-16                          [id number] mg by           [facility]



     (ESKALITH)      20:13:                          3D   mouth 3           



     150 mg      54                                 times           



     capsule                                         daily with           



                                                  meals.           

 

     DULoxetine      -0      Yes                 QD   Take by           [facility]

is



     (CYMBALTA)      7-16                               mouth           Health



     20 mg      20:13:                               daily.           



     delayed      54                                                



     release                                                        



     capsule                                                        

 

     lithium      0      Yes            150mg Q.[id number] Take 150          

 [facility]



     carbonate      7-16                          [id number] mg by           [facility]



     (ESKALITH)      20:13:                          3D   mouth 3           



     150 mg      54                                 times           



     capsule                                         daily with           



                                                  meals.           

 

     DULoxetine      2014-0      Yes                 QD   Take by           [facility]

is



     (CYMBALTA)      7-16                               mouth           Health



     20 mg      20:13:                               daily.           



     delayed      54                                                



     release                                                        



     capsule                                                        

 

     lithium      -0      Yes            150mg Q.[id number] Take 150          

 [facility]



     carbonate      7-16                          [id number] mg by           [facility]



     (ESKALITH)      20:13:                          3D   mouth 3           



     150 mg      54                                 times           



     capsule                                         daily with           



                                                  meals.           

 

     DULoxetine      2014-0      Yes                 QD   Take by           [facility]

is



     (CYMBALTA)      7-16                               mouth           Health



     20 mg      20:13:                               daily.           



     delayed      54                                                



     release                                                        



     capsule                                                        

 

     lithium      -0      Yes            150mg Q.[id number] Take 150          

 [facility]



     carbonate      7-16                          [id number] mg by           [facility]



     (ESKALITH)      20:13:                          3D   mouth 3           



     150 mg      54                                 times           



     capsule                                         daily with           



                                                  meals.           

 

     DULoxetine      -0      Yes                 QD   Take by           [facility]

is



     (CYMBALTA)      7-16                               mouth           Health



     20 mg      20:13:                               daily.           



     delayed      54                                                



     release                                                        



     capsule                                                        

 

     lithium      -0      Yes            150mg Q.[id number] Take 150          

 [facility]



     carbonate      7-16                          [id number] mg by           [facility]



     (ESKALITH)      20:13:                          3D   mouth 3           



     150 mg      54                                 times           



     capsule                                         daily with           



                                                  meals.           

 

     DULoxetine      -0      Yes                 QD   Take by           [facility]

is



     (CYMBALTA)      7-16                               mouth           Health



     20 mg      20:13:                               daily.           



     delayed      54                                                



     release                                                        



     capsule                                                        

 

     lithium      -0      Yes            150mg Q.[id number] Take 150          

 [facility]



     carbonate      7-16                          [id number] mg by           [facility]



     (ESKALITH)      20:13:                          3D   mouth 3           



     150 mg      54                                 times           



     capsule                                         daily with           



                                                  meals.           

 

     DULoxetine      -0      Yes                 QD   Take by           [facility]

is



     (CYMBALTA)      7-16                               mouth           Health



     20 mg      20:13:                               daily.           



     delayed      54                                                



     release                                                        



     capsule                                                        

 

     ibuprofen      -0      Yes       Dental 800mg      Take 1           [facility]

ris



     (MOTRIN)      7-16                abscess           tablet by           [facility]



     800 mg      00:00:                               mouth           



     tablet      00                                 every 8           



                                                  hours as           



                                                  needed for           



                                                  Pain.           

 

     ibuprofen      -0      Yes       Dental 800mg      Take 1           [facility]

ris



     (MOTRIN)      7-16                abscess           tablet by           [facility]



     800 mg      00:00:                               mouth           



     tablet      00                                 every 8           



                                                  hours as           



                                                  needed for           



                                                  Pain.           

 

     ibuprofen      -0      Yes       Dental 800mg      Take 1           [facility]

ris



     (MOTRIN)      7-16                abscess           tablet by           [facility]



     800 mg      00:00:                               mouth           



     tablet      00                                 every 8           



                                                  hours as           



                                                  needed for           



                                                  Pain.           

 

     ibuprofen            Yes       Dental 800mg      Take 1           [facility]

ris



     (MOTRIN)      7-16                abscess           tablet by           [facility]

[facility]



     800 mg      00:00:                               mouth           



     tablet      00                                 every 8           



                                                  hours as           



                                                  needed for           



                                                  Pain.           

 

     ibuprofen            Yes       Dental 800mg      Take 1           [facility]

ris



     (MOTRIN)      7-16                abscess           tablet by           [facility]

[facility]



     800 mg      00:00:                               mouth           



     tablet      00                                 every 8           



                                                  hours as           



                                                  needed for           



                                                  Pain.           

 

     ibuprofen            Yes       Dental 800mg      Take 1           [facility]

ris



     (MOTRIN)      7-16                abscess           tablet by           [facility]

[facility]



     800 mg      00:00:                               mouth           



     tablet      00                                 every 8           



                                                  hours as           



                                                  needed for           



                                                  Pain.           

 

     ibuprofen            Yes       Dental 800mg      Take 1           [facility]

ris



     (MOTRIN)      7-16                abscess           tablet by           [facility]

[facility]



     800 mg      00:00:                               mouth           



     tablet      00                                 every 8           



                                                  hours as           



                                                  needed for           



                                                  Pain.           

 

     ibuprofen            Yes       Dental 800mg      Take 1           [facility]

ris



     (MOTRIN)      7-16                abscess           tablet by           [facility]

lth



     800 mg      00:00:                               mouth           



     tablet      00                                 every 8           



                                                  hours as           



                                                  needed for           



                                                  Pain.           

 

     ibuprofen            Yes       Dental 800mg      Take 1           [facility]

ris



     (MOTRIN)      7-16                abscess           tablet by           [facility]

lth



     800 mg      00:00:                               mouth           



     tablet      00                                 every 8           



                                                  hours as           



                                                  needed for           



                                                  Pain.           

 

     ibuprofen            Yes       Dental 800mg      Take 1           [facility]

ris



     (MOTRIN)      7-16                abscess           tablet by           [facility]

l[facility]



     800 mg      00:00:                               mouth           



     tablet      00                                 every 8           



                                                  hours as           



                                                  needed for           



                                                  Pain.           







Vital Signs







             Vital Name   Observation Time Observation Value Comments     Source

 

             Systolic blood 2023 20:00:00 152 mm[Hg]                [facility]

 

             Diastolic blood 2023 20:00:00 64 mm[Hg]                 [facility]

[facility]

 

             Heart rate   2023 20:00:00 60 /min                   [facility]

 

             Respiratory rate 2023 20:00:00 21 /min                   [facility]

 

             Oxygen saturation in 2023 20:00:00 96 /min                   

[facility]



             Arterial blood by                                        [place] [facility]

staci



             Pulse oximetry                                        Branch

 

             Body temperature 2023 15:06:00 37.72 Danisha                 [facility]

 

             Body height  2023 15:06:00 157.5 cm                  [facility]

 

             Body weight  2023 15:06:00 113.399 kg                [facility]

ty of



                                                                 [place] [facility]



                                                                 Branch

 

             BMI          2023 15:06:00 45.73 kg/m2               Universi

ty of



                                                                 [facility]



                                                                 Branch

 

             HEIGHT       2023-02-15 16:56:00 157.5 cm                  

 

             WEIGHT       2023-02-15 16:56:00 100.245 kg                

 

             HEIGHT       2023-02-15 16:56:00 157.5 cm                  

 

             WEIGHT       2023-02-15 16:56:00 100.245 kg                

 

             HEIGHT       2023-02-15 16:56:00 157.5 cm                  

 

             WEIGHT       2023-02-15 16:56:00 100.245 kg                

 

             Systolic blood 2022 19:30:00 176 mm[Hg]                Univer

sity of



             pressure                                            [facility]

 

             Diastolic blood 2022 19:30:00 94 mm[Hg]                 Unive

[facility]



             [facility]

 

             Heart rate   2022 19:30:00 76 /min                   Universi

ty of



                                                                 [facility]

 

             Respiratory rate 2022 19:30:00 11 /min                   [facility]

 

             Oxygen saturation in 2022 19:30:00 95 /min                   

[facility]



             Arterial blood by                                        [place] [facility]

staci



             Pulse oximetry                                        [place]

 

             Body temperature 2022 17:54:00 37.22 Danisha                 [facility]

[facility]



                                                                 [facility]

 

             Body height  2022 17:54:00 157.5 cm                  Universi

ty of



                                                                 [facility]

 

             Body weight  2022 17:54:00 90.719 kg                 Universi

ty of



                                                                 [facility]

 

             BMI          2022 17:54:00 36.58 kg/m2               Universi

ty of



                                                                 [facility]

 

             Body height  2022 15:29:00 160 cm                    Universi

ty of



                                                                 [facility]



                                                                 Branch

 

             Body weight  2022 15:29:00 90.719 kg                 Universi

ty of



                                                                 [facility]



                                                                 Branch

 

             BMI          2022 15:29:00 35.43 kg/m2               Universi

ty of



                                                                 [facility]

 

             Height/Length 2022 08:36:33 160 cm                    



             Measured                                            

 

             Weight Dosing 2022 08:36:33 105.00 kg                 

 

             Body height  2023 08:42:00 157.5 cm                  [facility]

 

             Body weight  2023 08:42:00 100.245 kg                [facility]

 

             BMI          2023 08:42:00 40.42 kg/m2               [facility]

 

             Systolic blood 2023 08:29:26 114 mm[Hg]                [facility]



             pressure                                            

 

             Diastolic blood 2023 08:29:26 76 mm[Hg]                 [facility]



             pressure                                            

 

             Heart rate   2023 08:29:26 86 /min                   [facility]

 

             Body temperature 2023 08:29:26 36.78 Danisha                 [facility]

 

             Respiratory rate 2023 08:29:26 18 /min                   [facility]

 

             Oxygen saturation in 2023 08:29:26 97 /min                   

[facility]



             Arterial blood by                                        



             Pulse oximetry                                        

 

             Systolic blood 2023-02-15 22:01:00 124 mm[Hg]                [facility]

 

             Diastolic blood 2023-02-15 22:01:00 66 mm[Hg]                 [facility]

 

             Heart rate   2023-02-15 22:01:00 88 /min                   [facility]

 

             Body temperature 2023-02-15 22:01:00 37.17 Danisha                 [facility]

 

             Respiratory rate 2023-02-15 22:01:00 16 /min                   [facility]

 

             Oxygen saturation in 2023-02-15 22:01:00 100 /min                  

[facility]



             Arterial blood by                                        Medical Ce

nter



             Pulse oximetry                                        

 

             Body height  2023-02-15 16:56:00 157.5 cm                  [facility]

 

             Body weight  2023-02-15 16:56:00 100.245 kg                [facility]

 

             BMI          2023-02-15 16:56:00 40.42 kg/m2               [facility]

 

             Heart Rate   2023 22:58:16                           [facility]

 

             Systolic (mm Hg) 2023 22:58:00                           [facility]

[facility] [place]

 

             Diastolic (mm Hg) 2023 22:58:00                           [facility]

[facility]

 

             Temperature Oral (F) 2023 18:24:49 98.5 F                    

[facility]

 

             Height       2023 14:59:00 5 [ft_i]                  [facility]

 [facility]

 

             BMI Calculated 2023 14:59:00                           [facility]

 

             Weight       2023 14:59:00                           [facility]







Procedures







                Procedure       Date / Time     Performing Clinician Source



                                Performed                       

 

                CT              2023 16:56:55 Singer, [facility]



                MAXILLOFACIAL/MANDIBLE                                 Medical B

ranch



                W CONTRAST                                      

 

                LACTIC ACID WHOLE BLOOD 2023 15:57:00 Singer, [facility]

 

                COMP. METABOLIC PANEL 2023 15:56:00 Singer, Jack [facility]

[facility] [place]



                ([id number])                                         [facility]

 

                CBC WITH DIFF   2023 15:56:00 Singer, [facility]

 

                COVID-19, INFLUENZA 2023 10:39:00 Alejo Escalante [facility]



                A&B, AND RSV                    Gianni          



                QUALITATIVE RT-PCR                                 

 

                XR CHEST 1 VW PORTABLE 2023 09:16:40 Alejo Escalante [facility]



                                                Gianni          

 

                ASSIGNMENT OF BENEFITS 2023 19:39:54 Doctor Unassigned, Nimisha

 [facility]



                                                Name            Medical Branch

 

                XR CHEST 1 VW   2022 18:39:34 Singer, [facility]

 

                XR PELVIS <3 VW 2022 18:39:34 [facility]

 

                URINALYSIS      2022 18:19:00 Singer, [facility]

 

                COMP. METABOLIC PANEL 2022 18:08:00 Singer, [facility]

[facility] [place]



                ([id number])                                         Medical Branch

 

                CBC WITH DIFF   2022 18:08:00 Singer, [facility]

 

                COVID-19 (ID NOW RAPID 2022 18:08:00 Singer, [facility]



                TESTING)                                        Medical Branch

 

                REFERRAL-       2022 05:01:00 Doctor Unassigned, Nimisha [facility]



                REQUEST/RESPONSE                 Name            Medical Branch

 

                [id number]         2020 00:00:00 CANAL.02        [facility]







Plan of Care







             Planned Activity Planned Date Details      Comments     Source

 

             Future Scheduled 2023-10-01   IMM Influenza Seasonal              [facility]

[facility]



             Test         00:00:00     (>/= 19 yrs) [code =              



                                       IMM Influenza Seasonal              



                                       (>/= 19 yrs)]              

 

             Future Scheduled 2023   INFL[facility]ENZA VACCINE              [facility]



             Test         00:00:00     (Season Ended) [code =              [facility]

[facility] Center



                                       INFLUENZA VACCINE              



                                       (Season Ended)]              

 

             Future Scheduled 2023   INFLUENZA VACCINE              [facility]



             Test         00:00:00     (Season Ended) [code =              Medic

al Center



                                       INFLUENZA VACCINE              



                                       (Season Ended)]              

 

             Future Scheduled 2023   INFLUENZA VACCINE              [facility]



             Test         00:00:00     (Season Ended) [code =              Medic

al Center



                                       INFLUENZA VACCINE              



                                       (Season Ended)]              

 

             Future Scheduled 2023   COVID-19 VACCINE (#1)              [facility]



             Test         20:12:18     [code = COVID-19              



                                       VACCINE (#1)]              

 

             Future Scheduled 2023   Pneumococcal Vaccine:              [facility]



             Test         20:12:18     Pediatrics (0 to 5              



                                       Years) and At-Risk              



                                       Patients (6 to 64              



                                       Years) (1 - PCV) [code              



                                       = Pneumococcal              



                                       Vaccine: Pediatrics (0              



                                       to 5 Years) and              



                                       At-Risk Patients (6 to              



                                       64 Years) (1 - PCV)]              

 

             Future Scheduled 2023   Hepatitis C screening              [facility]



             Test         20:12:18     (procedure) [code =              



                                       927906881]                

 

             Future Scheduled 2023   COLONOSCOPY SCREENING              [facility]



             Test         20:12:18     [code = COLONOSCOPY              



                                       SCREENING]                

 

             Future Scheduled 2023   SHINGLES VACCINES (1              [facility]



             Test         20:12:18     of 2) [code = SHINGLES              



                                       VACCINES (1 of 2)]              

 

             Future Scheduled 2023   [facility]ACC[facility]              [facility]



             Test         20:12:18     [code = INFLUENZA              



                                       VACCINE]                  

 

             Future Scheduled 2023   COVID-19 VACCINE (#1)              [facility]



             Test         20:12:18     [code = COVID-19              



                                       VACCINE (#1)]              

 

             Future Scheduled 2023   Pneumococcal Vaccine:              [facility]



             Test         20:12:18     Pediatrics (0 to 5              



                                       Years) and At-Risk              



                                       Patients (6 to 64              



                                       Years) (1 - PCV) [code              



                                       = Pneumococcal              



                                       Vaccine: Pediatrics (0              



                                       to 5 Years) and              



                                       At-Risk Patients (6 to              



                                       64 Years) (1 - PCV)]              

 

             Future Scheduled 2023   [facility] C [facility]              [facility]



             Test         20:12:18     (procedure) [code =              



                                       [id number]]                

 

             Future Scheduled 2023   [facility] SCREENING              [facility]



             Test         20:12:18     [code = COLONOSCOPY              



                                       SCREENING]                

 

             Future Scheduled 2023   [facility]INGLES VACCINES (1              [facility]



             Test         20:12:18     of 2) [code = SHINGLES              



                                       VACCINES (1 of 2)]              

 

             Future Scheduled 2023   INFLUENZA VACCINE              [facility]



             Test         20:12:18     [code = INFLUENZA              



                                       VACCINE]                  

 

             Future Scheduled 2023   COVID-19 VACCINE (#1)              [facility]



             Test         19:28:05     [code = COVID-19              



                                       VACCINE (#1)]              

 

             Future Scheduled 2023   Pneumococcal Vaccine:              [facility]



             Test         19:28:05     Pediatrics (0 to 5              



                                       Years) and At-Risk              



                                       Patients (6 to 64              



                                       Years) (1 - PCV) [code              



                                       = Pneumococcal              



                                       Vaccine: Pediatrics (0              



                                       to 5 Years) and              



                                       At-Risk Patients (6 to              



                                       64 Years) (1 - PCV)]              

 

             Future Scheduled 2023   Hepatitis C screening              [facility]



             Test         19:28:05     (procedure) [code =              



                                       [id number]]                

 

             Future Scheduled 2023   COLONOSCOPY SCREENING              [facility]



             Test         19:28:05     [code = COLONOSCOPY              



                                       SCREENING]                

 

             Future Scheduled 2023   SHINGLES VACCINES (1              [facility]

[facility]



             Test         19:28:05     of 2) [code = SHINGLES              



                                       VACCINES (1 of 2)]              

 

             Future Scheduled 2023   INFLUENZA VACCINE              [facility]



             Test         19:28:05     [code = INFLUENZA              



                                       VACCINE]                  

 

             Future Scheduled 2023   Medicare IPPE (WELCOME              [facility]

[facility]



             Test         00:00:00     TO MEDICARE) [code =              Medical

 Center



                                       Medicare IPPE (WELCOME              



                                       TO MEDICARE)]              

 

             Future Scheduled 2023   Medicare IPPE (WELCOME              [facility]

[facility]



             Test         00:00:00     TO MEDICARE) [code =              Medical

 Center



                                       Medicare IPPE (WELCOME              



                                       TO MEDICARE)]              

 

             Future Scheduled 2023   Medicare IPPE (WELCOME              [facility]

[facility]



             Test         00:00:00     TO MEDICARE) [code =              Medical

 Center



                                       Medicare IPPE (WELCOME              



                                       TO MEDICARE)]              

 

             Future Scheduled 2023   Medicare IPPE (WELCOME              [facility]

[facility]



             Test         00:00:00     TO MEDICARE) [code =              Medical

 Center



                                       Medicare IPPE (WELCOME              



                                       TO MEDICARE)]              

 

             Future Scheduled 2023   DEPRESSION SCREENING              [facility]



             Test         00:00:00     (12+) [code =              Medical Center



                                       DEPRESSION SCREENING              



                                       (12+)]                    

 

             Future Scheduled 2023   DEPRESSION SCREENING              [facility]



             Test         00:00:00     (12+) [code =              Medical Center



                                       DEPRESSION SCREENING              



                                       (12+)]                    

 

             Future Scheduled 2023   DEPRESSION SCREENING              [facility]



             Test         00:00:00     (12+) [code =              Medical Center



                                       DEPRESSION SCREENING              



                                       (12+)]                    

 

             Future Scheduled 2023   DEPRESSION SCREENING              [facility]



             Test         00:00:00     (12+) [code =              Medical Center



                                       DEPRESSION SCREENING              



                                       (12+)]                    

 

             Future Scheduled 2022-10-01   IMM Influenza Seasonal              [facility]

[facility]



             Test         00:00:00     (>/= 19 yrs) [code =              



                                       IMM Influenza Seasonal              



                                       (>/= 19 yrs)]              

 

             Future Scheduled 2022-10-01   IMM Influenza Seasonal              [facility]

[facility]



             Test         00:00:00     (>/= 19 yrs) [code =              



                                       IMM Influenza Seasonal              



                                       (>/= 19 yrs)]              

 

             Future Scheduled 2022-10-01   IMM Influenza Seasonal              [facility]



             Test         00:00:00     (>/= 19 yrs) [code =              



                                       IMM Influenza Seasonal              



                                       (>/= 19 yrs)]              

 

             Future Scheduled 2022-10-01   IMM Influenza Seasonal              [facility]



             Test         00:00:00     (>/= 19 yrs) [code =              



                                       IMM Influenza Seasonal              



                                       (>/= 19 yrs)]              

 

             Future Scheduled 2022-10-01   IMM Influenza Seasonal              [facility]



             Test         00:00:00     (>/= 19 yrs) [code =              



                                       IMM Influenza Seasonal              



                                       (>/= 19 yrs)]              

 

             Future Scheduled 2022-10-01   IMM Influenza Seasonal              [facility]



             Test         00:00:00     (>/= 19 yrs) [code =              



                                       IMM Influenza Seasonal              



                                       (>/= 19 yrs)]              

 

             Future Scheduled 2022-10-01   IMM Influenza Seasonal              [facility]



             Test         00:00:00     (>/= 19 yrs) [code =              



                                       IMM Influenza Seasonal              



                                       (>/= 19 yrs)]              

 

             Future Scheduled 2022-10-01   IMM Influenza Seasonal              [facility]



             Test         00:00:00     (>/= 19 yrs) [code =              



                                       IMM Influenza Seasonal              



                                       (>/= 19 yrs)]              

 

             Future Scheduled 2022-10-01   IMM Influenza Seasonal              [facility]



             Test         00:00:00     (>/= 19 yrs) [code =              



                                       IMM Influenza Seasonal              



                                       (>/= 19 yrs)]              

 

             Future Scheduled 2022   INFLUENZA VACCINE (#1)              [facility]

[facility]



             Test         00:00:00     [code = INFLUENZA              Medical Ce

nter



                                       VACCINE (#1)]              

 

             Future Scheduled 2018   Screening for              [facility]

[facility]



             Test         00:00:00     malignant neoplasm of              



                                       colon (procedure)              



                                       [code = [id number]]              

 

             Future Scheduled 2018   SHINGLES VACCINES (1              [facility]



             Test         00:00:00     of 2) [code = SHINGLES              Medic

al Center



                                       VACCINES (1 of 2)]              

 

             Future Scheduled 2018   Screening for              [facility]

[facility]



             Test         00:00:00     malignant neoplasm of              



                                       colon (procedure)              



                                       [code = 452453216]              

 

             Future Scheduled 2018   Screening for              [facility]

[facility]



             Test         00:00:00     malignant neoplasm of              



                                       colon (procedure)              



                                       [code = 566224430]              

 

             Future Scheduled 2018   Screening for              [facility]

[facility]



             Test         00:00:00     malignant neoplasm of              



                                       colon (procedure)              



                                       [code = 399740576]              

 

             Future Scheduled 2018   Screening for              [facility]

[facility]



             Test         00:00:00     malignant neoplasm of              



                                       colon (procedure)              



                                       [code = 511556717]              

 

             Future Scheduled 2018   Screening for              [facility]

[facility]



             Test         00:00:00     malignant neoplasm of              



                                       colon (procedure)              



                                       [code = 651166412]              

 

             Future Scheduled 2018   Screening for              [facility]

[facility]



             Test         00:00:00     malignant neoplasm of              



                                       colon (procedure)              



                                       [code = 846379734]              

 

             Future Scheduled 2018   Screening for              [facility]

[facility]



             Test         00:00:00     malignant neoplasm of              



                                       colon (procedure)              



                                       [code = 609150760]              

 

             Future Scheduled 2018   Screening for              [facility]

[facility]



             Test         00:00:00     malignant neoplasm of              



                                       colon (procedure)              



                                       [code = 342330553]              

 

             Future Scheduled 2018   Screening for              [facility]

[facility]



             Test         00:00:00     malignant neoplasm of              



                                       colon (procedure)              



                                       [code = 103753447]              

 

             Future Scheduled 2018   SHINGLES VACCINES (1              [facility]



             Test         00:00:00     of 2) [code = SHINGLES              Medic

al Center



                                       VACCINES (1 of 2)]              

 

             Future Scheduled 2018   SHINGLES VACCINES (1              [facility]



             Test         00:00:00     of 2) [code = SHINGLES              Medic

al Center



                                       VACCINES (1 of 2)]              

 

             Future Scheduled 2018   SHINGLES VACCINES (1              [facility]



             Test         00:00:00     of 2) [code = SHINGLES              Medic

al Center



                                       VACCINES (1 of 2)]              

 

             Future Scheduled 2013   Lipid panel               [facility]

s



             Test         00:00:00     (procedure) [code =              [facility]



                                       [id number]]                 

 

             Future Scheduled 2013   Lipid panel               [facility]

s



             Test         00:00:00     (procedure) [code =              [facility]



                                       [id number]]                 

 

             Future Scheduled 2013   Lipid panel               [facility]

s



             Test         00:00:00     (procedure) [code =              [facility]



                                       [id number]]                 

 

             Future Scheduled 2013   Lipid panel               [facility]

s



             Test         00:00:00     (procedure) [code =              [facility]



                                       [id number]]                 

 

             Future Scheduled 2008   Breast Cancer Scrn              [facility]



             Test         00:00:00     (Yearly) [code =              



                                       Breast Cancer Scrn              



                                       (Yearly)]                 

 

             Future Scheduled 2008   Breast Cancer Scrn              [facility]



             Test         00:00:00     (Yearly) [code =              



                                       Breast Cancer Scrn              



                                       (Yearly)]                 

 

             Future Scheduled 2008   Breast Cancer Scrn              [facility]



             Test         00:00:00     (Yearly) [code =              



                                       Breast Cancer Scrn              



                                       (Yearly)]                 

 

             Future Scheduled 2008   Breast Cancer Scrn              [facility]



             Test         00:00:00     (Yearly) [code =              



                                       Breast Cancer Scrn              



                                       (Yearly)]                 

 

             Future Scheduled 2008   Breast Cancer Scrn              [facility]



             Test         00:00:00     (Yearly) [code =              



                                       Breast Cancer Scrn              



                                       (Yearly)]                 

 

             Future Scheduled 2008   Breast Cancer Scrn              [facility]



             Test         00:00:00     (Yearly) [code =              



                                       Breast Cancer Scrn              



                                       (Yearly)]                 

 

             Future Scheduled 2008   Breast Cancer Scrn              [facility]



             Test         00:00:00     (Yearly) [code =              



                                       Breast Cancer Scrn              



                                       (Yearly)]                 

 

             Future Scheduled 2008   Breast Cancer Scrn              [facility]



             Test         00:00:00     (Yearly) [code =              



                                       Breast Cancer Scrn              



                                       (Yearly)]                 

 

             Future Scheduled 2008   Breast Cancer Scrn              [facility]



             Test         00:00:00     (Yearly) [code =              



                                       Breast Cancer Scrn              



                                       (Yearly)]                 

 

             Future Scheduled 2008   Breast Cancer Scrn              [facility]



             Test         00:00:00     (Yearly) [code =              



                                       Breast Cancer Scrn              



                                       (Yearly)]                 

 

             Future Scheduled 1998   Screening for              [facility]

[facility]



             Test         00:00:00     malignant neoplasm of              



                                       cervix (procedure)              



                                       [code = 788313483]              

 

             Future Scheduled 1998   Screening for              [facility]

[facility]



             Test         00:00:00     malignant neoplasm of              



                                       cervix (procedure)              



                                       [code = 397552474]              

 

             Future Scheduled 1998   Screening for              [facility]

[facility]



             Test         00:00:00     malignant neoplasm of              



                                       cervix (procedure)              



                                       [code = 980098144]              

 

             Future Scheduled 1998   Screening for              [facility]

[facility]



             Test         00:00:00     malignant neoplasm of              



                                       cervix (procedure)              



                                       [code = 610501895]              

 

             Future Scheduled 1998   Screening for              [facility]

[facility]



             Test         00:00:00     malignant neoplasm of              



                                       cervix (procedure)              



                                       [code = 263978607]              

 

             Future Scheduled 1998   Screening for              [facility]

[facility]



             Test         00:00:00     malignant neoplasm of              



                                       cervix (procedure)              



                                       [code = 531686980]              

 

             Future Scheduled 1998   Screening for              [facility]

[facility]



             Test         00:00:00     malignant neoplasm of              



                                       cervix (procedure)              



                                       [code = 956594449]              

 

             Future Scheduled 1998   Screening for              [facility]

[facility]



             Test         00:00:00     malignant neoplasm of              



                                       cervix (procedure)              



                                       [code = 493202220]              

 

             Future Scheduled 1998   Screening for              [facility]

[facility]



             Test         00:00:00     malignant neoplasm of              



                                       cervix (procedure)              



                                       [code = 488707937]              

 

             Future Scheduled 1998   Screening for              [facility]

[facility]



             Test         00:00:00     malignant neoplasm of              



                                       cervix (procedure)              



                                       [code = 399270711]              

 

             Future Scheduled 1998   Screening for              [facility]

[facility]



             Test         00:00:00     malignant neoplasm of              



                                       cervix (procedure)              



                                       [code = 672745274]              

 

             Future Scheduled 1998   Screening for              [facility]

[facility]



             Test         00:00:00     malignant neoplasm of              



                                       cervix (procedure)              



                                       [code = 416764193]              

 

             Future Scheduled 1998   Screening for              [facility]

[facility]



             Test         00:00:00     malignant neoplasm of              



                                       cervix (procedure)              



                                       [code = 599366044]              

 

             Future Scheduled 1998   Screening for              [facility]

[facility]



             Test         00:00:00     malignant neoplasm of              



                                       cervix (procedure)              



                                       [code = 178944003]              

 

             Future Scheduled 1998   Screening for              [facility]

[facility]



             Test         00:00:00     malignant neoplasm of              



                                       cervix (procedure)              



                                       [code = 457139871]              

 

             Future Scheduled 1998   Screening for              [facility]

[facility]



             Test         00:00:00     malignant neoplasm of              



                                       cervix (procedure)              



                                       [code = 253220159]              

 

             Future Scheduled 1998   Screening for              [facility]

[facility]



             Test         00:00:00     malignant neoplasm of              



                                       cervix (procedure)              



                                       [code = 250191598]              

 

             Future Scheduled 1998   Screening for              [facility]

[facility]



             Test         00:00:00     malignant neoplasm of              



                                       cervix (procedure)              



                                       [code = 894332479]              

 

             Future Scheduled 1998   Screening for              [facility]

[facility]



             Test         00:00:00     malignant neoplasm of              



                                       cervix (procedure)              



                                       [code = 067163508]              

 

             Future Scheduled 1998   Screening for              [facility]

[facility]



             Test         00:00:00     malignant neoplasm of              



                                       cervix (procedure)              



                                       [code = 709492267]              

 

             Future Scheduled 1989   Screening for              [facility]

es



             Test         00:00:00     [facility]



                                       cervix (procedure)              



                                       [code = 641577213]              

 

             Future Scheduled 1989   Screening for              [facility]

es



             Test         00:00:00     malignant neoplasm of              [facility]



                                       cervix (procedure)              



                                       [code = 318022945]              

 

             Future Scheduled 1989   Screening for              [facility]

es



             Test         00:00:00     malignant neoplasm of              [facility]



                                       cervix (procedure)              



                                       [code = 262204464]              

 

             Future Scheduled 1989   Screening for              [facility]

es



             Test         00:00:00     malignant neoplasm of              [facility]



                                       cervix (procedure)              



                                       [code = 485765875]              

 

             Future Scheduled 1987   DTAP/TDAP/TD VACCINES              [facility]



             Test         00:00:00     (1 - Tdap) [code =              [facility]

enter



                                       DTAP/TDAP/TD VACCINES              



                                       (1 - Tdap)]               

 

             Future Scheduled 1987   DTAP/TDAP/TD VACCINES              [facility]



             Test         00:00:00     (1 - Tdap) [code =              [facility]

enter



                                       DTAP/TDAP/TD VACCINES              



                                       (1 - Tdap)]               

 

             Future Scheduled 1987   DTAP/TDAP/TD VACCINES              [facility]



             Test         00:00:00     (1 - Tdap) [code =              [facility]

enter



                                       DTAP/TDAP/TD VACCINES              



                                       (1 - Tdap)]               

 

             Future Scheduled 1987   DTAP/TDAP/TD VACCINES              [facility]



             Test         00:00:00     (1 - Tdap) [code =              [facility]

enter



                                       DTAP/TDAP/TD VACCINES              



                                       (1 - Tdap)]               

 

             Future Scheduled 1986   HEPATITIS C SCREENING              [facility]



             Test         00:00:00     [code = HEPATITIS C              [facility] 

[facility]



                                       SCREENING]                

 

             Future Scheduled 1986   HEPATITIS C SCREENING              [facility]



             Test         00:00:00     [code = HEPATITIS C              [facility] 

Center



                                       SCREENING]                

 

             Future Scheduled 1986   HEPATITIS C SCREENING              [facility]



             Test         00:00:00     [code = HEPATITIS C              [facility] 

Center



                                       SCREENING]                

 

             Future Scheduled 1986   HEPATITIS C SCREENING              [facility]



             Test         00:00:00     [code = HEPATITIS C              [facility] 

Center



                                       SCREENING]                

 

             Future Scheduled 1980   Tobacco Cessation              [facility]



             Test         00:00:00     [facility]sel[facility] and              [facility]

r



                                       Screening (12+) [code              



                                       = Tobacco Cessation              



                                       Counseling and              



                                       Screening (12+)]              

 

             Future Scheduled 1980   Tobacco Cessation              [facility]



             Test         00:00:00     Counseling and              [facility]

r



                                       Screening (12+) [code              



                                       = Tobacco Cessation              



                                       Counseling and              



                                       Screening (12+)]              

 

             Future Scheduled 1980   Tobacco Cessation              [facility]



             Test         00:00:00     Counseling and              [facility]

r



                                       Screening (12+) [code              



                                       = Tobacco Cessation              



                                       Counseling and              



                                       Screening (12+)]              

 

             Future Scheduled 1980   Tobacco Cessation              [facility]



             Test         00:00:00     Counseling and              [facility]

r



                                       Screening (12+) [code              



                                       = Tobacco Cessation              



                                       Counseling and              



                                       Screening (12+)]              

 

             Future Scheduled 1974   PNEUMOCOCCAL VACCINE              [facility]



             Test         00:00:00     0-64 YRS (1 - PCV)              [facility]

enter



                                       [code = PNEUMOCOCCAL              



                                       VACCINE 0-64 YRS (1 -              



                                       PCV)]                     

 

             Future Scheduled 1974   PNEUMOCOCCAL VACCINE              [facility]



             Test         00:00:00     0-64 YRS (1 - PCV)              [facility]

enter



                                       [code = PNEUMOCOCCAL              



                                       VACCINE 0-64 YRS (1 -              



                                       PCV)]                     

 

             Future Scheduled 1974   PNEUMOCOCCAL VACCINE              [facility]



             Test         00:00:00     0-64 YRS (1 - PCV)              [facility]

enter



                                       [code = PNEUMOCOCCAL              



                                       VACCINE 0-64 YRS (1 -              



                                       PCV)]                     

 

             Future Scheduled 1974   PNEUMOCOCCAL VACCINE              [facility]



             Test         00:00:00     0-64 YRS (1 - PCV)              [facility]

enter



                                       [code = PNEUMOCOCCAL              



                                       VACCINE 0-64 YRS (1 -              



                                       PCV)]                     

 

             Future Scheduled 1969   COVID-19 Vaccine (#1)              [facility]



             Test         00:00:00     [code = COVID-19              



                                       Vaccine (#1)]              

 

             Future Scheduled 1969   COVID-19 Vaccine (#1)              [facility]



             Test         00:00:00     [code = COVID-19              



                                       Vaccine (#1)]              

 

             Future Scheduled 1969   COVID-19 Vaccine (#1)              [facility]



             Test         00:00:00     [code = COVID-19              



                                       Vaccine (#1)]              

 

             Future Scheduled 1969   COVID-19 Vaccine (#1)              [facility]



             Test         00:00:00     [code = COVID-19              



                                       Vaccine (#1)]              

 

             Future Scheduled 1969   COVID-19 Vaccine (#1)              [facility]



             Test         00:00:00     [code = COVID-19              



                                       Vaccine (#1)]              

 

             Future Scheduled 1969   COVID-19 Vaccine (#1)              [facility]



             Test         00:00:00     [code = COVID-19              



                                       Vaccine (#1)]              

 

             Future Scheduled 1969   COVID-19 Vaccine (#1)              [facility]



             Test         00:00:00     [code = COVID-19              



                                       Vaccine (#1)]              

 

             Future Scheduled 1969   COVID-19 VACCINE (#1)              [facility]



             Test         00:00:00     [code = COVID-19              [facility]

[facility]



                                       VACCINE (#1)]              

 

             Future Scheduled 1969   COVID-19 Vaccine (#1)              [facility]



             Test         00:00:00     [code = COVID-19              



                                       Vaccine (#1)]              

 

             Future Scheduled 1969   COVID-19 Vaccine (#1)              [facility]



             Test         00:00:00     [code = COVID-19              



                                       Vaccine (#1)]              

 

             Future Scheduled 1969   COVID-19 Vaccine (#1)              [facility]



             Test         00:00:00     [code = COVID-19              



                                       Vaccine (#1)]              

 

             Future Scheduled 1969   COVID-19 VACCINE (#1)              [facility]



             Test         00:00:00     [code = COVID-19              [facility]

[facility]



                                       VACCINE (#1)]              

 

             Future Scheduled 1969   COVID-19 VACCINE (#1)              [facility]



             Test         00:00:00     [code = COVID-19              [facility]

[facility]



                                       VACCINE (#1)]              

 

             Future Scheduled 1969   COVID-19 VACCINE (#1)              [facility]



             Test         00:00:00     [code = COVID-19              [facility]

[facility]



                                       VACCINE (#1)]              

 

             Future Scheduled 1968   Screening for              [facility]

es



             Test         00:00:00     malignant neoplasm of              [facility]



                                       colon (procedure)              



                                       [code = 171070467]              

 

             Future Scheduled 1968   Screening for              [facility]

es



             Test         00:00:00     malignant neoplasm of              [facility]



                                       colon (procedure)              



                                       [code = 894181558]              

 

             Future Scheduled 1968   Screening for              [facility]

es



             Test         00:00:00     malignant neoplasm of              [facility]



                                       colon (procedure)              



                                       [code = 969052700]              

 

             Future Scheduled 1968   Screening for              [facility]

es



             Test         00:00:00     malignant neoplasm of              [facility]



                                       colon (procedure)              



                                       [code = 324324317]              

 

             Future Scheduled 1968   Sigmoidoscopy [code =              [facility]



             Test         00:00:00     Sigmoidoscopy]              [facility]

r

 

             Future Scheduled 1968   Screening for              [facility]

es



             Test         00:00:00     malignant neoplasm of              [facility]



                                       breast (procedure)              



                                       [code = 294232793]              

 

             Future Scheduled 1968   CT [facility]ography              [facility]



             Test         00:00:00     (combo) [code = CT              [facility]

enter



                                       Colonography (combo)]              

 

             Future Scheduled 1968   Screening for              [facility]

es



             Test         00:00:00     malignant neoplasm of              [facility]



                                       colon (procedure)              



                                       [code = 889371985]              

 

             Future Scheduled 1968   Screening for              [facility]

es



             Test         00:00:00     malignant neoplasm of              [facility]



                                       colon (procedure)              



                                       [code = 966586069]              

 

             Future Scheduled 1968   Screening for              [facility]

es



             Test         00:00:00     malignant neoplasm of              [facility]



                                       colon (procedure)              



                                       [code = 451181050]              

 

             Future Scheduled 1968   Screening for              [facility]

es



             Test         00:00:00     malignant neoplasm of              [facility]



                                       colon (procedure)              



                                       [code = 959122924]              

 

             Future Scheduled 1968   Sigmoidoscopy [code =              [facility]



             Test         00:00:00     Sigmoidoscopy]              [facility]

r

 

             Future Scheduled 1968   Screening for              [facility]

es



             Test         00:00:00     malignant neoplasm of              [facility]



                                       breast (procedure)              



                                       [code = 986972041]              

 

             Future Scheduled 1968   CT [facility]ography              [facility]



             Test         00:00:00     (combo) [code = CT              [facility]

enter



                                       Colonography (combo)]              

 

             Future Scheduled 1968   Screening for              [facility]

es



             Test         00:00:00     malignant neoplasm of              [facility]



                                       colon (procedure)              



                                       [code = 192079474]              

 

             Future Scheduled 1968   Screening for              [facility]

es



             Test         00:00:00     malignant neoplasm of              [facility]



                                       colon (procedure)              



                                       [code = 425747175]              

 

             Future Scheduled 1968   Screening for              [facility]

es



             Test         00:00:00     malignant neoplasm of              [facility]



                                       colon (procedure)              



                                       [code = 824715844]              

 

             Future Scheduled 1968   Screening for              [facility]

es



             Test         00:00:00     malignant neoplasm of              [facility]



                                       colon (procedure)              



                                       [code = 248387708]              

 

             Future Scheduled 1968   Sigmoidoscopy [code =              [facility]



             Test         00:00:00     Sigmoidoscopy]              [facility]

r

 

             Future Scheduled 1968   Screening for              [facility]

es



             Test         00:00:00     malignant neoplasm of              [facility]



                                       breast (procedure)              



                                       [code = 996835416]              

 

             Future Scheduled 1968   CT Colonography              [facility]



             Test         00:00:00     (combo) [code = CT              [facility]

enter



                                       Colonography (combo)]              

 

             Future Scheduled 1968   Screening for              [facility]

es



             Test         00:00:00     malignant neoplasm of              [facility]



                                       colon (procedure)              



                                       [code = 166819373]              

 

             Future Scheduled 1968   Screening for              [facility]

es



             Test         00:00:00     malignant neoplasm of              [facility]



                                       colon (procedure)              



                                       [code = 239717671]              

 

             Future Scheduled 1968   Screening for              [facility]

es



             Test         00:00:00     malignant neoplasm of              [facility]



                                       colon (procedure)              



                                       [code = 291456639]              

 

             Future Scheduled 1968   Screening for              [facility]

es



             Test         00:00:00     malignant neoplasm of              [facility]



                                       colon (procedure)              



                                       [code = 833442610]              

 

             Future Scheduled 1968   Sigmoidoscopy [code =              [facility]



             Test         00:00:00     Sigmoidoscopy]              [facility]

r

 

             Future Scheduled 1968   Screening for              [facility]

es



             Test         00:00:00     malignant neoplasm of              [facility]



                                       breast (procedure)              



                                       [code = 486819588]              

 

             Future Scheduled 1968   CT Colonography              [facility]



             Test         00:00:00     (combo) [code = CT              [facility]

enter



                                       Colonography (combo)]              







Encounters







        Start   End     Encounter Admission Attending Care    Care    Encounter 

Source



        Date/Time Date/Time Type    Type    Clinicians Facility Department ID   

   

 

        2023         Outpatient                 [facility]     [id number]

 [place]



        14:16:19                                                 [id number] [facility]

 

        2023         [facility]gency                 [facility]     [id number] 

[facility] -



        09:45:36                                                         [facility]



                                                                        ent

 

        2022         Outpatient         SARATH, [facility]     [id number]

-2 [place]



        01:05:17                         MELANIE                  [id number] [facility]

 

        2022         Outpatient         VIRGIE,  [facility]     [id number]

 [place]



        03:15:41                         JOAQUIN                 [id number] [facility]

 

        2022         Outpatient         VIRGIE,  [facility]     [id number]

 [place]



        15:19:55                         JOAQUIN                 [id number] [facility]

 

        2022         Outpatient                 [facility]     [id number]

 [place]



        15:28:25                                                 [id number] [facility]

 

        2022         Outpatient                 [facility]     [id number]

 [place]



        12:00:51                                                 [id number] [facility]

 

        2022         Outpatient                 [facility]     [id number]

 [place]



        15:13:02                                                 [id number] [facility]

 

        2020         Inpatient                 [facility]   YAMILA    [id number] 

[facility]



        03:17:00                                                 58      [facility]

 

        2023 Emergency X       SINGER, [facility]    ERT     [id number]

81 Univers



        09:01:00 14:52:00                 JACK brock of



                                                                        [facility]

 

        2023 [facility]         [facility]    [id number]

[id number] [facility]



        09:01:00 14:52:00                 Jack HAMM 350.1.13.10         i

[facility] 4.2.7.2.686         [facility]  [phone]         [address]

 

        2023 Emergency         Yosvany, 1.2.840.1 [id number]

[id number] Methodi



        02:46:00 05:35:00                 Alejo 52956.1.1         [address]



                                        Gianni  3.430.2.7                 Hospit

a



                                                .3.220180                 l



                                                .8                      

 

        2023 Emergency         [place], 1.2.840.1 [id number] 210

[id number] Methodi



        02:46:00 05:35:00                 Alejo 21288.1.1         [address]     [address]



                                        Gianni  3.430.2.7                 Hospit

a



                                                .3.211637                 l



                                                .8                      

 

        2023-02-15 2023 Emergency ER      [facility]ERMAN JUSTIN [facility]    Emergency 20

[id number] [facility]



        17:58:00 00:07:00                                                 

 

        2023-02-15 2023 Emergency         ShermanJustin [facility]   [id number]

[id number] [facility]



        17:58:00 00:07:00                 [facility]

 

        2023-02-15 2023 Emergency         Sherman Justin [facility]   [id number]

[id number] [facility]



        17:58:00 00:07:00                 [facility]

 

        2023 Documentat         Dangelo, 1.2.840.1 [id number]

[id number] Methodi



        00:00:00 00:00:00 ion             Arnaldo 16301.1.1         [address]     [address]



                                                3.430.2.7                 Hospit

a



                                                .3.026149                 l



                                                .8                      

 

        2023 Travel                  1.2.840.1 1.2.272.348 0438

[id number] Methodi



        00:00:00 00:00:00                         25165.1.1 350.1.13.43 977     

st



                                                3.430.2.7 0.2.7.3.698         Ho

spita



                                                .3.347517 084.8           l



                                                .8                      

 

        2023 Documentat         Dangelo, 1.2.840.1 [id number] 210

[id number] Methodi



        00:00:00 00:00:00 ion             Smithosh 37975.1.1         [address]     [address]



                                                3.430.2.7                 Hospit

a



                                                .3.771664                 l



                                                .8                      

 

        2023 Travel                  1.2.840.1 1.2.372.023 7437

[id number] Methodi



        00:00:00 00:00:00                         88963.1.1 350.1.13.43 977     

st



                                                3.430.2.7 0.2.7.3.698         Ho

spita



                                                .3.979445 084.8           l



                                                .8                      

 

        2023 2023-02-15 Emergency Emergency [facility]   JM0

[id number] 

[facility]



        19:56:00 06:17:00                                         79      

 

        2023 Emergency                 [facility] [id number]

[address]



        14:56:57 23:07:00                                 [address]

 

        2023 Emergency                 [facility] [id number]

[address]



        14:56:57 23:07:00                                 [address]

 

        2023 Emergency                 [facility]   [id number]

62 [facility]



        19:56:00 19:56:00                                         79      

 

        2023 Outpatient         Swati PANCHITO   [facility]   [id number]

7975 



        08:56:57 17:07:00                 Ishan                  00      



                                        Thomas                          

 

        2023 Emergency E       SWATI [facility]    7500   

 [facility]



        08:56:00 17:07:00                 ISHAN                          

 

        2023 Outpatient DIANA NOLAN  [facility]    [id number]

467 Univers



        14:00:00 14:00:00                 ANCELMO beltranjannie [facility]

 

        2023 Orders          Doctor GARIBAY    1.2.840.114 [id number]

447 Univers



        00:00:00 00:00:00 Only            Unassigned, SHONA   350.1.13.10       

  ity of



                                        [facility] 4.2.7.2.686         Roly

as



                                                        [phone]         Medi

staci



                                                        009             Branch

 

        2022 Inpatient         SANCHEZ, [facility]    MED       

  [facility]



        19:24:00 19:40:00                 LEXI                         

 

        2022 Emergency E       HEATHER, [facility]     

   [facility]



        14:56:00 18:09:00                 DEMARIO                           

 

        2022 [facility]     [id number]

15 Univers



        12:56:00 15:05:00                 JACK brock [facility]

 

        2022 Emergency         [facility]    1.2.192.402 1165

7614 Univers



        12:56:00 15:05:00                 Jack HAMM 350.1.13.10         i

ty of



                                                [place] 4.2.7.2.686         [facility]

[facility]  [phone]         Medi

staci



                                                        084             [place]

 

        2022 Office          [facility]    1.2.840.114 [id number]

13 Univers



        10:00:00 10:30:00 Visit           [facility]  350.1.13.10         it

y of



                                                [facility] 4.2.7.2.686         Roly

as



                                                SKYLAR?BLEA [phone]         [address]



                                                MEDICAL                 



                                                OFFICE                  



                                                BUILDING                 

 

        [facility]    [id number]

329 [facility]



        10:00:00 10:00:00                 ESCOBAR brock [facility]

 

        2022 Orders          Doctor GARIBAY    1.2.840.114 [id number]

07 Univers



        00:00:00 00:00:00 Only            UnassignedSHONA   350.1.13.10       

  ity of



                                        [facility] 4.2.7.2.686         Roly

as



                                                        [phone]         Medi

staci



                                                        009             Branch

 

        2020 Outpatient         ERNESTINE Ferrara   OUTD    [id number]

220 [facility]



        08:38:00 08:38:00                 [employer]                 75      Clear



                                                                        [facility]

 

        2019 Inpatient 1       Robin Barrios [facility]    PSY     12

[id number]



        23:01:00 13:16:00                 Robin Barrios                         

[facility]

 

        2017 Outpatient DIANA LOPEZ  [facility]    NUT     [id number]

565 Univers



        00:00:00 00:00:00                 VENKATA brock [facility]







Results







           Test Description Test Time  Test Comments Results    Result Comments 

Source









                    COMP. METABOLIC PANEL (47220) 2023 16:28:41 









                      Test Item  Value      Reference Range Interpretation Comme

nts









             NA (test code = 7236420748) 137 mmol/L   135-145                   

 

             K (test code = 3427040351) 4.1 mmol/L   3.5-5.0                   

 

             CL (test code = 7680051724) 104 mmol/L                       

 

             CO2 TOTAL (test code = 7072203732) 24 mmol/L    23-31              

       

 

             AGAP (test code = 4006309962) 9            2-16                    

  

 

             BUN (test code = 2376853718) 14 mg/dL     7-23                     

 

 

             GLUCOSE (test code = 0047169853) 114 mg/dL           H       

     

 

             CREATININE (test code = 0.79 mg/dL   0.50-1.04                 



             1532334806)                                         

 

             TOTAL BILI (test code = 1.3 mg/dL    0.1-1.1      H            



             4122116419)                                         

 

             CALCIUM (test code = 1010625954) 9.5 mg/dL    8.6-10.6             

     

 

             T PROTEIN (test code = 9153544113) 7.7 g/dL     6.3-8.2            

       

 

             ALBUMIN (test code = 6932455655) 4.2 g/dL     3.5-5.0              

     

 

             ALK PHOS (test code = 5826667915) 102 U/L                    

      

 

             ALTv (test code = 1742-6) 15 U/L       5-35                      

 

             AST(SGOT) (test code = 9618078477) 19 U/L       13-40              

       

 

             eGFR (test code = 7126602199) 75.8         mL/min/1.73m2           

   

 

             ELENA (test code = ELENA) [employer] of Glomerular                    

       



                          Filtration Rate (GFR) and Staging                     

      



                          of Kidney Disease*                           



                          +-----------------------+--------                     

      



                          -------------+-------------------                     

      



                          ------+| GFR (mL/min/1.73 m2) ?|                      

     



                          With Kidney Damage ?| ?Without                        

   



                          Kidney                                 



                          Damage+-----------------------+--                     

      



                          -------------------+-------------                     

      



                          ------------+| ?>90 ? ? ? ? ? ? ?                     

      



                          ? ?| ?Stage one ? ? ? ? ?| ?                          

 



                          Normal ? ? ? ? ? ? ?                           



                          ?+-----------------------+-------                     

      



                          --------------+------------------                     

      



                          -------+| ?60-89 ? ? ? ? ? ? ? ?|                     

      



                          ?Stage two ? ? ? ? ?| ? Decreased                     

      



                          GFR ? ? ? ?                            



                          +-----------------------+--------                     

      



                          -------------+-------------------                     

      



                          ------+| ?30-59 ? ? ? ? ? ? ? ?|                      

     



                          ?Stage three ? ? ? ?| ? Stage                         

  



                          three ? ? ? ? ?                           



                          +-----------------------+--------                     

      



                          -------------+-------------------                     

      



                          ------+| ?15-29 ? ? ? ? ? ? ? ?|                      

     



                          ?Stage four ? ? ? ? | ? Stage                         

  



                          four ? ? ? ? ?                           



                          ?+-----------------------+-------                     

      



                          --------------+------------------                     

      



                          -------+| ?<15 (or dialysis) ? ?|                     

      



                          ?Stage five ? ? ? ? | ? Stage                         

  



                          five ? ? ? ? ?                           



                          ?+-----------------------+-------                     

      



                          --------------+------------------                     

      



                          -------+ *Each stage assumes the                      

     



                          associated GFR level has been in                      

     



                          effect for at least three months.                     

      



                          ?Stages 1 to 5, with or without                       

    



                          kidney disease, indicate chronic                      

     



                          kidney disease. Notes:                           



                          Determination of stages one and                       

    



                          two (with eGFR >59mL/min/1.73 m2)                     

      



                          requires estimation of kidney                         

  



                          damage for at least three months                      

     



                          as defined by structural or                           



                          functional abnormalities of the                       

    



                          kidney, manifested by                           



                          either:Pathological abnormalities                     

      



                          or Markers of kidney damage                           



                          (including abnormalities in the                       

    



                          composition of the blood or urine                     

      



                          or abnormalities in imaging                           



                          tests).                                

 

             Lab Interpretation (test code = Abnormal                           

    



             63785-0)                                            



[facility] WITH [id number] 16:26:23





             Test Item    Value        Reference Range Interpretation Comments

 

             WBC (test code = 14.08        See_Comment  H             [Automated



             5306-2)                                             message] The



                                                                 system which



                                                                 generated this



                                                                 result transmit

gianfranco



                                                                 reference range

:



                                                                 4.30 - 11.10



                                                                 10*3/?L. The



                                                                 reference range



                                                                 was not used to



                                                                 interpret this



                                                                 result as



                                                                 normal/abnormal

.

 

             RBC (test code = 4.39         See_Comment                [Automated



             675-8)                                              message] The



                                                                 system which



                                                                 generated this



                                                                 result transmit

gianfranco



                                                                 reference range

:



                                                                 3.93 - 5.25



                                                                 10*6/?L. The



                                                                 reference range



                                                                 was not used to



                                                                 interpret this



                                                                 result as



                                                                 normal/abnormal

.

 

             HGB (test code = 13.6 g/dL    11.6-15.0                 



             718-7)                                              

 

             HCT (test code = 40.5 %       35.7-45.2                 



             4544-3)                                             

 

             MCV (test code = 92.3 fL      80.6-95.5                 



             787-2)                                              

 

             MCH (test code = 31.0 pg      25.9-32.8                 



             785-6)                                              

 

             MCHC (test code = 33.6 g/dL    31.6-35.1                 



             786-4)                                              

 

             RDW-SD (test code = 41.2 fL      39.0-49.9                 



             63974-4)                                            

 

             RDW-CV (test code = 12.2 %       12.0-15.5                 



             788-0)                                              

 

             PLT (test code = 221          See_Comment                [Automated



             777-3)                                              message] The



                                                                 system which



                                                                 generated this



                                                                 result transmit

gianfranco



                                                                 reference range

:



                                                                 166 - 358 10*3/

?L.



                                                                 The reference



                                                                 range was not u

sed



                                                                 to interpret th

is



                                                                 result as



                                                                 normal/abnormal

.

 

             MPV (test code = 10.1 fL      9.5-12.9                  



             18747-5)                                            

 

             NRBC/100 WBC (test 0.0          See_Comment                [Automat

ed



             code = 3529579232)                                        message] 

The



                                                                 system which



                                                                 generated this



                                                                 result transmit

gianfranco



                                                                 reference range

:



                                                                 0.0 - 10.0 /100



                                                                 WBCs. The



                                                                 reference range



                                                                 was not used to



                                                                 interpret this



                                                                 result as



                                                                 normal/abnormal

.

 

             NRBC x10^3 (test code              See_Comment                [Auto

mated



             = 2793513438)                                        message] The



                                                                 system which



                                                                 generated this



                                                                 result transmit

gianfranco



                                                                 reference range

:



                                                                 10*3/?L. The



                                                                 reference range



                                                                 was not used to



                                                                 interpret this



                                                                 result as



                                                                 normal/abnormal

.

 

             GRAN MAT (NEUT) % 86.9 %                                 



             (test code = 770-8)                                        

 

             IMM GRAN % (test code 0.60 %                                 



             = 5185066913)                                        

 

             LYMPH % (test code = 7.6 %                                  



             736-9)                                              

 

             MONO % (test code = 3.8 %                                  



             5905-5)                                             

 

             EOS % (test code = 0.6 %                                  



             713-8)                                              

 

             BASO % (test code = 0.5 %                                  



             706-2)                                              

 

             GRAN MAT x10^3(ANC) 12.23 10*3/uL 1.88-7.09    H            



             (test code =                                        



             [id number])                                         

 

             IMM GRAN x10^3 (test 0.09 10*3/uL 0.00-0.06    H            



             code = 1799733249)                                        

 

             LYMPH x10^3 (test code 1.07 10*3/uL 1.32-3.29    L            



             = 731-0)                                            

 

             MONO x10^3 (test code 0.54 10*3/uL 0.33-0.92                 



             = 742-7)                                            

 

             EOS x10^3 (test code = 0.08 10*3/uL 0.03-0.39                 



             711-2)                                              

 

             BASO x10^3 (test code 0.07 10*3/uL 0.01-0.07                 



             = 704-7)                                            

 

             Lab Interpretation Abnormal                               



             (test code = 24921-1)                                        



[id number] 05:23:37





             Test Item    Value        Reference Range Interpretation Comments

 

             SARS-CoV-2 (COVID-19) RNA [Presence] Detected                      

         



             in Respiratory specimen by CECIL with                                

        



             probe detection (test code =                                       

 



             48368-8)                                            

 

             Whether patient resides in a No                                    

 



             congregate care setting (test code =                               

         



             73610-0)                                            

 

             Date and time of symptom onset (test Unknown                       

         



             code = 65029-1)                                        

 

             Whether the patient was hospitalized No                            

         



             for condition of interest (test code                               

         



             = 50904-5)                                          

 

             Whether the patient was admitted to No                             

        



             intensive care unit (ICU) for                                      

  



             condition of interest (test code =                                 

       



             89973-1)                                            

 

             Whether patient is employed in a No                                

     



             healthcare setting (test code =                                    

    



             91465-0)                                            

 

             Whether the patient has symptoms No                                

     



             related to condition of interest                                   

     



             (test code = 30547-5)                                        

 

             Pregnancy status (test code = No                                   

  



             46199-9)                                            



MAX LÓPEZ WESTDrug Screen,[id number] 20:35:00





             Test Item    Value        Reference Range Interpretation Comments

 

             PCP Phencyclidine Screen,Urine (test Negative     Negative         

         



             code = PCPU)                                        

 

             Amphetamine Screen,Urine (test code Negative     Negative          

        



             = AMPU)                                             

 

             Methadone Screen,Urine (test code = Negative     Negative          

        



             METHU)                                              

 

             Opiate Screen,Urine (test code = Negative     Negative             

     



             UOPIS)                                              

 

             Barbituates Screen,Urine (test code Negative     Negative          

        



             = BARBU)                                            

 

             Benzodiazepines Screen,Urine (test Negative     Negative           

       



             code = UBENZS)                                        

 

             Cocaine Screen,Urine (test code = Negative     Negative            

      



             UCOCS)                                              

 

             Cannabinoid Screen,Urine (test code Negative     Negative          

        



             = UTHCS)                                            

 

             Propoxyphene Screen, Urine (test Negative     Negative             

     



             code = UPROP)                                        



Urine [id number] 20:35:00





             Test Item    Value        Reference Range Interpretation Comments

 

             RBC,Urine (test code = URBC.NP) 0-2 /HPF     0-2                   

    

 

             WBC,Urine (test code = UWBC.NP) 0-5 /HPF     0-5                   

    

 

             Bacteria,Urine (test code = Few /HPF     None Seen    A            



             UBACT.NP)                                           

 

             Squamous Epithelial Cell,Urine 6-10 /HPF    0-5          A         

   



             (test code = USQEPI.NP)                                        

 

             Amorphous Crystals,Urine (test code Few /HPF     None Seen    A    

        



             = UAMSE)                                            

 

             Hyaline Casts,Urine (test code = 0-5 /HPF     None Seen    A       

     



             UHYALC.XX)                                          



UA, Urinalysis Rflx Cult/[id number] 20:35:00





             Test Item    Value        Reference Range Interpretation Comments

 

             Color,Urine (test code = UCOL) Yellow       Yellow                 

   

 

             Clarity,Urine (test code = Clear        Clear                     



             UCLAR)                                              

 

             Ph, Urine (test code = UPH) 6.5          5.0-9.0      N            

 

             Specific Gravity,Urine (test 1.015        1.005-1.030  N           

 



             code = USG)                                         

 

             Blood,Urine (test code = UBLD) Negative mg/dL Negative             

     

 

             Protein,Urine (test code = Negative mg/dL Negative                 

 



             UPRO)                                               

 

             Glucose,Urine (UA) (test code Negative mg/dL Negative              

    



             = UGLU)                                             

 

             Ketones,Urine (test code = Negative mg/dL Negative                 

 



             UKET)                                               

 

             Nitrate,Urine (test code = Negative     Negative                  



             UNIT)                                               

 

             Bilirubin,Urine (test code = Negative mg/dL Negative               

   



             UBIL)                                               

 

             Urobilinogen,Urine (test code 1.0 E.U./dL  Normal                  

  



             = UURO)                                             

 

             Leukocyte Esterase,Urine (test Trace mg/dL  Negative     A         

   



             code = ULEU)                                        



Comprehensive Metabolic [id number] 20:19:00





             Test Item    Value        Reference Range Interpretation Comments

 

             SODIUM (test code = NA) 142.0 mmol/L 136.0-145.0  N            

 

             Potassium,K (test code = 4.2 mmol/L   3.0-5.1      N            



             K)                                                  

 

             Chloride (test code = 110 mmol/L          H            



             CL)                                                 

 

             Carbon Dioxide (test 26 mmol/L    20-31        N            



             code = CO2)                                         

 

             Anion Gap (test code = 6 mmol/L     5-15         N            



             GAP)                                                

 

             Blood Urea Nitrogen 17 mg/dL     9-23         N            



             (test code = BUN)                                        

 

             Creatinine (test code = 0.88 mg/dL   0.55-1.02    N            



             CREATT)                                             

 

             Creatinine Clr Calc 80.94 mL/min                           



             Pharmacy (test code =                                        



             CRCLPHA)                                            

 

             Estimated Glomerular 78           See_Comment  L            Reporte

d eGFR is



             Filt Rate (test code =                                        based

 on the



             EGFR.XX)                                            CKD-EPI 



                                                                 equation thatdo

es



                                                                 not use a race



                                                                 coefficient.



                                                                 Additional



                                                                 information can

be



                                                                 found



                                                                 at:[id number]_u

cb_



                                                                 egfr_summary_fl

andry



                                                                 5.pdf (kidney.o

rg)



                                                                 [Automated



                                                                 message] The



                                                                 system which



                                                                 generated this



                                                                 result transmit

gianfranco



                                                                 reference range

:



                                                                 >=90



                                                                 ml/min/1.73m2. 

The



                                                                 reference range



                                                                 was not used to



                                                                 interpret this



                                                                 result as



                                                                 normal/abnormal

.

 

             BUN/Creatinine Ratio 19 ratio     10-20        N            



             (test code = BCRATIO)                                        

 

             Glucose (test code = 92 mg/dL            N            



             GLU)                                                

 

             Osmolality,Calculated 295.0                                  



             (test code = OSMOC)                                        

 

             Calcium (test code = CA) 9.1 mg/dL    8.3-10.6     N            

 

             Bilirubin,Total (test 0.3 mg/dL    0.2-1.1      N            



             code = BILIT)                                        

 

             Aspartate Amino 22 U/L       0-34         N            



             Transferase (test code =                                        



             AST)                                                

 

             Alanine Aminotransferase 15 U/L       10-49        N            



             (test code = ALT)                                        

 

             Total Protein (test code 6.9 g/dL     5.7-8.2      N            



             = TP)                                               

 

             Albumin Level (test code 3.9 g/dL     3.2-4.8      N            



             = ALB)                                              

 

             Globulin (test code = 3.0 mg/dL    2.3-3.5      N            



             GLOB)                                               

 

             Albumin/Globulin Ratio 1.3 ratio    0.8-2.0      N            



             (test code = AGRATIO)                                        

 

             Alkaline Phosphatase 109 U/L             N            



             (test code = ALP)                                        



Ethanol [id number] 20:19:00





             Test Item    Value        Reference Range Interpretation Comments

 

             Ethanol (test code < 3 mg/dL                              The pharm

acological



             = ETOH)                                             response to blo

od alcohol



                                                                 levels mayvary 

from



                                                                 individual to i

ndividual.



                                                                 The fatal larisa

ntrationhas



                                                                 been reported t

o be



                                                                 >400mg/dL.



HCG, Serum Qual (LAB)2023 20:19:00





             Test Item    Value        Reference Range Interpretation Comments

 

             HCG, Serum Qual (LAB) (test code = Negative     Negative           

       



             HCGQ)                                               



Complete Blood Count Auto [id number] 20:19:00





             Test Item    Value        Reference Range Interpretation Comments

 

             White Blood Count (test code = 7.9 x10 3/uL 4.4-10.5     N         

   



             WBCT)                                               

 

             Red Blood Count (test code = 4.27 x10 6/uL 3.75-5.20    N          

  



             RBC)                                                

 

             Hemoglobin (test code = HGBT) 12.9 g/dL    12.2-14.8    N          

  

 

             Hematocrit (test code = HCTT) 40.2 %       36.5-44.4    N          

  

 

             Mean Corpuscular Volume (test 94.10 fL     80..00 N          

  



             code = MCV)                                         

 

             Mean Corpuscular Hemoglobin 30.2 pg      27.0-32.5    N            



             (test code = MCH)                                        

 

             Mean Corpuscular HGB Conc 32.10 g/dL   32.00-37.50  N            



             (test code = MCHC)                                        

 

             RDW Coefficient of Variation 12.4 %       11.5-14.5    N           

 



             (test code = RDWCV)                                        

 

             Platelet Count (test code = 211.0 x10 3/uL 140.0-440.0  N          

  



             PLTT)                                               

 

             Mean Platelet Volume (test 11.1 fL                                



             code = MPV)                                         

 

             Immature Granulocytes % (Auto) 0.3 %        0.0-5.0      N         

   



             (test code = IMMGRAN%)                                        

 

             Neutrophils % (Auto) (test 75.8 %       36.0-70.0    H            



             code = NE%)                                         

 

             Lymphocytes % (Auto) (test 10.6 %       12.0-44.0    L            



             code = LY%)                                         

 

             Monocytes % (Auto) (test code 11.8 %       0.0-11.0     H          

  



             = MO%)                                              

 

             Eosinophils % (Auto) (test 0.9 %        0.0-7.0      N            



             code = EO%)                                         

 

             Basophils % (Auto) (test code 0.6 %        0.0-2.0      N          

  



             = BA%)                                              

 

             Immature Granulocytes # (Auto) 0.02 x10 3/uL                       

    



             (test code = IMMGRAN#)                                        

 

             Neutrophils # (Auto) (test 6.0 x10 3/uL 1.6-7.4      N            



             code = NE#)                                         

 

             Lymphocytes # (Auto) (test 0.83 x10 3/uL 0.50-4.60    N            



             code = LY#)                                         

 

             Monocytes # (Auto) (test code 0.93 x10 3/uL 0.00-1.20    N         

   



             = MO#)                                              

 

             Eosinophils # (Auto) (test 0.07 x10 3/uL 0.00-0.74    N            



             code = EO#)                                         

 

             Basophils # (Auto) (test code 0.05 x10 3/uL 0.00-0.21    N         

   



             = BA#)                                              

 

             nRBC Abs (test code = NRBCA) 0                                     

 

 

             nRBC Pct (test code = NRBCP) 0 %                                   

 



URINE AND [id number] 15:40:00





             Test Item    Value        Reference Range Interpretation Comments

 

             UA Sq Epi (test code = UA Sq Epi) Many /LPF                        

      



[facility] [id number] 15:40:00





             Test Item    Value        Reference Range Interpretation Comments

 

             UA WBC (test code = 9            See_Comment                [Automa

gianfranco message] The



             UA WBC)                                             system which ge

nerated this



                                                                 result transmit

gianfranco



                                                                 reference range

: <=5. The



                                                                 reference range

 was not



                                                                 used to interpr

et this



                                                                 result as xenia

l/abnormal.



[facility] [id number] 15:40:00





             Test Item    Value        Reference Range Interpretation Comments

 

             UA Bacteria (test code = UA Occasional /HPF                        

   



             Bacteria)                                           



[facility] [id number] 15:40:00





             Test Item    Value        Reference Range Interpretation Comments

 

             UA Amorph Delmis (test code = Occasional /HPF                        

   



             UA Amorph Delmis)                                        



[facility] [id number] 15:40:00





             Test Item    Value        Reference Range Interpretation Comments

 

             UA CaOx Delmis (test code = UA Occasional /HPF                       

    



             CaOx Delmis)                                          



[facility] [id number] 15:40:00





             Test Item    Value        Reference Range Interpretation Comments

 

             UA Color (test code = Yellow *NA*(23                          

 



             UA Color)    9:40 AM)                               



[facility] [id number] 15:40:00





             Test Item    Value        Reference Range Interpretation Comments

 

             UA Turbidity (test code = Clear (23 9:40                      

     



             UA Turbidity) AM)                                    



[facility] [id number] 15:40:00





             Test Item    Value        Reference Range Interpretation Comments

 

             UA Spec Grav (test code = UA Spec 1.010 1                          

      



             Grav)                                               



[facility] [id number] 15:40:00





             Test Item    Value        Reference Range Interpretation Comments

 

             UA pH (test code = UA pH) 6.0 1        5.0-8.0                   



[facility] [id number] 15:40:00





             Test Item    Value        Reference Range Interpretation Comments

 

             UA Protein (test code Negative (23 9:40                       

    



             = UA Protein) AM)                                    



[facility] AND [id number] 15:40:00





             Test Item    Value        Reference Range Interpretation Comments

 

             UA Glucose (test code Negative (23 9:40                       

    



             = UA Glucose) AM)                                    



[facility] AND [id number] 15:40:00





             Test Item    Value        Reference Range Interpretation Comments

 

             UA Ketones (test code Negative *NA*(23                        

   



             = UA Ketones) 9:40 AM)                               



[facility] AND [id number] 15:40:00





             Test Item    Value        Reference Range Interpretation Comments

 

             UA Bili (test code = Negative *NA*(23                         

  



             UA Bili)     9:40 AM)                               



[facility] AND [id number] 15:40:00





             Test Item    Value        Reference Range Interpretation Comments

 

             UA Blood (test code = Negative (23 9:40                       

    



             UA Blood)    AM)                                    



[facility] AND [id number] 15:40:00





             Test Item    Value        Reference Range Interpretation Comments

 

             UA Urobilinogen (test code = UA 0.2          0.1-1.0               

    



             Urobilinogen)                                        



[facility] AND [id number] 15:40:00





             Test Item    Value        Reference Range Interpretation Comments

 

             UA Nitrite (test code Negative (23 9:40                       

    



             = UA Nitrite) AM)                                    



[facility] AND [id number] 15:40:00





             Test Item    Value        Reference Range Interpretation Comments

 

             UA Leuk Est (test code Small *ABN*(23                         

  



             = UA Leuk Est) 9:40 AM)                               



[facility] AND [id number] 15:40:00





             Test Item    Value        Reference Range Interpretation Comments

 

             UA RBC (test code = 2            See_Comment                [Automa

gianfranco message] The



             UA RBC)                                             system which ge

nerated this



                                                                 result transmit

gianfranco



                                                                 reference range

: <=2. The



                                                                 reference range

 was not



                                                                 used to interpr

et this



                                                                 result as xenia

l/abnormal.



[facility] AND [id number] 15:40:00





             Test Item    Value        Reference Range Interpretation Comments

 

             UA Sq Epi (test code = UA Sq Epi) Many /LPF                        

      



[facility] AND [id number] 15:40:00





             Test Item    Value        Reference Range Interpretation Comments

 

             UA WBC (test code = 9            See_Comment                [Automa

gianfranco message] The



             UA WBC)                                             system which ge

nerated this



                                                                 result transmit

gianfranco



                                                                 reference range

: <=5. The



                                                                 reference range

 was not



                                                                 used to interpr

et this



                                                                 result as xenia

l/abnormal.



[facility] [id number] 15:40:00





             Test Item    Value        Reference Range Interpretation Comments

 

             UA Bacteria (test code = UA Occasional /HPF                        

   



             Bacteria)                                           



[facility] [id number] 15:40:00





             Test Item    Value        Reference Range Interpretation Comments

 

             UA Amorph Delmis (test code = Occasional /HPF                        

   



             UA Amorph Delmis)                                        



[facility] [id number] 15:40:00





             Test Item    Value        Reference Range Interpretation Comments

 

             UA CaOx Delmis (test code = UA Occasional /HPF                       

    



             CaOx Delmis)                                          



[facility] [id number] 15:40:00





             Test Item    Value        Reference Range Interpretation Comments

 

             UA Color (test code = Yellow *NA*(23                          

 



             UA Color)    9:40 AM)                               



[facility] [id number] 15:40:00





             Test Item    Value        Reference Range Interpretation Comments

 

             UA Turbidity (test code = Clear (23 9:40                      

     



             UA Turbidity) AM)                                    



[facility] [id number] 15:40:00





             Test Item    Value        Reference Range Interpretation Comments

 

             UA Spec Grav (test code = UA Spec 1.010 1                          

      



             Grav)                                               



[facility] [id number] 15:40:00





             Test Item    Value        Reference Range Interpretation Comments

 

             UA Sq Epi (test code = UA Sq Epi) Many /LPF                        

      



[facility] [id number] 15:40:00





             Test Item    Value        Reference Range Interpretation Comments

 

             UA pH (test code = UA pH) 6.0 1        5.0-8.0                   



[facility] [id number] 15:40:00





             Test Item    Value        Reference Range Interpretation Comments

 

             UA Protein (test code Negative (23 9:40                       

    



             = UA Protein) AM)                                    



[facility] [id number] 15:40:00





             Test Item    Value        Reference Range Interpretation Comments

 

             UA Glucose (test code Negative (23 9:40                       

    



             = UA Glucose) AM)                                    



[facility] [id number] 15:40:00





             Test Item    Value        Reference Range Interpretation Comments

 

             UA Ketones (test code Negative *NA*(23                        

   



             = UA Ketones) 9:40 AM)                               



[facility] [id number] 15:40:00





             Test Item    Value        Reference Range Interpretation Comments

 

             UA Bili (test code = Negative *NA*(23                         

  



             UA Bili)     9:40 AM)                               



[facility] [id number] 15:40:00





             Test Item    Value        Reference Range Interpretation Comments

 

             UA Blood (test code = Negative (23 9:40                       

    



             UA Blood)    AM)                                    



[facility] [id number] 15:40:00





             Test Item    Value        Reference Range Interpretation Comments

 

             UA Urobilinogen (test code = UA 0.2          0.1-1.0               

    



             Urobilinogen)                                        



[facility] [id number] 15:40:00





             Test Item    Value        Reference Range Interpretation Comments

 

             UA Nitrite (test code Negative (23 9:40                       

    



             = UA Nitrite) AM)                                    



[facility] [id number] 15:40:00





             Test Item    Value        Reference Range Interpretation Comments

 

             UA Leuk Est (test code Small *ABN*(23                         

  



             = UA Leuk Est) 9:40 AM)                               



[facility] [id number] 15:40:00





             Test Item    Value        Reference Range Interpretation Comments

 

             UA RBC (test code = 2            See_Comment                [Automa

gianfranco message] The



             UA RBC)                                             system which ge

nerated this



                                                                 result transmit

gianfranco



                                                                 reference range

: <=2. The



                                                                 reference range

 was not



                                                                 used to interpr

et this



                                                                 result as xenia

l/abnormal.



[facility] [id number] 15:40:00





             Test Item    Value        Reference Range Interpretation Comments

 

             UA WBC (test code = 9            See_Comment                [Automa

gianfranco message] The



             UA WBC)                                             system which ge

nerated this



                                                                 result transmit

gianfranco



                                                                 reference range

: <=5. The



                                                                 reference range

 was not



                                                                 used to interpr

et this



                                                                 result as xenia

l/abnormal.



[facility] [id number] 15:40:00





             Test Item    Value        Reference Range Interpretation Comments

 

             UA Bacteria (test code = UA Occasional /HPF                        

   



             Bacteria)                                           



[facility] [id number] 15:40:00





             Test Item    Value        Reference Range Interpretation Comments

 

             UA Amorph Delmis (test code = Occasional /HPF                        

   



             UA Amorph Delmis)                                        



[facility] [id number] 15:40:00





             Test Item    Value        Reference Range Interpretation Comments

 

             UA CaOx Delmis (test code = UA Occasional /HPF                       

    



             CaOx Delmis)                                          



[facility] [id number] 15:40:00





             Test Item    Value        Reference Range Interpretation Comments

 

             UA Color (test code = Yellow *NA*(23                          

 



             UA Color)    9:40 AM)                               



[facility] [id number] 15:40:00





             Test Item    Value        Reference Range Interpretation Comments

 

             UA Sq Epi (test code = UA Sq Epi) Many /LPF                        

      



[facility] [id number] 15:40:00





             Test Item    Value        Reference Range Interpretation Comments

 

             UA WBC (test code = 9            See_Comment                [Automa

gianfranco message] The



             UA WBC)                                             system which ge

nerated this



                                                                 result transmit

gianfranco



                                                                 reference range

: <=5. The



                                                                 reference range

 was not



                                                                 used to interpr

et this



                                                                 result as xenia

l/abnormal.



[facility] [id number] 15:40:00





             Test Item    Value        Reference Range Interpretation Comments

 

             UA Bacteria (test code = UA Occasional /HPF                        

   



             Bacteria)                                           



[facility] [id number] 15:40:00





             Test Item    Value        Reference Range Interpretation Comments

 

             UA Amorph Delmis (test code = Occasional /HPF                        

   



             UA Amorph Delmis)                                        



[facility] [id number] 15:40:00





             Test Item    Value        Reference Range Interpretation Comments

 

             UA CaOx Delmis (test code = UA Occasional /HPF                       

    



             CaOx Delmis)                                          



[facility] [id number] 15:40:00





             Test Item    Value        Reference Range Interpretation Comments

 

             UA Color (test code = Yellow *NA*(23                          

 



             UA Color)    9:40 AM)                               



[facility] [id number] 15:40:00





             Test Item    Value        Reference Range Interpretation Comments

 

             UA Turbidity (test code = Clear (23 9:40                      

     



             UA Turbidity) AM)                                    



[facility] [id number] 15:40:00





             Test Item    Value        Reference Range Interpretation Comments

 

             UA Spec Grav (test code = UA Spec 1.010 1                          

      



             Grav)                                               



[facility] [id number] 15:40:00





             Test Item    Value        Reference Range Interpretation Comments

 

             UA Turbidity (test code = Clear (23 9:40                      

     



             UA Turbidity) AM)                                    



[facility] [id number] 15:40:00





             Test Item    Value        Reference Range Interpretation Comments

 

             UA pH (test code = UA pH) 6.0 1        5.0-8.0                   



[facility] [id number] 15:40:00





             Test Item    Value        Reference Range Interpretation Comments

 

             UA Protein (test code Negative (23 9:40                       

    



             = UA Protein) AM)                                    



[facility] [id number] 15:40:00





             Test Item    Value        Reference Range Interpretation Comments

 

             UA Glucose (test code Negative (23 9:40                       

    



             = UA Glucose) AM)                                    



[facility] [id number] 15:40:00





             Test Item    Value        Reference Range Interpretation Comments

 

             UA Ketones (test code Negative *NA*(23                        

   



             = UA Ketones) 9:40 AM)                               



[facility] [id number] 15:40:00





             Test Item    Value        Reference Range Interpretation Comments

 

             UA Bili (test code = Negative *NA*(23                         

  



             UA Bili)     9:40 AM)                               



[facility] [id number] 15:40:00





             Test Item    Value        Reference Range Interpretation Comments

 

             UA Blood (test code = Negative (23 9:40                       

    



             UA Blood)    AM)                                    



[facility] [id number] 15:40:00





             Test Item    Value        Reference Range Interpretation Comments

 

             UA Urobilinogen (test code = UA 0.2          0.1-1.0               

    



             Urobilinogen)                                        



[facility] [id number] 15:40:00





             Test Item    Value        Reference Range Interpretation Comments

 

             UA Nitrite (test code Negative (23 9:40                       

    



             = UA Nitrite) AM)                                    



[facility] [id number] 15:40:00





             Test Item    Value        Reference Range Interpretation Comments

 

             UA Leuk Est (test code Small *ABN*(23                         

  



             = UA Leuk Est) 9:40 AM)                               



[facility] [id number] 15:40:00





             Test Item    Value        Reference Range Interpretation Comments

 

             UA RBC (test code = 2            See_Comment                [Automa

gianfranco message] The



             UA RBC)                                             system which ge

nerated this



                                                                 result transmit

gianfranco



                                                                 reference range

: <=2. The



                                                                 reference range

 was not



                                                                 used to interpr

et this



                                                                 result as xenia

l/abnormal.



[facility] [id number] 15:40:00





             Test Item    Value        Reference Range Interpretation Comments

 

             UA Spec Grav (test code = UA Spec 1.010 1                          

      



             Grav)                                               



[facility] [id number] 15:40:00





             Test Item    Value        Reference Range Interpretation Comments

 

             UA pH (test code = UA pH) 6.0 1        5.0-8.0                   



[facility] [id number] 15:40:00





             Test Item    Value        Reference Range Interpretation Comments

 

             UA Protein (test code Negative (23 9:40                       

    



             = UA Protein) AM)                                    



[facility] [id number] 15:40:00





             Test Item    Value        Reference Range Interpretation Comments

 

             UA Glucose (test code Negative (23 9:40                       

    



             = UA Glucose) AM)                                    



[facility] [id number] 15:40:00





             Test Item    Value        Reference Range Interpretation Comments

 

             UA Ketones (test code Negative *NA*(23                        

   



             = UA Ketones) 9:40 AM)                               



[facility] [id number] 15:40:00





             Test Item    Value        Reference Range Interpretation Comments

 

             UA Bili (test code = Negative *NA*(23                         

  



             UA Bili)     9:40 AM)                               



[facility] [id number] 15:40:00





             Test Item    Value        Reference Range Interpretation Comments

 

             UA Blood (test code = Negative (23 9:40                       

    



             UA Blood)    AM)                                    



[facility] [id number] 15:40:00





             Test Item    Value        Reference Range Interpretation Comments

 

             UA Urobilinogen (test code = UA 0.2          0.1-1.0               

    



             Urobilinogen)                                        



[facility] [id number] 15:40:00





             Test Item    Value        Reference Range Interpretation Comments

 

             UA Nitrite (test code Negative (23 9:40                       

    



             = UA Nitrite) AM)                                    



[facility] [id number] 15:40:00





             Test Item    Value        Reference Range Interpretation Comments

 

             UA Leuk Est (test code Small *ABN*(23                         

  



             = UA Leuk Est) 9:40 AM)                               



[facility] [id number] 15:40:00





             Test Item    Value        Reference Range Interpretation Comments

 

             UA RBC (test code = 2            See_Comment                [Automa

gianfranco message] The



             UA RBC)                                             system which ge

nerated this



                                                                 result transmit

gianfranco



                                                                 reference range

: <=2. The



                                                                 reference range

 was not



                                                                 used to interpr

et this



                                                                 result as xenia

l/abnormal.



[facility] [id number] 15:40:00





             Test Item    Value        Reference Range Interpretation Comments

 

             UA Sq Epi (test code = UA Sq Epi) Many /LPF                        

      



[facility] [id number] 15:40:00





             Test Item    Value        Reference Range Interpretation Comments

 

             UA WBC (test code = 9            See_Comment                [Automa

gianfranco message] The



             UA WBC)                                             system which ge

nerated this



                                                                 result transmit

gianfranco



                                                                 reference range

: <=5. The



                                                                 reference range

 was not



                                                                 used to interpr

et this



                                                                 result as xenia

l/abnormal.



[facility] [id number] 15:40:00





             Test Item    Value        Reference Range Interpretation Comments

 

             UA Bacteria (test code = UA Occasional /HPF                        

   



             Bacteria)                                           



[facility] [id number] 15:40:00





             Test Item    Value        Reference Range Interpretation Comments

 

             UA Amorph Delmis (test code = Occasional /HPF                        

   



             UA Amorph Delmis)                                        



[facility] [id number] 15:40:00





             Test Item    Value        Reference Range Interpretation Comments

 

             UA CaOx Delmis (test code = UA Occasional /HPF                       

    



             CaOx Delmis)                                          



[facility] [id number] 15:40:00





             Test Item    Value        Reference Range Interpretation Comments

 

             UA Color (test code = Yellow *NA*(23                          

 



             UA Color)    9:40 AM)                               



[facility] [id number] 15:40:00





             Test Item    Value        Reference Range Interpretation Comments

 

             UA Turbidity (test code = Clear (23 9:40                      

     



             UA Turbidity) AM)                                    



[facility] [id number] 15:40:00





             Test Item    Value        Reference Range Interpretation Comments

 

             UA Spec Grav (test code = UA Spec 1.010 1                          

      



             Grav)                                               



[facility] [id number] 15:40:00





             Test Item    Value        Reference Range Interpretation Comments

 

             UA pH (test code = UA pH) 6.0 1        5.0-8.0                   



[facility] [id number] 15:40:00





             Test Item    Value        Reference Range Interpretation Comments

 

             UA Protein (test code Negative (23 9:40                       

    



             = UA Protein) AM)                                    



[facility] [id number] 15:40:00





             Test Item    Value        Reference Range Interpretation Comments

 

             UA Glucose (test code Negative (23 9:40                       

    



             = UA Glucose) AM)                                    



[facility] [id number] 15:40:00





             Test Item    Value        Reference Range Interpretation Comments

 

             UA Ketones (test code Negative *NA*(23                        

   



             = UA Ketones) 9:40 AM)                               



[facility] [id number] 15:40:00





             Test Item    Value        Reference Range Interpretation Comments

 

             UA Bili (test code = Negative *NA*(23                         

  



             UA Bili)     9:40 AM)                               



[facility] [id number] 15:40:00





             Test Item    Value        Reference Range Interpretation Comments

 

             UA Blood (test code = Negative (23 9:40                       

    



             UA Blood)    AM)                                    



[facility] [id number] 15:40:00





             Test Item    Value        Reference Range Interpretation Comments

 

             UA Urobilinogen (test code = UA 0.2          0.1-1.0               

    



             Urobilinogen)                                        



[facility] [id number] 15:40:00





             Test Item    Value        Reference Range Interpretation Comments

 

             UA Nitrite (test code Negative (23 9:40                       

    



             = UA Nitrite) AM)                                    



[facility] [id number] 15:40:00





             Test Item    Value        Reference Range Interpretation Comments

 

             UA Leuk Est (test code Small *ABN*(23                         

  



             = UA Leuk Est) 9:40 AM)                               



[facility] [id number] 15:40:00





             Test Item    Value        Reference Range Interpretation Comments

 

             UA RBC (test code = 2            See_Comment                [Automa

gianfranco message] The



             UA RBC)                                             system which ge

nerated this



                                                                 result transmit

gianfranco



                                                                 reference range

: <=2. The



                                                                 reference range

 was not



                                                                 used to interpr

et this



                                                                 result as xenia

l/abnormal.



[facility] HospitalMdbstdvBHMKAZOCJ9984-13-92 15:25:24





             Test Item    Value        Reference Range Interpretation Comments

 

             Glucose Lvl (test code = Glucose Lvl) 93           70-99           

          



[facility]Jccbf[facility]CNQZDEPPO4432-53-00 15:25:24





             Test Item    Value        Reference Range Interpretation Comments

 

             BUN (test code = BUN) 19           7-22                      



[facility] HospitalCdnomhfAWGGXJNMV0649-37-27 15:25:24





             Test Item    Value        Reference Range Interpretation Comments

 

             Creatinine Lvl (test code = Creatinine 0.85         0.50-1.40      

           



             Lvl)                                                



[facility] HospitalFualltrRAKBLYQCQ0710-39-80 15:25:24





             Test Item    Value        Reference Range Interpretation Comments

 

             Sodium Lvl (test code = Sodium Lvl) 142          135-145           

        



[facility] HospitalFyomsukLUTRJNSBQ8769-30-70 15:25:24





             Test Item    Value        Reference Range Interpretation Comments

 

             Potassium Lvl (test code = Potassium 4.4          3.5-5.1          

         



             Lvl)                                                



[facility] HospitalHkqxicuFLOUSOSXD0368-76-26 15:25:24





             Test Item    Value        Reference Range Interpretation Comments

 

             Chloride Lvl (test code = Chloride Lvl) 108                  

            



[facility] HospitalDywpybdKPRHNSGIC7763-80-80 15:25:24





             Test Item    Value        Reference Range Interpretation Comments

 

             CO2 (test code = CO2) 28           24-32                     



[facility] HospitalOlhcmkeVVIVXMIAR7639-20-35 15:25:24





             Test Item    Value        Reference Range Interpretation Comments

 

             Calcium Lvl (test code = Calcium Lvl) 10.0         8.5-10.5        

          



[facility] HospitalLvuoqxbQVJOSAQIS6328-95-20 15:25:24





             Test Item    Value        Reference Range Interpretation Comments

 

             AGAP (test code = AGAP) 10.4         10.0-20.0                 



[facility] HospitalZnfkxcrEYOKVIJPM2489-75-77 15:25:24





             Test Item    Value        Reference Range Interpretation Comments

 

             eGFR (test code = eGFR) 81                                     



[facility] HospitalQvmmxhdNFFBMYHOH6079-82-86 15:25:24





             Test Item    Value        Reference Range Interpretation Comments

 

             Lipase Lvl (test code = Lipase Lvl) 123                      

        



[facility] HospitalXydounsFDXXCZEMY5046-93-15 15:25:24





             Test Item    Value        Reference Range Interpretation Comments

 

             Total Protein (test code = Total 8.0          6.4-8.4              

     



             Protein)                                            



[facility] HospitalWuftnoyVBOOHLPHX5633-99-64 15:25:24





             Test Item    Value        Reference Range Interpretation Comments

 

             Albumin Lvl (test code = Albumin Lvl) 3.7          3.5-5.0         

          



[facility] HospitalCobfoxuFUJPHAVJD0907-01-31 15:25:24





             Test Item    Value        Reference Range Interpretation Comments

 

             Globulin (test code = Globulin) 4.3          2.7-4.2               

    



[facility] HospitalLaqfbasANEHIJEAY3125-67-50 15:25:24





             Test Item    Value        Reference Range Interpretation Comments

 

             A/G Ratio (test code = A/G Ratio) 0.9 1        0.7-1.6             

      



[facility]hamIgktsodSUZIOYLDO3000-87-77 15:25:24





             Test Item    Value        Reference Range Interpretation Comments

 

             ALANINE AMINOTRANSFERASE 20           See_Comment                [A

utomated message]



             (test code = ALANINE                                        The sys

tem which



             AMINOTRANSFERASE)                                        generated 

this result



                                                                 transmitted ref

erence



                                                                 range: <=65. Th

e



                                                                 reference range

 was



                                                                 not used to int

erpret



                                                                 this result as



                                                                 normal/abnormal

.



[facility]hamXaowaslBPDFJNTUL9924-64-04 15:25:24





             Test Item    Value        Reference Range Interpretation Comments

 

             AST (test code = AST) 19           See_Comment                [Auto

mated message] The



                                                                 system which ge

nerated this



                                                                 result transmit

gianfranco



                                                                 reference range

: <=37. The



                                                                 reference range

 was not



                                                                 used to interpr

et this



                                                                 result as xenia

l/abnormal.



[facility]hamHewwvcoWYYPNDELB1812-09-18 15:25:24





             Test Item    Value        Reference Range Interpretation Comments

 

             Alk Phos (test code = Alk Phos) 123                          

    



[facility]hamRuzqvscFDVBQWRRR8398-90-37 15:25:24





             Test Item    Value        Reference Range Interpretation Comments

 

             Bili Total (test code = Bili Total) 0.3          0.2-1.3           

        



[facility] BrenhamNopsdwzWONQWXNIX0583-49-16 15:25:24





             Test Item    Value        Reference Range Interpretation Comments

 

             Bili Direct (test code no gt        See_Comment                [Aut

omated message] The



             = Bili Direct)                                        system which 

generated



                                                                 this result tra

nsmitted



                                                                 reference range

: <=0.3.



                                                                 The reference r

jihan was



                                                                 not used to int

erpret this



                                                                 result as xneia

l/abnormal.



[facility] BrenhamDmzzjlbSQEIZNXMK5931-03-54 15:25:24





             Test Item    Value        Reference Range Interpretation Comments

 

             Bili Indirect Unable to    See_Comment                [Automated



             (test code = Bili Calculate                              message] T

he system



             Indirect)                                           which generated



                                                                 this result



                                                                 transmitted



                                                                 reference range

:



                                                                 <=1.0. The



                                                                 reference range

 was



                                                                 not used to



                                                                 interpret this



                                                                 result as



                                                                 normal/abnormal

.



[facility] BrenhamByqtrheONSKKCHKQI3097-40-47 15:25:24





             Test Item    Value        Reference Range Interpretation Comments

 

             WBC X 10x3 (test code = WBC X 10x3) 9.4          3.7-10.4          

        



[facility] BrenhamLckhumuXZOQWLPJSK8215-63-34 15:25:24





             Test Item    Value        Reference Range Interpretation Comments

 

             RBC X 10x6 (test code = RBC X 10x6) 4.59         4.20-5.40         

        



[place] [address]-02-14 15:25:24





             Test Item    Value        Reference Range Interpretation Comments

 

             Hgb (test code = Hgb) 14.1         12.0-16.0                 



[place] 362313-02-14 15:25:24





             Test Item    Value        Reference Range Interpretation Comments

 

             Hct (test code = Hct) 42.8         36.0-48.0                 



[place] 362313-02-14 15:25:24





             Test Item    Value        Reference Range Interpretation Comments

 

             MCV (test code = MCV) 93.2         80.0-98.0                 



[place] 362313-02-14 15:25:24





             Test Item    Value        Reference Range Interpretation Comments

 

             MCH (test code = MCH) 30.8 pg      27.0-31.0                 



[facility] TomballWmlyilrXYMERHSVJA2364-00-63 15:25:24





             Test Item    Value        Reference Range Interpretation Comments

 

             MCHC (test code = MCHC) 33.0         32.0-36.0                 



[facility] TomballFtqjfiqJJVCXWAVVU0579-70-61 15:25:24





             Test Item    Value        Reference Range Interpretation Comments

 

             RDW (test code = RDW) 13.0         11.5-14.5                 



[facility] TomballKimqkqcVNSHEEDOSZ3987-91-83 15:25:24





             Test Item    Value        Reference Range Interpretation Comments

 

             Platelet (test code = Platelet) 200          133-450               

    



[facility] TomballRfwqwvtQUHXZSKZZG2962-55-29 15:25:24





             Test Item    Value        Reference Range Interpretation Comments

 

             MPV (test code = MPV) 8.6          7.4-10.4                  



[facility] TomballQtofqjtAQLHCTTXRM7194-88-95 15:25:24





             Test Item    Value        Reference Range Interpretation Comments

 

             Segs (test code = Segs) 86.0         45.0-75.0                 



[place] 362313-02-14 15:25:24





             Test Item    Value        Reference Range Interpretation Comments

 

             Lymphocytes (test code = Lymphocytes) 5.5          20.0-40.0       

          



[place] [address]-02-14 15:25:24





             Test Item    Value        Reference Range Interpretation Comments

 

             Monocytes (test code = Monocytes) 7.0          2.0-12.0            

      



[place] [address]-02-14 15:25:24





             Test Item    Value        Reference Range Interpretation Comments

 

             Eosinophils (test code = 1.0          See_Comment                [A

utomated message] The



             Eosinophils)                                        system which ge

nerated



                                                                 this result tra

nsmitted



                                                                 reference range

: <=4.0.



                                                                 The reference r

jihan was



                                                                 not used to int

erpret



                                                                 this result as



                                                                 normal/abnormal

.



[facility] TomballDcwngqnBRLWHTWBFF7887-70-64 15:25:24





             Test Item    Value        Reference Range Interpretation Comments

 

             Basophils (test code = 0.5          See_Comment                [Aut

omated message] The



             Basophils)                                          system which ge

nerated



                                                                 this result tra

nsmitted



                                                                 reference range

: <=1.0.



                                                                 The reference r

jihan was



                                                                 not used to int

erpret



                                                                 this result as



                                                                 normal/abnormal

.



[facility] TomballNmhlotaTBNMKSVVTO8852-10-11 15:25:24





             Test Item    Value        Reference Range Interpretation Comments

 

             Neutrophils # (test code = Neutrophils 8.1          1.5-8.1        

           



             #)                                                  



[facility] TomballIiyyfedKVNZSLGCGK1678-17-90 15:25:24





             Test Item    Value        Reference Range Interpretation Comments

 

             Lymphocytes # (test code = Lymphocytes 0.5          1.0-5.5        

           



             #)                                                  



[facility] TomballDtsfcglDZOKOEUEWB9468-40-58 15:25:24





             Test Item    Value        Reference Range Interpretation Comments

 

             Monocytes # (test code 0.7          See_Comment                [Aut

omated message] The



             = Monocytes #)                                        system which 

generated



                                                                 this result tra

nsmitted



                                                                 reference range

: <=0.8.



                                                                 The reference r

jihan was



                                                                 not used to int

erpret



                                                                 this result as



                                                                 normal/abnormal

.



[facility] TomballLxpaggwYMCYJAAALX9102-99-39 15:25:24





             Test Item    Value        Reference Range Interpretation Comments

 

             Eosinophils # (test code 0.1          See_Comment                [A

utomated message] The



             = Eosinophils #)                                        system whic

h generated



                                                                 this result tra

nsmitted



                                                                 reference range

: <=0.5.



                                                                 The reference r

jihan was



                                                                 not used to int

erpret



                                                                 this result as



                                                                 normal/abnormal

.



[facility] HospitalKuflmhsTDJGIGBQN0055-27-08 15:25:24





             Test Item    Value        Reference Range Interpretation Comments

 

             Glucose Lvl (test code = Glucose Lvl) 93           70-99           

          



[place] 872133-02-14 15:25:24





             Test Item    Value        Reference Range Interpretation Comments

 

             BUN (test code = BUN) 19           7-22                      



[place] 872133-02-14 15:25:24





             Test Item    Value        Reference Range Interpretation Comments

 

             Creatinine Lvl (test code = Creatinine 0.85         0.50-1.40      

           



             Lvl)                                                



[facility] HospitalTcxvidjYVLXPYIYC8683-35-22 15:25:24





             Test Item    Value        Reference Range Interpretation Comments

 

             Sodium Lvl (test code = Sodium Lvl) 142          135-145           

        



[facility] HospitalXiyhixbEFFASYTPF2462-42-78 15:25:24





             Test Item    Value        Reference Range Interpretation Comments

 

             Potassium Lvl (test code = Potassium 4.4          3.5-5.1          

         



             Lvl)                                                



[facility] HospitalOoerlbgUIXSXNHZX7238-26-40 15:25:24





             Test Item    Value        Reference Range Interpretation Comments

 

             Chloride Lvl (test code = Chloride Lvl) 108                  

            



[facility] HospitalYkeioeqKRMWBUWUS2612-60-61 15:25:24





             Test Item    Value        Reference Range Interpretation Comments

 

             CO2 (test code = CO2) 28           24-32                     



[facility] HospitalUxjzlrwYTZQHESDQ6314-37-19 15:25:24





             Test Item    Value        Reference Range Interpretation Comments

 

             Calcium Lvl (test code = Calcium Lvl) 10.0         8.5-10.5        

          



[facility] HospitalLiwhrnnYQQJFXHWF0211-74-05 15:25:24





             Test Item    Value        Reference Range Interpretation Comments

 

             AGAP (test code = AGAP) 10.4         10.0-20.0                 



[facility] HospitalVcdaxscFGCROPPGS5601-96-84 15:25:24





             Test Item    Value        Reference Range Interpretation Comments

 

             eGFR (test code = eGFR) 81                                     



[facility] HospitalQgqvrvcSEVAMLGSV1838-03-05 15:25:24





             Test Item    Value        Reference Range Interpretation Comments

 

             Lipase Lvl (test code = Lipase Lvl) 123                      

        



[facility] HospitalHrqkddfOXYLYIDTH6092-21-65 15:25:24





             Test Item    Value        Reference Range Interpretation Comments

 

             Total Protein (test code = Total 8.0          6.4-8.4              

     



             Protein)                                            



[facility] HospitalDruwkhnWBNODQCYC1170-85-31 15:25:24





             Test Item    Value        Reference Range Interpretation Comments

 

             Albumin Lvl (test code = Albumin Lvl) 3.7          3.5-5.0         

          



[facility] HospitalHwljgguBZXREQGFV7300-94-54 15:25:24





             Test Item    Value        Reference Range Interpretation Comments

 

             Globulin (test code = Globulin) 4.3          2.7-4.2               

    



[facility] HospitalCvcnburWEXFCFSDN8232-37-46 15:25:24





             Test Item    Value        Reference Range Interpretation Comments

 

             A/G Ratio (test code = A/G Ratio) 0.9 1        0.7-1.6             

      



[facility] HospitalYwsvyavVVBGPENPB5380-52-79 15:25:24





             Test Item    Value        Reference Range Interpretation Comments

 

             ALANINE AMINOTRANSFERASE 20           See_Comment                [A

utomated message]



             (test code = ALANINE                                        The sys

tem which



             AMINOTRANSFERASE)                                        generated 

this result



                                                                 transmitted ref

erence



                                                                 range: <=65. Th

e



                                                                 reference range

 was



                                                                 not used to int

erpret



                                                                 this result as



                                                                 normal/abnormal

.



[facility]hamRxtxifmAHHDYXLTM1664-49-45 15:25:24





             Test Item    Value        Reference Range Interpretation Comments

 

             AST (test code = AST) 19           See_Comment                [Auto

mated message] The



                                                                 system which ge

nerated this



                                                                 result transmit

gianfranco



                                                                 reference range

: <=37. The



                                                                 reference range

 was not



                                                                 used to interpr

et this



                                                                 result as xenia

l/abnormal.



[facility] BrenhamIbanopzVPCIGUQTR1785-94-68 15:25:24





             Test Item    Value        Reference Range Interpretation Comments

 

             Alk Phos (test code = Alk Phos) 123                          

    



[facility]hamPdncicrWCERTNPNT6978-06-16 15:25:24





             Test Item    Value        Reference Range Interpretation Comments

 

             Bili Total (test code = Bili Total) 0.3          0.2-1.3           

        



[facility] BrenhamTbznmqqCZPVXUFYP2419-42-24 15:25:24





             Test Item    Value        Reference Range Interpretation Comments

 

             Bili Direct (test code no gt        See_Comment                [Aut

omated message] The



             = Bili Direct)                                        system which 

generated



                                                                 this result tra

nsmitted



                                                                 reference range

: <=0.3.



                                                                 The reference r

jihan was



                                                                 not used to int

erpret this



                                                                 result as xenia

l/abnormal.



[facility] BrenhamDuvxzjbQEGLKHJCH7377-24-45 15:25:24





             Test Item    Value        Reference Range Interpretation Comments

 

             Bili Indirect Unable to    See_Comment                [Automated



             (test code = Bili Calculate                              message] T

he system



             Indirect)                                           which generated



                                                                 this result



                                                                 transmitted



                                                                 reference range

:



                                                                 <=1.0. The



                                                                 reference range

 was



                                                                 not used to



                                                                 interpret this



                                                                 result as



                                                                 normal/abnormal

.



[facility] BrenhamEmqpfufVJLSWFNOMJ6777-83-39 15:25:24





             Test Item    Value        Reference Range Interpretation Comments

 

             WBC X 10x3 (test code = WBC X 10x3) 9.4          3.7-10.4          

        



[facility] BrenhamIyrrkznAHRLUDEXTP4481-70-19 15:25:24





             Test Item    Value        Reference Range Interpretation Comments

 

             RBC X 10x6 (test code = RBC X 10x6) 4.59         4.20-5.40         

        



[facility] BrenhamAmzhqlxNSWBKHXIJX4505-55-82 15:25:24





             Test Item    Value        Reference Range Interpretation Comments

 

             Hgb (test code = Hgb) 14.1         12.0-16.0                 



[facility] BrenhamJtszaqpOUKDQRMACF5533-26-43 15:25:24





             Test Item    Value        Reference Range Interpretation Comments

 

             Hct (test code = Hct) 42.8         36.0-48.0                 



[place] 362313-02-14 15:25:24





             Test Item    Value        Reference Range Interpretation Comments

 

             MCV (test code = MCV) 93.2         80.0-98.0                 



[place] [address]-02-14 15:25:24





             Test Item    Value        Reference Range Interpretation Comments

 

             MCH (test code = MCH) 30.8 pg      27.0-31.0                 



[place] [address]-02-14 15:25:24





             Test Item    Value        Reference Range Interpretation Comments

 

             MCHC (test code = MCHC) 33.0         32.0-36.0                 



[facility]ballCnyfvyxPMXHAGOJGG6494[facility]68-64 15:25:24





             Test Item    Value        Reference Range Interpretation Comments

 

             RDW (test code = RDW) 13.0         11.5-14.5                 



[facility] TomballQtsptbqNKHHIXRRWK5908-55-16 15:25:24





             Test Item    Value        Reference Range Interpretation Comments

 

             Platelet (test code = Platelet) 200          133-450               

    



[facility] TomballTfkvcwrUIKHNHDBSY7317-56-54 15:25:24





             Test Item    Value        Reference Range Interpretation Comments

 

             MPV (test code = MPV) 8.6          7.4-10.4                  



[facility] TomballOgaikadDJOADQNMMK5954-85-02 15:25:24





             Test Item    Value        Reference Range Interpretation Comments

 

             Segs (test code = Segs) 86.0         45.0-75.0                 



[facility] TomballAifrkliOIIYSUUYXA1109-65-68 15:25:24





             Test Item    Value        Reference Range Interpretation Comments

 

             Lymphocytes (test code = Lymphocytes) 5.5          20.0-40.0       

          



[place] [address]-02-14 15:25:24





             Test Item    Value        Reference Range Interpretation Comments

 

             Monocytes (test code = Monocytes) 7.0          2.0-12.0            

      



[place] [address]-02-14 15:25:24





             Test Item    Value        Reference Range Interpretation Comments

 

             Eosinophils (test code = 1.0          See_Comment                [A

utomated message] The



             Eosinophils)                                        system which ge

nerated



                                                                 this result tra

nsmitted



                                                                 reference range

: <=4.0.



                                                                 The reference r

jihan was



                                                                 not used to int

erpret



                                                                 this result as



                                                                 normal/abnormal

.



[facility] TomballQwlmwrgFHRECZTGDZ9855-97-49 15:25:24





             Test Item    Value        Reference Range Interpretation Comments

 

             Basophils (test code = 0.5          See_Comment                [Aut

omated message] The



             Basophils)                                          system which ge

nerated



                                                                 this result tra

nsmitted



                                                                 reference range

: <=1.0.



                                                                 The reference r

jihan was



                                                                 not used to int

erpret



                                                                 this result as



                                                                 normal/abnormal

.



[facility] TomballHradwrvPUFRJUNHJE1049-80-91 15:25:24





             Test Item    Value        Reference Range Interpretation Comments

 

             Neutrophils # (test code = Neutrophils 8.1          1.5-8.1        

           



             #)                                                  



[facility] TomballKqiwkrbSUZXVINBCT4544-18-28 15:25:24





             Test Item    Value        Reference Range Interpretation Comments

 

             Lymphocytes # (test code = Lymphocytes 0.5          1.0-5.5        

           



             #)                                                  



[facility] TomballIdhqcoqNEUYHRJSTY5532-27-49 15:25:24





             Test Item    Value        Reference Range Interpretation Comments

 

             Monocytes # (test code 0.7          See_Comment                [Aut

omated message] The



             = Monocytes #)                                        system which 

generated



                                                                 this result tra

nsmitted



                                                                 reference range

: <=0.8.



                                                                 The reference r

jihan was



                                                                 not used to int

erpret



                                                                 this result as



                                                                 normal/abnormal

.



[facility] TomballNteivlpPHEAUHLICG8300-30-06 15:25:24





             Test Item    Value        Reference Range Interpretation Comments

 

             Eosinophils # (test code 0.1          See_Comment                [A

utomated message] The



             = Eosinophils #)                                        system whic

h generated



                                                                 this result tra

nsmitted



                                                                 reference range

: <=0.5.



                                                                 The reference r

jihan was



                                                                 not used to int

erpret



                                                                 this result as



                                                                 normal/abnormal

.



[facility] HospitalCievkwoJLFBVINQQ8376[facility]:24





             Test Item    Value        Reference Range Interpretation Comments

 

             Glucose Lvl (test code = Glucose Lvl) 93           70-99           

          



[place] 872133-02-14 15:25:24





             Test Item    Value        Reference Range Interpretation Comments

 

             BUN (test code = BUN) 19           7-22                      



[place] [address]-02-14 15:25:24





             Test Item    Value        Reference Range Interpretation Comments

 

             Creatinine Lvl (test code = Creatinine 0.85         0.50-1.40      

           



             Lvl)                                                



[place] 872133-02-14 15:25:24





             Test Item    Value        Reference Range Interpretation Comments

 

             Sodium Lvl (test code = Sodium Lvl) 142          135-145           

        



[place] 872133-02-14 15:25:24





             Test Item    Value        Reference Range Interpretation Comments

 

             Potassium Lvl (test code = Potassium 4.4          3.5-5.1          

         



             Lvl)                                                



[facility] HospitalPlygkyxYMHTXVOAA4031-68-65 15:25:24





             Test Item    Value        Reference Range Interpretation Comments

 

             Chloride Lvl (test code = Chloride Lvl) 108                  

            



[place] 872133-02-14 15:25:24





             Test Item    Value        Reference Range Interpretation Comments

 

             CO2 (test code = CO2) 28           24-32                     



[facility] HospitalEetscrtYQRAOBKFS5471-11-57 15:25:24





             Test Item    Value        Reference Range Interpretation Comments

 

             Calcium Lvl (test code = Calcium Lvl) 10.0         8.5-10.5        

          



[facility] HospitalXzifvrwJRDKVABFT1881-07-42 15:25:24





             Test Item    Value        Reference Range Interpretation Comments

 

             AGAP (test code = AGAP) 10.4         10.0-20.0                 



[facility] HospitalZzvhaexCRNBQFPSO6845-10-15 15:25:24





             Test Item    Value        Reference Range Interpretation Comments

 

             eGFR (test code = eGFR) 81                                     



[facility] HospitalDaaznrvOWXUPMNDX9004-81-03 15:25:24





             Test Item    Value        Reference Range Interpretation Comments

 

             Lipase Lvl (test code = Lipase Lvl) 123                      

        



[facility] HospitalQrkclbzEDOLQLOUT9445-23-07 15:25:24





             Test Item    Value        Reference Range Interpretation Comments

 

             Total Protein (test code = Total 8.0          6.4-8.4              

     



             Protein)                                            



[facility] HospitalNnivedsGLITZSMIH9101-90-77 15:25:24





             Test Item    Value        Reference Range Interpretation Comments

 

             Albumin Lvl (test code = Albumin Lvl) 3.7          3.5-5.0         

          



[facility] HospitalSovkhpiWFZZMEQPW8377-81-36 15:25:24





             Test Item    Value        Reference Range Interpretation Comments

 

             Globulin (test code = Globulin) 4.3          2.7-4.2               

    



[facility] HospitalOloqwuaISHOXAFSR6497-36-69 15:25:24





             Test Item    Value        Reference Range Interpretation Comments

 

             A/G Ratio (test code = A/G Ratio) 0.9 1        0.7-1.6             

      



[facility] HospitalPbmmvovYMFGTGWIA2618-51-89 15:25:24





             Test Item    Value        Reference Range Interpretation Comments

 

             ALANINE AMINOTRANSFERASE 20           See_Comment                [A

utomated message]



             (test code = ALANINE                                        The sys

tem which



             AMINOTRANSFERASE)                                        generated 

this result



                                                                 transmitted ref

erence



                                                                 range: <=65. Th

e



                                                                 reference range

 was



                                                                 not used to int

erpret



                                                                 this result as



                                                                 normal/abnormal

.



[facility] HospitalCuuanxvXWHPTMASE4928-31-15 15:25:24





             Test Item    Value        Reference Range Interpretation Comments

 

             AST (test code = AST) 19           See_Comment                [Auto

mated message] The



                                                                 system which ge

nerated this



                                                                 result transmit

gianfranco



                                                                 reference range

: <=37. The



                                                                 reference range

 was not



                                                                 used to interpr

et this



                                                                 result as xenia

l/abnormal.



[facility] HospitalWuhmnlmPMABBRUFA5983-50-67 15:25:24





             Test Item    Value        Reference Range Interpretation Comments

 

             Alk Phos (test code = Alk Phos) 123                          

    



[facility] HospitalQtegmrxVZZTRRMKT5150-22-00 15:25:24





             Test Item    Value        Reference Range Interpretation Comments

 

             Bili Total (test code = Bili Total) 0.3          0.2-1.3           

        



[facility] HospitalKemxozoIWIZODRVL0500-46-09 15:25:24





             Test Item    Value        Reference Range Interpretation Comments

 

             Bili Direct (test code no gt        See_Comment                [Aut

omated message] The



             = Bili Direct)                                        system which 

generated



                                                                 this result tra

nsmitted



                                                                 reference range

: <=0.3.



                                                                 The reference r

jihan was



                                                                 not used to int

erpret this



                                                                 result as xenia

l/abnormal.



[facility] HospitalWpfrtbmLTZKTJEGZ2332-00-20 15:25:24





             Test Item    Value        Reference Range Interpretation Comments

 

             Bili Indirect Unable to    See_Comment                [Automated



             (test code = Bili Calculate                              message] T

he system



             Indirect)                                           which generated



                                                                 this result



                                                                 transmitted



                                                                 reference range

:



                                                                 <=1.0. The



                                                                 reference range

 was



                                                                 not used to



                                                                 interpret this



                                                                 result as



                                                                 normal/abnormal

.



[facility]PmrqhtjOZYIQCVEOO0745-37-21 15:25:24





             Test Item    Value        Reference Range Interpretation Comments

 

             WBC X 10x3 (test code = WBC X 10x3) 9.4          3.7-10.4          

        



[facility] TomballXdnreosILUCOALKGZ2585-31-87 15:25:24





             Test Item    Value        Reference Range Interpretation Comments

 

             RBC X 10x6 (test code = RBC X 10x6) 4.59         4.20-5.40         

        



[place] 362313-02-14 15:25:24





             Test Item    Value        Reference Range Interpretation Comments

 

             Hgb (test code = Hgb) 14.1         12.0-16.0                 



[place] 362313-02-14 15:25:24





             Test Item    Value        Reference Range Interpretation Comments

 

             Hct (test code = Hct) 42.8         36.0-48.0                 



[place] 362313-02-14 15:25:24





             Test Item    Value        Reference Range Interpretation Comments

 

             MCV (test code = MCV) 93.2         80.0-98.0                 



[place] 362313-02-14 15:25:24





             Test Item    Value        Reference Range Interpretation Comments

 

             MCH (test code = MCH) 30.8 pg      27.0-31.0                 



[place] 362313-02-14 15:25:24





             Test Item    Value        Reference Range Interpretation Comments

 

             MCHC (test code = MCHC) 33.0         32.0-36.0                 



[place] 362313-02-14 15:25:24





             Test Item    Value        Reference Range Interpretation Comments

 

             RDW (test code = RDW) 13.0         11.5-14.5                 



[place] 362313-02-14 15:25:24





             Test Item    Value        Reference Range Interpretation Comments

 

             Platelet (test code = Platelet) 200          133-450               

    



[place] 362313-02-14 15:25:24





             Test Item    Value        Reference Range Interpretation Comments

 

             MPV (test code = MPV) 8.6          7.4-10.4                  



[place] 362313-02-14 15:25:24





             Test Item    Value        Reference Range Interpretation Comments

 

             Segs (test code = Segs) 86.0         45.0-75.0                 



[place] 362313-02-14 15:25:24





             Test Item    Value        Reference Range Interpretation Comments

 

             Lymphocytes (test code = Lymphocytes) 5.5          20.0-40.0       

          



[place] 362313-02-14 15:25:24





             Test Item    Value        Reference Range Interpretation Comments

 

             Monocytes (test code = Monocytes) 7.0          2.0-12.0            

      



[place] 362313-02-14 15:25:24





             Test Item    Value        Reference Range Interpretation Comments

 

             Eosinophils (test code = 1.0          See_Comment                [A

utomated message] The



             Eosinophils)                                        system which ge

nerated



                                                                 this result tra

nsmitted



                                                                 reference range

: <=4.0.



                                                                 The reference r

jihan was



                                                                 not used to int

erpret



                                                                 this result as



                                                                 normal/abnormal

.



[place] [address]-02-14 15:25:24





             Test Item    Value        Reference Range Interpretation Comments

 

             Basophils (test code = 0.5          See_Comment                [Aut

omated message] The



             Basophils)                                          system which ge

nerated



                                                                 this result tra

nsmitted



                                                                 reference range

: <=1.0.



                                                                 The reference r

jihan was



                                                                 not used to int

erpret



                                                                 this result as



                                                                 normal/abnormal

.



[place] [address]-02-14 15:25:24





             Test Item    Value        Reference Range Interpretation Comments

 

             Neutrophils # (test code = Neutrophils 8.1          1.5-8.1        

           



             #)                                                  



[place] [address]-02-14 15:25:24





             Test Item    Value        Reference Range Interpretation Comments

 

             Lymphocytes # (test code = Lymphocytes 0.5          1.0-5.5        

           



             #)                                                  



[place] [address]-02-14 15:25:24





             Test Item    Value        Reference Range Interpretation Comments

 

             Monocytes # (test code 0.7          See_Comment                [Aut

omated message] The



             = Monocytes #)                                        system which 

generated



                                                                 this result tra

nsmitted



                                                                 reference range

: <=0.8.



                                                                 The reference r

jihan was



                                                                 not used to int

erpret



                                                                 this result as



                                                                 normal/abnormal

.



[place] [address]-02-14 15:25:24





             Test Item    Value        Reference Range Interpretation Comments

 

             Eosinophils # (test code 0.1          See_Comment                [A

utomated message] The



             = Eosinophils #)                                        system whic

h generated



                                                                 this result tra

nsmitted



                                                                 reference range

: <=0.5.



                                                                 The reference r

jihan was



                                                                 not used to int

erpret



                                                                 this result as



                                                                 normal/abnormal

.



[facility] HospitalXmwdhyrVNSSBYPOZ9069-03-58 15:25:24





             Test Item    Value        Reference Range Interpretation Comments

 

             Glucose Lvl (test code = Glucose Lvl) 93           70-99           

          



[place] 872133-02-14 15:25:24





             Test Item    Value        Reference Range Interpretation Comments

 

             BUN (test code = BUN) 19           7-22                      



[place] [address]-02-14 15:25:24





             Test Item    Value        Reference Range Interpretation Comments

 

             Creatinine Lvl (test code = Creatinine 0.85         0.50-1.40      

           



             Lvl)                                                



[place] 872133-02-14 15:25:24





             Test Item    Value        Reference Range Interpretation Comments

 

             Sodium Lvl (test code = Sodium Lvl) 142          135-145           

        



[place] 872133-02-14 15:25:24





             Test Item    Value        Reference Range Interpretation Comments

 

             Potassium Lvl (test code = Potassium 4.4          3.5-5.1          

         



             Lvl)                                                



[place] [address]-02-14 15:25:24





             Test Item    Value        Reference Range Interpretation Comments

 

             Chloride Lvl (test code = Chloride Lvl) 108                  

            



[place] 872133-02-14 15:25:24





             Test Item    Value        Reference Range Interpretation Comments

 

             CO2 (test code = CO2) 28           24-32                     



[facility] HospitalZduaxczODSCUBZGX2282-47-93 15:25:24





             Test Item    Value        Reference Range Interpretation Comments

 

             Calcium Lvl (test code = Calcium Lvl) 10.0         8.5-10.5        

          



[facility] HospitalUvgooltQRCLJUBAL1610-01-79 15:25:24





             Test Item    Value        Reference Range Interpretation Comments

 

             AGAP (test code = AGAP) 10.4         10.0-20.0                 



[facility] HospitalLrotabdCWEFNJIQH8314-37-98 15:25:24





             Test Item    Value        Reference Range Interpretation Comments

 

             eGFR (test code = eGFR) 81                                     



[facility] HospitalDeqyrdrIBNVHOATP4474-68-99 15:25:24





             Test Item    Value        Reference Range Interpretation Comments

 

             Lipase Lvl (test code = Lipase Lvl) 123                      

        



[facility] HospitalEueyodcHTQJCLUYV0555-68-97 15:25:24





             Test Item    Value        Reference Range Interpretation Comments

 

             Total Protein (test code = Total 8.0          6.4-8.4              

     



             Protein)                                            



[facility] HospitalIixdwqmHBJKWQSQZ4483-63-89 15:25:24





             Test Item    Value        Reference Range Interpretation Comments

 

             Albumin Lvl (test code = Albumin Lvl) 3.7          3.5-5.0         

          



[facility] HospitalItfsonfWTYMUECTU3546-58-73 15:25:24





             Test Item    Value        Reference Range Interpretation Comments

 

             Globulin (test code = Globulin) 4.3          2.7-4.2               

    



[facility] HospitalGedpsfoXXNKQXZCZ9089-51-46 15:25:24





             Test Item    Value        Reference Range Interpretation Comments

 

             A/G Ratio (test code = A/G Ratio) 0.9 1        0.7-1.6             

      



[facility] HospitalEdvliorLCWZXYYLL8969-42-13 15:25:24





             Test Item    Value        Reference Range Interpretation Comments

 

             ALANINE AMINOTRANSFERASE 20           See_Comment                [A

utomated message]



             (test code = ALANINE                                        The sys

tem which



             AMINOTRANSFERASE)                                        generated 

this result



                                                                 transmitted ref

erence



                                                                 range: <=65. Th

e



                                                                 reference range

 was



                                                                 not used to int

erpret



                                                                 this result as



                                                                 normal/abnormal

.



[facility] Hospital[id number] 15:25:24





             Test Item    Value        Reference Range Interpretation Comments

 

             AST (test code = AST) 19           See_Comment                [Auto

mated message] The



                                                                 system which ge

nerated this



                                                                 result transmit

gianfranco



                                                                 reference range

: <=37. The



                                                                 reference range

 was not



                                                                 used to interpr

et this



                                                                 result as xenia

l/abnormal.



[facility] HospitalKzyvbflFHXYMJNRP1664-07-38 15:25:24





             Test Item    Value        Reference Range Interpretation Comments

 

             Alk Phos (test code = Alk Phos) 123                          

    



[facility] HospitalPnvehkwEMFJWMWDC4930-02-92 15:25:24





             Test Item    Value        Reference Range Interpretation Comments

 

             Bili Total (test code = Bili Total) 0.3          0.2-1.3           

        



[facility] HospitalKcihwqwVSEFELSIJ6738-31-71 15:25:24





             Test Item    Value        Reference Range Interpretation Comments

 

             Bili Direct (test code no gt        See_Comment                [Aut

omated message] The



             = Bili Direct)                                        system which 

generated



                                                                 this result tra

nsmitted



                                                                 reference range

: <=0.3.



                                                                 The reference r

jihan was



                                                                 not used to int

erpret this



                                                                 result as xenia

l/abnormal.



[facility] HospitalMvuzolnZUWFKMAPF5207-42-57 15:25:24





             Test Item    Value        Reference Range Interpretation Comments

 

             Bili Indirect Unable to    See_Comment                [Automated



             (test code = Bili Calculate                              message] T

he system



             Indirect)                                           which generated



                                                                 this result



                                                                 transmitted



                                                                 reference range

:



                                                                 <=1.0. The



                                                                 reference range

 was



                                                                 not used to



                                                                 interpret this



                                                                 result as



                                                                 normal/abnormal

.



[facility]ballIfciqawQMBCLNHXMY1742-00-93 15:25:24





             Test Item    Value        Reference Range Interpretation Comments

 

             WBC X 10x3 (test code = WBC X 10x3) 9.4          3.7-10.4          

        



[facility] TomballJsdreikUPYVQGKCHP2152-84-71 15:25:24





             Test Item    Value        Reference Range Interpretation Comments

 

             RBC X 10x6 (test code = RBC X 10x6) 4.59         4.20-5.40         

        



[facility] TomballWgmamypGDORQNLYBJ0707-06-30 15:25:24





             Test Item    Value        Reference Range Interpretation Comments

 

             Hgb (test code = Hgb) 14.1         12.0-16.0                 



[facility] TomballOvmwavoBHZRTVWZJL6858-68-85 15:25:24





             Test Item    Value        Reference Range Interpretation Comments

 

             Hct (test code = Hct) 42.8         36.0-48.0                 



[place] 362313-02-14 15:25:24





             Test Item    Value        Reference Range Interpretation Comments

 

             MCV (test code = MCV) 93.2         80.0-98.0                 



[facility] TomballCixwzvzKHFKFVYAHA0183-09-80 15:25:24





             Test Item    Value        Reference Range Interpretation Comments

 

             MCH (test code = MCH) 30.8 pg      27.0-31.0                 



[place] [address]-02-14 15:25:24





             Test Item    Value        Reference Range Interpretation Comments

 

             MCHC (test code = MCHC) 33.0         32.0-36.0                 



[place] [address]-02-14 15:25:24





             Test Item    Value        Reference Range Interpretation Comments

 

             RDW (test code = RDW) 13.0         11.5-14.5                 



[place] 362313-02-14 15:25:24





             Test Item    Value        Reference Range Interpretation Comments

 

             Platelet (test code = Platelet) 200          133-450               

    



[place] 362313-02-14 15:25:24





             Test Item    Value        Reference Range Interpretation Comments

 

             MPV (test code = MPV) 8.6          7.4-10.4                  



[place] [address]-02-14 15:25:24





             Test Item    Value        Reference Range Interpretation Comments

 

             Segs (test code = Segs) 86.0         45.0-75.0                 



[place] [address]-02-14 15:25:24





             Test Item    Value        Reference Range Interpretation Comments

 

             Lymphocytes (test code = Lymphocytes) 5.5          20.0-40.0       

          



[place] [address]-02-14 15:25:24





             Test Item    Value        Reference Range Interpretation Comments

 

             Monocytes (test code = Monocytes) 7.0          2.0-12.0            

      



[place] 362313-02-14 15:25:24





             Test Item    Value        Reference Range Interpretation Comments

 

             Eosinophils (test code = 1.0          See_Comment                [A

utomated message] The



             Eosinophils)                                        system which ge

nerated



                                                                 this result tra

nsmitted



                                                                 reference range

: <=4.0.



                                                                 The reference r

jihan was



                                                                 not used to int

erpret



                                                                 this result as



                                                                 normal/abnormal

.



[place] [address]-02-14 15:25:24





             Test Item    Value        Reference Range Interpretation Comments

 

             Basophils (test code = 0.5          See_Comment                [Aut

omated message] The



             Basophils)                                          system which ge

nerated



                                                                 this result tra

nsmitted



                                                                 reference range

: <=1.0.



                                                                 The reference r

jihan was



                                                                 not used to int

erpret



                                                                 this result as



                                                                 normal/abnormal

.



[place] [address]-02-14 15:25:24





             Test Item    Value        Reference Range Interpretation Comments

 

             Neutrophils # (test code = Neutrophils 8.1          1.5-8.1        

           



             #)                                                  



[place] [address]-02-14 15:25:24





             Test Item    Value        Reference Range Interpretation Comments

 

             Lymphocytes # (test code = Lymphocytes 0.5          1.0-5.5        

           



             #)                                                  



[facility] TomballDpdaghiWOCABTJJQA1450-88-10 15:25:24





             Test Item    Value        Reference Range Interpretation Comments

 

             Monocytes # (test code 0.7          See_Comment                [Aut

omated message] The



             = Monocytes #)                                        system which 

generated



                                                                 this result tra

nsmitted



                                                                 reference range

: <=0.8.



                                                                 The reference r

jihan was



                                                                 not used to int

erpret



                                                                 this result as



                                                                 normal/abnormal

.



[facility] TomballFngizuuUJIQPCGFQF2523-25-68 15:25:24





             Test Item    Value        Reference Range Interpretation Comments

 

             Eosinophils # (test code 0.1          See_Comment                [A

utomated message] The



             = Eosinophils #)                                        system whic

h generated



                                                                 this result tra

nsmitted



                                                                 reference range

: <=0.5.



                                                                 The reference r

jihan was



                                                                 not used to int

erpret



                                                                 this result as



                                                                 normal/abnormal

.



[facility] HospitalAvzghseMMXQLSWHX4804-29-95 15:25:24





             Test Item    Value        Reference Range Interpretation Comments

 

             Glucose Lvl (test code = Glucose Lvl) 93           70-99           

          



[facility] HospitalVoeoccgDMOJYMBIP6786-08-82 15:25:24





             Test Item    Value        Reference Range Interpretation Comments

 

             BUN (test code = BUN) 19           7-22                      



[facility] HospitalQrabewoYXAUPSULE6161-67-48 15:25:24





             Test Item    Value        Reference Range Interpretation Comments

 

             Creatinine Lvl (test code = Creatinine 0.85         0.50-1.40      

           



             Lvl)                                                



[facility] HospitalXhwhywpBLRVKBTPB7271-28-14 15:25:24





             Test Item    Value        Reference Range Interpretation Comments

 

             Sodium Lvl (test code = Sodium Lvl) 142          135-145           

        



[facility] HospitalXjywiakSCCKCAZZT5073-12-89 15:25:24





             Test Item    Value        Reference Range Interpretation Comments

 

             Potassium Lvl (test code = Potassium 4.4          3.5-5.1          

         



             Lvl)                                                



[facility] HospitalUdtfteqKQZJSMEIG7006-81-01 15:25:24





             Test Item    Value        Reference Range Interpretation Comments

 

             Chloride Lvl (test code = Chloride Lvl) 108                  

            



[facility] HospitalFwpeprgEQKMNIKBZ8103-75-81 15:25:24





             Test Item    Value        Reference Range Interpretation Comments

 

             CO2 (test code = CO2) 28           24-32                     



[place]133-02-14 15:25:24





             Test Item    Value        Reference Range Interpretation Comments

 

             Calcium Lvl (test code = Calcium Lvl) 10.0         8.5-10.5        

          



[facility] HospitalSoetjgkTAWVYHEOA4141-55-75 15:25:24





             Test Item    Value        Reference Range Interpretation Comments

 

             AGAP (test code = AGAP) 10.4         10.0-20.0                 



[facility] HospitalMjinnruFONYOYTLO2611-34-43 15:25:24





             Test Item    Value        Reference Range Interpretation Comments

 

             eGFR (test code = eGFR) 81                                     



[facility] HospitalJkeqjeuUSVOGVYDQ8757-68-87 15:25:24





             Test Item    Value        Reference Range Interpretation Comments

 

             Lipase Lvl (test code = Lipase Lvl) 123                      

        



[facility] HospitalUlrbzwyCQSBXYTIC8814-80-63 15:25:24





             Test Item    Value        Reference Range Interpretation Comments

 

             Total Protein (test code = Total 8.0          6.4-8.4              

     



             Protein)                                            



[facility] HospitalUcqalqxOWXMDTNBB2562-67-00 15:25:24





             Test Item    Value        Reference Range Interpretation Comments

 

             Albumin Lvl (test code = Albumin Lvl) 3.7          3.5-5.0         

          



[facility] HospitalKmxeveqXFPELKQGO1849-45-24 15:25:24





             Test Item    Value        Reference Range Interpretation Comments

 

             Globulin (test code = Globulin) 4.3          2.7-4.2               

    



[facility] HospitalYerbtleIBYFFJBKS7698-20-72 15:25:24





             Test Item    Value        Reference Range Interpretation Comments

 

             A/G Ratio (test code = A/G Ratio) 0.9 1        0.7-1.6             

      



[facility] HospitalQajagocDEMTDBPOM6435-15-55 15:25:24





             Test Item    Value        Reference Range Interpretation Comments

 

             ALANINE AMINOTRANSFERASE 20           See_Comment                [A

utomated message]



             (test code = ALANINE                                        The sys

tem which



             AMINOTRANSFERASE)                                        generated 

this result



                                                                 transmitted ref

erence



                                                                 range: <=65. Th

e



                                                                 reference range

 was



                                                                 not used to int

erpret



                                                                 this result as



                                                                 normal/abnormal

.



[facility] HospitalGmzzlyeIMGEYQDBT3609-66-71 15:25:24





             Test Item    Value        Reference Range Interpretation Comments

 

             AST (test code = AST) 19           See_Comment                [Auto

mated message] The



                                                                 system which ge

nerated this



                                                                 result transmit

gianfranco



                                                                 reference range

: <=37. The



                                                                 reference range

 was not



                                                                 used to interpr

et this



                                                                 result as xenia

l/abnormal.



[facility] HospitalAagklmuZVQPIGGMC3330-00-28 15:25:24





             Test Item    Value        Reference Range Interpretation Comments

 

             Alk Phos (test code = Alk Phos) 123                          

    



[facility] HospitalCvftpzfWJRJNOZYL2413-43-07 15:25:24





             Test Item    Value        Reference Range Interpretation Comments

 

             Bili Total (test code = Bili Total) 0.3          0.2-1.3           

        



[facility] HospitalDjgwxurZGSLWQHMZ2823-66-13 15:25:24





             Test Item    Value        Reference Range Interpretation Comments

 

             Bili Direct (test code no gt        See_Comment                [Aut

omated message] The



             = Bili Direct)                                        system which 

generated



                                                                 this result tra

nsmitted



                                                                 reference range

: <=0.3.



                                                                 The reference r

jihan was



                                                                 not used to int

erpret this



                                                                 result as xenia

l/abnormal.



[facility] HospitalUlkgprzUBJQHTIYL8923-99-69 15:25:24





             Test Item    Value        Reference Range Interpretation Comments

 

             Bili Indirect Unable to    See_Comment                [Automated



             (test code = Bili Calculate                              message] T

he system



             Indirect)                                           which generated



                                                                 this result



                                                                 transmitted



                                                                 reference range

:



                                                                 <=1.0. The



                                                                 reference range

 was



                                                                 not used to



                                                                 interpret this



                                                                 result as



                                                                 normal/abnormal

.



[facility] TomballJqclwobVARCLQLZFU2958-79-90 15:25:24





             Test Item    Value        Reference Range Interpretation Comments

 

             WBC X 10x3 (test code = WBC X 10x3) 9.4          3.7-10.4          

        



[facility] TomballMsvjcfrAFQKOCPTYX0959-87-85 15:25:24





             Test Item    Value        Reference Range Interpretation Comments

 

             RBC X 10x6 (test code = RBC X 10x6) 4.59         4.20-5.40         

        



[facility] TomballQtarwmrERMJCSPUIW2346-99-71 15:25:24





             Test Item    Value        Reference Range Interpretation Comments

 

             Hgb (test code = Hgb) 14.1         12.0-16.0                 



[facility] TomballRqoszhbWSSYIUOPFM4237-28-30 15:25:24





             Test Item    Value        Reference Range Interpretation Comments

 

             Hct (test code = Hct) 42.8         36.0-48.0                 



[facility] TomballQbuinldAZCYUURHMK9382-55-74 15:25:24





             Test Item    Value        Reference Range Interpretation Comments

 

             MCV (test code = MCV) 93.2         80.0-98.0                 



[facility] TomballYnojqfgHLZFVXDSYI4836-26-28 15:25:24





             Test Item    Value        Reference Range Interpretation Comments

 

             MCH (test code = MCH) 30.8 pg      27.0-31.0                 



[place] [address]2313-02-14 15:25:24





             Test Item    Value        Reference Range Interpretation Comments

 

             MCHC (test code = MCHC) 33.0         32.0-36.0                 



[facility] TomballTmztjgeMJJFYXRSLI1590-98-11 15:25:24





             Test Item    Value        Reference Range Interpretation Comments

 

             RDW (test code = RDW) 13.0         11.5-14.5                 



[place] [address]-02-14 15:25:24





             Test Item    Value        Reference Range Interpretation Comments

 

             Platelet (test code = Platelet) 200          133-450               

    



[place] [address]2313-02-14 15:25:24





             Test Item    Value        Reference Range Interpretation Comments

 

             MPV (test code = MPV) 8.6          7.4-10.4                  



[place] 362313-02-14 15:25:24





             Test Item    Value        Reference Range Interpretation Comments

 

             Segs (test code = Segs) 86.0         45.0-75.0                 



[place] [address]-02-14 15:25:24





             Test Item    Value        Reference Range Interpretation Comments

 

             Lymphocytes (test code = Lymphocytes) 5.5          20.0-40.0       

          



[place] [address]2313-02-14 15:25:24





             Test Item    Value        Reference Range Interpretation Comments

 

             Monocytes (test code = Monocytes) 7.0          2.0-12.0            

      



[facility] TomballTzdtrszRLEKAFYZNB5820-28-79 15:25:24





             Test Item    Value        Reference Range Interpretation Comments

 

             Eosinophils (test code = 1.0          See_Comment                [A

utomated message] The



             Eosinophils)                                        system which ge

nerated



                                                                 this result tra

nsmitted



                                                                 reference range

: <=4.0.



                                                                 The reference r

jihan was



                                                                 not used to int

erpret



                                                                 this result as



                                                                 normal/abnormal

.



[place] [address]2313-02-14 15:25:24





             Test Item    Value        Reference Range Interpretation Comments

 

             Basophils (test code = 0.5          See_Comment                [Aut

omated message] The



             Basophils)                                          system which ge

nerated



                                                                 this result tra

nsmitted



                                                                 reference range

: <=1.0.



                                                                 The reference r

jihan was



                                                                 not used to int

erpret



                                                                 this result as



                                                                 normal/abnormal

.



[facility] TomballFnaygwoSDIWOPGQKU7682-39-71 15:25:24





             Test Item    Value        Reference Range Interpretation Comments

 

             Neutrophils # (test code = Neutrophils 8.1          1.5-8.1        

           



             #)                                                  



[facility] TomballMcdhyqlHZDNUUIMIH5910-16-79 15:25:24





             Test Item    Value        Reference Range Interpretation Comments

 

             Lymphocytes # (test code = Lymphocytes 0.5          1.0-5.5        

           



             #)                                                  



[place] [id number] 15:25:24





             Test Item    Value        Reference Range Interpretation Comments

 

             Monocytes # (test code 0.7          See_Comment                [Aut

omated message] The



             = Monocytes #)                                        system which 

generated



                                                                 this result tra

nsmitted



                                                                 reference range

: <=0.8.



                                                                 The reference r

jihan was



                                                                 not used to int

erpret



                                                                 this result as



                                                                 normal/abnormal

.



[id number]





             Test Item    Value        Reference Range Interpretation Comments

 

             Eosinophils # (test code 0.1          See_Comment                [A

utomated message] The



             = Eosinophils #)                                        system whic

h generated



                                                                 this result tra

nsmitted



                                                                 reference range

: <=0.5.



                                                                 The reference r

jihan was



                                                                 not used to int

erpret



                                                                 this result as



                                                                 normal/abnormal

.



[facility] METABOLIC PANEL [id number] 19:59:50





             Test Item    Value        Reference Range Interpretation Comments

 

             NA (test code = 138 mmol/L   135-145                   



             0598243244)                                         

 

             K (test code = 4.3 mmol/L   3.5-5.0                   



             9393744014)                                         

 

             CL (test code = 102 mmol/L                       



             1258333666)                                         

 

             CO2 TOTAL (test code = 23 mmol/L    23-31                     



             8735583548)                                         

 

             AGAP (test code =              2-16                      



             7902663023)                                         

 

             BUN (test code = 18 mg/dL     7-23                      



             5505670933)                                         

 

             GLUCOSE (test code = 110 mg/dL                        



             6451521058)                                         

 

             CREATININE (test code = 0.70 mg/dL   0.50-1.04                 



             2496526461)                                         

 

             TOTAL BILI (test code = 1.3 mg/dL    0.1-1.1      H            



             0620250472)                                         

 

             CALCIUM (test code = 9.5 mg/dL    8.6-10.6                  



             0334995247)                                         

 

             T PROTEIN (test code = 7.4 g/dL     6.3-8.2                   



             4521432279)                                         

 

             ALBUMIN (test code = 4.2 g/dL     3.5-5.0                   



             8633612595)                                         

 

             ALK PHOS (test code = 100 U/L                          



             6647857158)                                         

 

             ALTv (test code = 16 U/L       5-35                      



             1742-6)                                             

 

             AST(SGOT) (test code = 27 U/L       13-40                     



             2824014166)                                         

 

             eGFR (test code =              mL/min/1.73m2              



             7387425349)                                         

 

             ELENA (test code = ELENA) Association of                           



                          Glomerular Filtration                           



                          Rate (GFR) and Staging                           



                          of Kidney Disease*                           



                          +---------------------                           



                          --+-------------------                           



                          --+-------------------                           



                          ------+| GFR                           



                          (mL/min/1.73 m2) ?|                           



                          With Kidney Damage ?|                           



                          ?Without Kidney                           



                          Damage+---------------                           



                          --------+-------------                           



                          --------+-------------                           



                          ------------+| ?>90 ?                           



                          ? ? ? ? ? ? ? ?|                           



                          ?Stage one ? ? ? ? ?|                           



                          ? Normal ? ? ? ? ? ? ?                           



                          ?+--------------------                           



                          ---+------------------                           



                          ---+------------------                           



                          -------+| ?60-89 ? ? ?                           



                          ? ? ? ? ?| ?Stage two                           



                          ? ? ? ? ?| ? Decreased                           



                          GFR ? ? ? ?                            



                          +---------------------                           



                          --+-------------------                           



                          --+-------------------                           



                          ------+| ?30-59 ? ? ?                           



                          ? ? ? ? ?| ?Stage                           



                          three ? ? ? ?| ? Stage                           



                          three ? ? ? ? ?                           



                          +---------------------                           



                          --+-------------------                           



                          --+-------------------                           



                          ------+| ?15-29 ? ? ?                           



                          ? ? ? ? ?| ?Stage four                           



                          ? ? ? ? | ? Stage four                           



                          ? ? ? ? ?                              



                          ?+--------------------                           



                          ---+------------------                           



                          ---+------------------                           



                          -------+| ?<15 (or                           



                          dialysis) ? ?| ?Stage                           



                          five ? ? ? ? | ? Stage                           



                          five ? ? ? ? ?                           



                          ?+--------------------                           



                          ---+------------------                           



                          ---+------------------                           



                          -------+ *Each stage                           



                          assumes the associated                           



                          GFR level has been in                           



                          effect for at least                           



                          three months. ?Stages                           



                          1 to 5, with or                           



                          without kidney                           



                          disease, indicate                           



                          chronic kidney                           



                          disease. Notes:                           



                          Determination of                           



                          stages one and two                           



                          (with eGFR                             



                          >59mL/min/1.73 m2)                           



                          requires estimation of                           



                          kidney damage for at                           



                          least three months as                           



                          defined by structural                           



                          or functional                           



                          abnormalities of the                           



                          kidney, manifested by                           



                          either:Pathological                           



                          abnormalities or                           



                          Markers of kidney                           



                          damage (including                           



                          abnormalities in the                           



                          composition of the                           



                          blood or urine or                           



                          abnormalities in                           



                          imaging tests).                           

 

             Lab Interpretation Abnormal                               



             (test code = 92372-5)                                        



[facility] WITH [id number] 19:22:40





             Test Item    Value        Reference Range Interpretation Comments

 

             WBC (test code =              See_Comment  H             [Automated



             5052-2)                                             message] The sy

stem



                                                                 which generated



                                                                 this result



                                                                 transmitted



                                                                 reference range

:



                                                                 4.30 - 11.10



                                                                 10*3/?L. The



                                                                 reference range

 was



                                                                 not used to



                                                                 interpret this



                                                                 result as



                                                                 normal/abnormal

.

 

             RBC (test code =              See_Comment                [Automated



             208-6)                                              message] The sy

stem



                                                                 which generated



                                                                 this result



                                                                 transmitted



                                                                 reference range

:



                                                                 3.93 - 5.25



                                                                 10*6/?L. The



                                                                 reference range

 was



                                                                 not used to



                                                                 interpret this



                                                                 result as



                                                                 normal/abnormal

.

 

             HGB (test code = 13.0 g/dL    11.6-15.0                 



             718-7)                                              

 

             HCT (test code = 38.4 %       35.7-45.2                 



             4544-3)                                             

 

             MCV (test code = 89.9 fL      80.6-95.5                 



             787-2)                                              

 

             MCH (test code = 30.4 pg      25.9-32.8                 



             785-6)                                              

 

             MCHC (test code = 33.9 g/dL    31.6-35.1                 



             786-4)                                              

 

             RDW-SD (test code = 39.3 fL      39.0-49.9                 



             52541-5)                                            

 

             RDW-CV (test code = 12.2 %       12.0-15.5                 



             788-0)                                              

 

             PLT (test code =              See_Comment                [Automated



             777-3)                                              message] The sy

stem



                                                                 which generated



                                                                 this result



                                                                 transmitted



                                                                 reference range

:



                                                                 166 - 358 10*3/

?L.



                                                                 The reference r

jihan



                                                                 was not used to



                                                                 interpret this



                                                                 result as



                                                                 normal/abnormal

.

 

             MPV (test code = 10.1 fL      9.5-12.9                  



             52892-5)                                            

 

             NRBC/100 WBC (test              See_Comment                [Automat

ed



             code = 5937351339)                                        message] 

The system



                                                                 which generated



                                                                 this result



                                                                 transmitted



                                                                 reference range

:



                                                                 0.0 - 10.0 /100



                                                                 WBCs. The refer

ence



                                                                 range was not u

sed



                                                                 to interpret th

is



                                                                 result as



                                                                 normal/abnormal

.

 

             NRBC x10^3 (test code <0.01        See_Comment                [Auto

mated



             = 7142396514)                                        message] The s

ystem



                                                                 which generated



                                                                 this result



                                                                 transmitted



                                                                 reference range

:



                                                                 10*3/?L. The



                                                                 reference range

 was



                                                                 not used to



                                                                 interpret this



                                                                 result as



                                                                 normal/abnormal

.

 

             GRAN MAT (NEUT) % 79.4 %                                 



             (test code = 770-8)                                        

 

             IMM GRAN % (test code 0.50 %                                 



             = 2642063833)                                        

 

             LYMPH % (test code = 9.5 %                                  



             736-9)                                              

 

             MONO % (test code = 9.7 %                                  



             5905-5)                                             

 

             EOS % (test code = 0.5 %                                  



             713-8)                                              

 

             BASO % (test code = 0.4 %                                  



             706-2)                                              

 

             GRAN MAT x10^3(ANC) 9.77 10*3/uL 1.88-7.09    H            



             (test code =                                        



             5301204104)                                         

 

             IMM GRAN x10^3 (test 0.06 10*3/uL 0.00-0.06                 



             code = 9417044054)                                        

 

             LYMPH x10^3 (test code 1.17 10*3/uL 1.32-3.29    L            



             = 731-0)                                            

 

             MONO x10^3 (test code 1.19 10*3/uL 0.33-0.92    H            



             = 742-7)                                            

 

             EOS x10^3 (test code = 0.06 10*3/uL 0.03-0.39                 



             711-2)                                              

 

             BASO x10^3 (test code 0.05 10*3/uL 0.01-0.07                 



             = 704-7)                                            

 

             Lab Interpretation Abnormal                               



             (test code = 23686-1)                                        



[facility] METABOLIC [id number] 05:36:00





             Test Item    Value        Reference Range Interpretation Comments

 

             SODIUM (test code = NA) 143 mmol/L   134-147      N            

 

             POTASSIUM (test code = 4.2 mmol/L   3.4-5.0      N            



             K)                                                  

 

             CHLORIDE (test code = 114 mmol/L   100-108      H            



             CL)                                                 

 

             CARBON DIOXIDE (test 24 mmol/L    21-32        N            



             code = CO2)                                         

 

             ANION GAP (test code = 5.0 GAP calc 4.0-15.0     N            



             GAP)                                                

 

             GLUCOSE (test code = 89 MG/DL            N            



             GLU)                                                

 

             BLOOD UREA NITROGEN 17 MG/DL     7-18         N            



             (test code = BUN)                                        

 

             GLOMERULAR FILTRATION >=60 max estimate >60                       



             RATE (test code = GFR) estGFR                                 

 

             CREATININE (test code = 0.9 MG/DL    0.6-1.0      N            



             CREAT)                                              

 

             CALCIUM (test code = CA) 8.3 MG/DL    8.5-10.1     L            



CBC W/AUTO [id number] 05:21:00





             Test Item    Value        Reference Range Interpretation Comments

 

             WHITE BLOOD CELL (test code = 15.2 K/mm3   3.5-11.0     H          

  



             WBC)                                                

 

             RED BLOOD CELL (test code = RBC) 3.67 M/mm3   4.70-6.10    L       

     

 

             HEMOGLOBIN (test code = HGB) 11.3 G/DL    10.4-14.9    N           

 

 

             HEMATOCRIT (test code = HCT) 35.5 %       31.5-44.1    N           

 

 

             MEAN CELL VOLUME (test code = 96.7 Fl      84.5-98.6    N          

  



             MCV)                                                

 

             MEAN CELL HGB (test code = MCH) 30.8 pg      27.0-34.2    N        

    

 

             MEAN CELL HGB CONCETRATION (test 31.8 G/DL    31.5-34.0    N       

     



             code = MCHC)                                        

 

             RED CELL DISTRIBUTION WIDTH (test 14.2 SD      11.5-14.5    N      

      



             code = RDW)                                         

 

             PLATELET COUNT (test code = PLT) 242.0 K/mm3  150-450      N       

     

 

             MEAN PLATELET VOLUME (test code = 10.20 fL     7.0-10.5     N      

      



             MPV)                                                

 

             NEUTROPHIL % (test code = NT%) 78.8 %       40-76        H         

   

 

             LYMPHOCYTE % (test code = LY%) 13.1 %       20.5-51.1    L         

   

 

             MONOCYTE % (test code = MO%) 6.2 %        1.7-9.3      N           

 

 

             EOSINOPHIL % (test code = EO%) 1.6 %        0.0-6.0      N         

   

 

             BASOPHIL % (test code = BA%) 0.3 %        0.0-2.0      N           

 

 

             NEUTROPHIL # (test code = NT#) 11.94 K/mm3  1.8-7.6      H         

   

 

             LYMPHOCYTE # (test code = LY#) 2.0 K/mm3    0.6-3.2      N         

   

 

             MONOCYTE # (test code = MO#) 0.9 K/mm3    0.3-1.1      N           

 

 

             EOSINOPHIL # (test code = EO#) 0.2 K/mm3    0.0-0.4      N         

   

 

             BASOPHIL # (test code = BA#) 0.1 K/mm3    0.0-0.1      N           

 

 

             MANUAL DIFF REQUIRED (test code = NO DIFF/SCN  CRITERIA            

      



             MDIFF)                                              



[id number] 14:09:00





             Test Item    Value        Reference Range Interpretation Comments

 

             LITHIUM (test 0.5 mmol/L   0.6-1.2      L             Detection Lopez

it = 0.1



             code = LITH)                                        <0.1 indicates 

None



                                                                 DetectedPerform

ed At: 



                                                                 [facility] [address]



                                                                 [id number] 

Kyle L MD



                                                                 Ph:[phone]



[id number] 13:35:00





             Test Item    Value        Reference Range Interpretation Comments

 

             TROPONIN-I (test 0.436 NG/ML  0.000-0.045  HH           Negative: <

/= 0.045



             code = TROPI)                                        Positive: >/= 

0.046



                                                                 Correlation wit

h serial



                                                                 results, other 

cardiac



                                                                 markers, and cl

inical



                                                                 findings is nec

essary to



                                                                 determine the c

linical



                                                                 significance of

 this



                                                                 result. Quantit

ative



                                                                 results using d

ifferent



                                                                 methodologies s

hould not



                                                                 be compared to 

one



                                                                 another as nume

rical



                                                                 results may daniel

yby



                                                                 method.



Completed by Nursing: BHLVSOGRJW--38-28 10:33:00





             Test Item    Value        Reference Range Interpretation Comments

 

             TROPONIN-I (test 0.360 NG/ML  0.000-0.045  HH           Negative: <

/= 0.045



             code = TROPI)                                        Positive: >/= 

0.046



                                                                 Correlation wit

h serial



                                                                 results, other 

cardiac



                                                                 markers, and cl

inical



                                                                 findings is nec

essary to



                                                                 determine the c

linical



                                                                 significance of

 this



                                                                 result. Quantit

ative



                                                                 results using d

ifferent



                                                                 methodologies s

hould not



                                                                 be compared to 

one



                                                                 another as nume

rical



                                                                 results may daniel

yby



                                                                 method.



Completed by Nursing: NOLast Dose Date: 20Last Dose Time: 1200TROPONIN-I
2020 07:23:00





             Test Item    Value        Reference Range Interpretation Comments

 

             TROPONIN-I (test 0.399 NG/ML  0.000-0.045  HH           Negative: <

/= 0.045



             code = TROPI)                                        Positive: >/= 

0.046



                                                                 Correlation wit

h serial



                                                                 results, other 

cardiac



                                                                 markers, and cl

inical



                                                                 findings is nec

essary to



                                                                 determine the c

linical



                                                                 significance of

 this



                                                                 result. Quantit

ative



                                                                 results using d

ifferent



                                                                 methodologies s

hould not



                                                                 be compared to 

one



                                                                 another as nume

rical



                                                                 results may daniel

yby



                                                                 method.



Completed by Nursing: JHXIZMFIEG--23-28 05:00:00





             Test Item    Value        Reference Range Interpretation Comments

 

             TROPONIN-I (test 0.555 NG/ML  0.000-0.045  HH           Negative: <

/= 0.045



             code = TROPI)                                        Positive: >/= 

0.046



                                                                 Correlation wit

h serial



                                                                 results, other 

cardiac



                                                                 markers, and cl

inical



                                                                 findings is nec

essary to



                                                                 determine the c

linical



                                                                 significance of

 this



                                                                 result. Quantit

ative



                                                                 results using d

ifferent



                                                                 methodologies s

hould not



                                                                 be compared to 

one



                                                                 another as nume

rical



                                                                 results may daniel

yby



                                                                 method.



Completed by Nursing: NO- XR CHEST 1 -68-38 04:33:00 Name: TYLER EDGE 
[facility]ly [facility] : 1970 Age/S: 49 / F [id number] [address] Unit #: [id number]
2 Loc: [place] [address] Phys: Kristy Quiros MD  Acct: [id number] Dis Date: 
Status: REG ER PHONE#: [phone] Exam Date: 2020 FAX #:  Reason: 
POST CENTRAL PLACEMENT; RIGHT IJ EXAMS: CPT: [id number] XR CHEST 1 V 09613  
Fluoro Time: DAP (Gy m2): Air Kerma (mGy): HISTORY: Post central line placement,
hypotension Location code: B2 FINDINGS: Frontal view of the chest demonstrates a
mildlyenlarged cardiomediastinal silhouette. The trachea is midline. The lungs 
are clear. There is no effusion or pneumothorax. The bones are intact. Right IJ 
catheter in good position. IMPRESSION: Mild cardiomegaly, without acute 
pulmonary process. ** Electronically Signed by ISABEL March on 2020 at 
0433 ** Reported and signed by: Shree March M.D. CC: Kristy Quiros MD  PAGE 1 
Signed Report Name: TYLER EDGE [facility]ly [facility] : 1970 Age/S: 49 / F 
[id number] [address] Unit #: [id number] Loc: [place] [address] Phys: 
Kristy Quiros MD Acct: [id number] Dis Date: Status: REG ER PHONE #: [phone]
[phone] Exam Date: 2020 FAX #: Reason: POST CENTRAL PLACEMENT; RIGHT 
IJ EXAMS: CPT: [id number] XR CHEST 1 V 84675 Fluoro Time: DAP (Gy m2): Air Kerma 
(mGy): (Continued) Technologist: Duncan EllisRT(R)(CT)  Trnscb Date/Time: 
2020 (433) DanielRK5 Orig Print D/T: S: 2020 (043)  PAGE 2 Signed 
ReportCBC W/AUTO [id number] 04:15:00





             Test Item    Value        Reference Range Interpretation Comments

 

             WHITE BLOOD CELL (test 24.2 K/mm3   3.5-11.0     H            



             code = WBC)                                         

 

             RED BLOOD CELL (test 3.75 M/mm3   4.70-6.10    L            



             code = RBC)                                         

 

             HEMOGLOBIN (test code 11.5 G/DL    10.4-14.9    N            



             = HGB)                                              

 

             HEMATOCRIT (test code 35.2 %       31.5-44.1    N            



             = HCT)                                              

 

             MEAN CELL VOLUME (test 93.9 Fl      84.5-98.6    N            



             code = MCV)                                         

 

             MEAN CELL HGB (test 30.7 pg      27.0-34.2    N            



             code = MCH)                                         

 

             MEAN CELL HGB 32.7 G/DL    31.5-34.0    N            



             CONCETRATION (test                                        



             code = MCHC)                                        

 

             RED CELL DISTRIBUTION 13.8 SD      11.5-14.5    N            



             WIDTH (test code =                                        



             RDW)                                                

 

             PLATELET COUNT (test 234.0 K/mm3  150-450      N            



             code = PLT)                                         

 

             MEAN PLATELET VOLUME 9.80 fL      7.0-10.5     N            



             (test code = MPV)                                        

 

             NEUTROPHIL % (test 82.9 %       40-76        H            



             code = NT%)                                         

 

             LYMPHOCYTE % (test 8.3 %        20.5-51.1    L            



             code = LY%)                                         

 

             MONOCYTE % (test code 7.3 %        1.7-9.3      N            



             = MO%)                                              

 

             EOSINOPHIL % (test 1.4 %        0.0-6.0      N            



             code = EO%)                                         

 

             BASOPHIL % (test code 0.1 %        0.0-2.0      N            



             = BA%)                                              

 

             NEUTROPHIL # (test 20.08 K/mm3  1.8-7.6      H            



             code = NT#)                                         

 

             LYMPHOCYTE # (test 2.0 K/mm3    0.6-3.2      N            



             code = LY#)                                         

 

             MONOCYTE # (test code 1.8 K/mm3    0.3-1.1      H            



             = MO#)                                              

 

             EOSINOPHIL # (test 0.3 K/mm3    0.0-0.4      N            



             code = EO#)                                         

 

             BASOPHIL # (test code 0.0 K/mm3    0.0-0.1      N            



             = BA#)                                              

 

             MANUAL DIFF REQUIRED NO DIFF/SCN  CRITERIA                  SLIDE R

EVIEW



             (test code = MDIFF)                                        CONSISTA

NT WITH AUTO



                                                                 DIFFERENTIAL.



BASIC METABOLIC [id number] 04:11:00





             Test Item    Value        Reference Range Interpretation Comments

 

             SODIUM (test code = NA) 135 mmol/L   134-147      N            

 

             POTASSIUM (test code = K) 4.0 mmol/L   3.4-5.0      N            

 

             CHLORIDE (test code = CL) 106 mmol/L   100-108      N            

 

             CARBON DIOXIDE (test code = CO2) 22 mmol/L    21-32        N       

     

 

             ANION GAP (test code = GAP) 7.0 GAP calc 4.0-15.0     N            

 

             GLUCOSE (test code = GLU) 97 MG/DL            N            

 

             BLOOD UREA NITROGEN (test code = 34 MG/DL     7-18         H       

     



             BUN)                                                

 

             GLOMERULAR FILTRATION RATE (test 39 estGFR    >60          L       

     



             code = GFR)                                         

 

             CREATININE (test code = CREAT) 1.5 MG/DL    0.6-1.0      H         

   

 

             CALCIUM (test code = CA) 8.9 MG/DL    8.5-10.1     N            



LACTIC [id number] 04:11:00





             Test Item    Value        Reference Range Interpretation Comments

 

             LACTIC ACID (test code = LACT) 0.8 mmol/L   0.4-2.0      N         

   



CBC W/AUTO [id number] 03:54:00





             Test Item    Value        Reference Range Interpretation Comments

 

             WHITE BLOOD CELL (test code = 24.2 K/mm3   3.5-11.0     H          

  



             WBC)                                                

 

             RED BLOOD CELL (test code = RBC) 3.75 M/mm3   4.70-6.10    L       

     

 

             HEMOGLOBIN (test code = HGB) 11.5 G/DL    10.4-14.9    N           

 

 

             HEMATOCRIT (test code = HCT) 35.2 %       31.5-44.1    N           

 

 

             MEAN CELL VOLUME (test code = 93.9 Fl      84.5-98.6    N          

  



             MCV)                                                

 

             MEAN CELL HGB (test code = MCH) 30.7 pg      27.0-34.2    N        

    

 

             MEAN CELL HGB CONCETRATION (test 32.7 G/DL    31.5-34.0    N       

     



             code = MCHC)                                        

 

             RED CELL DISTRIBUTION WIDTH (test 13.8 SD      11.5-14.5    N      

      



             code = RDW)                                         

 

             PLATELET COUNT (test code = PLT) 234.0 K/mm3  150-450      N       

     

 

             MEAN PLATELET VOLUME (test code = 9.80 fL      7.0-10.5     N      

      



             MPV)                                                

 

             NEUTROPHIL % (test code = NT%)  %           40-76        H         

   

 

             LYMPHOCYTE % (test code = LY%)  %           20.5-51.1    L         

   

 

             MONOCYTE % (test code = MO%)  %           1.7-9.3      N           

 

 

             EOSINOPHIL % (test code = EO%)  %           0.0-6.0      N         

   

 

             BASOPHIL % (test code = BA%)  %           0.0-2.0      N           

 

 

             NEUTROPHIL # (test code = NT#)  K/mm3       1.8-7.6      H         

   

 

             LYMPHOCYTE # (test code = LY#)  K/mm3       0.6-3.2      N         

   

 

             MONOCYTE # (test code = MO#)  K/mm3       0.3-1.1      H           

 

 

             EOSINOPHIL # (test code = EO#)  K/mm3       0.0-0.4      N         

   

 

             BASOPHIL # (test code = BA#)  K/mm3       0.0-0.1      N           

 

 

             MANUAL DIFF REQUIRED (test code =  DIFF/SCN    CRITERIA            

      



             MDIFF)                                              



Basic Metabolic [id number] 07:54:48





             Test Item    Value        Reference Range Interpretation Comments

 

             Sodium Level (test code = Sodium 142.0 mmol/L 135.0-145.0          

     



             Level)                                              

 

             Potassium Level (test code = 4.8 mmol/L   3.5-5.1                  

 



             Potassium Level)                                        

 

             Chloride Level (test code = 105 mmol/L                       



             Chloride Level)                                        

 

             CO2 (test code = CO2) 25 mmol/L    22-29                     

 

             Anion Gap (test code = Anion 12 mmol/L    7-16                     

 



             Gap)                                                

 

             BUN (test code = BUN) 19.60 mg/dL  6.00-20.00                

 

             Creatinine Level (test code = 0.80 mg/dL   0.50-0.90               

  



             Creatinine Level)                                        

 

             BUN/Creat Ratio (test code = 24                        N           

 



             BUN/Creat Ratio)                                        

 

             Glucose Level (test code = 86 mg/dL                         



             Glucose Level)                                        

 

             Calcium Level (test code = 10.1 mg/dL   8.3-10.5                  



             Calcium Level)                                        



Basic Metabolic [id number] 07:54:48





             Test Item    Value        Reference Range Interpretation Comments

 

             Sodium Level (test 142.0 mmol/L 135.0-145.0               



             code = Sodium Level)                                        

 

             Potassium Level 4.8 mmol/L   3.5-5.1                   



             (test code =                                        



             Potassium Level)                                        

 

             Chloride Level (test 105 mmol/L                       



             code = Chloride                                        



             Level)                                              

 

             CO2 (test code = 25 mmol/L    22-29                     



             CO2)                                                

 

             Anion Gap (test code 12 mmol/L    7-16                      



             = Anion Gap)                                        

 

             BUN (test code = 19.60 mg/dL  6.00-20.00                



             BUN)                                                

 

             Creatinine Level 0.80 mg/dL   0.50-0.90                 



             (test code =                                        



             Creatinine Level)                                        

 

             BUN/Creat Ratio 24                        N            



             (test code =                                        



             BUN/Creat Ratio)                                        

 

             Glucose Level (test 86 mg/dL                         



             code = Glucose                                        



             Level)                                              

 

             Calcium Level (test 10.1 mg/dL   8.3-10.5                  



             code = Calcium                                        



             Level)                                              

 

             eGFR AA (test code = >60                       N            eGFR (e

stimated



             eGFR AA)     mL/min/1.73 m2                           Glomerular



                                                                 Filtration Rate

) is



                                                                 an estimated va

lue,



                                                                 calculated from

 the



                                                                 patient's serum



                                                                 creatinine usin

g the



                                                                 MDRD equation. 

It is



                                                                 NOT the patient

's



                                                                 actual GFR. The

 eGFR



                                                                 provides a more



                                                                 clinically usef

ul



                                                                 measure of kidn

ey



                                                                 disease than se

rum



                                                                 creatinine



                                                                 alone.***This



                                                                 calculation rimma

es



                                                                 sex and race in

to



                                                                 account, if the



                                                                 information is



                                                                 provided. If th

e



                                                                 race is not



                                                                 provided, and t

he



                                                                 patient is



                                                                 -Crystal

n,



                                                                 multiply by 1.2

12.



                                                                 If sex is not



                                                                 provided, and t

he



                                                                 patient is fema

le,



                                                                 multiply by 0.7

42.



                                                                 Results for pat

ients



                                                                 <18 years of ag

e



                                                                 have not been



                                                                 validated by th

e



                                                                 MDRD study and



                                                                 should be



                                                                 interpreted wit

h



                                                                 caution. eGFR R

esult



                                                                 Interpretation:

eGFR



                                                                 > or = 60 is in

 the



                                                                 Normal RangeeGF

R <



                                                                 60 may mean kid

hang



                                                                 diseaseeGFR < 1

5 may



                                                                 mean kidney



                                                                 failure*** Rang

es



                                                                 recommended by 

the



                                                                 [employer]



                                                                 Foundation,



                                                                 http://nkdep.ni

h.gov



Basic Metabolic [id number] 07:54:48





             Test Item    Value        Reference Range Interpretation Comments

 

             Sodium Level (test 142.0 mmol/L 135.0-145.0               



             code = Sodium Level)                                        

 

             Potassium Level 4.8 mmol/L   3.5-5.1                   



             (test code =                                        



             Potassium Level)                                        

 

             Chloride Level (test 105 mmol/L                       



             code = Chloride                                        



             Level)                                              

 

             CO2 (test code = 25 mmol/L    22-29                     



             CO2)                                                

 

             Anion Gap (test code 12 mmol/L    7-16                      



             = Anion Gap)                                        

 

             BUN (test code = 19.60 mg/dL  6.00-20.00                



             BUN)                                                

 

             Creatinine Level 0.80 mg/dL   0.50-0.90                 



             (test code =                                        



             Creatinine Level)                                        

 

             BUN/Creat Ratio 24                        N            



             (test code =                                        



             BUN/Creat Ratio)                                        

 

             Glucose Level (test 86 mg/dL                         



             code = Glucose                                        



             Level)                                              

 

             Calcium Level (test 10.1 mg/dL   8.3-10.5                  



             code = Calcium                                        



             Level)                                              

 

             eGFR AA (test code = >60                       N            eGFR (e

stimated



             eGFR AA)     mL/min/1.73 m2                           Glomerular



                                                                 Filtration Rate

) is



                                                                 an estimated va

lue,



                                                                 calculated from

 the



                                                                 patient's serum



                                                                 creatinine usin

g the



                                                                 MDRD equation. 

It is



                                                                 NOT the patient

's



                                                                 actual GFR. The

 eGFR



                                                                 provides a more



                                                                 clinically usef

ul



                                                                 measure of kidn

ey



                                                                 disease than se

rum



                                                                 creatinine



                                                                 alone.***This



                                                                 calculation rimma

es



                                                                 sex and race in

to



                                                                 account, if the



                                                                 information is



                                                                 provided. If th

e



                                                                 race is not



                                                                 provided, and t

he



                                                                 patient is



                                                                 -Crystal

n,



                                                                 multiply by 1.2

12.



                                                                 If sex is not



                                                                 provided, and t

he



                                                                 patient is fema

le,



                                                                 multiply by 0.7

42.



                                                                 Results for pat

ients



                                                                 <18 years of ag

e



                                                                 have not been



                                                                 validated by [facility]



                                                                 MDRD study and



                                                                 should be



                                                                 interpreted wit

h



                                                                 caution. eGFR R

esult



                                                                 Interpretation:

eGFR



                                                                 > or = 60 is in

 the



                                                                 Normal RangeeGF

R <



                                                                 60 may mean kid

hang



                                                                 diseaseeGFR < 1

5 may



                                                                 mean kidney



                                                                 failure*** Rang

es



                                                                 recommended by 

the



                                                                 [employer]



                                                                 Foundation,



                                                                 http://nkdep.ni

h.gov

 

             eGFR Non-AA (test >60.00                    N            eGFR (sheba

mated



             code = eGFR Non-AA) mL/min/1.73 m2                           Glomer

ular



                                                                 Filtration Rate

) is



                                                                 an estimated va

lue,



                                                                 calculated from

 the



                                                                 patient's serum



                                                                 creatinine usin

g the



                                                                 MDRD equation. 

It is



                                                                 NOT the patient

's



                                                                 actual GFR. The

 eGFR



                                                                 provides a more



                                                                 clinically usef

ul



                                                                 measure of kidn

ey



                                                                 disease than se

rum



                                                                 creatinine



                                                                 alone.***This



                                                                 calculation rimma

es



                                                                 sex and race in

to



                                                                 account, if the



                                                                 information is



                                                                 provided. If th

e



                                                                 race is not



                                                                 provided, and t

he



                                                                 patient is



                                                                 -Crystal

n,



                                                                 multiply by 1.2

12.



                                                                 If sex is not



                                                                 provided, and t

he



                                                                 patient is fema

le,



                                                                 multiply by 0.7

42.



                                                                 Results for pat

ients



                                                                 <18 years of ag

e



                                                                 have not been



                                                                 validated by [facility]



                                                                 MDRD study and



                                                                 should be



                                                                 interpreted wit

h



                                                                 caution. eGFR R

esult



                                                                 Interpretation:

eGFR



                                                                 > or = 60 is in

 the



                                                                 Normal RangeeGF

R <



                                                                 60 may mean kid

hang



                                                                 diseaseeGFR < 1

5 may



                                                                 mean kidney



                                                                 failure*** Rang

es



                                                                 recommended by 

the



                                                                 [employer]



                                                                 [employer],



                                                                 http://nkdep.ni

h.gov



Complete Blood Count with [id number] 07:29:42





             Test Item    Value        Reference Range Interpretation Comments

 

             WBC (test code = WBC) 9.3 x10      4.4-10.5                  

 

             RBC (test code = RBC) 4.71 x10     3.75-5.20                 

 

             Hgb (test code = Hgb) 14.4 g/dL    12.2-14.8                 

 

             MCV (test code = MCV) 92.10 fL     80..00              

 

             Hct (test code = Hct) 43.4 %       36.5-44.4                 

 

             MCHC (test code = 33.20 g/dL   32.00-37.50               



             MCHC)                                               

 

             RDW CV (test code = 13.4 %       11.5-14.5                 



             RDW CV)                                             

 

             MCH (test code = MCH) 30.6 pg      27.0-32.5                 

 

             Platelets (test code = 325.0 x10    140.0-440.0               



             Platelets)                                          

 

             MPV (test code = MPV) 9.8 fL                    N            

 

             Slide Review (test Auto         Auto                      Result cr

eated by



             code = Slide Review)                                        [email or web address]

SLIDE_REV_AUTO

 

             nRBC (test code = 0                         N            



             nRBC)                                               

 

             NRBC Abs (test code = 0.00 x10                  N            



             NRBC Abs)                                           

 

             IPF (test code = IPF) 0 %                       N            



Automated [id number] 07:29:42





             Test Item    Value        Reference Range Interpretation Comments

 

             Neutro Auto (test code = Neutro 66.2 %       36.0-70.0             

    



             Auto)                                               

 

             Lymph Auto (test code = Lymph Auto) 21.1 %       12.0-44.0         

        

 

             Mono Auto (test code = Mono Auto) 6.8 %        0.0-11.0            

      

 

             Eos, Auto (test code = Eos, Auto) 4.8 %        0.0-7.0             

      

 

             Basophil Auto (test code = Basophil 0.9 %        0.0-2.0           

        



             Auto)                                               

 

             Neutro Absolute (test code = Neutro 6.2 x10      1.6-7.4           

        



             Absolute)                                           

 

             Lymph Absolute (test code = Lymph 1.97 x10     .50-4.60            

      



             Absolute)                                           

 

             Mono Absolute (test code = Mono .63 x10      .00-1.20              

    



             Absolute)                                           

 

             Eos Absolute (test code = Eos 0.45 x10     0.00-0.74               

  



             Absolute)                                           

 

             Baso Absolute (test code = Baso 0.08 x10     0.00-0.21             

    



             Absolute)                                           



IG [id number] 07:29:42





             Test Item    Value        Reference Range Interpretation Comments

 

             IG (test code = IG) 0.2 %        0.0-5.0                   

 

             IG Abs (test code = IG Abs) 0 x10                     N            



Thyroid Stimulating [id number] 04:30:30





             Test Item    Value        Reference Range Interpretation Comments

 

             TSH (test code = TSH) 1.360 mIU/mL 0.270-4.200               



RPR [id number] 04:06:17





             Test Item    Value        Reference Range Interpretation Comments

 

             RPR Qual (test code = RPR Qual) Non-Reactive Non-Reactive          

    

 

             Reactive Control (test code = Reactive                             

  



             Reactive Control)                                        

 

             Weak Reactive Control (test Weak Reactive                          

 



             code = Weak Reactive Control)                                      

  

 

             Non-Reactive Control (test code Non-Reactive                       

    



             = Non-Reactive Control)                                        

 

             Lot # (test code = Lot #) [id number]                    N            

 

             Expiration Dt (test code = 10-                N            



             Expiration Dt)                                        



Hemoglobin A1c2019-11-15 18:37:54





             Test Item    Value        Reference Range Interpretation Comments

 

             Hemoglobin A1c (test code 4.7 %        4.8-5.9      L            No

n Diabetic



             = Hemoglobin A1c)                                        4.8-5.9%Di

abetic <7.0%



Lipid Panel2019-11-15 18:08:58





             Test Item    Value        Reference Range Interpretation Comments

 

             Cholesterol Total 189 mg/dL    0-200                     RISK OF HE

ART



             (test code =                                        DISEASEPublishe

d by



             Cholesterol Total)                                        American 

Heart



                                                                 Association Judith

lyte



                                                                 Optimal Borderl

ine



                                                                 Increased RiskC

HOL <200



                                                                 200-239 >240TRI

G <150



                                                                 150-199 >200HDL

 Male



                                                                 >60 <40HDL Fema

le >60



                                                                 <50LDL <100 130

-159



                                                                 >160LDL Near op

timal is



                                                                 100-129

 

             Triglycerides (test 112 mg/dL    9-200                     



             code = Triglycerides)                                        

 

             HDL (test code = HDL) 44 mg/dL     50-60        L            

 

             LDL (test code = LDL) 122 mg/dL    0-130                     The eq

uation being used



                                                                 in this calcula

tion is



                                                                 LDL = (Chol - H

DL) -



                                                                 (Trig / 5)

 

             VLDL (test code = 22 mg/dL     5-40                      The equati

on being used



             VLDL)                                               in this calcula

tion is



                                                                 VLDL = Trig / 5

 

             Chol/HDL (test code = 4.3 ratio    0.0-4.4                   



             Chol/HDL)                                           

 

             LDL/HDL Ratio (test 3                         N            The equa

tion being used



             code = LDL/HDL Ratio)                                        in thi

s calculation is



                                                                 LDL/HDL Ratio=L

DL



                                                                 Calc/HDL Chol



Complete Blood Count with Differential2019-11-15 07:51:57





             Test Item    Value        Reference Range Interpretation Comments

 

             WBC (test code = WBC) 10.6 x10     4.4-10.5     H            

 

             RBC (test code = RBC) 4.45 x10     3.75-5.20                 

 

             Hgb (test code = Hgb) 13.5 g/dL    12.2-14.8                 

 

             MCV (test code = MCV) 93.30 fL     80..00              

 

             Hct (test code = Hct) 41.5 %       36.5-44.4                 

 

             MCHC (test code = 32.50 g/dL   32.00-37.50               



             MCHC)                                               

 

             RDW CV (test code = 13.7 %       11.5-14.5                 



             RDW CV)                                             

 

             MCH (test code = MCH) 30.3 pg      27.0-32.5                 

 

             Platelets (test code = 356.0 x10    140.0-440.0               



             Platelets)                                          

 

             MPV (test code = MPV) 9.7 fL                    N            

 

             Slide Review (test Auto         Auto                      Result cr

eated by



             code = Slide Review)                                        [email or web address]_

SLIDE_REV_AUTO

 

             nRBC (test code = 0                         N            



             nRBC)                                               

 

             NRBC Abs (test code = 0.00 x10                  N            



             NRBC Abs)                                           

 

             IPF (test code = IPF) 0 %                       N            



Automated Differential2019-11-15 07:51:57





             Test Item    Value        Reference Range Interpretation Comments

 

             Neutro Auto (test code = Neutro 65.4 %       36.0-70.0             

    



             Auto)                                               

 

             Lymph Auto (test code = Lymph Auto) 23.5 %       12.0-44.0         

        

 

             Mono Auto (test code = Mono Auto) 5.7 %        0.0-11.0            

      

 

             Eos, Auto (test code = Eos, Auto) 4.4 %        0.0-7.0             

      

 

             Basophil Auto (test code = Basophil 0.8 %        0.0-2.0           

        



             Auto)                                               

 

             Neutro Absolute (test code = Neutro 6.9 x10      1.6-7.4           

        



             Absolute)                                           

 

             Lymph Absolute (test code = Lymph 2.49 x10     .50-4.60            

      



             Absolute)                                           

 

             Mono Absolute (test code = Mono .60 x10      .00-1.20              

    



             Absolute)                                           

 

             Eos Absolute (test code = Eos 0.47 x10     0.00-0.74               

  



             Absolute)                                           

 

             Baso Absolute (test code = Baso 0.08 x10     0.00-0.21             

    



             Absolute)                                           



IG Flags2019-11-15 07:51:57





             Test Item    Value        Reference Range Interpretation Comments

 

             IG (test code = IG) 0.2 %        0.0-5.0                   

 

             IG Abs (test code = IG Abs) 0 x10                     N            



Urine [id number] 10:18:52





             Test Item    Value        Reference Range Interpretation Comments

 

             ORGANISM (test code = Escherichia coli                           



             ORGANISM)                                           

 

             Amikacin (test code =                           S            



             Amik)                                               

 

             Ampicillin (test code =                           S            



             Amp)                                                

 

             Ampicillin/Sulbactam                           S            



             (test code = Amp/Sul)                                        

 

             Cefazolin (test code =                           S            



             Cefaz)                                              

 

             Cefepime (test code =                           S            



             Cefep)                                              

 

             Cefotaxime (test code =                           S            



             Cefo)                                               

 

             Ceftazidime (test code                           S            



             = Ceftaz)                                           

 

             Ceftriaxone (test code                           S            



             = Ceftri)                                           

 

             Cefuroxime (test code =                           S            



             Cefur)                                              

 

             Ciprofloxacin (test                           S            



             code = Cipro)                                        

 

             Gentamicin (test code =                           S            



             Gent)                                               

 

             Imipenem (test code =                           S            



             Imi)                                                

 

             Levofloxacin (test code                           S            



             = Levo)                                             

 

             Meropenem (test code =                           S            



             Sindi)                                               

 

             Nitrofurantoin (test                           S            



             code = Nitro)                                        

 

             Piperacillin/Tazobactam                           S            



             (test code = Pip/Blaine)                                        

 

             Tobramycin (test code =                           S            



             Tobra)                                              

 

             Trimethoprim/Sulfa                           S            



             (test code = SXT)                                        

 

             Final Report (test code >=100,000 cfu/ml                           



             = Final Report) Escherichia coli                           



                          >=100,000 cfu/ml Alpha                           



                          hemolytic                              



                          Streptococcus (not                           



                          Group D or                             



                          Enterococcus)                           



 C Urine Added by [email or web address] [id number] 09:18:25





             Test Item    Value        Reference Range Interpretation Comments

 

             Lithium Level (test code = 0.58 mmol/L  0.60-1.20    L            



             Lithium Level)                                        



Urine Drug [id number] 01:36:44





             Test Item    Value        Reference Range Interpretation Comments

 

             Amphetamine Screen Ur POSITIVE     Negative     A            



             (test code = Amphetamine                                        



             Screen Ur)                                          

 

             Barbiturate Screen Ur Negative     Negative                  



             (test code = Barbiturate                                        



             Screen Ur)                                          

 

             Benzodiazepines Ur (test Negative     Negative                  



             code = Benzodiazepines                                        



             Ur)                                                 

 

             Cocaine Screen Ur (test Negative     Negative                  



             code = Cocaine Screen                                        



             Ur)                                                 

 

             U Methadone Scr (test Negative     Negative                  



             code = U Methadone Scr)                                        

 

             Opiate Screen Ur (test Negative     Negative                  



             code = Opiate Screen Ur)                                        

 

             U PCP Scrn (test code = Negative     Negative                  



             U PCP Scrn)                                         

 

             Cannabinoid Screen Ur Negative     Negative                  



             (test code = Cannabinoid                                        



             Screen Ur)                                          

 

             U TCA (test code = U Negative     Negative                  The res

ults of all



             TCA)                                                drug screen elinor

ts are



                                                                 only preliminar

y.



                                                                 Clinical



                                                                 consideration a

nd



                                                                 professional ju

dgment



                                                                 should be appli

ed to



                                                                 any drug of abu

se



                                                                 test result,



                                                                 particularly wh

en



                                                                 preliminary pos

itive



                                                                 results are obt

ained.



                                                                 Please order a



                                                                 separate confir

matory



                                                                 test if desired

.



HCG Qualitative [id number] 01:36:43





             Test Item    Value        Reference Range Interpretation Comments

 

             hCG Ur (test code = Negative                               If the r

esult is



             hCG Ur)                                             "Negative" in p

atients



                                                                 suspected to be



                                                                 pregnant, recom

mend



                                                                 retest with a s

ample



                                                                 obtained 48 to 

72



                                                                 hours later, or

 by



                                                                 ordering a



                                                                 quantitative as

say. If



                                                                 the result is



                                                                 "Borderline" te

sting



                                                                 should be repea

gianfranco in



                                                                 48 to 72 hours.

 

             Lot # (test code = [id number]                    N            



             Lot #)                                              

 

             Expiration Dt (test 2020                N            



             code = Expiration Dt)                                        

 

             Neg Control (test Negative                               



             code = Neg Control)                                        

 

             Pos Control (test Positive                               



             code = Pos Control)                                        

 

             Internal QC (test Acceptable                             



             code = Internal QC)                                        



Urinalysis [id number] 01:36:03





             Test Item    Value        Reference Range Interpretation Comments

 

             UA WBC (test code = UA WBC) 0-5          0-5                       

 

             UA RBC (test code = UA RBC) None Seen    0-5                       

 

             UA Bacteria (test code = UA Moderate                  A            



             Bacteria)                                           

 

             UA Squam Epithelial (test code = UA 0-5                            

        



             Squam Epithelial)                                        



Comprehensive Metabolic [id number] 01:23:40





             Test Item    Value        Reference Range Interpretation Comments

 

             Sodium Level (test code = Sodium 139.0 mmol/L 135.0-145.0          

     



             Level)                                              

 

             Potassium Level (test code = 4.4 mmol/L   3.5-5.1                  

 



             Potassium Level)                                        

 

             Chloride Level (test code = 102 mmol/L                       



             Chloride Level)                                        

 

             CO2 (test code = CO2) 23 mmol/L    22-29                     

 

             Anion Gap (test code = Anion 14 mmol/L    7-16                     

 



             Gap)                                                

 

             BUN (test code = BUN) 9.10 mg/dL   6.00-20.00                

 

             Creatinine Level (test code = 1.00 mg/dL   0.50-0.90    H          

  



             Creatinine Level)                                        

 

             BUN/Creat Ratio (test code = 9                         N           

 



             BUN/Creat Ratio)                                        

 

             Glucose Level (test code = 115 mg/dL                        



             Glucose Level)                                        

 

             Calcium Level (test code = 10.1 mg/dL   8.3-10.5                  



             Calcium Level)                                        

 

             Alk Phos (test code = Alk Phos) 106 U/L             H        

    

 

             Bilirubin Total (test code = 0.4 mg/dL    0.1-0.9                  

 



             Bilirubin Total)                                        

 

             Albumin Level (test code = 4.6 g/dL     3.5-5.2                   



             Albumin Level)                                        

 

             Protein Total (test code = 7.7 g/dL     6.4-8.3                   



             Protein Total)                                        

 

             ALT (test code = ALT) 12 U/L       1-33                      

 

             AST (test code = AST) 18 U/L       1-32                      

 

             Globulin (test code = Globulin) 3.1 g/dL     2.9-3.1               

    

 

             A/G Ratio (test code = A/G 1.5 ratio                 N            



             Ratio)                                              



Comprehensive Metabolic [id number] 01:23:40





             Test Item    Value        Reference Range Interpretation Comments

 

             Sodium Level (test 139.0 mmol/L 135.0-145.0               



             code = Sodium Level)                                        

 

             Potassium Level 4.4 mmol/L   3.5-5.1                   



             (test code =                                        



             Potassium Level)                                        

 

             Chloride Level (test 102 mmol/L                       



             code = Chloride                                        



             Level)                                              

 

             CO2 (test code = 23 mmol/L    22-29                     



             CO2)                                                

 

             Anion Gap (test code 14 mmol/L    7-16                      



             = Anion Gap)                                        

 

             BUN (test code = 9.10 mg/dL   6.00-20.00                



             BUN)                                                

 

             Creatinine Level 1.00 mg/dL   0.50-0.90    H            



             (test code =                                        



             Creatinine Level)                                        

 

             BUN/Creat Ratio 9                         N            



             (test code =                                        



             BUN/Creat Ratio)                                        

 

             Glucose Level (test 115 mg/dL                        



             code = Glucose                                        



             Level)                                              

 

             Calcium Level (test 10.1 mg/dL   8.3-10.5                  



             code = Calcium                                        



             Level)                                              

 

             Alk Phos (test code 106 U/L             H            



             = Alk Phos)                                         

 

             Bilirubin Total 0.4 mg/dL    0.1-0.9                   



             (test code =                                        



             Bilirubin Total)                                        

 

             Albumin Level (test 4.6 g/dL     3.5-5.2                   



             code = Albumin                                        



             Level)                                              

 

             Protein Total (test 7.7 g/dL     6.4-8.3                   



             code = Protein                                        



             Total)                                              

 

             ALT (test code = 12 U/L       1-33                      



             ALT)                                                

 

             AST (test code = 18 U/L       1-32                      



             AST)                                                

 

             Globulin (test code 3.1 g/dL     2.9-3.1                   



             = Globulin)                                         

 

             A/G Ratio (test code 1.5 ratio                 N            



             = A/G Ratio)                                        

 

             eGFR AA (test code = >60                       N            eGFR (e

stimated



             eGFR AA)     mL/min/1.73 m2                           Glomerular



                                                                 Filtration Rate

) is



                                                                 an estimated va

lue,



                                                                 calculated from

 the



                                                                 patient's serum



                                                                 creatinine usin

g the



                                                                 MDRD equation. 

It is



                                                                 NOT the patient

's



                                                                 actual GFR. The

 eGFR



                                                                 provides a more



                                                                 clinically usef

ul



                                                                 measure of kidn

ey



                                                                 disease than se

rum



                                                                 creatinine



                                                                 alone.***This



                                                                 calculation rimma

es



                                                                 sex and race in

to



                                                                 account, if the



                                                                 information is



                                                                 provided. If th

e



                                                                 race is not



                                                                 provided, and t

he



                                                                 patient is



                                                                 -Crystal

n,



                                                                 multiply by 1.2

12.



                                                                 If sex is not



                                                                 provided, and t

he



                                                                 patient is fema

le,



                                                                 multiply by 0.7

42.



                                                                 Results for pat

ients



                                                                 <18 years of ag

e



                                                                 have not been



                                                                 validated by th

e



                                                                 MDRD study and



                                                                 should be



                                                                 interpreted wit

h



                                                                 caution. eGFR R

esult



                                                                 Interpretation:

eGFR



                                                                 > or = 60 is in

 the



                                                                 Normal RangeeGF

R <



                                                                 60 may mean kid

hang



                                                                 diseaseeGFR < 1

5 may



                                                                 mean kidney



                                                                 failure*** Rang

es



                                                                 recommended by 

the



                                                                 [employer]



                                                                 [employer],



                                                                 http://nkdep.ni

h.gov



Comprehensive Metabolic [id number] 01:23:40





             Test Item    Value        Reference Range Interpretation Comments

 

             Sodium Level (test 139.0 mmol/L 135.0-145.0               



             code = Sodium Level)                                        

 

             Potassium Level 4.4 mmol/L   3.5-5.1                   



             (test code =                                        



             Potassium Level)                                        

 

             Chloride Level (test 102 mmol/L                       



             code = Chloride                                        



             Level)                                              

 

             CO2 (test code = 23 mmol/L    22-29                     



             CO2)                                                

 

             Anion Gap (test code 14 mmol/L    7-16                      



             = Anion Gap)                                        

 

             BUN (test code = 9.10 mg/dL   6.00-20.00                



             BUN)                                                

 

             Creatinine Level 1.00 mg/dL   0.50-0.90    H            



             (test code =                                        



             Creatinine Level)                                        

 

             BUN/Creat Ratio 9                         N            



             (test code =                                        



             BUN/Creat Ratio)                                        

 

             Glucose Level (test 115 mg/dL                        



             code = Glucose                                        



             Level)                                              

 

             Calcium Level (test 10.1 mg/dL   8.3-10.5                  



             code = Calcium                                        



             Level)                                              

 

             Alk Phos (test code 106 U/L             H            



             = Alk Phos)                                         

 

             Bilirubin Total 0.4 mg/dL    0.1-0.9                   



             (test code =                                        



             Bilirubin Total)                                        

 

             Albumin Level (test 4.6 g/dL     3.5-5.2                   



             code = Albumin                                        



             Level)                                              

 

             Protein Total (test 7.7 g/dL     6.4-8.3                   



             code = Protein                                        



             Total)                                              

 

             ALT (test code = 12 U/L       1-33                      



             ALT)                                                

 

             AST (test code = 18 U/L       1-32                      



             AST)                                                

 

             Globulin (test code 3.1 g/dL     2.9-3.1                   



             = Globulin)                                         

 

             A/G Ratio (test code 1.5 ratio                 N            



             = A/G Ratio)                                        

 

             eGFR AA (test code = >60                       N            eGFR (e

stimated



             eGFR AA)     mL/min/1.73 m2                           Glomerular



                                                                 Filtration Rate

) is



                                                                 an estimated va

lue,



                                                                 calculated from

 the



                                                                 patient's serum



                                                                 creatinine usin

g the



                                                                 MDRD equation. 

It is



                                                                 NOT the patient

's



                                                                 actual GFR. The

 eGFR



                                                                 provides a more



                                                                 clinically usef

ul



                                                                 measure of kidn

ey



                                                                 disease than se

rum



                                                                 creatinine



                                                                 alone.***This



                                                                 calculation rimma

es



                                                                 sex and race in

to



                                                                 account, if the



                                                                 information is



                                                                 provided. If th

e



                                                                 race is not



                                                                 provided, and t

he



                                                                 patient is



                                                                 -Crystal

n,



                                                                 multiply by 1.2

12.



                                                                 If sex is not



                                                                 provided, and t

he



                                                                 patient is fema

le,



                                                                 multiply by 0.7

42.



                                                                 Results for pat

ients



                                                                 <18 years of ag

e



                                                                 have not been



                                                                 validated by [facility]



                                                                 MDRD study and



                                                                 should be



                                                                 interpreted wit

h



                                                                 caution. eGFR R

esult



                                                                 Interpretation:

eGFR



                                                                 > or = 60 is in

 the



                                                                 Normal RangeeGF

R <



                                                                 60 may mean kid

hang



                                                                 diseaseeGFR < 1

5 may



                                                                 mean kidney



                                                                 failure*** Rang

es



                                                                 recommended by 

the



                                                                 [employer]



                                                                 [employer],



                                                                 http://nkdep.ni

h.gov

 

             eGFR Non-AA (test 58.45                     N            eGFR (sheba

mated



             code = eGFR Non-AA) mL/min/1.73 m2                           Glomer

ular



                                                                 Filtration Rate

) is



                                                                 an estimated va

lue,



                                                                 calculated from

 the



                                                                 patient's serum



                                                                 creatinine usin

g the



                                                                 MDRD equation. 

It is



                                                                 NOT the patient

's



                                                                 actual GFR. The

 eGFR



                                                                 provides a more



                                                                 clinically usef

ul



                                                                 measure of kidn

ey



                                                                 disease than se

rum



                                                                 creatinine



                                                                 alone.***This



                                                                 calculation rimma

es



                                                                 sex and race in

to



                                                                 account, if the



                                                                 information is



                                                                 provided. If th

e



                                                                 race is not



                                                                 provided, and t

he



                                                                 patient is



                                                                 -Crystal

n,



                                                                 multiply by 1.2

12.



                                                                 If sex is not



                                                                 provided, and t

he



                                                                 patient is fema

le,



                                                                 multiply by 0.7

42.



                                                                 Results for pat

ients



                                                                 <18 years of ag

e



                                                                 have not been



                                                                 validated by [facility]



                                                                 MDRD study and



                                                                 should be



                                                                 interpreted wit

h



                                                                 caution. eGFR R

esult



                                                                 Interpretation:

eGFR



                                                                 > or = 60 is in

 the



                                                                 Normal RangeeGF

R <



                                                                 60 may mean kid

hang



                                                                 diseaseeGFR < 1

5 may



                                                                 mean kidney



                                                                 failure*** Rang

es



                                                                 recommended by 

the



                                                                 [employer]



                                                                 Foundation,



                                                                 http://nkdep.ni

h.gov



Alcohol [id number] 01:23:31





             Test Item    Value        Reference Range Interpretation Comments

 

             Ethanol Level (test <0.00 g/dL   0.00-0.01                 Intoxica

gianfranco 0.080 g/dL



             code = Ethanol                                        or more



             Level)                                              

 

             Ethanol Inst (test <0                        N            



             code = Ethanol Inst)                                        



Complete Blood Count with [id number] 00:56:12





             Test Item    Value        Reference Range Interpretation Comments

 

             WBC (test code = WBC) 19.4 x10     4.4-10.5     H            

 

             RBC (test code = RBC) 4.53 x10     3.75-5.20                 

 

             Hgb (test code = Hgb) 13.5 g/dL    12.2-14.8                 

 

             Hct (test code = Hct) 41.3 %       36.5-44.4                 

 

             MCV (test code = MCV) 91.20 fL     80..00              

 

             MCHC (test code = 32.70 g/dL   32.00-37.50               



             MCHC)                                               

 

             RDW CV (test code = 13.5 %       11.5-14.5                 



             RDW CV)                                             

 

             MCH (test code = MCH) 29.8 pg      27.0-32.5                 

 

             Platelets (test code = 462.0 x10    140.0-440.0  H            



             Platelets)                                          

 

             MPV (test code = MPV) 9.9 fL                    N            

 

             Slide Review (test Auto         Auto                      Result cr

eated by



             code = Slide Review)                                        [email or web address]

[email or web address]_AUTO

 

             nRBC (test code = 0                         N            



             nRBC)                                               

 

             NRBC Abs (test code = 0.00 x10                  N            



             NRBC Abs)                                           

 

             IPF (test code = IPF) 0 %                       N            



Automated [id number] 00:56:12





             Test Item    Value        Reference Range Interpretation Comments

 

             Neutro Auto (test code = Neutro 83.7 %       36.0-70.0    H        

    



             Auto)                                               

 

             Lymph Auto (test code = Lymph Auto) 8.9 %        12.0-44.0    L    

        

 

             Mono Auto (test code = Mono Auto) 5.0 %        0.0-11.0            

      

 

             Eos, Auto (test code = Eos, Auto) 1.4 %        0.0-7.0             

      

 

             Basophil Auto (test code = Basophil 0.7 %        0.0-2.0           

        



             Auto)                                               

 

             Neutro Absolute (test code = Neutro 16.2 x10     1.6-7.4      H    

        



             Absolute)                                           

 

             Lymph Absolute (test code = Lymph 1.73 x10     .50-4.60            

      



             Absolute)                                           

 

             Mono Absolute (test code = Mono .96 x10      .00-1.20              

    



             Absolute)                                           

 

             Eos Absolute (test code = Eos 0.27 x10     0.00-0.74               

  



             Absolute)                                           

 

             Baso Absolute (test code = Baso 0.13 x10     0.00-0.21             

    



             Absolute)                                           



IG [id number] 00:56:12





             Test Item    Value        Reference Range Interpretation Comments

 

             IG (test code = IG) 0.3 %        0.0-5.0                   

 

             IG Abs (test code = IG Abs) 0 x10                     N            



Urinalysis with Culture, if [id number] 00:55:34





             Test Item    Value        Reference Range Interpretation Comments

 

             UA Color (test code = STRAW        Yellow                    



             UA Color)                                           

 

             UA Appear (test code = CLEAR        Clear                     



             UA Appear)                                          

 

             UA pH (test code = UA 5                         N            



             pH)                                                 

 

             UA Spec Grav (test code 1.001        1.001-1.035               



             = UA Spec Grav)                                        

 

             UA Glucose (test code = NEG          Negative                  



             UA Glucose)                                         

 

             UA Bili (test code = UA NEG          Negative                  



             Bili)                                               

 

             UA Ketones (test code = NEG          Negative                  



             UA Ketones)                                         

 

             UA Blood (test code = NEG          Negative                  



             UA Blood)                                           

 

             UA Protein (test code = NEG          Negative                  



             UA Protein)                                         

 

             UA Urobilinogen (test 0.2 mg/dL                 N            



             code = UA Urobilinogen)                                        

 

             UA Nitrite (test code = POS          Negative     A            



             UA Nitrite)                                         

 

             UA Leuk Est (test code 25 cells/mcL Negative     A            



             = UA Leuk Est)                                        

 

             UA Micro Ind? (test Indicated    Not Indicated A            Result 

created by



             code = UA Micro Ind?)                                        rule



                                                                 [email or web address]_MICRO

_IN



                                                                 D



Urine [id number] 08:13:23





             Test Item    Value        Reference Range Interpretation Comments

 

             ORGANISM (test code = Escherichia coli                           



             ORGANISM)                                           

 

             Amikacin (test code =                           S            



             Amik)                                               

 

             Ampicillin (test code =                           S            



             Amp)                                                

 

             Ampicillin/Sulbactam                           S            



             (test code = Amp/Sul)                                        

 

             Cefazolin (test code =                           S            



             Cefaz)                                              

 

             Cefepime (test code =                           S            



             Cefep)                                              

 

             Cefotaxime (test code =                           S            



             Cefo)                                               

 

             Ceftazidime (test code =                           S            



             Ceftaz)                                             

 

             Ceftriaxone (test code =                           S            



             Ceftri)                                             

 

             Cefuroxime (test code =                           S            



             Cefur)                                              

 

             Ciprofloxacin (test code                           S            



             = Cipro)                                            

 

             Gentamicin (test code =                           S            



             Gent)                                               

 

             Imipenem (test code =                           S            



             Imi)                                                

 

             Levofloxacin (test code                           S            



             = Levo)                                             

 

             Meropenem (test code =                           S            



             Sindi)                                               

 

             Nitrofurantoin (test                           S            



             code = Nitro)                                        

 

             Piperacillin/Tazobactam                           S            



             (test code = Pip/Blaine)                                        

 

             Tobramycin (test code =                           S            



             Tobra)                                              

 

             Trimethoprim/Sulfa (test                           S            



             code = SXT)                                         

 

             Final Report (test code 30,000 cfu/ml                           



             = Final Report) Escherichia coli                           



                          20,000 cfu/ml                           



                          Diphtheroids                           



 C Urine Added by [email or web address] Qualitative2019-09-15 04:57:25





             Test Item    Value        Reference Range Interpretation Comments

 

             RPR Qual (test code = RPR Qual) Non-Reactive Non-Reactive          

    

 

             Reactive Control (test code = Reactive                             

  



             Reactive Control)                                        

 

             Weak Reactive Control (test Weak Reactive                          

 



             code = Weak Reactive Control)                                      

  

 

             Non-Reactive Control (test code Non-Reactive                       

    



             = Non-Reactive Control)                                        

 

             Lot # (test code = Lot #) [id number]                    N            

 

             Expiration Dt (test code = 10.31.2020                N            



             Expiration Dt)                                        



Thyroid Stimulating Hormone2019-09-15 01:22:43





             Test Item    Value        Reference Range Interpretation Comments

 

             TSH (test code = TSH) 3.370 mIU/mL 0.270-4.200               



Lipid Panel2019-09-15 01:17:51





             Test Item    Value        Reference Range Interpretation Comments

 

             Cholesterol Total 168 mg/dL    0-200                     RISK OF HE

ART



             (test code =                                        DISEASEPublishe

d by



             Cholesterol Total)                                        American 

Heart



                                                                 Association Judith

lyte



                                                                 Optimal Borderl

ine



                                                                 Increased RiskC

HOL <200



                                                                 200-239 >240TRI

G <150



                                                                 150-199 >200HDL

 Male



                                                                 >60 <40HDL Fema

le >60



                                                                 <50LDL <100 130

-159



                                                                 >160LDL Near op

timal is



                                                                 100-129

 

             Triglycerides (test 108 mg/dL    9-200                     



             code = Triglycerides)                                        

 

             HDL (test code = HDL) 46 mg/dL     50-60        L            

 

             LDL (test code = LDL) 100 mg/dL    0-130                     The eq

uation being used



                                                                 in this calcula

tion is



                                                                 LDL = (Chol - H

DL) -



                                                                 (Trig / 5)

 

             VLDL (test code = 22 mg/dL     5-40                      The equati

on being used



             VLDL)                                               in this calcula

tion is



                                                                 VLDL = Trig / 5

 

             Chol/HDL (test code = 3.7 ratio    0.0-4.4                   



             Chol/HDL)                                           

 

             LDL/HDL Ratio (test 2                         N            The equa

tion being used



             code = LDL/HDL Ratio)                                        in thi

s calculation is



                                                                 LDL/HDL Ratio=L

DL



                                                                 Calc/HDL Chol



Lithium Level2019-09-15 01:17:51





             Test Item    Value        Reference Range Interpretation Comments

 

             Lithium Level (test code = 0.81 mmol/L  0.60-1.20                 



             Lithium Level)                                        



Comprehensive Metabolic Panel2019-09-15 00:04:54





             Test Item    Value        Reference Range Interpretation Comments

 

             Sodium Level (test code = Sodium 137.0 mmol/L 135.0-145.0          

     



             Level)                                              

 

             Potassium Level (test code = 4.0 mmol/L   3.5-5.1                  

 



             Potassium Level)                                        

 

             Chloride Level (test code = 103 mmol/L                       



             Chloride Level)                                        

 

             CO2 (test code = CO2) 23 mmol/L    22-29                     

 

             Anion Gap (test code = Anion 11 mmol/L    7-16                     

 



             Gap)                                                

 

             BUN (test code = BUN) 11.50 mg/dL  6.00-20.00                

 

             Creatinine Level (test code = 1.00 mg/dL   0.50-0.90    H          

  



             Creatinine Level)                                        

 

             BUN/Creat Ratio (test code = 12                        N           

 



             BUN/Creat Ratio)                                        

 

             Glucose Level (test code = 108 mg/dL                        



             Glucose Level)                                        

 

             Calcium Level (test code = 10.3 mg/dL   8.3-10.5                  



             Calcium Level)                                        

 

             Alk Phos (test code = Alk Phos) 93 U/L                       

    

 

             Bilirubin Total (test code = 0.5 mg/dL    0.1-0.9                  

 



             Bilirubin Total)                                        

 

             Albumin Level (test code = 4.4 g/dL     3.5-5.2                   



             Albumin Level)                                        

 

             Protein Total (test code = 7.2 g/dL     6.4-8.3                   



             Protein Total)                                        

 

             ALT (test code = ALT) 12 U/L       1-33                      

 

             AST (test code = AST) 17 U/L       1-32                      

 

             Globulin (test code = Globulin) 2.8 g/dL     2.9-3.1      L        

    

 

             A/G Ratio (test code = A/G 1.6 ratio                 N            



             Ratio)                                              



Alcohol Level2019-09-15 00:04:54





             Test Item    Value        Reference Range Interpretation Comments

 

             Ethanol Level (test <0.00 g/dL   0.00-0.01                 [facility]tox[facility]

gianfranco 0.080 g/dL



             code = Ethanol                                        or more



             Level)                                              

 

             Ethanol Inst (test <0                        N            



             code = Ethanol Inst)                                        



Comprehensive Metabolic Panel2019-09-15 00:04:54





             Test Item    Value        Reference Range Interpretation Comments

 

             Sodium Level (test 137.0 mmol/L 135.0-145.0               



             code = Sodium Level)                                        

 

             Potassium Level 4.0 mmol/L   3.5-5.1                   



             (test code =                                        



             Potassium Level)                                        

 

             Chloride Level (test 103 mmol/L                       



             code = Chloride                                        



             Level)                                              

 

             CO2 (test code = 23 mmol/L    22-29                     



             CO2)                                                

 

             Anion Gap (test code 11 mmol/L    7-16                      



             = Anion Gap)                                        

 

             BUN (test code = 11.50 mg/dL  6.00-20.00                



             BUN)                                                

 

             Creatinine Level 1.00 mg/dL   0.50-0.90    H            



             (test code =                                        



             Creatinine Level)                                        

 

             BUN/Creat Ratio 12                        N            



             (test code =                                        



             BUN/Creat Ratio)                                        

 

             Glucose Level (test 108 mg/dL                        



             code = Glucose                                        



             Level)                                              

 

             Calcium Level (test 10.3 mg/dL   8.3-10.5                  



             code = Calcium                                        



             Level)                                              

 

             Alk Phos (test code 93 U/L                           



             = Alk Phos)                                         

 

             Bilirubin Total 0.5 mg/dL    0.1-0.9                   



             (test code =                                        



             Bilirubin Total)                                        

 

             Albumin Level (test 4.4 g/dL     3.5-5.2                   



             code = Albumin                                        



             Level)                                              

 

             Protein Total (test 7.2 g/dL     6.4-8.3                   



             code = Protein                                        



             Total)                                              

 

             ALT (test code = 12 U/L       1-33                      



             ALT)                                                

 

             AST (test code = 17 U/L       1-32                      



             AST)                                                

 

             Globulin (test code 2.8 g/dL     2.9-3.1      L            



             = Globulin)                                         

 

             A/G Ratio (test code 1.6 ratio                 N            



             = A/G Ratio)                                        

 

             eGFR AA (test code = >60                       N            eGFR (e

stimated



             eGFR AA)     mL/min/1.73 m2                           Glomerular



                                                                 Filtration Rate

) is



                                                                 an estimated va

lue,



                                                                 calculated from

 the



                                                                 patient's serum



                                                                 creatinine usin

g the



                                                                 MDRD equation. 

It is



                                                                 NOT the patient

's



                                                                 actual GFR. The

 eGFR



                                                                 provides a more



                                                                 clinically usef

ul



                                                                 measure of kidn

ey



                                                                 disease than se

rum



                                                                 creatinine



                                                                 alone.***This



                                                                 calculation rimma

es



                                                                 sex and race in

to



                                                                 account, if the



                                                                 information is



                                                                 provided. If th

e



                                                                 race is not



                                                                 provided, and t

he



                                                                 patient is



                                                                 -Crystal

n,



                                                                 multiply by 1.2

12.



                                                                 If sex is not



                                                                 provided, and t

he



                                                                 patient is fema

le,



                                                                 multiply by 0.7

42.



                                                                 Results for pat

ients



                                                                 <18 years of ag

e



                                                                 have not been



                                                                 validated by th

e



                                                                 MDRD study and



                                                                 should be



                                                                 interpreted wit

h



                                                                 caution. eGFR R

esult



                                                                 Interpretation:

eGFR



                                                                 > or = 60 is in

 the



                                                                 Normal RangeeGF

R <



                                                                 60 may mean kid

hang



                                                                 diseaseeGFR < 1

5 may



                                                                 mean kidney



                                                                 failure*** Rang

es



                                                                 recommended by 

the



                                                                 [employer]



                                                                 [employer],



                                                                 http://nkdep.ni

h.gov



Comprehensive Metabolic Panel2019-09-15 00:04:54





             Test Item    Value        Reference Range Interpretation Comments

 

             Sodium Level (test 137.0 mmol/L 135.0-145.0               



             code = Sodium Level)                                        

 

             Potassium Level 4.0 mmol/L   3.5-5.1                   



             (test code =                                        



             Potassium Level)                                        

 

             Chloride Level (test 103 mmol/L                       



             code = Chloride                                        



             Level)                                              

 

             CO2 (test code = 23 mmol/L    22-29                     



             CO2)                                                

 

             Anion Gap (test code 11 mmol/L    7-16                      



             = Anion Gap)                                        

 

             BUN (test code = 11.50 mg/dL  6.00-20.00                



             BUN)                                                

 

             Creatinine Level 1.00 mg/dL   0.50-0.90    H            



             (test code =                                        



             Creatinine Level)                                        

 

             BUN/Creat Ratio 12                        N            



             (test code =                                        



             BUN/Creat Ratio)                                        

 

             Glucose Level (test 108 mg/dL                        



             code = Glucose                                        



             Level)                                              

 

             Calcium Level (test 10.3 mg/dL   8.3-10.5                  



             code = Calcium                                        



             Level)                                              

 

             Alk Phos (test code 93 U/L                           



             = Alk Phos)                                         

 

             Bilirubin Total 0.5 mg/dL    0.1-0.9                   



             (test code =                                        



             Bilirubin Total)                                        

 

             Albumin Level (test 4.4 g/dL     3.5-5.2                   



             code = Albumin                                        



             Level)                                              

 

             Protein Total (test 7.2 g/dL     6.4-8.3                   



             code = Protein                                        



             Total)                                              

 

             ALT (test code = 12 U/L       1-33                      



             ALT)                                                

 

             AST (test code = 17 U/L       1-32                      



             AST)                                                

 

             Globulin (test code 2.8 g/dL     2.9-3.1      L            



             = Globulin)                                         

 

             A/G Ratio (test code 1.6 ratio                 N            



             = A/G Ratio)                                        

 

             eGFR AA (test code = >60                       N            eGFR (e

stimated



             eGFR AA)     mL/min/1.73 m2                           Glomerular



                                                                 Filtration Rate

) is



                                                                 an estimated va

lue,



                                                                 calculated from

 the



                                                                 patient's serum



                                                                 creatinine usin

g the



                                                                 MDRD equation. 

It is



                                                                 NOT the patient

's



                                                                 actual GFR. The

 eGFR



                                                                 provides a more



                                                                 clinically usef

ul



                                                                 measure of kidn

ey



                                                                 disease than se

rum



                                                                 creatinine



                                                                 alone.***This



                                                                 calculation rimma

es



                                                                 sex and race in

to



                                                                 account, if the



                                                                 information is



                                                                 provided. If th

e



                                                                 race is not



                                                                 provided, and t

he



                                                                 patient is



                                                                 -Crystal

n,



                                                                 multiply by 1.2

12.



                                                                 If sex is not



                                                                 provided, and t

he



                                                                 patient is fema

le,



                                                                 multiply by 0.7

42.



                                                                 Results for pat

ients



                                                                 <18 years of ag

e



                                                                 have not been



                                                                 validated by [facility]



                                                                 MDRD study and



                                                                 should be



                                                                 interpreted wit

h



                                                                 caution. eGFR R

esult



                                                                 Interpretation:

eGFR



                                                                 > or = 60 is in

 the



                                                                 Normal RangeeGF

R <



                                                                 60 may mean kid

hang



                                                                 diseaseeGFR < 1

5 may



                                                                 mean kidney



                                                                 failure*** Rang

es



                                                                 recommended by 

the



                                                                 [employer]



                                                                 [employer],



                                                                 http://nkdep.ni

h.gov

 

             eGFR Non-AA (test 58.45                     N            eGFR (sheba

mated



             code = eGFR Non-AA) mL/min/1.73 m2                           Glomer

ular



                                                                 Filtration Rate

) is



                                                                 an estimated va

lue,



                                                                 calculated from

 the



                                                                 patient's serum



                                                                 creatinine usin

g the



                                                                 MDRD equation. 

It is



                                                                 NOT the patient

's



                                                                 actual GFR. The

 eGFR



                                                                 provides a more



                                                                 clinically usef

ul



                                                                 measure of kidn

ey



                                                                 disease than se

rum



                                                                 creatinine



                                                                 alone.***This



                                                                 calculation rimma

es



                                                                 sex and race in

to



                                                                 account, if the



                                                                 information is



                                                                 provided. If th

e



                                                                 race is not



                                                                 provided, and t

he



                                                                 patient is



                                                                 -Crystal

n,



                                                                 multiply by 1.2

12.



                                                                 If sex is not



                                                                 provided, and t

he



                                                                 patient is fema

le,



                                                                 multiply by 0.7

42.



                                                                 Results for pat

ients



                                                                 <18 years of ag

e



                                                                 have not been



                                                                 validated by [facility]



                                                                 MDRD study and



                                                                 should be



                                                                 interpreted wit

h



                                                                 caution. eGFR R

esult



                                                                 Interpretation:

eGFR



                                                                 > or = 60 is in

 the



                                                                 Normal RangeeGF

R <



                                                                 60 may mean kid

hang



                                                                 diseaseeGFR < 1

5 may



                                                                 mean kidney



                                                                 failure*** Rang

es



                                                                 recommended by 

the



                                                                 [employer]



                                                                 [employer],



                                                                 http://nkdep.ni

h.gov



Urinalysis Microscopic2019-09-15 00:02:53





             Test Item    Value        Reference Range Interpretation Comments

 

             UA WBC (test code = UA WBC) 6-10         0-5          A            

 

             UA RBC (test code = UA RBC) 0-5          0-5                       

 

             UA Bacteria (test code = UA Bacteria) Many                      A  

          

 

             UA Squam Epithelial (test code = UA TNTC                      A    

        



             Squam Epithelial)                                        



Urine Drug [id number] 23:59:42





             Test Item    Value        Reference Range Interpretation Comments

 

             Amphetamine Screen Ur POSITIVE     Negative     A            



             (test code = Amphetamine                                        



             Screen Ur)                                          

 

             Barbiturate Screen Ur Negative     Negative                  



             (test code = Barbiturate                                        



             Screen Ur)                                          

 

             Benzodiazepines Ur (test POSITIVE     Negative     A            



             code = Benzodiazepines                                        



             Ur)                                                 

 

             Cocaine Screen Ur (test Negative     Negative                  



             code = Cocaine Screen                                        



             Ur)                                                 

 

             U Methadone Scr (test Negative     Negative                  



             code = U Methadone Scr)                                        

 

             Opiate Screen Ur (test Negative     Negative                  



             code = Opiate Screen Ur)                                        

 

             U PCP Scrn (test code = Negative     Negative                  



             U PCP Scrn)                                         

 

             Cannabinoid Screen Ur Negative     Negative                  



             (test code = Cannabinoid                                        



             Screen Ur)                                          

 

             U TCA (test code = U Negative     Negative                  The res

ults of all



             TCA)                                                drug screen elinor

ts are



                                                                 only preliminar

y.



                                                                 Clinical



                                                                 consideration a

nd



                                                                 professional ju

dgment



                                                                 should be appli

ed to



                                                                 any drug of abu

se



                                                                 test result,



                                                                 particularly wh

en



                                                                 preliminary pos

itive



                                                                 results are obt

ained.



                                                                 Please order a



                                                                 separate confir

matory



                                                                 test if desired

.



HCG Qualitative [id number] 23:54:43





             Test Item    Value        Reference Range Interpretation Comments

 

             hCG Ur (test code = Negative                               If the r

esult is



             hCG Ur)                                             "Negative" in p

atients



                                                                 suspected to be



                                                                 pregnant, recom

mend



                                                                 retest with a s

ample



                                                                 obtained 48 to 

72



                                                                 hours later, or

 by



                                                                 ordering a



                                                                 quantitative as

say. If



                                                                 the result is



                                                                 "Borderline" te

sting



                                                                 should be repea

gianfranco in



                                                                 48 to 72 hours.

 

             Lot # (test code = [id number]                    N            



             Lot #)                                              

 

             Expiration Dt (test 2020                  N            



             code = Expiration Dt)                                        

 

             Neg Control (test Negative                               



             code = Neg Control)                                        

 

             Pos Control (test Positive                               



             code = Pos Control)                                        

 

             Internal QC (test Acceptable                             



             code = Internal QC)                                        



Urinalysis with Culture, if [id number] 23:52:04





             Test Item    Value        Reference Range Interpretation Comments

 

             UA Color (test code = UA YELLO        Yellow                    



             Color)                                              

 

             UA Appear (test code = SCLD         Clear        A            



             UA Appear)                                          

 

             UA pH (test code = UA 6.5                                    



             pH)                                                 

 

             UA Spec Grav (test code 1.011        1.001-1.035               



             = UA Spec Grav)                                        

 

             UA Glucose (test code = NEG          Negative                  



             UA Glucose)                                         

 

             UA Bili (test code = UA NEG          Negative                  



             Bili)                                               

 

             UA Ketones (test code = NEG          Negative                  



             UA Ketones)                                         

 

             UA Blood (test code = UA NEG          Negative                  



             Blood)                                              

 

             UA Protein (test code = NEG          Negative                  



             UA Protein)                                         

 

             UA Urobilinogen (test 1 mg/dL      >0.2         A            



             code = UA Urobilinogen)                                        

 

             UA Nitrite (test code = POS          Negative     A            



             UA Nitrite)                                         

 

             UA Leuk Est (test code = NEG          Negative                  



             UA Leuk Est)                                        

 

             UA Micro Ind? (test code Indicated    Not Indicated A            Re

sult created by



             = UA Micro Ind?)                                        rule



                                                                 [email or web address]

_IND



Automated [id number] 23:48:39





             Test Item    Value        Reference Range Interpretation Comments

 

             Neutro Auto (test code = Neutro 75.7 %       36.0-70.0    H        

    



             Auto)                                               

 

             Lymph Auto (test code = Lymph Auto) 14.1 %       12.0-44.0         

        

 

             Mono Auto (test code = Mono Auto) 7.6 %        0.0-11.0            

      

 

             Eos, Auto (test code = Eos, Auto) 1.8 %        0.0-7.0             

      

 

             Basophil Auto (test code = Basophil 0.5 %        0.0-2.0           

        



             Auto)                                               

 

             Neutro Absolute (test code = Neutro 12.7 x10     1.6-7.4      H    

        



             Absolute)                                           

 

             Lymph Absolute (test code = Lymph 2.38 x10     .50-4.60            

      



             Absolute)                                           

 

             Mono Absolute (test code = Mono 1.28 x10     .00-1.20     H        

    



             Absolute)                                           

 

             Eos Absolute (test code = Eos 0.31 x10     0.00-0.74               

  



             Absolute)                                           

 

             Baso Absolute (test code = Baso 0.09 x10     0.00-0.21             

    



             Absolute)                                           



IG [id number] 23:48:39





             Test Item    Value        Reference Range Interpretation Comments

 

             IG (test code = IG) 0.3 %        0.0-5.0                   

 

             IG Abs (test code = IG Abs) 0 x10                     N            



Complete Blood Count with [id number] 23:48:38





             Test Item    Value        Reference Range Interpretation Comments

 

             WBC (test code = WBC) 16.8 x10     4.4-10.5     H            

 

             RBC (test code = RBC) 4.06 x10     3.75-5.20                 

 

             Hgb (test code = Hgb) 12.7 g/dL    12.2-14.8                 

 

             MCV (test code = MCV) 94.10 fL     80..00              

 

             Hct (test code = Hct) 38.2 %       36.5-44.4                 

 

             MCHC (test code = 33.20 g/dL   32.00-37.50               



             MCHC)                                               

 

             RDW CV (test code = 13.2 %       11.5-14.5                 



             RDW CV)                                             

 

             MCH (test code = MCH) 31.3 pg      27.0-32.5                 

 

             Platelets (test code = 363.0 x10    140.0-440.0               



             Platelets)                                          

 

             MPV (test code = MPV) 10.0 fL                   N            

 

             Slide Review (test Auto         Auto                      Result cr

eated by



             code = Slide Review)                                        [email or web address]

SLIDE_REV_AUTO

 

             nRBC (test code = 0                         N            



             nRBC)                                               

 

             NRBC Abs (test code = 0.00 x10                  N            



             NRBC Abs)                                           

 

             IPF (test code = IPF) 0 %                       N            



RPR, [id number] 05:53:00





             Test Item    Value        Reference Range Interpretation Comments

 

             RPR (test code = RPR) Non-Reactive Non-Reactive N            



BHCG, Serum, [id number] 01:39:00





             Test Item    Value        Reference Range Interpretation Comments

 

             Preg Qual [Se] (test code = BSHCG) Negative     Negative     N     

       



BHCG, Serum, [id number] 01:09:00





             Test Item    Value        Reference Range Interpretation Comments

 

             B hCG, Quant (test <1 mIU/mL                              Weeks of 

Gestation



             code = BHCGQT)                                        Ranges (mIU/m

L)3 weeks



                                                                 5.40 - 72.04 we

eks 10.2



                                                                 - 7085 weeks 21

7 - 94785



                                                                 weeks 152 - 321

777 weeks



                                                                 4059 - [id number] 

weeks



                                                                 94767 - 6752960

 weeks



                                                                 03100 - 7043162

0 weeks



                                                                 69934 - [id number]

2 weeks



                                                                 75837 - 0297307

4 weeks



                                                                 14860 - 0001265

 weeks



                                                                 81858 - 6016220

 weeks



                                                                 2904 - 7786693 

weeks



                                                                 4540 - 0979728 

weeks



                                                                 1949 - 28809



Thyroid Stimulating Hormone (TSH)2017 01:09:00





             Test Item    Value        Reference Range Interpretation Comments

 

             TSH (test code = TSH) 0.93 mIU/mL  0.270-4.200  N            



Lipid [id number] 01:05:00





             Test Item    Value        Reference Range Interpretation Comments

 

             Cholesterol (test 163 mg/dL    0-200        N            



             code = CHOL)                                        

 

             Triglycerides (test 108 mg/dL    9-200        N            



             code = TRIG)                                        

 

             HDL (test code = 41 mg/dL     50-60        L            



             HDL)                                                

 

             Chol/HDL (test code 4.0 Ratio    0.0-4.4      N            



             = CHOLPHDL)                                         

 

             LDL, Calculated 100          0-130        N            (NOTE)RISK O

F HEART



             (test code = LDLC)                                        DISEASEPu

blished by



                                                                 American [facility]



                                                                 AssociationAnal

yte



                                                                 Optimal Boderli

ne



                                                                 Increased RiskC

HOL <200



                                                                 200-239  >240TR

IG <150



                                                                 150-199 >200HDL

 Male:



                                                                 >60 <40HDL Fema

le:



                                                                 &gt;60 <50LDL <

100



                                                                 130-159 >160LDL

 NEAR



                                                                 OPTIMAL -

129

 

             VLDL (test code = 22 mg/dL     5-40         N            



             VLDL)                                               

 

             LDL/HDL (test code = 2                                      



             LDLPHDL)                                            



Comprehensive Metabolic [id number] 21:02:00





             Test Item    Value        Reference Range Interpretation Comments

 

             Sodium (test code = 140 mmol/L   135-145      N            



             NA)                                                 

 

             Potassium (test 3.6 mmol/L   3.5-5.1      N            



             code = K)                                           

 

             Chloride (test code 103 mmol/L          N            



             = CL)                                               

 

             Carbon Dioxide 26 mmol/L    22-29        N            



             (test code = CO2)                                        

 

             Glucose (test code 89 mg/dL            N            



             = GLU)                                              

 

             Blood Urea Nitrogen 13 mg/dL     6-20         N            



             (test code = BUN)                                        

 

             Creatinine (test 0.8 mg/dL    0.5-0.9      N            



             code = CREAT)                                        

 

             Calcium (test code 10.0 mg/dL   8.3-10.5     N            



             = CA)                                               

 

             Prot Total (test 7.0 g/dL     6.4-8.3      N            



             code = TP)                                          

 

             Albumin (test code 4.2 g/dL     3.5-5.2      N            



             = ALB)                                              

 

             A/G Ratio (test 1.5 Ratio                              



             code = AGRATIO)                                        

 

             Globulin (test code 2.8          2.9-3.1      L            



             = GLOB)                                             

 

             Bili Total (test 0.6 mg/dL    0.1-0.9      N            



             code = TBIL)                                        

 

             Alk Phos (test code 91 U/L              N            



             = APHOS)                                            

 

             AST (test code = 19 U/L       1-32         N            



             AST)                                                

 

             ALT (test code = 14 U/L       1-33         N            



             ALT)                                                

 

             BUN/Creatinine 16.3                                   



             Ratio (test code =                                        



             BCRATIO)                                            

 

             Anion Gap (test 11 mmol/L    7-16         N            



             code = AGAP)                                        

 

             Estimated GFR (test >60                                    eGFR (es

timated



             code = GFR)  mL/min/1.73m2                           Glomerular Nguyễn

tration



                                                                 Rate) is an est

imated



                                                                 value,calculate

d from



                                                                 the patient's s

harman



                                                                 creatinine usin

g the



                                                                 MDRD equation.I

t is



                                                                 NOT the patient

's



                                                                 actual GFR. The

 eGFR



                                                                 provides a more



                                                                 clinicallyusefu

l



                                                                 measure of kidn

ey



                                                                 disease than se

rum



                                                                 creatinine



                                                                 alone.***This



                                                                 calculation rimma

es sex



                                                                 and race into



                                                                 account, if the



                                                                 informationis



                                                                 provided. If th

e race



                                                                 is not provided

, and



                                                                 the patient



                                                                 isAfrican-Ameri

can,



                                                                 multiply by 1.2

12. If



                                                                 sex is not prov

ided,



                                                                 and thepatient 

is



                                                                 female, multipl

y by



                                                                 0.742. Results 

for



                                                                 patients <18 ye

ars



                                                                 ofage have not 

been



                                                                 validated by th

e MDRD



                                                                 study and shoul

d be



                                                                 interpretedwith



                                                                 caution.eGFR Re

sult



                                                                 Interpretation:

eGFR >



                                                                 or = 60 is in t

he



                                                                 Normal RangeeGF

R < 60



                                                                 may mean kidney



                                                                 diseaseeGFR < 1

5 may



                                                                 mean kidney



                                                                 failure***Range

s



                                                                 recommended by 

the



                                                                 [employer]



                                                                 Foundation,http

://nkd



                                                                 ep.nih.gov



Alcohol/Ethanol, [id number] 21:02:00





             Test Item    Value        Reference Range Interpretation Comments

 

             Alcohol, Ethyl <0.01 g/dL   0.00-0.01    N            Intoxicated 0

.080 g/dL



             (test code = ETOH)                                        or more



CBC with [id number] 20:35:00





             Test Item    Value        Reference Range Interpretation Comments

 

             WBC (test code = WBC) 10.3 K/cumm  4.4-10.5     N            

 

             RBC (test code = RBC) 4.37 M/cumm  3.75-5.20    N            

 

             Hemoglobin (test code = HGB) 13.1 gm/dL   12.2-14.8    N           

 

 

             Hematocrit (test code = HCT) 41.5 %       36.5-44.4    N           

 

 

             MCV (test code = MCV) 94.9 fL             N            

 

             MCH (test code = MCH) 30.1 pg      27.0-32.5    N            

 

             MCHC (test code = MCHC) 31.7 g/dL    32.0-37.5    L            

 

             RDW (test code = RDW) 12.9 %       11.5-14.5    N            

 

             Platelet Count (test code = 327 K/cumm   140-440      N            



             PLTCT)                                              

 

             MPV (test code = MPV) 7.0 fL                                 

 

             Diff Method (test code = DIFFM) Auto                               

    

 

             Neutrophil (test code = NEUT) 74.0 %       36-70        H          

  

 

             Lymphocyte (test code = LYMPH) 17.6 %       12-44        N         

   

 

             Monocyte (test code = MONO) 6.4 %        0-11         N            

 

             Eosinophil (test code = EOS) 1.5 %        0-7          N           

 

 

             Basophil (test code = BASO) 0.5 %        0-2          N            

 

             Neutro Abs (test code = ANEUT) 7.6 K/cumm   1.6-7.4      H         

   

 

             Lymph Abs (test code = ALYMPH) 1.8 K/cumm   0.5-4.6      N         

   

 

             Mono Abs (test code = AMONO) 0.7 K/cumm   0.0-1.2      N           

 

 

             Eos Abs (test code = AEOS) 0.16 K/cumm  0.00-0.74    N            

 

             Baso Abs (test code = ABASO) 0.1 K/cumm   0.00-0.21    N           

 



[id number] 20:33:00





             Test Item    Value        Reference Range Interpretation Comments

 

             Amphetamine (test code POSITIVE     Negative     A            For d

iagnostic purposes



             = AMPH)                                             only, positive 

results



                                                                 should always b

e



                                                                 assessedin



                                                                 conjunctionwith

 the



                                                                 patient's medic

al



                                                                 history,clinica

l



                                                                 examination and



                                                                 otherfindings.T

o



                                                                 fulfill legal



                                                                 requirements, a

 more



                                                                 specific altern

ate



                                                                 chemical method

must be



                                                                 used inorder to

 obtain



                                                                 a Confirmed judith

lytical



                                                                 result. GC/MS i

s the



                                                                 preferred confi

rmatory



                                                                 method.

 

             Barbiturates (test Negative     Negative     N            



             code = GENEVIEVE)                                        

 

             Benzodiazepine (test Negative     Negative     N            



             code = IRENE)                                        

 

             Cocaine (test code = Negative     Negative     N            



             COCA)                                               

 

             Methadone (test code = Negative     Negative     N            



             MTHD)                                               

 

             Opiates (test code = Negative     Negative     N            



             OPIA)                                               

 

             PCP (test code = PCP) Negative     Negative     N            

 

             Propoxyphene (test Negative     Negative     N            



             code = PROPOX)                                        

 

             THC (test code = THC) Negative     Negative     N            



Urinalysis [id number] 20:23:00





             Test Item    Value        Reference Range Interpretation Comments

 

             Color (test code = COLOR) Yellow       Yellow,Straw,Pl N           

 



                                       yellow                    

 

             Clarity (test code = Sl Cloudy    Clear        A            



             CLAR)                                               

 

             Specific Gravity (test 1.007        1.001-1.035  N            



             code = SPGR)                                        

 

             pH (test code = PH) 6.0          5.0-9.0      N            

 

             Ketone (test code = KET) Negative mg/dL Negative     N            

 

             Glucose (test code = Negative mg/dL Negative     N            



             GLUCUR)                                             

 

             Protein (test code = Negative mg/dL Negative     N            



             PROT)                                               

 

             Bilirubin (test code = Negative mg/dL Negative     N            



             BILI)                                               

 

             Occult Blood (test code = Moderate     Negative     A            



             UDOB)                                               

 

             Urobilinogen (test code = 0.2 mg/dL    0.2-1.0      N            



             UROB)                                               

 

             Nitrite (test code = NIT) Positive     Negative     A            

 

             Leuk Esterase (test code Moderate     Negative     A            



             = LEUK)                                             

 

             Micros Exam (test code = Indicated                              



             MEXAM)                                              

 

             Epithelial Cells (test 50+ /LPF     0-30         A            



             code = EPI)                                         

 

             WBC, Urine (test code = 41-50 /HPF   0-5          A            



             UWBC)                                               

 

             RBC, Urine (test code = 3-5 /HPF     0-5          A            



             URBC)                                               

 

             Bacteria (test code = Many /HPF                              



             BACT)                                               



[id number] 16:21:00





             Test Item    Value        Reference Range Interpretation Comments

 

             Lithium (test code = LI) 0.6 mmol/l   0.6-1.2                   



[facility] (Ultra Sensitive)2017 16:21:00





             Test Item    Value        Reference Range Interpretation Comments

 

             TSH (test code = TSH) 2.14 mIU/L   0.47-4.68                 



[facility]





             Test Item    Value        Reference Range Interpretation Comments

 

             Glucose (test code 77 mg/dl                         



             = GLU)                                              

 

             BUN (test code = 9.0 mg/dl    6.0-17.0                  



             BUN)                                                

 

             Creatinine (test 0.7 mg/dl    0.4-1.2                   



             code = CREA)                                        

 

             Sodium (test code = 139 mmol/l   137-145                   



             NA)                                                 

 

             Potassium (test 4.7 mmol/l   3.5-5.0                   



             code = K)                                           

 

             Chloride (test code 107 mmol/l                       



             = CL)                                               

 

             CO2 (test code = 22 mmol/l    22-30                     



             CO2)                                                

 

             Anion Gap (test 11                                     



             code = GAP)                                         

 

             Calcium (test code 9.8 mg/dl    8.4-10.2                  



             = CALC)                                             

 

             T Protein (test 8.0 gm/dl    5.1-8.7                   



             code = TP)                                          

 

             Albumin (test code 4.4 gm/dl    3.5-4.6                   



             = ALB)                                              

 

             A/G Ratio (test 1.2 %        1.1-2.2                   



             code = AGRAT)                                        

 

             AST (SGOT) (test 20 U/L       11-36                     



             code = AST)                                         

 

             ALT (SGPT) (test 29 U/L       11-40                     



             code = ALT)                                         

 

             Alkaline Phos (test 108 U/L                          



             code = ALKP)                                        

 

             Total Bilirubin 0.6 mg/dl    0.2-1.2                   



             (test code = TBIL)                                        

 

             Globulin (test code 3.6 gm/dl    2.3-3.5      H            



             = GLOBU)                                            

 

             Calcium, Corrected 9.5 mg/dl    8.4-10.2                  Various f

ormulas exist



             (test code =                                        for corrected s

harman



             CALCCORR)                                           calcium results

, each



                                                                 yielding differ

ent



                                                                 values. This co

rrected



                                                                 result was base

d on



                                                                 the formula: Co

rrected



                                                                 Calcium = Serum

Calcium



                                                                 + [0.8 * ( 4 -



                                                                 SerumAlbumin)]

 

             EGFR if  >60                                    



             American (test code mL/min/1.73m\                           



             = EGFRAA)    S\2                                    

 

             EGFR if Non- >60                                    Estimate

d Glomerular



             American (test code mL/min/1.73m\                           Filtrat

ion Rate (eGFR)



             = EGFRNA)    S\2                                    Reference Inter

vals



                                                                 Decision Points

 for 18



                                                                 years and older

 and



                                                                 average body ma

ss: >=



                                                                 60 Does not exc

lude



                                                                 kidney disease.

 30 -



                                                                 59 Suggests mod

erate



                                                                 chronic kidney 

disease



                                                                 and indicates t

he need



                                                                 for further



                                                                 investigation



                                                                 including asses

sment



                                                                 of proteinuria 

and



                                                                 cardiovascular



                                                                 factors. < 30 U

sually



                                                                 indicates a nee

d for



                                                                 referral for



                                                                 assessment and



                                                                 management of c

hronic



                                                                 kidney failure.



[facility]

## 2023-05-01 NOTE — EDPHYS
Physician Documentation                                                                           

 [facility]                                                                 

Name: Alka Judd                                                                               

Age: 52 yrs                                                                                       

Sex: Female                                                                                       

: 1970                                                                                   

MRN: [id number]                                                                                   

Arrival Date: 2023                                                                          

Time: 13:24                                                                                       

Account#: [id number]                                                                            

Bed 9                                                                                             

Private MD:                                                                                       

DB Physician Raúl Chavez                                                                      

HPI:                                                                                              

                                                                                             

15:12 This 52 yrs old  Female presents to ER via EMS with complaints of Anxiety.     [place] 

15:12 The patient has shortness of breath at rest. Onset: The symptoms/episode began/occurred su[place] 

      3 day(s) ago. Duration: The symptoms are continuous, but are steadily getting better.       

      The patient's shortness of breath is aggravated by coughing, is alleviated by rest. The     

      patient presents with urinary symptoms, frequency. Onset: The symptoms/episode              

      began/occurred 2 day(s) ago. Modifying factors: The symptoms are alleviated by nothing,     

      the symptoms are aggravated by nothing. Severity of symptoms: At their worst the            

      symptoms were mild, in the emergency department the symptoms are unchanged. The patient     

      or guardian reports cough, described as mild, difficulty breathing. Modifying factors:      

      The symptoms are alleviated by nothing. the symptoms are aggravated by hot environment.     

      Severity of symptoms: At their worst the symptoms were mild in the emergency department     

      the symptoms are unchanged. The patient has experienced similar episodes in the past,       

      several times.                                                                              

                                                                                                  

OB/GYN:                                                                                           

16:03 LMP N/A - Post-menopause                                                                db  

                                                                                                  

Historical:                                                                                       

- Allergies:                                                                                      

13:27 PENICILLINS;                                                                            mb9 

13:27 Risperdal;                                                                              mb9 

- Home Meds:                                                                                      

13:27 None [Active];                                                                          mb9 

- PMHx:                                                                                           

13:27 Bipolar disorder; Hypertensive disorder;                                                mb9 

- PSHx:                                                                                           

13:27 righ hip surgery;                                                                       mb9 

                                                                                                  

- Immunization history:: Adult Immunizations up to date.                                          

- Social history:: Smoking status: Patient reports the use of cigarette tobacco                   

  products, smokes one pack cigarettes per day.                                                   

- Family history:: not pertinent.                                                                 

                                                                                                  

                                                                                                  

ROS:                                                                                              

15:12 Constitutional: Negative for fever, chills, and weight loss, Eyes: Negative for injury, [place] 

      pain, redness, and discharge, ENT: Negative for injury, pain, and discharge, Neck:          

      Negative for injury, pain, and swelling, Cardiovascular: Negative for chest pain,           

      palpitations, and edema, Abdomen/GI: Negative for abdominal pain, nausea, vomiting,         

      diarrhea, and constipation, Back: Negative for injury and pain, MS/Extremity: Negative      

      for injury and deformity, Skin: Negative for injury, rash, and discoloration, Neuro:        

      Negative for headache, weakness, numbness, tingling, and seizure, Psych: Negative for       

      depression, anxiety, suicide ideation, homicidal ideation, and hallucinations,              

      Allergy/Immunology: Negative for hives, rash, and allergies, Endocrine: Negative for        

      neck swelling, polydipsia, polyuria, polyphagia, and marked weight changes.                 

15:12 Respiratory: Positive for cough, with green sputum.                                         

15:12 : Positive for urinary symptoms, urinary frequency, burning with urination.               

                                                                                                  

Exam:                                                                                             

15:12 Constitutional:  This is a well developed, well nourished patient who is awake, alert,  suleiman 

      and in no acute distress. Head/Face:  Normocephalic, atraumatic. Eyes:  Pupils equal        

      round and reactive to light, extra-ocular motions intact.  Lids and lashes normal.          

      Conjunctiva and sclera are non-icteric and not injected.  Cornea within normal limits.      

      Periorbital areas with no swelling, redness, or edema. ENT:  Nares patent. No nasal         

      discharge, no septal abnormalities noted.  Tympanic membranes are normal and external       

      auditory canals are clear.  Oropharynx with no redness, swelling, or masses, exudates,      

      or evidence of obstruction, uvula midline.  Mucous membranes moist. Neck:  Trachea          

      midline, no thyromegaly or masses palpated, and no cervical lymphadenopathy.  Supple,       

      full range of motion without nuchal rigidity, or vertebral point tenderness.  No            

      Meningismus. Chest/axilla:  Normal chest wall appearance and motion.  Nontender with no     

      deformity.  No lesions are appreciated. Cardiovascular:  Regular rate and rhythm with a     

      normal S1 and S2.  No gallops, murmurs, or rubs.  Normal PMI, no JVD.  No pulse             

      deficits. Abdomen/GI:  Soft, non-tender, with normal bowel sounds.  No distension or        

      tympany.  No guarding or rebound.  No evidence of tenderness throughout. Back:  No          

      spinal tenderness.  No costovertebral tenderness.  Full range of motion. Skin:  Warm,       

      dry with normal turgor.  Normal color with no rashes, no lesions, and no evidence of        

      cellulitis. MS/ Extremity:  Pulses equal, no cyanosis.  Neurovascular intact.  Full,        

      normal range of motion. Neuro:  Awake and alert, GCS 15, oriented to person, place,         

      time, and situation.  Cranial nerves II-XII grossly intact.  Motor strength 5/5 in all      

      extremities.  Sensory grossly intact.  Cerebellar exam normal.  Normal gait. Psych:         

      Awake, alert, with orientation to person, place and time.  Behavior, mood, and affect       

      are within normal limits.                                                                   

15:12 Respiratory: the patient does not display signs of respiratory distress,  Respirations:     

      normal, Breath sounds: bronchial sounds, that are mild, rhonchi, are not appreciated,       

      stridor, is not appreciated, Respiratory rate:  18                                          

15:12 Neuro: Orientation: is normal, appropriate for stated age, no acute changes, Mentation:     

      is normal, appropriate for stated age, no acute changes, Motor: is normal, is grossly       

      normal based on the patient's age, no acute changes, moves all fours, strength is           

      normal, Sensation: no obvious gross deficits, appropriate  no acute changes, Gait: is       

      steady, appropriate for age, Babinski testing is normal.                                    

15:24 ECG was reviewed by the Attending Physician.                                            [facility] 

                                                                                                  

Vital Signs:                                                                                      

13:25  / 94; Pulse 85; Resp 18; Temp 98.4(O); Pulse Ox 99% on R/A; Weight 97.52 kg;     mb9 

      Height 5 ft. 5 in. ; Pain 0/10;                                                             

16:01  / 81; Pulse 82; Resp 16; Pulse Ox 99% on R/A;                                    db  

13:25 Body Mass Index 35.78 (97.52 kg, 165.1 cm)                                              mb9 

13:25 Pain Scale: Adult                                                                       mb9 

                                                                                                  

Russell Coma Score:                                                                               

15:12 Eye Response: spontaneous(4). Motor Response: obeys commands(6). Verbal Response:       suleiman 

      oriented(5). Total: 15.                                                                     

                                                                                                  

MDM:                                                                                              

13:28 Patient medically screened.                                                             [facility] 

15:17 Differential diagnosis: bronchitis, flu, nonspecific abdominal pain, pelvic             suleiman 

      inflammatory disease, urinary tract infection. Antibiotic administration: The patient       

      is discharged and will get outpatient antibiotics, Bactrim. Immunization status:            

      Influenza vaccine: within last 5 years. Data reviewed: vital signs, nurses notes, lab       

      test result(s), EKG, radiologic studies, plain films. Consideration of                      

      Admission/Observation Escalation of care including admission/observation considered. I      

      considered the following discharge prescriptions or medication management in the            

      emergency department Medications were administered in the Emergency Department. See         

      MAR. Test considered but Not performed: CT: no ct chest, no ct abd/ pelvis. Care            

      significantly affected by the following chronic conditions: Hypertension, Obesity,          

      bipolar do. Counseling: I had a detailed discussion with the patient and/or guardian        

      regarding: the historical points, exam findings, and any diagnostic results supporting      

      the discharge/admit diagnosis, lab results, radiology results, the need for outpatient      

      follow up, for definitive care, a family practitioner.                                      

                                                                                                  

                                                                                             

13:29 Order name: Basic Metabolic Panel; Complete Time: 15:03                                 [facility] 

                                                                                             

13:29 Order name: CBC with Diff; Complete Time: 15:03                                         [facility] 

                                                                                             

13:29 Order name: D-Dimer; Complete Time: 15:03                                               [facility] 

                                                                                             

13:29 Order name: LFT's; Complete Time: 15:03                                                 [facility] 

                                                                                             

13:29 Order name: Magnesium; Complete Time: 15:03                                             [facility] 

                                                                                             

13:29 Order name: NT PRO-BNP; Complete Time: 15:03                                            [facility] 

                                                                                             

13:29 Order name: PT-INR; Complete Time: 15:03                                                [facility] 

                                                                                             

13:29 Order name: Troponin HS; Complete Time: 15:03                                           [facility] 

                                                                                             

13:29 Order name: Acetaminophen; Complete Time: 15:03                                         [facility] 

                                                                                             

13:29 Order name: ETOH Level; Complete Time: 15:03                                            [facility] 

                                                                                             

13:29 Order name: Pregnancy Test, Urine; Complete Time: 15:03                                                                                                                              

13:29 Order name: Ptt, Activated; Complete Time: 15:03                                        [facility] 

                                                                                             

13:29 Order name: Salicylate; Complete Time: 15:11                                            [facility] 

                                                                                             

13:29 Order name: Urinalysis w/ reflexes; Complete Time: 15:03                                [facility] 

                                                                                             

13:29 Order name: Urine Drug Screen; Complete Time: 15:03                                     [facility] 

                                                                                             

13:29 Order name: XRAY Chest (1 view)                                                         [facility] 

                                                                                             

13:29 Order name: EKG; Complete Time: 13:31                                                   [facility] 

                                                                                             

13:29 Order name: Cardiac monitoring; Complete Time: 14:20                                    [facility] 

                                                                                             

13:29 Order name: EKG - Nurse/Tech; Complete Time: 14:20                                      [facility] 

                                                                                             

13:29 Order name: IV Saline Lock; Complete Time: 14:                                        [facility] 

                                                                                             

13:29 Order name: Labs collected and sent; Complete Time: 14:                               [facility] 

                                                                                             

13:29 Order name: O2 Per Protocol; Complete Time: 14:                                       [facility] 

                                                                                             

13:29 Order name: O2 Sat Monitoring; Complete Time: 14:                                     [facility] 

                                                                                             

13:29 Order name: Suicide Screening (Joyce); Complete Time: 14:23                          [facility] 

                                                                                                  

ECG:                                                                                              

15:24 Rate is 55 beats/min. Rhythm is regular. QRS Axis is Normal. MT interval is normal. QRS suleiman 

      interval is normal. QT interval is normal. No Q waves. T waves are Normal. No ST            

      changes noted. Clinical impression: Sinus bradycardia and No evidence of ischemia.          

      Interpreted by me. Reviewed by me.                                                          

                                                                                                  

Administered Medications:                                                                         

14:05 Drug: NS 0.9% IV 1000 ml Route: IV; Rate: 1 bolus; Site: right forearm;                 db  

16:03 Follow up: Response: No adverse reaction; IV Status: Completed infusion; IV Intake:     db  

      1000ml                                                                                      

15:18 Drug: Trimethoprim-Sulfamethoxazole PO (160 mg-800 mg (DS) 1 tablet Route: PO;          mb9 

16:04 Follow up: Response: No adverse reaction                                                db  

15:19 Drug: Rocephin IV 1 grams Route: IV; Rate: per protocol; Site: right antecubital;       mb9 

16:03 Follow up: Response: No adverse reaction; IV Status: Completed infusion; IV Intake: 50mldb  

16:04 Not Given (received bolus and discharged): NS 0.9% IV 1000 ml IV at 125 ml/hr continuousdb  

                                                                                                  

                                                                                                  

Disposition Summary:                                                                              

23 15:21                                                                                    

Discharge Ordered                                                                                 

      Location: Home                                                                          [place] 

      Problem: new                                                                            [place] 

      Symptoms: have improved                                                                 [place] 

      Condition: Stable                                                                       suleiman 

      Diagnosis                                                                                   

        - Anxiety disorder, unspecified                                                       s[place] 

        - Acute upper respiratory infection, unspecified                                      suleiman 

        - UTI/ Urinary tract infection, site not specified                                    suleiman 

        - Tobacco abuse counseling                                                            suleiman 

        - Tobacco use                                                                         suleiman 

        - Bipolar disorder, unspecified                                                       s[place] 

      Followup:                                                                               [place] 

        - With: Private Physician                                                                  

        - When: 2 - 3 days                                                                         

        - Reason: Recheck today's complaints, Continuance of care, Re-evaluation by your           

      physician                                                                                   

      Discharge Instructions:                                                                     

        - Discharge Summary Sheet                                                             [place] 

        - Chronic Bronchitis, Adult                                                           suleiman 

        - Steps to Quit Smoking                                                               [place] 

        - Upper Respiratory Infection, Adult                                                  [place] 

        - Urinary Tract Infection, Adult                                                      s[place] 

        - Cool Mist Vaporizer                                                                 sule[place] 

        - Urinary Tract Infection, Adult, Easy-to-Read                                        suleiman 

        - Upper Respiratory Infection, Adult, Easy-to-Read                                    suleiman 

        - Steps to Quit Smoking, Easy-to-Read                                                 suleiman 

        - Cough, Adult, Easy-to-Read                                                          s[place] 

        - Aspirin and Your Heart                                                              sule[place] 

        - Cough, Adult                                                                        [facility] 

      Forms:                                                                                      

        - Medication Reconciliation Form                                                      [place] 

        - Thank You Letter                                                                    s[place] 

        - Antibiotic Education                                                                sule[place] 

        - Prescription Opioid Use                                                             [facility] 

      Prescriptions:                                                                              

        - Bactrim -160 mg Oral Tablet                                                        

            - take 1 tablet by ORAL route every 12 hours for 10 days; 20 tablet; Refills: 0,  suleiman 

      Product Selection Permitted                                                                 

Signatures:                                                                                       

Dispatcher [employer]                           EDRaúl Holland MD MD cha Benton, Danielle, RN                    RN   Hanna Perez RN                 RN   mb9                                                  

                                                                                                  

**************************************************************************************************

## 2023-05-01 NOTE — ER
Nurse's Notes                                                                                     

 [facility]                                                                 

Name: Alka Judd                                                                               

Age: 52 yrs                                                                                       

Sex: Female                                                                                       

: 1970                                                                                   

MRN: [id number]                                                                                   

Arrival Date: 2023                                                                          

Time: 13:24                                                                                       

Account#: [id number]                                                                            

Bed 9                                                                                             

Private MD:                                                                                       

Diagnosis: Anxiety disorder, unspecified;Acute upper respiratory infection, unspecified;UTI/      

  Urinary tract infection, site not specified;Tobacco abuse counseling;Tobacco                    

  use;Bipolar disorder, unspecified                                                               

                                                                                                  

Presentation:                                                                                     

                                                                                             

13:25 Chief complaint: EMS states: "She was walking around outside and started complaining    mb9 

      that she couldn't breathe and was having a panic attack. Pt states "there is also           

      something wrong with my lady parts.". Coronavirus screen: Vaccine status: Patient           

      reports receiving the 2nd dose of the covid vaccine. Ebola Screen: No symptoms or risks     

      identified at this time. Initial Sepsis Screen: Does the patient meet any 2 criteria?       

      No. Patient's initial sepsis screen is negative. Does the patient have a suspected          

      source of infection? No. Patient's initial sepsis screen is negative. Risk Assessment:      

      Do you want to hurt yourself or someone else? Patient reports no desire to harm self or     

      others. Onset of symptoms was May 01, 2023.                                                 

13:25 Method Of Arrival: EMS: [facility]                                                       mb9 

13:25 Acuity: TEMO 3                                                                           mb9 

                                                                                                  

Triage Assessment:                                                                                

13:28 General: Appears uncomfortable, unkempt, Behavior is anxious. Pain: Denies pain. Neuro: mb9 

      Level of Consciousness is awake, alert, obeys commands, Oriented to person, place,          

      time, situation, Appropriate for age. Cardiovascular: Patient's skin is warm and dry.       

      Respiratory: Reports shortness of breath on exertion Airway is patent Respiratory           

      effort is even, unlabored, Respiratory pattern is regular, symmetrical, Breath sounds       

      are clear bilaterally. Derm: Skin is pink, warm \T\ dry. Musculoskeletal: Range of          

      motion: intact in all extremities.                                                          

                                                                                                  

OB/GYN:                                                                                           

16:03 LMP N/A - Post-menopause                                                                db  

                                                                                                  

Historical:                                                                                       

- Allergies:                                                                                      

13:27 PENICILLINS;                                                                            mb9 

13:27 Risperdal;                                                                              mb9 

- Home Meds:                                                                                      

13:27 None [Active];                                                                          mb9 

- PMHx:                                                                                           

13:27 Bipolar disorder; Hypertensive disorder;                                                mb9 

- PSHx:                                                                                           

13:27 righ hip surgery;                                                                       mb9 

                                                                                                  

- Immunization history:: Adult Immunizations up to date.                                          

- Social history:: Smoking status: Patient reports the use of cigarette tobacco                   

  products, smokes one pack cigarettes per day.                                                   

- Family history:: not pertinent.                                                                 

                                                                                                  

                                                                                                  

Screenin:02 [facility] ED Fall Risk Assessment (Adult) History of falling in the last 3 months,       db  

      including since admission No falls in past 3 months (0 pts) Confusion or Disorientation     

      No (0 pts) Intoxicated or Sedated No (0 pts) Impaired Gait No (0 pts) Mobility Assist       

      Device Used No (0 pt) Altered Elimination No (0 pt) Score/Fall Risk Level 0 - 2 = Low       

      Risk Oriented to surroundings, Maintained a safe environment. Abuse screen: Denies          

      threats or abuse. Denies injuries from another. Nutritional screening: No deficits          

      noted. Tuberculosis screening: No symptoms or risk factors identified.                      

                                                                                                  

Assessment:                                                                                       

13:28 Reassessment: see triage assessment.                                                    mb9 

14:19 Reassessment: patient ambulatory to restroom.                                           db  

14:22 Reassessment: Patient appears in no apparent distress at this time. Patient denies SI   db  

      and HI.                                                                                     

14:52 Reassessment: Patient appears in no apparent distress at this time. Patient and/or      db  

      family updated on plan of care and expected duration. Pain level reassessed. Patient is     

      alert, oriented x 3, equal unlabored respirations, skin warm/dry/pink. patient              

      ambulatory to vending machine. Reassessment: Patient states feeling better. Patient         

      states symptoms have improved. General: Appears in no apparent distress. comfortable,       

      Behavior is calm, cooperative. Neuro: Level of Consciousness is awake, alert, obeys         

      commands, Oriented to person, place, time, situation.                                       

16:01 Reassessment: Patient appears in no apparent distress at this time. Patient and/or      db  

      family updated on plan of care and expected duration. Pain level reassessed. Patient is     

      alert, oriented x 3, equal unlabored respirations, skin warm/dry/pink. Respiratory: No      

      deficits noted. Airway is patent Respiratory effort is even, unlabored, Respiratory         

      pattern is regular, symmetrical.                                                            

                                                                                                  

Vital Signs:                                                                                      

13:25  / 94; Pulse 85; Resp 18; Temp 98.4(O); Pulse Ox 99% on R/A; Weight 97.52 kg;     mb9 

      Height 5 ft. 5 in. ; Pain 0/10;                                                             

16:01  / 81; Pulse 82; Resp 16; Pulse Ox 99% on R/A;                                    db  

13:25 Body Mass Index 35.78 (97.52 kg, 165.1 cm)                                              mb9 

13:25 Pain Scale: Adult                                                                       mb9 

                                                                                                  

Russell Coma Score:                                                                               

15:12 Eye Response: spontaneous(4). Motor Response: obeys commands(6). Verbal Response:       suleiman 

      oriented(5). Total: 15.                                                                     

                                                                                                  

ED Course:                                                                                        

13:25 Patient arrived in ED.                                                                  mb9 

13:25 Arm band placed on.                                                                     mb9 

13:27 Triage completed.                                                                       mb9 

13:28 Raúl Chavez MD is Attending Physician.                                             suleiman 

13:28 Bed in low position. Call light in reach. Side rails up X 1. Client placed on           mb9 

      continuous cardiac and pulse oximetry monitoring. NIBP monitoring applied.                  

13:56 XRAY Chest (1 view) In Process Unspecified.                                             EDMS

14:01 Kira Hebert, RN is Primary Nurse.                                                  db  

14:01 Inserted saline lock: 20 gauge in right forearm, using aseptic technique. Blood         db  

      collected.                                                                                  

16:02 No provider procedures requiring assistance completed. IV discontinued, intact,         db  

      bleeding controlled, No redness/swelling at site.                                           

                                                                                                  

Administered Medications:                                                                         

14:05 Drug: NS 0.9% IV 1000 ml Route: IV; Rate: 1 bolus; Site: right forearm;                 db  

16:03 Follow up: Response: No adverse reaction; IV Status: Completed infusion; IV Intake:     db  

      1000ml                                                                                      

15:18 Drug: Trimethoprim-Sulfamethoxazole PO (160 mg-800 mg (DS) 1 tablet Route: PO;          mb9 

16:04 Follow up: Response: No adverse reaction                                                db  

15:19 Drug: Rocephin IV 1 grams Route: IV; Rate: per protocol; Site: right antecubital;       mb9 

16:03 Follow up: Response: No adverse reaction; IV Status: Completed infusion; IV Intake: 50mldb  

16:04 Not Given (received bolus and discharged): NS 0.9% IV 1000 ml IV at 125 ml/hr continuousdb  

                                                                                                  

                                                                                                  

Medication:                                                                                       

13:29 VIS not applicable for this client.                                                     mb9 

                                                                                                  

Intake:                                                                                           

16:03 IV: 50ml; Total: 50ml.                                                                  db  

16:03 IV: 1000ml; Total: 1050ml.                                                              db  

                                                                                                  

Outcome:                                                                                          

15:21 Discharge ordered by MD.                                                                [facility] 

16:02 Discharged to home ambulatory.                                                          db  

16:02 Condition: stable                                                                           

16:02 Discharge instructions given to patient, Instructed on discharge instructions, follow       

      up and referral plans. Prescriptions given X 1.                                             

16:04 Patient left the ED.                                                                    db  

                                                                                                  

Signatures:                                                                                       

Dispatcher [employer]                           Raúl Reed MD MD cha Benton, Danielle, RN                    RN   Hanna Perez RN                 RN   mb9                                                  

                                                                                                  

Corrections: (The following items were deleted from the chart)                                    

16:04 14:05 NS 0.9% IV 1000 ml IV at 125 ml/hr in right forearm db                            db  

                                                                                                  

**************************************************************************************************

## 2023-05-02 NOTE — EKG
Test Date:    2023-05-01               Test Time:    14:11:03

Technician:   RAUL                                    

                                                     

MEASUREMENT RESULTS:                                       

Intervals:                                           

Rate:         55                                     

SC:           150                                    

QRSD:         84                                     

QT:           462                                    

QTc:          441                                    

Axis:                                                

P:            62                                     

SC:           150                                    

QRS:          51                                     

T:            63                                     

                                                     

INTERPRETIVE STATEMENTS:                                       

                                                     

*** Poor data quality, interpretation may be adversely affected

Sinus bradycardia

Otherwise normal ECG

Compared to ECG 02/02/2023 01:33:19

Sinus rhythm no longer present

Atrial abnormality no longer present



Electronically Signed On 05-02-23 16:04:50 CDT by Ashok Orta

## 2023-06-05 ENCOUNTER — HOSPITAL ENCOUNTER (EMERGENCY)
Dept: HOSPITAL 97 - ER | Age: 53
Discharge: HOME | End: 2023-06-05
Payer: COMMERCIAL

## 2023-06-05 VITALS — OXYGEN SATURATION: 98 % | SYSTOLIC BLOOD PRESSURE: 123 MMHG | DIASTOLIC BLOOD PRESSURE: 84 MMHG

## 2023-06-05 VITALS — TEMPERATURE: 97.9 F

## 2023-06-05 DIAGNOSIS — R07.9: Primary | ICD-10-CM

## 2023-06-05 DIAGNOSIS — Z88.8: ICD-10-CM

## 2023-06-05 DIAGNOSIS — Z88.0: ICD-10-CM

## 2023-06-05 DIAGNOSIS — R45.1: ICD-10-CM

## 2023-06-05 LAB
ALBUMIN SERPL BCP-MCNC: 3.9 G/DL (ref 3.4–5)
ALP SERPL-CCNC: 131 U/L (ref 45–117)
ALT SERPL W P-5'-P-CCNC: 20 U/L (ref 13–56)
AST SERPL W P-5'-P-CCNC: 19 U/L (ref 15–37)
BILIRUB INDIRECT SERPL-MCNC: 0.5 MG/DL (ref 0.2–0.8)
BUN BLD-MCNC: 8 MG/DL (ref 7–18)
GLUCOSE SERPLBLD-MCNC: 90 MG/DL (ref 74–106)
HCT VFR BLD CALC: 38.9 % (ref 36–45)
LYMPHOCYTES # SPEC AUTO: 2.1 K/UL (ref 0.7–4.9)
MCV RBC: 91.7 FL (ref 80–100)
PMV BLD: 8.4 FL (ref 7.6–11.3)
POTASSIUM SERPL-SCNC: 3.9 MEQ/L (ref 3.5–5.1)
RBC # BLD: 4.25 M/UL (ref 3.86–4.86)
TROPONIN I SERPL HS-MCNC: 20.6 PG/ML (ref ?–58.9)

## 2023-06-05 PROCEDURE — 80076 HEPATIC FUNCTION PANEL: CPT

## 2023-06-05 PROCEDURE — 99284 EMERGENCY DEPT VISIT MOD MDM: CPT

## 2023-06-05 PROCEDURE — 93005 ELECTROCARDIOGRAM TRACING: CPT

## 2023-06-05 PROCEDURE — 84484 ASSAY OF TROPONIN QUANT: CPT

## 2023-06-05 PROCEDURE — 85025 COMPLETE CBC W/AUTO DIFF WBC: CPT

## 2023-06-05 PROCEDURE — 83880 ASSAY OF NATRIURETIC PEPTIDE: CPT

## 2023-06-05 PROCEDURE — 36415 COLL VENOUS BLD VENIPUNCTURE: CPT

## 2023-06-05 PROCEDURE — 71045 X-RAY EXAM CHEST 1 VIEW: CPT

## 2023-06-05 PROCEDURE — 80048 BASIC METABOLIC PNL TOTAL CA: CPT

## 2023-06-05 NOTE — XMS REPORT
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                               tab, PO,           l



     tablet      22:50:                               BID, # 60           Daniel

n



               00                                 tab, 0           



                                                  Refill(s)           

 

     Bentyl 10      0      Yes                      10 mg = 1           [facility]

[facility]



     mg oral      2-14                               cap, PO,           l



     capsule      22:50:                               QID-Before           Herm

ruddy



               00                                 Meals, #           



                                                  40 cap, 0           



                                                  Refill(s)           

 

     Pepcid       Yes                      20 mg = 1           Mem

[facility]



     mg oral      2-14                               tab, PO,           l



     tablet      22:50:                               BID, # 60           Daniel

n



               00                                 tab, 0           



                                                  Refill(s)           

 

     Bentyl 10      2023-0      Yes                      10 mg = 1           Mem

[facility]



     mg oral      2-14                               cap, PO,           l



     capsule      22:50:                               QID-Before           Herm

ruddy



               00                                 Meals, #           



                                                  40 cap, 0           



                                                  Refill(s)           

 

     Pepcid 20            Yes                      20 mg = 1           Mem

[facility]



     mg oral      2-14                               tab, PO,           l



     tablet      22:50:                               BID, # 60           Daniel

n



               00                                 tab, 0           



                                                  Refill(s)           

 

     Bentyl 10            Yes                      10 mg = 1           Mem

oria



     mg oral      2-14                               cap, PO,           l



     capsule      22:50:                               QID-Before           Herm

ruddy



               00                                 Meals, #           



                                                  40 cap, 0           



                                                  Refill(s)           

 

     ondansetron      2022- No             4mg       4 mg, Slow          

 [facility]



     (ZOFRAN      3-31 03-31                          IV Push,           ity of



     (PF))      20:15: 19:31                          ONCE, 1           [place]



     injection 4      00   :00                           dose, On           Medi

staci



     mg                                           u            [facility]



                                                  3/31/22 at           



                                                  1515,           



                                                  Routine           

 

     morpHINE      2022- No             4mg       4 mg, Slow           Un

donita



     injection 4      3-31 03-31                          IV Push,           ity

 of



     mg        20:15: 19:33                          ONCE, 1           [place]



               00   :00                           dose, On           Medical



                                                  u            Branch



                                                  3/31/22 at           



                                                  1515, STAT           

 

     No known            No                                      [facility]



     medications      3-31                                              ity of



               11:41:                                              [place]



               00                                                [facility]

 

     lithium            Yes                      2 (two)           [facility]



     carbonate      3-31                               times a           ity of



      mg      10:29:                               day            [place]



     SR tablet      [address] [place] [address]

 

     ziprasidone            Yes                      1 (one)           Uni

vers



     80 mg      3-31                               time each           ity of



     capsule      10:29:                               day            [address]

 

     lithium            Yes                      2 (two)           [facility]



     carbonate      3-31                               times a           ity of



      mg      10:29:                               day            [place]



     SR tablet      [address] [place] [address]

 

     ziprasidone            Yes                      1 (one)           Uni

vers



     80 mg      3-31                               time each           ity of



     capsule      10:29:                               day            [address]

 

     lithium            Yes                      2 (two)           [facility]



     carbonate      3-31                               times a           ity of



      mg      10:29:                               day            [place]



     SR tablet      [address] [place] [address]

 

     ziprasidone            Yes                      1 (one)           Uni

vers



     80 mg      3-31                               time each           ity of



     capsule      10:29:                               day            [place]



               [address] [place] [address]

 

     lithium      2022-0      Yes                      2 (two)           [facility]



     carbonate      3-31                               times a           ity of



      mg      10:29:                               day            [place]



     SR tablet      56                                                Medical



                                                                 Branch

 

     ziprasidone      -0      Yes                      1 (one)           Uni

vers



     80 mg      3-31                               time each           ity of



     capsule      10:29:                               day            [place]



               56                                                Medical



                                                                 Branch

 

     atorvastati      2021- No             10mg      Take 10 mg          

 [facility]



     n 10 mg      -16 11-17                          by mouth.           ity of



     tablet      00:00: 05:59                                         [place]



               00   :00                                          Medical



                                                                 Branch

 

     atorvastati      2021- No             10mg      Take 10 mg          

 [facility]



     n 10 mg      -16 11-17                          by mouth.           ity of



     tablet      00:00: 05:59                                         [place]



               00   :00                                          [facility]

 

     lithium      -0      Yes            150mg Q.[id number] Take 150          

 [facility]



     carbonate      7-16                          [id number] mg by           [facility]



     (ESKALI)      20:13:                          3D   mouth 3           



     150 mg      54                                 times           



     capsule                                         daily with           



                                                  meals.           

 

     DULoxetine      -0      Yes                 QD   Take by           [facility]

is



     (CYMBALTA)      7-16                               mouth           Health



     20 mg      20:13:                               daily.           



     delayed      54                                                



     release                                                        



     capsule                                                        

 

     lithium            Yes            150mg Q.[id number] Take 150          

 [facility]



     carbonate      7-16                          [id number] mg by           [facility]



     (ESKALITH)      20:13:                          3D   mouth 3           



     150 mg      54                                 times           



     capsule                                         daily with           



                                                  meals.           

 

     DULoxetine      -0      Yes                 QD   Take by           [facility]

is



     (CYMBALTA)      7-16                               mouth           Health



     20 mg      20:13:                               daily.           



     delayed      54                                                



     release                                                        



     capsule                                                        

 

     lithium            Yes            150mg Q.[id number] Take 150          

 [facility]



     carbonate      7-16                          [id number] mg by           [facility]



     (ESKALITH)      20:13:                          3D   mouth 3           



     150 mg      54                                 times           



     capsule                                         daily with           



                                                  meals.           

 

     DULoxetine      -0      Yes                 QD   Take by           [facility]

is



     (CYMBALTA)      7-16                               mouth           Health



     20 mg      20:13:                               daily.           



     delayed      54                                                



     release                                                        



     capsule                                                        

 

     lithium      -0      Yes            150mg Q.[id number] Take 150          

 [facility]



     carbonate      7-16                          [id number] mg by           [facility]



     (ESKALITH)      20:13:                          3D   mouth 3           



     150 mg      54                                 times           



     capsule                                         daily with           



                                                  meals.           

 

     DULoxetine      -0      Yes                 QD   Take by           [facility]

is



     (CYMBALTA)      7-16                               mouth           Health



     20 mg      20:13:                               daily.           



     delayed      54                                                



     release                                                        



     capsule                                                        

 

     lithium      -0      Yes            150mg Q.[id number] Take 150          

 [facility]



     carbonate      7-16                          [id number] mg by           [facility]



     (ESKALITH)      20:13:                          3D   mouth 3           



     150 mg      54                                 times           



     capsule                                         daily with           



                                                  meals.           

 

     DULoxetine      -0      Yes                 QD   Take by           [facility]

is



     (CYMBALTA)      7-16                               mouth           Health



     20 mg      20:13:                               daily.           



     delayed      54                                                



     release                                                        



     capsule                                                        

 

     lithium      -0      Yes            150mg Q.[id number] Take 150          

 [facility]



     carbonate      7-16                          [id number] mg by           [facility]



     (ESKALITH)      20:13:                          3D   mouth 3           



     150 mg      54                                 times           



     capsule                                         daily with           



                                                  meals.           

 

     DULoxetine      -0      Yes                 QD   Take by           [facility]

is



     (CYMBALTA)      7-16                               mouth           Health



     20 mg      20:13:                               daily.           



     delayed      54                                                



     release                                                        



     capsule                                                        

 

     lithium      -0      Yes            150mg Q.[id number] Take 150          

 [facility]



     carbonate      7-16                          [id number] mg by           [facility]



     (ESKALITH)      20:13:                          3D   mouth 3           



     150 mg      54                                 times           



     capsule                                         daily with           



                                                  meals.           

 

     DULoxetine      -0      Yes                 QD   Take by           [facility]

is



     (CYMBALTA)      7-16                               mouth           Health



     20 mg      20:13:                               daily.           



     delayed      54                                                



     release                                                        



     capsule                                                        

 

     lithium      0      Yes            150mg Q.[id number] Take 150          

 [facility]



     carbonate      7-16                          [id number] mg by           [facility]



     (ESKALITH)      20:13:                          3D   mouth 3           



     150 mg      54                                 times           



     capsule                                         daily with           



                                                  meals.           

 

     DULoxetine      -0      Yes                 QD   Take by           [facility]

is



     (CYMBALTA)      7-16                               mouth           Health



     20 mg      20:13:                               daily.           



     delayed      54                                                



     release                                                        



     capsule                                                        

 

     ibuprofen      0      Yes       Dental 800mg      Take 1           [facility]

ris



     (MOTRIN)      7-16                abscess           tablet by           [facility]



     800 mg      00:00:                               mouth           



     tablet      00                                 every 8           



                                                  hours as           



                                                  needed for           



                                                  Pain.           

 

     ibuprofen      -0      Yes       Dental 800mg      Take 1           Jeff

ris



     (MOTRIN)      7-16                abscess           tablet by           [facility]



     800 mg      00:00:                               mouth           



     tablet      00                                 every 8           



                                                  hours as           



                                                  needed for           



                                                  Pain.           

 

     ibuprofen      -0      Yes       Dental 800mg      Take 1           [facility]

ris



     (MOTRIN)      7-16                abscess           tablet by           [facility]



     800 mg      00:00:                               mouth           



     tablet      00                                 every 8           



                                                  hours as           



                                                  needed for           



                                                  Pain.           

 

     ibuprofen      0      Yes       Dental 800mg      Take 1           [facility]

ris



     (MOTRIN)      7-16                abscess           tablet by           [facility]

l[facility]



     800 mg      00:00:                               mouth           



     tablet      00                                 every 8           



                                                  hours as           



                                                  needed for           



                                                  Pain.           

 

     ibuprofen            Yes       Dental 800mg      Take 1           [facility]

ris



     (MOTRIN)      7-16                abscess           tablet by           [facility]

l[facility]



     800 mg      00:00:                               mouth           



     tablet      00                                 every 8           



                                                  hours as           



                                                  needed for           



                                                  Pain.           

 

     ibuprofen            Yes       Dental 800mg      Take 1           [facility]

ris



     (MOTRIN)      7-16                abscess           tablet by           [facility]

l[facility]



     800 mg      00:00:                               mouth           



     tablet      00                                 every 8           



                                                  hours as           



                                                  needed for           



                                                  Pain.           

 

     ibuprofen            Yes       Dental 800mg      Take 1           [facility]

ris



     (MOTRIN)      7-16                abscess           tablet by           [facility]

lth



     800 mg      00:00:                               mouth           



     tablet      00                                 every 8           



                                                  hours as           



                                                  needed for           



                                                  Pain.           

 

     ibuprofen            Yes       Dental 800mg      Take 1           [facility]

ris



     (MOTRIN)      7-16                abscess           tablet by           [facility]

[facility]



     800 mg      00:00:                               mouth           



     tablet      00                                 every 8           



                                                  hours as           



                                                  needed for           



                                                  Pain.           

 

     ibuprofen            Yes       Dental 800mg      Take 1           [facility]

ris



     (MOTRIN)      7-16                abscess           tablet by           [facility]

[facility]



     800 mg      00:00:                               mouth           



     tablet      00                                 every 8           



                                                  hours as           



                                                  needed for           



                                                  Pain.           

 

     ibuprofen            Yes       Dental 800mg      Take 1           [facility]

ris



     (MOTRIN)      7-16                abscess           tablet by           [facility]

[facility]



     800 mg      00:00:                               mouth           



     tablet      00                                 every 8           



                                                  hours as           



                                                  needed for           



                                                  Pain.           

 

     ibuprofen            Yes       Dental 800mg      Take 1           [facility]

ris



     (MOTRIN)      7-16                abscess           tablet by           [facility]

lth



     800 mg      00:00:                               mouth           



     tablet      00                                 every 8           



                                                  hours as           



                                                  needed for           



                                                  Pain.           







Vital Signs







             Vital Name   Observation Time Observation Value Comments     Source

 

             Systolic blood 2023 20:00:00 152 mm[facility]Hg[facility]                [facility]

 

             Diastolic blood 2023 20:00:00 64 mm[Hg]                 [facility]

 

             Heart rate   2023 20:00:00 60 /min                   [facility]

 

             Respiratory rate 2023 20:00:00 21 /min                   [facility]

 

             Oxygen saturation in 2023 20:00:00 96 /min                   

[facility]



             Arterial blood by                                        [place] [facility]

[facility]



             Pulse oximetry                                        [facility]

 

             Body temperature 2023 15:06:00 37.72 Danisha                 [facility]

 

             Body height  2023 15:06:00 157.5 cm                  [facility]

ty of



                                                                 [place] [facility]



                                                                 [place]

 

             Body weight  2023 15:06:00 113.399 kg                [facility]

ty of



                                                                 [facility]



                                                                 [facility]

 

             BMI          2023 15:06:00 45.73 kg/m2               Univers[facility]

ty of



                                                                 [place] [facility]



                                                                 [facility]

 

             HEIGHT       2023-02-15 16:56:00 157.5 cm                  

 

             WEIGHT       2023-02-15 16:56:00 100.245 kg                

 

             HEIGHT       2023-02-15 16:56:00 157.5 cm                  

 

             WEIGHT       2023-02-15 16:56:00 100.245 kg                

 

             HEIGHT       2023-02-15 16:56:00 157.5 cm                  

 

             WEIGHT       2023-02-15 16:56:00 100.245 kg                

 

             Systolic blood 2022 19:30:00 176 mm[Hg]                Univer

sity of



             pressure                                            [facility]

 

             Diastolic blood 2022 19:30:00 94 mm[Hg]                 Unive

[facility]



             [facility]

 

             Heart rate   2022 19:30:00 76 /min                   Universi

ty of



                                                                 [facility]

 

             Respiratory rate 2022 19:30:00 11 /min                   [facility]

 

             Oxygen saturation in 2022 19:30:00 95 /min                   

[facility]



             Arterial blood by                                        [place] [facility]

staci



             Pulse oximetry                                        [place]

 

             Body temperature 2022 17:54:00 37.22 Danisha                 [facility]

[facility]



                                                                 [facility]

 

             Body height  2022 17:54:00 157.5 cm                  Universi

ty of



                                                                 [facility]

 

             Body weight  2022 17:54:00 90.719 kg                 Universi

ty of



                                                                 [facility]



                                                                 Branch

 

             BMI          2022 17:54:00 36.58 kg/m2               Universi

ty of



                                                                 [facility]

 

             Body height  2022 15:29:00 160 cm                    Universi

ty of



                                                                 [facility]

 

             Body weight  2022 15:29:00 90.719 kg                 Universi

ty of



                                                                 [facility]



                                                                 Branch

 

             [facility]          2022 15:29:00 35.43 kg/m2               Universi

ty of



                                                                 [facility]

 

             Height/Length 2022 08:36:33 160 cm                    



             Measured                                            

 

             Weight Dosing 2022 08:36:33 105.00 kg                 

 

             Body height  2023 08:42:00 157.5 cm                  [facility]

 

             Body weight  2023 08:42:00 100.245 kg                [facility]

 

             BMI          2023 08:42:00 40.42 kg/m2               [facility]

 

             Systolic blood 2023 08:29:26 114 mm[Hg]                [facility]



             pressure                                            

 

             Diastolic blood 2023 08:29:26 76 mm[Hg]                 [facility]



             pressure                                            

 

             Heart rate   2023 08:29:26 86 /min                   [facility]

 

             Body temperature 2023 08:29:26 36.78 Danisha                 [facility]

 

             Respiratory rate 2023 08:29:26 18 /min                   [facility]

 

             Oxygen saturation in 2023 08:29:26 97 /min                   

[facility]



             Arterial blood by                                        



             Pulse oximetry                                        

 

             Systolic blood 2023-02-15 22:01:00 124 mm[Hg]                [facility]

 

             Diastolic blood 2023-02-15 22:01:00 66 mm[Hg]                 [facility]

 

             Heart rate   2023-02-15 22:01:00 88 /min                   [facility]

 

             Body temperature 2023-02-15 22:01:00 37.17 Danisha                 [facility]

 

             Respiratory rate 2023-02-15 22:01:00 16 /min                   [facility]

 

             Oxygen saturation in 2023-02-15 22:01:00 100 /min                  

[facility]



             Arterial blood by                                        Medical Ce

nter



             Pulse oximetry                                        

 

             Body height  2023-02-15 16:56:00 157.5 cm                  [facility]

 

             Body weight  2023-02-15 16:56:00 100.245 kg                [facility]

 

             BMI          2023-02-15 16:56:00 40.42 kg/m2               [facility]

 

             Heart Rate   2023 22:58:16                           [facility]

 [place]

 

             Systolic (mm Hg) 2023 22:58:00                           [facility] [place]

 

             Diastolic (mm Hg) 2023 22:58:00                           [facility] [place]

 

             Temperature Oral (F) 2023 18:24:49 98.5 F                    

[facility]

 

             Height       2023 14:59:00 5 [ft_i]                  [facility]

 

             BMI Calculated 2023 14:59:00                           Fabiano Paez

 

             Weight       2023 14:59:00                           Sergio Delgadillo







Procedures







                Procedure       Date / Time     Performing Clinician Source



                                Performed                       

 

                CT              2023 16:56:55 Singer, [facility]



                MAXILLOFACIAL/MANDIBLE                                 Medical B

ranch



                W CONTRAST                                      

 

                LACTIC ACID WHOLE BLOOD 2023 15:57:00 Singer, [facility]

 

                COMP. METABOLIC PANEL 2023 15:56:00 Singer, Jack [facility]

[facility] [place]



                ([id number])                                         Medical Branch

 

                [facility] DIFF   [facility]

 

                COVID-19, INFLUENZA 2023 10:39:00 Alejo Escalante [facility]



                A&B, AND RSV                    Gianni          



                QUALITATIVE RT-PCR                                 

 

                XR CHEST 1 VW PORTABLE 2023 09:16:40 Alejo Escalante [facility]



                                                Gianni          

 

                ASSIGNMENT OF BENEFITS 2023 19:39:54 Doctor Unassigned, [facility]



                                                Name            Medical Branch

 

                XR CHEST 1 VW   2022 18:39:34 [facility]

 

                XR PELVIS <3 VW 2022 18:39:34 [facility]

 

                URINALYSIS      2022 18:19:00 [facility]

 

                COMP. METABOLIC PANEL 2022 18:08:00 Singer, Jack [facility]

[facility] [place]



                ([id number])                                         Medical Branch

 

                CBC WITH DIFF   2022 18[facility]08:00 [facility]

 

                COVID-19 (ID NOW RAPID 2022 18:08:00 Singer, Jack [facility]



                TESTING)                                        Medical Branch

 

                REFERRAL-       2022 05:01:00 Doctor Unassigned, Nimisha [facility]



                REQUEST/RESPONSE                 Name            Medical Branch

 

                [id number]         2020 00:00:00 CANAL.02        [facility]







Plan of Care







             Planned Activity Planned Date Details      Comments     Source

 

             Future Scheduled 2023-10-01   IMM Influenza Seasonal              [facility]



             Test         00:00:00     (>/= 19 yrs) [code =              



                                       IMM Influenza Seasonal              



                                       (>/= 19 yrs)]              

 

             Future Scheduled 2023-10-01   IMM Influenza Seasonal              H

[facility]



             Test         00:00:00     (>/= 19 yrs) [code =              



                                       IMM Influenza Seasonal              



                                       (>/= 19 yrs)]              

 

             Future Scheduled 2023   INFLUENZA VACCINE              [facility]



             Test         00:00:00     (Season Ended) [code =              Medic

al Center



                                       INFLUENZA VACCINE              



                                       (Season Ended)]              

 

             Future Scheduled 2023   INFLUENZA VACCINE              [facility]



             Test         00:00:00     (Season Ended) [code =              Medic

al Center



                                       INFLUENZA VACCINE              



                                       (Season Ended)]              

 

             Future Scheduled 2023   INFLUENZA VACCINE              [facility]



             Test         00:00:00     (Season Ended) [code =              Medic

al Center



                                       INFLUENZA VACCINE              



                                       (Season Ended)]              

 

             Future Scheduled 2023   INFLUENZA VACCINE              [facility]



             Test         00:00:00     (Season Ended) [code =              Medic

al Center



                                       INFLUENZA VACCINE              



                                       (Season Ended)]              

 

             Future Scheduled 2023   SHINGLES VACCINES (1              Met

[facility]



             Test         20:12:18     of 2) [code = SHINGLES              



                                       VACCINES (1 of 2)]              

 

             Future Scheduled 2023   INFLUENZA VACCINE              Method

[facility]



             Test         20:12:18     [code = INFLUENZA              



                                       VACCINE]                  

 

             Future Scheduled 2023   COVID-19 VACCINE (#1)              [facility]



             Test         20:12:18     [code = COVID-19              



                                       VACCINE (#1)]              

 

             Future Scheduled 2023   Pneumococcal Vaccine:              [facility]



             Test         20:12:18     Pediatrics (0 to 5              



                                       Years) and At-Risk              



                                       Patients (6 to 64              



                                       Years) (1 - PCV) [code              



                                       = Pneumococcal              



                                       Vaccine: Pediatrics (0              



                                       to 5 Years) and              



                                       At-Risk Patients (6 to              



                                       64 Years) (1 - PCV)]              

 

             Future Scheduled 2023   Hepatitis C screening              [facility]



             Test         20:12:18     (procedure) [code =              



                                       274515928]                

 

             Future Scheduled 2023   COLONOSCOPY SCREENING              [facility]



             Test         20:12:18     [code = COLONOSCOPY              



                                       SCREENING]                

 

             Future Scheduled 2023   SHINGLES VACCINES (1              Met

[facility]



             Test         20:12:18     of 2) [code = SHINGLES              



                                       VACCINES (1 of 2)]              

 

             Future Scheduled 2023   INFLUENZA VACCINE              [facility]

[facility]



             Test         20:12:18     [code = INFLUENZA              



                                       VACCINE]                  

 

             Future Scheduled 2023   COVID-19 VACCINE (#1)              [facility]



             Test         20:12:18     [code = COVID-19              



                                       VACCINE (#1)]              

 

             Future Scheduled 2023   Pneumococcal Vaccine:              [facility]



             Test         20:12:18     Pediatrics (0 to 5              



                                       Years) and At-Risk              



                                       Patients (6 to 64              



                                       Years) (1 - PCV) [code              



                                       = Pneumococcal              



                                       Vaccine: Pediatrics (0              



                                       to 5 Years) and              



                                       At-Risk Patients (6 to              



                                       64 Years) (1 - PCV)]              

 

             Future Scheduled 2023   Hepatitis C screening              [facility]



             Test         20:12:18     (procedure) [code =              



                                       626570564]                

 

             Future Scheduled 2023   [facility]



             Test         20:12:18     malignant neoplasm of              



                                       colon (procedure)              



                                       [code = 271455429]              

 

             Future Scheduled 2023   [facility]ING[facility]ACCINES (1              [facility]



             Test         20:12:18     of 2) [code = SHINGLES              



                                       VACCINES (1 of 2)]              

 

             Future Scheduled 2023   INFLUENZA VACCINE              Method

ist Hospital



             Test         20:12:18     [code = INFLUENZA              



                                       VACCINE]                  

 

             Future Scheduled 2023   COVID-19 VACCINE (#1)              [facility]



             Test         20:12:18     [code = COVID-19              



                                       VACCINE (#1)]              

 

             Future Scheduled 2023   Pneumococcal Vaccine:              [facility]



             Test         20:12:18     Pediatrics (0 to 5              



                                       Years) and At-Risk              



                                       Patients (6 to 64              



                                       Years) (1 - PCV) [code              



                                       = Pneumococcal              



                                       Vaccine: Pediatrics (0              



                                       to 5 Years) and              



                                       At-Risk Patients (6 to              



                                       64 Years) (1 - PCV)]              

 

             Future Scheduled 2023   Hepatitis C screening              [facility]



             Test         20:12:18     (procedure) [code =              



                                       126191315]                

 

             Future Scheduled 2023   COLONOSCOPY SCREENING              [facility]



             Test         20:12:18     [code = COLONOSCOPY              



                                       SCREENING]                

 

             Future Scheduled 2023   COVID-19 VACCINE (#1)              [facility]



             Test         19:28:05     [code = COVID-19              



                                       VACCINE (#1)]              

 

             Future Scheduled 2023   [facility] Vaccine:              [facility]



             Test         19:28:05     Pediatrics (0 to 5              



                                       Years) and At-Risk              



                                       Patients (6 to 64              



                                       Years) (1 - PCV) [code              



                                       = Pneumococcal              



                                       Vaccine: Pediatrics (0              



                                       to 5 Years) and              



                                       At-Risk Patients (6 to              



                                       64 Years) (1 - PCV)]              

 

             Future Scheduled 2023   Hepatitis C screening              [facility]



             Test         19:28:05     (procedure) [code =              



                                       [id number]]                

 

             Future Scheduled 2023   COLONOSCOPY SCREENING              [facility]



             Test         19:28:05     [code = COLONOSCOPY              



                                       SCREENING]                

 

             Future Scheduled 2023   SHINGLES VACCINES (1              Met

[facility]



             Test         19:28:05     of 2) [code = SHINGLES              



                                       VACCINES (1 of 2)]              

 

             Future Scheduled 2023   INFLUENZA VACCINE              Method

[facility]



             Test         19:28:05     [code = INFLUENZA              



                                       VACCINE]                  

 

             Future Scheduled 2023   Medicare IPPE (WELCOME              C

[facility]



             Test         00:00:00     TO MEDICARE) [code =              Medical

 Center



                                       Medicare IPPE (WELCOME              



                                       TO MEDICARE)]              

 

             Future Scheduled 2023   Medicare IPPE (WELCOME              C

[facility]



             Test         00:00:00     TO MEDICARE) [code =              Medical

 Center



                                       Medicare IPPE (WELCOME              



                                       TO MEDICARE)]              

 

             Future Scheduled 2023   Medicare IPPE (WELCOME              C

[facility]



             Test         00:00:00     TO MEDICARE) [code =              Medical

 Center



                                       Medicare IPPE (WELCOME              



                                       TO MEDICARE)]              

 

             Future Scheduled 2023   Medicare IPPE (WELCOME              C

[facility]



             Test         00:00:00     TO MEDICARE) [code =              Medical

 Center



                                       Medicare IPPE (WELCOME              



                                       TO MEDICARE)]              

 

             Future Scheduled 2023   Medicare IPPE (WELCOME              C

[facility]



             Test         00:00:00     TO MEDICARE) [code =              Medical

 Center



                                       Medicare IPPE (WELCOME              



                                       TO MEDICARE)]              

 

             Future Scheduled 2023   DEPRESSION SCREENING              [facility]



             Test         00:00:00     (12+) [code =              Medical Center



                                       DEPRESSION SCREENING              



                                       (12+)]                    

 

             Future Scheduled 2023   DEPRESSION SCREENING              [facility]



             Test         00:00:00     (12+) [code =              Medical Center



                                       DEPRESSION SCREENING              



                                       (12+)]                    

 

             Future Scheduled 2023   DEPRESSION SCREENING              [facility]



             Test         00:00:00     (12+) [code =              Medical Center



                                       DEPRESSION SCREENING              



                                       (12+)]                    

 

             Future Scheduled 2023   DEPRESSION SCREENING              [facility]



             Test         00:00:00     (12+) [code =              Medical Center



                                       DEPRESSION SCREENING              



                                       (12+)]                    

 

             Future Scheduled 2023   DEPRESSION SCREENING              [facility]



             Test         00:00:00     (12+) [code =              [facility]



                                       DEPRESSION SCREENING              



                                       (12+)]                    

 

             Future Scheduled 2022-10-01   IMM Influenza Seasonal              [facility]



             Test         00:00:00     (>/= 19 yrs) [code =              



                                       IMM Influenza Seasonal              



                                       (>/= 19 yrs)]              

 

             Future Scheduled 2022-10-01   IMM Influenza Seasonal              [facility]

[facility]



             Test         00:00:00     (>/= 19 yrs) [code =              



                                       IMM Influenza Seasonal              



                                       (>/= 19 yrs)]              

 

             Future Scheduled 2022-10-01   IMM Influenza Seasonal              [facility]



             Test         00:00:00     (>/= 19 yrs) [code =              



                                       IMM Influenza Seasonal              



                                       (>/= 19 yrs)]              

 

             Future Scheduled 2022-10-01   IMM Influenza Seasonal              [facility]



             Test         00:00:00     (>/= 19 yrs) [code =              



                                       IMM Influenza Seasonal              



                                       (>/= 19 yrs)]              

 

             Future Scheduled 2022-10-01   IMM Influenza Seasonal              [facility]



             Test         00:00:00     (>/= 19 yrs) [code =              



                                       IMM Influenza Seasonal              



                                       (>/= 19 yrs)]              

 

             Future Scheduled 2022-10-01   IMM Influenza Seasonal              [facility]



             Test         00:00:00     (>/= 19 yrs) [code =              



                                       IMM Influenza Seasonal              



                                       (>/= 19 yrs)]              

 

             Future Scheduled 2022-10-01   IMM Influenza Seasonal              [facility]



             Test         00:00:00     (>/= 19 yrs) [code =              



                                       IMM Influenza Seasonal              



                                       (>/= 19 yrs)]              

 

             Future Scheduled 2022-10-01   IMM Influenza Seasonal              [facility]



             Test         00:00:00     (>/= 19 yrs) [code =              



                                       IMM Influenza Seasonal              



                                       (>/= 19 yrs)]              

 

             Future Scheduled 2022-10-01   IMM Influenza Seasonal              [facility]



             Test         00:00:00     (>/= 19 yrs) [code =              



                                       IMM Influenza Seasonal              



                                       (>/= 19 yrs)]              

 

             Future Scheduled 2022   INFLUENZA VACCINE (#1)              C

[facility]



             Test         00:00:00     [code = INFLUENZA              Medical Ce

nter



                                       VACCINE (#1)]              

 

             Future Scheduled 2018   Screening for              [facility]

[facility]



             Test         00:00:00     malignant neoplasm of              



                                       colon (procedure)              



                                       [code = [id number]]              

 

             Future Scheduled 2018   SHINGLES VACCINES (1              [facility]



             Test         00:00:00     of 2) [code = [facility]              [facility]



                                       VACCINES (1 of 2)]              

 

             Future Scheduled 2018   Screening for              [facility]

[facility]



             Test         00:00:00     malignant neoplasm of              



                                       colon (procedure)              



                                       [code = 742221181]              

 

             Future Scheduled 2018   Screening for              [facility]

[facility]



             Test         00:00:00     malignant neoplasm of              



                                       colon (procedure)              



                                       [code = 902402825]              

 

             Future Scheduled 2018   Screening for              [facility]

[facility]



             Test         00:00:00     malignant neoplasm of              



                                       colon (procedure)              



                                       [code = [id number]]              

 

             Future Scheduled 2018   Screening for              [facility]

[facility]



             Test         00:00:00     malignant neoplasm of              



                                       colon (procedure)              



                                       [code = 283152782]              

 

             Future Scheduled 2018   Screening for              [facility]

[facility]



             Test         00:00:00     malignant neoplasm of              



                                       colon (procedure)              



                                       [code = 245164542]              

 

             Future Scheduled 2018   Screening for              [facility]

[facility]



             Test         00:00:00     malignant neoplasm of              



                                       colon (procedure)              



                                       [code = 680245464]              

 

             Future Scheduled 2018   Screening for              [facility]

[facility]



             Test         00:00:00     malignant neoplasm of              



                                       colon (procedure)              



                                       [code = 291154397]              

 

             Future Scheduled 2018   Screening for              [facility]

[facility]



             Test         00:00:00     malignant neoplasm of              



                                       colon (procedure)              



                                       [code = 709808935]              

 

             Future Scheduled 2018   Screening for              [facility]

[facility]



             Test         00:00:00     malignant neoplasm of              



                                       colon (procedure)              



                                       [code = 602601019]              

 

             Future Scheduled 2018   Screening for              [facility]

[facility]



             Test         00:00:00     malignant neoplasm of              



                                       colon (procedure)              



                                       [code = 839033397]              

 

             Future Scheduled 2018   SHINGLES VACCINES (1              [facility]



             Test         00:00:00     of 2) [code = [facility]              [facility]



                                       VACCINES (1 of 2)]              

 

             Future Scheduled 2018   SHINGLES VACCINES (1              [facility]



             Test         00:00:00     of 2) [code = [facility]              [facility]

[facility]



                                       VACCINES (1 of 2)]              

 

             Future Scheduled 2018   SHINGLES VACCINES (1              [facility]



             Test         00:00:00     of 2) [code = [facility]              [facility]

[facility]



                                       VACCINES (1 of 2)]              

 

             Future Scheduled 2018   SHINGLES VACCINES (1              [facility]



             Test         00:00:00     of 2) [code = [facility]              [facility]

[facility]



                                       VACCINES (1 of 2)]              

 

             Future Scheduled 2013   Lipid panel               [facility]

s



             Test         00:00:00     (procedure) [code =              [facility]



                                       [id number]]                 

 

             Future Scheduled 2013   Lipid panel               [facility]

s



             Test         00:00:00     (procedure) [code =              [facility]



                                       [id number]]                 

 

             Future Scheduled 2013   Lipid panel               [facility]

s



             Test         00:00:00     (procedure) [code =              [facility]



                                       [id number]]                 

 

             Future Scheduled 2013   Lipid panel               [facility]

s



             Test         00:00:00     (procedure) [code =              [facility]



                                       [id number]]                 

 

             Future Scheduled 2013   Lipid panel               [facility]

s



             Test         00:00:00     (procedure) [code =              [facility]



                                       [id number]]                 

 

             Future Scheduled 2008   Bre[facility] Cancer [facility]



             Test         00:00:00     (Yearly) [code =              



                                       Breast Cancer [facility]rn              



                                       (Yearly)]                 

 

             Future Scheduled 2008   [facility] Cancer [facility]



             Test         00:00:00     (Yearly) [code =              



                                       Breast Cancer [facility]              



                                       (Yearly)]                 

 

             Future Scheduled 2008   [facility] Cancer [facility]



             Test         00:00:00     (Yearly) [code =              



                                       Breast Cancer Scrn              



                                       (Yearly)]                 

 

             Future Scheduled 2008   [facility] Cancer [facility]



             Test         00:00:00     (Yearly) [code =              



                                       Breast Cancer Scrn              



                                       (Yearly)]                 

 

             Future Scheduled 2008   [facility] Cancer [facility]



             Test         00:00:00     (Yearly) [code =              



                                       Breast Cancer Scrn              



                                       (Yearly)]                 

 

             Future Scheduled 2008   [facility] Cancer [facility]



             Test         00:00:00     (Yearly) [code =              



                                       Breast Cancer Scrn              



                                       (Yearly)]                 

 

             Future Scheduled 2008   [facility] Cancer [facility]



             Test         00:00:00     (Yearly) [code =              



                                       Breast Cancer Scrn              



                                       (Yearly)]                 

 

             Future Scheduled 2008   [facility] Cancer [facility]rn              [facility]



             Test         00:00:00     (Yearly) [code =              



                                       Breast Cancer [facility]rn              



                                       (Yearly)]                 

 

             Future Scheduled 2008   [facility] Cancer [facility]rn              [facility]



             Test         00:00:00     (Yearly) [code =              



                                       Breast Cancer Sc[facility]              



                                       (Yearly)]                 

 

             Future Scheduled 2008   [facility] Cancer [facility]rn              [facility]



             Test         00:00:00     (Yearly) [code =              



                                       Breast Cancer Scrn              



                                       (Yearly)]                 

 

             Future Scheduled 2008   [facility] Cancer Scrn              [facility]



             Test         00:00:00     (Yearly) [code =              



                                       Breast Cancer Scrn              



                                       (Yearly)]                 

 

             Future Scheduled 1998   Screening for              [facility]

[facility]



             Test         00:00:00     malignant neoplasm of              



                                       cervix (procedure)              



                                       [code = 008757920]              

 

             Future Scheduled 1998   Screening for              [facility]

[facility]



             Test         00:00:00     malignant neoplasm of              



                                       cervix (procedure)              



                                       [code = 304184547]              

 

             Future Scheduled 1998   Screening for              [facility]

[facility]



             Test         00:00:00     malignant neoplasm of              



                                       cervix (procedure)              



                                       [code = 539455327]              

 

             Future Scheduled 1998   Screening for              [facility]

[facility]



             Test         00:00:00     malignant neoplasm of              



                                       cervix (procedure)              



                                       [code = 728154251]              

 

             Future Scheduled 1998   Screening for              [facility]

[facility]



             Test         00:00:00     malignant neoplasm of              



                                       cervix (procedure)              



                                       [code = 506496550]              

 

             Future Scheduled 1998   Screening for              [facility]

[facility]



             Test         00:00:00     malignant neoplasm of              



                                       cervix (procedure)              



                                       [code = 015475762]              

 

             Future Scheduled 1998   Screening for              [facility]

[facility]



             Test         00:00:00     malignant neoplasm of              



                                       cervix (procedure)              



                                       [code = 325862472]              

 

             Future Scheduled 1998   Screening for              [facility]

[facility]



             Test         00:00:00     malignant neoplasm of              



                                       cervix (procedure)              



                                       [code = 368245588]              

 

             Future Scheduled 1998   Screening for              [facility]

[facility]



             Test         00:00:00     malignant neoplasm of              



                                       cervix (procedure)              



                                       [code = 838551336]              

 

             Future Scheduled 1998   Screening for              [facility]

[facility]



             Test         00:00:00     malignant neoplasm of              



                                       cervix (procedure)              



                                       [code = 664909836]              

 

             Future Scheduled 1998   Screening for              [facility]

[facility]



             Test         00:00:00     malignant neoplasm of              



                                       cervix (procedure)              



                                       [code = 666869867]              

 

             Future Scheduled 1998   Screening for              [facility]

[facility]



             Test         00:00:00     malignant neoplasm of              



                                       cervix (procedure)              



                                       [code = 364665991]              

 

             Future Scheduled 1998   Screening for              [facility]

[facility]



             Test         00:00:00     malignant neoplasm of              



                                       cervix (procedure)              



                                       [code = 216596940]              

 

             Future Scheduled 1998   Screening for              [facility]

[facility]



             Test         00:00:00     malignant neoplasm of              



                                       cervix (procedure)              



                                       [code = 816554064]              

 

             Future Scheduled 1998   Screening for              [facility]

[facility]



             Test         00:00:00     malignant neoplasm of              



                                       cervix (procedure)              



                                       [code = 878186380]              

 

             Future Scheduled 1998   Screening for              [facility]

[facility]



             Test         00:00:00     malignant neoplasm of              



                                       cervix (procedure)              



                                       [code = 886962109]              

 

             Future Scheduled 1998   Screening for              [facility]

[facility]



             Test         00:00:00     malignant neoplasm of              



                                       cervix (procedure)              



                                       [code = 132406883]              

 

             Future Scheduled 1998   Screening for              [facility]

[facility]



             Test         00:00:00     malignant neoplasm of              



                                       cervix (procedure)              



                                       [code = 157107893]              

 

             Future Scheduled 1998   Screening for              [facility]

[facility]



             Test         00:00:00     malignant neoplasm of              



                                       cervix (procedure)              



                                       [code = 963787223]              

 

             Future Scheduled 1998   Screening for              [facility]

[facility]



             Test         00:00:00     malignant neoplasm of              



                                       cervix (procedure)              



                                       [code = 215769893]              

 

             Future Scheduled 1998   Screening for              [facility]

[facility]



             Test         00:00:00     malignant neoplasm of              



                                       cervix (procedure)              



                                       [code = 804192123]              

 

             Future Scheduled 1998   Screening for              [facility]

[facility]



             Test         00:00:00     malignant neoplasm of              



                                       cervix (procedure)              



                                       [code = 524835790]              

 

             Future Scheduled 1989   Screening for              [facility]

es



             Test         00:00:00     [facility]align[facility]              [facility]



                                       cervix (procedure)              



                                       [code = 363363920]              

 

             Future Scheduled 1989   Screening for              [facility]

es



             Test         00:00:00     malignant neoplasm of              [facility]



                                       cervix (procedure)              



                                       [code = 534591103]              

 

             Future Scheduled 1989   Screening for              [facility]

es



             Test         00:00:00     malignant neoplasm of              [facility]



                                       cervix (procedure)              



                                       [code = 632334812]              

 

             Future Scheduled 1989   Screening for              [facility]

es



             Test         00:00:00     malignant neoplasm of              [facility]



                                       cervix (procedure)              



                                       [code = 712022573]              

 

             Future Scheduled 1989   Screening for              [facility]

es



             Test         00:00:00     malignant neoplasm of              [facility]

[facility]



                                       cervix (procedure)              



                                       [code = 763616838]              

 

             Future Scheduled 1987   DTAP/TDAP/TD VACCINES              [facility]



             Test         00:00:00     (1 - Tdap) [code =              [facility]

enter



                                       DTAP/TDAP/TD VACCINES              



                                       (1 - Tdap)]               

 

             Future Scheduled 1987   DTAP/TDAP/TD VACCINES              [facility]



             Test         00:00:00     (1 - Tdap) [code =              [facility]

enter



                                       DTAP/TDAP/TD VACCINES              



                                       (1 - Tdap)]               

 

             Future Scheduled 1987   DTAP/TDAP/TD VACCINES              [facility]



             Test         00:00:00     (1 - Tdap) [code =              [facility]

enter



                                       DTAP/TDAP/TD VACCINES              



                                       (1 - Tdap)]               

 

             Future Scheduled 1987   DTAP/TDAP/TD VACCINES              [facility]



             Test         00:00:00     (1 - Tdap) [code =              [facility]

enter



                                       DTAP/TDAP/TD VACCINES              



                                       (1 - Tdap)]               

 

             Future Scheduled 1987   DTAP/TDAP/TD VACCINES              [facility]



             Test         00:00:00     (1 - Tdap) [code =              [facility]

enter



                                       DTAP/TDAP/TD VACCINES              



                                       (1 - Tdap)]               

 

             Future Scheduled 1986   HEPATITIS C SCREENING              [facility]



             Test         00:00:00     [code = HEPATITIS C              [facility] 

[facility]



                                       SCREENING]                

 

             Future Scheduled 1986   HEPATITIS C SCREENING              [facility]



             Test         00:00:00     [code = HEPATITIS C              [facility] 

Center



                                       SCREENING]                

 

             Future Scheduled 1986   HEPATITIS C SCREENING              [facility]



             Test         00:00:00     [code = HEPATITIS C              [facility] 

Center



                                       SCREENING]                

 

             Future Scheduled 1986   HEPATITIS C SCREENING              [facility]



             Test         00:00:00     [code = HEPATITIS C              [facility] 

Center



                                       SCREENING]                

 

             Future Scheduled 1986   HEPATITIS C SCREENING              [facility]



             Test         00:00:00     [code = HEPATITIS C              [facility] 

Center



                                       SCREENING]                

 

             Future Scheduled 1980   Tobacco Cessation              [facility]



             Test         00:00:00     [facility] and              [facility]

r



                                       Screening (12+) [code              



                                       = Tobacco Cessation              



                                       Counseling and              



                                       Screening (12+)]              

 

             Future Scheduled 1980   Tobacco Cessation              [facility]



             Test         00:00:00     [facility]sel[facility] and              [facility]

r



                                       Screening (12+) [code              



                                       = Tobacco Cessation              



                                       Counseling and              



                                       Screening (12+)]              

 

             Future Scheduled 1980   Tobacco Cessation              [facility]



             Test         00:00:00     Counseling and              [facility]

r



                                       Screening (12+) [code              



                                       = Tobacco Cessation              



                                       Counseling and              



                                       Screening (12+)]              

 

             Future Scheduled 1980   Tobacco Cessation              [facility]



             Test         00:00:00     Counseling and              [facility]

r



                                       Screening (12+) [code              



                                       = Tobacco Cessation              



                                       Counseling and              



                                       Screening (12+)]              

 

             Future Scheduled 1980   Tobacco Cessation              [facility]



             Test         00:00:00     Counseling and              [facility]

r



                                       Screening (12+) [code              



                                       = Tobacco Cessation              



                                       Counseling and              



                                       Screening (12+)]              

 

             Future Scheduled 1974   PNEUMOCOCCAL VACCINE              [facility]



             Test         00:00:00     0-64 YRS (1 - PCV)              [facility]

enter



                                       [code = PNEUMOCOCCAL              



                                       VACCINE 0-64 YRS (1 -              



                                       PCV)]                     

 

             Future Scheduled 1974   PNEUMOCOCCAL VACCINE              [facility]



             Test         00:00:00     0-64 YRS (1 - PCV)              [facility]

enter



                                       [code = PNEUMOCOCCAL              



                                       VACCINE 0-64 YRS (1 -              



                                       PCV)]                     

 

             Future Scheduled 1974   PNEUMOCOCCAL VACCINE              [facility]



             Test         00:00:00     0-64 YRS (1 - PCV)              [facility]

enter



                                       [code = PNEUMOCOCCAL              



                                       VACCINE 0-64 YRS (1 -              



                                       PCV)]                     

 

             Future Scheduled 1974   PNEUMOCOCCAL VACCINE              [facility]



             Test         00:00:00     0-64 YRS (1 - PCV)              [facility]

enter



                                       [code = PNEUMOCOCCAL              



                                       VACCINE 0-64 YRS (1 -              



                                       PCV)]                     

 

             Future Scheduled 1974   PNEUMOCOCCAL VACCINE              [facility]



             Test         00:00:00     0-64 YRS (1 - PCV)              Medical C

enter



                                       [code = PNEUMOCOCCAL              



                                       VACCINE 0-64 YRS (1 -              



                                       PCV)]                     

 

             Future Scheduled 1969   COVID-19 Vaccine (#1)              [facility]

[facility]



             Test         00:00:00     [code = COVID-19              



                                       Vaccine (#1)]              

 

             Future Scheduled 1969   COVID-19 VACCINE (#1)              [facility]

[facility]



             Test         00:00:00     [code = COVID-19              Medical Jessie

ter



                                       VACCINE (#1)]              

 

             Future Scheduled 1969   COVID-19 Vaccine (#1)              [place]

[facility]



             Test         00:00:00     [code = COVID-19              



                                       Vaccine (#1)]              

 

             Future Scheduled 1969   COVID-19 Vaccine (#1)              [facility]

[facility]



             Test         00:00:00     [code = COVID-19              



                                       Vaccine (#1)]              

 

             Future Scheduled 1969   COVID-19 Vaccine (#1)              [facility]

[facility]



             Test         00:00:00     [code = COVID-19              



                                       Vaccine (#1)]              

 

             Future Scheduled 1969   COVID-19 Vaccine (#1)              [place]

[facility]



             Test         00:00:00     [code = COVID-19              



                                       Vaccine (#1)]              

 

             Future Scheduled 1969   COVID-19 Vaccine (#1)              [facility]

[facility]



             Test         00:00:00     [code = COVID-19              



                                       Vaccine (#1)]              

 

             Future Scheduled 1969   COVID-19 Vaccine (#1)              [place]

[facility]



             Test         00:00:00     [code = COVID-19              



                                       Vaccine (#1)]              

 

             Future Scheduled 1969   COVID-19 Vaccine (#1)              [place]

[facility]



             Test         00:00:00     [code = COVID-19              



                                       Vaccine (#1)]              

 

             Future Scheduled 1969   COVID-19 Vaccine (#1)              [facility]

[facility]



             Test         00:00:00     [code = COVID-19              



                                       Vaccine (#1)]              

 

             Future Scheduled 1969   COVID-19 Vaccine (#1)              [place]

[facility]



             Test         00:00:00     [code = COVID-19              



                                       Vaccine (#1)]              

 

             Future Scheduled 1969   COVID-19 Vaccine (#1)              [place]

[facility]



             Test         00:00:00     [code = COVID-19              



                                       Vaccine (#1)]              

 

             Future Scheduled 1969   COVID-19 VACCINE (#1)              [facility]



             Test         00:00:00     [code = COVID-19              Medical Jessie

ter



                                       VACCINE (#1)]              

 

             Future Scheduled 1969   COVID-19 VACCINE (#1)              [facility]

[facility]



             Test         00:00:00     [code = COVID-19              Medical Jessie

ter



                                       VACCINE (#1)]              

 

             Future Scheduled 1969   COVID-19 VACCINE (#1)              [facility]



             Test         00:00:00     [code = COVID-19              Medical Jessie

ter



                                       VACCINE (#1)]              

 

             Future Scheduled 1969   COVID-19 VACCINE (#1)              [facility]

[facility]



             Test         00:00:00     [code = COVID-19              Medical Jessie

ter



                                       VACCINE (#1)]              

 

             Future Scheduled 1968   Screening for              [facility]

es



             Test         00:00:00     malignant neoplasm of              [facility]

[facility]



                                       breast (procedure)              



                                       [code = 026869366]              

 

             Future Scheduled 1968   CT Colonography              [facility]



             Test         00:00:00     (combo) [code = CT              Medical C

enter



                                       Colonography (combo)]              

 

             Future Scheduled 1968   Screening for              [facility]

es



             Test         00:00:00     malignant neoplasm of              [facility]

[facility] Center



                                       colon (procedure)              



                                       [code = 818169461]              

 

             Future Scheduled 1968   Screening for              [facility]

es



             Test         00:00:00     malignant neoplasm of              [facility]

[facility]



                                       colon (procedure)              



                                       [code = 904020390]              

 

             Future Scheduled 1968   Screening for              [facility]

es



             Test         00:00:00     malignant neoplasm of              [facility]

[facility]



                                       colon (procedure)              



                                       [code = 274943275]              

 

             Future Scheduled 1968   Screening for              [facility]

es



             Test         00:00:00     malignant neoplasm of              [facility]

[facility]



                                       colon (procedure)              



                                       [code = 868217714]              

 

             Future Scheduled 1968   Sigmoidoscopy [code =              [facility]



             Test         00:00:00     Sigmoidoscopy]              Medical Leticia regalado

 

             Future Scheduled 1968   Screening for              [facility]

es



             Test         00:00:00     malignant neoplasm of              [facility]

[facility] Center



                                       breast (procedure)              



                                       [code = 949630590]              

 

             Future Scheduled 1968   CT Colonography              [facility]



             Test         00:00:00     (combo) [code = CT              Medical C

enter



                                       Colonography (combo)]              

 

             Future Scheduled 1968   Screening for              [facility]

es



             Test         00:00:00     malignant neoplasm of              [facility]

l Center



                                       colon (procedure)              



                                       [code = 498743943]              

 

             Future Scheduled 1968   Screening for              [facility]

es



             Test         00:00:00     malignant neoplasm of              [facility]

[facility]



                                       colon (procedure)              



                                       [code = 180242705]              

 

             Future Scheduled 1968   Screening for              [facility]

es



             Test         00:00:00     malignant neoplasm of              [facility]

[facility]



                                       colon (procedure)              



                                       [code = 939199867]              

 

             Future Scheduled 1968   Screening for              [facility]

es



             Test         00:00:00     malignant neoplasm of              [facility]

[facility] Center



                                       colon (procedure)              



                                       [code = 188010483]              

 

             Future Scheduled 1968   Sigmoidoscopy [code =              [facility]



             Test         00:00:00     Sigmoidoscopy]              [facility]

r

 

             Future Scheduled 1968   Screening for              [facility]

es



             Test         00:00:00     malignant neoplasm of              [facility]

l Center



                                       breast (procedure)              



                                       [code = 635695925]              

 

             Future Scheduled 1968   CT Colonography              [facility]



             Test         00:00:00     (combo) [code = CT              Medical C

enter



                                       Colonography (combo)]              

 

             Future Scheduled 1968   Screening for              [facility]

es



             Test         00:00:00     malignant neoplasm of              [facility]

[facility] Center



                                       colon (procedure)              



                                       [code = 492720050]              

 

             Future Scheduled 1968   Screening for              [facility]

es



             Test         00:00:00     malignant neoplasm of              [facility]

[facility] Center



                                       colon (procedure)              



                                       [code = 238529842]              

 

             Future Scheduled 1968   Screening for              [facility]

es



             Test         00:00:00     malignant neoplasm of              [facility]

[facility] Center



                                       colon (procedure)              



                                       [code = 275368608]              

 

             Future Scheduled 1968   Screening for              [facility]

es



             Test         00:00:00     malignant neoplasm of              [facility]

[facility] Center



                                       colon (procedure)              



                                       [code = 441515254]              

 

             Future Scheduled 1968   Sigmoidoscopy [code =              [facility]



             Test         00:00:00     Sigmoidoscopy]              [facility]

r

 

             Future Scheduled 1968   Screening for              [facility]

es



             Test         00:00:00     malignant neoplasm of              [facility]

l Center



                                       breast (procedure)              



                                       [code = 030183295]              

 

             Future Scheduled 1968   CT Colonography              [facility]

[facility]



             Test         00:00:00     (combo) [code = CT              Medical C

enter



                                       Colonography (combo)]              

 

             Future Scheduled 1968   Screening for              [facility]

es



             Test         00:00:00     malignant neoplasm of              [facility]

[facility] Center



                                       colon (procedure)              



                                       [code = 803079779]              

 

             Future Scheduled 1968   Screening for              [facility]

es



             Test         00:00:00     malignant neoplasm of              [facility]

l Center



                                       colon (procedure)              



                                       [code = 170937762]              

 

             Future Scheduled 1968   Screening for              [facility]

es



             Test         00:00:00     malignant neoplasm of              [facility]

[facility] Center



                                       colon (procedure)              



                                       [code = 199256664]              

 

             Future Scheduled 1968   Screening for              [facility]

es



             Test         00:00:00     malignant neoplasm of              [facility]

[facility] Center



                                       colon (procedure)              



                                       [code = 194275903]              

 

             Future Scheduled 1968   Sigmoidoscopy [code =              [facility]



             Test         00:00:00     Sigmoidoscopy]              [facility]

r

 

             Future Scheduled 1968   Screening for              [facility]

es



             Test         00:00:00     malignant neoplasm of              [facility]

l Center



                                       breast (procedure)              



                                       [code = 270147209]              

 

             Future Scheduled 1968   CT Colonography              [facility]



             Test         00:00:00     (combo) [code = CT              Medical C

enter



                                       Colonography (combo)]              

 

             Future Scheduled 1968   Screening for              [facility]

es



             Test         00:00:00     malignant neoplasm of              [facility]

l Center



                                       colon (procedure)              



                                       [code = 159938319]              

 

             Future Scheduled 1968   Screening for              [facility]

es



             Test         00:00:00     malignant neoplasm of              [facility]

l Center



                                       colon (procedure)              



                                       [code = 060529810]              

 

             Future Scheduled 1968   Screening for              [facility]

es



             Test         00:00:00     malignant neoplasm of              [facility]

l Center



                                       colon (procedure)              



                                       [code = 597347734]              

 

             Future Scheduled 1968   Screening for              [facility]

es



             Test         00:00:00     malignant neoplasm of              [facility]

l Center



                                       colon (procedure)              



                                       [code = 238030675]              

 

             Future Scheduled 1968   Sigmoidoscopy [code =              [facility]



             Test         00:00:00     Sigmoidoscopy]              [facility] Cent[facility]

r







Encounters







        Start   End     Encounter Admission Attending Care    Care    Encounter 

Source



        Date/Time Date/Time Type    Type    Clinicians Facility Department ID   

   

 

        2023-05-15         Outpatient                 [facility]     [id number]

 [place]



        14:18:14                                                 [id number] [facility]

 

        2023         Outpatient                 [facility]     [id number]

 [place]



        14:16:19                                                 [id number] [facility]

 

        2023         Emergency                 [facility]     [id number] 

PIERRE -



        09:45:36                                                         [facility]



                                                                        ent

 

        2022         Outpatient         SARATH, [facility]     [id number]

-[id number] [facility]



        01:05:17                         MELANIE                  [id number] [facility]

 

        2022         Outpatient         VIRGIE,  [facility]     [id number]

 [place]



        03:15:41                         JOAQUIN                 [id number] [facility]

 

        2022         Outpatient         VIRGIE,  [facility]     [id number]

 [place]



        15:19:55                         JOAQUIN                 [id number] [facility]

 

        2022         Outpatient                 [facility]     [id number]

 [facility]



        15:28:25                                                 [id number] [facility]

 

        2022         Outpatient                 [facility]     [id number]

 [facility]



        12:00:51                                                 [id number] [facility]

 

        2022         Outpatient                 [facility]     [id number]

 [facility]



        15:13:02                                                 [id number] [place] [address]

 

        2020         Inpatient                 [facility]   YAMILA    [id number] 

[facility]



        03:17:00                                                 56      [facility]

 

        2023 [facility]     [id number]

[id number] [facility]



        09:01:00 14:52:00                 JACK brock [facility]

 

        2023 Emergency         [facility]    [id number]

[id number] [facility]



        09:01:00 14:52:00                 Jack HAMM 350.1.13.10         i

[facility] [id number]         [facility]  [phone]         [address]

 

        2023 Emergency         Yosvany, 1.2.840.1 [id number] 

[id number] Methodi



        02:46:00 05:35:00                 Alejo 21858.1.1         [address]



                                        Gianni  3.430.2.7                 Hospit

a



                                                .3.157342                 l



                                                .8                      

 

        2023 Emergency         Yosvany, 1.2.840.1 [id number]

[id number] Methodi



        02:46:00 05:35:00                 Alejo 06859.1.1         [address]



                                        Gianni  3.430.2.7                 Hospit

a



                                                .3.905883                 l



                                                .8                      

 

        2023-02-15 2023 Emergency         Justin Whitaker [facility]   [id number]

[id number] [facility]



        17:58:00 00:07:00                 [facility]

 

        2023-02-15 2023 Emergency         [facility]in Whitaker [facility]   [id number]

[id number] [facility]



        17:58:00 00:07:00                 [facility]

 

        2023-02-15 2023 Emergency ER      JUSTIN WHITAKER [facility]    Emergency 20

[id number] [facility]



        17:58:00 00:07:00                                                 

 

        2023 Documentat         Dangelo, 1.2.840.1 [id number]

[id number] Methodi



        00:00:00 00:00:00 ion             [place] 68393.1.1         [address]     [address]



                                                3.430.2.7                 Hospit

a



                                                .3.100573                 l



                                                .8                      

 

        2023 Travel                  1.2.840.1 1.2.518.241 8499

[id number] Methodi



        00:00:00 00:00:00                         84804.1.1 350.1.13.43 977     

st



                                                3.430.2.7 0.2.7.3.698         Ho

spita



                                                .3.590192 084.8           l



                                                .8                      

 

        2023 Documentat         Dangelo, 1.2.840.1 [id number]

[id number] Methodi



        00:00:00 00:00:00 ion             Smith[place] 01965.1.1         [address]     [address]



                                                3.430.2.7                 Hospit

a



                                                .3.257668                 l



                                                .8                      

 

        2023 Travel                  1.2.840.1 1.2.444.550 4278

167969 Methodi



        00:00:00 00:00:00                         28784.1.1 350.1.13.43 977     

st



                                                3.430.2.7 0.2.7.3.698         Ho

spita



                                                .3.575697 084.8           l



                                                .8                      

 

        2023 2023-02-15 Emergency Emergency [facility]   [id number]

[id number] 

[facility]



        19:56:00 06:17:00                                         79      

 

        2023 Emergency                 [facility] [id number]

[id number] Memoria



        14:56:57 23:07:00                                 [place] 00      [facility]

 

        2023 Emergency                 ANA    [facility] [id number]

[id number] Memoria



        14:56:57 23:07:00                                 [place] 00      [facility]

 

        2023 Emergency                 [facility]   [id number]

[id number] [facility]



        19:56:00 19:56:00                                         79      

 

        2023 Outpatient         Swati [facility]   [id number]

[id number] 



        08:56:57 17:07:00                 Ishan                  [address] [place] [address]                          

 

        2023 Emergency E       SWATI, [facility]    MH    7500   

 MH



        08:56:00 17:07:00                 ISHAN                          

 

        2023 Outpatient DIANA NOLAN  [facility]    [id number]

[id number] Univers



        14:00:00 14:00:00                 ANCELMO brock [facility]

 

        2023 Orders          Doctor  [place]    1.2.840.114 [id number]

[id number] Univers



        00:00:00 00:00:00 Only            Unassigned, SHONA   350.1.13.10       

  ity of



                                        [facility] 4.2.7.2.686         [place]oly

as



                                                        [phone]         [address]

 

        2022 Inpatient         SANCHEZ [facility]    MED       

  [facility]



        19:24:00 19:40:00                 LEXI                         

 

        2022 Emergency E       HEATHER, [facility]     

   MH



        14:56:00 18:09:00                 DEMARIO                           

 

        2022 Emergency X       SINGER, [facility]    ERT     [id number]

15 Univers



        12:56:00 15:05:00                 JACK brock [facility]

 

        2022 Emergency         [facility]    1.2.148.832 [id number]

[id number] Univers



        12:56:00 15:05:00                 Jack HAMM 350.1.13.10         i

ty of



                                                [facility] 4.2.7.2.686         [place]

[place]



                                                [place]  [phone]         Medi

staci



                                                        [address]             [address]

 

        2022 Office          [facility]    1.2.840.114 [id number]

13 Univers



        10:00:00 10:30:00 Visit           [facility]  350.1.13.10         it

y of



                                                [place] 4.2.7.2.686         Roly

as



                                                SKYLAR?BLEA [phone]         Me

dical



                                                [address]



                                                MEDICAL                 



                                                OFFICE                  



                                                BUILDING                 

 

        2022 [facility]       [facility]    [id number]

329 Univers



        10:00:00 10:00:00                 ESCOBAR                           [facility]

 

        2022 Orders          Doctor  [place]IX    [id number]

07 [employer]



        00:00:00 00:00:00 Only            Unassigned, Dawes   [id number]       

  ity of



                                        [facility] [id number]         Roly

as



                                                        [phone]         Medi

staci



                                                        [address]             [address]

 

        2020 Outpatient         Josias, HERMILACL   OUTD    [id number]

[facility]



        08:38:00 08:38:00                 Musaddiq                 73      [facility]

 

        2019 Inpatient 1       Robin Barrios [facility]    PSY     12

[id number]



        23:01:00 13:16:00                 Robin Barrios                         

[facility]

 

        2017 Outpatient DIANA COHEN  [facility]    NUT     [id number]

[facility]



        00:00:00 00:00:00                 VENKATA brock [facility]







Results







           Test Description Test Time  Test Comments Results    Result Comments 

Source









                    COMP. METABOLIC PANEL (57208) 2023 16:28:41 









                      Test Item  Value      Reference Range Interpretation Comme

nts









             NA (test code = 4834797464) 137 mmol/L   135-145                   

 

             K (test code = 0322476689) 4.1 mmol/L   3.5-5.0                   

 

             CL (test code = 2565265444) 104 mmol/L                       

 

             CO2 TOTAL (test code = 6446746398) 24 mmol/L    23-31              

       

 

             AGAP (test code = 0397661848) 9            2-16                    

  

 

             BUN (test code = 9845933124) 14 mg/dL     7-23                     

 

 

             GLUCOSE (test code = 5531710336) 114 mg/dL           H       

     

 

             CREATININE (test code = 0.79 mg/dL   0.50-1.04                 



             9611366072)                                         

 

             TOTAL BILI (test code = 1.3 mg/dL    0.1-1.1      H            



             1053414572)                                         

 

             CALCIUM (test code = 9889344918) 9.5 mg/dL    8.6-10.6             

     

 

             T PROTEIN (test code = 6418835714) 7.7 g/dL     6.3-8.2            

       

 

             ALBUMIN (test code = 3311989174) 4.2 g/dL     3.5-5.0              

     

 

             ALK PHOS (test code = 1938927290) 102 U/L                    

      

 

             ALTv (test code = 1742-6) 15 U/L       5-35                      

 

             AST(SGOT) (test code = 6388569573) 19 U/L       13-40              

       

 

             eGFR (test code = 2778671800) 75.8         mL/min/1.73m2           

   

 

             ELENA (test code = ELENA) [employer] of [employer]                    

       



                          Filtration Rate (GFR) and Staging                     

      



                          of Kidney Disease*                           



                          +-----------------------+--------                     

      



                          -------------+-------------------                     

      



                          ------+| GFR (mL/min/1.73 m2) ?|                      

     



                          With Kidney Damage ?| ?Without                        

   



                          Kidney                                 



                          Damage+-----------------------+--                     

      



                          -------------------+-------------                     

      



                          ------------+| ?>90 ? ? ? ? ? ? ?                     

      



                          ? ?| ?Stage one ? ? ? ? ?| ?                          

 



                          Normal ? ? ? ? ? ? ?                           



                          ?+-----------------------+-------                     

      



                          --------------+------------------                     

      



                          -------+| ?60-89 ? ? ? ? ? ? ? ?|                     

      



                          ?Stage two ? ? ? ? ?| ? Decreased                     

      



                          GFR ? ? ? ?                            



                          +-----------------------+--------                     

      



                          -------------+-------------------                     

      



                          ------+| ?30-59 ? ? ? ? ? ? ? ?|                      

     



                          ?Stage three ? ? ? ?| ? Stage                         

  



                          three ? ? ? ? ?                           



                          +-----------------------+--------                     

      



                          -------------+-------------------                     

      



                          ------+| ?15-29 ? ? ? ? ? ? ? ?|                      

     



                          ?Stage four ? ? ? ? | ? Stage                         

  



                          four ? ? ? ? ?                           



                          ?+-----------------------+-------                     

      



                          --------------+------------------                     

      



                          -------+| ?<15 (or dialysis) ? ?|                     

      



                          ?Stage five ? ? ? ? | ? Stage                         

  



                          five ? ? ? ? ?                           



                          ?+-----------------------+-------                     

      



                          --------------+------------------                     

      



                          -------+ *Each stage assumes the                      

     



                          associated GFR level has been in                      

     



                          effect for at least three months.                     

      



                          ?Stages 1 to 5, with or without                       

    



                          kidney disease, indicate chronic                      

     



                          kidney disease. Notes:                           



                          Determination of stages one and                       

    



                          two (with eGFR >59mL/min/1.73 m2)                     

      



                          requires estimation of kidney                         

  



                          damage for at least three months                      

     



                          as defined by structural or                           



                          functional abnormalities of the                       

    



                          kidney, manifested by                           



                          either:Pathological abnormalities                     

      



                          or Markers of kidney damage                           



                          (including abnormalities in the                       

    



                          composition of the blood or urine                     

      



                          or abnormalities in imaging                           



                          tests).                                

 

             Lab Interpretation (test code = Abnormal                           

    



             88537-9)                                            



[facility] WITH [id number] 16:26:23





             Test Item    Value        Reference Range Interpretation Comments

 

             WBC (test code = 14.08        See_Comment  H             [Automated



             3490-2)                                             message] The



                                                                 system which



                                                                 generated this



                                                                 result transmit

gianfranco



                                                                 reference range

:



                                                                 4.30 - 11.10



                                                                 10*3/?L. The



                                                                 reference range



                                                                 was not used to



                                                                 interpret this



                                                                 result as



                                                                 normal/abnormal

.

 

             RBC (test code = 4.39         See_Comment                [Automated



             789-8)                                              message] The



                                                                 system which



                                                                 generated this



                                                                 result transmit

gianfranco



                                                                 reference range

:



                                                                 3.93 - 5.25



                                                                 10*6/?L. The



                                                                 reference range



                                                                 was not used to



                                                                 interpret this



                                                                 result as



                                                                 normal/abnormal

.

 

             HGB (test code = 13.6 g/dL    11.6-15.0                 



             718-7)                                              

 

             HCT (test code = 40.5 %       35.7-45.2                 



             4544-3)                                             

 

             MCV (test code = 92.3 fL      80.6-95.5                 



             787-2)                                              

 

             MCH (test code = 31.0 pg      25.9-32.8                 



             785-6)                                              

 

             MCHC (test code = 33.6 g/dL    31.6-35.1                 



             786-4)                                              

 

             RDW-SD (test code = 41.2 fL      39.0-49.9                 



             32902-1)                                            

 

             RDW-CV (test code = 12.2 %       12.0-15.5                 



             788-0)                                              

 

             PLT (test code = 221          See_Comment                [Automated



             777-3)                                              message] The



                                                                 system which



                                                                 generated this



                                                                 result transmit

gianfranco



                                                                 reference range

:



                                                                 166 - 358 10*3/

?L.



                                                                 The reference



                                                                 range was not u

sed



                                                                 to interpret th

is



                                                                 result as



                                                                 normal/abnormal

.

 

             MPV (test code = 10.1 fL      9.5-12.9                  



             68180-0)                                            

 

             NRBC/100 WBC (test 0.0          See_Comment                [Automat

ed



             code = 5080612180)                                        message] 

The



                                                                 system which



                                                                 generated this



                                                                 result transmit

gianfranco



                                                                 reference range

:



                                                                 0.0 - 10.0 /100



                                                                 WBCs. The



                                                                 reference range



                                                                 was not used to



                                                                 interpret this



                                                                 result as



                                                                 normal/abnormal

.

 

             NRBC x10^3 (test code              See_Comment                [Auto

mated



             = 1837950207)                                        message] The



                                                                 system which



                                                                 generated this



                                                                 result transmit

gianfranco



                                                                 reference range

:



                                                                 10*3/?L. The



                                                                 reference range



                                                                 was not used to



                                                                 interpret this



                                                                 result as



                                                                 normal/abnormal

.

 

             GRAN MAT (NEUT) % 86.9 %                                 



             (test code = 770-8)                                        

 

             IMM GRAN % (test code 0.60 %                                 



             = 6171071607)                                        

 

             LYMPH % (test code = 7.6 %                                  



             736-9)                                              

 

             MONO % (test code = 3.8 %                                  



             5905-5)                                             

 

             EOS % (test code = 0.6 %                                  



             713-8)                                              

 

             BASO % (test code = 0.5 %                                  



             706-2)                                              

 

             GRAN MAT x10^3(ANC) 12.23 10*3/uL 1.88-7.09    H            



             (test code =                                        



             1170202900)                                         

 

             IMM GRAN x10^3 (test 0.09 10*3/uL 0.00-0.06    H            



             code = 7996697420)                                        

 

             LYMPH x10^3 (test code 1.07 10*3/uL 1.32-3.29    L            



             = 731-0)                                            

 

             MONO x10^3 (test code 0.54 10*3/uL 0.33-0.92                 



             = 742-7)                                            

 

             EOS x10^3 (test code = 0.08 10*3/uL 0.03-0.39                 



             711-2)                                              

 

             BASO x10^3 (test code 0.07 10*3/uL 0.01-0.07                 



             = 704-7)                                            

 

             Lab Interpretation Abnormal                               



             (test code = 58552-7)                                        



[id number]





             Test Item    Value        Reference Range Interpretation Comments

 

             SARS-CoV-2 (COVID-19) RNA [Presence] Detected                      

         



             in Respiratory specimen by CECIL with                                

        



             probe detection (test code =                                       

 



             71015-4)                                            

 

             Whether patient resides in a No                                    

 



             congregate care setting (test code =                               

         



             90607-4)                                            

 

             Date and time of symptom onset (test Unknown                       

         



             code = 54748-7)                                        

 

             Whether the patient was hospitalized No                            

         



             for condition of interest (test code                               

         



             = 78355-1)                                          

 

             Whether the patient was admitted to No                             

        



             intensive care unit (ICU) for                                      

  



             condition of interest (test code =                                 

       



             77651-6)                                            

 

             Whether patient is employed in a No                                

     



             healthcare setting (test code =                                    

    



             64787-2)                                            

 

             Whether the patient has symptoms No                                

     



             related to condition of interest                                   

     



             (test code = 62518-5)                                        

 

             Pregnancy status (test code = No                                   

  



             01001-4)                                            



MAX ROMO, Urinalysis Rflx Cult/[id number] 20:35:00





             Test Item    Value        Reference Range Interpretation Comments

 

             Color,Urine (test code = UCOL) Yellow       Yellow                 

   

 

             Clarity,Urine (test code = Clear        Clear                     



             UCLAR)                                              

 

             Ph, Urine (test code = UPH) 6.5          5.0-9.0      N            

 

             Specific Gravity,Urine (test 1.015        1.005-1.030  N           

 



             code = USG)                                         

 

             Blood,Urine (test code = UBLD) Negative mg/dL Negative             

     

 

             Protein,Urine (test code = Negative mg/dL Negative                 

 



             UPRO)                                               

 

             Glucose,Urine (UA) (test code Negative mg/dL Negative              

    



             = UGLU)                                             

 

             Ketones,Urine (test code = Negative mg/dL Negative                 

 



             UKET)                                               

 

             Nitrate,Urine (test code = Negative     Negative                  



             UNIT)                                               

 

             Bilirubin,Urine (test code = Negative mg/dL Negative               

   



             UBIL)                                               

 

             Urobilinogen,Urine (test code 1.0 E.U./dL  Normal                  

  



             = UURO)                                             

 

             Leukocyte Esterase,Urine (test Trace mg/dL  Negative     A         

   



             code = ULEU)                                        



Drug Screen,[id number] 20:35:00





             Test Item    Value        Reference Range Interpretation Comments

 

             PCP Phencyclidine Screen,Urine (test Negative     Negative         

         



             code = PCPU)                                        

 

             Amphetamine Screen,Urine (test code Negative     Negative          

        



             = AMPU)                                             

 

             Methadone Screen,Urine (test code = Negative     Negative          

        



             METHU)                                              

 

             Opiate Screen,Urine (test code = Negative     Negative             

     



             UOPIS)                                              

 

             Barbituates Screen,Urine (test code Negative     Negative          

        



             = BARBU)                                            

 

             Benzodiazepines Screen,Urine (test Negative     Negative           

       



             code = UBENZS)                                        

 

             Cocaine Screen,Urine (test code = Negative     Negative            

      



             UCOCS)                                              

 

             Cannabinoid Screen,Urine (test code Negative     Negative          

        



             = UTHCS)                                            

 

             Propoxyphene Screen, Urine (test Negative     Negative             

     



             code = UPROP)                                        



Urine [id number] 20:35:00





             Test Item    Value        Reference Range Interpretation Comments

 

             RBC,Urine (test code = URBC.NP) 0-2 /HPF     0-2                   

    

 

             WBC,Urine (test code = UWBC.NP) 0-5 /HPF     0-5                   

    

 

             Bacteria,Urine (test code = Few /HPF     None Seen    A            



             UBACT.NP)                                           

 

             Squamous Epithelial Cell,Urine 6-10 /HPF    0-5          A         

   



             (test code = USQEPI.NP)                                        

 

             Amorphous Crystals,Urine (test code Few /HPF     None Seen    A    

        



             = UAMSE)                                            

 

             Hyaline Casts,Urine (test code = 0-5 /HPF     None Seen    A       

     



             UHYALC.XX)                                          



Complete Blood Count Auto [id number] 20:19:00





             Test Item    Value        Reference Range Interpretation Comments

 

             White Blood Count (test code = 7.9 x10 3/uL 4.4-10.5     N         

   



             WBCT)                                               

 

             Red Blood Count (test code = 4.27 x10 6/uL 3.75-5.20    N          

  



             RBC)                                                

 

             Hemoglobin (test code = HGBT) 12.9 g/dL    12.2-14.8    N          

  

 

             Hematocrit (test code = HCTT) 40.2 %       36.5-44.4    N          

  

 

             Mean Corpuscular Volume (test 94.10 fL     80..00 N          

  



             code = MCV)                                         

 

             Mean Corpuscular Hemoglobin 30.2 pg      27.0-32.5    N            



             (test code = MCH)                                        

 

             Mean Corpuscular HGB Conc 32.10 g/dL   32.00-37.50  N            



             (test code = MCHC)                                        

 

             RDW Coefficient of Variation 12.4 %       11.5-14.5    N           

 



             (test code = RDWCV)                                        

 

             Platelet Count (test code = 211.0 x10 3/uL 140.0-440.0  N          

  



             PLTT)                                               

 

             Mean Platelet Volume (test 11.1 fL                                



             code = MPV)                                         

 

             Immature Granulocytes % (Auto) 0.3 %        0.0-5.0      N         

   



             (test code = IMMGRAN%)                                        

 

             Neutrophils % (Auto) (test 75.8 %       36.0-70.0    H            



             code = NE%)                                         

 

             Lymphocytes % (Auto) (test 10.6 %       12.0-44.0    L            



             code = LY%)                                         

 

             Monocytes % (Auto) (test code 11.8 %       0.0-11.0     H          

  



             = MO%)                                              

 

             Eosinophils % (Auto) (test 0.9 %        0.0-7.0      N            



             code = EO%)                                         

 

             Basophils % (Auto) (test code 0.6 %        0.0-2.0      N          

  



             = BA%)                                              

 

             Immature Granulocytes # (Auto) 0.02 x10 3/uL                       

    



             (test code = IMMGRAN#)                                        

 

             Neutrophils # (Auto) (test 6.0 x10 3/uL 1.6-7.4      N            



             code = NE#)                                         

 

             Lymphocytes # (Auto) (test 0.83 x10 3/uL 0.50-4.60    N            



             code = LY#)                                         

 

             Monocytes # (Auto) (test code 0.93 x10 3/uL 0.00-1.20    N         

   



             = MO#)                                              

 

             Eosinophils # (Auto) (test 0.07 x10 3/uL 0.00-0.74    N            



             code = EO#)                                         

 

             Basophils # (Auto) (test code 0.05 x10 3/uL 0.00-0.21    N         

   



             = BA#)                                              

 

             nRBC Abs (test code = NRBCA) 0                                     

 

 

             nRBC Pct (test code = NRBCP) 0 %                                   

 



Comprehensive Metabolic [id number] 20:19:00





             Test Item    Value        Reference Range Interpretation Comments

 

             SODIUM (test code = NA) 142.0 mmol/L 136.0-145.0  N            

 

             Potassium,K (test code = 4.2 mmol/L   3.0-5.1      N            



             K)                                                  

 

             Chloride (test code = 110 mmol/L          H            



             CL)                                                 

 

             Carbon Dioxide (test 26 mmol/L    20-31        N            



             code = CO2)                                         

 

             Anion Gap (test code = 6 mmol/L     5-15         N            



             GAP)                                                

 

             Blood Urea Nitrogen 17 mg/dL     9-23         N            



             (test code = BUN)                                        

 

             Creatinine (test code = 0.88 mg/dL   0.55-1.02    N            



             CREATT)                                             

 

             Creatinine Clr Calc 80.94 mL/min                           



             Pharmacy (test code =                                        



             CRCLPHA)                                            

 

             Estimated Glomerular 78           See_Comment  L            Reporte

d eGFR is



             Filt Rate (test code =                                        based

 on the



             EGFR.XX)                                            CKD-



                                                                 equation thatdo

es



                                                                 not use a race



                                                                 coefficient.



                                                                 Additional



                                                                 information can

be



                                                                 found



                                                                 at:[id number]

cb_



                                                                 egfr_summary_fl

andry



                                                                 5.pdf (kidney.o

rg)



                                                                 [Automated



                                                                 message] The



                                                                 system which



                                                                 generated this



                                                                 result transmit

gianfranco



                                                                 reference range

:



                                                                 >=90



                                                                 ml/min/1.73m2. 

The



                                                                 reference range



                                                                 was not used to



                                                                 interpret this



                                                                 result as



                                                                 normal/abnormal

.

 

             BUN/Creatinine Ratio 19 ratio     10-20        N            



             (test code = BCRATIO)                                        

 

             Glucose (test code = 92 mg/dL            N            



             GLU)                                                

 

             Osmolality,Calculated 295.0                                  



             (test code = OSMOC)                                        

 

             Calcium (test code = CA) 9.1 mg/dL    8.3-10.6     N            

 

             Bilirubin,Total (test 0.3 mg/dL    0.2-1.1      N            



             code = BILIT)                                        

 

             Aspartate Amino 22 U/L       0-34         N            



             Transferase (test code =                                        



             AST)                                                

 

             Alanine Aminotransferase 15 U/L       10-49        N            



             (test code = ALT)                                        

 

             Total Protein (test code 6.9 g/dL     5.7-8.2      N            



             = TP)                                               

 

             Albumin Level (test code 3.9 g/dL     3.2-4.8      N            



             = ALB)                                              

 

             Globulin (test code = 3.0 mg/dL    2.3-3.5      N            



             GLOB)                                               

 

             Albumin/Globulin Ratio 1.3 ratio    0.8-2.0      N            



             (test code = AGRATIO)                                        

 

             Alkaline Phosphatase 109 U/L             N            



             (test code = ALP)                                        



Ethanol [id number] 20:19:00





             Test Item    Value        Reference Range Interpretation Comments

 

             Ethanol (test code < 3 mg/dL                              The pharm

acological



             = ETOH)                                             response to blo

od alcohol



                                                                 levels mayvary 

from



                                                                 individual to i

ndividual.



                                                                 The fatal larisa

ntrationhas



                                                                 been reported t

o be



                                                                 >400mg/dL.



HCG, Serum Qual (LAB)2023 20:19:00





             Test Item    Value        Reference Range Interpretation Comments

 

             HCG, Serum Qual (LAB) (test code = Negative     Negative           

       



             HCGQ)                                               



URINE AND [id number] 15:40:00





             Test Item    Value        Reference Range Interpretation Comments

 

             UA Sq Epi (test code = UA Sq Epi) Many /LPF                        

      



[facility] AND [id number] 15:40:00





             Test Item    Value        Reference Range Interpretation Comments

 

             UA WBC (test code = 9            See_Comment                [Automa

gianfranco message] The



             UA WBC)                                             system which ge

nerated this



                                                                 result transmit

gianfranco



                                                                 reference range

: <=5. The



                                                                 reference range

 was not



                                                                 used to interpr

et this



                                                                 result as xenia

l/abnormal.



[facility] [id number] 15:40:00





             Test Item    Value        Reference Range Interpretation Comments

 

             UA Bacteria (test code = UA Occasional /HPF                        

   



             Bacteria)                                           



[facility] [id number] 15:40:00





             Test Item    Value        Reference Range Interpretation Comments

 

             UA Amorph Delmis (test code = Occasional /HPF                        

   



             UA Amorph Delmis)                                        



[facility] AND [id number] 15:40:00





             Test Item    Value        Reference Range Interpretation Comments

 

             UA CaOx Delmis (test code = UA Occasional /HPF                       

    



             CaOx Delmis)                                          



[facility] AND [id number] 15:40:00





             Test Item    Value        Reference Range Interpretation Comments

 

             UA Color (test code = Yellow *NA*(23                          

 



             UA Color)    9:40 AM)                               



[facility] AND [id number] 15:40:00





             Test Item    Value        Reference Range Interpretation Comments

 

             UA Turbidity (test code = Clear (23 9:40                      

     



             UA Turbidity) AM)                                    



[facility] AND [id number] 15:40:00





             Test Item    Value        Reference Range Interpretation Comments

 

             UA Spec Grav (test code = UA Spec 1.010 1                          

      



             Grav)                                               



[facility] AND [id number] 15:40:00





             Test Item    Value        Reference Range Interpretation Comments

 

             UA pH (test code = UA pH) 6.0 1        5.0-8.0                   



[facility] AND [id number] 15:40:00





             Test Item    Value        Reference Range Interpretation Comments

 

             UA Protein (test code Negative (23 9:40                       

    



             = UA Protein) AM)                                    



[facility] AND [id number] 15:40:00





             Test Item    Value        Reference Range Interpretation Comments

 

             UA Glucose (test code Negative (23 9:40                       

    



             = UA Glucose) AM)                                    



[facility] AND [id number] 15:40:00





             Test Item    Value        Reference Range Interpretation Comments

 

             UA Ketones (test code Negative *NA*(23                        

   



             = UA Ketones) 9:40 AM)                               



[facility] AND [id number] 15:40:00





             Test Item    Value        Reference Range Interpretation Comments

 

             UA Bili (test code = Negative *NA*(23                         

  



             UA Bili)     9:40 AM)                               



[facility] AND [id number] 15:40:00





             Test Item    Value        Reference Range Interpretation Comments

 

             UA Blood (test code = Negative (23 9:40                       

    



             UA Blood)    AM)                                    



[facility] AND [id number] 15:40:00





             Test Item    Value        Reference Range Interpretation Comments

 

             UA Urobilinogen (test code = UA 0.2          0.1-1.0               

    



             Urobilinogen)                                        



[facility] AND [id number] 15:40:00





             Test Item    Value        Reference Range Interpretation Comments

 

             UA Nitrite (test code Negative (23 9:40                       

    



             = UA Nitrite) AM)                                    



[facility] AND [id number] 15:40:00





             Test Item    Value        Reference Range Interpretation Comments

 

             UA Leuk Est (test code Small *ABN*(23                         

  



             = UA Leuk Est) 9:40 AM)                               



[facility] AND [id number] 15:40:00





             Test Item    Value        Reference Range Interpretation Comments

 

             UA RBC (test code = 2            See_Comment                [Automa

gianfranco message] The



             UA RBC)                                             system which ge

nerated this



                                                                 result transmit

gianfranco



                                                                 reference range

: <=2. The



                                                                 reference range

 was not



                                                                 used to interpr

et this



                                                                 result as xenia

l/abnormal.



[facility] AND [id number] 15:40:00





             Test Item    Value        Reference Range Interpretation Comments

 

             UA Sq Epi (test code = UA Sq Epi) Many /LPF                        

      



[facility] AND [id number] 15:40:00





             Test Item    Value        Reference Range Interpretation Comments

 

             UA WBC (test code = 9            See_Comment                [Automa

gianfranco message] The



             UA WBC)                                             system which ge

nerated this



                                                                 result transmit

gianfranco



                                                                 reference range

: <=5. The



                                                                 reference range

 was not



                                                                 used to interpr

et this



                                                                 result as xenia

l/abnormal.



[facility] AND [id number] 15:40:00





             Test Item    Value        Reference Range Interpretation Comments

 

             UA Bacteria (test code = UA Occasional /HPF                        

   



             Bacteria)                                           



[facility] AND [id number] 15:40:00





             Test Item    Value        Reference Range Interpretation Comments

 

             UA Amorph Delmis (test code = Occasional /HPF                        

   



             UA Amorph Delmis)                                        



[facility] AND [id number] 15:40:00





             Test Item    Value        Reference Range Interpretation Comments

 

             UA CaOx Delmis (test code = UA Occasional /HPF                       

    



             CaOx Delmis)                                          



[facility] AND [id number] 15:40:00





             Test Item    Value        Reference Range Interpretation Comments

 

             UA Color (test code = Yellow *NA*(23                          

 



             UA Color)    9:40 AM)                               



[facility] AND [id number] 15:40:00





             Test Item    Value        Reference Range Interpretation Comments

 

             UA Turbidity (test code = Clear (23 9:40                      

     



             UA Turbidity) AM)                                    



[facility] AND [id number] 15:40:00





             Test Item    Value        Reference Range Interpretation Comments

 

             UA Spec Grav (test code = UA Spec 1.010 1                          

      



             Grav)                                               



[facility] AND [id number] 15:40:00





             Test Item    Value        Reference Range Interpretation Comments

 

             UA Sq Epi (test code = UA Sq Epi) Many /LPF                        

      



[facility] AND [id number] 15:40:00





             Test Item    Value        Reference Range Interpretation Comments

 

             UA pH (test code = UA pH) 6.0 1        5.0-8.0                   



[facility] AND [id number] 15:40:00





             Test Item    Value        Reference Range Interpretation Comments

 

             UA Protein (test code Negative (23 9:40                       

    



             = UA Protein) AM)                                    



[facility] AND [id number] 15:40:00





             Test Item    Value        Reference Range Interpretation Comments

 

             UA Glucose (test code Negative (23 9:40                       

    



             = UA Glucose) AM)                                    



[facility] AND [id number] 15:40:00





             Test Item    Value        Reference Range Interpretation Comments

 

             UA Ketones (test code Negative *NA*(23                        

   



             = UA Ketones) 9:40 AM)                               



[facility] AND [id number] 15:40:00





             Test Item    Value        Reference Range Interpretation Comments

 

             UA Bili (test code = Negative *NA*(23                         

  



             UA Bili)     9:40 AM)                               



[facility] AND [id number] 15:40:00





             Test Item    Value        Reference Range Interpretation Comments

 

             UA Blood (test code = Negative (23 9:40                       

    



             UA Blood)    AM)                                    



[facility] AND [id number] 15:40:00





             Test Item    Value        Reference Range Interpretation Comments

 

             UA Urobilinogen (test code = UA 0.2          0.1-1.0               

    



             Urobilinogen)                                        



[facility] AND [id number] 15:40:00





             Test Item    Value        Reference Range Interpretation Comments

 

             UA Nitrite (test code Negative (23 9:40                       

    



             = UA Nitrite) AM)                                    



[facility] AND [id number] 15:40:00





             Test Item    Value        Reference Range Interpretation Comments

 

             UA Leuk Est (test code Small *ABN*(23                         

  



             = UA Leuk Est) 9:40 AM)                               



[facility] AND [id number] 15:40:00





             Test Item    Value        Reference Range Interpretation Comments

 

             UA RBC (test code = 2            See_Comment                [Automa

gianfranco message] The



             UA RBC)                                             system which ge

nerated this



                                                                 result transmit

gianfranco



                                                                 reference range

: <=2. The



                                                                 reference range

 was not



                                                                 used to interpr

et this



                                                                 result as xenia

l/abnormal.



[facility] [id number] 15:40:00





             Test Item    Value        Reference Range Interpretation Comments

 

             UA WBC (test code = 9            See_Comment                [Automa

gianfranco message] The



             UA WBC)                                             system which ge

nerated this



                                                                 result transmit

gianfranco



                                                                 reference range

: <=5. The



                                                                 reference range

 was not



                                                                 used to interpr

et this



                                                                 result as xenia

l/abnormal.



[facility] [id number] 15:40:00





             Test Item    Value        Reference Range Interpretation Comments

 

             UA Bacteria (test code = UA Occasional /HPF                        

   



             Bacteria)                                           



[facility] [id number] 15:40:00





             Test Item    Value        Reference Range Interpretation Comments

 

             UA Amorph Delmis (test code = Occasional /HPF                        

   



             UA Amorph Delmis)                                        



[facility] [id number] 15:40:00





             Test Item    Value        Reference Range Interpretation Comments

 

             UA CaOx Delmis (test code = UA Occasional /HPF                       

    



             CaOx Delmis)                                          



[facility] [id number] 15:40:00





             Test Item    Value        Reference Range Interpretation Comments

 

             UA Color (test code = Yellow *NA*(23                          

 



             UA Color)    9:40 AM)                               



[facility] [id number] 15:40:00





             Test Item    Value        Reference Range Interpretation Comments

 

             UA Sq Epi (test code = UA Sq Epi) Many /LPF                        

      



[facility] [id number] 15:40:00





             Test Item    Value        Reference Range Interpretation Comments

 

             UA WBC (test code = 9            See_Comment                [Automa

gianfranco message] The



             UA WBC)                                             system which ge

nerated this



                                                                 result transmit

gianfranco



                                                                 reference range

: <=5. The



                                                                 reference range

 was not



                                                                 used to interpr

et this



                                                                 result as xenia

l/abnormal.



[facility] [id number]40[facility]00





             Test Item    Value        Reference Range Interpretation Comments

 

             UA Bacteria (test code = UA Occasional /HPF                        

   



             Bacteria)                                           



[facility] [id number] 15:40:00





             Test Item    Value        Reference Range Interpretation Comments

 

             UA Amorph Delmis (test code = Occasional /HPF                        

   



             UA Amorph Delmis)                                        



[facility] [id number] 15:40:00





             Test Item    Value        Reference Range Interpretation Comments

 

             UA CaOx Delmis (test code = UA Occasional /HPF                       

    



             CaOx Delmis)                                          



[facility] [id number] 15:40:00





             Test Item    Value        Reference Range Interpretation Comments

 

             UA Color (test code = Yellow *NA*(23                          

 



             UA Color)    9:40 AM)                               



[facility] [id number] 15:40:00





             Test Item    Value        Reference Range Interpretation Comments

 

             UA Turbidity (test code = Clear (23 9:40                      

     



             UA Turbidity) AM)                                    



[facility] [id number] 15:40:00





             Test Item    Value        Reference Range Interpretation Comments

 

             UA Spec Grav (test code = UA Spec 1.010 1                          

      



             Grav)                                               



[facility] [id number] 15:40:00





             Test Item    Value        Reference Range Interpretation Comments

 

             UA Turbidity (test code = Clear (23 9:40                      

     



             UA Turbidity) AM)                                    



[facility] [id number] 15:40:00





             Test Item    Value        Reference Range Interpretation Comments

 

             UA pH (test code = UA pH) 6.0 1        5.0-8.0                   



[facility] [id number] 15:40:00





             Test Item    Value        Reference Range Interpretation Comments

 

             UA Protein (test code Negative (23 9:40                       

    



             = UA Protein) AM)                                    



[facility] [id number] 15:40:00





             Test Item    Value        Reference Range Interpretation Comments

 

             UA Glucose (test code Negative (23 9:40                       

    



             = UA Glucose) AM)                                    



[facility] [id number] 15:40:00





             Test Item    Value        Reference Range Interpretation Comments

 

             UA Ketones (test code Negative *NA*(23                        

   



             = UA Ketones) 9:40 AM)                               



[facility] [id number] 15:40:00





             Test Item    Value        Reference Range Interpretation Comments

 

             UA Bili (test code = Negative *NA*(23                         

  



             UA Bili)     9:40 AM)                               



[facility] [id number] 15:40:00





             Test Item    Value        Reference Range Interpretation Comments

 

             UA Blood (test code = Negative (23 9:40                       

    



             UA Blood)    AM)                                    



[facility] [id number] 15:40:00





             Test Item    Value        Reference Range Interpretation Comments

 

             UA Urobilinogen (test code = UA 0.2          0.1-1.0               

    



             Urobilinogen)                                        



[facility] [id number] 15:40:00





             Test Item    Value        Reference Range Interpretation Comments

 

             UA Nitrite (test code Negative (23 9:40                       

    



             = UA Nitrite) AM)                                    



[facility] AND [id number] 15:40:00





             Test Item    Value        Reference Range Interpretation Comments

 

             UA Leuk Est (test code Small *ABN*(23                         

  



             = UA Leuk Est) 9:40 AM)                               



[facility] AND [id number] 15:40:00





             Test Item    Value        Reference Range Interpretation Comments

 

             UA RBC (test code = 2            See_Comment                [Automa

gianfranco message] The



             UA RBC)                                             system which ge

nerated this



                                                                 result transmit

gianfranco



                                                                 reference range

: <=2. The



                                                                 reference range

 was not



                                                                 used to interpr

et this



                                                                 result as xenia

l/abnormal.



[facility] AND [id number] 15:40:00





             Test Item    Value        Reference Range Interpretation Comments

 

             UA Spec Grav (test code = UA Spec 1.010 1                          

      



             Grav)                                               



[facility] AND [id number] 15:40:00





             Test Item    Value        Reference Range Interpretation Comments

 

             UA pH (test code = UA pH) 6.0 1        5.0-8.0                   



[facility] AND [id number] 15:40:00





             Test Item    Value        Reference Range Interpretation Comments

 

             UA Protein (test code Negative (23 9:40                       

    



             = UA Protein) AM)                                    



[facility] AND [id number] 15:40:00





             Test Item    Value        Reference Range Interpretation Comments

 

             UA Glucose (test code Negative (23 9:40                       

    



             = UA Glucose) AM)                                    



[facility] AND [id number] 15:40:00





             Test Item    Value        Reference Range Interpretation Comments

 

             UA Ketones (test code Negative *NA*(23                        

   



             = UA Ketones) 9:40 AM)                               



[facility] AND [id number] 15:40:00





             Test Item    Value        Reference Range Interpretation Comments

 

             UA Sq Epi (test code = UA Sq Epi) Many /LPF                        

      



[facility] AND [id number] 15:40:00





             Test Item    Value        Reference Range Interpretation Comments

 

             UA WBC (test code = 9            See_Comment                [Automa

gianfranco message] The



             UA WBC)                                             system which ge

nerated this



                                                                 result transmit

gianfranco



                                                                 reference range

: <=5. The



                                                                 reference range

 was not



                                                                 used to interpr

et this



                                                                 result as xenia

l/abnormal.



[facility] [id number] 15:40:00





             Test Item    Value        Reference Range Interpretation Comments

 

             UA Bacteria (test code = UA Occasional /HPF                        

   



             Bacteria)                                           



[facility] [id number] 15:40:00





             Test Item    Value        Reference Range Interpretation Comments

 

             UA Amorph Delmis (test code = Occasional /HPF                        

   



             UA Amorph Delmis)                                        



[facility] [id number] 15:40:00





             Test Item    Value        Reference Range Interpretation Comments

 

             UA CaOx Delmis (test code = UA Occasional /HPF                       

    



             CaOx Delmis)                                          



[facility] [id number] 15:40:00





             Test Item    Value        Reference Range Interpretation Comments

 

             UA Color (test code = Yellow *NA*(23                          

 



             UA Color)    9:40 AM)                               



[facility] [id number] 15:40:00





             Test Item    Value        Reference Range Interpretation Comments

 

             UA Turbidity (test code = Clear (23 9:40                      

     



             UA Turbidity) AM)                                    



[facility] [id number] 15:40:00





             Test Item    Value        Reference Range Interpretation Comments

 

             UA Spec Grav (test code = UA Spec 1.010 1                          

      



             Grav)                                               



[facility] [id number] 15:40:00





             Test Item    Value        Reference Range Interpretation Comments

 

             UA Bili (test code = Negative *NA*(23                         

  



             UA Bili)     9:40 AM)                               



[facility] [id number] 15:40:00





             Test Item    Value        Reference Range Interpretation Comments

 

             UA pH (test code = UA pH) 6.0 1        5.0-8.0                   



[facility] [id number] 15:40:00





             Test Item    Value        Reference Range Interpretation Comments

 

             UA Protein (test code Negative (23 9:40                       

    



             = UA Protein) AM)                                    



[facility] [id number] 15:40:00





             Test Item    Value        Reference Range Interpretation Comments

 

             UA Glucose (test code Negative (23 9:40                       

    



             = UA Glucose) AM)                                    



[facility] [id number] 15:40:00





             Test Item    Value        Reference Range Interpretation Comments

 

             UA Ketones (test code Negative *NA*(23                        

   



             = UA Ketones) 9:40 AM)                               



[facility] [id number] 15:40:00





             Test Item    Value        Reference Range Interpretation Comments

 

             UA Bili (test code = Negative *NA*(23                         

  



             UA Bili)     9:40 AM)                               



[facility] [id number] 15:40:00





             Test Item    Value        Reference Range Interpretation Comments

 

             UA Blood (test code = Negative (23 9:40                       

    



             UA Blood)    AM)                                    



[facility] [id number] 15:40:00





             Test Item    Value        Reference Range Interpretation Comments

 

             UA Urobilinogen (test code = UA 0.2          0.1-1.0               

    



             Urobilinogen)                                        



[facility] [id number] 15:40:00





             Test Item    Value        Reference Range Interpretation Comments

 

             UA Nitrite (test code Negative (23 9:40                       

    



             = UA Nitrite) AM)                                    



[facility] [id number] 15:40:00





             Test Item    Value        Reference Range Interpretation Comments

 

             UA Leuk Est (test code Small *ABN*(23                         

  



             = UA Leuk Est) 9:40 AM)                               



[facility] [id number] 15:40:00





             Test Item    Value        Reference Range Interpretation Comments

 

             UA RBC (test code = 2            See_Comment                [Automa

gianfranco message] The



             UA RBC)                                             system which ge

nerated this



                                                                 result transmit

gianfranco



                                                                 reference range

: <=2. The



                                                                 reference range

 was not



                                                                 used to interpr

et this



                                                                 result as xenia

l/abnormal.



[facility] [id number] 15:40:00





             Test Item    Value        Reference Range Interpretation Comments

 

             UA Blood (test code = Negative (23 9:40                       

    



             UA Blood)    AM)                                    



[facility] [id number] 15:40:00





             Test Item    Value        Reference Range Interpretation Comments

 

             UA Urobilinogen (test code = UA 0.2          0.1-1.0               

    



             Urobilinogen)                                        



[facility] [id number] 15:40:00





             Test Item    Value        Reference Range Interpretation Comments

 

             UA Nitrite (test code Negative (2/14/23 9:40                       

    



             = UA Nitrite) AM)                                    



[facility] [id number] 15:40:00





             Test Item    Value        Reference Range Interpretation Comments

 

             UA Leuk Est (test code Small *ABN*(23                         

  



             = UA Leuk Est) 9:40 AM)                               



[facility] [id number] 15:40:00





             Test Item    Value        Reference Range Interpretation Comments

 

             UA RBC (test code = 2            See_Comment                [Automa

gianfranco message] The



             UA RBC)                                             system which ge

nerated this



                                                                 result transmit

gianfranco



                                                                 reference range

: <=2. The



                                                                 reference range

 was not



                                                                 used to interpr

et this



                                                                 result as xenia

l/abnormal.



[facility] [id number] 15:40:00





             Test Item    Value        Reference Range Interpretation Comments

 

             UA Sq Epi (test code = UA Sq Epi) Many /LPF                        

      



[facility] [id number] 15:40:00





             Test Item    Value        Reference Range Interpretation Comments

 

             UA WBC (test code = 9            See_Comment                [Automa

gianfranco message] The



             UA WBC)                                             system which ge

nerated this



                                                                 result transmit

gianfranco



                                                                 reference range

: <=5. The



                                                                 reference range

 was not



                                                                 used to interpr

et this



                                                                 result as xenia

l/abnormal.



[facility] [id number] 15:40:00





             Test Item    Value        Reference Range Interpretation Comments

 

             UA Bacteria (test code = UA Occasional /HPF                        

   



             Bacteria)                                           



[facility] [id number] 15:40:00





             Test Item    Value        Reference Range Interpretation Comments

 

             UA Amorph Delmis (test code = Occasional /HPF                        

   



             UA Amorph Delmis)                                        



[facility] [id number] 15:40:00





             Test Item    Value        Reference Range Interpretation Comments

 

             UA CaOx Delmis (test code = UA Occasional /HPF                       

    



             CaOx Delmis)                                          



[facility] [id number] 15:40:00





             Test Item    Value        Reference Range Interpretation Comments

 

             UA Color (test code = Yellow *NA*(23                          

 



             UA Color)    9:40 AM)                               



[facility] [id number] 15:40:00





             Test Item    Value        Reference Range Interpretation Comments

 

             UA Turbidity (test code = Clear (23 9:40                      

     



             UA Turbidity) AM)                                    



[facility] [id number] 15:40:00





             Test Item    Value        Reference Range Interpretation Comments

 

             UA Spec Grav (test code = UA Spec 1.010 1                          

      



             Grav)                                               



[facility] [id number] 15:40:00





             Test Item    Value        Reference Range Interpretation Comments

 

             UA pH (test code = UA pH) 6.0 1        5.0-8.0                   



[facility] [id number] 15:40:00





             Test Item    Value        Reference Range Interpretation Comments

 

             UA Protein (test code Negative (23 9:40                       

    



             = UA Protein) AM)                                    



[facility] AND [id number] 15:40:00





             Test Item    Value        Reference Range Interpretation Comments

 

             UA Glucose (test code Negative (23 9:40                       

    



             = UA Glucose) AM)                                    



[facility] AND [id number] 15:40:00





             Test Item    Value        Reference Range Interpretation Comments

 

             UA Ketones (test code Negative *NA*(23                        

   



             = UA Ketones) 9:40 AM)                               



[facility] AND [id number] 15:40:00





             Test Item    Value        Reference Range Interpretation Comments

 

             UA Bili (test code = Negative *NA*(23                         

  



             UA Bili)     9:40 AM)                               



[facility] AND [id number] 15:40:00





             Test Item    Value        Reference Range Interpretation Comments

 

             UA Blood (test code = Negative (23 9:40                       

    



             UA Blood)    AM)                                    



[facility] AND [id number] 15:40:00





             Test Item    Value        Reference Range Interpretation Comments

 

             UA Urobilinogen (test code = UA 0.2          0.1-1.0               

    



             Urobilinogen)                                        



[facility] AND [id number] 15:40:00





             Test Item    Value        Reference Range Interpretation Comments

 

             UA Nitrite (test code Negative (23 9:40                       

    



             = UA Nitrite) AM)                                    



[facility] AND [id number] 15:40:00





             Test Item    Value        Reference Range Interpretation Comments

 

             UA Leuk Est (test code Small *ABN*(23                         

  



             = UA Leuk Est) 9:40 AM)                               



[facility] AND [id number] 15:40:00





             Test Item    Value        Reference Range Interpretation Comments

 

             UA RBC (test code = 2            See_Comment                [Automa

gianfranco message] The



             UA RBC)                                             system which ge

nerated this



                                                                 result transmit

gianfranco



                                                                 reference range

: <=2. The



                                                                 reference range

 was not



                                                                 used to interpr

et this



                                                                 result as xenia

l/abnormal.



[id number]:25:24





             Test Item    Value        Reference Range Interpretation Comments

 

             Glucose Lvl (test code = Glucose Lvl) 93           70-99           

          



[id number] 15:25:24





             Test Item    Value        Reference Range Interpretation Comments

 

             BUN (test code = BUN) 19           7-22                      



[place] [id number] 15:25:24





             Test Item    Value        Reference Range Interpretation Comments

 

             Creatinine Lvl (test code = Creatinine 0.85         0.50-1.40      

           



             Lvl)                                                



[id number] 15:25:24





             Test Item    Value        Reference Range Interpretation Comments

 

             Sodium Lvl (test code = Sodium Lvl) 142          135-145           

        



[id number] 15:25:24





             Test Item    Value        Reference Range Interpretation Comments

 

             Potassium Lvl (test code = Potassium 4.4          3.5-5.1          

         



             Lvl)                                                



[facility] A[id number] 15:25:24





             Test Item    Value        Reference Range Interpretation Comments

 

             Chloride Lvl (test code = Chloride Lvl) 108                  

            



[facility]CtmmajpOIGUDSCBE2857-07-[facility] 15:25:24





             Test Item    Value        Reference Range Interpretation Comments

 

             CO2 (test code = CO2) 28           24-32                     



[facility] ArlingtonOfqeqbiSXFYWLITV6201-26-11 15:25:24





             Test Item    Value        Reference Range Interpretation Comments

 

             Calcium Lvl (test code = Calcium Lvl) 10.0         8.5-10.5        

          



[facility] ArlingtonBgxvgyeLFOJILUBF8415-42-57 15:25:24





             Test Item    Value        Reference Range Interpretation Comments

 

             AGAP (test code = AGAP) 10.4         10.0-20.0                 



[facility] ArlingtonLansxrbFQBPANSTD3178-97-06 15:25:24





             Test Item    Value        Reference Range Interpretation Comments

 

             eGFR (test code = eGFR) 81                                     



[facility] ArlingtonCmxhotuTMDVHWGRX0876-16-92 15:25:24





             Test Item    Value        Reference Range Interpretation Comments

 

             Lipase Lvl (test code = Lipase Lvl) 123                      

        



[facility] ArlingtonMmahxmrCUJEIQGLR8886-27-73 15:25:24





             Test Item    Value        Reference Range Interpretation Comments

 

             Total Protein (test code = Total 8.0          6.4-8.4              

     



             Protein)                                            



[facility] ArlingtonDczwoahGGVQPQODQ2429-85-96 15:25:24





             Test Item    Value        Reference Range Interpretation Comments

 

             Albumin Lvl (test code = Albumin Lvl) 3.7          3.5-5.0         

          



[facility]XbbcdrkIJXRAAXCM8624[facility]37[facility]:25:24





             Test Item    Value        Reference Range Interpretation Comments

 

             Globulin (test code = Globulin) 4.3          2.7-4.2               

    



[facility]anoLdiqhguWVKNCRKQL0709-09-16 15:25:24





             Test Item    Value        Reference Range Interpretation Comments

 

             A/G Ratio (test code = A/G Ratio) 0.9 1        0.7-1.6             

      



[facility]anoXqfdybqUGKJUHTYP4998-35-25 15:25:24





             Test Item    Value        Reference Range Interpretation Comments

 

             ALANINE AMINOTRANSFERASE 20           See_Comment                [A

utomated message]



             (test code = ALANINE                                        The sys

tem which



             AMINOTRANSFERASE)                                        generated 

this result



                                                                 transmitted ref

erence



                                                                 range: <=65. Th

e



                                                                 reference range

 was



                                                                 not used to int

erpret



                                                                 this result as



                                                                 normal/abnormal

.



[facility]anoGqfcuhxSJBWXOYGM8423-95-88 15:25:24





             Test Item    Value        Reference Range Interpretation Comments

 

             AST (test code = AST) 19           See_Comment                [Auto

mated message] The



                                                                 system which ge

nerated this



                                                                 result transmit

gianfranco



                                                                 reference range

: <=37. The



                                                                 reference range

 was not



                                                                 used to interpr

et this



                                                                 result as xenia

l/abnormal.



[facility]anoFetadqpAEMIKHGLM1836-30-51 15:25:24





             Test Item    Value        Reference Range Interpretation Comments

 

             Alk Phos (test code = Alk Phos) 123                          

    



[facility]anoDjewgbpMXSHQEYLH0168-55-89 15:25:24





             Test Item    Value        Reference Range Interpretation Comments

 

             Bili Total (test code = Bili Total) 0.3          0.2-1.3           

        



[facility]anoVjyqwjmWZNZFPGAM6138-61-30 15:25:24





             Test Item    Value        Reference Range Interpretation Comments

 

             Bili Direct (test code no gt        See_Comment                [Aut

omated message] The



             = Bili Direct)                                        system which 

generated



                                                                 this result tra

nsmitted



                                                                 reference range

: <=0.3.



                                                                 The reference r

jihan was



                                                                 not used to int

erpret this



                                                                 result as xenia

l/abnormal.



[facility]anoJdvxnueWKHNVHAIR9193-17-86 15:25:24





             Test Item    Value        Reference Range Interpretation Comments

 

             Bili Indirect Unable to    See_Comment                [Automated



             (test code = Bili Calculate                              message] T

he system



             Indirect)                                           which generated



                                                                 this result



                                                                 transmitted



                                                                 reference range

:



                                                                 <=1.0. The



                                                                 reference range

 was



                                                                 not used to



                                                                 interpret this



                                                                 result as



                                                                 normal/abnormal

.



[place]43-02-14 15:25:24





             Test Item    Value        Reference Range Interpretation Comments

 

             WBC X 10x3 (test code = WBC X 10x3) 9.4          3.7-10.4          

        



[place] 506043-02-14 15:25:24





             Test Item    Value        Reference Range Interpretation Comments

 

             RBC X 10x6 (test code = RBC X 10x6) 4.59         4.20-5.40         

        



[facility]iscoYwkposdDPHIVEZZHE6856-94-68 15:25:24





             Test Item    Value        Reference Range Interpretation Comments

 

             Hgb (test code = Hgb) 14.1         12.0-16.0                 



[facility] FriscoTifmxvnPXQLDURXHK1640-55-15 15:25:24





             Test Item    Value        Reference Range Interpretation Comments

 

             Hct (test code = Hct) 42.8         36.0-48.0                 



[facility] FriscoIcdxerrGMGDHPSXOP9724-22-65 15:25:24





             Test Item    Value        Reference Range Interpretation Comments

 

             MCV (test code = MCV) 93.2         80.0-98.0                 



[facility] FriscoHukanzqCZYRJKQSNR7980-98-23 15:25:24





             Test Item    Value        Reference Range Interpretation Comments

 

             MCH (test code = MCH) 30.8 pg      27.0-31.0                 



[facility] FriscoSlnpastVCOSBECXGK8463-72-55 15:25:24





             Test Item    Value        Reference Range Interpretation Comments

 

             MCHC (test code = MCHC) 33.0         32.0-36.0                 



[facility] FriscoPevnnunZWFESHXIDB2249-61-10 15:25:24





             Test Item    Value        Reference Range Interpretation Comments

 

             RDW (test code = RDW) 13.0         11.5-14.5                 



[facility] FriscoAjjoapqXRDAHBNVZY4701-24-41 15:25:24





             Test Item    Value        Reference Range Interpretation Comments

 

             Platelet (test code = Platelet) 200          133-450               

    



[facility] FriscoPcakucmQXNQXRBHZD1912-18-23 15:25:24





             Test Item    Value        Reference Range Interpretation Comments

 

             MPV (test code = MPV) 8.6          7.4-10.4                  



[facility] FriscoRffdyydOXTZWNUHQZ4780-84-85 15:25:24





             Test Item    Value        Reference Range Interpretation Comments

 

             Segs (test code = Segs) 86.0         45.0-75.0                 



[facility] FriscoJwyxvihVEFNBEKVIN4658-96-15 15:25:24





             Test Item    Value        Reference Range Interpretation Comments

 

             Lymphocytes (test code = Lymphocytes) 5.5          20.0-40.0       

          



[place] [address]-02-14 15:25:24





             Test Item    Value        Reference Range Interpretation Comments

 

             Monocytes (test code = Monocytes) 7.0          2.0-12.0            

      



[place] [address]-02-14 15:25:24





             Test Item    Value        Reference Range Interpretation Comments

 

             Eosinophils (test code = 1.0          See_Comment                [A

utomated message] The



             Eosinophils)                                        system which ge

nerated



                                                                 this result tra

nsmitted



                                                                 reference range

: <=4.0.



                                                                 The reference r

jihan was



                                                                 not used to int

erpret



                                                                 this result as



                                                                 normal/abnormal

.



[place] [address]-02-14 15:25:24





             Test Item    Value        Reference Range Interpretation Comments

 

             Basophils (test code = 0.5          See_Comment                [Aut

omated message] The



             Basophils)                                          system which ge

nerated



                                                                 this result tra

nsmitted



                                                                 reference range

: <=1.0.



                                                                 The reference r

jihan was



                                                                 not used to int

erpret



                                                                 this result as



                                                                 normal/abnormal

.



[place] 506043-02-14 15:25:24





             Test Item    Value        Reference Range Interpretation Comments

 

             Neutrophils # (test code = Neutrophils 8.1          1.5-8.1        

           



             #)                                                  



[place] 506043-02-14 15:25:24





             Test Item    Value        Reference Range Interpretation Comments

 

             Lymphocytes # (test code = Lymphocytes 0.5          1.0-5.5        

           



             #)                                                  



[facility] FriscoKxasnqjPXDPYQEOVL4794-59-50 15:25:24





             Test Item    Value        Reference Range Interpretation Comments

 

             Monocytes # (test code 0.7          See_Comment                [Aut

omated message] The



             = Monocytes #)                                        system which 

generated



                                                                 this result tra

nsmitted



                                                                 reference range

: <=0.8.



                                                                 The reference r

jihan was



                                                                 not used to int

erpret



                                                                 this result as



                                                                 normal/abnormal

.



[place] [address]-02-14 15:25:24





             Test Item    Value        Reference Range Interpretation Comments

 

             Eosinophils # (test code 0.1          See_Comment                [A

utomated message] The



             = Eosinophils #)                                        system whic

h generated



                                                                 this result tra

nsmitted



                                                                 reference range

: <=0.5.



                                                                 The reference r

jihan was



                                                                 not used to int

erpret



                                                                 this result as



                                                                 normal/abnormal

.



[place] [address]-02-14 15:25:24





             Test Item    Value        Reference Range Interpretation Comments

 

             Glucose Lvl (test code = Glucose Lvl) 93           70-99           

          



[place] 781843-02-14 15:25:24





             Test Item    Value        Reference Range Interpretation Comments

 

             BUN (test code = BUN) 19           7-22                      



[facility] ArlingtonBsbhouiKYYJNAAAU7897-94-31 15:25:24





             Test Item    Value        Reference Range Interpretation Comments

 

             Creatinine Lvl (test code = Creatinine 0.85         0.50-1.40      

           



             Lvl)                                                



[facility] ArlingtonQrruqrzZSDPLESCP1771-89-33 15:25:24





             Test Item    Value        Reference Range Interpretation Comments

 

             Sodium Lvl (test code = Sodium Lvl) 142          135-145           

        



[facility] ArlingtonXakbnupIFNUQXRPI8980-79-27 15:25:24





             Test Item    Value        Reference Range Interpretation Comments

 

             Potassium Lvl (test code = Potassium 4.4          3.5-5.1          

         



             Lvl)                                                



[facility] ArlingtonKvxenwvCYWXVUZZN9639-12-94 15:25:24





             Test Item    Value        Reference Range Interpretation Comments

 

             Chloride Lvl (test code = Chloride Lvl) 108                  

            



[facility] ArlingtonBdfdpqhEEHMQYNYT4114-25-29 15:25:24





             Test Item    Value        Reference Range Interpretation Comments

 

             CO2 (test code = CO2) 28           24-32                     



[facility] ArlingtonUqeompzQKSYXODQJ4975-05-56 15:25:24





             Test Item    Value        Reference Range Interpretation Comments

 

             Calcium Lvl (test code = Calcium Lvl) 10.0         8.5-10.5        

          



[facility] ArlingtonXotsmciIONOCHVLT1303-81-17 15:25:24





             Test Item    Value        Reference Range Interpretation Comments

 

             AGAP (test code = AGAP) 10.4         10.0-20.0                 



[facility] ArlingtonNwisigbVTPXNWMRG4549-44-70 15:25:24





             Test Item    Value        Reference Range Interpretation Comments

 

             eGFR (test code = eGFR) 81                                     



[facility] ArlingtonWdgpetyPCJEQPCYT9479-58-16 15:25:24





             Test Item    Value        Reference Range Interpretation Comments

 

             Lipase Lvl (test code = Lipase Lvl) 123                      

        



[facility] ArlingtonEaayqsbFIOBJRLSD0389-42-30 15:25:24





             Test Item    Value        Reference Range Interpretation Comments

 

             Total Protein (test code = Total 8.0          6.4-8.4              

     



             Protein)                                            



[facility] ArlingtonZwukyqzWFAMXJDTO5718-54-69 15:25:24





             Test Item    Value        Reference Range Interpretation Comments

 

             Albumin Lvl (test code = Albumin Lvl) 3.7          3.5-5.0         

          



[facility] ArlingtonGyczhayEZSHMSDBS7926-67-26 15:25:24





             Test Item    Value        Reference Range Interpretation Comments

 

             Globulin (test code = Globulin) 4.3          2.7-4.2               

    



[facility] ArlingtonDmsxxdfTGOCAWDUG6541-19-19 15:25:24





             Test Item    Value        Reference Range Interpretation Comments

 

             A/G Ratio (test code = A/G Ratio) 0.9 1        0.7-1.6             

      



[facility] ArlingtonOvjwlmxWQOBSVJPY8431-37-68 15:25:24





             Test Item    Value        Reference Range Interpretation Comments

 

             ALANINE AMINOTRANSFERASE 20           See_Comment                [A

utomated message]



             (test code = ALANINE                                        The sys

tem which



             AMINOTRANSFERASE)                                        generated 

this result



                                                                 transmitted ref

erence



                                                                 range: <=65. Th

e



                                                                 reference range

 was



                                                                 not used to int

erpret



                                                                 this result as



                                                                 normal/abnormal

.



[facility] ArlingtonTorfdykONQUHXUUO3872-57-49 15:25:24





             Test Item    Value        Reference Range Interpretation Comments

 

             AST (test code = AST) 19           See_Comment                [Auto

mated message] The



                                                                 system which ge

nerated this



                                                                 result transmit

gianfranco



                                                                 reference range

: <=37. The



                                                                 reference range

 was not



                                                                 used to interpr

et this



                                                                 result as xenia

l/abnormal.



[facility] ArlingtonDyyqqozTURUYMDQD8474-69-46 15:25:24





             Test Item    Value        Reference Range Interpretation Comments

 

             Alk Phos (test code = Alk Phos) 123                          

    



[facility] ArlingtonIahigmwUSYKAZCTR1952-53-30 15:25:24





             Test Item    Value        Reference Range Interpretation Comments

 

             Bili Total (test code = Bili Total) 0.3          0.2-1.3           

        



[facility] ArlingtonGgzwexbMYZFRVYDG1883-41-14 15:25:24





             Test Item    Value        Reference Range Interpretation Comments

 

             Bili Direct (test code no gt        See_Comment                [Aut

omated message] The



             = Bili Direct)                                        system which 

generated



                                                                 this result tra

nsmitted



                                                                 reference range

: <=0.3.



                                                                 The reference r

jihan was



                                                                 not used to int

erpret this



                                                                 result as xenia

l/abnormal.



[facility] ClubBnfisnrNFXFJDYTU0597-18-40 15:25:24





             Test Item    Value        Reference Range Interpretation Comments

 

             Bili Indirect Unable to    See_Comment                [Automated



             (test code = Bili Calculate                              message] T

he system



             Indirect)                                           which generated



                                                                 this result



                                                                 transmitted



                                                                 reference range

:



                                                                 <=1.0. The



                                                                 reference range

 was



                                                                 not used to



                                                                 interpret this



                                                                 result as



                                                                 normal/abnormal

.



[facility] ClubTckpswfNFCNXMZUPI7559-15-01 15:25:24





             Test Item    Value        Reference Range Interpretation Comments

 

             WBC X 10x3 (test code = WBC X 10x3) 9.4          3.7-10.4          

        



[facility] FriscoVtmwiqwBECNQZSWLX2957-26-34 15:25:24





             Test Item    Value        Reference Range Interpretation Comments

 

             RBC X 10x6 (test code = RBC X 10x6) 4.59         4.20-5.40         

        



[facility] FriscoSgrmzejGEIQRRJRAQ2006-26-60 15:25:24





             Test Item    Value        Reference Range Interpretation Comments

 

             Hgb (test code = Hgb) 14.1         12.0-16.0                 



[facility] FriscoHkeznbmCSONMSRIGA8034-20-06 15:25:24





             Test Item    Value        Reference Range Interpretation Comments

 

             Hct (test code = Hct) 42.8         36.0-48.0                 



[facility] FriscoXjocwkrTJYAXWGBBT5308-84-49 15:25:24





             Test Item    Value        Reference Range Interpretation Comments

 

             MCV (test code = MCV) 93.2         80.0-98.0                 



[facility] FriscoAydjgqdVRORFSBYJU0480-28-98 15:25:24





             Test Item    Value        Reference Range Interpretation Comments

 

             MCH (test code = MCH) 30.8 pg      27.0-31.0                 



[facility] FriscoJqjrjrgQTZECACPWX3103-60-55 15:25:24





             Test Item    Value        Reference Range Interpretation Comments

 

             MCHC (test code = MCHC) 33.0         32.0-36.0                 



[facility] FriscoAearjuiCZTBLYJQOF3833-21-02 15:25:24





             Test Item    Value        Reference Range Interpretation Comments

 

             RDW (test code = RDW) 13.0         11.5-14.5                 



[facility] FriscoNaevgnpWKXOJUDGUF6416-18-39 15:25:24





             Test Item    Value        Reference Range Interpretation Comments

 

             Platelet (test code = Platelet) 200          133-450               

    



[facility] FriscoRyamqrhHVBVKUEZGU8983-91-90 15:25:24





             Test Item    Value        Reference Range Interpretation Comments

 

             MPV (test code = MPV) 8.6          7.4-10.4                  



[place] 506043-02-14 15:25:24





             Test Item    Value        Reference Range Interpretation Comments

 

             Segs (test code = Segs) 86.0         45.0-75.0                 



[place] [id number] 15:25:24





             Test Item    Value        Reference Range Interpretation Comments

 

             Lymphocytes (test code = Lymphocytes) 5.5          20.0-40.0       

          



[place] 506043-02-14 15:25:24





             Test Item    Value        Reference Range Interpretation Comments

 

             Monocytes (test code = Monocytes) 7.0          2.0-12.0            

      



[facility]GxidundIUIVZLBGAI8366[facility]92-87 15:25:24





             Test Item    Value        Reference Range Interpretation Comments

 

             Eosinophils (test code = 1.0          See_Comment                [A

utomated message] The



             Eosinophils)                                        system which ge

nerated



                                                                 this result tra

nsmitted



                                                                 reference range

: <=4.0.



                                                                 The reference r

jihan was



                                                                 not used to int

erpret



                                                                 this result as



                                                                 normal/abnormal

.



[facility]iscoHwbfbwoMYYMNWHOPA8002-50-89 15:25:24





             Test Item    Value        Reference Range Interpretation Comments

 

             Basophils (test code = 0.5          See_Comment                [Aut

omated message] The



             Basophils)                                          system which ge

nerated



                                                                 this result tra

nsmitted



                                                                 reference range

: <=1.0.



                                                                 The reference r

jihan was



                                                                 not used to int

erpret



                                                                 this result as



                                                                 normal/abnormal

.



[facility]iscoAwnxktsAACEOBCJNB7522-57-65 15:25:24





             Test Item    Value        Reference Range Interpretation Comments

 

             Neutrophils # (test code = Neutrophils 8.1          1.5-8.1        

           



             #)                                                  



[facility]iscoKhvfmcqCMBKIWCYSW4421-52-51 15:25:24





             Test Item    Value        Reference Range Interpretation Comments

 

             Lymphocytes # (test code = Lymphocytes 0.5          1.0-5.5        

           



             #)                                                  



[facility] FriscoVagadrdHVHYRVNLXW6478-36-77 15:25:24





             Test Item    Value        Reference Range Interpretation Comments

 

             Monocytes # (test code 0.7          See_Comment                [Aut

omated message] The



             = Monocytes #)                                        system which 

generated



                                                                 this result tra

nsmitted



                                                                 reference range

: <=0.8.



                                                                 The reference r

jihan was



                                                                 not used to int

erpret



                                                                 this result as



                                                                 normal/abnormal

.



[facility]iscoPqafagsUXRJTJWTTU2238-32-85 15:25:24





             Test Item    Value        Reference Range Interpretation Comments

 

             Eosinophils # (test code 0.1          See_Comment                [A

utomated message] The



             = Eosinophils #)                                        system whic

h generated



                                                                 this result tra

nsmitted



                                                                 reference range

: <=0.5.



                                                                 The reference r

jihan was



                                                                 not used to int

erpret



                                                                 this result as



                                                                 normal/abnormal

.



[facility] ClubCgdfrddELXMSXJUH5346-64-82 15:25:24





             Test Item    Value        Reference Range Interpretation Comments

 

             Glucose Lvl (test code = Glucose Lvl) 93           70-99           

          



[facility]XckgvntLIEVXILGZ3287-24-53 15:25:24





             Test Item    Value        Reference Range Interpretation Comments

 

             BUN (test code = BUN) 19           7-22                      



[place] 781843-02-14 15:25:24





             Test Item    Value        Reference Range Interpretation Comments

 

             Creatinine Lvl (test code = Creatinine 0.85         0.50-1.40      

           



             Lvl)                                                



[facility] ArlingtonLuqxwcpGGYXOKHTA8347-29-00 15:25:24





             Test Item    Value        Reference Range Interpretation Comments

 

             Sodium Lvl (test code = Sodium Lvl) 142          135-145           

        



[facility] ArlingtonQlzsqmaKDYONWNWW5070-67-01 15:25:24





             Test Item    Value        Reference Range Interpretation Comments

 

             Potassium Lvl (test code = Potassium 4.4          3.5-5.1          

         



             Lvl)                                                



[facility] ArlingtonRcceqweGPLYFGIIY7787-28-48 15:25:24





             Test Item    Value        Reference Range Interpretation Comments

 

             Chloride Lvl (test code = Chloride Lvl) 108                  

            



[facility] ArlingtonHzucruoKPFEOUZUF4415-68-42 15:25:24





             Test Item    Value        Reference Range Interpretation Comments

 

             CO2 (test code = CO2) 28           24-32                     



[facility] ArlingtonLlwtlrnRSIDEBYNY8913-48-96 15:25:24





             Test Item    Value        Reference Range Interpretation Comments

 

             Calcium Lvl (test code = Calcium Lvl) 10.0         8.5-10.5        

          



[facility] ArlingtonIisanllQCDVWXKKZ2043-06-05 15:25:24





             Test Item    Value        Reference Range Interpretation Comments

 

             AGAP (test code = AGAP) 10.4         10.0-20.0                 



[facility] ArlingtonLbhvipoXUJLHHSUI3886-30-46 15:25:24





             Test Item    Value        Reference Range Interpretation Comments

 

             eGFR (test code = eGFR) 81                                     



[facility] ArlingtonKdethnrKGVVTRHWM4162-85-14 15:25:24





             Test Item    Value        Reference Range Interpretation Comments

 

             Lipase Lvl (test code = Lipase Lvl) 123                      

        



[facility] ArlingtonSybfxliWGVYPUHKJ0031-02-21 15:25:24





             Test Item    Value        Reference Range Interpretation Comments

 

             Total Protein (test code = Total 8.0          6.4-8.4              

     



             Protein)                                            



[facility] ArlingtonGttapudQBIZKRKDW7986-60-17 15:25:24





             Test Item    Value        Reference Range Interpretation Comments

 

             Albumin Lvl (test code = Albumin Lvl) 3.7          3.5-5.0         

          



[facility] ArlingtonTeawbjdCMKJRRHTF6261-14-84 15:25:24





             Test Item    Value        Reference Range Interpretation Comments

 

             Globulin (test code = Globulin) 4.3          2.7-4.2               

    



[facility] ArlingtonFbwwczkEDNVXEYBD5309-41-55 15:25:24





             Test Item    Value        Reference Range Interpretation Comments

 

             A/G Ratio (test code = A/G Ratio) 0.9 1        0.7-1.6             

      



[facility] ArlingtonQwewbsuIKCWXUQRM6812-39-16 15:25:24





             Test Item    Value        Reference Range Interpretation Comments

 

             ALANINE AMINOTRANSFERASE 20           See_Comment                [A

utomated message]



             (test code = ALANINE                                        The sys

tem which



             AMINOTRANSFERASE)                                        generated 

this result



                                                                 transmitted ref

erence



                                                                 range: <=65. Th

e



                                                                 reference range

 was



                                                                 not used to int

erpret



                                                                 this result as



                                                                 normal/abnormal

.



[facility]anoEkqbqfpDWSBYIMRK3193-77-45 [facility]:24





             Test Item    Value        Reference Range Interpretation Comments

 

             AST (test code = AST) 19           See_Comment                [Auto

mated message] The



                                                                 system which ge

nerated this



                                                                 result transmit

gianfranco



                                                                 reference range

: <=37. The



                                                                 reference range

 was not



                                                                 used to interpr

et this



                                                                 result as xenia

l/abnormal.



[facility]anoIxvqxqzEMOELCQEH6523-50-95 15:25:24





             Test Item    Value        Reference Range Interpretation Comments

 

             Alk Phos (test code = Alk Phos) 123                          

    



[facility]anoPvioqowRMGJQVWXY6668-71-22 15:25:24





             Test Item    Value        Reference Range Interpretation Comments

 

             Bili Total (test code = Bili Total) 0.3          0.2-1.3           

        



[facility]anoPadcthjDPYTALFYL9650-56-24 15:25:24





             Test Item    Value        Reference Range Interpretation Comments

 

             Bili Direct (test code no gt        See_Comment                [Aut

omated message] The



             = Bili Direct)                                        system which 

generated



                                                                 this result tra

nsmitted



                                                                 reference range

: <=0.3.



                                                                 The reference r

jihan was



                                                                 not used to int

erpret this



                                                                 result as xenia

l/abnormal.



[facility]anoTyweomuLLVEFANIP2508-38-23 15:25:24





             Test Item    Value        Reference Range Interpretation Comments

 

             Bili Indirect Unable to    See_Comment                [Automated



             (test code = Bili Calculate                              message] T

he system



             Indirect)                                           which generated



                                                                 this result



                                                                 transmitted



                                                                 reference range

:



                                                                 <=1.0. The



                                                                 reference range

 was



                                                                 not used to



                                                                 interpret this



                                                                 result as



                                                                 normal/abnormal

.



[facility]anoTjtrykhIQNMHFOMFX0621-20-98 15:25:24





             Test Item    Value        Reference Range Interpretation Comments

 

             WBC X 10x3 (test code = WBC X 10x3) 9.4          3.7-10.4          

        



[facility]anoAmzabkhXOJQEDAAMP7877-10-84 15:25:24





             Test Item    Value        Reference Range Interpretation Comments

 

             RBC X 10x6 (test code = RBC X 10x6) 4.59         4.20-5.40         

        



[facility]anoRnfsuzmLUXIFOJGEJ5679-18-73 15:25:24





             Test Item    Value        Reference Range Interpretation Comments

 

             Hgb (test code = Hgb) 14.1         12.0-16.0                 



[place] 79001-02-14 15:25:24





             Test Item    Value        Reference Range Interpretation Comments

 

             Hct (test code = Hct) 42.8         36.0-48.0                 



[place] 506043-02-14 15:25:24





             Test Item    Value        Reference Range Interpretation Comments

 

             MCV (test code = MCV) 93.2         80.0-98.0                 



[place] 506043-02-14 15:25:24





             Test Item    Value        Reference Range Interpretation Comments

 

             MCH (test code = MCH) 30.8 pg      27.0-31.0                 



[place] 506043-02-14 15:25:24





             Test Item    Value        Reference Range Interpretation Comments

 

             MCHC (test code = MCHC) 33.0         32.0-36.0                 



[place] 506043-02-14 15:25:24





             Test Item    Value        Reference Range Interpretation Comments

 

             RDW (test code = RDW) 13.0         11.5-14.5                 



[place] 506043-02-14 15:25:24





             Test Item    Value        Reference Range Interpretation Comments

 

             Platelet (test code = Platelet) 200          133-450               

    



[facility]iscoKwgntiqPZTUZRHHXN4091-14-98 15:25:24





             Test Item    Value        Reference Range Interpretation Comments

 

             MPV (test code = MPV) 8.6          7.4-10.4                  



[place] 506043-02-14 15:25:24





             Test Item    Value        Reference Range Interpretation Comments

 

             Segs (test code = Segs) 86.0         45.0-75.0                 



[place] 506043-02-14 15:25:24





             Test Item    Value        Reference Range Interpretation Comments

 

             Lymphocytes (test code = Lymphocytes) 5.5          20.0-40.0       

          



[place] [address]-02-14 15:25:24





             Test Item    Value        Reference Range Interpretation Comments

 

             Monocytes (test code = Monocytes) 7.0          2.0-12.0            

      



[place] [address]-02-14 15:25:24





             Test Item    Value        Reference Range Interpretation Comments

 

             Eosinophils (test code = 1.0          See_Comment                [A

utomated message] The



             Eosinophils)                                        system which ge

nerated



                                                                 this result tra

nsmitted



                                                                 reference range

: <=4.0.



                                                                 The reference r

jihan was



                                                                 not used to int

erpret



                                                                 this result as



                                                                 normal/abnormal

.



[facility]anoMjynnmtDSSYUSUNNJ0604-11-13 15:25:24





             Test Item    Value        Reference Range Interpretation Comments

 

             Basophils (test code = 0.5          See_Comment                [Aut

omated message] The



             Basophils)                                          system which ge

nerated



                                                                 this result tra

nsmitted



                                                                 reference range

: <=1.0.



                                                                 The reference r

jihan was



                                                                 not used to int

erpret



                                                                 this result as



                                                                 normal/abnormal

.



[facility] ClubVjkqsuvMVKVEVOQWC7560-09-96 15:25:24





             Test Item    Value        Reference Range Interpretation Comments

 

             Neutrophils # (test code = Neutrophils 8.1          1.5-8.1        

           



             #)                                                  



[facility] FriscoYjuymmpEEOPCZNBVL3996-96-12 15:25:24





             Test Item    Value        Reference Range Interpretation Comments

 

             Lymphocytes # (test code = Lymphocytes 0.5          1.0-5.5        

           



             #)                                                  



[facility] FriscoCgahroiNMOURUCRYG0734-80-28 15:25:24





             Test Item    Value        Reference Range Interpretation Comments

 

             Monocytes # (test code 0.7          See_Comment                [Aut

omated message] The



             = Monocytes #)                                        system which 

generated



                                                                 this result tra

nsmitted



                                                                 reference range

: <=0.8.



                                                                 The reference r

jihan was



                                                                 not used to int

erpret



                                                                 this result as



                                                                 normal/abnormal

.



[facility] ClubJopviivRPWDRVECGR0975-64-18 15:25:24





             Test Item    Value        Reference Range Interpretation Comments

 

             Eosinophils # (test code 0.1          See_Comment                [A

utomated message] The



             = Eosinophils #)                                        system whic

h generated



                                                                 this result tra

nsmitted



                                                                 reference range

: <=0.5.



                                                                 The reference r

jihan was



                                                                 not used to int

erpret



                                                                 this result as



                                                                 normal/abnormal

.



[facility] ClubNzpbbkoPWIDTPJCN3369-39-58 15:25:24





             Test Item    Value        Reference Range Interpretation Comments

 

             Glucose Lvl (test code = Glucose Lvl) 93           70-99           

          



[facility] ArlingtonIepvlsyVIIYRRVLY5852-35-73 15:25:24





             Test Item    Value        Reference Range Interpretation Comments

 

             BUN (test code = BUN) 19           7-22                      



[facility] ArlingtonPnelkdbCECFVTVDL3707-87-41 15:25:24





             Test Item    Value        Reference Range Interpretation Comments

 

             Creatinine Lvl (test code = Creatinine 0.85         0.50-1.40      

           



             Lvl)                                                



[facility] ArlingtonTbzztjnFSXERIWQT2240-60-77 15:25:24





             Test Item    Value        Reference Range Interpretation Comments

 

             Sodium Lvl (test code = Sodium Lvl) 142          135-145           

        



[facility] ArlingtonYjgtefsZBDDJSMNJ7798-08-61 15:25:24





             Test Item    Value        Reference Range Interpretation Comments

 

             Potassium Lvl (test code = Potassium 4.4          3.5-5.1          

         



             Lvl)                                                



[facility] ArlingtonMqfvhjlEFGIDXYYZ3654-03-38 15:25:24





             Test Item    Value        Reference Range Interpretation Comments

 

             Chloride Lvl (test code = Chloride Lvl) 108                  

            



[facility] ArlingtonYsymowcRPABQVCCE6330-95-16 15:25:24





             Test Item    Value        Reference Range Interpretation Comments

 

             CO2 (test code = CO2) 28           24-32                     



[facility] ArlingtonLgauohaSOLXIHFRG4508-21-13 15:25:24





             Test Item    Value        Reference Range Interpretation Comments

 

             Calcium Lvl (test code = Calcium Lvl) 10.0         8.5-10.5        

          



[facility] ArlingtonUclbljwDQKUKNEXQ3211-27-22 15:25:24





             Test Item    Value        Reference Range Interpretation Comments

 

             AGAP (test code = AGAP) 10.4         10.0-20.0                 



[facility] ArlingtonVnrjmxoMWKFHIXFA3245-39-71 15:25:24





             Test Item    Value        Reference Range Interpretation Comments

 

             eGFR (test code = eGFR) 81                                     



[facility] ArlingtonTjhqlwhEHYZOHZAE5809-60-35 15:25:24





             Test Item    Value        Reference Range Interpretation Comments

 

             Lipase Lvl (test code = Lipase Lvl) 123                      

        



[facility] ArlingtonXbwjohmDHXIQIOTJ4091-17-24 15:25:24





             Test Item    Value        Reference Range Interpretation Comments

 

             Total Protein (test code = Total 8.0          6.4-8.4              

     



             Protein)                                            



[facility] ArlingtonUvjyctnYNNJYTSQP4462-90-60 15:25:24





             Test Item    Value        Reference Range Interpretation Comments

 

             Albumin Lvl (test code = Albumin Lvl) 3.7          3.5-5.0         

          



[facility] ArlingtonBpswndtYJYGDGEJF4793-27-08 15:25:24





             Test Item    Value        Reference Range Interpretation Comments

 

             Globulin (test code = Globulin) 4.3          2.7-4.2               

    



[facility] ArlingtonPwapwlzHIRFNYLWY8767-26-46 15:25:24





             Test Item    Value        Reference Range Interpretation Comments

 

             A/G Ratio (test code = A/G Ratio) 0.9 1        0.7-1.6             

      



[facility] ArlingtonHompuqfIDDYROEOY1377-59-89 15:25:24





             Test Item    Value        Reference Range Interpretation Comments

 

             ALANINE AMINOTRANSFERASE 20           See_Comment                [A

utomated message]



             (test code = ALANINE                                        The sys

tem which



             AMINOTRANSFERASE)                                        generated 

this result



                                                                 transmitted ref

erence



                                                                 range: <=65. Th

e



                                                                 reference range

 was



                                                                 not used to int

erpret



                                                                 this result as



                                                                 normal/abnormal

.



[facility]TqjvlsoVVDOYGTRS1565[facility]93 [facility]25:24





             Test Item    Value        Reference Range Interpretation Comments

 

             AST (test code = AST) 19           See_Comment                [Auto

mated message] The



                                                                 system which ge

nerated this



                                                                 result transmit

gianfranco



                                                                 reference range

: <=37. The



                                                                 reference range

 was not



                                                                 used to interpr

et this



                                                                 result as xenia

l/abnormal.



[facility]anoIclkydfREWJJRBLB6375-69-09 15:25:24





             Test Item    Value        Reference Range Interpretation Comments

 

             Alk Phos (test code = Alk Phos) 123                          

    



[facility] ClubHufwdpnVLYSOWPJJ2180-33-80 15:25:24





             Test Item    Value        Reference Range Interpretation Comments

 

             Bili Total (test code = Bili Total) 0.3          0.2-1.3           

        



[facility]jl[facility]YEPCSNMXL9710[facility]24





             Test Item    Value        Reference Range Interpretation Comments

 

             Bili Direct (test code no gt        See_Comment                [Aut

omated message] The



             = Bili Direct)                                        system which 

generated



                                                                 this result tra

nsmitted



                                                                 reference range

: <=0.3.



                                                                 The reference r

jihan was



                                                                 not used to int

erpret this



                                                                 result as xenia

l/abnormal.



[facility] ClubYuglxmkXAMMCXMJI9432-86-45 15:25:24





             Test Item    Value        Reference Range Interpretation Comments

 

             Bili Indirect Unable to    See_Comment                [Automated



             (test code = Bili Calculate                              message] T

he system



             Indirect)                                           which generated



                                                                 this result



                                                                 transmitted



                                                                 reference range

:



                                                                 <=1.0. The



                                                                 reference range

 was



                                                                 not used to



                                                                 interpret this



                                                                 result as



                                                                 normal/abnormal

.



[facility] ClubSyewsgpGJZPSEQNUT9850-62-05 15:25:24





             Test Item    Value        Reference Range Interpretation Comments

 

             WBC X 10x3 (test code = WBC X 10x3) 9.4          3.7-10.4          

        



[facility] ClubArcqqmwMDNUXEJHBE2396-15-80 15:25:24





             Test Item    Value        Reference Range Interpretation Comments

 

             RBC X 10x6 (test code = RBC X 10x6) 4.59         4.20-5.40         

        



[facility] ClubYcufgefNCTQDTMSGM2392-04-91 15:25:24





             Test Item    Value        Reference Range Interpretation Comments

 

             Hgb (test code = Hgb) 14.1         12.0-16.0                 



[facility]anoZdvyyciUAQSMBFTRI2884-78-81 15:25:24





             Test Item    Value        Reference Range Interpretation Comments

 

             Hct (test code = Hct) 42.8         36.0-48.0                 



[place] [address]-02-14 15:25:24





             Test Item    Value        Reference Range Interpretation Comments

 

             MCV (test code = MCV) 93.2         80.0-98.0                 



[place] [address]-02-14 15:25:24





             Test Item    Value        Reference Range Interpretation Comments

 

             MCH (test code = MCH) 30.8 pg      27.0-31.0                 



[place] [address]-02-14 15:25:24





             Test Item    Value        Reference Range Interpretation Comments

 

             MCHC (test code = MCHC) 33.0         32.0-36.0                 



[place] [address]-02-14 15:25:24





             Test Item    Value        Reference Range Interpretation Comments

 

             RDW (test code = RDW) 13.0         11.5-14.5                 



[place] [address]-02-14 15:25:24





             Test Item    Value        Reference Range Interpretation Comments

 

             Platelet (test code = Platelet) 200          133-450               

    



[place] 506043-02-14 15:25:24





             Test Item    Value        Reference Range Interpretation Comments

 

             MPV (test code = MPV) 8.6          7.4-10.4                  



[place] [address]-02-14 15:25:24





             Test Item    Value        Reference Range Interpretation Comments

 

             Segs (test code = Segs) 86.0         45.0-75.0                 



[place] [address]-02-14 15:25:24





             Test Item    Value        Reference Range Interpretation Comments

 

             Lymphocytes (test code = Lymphocytes) 5.5          20.0-40.0       

          



[place] [address]-02-14 15:25:24





             Test Item    Value        Reference Range Interpretation Comments

 

             Monocytes (test code = Monocytes) 7.0          2.0-12.0            

      



[place] [address]-02-14 15:25:24





             Test Item    Value        Reference Range Interpretation Comments

 

             Eosinophils (test code = 1.0          See_Comment                [A

utomated message] The



             Eosinophils)                                        system which ge

nerated



                                                                 this result tra

nsmitted



                                                                 reference range

: <=4.0.



                                                                 The reference r

jihan was



                                                                 not used to int

erpret



                                                                 this result as



                                                                 normal/abnormal

.



[place] [address]-02-14 15:25:24





             Test Item    Value        Reference Range Interpretation Comments

 

             Basophils (test code = 0.5          See_Comment                [Aut

omated message] The



             Basophils)                                          system which ge

nerated



                                                                 this result tra

nsmitted



                                                                 reference range

: <=1.0.



                                                                 The reference r

jihan was



                                                                 not used to int

erpret



                                                                 this result as



                                                                 normal/abnormal

.



[facility]iscoSdhwfgeVQRFOKMDUJ7295-75-01 15:25:24





             Test Item    Value        Reference Range Interpretation Comments

 

             Neutrophils # (test code = Neutrophils 8.1          1.5-8.1        

           



             #)                                                  



[facility]iscoKjommkjREFAQYDMXP4806-93-76 15:25:24





             Test Item    Value        Reference Range Interpretation Comments

 

             Lymphocytes # (test code = Lymphocytes 0.5          1.0-5.5        

           



             #)                                                  



[facility] FriscoNhewvxwVQYEFMQVVF6553-91-35 15:25:24





             Test Item    Value        Reference Range Interpretation Comments

 

             Monocytes # (test code 0.7          See_Comment                [Aut

omated message] The



             = Monocytes #)                                        system which 

generated



                                                                 this result tra

nsmitted



                                                                 reference range

: <=0.8.



                                                                 The reference r

jihan was



                                                                 not used to int

erpret



                                                                 this result as



                                                                 normal/abnormal

.



[facility] FriscoBwmimeqRAZFDSVHTI2769-58-30 15:25:24





             Test Item    Value        Reference Range Interpretation Comments

 

             Eosinophils # (test code 0.1          See_Comment                [A

utomated message] The



             = Eosinophils #)                                        system whic

h generated



                                                                 this result tra

nsmitted



                                                                 reference range

: <=0.5.



                                                                 The reference r

jihan was



                                                                 not used to int

erpret



                                                                 this result as



                                                                 normal/abnormal

.



[facility]MfhjvxrHCABRJNVY4774-39-20 15:25:24





             Test Item    Value        Reference Range Interpretation Comments

 

             Glucose Lvl (test code = Glucose Lvl) 93           70-99           

          



[facility] ArlingtonYworodwYWKAFBANT0404-19-38 15:25:24





             Test Item    Value        Reference Range Interpretation Comments

 

             BUN (test code = BUN) 19           7-22                      



[place] [address]-02-14 15:25:24





             Test Item    Value        Reference Range Interpretation Comments

 

             Creatinine Lvl (test code = Creatinine 0.85         0.50-1.40      

           



             Lvl)                                                



[place] [address]43-02-14 15:25:24





             Test Item    Value        Reference Range Interpretation Comments

 

             Sodium Lvl (test code = Sodium Lvl) 142          135-145           

        



[facility] ArlingtonQllacsbRHZSNDIGW3726-26-85 15:25:24





             Test Item    Value        Reference Range Interpretation Comments

 

             Potassium Lvl (test code = Potassium 4.4          3.5-5.1          

         



             Lvl)                                                



[place] [address]1843-02-14 15:25:24





             Test Item    Value        Reference Range Interpretation Comments

 

             Chloride Lvl (test code = Chloride Lvl) 108                  

            



[facility] ArlingtonAoormpjWONMDOEJJ7479-10-22 15:25:24





             Test Item    Value        Reference Range Interpretation Comments

 

             CO2 (test code = CO2) 28           24-32                     



[facility] ArlingtonXgemnvsYDHFSUMYE7781-58-48 15:25:24





             Test Item    Value        Reference Range Interpretation Comments

 

             Calcium Lvl (test code = Calcium Lvl) 10.0         8.5-10.5        

          



[facility] ArlingtonRqiyjpcGPCDMBPIZ4817-72-12 15:25:24





             Test Item    Value        Reference Range Interpretation Comments

 

             AGAP (test code = AGAP) 10.4         10.0-20.0                 



[facility] ArlingtonUthdgqfDYZAVVFDA5214-31-95 15:25:24





             Test Item    Value        Reference Range Interpretation Comments

 

             eGFR (test code = eGFR) 81                                     



[facility] ArlingtonNopnkpyKQWMHRFCH9830-44-81 15:25:24





             Test Item    Value        Reference Range Interpretation Comments

 

             Lipase Lvl (test code = Lipase Lvl) 123                      

        



[facility] ArlingtonVqqnrxdQGXOONCPE9870-90-07 15:25:24





             Test Item    Value        Reference Range Interpretation Comments

 

             Total Protein (test code = Total 8.0          6.4-8.4              

     



             Protein)                                            



[facility] ArlingtonJfpeafgDVMEGKHHR1370-81-45 15:25:24





             Test Item    Value        Reference Range Interpretation Comments

 

             Albumin Lvl (test code = Albumin Lvl) 3.7          3.5-5.0         

          



[facility] ArlingtonSrffwnkXKHANXSRD0686-62-66 15:25:24





             Test Item    Value        Reference Range Interpretation Comments

 

             Globulin (test code = Globulin) 4.3          2.7-4.2               

    



[facility] ArlingtonRysrbbxOTPAPNQRT6384-48-44 15:25:24





             Test Item    Value        Reference Range Interpretation Comments

 

             A/G Ratio (test code = A/G Ratio) 0.9 1        0.7-1.6             

      



[facility] ArlingtonMefzpipBWDRCFFYC7700-99-96 15:25:24





             Test Item    Value        Reference Range Interpretation Comments

 

             ALANINE AMINOTRANSFERASE 20           See_Comment                [A

utomated message]



             (test code = ALANINE                                        The sys

tem which



             AMINOTRANSFERASE)                                        generated 

this result



                                                                 transmitted ref

erence



                                                                 range: <=65. Th

e



                                                                 reference range

 was



                                                                 not used to int

erpret



                                                                 this result as



                                                                 normal/abnormal

.



[facility] ArlingtonZdhpathADSRUUXGV7549-45-16 15:25:24





             Test Item    Value        Reference Range Interpretation Comments

 

             AST (test code = AST) 19           See_Comment                [Auto

mated message] The



                                                                 system which ge

nerated this



                                                                 result transmit

gianfranco



                                                                 reference range

: <=37. The



                                                                 reference range

 was not



                                                                 used to interpr

et this



                                                                 result as xenia

l/abnormal.



[facility] ArlingtonJhshqyoGUCGOKWCD1499-13-48 15:25:24





             Test Item    Value        Reference Range Interpretation Comments

 

             Alk Phos (test code = Alk Phos) 123                          

    



[facility] ArlingtonSfueiuvNQKAKHBND5752-19-31 15:25:24





             Test Item    Value        Reference Range Interpretation Comments

 

             Bili Total (test code = Bili Total) 0.3          0.2-1.3           

        



[place] [address]-14 15:25:24





             Test Item    Value        Reference Range Interpretation Comments

 

             Bili Direct (test code no gt        See_Comment                [Aut

omated message] The



             = Bili Direct)                                        system which 

generated



                                                                 this result tra

nsmitted



                                                                 reference range

: <=0.3.



                                                                 The reference r

jihan was



                                                                 not used to int

erpret this



                                                                 result as xenia

l/abnormal.



[facility] ArlingtonWqeluvkNYTQRAUCL7538-95-77 15:25:24





             Test Item    Value        Reference Range Interpretation Comments

 

             Bili Indirect Unable to    See_Comment                [Automated



             (test code = Bili Calculate                              message] T

he system



             Indirect)                                           which generated



                                                                 this result



                                                                 transmitted



                                                                 reference range

:



                                                                 <=1.0. The



                                                                 reference range

 was



                                                                 not used to



                                                                 interpret this



                                                                 result as



                                                                 normal/abnormal

.



[facility] ClubTcwcuvvUMRCBIBLBV7126-00-32 15:25:24





             Test Item    Value        Reference Range Interpretation Comments

 

             WBC X 10x3 (test code = WBC X 10x3) 9.4          3.7-10.4          

        



[facility] FriscoTkayspaGWZELFECUK9124-98-71 15:25:24





             Test Item    Value        Reference Range Interpretation Comments

 

             RBC X 10x6 (test code = RBC X 10x6) 4.59         4.20-5.40         

        



[place] [address]43-02-14 15:25:24





             Test Item    Value        Reference Range Interpretation Comments

 

             Hgb (test code = Hgb) 14.1         12.0-16.0                 



[place] [address]-14 15:25:24





             Test Item    Value        Reference Range Interpretation Comments

 

             Hct (test code = Hct) 42.8         36.0-48.0                 



[place] [address]-14 15:25:24





             Test Item    Value        Reference Range Interpretation Comments

 

             MCV (test code = MCV) 93.2         80.0-98.0                 



[place] [address]-14 15:25:24





             Test Item    Value        Reference Range Interpretation Comments

 

             MCH (test code = MCH) 30.8 pg      27.0-31.0                 



[facility] FriscoKgijrowNINZRLIHLF2592-27-24 15:25:24





             Test Item    Value        Reference Range Interpretation Comments

 

             MCHC (test code = MCHC) 33.0         32.0-36.0                 



[facility] FriscoMrcapawBWIRFXFATR8741-06-36 15:25:24





             Test Item    Value        Reference Range Interpretation Comments

 

             RDW (test code = RDW) 13.0         11.5-14.5                 



[facility] FriscoTtliyhnSMDQZJTWLD4656-44-35 15:25:24





             Test Item    Value        Reference Range Interpretation Comments

 

             Platelet (test code = Platelet) 200          133-450               

    



[facility] FriscoCpeufyjHTLTMJZWMD0206-64-88 15:25:24





             Test Item    Value        Reference Range Interpretation Comments

 

             MPV (test code = MPV) 8.6          7.4-10.4                  



[facility] FriscoOrttrafCWOHTXTEAC1696-67-09 15:25:24





             Test Item    Value        Reference Range Interpretation Comments

 

             Segs (test code = Segs) 86.0         45.0-75.0                 



[facility] FriscoFaikqgcFYZVAVLUFD5847-97-07 15:25:24





             Test Item    Value        Reference Range Interpretation Comments

 

             Lymphocytes (test code = Lymphocytes) 5.5          20.0-40.0       

          



[facility] FriscoIsouucaSKYVHRTTYX3678-60-11 15:25:24





             Test Item    Value        Reference Range Interpretation Comments

 

             Monocytes (test code = Monocytes) 7.0          2.0-12.0            

      



[facility] FriscoYatwyzdGFDWLAJDIW8562-31-09 15:25:24





             Test Item    Value        Reference Range Interpretation Comments

 

             Eosinophils (test code = 1.0          See_Comment                [A

utomated message] The



             Eosinophils)                                        system which ge

nerated



                                                                 this result tra

nsmitted



                                                                 reference range

: <=4.0.



                                                                 The reference r

jihan was



                                                                 not used to int

erpret



                                                                 this result as



                                                                 normal/abnormal

.



[facility] FriscoEwinfkgTBCAJSHDWU4314-62-09 15:25:24





             Test Item    Value        Reference Range Interpretation Comments

 

             Basophils (test code = 0.5          See_Comment                [Aut

omated message] The



             Basophils)                                          system which ge

nerated



                                                                 this result tra

nsmitted



                                                                 reference range

: <=1.0.



                                                                 The reference r

jihan was



                                                                 not used to int

erpret



                                                                 this result as



                                                                 normal/abnormal

.



[facility] FriscoQqyimzyWXYPFDHKMG9723-99-51 15:25:24





             Test Item    Value        Reference Range Interpretation Comments

 

             Neutrophils # (test code = Neutrophils 8.1          1.5-8.1        

           



             #)                                                  



[facility] FriscoOvvybccUQXJOLOZPK9634-86-56 15:25:24





             Test Item    Value        Reference Range Interpretation Comments

 

             Lymphocytes # (test code = Lymphocytes 0.5          1.0-5.5        

           



             #)                                                  



[facility] FriscoVghkagnOUFHAXGXIB8320-84-29 15:25:24





             Test Item    Value        Reference Range Interpretation Comments

 

             Monocytes # (test code 0.7          See_Comment                [Aut

omated message] The



             = Monocytes #)                                        system which 

generated



                                                                 this result tra

nsmitted



                                                                 reference range

: <=0.8.



                                                                 The reference r

jihan was



                                                                 not used to int

erpret



                                                                 this result as



                                                                 normal/abnormal

.



[facility]kwhcqxIFVPFVTOOF7589[facility] 15:25:24





             Test Item    Value        Reference Range Interpretation Comments

 

             Eosinophils # (test code 0.1          See_Comment                [A

utomated message] The



             = Eosinophils #)                                        system whic

h generated



                                                                 this result tra

nsmitted



                                                                 reference range

: <=0.5.



                                                                 The reference r

jihan was



                                                                 not used to int

erpret



                                                                 this result as



                                                                 normal/abnormal

.



[facility]CD[facility]WTNKO3336[facility]:24





             Test Item    Value        Reference Range Interpretation Comments

 

             Glucose Lvl (test code = Glucose Lvl) 93           70-99           

          



[facility] ArlingtonAqpkyxxUMGXERQBT3952-62-69 15:25:24





             Test Item    Value        Reference Range Interpretation Comments

 

             BUN (test code = BUN) 19           7-22                      



[facility] ArlingtonZmpwsdfHMHETZBOK1662-05-62 15:25:24





             Test Item    Value        Reference Range Interpretation Comments

 

             Creatinine Lvl (test code = Creatinine 0.85         0.50-1.40      

           



             Lvl)                                                



[facility] ArlingtonAzxjhzcACPDBINPU8773-68-69 15:25:24





             Test Item    Value        Reference Range Interpretation Comments

 

             Sodium Lvl (test code = Sodium Lvl) 142          135-145           

        



[facility] ArlingtonMawkudxMHEHFAQSV2221-59-42 15:25:24





             Test Item    Value        Reference Range Interpretation Comments

 

             Potassium Lvl (test code = Potassium 4.4          3.5-5.1          

         



             Lvl)                                                



[facility] ArlingtonBwinxuyZYMYIZLCD4860-39-78 15:25:24





             Test Item    Value        Reference Range Interpretation Comments

 

             Chloride Lvl (test code = Chloride Lvl) 108                  

            



[place] 781843-02-14 15:25:24





             Test Item    Value        Reference Range Interpretation Comments

 

             CO2 (test code = CO2) 28           24-32                     



[facility] ArlingtonJvyczreKDJZRZKVD8062-59-95 15:25:24





             Test Item    Value        Reference Range Interpretation Comments

 

             Calcium Lvl (test code = Calcium Lvl) 10.0         8.5-10.5        

          



[facility] ArlingtonLcfumooNSFVZAMNG7443-65-15 15:25:24





             Test Item    Value        Reference Range Interpretation Comments

 

             AGAP (test code = AGAP) 10.4         10.0-20.0                 



[facility] ArlingtonOdiqpzsMNTTLWAPL8560-70-06 15:25:24





             Test Item    Value        Reference Range Interpretation Comments

 

             eGFR (test code = eGFR) 81                                     



[facility] ArlingtonLgchyziLPLMRDUZM8230-22-98 15:25:24





             Test Item    Value        Reference Range Interpretation Comments

 

             Lipase Lvl (test code = Lipase Lvl) 123                      

        



[facility] ArlingtonHytzsmvXCRFZGLOH2324-67-31 15:25:24





             Test Item    Value        Reference Range Interpretation Comments

 

             Total Protein (test code = Total 8.0          6.4-8.4              

     



             Protein)                                            



[facility] ArlingtonGfullnpINGILPPNL6178-75-11 15:25:24





             Test Item    Value        Reference Range Interpretation Comments

 

             Albumin Lvl (test code = Albumin Lvl) 3.7          3.5-5.0         

          



[facility] ArlingtonPyclcqpQXVZEPWAD5207-42-92 15:25:24





             Test Item    Value        Reference Range Interpretation Comments

 

             Globulin (test code = Globulin) 4.3          2.7-4.2               

    



[facility] ArlingtonWrqqygmSLENURAQV9496-38-77 15:25:24





             Test Item    Value        Reference Range Interpretation Comments

 

             A/G Ratio (test code = A/G Ratio) 0.9 1        0.7-1.6             

      



[facility] ArlingtonNypwhusUGFAHHJNX4659-44-32 15:25:24





             Test Item    Value        Reference Range Interpretation Comments

 

             ALANINE AMINOTRANSFERASE 20           See_Comment                [A

utomated message]



             (test code = ALANINE                                        The sys

tem which



             AMINOTRANSFERASE)                                        generated 

this result



                                                                 transmitted ref

erence



                                                                 range: <=65. Th

e



                                                                 reference range

 was



                                                                 not used to int

erpret



                                                                 this result as



                                                                 normal/abnormal

.



[facility] ArlingtonBbkgtceNDUCFIEYL3854-06-99 15:25:24





             Test Item    Value        Reference Range Interpretation Comments

 

             AST (test code = AST) 19           See_Comment                [Auto

mated message] The



                                                                 system which ge

nerated this



                                                                 result transmit

gianfranco



                                                                 reference range

: <=37. The



                                                                 reference range

 was not



                                                                 used to interpr

et this



                                                                 result as xenia

l/abnormal.



[facility] ArlingtonNfxsiatZRGIYCZZP0925-23-60 15:25:24





             Test Item    Value        Reference Range Interpretation Comments

 

             Alk Phos (test code = Alk Phos) 123                          

    



[facility] ArlingtonIoevrbnHQKWQKQCG0654-04-46 15:25:24





             Test Item    Value        Reference Range Interpretation Comments

 

             Bili Total (test code = Bili Total) 0.3          0.2-1.3           

        



[place] 781843-02-14 15:25:24





             Test Item    Value        Reference Range Interpretation Comments

 

             Bili Direct (test code no gt        See_Comment                [Aut

omated message] The



             = Bili Direct)                                        system which 

generated



                                                                 this result tra

nsmitted



                                                                 reference range

: <=0.3.



                                                                 The reference r

jihan was



                                                                 not used to int

erpret this



                                                                 result as xenia

l/abnormal.



[facility] ArlingtonAqzpqouMKZNUMHGU0607-92-96 15:25:24





             Test Item    Value        Reference Range Interpretation Comments

 

             Bili Indirect Unable to    See_Comment                [Automated



             (test code = Bili Calculate                              message] T

he system



             Indirect)                                           which generated



                                                                 this result



                                                                 transmitted



                                                                 reference range

:



                                                                 <=1.0. The



                                                                 reference range

 was



                                                                 not used to



                                                                 interpret this



                                                                 result as



                                                                 normal/abnormal

.



[facility]iscoQdpqrvfFKEHJSZGQX9323-22-41 15:25:24





             Test Item    Value        Reference Range Interpretation Comments

 

             WBC X 10x3 (test code = WBC X 10x3) 9.4          3.7-10.4          

        



[place] [address]-02-14 15:25:24





             Test Item    Value        Reference Range Interpretation Comments

 

             RBC X 10x6 (test code = RBC X 10x6) 4.59         4.20-5.40         

        



[place] 506043-02-14 15:25:24





             Test Item    Value        Reference Range Interpretation Comments

 

             Hgb (test code = Hgb) 14.1         12.0-16.0                 



[facility] FriscoRffbxvgCKRIPNFIBR9940-02-79 15:25:24





             Test Item    Value        Reference Range Interpretation Comments

 

             Hct (test code = Hct) 42.8         36.0-48.0                 



[place] [address]-02-14 15:25:24





             Test Item    Value        Reference Range Interpretation Comments

 

             MCV (test code = MCV) 93.2         80.0-98.0                 



[place] 506043-02-14 15:25:24





             Test Item    Value        Reference Range Interpretation Comments

 

             MCH (test code = MCH) 30.8 pg      27.0-31.0                 



[place] 506043-02-14 15:25:24





             Test Item    Value        Reference Range Interpretation Comments

 

             MCHC (test code = MCHC) 33.0         32.0-36.0                 



[place] 506043-02-14 15:25:24





             Test Item    Value        Reference Range Interpretation Comments

 

             RDW (test code = RDW) 13.0         11.5-14.5                 



[place] [address]-02-14 15:25:24





             Test Item    Value        Reference Range Interpretation Comments

 

             Platelet (test code = Platelet) 200          133-450               

    



[place] 506043-02-14 15:25:24





             Test Item    Value        Reference Range Interpretation Comments

 

             MPV (test code = MPV) 8.6          7.4-10.4                  



[place] [address]-02-14 15:25:24





             Test Item    Value        Reference Range Interpretation Comments

 

             Segs (test code = Segs) 86.0         45.0-75.0                 



[place] [address]-02-14 15:25:24





             Test Item    Value        Reference Range Interpretation Comments

 

             Lymphocytes (test code = Lymphocytes) 5.5          20.0-40.0       

          



[place] 506043-02-14 15:25:24





             Test Item    Value        Reference Range Interpretation Comments

 

             Monocytes (test code = Monocytes) 7.0          2.0-12.0            

      



[facility]iscoTfftnhkPUPTKHZUYO6825-28-26 15:25:24





             Test Item    Value        Reference Range Interpretation Comments

 

             Eosinophils (test code = 1.0          See_Comment                [A

utomated message] The



             Eosinophils)                                        system which ge

nerated



                                                                 this result tra

nsmitted



                                                                 reference range

: <=4.0.



                                                                 The reference r

jihan was



                                                                 not used to int

erpret



                                                                 this result as



                                                                 normal/abnormal

.



[facility]iscoCxwlugjBLRCGWMAMH0807-55-97 15:25:24





             Test Item    Value        Reference Range Interpretation Comments

 

             Basophils (test code = 0.5          See_Comment                [Aut

omated message] The



             Basophils)                                          system which ge

nerated



                                                                 this result tra

nsmitted



                                                                 reference range

: <=1.0.



                                                                 The reference r

jihan was



                                                                 not used to int

erpret



                                                                 this result as



                                                                 normal/abnormal

.



[place] [address]-02-14 15:25:24





             Test Item    Value        Reference Range Interpretation Comments

 

             Neutrophils # (test code = Neutrophils 8.1          1.5-8.1        

           



             #)                                                  



[place] [address]-02-14 15:25:24





             Test Item    Value        Reference Range Interpretation Comments

 

             Lymphocytes # (test code = Lymphocytes 0.5          1.0-5.5        

           



             #)                                                  



[facility]iscoIyhyjxiQIXKUSTLXJ8628-48-54 15:25:24





             Test Item    Value        Reference Range Interpretation Comments

 

             Monocytes # (test code 0.7          See_Comment                [Aut

omated message] The



             = Monocytes #)                                        system which 

generated



                                                                 this result tra

nsmitted



                                                                 reference range

: <=0.8.



                                                                 The reference r

jihan was



                                                                 not used to int

erpret



                                                                 this result as



                                                                 normal/abnormal

.



[facility] FriscoRrkkmwdWJQTDMZIUO1552-82-23 15:25:24





             Test Item    Value        Reference Range Interpretation Comments

 

             Eosinophils # (test code 0.1          See_Comment                [A

utomated message] The



             = Eosinophils #)                                        system whic

h generated



                                                                 this result tra

nsmitted



                                                                 reference range

: <=0.5.



                                                                 The reference r

jihan was



                                                                 not used to int

erpret



                                                                 this result as



                                                                 normal/abnormal

.



[facility] METABOLIC PANEL ([id number] 19:59:50





             Test Item    Value        Reference Range Interpretation Comments

 

             NA (test code = 138 mmol/L   135-145                   



             [id number])                                         

 

             K (test code = 4.3 mmol/L   3.5-5.0                   



             3102134791)                                         

 

             CL (test code = 102 mmol/L                       



             7473827381)                                         

 

             CO2 TOTAL (test code = 23 mmol/L    23-31                     



             [id number])                                         

 

             AGAP (test code =              2-16                      



             [id number])                                         

 

             BUN (test code = 18 mg/dL     7-23                      



             8822245001)                                         

 

             GLUCOSE (test code = 110 mg/dL                        



             7152130635)                                         

 

             CREATININE (test code = 0.70 mg/dL   0.50-1.04                 



             [id number])                                         

 

             TOTAL BILI (test code = 1.3 mg/dL    0.1-1.1      H            



             4361746101)                                         

 

             CALCIUM (test code = 9.5 mg/dL    8.6-10.6                  



             8218891032)                                         

 

             T PROTEIN (test code = 7.4 g/dL     6.3-8.2                   



             6156122145)                                         

 

             ALBUMIN (test code = 4.2 g/dL     3.5-5.0                   



             2807508701)                                         

 

             ALK PHOS (test code = 100 U/L                          



             4996448353)                                         

 

             ALTv (test code = 16 U/L       5-35                      



             1742-6)                                             

 

             AST(SGOT) (test code = 27 U/L       13-40                     



             [id number])                                         

 

             eGFR (test code =              mL/min/1.73m2              



             3014610021)                                         

 

             ELENA (test code = ELENA) Association of                           



                          Glomerular Filtration                           



                          Rate (GFR) and Staging                           



                          of Kidney Disease*                           



                          +---------------------                           



                          --+-------------------                           



                          --+-------------------                           



                          ------+| GFR                           



                          (mL/min/1.73 m2) ?|                           



                          With Kidney Damage ?|                           



                          ?Without Kidney                           



                          Damage+---------------                           



                          --------+-------------                           



                          --------+-------------                           



                          ------------+| ?>90 ?                           



                          ? ? ? ? ? ? ? ?|                           



                          ?Stage one ? ? ? ? ?|                           



                          ? Normal ? ? ? ? ? ? ?                           



                          ?+--------------------                           



                          ---+------------------                           



                          ---+------------------                           



                          -------+| ?60-89 ? ? ?                           



                          ? ? ? ? ?| ?Stage two                           



                          ? ? ? ? ?| ? Decreased                           



                          GFR ? ? ? ?                            



                          +---------------------                           



                          --+-------------------                           



                          --+-------------------                           



                          ------+| ?30-59 ? ? ?                           



                          ? ? ? ? ?| ?Stage                           



                          three ? ? ? ?| ? Stage                           



                          three ? ? ? ? ?                           



                          +---------------------                           



                          --+-------------------                           



                          --+-------------------                           



                          ------+| ?15-29 ? ? ?                           



                          ? ? ? ? ?| ?Stage four                           



                          ? ? ? ? | ? Stage four                           



                          ? ? ? ? ?                              



                          ?+--------------------                           



                          ---+------------------                           



                          ---+------------------                           



                          -------+| ?<15 (or                           



                          dialysis) ? ?| ?Stage                           



                          five ? ? ? ? | ? Stage                           



                          five ? ? ? ? ?                           



                          ?+--------------------                           



                          ---+------------------                           



                          ---+------------------                           



                          -------+ *Each stage                           



                          assumes the associated                           



                          GFR level has been in                           



                          effect for at least                           



                          three months. ?Stages                           



                          1 to 5, with or                           



                          without kidney                           



                          disease, indicate                           



                          chronic kidney                           



                          disease. Notes:                           



                          Determination of                           



                          stages one and two                           



                          (with eGFR                             



                          >59mL/min/1.73 m2)                           



                          requires estimation of                           



                          kidney damage for at                           



                          least three months as                           



                          defined by structural                           



                          or functional                           



                          abnormalities of the                           



                          kidney, manifested by                           



                          either:Pathological                           



                          abnormalities or                           



                          Markers of kidney                           



                          damage (including                           



                          abnormalities in the                           



                          composition of the                           



                          blood or urine or                           



                          abnormalities in                           



                          imaging tests).                           

 

             Lab Interpretation Abnormal                               



             (test code = 79238-4)                                        



[facility] WITH [id number] 19:22:40





             Test Item    Value        Reference Range Interpretation Comments

 

             WBC (test code =              See_Comment  H             [Automated



             1607-2)                                             message] The sy

stem



                                                                 which generated



                                                                 this result



                                                                 transmitted



                                                                 reference range

:



                                                                 4.30 - 11.10



                                                                 10*3/?L. The



                                                                 reference range

 was



                                                                 not used to



                                                                 interpret this



                                                                 result as



                                                                 normal/abnormal

.

 

             RBC (test code =              See_Comment                [Automated



             846-3)                                              message] The sy

stem



                                                                 which generated



                                                                 this result



                                                                 transmitted



                                                                 reference range

:



                                                                 3.93 - 5.25



                                                                 10*6/?L. The



                                                                 reference range

 was



                                                                 not used to



                                                                 interpret this



                                                                 result as



                                                                 normal/abnormal

.

 

             HGB (test code = 13.0 g/dL    11.6-15.0                 



             718-7)                                              

 

             HCT (test code = 38.4 %       35.7-45.2                 



             4544-3)                                             

 

             MCV (test code = 89.9 fL      80.6-95.5                 



             787-2)                                              

 

             MCH (test code = 30.4 pg      25.9-32.8                 



             785-6)                                              

 

             MCHC (test code = 33.9 g/dL    31.6-35.1                 



             786-4)                                              

 

             RDW-SD (test code = 39.3 fL      39.0-49.9                 



             09305-3)                                            

 

             RDW-CV (test code = 12.2 %       12.0-15.5                 



             788-0)                                              

 

             PLT (test code =              See_Comment                [Automated



             777-3)                                              message] The sy

stem



                                                                 which generated



                                                                 this result



                                                                 transmitted



                                                                 reference range

:



                                                                 166 - 358 10*3/

?L.



                                                                 The reference r

jihan



                                                                 was not used to



                                                                 interpret this



                                                                 result as



                                                                 normal/abnormal

.

 

             MPV (test code = 10.1 fL      9.5-12.9                  



             56019-1)                                            

 

             NRBC/100 WBC (test              See_Comment                [Automat

ed



             code = [id number])                                        message] 

The system



                                                                 which generated



                                                                 this result



                                                                 transmitted



                                                                 reference range

:



                                                                 0.0 - 10.0 /100



                                                                 WBCs. The refer

ence



                                                                 range was not u

sed



                                                                 to interpret th

is



                                                                 result as



                                                                 normal/abnormal

.

 

             NRBC x10^3 (test code <0.01        See_Comment                [Auto

mated



             = [id number])                                        message] The s

ystem



                                                                 which generated



                                                                 this result



                                                                 transmitted



                                                                 reference range

:



                                                                 10*3/?L. The



                                                                 reference range

 was



                                                                 not used to



                                                                 interpret this



                                                                 result as



                                                                 normal/abnormal

.

 

             GRAN MAT (NEUT) % 79.4 %                                 



             (test code = 770-8)                                        

 

             IMM GRAN % (test code 0.50 %                                 



             = 1326974542)                                        

 

             LYMPH % (test code = 9.5 %                                  



             736-9)                                              

 

             MONO % (test code = 9.7 %                                  



             5905-5)                                             

 

             EOS % (test code = 0.5 %                                  



             713-8)                                              

 

             BASO % (test code = 0.4 %                                  



             706-2)                                              

 

             GRAN MAT x10^3(ANC) 9.77 10*3/uL 1.88-7.09    H            



             (test code =                                        



             9414357870)                                         

 

             IMM GRAN x10^3 (test 0.06 10*3/uL 0.00-0.06                 



             code = 7836041636)                                        

 

             LYMPH x10^3 (test code 1.17 10*3/uL 1.32-3.29    L            



             = 731-0)                                            

 

             MONO x10^3 (test code 1.19 10*3/uL 0.33-0.92    H            



             = 742-7)                                            

 

             EOS x10^3 (test code = 0.06 10*3/uL 0.03-0.39                 



             711-2)                                              

 

             BASO x10^3 (test code 0.05 10*3/uL 0.01-0.07                 



             = 704-7)                                            

 

             Lab Interpretation Abnormal                               



             (test code = 99277-6)                                        



[facility] [id number] 05:36:00





             Test Item    Value        Reference Range Interpretation Comments

 

             SODIUM (test code = NA) 143 mmol/L   134-147      N            

 

             POTASSIUM (test code = 4.2 mmol/L   3.4-5.0      N            



             K)                                                  

 

             CHLORIDE (test code = 114 mmol/L   100-108      H            



             CL)                                                 

 

             CARBON DIOXIDE (test 24 mmol/L    21-32        N            



             code = CO2)                                         

 

             ANION GAP (test code = 5.0 GAP calc 4.0-15.0     N            



             GAP)                                                

 

             GLUCOSE (test code = 89 MG/DL            N            



             GLU)                                                

 

             BLOOD UREA NITROGEN 17 MG/DL     7-18         N            



             (test code = BUN)                                        

 

             GLOMERULAR FILTRATION >=60 max estimate >60                       



             RATE (test code = GFR) estGFR                                 

 

             CREATININE (test code = 0.9 MG/DL    0.6-1.0      N            



             CREAT)                                              

 

             CALCIUM (test code = CA) 8.3 MG/DL    8.5-10.1     L            



CBC W/AUTO [id number] 05:21:00





             Test Item    Value        Reference Range Interpretation Comments

 

             WHITE BLOOD CELL (test code = 15.2 K/mm3   3.5-11.0     H          

  



             WBC)                                                

 

             RED BLOOD CELL (test code = RBC) 3.67 M/mm3   4.70-6.10    L       

     

 

             HEMOGLOBIN (test code = HGB) 11.3 G/DL    10.4-14.9    N           

 

 

             HEMATOCRIT (test code = HCT) 35.5 %       31.5-44.1    N           

 

 

             MEAN CELL VOLUME (test code = 96.7 Fl      84.5-98.6    N          

  



             MCV)                                                

 

             MEAN CELL HGB (test code = MCH) 30.8 pg      27.0-34.2    N        

    

 

             MEAN CELL HGB CONCETRATION (test 31.8 G/DL    31.5-34.0    N       

     



             code = MCHC)                                        

 

             RED CELL DISTRIBUTION WIDTH (test 14.2 SD      11.5-14.5    N      

      



             code = RDW)                                         

 

             PLATELET COUNT (test code = PLT) 242.0 K/mm3  150-450      N       

     

 

             MEAN PLATELET VOLUME (test code = 10.20 fL     7.0-10.5     N      

      



             MPV)                                                

 

             NEUTROPHIL % (test code = NT%) 78.8 %       40-76        H         

   

 

             LYMPHOCYTE % (test code = LY%) 13.1 %       20.5-51.1    L         

   

 

             MONOCYTE % (test code = MO%) 6.2 %        1.7-9.3      N           

 

 

             EOSINOPHIL % (test code = EO%) 1.6 %        0.0-6.0      N         

   

 

             BASOPHIL % (test code = BA%) 0.3 %        0.0-2.0      N           

 

 

             NEUTROPHIL # (test code = NT#) 11.94 K/mm3  1.8-7.6      H         

   

 

             LYMPHOCYTE # (test code = LY#) 2.0 K/mm3    0.6-3.2      N         

   

 

             MONOCYTE # (test code = MO#) 0.9 K/mm3    0.3-1.1      N           

 

 

             EOSINOPHIL # (test code = EO#) 0.2 K/mm3    0.0-0.4      N         

   

 

             BASOPHIL # (test code = BA#) 0.1 K/mm3    0.0-0.1      N           

 

 

             MANUAL DIFF REQUIRED (test code = NO DIFF/SCN  CRITERIA            

      



             MDIFF)                                              



[id number] 14:09:00





             Test Item    Value        Reference Range Interpretation Comments

 

             LITHIUM (test 0.5 mmol/L   0.6-1.2      L             Detection Lopez

it = 0.1



             code = LITH)                                        <0.1 indicates 

None



                                                                 DetectedPerform

ed At: 



                                                                 [facility] [address]



                                                                 [id number] 

Jeffry KIMBROUGH MD



                                                                 Ph:[phone]



XUTWJYIX--20-28 13:35:00





             Test Item    Value        Reference Range Interpretation Comments

 

             TROPONIN-I (test 0.436 NG/ML  0.000-0.045  HH           Negative: <

/= 0.045



             code = TROPI)                                        Positive: >/= 

0.046



                                                                 Correlation wit

h serial



                                                                 results, other 

cardiac



                                                                 markers, and cl

inical



                                                                 findings is nec

essary to



                                                                 determine the c

linical



                                                                 significance of

 this



                                                                 result. Quantit

ative



                                                                 results using d

ifferent



                                                                 methodologies s

hould not



                                                                 be compared to 

one



                                                                 another as nume

rical



                                                                 results may amanda

yby



                                                                 method.



Completed by Nursing: KYGHBZNHPM--13-28 10:33:00





             Test Item    Value        Reference Range Interpretation Comments

 

             TROPONIN-I (test 0.360 NG/ML  0.000-0.045  HH           Negative: <

/= 0.045



             code = TROPI)                                        Positive: >/= 

0.046



                                                                 Correlation wit

h serial



                                                                 results, other 

cardiac



                                                                 markers, and cl

inical



                                                                 findings is nec

essary to



                                                                 determine the c

linical



                                                                 significance of

 this



                                                                 result. Quantit

ative



                                                                 results using d

ifferent



                                                                 methodologies s

hould not



                                                                 be compared to 

one



                                                                 another as nume

rical



                                                                 results may amanda

yby



                                                                 method.



Completed by Nursing: NOLast Dose Date: 20Last Dose Time: 1200TROPONIN-I
2020 07:23:00





             Test Item    Value        Reference Range Interpretation Comments

 

             TROPONIN-I (test 0.399 NG/ML  0.000-0.045  HH           Negative: <

/= 0.045



             code = TROPI)                                        Positive: >/= 

0.046



                                                                 Correlation wit

h serial



                                                                 results, other 

cardiac



                                                                 markers, and cl

inical



                                                                 findings is nec

essary to



                                                                 determine the c

linical



                                                                 significance of

 this



                                                                 result. Quantit

ative



                                                                 results using d

ifferent



                                                                 methodologies s

hould not



                                                                 be compared to 

one



                                                                 another as nume

rical



                                                                 results may amanda

yby



                                                                 method.



Completed by Nursing: FMZKFFINUH--82-28 05:00:00





             Test Item    Value        Reference Range Interpretation Comments

 

             TROPONIN-I (test 0.555 NG/ML  0.000-0.045  HH           Negative: <

/= 0.045



             code = TROPI)                                        Positive: >/= 

0.046



                                                                 Correlation wit

h serial



                                                                 results, other 

cardiac



                                                                 markers, and cl

inical



                                                                 findings is nec

essary to



                                                                 determine the c

linical



                                                                 significance of

 this



                                                                 result. Quantit

ative



                                                                 results using d

ifferent



                                                                 methodologies s

hould not



                                                                 be compared to 

one



                                                                 another as nume

rical



                                                                 results may amanda

yby



                                                                 method.



Completed by Nursing: NO- XR CHEST 1 -90-55 04:33:00 Name: TYLER JUDD 
[facility] : 1970 Age/S: 49 / F [id number] [address] Unit #: [id number]
2 Loc: [place] [address] Phys: Kristy Quiros MD  Acct: [id number] Dis Date: 
Status: REG ER PHONE#: [phone] Exam Date: 2020 FAX #:  Reason: 
POST CENTRAL PLACEMENT; RIGHT IJ EXAMS: CPT: [id number] XR CHEST 1 V 44110  
Fluoro Time: DAP (Gy m2): Air Kerma (mGy): HISTORY: Post central line placement,
hypotension Location code: B2 FINDINGS: Frontal view of the chest demonstrates a
mildlyenlarged cardiomediastinal silhouette. The trachea is midline. The lungs 
are clear. There is no effusion or pneumothorax. The bones are intact.  Right IJ
catheter in good position. IMPRESSION: Mild cardiomegaly, without acute 
pulmonary process. ** Electronically Signed by ISABEL March on 2020 at 
0433 ** Reported and signed by: Shree March M.D. CC: Kristy Quiros MD  PAGE 1 
Signed Report Name: TYLER JUDD [facility] : 1970 Age/S: 49 / F 
[id number] [address] Unit #: [id number] Loc: [place] [address] Phys: 
Kristy Quiros MD Acct: [id number] Dis Date: Status: REG ER PHONE #: [phone]
[phone] Exam Date: 2020 FAX #: Reason: POST CENTRAL PLACEMENT; RIGHT 
IJ EXAMS: CPT: [id number] XR CHEST 1 V  90077 Fluoro Time: DAP (Gy m2): Air Kerma
(mGy): (Continued) Technologist: Duncan Ellis RT(R)(CT)  Trnscb Date/Time: 
2020 (0433) [id number] Orig Print D/T: S: 2020 (0436) PAGE 2 Signed 
ReportCBC W/AUTO [id number] 04:15:00





             Test Item    Value        Reference Range Interpretation Comments

 

             WHITE BLOOD CELL (test 24.2 K/mm3   3.5-11.0     H            



             code = WBC)                                         

 

             RED BLOOD CELL (test 3.75 M/mm3   4.70-6.10    L            



             code = RBC)                                         

 

             HEMOGLOBIN (test code 11.5 G/DL    10.4-14.9    N            



             = HGB)                                              

 

             HEMATOCRIT (test code 35.2 %       31.5-44.1    N            



             = HCT)                                              

 

             MEAN CELL VOLUME (test 93.9 Fl      84.5-98.6    N            



             code = MCV)                                         

 

             MEAN CELL HGB (test 30.7 pg      27.0-34.2    N            



             code = MCH)                                         

 

             MEAN CELL HGB 32.7 G/DL    31.5-34.0    N            



             CONCETRATION (test                                        



             code = MCHC)                                        

 

             RED CELL DISTRIBUTION 13.8 SD      11.5-14.5    N            



             WIDTH (test code =                                        



             RDW)                                                

 

             PLATELET COUNT (test 234.0 K/mm3  150-450      N            



             code = PLT)                                         

 

             MEAN PLATELET VOLUME 9.80 fL      7.0-10.5     N            



             (test code = MPV)                                        

 

             NEUTROPHIL % (test 82.9 %       40-76        H            



             code = NT%)                                         

 

             LYMPHOCYTE % (test 8.3 %        20.5-51.1    L            



             code = LY%)                                         

 

             MONOCYTE % (test code 7.3 %        1.7-9.3      N            



             = MO%)                                              

 

             EOSINOPHIL % (test 1.4 %        0.0-6.0      N            



             code = EO%)                                         

 

             BASOPHIL % (test code 0.1 %        0.0-2.0      N            



             = BA%)                                              

 

             NEUTROPHIL # (test 20.08 K/mm3  1.8-7.6      H            



             code = NT#)                                         

 

             LYMPHOCYTE # (test 2.0 K/mm3    0.6-3.2      N            



             code = LY#)                                         

 

             MONOCYTE # (test code 1.8 K/mm3    0.3-1.1      H            



             = MO#)                                              

 

             EOSINOPHIL # (test 0.3 K/mm3    0.0-0.4      N            



             code = EO#)                                         

 

             BASOPHIL # (test code 0.0 K/mm3    0.0-0.1      N            



             = BA#)                                              

 

             MANUAL DIFF REQUIRED NO DIFF/SCN  CRITERIA                  SLIDE R

EVIEW



             (test code = MDIFF)                                        CONSISTA

NT WITH AUTO



                                                                 DIFFERENTIAL.



BASIC METABOLIC [id number] 04:11:00





             Test Item    Value        Reference Range Interpretation Comments

 

             SODIUM (test code = NA) 135 mmol/L   134-147      N            

 

             POTASSIUM (test code = K) 4.0 mmol/L   3.4-5.0      N            

 

             CHLORIDE (test code = CL) 106 mmol/L   100-108      N            

 

             CARBON DIOXIDE (test code = CO2) 22 mmol/L    21-32        N       

     

 

             ANION GAP (test code = GAP) 7.0 GAP calc 4.0-15.0     N            

 

             GLUCOSE (test code = GLU) 97 MG/DL            N            

 

             BLOOD UREA NITROGEN (test code = 34 MG/DL     7-18         H       

     



             BUN)                                                

 

             GLOMERULAR FILTRATION RATE (test 39 estGFR    >60          L       

     



             code = GFR)                                         

 

             CREATININE (test code = CREAT) 1.5 MG/DL    0.6-1.0      H         

   

 

             CALCIUM (test code = CA) 8.9 MG/DL    8.5-10.1     N            



LACTIC [id number] 04:11:00





             Test Item    Value        Reference Range Interpretation Comments

 

             LACTIC ACID (test code = LACT) 0.8 mmol/L   0.4-2.0      N         

   



CBC W/AUTO [id number] 03:54:00





             Test Item    Value        Reference Range Interpretation Comments

 

             WHITE BLOOD CELL (test code = 24.2 K/mm3   3.5-11.0     H          

  



             WBC)                                                

 

             RED BLOOD CELL (test code = RBC) 3.75 M/mm3   4.70-6.10    L       

     

 

             HEMOGLOBIN (test code = HGB) 11.5 G/DL    10.4-14.9    N           

 

 

             HEMATOCRIT (test code = HCT) 35.2 %       31.5-44.1    N           

 

 

             MEAN CELL VOLUME (test code = 93.9 Fl      84.5-98.6    N          

  



             MCV)                                                

 

             MEAN CELL HGB (test code = MCH) 30.7 pg      27.0-34.2    N        

    

 

             MEAN CELL HGB CONCETRATION (test 32.7 G/DL    31.5-34.0    N       

     



             code = MCHC)                                        

 

             RED CELL DISTRIBUTION WIDTH (test 13.8 SD      11.5-14.5    N      

      



             code = RDW)                                         

 

             PLATELET COUNT (test code = PLT) 234.0 K/mm3  150-450      N       

     

 

             MEAN PLATELET VOLUME (test code = 9.80 fL      7.0-10.5     N      

      



             MPV)                                                

 

             NEUTROPHIL % (test code = NT%)  %           40-76        H         

   

 

             LYMPHOCYTE % (test code = LY%)  %           20.5-51.1    L         

   

 

             MONOCYTE % (test code = MO%)  %           1.7-9.3      N           

 

 

             EOSINOPHIL % (test code = EO%)  %           0.0-6.0      N         

   

 

             BASOPHIL % (test code = BA%)  %           0.0-2.0      N           

 

 

             NEUTROPHIL # (test code = NT#)  K/mm3       1.8-7.6      H         

   

 

             LYMPHOCYTE # (test code = LY#)  K/mm3       0.6-3.2      N         

   

 

             MONOCYTE # (test code = MO#)  K/mm3       0.3-1.1      H           

 

 

             EOSINOPHIL # (test code = EO#)  K/mm3       0.0-0.4      N         

   

 

             BASOPHIL # (test code = BA#)  K/mm3       0.0-0.1      N           

 

 

             MANUAL DIFF REQUIRED (test code =  DIFF/SCN    CRITERIA            

      



             MDIFF)                                              



Basic Metabolic [id number] 07:54:48





             Test Item    Value        Reference Range Interpretation Comments

 

             Sodium Level (test code = Sodium 142.0 mmol/L 135.0-145.0          

     



             Level)                                              

 

             Potassium Level (test code = 4.8 mmol/L   3.5-5.1                  

 



             Potassium Level)                                        

 

             Chloride Level (test code = 105 mmol/L                       



             Chloride Level)                                        

 

             CO2 (test code = CO2) 25 mmol/L    22-29                     

 

             Anion Gap (test code = Anion 12 mmol/L    7-16                     

 



             Gap)                                                

 

             BUN (test code = BUN) 19.60 mg/dL  6.00-20.00                

 

             Creatinine Level (test code = 0.80 mg/dL   0.50-0.90               

  



             Creatinine Level)                                        

 

             BUN/Creat Ratio (test code = 24                        N           

 



             BUN/Creat Ratio)                                        

 

             Glucose Level (test code = 86 mg/dL                         



             Glucose Level)                                        

 

             Calcium Level (test code = 10.1 mg/dL   8.3-10.5                  



             Calcium Level)                                        



Basic Metabolic [id number] 07:54:48





             Test Item    Value        Reference Range Interpretation Comments

 

             Sodium Level (test 142.0 mmol/L 135.0-145.0               



             code = Sodium Level)                                        

 

             Potassium Level 4.8 mmol/L   3.5-5.1                   



             (test code =                                        



             Potassium Level)                                        

 

             Chloride Level (test 105 mmol/L                       



             code = Chloride                                        



             Level)                                              

 

             CO2 (test code = 25 mmol/L    22-29                     



             CO2)                                                

 

             Anion Gap (test code 12 mmol/L    7-16                      



             = Anion Gap)                                        

 

             BUN (test code = 19.60 mg/dL  6.00-20.00                



             BUN)                                                

 

             Creatinine Level 0.80 mg/dL   0.50-0.90                 



             (test code =                                        



             Creatinine Level)                                        

 

             BUN/Creat Ratio 24                        N            



             (test code =                                        



             BUN/Creat Ratio)                                        

 

             Glucose Level (test 86 mg/dL                         



             code = Glucose                                        



             Level)                                              

 

             Calcium Level (test 10.1 mg/dL   8.3-10.5                  



             code = Calcium                                        



             Level)                                              

 

             eGFR AA (test code = >60                       N            eGFR (e

stimated



             eGFR AA)     mL/min/1.73 m2                           Glomerular



                                                                 Filtration Rate

) is



                                                                 an estimated va

lue,



                                                                 calculated from

 the



                                                                 patient's serum



                                                                 creatinine usin

g the



                                                                 MDRD equation. 

It is



                                                                 NOT the patient

's



                                                                 actual GFR. The

 eGFR



                                                                 provides a more



                                                                 clinically usef

ul



                                                                 measure of kidn

ey



                                                                 disease than se

rum



                                                                 creatinine



                                                                 alone.***This



                                                                 calculation rimma

es



                                                                 sex and race in

to



                                                                 account, if the



                                                                 information is



                                                                 provided. If th

e



                                                                 race is not



                                                                 provided, and t

he



                                                                 patient is



                                                                 -Crystal

n,



                                                                 multiply by 1.2

12.



                                                                 If sex is not



                                                                 provided, and t

he



                                                                 patient is fema

le,



                                                                 multiply by 0.7

42.



                                                                 Results for pat

ients



                                                                 <18 years of ag

e



                                                                 have not been



                                                                 validated by th

e



                                                                 MDRD study and



                                                                 should be



                                                                 interpreted wit

h



                                                                 caution. eGFR R

esult



                                                                 Interpretation:

eGFR



                                                                 > or = 60 is in

 the



                                                                 Normal RangeeGF

R <



                                                                 60 may mean kid

hang



                                                                 diseaseeGFR < 1

5 may



                                                                 mean kidney



                                                                 failure*** Rang

es



                                                                 recommended by 

the



                                                                 [employer]



                                                                 [employer],



                                                                 http://nkdep.ni

h.gov



Basic Metabolic [id number] 07:54:48





             Test Item    Value        Reference Range Interpretation Comments

 

             Sodium Level (test 142.0 mmol/L 135.0-145.0               



             code = Sodium Level)                                        

 

             Potassium Level 4.8 mmol/L   3.5-5.1                   



             (test code =                                        



             Potassium Level)                                        

 

             Chloride Level (test 105 mmol/L                       



             code = Chloride                                        



             Level)                                              

 

             CO2 (test code = 25 mmol/L    22-29                     



             CO2)                                                

 

             Anion Gap (test code 12 mmol/L    7-16                      



             = Anion Gap)                                        

 

             BUN (test code = 19.60 mg/dL  6.00-20.00                



             BUN)                                                

 

             Creatinine Level 0.80 mg/dL   0.50-0.90                 



             (test code =                                        



             Creatinine Level)                                        

 

             BUN/Creat Ratio 24                        N            



             (test code =                                        



             BUN/Creat Ratio)                                        

 

             Glucose Level (test 86 mg/dL                         



             code = Glucose                                        



             Level)                                              

 

             Calcium Level (test 10.1 mg/dL   8.3-10.5                  



             code = Calcium                                        



             Level)                                              

 

             eGFR AA (test code = >60                       N            eGFR (e

stimated



             eGFR AA)     mL/min/1.73 m2                           Glomerular



                                                                 Filtration Rate

) is



                                                                 an estimated va

lue,



                                                                 calculated from

 the



                                                                 patient's serum



                                                                 creatinine usin

g the



                                                                 MDRD equation. 

It is



                                                                 NOT the patient

's



                                                                 actual GFR. The

 eGFR



                                                                 provides a more



                                                                 clinically usef

ul



                                                                 measure of kidn

ey



                                                                 disease than se

rum



                                                                 creatinine



                                                                 alone.***This



                                                                 calculation rimma

es



                                                                 sex and race in

to



                                                                 account, if the



                                                                 information is



                                                                 provided. If th

e



                                                                 race is not



                                                                 provided, and t

he



                                                                 patient is



                                                                 -Crystal

n,



                                                                 multiply by 1.2

12.



                                                                 If sex is not



                                                                 provided, and t

he



                                                                 patient is fema

le,



                                                                 multiply by 0.7

42.



                                                                 Results for pat

ients



                                                                 <18 years of ag

e



                                                                 have not been



                                                                 validated by [facility]



                                                                 MDRD study and



                                                                 should be



                                                                 interpreted wit

h



                                                                 caution. eGFR R

esult



                                                                 Interpretation:

eGFR



                                                                 > or = 60 is in

 the



                                                                 Normal RangeeGF

R <



                                                                 60 may mean kid

hang



                                                                 diseaseeGFR < 1

5 may



                                                                 mean kidney



                                                                 failure*** Rang

es



                                                                 recommended by 

the



                                                                 [employer]



                                                                 [employer],



                                                                 http://nkdep.ni

h.gov

 

             eGFR Non-AA (test >60.00                    N            eGFR (sheba

mated



             code = eGFR Non-AA) mL/min/1.73 m2                           Glomer

ular



                                                                 Filtration Rate

) is



                                                                 an estimated va

lue,



                                                                 calculated from

 the



                                                                 patient's serum



                                                                 creatinine usin

g the



                                                                 MDRD equation. 

It is



                                                                 NOT the patient

's



                                                                 actual GFR. The

 eGFR



                                                                 provides a more



                                                                 clinically usef

ul



                                                                 measure of kidn

ey



                                                                 disease than se

rum



                                                                 creatinine



                                                                 alone.***This



                                                                 calculation rimma

es



                                                                 sex and race in

to



                                                                 account, if the



                                                                 information is



                                                                 provided. If th

e



                                                                 race is not



                                                                 provided, and t

he



                                                                 patient is



                                                                 -Crystal

n,



                                                                 multiply by 1.2

12.



                                                                 If sex is not



                                                                 provided, and t

he



                                                                 patient is fema

le,



                                                                 multiply by 0.7

42.



                                                                 Results for pat

ients



                                                                 <18 years of ag

e



                                                                 have not been



                                                                 validated by [facility]



                                                                 MDRD study and



                                                                 should be



                                                                 interpreted wit

h



                                                                 caution. eGFR R

esult



                                                                 Interpretation:

eGFR



                                                                 > or = 60 is in

 the



                                                                 Normal RangeeGF

R <



                                                                 60 may mean kid

hang



                                                                 diseaseeGFR < 1

5 may



                                                                 mean kidney



                                                                 failure*** Rang

es



                                                                 recommended by 

the



                                                                 [employer]



                                                                 [employer],



                                                                 http://nkdep.ni

h.gov



Complete Blood Count with [id number] 07:29:42





             Test Item    Value        Reference Range Interpretation Comments

 

             WBC (test code = WBC) 9.3 x10      4.4-10.5                  

 

             RBC (test code = RBC) 4.71 x10     3.75-5.20                 

 

             Hgb (test code = Hgb) 14.4 g/dL    12.2-14.8                 

 

             MCV (test code = MCV) 92.10 fL     80..00              

 

             Hct (test code = Hct) 43.4 %       36.5-44.4                 

 

             MCHC (test code = 33.20 g/dL   32.00-37.50               



             MCHC)                                               

 

             RDW CV (test code = 13.4 %       11.5-14.5                 



             RDW CV)                                             

 

             MCH (test code = MCH) 30.6 pg      27.0-32.5                 

 

             Platelets (test code = 325.0 x10    140.0-440.0               



             Platelets)                                          

 

             MPV (test code = MPV) 9.8 fL                    N            

 

             Slide Review (test Auto         Auto                      Result cr

eated by



             code = Slide Review)                                        [id number]

SLIDE_REV_AUTO

 

             nRBC (test code = 0                         N            



             nRBC)                                               

 

             NRBC Abs (test code = 0.00 x10                  N            



             NRBC Abs)                                           

 

             IPF (test code = IPF) 0 %                       N            



Automated [id number] 07:29:42





             Test Item    Value        Reference Range Interpretation Comments

 

             Neutro Auto (test code = Neutro 66.2 %       36.0-70.0             

    



             Auto)                                               

 

             Lymph Auto (test code = Lymph Auto) 21.1 %       12.0-44.0         

        

 

             Mono Auto (test code = Mono Auto) 6.8 %        0.0-11.0            

      

 

             Eos, Auto (test code = Eos, Auto) 4.8 %        0.0-7.0             

      

 

             Basophil Auto (test code = Basophil 0.9 %        0.0-2.0           

        



             Auto)                                               

 

             Neutro Absolute (test code = Neutro 6.2 x10      1.6-7.4           

        



             Absolute)                                           

 

             Lymph Absolute (test code = Lymph 1.97 x10     .50-4.60            

      



             Absolute)                                           

 

             Mono Absolute (test code = Mono .63 x10      .00-1.20              

    



             Absolute)                                           

 

             Eos Absolute (test code = Eos 0.45 x10     0.00-0.74               

  



             Absolute)                                           

 

             Baso Absolute (test code = Baso 0.08 x10     0.00-0.21             

    



             Absolute)                                           



IG [id number] 07:29:42





             Test Item    Value        Reference Range Interpretation Comments

 

             IG (test code = IG) 0.2 %        0.0-5.0                   

 

             IG Abs (test code = IG Abs) 0 x10                     N            



Thyroid Stimulating [id number] 04:30:30





             Test Item    Value        Reference Range Interpretation Comments

 

             TSH (test code = TSH) 1.360 mIU/mL 0.270-4.200               



RPR [id number] 04:06:17





             Test Item    Value        Reference Range Interpretation Comments

 

             RPR Qual (test code = RPR Qual) Non-Reactive Non-Reactive          

    

 

             Reactive Control (test code = Reactive                             

  



             Reactive Control)                                        

 

             Weak Reactive Control (test Weak Reactive                          

 



             code = Weak Reactive Control)                                      

  

 

             Non-Reactive Control (test code Non-Reactive                       

    



             = Non-Reactive Control)                                        

 

             Lot # (test code = Lot #) [id number]                    N            

 

             Expiration Dt (test code = 10-                N            



             Expiration Dt)                                        



Hemoglobin A1c2019-11-15 18:37:54





             Test Item    Value        Reference Range Interpretation Comments

 

             Hemoglobin A1c (test code 4.7 %        4.8-5.9      L            No

n Diabetic



             = Hemoglobin A1c)                                        4.8-5.9%Di

abetic <7.0%



Lipid Panel2019-11-15 18:08:58





             Test Item    Value        Reference Range Interpretation Comments

 

             Cholesterol Total 189 mg/dL    0-200                     RISK OF HE

ART



             (test code =                                        DISEASEPublishe

d by



             Cholesterol Total)                                        American 

Heart



                                                                 Association Judith

lyte



                                                                 Optimal Borderl

ine



                                                                 Increased RiskC

HOL <200



                                                                 200-239 >240TRI

G <150



                                                                 150-199 >200HDL

 Male



                                                                 >60 <40HDL Fema

le >60



                                                                 <50LDL <100 130

-159



                                                                 >160LDL Near op

[facility]



                                                                 100-129

 

             Triglycerides (test 112 mg/dL    9-200                     



             code = Triglycerides)                                        

 

             HDL (test code = HDL) 44 mg/dL     50-60        L            

 

             LDL (test code = LDL) 122 mg/dL    0-130                     The eq

uation being used



                                                                 in this calcula

tion is



                                                                 LDL = (Chol - H

DL) -



                                                                 (Trig / 5)

 

             VLDL (test code = 22 mg/dL     5-40                      The equati

on being used



             VLDL)                                               in this calcula

tion is



                                                                 VLDL = Trig / 5

 

             Chol/HDL (test code = 4.3 ratio    0.0-4.4                   



             Chol/HDL)                                           

 

             LDL/HDL Ratio (test 3                         N            The equa

tion being used



             code = LDL/HDL Ratio)                                        in thi

s calculation is



                                                                 LDL/HDL Ratio=L

DL



                                                                 Calc/HDL Chol



Complete Blood Count with Differential2019-11-15 07:51:57





             Test Item    Value        Reference Range Interpretation Comments

 

             WBC (test code = WBC) 10.6 x10     4.4-10.5     H            

 

             RBC (test code = RBC) 4.45 x10     3.75-5.20                 

 

             Hgb (test code = Hgb) 13.5 g/dL    12.2-14.8                 

 

             MCV (test code = MCV) 93.30 fL     80..00              

 

             Hct (test code = Hct) 41.5 %       36.5-44.4                 

 

             MCHC (test code = 32.50 g/dL   32.00-37.50               



             MCHC)                                               

 

             RDW CV (test code = 13.7 %       11.5-14.5                 



             RDW CV)                                             

 

             MCH (test code = MCH) 30.3 pg      27.0-32.5                 

 

             Platelets (test code = 356.0 x10    140.0-440.0               



             Platelets)                                          

 

             MPV (test code = MPV) 9.7 fL                    N            

 

             Slide Review (test Auto         Auto                      Result cr

eated by



             code = Slide Review)                                        [email or web address]

[email or web address]

 

             nRBC (test code = 0                         N            



             nRBC)                                               

 

             NRBC Abs (test code = 0.00 x10                  N            



             NRBC Abs)                                           

 

             IPF (test code = IPF) 0 %                       N            



Automated Differential2019-11-15 07:51:57





             Test Item    Value        Reference Range Interpretation Comments

 

             Neutro Auto (test code = Neutro 65.4 %       36.0-70.0             

    



             Auto)                                               

 

             Lymph Auto (test code = Lymph Auto) 23.5 %       12.0-44.0         

        

 

             Mono Auto (test code = Mono Auto) 5.7 %        0.0-11.0            

      

 

             Eos, Auto (test code = Eos, Auto) 4.4 %        0.0-7.0             

      

 

             Basophil Auto (test code = Basophil 0.8 %        0.0-2.0           

        



             Auto)                                               

 

             Neutro Absolute (test code = Neutro 6.9 x10      1.6-7.4           

        



             Absolute)                                           

 

             Lymph Absolute (test code = Lymph 2.49 x10     .50-4.60            

      



             Absolute)                                           

 

             Mono Absolute (test code = Mono .60 x10      .00-1.20              

    



             Absolute)                                           

 

             Eos Absolute (test code = Eos 0.47 x10     0.00-0.74               

  



             Absolute)                                           

 

             Baso Absolute (test code = Baso 0.08 x10     0.00-0.21             

    



             Absolute)                                           



IG Flags2019-11-15 07:51:57





             Test Item    Value        Reference Range Interpretation Comments

 

             IG (test code = IG) 0.2 %        0.0-5.0                   

 

             IG Abs (test code = IG Abs) 0 x10                     N            



Urine [id number] 10:18:52





             Test Item    Value        Reference Range Interpretation Comments

 

             ORGANISM (test code = Escherichia coli                           



             ORGANISM)                                           

 

             Amikacin (test code =                           S            



             Amik)                                               

 

             Ampicillin (test code =                           S            



             Amp)                                                

 

             Ampicillin/Sulbactam                           S            



             (test code = Amp/Sul)                                        

 

             Cefazolin (test code =                           S            



             Cefaz)                                              

 

             Cefepime (test code =                           S            



             Cefep)                                              

 

             Cefotaxime (test code =                           S            



             Cefo)                                               

 

             Ceftazidime (test code                           S            



             = Ceftaz)                                           

 

             Ceftriaxone (test code                           S            



             = Ceftri)                                           

 

             Cefuroxime (test code =                           S            



             Cefur)                                              

 

             Ciprofloxacin (test                           S            



             code = Cipro)                                        

 

             Gentamicin (test code =                           S            



             Gent)                                               

 

             Imipenem (test code =                           S            



             Imi)                                                

 

             Levofloxacin (test code                           S            



             = Levo)                                             

 

             Meropenem (test code =                           S            



             Sindi)                                               

 

             Nitrofurantoin (test                           S            



             code = Nitro)                                        

 

             Piperacillin/Tazobactam                           S            



             (test code = Pip/Blaine)                                        

 

             Tobramycin (test code =                           S            



             Tobra)                                              

 

             Trimethoprim/Sulfa                           S            



             (test code = SXT)                                        

 

             Final Report (test code >=100,000 cfu/ml                           



             = Final Report) Escherichia coli                           



                          >=100,000 cfu/ml Alpha                           



                          hemolytic                              



                          Streptococcus (not                           



                          Group D or                             



                          Enterococcus)                           



 C Urine Added by [email or web address] [id number] 09:18:25





             Test Item    Value        Reference Range Interpretation Comments

 

             Lithium Level (test code = 0.58 mmol/L  0.60-1.20    L            



             Lithium Level)                                        



Urine Drug [id number] 01:36:44





             Test Item    Value        Reference Range Interpretation Comments

 

             Amphetamine Screen Ur POSITIVE     Negative     A            



             (test code = Amphetamine                                        



             Screen Ur)                                          

 

             Barbiturate Screen Ur Negative     Negative                  



             (test code = Barbiturate                                        



             Screen Ur)                                          

 

             Benzodiazepines Ur (test Negative     Negative                  



             code = Benzodiazepines                                        



             Ur)                                                 

 

             Cocaine Screen Ur (test Negative     Negative                  



             code = Cocaine Screen                                        



             Ur)                                                 

 

             U Methadone Scr (test Negative     Negative                  



             code = U Methadone Scr)                                        

 

             Opiate Screen Ur (test Negative     Negative                  



             code = Opiate Screen Ur)                                        

 

             U PCP Scrn (test code = Negative     Negative                  



             U PCP Scrn)                                         

 

             Cannabinoid Screen Ur Negative     Negative                  



             (test code = Cannabinoid                                        



             Screen Ur)                                          

 

             U TCA (test code = U Negative     Negative                  The res

ults of all



             TCA)                                                drug screen elinor

ts are



                                                                 only preliminar

y.



                                                                 Clinical



                                                                 consideration a

nd



                                                                 professional ju

dgment



                                                                 should be appli

ed to



                                                                 any drug of abu

se



                                                                 test result,



                                                                 particularly wh

en



                                                                 preliminary pos

itive



                                                                 results are obt

ained.



                                                                 Please order a



                                                                 separate confir

matory



                                                                 test if desired

.



HCG Qualitative [id number] 01:36:43





             Test Item    Value        Reference Range Interpretation Comments

 

             hCG Ur (test code = Negative                               If the r

esult is



             hCG Ur)                                             "Negative" in p

atients



                                                                 suspected to be



                                                                 pregnant, recom

mend



                                                                 retest with a s

ample



                                                                 obtained 48 to 

72



                                                                 hours later, or

 by



                                                                 ordering a



                                                                 quantitative as

say. If



                                                                 the result is



                                                                 "Borderline" te

sting



                                                                 should be repea

gianfranco in



                                                                 48 to 72 hours.

 

             Lot # (test code = [id number]                    N            



             Lot #)                                              

 

             Expiration Dt (test 2020                N            



             code = Expiration Dt)                                        

 

             Neg Control (test Negative                               



             code = Neg Control)                                        

 

             Pos Control (test Positive                               



             code = Pos Control)                                        

 

             Internal QC (test Acceptable                             



             code = Internal QC)                                        



Urinalysis [id number] 01:36:03





             Test Item    Value        Reference Range Interpretation Comments

 

             UA WBC (test code = UA WBC) 0-5          0-5                       

 

             UA RBC (test code = UA RBC) None Seen    0-5                       

 

             UA Bacteria (test code = UA Moderate                  A            



             Bacteria)                                           

 

             UA Squam Epithelial (test code = UA 0-5                            

        



             Squam Epithelial)                                        



Comprehensive Metabolic [id number] 01:23:40





             Test Item    Value        Reference Range Interpretation Comments

 

             Sodium Level (test code = Sodium 139.0 mmol/L 135.0-145.0          

     



             Level)                                              

 

             Potassium Level (test code = 4.4 mmol/L   3.5-5.1                  

 



             Potassium Level)                                        

 

             Chloride Level (test code = 102 mmol/L                       



             Chloride Level)                                        

 

             CO2 (test code = CO2) 23 mmol/L    22-29                     

 

             Anion Gap (test code = Anion 14 mmol/L    7-16                     

 



             Gap)                                                

 

             BUN (test code = BUN) 9.10 mg/dL   6.00-20.00                

 

             Creatinine Level (test code = 1.00 mg/dL   0.50-0.90    H          

  



             Creatinine Level)                                        

 

             BUN/Creat Ratio (test code = 9                         N           

 



             BUN/Creat Ratio)                                        

 

             Glucose Level (test code = 115 mg/dL                        



             Glucose Level)                                        

 

             Calcium Level (test code = 10.1 mg/dL   8.3-10.5                  



             Calcium Level)                                        

 

             Alk Phos (test code = Alk Phos) 106 U/L             H        

    

 

             Bilirubin Total (test code = 0.4 mg/dL    0.1-0.9                  

 



             Bilirubin Total)                                        

 

             Albumin Level (test code = 4.6 g/dL     3.5-5.2                   



             Albumin Level)                                        

 

             Protein Total (test code = 7.7 g/dL     6.4-8.3                   



             Protein Total)                                        

 

             ALT (test code = ALT) 12 U/L       1-33                      

 

             AST (test code = AST) 18 U/L       1-32                      

 

             Globulin (test code = Globulin) 3.1 g/dL     2.9-3.1               

    

 

             A/G Ratio (test code = A/G 1.5 ratio                 N            



             Ratio)                                              



Comprehensive Metabolic [id number] 01:23:40





             Test Item    Value        Reference Range Interpretation Comments

 

             Sodium Level (test 139.0 mmol/L 135.0-145.0               



             code = Sodium Level)                                        

 

             Potassium Level 4.4 mmol/L   3.5-5.1                   



             (test code =                                        



             Potassium Level)                                        

 

             Chloride Level (test 102 mmol/L                       



             code = Chloride                                        



             Level)                                              

 

             CO2 (test code = 23 mmol/L    22-29                     



             CO2)                                                

 

             Anion Gap (test code 14 mmol/L    7-16                      



             = Anion Gap)                                        

 

             BUN (test code = 9.10 mg/dL   6.00-20.00                



             BUN)                                                

 

             Creatinine Level 1.00 mg/dL   0.50-0.90    H            



             (test code =                                        



             Creatinine Level)                                        

 

             BUN/Creat Ratio 9                         N            



             (test code =                                        



             BUN/Creat Ratio)                                        

 

             Glucose Level (test 115 mg/dL                        



             code = Glucose                                        



             Level)                                              

 

             Calcium Level (test 10.1 mg/dL   8.3-10.5                  



             code = Calcium                                        



             Level)                                              

 

             Alk Phos (test code 106 U/L             H            



             = Alk Phos)                                         

 

             Bilirubin Total 0.4 mg/dL    0.1-0.9                   



             (test code =                                        



             Bilirubin Total)                                        

 

             Albumin Level (test 4.6 g/dL     3.5-5.2                   



             code = Albumin                                        



             Level)                                              

 

             Protein Total (test 7.7 g/dL     6.4-8.3                   



             code = Protein                                        



             Total)                                              

 

             ALT (test code = 12 U/L       1-33                      



             ALT)                                                

 

             AST (test code = 18 U/L       1-32                      



             AST)                                                

 

             Globulin (test code 3.1 g/dL     2.9-3.1                   



             = Globulin)                                         

 

             A/G Ratio (test code 1.5 ratio                 N            



             = A/G Ratio)                                        

 

             eGFR AA (test code = >60                       N            eGFR (e

stimated



             eGFR AA)     mL/min/1.73 m2                           Glomerular



                                                                 Filtration Rate

) is



                                                                 an estimated va

lue,



                                                                 calculated from

 the



                                                                 patient's serum



                                                                 creatinine usin

g the



                                                                 MDRD equation. 

It is



                                                                 NOT the patient

's



                                                                 actual GFR. The

 eGFR



                                                                 provides a more



                                                                 clinically usef

ul



                                                                 measure of kidn

ey



                                                                 disease than se

rum



                                                                 creatinine



                                                                 alone.***This



                                                                 calculation rimma

es



                                                                 sex and race in

to



                                                                 account, if the



                                                                 information is



                                                                 provided. If th

e



                                                                 race is not



                                                                 provided, and t

he



                                                                 patient is



                                                                 -Crystal

n,



                                                                 multiply by 1.2

12.



                                                                 If sex is not



                                                                 provided, and t

he



                                                                 patient is fema

le,



                                                                 multiply by 0.7

42.



                                                                 Results for pat

ients



                                                                 <18 years of ag

e



                                                                 have not been



                                                                 validated by th

e



                                                                 MDRD study and



                                                                 should be



                                                                 interpreted wit

h



                                                                 caution. eGFR R

esult



                                                                 Interpretation:

eGFR



                                                                 > or = 60 is in

 the



                                                                 Normal RangeeGF

R <



                                                                 60 may mean kid

hang



                                                                 diseaseeGFR < 1

5 may



                                                                 mean kidney



                                                                 failure*** Rang

es



                                                                 recommended by 

the



                                                                 [employer]



                                                                 [employer],



                                                                 http://nkdep.ni

h.gov



Comprehensive Metabolic [id number] 01:23:40





             Test Item    Value        Reference Range Interpretation Comments

 

             Sodium Level (test 139.0 mmol/L 135.0-145.0               



             code = Sodium Level)                                        

 

             Potassium Level 4.4 mmol/L   3.5-5.1                   



             (test code =                                        



             Potassium Level)                                        

 

             Chloride Level (test 102 mmol/L                       



             code = Chloride                                        



             Level)                                              

 

             CO2 (test code = 23 mmol/L    22-29                     



             CO2)                                                

 

             Anion Gap (test code 14 mmol/L    7-16                      



             = Anion Gap)                                        

 

             BUN (test code = 9.10 mg/dL   6.00-20.00                



             BUN)                                                

 

             Creatinine Level 1.00 mg/dL   0.50-0.90    H            



             (test code =                                        



             Creatinine Level)                                        

 

             BUN/Creat Ratio 9                         N            



             (test code =                                        



             BUN/Creat Ratio)                                        

 

             Glucose Level (test 115 mg/dL                        



             code = Glucose                                        



             Level)                                              

 

             Calcium Level (test 10.1 mg/dL   8.3-10.5                  



             code = Calcium                                        



             Level)                                              

 

             Alk Phos (test code 106 U/L             H            



             = Alk Phos)                                         

 

             Bilirubin Total 0.4 mg/dL    0.1-0.9                   



             (test code =                                        



             Bilirubin Total)                                        

 

             Albumin Level (test 4.6 g/dL     3.5-5.2                   



             code = Albumin                                        



             Level)                                              

 

             Protein Total (test 7.7 g/dL     6.4-8.3                   



             code = Protein                                        



             Total)                                              

 

             ALT (test code = 12 U/L       1-33                      



             ALT)                                                

 

             AST (test code = 18 U/L       1-32                      



             AST)                                                

 

             Globulin (test code 3.1 g/dL     2.9-3.1                   



             = Globulin)                                         

 

             A/G Ratio (test code 1.5 ratio                 N            



             = A/G Ratio)                                        

 

             eGFR AA (test code = >60                       N            eGFR (e

stimated



             eGFR AA)     mL/min/1.73 m2                           Glomerular



                                                                 Filtration Rate

) is



                                                                 an estimated va

lue,



                                                                 calculated from

 the



                                                                 patient's serum



                                                                 creatinine usin

g the



                                                                 MDRD equation. 

It is



                                                                 NOT the patient

's



                                                                 actual GFR. The

 eGFR



                                                                 provides a more



                                                                 clinically usef

ul



                                                                 measure of kidn

ey



                                                                 disease than se

rum



                                                                 creatinine



                                                                 alone.***This



                                                                 calculation rimma

es



                                                                 sex and race in

to



                                                                 account, if the



                                                                 information is



                                                                 provided. If th

e



                                                                 race is not



                                                                 provided, and t

he



                                                                 patient is



                                                                 -Crystal

n,



                                                                 multiply by 1.2

12.



                                                                 If sex is not



                                                                 provided, and t

he



                                                                 patient is fema

le,



                                                                 multiply by 0.7

42.



                                                                 Results for pat

ients



                                                                 <18 years of ag

e



                                                                 have not been



                                                                 validated by th

e



                                                                 MDRD study and



                                                                 should be



                                                                 interpreted wit

h



                                                                 caution. eGFR R

esult



                                                                 Interpretation:

eGFR



                                                                 > or = 60 is in

 the



                                                                 Normal RangeeGF

R <



                                                                 60 may mean kid

hang



                                                                 diseaseeGFR < 1

5 may



                                                                 mean kidney



                                                                 failure*** Rang

es



                                                                 recommended by 

the



                                                                 [employer]



                                                                 [employer],



                                                                 http://nkdep.ni

h.gov

 

             eGFR Non-AA (test 58.45                     N            eGFR (sheba

mated



             code = eGFR Non-AA) mL/min/1.73 m2                           Glomer

ular



                                                                 Filtration Rate

) is



                                                                 an estimated va

lue,



                                                                 calculated from

 the



                                                                 patient's serum



                                                                 creatinine usin

g the



                                                                 MDRD equation. 

It is



                                                                 NOT the patient

's



                                                                 actual GFR. The

 eGFR



                                                                 provides a more



                                                                 clinically usef

ul



                                                                 measure of kidn

ey



                                                                 disease than se

rum



                                                                 creatinine



                                                                 alone.***This



                                                                 calculation rimma

es



                                                                 sex and race in

to



                                                                 account, if the



                                                                 information is



                                                                 provided. If th

e



                                                                 race is not



                                                                 provided, and t

he



                                                                 patient is



                                                                 -Crystal

n,



                                                                 multiply by 1.2

12.



                                                                 If sex is not



                                                                 provided, and t

he



                                                                 patient is fema

le,



                                                                 multiply by 0.7

42.



                                                                 Results for pat

ients



                                                                 <18 years of ag

e



                                                                 have not been



                                                                 validated by th

e



                                                                 MDRD study and



                                                                 should be



                                                                 interpreted wit

h



                                                                 caution. eGFR R

esult



                                                                 Interpretation:

eGFR



                                                                 > or = 60 is in

 the



                                                                 Normal RangeeGF

R <



                                                                 60 may mean kid

hang



                                                                 diseaseeGFR < 1

5 may



                                                                 mean kidney



                                                                 failure*** Rang

es



                                                                 recommended by 

the



                                                                 [employer]



                                                                 [employer],



                                                                 http://nkdep.ni

h.gov



Alcohol [id number] 01:23:31





             Test Item    Value        Reference Range Interpretation Comments

 

             Ethanol Level (test <0.00 g/dL   0.00-0.01                 [facility]

gianfranco 0.080 g/dL



             code = Ethanol                                        or more



             Level)                                              

 

             Ethanol Inst (test <0                        N            



             code = Ethanol Inst)                                        



Complete Blood Count with [id number] 00:56:12





             Test Item    Value        Reference Range Interpretation Comments

 

             WBC (test code = WBC) 19.4 x10     4.4-10.5     H            

 

             RBC (test code = RBC) 4.53 x10     3.75-5.20                 

 

             Hgb (test code = Hgb) 13.5 g/dL    12.2-14.8                 

 

             Hct (test code = Hct) 41.3 %       36.5-44.4                 

 

             MCV (test code = MCV) 91.20 fL     80..00              

 

             MCHC (test code = 32.70 g/dL   32.00-37.50               



             MCHC)                                               

 

             RDW CV (test code = 13.5 %       11.5-14.5                 



             RDW CV)                                             

 

             MCH (test code = MCH) 29.8 pg      27.0-32.5                 

 

             Platelets (test code = 462.0 x10    140.0-440.0  H            



             Platelets)                                          

 

             MPV (test code = MPV) 9.9 fL                    N            

 

             Slide Review (test Auto         Auto                      Result cr

eated by



             code = Slide Review)                                        [email or web address]

[email or web address]_AUTO

 

             nRBC (test code = 0                         N            



             nRBC)                                               

 

             NRBC Abs (test code = 0.00 x10                  N            



             NRBC Abs)                                           

 

             IPF (test code = IPF) 0 %                       N            



Automated [id number] 00:56:12





             Test Item    Value        Reference Range Interpretation Comments

 

             Neutro Auto (test code = Neutro 83.7 %       36.0-70.0    H        

    



             Auto)                                               

 

             Lymph Auto (test code = Lymph Auto) 8.9 %        12.0-44.0    L    

        

 

             Mono Auto (test code = Mono Auto) 5.0 %        0.0-11.0            

      

 

             Eos, Auto (test code = Eos, Auto) 1.4 %        0.0-7.0             

      

 

             Basophil Auto (test code = Basophil 0.7 %        0.0-2.0           

        



             Auto)                                               

 

             Neutro Absolute (test code = Neutro 16.2 x10     1.6-7.4      H    

        



             Absolute)                                           

 

             Lymph Absolute (test code = Lymph 1.73 x10     .50-4.60            

      



             Absolute)                                           

 

             Mono Absolute (test code = Mono .96 x10      .00-1.20              

    



             Absolute)                                           

 

             Eos Absolute (test code = Eos 0.27 x10     0.00-0.74               

  



             Absolute)                                           

 

             Baso Absolute (test code = Baso 0.13 x10     0.00-0.21             

    



             Absolute)                                           



IG [id number] 00:56:12





             Test Item    Value        Reference Range Interpretation Comments

 

             IG (test code = IG) 0.3 %        0.0-5.0                   

 

             IG Abs (test code = IG Abs) 0 x10                     N            



Urinalysis with Culture, if [id number] 00:55:34





             Test Item    Value        Reference Range Interpretation Comments

 

             UA Color (test code = STRAW        Yellow                    



             UA Color)                                           

 

             UA Appear (test code = CLEAR        Clear                     



             UA Appear)                                          

 

             UA pH (test code = UA 5                         N            



             pH)                                                 

 

             UA Spec Grav (test code 1.001        1.001-1.035               



             = UA Spec Grav)                                        

 

             UA Glucose (test code = NEG          Negative                  



             UA Glucose)                                         

 

             UA Bili (test code = UA NEG          Negative                  



             Bili)                                               

 

             UA Ketones (test code = NEG          Negative                  



             UA Ketones)                                         

 

             UA Blood (test code = NEG          Negative                  



             UA Blood)                                           

 

             UA Protein (test code = NEG          Negative                  



             UA Protein)                                         

 

             UA Urobilinogen (test 0.2 mg/dL                 N            



             code = UA Urobilinogen)                                        

 

             UA Nitrite (test code = POS          Negative     A            



             UA Nitrite)                                         

 

             UA Leuk Est (test code 25 cells/mcL Negative     A            



             = UA Leuk Est)                                        

 

             UA Micro Ind? (test Indicated    Not Indicated A            Result 

created by



             code = UA Micro Ind?)                                        rule



                                                                 [email or web address]

_IN



                                                                 D



Urine [id number] 08:13:23





             Test Item    Value        Reference Range Interpretation Comments

 

             ORGANISM (test code = Escherichia coli                           



             ORGANISM)                                           

 

             Amikacin (test code =                           S            



             Amik)                                               

 

             Ampicillin (test code =                           S            



             Amp)                                                

 

             Ampicillin/Sulbactam                           S            



             (test code = Amp/Sul)                                        

 

             Cefazolin (test code =                           S            



             Cefaz)                                              

 

             Cefepime (test code =                           S            



             Cefep)                                              

 

             Cefotaxime (test code =                           S            



             Cefo)                                               

 

             Ceftazidime (test code =                           S            



             Ceftaz)                                             

 

             Ceftriaxone (test code =                           S            



             Ceftri)                                             

 

             Cefuroxime (test code =                           S            



             Cefur)                                              

 

             Ciprofloxacin (test code                           S            



             = Cipro)                                            

 

             Gentamicin (test code =                           S            



             Gent)                                               

 

             Imipenem (test code =                           S            



             Imi)                                                

 

             Levofloxacin (test code                           S            



             = Levo)                                             

 

             Meropenem (test code =                           S            



             Sindi)                                               

 

             Nitrofurantoin (test                           S            



             code = Nitro)                                        

 

             Piperacillin/Tazobactam                           S            



             (test code = Pip/Blaine)                                        

 

             Tobramycin (test code =                           S            



             Tobra)                                              

 

             Trimethoprim/Sulfa (test                           S            



             code = SXT)                                         

 

             Final Report (test code 30,000 cfu/ml                           



             = Final Report) Escherichia coli                           



                          20,000 cfu/ml                           



                          Diphtheroids                           



 C Urine Added by [email or web address] Qualitative2019-09-15 04:57:25





             Test Item    Value        Reference Range Interpretation Comments

 

             RPR Qual (test code = RPR Qual) Non-Reactive Non-Reactive          

    

 

             Reactive Control (test code = Reactive                             

  



             Reactive Control)                                        

 

             Weak Reactive Control (test Weak Reactive                          

 



             code = Weak Reactive Control)                                      

  

 

             Non-Reactive Control (test code Non-Reactive                       

    



             = Non-Reactive Control)                                        

 

             Lot # (test code = Lot #) [id number]                    N            

 

             Expiration Dt (test code = 10.31.2020                N            



             Expiration Dt)                                        



Thyroid Stimulating Hormone2019-09-15 01:22:43





             Test Item    Value        Reference Range Interpretation Comments

 

             TSH (test code = TSH) 3.370 mIU/mL 0.270-4.200               



Lipid Panel2019-09-15 01:17:51





             Test Item    Value        Reference Range Interpretation Comments

 

             Cholesterol Total 168 mg/dL    0-200                     RISK OF HE

ART



             (test code =                                        DISEASEPublishe

d by



             Cholesterol Total)                                        [employer] 

Heart



                                                                 Association Judith

lyte



                                                                 Optimal Borderl

ine



                                                                 Increased RiskC

HOL <200



                                                                 200-239 >240TRI

G <150



                                                                 150-199 >200HDL

 Male



                                                                 >60 <40HDL Fema

le  >60



                                                                 &lt;50LDL <100 

130-159



                                                                 >160LDL Near op

timal is



                                                                 100-129

 

             Triglycerides (test 108 mg/dL    9-200                     



             code = Triglycerides)                                        

 

             HDL (test code = HDL) 46 mg/dL     50-60        L            

 

             LDL (test code = LDL) 100 mg/dL    0-130                     The eq

uation being used



                                                                 in this calcula

tion is



                                                                 LDL = (Chol - H

DL) -



                                                                 (Trig / 5)

 

             VLDL (test code = 22 mg/dL     5-40                      The equati

on being used



             VLDL)                                               in this calcula

tion is



                                                                 VLDL = Trig / 5

 

             Chol/HDL (test code = 3.7 ratio    0.0-4.4                   



             Chol/HDL)                                           

 

             LDL/HDL Ratio (test 2                         N            The equa

tion being used



             code = LDL/HDL Ratio)                                        in thi

s calculation is



                                                                 LDL/HDL Ratio=L

DL



                                                                 Calc/HDL Chol



Lithium Level2019-09-15 01:17:51





             Test Item    Value        Reference Range Interpretation Comments

 

             Lithium Level (test code = 0.81 mmol/L  0.60-1.20                 



             Lithium Level)                                        



Comprehensive Metabolic Panel2019-09-15 00:04:54





             Test Item    Value        Reference Range Interpretation Comments

 

             Sodium Level (test code = Sodium 137.0 mmol/L 135.0-145.0          

     



             Level)                                              

 

             Potassium Level (test code = 4.0 mmol/L   3.5-5.1                  

 



             Potassium Level)                                        

 

             Chloride Level (test code = 103 mmol/L                       



             Chloride Level)                                        

 

             CO2 (test code = CO2) 23 mmol/L    22-29                     

 

             Anion Gap (test code = Anion 11 mmol/L    7-16                     

 



             Gap)                                                

 

             BUN (test code = BUN) 11.50 mg/dL  6.00-20.00                

 

             Creatinine Level (test code = 1.00 mg/dL   0.50-0.90    H          

  



             Creatinine Level)                                        

 

             BUN/Creat Ratio (test code = 12                        N           

 



             BUN/Creat Ratio)                                        

 

             Glucose Level (test code = 108 mg/dL                        



             Glucose Level)                                        

 

             Calcium Level (test code = 10.3 mg/dL   8.3-10.5                  



             Calcium Level)                                        

 

             Alk Phos (test code = Alk Phos) 93 U/L                       

    

 

             Bilirubin Total (test code = 0.5 mg/dL    0.1-0.9                  

 



             Bilirubin Total)                                        

 

             Albumin Level (test code = 4.4 g/dL     3.5-5.2                   



             Albumin Level)                                        

 

             Protein Total (test code = 7.2 g/dL     6.4-8.3                   



             Protein Total)                                        

 

             ALT (test code = ALT) 12 U/L       1-33                      

 

             AST (test code = AST) 17 U/L       1-32                      

 

             Globulin (test code = Globulin) 2.8 g/dL     2.9-3.1      L        

    

 

             A/G Ratio (test code = A/G 1.6 ratio                 N            



             Ratio)                                              



Alcohol Level2019-09-15 00:04:54





             Test Item    Value        Reference Range Interpretation Comments

 

             Ethanol Level (test <0.00 g/dL   0.00-0.01                 Intoxica

gianfranco 0.080 g/dL



             code = Ethanol                                        or more



             Level)                                              

 

             Ethanol Inst (test <0                        N            



             code = Ethanol Inst)                                        



Comprehensive Metabolic Panel2019-09-15 00:04:54





             Test Item    Value        Reference Range Interpretation Comments

 

             Sodium Level (test 137.0 mmol/L 135.0-145.0               



             code = Sodium Level)                                        

 

             Potassium Level 4.0 mmol/L   3.5-5.1                   



             (test code =                                        



             Potassium Level)                                        

 

             Chloride Level (test 103 mmol/L                       



             code = Chloride                                        



             Level)                                              

 

             CO2 (test code = 23 mmol/L    22-29                     



             CO2)                                                

 

             Anion Gap (test code 11 mmol/L    7-16                      



             = Anion Gap)                                        

 

             BUN (test code = 11.50 mg/dL  6.00-20.00                



             BUN)                                                

 

             Creatinine Level 1.00 mg/dL   0.50-0.90    H            



             (test code =                                        



             Creatinine Level)                                        

 

             BUN/Creat Ratio 12                        N            



             (test code =                                        



             BUN/Creat Ratio)                                        

 

             Glucose Level (test 108 mg/dL                        



             code = Glucose                                        



             Level)                                              

 

             Calcium Level (test 10.3 mg/dL   8.3-10.5                  



             code = Calcium                                        



             Level)                                              

 

             Alk Phos (test code 93 U/L                           



             = Alk Phos)                                         

 

             Bilirubin Total 0.5 mg/dL    0.1-0.9                   



             (test code =                                        



             Bilirubin Total)                                        

 

             Albumin Level (test 4.4 g/dL     3.5-5.2                   



             code = Albumin                                        



             Level)                                              

 

             Protein Total (test 7.2 g/dL     6.4-8.3                   



             code = Protein                                        



             Total)                                              

 

             ALT (test code = 12 U/L       1-33                      



             ALT)                                                

 

             AST (test code = 17 U/L       1-32                      



             AST)                                                

 

             Globulin (test code 2.8 g/dL     2.9-3.1      L            



             = Globulin)                                         

 

             A/G Ratio (test code 1.6 ratio                 N            



             = A/G Ratio)                                        

 

             eGFR AA (test code = >60                       N            eGFR (e

stimated



             eGFR AA)     mL/min/1.73 m2                           Glomerular



                                                                 Filtration Rate

) is



                                                                 an estimated va

lue,



                                                                 calculated from

 the



                                                                 patient's serum



                                                                 creatinine usin

g the



                                                                 MDRD equation. 

It is



                                                                 NOT the patient

's



                                                                 actual GFR. The

 eGFR



                                                                 provides a more



                                                                 clinically usef

ul



                                                                 measure of kidn

ey



                                                                 disease than se

rum



                                                                 creatinine



                                                                 alone.***This



                                                                 calculation rimma

es



                                                                 sex and race in

to



                                                                 account, if the



                                                                 information is



                                                                 provided. If th

e



                                                                 race is not



                                                                 provided, and t

he



                                                                 patient is



                                                                 -Crystal

n,



                                                                 multiply by 1.2

12.



                                                                 If sex is not



                                                                 provided, and t

he



                                                                 patient is fema

le,



                                                                 multiply by 0.7

42.



                                                                 Results for pat

ients



                                                                 <18 years of ag

e



                                                                 have not been



                                                                 validated by th

e



                                                                 MDRD study and



                                                                 should be



                                                                 interpreted wit

h



                                                                 caution. eGFR R

esult



                                                                 Interpretation:

eGFR



                                                                 > or = 60 is in

 the



                                                                 Normal RangeeGF

R <



                                                                 60 may mean kid

hang



                                                                 diseaseeGFR < 1

5 may



                                                                 mean kidney



                                                                 failure*** Rang

es



                                                                 recommended by 

the



                                                                 [employer]



                                                                 Foundation,



                                                                 http://nkdep.ni

h.gov



Comprehensive Metabolic Panel2019-09-15 00:04:54





             Test Item    Value        Reference Range Interpretation Comments

 

             Sodium Level (test 137.0 mmol/L 135.0-145.0               



             code = Sodium Level)                                        

 

             Potassium Level 4.0 mmol/L   3.5-5.1                   



             (test code =                                        



             Potassium Level)                                        

 

             Chloride Level (test 103 mmol/L                       



             code = Chloride                                        



             Level)                                              

 

             CO2 (test code = 23 mmol/L    22-29                     



             CO2)                                                

 

             Anion Gap (test code 11 mmol/L    7-16                      



             = Anion Gap)                                        

 

             BUN (test code = 11.50 mg/dL  6.00-20.00                



             BUN)                                                

 

             Creatinine Level 1.00 mg/dL   0.50-0.90    H            



             (test code =                                        



             Creatinine Level)                                        

 

             BUN/Creat Ratio 12                        N            



             (test code =                                        



             BUN/Creat Ratio)                                        

 

             Glucose Level (test 108 mg/dL                        



             code = Glucose                                        



             Level)                                              

 

             Calcium Level (test 10.3 mg/dL   8.3-10.5                  



             code = Calcium                                        



             Level)                                              

 

             Alk Phos (test code 93 U/L                           



             = Alk Phos)                                         

 

             Bilirubin Total 0.5 mg/dL    0.1-0.9                   



             (test code =                                        



             Bilirubin Total)                                        

 

             Albumin Level (test 4.4 g/dL     3.5-5.2                   



             code = Albumin                                        



             Level)                                              

 

             Protein Total (test 7.2 g/dL     6.4-8.3                   



             code = Protein                                        



             Total)                                              

 

             ALT (test code = 12 U/L       1-33                      



             ALT)                                                

 

             AST (test code = 17 U/L       1-32                      



             AST)                                                

 

             Globulin (test code 2.8 g/dL     2.9-3.1      L            



             = Globulin)                                         

 

             A/G Ratio (test code 1.6 ratio                 N            



             = A/G Ratio)                                        

 

             eGFR AA (test code = >60                       N            eGFR (e

stimated



             eGFR AA)     mL/min/1.73 m2                           Glomerular



                                                                 Filtration Rate

) is



                                                                 an estimated va

lue,



                                                                 calculated from

 the



                                                                 patient's serum



                                                                 creatinine usin

g the



                                                                 MDRD equation. 

It is



                                                                 NOT the patient

's



                                                                 actual GFR. The

 eGFR



                                                                 provides a more



                                                                 clinically usef

ul



                                                                 measure of kidn

ey



                                                                 disease than se

rum



                                                                 creatinine



                                                                 alone.***This



                                                                 calculation rimma

es



                                                                 sex and race in

to



                                                                 account, if the



                                                                 information is



                                                                 provided. If th

e



                                                                 race is not



                                                                 provided, and t

he



                                                                 patient is



                                                                 -Crystal

n,



                                                                 multiply by 1.2

12.



                                                                 If sex is not



                                                                 provided, and t

he



                                                                 patient is fema

le,



                                                                 multiply by 0.7

42.



                                                                 Results for pat

ients



                                                                 <18 years of ag

e



                                                                 have not been



                                                                 validated by [facility]



                                                                 MDRD study and



                                                                 should be



                                                                 interpreted wit

h



                                                                 caution. eGFR R

esult



                                                                 Interpretation:

eGFR



                                                                 > or = 60 is in

 the



                                                                 Normal RangeeGF

R <



                                                                 60 may mean kid

hang



                                                                 diseaseeGFR < 1

5 may



                                                                 mean kidney



                                                                 failure*** Rang

es



                                                                 recommended by 

the



                                                                 [employer]



                                                                 Foundation,



                                                                 http://nkdep.ni

h.gov

 

             eGFR Non-AA (test 58.45                     N            eGFR (sheba

mated



             code = eGFR Non-AA) mL/min/1.73 m2                           Glomer

ular



                                                                 Filtration Rate

) is



                                                                 an estimated va

lue,



                                                                 calculated from

 the



                                                                 patient's serum



                                                                 creatinine usin

g the



                                                                 MDRD equation. 

It is



                                                                 NOT the patient

's



                                                                 actual GFR. The

 eGFR



                                                                 provides a more



                                                                 clinically usef

ul



                                                                 measure of kidn

ey



                                                                 disease than se

rum



                                                                 creatinine



                                                                 alone.***This



                                                                 calculation rimma

es



                                                                 sex and race in

to



                                                                 account, if the



                                                                 information is



                                                                 provided. If th

e



                                                                 race is not



                                                                 provided, and t

he



                                                                 patient is



                                                                 -Crystal

n,



                                                                 multiply by 1.2

12.



                                                                 If sex is not



                                                                 provided, and t

he



                                                                 patient is fema

le,



                                                                 multiply by 0.7

42.



                                                                 Results for pat

ients



                                                                 <18 years of ag

e



                                                                 have not been



                                                                 validated by [facility]



                                                                 MDRD study and



                                                                 should be



                                                                 interpreted wit

h



                                                                 caution. eGFR R

esult



                                                                 Interpretation:

eGFR



                                                                 > or = 60 is in

 the



                                                                 Normal RangeeGF

R <



                                                                 60 may mean kid

hang



                                                                 diseaseeGFR < 1

5 may



                                                                 mean kidney



                                                                 failure*** Rang

es



                                                                 recommended by 

the



                                                                 [employer]



                                                                 Foundation,



                                                                 http://nkdep.ni

h.gov



Urinalysis Microscopic2019-09-15 00:02:53





             Test Item    Value        Reference Range Interpretation Comments

 

             UA WBC (test code = UA WBC) 6-10         0-5          A            

 

             UA RBC (test code = UA RBC) 0-5          0-5                       

 

             UA Bacteria (test code = UA Bacteria) Many                      A  

          

 

             UA Squam Epithelial (test code = UA TNTC                      A    

        



             Squam Epithelial)                                        



Urine Drug [id number] 23:59:42





             Test Item    Value        Reference Range Interpretation Comments

 

             Amphetamine Screen Ur POSITIVE     Negative     A            



             (test code = Amphetamine                                        



             Screen Ur)                                          

 

             Barbiturate Screen Ur Negative     Negative                  



             (test code = Barbiturate                                        



             Screen Ur)                                          

 

             Benzodiazepines Ur (test POSITIVE     Negative     A            



             code = Benzodiazepines                                        



             Ur)                                                 

 

             Cocaine Screen Ur (test Negative     Negative                  



             code = Cocaine Screen                                        



             Ur)                                                 

 

             U Methadone Scr (test Negative     Negative                  



             code = U Methadone Scr)                                        

 

             Opiate Screen Ur (test Negative     Negative                  



             code = Opiate Screen Ur)                                        

 

             U PCP Scrn (test code = Negative     Negative                  



             U PCP Scrn)                                         

 

             Cannabinoid Screen Ur Negative     Negative                  



             (test code = Cannabinoid                                        



             Screen Ur)                                          

 

             U TCA (test code = U Negative     Negative                  The res

ults of all



             TCA)                                                drug screen elinor

ts are



                                                                 only preliminar

y.



                                                                 Clinical



                                                                 consideration a

nd



                                                                 professional ju

dgment



                                                                 should be appli

ed to



                                                                 any drug of abu

se



                                                                 test result,



                                                                 particularly wh

en



                                                                 preliminary pos

itive



                                                                 results are obt

ained.



                                                                 Please order a



                                                                 separate confir

matory



                                                                 test if desired

.



HCG Qualitative [id number] 23:54:43





             Test Item    Value        Reference Range Interpretation Comments

 

             hCG Ur (test code = Negative                               If the r

esult is



             hCG Ur)                                             "Negative" in p

atients



                                                                 suspected to be



                                                                 pregnant, recom

mend



                                                                 retest with a s

ample



                                                                 obtained 48 to 

72



                                                                 hours later, or

 by



                                                                 ordering a



                                                                 quantitative as

say. If



                                                                 the result is



                                                                 "Borderline" te

sting



                                                                 should be repea

gianfranco in



                                                                 48 to 72 hours.

 

             Lot # (test code = [id number]                    N            



             Lot #)                                              

 

             Expiration Dt (test 2020                  N            



             code = Expiration Dt)                                        

 

             Neg Control (test Negative                               



             code = Neg Control)                                        

 

             Pos Control (test Positive                               



             code = Pos Control)                                        

 

             Internal QC (test Acceptable                             



             code = Internal QC)                                        



Urinalysis with Culture, if [id number] 23:52:04





             Test Item    Value        Reference Range Interpretation Comments

 

             UA Color (test code = UA YELLO        Yellow                    



             Color)                                              

 

             UA Appear (test code = SCLD         Clear        A            



             UA Appear)                                          

 

             UA pH (test code = UA 6.5                                    



             pH)                                                 

 

             UA Spec Grav (test code 1.011        1.001-1.035               



             = UA Spec Grav)                                        

 

             UA Glucose (test code = NEG          Negative                  



             UA Glucose)                                         

 

             UA Bili (test code = UA NEG          Negative                  



             Bili)                                               

 

             UA Ketones (test code = NEG          Negative                  



             UA Ketones)                                         

 

             UA Blood (test code = UA NEG          Negative                  



             Blood)                                              

 

             UA Protein (test code = NEG          Negative                  



             UA Protein)                                         

 

             UA Urobilinogen (test 1 mg/dL      >0.2         A            



             code = UA Urobilinogen)                                        

 

             UA Nitrite (test code = POS          Negative     A            



             UA Nitrite)                                         

 

             UA Leuk Est (test code = NEG          Negative                  



             UA Leuk Est)                                        

 

             UA Micro Ind? (test code Indicated    Not Indicated A            Re

sult created by



             = UA Micro Ind?)                                        rule



                                                                 [id number]

_IND



Automated [id number] 23:48:39





             Test Item    Value        Reference Range Interpretation Comments

 

             Neutro Auto (test code = Neutro 75.7 %       36.0-70.0    H        

    



             Auto)                                               

 

             Lymph Auto (test code = Lymph Auto) 14.1 %       12.0-44.0         

        

 

             Mono Auto (test code = Mono Auto) 7.6 %        0.0-11.0            

      

 

             Eos, Auto (test code = Eos, Auto) 1.8 %        0.0-7.0             

      

 

             Basophil Auto (test code = Basophil 0.5 %        0.0-2.0           

        



             Auto)                                               

 

             Neutro Absolute (test code = Neutro 12.7 x10     1.6-7.4      H    

        



             Absolute)                                           

 

             Lymph Absolute (test code = Lymph 2.38 x10     .50-4.60            

      



             Absolute)                                           

 

             Mono Absolute (test code = Mono 1.28 x10     .00-1.20     H        

    



             Absolute)                                           

 

             Eos Absolute (test code = Eos 0.31 x10     0.00-0.74               

  



             Absolute)                                           

 

             Baso Absolute (test code = Baso 0.09 x10     0.00-0.21             

    



             Absolute)                                           



IG [id number] 23:48:39





             Test Item    Value        Reference Range Interpretation Comments

 

             IG (test code = IG) 0.3 %        0.0-5.0                   

 

             IG Abs (test code = IG Abs) 0 x10                     N            



Complete Blood Count with [id number] 23:48:38





             Test Item    Value        Reference Range Interpretation Comments

 

             WBC (test code = WBC) 16.8 x10     4.4-10.5     H            

 

             RBC (test code = RBC) 4.06 x10     3.75-5.20                 

 

             Hgb (test code = Hgb) 12.7 g/dL    12.2-14.8                 

 

             MCV (test code = MCV) 94.10 fL     80..00              

 

             Hct (test code = Hct) 38.2 %       36.5-44.4                 

 

             MCHC (test code = 33.20 g/dL   32.00-37.50               



             MCHC)                                               

 

             RDW CV (test code = 13.2 %       11.5-14.5                 



             RDW CV)                                             

 

             MCH (test code = MCH) 31.3 pg      27.0-32.5                 

 

             Platelets (test code = 363.0 x10    140.0-440.0               



             Platelets)                                          

 

             MPV (test code = MPV) 10.0 fL                   N            

 

             Slide Review (test Auto         Auto                      Result cr

eated by



             code = Slide Review)                                        GL_SJM_

SLIDE_REV_AUTO

 

             nRBC (test code = 0                         N            



             nRBC)                                               

 

             NRBC Abs (test code = 0.00 x10                  N            



             NRBC Abs)                                           

 

             IPF (test code = IPF) 0 %                       N            



RPR, [id number] 05:53:00





             Test Item    Value        Reference Range Interpretation Comments

 

             RPR (test code = RPR) Non-Reactive Non-Reactive N            



BHCG, Serum, [id number] 01:39:00





             Test Item    Value        Reference Range Interpretation Comments

 

             Preg Qual [Se] (test code = BSHCG) Negative     Negative     N     

       



BHCG, Serum, [id number] 01:09:00





             Test Item    Value        Reference Range Interpretation Comments

 

             B hCG, Quant (test <1 mIU/mL                              Weeks of 

Gestation



             code = BHCGQT)                                        Ranges (mIU/m

L)3 weeks



                                                                 5.40 - 72.04 we

eks 10.2



                                                                 - 7085 weeks 21

7 - 33268



                                                                 weeks 152 - 321

777 weeks



                                                                 4059 - 9065531 

weeks



                                                                 93536 - 4491154

 weeks



                                                                 92111 - 1391179

0 weeks



                                                                 74020 - 5482669

2 weeks



                                                                 24866 - 8596664

4 weeks



                                                                 83808 - 0466813

 weeks



                                                                 19918 - 9714827

 weeks



                                                                 8904 - 7330273 

weeks



                                                                 8240  1163850 

weeks



                                                                 0835 - 43591



Thyroid Stimulating Hormone (TSH)2017 01:09:00





             Test Item    Value        Reference Range Interpretation Comments

 

             TSH (test code = TSH) 0.93 mIU/mL  0.270-4.200  N            



Lipid [id number] 01:05:00





             Test Item    Value        Reference Range Interpretation Comments

 

             Cholesterol (test 163 mg/dL    0-200        N            



             code = CHOL)                                        

 

             Triglycerides (test 108 mg/dL    9-200        N            



             code = TRIG)                                        

 

             HDL (test code = 41 mg/dL     50-60        L            



             HDL)                                                

 

             Chol/HDL (test code 4.0 Ratio    0.0-4.4      N            



             = CHOLPHDL)                                         

 

             LDL, Calculated 100          0-130        N            (NOTE)RISK O

F HEART



             (test code = LDLC)                                        DISEASEPu

blished by



                                                                 American Heart



                                                                 AssociationAnal

yte



                                                                 Optimal Boderli

ne



                                                                 Increased RiskC

HOL <200



                                                                 200-239  >240TR

IG <150



                                                                 150-199 >200HDL

 Male:



                                                                 >60 <40HDL Fema

le: >60



                                                                 <50LDL <100 130

-159



                                                                 >160LDL NEAR OP

TIMAL IS



                                                                 100-129

 

             VLDL (test code = 22 mg/dL     5-40         N            



             VLDL)                                               

 

             LDL/HDL (test code = 2                                      



             LDLPHDL)                                            



Comprehensive Metabolic [id number] 21:02:00





             Test Item    Value        Reference Range Interpretation Comments

 

             Sodium (test code = 140 mmol/L   135-145      N            



             NA)                                                 

 

             Potassium (test 3.6 mmol/L   3.5-5.1      N            



             code = K)                                           

 

             Chloride (test code 103 mmol/L          N            



             = CL)                                               

 

             Carbon Dioxide 26 mmol/L    22-29        N            



             (test code = CO2)                                        

 

             Glucose (test code 89 mg/dL            N            



             = GLU)                                              

 

             Blood Urea Nitrogen 13 mg/dL     6-20         N            



             (test code = BUN)                                        

 

             Creatinine (test 0.8 mg/dL    0.5-0.9      N            



             code = CREAT)                                        

 

             Calcium (test code 10.0 mg/dL   8.3-10.5     N            



             = CA)                                               

 

             Prot Total (test 7.0 g/dL     6.4-8.3      N            



             code = TP)                                          

 

             Albumin (test code 4.2 g/dL     3.5-5.2      N            



             = ALB)                                              

 

             A/G Ratio (test 1.5 Ratio                              



             code = AGRATIO)                                        

 

             Globulin (test code 2.8          2.9-3.1      L            



             = GLOB)                                             

 

             Bili Total (test 0.6 mg/dL    0.1-0.9      N            



             code = TBIL)                                        

 

             Alk Phos (test code 91 U/L              N            



             = APHOS)                                            

 

             AST (test code = 19 U/L       1-32         N            



             AST)                                                

 

             ALT (test code = 14 U/L       1-33         N            



             ALT)                                                

 

             BUN/Creatinine 16.3                                   



             Ratio (test code =                                        



             BCRATIO)                                            

 

             Anion Gap (test 11 mmol/L    7-16         N            



             code = AGAP)                                        

 

             Estimated GFR (test >60                                    eGFR (es

timated



             code = GFR)  mL/min/1.73m2                           Glomerular Nguyễn

tration



                                                                 Rate) is an est

imated



                                                                 value,calculate

d from



                                                                 the patient's s

harman



                                                                 creatinine usin

g the



                                                                 MDRD equation.I

t is



                                                                 NOT the patient

's



                                                                 actual GFR. The

 eGFR



                                                                 provides a more



                                                                 clinicallyusefu

l



                                                                 measure of kidn

ey



                                                                 disease than se

rum



                                                                 creatinine



                                                                 alone.***This



                                                                 calculation rimma

es sex



                                                                 and race into



                                                                 account, if the



                                                                 informationis



                                                                 provided. If th

e race



                                                                 is not provided

, and



                                                                 the patient



                                                                 isAfrican-Ameri

can,



                                                                 multiply by 1.2

12. If



                                                                 sex is not prov

ided,



                                                                 and thepatient 

is



                                                                 female, multipl

y by



                                                                 0.742. Results 

for



                                                                 patients <18 ye

ars



                                                                 ofage have not 

been



                                                                 validated by 

e MDRD



                                                                 study and shoul

d be



                                                                 interpretedwith



                                                                 caution.eGFR Re

sult



                                                                 Interpretation:

eGFR >



                                                                 or = 60 is in t

he



                                                                 Normal RangeeGF

R < 60



                                                                 may mean kidney



                                                                 diseaseeGFR < 1

5 may



                                                                 mean kidney



                                                                 failure***Range

s



                                                                 recommended by 

the



                                                                 [employer]



                                                                 Foundation,http

://nkd



                                                                 ep.nih.gov



Alcohol/Ethanol, [id number] 21:02:00





             Test Item    Value        Reference Range Interpretation Comments

 

             Alcohol, Ethyl <0.01 g/dL   0.00-0.01    N            Intoxicated 0

.080 g/dL



             (test code = ETOH)                                        or more



CBC with [id number] 20:35:00





             Test Item    Value        Reference Range Interpretation Comments

 

             WBC (test code = WBC) 10.3 K/cumm  4.4-10.5     N            

 

             RBC (test code = RBC) 4.37 M/cumm  3.75-5.20    N            

 

             Hemoglobin (test code = HGB) 13.1 gm/dL   12.2-14.8    N           

 

 

             Hematocrit (test code = HCT) 41.5 %       36.5-44.4    N           

 

 

             MCV (test code = MCV) 94.9 fL             N            

 

             MCH (test code = MCH) 30.1 pg      27.0-32.5    N            

 

             MCHC (test code = MCHC) 31.7 g/dL    32.0-37.5    L            

 

             RDW (test code = RDW) 12.9 %       11.5-14.5    N            

 

             Platelet Count (test code = 327 K/cumm   140-440      N            



             PLTCT)                                              

 

             MPV (test code = MPV) 7.0 fL                                 

 

             Diff Method (test code = DIFFM) Auto                               

    

 

             Neutrophil (test code = NEUT) 74.0 %       36-70        H          

  

 

             Lymphocyte (test code = LYMPH) 17.6 %       12-44        N         

   

 

             Monocyte (test code = MONO) 6.4 %        0-11         N            

 

             Eosinophil (test code = EOS) 1.5 %        0-7          N           

 

 

             Basophil (test code = BASO) 0.5 %        0-2          N            

 

             Neutro Abs (test code = ANEUT) 7.6 K/cumm   1.6-7.4      H         

   

 

             Lymph Abs (test code = ALYMPH) 1.8 K/cumm   0.5-4.6      N         

   

 

             Mono Abs (test code = AMONO) 0.7 K/cumm   0.0-1.2      N           

 

 

             Eos Abs (test code = AEOS) 0.16 K/cumm  0.00-0.74    N            

 

             Baso Abs (test code = ABASO) 0.1 K/cumm   0.00-0.21    N           

 



[id number] 20:33:00





             Test Item    Value        Reference Range Interpretation Comments

 

             Amphetamine (test code POSITIVE     Negative     A            For d

iagnostic purposes



             = AMPH)                                             only, positive 

results



                                                                 should always b

e



                                                                 assessedin



                                                                 conjunctionwith

 the



                                                                 patient's medic

al



                                                                 history,clinica

l



                                                                 examination and



                                                                 otherfindings.T

o



                                                                 fulfill legal



                                                                 requirements, a

 more



                                                                 specific altern

ate



                                                                 chemical method

must be



                                                                 used inorder to

 obtain



                                                                 a Confirmed judith

lytical



                                                                 result. GC/MS i

s the



                                                                 preferred confi

rmatory



                                                                 method.

 

             Barbiturates (test Negative     Negative     N            



             code = GENEVIEVE)                                        

 

             Benzodiazepine (test Negative     Negative     N            



             code = IRENE)                                        

 

             Cocaine (test code = Negative     Negative     N            



             COCA)                                               

 

             Methadone (test code = Negative     Negative     N            



             MTHD)                                               

 

             Opiates (test code = Negative     Negative     N            



             OPIA)                                               

 

             PCP (test code = PCP) Negative     Negative     N            

 

             Propoxyphene (test Negative     Negative     N            



             code = PROPOX)                                        

 

             THC (test code = THC) Negative     Negative     N            



Urinalysis [id number] 20:23:00





             Test Item    Value        Reference Range Interpretation Comments

 

             Color (test code = COLOR) Yellow       Yellow,Straw,Pl N           

 



                                       yellow                    

 

             Clarity (test code = Sl Cloudy    Clear        A            



             CLAR)                                               

 

             Specific Gravity (test 1.007        1.001-1.035  N            



             code = SPGR)                                        

 

             pH (test code = PH) 6.0          5.0-9.0      N            

 

             Ketone (test code = KET) Negative mg/dL Negative     N            

 

             Glucose (test code = Negative mg/dL Negative     N            



             GLUCUR)                                             

 

             Protein (test code = Negative mg/dL Negative     N            



             PROT)                                               

 

             Bilirubin (test code = Negative mg/dL Negative     N            



             BILI)                                               

 

             Occult Blood (test code = Moderate     Negative     A            



             UDOB)                                               

 

             Urobilinogen (test code = 0.2 mg/dL    0.2-1.0      N            



             UROB)                                               

 

             Nitrite (test code = NIT) Positive     Negative     A            

 

             Leuk Esterase (test code Moderate     Negative     A            



             = LEUK)                                             

 

             Micros Exam (test code = Indicated                              



             MEXAM)                                              

 

             Epithelial Cells (test 50+ /LPF     0-30         A            



             code = EPI)                                         

 

             WBC, Urine (test code = 41-50 /HPF   0-5          A            



             UWBC)                                               

 

             RBC, Urine (test code = 3-5 /HPF     0-5          A            



             URBC)                                               

 

             Bacteria (test code = Many /HPF                              



             BACT)                                               



[id number] 16:21:00





             Test Item    Value        Reference Range Interpretation Comments

 

             Lithium (test code = LI) 0.6 mmol/l   0.6-1.2                   



[facility]2017 16:21:00





             Test Item    Value        Reference Range Interpretation Comments

 

             TSH (test code = TSH) 2.14 mIU/L   0.47-4.68                 



[facility]





             Test Item    Value        Reference Range Interpretation Comments

 

             Glucose (test code 77 mg/dl                         



             = GLU)                                              

 

             BUN (test code = 9.0 mg/dl    6.0-17.0                  



             BUN)                                                

 

             Creatinine (test 0.7 mg/dl    0.4-1.2                   



             code = CREA)                                        

 

             Sodium (test code = 139 mmol/l   137-145                   



             NA)                                                 

 

             Potassium (test 4.7 mmol/l   3.5-5.0                   



             code = K)                                           

 

             Chloride (test code 107 mmol/l                       



             = CL)                                               

 

             CO2 (test code = 22 mmol/l    22-30                     



             CO2)                                                

 

             Anion Gap (test 11                                     



             code = GAP)                                         

 

             Calcium (test code 9.8 mg/dl    8.4-10.2                  



             = CALC)                                             

 

             T Protein (test 8.0 gm/dl    5.1-8.7                   



             code = TP)                                          

 

             Albumin (test code 4.4 gm/dl    3.5-4.6                   



             = ALB)                                              

 

             A/G Ratio (test 1.2 %        1.1-2.2                   



             code = AGRAT)                                        

 

             AST (SGOT) (test 20 U/L       11-36                     



             code = AST)                                         

 

             ALT (SGPT) (test 29 U/L       11-40                     



             code = ALT)                                         

 

             Alkaline Phos (test 108 U/L                          



             code = ALKP)                                        

 

             Total Bilirubin 0.6 mg/dl    0.2-1.2                   



             (test code = TBIL)                                        

 

             Globulin (test code 3.6 gm/dl    2.3-3.5      H            



             = GLOBU)                                            

 

             Calcium, Corrected 9.5 mg/dl    8.4-10.2                  Various f

ormulas exist



             (test code =                                        for corrected s

harman



             CALCCORR)                                           calcium results

, each



                                                                 yielding differ

ent



                                                                 values. This co

rrected



                                                                 result was base

d on



                                                                 the formula: Co

rrected



                                                                 Calcium = Serum

Calcium



                                                                 + [0.8 * ( 4 -



                                                                 SerumAlbumin)]

 

             EGFR if  >60                                    



             American (test code mL/min/1.73m\                           



             = EGFRAA)    S\2                                    

 

             EGFR if Non- >60                                    Estimate

d Glomerular



             American (test code mL/min/1.73m\                           Filtrat

ion Rate (eGFR)



             = EGFRNA)    S\2                                    Reference Inter

vals



                                                                 Decision Points

 for 18



                                                                 years and older

 and



                                                                 average body ma

ss: >=



                                                                 60 Does not exc

lude



                                                                 kidney disease.

 30 -



                                                                 59 Suggests mod

erate



                                                                 chronic kidney 

disease



                                                                 and indicates t

he need



                                                                 for further



                                                                 investigation



                                                                 including asses

sment



                                                                 of proteinuria 

and



                                                                 cardiovascular



                                                                 factors. < 30 U

sually



                                                                 indicates a nee

d for



                                                                 referral for



                                                                 assessment and



                                                                 management of c

hronic



                                                                 kidney failure.



[facility]



Notes







                Date/Time       Note            Provider        Source

 

                2023 20:08:00-00:00  [facility]



                                                                



                                 [address] [place] [address]                 



                                                                



                                  Emergency Department Document                 



                                 Signed                         



                                                                



                                Patient: Tyler Judd Medical Record#: [id number]

[id number]                 



                                : 1970 Acct:[id number]               

  



                                Age/Sex: 52 / F Admit/Reg Date: 23        

         



                                Loc: [facility] Room: Report Number: [id number] 

                



                                Attending Dr: John Melton MD                 



                                                                



                                                                



                                                                



                                                                



                                Psych HPI                       



                                                                



                                - General                       



                                Nursing note reviewed: Yes                 



                                Source: patient                 



                                Mode of arrival: ambulatory                 



                                Limitations: no limitations                 



                                Primary Care Provider: Wily Genao         

        



                                                                



                                - General                       



                                Chief Complaint: Psychiatric Symptoms           

      



                                Stated Complaint: PINA                 



                                                                



                                - History of Present Illness                 



                                HPI Narrative:                  



                                                                



                                                    53yo f w/ pmh schizophrenia,

 bipolar d/o, htn, hip fx, brought w/ police w/ pina

for psych eval, pt                                  



                                                    states she has a "low mood,"

 has been feeling depressed for a while, has had 

suicidal ideations in                               



                                                    the past but not currently. 

pt states she was here 2 days ago and tested 

positive for                                        



                                                    methamphetamines and she was

 started on depakote and abilify and thinks that 

has helped w/ her                                   



                                                    suicidal ideations however s

till feels depressed. pt was at [facility] 

this morning and they                               



                                                    found a UTI as well as she w

as pregnant. pt denies any other medical 

complaints. pt states she is h                           



                                                    omeless and has been for 3 w

eeks and thinks that is making her depressed. pt 

[facility] and yeste

rday and they ignored her. pt states she drinks 

alcohol ocassionally, l                             



                                ast drink 3 wks ago. and smoke 2ppd. uses meth. 

(John Melton)                 



                                                                



                                - Related Data                  



                                 Allergies                      



                                                                



                                                                



                                                                



                                Allergy/AdvReac Type Severity Reaction Status Da

te / Time                 



                                                                



                                diphenhydramine Allergy Anxiety Verified 

3 20:13                 



                                                                



                                [From Benadryl]                 



                                                                



                                Penicillins Allergy Difficulty Verified 23

 20:13                 



                                                                



                                 Breathing                      



                                                                



                                risperidone [From Risperdal] Allergy Rash Verifi

ed 23 20:13                 



                                                                



                                                                



                                                                



                                                                



                                Review of Systems                 



                                ROS:                            



                                                                



                                Constitutional: No fevers, chills, sweats, weigh

t loss                 



                                Eye: No visual problems                 



                                ENMT: No ear pain, nasal congestion, sore throat

                 



                                Respiratory: No shortness of breath, cough      

           



                                                    Cardiovascular: No Chest tan

n, palpitations, syncope, swelling in legs, dyspnea

on exertion                                         



                                                    Gastrointestinal: No nausea,

 vomiting, diarrhea, abdominal pain, no difficulty 

swallowing                                          



                                                    Genitourinary: No hematuria,

 no dysuria, no hesitancy, no frequency, no 

incontinence                                        



                                Hema/Lymph: Negative for bruising tendency, swol

alana lymph glands                 



                                                    Endocrine: Negative for exce

ssive thirst, glucose normal, no heat or cold 

intolerance                                         



                                                    Musculoskeletal: No back tan

n, neck pain, joint pain, muscle pain, decreased 

range of motion                                     



                                Integumentary: No rash, pruritus, abrasions, no 

skin ulcers                 



                                                    Neurologic: No focal weaknes

s, no paresthesia, no headaches, numbness or 

tingling, no seizures or                            



                                tremors (John Melton)                 



                                                                



                                Past Medical/Surgical History                 



                                Medical History:                 



                                Medical History (Last Updated 23 @ 22:14 b

y Sudheer Barakat RN)                 



                                Hypertension (Medical) I10                 



                                                                



                                                                



                                                                



                                Family/Social History                 



                                                                



                                - Family History                 



                                Family History: reviewed, not pertinent         

        



                                Family History Of: None Reported                

 



                                                                



                                - Social History                 



                                Living Situation: Homeless                 



                                Do you drink alcohol: No                 



                                Current or Hx of Recreational Drug use: No      

           



                                                                



                                Physical Exam                   



                                Triage Vital Signs:                 



                                                                



                                                                



                                                                



                                                                



                                Temperature 37.4 C 23 20:06               

  



                                                                



                                Temperature Source Oral 23 20:06          

       



                                                                



                                Pulse Rate 89 23 20:06                 



                                                                



                                Respiratory Rate 17 23 20:06              

   



                                                                



                                Blood Pressure 129/55 L 23 20:06          

       



                                                                



                                Blood Pressure Source Automatic Cuff 23 20

:06                 



                                                                



                                Blood Pressure Mean 79 23 20:06           

      



                                                                



                                Blood Pressure Position Sitting 23 20:06  

               



                                                                



                                O2 Sat by Pulse Oximetry 97 23 20:06      

           



                                                                



                                Oxygen Delivery Method 23 20:06           

      



                                                                



                                                                



                                Physical Exam:                  



                                                                



                                CONSTITUTIONAL: no apparent distress, well appea

ring                 



                                SKIN: warm, dry, no jaundice, hives or petechiae

                 



                                                    EYES: pupils are equally rou

nd, extraocular movements intact without nystagmus,

clear conjunctiva,                                  



                                non-icteric sclera                 



                                                    HENT: normocephalic, atrauma

tic, moist mucus membranes, oropharynx clear 

without exudates                                    



                                                    NECK: Nontender and supple w

ith no nuchal rigidity, no lymphadenopathy, full 

range of motion                                     



                                                    PULMONARY: clear to ausculta

tion without wheezes, rhonchi, or rales, normal 

excursion, no accessory                             



                                muscle use and no stridor                 



                                                    CARDIOVASCULAR: regular rate

, rhythm, normal S1 and S2. No appreciated murmurs.

Strong radial pulses                                



                                with intact distal perfusion                 



                                                    GASTROINTESTINAL: soft, non-

tender, non-distended, no palpable masses, no 

rebound or guarding                                 



                                                    PSYCHIATRIC: depressed mood 

and affect, denies si/hi, denies hallucinations 

(John Melton)                                       



                                                                



                                Results/Orders                  



                                                                



                                - Results and Orders                 



                                Result diagrams:                 



                                 23 20:19                 



                                                                



                                 23 20:19                 



                                                                



                                - Results and Orders                 



                                Lab Testing Results                 



                                                                



                                                    23 20:19: WBC 7.9, RBC

 4.27, Hgb 12.9, Hct 40.2, MCV 94.10, MCH 30.2, 

MCHC 32.10, RDW Coeff of                            



                                                    Amanda 12.4, Plt Count 211.0, M

PV 11.1, Immature Gran % (Auto) 0.3, Neut % (Auto) 

75.8 H, Lymph %                                     



                                                    (Auto) 10.6 L, Mono % (Auto)

 11.8 H, Eos % (Auto) 0.9, Baso % (Auto) 0.6, Neut 

# (Auto) 6.0, Lymph #                               



                                                    (Auto) 0.83, Mono # (Auto) 0

.93, Eos # (Auto) 0.07, Baso # (Auto) 0.05, 

Immature Gran # (Auto) 0.02,                           



                                Absolute Nucleated RBC 0, Nucleated RBC % (auto)

 0                 



                                                    23 20:19: Sodium 142.0

, Potassium 4.2, Chloride 110 H, Carbon Dioxide 26,

Anion Gap 6, BUN 17,                                



                                                    Creatinine 0.88, Estimated C

reat Clear 80.94, Estimated GFR 78 L, 

BUN/Creatinine Ratio 19, Glucose                           



                                                    92, Calculated Osmolality 29

5.0, Calcium 9.1, Total Bilirubin 0.3, AST 22, ALT 

15, Alkaline                                        



                                                    Phosphatase 109, Total Prote

in 6.9, Albumin 3.9, Globulin 3.0, Albumin/Globulin

Ratio 1.3, Ethyl                                    



                                Alcohol < 3                     



                                23 20:19: HCG (Qual) Negative             

    



                                                    23 20:35: Urine Opiate

s Screen Negative, Urine Methadone Screen Negative,

Ur Propoxyphene                                     



                                                    Screen Negative, Ur Barbitur

ates Screen Negative, Ur Phencyclidine Scrn 

Negative, Ur Amphetamines                           



                                                    Screen Negative, U Benzodiaz

epines Scrn Negative, Urine Cocaine Screen 

Negative, U Cannabinoids                            



                                Screen Negative                 



                                                    23 20:35: Urine Color 

Yellow, Urine Clarity Clear, Urine pH 6.5, Ur 

Specific Gravity 1.015,                             



                                                    Urine Protein Negative, Urin

e Glucose (UA) Negative, Urine Ketones Negative, 

Urine Blood Negative,                               



                                                    Urine Nitrate Negative, Urin

e Bilirubin Negative, Urine Urobilinogen 1.0, Ur 

Leukocyte Esterase                                  



                                                    Trace A, Urine WBC (Auto) No

t Reportable, Urine RBC (Auto) Not Reportable, 

Urine Casts (Auto) Not                              



                                                    Reportable, U Epithel Cells 

(Auto) Not Reportable, Urine Bacteria (Auto) Not 

Reportable, Urine RBC                               



                                                    0-2, Urine WBC 0-5, Ur Squam

ous Epith Cells 6-10 A, Amorphous Crystals Few A, 

Urine Bacteria Few A,                               



                                Hyaline Casts 0-5 A                 



                                                                



                                                                



                                MDM/COURSE                      



                                 Vital Signs                    



                                                                



                                                                



                                                                



                                Temperature 37.4 C 23 20:06               

  



                                                                



                                Pulse Rate 89 23 20:06                 



                                                                



                                Respiratory Rate 17 23 20:06              

   



                                                                



                                Blood Pressure 129/55 L 23 20:06          

       



                                                                



                                O2 Sat by Pulse Oximetry 97 23 20:06      

           



                                                                



                                                                



                                                                



                                                                



                                                                



                                                                



                                Temperature 37.4 C 23 21:37               

  



                                                                



                                Pulse Rate 90 23 21:37                 



                                                                



                                Respiratory Rate 18 23 21:37              

   



                                                                



                                Blood Pressure 127/68 23 21:37            

     



                                                                



                                O2 Sat by Pulse Oximetry 99 23 21:37      

           



                                                                



                                                                



                                                                



                                                                



                                - [facility]                           



                                Medical decision making narrative:              

   



                                                                



                                23 20:13                  



                                ddx: depression, meth abuse, homeless           

      



                                                                



                                will get labs, medically clear, get psych eval  

               



                                02/15/23 05:25                  



                                                    Labs returned within normal 

limits, no UTI, patient is not pregnant as she 

stated. Patient medi                                



                                malathi cleared.                  



                                                                



                                                    Patient evaluated by Psychia

try and cleared for discharge with outpatient 

follow-up.                                          



                                                                



                                 Escalation of care including admission/observat

ion considered: None                 



                                                     Discussions of management w

ith other providers e.g. Hospitalist, Consultant, 

Case Mgmt, Pharmacy,                                



                                Radiology: None                 



                                 Discussed with Radiology regarding test interpr

etation: None                 



                                                     Independent interpretation:

 (e.g., EKG, rhythm strips, x-rays, CT, Other): 

None                                                



                                                     Additional patient history 

obtained from (e.g. Partner, Parent, Friend, EMS, 

Other): None                                        



                                                     Review of external non-ED r

ecord (e.g. Inpt record, Office record, Outpt 

record, Prior Outpt labs,                           



                                PCP record, Outside ED record, Other): None     

            



                                 Diagnostic tests considered but not performed: 

None                 



                                 Prescription medications considered but not giv

en: None                 



                                 Chronic conditions affecting care (e.g. HTN, DM

): None                 



                                                     Social Determinants of heal

th significantly affecting care (e.g. homeless): 

None (John Melton)                                  



                                                                



                                Discharge Plan                  



                                                                



                                - Discharge                     



                                Clinical Impression:                 



                                Depression                      



                                Qualifiers:                     



                                                     Depression Type: unspecifie

d Qualified Code(s): F32.A - Depression, 

unspecified                                         



                                                                



                                Disposition: Home or Self-Care                 



                                Condition: Good                 



                                Care Plan Goals:                 



                                Return if any issues or concerns arise.         

        



                                Follow up with your PCP or with one of the [facility] Providers/Clinics:                 



                                                                



                                [facility]                

 



                                [address] [place] [address]         

        



                                [phone]                  



                                                                



                                [facility]                  



                                [address] [place] [address]                 



                                [phone]                    



                                                                



                                [facility]                 



                                 [place] [address]                 



                                [phone]                    



                                                                



                                [facility]                 



                                [address] [place] [address]                 



                                [phone]                    



                                                                



                                Dr. Manoj Montero                 



                                [address]                 



                                [place] [address]                 



                                [phone]                    



                                                                



                                Dr. Yolie Montalvo                 



                                [address]                 



                                [place] [address]                 



                                [phone]                    



                                                                



                                [facility]           

      



                                [address] [place] [address]             

    



                                [phone]                  



                                                                



                                Return if your condition worsens/return or any i

ssues or concerns arise.                 



                                Referrals:                      



                                Wily Genao MD [Primary Care Provider] -  

               



                                Print Language: English                 



                                                                



                                - Discharge Data                 



                                Time Seen by Provider: 23 19:59           

      



                                                                



                                                                



                                                                



                                Dictated By: John Melton MD                 



                                Signed By: John Melton MD 02/15/23 0526         

        



                                                                



                                                                



                                                                



                                                                



                                                                



                                DD/DT: 23                 



                                TD/TT: 23 : [id number]  

               



                                                                



                                                                



                                cc: FABIOLA*                    



                                                                



                                Wily Genao MD                 

 

                2020 15:43:00-00:00 [id number]                       [address] [address]                 



                                                                



                                PATIENT NAME: TYLER JUDD ADMIT DATE: 20

                 



                                ACCOUNT NO: [id number] ROOM NO: [id number]        

         



                                MEDICAL RECORD NO: [id number] AGE: 49           

      



                                REPORT TYPE: eECHOCARDIOGRAM REPORT SEX: F      

           



                                                                



                                ADMITTING PHYSICIAN: Derian Ferrara MD        

         



                                ATTENDING PHYSICIAN: Derian Ferrara MD        

         



                                                                



                                                                



                                                                



                                *[facility]*             

    



                                [address] [place], [place] [address]                 



                                Phone [phone]                 



                                                                



                                Transthoracic Echocardiogram                 



                                                                



                                Patient: Tyler Judd                 



                                Study Date: 2020 BP: 97 / 82 Location: [facility]                 



                                URN: [id number] MRN: [id number] Account#: [id number]

[id number]                 



                                : 1970 Age: 49 Height: 62 in / 157.5   

              



                                 cm                             



                                Accession#: [id number] Gender: F Weight: 243

 lb / 110.5                 



                                 kg                             



                                BMI/BSA: 44.5 kg/m 2 /                 



                                 2.26 m 2                       



                                                                



                                *Ordering Physician: * Bee MultaniInterpreting Physician: * Savanna GainesSonographer: * Eufemia Hodges                 



                                                                



                                ------------------------------------------------

------------------------                 



                                Indications: NSTEMI.                 



                                                                



                                ------------------------------------------------

------------------------                 



                                Study data: Transthoracic echocardiogram. Proced

ure: Transthoracic                 



                                echocardiography was performed. Image quality wa

s adequate. Intravenous                 



                                contrast (agitated saline) was administered. Com

plete 2D, complete                 



                                spectral Doppler, and color Doppler. Location: [facility]. Patient                 



                                status: Inpatient. Patient room number: ER-4. St

udy status: Stat.                 



                                                                



                                ------------------------------------------------

------------------------                 



                                Findings                        



                                                                



                                Left ventricle: The cavity size is normal. Wall 

thickness is mildly                 



                                increased. The estimated ejection fraction is 65

-69%.                 



                                Right ventricle: The cavity size is normal.     

            



                                Left atrium: The atrium is normal in size.      

           



                                Right atrium: The atrium is normal in size.     

            



                                Atrial septum: Echo contrast study shows no shun

t. 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                 



                                                                



                                PATIENT NAME: TYLER JUDD ACCOUNT #: [id number]

[id number]                 



                                                                



                                                                



                                                                



                                primum aneurysm.                 



                                Aorta: Aortic root: The aortic root is normal in

 size.                 



                                Aortic valve: The valve is structurally normal. 

The valve is                 



                                trileaflet. There is no evidence of stenosis. Th

ere is mild                 



                                regurgitation.                  



                                Mitral valve: The valve is structurally normal. 

There is mild                 



                                regurgitation.                  



                                Tricuspid valve: The valve is structurally xenia

l. There is mild                 



                                regurgitation.                  



                                Pulmonic valve: The valve is structurally normal

. There is no                 



                                regurgitation.                  



                                Pericardium: There is no pericardial effusion.  

               



                                Pulmonary arteries:                 



                                Not visualized.                 



                                Systemic veins:                 



                                Inferior vena cava: The vessel is normal in size

.                 



                                                                



                                ------------------------------------------------

------------------------                 



                                Measurements                    



                                                                



                                 Left ventricle Value Ref Aortic valve continued

                 



                                Value Ref                       



                                 CLEMENTINA, LAX 5.0 cm 3.8 - 5.2 Mean v, S            

     



                                2.22 m/sec -----                 



                                 ESD, LAX 3.1 cm 2.2 - 3.5 VTI, S               

  



                                64.8 cm -----                   



                                 ESD/bsa, LAX 1.4 cm/m 2 1.3 - 2.1 LVOT/AV, VTI 

ratio                 



                                0.62 -----                      



                                 FS, LAX 37 % 27 - 45 RABIA, VTI                 



                                2.04 cm 2 -----                 



                                 PW, ED 1.3 cm 0.6 - 0.9 RABIA, Vmax              

   



                                1.94 cm 2 -----                 



                                 IVS/PW, ED 0.97 --------- AR peak v            

     



                                3.71 m/sec -----                 



                                 EF 67 % 54 - 74 AR decel time                 



                                2363 ms -----                   



                                 IVRT 80 ms --------- AR PHT                 



                                685 ms -----                    



                                 E', med ruddy, TDI 6.8 cm/sec >=7.0 AR peak grad 

                



                                55 mm Hg -----                  



                                 E/e', med ruddy, TDI 12 ---------                

 



                                 Mitral valve                   



                                Value Ref                       



                                 LVOT Value Ref Peak E                 



                                0.84 m/sec -----                 



                                 Diam, S 2.05 cm --------- Peak A               

  



                                0.9 m/sec -----                 



                                 Area 3.3 cm 2 --------- Decel time             

    



                                324 ms -----                    



                                 Peak renny, S 1.8 m/sec --------- PHT            

     



                                78 ms -----                     



                                 Mean renny, S 1.36 m/sec --------- Peak grad, D  

               



                                2.8 mm Hg -----                 



                                 VTI, S  40.1 cm --------- Peak E/A ratio       

          



                                                                



                                PATIENT NAME: TYLER JUDD ACCOUNT #: [id number]

158                 



                                                                



                                                                



                                                                



                                0.94 -----                      



                                 Peak grad, S  13 mm Hg --------- MVA, PHT      

           



                                2.8 cm 2 -----                  



                                 Mean grad, S 8 mm Hg ---------                 



                                  ml ---------  Tricuspid valve           

      



                                Value Ref                       



                                 SV/bsa 58 ml/m 2 --------- TR peak v           

      



                                2.83 m/sec <=2.8                 



                                  Peak RV-RA grad, S                 



                                32 mm Hg -----                  



                                 Ventricular septum Value Ref                 



                                 IVS, ED 1.2 cm 0.6 - 0.9 Aortic root           

      



                                Value Ref                       



                                 Root diam, ED MM                 



                                2.85 cm -----                   



                                 Right ventricle Value Ref                 



                                 Pressure, S  37 mm Hg --------- Pulmonary arter

y                 



                                Value Ref                       



                                 Pressure, S                    



                                31.0 mm Hg -----                 



                                 Left atrium Value Ref                 



                                 AP dim, ES MM 3.5 cm 2.7 - 3.8 Systemic veins  

               



                                Value Ref                       



                                 LA/Ao root ratio, MM 1.24 --------- Estimated C

                 



                                5 mm Hg -----                   



                                                                



                                 Aortic valve Value Ref Pulmonary veins         

        



                                Value Ref                       



                                 Leaflet sep, MM 1.65 cm --------- A rev jaimeo

n                 



                                140 ms -----                    



                                                                



                                ------------------------------------------------

------------------------                 



                                Conclusions                     



                                                                



                                Summary:                        



                                                                



                                1. Left ventricle: The cavity size is normal. Wa

ll thickness is mildly                 



                                 increased. The estimated ejection fraction is 6

5-69%.                 



                                2. Right ventricle: The RV pressure during systo

le by Doppler is 37 mm                 



                                 Hg.                            



                                3. Atrial septum: Echo contrast study shows no s

hunt. 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                 



                                 primum aneurysm.                 



                                                                



                                Prepared and electronically signed by           

      



                                                                



                                Savanna Gaines MD                 



                                2020 15:43                 



                                                                



                                                                



                                                                



                                                                



                                                                



                                Electronically Signed by DINH Gaines MD on

 20 at 1543                 



                                                                



                                                                



                                PATIENT NAME: TYLER JUDD ACCOUNT #: [id number]

158                 

 

                2020 13:30:00-00:00                                 [facility] ([facility])        

         



                                Hospitalist Discharge Summary                 



                                REPORT#:[id number] REPORT STATUS: Signed         

        



                                DATE:20 TIME:1330                 



                                                                



                                PATIENT: TYLER JUDD UNIT #: [id number]       

          



                                ACCOUNT#: [id number]  ROOM/BED: [place] [address]      

           



                                : [birth year] AGE: 49 SEX: F ATTEND: Sanjiv Ferrara MD                 



                                ADM DT: 20 AUTHOR: Bee Multani MD        

         



                                                                



                                * ALL edits or amendments must be made on the [employer] document *                 



                                                                



                                                                



                                PCP                             



                                                                



                                PCP                             



                                PCP:                            



                                PCP: No Primary or Family Physician             

    



                                                                



                                Discharge to: home                 



                                                                



                                                                



                                General Information                 



                                Date of admission:                 



                                Observation Start Date:                 



                                Date of admission: 20                 



                                                                



                                Discharge date: 20                 



                                Discharge diagnosis:                 



                                see hosp course                 



                                Hospital course:                 



                                HOSP COURSE                     



                                                                



                                50 yo F admitted with Ggeneralised weaknss and C

P night PTA                 



                                                                



                                ASSESSMENT AND PLAN:                 



                                1. Elevated cardiac enzymes. The patient is star

gianfranco on                 



                                weight-based Lovenox. started the patient on asp

irin, statin.                 



                                Hold beta-blocker because of bradycardia        

         



                                        Per cardiology, they feel the elevated c

ardiac enzymes are secondary to demand 

                                        



                                ischemia and not an STEMI                 



                                                                



                                Had CP PTA but resolved- dc home , dc her hoem c

oreq on discharge                 



                                                                



                                2. Symptomatic bradycardia with hypotension. Res

olved                 



                                Off Levophed, off dopamine                 



                                patient reports she may have mistakenly taken mo

re Coreq than her prescribed                 



                                dose at home                    



                                                                



                                3. Leukocytosis, etiology is unclear, However, t

he patient is pancultured.                 



                                Continue the empiric antibiotics, ceftriaxone , 

ID consulted                 



                                po abx on dc                    



                                                                



                                4. History of bipolar. The patient takes lithium

. Lithium level is 0.5                 



                                                                



                                stable for dc                   



                                dc home stable                  



                                                                



                                dc time 33 mins                 



                                Pt. condition on discharge: improved, stable    

             



                                                                



                                Med Rec                         



                                                                



                                Med Rec                         



                                Discharge meds:                 



                                Stop taking the following medications:          

       



                                CARVEDILOL (COREG) 25 MG TAB                 



                                 25 MILLIGRAM ORAL TWICE DAILY WITH MEALS.      

           



                                                                



                                Continue taking these medications:              

   



                                LITHIUM CARBONATE ER (LITHIUM CARBONATE ER) 450 

MG TAB.SA                 



                                 900 MILLIGRAM ORAL DAILY PM                 



                                                                



                                CHOLECALCIFEROL (VITAMIN D3) (VITAMIN D3) 1,000 

UNITS CAP                 



                                 1,000 UNITS ORAL DAILY.                 



                                                                



                                MULTIVITAMIN (MULTI-DAY VITAMIN) 1 TAB TAB      

           



                                 1 TABLET ORAL DAILY.                 



                                                                



                                GABAPENTIN (NEURONTIN) 100 MG CAP               

  



                                 100 MILLIGRAM ORAL THREE TIMES A DAY.          

       



                                                                



                                ALPRAZolam (XANAX) 0.5 MG TAB                 



                                 0.5 MILLIGRAM ORAL EVERY 8 HR AS NEEDED. as nee

ded for ANXIETY                 



                                                                



                                MIRTAZAPINE (REMERON) 30 MG TAB                 



                                 30 MILLIGRAM ORAL DAILY.                 



                                                                



                                ZIPRASIDONE (GEODON) 40 MG CAP                 



                                 40 MILLIGRAM ORAL TWICE DAILY.                 



                                                                



                                MELOXICAM (MOBIC) 7.5 MG TAB                 



                                 7.5 MILLIGRAM ORAL DAILY.                 



                                 Comments:                      



                                 10MG DAILY                     



                                                                



                                ALBUTEROL (PROAIR HFA 90 MCG/ACT) 90 MCG INHALER

                 



                                 1 PUFF INHALATION RT - EVERY 4 HOURS.          

       



                                                                



                                CEPHALEXIN (KEFLEX) 500 MG CAP                 



                                 500 MILLIGRAM ORAL EVERY 6 HOURS.              

   



                                 Qty = 12                       



                                                                



                                                                



                                                                



                                Discharge Instructions                 



                                Activity: as tolerated                 



                                Additional instructions:                 



                                fup with PCP 3-5 days                 



                                Prescriptions: on chart                 



                                Discharge management: greater than 30 mins      

           



                                                                



                                Objective                       



                                                                



                                Physical Exam                   



                                Head/Eyes: atraumatic, clear cornea, EOMI, xenia

l conjunctiva/sclera, normal                 



                                eyelids/periorb., normocephalic, PERRL          

       



                                Cardiovascular: normal capillary refill, regular

 rate rhythm                 



                                Respiratory: aerating well, no distress         

        



                                        Abdomen: non-tender, normal bowel sounds

, soft, no distention, no guarding, no 

                                        



                                hernial, no mass/organomegaly, no rebound       

          



                                Extremities: moves all, normal capillary refill,

 normal range of motion, no                 



                                edema                           



                                Neuro/CNS: alert, oriented X 3                 



                                Psychiatry: normal affect                 



                                                                



                                Electronically Signed by Bee Multani MD on  at 1333                 



                                                                



                                RPT #: [id number]                 



                                ***END OF REPORT***                 



                                                                



                                                                

 

                2020 17:48:00-00:00                                 [facility]        

         



                                Infectious Dis. Progress Note                 



                                REPORT#:[id number] REPORT STATUS: Signed         

        



                                DATE:20 TIME:1748                 



                                                                



                                PATIENT: TYLER JUDD UNIT #: [id number]       

          



                                ACCOUNT#: [id number] ROOM/BED: [place] [address]      

           



                                : [birth year] AGE: 49 SEX: F ATTEND: Sanjiv Ferrara MD                 



                                ADM DT: 20 AUTHOR: Serge Kennedy MD                 



                                                                



                                * ALL edits or amendments must be made on the [employer] document *                 



                                                                



                                                                



                                Subjective                      



                                Chief Complaint:                 



                                F/u leukocytosis                 



                                HPI:                            



                                Leukocytosis resolving.                 



                                                                



                                Review of Systems                 



                                Constitutional:                 



                                Denies: chills, fever.                 



                                                                



                                Objective                       



                                                                



                                General                         



                                VS/I O:                         



                                Last Documented:                 



                                 Result Date Time                 



                                 Pulse Ox 100  1701                 



                                 B/P 86/47  1701                 



                                 B/P Mean 61  1701                 



                                 Pulse 53  1701                 



                                 Resp 46  1701                 



                                 O2 Delivery Room air  1600                

 



                                 Temp 36.4  1600                 



                                 FiO2 21  0741                 



                                  O2 Flow Rate 0.933113 2052              

   



                                                                



                                Vital Signs                     



                                 Date Temp Pulse Resp B/P B/P Mean Pulse Ox FiO2

                 



                                 - 36.4-37.1 51-67 18-54 /40-77 

  21                 



                                                                



                                24 hour I O ending at 0700:                 



                                  0700  1900                 



                                 Intake Total 4572.60 408.20                 



                                 Output Total 1250 2300                 



                                 Balance 3322.60 -1891.80                 



                                                                



                                 Intake, IV 3432.60 408.20                 



                                 Intake, Oral 1140                 



                                 Number 0                       



                                 Bowel                          



                                 Movements                      



                                 Number Voids 0                 



                                 Output, Urine 1250 2300                 



                                                                



                                Patient Weight                  



                                                                



                                Weight (lb):                    



                                Weight (oz):                    



                                Weight (kg): 110.455                 



                                                                



                                                                



                                Physical Exam                   



                                General appearance: alert, awake                

 



                                Head/Eyes: atraumatic                 



                                Cardiovascular: regular rate rhythm             

    



                                Respiratory: symmetric expansion, no distress   

              



                                Abdomen: soft, no distention                 



                                Musculoskeletal: no joint swelling              

   



                                Neuro/CNS: normal speech                 



                                                                



                                                                



                                Diagnosis, Assessment Plan                 



                                Free Text A P:                  



                                Assessment:                     



                                1. Hypotension, most likely due to NSTEMI.      

           



                                2. Probable septic shock, r/o bacteremia. No res

piratory or urinary symptoms.                 



                                CXR showed no pneumonia. She had diarrhea but it

 is resolving.                 



                                3. Acute kidney failure. Resolving.             

    



                                4. Morbid obesity.                 



                                5. COPD.                        



                                                                



                                Plan:                           



                                1. Leukocytosis resolving.                 



                                2. Continue ceftriaxone. Will likely stop in 1-2

 days depending on culture                 



                                results.                        



                                3. S/p vancomycin IV x 1 dose.                 



                                4. If diarrhea returns, will order stool culture

 and C. difficile.                 



                                                                



                                                                



                                Electronically Signed by Serge Kennedy MD on 20 at 1753                 



                                                                



                                RPT #: [id number]                 



                                ***END OF REPORT***                 



                                                                



                                                                

 

                2020 13:37:00-00:00 [id number]                       [place] [address]                 



                                                                



                                PATIENT NAME: TYLER JUDD  ADMIT DATE: 

0                 



                                ACCOUNT NO: [id number] ROOM NO: .S201        

         



                                MEDICAL RECORD NO: [id number] AGE: 49           

      



                                REPORT TYPE: PROGRESS NOTE SEX: F               

  



                                                                



                                ADMITTING PHYSICIAN: Derian Ferrara MD        

         



                                ATTENDING PHYSICIAN: Derian Ferrara MD        

         



                                                                



                                                                



                                DATE: 2020                 



                                                                



                                The patient is seen in the room. I reviewed the 

chart. At this time, troponin                 



                                is borderline high. The patient is already start

ed on Lovenox. The patient                 



                                                    came with hypertension and w

ith normal saline the patient improved. The patient

                                                    



                                had enteritis before. However, the patient also 

has troponins borderline high                 



                                and Lovenox was started and we will continue to 

follow the patient. Possibly,                 



                                        we have to rule out the possibility of a

n acute myocardial infarction. On this 

                                        



                                lady, a troponin borderline high shows there is 

indication of some myocardial                 



                                ischemia.                       



                                                                



                                From my point of view, the patient is awake and 

alert. We will continue the                 



                                present medication as ordered by Dr. Multani and pr

imary physician intensivist.                 



                                                                



                                Dictated By: DINH Soto MD               

  



                                                                



                                WT: PN:L.HIM/BALPA/NTS                 



                                DD: 2020 13:37:18                 



                                DT: 2020 14:57:10                 



                                Conf#: [id number]/DID#: [id number]                 



                                                                



                                Authenticated by Savanna Gaines MD On  01:10:35 PM                 



                                                                



                                                                



                                                                



                                                                



                                                                



                                Electronically Signed by DINH Gaines MD on

 20 at 1310                 



                                                                



                                                                



                                                                



                                                                



                                                                



                                                                



                                                                



                                                                



                                                                



                                                                



                                                                



                                                                



                                                                



                                                                



                                                                



                                PATIENT NAME: TYLER JUDD ACCOUNT #: [id number]

158                 

 

                2020 13:29:00-00:00 [id number]                       [facility]                

 



                                 [address] [place] [address]                 



                                                                



                                PATIENT NAME: TYLER JUDD ADMIT DATE: 20

                 



                                ACCOUNT NO: [id number]  ROOM NO: L.ICU07      

           



                                MEDICAL RECORD NO: [id number] AGE: 49           

      



                                REPORT TYPE: PROGRESS NOTE SEX: F               

  



                                                                



                                ADMITTING PHYSICIAN: Derian Ferrara MD        

         



                                ATTENDING PHYSICIAN: Derian Ferrara MD        

         



                                                                



                                                                



                                DATE: 2020                 



                                                                



                                SUBJECTIVE: The patient is awake, alert. The pat

ient is feeling better. The                 



                                patient's blood pressure is 110/80. The patient'

s normal saline is going                 



                                intravenously. The patient is off of dopamine an

d epinephrine and IV normal                 



                                saline is continusly infusedd 100cc of normal sa

line / hr. The patient was                 



                                transferred from [facility] where she had s

ome significant enteritis;                 



                                        however, the patient was very severely h

ypotensive and was started on dopamine 

                                        



                                and epinephrine. At this time, my point of view,

 the patient is quite stable.                 



                                 She is off of all vasopressors. She is more ronny

rt and able to answer all the                 



                                questions. I will continue to follow the patient

 cardiac wise and the patient                 



                                may be transferred out of ICU. I will leave the 

decision to the primary care                 



                                and intensivist, borderline increase in troponin

 is noted and investigated                 



                                further..                       



                                                                



                                Dictated By: DINH Soto MD               

  



                                                                



                                WT: PN:LCortesHIM/BALPA/NTS                 



                                DD: 2020 13:29:41                 



                                DT: 2020 14:43:35                 



                                Conf#: [id number]/DID#: [id number]                 



                                                                



                                Authenticated and Edited by Savanna Gaines MD On 20 3:24:06 PM                 



                                                                



                                                                



                                                                



                                                                



                                                                



                                Electronically Signed by DINH Gaines MD on

 20 at 1526                 



                                                                



                                                                



                                                                



                                                                



                                                                



                                                                



                                                                



                                                                



                                                                



                                                                



                                                                



                                                                



                                                                



                                                                



                                PATIENT NAME: TYLER JUDD ACCOUNT #: [id number]

158                 

 

                2020 10:32:00-00:00                                 [facility]        

         



                                Hospitalist Progress Note                 



                                REPORT#:[id number] REPORT STATUS: Signed         

        



                                DATE:20 TIME:1032                 



                                                                



                                PATIENT: TYLER JUDD UNIT #: [id number]       

          



                                ACCOUNT#: [id number] ROOM/BED: [address]      

           



                                : 68 AGE: 49 SEX: F ATTEND: Sanjiv Ferrara MD                 



                                ADM DT: 20 AUTHOR: Bee Multani MD        

         



                                                                



                                * ALL edits or amendments must be made on the [employer]/computer document *                 



                                                                



                                                                



                                Subjective                      



                                Chief Complaint:                 



                                No complaints                   



                                                                



                                Review of Systems                 



                                Respiratory:                    



                                Denies: SOB.                    



                                Cardiovascular:                 



                                Denies: chest pain.                 



                                GI:                             



                                Denies: abdominal pain, diarrhea, nausea, vomiti

ng.                 



                                Heme:                           



                                Denies: bleeding.                 



                                Neuro:                          



                                Denies: headache.                 



                                                                



                                Objective                       



                                                                



                                General                         



                                VS/I O:                         



                                Vital Signs:                    



                                 Date Time Temp Pulse Resp B/P B/P Pulse O2 O2 F

low FiO2                 



                                 Mean Ox Delivery Rate                 



                                  0741 96 Room air  21                 



                                  0710 36.4 60 29 112/58 76 100 Room air   

              



                                  0646 60 29 112/58 79                 



                                  0631 60 28 114/61 83 100                 



                                  0619 64 26 144/77 105 99                 



                                  0615 62 23 136/73 98 98                 



                                  0601 63 30 136/74 97 96                 



                                  0545 59 28 120/57 83 100                 



                                  0530 58 34 120/58 84 100                 



                                  0515 56 30 115/55 77 100                 



                                  0500 63 21 105/54 77 100                 



                                  0445 56 25 113/56 80 100                 



                                  0430 58 28 105/59 74 100                 



                                  0416 55 20 98/56 72 100                 



                                  0400 59                  



                                  0400 24 95/51 70 100                 



                                  0349 37.1 20                 



                                  0346 56 24 102/52 72 99                 



                                  0330 67 38 132/63 91 99                 



                                  0315 61 27 101/55 73 99                 



                                  0300 61 26 92/51 66 98                 



                                  0245 62 21 99/54 74 98                 



                                  0231 62 25 108/55 78 99                 



                                  0215 67 29 123/62 87 98                 



                                  0200 64 29 120/57 82 100                 



                                  0145 61 27 100/58 76 99                 



                                   0130 61 34 98/53 70 99                 



                                  0115 60 27 99/58 75 99                 



                                  0100 60 29 96/52 69 99                 



                                  0045 58 27 101/56 73 100                 



                                  0030 61 34 99/56 73 99                 



                                  0015 59 29 94/52 70 99                 



                                  0011 60 26 98/48 69 99                 



                                  0006 62 29 83/40 56 99                 



                                  0000 36.8 24                 



                                  0000 62 31 89/51 66 100                 



                                  2358 62 25 92/51 66 100                 



                                  2345 64 27 102/58 77 99                 



                                  2330 63 28 96/55 72 100                 



                                 02 2316 64 25 100/55 73 99                 



                                  2300 63 27 119/58 82 95                 



                                  2245 60 18 97/56 74 99                 



                                  2230 58 25 99/55 72 98                 



                                  2215 56 21 97/54 74 99                 



                                  2200 56 25 105/62 78 99                 



                                  2145 57 32 101/65 78 100                 



                                  2130 57 26 101/58 77 98                 



                                  2116 55 38 105/54 74 95                 



                                  2115 58 42 95                 



                                  2104 58 18 91/50 67 99                 



                                  2100 59 25 99                 



                                 2 99 Room air 0.200698 21             

    



                                  2045 60 27 100                 



                                  2030 61 19 100                 



                                 2015 61 24 100                 



                                 2000 37.1                 



                                 2000 20                  



                                  2000 62 32 100                 



                                  1945 60  38 100                 



                                  1934 61 104/53 75 100                 



                                  1916 61 117/53 76 99                 



                                  1900 55 22 104/59 77 100                 



                                 02 1845 53 23 108/57 80 100                 



                                 02 1816 53 25 108/54 78 99                 



                                  1802 53 20 109/56 80 99                 



                                 0228 1730 53  24 93/54 68                 



                                  1730 36.4 53 24 93/54 67 100 Room air    

             



                                  1700 57 22 110/55 73 100 Room air        

         



                                  1639 57 22 100/57 71 100 Room air        

         



                                  1631 54 102/59 73 97 Room air            

     



                                  1615 58 23 105/62 76 100 Room air        

         



                                  1558 54 110/58 75 100 Room air           

      



                                  1431 57 115/56 75 100 Room air           

      



                                  1409 99 Room air 21                 



                                  1408 57  21 92/52 65 100 Room air        

         



                                  1345 57 22 98/54 68 100                 



                                  1330 57 102/57 72 100 Room air           

      



                                  1246 59 102/57 72                 



                                  1215 59 21 106/57 73 98                 



                                  1200 57 25 106/57 73 99                 



                                  1145 56 107/55 72 100                 



                                  1130  52 27 113/59 77 100                

 



                                  1116 53 105/51 69 99                 



                                  1101 51 93/52 65 99                 



                                  1045 52 101/60 73 99                 



                                                                



                                24 hour I O ending at 0700:                 



                                  0700  1900                 



                                 Intake Total 4572.60 408.20                 



                                 Output Total 1250 2300                 



                                 Balance 3322.60 -1891.80                 



                                                                



                                 Intake, IV 3432.60 408.20                 



                                 Intake, Oral 1140                 



                                 Number 0                       



                                 Bowel                          



                                 Movements                      



                                 Number Voids 0                 



                                  Output, Urine 1250 2300                 



                                                                



                                Patient Weight                  



                                                                



                                Weight (lb):                    



                                Weight (oz):                    



                                Weight (kg): 110.455                 



                                                                



                                                                



                                Physical Exam                   



                                General appearance: alert, awake                

 



                                Head/Eyes: atraumatic, clear cornea, EOMI, xenia

l conjunctiva/sclera, normal                 



                                eyelids/periorb., normocephalic, PERRL          

       



                                Cardiovascular: normal capillary refill, regular

 rate rhythm                 



                                Respiratory: aerating well, no distress         

        



                                        Abdomen: non-tender, normal bowel sounds

, soft, no distention, no guarding, no 

                                        



                                hernial, no mass/organomegaly, no rebound       

          



                                Extremities: moves all, normal capillary refill,

 normal range of motion, no                 



                                edema                           



                                Neuro/CNS: alert, oriented X 3                 



                                Psychiatry: normal affect                 



                                                                



                                Results                         



                                Findings/Data:                  



                                Laboratory Tests                 



                                                     



                                 0500 1240                      



                                 Chemistry                      



                                 Sodium (134 - 147 mmol/L) 143                 



                                 Potassium (3.4 - 5.0 mmol/L) 4.2               

  



                                 Chloride (100 - 108 mmol/L) 114 H              

   



                                 Carbon Dioxide (21 - 32 mmol/L) 24             

    



                                 Anion Gap (4.0 - 15.0 GAP calc) 5.0            

     



                                 BUN (7 - 18 MG/DL) 17                 



                                 Creatinine (0.6 - 1.0 MG/DL) 0.9               

  



                                 Glomerular Filtr Rate (>60 estGFR) >=60 max est

imate                 



                                 Glucose (70 - 110 MG/DL) 89                 



                                 Calcium (8.5 - 10.1 MG/DL) 8.3 L               

  



                                 Troponin I (0.000 - 0.045 NG/ML) 0.436 *H      

           



                                                                



                                Laboratory Tests                 



                                                           



                                 0500                           



                                 Hematology                     



                                 WBC (3.5 - 11.0 K/mm3) 15.2 H                 



                                  RBC (4.70 - 6.10 M/mm3) 3.67 L                

 



                                 Hgb (10.4 - 14.9 G/DL) 11.3                 



                                 Hct (31.5 - 44.1 %) 35.5                 



                                  MCV (84.5 - 98.6 Fl) 96.7                 



                                 MCH (27.0 - 34.2 pg) 30.8                 



                                 MCHC (31.5 - 34.0 G/DL) 31.8                 



                                  RDW (11.5 - 14.5 SD) 14.2                 



                                 Plt Count (150 - 450 K/mm3) 242.0              

   



                                 MPV (7.0 - 10.5 fL) 10.20                 



                                 Neut % (Auto) (40 - 76 %) 78.8 H               

  



                                 Lymph % (Auto) (20.5 - 51.1 %) 13.1 L          

       



                                 Mono % (Auto) (1.7 - 9.3 %) 6.2                

 



                                 Eos % (Auto) (0.0 - 6.0 %) 1.6                 



                                 Baso % (Auto) (0.0 - 2.0 %) 0.3                

 



                                 Neut # (Auto) (1.8 - 7.6 K/mm3) 11.94 H        

         



                                 Lymph # (Auto) (0.6 - 3.2 K/mm3) 2.0           

      



                                 Mono # (Auto) (0.3 - 1.1 K/mm3) 0.9            

     



                                 Eos # (Auto) (0.0 - 0.4 K/mm3)  0.2            

     



                                 Baso # (Auto) (0.0 - 0.1 K/mm3) 0.1            

     



                                 Add Manual Diff (CRITERIA DIFF/SCN) NO         

        



                                                                



                                                                



                                                                



                                                                



                                Diagnosis, Assessment Plan                 



                                                                



                                Free Text DxA P Notes                 



                                Free Text DxA P Notes:                 



                                                                



                                ASSESSMENT AND PLAN:                 



                                1. Elevated cardiac enzymes. The patient is star

gianfranco on                 



                                weight-based Lovenox. started the patient on asp

irin, statin.                 



                                Hold beta-blocker because of bradycardia, start 

lisinopril                 



                                        Per cardiology, they feel the elevated c

ardiac enzymes are secondary to demand 

                                        



                                ischemia and not an STEMI                 



                                2. Symptomatic bradycardia with hypotension. Res

olved                 



                                Off Levophed, off dopamine                 



                                3. Leukocytosis, etiology is unclear, However, t

he patient is pancultured.                 



                                Continue the empiric antibiotics, ceftriaxone , 

ID consulted                 



                                4. History of bipolar. The patient takes lithium

. Lithium level is noted                 



                                                                



                                                                



                                Patient is stable, possible transfer to telemetr

y this afternoon                 



                                                                



                                DNR status noted                 



                                                                



                                Electronically Signed by Bee Multani MD on  at 1036                 



                                                                



                                RPT #: [id number]                 



                                ***END OF REPORT***                 



                                                                



                                                                

 

                2020 21:26:00-00:00                                 [facility]M



                                [facility]        

         



                                Pharmacy Prog.Note-Vancomycin                 



                                REPORT#:[id number] REPORT STATUS: Signed         

        



                                DATE:20 TIME:                 



                                                                



                                PATIENT: TYLER JUDD UNIT #: [id number]       

          



                                ACCOUNT#: [id number] ROOM/BED: [place] [address]      

           



                                : 68 AGE: 49 SEX: F ATTEND: Sanjiv Ferrara MD                 



                                ADM DT: 20 AUTHOR: Shirin Garcia [facility]   

              



                                                                



                                * ALL edits or amendments must be made on the el

ectronic/computer document *                 



                                                                



                                                                



                                Vancomycin                      



                                                                



                                Vancomycin                      



                                Treatment plan: ONE TIME DOSE                 



                                Rationale:                      



                                 WOULD LIKE ONE TIME DOSE OF 2gm VANCO

MYCIN. HE WILL FOLLOW UP                 



                                        TOMORROW AND EVALUATE PATIENT IF THEY NE

ED CONTINUATION OF THERAPY - 100% RBTO 

                                        



                                .                     



                                                                



                                Electronically Signed by Shirin Garcia [facility] on

 20 at 2127                 



                                                                



                                RPT #: [id number]                 



                                ***END OF REPORT***                 



                                                                



                                                                

 

                2020 20:27:00-00:00                                 [facility]APM



                                [facility]        

         



                                Infect Disease Consult Note                 



                                REPORT#:[id number] REPORT STATUS: Signed         

        



                                DATE:20 TIME:                 



                                                                



                                PATIENT: TYLER JUDD UNIT #: [id number]       

          



                                ACCOUNT#: [id number] ROOM/BED: ICU-1      

           



                                : [birth year] AGE: 49 SEX: F ATTEND: Sanjiv Ferrara MD                 



                                ADM DT: 20 AUTHOR: Serge Kennedy MD                 



                                                                



                                * ALL edits or amendments must be made on the el

[employer]/computer document *                 



                                                                



                                                                



                                History of Present Illness                 



                                Requesting Clinician: Dr. Multani                 



                                Reason for consult:                 



                                Sepsis                          



                                Chief complaint:                 



                                Chest pain                      



                                HPI:                            



                                50 yo female with h/o COPD, bipolar disorder, ob

esity who presented with                 



                                worsening intermittent sharp/pressure chest pain

 for 2-3 days. Pt also had                 



                                liquid diarrhea for 3 days, which is now resolve

d. She denied fever, chills,                 



                                cough, sore throat, dysuria, vomiting. Pt was ad

mitted for hypotension due to                 



                                NSTEMI vs septic shock as WBC was elevated.     

            



                                                                



                                History - Adult longitudinal                 



                                Past medical history:                 



                                Reports: COPD, Depression/mood disorder, Hyperte

nsion.                 



                                Past surgical history:                 



                                Reports: Cholecystectomy.                 



                                Additional surgical history:                 



                                ankle surgery, jaw reconstruction, ear sx       

          



                                Smoking status for patients 13 years old or olde

r: Current every day smoker                 



                                Other social history: Local resident            

     



                                Allergies:                      



                                Coded Allergies:                 



                                Penicillins (Severe, SWELLING 16)         

        



                                                                



                                Ambulatory status: Independent                 



                                                                



                                Review of Systems                 



                                Constitutional:                 



                                Denies: chills, fever.                 



                                Skin:                           



                                Denies: rash.                   



                                Allergy/Immun:                  



                                Denies hives                    



                                Eyes:                           



                                Denies discharge                 



                                ENT:                            



                                Denies: sinus problem, sore throat.             

    



                                Respiratory:                    



                                Reports: SOB.                   



                                Cardiovascular:                 



                                Reports: chest pain.                 



                                GI:                             



                                Reports: diarrhea. Denies: nausea, vomiting.    

             



                                :                             



                                Denies: dysuria.                 



                                Musculoskeletal:                 



                                Denies: extremity swelling.                 



                                Neuro:                          



                                Denies: seizure.                 



                                                                



                                Objective                       



                                                                



                                Physical Exam                   



                                General appearance: obese, alert, awake, oriente

d                 



                                Head/eyes: atraumatic, clear cornea, EOMI, normo

cephalic                 



                                ENT: moist mucosal membranes, normal nose, xenia

l pharynx, normal sinus                 



                                Neck: full range of motion, non-tender, normal t

hyroid                 



                                Cardiovascular: regular rate rhythm             

    



                                Respiratory: symmetric expansion, no distress   

              



                                Abdomen: non-tender, normal bowel sounds, soft, 

no distention, no guarding                 



                                Extremities: no clubbing, no cyanosis, no edema 

                



                                Musculoskeletal: no joint swelling              

   



                                Neuro/CNS: alert, oriented X 3, normal speech   

              



                                Skin: intact, no rash                 



                                Psychiatry: normal affect, normal mood          

       



                                                                



                                                                



                                Diagnosis, Assessment Plan                 



                                                                



                                Free Text DxA P Notes                 



                                Free text DxA P notes:                 



                                Assessment:                     



                                                    1. Hypotension, most likely 

due to NSTEMI although septic shock also within the

                                                    



                                differential.                   



                                2. Probable septic shock, r/o bacteremia. No res

piratory or urinary symptoms.                 



                                CXR showed no pneumonia. She had diarrhea but it

 is resolving.                 



                                3. Acute kidney failure.                 



                                4. Morbid obesity.                 



                                5. COPD.                        



                                                                



                                Plan:                           



                                1. No recent hospitalization per pt, thus this c

an be treated as community                 



                                acquired infection.                 



                                2. Increase ceftriaxone to 2 g IV daily.        

         



                                3. Vancomycin IV x 1 dose.                 



                                4. F/u blood cultures.                 



                                5. If diarrhea persists, will order stool cultur

e and C. difficile.                 



                                                                



                                I spent 45 minutes evaluating and examining the 

patient. Thank you for this                 



                                consult.                        



                                                                



                                Electronically Signed by Serge Kennedy MD on 20 at 2037                 



                                                                



                                RUST #: [id number]                 



                                ***END OF REPORT***                 



                                                                



                                                                

 

                2020 11:32:00-00:00 [id number]                       [place] [address]                 



                                                                



                                PATIENT NAME: TYLER JUDD ADMIT DATE: 20

                 



                                ACCOUNT NO: [id number] ROOM NO: L.ICU07       

          



                                MEDICAL RECORD NO: [id number] AGE: 49           

      



                                REPORT TYPE: CONSULTATION SEX: F                

 



                                                                



                                ADMITTING PHYSICIAN: Derian Ferrara MD        

         



                                ATTENDING PHYSICIAN: Derian Ferrara MD        

         



                                                                



                                                                



                                CONSULTATION DATE: 2020                 



                                                                



                                CONSULTING PHYSICIAN: DINH Soto MD      

           



                                                                



                                                    I am on-call for cardiology 

at [facility]. I saw this patient stat

                                                    



                                consult from the hospitalist. The patient was se

en in the ER for diagnosis.                 



                                1. Obesity.                     



                                2. Sepsis.                      



                                3. Hypovolemia.                 



                                        4. History of enteritis, multiple loose 

bowel movements for the last 3 days to 

                                        



                                4 days.                         



                                5. History of lap band surgery was undone.      

           



                                6. History of hypertension.                 



                                7. Possible bronchitis.                 



                                                                



                                HISTORY OF PRESENT ILLNESS: The patient is trans

ferred from [facility].                 



                                She stayed for 2 days for diarrhea. At this time

, I was asked to evaluate the                 



                                patient because of borderline troponin, loss of 

bradycardia 50 per minute and                 



                                                    the patient's blood pressure

 90/70. I examined the patient, reviewed the chart.

                                                    



                                 At this time, EKG does not show any acute ische

flaco changes and the patient's                 



                                troponin is borderline high and WBC count 25,000

 per cubic mm. The patient's                 



                                chest x-ray was normal and the patient's laborat

ory evaluation shows the                 



                                        patient's GFR is about 39 and the patien

t's creatinine is about 1.5 and BUN is 

                                        



                                about 34.                       



                                                                



                                        The patient's echocardiogram are reviewe

d, but it shows only ejection fraction 

                                        



                                65% and without any significant valvular abnorma

lities. No evidence of                 



                                pericardial effusion and no evidence of wall mot

ion abnormalities. At this                 



                                time, the patient did not have a myocardial infa

rction. Borderline increased                 



                                troponin, troponin leak. The patient has got sep

sis and hypotension and I                 



                                        ordered a 500 mL saline bolus followed 2

50 mL normal saline at 3:00 hours, and 

                                        



                                slowly taper out dopamine and Levophed. The zaki

ent will go to ICU. I will                 



                                follow the patient there.                 



                                                                



                                At this time, the patient is in quite stable con

dition. The patient does not                 



                                                    need further cardiac evaluat

ion. We will repeat the troponin in the morning and

                                                    



                                follow the patient along with other physicians i

ncluding the hospitalist.                 



                                                                



                                The patient is quite comfortable.               

  



                                                                



                                Dictated By: DINH Soto MD               

  



                                                                



                                                                



                                PATIENT NAME: TYLER JUDD  ACCOUNT #: [id number]

[id number]                 



                                                                



                                                                



                                                                



                                WT: CON:L.HIM/MILTON/NTS                 



                                DD: 2020 11:32:35                 



                                DT: 2020 15:10:23                 



                                Conf#: [id number]/DID#: [id number]                 



                                                                



                                Authenticated by Savanna Gaines MD On  10:48:16 AM                 



                                                                



                                                                



                                                                



                                                                



                                                                



                                Electronically Signed by DINH Gaines MD on

 20 at 1048                 



                                                                



                                                                



                                                                



                                                                



                                                                



                                                                



                                                                



                                                                



                                                                



                                                                



                                                                



                                                                



                                                                



                                                                



                                                                



                                                                



                                                                



                                                                



                                                                



                                                                



                                                                



                                                                



                                                                



                                                                



                                                                



                                                                



                                                                



                                                                



                                                                



                                                                



                                                                



                                                                



                                                                



                                                                



                                                                



                                                                



                                                                



                                                                



                                                                



                                                                



                                                                



                                                                



                                                                



                                PATIENT NAME: TYLER JUDD ACCOUNT #: [id number]

158                 

 

                2020 08:46:00-00:00                                 [facility] ([facility])        

         



                                Hospitalist History Physical                 



                                REPORT#:[id number] REPORT STATUS: Signed         

        



                                DATE:20 TIME:08                 



                                                                



                                PATIENT: TYLER JUDD UNIT #: [id number]       

          



                                ACCOUNT#: [id number] ROOM/BED: [place] [address]      

           



                                : 68  AGE: 49 SEX: F ATTEND: Paige Ferrara MD                 



                                ADM DT: 20 AUTHOR: Bee Multani MD        

         



                                                                



                                * ALL edits or amendments must be made on the [employer]/computer document *                 



                                                                



                                                                



                                History of Present Illness                 



                                                                



                                HPI                             



                                Chief complaint:                 



                                CHEST PAIN, weakness                 



                                                                



                                History                         



                                                                



                                Medication/Allergy-Vaccine Hx                 



                                Home Medications:                 



                                ALBUTEROL (PROAIR HFA 90 MCG/ACT) 1 PUFF INH RTQ

4H                 



                                ALPRAZolam (XANAX) 0.5 MG PO Q8H PRN PRN ANXIETY

                 



                                CARVEDILOL (COREG) 25 MG PO BID MEALS           

      



                                CHOLECALCIFEROL (VITAMIN D3) (VITAMIN D3) 1,000 

UNITS PO DAILY                 



                                GABAPENTIN (NEURONTIN) 100 MG PO TID            

     



                                LITHIUM CARBONATE  MG PO DAILY PM         

        



                                MELOXICAM (MOBIC) 7.5 MG PO DAILY               

  



                                MIRTAZAPINE (REMERON) 30 MG PO DAILY            

     



                                MULTIVITAMIN (MULTI-DAY VITAMIN) 1 TAB PO DAILY 

                



                                ZIPRASIDONE (GEODON) 40 MG PO BID               

  



                                                                



                                Discontinued Medications                 



                                CIPROFLOXACIN (CIPRO) 750 MG PO Q12H            

     



                                 Discontinued reason: Patient stopped taking    

             



                                HYDROcodone/APAP (NORCO 10/325) 1 TAB PO Q4H PRN

 PRN PAIN                 



                                 Discontinued reason: Patient stopped taking    

             



                                MELOXICAM (MOBIC) 7.5 MG PO BID                 



                                 Discontinued reason: Patient stopped taking    

             



                                                                



                                Allergies:                      



                                Coded Allergies:                 



                                Penicillins (Severe, SWELLING 16)         

        



                                                                



                                                                



                                Objective                       



                                                                



                                Physical Exam                   



                                General appearance: alert, awake                

 



                                                                



                                                                



                                Diagnosis, Assessment Plan                 



                                                                



                                Free Text DxA P Notes                 



                                Free Text DxA P Notes:                 



                                H/P                             



                                dictation ID [id number]                 



                                                                



                                Electronically Signed by Bee Multani MD on  at 0846                 



                                                                



                                [facility]UST #: [id number]                 



                                ***END OF REPORT***                 



                                                                



                                                                

 

                2020 08:43:00-00:00 [id number]                       [place] [address]                 



                                                                



                                PATIENT NAME: TYLER JUDD ADMIT DATE: 20

                 



                                ACCOUNT NO: [id number] ROOM NO: [place] [address]       

          



                                MEDICAL RECORD NO: [id number] AGE: 49           

      



                                REPORT TYPE: HISTORY AND PHYSICAL SEX: F        

         



                                                                



                                ADMITTING PHYSICIAN: Derian Ferrara MD        

         



                                ATTENDING PHYSICIAN: Derian Ferrara MD        

         



                                                                



                                                                



                                ADMISSION DATE: 2020                 



                                                                



                                PRIMARY CARE PHYSICIAN: Unknown.                

 



                                                                



                                CHIEF COMPLAINT: Generalized weakness and chest 

pain.                 



                                                                



                                HISTORY OF PRESENT ILLNESS: The patient is a 49-

year-old female with past                 



                                medical history of bipolar disorder, schizoaffec

tive disorder, COPD, restless                 



                                        leg syndrome, and bronchitis. The patien

t was last well 2 days ago. Yesterday, 

                                        



                                she had an episode of chest pain, lasted about 5

 minutes, retrosternal,                 



                                nonradiating, described as sharp. The patient sa

t down to rest. Subsequently,                 



                                        chest pain went away. She reported at th

at time that she had some shortness of 

                                        



                                                    breath and some dizziness an

d subsequently when the pain went away, the patient

                                                    



                                                    went outside to pick her ana maria

l, but she could not make it because she was so 

weak                                                



                                                    and she actually sat down in

 front of her house. She called 911 EMS and she was

                                                    



                                [facility] At [facility], she was                 



                                                    noticed to be hypotensive, s

ystolic blood pressure in the 70s, temperature 

36.9,                                               



                                        respiratory rate of 20, and heart rate o

f 50. Her labs were abnormal with REG. 

                                        



                                Creatinine of 1.9. Troponin was 0.421 NT-BNP was

 2042.  The patient was given                 



                                IV fluids, normal saline 3 L boluses and she was

 given ceftriaxone 2 g IV and                 



                                        transferred to [facility] f

or further evaluation. In the ER here, 

                                        



                                                    the patient was noted to be 

hypotensive 93/48 and she was still bradycardic 

down                                                



                                to 248. The patient's EKG shows sinus jon at 5

0 with prolonged QT of 508,                 



                                Q-wave in II, III, and aVF. The patient's tropon

ins were elevated at 0.55 and                 



                                the next one was 0.359 here. The patient was giv

en Lovenox 1 mg/kg bodyweight                 



                                every 12 hours, was started on dopamine drip and

 Levophed. The patient at the                 



                                bedside is complaining of weakness. She denies a

ny chest pain.                 



                                                                



                                PAST MEDICAL HISTORY: As mentioned above.       

          



                                                                



                                PAST SURGICAL HISTORY:                 



                                1. Cholecystectomy.                 



                                2. Right ankle surgery.                 



                                                                



                                SOCIAL HISTORY: Smokes 2 packs of cigarettes a d

ay. Denies any alcohol. She                 



                                does meth.                      



                                                                



                                FAMILY HISTORY: The father had an MI.           

      



                                                                



                                ALLERGIES: PENICILLIN AND RISPERIDONE.          

       



                                                                



                                REVIEW OF SYSTEMS:                 



                                                                



                                PATIENT NAME: TYLER JUDD ACCOUNT #: [id number]

158                 



                                                                



                                                                



                                                                



                                CONSTITUTIONAL: Positive for weakness. Negative 

for fevers.                 



                                CARDIOVASCULAR: Positive for chest pain. Positiv

e for shortness of breath,                 



                                negative for any pitting edema.                 



                                                                



                                REVIEW OF SYSTEMS: A 14-point review of system w

as done and was otherwise                 



                                negative except as mentioned above.             

    



                                                                



                                PHYSICAL EXAMINATION:                 



                                VITAL SIGNS: The patient is afebrile, temperatur

e 36.7, heart rate 55,                 



                                                    respiratory rate of 17, bloo

d pressure 97/82, O2 saturation is 96% on room air.

                                                    



                                GENERAL: The patient is awake and alert, oriente

d x3.                 



                                HEENT: Head is normocephalic and atraumatic. Pup

ils equal and reactive to                 



                                light.                          



                                NECK: No cervical adenopathy or thyromegaly. No 

jugular venous distention.                 



                                CHEST: Clear to auscultation bilaterally. No whe

ezing or rhonchi.                 



                                CARDIOVASCULAR: S1 and S2. No murmur or added so

unds. The patient is                 



                                bradycardic.                    



                                ABDOMEN: Soft, nontender, and nondistended. No o

rganomegaly.                 



                                EXTREMITIES: No cyanosis, clubbing, or edema.   

              



                                NEUROLOGICAL: Nonfocal. Noted that the patient i

s obese.                 



                                                                



                                LABS AND IMAGING: CBC: WBC 24.2, hemoglobin 11.5

, and platelet count of 234.                 



                                CMP: Sodium 135, potassium 4.0, creatinine 1.5. 

Troponins 0.55 and 0.399.                 



                                        Chest x-ray shows no acute cardiopulmona

ry disease. EKG with both bradycardia, 

                                        



                                sinus jon 50, , Q-waves in II, III, and 

aVF.                 



                                                                



                                ASSESSMENT AND PLAN:                 



                                1. Non-ST elevation myocardial infarction. The p

atient is started on                 



                                weight-based Lovenox. I have called the cardiolo

gist, Dr. Gaines who                 



                                evaluated the patient, started the patient on as

pirin, statin. We will hold                 



                                beta blockers and ACE inhibitor secondary to hyp

otension and acute kidney                 



                                injury.                         



                                2. Symptomatic bradycardia with hypotension. Nor

mal sinus rhythm, but the                 



                                patient is symptomatic with hypotension. At this

 point, I will get a stat                 



                                echocardiogram. This is likely secondary to non-

ST-elevation myocardial                 



                                infarction. Continue management for non-ST-eleva

tion myocardial infarction.                 



                                We will continue dopamine drip to titrate for he

art rate of 50. Also on                 



                                Levophed                        



                                3. Leukocytosis, etiology is unclear, may be rel

ated to non-ST-                 



                                elevation myocardial infarction. However, the pa

buffy is pancultured.                 



                                Continue the empiric antibiotics, ceftriaxone 1 

g q. 24 hours.                 



                                4. History of bipolar. The patient takes lithium

. I will check a lithium                 



                                level to make sure that some of the symptoms are

 not related to Lithium                 



                                toxicity.                       



                                                                



                                Other comorbidities, we will resume the patient'

s home medications                 



                                and adjust as needed.                 



                                                                



                                Deep venous thrombosis prophylaxis. The patient 

is on                 



                                Lovenox.                        



                                                                



                                Addendum- she says she is DNR, also wanted to ea

t so NPO order was dcd                 



                                                                



                                Critical time spent: I spent 50 mins evaluating 

patient including history and                 



                                                                



                                PATIENT NAME: TYLER JUDD ACCOUNT #: [id number]

158                 



                                                                



                                                                



                                                                



                                examination, medication management and review of

 labs and imaging and                 



                                discussing with the staff                 



                                                                



                                Dictated By: Bee Multani MD                 



                                                                



                                WT: HP:L.HIM/NICOL/NTS                 



                                DD: 2020 08:43:38                 



                                                                



                                DT: 2020 12:45:32                 



                                Conf#: [id number]/DID#: [id number]                 



                                                                



                                Authenticated and Edited by Bee Multani MD O

n 20 7:01:30 PM                 



                                                                



                                                                



                                                                



                                                                



                                                                



                                Electronically Signed by Bee Multani MD on  at 1903                 



                                                                



                                                                



                                                                



                                                                



                                                                



                                                                



                                                                



                                                                



                                                                



                                                                



                                                                



                                                                



                                                                



                                                                



                                                                



                                                                



                                                                



                                                                



                                                                



                                                                



                                                                



                                                                



                                                                



                                                                



                                                                



                                                                



                                                                



                                                                



                                                                



                                                                



                                                                



                                                                



                                                                



                                                                



                                                                



                                                                



                                                                



                                PATIENT NAME: TYLER JUDD ACCOUNT #: [id number]

[id number]                 

 

                2020 06:37:00-00:00 [id number]                       [address] [address]                 



                                                                



                                PATIENT NAME: TYLER JUDD ADMIT DATE: 20

                 



                                ACCOUNT NO: [id number] ROOM NO:         

         



                                MEDICAL RECORD NO: [id number] AGE: 49           

      



                                REPORT TYPE: eELECTROCARDIOGRAM SEX: F          

       



                                                                



                                ADMITTING PHYSICIAN: Derian Ferrara MD        

         



                                ATTENDING PHYSICIAN: Derian Ferrara MD        

         



                                                                



                                                                



                                Order:                          



                                [id number]                   



                                Test Reason : (Not Selected)                 



                                 Test Date/Time Stamp:                 



                                 06:37:22                 



                                Blood Pressure : 081/039 mmHG                 



                                Vent. Rate : 058 BPM Atrial Rate : 058 BPM      

           



                                 P-R Int : 154 ms QRS Dur : 088 ms              

   



                                 QT Int : 528 ms P-R-T Axes : 089 065 180 degree

s                 



                                 QTc Int : 518 ms                 



                                                                



                                Sinus bradycardia                 



                                Nonspecific ST and T wave abnormality           

      



                                Prolonged QT                    



                                Abnormal ECG                    



                                When compared with ECG of 2020 06:33, (Un

confirmed)                 



                                T wave inversion less evident in Lateral leads  

               



                                QT has lengthened                 



                                Confirmed by SAVANNA RODRIGUEZ MD () on 3

/2020 3:25:18 PM                 



                                                                



                                Referred By: Self Referred Confirmed by:SAVANNA GEORGE MD                 



                                                                



                                                                



                                                                



                                                                



                                                                



                                Electronically Signed by DINH Gaines MD on

 20 at 1525                 



                                                                



                                                                



                                                                



                                                                



                                                                



                                                                



                                                                



                                                                



                                                                



                                                                



                                                                



                                                                



                                                                



                                                                



                                                                



                                                                



                                PATIENT NAME: TYLER JUDD ACCOUNT #: [id number]

[id number]                 

 

                2020 06:33:00-00:00 [id number]                       [address] [address]                 



                                                                



                                PATIENT NAME: TYLER JUDD ADMIT DATE: 20

                 



                                ACCOUNT NO: [id number] ROOM NO: [facility]        

         



                                MEDICAL RECORD NO: [id number] AGE: 49           

      



                                REPORT TYPE: eELECTROCARDIOGRAM SEX: F          

       



                                                                



                                ADMITTING PHYSICIAN: Derian Ferrara MD        

         



                                ATTENDING PHYSICIAN: Derian Ferrara MD        

         



                                                                



                                                                



                                Order:                          



                                [id number]                   



                                Test Reason : (Not Selected)                 



                                 Test Date/Time Stamp:                 



                                 06:33:44                 



                                Blood Pressure : 081/039 mmHG                 



                                Vent. Rate : 061 BPM Atrial Rate : 061 BPM      

           



                                 P-R Int : 142 ms QRS Dur : 086 ms              

   



                                 QT Int : 436 ms P-R-T Axes : 090 065 180 degree

s                 



                                 QTc Int : 438 ms                 



                                                                



                                Normal sinus rhythm with sinus arrhythmia       

          



                                ST and T wave abnormality, consider lateral isch

emia                 



                                Abnormal ECG                    



                                When compared with ECG of 2020 03:20, (Un

confirmed)                 



                                ST now depressed in Inferior leads              

   



                                Nonspecific T wave abnormality now evident in In

ferior leads                 



                                T wave inversion now evident in Lateral leads   

              



                                QT has shortened                 



                                Confirmed by SAVANNA RODRIGUEZ MD () on 3

/2020 3:25:24 PM                 



                                                                



                                Referred By: Self Referred Confirmed by:SAVANNA GEORGE MD                 



                                                                



                                                                



                                                                



                                                                



                                                                



                                Electronically Signed by DINH Gaines MD on

 20 at 1525                 



                                                                



                                                                



                                                                



                                                                



                                                                



                                                                



                                                                



                                                                



                                                                



                                                                



                                                                



                                                                



                                                                



                                                                



                                                                



                                PATIENT NAME: TYLER JUDD ACCOUNT #: [id number]

158                 

 

                2020 03:34:00-00:00                                 [facility] ([facility])        

         



                                EMERGENCY PROVIDER REPORT                 



                                REPORT#:[id number] REPORT STATUS: Signed         

        



                                DATE:20 TIME:033                 



                                                                



                                PATIENT: TYLER JUDD UNIT #: [id number]       

          



                                ACCOUNT#: [id number] ROOM/BED: [place] [address]      

           



                                : 66 AGE: 49 SEX: F PCP PHYS: No Primar

y or Family Physician                 



                                SERVICE DT: 20 AUTHOR: Kristy Quiros MD  

               



                                                                



                                * ALL edits or amendments must be made on the [employer]/computer document *                 



                                                                



                                                                



                                HPI-Chest Pain 40 and Over                 



                                                                



                                General                         



                                Confirmed Patient Yes                 



                                Patient Type New patient                 



                                Initial Greet Date/Time 20 0323           

      



                                                                



                                Presentation                    



                                Chief Complaint Shortness of breath, Diarrhea   

              



                                Hx Obtained From Patient                 



                                Sudden in Onset? Yes                 



                                Onset Occurred Today                 



                                Symptom Duration Since onset                 



                                Progression since Onset Unchanged               

  



                                )( Migration/Movement None                 



                                Severity: Onset Moderate                 



                                Severity: Current Mild                 



                                Exacerbated by Nothing                 



                                Relieved by Nothing                 



                                                                



                                Context                         



                                Immunization Status                 



                                 General Unknown                 



                                                                



                                Free Text HPI Notes                 



                                Free Text HPI Notes                 



                                        50 y/o F w/ PMHx of HTN, COPD, bipolar d

isorder, and schizophrenia presents to 

                                        



                                ED from [facility] for low blood pressure n

oted for 2 days. Also w/ SOB,                 



                                and diarrhea x 2 days. Per EMS, pt was bradycard

ic and with troponin of 0.42.                 



                                Denies fever, chills and N/V.                 



                                                                



                                                    Portions of this section wer

e scribed by Génesis Matias on 20 at 

0553                                                



                                                                



                                Risk-Chest Pain 40 and Over                 



                                                                



                                Risk Stratification                 



                                )( Coronary Artery Disease Risk factors reviewed

, Hypertension                 



                                )( Thoracic Aortic Dissection Risk factors revie

wed, Hypertension                 



                                )( Pulmonary Embolism Risk factors reviewed     

            



                                )( AMI-Aspirin                  



                                 Aspirin Last 24 Hrs Not indicated              

   



                                )( HEART for MACE                 



                                 )( HEART for MACE Response Value               

  



                                 History Mod index of suspicion 1               

  



                                 ECG Interpretation Nonspec repol disturb 1     

            



                                 Age Age 45 - 65  1                 



                                 Risk Factors for CAD 1-2 CAD risk factors 1    

             



                                 Troponin 1 to 3x NL troponin 1                 



                                 Total  5                       



                                                                



                                                                



                                                    Portions of this section wer

e scribed by Génesis Matias on 20 at 

0454                                                



                                                                



                                Review of Systems                 



                                                                



                                ROS Statements                  



                                All systems rev neg except as marked.           

      



                                                                



                                Free Text ROS Notes                 



                                Free Text ROS Notes                 



                                -Constitutional                 



                                Denies: Fever. Chills.                 



                                -GI                             



                                Denies: Nausea, Vomiting. Abdominal pain. consti

pation                 



                                + diarrhea                      



                                -                             



                                Denies: Dysuria, Hematuria,                 



                                -Musculoskeletal                 



                                Denies: Extremity pain, Ext Swelling            

     



                                -Skin                           



                                Denies: Rash, Swelling.                 



                                -Neurologic                     



                                Denies: Numbness, Tingling.                 



                                -Eyes                           



                                Denies:visual loss, blurred vision              

   



                                -Respiratory                    



                                Denies: dyspnea on exertion                 



                                + SOB                           



                                -Cardiovascular                 



                                Denies: Chest pain, Dyspnea on exertion, Edema. 

                



                                -Allergy:                       



                                Denies Hives, Itching                 



                                                                



                                                                



                                                    Portions of this section wer

e scribed by Génesis Matias on 20 at 

0338                                                



                                                                



                                Past Medical History - Adult                 



                                Stated Complaint FROM [facility]; HYPOTENSI

ON                 



                                Allergies                       



                                Coded Allergies:                 



                                Penicillins (Severe, SWELLING 16)         

        



                                                                



                                Home Medications                 



                                Discontinued Scripts                 



                                CIPROFLOXACIN (CIPRO) 750 MG PO Q12H            

     



                                 CIPROFLOXACIN (CIPRO) 750 MG PO Q12H #14 TAB   

              



                                 Prov: 16                 



                                 DC: 20 0347 Patient stopped taking       

          



                                HYDROcodone/APAP (NORCO 10/325) 1 TAB PO Q4H PRN

 PRN PAIN                 



                                 HYDROcodone/APAP (NORCO 10/325) 1 TAB PO Q4H AK

N PRN PAIN #30 TAB                 



                                 Prov: 16                 



                                 DC: 20 0347 Patient stopped taking       

          



                                                                



                                Reported Medications                 



                                LITHIUM CARBONATE  MG PO DAILY PM         

        



                                CHOLECALCIFEROL (VITAMIN D3) (VITAMIN D3) 1,000 

UNITS PO DAILY                 



                                MULTIVITAMIN (MULTI-DAY VITAMIN) 1 TAB PO DAILY 

                



                                GABAPENTIN (NEURONTIN) 100 MG PO TID            

     



                                ALPRAZolam (XANAX) 0.5 MG PO Q8H PRN PRN ANXIETY

                 



                                MIRTAZAPINE (REMERON) 30 MG PO DAILY            

     



                                ZIPRASIDONE (GEODON) 40 MG PO BID               

  



                                CARVEDILOL (COREG) 25 MG PO BID MEALS           

      



                                MELOXICAM (MOBIC) 7.5 MG PO DAILY               

  



                                ALBUTEROL (PROAIR HFA 90 MCG/ACT) 1 PUFF INH RTQ

4H                 



                                                                



                                Discontinued Reported Medications               

  



                                MELOXICAM (MOBIC) 7.5 MG PO BID                 



                                                                



                                                                



                                Review of Nursing Notes Rev avail, and agree    

             



                                Past Medical History:                 



                                Reports: COPD, Depression/mood disorder, Hyperte

nsion.                 



                                Past Surgical History:                 



                                Reports: Cholecystectomy.                 



                                Additional Surgical History                 



                                ankle surgery, jaw reconstruction, ear sx       

          



                                Smoking status for patients 13 years old or olde

r: Unknown,if ever smoked                 



                                Other Social History Local resident             

    



                                Ambulatory Status Independent                 



                                                                



                                                    Portions of this section caesar vargas scribed by Génesis Matias on 20 at 

0457                                                



                                                                



                                Physical Exam                   



                                                                



                                Vital Signs                     



                                Vital Signs                     



                                First Documented:                 



                                 Result Date Time                 



                                 Pulse Ox 100 318                 



                                 B/P 93/48 318                 



                                 B/P Mean 63 318                 



                                  O2 Delivery Room air 318               

  



                                 Temp 36.4 318                 



                                 Pulse 48 318                 



                                 Resp  22 318                 



                                                                



                                Last Documented:                 



                                 Result Date Time                 



                                 Pulse Ox 100  0431                 



                                 B/P 102/58 431                 



                                 B/P Mean 72 431                 



                                 O2 Delivery Room air 431                

 



                                 Pulse  48 431                 



                                 Resp 18 431                 



                                 Temp 36.4 318                 



                                                                



                                                                



                                Review of Vital Signs Reviewed                 



                                                                



                                Focused PE                      



                                General/Const **                 



                                 General/Const Awake, Alert                 



                                 Appearance/Presentation                 



                                 Obese, morbidly.                 



                                Resp/Chest **                   



                                 Respiratory/Chest Atraumatic, Breath sounds NL,

 Breath sounds = bilat, No                 



                                respiratory distress, No rales, No rhonchi, No w

heezing                 



                                Cardiovascular **                 



                                 Cardiovascular Heart sounds NL, No gallop, No m

urmurs                 



                                 Heart Rate/Rhythm                 



                                 Bradycardia.                   



                                                                



                                Free Text PE Notes                 



                                Free Text PE Notes                 



                                 General/Const                  



                                 General/Const Awake, Alert                 



                                MS Head                         



                                 Head Atraumatic, Normocephalic                 



                                Eyes                            



                                 Eyes Atraumatic, No scleral icterus            

     



                                MS Neck                         



                                 Neck Atraumatic, Full range of motion          

       



                                Resp/Chest CTAB, -w                 



                                Cardiovascular                  



                                 good cap refill                 



                                Ext: moving all 4 ext, no swelling/ cyanosis not

ed on UE Bl                 



                                Skin                            



                                 Skin no apparent rashes, Dry, Intact           

      



                                Neurologic                      



                                 Neurologic Oriented X3, Speech NL              

   



                                Psych nl affect and mood                 



                                                                



                                                                



                                                    Portions of this section caesar vargas scribed by Génesis Matias on 20 at 

0547                                                



                                                                



                                Interpretation Diagnostics                 



                                                                



                                Lab Results Interpretation                 



                                Results                         



                                Laboratory Tests                 



                                                                



                                                                



                                                                



                                20 0342:                  



                                [Embedded Image Not Available]                 



                                Laboratory Tests:                 



                                  0342 0342                 



                                 Chemistry                      



                                 Sodium (134 - 147 mmol/L) 135                 



                                 Potassium (3.4 - 5.0 mmol/L) 4.0               

  



                                 Chloride (100 - 108 mmol/L) 106                

 



                                 Carbon Dioxide (21 - 32 mmol/L) 22             

    



                                 Anion Gap (4.0 - 15.0 GAP calc)  7.0           

      



                                 BUN (7 - 18 MG/DL) 34 H                 



                                 Creatinine (0.6 - 1.0 MG/DL) 1.5 H             

    



                                 Glomerular Filtr Rate (>60 estGFR) 39 L        

         



                                 Glucose (70 - 110 MG/DL) 97                 



                                 Lactic Acid (0.4 - 2.0 mmol/L) 0.8             

    



                                 Calcium (8.5 - 10.1 MG/DL) 8.9                 



                                 Troponin I (0.000 - 0.045 NG/ML) 0.555 *H      

           



                                 Hematology                     



                                 WBC (3.5 - 11.0 K/mm3)  24.2 H                 



                                 RBC (4.70 - 6.10 M/mm3) 3.75 L                 



                                 Hgb (10.4 - 14.9 G/DL) 11.5                 



                                 Hct (31.5 - 44.1 %) 35.2                 



                                 MCV (84.5 - 98.6 Fl) 93.9                 



                                 MCH (27.0 - 34.2 pg) 30.7                 



                                  MCHC (31.5 - 34.0 G/DL) 32.7                 



                                 RDW (11.5 - 14.5 SD) 13.8                 



                                 Plt Count (150 - 450 K/mm3)  234.0             

    



                                 MPV (7.0 - 10.5 fL) 9.80                 



                                 Neut % (Auto) (40 - 76 %) 82.9 H               

  



                                 Lymph % (Auto) (20.5 - 51.1 %) 8.3 L           

      



                                 Mono % (Auto) (1.7 - 9.3 %) 7.3                

 



                                 Eos % (Auto) (0.0 - 6.0 %) 1.4                 



                                 Baso % (Auto) (0.0 - 2.0 %) 0.1                

 



                                 Neut # (Auto) (1.8 - 7.6 K/mm3) 20.08 H        

         



                                 Lymph # (Auto) (0.6 - 3.2 K/mm3) 2.0           

      



                                 Mono # (Auto) (0.3 - 1.1 K/mm3) 1.8 H          

       



                                 Eos # (Auto) (0.0 - 0.4 K/mm3) 0.3             

    



                                 Baso # (Auto) (0.0 - 0.1 K/mm3) 0.0            

     



                                 Add Manual Diff (CRITERIA DIFF/SCN) NO         

        



                                                                



                                Microbiology:                   



                                 Date/Time Procedure - Status                 



                                 Source Growth                  



                                 452 MRSA Screen - ORD                 



                                 NASAL                          



                                  0340 Blood Culture - RECD                

 



                                 BLOOD                          



                                                                



                                Recent Impressions:                 



                                RADIOLOGY - XR CHEST 1 V  043             

    



                                *** Report Impression - Status: SIGNED Entered: 

2020                 



                                                                



                                IMPRESSION: Mild cardiomegaly, without acute pul

monary process.                 



                                Impression By: Jose March M.D.      

           



                                                                



                                                                



                                 Lab Statement                  



                                Laboratory studies reviewed and considered in [facility] medical decision-making.                 



                                                                



                                 Imaging Statement                 



                                Radiographic studies reviewed and considered in 

the medical decision-making.                 



                                                                



                                                                



                                Point of Care Testing                 



                                Pulse Oximetry                  



                                 Pulse Ox % 100                 



                                 On: Room air                   



                                 Interpretation Interpreted by me, Pulse oximetr

y normal                 



                                 Time 0318                      



                                                                



                                ECG #1 Interpretation                 



                                ECG Documented in MUSE Yes                 



                                Date 20                   



                                Time 042                       



                                Interpreted by and reviewed by me, ED physician 

                



                                NL ECG Interpretation No STEMI                 



                                Rate 50                         



                                Rhythm Bradycardia                 



                                                                



                                                    Portions of this section wer

e scribed by Génesis Matias on 20 at 

0553                                                



                                                                



                                Procedures                      



                                                                



                                Central Line Placement #1                 



                                Time 0355                       



                                Procedure Performed by ED physician             

    



                                Consent/Setup/Site Prep Verified correct patient

, Informed consent provided,                 



                                Consent from patient, Oxygen administered, Pulse

 oximeter applied, Cardiac                 



                                monitor applied, Hand hygiene observed          

       



                                Skin Preparation Agent Hibiclens - Chlorhexidine

                 



                                Local Anesthesia Lidocaine 1%                 



                                Side/Location/Ultrasound Internal jugular R, Ult

rasound assisted                 



                                                    Catheter/Lumen/Technique Tri

ple lumen, Seldinger technique, Guide wire removed,

                                                    



                                                    Good blood return, Secured w

 catheter device, Secured with tape (and stat lock)

                                                    



                                Number of Attempts 1                 



                                                    Post-Procedure/Complications

 Dressing placed, CXR neg for pneumothorax, Cath 

tip                                                 



                                good position, Condition improved, Tolerated pro

cedure well, Patient stable                 



                                                                



                                                    Portions of this section wer

e scribed by Génesis Matias on 20 at 

0423                                                



                                                                



                                Re-Evaluation MDM                 



                                                                



                                Free Text MDM Notes                 



                                Free Text MDM Notes                 



                                49 lady HPI PE as above                 



                                VS notable for hypotension:                 



                                1. labs                         



                                2. central line                 



                                3. imaging                      



                                4. reassess                     



                                                                



                                Sepsis work-up and antibiotics given at outside 

facility.                 



                                                                



                                Pt. understands and agrees with plan.           

      



                                Discussed case with STANISLAV Monreal.                 



                                Pt understands plan and agrees to it            

     



                                                                



                                                                



                                                                



                                ED Course                       



                                Medication(s) Ordered                 



                                Medication(s) Ordered:                 



                                Autonomic Drugs                 



                                 Sig/Kelle Start time Last                 



                                 Medication Dose Route Stop Time Status Admin   

              



                                 Dopamine HCl/Dextrose 250 ML X1ED STA  045

2 AC                  



                                 IV  0059 0506                 



                                                                



                                Central Nervous System Agents                 



                                 Sig/Kelle Start time Last                 



                                 Medication Dose Route Stop Time Status Admin   

              



                                 Acetaminophen 650 MG Q4H PRN PRN  0445 AC 

                



                                 PO  044                  



                                                                



                                Gastrointestinal Drugs                 



                                 Sig/Kelle Start time Last                 



                                 Medication Dose Route Stop Time Status Admin   

              



                                 Docusate Sodium 100 MG Q12H PRN PRN  0445 

AC                 



                                                   



                                 Ondansetron HCl 4 MG Q4H PRN PRN  0445 AC 

                



                                                   



                                                                



                                                                



                                                                



                                                    Portions of this section wer

e scribed by Génesis Matais on 20 at 

0553                                                



                                                                



                                Patient Discharge Departure                 



                                                                



                                Vital Signs/Condition                 



                                Vital Signs                     



                                First Documented:                 



                                 Result Date Time                 



                                  Pulse Ox 100  0318                 



                                 B/P 93/48  0318                 



                                 B/P Mean 63  0318                 



                                 O2 Delivery Room air  0318                

 



                                 Temp 36.4  0318                 



                                 Pulse 48  0318                 



                                 Resp 22 8                 



                                                                



                                Last Documented:                 



                                 Result Date Time                 



                                 Pulse Ox 100  0431                 



                                 B/P  102/58  0431                 



                                 B/P Mean 72  0431                 



                                 O2 Delivery Room air  0431                

 



                                 Pulse 48  0431                 



                                 Resp 18  0431                 



                                 Temp 36.4  0318                 



                                                                



                                All vital signs available at the time of this en

try have been reviewed.                 



                                                                



                                Condition Guarded                 



                                                                



                                Clinical Impression                 



                                Clinical Impression                 



                                Primary Impression: Hypotension                 



                                Secondary Impressions: Bradycardia, Sepsis, SOB 

(shortness of breath)                 



                                                                



                                Disposition Decision                 



                                Admit                           



                                 Admit Physician Name                 



                                 Derian Ferrara MD                 



                                 Admit Physician Hospitalist                 



                                 Request Time 045                 



                                 Request Date 20                 



                                 )( Admission Accepts Yes                 



                                 )( Accepted Time 045                 



                                 )( Accepted Date 20                 



                                 Call Information will see patient, agrees with 

eval, agrees with plan                 



                                                                



                                Discharge/Care Plan                 



                                                    Counseled Regarding Diagnosi

s, Lab results, Imaging studies, Need for admission

                                                    



                                 Admit Note                     



                                                    I have spoken with the patie

nt and/or caregivers. I have explained the 

patient's                                           



                                                    condition, diagnoses and farida

atment plan based on the information available to 

me                                                  



                                at this time. I have answered the patient's and/

or caregiver's questions and                 



                                addressed any concerns. The patient and/or careg

donita have as good an                 



                                                    understanding of the patient

's diagnosis, condition and treatment plan as can 

be                                                  



                                                    expected at this point. The 

patient has been stabilized within the capability 

of                                                  



                                        the emergency department. The patient wi

ll be transported for further care and 

                                        



                                management or will be moved to an observation or

 inpatient service. I have                 



                                        communicated with the staff or medical p

tiffanieer taking over this patient's 

                                        



                                care.                           



                                                                



                                                                



                                Critical Care                   



                                Time Spent (minutes): 30                 



                                Services Performed Patient management by me, Julien

alicia spent at bedside, Reviewing                 



                                test results, Reviewing imaging, Discussing zaki

ent care, Documentation in                 



                                record                          



                                Separately billable procedures excluded from julien

e.                 



                                Patient was critically ill due to:              

   



                                hypotension                     



                                My treatment and management were:               

  



                                pressors                        



                                 CC Note 1                      



                                                    Total critical care time [30

] minutes. Total critical care time documented does

                                                    



                                not include time spent on separately billed proc

edures or the services of                 



                                residents, students, nurses or physician assista

nts. I personally saw and                 



                                examined the patient. I have reviewed all diagno

stic interpretations and                 



                                                    treatment plans as written. 

I was present for the key portions of any 

procedures                                          



                                                    performed and the inclusive 

time noted in any critical care statement. Critical

                                                    



                                                    care time includes patient m

anagement by me, time spent at the patients 

bedside,                                            



                                        time to review lab and imaging results, 

discussing patient care, documentation 

                                        



                                in the medical record, and time spent with the f

amily or caregiver.                 



                                                                



                                                                



                                Supervising Physician Note                 



                                 Scribe Statement                 



                                Génesis Matias, 20 0340, scribing for

 and in the presence of Dr. Quiros.                           



                                Signed By: Génesis Matias, 20 0340   

              



                                                                



                                 Provider Scribed Statement                 



                                                    I personally performed the s

ervices described in this documentation and 

reviewed                                            



                                the documentation that was dictated to the scrib

e(s) in my presence, and it                 



                                accurately records my words and actions. ELINOR Quiros, 20                 



                                                                



                                                                



                                                    Portions of this section wer

e scribed by Génesis Matias on 20 at 

0553                                                



                                                                



                                Electronically Signed by Kristy Quiros MD on  at 0622                 



                                                                



                                RPT #: [id number]                 



                                ***END OF REPORT***                 

 

                2020 03:20:00-00:00 [id number]                       [address] [address]                 



                                                                



                                PATIENT NAME: TYLER JUDD ADMIT DATE: 20

                 



                                ACCOUNT NO: [id number] ROOM NO: [id number]        

         



                                MEDICAL RECORD NO: [id number]  AGE: 49          

       



                                REPORT TYPE: eELECTROCARDIOGRAM SEX: F          

       



                                                                



                                ADMITTING PHYSICIAN: Derian Ferrara MD        

         



                                ATTENDING PHYSICIAN: Derian Ferrara MD        

         



                                                                



                                                                



                                Order:                          



                                [id number]                   



                                Test Reason : (Not Selected)                 



                                 Test Date/Time Stamp:                 



                                 03:20:27                 



                                Blood Pressure : 093/048 mmHG                 



                                Vent. Rate : 050 BPM Atrial Rate : 050 BPM      

           



                                 P-R Int : 148 ms QRS Dur : 098 ms              

   



                                 QT Int : 558 ms P-R-T Axes : 064 055 087 degree

s                 



                                 QTc Int : 508 ms                 



                                                                



                                Sinus bradycardia                 



                                Cannot rule out Inferior infarct , age undetermi

minerva                 



                                Prolonged QT                    



                                Abnormal ECG                    



                                No previous ECGs available                 



                                Confirmed by SAVANNA RODRIGUEZ MD ([id number]) on 3

/2020 3:26:38 PM                 



                                                                



                                Referred By: Self Referred Confirmed by:SAVANNA GEORGE MD                 



                                                                



                                                                



                                                                



                                                                



                                                                



                                Electronically Signed by DINH Gaines MD on

 20 at 1526                 



                                                                



                                                                



                                                                



                                                                



                                                                



                                                                



                                                                



                                                                



                                                                



                                                                



                                                                



                                                                



                                                                



                                                                



                                                                



                                                                



                                                                



                                                                



                                PATIENT NAME: TYLER JUDD ACCOUNT #: [id number]

158                 

 

                2018 16:18:55-00:00  [facility]



                                 Discharge Summary                 



                                                                



                                PATIENT NAME: MANNY JUDDESA PHYSICIAN: Salvatore Brownlee MD                 



                                 ACCOUNT #: [id number] Admitted:                

 



                                 MR NUMBER: [id number] DISCHARGED: 2017 05:4

1:00                 



                                                    

________________________________________________________________________________
                                                    



                                The patient was admitted to Dr. Cohen's team on 

the inpatient unit.                 



                                                                



                                REASON FOR ADMISSION:                 



                                The patient was admitted for suicidal ideation w

ith a chief complaint of "I                 



                                am tired of doing all this." The patient is a 47

-year-old female with a past                 



                                psychiatric history of bipolar disorder and schi

zophrenia. She presented                 



                                with suicidal ideation without a plan going on f

or over 3 days prior to                 



                                admission.                      



                                                                



                                ADMITTING DIAGNOSES:                 



                                Major depressive disorder, recurrent episode, se

sara with mixed features, and                 



                                methamphetamine use disorder.                 



                                                                



                                FINAL DIAGNOSES:                 



                                AXIS I: Bipolar 2 disorder, current depressive e

pisode with psychotic                 



                                features, and methamphetamine use disorder.     

            



                                AXIS II: None.                  



                                AXIS III: Obesity, chronic obstructive pulmonary

 disease, and neuropathy.                 



                                AXIS IV: Poor family relationship, history of dr decker abuse, unemployed, on                 



                                disability.                     



                                                                



                                PRINCIPAL PROCEDURE:                 



                                Psychopharmacotherapy.                 



                                                                



                                SPECIAL PROCEDURES:                 



                                None.                           



                                                                



                                HOSPITAL COURSE:                 



                                Ms. Tyler Judd is a 47-year-old  fem

ronny, who was admitted to Dr. Cohen's team from 2017 to . The patient was on                 



                                unit restrictions along with elopement and stand

suzette PICU precautions. The                 



                                patient on admission was only started on trazodo

ne 50 mg at bedtime p.r.n.                 



                                for sleep and Vistaril 50 mg q. 6 hours p.r.n. f

or anxiety. The patient did                 



                                state that she had medication that she is taking

 at home of lithium 400 mg                 



                                daily, Abilify 30 mg, gabapentin, unknown dose, 

and trazodone, unknown dose.                 



                                We did not start her home medications as she abebe

d they were not working. She                 



                                had stopped taking them a few weeks prior to [facility] admitted. Due to her                 



                                presenting symptoms and her past psychiatric his

tory of bipolar 2, we have                 



                                since started the patient on Latuda 20 mg with l

unch. She desired to stop                 



                                the lithium and Abilify. She states that she had

 an allergy to risperdal and                 



                                she would not take medications that would cause 

her to gain weight. The                 



                                patient did not have any side effects to the Lat

uda. Over the next few days,                 



                                we increased the dose from 20 to 40 mg with lunc

h. We increase the trazodone                 



                                to 100 mg at night for sleep and these changes t

o his medications help                 



                                resolve the patient's presenting symptoms of nancy

cidal ideation, improvement                 



                                in depression, improved sleep, improved appetite

, and improved concentration.                 



                                 She is no longer reporting feelings of hopeless

ness. The patient was                 



                                cooperative during the day.                 



                                                                



                                ASSESSMENT:                     



                                                                



                                 [facility]                 



                                  Discharge Summary                 



                                                                



                                PATIENT NAME: TYLER JUDD PHYSICIAN: Salvatore Brownlee MD                 



                                 ACCOUNT #: [id number] Admitted:                

 



                                 MR NUMBER: [id number] DISCHARGED: 2017 05:4

1:00                 



                                                    

________________________________________________________________________________
                                                    



                                The patient was admitted to Dr. Cohen's team on 

the inpatient unit.                 



                                                                



                                REASON FOR ADMISSION:                 



                                The patient was admitted for suicidal ideation w

ith a chief complaint of "I                 



                                am tired of doing all this." The patient is a 47

-year-old female with a past                 



                                psychiatric history of bipolar disorder and schi

zophrenia. She presented                 



                                with suicidal ideation without a plan going on f

or over 3 days prior to                 



                                admission.                      



                                                                



                                ADMITTING DIAGNOSES:                 



                                Major depressive disorder, recurrent episode, se

sara with mixed features, and                 



                                methamphetamine use disorder.                 



                                                                



                                FINAL DIAGNOSES:                 



                                AXIS I: Bipolar 2 disorder, current depressive e

pisode with psychotic                 



                                features, and methamphetamine use disorder.     

            



                                AXIS II: None.                  



                                AXIS III: Obesity, chronic obstructive pulmonary

 disease, and neuropathy.                 



                                AXIS IV: Poor family relationship, history of dr decker abuse, unemployed, on                 



                                disability.                     



                                                                



                                PRINCIPAL PROCEDURE:                 



                                Psychopharmacotherapy.                 



                                                                



                                SPECIAL PROCEDURES:                 



                                None.                           



                                                                



                                HOSPITAL COURSE:                 



                                Ms. Tyler Judd is a 47-year-old  fem

ronny, who was admitted to Dr. Cohen's team from 2017 to . The patient was on                 



                                unit restrictions along with elopement and stand

suzette PICU precautions. The                 



                                patient on admission was only started on trazodo

ne 50 mg at bedtime p.r.n.                 



                                for sleep and Vistaril 50 mg q. 6 hours p.r.n. f

or anxiety. The patient did                 



                                state that she had medication that she is taking

 at home of lithium 400 mg                 



                                daily, Abilify 30 mg, gabapentin, unknown dose, 

and trazodone, unknown dose.                 



                                We did not start her home medications as she abebe

d they were not working. She                 



                                had stopped taking them a few weeks prior to [facility] admitted. Due to her                 



                                presenting symptoms and her past psychiatric his

tory of bipolar 2, we have                 



                                since started the patient on Latuda 20 mg with l

unch. She desired to stop                 



                                the lithium and Abilify. She states that she had

 an allergy to risperdal and                 



                                she would not take medications that would cause 

her to gain weight. The                 



                                patient did not have any side effects to the Lat

uda. Over the next few days,                 



                                we increased the dose from 20 to 40 mg with lunc

h. We increase the trazodone                 



                                to 100 mg at night for sleep and these changes t

o his medications help                 



                                resolve the patient's presenting symptoms of nancy

cidal ideation, improvement                 



                                in depression, improved sleep, improved appetite

, and improved concentration.                 



                                 She is no longer reporting feelings of hopeless

ness. The patient was                 



                                cooperative during the day.                 



                                                                



                                ASSESSMENT:                     



                                                                



                                 [facility]                 



                                 Discharge Summary                 



                                She did not attend group therapy. On the day of 

discharge, she was deemed                 



                                suitable for discharge because she denied suicid

al and homicidal ideations.                 



                                                                



                                She denied audio and visual hallucinations. Her 

mood had improved. Her                 



                                affect had improved her sleep. Judgment and insi

ght had all improved. The                 



                                patient plans to return home. She will continue 

psychotropic medications and                 



                                followup with an outpatient psychiatrist.       

          



                                                                



                                MENTAL STATUS EXAM ON DISCHARGE:                

 



                                The patient appeared well-groomed, well-nourishe

d 47-year-old female. She                 



                                made good eye contact. She had no psychomotor re

tardation or activation.                 



                                She did have a calm attitude. Her speech was reg

ular rate and rhythm with                 



                                good volume. Her mood was good. Her affect was c

ongruent and euthymic.                 



                                Regarding perception, she denied any audio or vi

sual hallucinations. She                 



                                denies any delusions. Thought process was linear

 and goal directed. She                 



                                denied suicidal and homicidal ideations. Insight

 and judgment were fair.                 



                                She is alert and oriented to person, place, time

, and circumstance. Her                 



                                memory and attention are grossly intact. Abstrac

tion is intact. Fund of                 



                                knowledge was appropriate for her age and educat

ion. Her gait was normal.                 



                                                                



                                LABORATORY DATA:                 



                                No relevant labs upon discharge.                

 



                                                                



                                DRUG REACTIONS/INTERACTIONS:                 



                                None.                           



                                                                



                                DISCHARGE MEDICATIONS:                 



                                Were:                           



                                                                



                                 1. Latuda 40 mg per day with lunch.            

     



                                 2. Gabapentin 1200 mg at night, 300 mg with sharon

akfast, and 300 mg wtih                 



                                 lunch.                         



                                                                



                                DISCHARGE INSTRUCTIONS:                 



                                The patient was provided with standard discharge

 instructions. No                 



                                restrictions on diet or physical activity. The p

atient was given driving                 



                                                    restrictions as she is going

 to continue taking gabapentin may cause 

drowsiness.                                         



                                                                



                                FOLLOWUP:                       



                                The patient is scheduled a followup appointment 

with [facility] been                 



                                given specific instructions on how to get to the

 appointment.                 



                                                                



                                DISPOSITION:                    



                                Ms. Tyler Judd is currently stable and tolera

ting all her medications and                 



                                will be discharging to her home. The patient's p

rognosis is poor as                 



                                she has a history of noncompliance; however, if 

she is able to be compliant                 



                                                                



                                 [facility]                 



                                  Discharge Summary                 



                                She did not attend group therapy. On the day of 

discharge, she was deemed                 



                                suitable for discharge because she denied suicid

al and homicidal ideations.                 



                                                                



                                She denied audio and visual hallucinations. Her 

mood had improved. Her                 



                                affect had improved her sleep. Judgment and insi

ght had all improved. The                 



                                patient plans to return home. She will continue 

psychotropic medications and                 



                                followup with an outpatient psychiatrist.       

          



                                                                



                                MENTAL STATUS EXAM ON DISCHARGE:                

 



                                The patient appeared well-groomed, well-nourishe

d 47-year-old female. She                 



                                made good eye contact. She had no psychomotor re

tardation or activation.                 



                                She did have a calm attitude. Her speech was reg

ular rate and rhythm with                 



                                good volume. Her mood was good. Her affect was c

ongruent and euthymic.                 



                                Regarding perception, she denied any audio or vi

sual hallucinations. She                 



                                denies any delusions. Thought process was linear

 and goal directed. She                 



                                denied suicidal and homicidal ideations. Insight

 and judgment were fair.                 



                                She is alert and oriented to person, place, time

, and circumstance. Her                 



                                memory and attention are grossly intact. Abstrac

tion is intact. Fund of                 



                                knowledge was appropriate for her age and educat

ion. Her gait was normal.                 



                                                                



                                LABORATORY DATA:                 



                                No relevant labs upon discharge.                

 



                                                                



                                DRUG REACTIONS/INTERACTIONS:                 



                                None.                           



                                                                



                                DISCHARGE MEDICATIONS:                 



                                Were:                           



                                                                



                                 1. Latuda 40 mg per day with lunch.            

     



                                 2. Gabapentin 1200 mg at night, 300 mg with sharon

akfast, and 300 mg wtih                 



                                 lunch.                         



                                                                



                                DISCHARGE INSTRUCTIONS:                 



                                The patient was provided with standard discharge

 instructions. No                 



                                restrictions on diet or physical activity. The p

atg[place] was given driving                 



                                                    restrictions as she is going

 to continue taking gabapentin may cause 

drowsiness.                                         



                                                                



                                FOLLOWUP:                       



                                The [facility]                 



                                given specific instructions on how to get to the

 appointment.                 



                                                                



                                DISPOSITION:                    



                                Ms. Tyler Judd is currently stable and tolera

ting all her medications and                 



                                will be discharging to her home. The patient's p

rognosis is poor as                 



                                she has a history of noncompliance; however, if 

she is able to be compliant                 



                                                                



                                 [facility]                 



                                  Discharge Summary                 



                                with her psychotropic medications and consistent

 with outpatient followup,                 



                                she should do very well. She has been encouraged

 to abstain from illicit                 



                                drug use and to not discontinue any psychotropic

 medications without the                 



                                guidance of her outpatient psychiatrist. The hammad bowens has also met with the                 



                                 on the unit to obtain appropriate 

followup. The importance of                 



                                following through with this plan has been review

ed with the patient, with the                 



                                understanding that compliance will be crucial to

 her recovery. She has been                 



                                                                



                                                    given the [facility] 

hotline #[phone] and information about [facility] if she wishes to obtain therapy.

                 



                                                                



                                                                



                                                                



                                                                



                                MD THUAN Roman/MIKE                      



                                DD: 2018 21:40                 



                                TD: 01/10/2018 00:53                 



                                Job #: [id number]                 



                                                                



                                Electronically Authenticated and Edited by:     

            



                                Salvatore Brownlee MD On 01/10/2018 08:48 PM CST       

          



                                 [facility]                 



                                 Discharge Summary                 



                                with her psychotropic medications and consistent

 with outpatient followup,                 



                                she should do very well. She has been encouraged

 to abstain from illicit                 



                                drug use and to not discontinue any psychotropic

 medications without the                 



                                guidance of her outpatient psychiatrist. The hammad bowens has also met with the                 



                                 on the unit to obtain appropriate 

followup. The importance of                 



                                following through with this plan has been review

ed with the patient, with the                 



                                understanding that compliance will be crucial to

 her recovery. She has been                 



                                                                



                                                    given the [facility] 

hotline #[phone] and information about [facility] if she wishes to obtain therapy.

                 



                                                                



                                                                



                                                                



                                                                



                                MD PRINCESS Roman                      



                                DD: 2018 21:40                 



                                TD: 01/10/2018 00:53                 



                                Job #: [id number]                 



                                                                



                                Electronically Authenticated and Edited by:     

            



                                Salvatore Brownlee MD On 01/10/2018 08:48 PM CST       

          



                                Electronically Authenticated by:                

 



                                Venkata Cohen MD On 2018 04:18 PM CST  

               

 

                2017 21:03:34-00:00  [facility]



                                 Psych Eval                     



                                                                



                                PATIENT NAME: TYLER JUDD PHYSICIAN: Salvatore Brownlee MD                 



                                 ACCOUNT #: [id number] Admitted:                

 



                                 MR NUMBER: [id number] DISCHARGED:                

 



                                                    

________________________________________________________________________________
                                                    



                                Psych Eval                      



                                Patient Name: TYLER JUDD Date of            

     



                                Service:                        



                                Patient ID: [id number] YOB: 1970 

                



                                Clinician: Salvatore Brownlee MD User Field 1: J        

         



                                Encounter Visit: [facility] User Field 3: J            

     



                                Referring Venkata Cohen MD                 



                                Clinician:                      



                                                                



                                ATTENDING:                      



                                Venkata Cohen MD                 



                                                                



                                INFORMANT:                      



                                Information was gathered from the patient, as we

ll as the outside hospital                 



                                record, [place] medical record and [facility] epic.

                 



                                                                



                                CHIEF COMPLAINT:                 



                                "Tired of doing all of this."                 



                                                                



                                HISTORY OF PRESENT ILLNESS:                 



                                Tyler Judd is a 47-year-old female with a pas

t psychiatric history of                 



                                bipolar disorder and schizophrenia, presented to

 [facility] with 3                 



                                days of suicidal ideation without a plan. She st

ates that she has been down,                 



                                but she would not describe what led to her suici

herber ideation. Although the                 



                                medical chart mentions that she had a recent str

essor of finding out that her                 



                                boyfriend has not been faithful. This occurred i

n the context of                 



                                methamphetamine intoxication. The patient states

 that she has had suicidal                 



                                ideation in the past on occasion since the age o

f 11. She has never                 



                                attempted suicide. She describes being compliant

 with her home medications                 



                                of lithium 400 mg at bedtime, Abilify 30 mg at b

edtime, trazodone 100 mg at                 



                                bedtime, but has not had these medications for a

pproximately 3 days. The                 



                                patient currently denies any auditory and visual

 hallucinations, but states                 



                                that occasionally she will have visual hallucina

tions of her mother.                 



                                Previously, she endorsed audio hallucinations of

 others laughing at her and                 



                                the emergency department records do state that s

he was having visual                 



                                hallucinations of demons.                 



                                                                



                                PSYCHIATRIC REVIEW OF SYSTEMS:                 



                                The patient endorses poor sleep, as she has trou

ble falling and staying                 



                                asleep, poor concentration, poor energy, anhedon

ia, psychomotor retardation,                 



                                guilt, and suicidal ideation. The patient does a

lso state that she has had                 



                                episodes of alli and said yes to all the alli 

screening questions. She                 



                                does state that she has had audio and visual justin

lucinations.                 



                                                                



                                PAST PSYCHIATRIC HISTORY:                 



                                The patient states that her past psychiatric his

tory is bipolar disorder and                 



                                schizophrenia. She denies ever being diagnosed w

ith schizoaffective                 



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                 



                                                                



                                PSN/CTV                         



                                DD: 2017 14:48                 



                                TD: 2017 17:46                 



                                Job #: [id number]                 



                                                                



                                Electronically Authenticated and Edited by:     

            



                                Norma Judge MD On 2017 07:41 AM 

T

## 2023-06-05 NOTE — RAD REPORT
EXAM DESCRIPTION:  RAD - Chest Single View - 6/5/2023 5:06 pm

 

CLINICAL HISTORY:  CHEST PAIN

Chest pain.

 

COMPARISON:  Chest Single View dated 5/1/2023; CHEST SINGLE VIEW dated 7/16/2014; CHEST PA AND LAT 2 
VIEW dated 11/11/2013; CHEST PA AND LAT 2 VIEW dated 5/5/2008

 

FINDINGS:  Portable technique limits examination quality.

 

Mild interstitial pulmonary edema. The heart is mildly enlarged. No displaced fractures.

 

IMPRESSION:  Mild CHF.

## 2023-06-05 NOTE — ER
Nurse's Notes                                                                                     

 [facility]                                                                 

Name: Alka Judd                                                                               

Age: 52 yrs                                                                                       

Sex: Female                                                                                       

: 1970                                                                                   

MRN: [id number]                                                                                   

Arrival Date: 2023                                                                          

Time: 16:17                                                                                       

Account#: [id number]                                                                            

Bed 7                                                                                             

Private MD:                                                                                       

Diagnosis: Chest pain, unspecified;Restlessness and agitation                                     

                                                                                                  

Presentation:                                                                                     

                                                                                             

16:32 Chief complaint: EMS states: patient called for non-radiating chest pain 8 out of 10,   ko1 

      refused baby aspirin. States she does use street drugs. Coronavirus screen: At this         

      time, the client does not indicate any symptoms associated with coronavirus-19. Ebola       

      Screen: No symptoms or risks identified at this time. Initial Sepsis Screen: Does the       

      patient meet any 2 criteria? No. Patient's initial sepsis screen is negative. Does the      

      patient have a suspected source of infection? No. Patient's initial sepsis screen is        

      negative. Risk Assessment: Do you want to hurt yourself or someone else? Patient            

      reports no desire to harm self or others. Onset of symptoms was 2023.              

16:32 Method Of Arrival: EMS: [facility]                                                    ko1 

16:32 Acuity: TEMO 3                                                                           ko1 

                                                                                                  

Triage Assessment:                                                                                

16:34 General: Appears in no apparent distress. comfortable, Behavior is appropriate for age, ko1 

      restless. Pain: Complains of pain in right hip.                                             

                                                                                                  

OB/GYN:                                                                                           

20:25 LMP N/A -                                                                               jj7 

                                                                                                  

Historical:                                                                                       

- Allergies:                                                                                      

16:34 PENICILLINS;                                                                            ko1 

16:34 Risperdal;                                                                              ko1 

- PMHx:                                                                                           

16:34 Bipolar disorder; Hypertensive disorder;                                                ko1 

- PSHx:                                                                                           

16:34 righ hip surgery;                                                                       ko1 

                                                                                                  

- Immunization history:: Adult Immunizations unknown.                                             

- Social history:: Smoking status: Patient/guardian denies using tobacco, but has a               

  distant history of tobacco abuse, Patient uses street drugs, speed.                             

- Family history:: not pertinent.                                                                 

                                                                                                  

                                                                                                  

Screenin:36 [facility] ED Fall Risk Assessment (Adult) History of falling in the last 3 months,       ko1 

      including since admission No falls in past 3 months (0 pts) Confusion or Disorientation     

      No (0 pts) Intoxicated or Sedated No (0 pts) Impaired Gait No (0 pts) Mobility Assist       

      Device Used No (0 pt) Altered Elimination No (0 pt) Score/Fall Risk Level 0 - 2 = Low       

      Risk Oriented to surroundings, Maintained a safe environment, Educated pt \T\ family on     

      fall prevention, incl call for assistance when getting out of bed, Assessed \T\             

      reinforced patient's understanding of fall precautions, Provided non-skid footwear,         

      Hourly rounding (assess needs \T\ fall precautionary measures) done, Used ambulatory aids   

      as needed (educated on \T\ assisted with), Used gait belt as appropriate. Abuse screen:     

      Denies threats or abuse. Denies injuries from another. Nutritional screening: No            

      deficits noted. Tuberculosis screening: No symptoms or risk factors identified.             

                                                                                                  

Assessment:                                                                                       

16:36 Neuro: No deficits noted. Cardiovascular: Reports chest pain, Rhythm is sinus rhythm.   ko1 

      Respiratory: No deficits noted. GI: No deficits noted. : No deficits noted. EENT: No      

      deficits noted. Derm: No deficits noted. Musculoskeletal: Reports pain in right hip.        

                                                                                                  

Vital Signs:                                                                                      

16:32  / 84; Pulse 79; Resp 18; Temp 97.9; Pulse Ox 99% on R/A;                         ko1 

17:26  / 89; Pulse 62; Resp 16; Pulse Ox 98% on R/A;                                    ko1 

17:38  / 72; Pulse 66; Resp 18; Pulse Ox 98% ;                                          ko1 

18:35  / 83; Pulse 63; Resp 18; Pulse Ox 99% ;                                          hb  

19:30  / 89; Pulse 67; Resp 19; Pulse Ox 99% ;                                          jj7 

20:20  / 84; Pulse 61; Resp 18; Pulse Ox 98% ;                                          jj7 

                                                                                                  

ED Course:                                                                                        

16:31 Patient arrived in ED.                                                                  ko1 

16:32 Nallely Jiménez RN is Primary Nurse.                                                     ko1 

16:33 Markos Urbina MD is Attending Physician.                                            rt  

16:34 Triage completed.                                                                       ko1 

16:34 Arm band placed on right wrist.                                                         ko1 

16:36 Patient has correct armband on for positive identification. Placed in gown. Bed in low  ko1 

      position. Call light in reach. Side rails up X2. Client placed on continuous cardiac        

      and pulse oximetry monitoring. NIBP monitoring applied. Cardiac monitor on. Door            

      closed. Noise minimized. Lights dimmed. Warm blanket given.                                 

17:09 XRAY Chest (1 view) In Process Unspecified.                                             EDMS

17:20 Inserted saline lock: 22 gauge in left hand, using aseptic technique. Blood collected.  ko1 

17:24 Basic Metabolic Panel Sent.                                                             ko1 

17:24 CBC with Diff Sent.                                                                     ko1 

17:24 LFT's Sent.                                                                             ko1 

17:24 Troponin HS Sent.                                                                       ko1 

17:35 BNP Sent.                                                                               ko1 

20:10 Attending Physician role handed off by Markos Urbina MD                             sp4 

20:10 Tawanda Cormier MD is Attending Physician.                                           sp4 

20:10 Ashok Orta MD is Referral Physician.                                               sp4 

20:20 No provider procedures requiring assistance completed. IV discontinued, intact,         jj7 

      bleeding controlled, No redness/swelling at site. Pressure dressing applied.                

                                                                                                  

Administered Medications:                                                                         

16:54 Drug: Aspirin PO Chewable Tablet 324 mg Route: PO;                                      ko1 

16:54 Drug: Nitroglycerin Sublingual 0.4 mg Route: Sublingual;                                ko1 

17:00 Drug: Nitroglycerin Sublingual 0.4 mg Route: Sublingual;                                ko1 

                                                                                                  

                                                                                                  

Medication:                                                                                       

16:36 VIS not applicable for this client.                                                     ko1 

                                                                                                  

Outcome:                                                                                          

20:11 Discharge ordered by MD.                                                                sp4 

20:20 Discharged to home ambulatory.                                                          jj7 

20:20 Condition: good                                                                             

20:20 Discharge instructions given to patient, Instructed on discharge instructions,              

      Demonstrated understanding of instructions.                                                 

20:26 Patient left the ED.                                                                    jj7 

                                                                                                  

Signatures:                                                                                       

Dispatcher [employer]                           EDMS                                                 

Lolly Hamilton, JÚNIOR CALLEJAS                                                      

Nallely Jiménez RN RN ko1 Johnson, Juwairiyah, RN RN   jjMarkos Greco MD MD   rt                                                   

Tawanda Cormier MD MD   sp4                                                  

                                                                                                  

**************************************************************************************************

## 2023-06-05 NOTE — EDPHYS
Physician Documentation                                                                           

 [facility]                                                                 

Name: Alka Judd                                                                               

Age: 52 yrs                                                                                       

Sex: Female                                                                                       

: 1970                                                                                   

MRN: [id number]                                                                                   

Arrival Date: 2023                                                                          

Time: 16:17                                                                                       

Account#: [id number]                                                                            

Bed 7                                                                                             

Private MD:                                                                                       

ED Physician Tawanda Cormier                                                                    

HPI:                                                                                              

                                                                                             

16:50 This 52 yrs old Female presents to ER via EMS with complaints of Chest pain.            rt  

16:50 Patient presents to the ED with chest pains for about 1 hour prior to arrival. Patient  rt  

      states that she was sitting down drinking water when this occurred. She denies any          

      aggravating or alleviating factors. Patient does state that she has some burning with       

      urination also complains of a right hip pain that has been present for about 12 weeks.      

      Denies injury. Denies other acute complaints at this time. Symptoms are moderate            

      severity, aching nature, nonradiating, no other aggravating or alleviating factors..        

                                                                                                  

OB/GYN:                                                                                           

20:25 LMP N/A -                                                                               jj7 

                                                                                                  

Historical:                                                                                       

- Allergies:                                                                                      

16:34 PENICILLINS;                                                                            ko1 

16:34 Risperdal;                                                                              ko1 

- PMHx:                                                                                           

16:34 Bipolar disorder; Hypertensive disorder;                                                ko1 

- PSHx:                                                                                           

16:34 righ hip surgery;                                                                       ko1 

                                                                                                  

- Immunization history:: Adult Immunizations unknown.                                             

- Social history:: Smoking status: Patient/guardian denies using tobacco, but has a               

  distant history of tobacco abuse, Patient uses street drugs, speed.                             

- Family history:: not pertinent.                                                                 

                                                                                                  

                                                                                                  

ROS:                                                                                              

16:50 Constitutional: Negative for fever, chills, and weight loss, Respiratory: Negative for  rt  

      shortness of breath, cough, wheezing, and pleuritic chest pain, Abdomen/GI: Negative        

      for abdominal pain, nausea, vomiting, diarrhea, and constipation, Skin: Negative for        

      injury, rash, and discoloration, Neuro: Negative for headache, weakness, numbness,          

      tingling, and seizure, Psych: Negative for depression, anxiety, suicide ideation,           

      homicidal ideation, and hallucinations.                                                     

16:50 Cardiovascular: Positive for chest pain, Negative for edema.                                

16:50 : Positive for burning with urination, foul smelling urine.                               

16:50 MS/extremity: Positive for pain, Negative for injury or acute deformity.                    

                                                                                                  

Exam:                                                                                             

16:50 Constitutional:  This is a well developed, well nourished patient who is awake, alert,  rt  

      and in no acute distress. Head/Face:  Normocephalic, atraumatic. Chest/axilla:  Normal      

      chest wall appearance and motion.  Nontender with no deformity.  No lesions are             

      appreciated. Cardiovascular:  Regular rate and rhythm with a normal S1 and S2.  No          

      gallops, murmurs, or rubs.  Normal PMI, no JVD.  No pulse deficits. Respiratory:  Lungs     

      have equal breath sounds bilaterally, clear to auscultation and percussion.  No rales,      

      rhonchi or wheezes noted.  No increased work of breathing, no retractions or nasal          

      flaring. Abdomen/GI:  Soft, non-tender, with normal bowel sounds.  No distension or         

      tympany.  No guarding or rebound.  No evidence of tenderness throughout. Skin:  Warm,       

      dry with normal turgor.  Normal color with no rashes, no lesions, and no evidence of        

      cellulitis. MS/ Extremity:  Pulses equal, no cyanosis.  Neurovascular intact.  Full,        

      normal range of motion. Neuro:  Awake and alert, GCS 15, oriented to person, place,         

      time, and situation.  Cranial nerves II-XII grossly intact.  Motor strength 5/5 in all      

      extremities.  Sensory grossly intact.  Cerebellar exam normal.  Normal gait. Psych:         

      Awake, alert, with orientation to person, place and time.  Behavior, mood, and affect       

      are within normal limits.                                                                   

17:08 ECG was reviewed by the Attending Physician.                                            rt  

                                                                                                  

Vital Signs:                                                                                      

16:32  / 84; Pulse 79; Resp 18; Temp 97.9; Pulse Ox 99% on R/A;                         ko1 

17:26  / 89; Pulse 62; Resp 16; Pulse Ox 98% on R/A;                                    ko1 

17:38  / 72; Pulse 66; Resp 18; Pulse Ox 98% ;                                          ko1 

18:35  / 83; Pulse 63; Resp 18; Pulse Ox 99% ;                                          hb  

19:30  / 89; Pulse 67; Resp 19; Pulse Ox 99% ;                                          jj7 

20:20  / 84; Pulse 61; Resp 18; Pulse Ox 98% ;                                          jj7 

                                                                                                  

MDM:                                                                                              

16:37 Patient medically screened.                                                             rt  

19:54 Differential diagnosis: acute myocardial infarction, acute pericarditis, anxiety, chest rt  

      wall pain, pneumonia, pneumothorax. HEART Score: History: Moderately Suspicious (1),        

      ECG: Normal (0), Age: > 45 and < 65 years (1), Risk Factors: 1 or 2 risk factors (1),       

      Troponin: < or = 1 x Normal Limit (0), Total Score = 3. The patient was given aspirin       

      in the Emergency Department. Data reviewed: vital signs, nurses notes, lab test             

      result(s), EKG, radiologic studies. Consideration of Admission/Observation Escalation       

      of care including admission/observation considered. Test considered but Not performed:      

      CT: Low suspicion for PE, CT angiogram not indicated. Counseling: I had a detailed          

      discussion with the patient and/or guardian regarding: the historical points, exam          

      findings, and any diagnostic results supporting the discharge/admit diagnosis, lab          

      results, radiology results, the need for outpatient follow up, to return to the             

      emergency department if symptoms worsen or persist or if there are any questions or         

      concerns that arise at home. ED course: Discussed results with patient, she is desirous     

      of discharge, will repeat troponin, send to follow-up as an outpatient. Pain is             

      resolved in the ED.                                                                         

20:11 ED course: Second troponin is negative and patient wishes to be discharged from the ER. sp4 

      Patient discharged in stable condition.                                                     

                                                                                                  

                                                                                             

16:43 Order name: Basic Metabolic Panel; Complete Time: 18:00                                 rt  

                                                                                             

16:43 Order name: CBC with Diff; Complete Time: 17:50                                         rt  

                                                                                             

16:43 Order name: LFT's; Complete Time: 18:00                                                 rt  

                                                                                             

16:43 Order name: Troponin HS; Complete Time: 18:00                                           rt  

                                                                                             

17:29 Order name: BNP; Complete Time: 18:00                                                   rt  

                                                                                             

18:45 Order name: Troponin High Sensitivity; Complete Time: 20:10                             rt  

                                                                                             

16:43 Order name: XRAY Chest (1 view); Complete Time: 17:26                                   rt  

                                                                                             

16:43 Order name: EKG; Complete Time: 16:44                                                   rt  

                                                                                             

16:43 Order name: Cardiac monitoring; Complete Time: 16:49                                    rt  

                                                                                             

16:43 Order name: EKG - Nurse/Tech; Complete Time: 17:24                                      rt  

                                                                                             

16:43 Order name: IV Saline Lock; Complete Time: 17:24                                        rt  

                                                                                             

16:43 Order name: Labs collected and sent; Complete Time: 17:24                               rt  

                                                                                             

16:43 Order name: O2 Per Protocol; Complete Time: 16:49                                       rt  

                                                                                             

16:43 Order name: O2 Sat Monitoring; Complete Time: 16:49                                     rt  

                                                                                                  

EC:08 Rate is 62 beats/min. Rhythm is regular, Normal Sinus Rhythm with No ectopy. QRS Axis   rt  

      is Normal. [place] interval is normal. QRS interval is normal. QT interval is normal. No Q       

      waves. Clinical impression: NSR w/ Non-specific ST/T Changes. Interpreted by me.            

                                                                                                  

Administered Medications:                                                                         

16:54 Drug: Aspirin PO Chewable Tablet 324 mg Route: PO;                                      ko1 

16:54 Drug: Nitroglycerin Sublingual 0.4 mg Route: Sublingual;                                ko1 

17:00 Drug: Nitroglycerin Sublingual 0.4 mg Route: Sublingual;                                ko1 

                                                                                                  

                                                                                                  

Disposition Summary:                                                                              

23 20:11                                                                                    

Discharge Ordered                                                                                 

      Location: Home                                                                          sp4 

      Problem: new                                                                            sp4 

      Symptoms: are resolved                                                                  sp4 

      Condition: Stable                                                                       sp4 

      Diagnosis                                                                                   

        - Chest pain, unspecified                                                             sp4 

        - Restlessness and agitation                                                          sp4 

      Followup:                                                                               rt  

        - With:                                                                                    

        - When: 2 - 3 days                                                                         

        - Reason:                                                                                  

      Discharge Instructions:                                                                     

        - Discharge Summary Sheet                                                             rt  

        - Nonspecific Chest Pain, Adult                                                       rt  

Signatures:                                                                                       

Dispatcher [employer]                           EDNallely Kam RN                       RN   ko1                                                  

Markos Urbina MD MD   rt                                                   

Tawanda Cormier MD MD   sp4                                                  

                                                                                                  

**************************************************************************************************

## 2023-06-06 ENCOUNTER — HOSPITAL ENCOUNTER (EMERGENCY)
Dept: HOSPITAL 97 - ER | Age: 53
Discharge: HOME | End: 2023-06-06
Payer: COMMERCIAL

## 2023-06-06 VITALS — OXYGEN SATURATION: 100 % | DIASTOLIC BLOOD PRESSURE: 72 MMHG | SYSTOLIC BLOOD PRESSURE: 138 MMHG | TEMPERATURE: 98 F

## 2023-06-06 DIAGNOSIS — Z88.0: ICD-10-CM

## 2023-06-06 DIAGNOSIS — Z88.8: ICD-10-CM

## 2023-06-06 DIAGNOSIS — M25.551: Primary | ICD-10-CM

## 2023-06-06 PROCEDURE — 99283 EMERGENCY DEPT VISIT LOW MDM: CPT

## 2023-06-06 NOTE — XMS REPORT
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           



                                                  30 days.           

 

     omeprazole      -0 2023- No             40mg QD   Take 1           [facility]



     (PriLOSEC)      2-15 02-15                          capsule           [facility]



     40 MG      00:00: 00:00                          (40 mg           Medical



     capsule      00   :00                           total) by           Center



                                                  mouth           



                                                  daily for           



                                                  30 days.           

 

     omeprazole      -0 2023- No             40mg QD   Take 1           [facility]



     (PriLOSEC)      2-15 02-15                          capsule           [facility]



     40 MG      00:00: 00:00                          (40 mg           Medical



     capsule      00   :00                           total) by           Center



                                                  mouth           



                                                  daily for           



                                                  30 days.           

 

     Pepcid 20            Yes                      20 mg = 1           [facility]



     mg oral      2-14                               tab, PO,           l



     tablet      22:50:                               BID, # 60           [facility]

[facility]



               00                                 tab, 0           



                                                  Refill(s)           

 

     Bentyl 10            Yes                      10 mg = 1           [facility]

[facility]



     mg oral      2-14                               cap, PO,           l



     capsule      22:50:                               QID-Before           [facility]

[facility]



               00                                 Meals, #           



                                                  40 cap, 0           



                                                  Refill(s)           

 

     Pepcid 2023-0      Yes                      20 mg = 1           [facility]

[facility]



     mg oral      2-14                               tab, PO,           l



     tablet      22:50:                               BID, # 60           [facility]

n



               00                                 tab, 0           



                                                  Refill(s)           

 

     Bentyl 10      0      Yes                      10 mg = 1           [facility]

[facility]



     mg oral      2-14                               cap, PO,           l



     capsule      22:50:                               QID-Before           [facility]

[facility]



               00                                 Meals, #           



                                                  40 cap, 0           



                                                  Refill(s)           

 

     Pepcid 2023-0      Yes                      20 mg = 1           [facility]

[facility]



     mg oral      2-14                               tab, PO,           l



     tablet      22:50:                               BID, # 60           [facility]

n



               00                                 tab, 0           



                                                  Refill(s)           

 

     Bentyl 10      0      Yes                      10 mg = 1           [facility]

[facility]



     mg oral      2-14                               cap, PO,           l



     capsule      22:50:                               QID-Before           [facility]

[facility]



                                                Meals, #           



                                                  40 cap, 0           



                                                  Refill(s)           

 

     Pepcid 2023-0      Yes                      20 mg = 1           [facility]

[facility]



     mg oral      2-14                               tab, PO,           l



     tablet      22:50:                               BID, # 60           [facility]

n



               00                                 tab, 0           



                                                  Refill(s)           

 

     Bentyl 10      0      Yes                      10 mg = 1           [facility]

[facility]



     mg oral      2-14                               cap, PO,           l



     capsule      22:50:                               QID-Before           [facility]

[facility]



                                                Meals, #           



                                                  40 cap, 0           



                                                  Refill(s)           

 

     Pepcid 2023-0      Yes                      20 mg = 1           [facility]

[facility]



     mg oral      2-14                               tab, PO,           l



     tablet      22:50:                               BID, # 60           [facility]

n



               00                                 tab, 0           



                                                  Refill(s)           

 

     Bentyl 10      0      Yes                      10 mg = 1           [facility]

[facility]



     mg oral      2-14                               cap, PO,           l



     capsule      22:50:                               QID-Before           [facility]

[facility]



               00                                 Meals, #           



                                                  40 cap, 0           



                                                  Refill(s)           

 

     Pepcid 2023-0      Yes                      20 mg = 1           [facility]

[facility]



     mg oral      2-14                               tab, PO,           l



     tablet      22:50:                               BID, # 60           [facility]

n



               00                                 tab, 0           



                                                  Refill(s)           

 

     Bentyl 10      -0      Yes                      10 mg = 1           [facility]

[facility]



     mg oral      2-14                               cap, PO,           l



     capsule      22:50:                               QID-Before           [facility]

[facility]



                                                Meals, #           



                                                  40 cap, 0           



                                                  Refill(s)           

 

     Pepcid 2023-0      Yes                      20 mg = 1           [facility]

[facility]



     mg oral      2-14                               tab, PO,           l



     tablet      22:50:                               BID, # 60           [facility]

n



               00                                 tab, 0           



                                                  Refill(s)           

 

     Bentyl 10            Yes                      10 mg = 1           [facility]

[facility]



     mg oral      2-14                               cap, PO,           l



     capsule      22:50:                               QID-Before           [facility]

[facility]



               00                                 Meals, #           



                                                  40 cap, 0           



                                                  Refill(s)           

 

     ondansetron      2022- No             4mg       4 mg, Slow          

 [facility]



     (ZOFRAN      3-31 03-31                          IV Push,           ity of



     (PF))      20:15: 19:31                          ONCE, 1           [place]



     injection 4      00   :00                           dose, On           Medi

staci



     mg                                           Thu            [facility]



                                                  3/31/22 at           



                                                  1515,           



                                                  Routine           

 

     morpHINE      2022- No             4mg       4 mg, Slow           Un

[facility]



     injection 4      3-31 03-31                          IV Push,           ity

 of



     mg        20:15: 19:33                          ONCE, 1           [place]



               00   :00                           dose, On           Medical



                                                  [facility]



                                                  3/31/22 at           



                                                  1515, STAT           

 

     No known            No                                      [facility]



     medications      3-31                                              ity of



               11:41:                                              [place]



               00                                                [facility]

 

     lithium            Yes                      2 (two)           [facility]



     carbonate      3-31                               times a           ity of



      mg      10:29:                               day            [place]



     SR tablet      [address] [place] [address]

 

     ziprasidone            Yes                      1 (one)           [facility]

vers



     80 mg      3-31                               time each           ity of



     capsule      10:29:                               day            [address]

 

     lithium            Yes                      2 (two)           [facility]



     carbonate      3-31                               times a           ity of



      mg      10:29:                               day            [place]



     SR tablet      [address] [place] [address]

 

     ziprasidone            Yes                      1 (one)           [facility]

vers



     80 mg      3-31                               time each           ity of



     capsule      10:29:                               day            [address]

 

     lithium            Yes                      2 (two)           [facility]



     carbonate      3-31                               times a           ity of



      mg      10:29:                               day            [place]



     SR tablet      [address] [place] [address]

 

     ziprasidone            Yes                      1 (one)           [facility]

vers



     80 mg      3-31                               time each           ity of



     capsule      10:29:                               day            [address]

 

     lithium            Yes                      2 (two)           [facility]



     carbonate      3-31                               times a           ity of



      mg      10:29:                               day            [place]



     SR tablet      [address] [place] [address]

 

     ziprasidone      2022-0      Yes                      1 (one)           Uni

vers



     80 mg      3-31                               time each           ity of



     capsule      10:29:                               day            [place]



               56                                                [facility]

 

     [facility]      [facility] No             10[facility]      Take 10 mg          

 [facility]



     n 10 mg      17                          by mouth.           ity of



     tablet      00:00: 05:59                                         [place]



               00   :00                                          [facility]

 

     atorvastati      2021- No             10mg      Take 10 mg          

 [facility]



     n 10 mg      17                          by mouth.           ity of



     tablet      00:00: 05:59                                         [place]



               00   :00                                          [facility]

 

     lithium      -0      Yes            150mg Q.[id number] Take 150          

 [facility]



     carbonate      7-16                          [id number] mg by           [facility]



     (ESKALITH)      20:13:                          3D   mouth 3           



     150 mg      54                                 times           



     capsule                                         daily with           



                                                  meals.           

 

     DULoxetine      -0      Yes                 QD   Take by           [facility]

[facility]



     (CYMBALTA)      7-16                               mouth           Health



     20 mg      20:13:                               daily.           



     delayed      54                                                



     release                                                        



     capsule                                                        

 

     lithium      -0      Yes            150mg Q.[id number] Take 150          

 [facility]



     carbonate      7-16                          [id number] mg by           [facility]



     (ESKALITH)      20:13:                          3D   mouth 3           



     150 mg      54                                 times           



     capsule                                         daily with           



                                                  meals.           

 

     DULoxetine      -0      Yes                 QD   Take by           [facility]

[facility]



     (CYMBALTA)      7-16                               mouth           Health



     20 mg      20:13:                               daily.           



     delayed      54                                                



     release                                                        



     capsule                                                        

 

     lithium      -0      Yes            150mg Q.[id number] Take 150          

 [facility]



     carbonate      7-16                          [id number] mg by           [facility]



     (ESKALITH)      20:13:                          3D   mouth 3           



     150 mg      54                                 times           



     capsule                                         daily with           



                                                  meals.           

 

     DULoxetine      -0      Yes                 QD   Take by           [facility]

[facility]



     (CYMBALTA)      7-16                               mouth           Health



     20 mg      20:13:                               daily.           



     delayed      54                                                



     release                                                        



     capsule                                                        

 

     lithium      -0      Yes            150mg Q.[id number] Take 150          

 [facility]



     carbonate      7-16                          [id number] mg by           [facility]



     (ESKALITH)      20:13:                          3D   mouth 3           



     150 mg      54                                 times           



     capsule                                         daily with           



                                                  meals.           

 

     DULoxetine      2014-0      Yes                 QD   Take by           [facility]

[facility]



     (CYMBALTA)      7-16                               mouth           Health



     20 mg      20:13:                               daily.           



     delayed      54                                                



     release                                                        



     capsule                                                        

 

     lithium      -0      Yes            150mg Q.[id number] Take 150          

 [facility]



     carbonate      7-16                          [id number] mg by           [facility]

[facility]



     (ESKALITH)      20:13:                          3D   mouth 3           



     150 mg      54                                 times           



     capsule                                         daily with           



                                                  meals.           

 

     DULoxetine      -0      Yes                 QD   Take by           [facility]

[facility]



     (CYMBALTA)      7-16                               mouth           Health



     20 mg      20:13:                               daily.           



     delayed      54                                                



     release                                                        



     capsule                                                        

 

     lithium      -0      Yes            150mg Q.[id number] Take 150          

 [facility]



     carbonate      7-16                          [id number] mg by           [facility]



     (ESKALITH)      20:13:                          3D   mouth 3           



     150 mg      54                                 times           



     capsule                                         daily with           



                                                  meals.           

 

     DULoxetine      -0      Yes                 QD   Take by           [facility]

is



     (CYMBALTA)      7-16                               mouth           Health



     20 mg      20:13:                               daily.           



     delayed      54                                                



     release                                                        



     capsule                                                        

 

     lithium      -      Yes            150mg Q.[id number] Take 150          

 [facility]



     carbonate      7-16                          [id number] mg by           [facility]



     (ESKALITH)      20:13:                          3D   mouth 3           



     150 mg      54                                 times           



     capsule                                         daily with           



                                                  meals.           

 

     DULoxetine      -0      Yes                 QD   Take by           [facility]

is



     (CYMBALTA)      7-16                               mouth           Health



     20 mg      20:13:                               daily.           



     delayed      54                                                



     release                                                        



     capsule                                                        

 

     lithium            Yes            150mg Q.[id number] Take 150          

 [place]



     carbonate      7-16                          [id number] mg by           [facility]



     (ESKALITH)      20:13:                          3D   mouth 3           



     150 mg      54                                 times           



     capsule                                         daily with           



                                                  meals.           

 

     DULoxetine      -0      Yes                 QD   Take by           [facility]

is



     (CYMBALTA)      7-16                               mouth           Health



     20 mg      20:13:                               daily.           



     delayed      54                                                



     release                                                        



     capsule                                                        

 

     ibuprofen            Yes       Dental 800mg      Take 1           [facility]

[facility]



     (MOTRIN)      7-16                abscess           tablet by           [facility]



     800 mg      00:00:                               mouth           



     tablet      00                                 every 8           



                                                  hours as           



                                                  needed for           



                                                  Pain.           

 

     ibuprofen            Yes       Dental 800mg      Take 1           [facility]

[facility]



     (MOTRIN)      7-16                abscess           tablet by           [facility]



     800 mg      00:00:                               mouth           



     tablet      00                                 every 8           



                                                  hours as           



                                                  needed for           



                                                  Pain.           

 

     ibuprofen      0      Yes       Dental 800mg      Take 1           [facility]

[facility]



     (MOTRIN)      7-16                abscess           tablet by           [facility]

lt



     800 mg      00:00:                               mouth           



     tablet      00                                 every 8           



                                                  hours as           



                                                  needed for           



                                                  Pain.           

 

     ibuprofen      0      Yes       Dental 800mg      Take 1           [facility]

[facility]



     (MOTRIN)      7-16                abscess           tablet by           [facility]



     800 mg      00:00:                               mouth           



     tablet      00                                 every 8           



                                                  hours as           



                                                  needed for           



                                                  Pain.           

 

     ibuprofen            Yes       Dental 800mg      Take 1           [facility]

[facility]



     (MOTRIN)      7-16                abscess           tablet by           [facility]

[facility]



     800 mg      00:00:                               mouth           



     tablet      00                                 every 8           



                                                  hours as           



                                                  needed for           



                                                  Pain.           

 

     ibuprofen            Yes       Dental 800mg      Take 1           [facility]

[facility]



     (MOTRIN)      7-16                abscess           tablet by           [facility]

[facility]



     800 mg      00:00:                               mouth           



     tablet      00                                 every 8           



                                                  hours as           



                                                  needed for           



                                                  Pain.           

 

     ibuprofen            Yes       Dental 800mg      Take 1           [facility]

[facility]



     (MOTRIN)      7-16                abscess           tablet by           [facility]

[facility]



     800 mg      00:00:                               mouth           



     tablet      00                                 every 8           



                                                  hours as           



                                                  needed for           



                                                  Pain.           

 

     ibuprofen            Yes       Dental 800mg      Take 1           [facility]

[facility]



     (MOTRIN)      7-16                abscess           tablet by           [facility]

[facility]



     800 mg      00:00:                               mouth           



     tablet      00                                 every 8           



                                                  hours as           



                                                  needed for           



                                                  Pain.           

 

     ibuprofen            Yes       Dental 800mg      Take 1           [facility]

[facility]



     (MOTRIN)      7-16                abscess           tablet by           [facility]

lth



     800 mg      00:00:                               mouth           



     tablet      00                                 every 8           



                                                  hours as           



                                                  needed for           



                                                  Pain.           

 

     ibuprofen            Yes       Dental 800mg      Take 1           Jeff

ris



     (MOTRIN)      7-16                abscess           tablet by           [facility]

lth



     800 mg      00:00:                               mouth           



     tablet      00                                 every 8           



                                                  hours as           



                                                  needed for           



                                                  Pain.           

 

     ibuprofen            Yes       Dental 800mg      Take 1           Jeff

ris



     (MOTRIN)      7-16                abscess           tablet by           He[facility]

lth



     800 mg      00:00:                               mouth           



     tablet      00                                 every 8           



                                                  hours as           



                                                  needed for           



                                                  Pain.           

 

     ibuprofen            Yes       Dental 800mg      Take 1           Jeff

ris



     (MOTRIN)      7-16                abscess           tablet by           [facility]

lth



     800 mg      00:00:                               mouth           



     tablet      00                                 every 8           



                                                  hours as           



                                                  needed for           



                                                  Pain.           







Vital Signs







             Vital Name   Observation Time Observation Value Comments     Source

 

             Systolic blood 2023 20:00:00 152 mm[Hg]                [facility]

 

             Diastolic blood 2023 20:00:00 64 mm[Hg]                 [facility]

 

             Heart rate   2023 20:00:00 60 /min                   [facility]

[facility]

 

             Respiratory rate 2023 20:00:00 21 /min                   [facility]

 

             Oxygen saturation in 2023 20:00:00 96 /min                   

[facility]



             Arterial blood by                                        [place] [facility]

[facility]



             Pulse oximetry                                        Branch

 

             Body temperature 2023 15:06:00 37.72 Danisha                 [facility]

 

             Body height  2023 15:06:00 157.5 cm                  Univers[facility]

ty of



                                                                 [facility]



                                                                 Branch

 

             Body weight  2023 15:06:00 113.399 kg                Universi

ty of



                                                                 [facility]



                                                                 Branch

 

             BMI          2023 15:06:00 45.73 kg/m2               Universi

ty of



                                                                 [facility]



                                                                 Branch

 

             HEIGHT       2023-02-15 16:56:00 157.5 cm                  

 

             WEIGHT       2023-02-15 16:56:00 100.245 kg                

 

             HEIGHT       2023-02-15 16:56:00 157.5 cm                  

 

             WEIGHT       2023-02-15 16:56:00 100.245 kg                

 

             HEIGHT       2023-02-15 16:56:00 157.5 cm                  

 

             WEIGHT       2023-02-15 16:56:00 100.245 kg                

 

             Systolic blood 2022 19:30:00 176 mm[Hg]                Univer

sity of



             pressure                                            [facility]

 

             Diastolic blood 2022 19:30:00 94 mm[Hg]                 Unive

rsity of



             pressure                                            [facility]

 

             Heart rate   2022 19:30:00 76 /min                   Universi

ty of



                                                                 [facility]

 

             Respiratory rate 2022 19:30:00 11 /min                   [facility]

 

             Oxygen saturation in 2022 19:30:00 95 /min                   

[facility]



             Arterial blood by                                        [place] [facility]

st[facility]



             Pulse oximetry                                        [place]

 

             Body temperature 2022 17:54:00 37.22 Danisha                 Univ

ersity of



                                                                 [facility]

 

             Body height  2022 17:54:00 157.5 cm                  Universi

ty of



                                                                 [facility]

 

             Body weight  2022 17:54:00 90.719 kg                 Universi

ty of



                                                                 [facility]



                                                                 Branch

 

             BMI          2022 17:54:00 36.58 kg/m2               Universi

ty of



                                                                 [facility]

 

             Body height  2022 15:29:00 160 cm                    Universi

ty of



                                                                 [facility]



                                                                 Branch

 

             Body weight  2022 15:29:00 90.719 kg                 Universi

ty of



                                                                 [facility]



                                                                 Branch

 

             [facility]          2022 15:29:00 35.43 kg/m2               Universi

ty of



                                                                 [facility]

 

             Height/Length 2022 08:36:33 160 cm                    



             Measured                                            

 

             Weight Dosing 2022 08:36:33 105.00 kg                 

 

             Body height  2023 08:42:00 157.5 cm                  [facility]

 

             Body weight  2023 08:42:00 100.245 kg                [facility]

 

             BMI          2023 08:42:00 40.42 kg/m2               [facility]

 

             Systolic blood 2023 08:29:26 114 mm[Hg]                [facility]



             [facility]                                            

 

             Diastolic blood 2023 08:29:26 76 mm[Hg]                 [facility]



             pressure                                            

 

             Heart rate   2023 08:29:26 86 /min                   [facility]

 

             Body temperature 2023 08:29:26 36.78 Danisha                 [facility]

 

             Respiratory rate 2023 08:29:26 18 /min                   [facility]

 

             Oxygen saturation in 2023 08:29:26 97 /min                   

[facility]



             Arterial blood by                                        



             Pulse oximetry                                        

 

             Systolic blood 2023-02-15 22:01:00 124 mm[Hg]                [facility]

 

             Diastolic blood 2023-02-15 22:01:00 66 mm[Hg]                 [facility]

 

             Heart rate   2023-02-15 22:01:00 88 /min                   [facility]

 

             Body temperature 2023-02-15 22:01:00 37.17 Danisha                 [facility]

 

             Respiratory rate 2023-02-15 22:01:00 16 /min                   [facility]

 

             Oxygen saturation in 2023-02-15 22:01:00 100 /min                  

[facility]



             Arterial blood by                                        Medical Ce

nter



             Pulse oximetry                                        

 

             Body height  2023-02-15 16:56:00 157.5 cm                  [facility]

 

             Body weight  2023-02-15 16:56:00 100.245 kg                [facility]

 

             BMI          2023-02-15 16:56:00 40.42 kg/m2               [facility]

 

             Heart Rate   2023 22:58:16                           [facility]

 

             Systolic (mm Hg) 2023 22:58:00                           Danis

rial [facility]

 

             Diastolic (mm Hg) 2023 22:58:00                           [facility]

[facility]

 

             Temperature Oral (F) 2023 18:24:49 98.5 F                    

[facility]

 

             Height       2023 14:59:00 5 [ft_i]                  [facility]

 [place]

 

             BMI Calculated 2023 14:59:00                           Fabiano Paez

 

             Weight       2023 14:59:00                           Sergio Schreiberann







Procedures







                Procedure       Date / Time     Performing Clinician Source



                                Performed                       

 

                CT              2023 16:56:55 Singer, [facility]



                MAXILLOFACIAL/MANDIBLE                                 Medical B

ranch



                W CONTRAST                                      

 

                LACTIC ACID WHOLE BLOOD 2023 15:57:00 Singer, [facility]

 

                COMP. METABOLIC PANEL 2023 15:56:00 Singer, Jack [facility]

[facility] [place]



                ([id number])                                         Medical Branch

 

                [facility] DIFF   [facility]56[facility]

 

                COVID-19, INFLUENZA 2023 10:39:00 Alejo Escalante [facility]



                A&B, AND RSV                    Gianni          



                QUALITATIVE RT-PCR                                 

 

                XR CHEST 1 VW PORTABLE 2023 09:16:40 Alejo Escalante [facility]



                                                Gianni          

 

                ASSIGNMENT OF BENEFITS 2023 19:39:54 Doctor Unassigned, [facility]



                                                Name            Medical Branch

 

                XR CHEST 1 VW   2022 18:39:34 [facility]

 

                [facility] PELVIS <3 VW [facility]22 [facility]:39[facility]

 

                [facility]YSIS      2022 18:19:[facility]

 

                COMP. METABOLIC PANEL 2022 18:08:00 Singer, Jack [facility]

[facility] [place]



                ([id number])                                         Medical Branch

 

                CBC WITH DIFF   2022 18:08:[facility]

 

                COVID-19 (ID NOW RAPID 2022 18:08:00 Singer, [facility]



                TESTING)                                        Medical Branch

 

                REFERRAL-       2022 05:01:00 Doctor Unassigned, Nimisha [facility]



                REQUEST/RESPONSE                 Name            Medical Branch

 

                [id number]         2020 00:00:00 CANAL.02        [facility]







Plan of Care







             Planned Activity Planned Date Details      Comments     Source

 

             Future Scheduled 2023-10-01   IMM Influenza Seasonal              [facility]



             Test         00:00:00     (>/= 19 yrs) [code =              



                                       IMM Influenza Seasonal              



                                       (>/= 19 yrs)]              

 

             Future Scheduled 2023-10-01   IMM Influenza Seasonal              [facility]

[facility]



             Test         00:00:00     (>/= 19 yrs) [code =              



                                       IMM Influenza Seasonal              



                                       (>/= 19 yrs)]              

 

             Future Scheduled 2023-10-01   IMM Influenza Seasonal              [facility]

[facility]



             Test         00:00:00     (>/= 19 yrs) [code =              



                                       IMM Influenza Seasonal              



                                       (>/= 19 yrs)]              

 

             Future Scheduled 2023   [facility]EN[facility] VACCINE              [facility]



             Test         00:00:00     (Season Ended) [code =              Medic

al Center



                                       INFLUENZA VACCINE              



                                       (Season Ended)]              

 

             Future Scheduled 2023   INFLUENZA VACCINE              [facility]



             Test         00:00:00     (Season Ended) [code =              Medic

al Center



                                       INFLUENZA VACCINE              



                                       (Season Ended)]              

 

             Future Scheduled 2023   INFLUENZA VACCINE              [facility]



             Test         00:00:00     (Season Ended) [code =              Medic

al Center



                                       INFLUENZA VACCINE              



                                       (Season Ended)]              

 

             Future Scheduled 2023   INFLUENZA VACCINE              [facility]



             Test         00:00:00     (Season Ended) [code =              Medic

al Center



                                       INFLUENZA VACCINE              



                                       (Season Ended)]              

 

             Future Scheduled 2023   INFLUENZA VACCINE              [facility]



             Test         00:00:00     (Season Ended) [code =              Medic

al Center



                                       INFLUENZA VACCINE              



                                       (Season Ended)]              

 

             Future Scheduled 2023   COVID-19 VACCINE (#1)              [facility] [place] [place]



             Test         20:12:18     [code = COVID-19              



                                       VACCINE (#1)]              

 

             Future Scheduled 2023   Pneumococcal Vaccine:              [facility] [place] [place]



             Test         20:12:18     Pediatrics (0 to 5              



                                       Years) and At-Risk              



                                       Patients (6 to 64              



                                       Years) (1 - PCV) [code              



                                       = Pneumococcal              



                                       Vaccine: Pediatrics (0              



                                       to 5 Years) and              



                                       At-Risk Patients (6 to              



                                       64 Years) (1 - PCV)]              

 

             Future Scheduled 2023   Hepatitis C screening              [facility] [place] [place]



             Test         20:12:18     (procedure) [code =              



                                       629863186]                

 

             Future Scheduled 2023   COLONOSCOPY SCREENING              [facility] [place] [place]



             Test         20:12:18     [code = COLONOSCOPY              



                                       SCREENING]                

 

             Future Scheduled 2023   [facility]INGLES VACCINES (1              [facility]



             Test         20:12:18     of 2) [code = SHINGLES              



                                       VACCINES (1 of 2)]              

 

             Future Scheduled 2023   INFLUENZA VACCINE              [facility]

[facility] Hospital



             Test         20:12:18     [code = INFLUENZA              



                                       VACCINE]                  

 

             Future Scheduled 2023   COVID-19 VACCINE (#1)              [facility]



             Test         20:12:18     [code = COVID-19              



                                       VACCINE (#1)]              

 

             Future Scheduled 2023   Pneumococcal Vaccine:              [facility]



             Test         20:12:18     Pediatrics (0 to 5              



                                       Years) and At-Risk              



                                       Patients (6 to 64              



                                       Years) (1 - PCV) [code              



                                       = Pneumococcal              



                                       Vaccine: Pediatrics (0              



                                       to 5 Years) and              



                                       At-Risk Patients (6 to              



                                       64 Years) (1 - PCV)]              

 

             Future Scheduled 2023   Hepatitis C screening              [facility]



             Test         20:12:18     (procedure) [code =              



                                       301580476]                

 

             Future Scheduled 2023   [facility]



             Test         20:12:18     malignant neoplasm of              



                                       colon (procedure)              



                                       [code = 932189534]              

 

             Future Scheduled 2023   SHINGLES VACCINES (1              Met

[facility]



             Test         20:12:18     of 2) [code = SHINGLES              



                                       VACCINES (1 of 2)]              

 

             Future Scheduled 2023   INFLUENZA VACCINE              [facility]

ist Hospital



             Test         20:12:18     [code = INFLUENZA              



                                       VACCINE]                  

 

             Future Scheduled 2023   COVID-19 VACCINE (#1)              [facility]



             Test         20:12:18     [code = COVID-19              



                                       VACCINE (#1)]              

 

             Future Scheduled 2023   Pneumococcal Vaccine:              [facility]



             Test         20:12:18     Pediatrics (0 to 5              



                                       Years) and At-Risk              



                                       Patients (6 to 64              



                                       Years) (1 - PCV) [code              



                                       = Pneumococcal              



                                       Vaccine: Pediatrics (0              



                                       to 5 Years) and              



                                       At-Risk Patients (6 to              



                                       64 Years) (1 - PCV)]              

 

             Future Scheduled 2023   Hepatitis C screening              [facility]



             Test         20:12:18     (procedure) [code =              



                                       584161914]                

 

             Future Scheduled 2023   Screening for              [facility]



             Test         20:12:18     malignant neoplasm of              



                                       colon (procedure)              



                                       [code = [id number]]              

 

             Future Scheduled 2023   SHINGLES VACCINES (1              [facility]



             Test         20:12:18     of 2) [code = SHINGLES              



                                       VACCINES (1 of 2)]              

 

             Future Scheduled 2023   INFLUENZA VACCINE              Method

is Hospital



             Test         20:12:18     [code = INFLUENZA              



                                       VACCINE]                  

 

             Future Scheduled 2023   COVID-19 VACCINE (#1)              [facility]



             Test         20:12:18     [code = COVID-19              



                                       VACCINE (#1)]              

 

             Future Scheduled 2023   Pneumococcal Vaccine:              [facility]



             Test         20:12:18     Pediatrics (0 to 5              



                                       Years) and At-Risk              



                                       Patients (6 to 64              



                                       Years) (1 - PCV) [code              



                                       = Pneumococcal              



                                       Vaccine: Pediatrics (0              



                                       to 5 Years) and              



                                       At-Risk Patients (6 to              



                                       64 Years) (1 - PCV)]              

 

             Future Scheduled 2023   Hepatitis C screening              [facility]



             Test         20:12:18     (procedure) [code =              



                                       [id number]]                

 

             Future Scheduled 2023   COLONOSCOPY SCREENING              [facility] [place] [place]



             Test         20:12:18     [code = COLONOSCOPY              



                                       SCREENING]                

 

             Future Scheduled 2023   SHINGLES VACCINES (1              Met

[facility]



             Test         20:12:18     of 2) [code = SHINGLES              



                                       VACCINES (1 of 2)]              

 

             Future Scheduled 2023   INFLUENZA VACCINE              Method

[facility]



             Test         20:12:18     [code = INFLUENZA              



                                       VACCINE]                  

 

             Future Scheduled 2023   COVID-19 VACCINE (#1)              [facility]



             Test         19:28:05     [code = COVID-19              



                                       VACCINE (#1)]              

 

             Future Scheduled 2023   Pneumococcal Vaccine:              [facility]



             Test         19:28:05     Pediatrics (0 to 5              



                                       Years) and At-Risk              



                                       Patients (6 to 64              



                                       Years) (1 - PCV) [code              



                                       = Pneumococcal              



                                       Vaccine: Pediatrics (0              



                                       to 5 Years) and              



                                       At-Risk Patients (6 to              



                                       64 Years) (1 - PCV)]              

 

             Future Scheduled 2023   Hepatitis C screening              [facility]



             Test         19:28:05     (procedure) [code =              



                                       724521459]                

 

             Future Scheduled 2023   COLONOSCOPY SCREENING              [facility]



             Test         19:28:05     [code = COLONOSCOPY              



                                       SCREENING]                

 

             Future Scheduled 2023   SHINGLES VACCINES (1              Met

[facility]



             Test         19:28:05     of 2) [code = SHINGLES              



                                       VACCINES (1 of 2)]              

 

             Future Scheduled 2023   INFLUENZA VACCINE              [facility]



             Test         19:28:05     [code = INFLUENZA              



                                       VACCINE]                  

 

             Future Scheduled 2023   Medicare IPPE (WELCOME              C

[facility]



             Test         00:00:00     TO MEDICARE) [code =              Medical

 Center



                                       Medicare IPPE (WELCOME              



                                       TO MEDICARE)]              

 

             Future Scheduled 2023   Medicare IPPE (WELCOME              C

[facility]



             Test         00:00:00     TO MEDICARE) [code =              Medical

 Center



                                       Medicare IPPE (WELCOME              



                                       TO MEDICARE)]              

 

             Future Scheduled 2023   Medicare IPPE (WELCOME              C

[facility]



             Test         00:00:00     TO MEDICARE) [code =              Medical

 Center



                                       Medicare IPPE (WELCOME              



                                       TO MEDICARE)]              

 

             Future Scheduled 2023   Medicare IPPE (WELCOME              C

[facility]



             Test         00:00:00     TO MEDICARE) [code =              Medical

 Center



                                       Medicare IPPE (WELCOME              



                                       TO MEDICARE)]              

 

             Future Scheduled 2023   Medicare IPPE (WELCOME              C

[facility]



             Test         00:00:00     TO MEDICARE) [code =              Medical

 Center



                                       Medicare IPPE (WELCOME              



                                       TO MEDICARE)]              

 

             Future Scheduled 2023   Medicare IPPE (WELCOME              C

[facility]



             Test         00:00:00     TO MEDICARE) [code =              Medical

 Center



                                       Medicare IPPE (WELCOME              



                                       TO MEDICARE)]              

 

             Future Scheduled 2023   DEPRESSION SCREENING              [facility]



             Test         00:00:00     (12+) [code =              Medical Center



                                       DEPRESSION SCREENING              



                                       (12+)]                    

 

             Future Scheduled 2023   DEPRESSION SCREENING              [facility]



             Test         00:00:00     (12+) [code =              Medical Center



                                       DEPRESSION SCREENING              



                                       (12+)]                    

 

             Future Scheduled 2023   DEPRESSION SCREENING              [facility]



             Test         00:00:00     (12+) [code =              Medical Center



                                       DEPRESSION SCREENING              



                                       (12+)]                    

 

             Future Scheduled 2023   DEPRESSION SCREENING              [facility]



             Test         00:00:00     (12+) [code =              Medical Center



                                       DEPRESSION SCREENING              



                                       (12+)]                    

 

             Future Scheduled 2023   DEPRESSION SCREENING              [facility]



             Test         00:00:00     (12+) [code =              Medical Center



                                       DEPRESSION SCREENING              



                                       (12+)]                    

 

             Future Scheduled 2023   DEPRESSION SCREENING              [facility]



             Test         00:00:00     (12+) [code =              Medical Center



                                       DEPRESSION SCREENING              



                                       (12+)]                    

 

             Future Scheduled 2022-10-01   IMM Influenza Seasonal              [facility]

[facility]



             Test         00:00:00     (>/= 19 yrs) [code =              



                                       IMM Influenza Seasonal              



                                       (>/= 19 yrs)]              

 

             Future Scheduled 2022-10-01   IMM Influenza Seasonal              H

[facility]



             Test         00:00:00     (>/= 19 yrs) [code =              



                                       IMM Influenza Seasonal              



                                       (>/= 19 yrs)]              

 

             Future Scheduled 2022-10-01   IMM Influenza Seasonal              H

[facility]



             Test         00:00:00     (>/= 19 yrs) [code =              



                                       IMM Influenza Seasonal              



                                       (>/= 19 yrs)]              

 

             Future Scheduled 2022-10-01   IMM Influenza Seasonal              [facility]

[facility]



             Test         00:00:00     (>/= 19 yrs) [code =              



                                       IMM Influenza Seasonal              



                                       (>/= 19 yrs)]              

 

             Future Scheduled 2022-10-01   IMM Influenza Seasonal              [facility]

[facility]



             Test         00:00:00     (>/= 19 yrs) [code =              



                                       IMM Influenza Seasonal              



                                       (>/= 19 yrs)]              

 

             Future Scheduled 2022-10-01   IMM Influenza Seasonal              [facility]

[facility]



             Test         00:00:00     (>/= 19 yrs) [code =              



                                       IMM Influenza Seasonal              



                                       (>/= 19 yrs)]              

 

             Future Scheduled 2022-10-01   IMM Influenza Seasonal              [facility]

[facility]



             Test         00:00:00     (>/= 19 yrs) [code =              



                                       IMM Influenza Seasonal              



                                       (>/= 19 yrs)]              

 

             Future Scheduled 2022-10-01   IMM Influenza Seasonal              [facility]

[facility]



             Test         00:00:00     (>/= 19 yrs) [code =              



                                       IMM Influenza Seasonal              



                                       (>/= 19 yrs)]              

 

             Future Scheduled 2022-10-01   IMM Influenza Seasonal              [facility]

[facility]



             Test         00:00:00     (>/= 19 yrs) [code =              



                                       IMM Influenza Seasonal              



                                       (>/= 19 yrs)]              

 

             Future Scheduled 2022   INFLUENZA VACCINE (#1)              C

[facility]



             Test         00:00:00     [code = INFLUENZA              Medical Ce

nter



                                       VACCINE (#1)]              

 

             Future Scheduled 2018   Screening for              [facility]



             Test         00:00:00     malignant neoplasm of              



                                       colon (procedure)              



                                       [code = [id number]]              

 

             Future Scheduled 2018   SHINGLES VACCINES (1              [facility]



             Test         00:00:00     of 2) [code = SHINGLES              Medic

al Center



                                       VACCINES (1 of 2)]              

 

             Future Scheduled 2018   Screening for              [facility]

[facility]



             Test         00:00:00     malignant neoplasm of              



                                       colon (procedure)              



                                       [code = [id number]]              

 

             Future Scheduled 2018   Screening for              [facility]

[facility]



             Test         00:00:00     malignant neoplasm of              



                                       colon (procedure)              



                                       [code = 209745984]              

 

             Future Scheduled 2018   Screening for              [facility]

[facility]



             Test         00:00:00     malignant neoplasm of              



                                       colon (procedure)              



                                       [code = 009822467]              

 

             Future Scheduled 2018   Screening for              [facility]

[facility]



             Test         00:00:00     malignant neoplasm of              



                                       colon (procedure)              



                                       [code = 535605114]              

 

             Future Scheduled 2018   Screening for              [facility]

[facility]



             Test         00:00:00     malignant neoplasm of              



                                       colon (procedure)              



                                       [code = 035874880]              

 

             Future Scheduled 2018   Screening for              [facility]

[facility]



             Test         00:00:00     malignant neoplasm of              



                                       colon (procedure)              



                                       [code = 193537096]              

 

             Future Scheduled 2018   Screening for              [facility]

[facility]



             Test         00:00:00     malignant neoplasm of              



                                       colon (procedure)              



                                       [code = 598010123]              

 

             Future Scheduled 2018   Screening for              [facility]

[facility]



             Test         00:00:00     malignant neoplasm of              



                                       colon (procedure)              



                                       [code = 777113540]              

 

             Future Scheduled 2018   Screening for              [facility]

[facility]



             Test         00:00:00     malignant neoplasm of              



                                       colon (procedure)              



                                       [code = 836106779]              

 

             Future Scheduled 2018   Screening for              [facility]

[facility]



             Test         00:00:00     malignant neoplasm of              



                                       colon (procedure)              



                                       [code = 389797328]              

 

             Future Scheduled 2018   Screening for              [facility]

[facility]



             Test         00:00:00     malignant neoplasm of              



                                       colon (procedure)              



                                       [code = 013759500]              

 

             Future Scheduled 2018   SHINGLES VACCINES (1              [facility]



             Test         00:00:00     of 2) [code = SHINGLES              Medic

al Center



                                       VACCINES (1 of 2)]              

 

             Future Scheduled 2018   SHINGLES VACCINES (1              [facility]



             Test         00:00:00     of 2) [code = SHINGLES              Medic

al Center



                                       VACCINES (1 of 2)]              

 

             Future Scheduled 2018   SHINGLES VACCINES (1              [facility]



             Test         00:00:00     of 2) [code = SHINGLES              Medic

al Center



                                       VACCINES (1 of 2)]              

 

             Future Scheduled 2018   SHINGLES VACCINES (1              [facility]



             Test         00:00:00     of 2) [code = SHINGLES              Medic

al Center



                                       VACCINES (1 of 2)]              

 

             Future Scheduled 2018   SHINGLES VACCINES (1              [facility]



             Test         00:00:00     of 2) [code = SHINGLES              Medic

al Center



                                       VACCINES (1 of 2)]              

 

             Future Scheduled 2013   [facility]               [facility]



             Test         00:00:00     (procedure) [code =              [facility]



                                       [id number]]                 

 

             Future Scheduled 2013   Lipid panel               [facility]



             Test         00:00:00     (procedure) [code =              [facility]



                                       [id number]]                 

 

             Future Scheduled 2013   Lipid panel               [facility]



             Test         00:00:00     (procedure) [code =              [facility]



                                       [id number]]                 

 

             Future Scheduled 2013   Lipid panel               [facility]



             Test         00:00:00     (procedure) [code =              [facility]



                                       [id number]]                 

 

             Future Scheduled 2013   Lipid panel               [facility]



             Test         00:00:00     (procedure) [code =              [facility]



                                       [id number]]                 

 

             Future Scheduled 2013   Lipid panel               [facility]



             Test         00:00:00     (procedure) [code =              [facility]



                                       [id number]]                 

 

             Future Scheduled 2008   Bre[facility] Cancer [facility]rn              [facility]



             Test         00:00:00     (Yearly) [code =              



                                       Breast Cancer Scrn              



                                       (Yearly)]                 

 

             Future Scheduled 2008   Bre[facility] Cancer [facility]rn              [facility]



             Test         00:00:00     (Yearly) [code =              



                                       Breast Cancer Scrn              



                                       (Yearly)]                 

 

             Future Scheduled 2008   [facility] Cancer Scrn              [facility]



             Test         00:00:00     (Yearly) [code =              



                                       Breast Cancer Scrn              



                                       (Yearly)]                 

 

             Future Scheduled 2008   [facility] Cancer Scrn              [facility]



             Test         00:00:00     (Yearly) [code =              



                                       Breast Cancer Scrn              



                                       (Yearly)]                 

 

             Future Scheduled 2008   [facility] Cancer [facility]rn              [facility]



             Test         00:00:00     (Yearly) [code =              



                                       Breast Cancer Scrn              



                                       (Yearly)]                 

 

             Future Scheduled 2008   [facility] Cancer [facility]rn              [facility]



             Test         00:00:00     (Yearly) [code =              



                                       Breast Cancer Scrn              



                                       (Yearly)]                 

 

             Future Scheduled 2008   [facility] Cancer [facility]rn              [facility]



             Test         00:00:00     (Yearly) [code =              



                                       Breast Cancer Scrn              



                                       (Yearly)]                 

 

             Future Scheduled 2008   Bre[facility] Cancer Scrn              [facility]



             Test         00:00:00     (Yearly) [code =              



                                       Breast Cancer Scrn              



                                       (Yearly)]                 

 

             Future Scheduled 2008   Breast Cancer Scrn              [facility]



             Test         00:00:00     (Yearly) [code =              



                                       Breast Cancer Scrn              



                                       (Yearly)]                 

 

             Future Scheduled 2008   Bre[facility] Cancer Scrn              [facility]



             Test         00:00:00     (Yearly) [code =              



                                       Breast Cancer Scrn              



                                       (Yearly)]                 

 

             Future Scheduled 2008   Bre[facility] Cancer Scrn              [facility]



             Test         00:00:00     (Yearly) [code =              



                                       Breast Cancer Scrn              



                                       (Yearly)]                 

 

             Future Scheduled 2008   [facility] Cancer Scrn              [facility]



             Test         00:00:00     (Yearly) [code =              



                                       Breast Cancer Scrn              



                                       (Yearly)]                 

 

             Future Scheduled 1998   Screening for              [facility]



             Test         00:00:00     malignant neoplasm of              



                                       cervix (procedure)              



                                       [code = 213694952]              

 

             Future Scheduled 1998   Screening for              [facility]



             Test         00:00:00     malignant neoplasm of              



                                       cervix (procedure)              



                                       [code = 174236072]              

 

             Future Scheduled 1998   Screening for              [facility]



             Test         00:00:00     malignant neoplasm of              



                                       cervix (procedure)              



                                       [code = 651655601]              

 

             Future Scheduled 1998   Screening for              [facility]

[facility]



             Test         00:00:00     malignant neoplasm of              



                                       cervix (procedure)              



                                       [code = 172186168]              

 

             Future Scheduled 1998   Screening for              [facility]



             Test         00:00:00     malignant neoplasm of              



                                       cervix (procedure)              



                                       [code = 400512810]              

 

             Future Scheduled 1998   Screening for              [facility]

[facility]



             Test         00:00:00     malignant neoplasm of              



                                       cervix (procedure)              



                                       [code = 804562151]              

 

             Future Scheduled 1998   Screening for              [facility]



             Test         00:00:00     malignant neoplasm of              



                                       cervix (procedure)              



                                       [code = 948887881]              

 

             Future Scheduled 1998   Screening for              [facility]



             Test         00:00:00     malignant neoplasm of              



                                       cervix (procedure)              



                                       [code = 717134873]              

 

             Future Scheduled 1998   Screening for              [facility]



             Test         00:00:00     malignant neoplasm of              



                                       cervix (procedure)              



                                       [code = 876642446]              

 

             Future Scheduled 1998   Screening for              [facility]



             Test         00:00:00     malignant neoplasm of              



                                       cervix (procedure)              



                                       [code = 499856694]              

 

             Future Scheduled 1998   Screening for              [facility]



             Test         00:00:00     malignant neoplasm of              



                                       cervix (procedure)              



                                       [code = 902651869]              

 

             Future Scheduled 1998   Screening for              [facility]

[facility]



             Test         00:00:00     malignant neoplasm of              



                                       cervix (procedure)              



                                       [code = 097322973]              

 

             Future Scheduled 1998   Screening for              [facility]

[facility]



             Test         00:00:00     malignant neoplasm of              



                                       cervix (procedure)              



                                       [code = 939742970]              

 

             Future Scheduled 1998   Screening for              [facility]

[facility]



             Test         00:00:00     malignant neoplasm of              



                                       cervix (procedure)              



                                       [code = 950848148]              

 

             Future Scheduled 1998   Screening for              [facility]

[facility]



             Test         00:00:00     malignant neoplasm of              



                                       cervix (procedure)              



                                       [code = 429583345]              

 

             Future Scheduled 1998   Screening for              [facility]

[facility]



             Test         00:00:00     malignant neoplasm of              



                                       cervix (procedure)              



                                       [code = 063154914]              

 

             Future Scheduled 1998   Screening for              [facility]

[facility]



             Test         00:00:00     malignant neoplasm of              



                                       cervix (procedure)              



                                       [code = 654602534]              

 

             Future Scheduled 1998   Screening for              [facility]

[facility]



             Test         00:00:00     malignant neoplasm of              



                                       cervix (procedure)              



                                       [code = 036389687]              

 

             Future Scheduled 1998   Screening for              [facility]

[facility]



             Test         00:00:00     malignant neoplasm of              



                                       cervix (procedure)              



                                       [code = [id number]]              

 

             Future Scheduled 1998   Screening for              [facility]

[facility]



             Test         00:00:00     malignant neoplasm of              



                                       cervix (procedure)              



                                       [code = 462620149]              

 

             Future Scheduled 1998   Screening for              [facility]

[facility]



             Test         00:00:00     malignant neoplasm of              



                                       cervix (procedure)              



                                       [code = 953229804]              

 

             Future Scheduled 1998   Screening for              [facility]

[facility]



             Test         00:00:00     malignant neoplasm of              



                                       cervix (procedure)              



                                       [code = 944403788]              

 

             Future Scheduled 1998   Screening for              [facility]

[facility]



             Test         00:00:00     malignant neoplasm of              



                                       cervix (procedure)              



                                       [code = 480057493]              

 

             Future Scheduled 1998   Screening for              [facility]

[facility]



             Test         00:00:00     malignant neoplasm of              



                                       cervix (procedure)              



                                       [code = 072586580]              

 

             Future Scheduled 1989   Screening for              [facility]

es



             Test         00:00:00     [facility]



                                       cervix (procedure)              



                                       [code = 352841527]              

 

             Future Scheduled 1989   Screening for              [facility]

es



             Test         00:00:00     malignant neoplasm of              [facility]



                                       cervix (procedure)              



                                       [code = 489152598]              

 

             Future Scheduled 1989   Screening for              [facility]

es



             Test         00:00:00     malignant neoplasm of              [facility]



                                       cervix (procedure)              



                                       [code = 130227472]              

 

             Future Scheduled 1989   Screening for              [facility]

es



             Test         00:00:00     malignant neoplasm of              [facility]



                                       cervix (procedure)              



                                       [code = 043414835]              

 

             Future Scheduled 1989   Screening for              [facility]

es



             Test         00:00:00     malignant neoplasm of              [facility]



                                       cervix (procedure)              



                                       [code = 944877425]              

 

             Future Scheduled 1989   Screening for              [facility]

es



             Test         00:00:00     malignant neoplasm of              [facility]



                                       cervix (procedure)              



                                       [code = 815046109]              

 

             Future Scheduled 1987   DTAP/TDAP/TD VACCINES              [facility]



             Test         00:00:00     (1 - Tdap) [code =              [facility]

enter



                                       DTAP/TDAP/TD VACCINES              



                                       (1 - Tdap)]               

 

             Future Scheduled 1987   DTAP/TDAP/TD VACCINES              [facility]



             Test         00:00:00     (1 - Tdap) [code =              [facility]

enter



                                       DTAP/TDAP/TD VACCINES              



                                       (1 - Tdap)]               

 

             Future Scheduled 1987   DTAP/TDAP/TD VACCINES              [facility]



             Test         00:00:00     (1 - Tdap) [code =              [facility]

enter



                                       DTAP/TDAP/TD VACCINES              



                                       (1 - Tdap)]               

 

             Future Scheduled 1987   DTAP/TDAP/TD VACCINES              [facility]



             Test         00:00:00     (1 - Tdap) [code =              [facility]

enter



                                       DTAP/TDAP/TD VACCINES              



                                       (1 - Tdap)]               

 

             Future Scheduled 1987   DTAP/TDAP/TD VACCINES              [facility]



             Test         00:00:00     (1 - Tdap) [code =              [facility]

enter



                                       DTAP/TDAP/TD VACCINES              



                                       (1 - Tdap)]               

 

             Future Scheduled 1987   DTAP/TDAP/TD VACCINES              [facility]



             Test         00:00:00     (1 - Tdap) [code =              [facility]

enter



                                       DTAP/TDAP/TD VACCINES              



                                       (1 - Tdap)]               

 

             Future Scheduled 1986   HEPATITIS C SCREENING              [facility]



             Test         00:00:00     [code = HEPATITIS C              [facility]



                                       SCREENING]                

 

             Future Scheduled 1986   HEPATITIS C SCREENING              [facility]



             Test         00:00:00     [code = HEPATITIS C              [facility] 

[facility]



                                       SCREENING]                

 

             Future Scheduled 1986   HEPATITIS C SCREENING              [facility]



             Test         00:00:00     [code = HEPATITIS C              [facility] 

[facility]



                                       SCREENING]                

 

             Future Scheduled 1986   HEPATITIS C SCREENING              [facility]



             Test         00:00:00     [code = HEPATITIS C              [facility] 

[facility]



                                       SCREENING]                

 

             Future Scheduled 1986   HEPATITIS C SCREENING              [facility]



             Test         00:00:00     [code = HEPATITIS C              [facility] 

[facility]



                                       SCREENING]                

 

             Future Scheduled 1986   HEPATITIS C SCREENING              [facility]



             Test         00:00:00     [code = HEPATITIS C              [facility] 

[facility]



                                       SCREENING]                

 

             Future Scheduled 1980   Tobacco Cessation              [facility]



             Test         00:00:00     [facility]sel[facility] and              [facility]

r



                                       Screening (12+) [code              



                                       = Tobacco Cessation              



                                       Counseling and              



                                       Screening (12+)]              

 

             Future Scheduled 1980   Tobacco Cessation              [facility]



             Test         00:00:00     Counseling and              [facility]

r



                                       Screening (12+) [code              



                                       = Tobacco Cessation              



                                       Counseling and              



                                       Screening (12+)]              

 

             Future Scheduled 1980   Tobacco Cessation              [facility]



             Test         00:00:00     Counseling and              [facility]

r



                                       Screening (12+) [code              



                                       = Tobacco Cessation              



                                       Counseling and              



                                       Screening (12+)]              

 

             Future Scheduled 1980   Tobacco Cessation              [facility]



             Test         00:00:00     Counseling and              [facility]

r



                                       Screening (12+) [code              



                                       = Tobacco Cessation              



                                       Counseling and              



                                       Screening (12+)]              

 

             Future Scheduled 1980   Tobacco Cessation              [facility]



             Test         00:00:00     Counseling and              [facility]

r



                                       Screening (12+) [code              



                                       = Tobacco Cessation              



                                       Counseling and              



                                       Screening (12+)]              

 

             Future Scheduled 1980   Tobacco Cessation              [facility]



             Test         00:00:00     Counseling and              [facility]

r



                                       Screening (12+) [code              



                                       = Tobacco Cessation              



                                       Counseling and              



                                       Screening (12+)]              

 

             Future Scheduled 1974   PNEUMOCOCCAL VACCINE              [facility]



             Test         00:00:00     0-64 YRS (1 - PCV)              [facility]

enter



                                       [code = PNEUMOCOCCAL              



                                       VACCINE 0-64 YRS (1 -              



                                       PCV)]                     

 

             Future Scheduled 1974   PNEUMOCOCCAL VACCINE              [facility]



             Test         00:00:00     0-64 YRS (1 - PCV)              [facility]

enter



                                       [code = PNEUMOCOCCAL              



                                       VACCINE 0-64 YRS (1 -              



                                       PCV)]                     

 

             Future Scheduled 1974   PNEUMOCOCCAL VACCINE              [facility]



             Test         00:00:00     0-64 YRS (1 - PCV)              [facility]

enter



                                       [code = PNEUMOCOCCAL              



                                       VACCINE 0-64 YRS (1 -              



                                       PCV)]                     

 

             Future Scheduled 1974   PNEUMOCOCCAL VACCINE              [facility]



             Test         00:00:00     0-64 YRS (1 - PCV)              [facility]

enter



                                       [code = PNEUMOCOCCAL              



                                       VACCINE 0-64 YRS (1 -              



                                       PCV)]                     

 

             Future Scheduled 1974   PNEUMOCOCCAL VACCINE              [facility]



             Test         00:00:00     0-64 YRS (1 - PCV)              [facility]

enter



                                       [code = PNEUMOCOCCAL              



                                       VACCINE 0-64 YRS (1 -              



                                       PCV)]                     

 

             Future Scheduled 1974   PNEUMOCOCCAL VACCINE              [facility]



             Test         00:00:00     0-64 YRS (1 - PCV)              [facility]

enter



                                       [code = PNEUMOCOCCAL              



                                       VACCINE 0-64 YRS (1 -              



                                       PCV)]                     

 

             Future Scheduled 1969   COVID-19 Vaccine (#1)              [facility]



             Test         00:00:00     [code = COVID-19              



                                       Vaccine (#1)]              

 

             Future Scheduled 1969   COVID-19 VACCINE (#1)              [facility]



             Test         00:00:00     [code = COVID-19              [facility]

[facility]



                                       VACCINE (#1)]              

 

             Future Scheduled 1969   COVID-19 Vaccine (#1)              [facility]



             Test         00:00:00     [code = COVID-19              



                                       Vaccine (#1)]              

 

             Future Scheduled 1969   COVID-19 Vaccine (#1)              [facility]



             Test         00:00:00     [code = COVID-19              



                                       Vaccine (#1)]              

 

             Future Scheduled 1969   COVID-19 Vaccine (#1)              [facility]



             Test         00:00:00     [code = COVID-19              



                                       Vaccine (#1)]              

 

             Future Scheduled 1969   COVID-19 Vaccine (#1)              [facility]



             Test         00:00:00     [code = COVID-19              



                                       Vaccine (#1)]              

 

             Future Scheduled 1969   COVID-19 Vaccine (#1)              [facility]



             Test         00:00:00     [code = COVID-19              



                                       Vaccine (#1)]              

 

             Future Scheduled 1969   COVID-19 Vaccine (#1)              [facility]



             Test         00:00:00     [code = COVID-19              



                                       Vaccine (#1)]              

 

             Future Scheduled 1969   COVID-19 Vaccine (#1)              [facility]



             Test         00:00:00     [code = COVID-19              



                                       Vaccine (#1)]              

 

             Future Scheduled 1969   COVID-19 Vaccine (#1)              [facility]



             Test         00:00:00     [code = COVID-19              



                                       Vaccine (#1)]              

 

             Future Scheduled 1969   COVID-19 Vaccine (#1)              [facility]



             Test         00:00:00     [code = COVID-19              



                                       Vaccine (#1)]              

 

             Future Scheduled 1969   COVID-19 Vaccine (#1)              [facility]



             Test         00:00:00     [code = COVID-19              



                                       Vaccine (#1)]              

 

             Future Scheduled 1969   COVID-19 Vaccine (#1)              [facility]



             Test         00:00:00     [code = COVID-19              



                                       Vaccine (#1)]              

 

             Future Scheduled 1969   COVID-19 VACCINE (#1)              [facility]



             Test         00:00:00     [code = COVID-19              [facility]

[facility]



                                       VACCINE (#1)]              

 

             Future Scheduled 1969   COVID-19 VACCINE (#1)              [facility]



             Test         00:00:00     [code = COVID-19              [facility]

[facility]



                                       VACCINE (#1)]              

 

             Future Scheduled 1969   COVID-19 VACCINE (#1)              [facility]



             Test         00:00:00     [code = COVID-19              [facility]

[facility]



                                       VACCINE (#1)]              

 

             Future Scheduled 1969   COVID-19 VACCINE (#1)              [facility]



             Test         00:00:00     [code = COVID-19              [facility]

[facility]



                                       VACCINE (#1)]              

 

             Future Scheduled 1969   COVID-19 VACCINE (#1)              [facility]



             Test         00:00:00     [code = COVID-19              [facility]

[facility]



                                       VACCINE (#1)]              

 

             Future Scheduled 1968   Screening for              [facility]

es



             Test         00:00:00     malignant neoplasm [facility]              [facility]



                                       breast (procedure)              



                                       [code = [id number]]              

 

             Future Scheduled 1968   CT Colonography              [facility]



             Test         00:00:00     (combo) [code = CT              [facility]

enter



                                       Colonography (combo)]              

 

             Future Scheduled 1968   Screening for              [facility]

es



             Test         00:00:00     malignant neoplasm of              [facility]



                                       colon (procedure)              



                                       [code = 202656085]              

 

             Future Scheduled 1968   Screening for              [facility]

es



             Test         00:00:00     malignant neoplasm of              [facility]



                                       colon (procedure)              



                                       [code = 964330010]              

 

             Future Scheduled 1968   Screening for              [facility]

es



             Test         00:00:00     malignant neoplasm of              [facility]



                                       colon (procedure)              



                                       [code = 713117040]              

 

             Future Scheduled 1968   Screening for              [facility]

es



             Test         00:00:00     malignant neoplasm of              [facility]



                                       colon (procedure)              



                                       [code = 293250754]              

 

             Future Scheduled 1968   Sigmoidoscopy [code =              [facility]



             Test         00:00:00     Sigmoidoscopy]              [facility]

r

 

             Future Scheduled 1968   Screening for              [facility]

es



             Test         00:00:00     malignant neoplasm of              [facility]



                                       breast (procedure)              



                                       [code = 807360137]              

 

             Future Scheduled 1968   CT [facility]ography              [facility]



             Test         00:00:00     (combo) [code = CT              [facility]

enter



                                       Colonography (combo)]              

 

             Future Scheduled 1968   Screening for              [facility]

es



             Test         00:00:00     malignant neoplasm of              [facility]



                                       colon (procedure)              



                                       [code = 000915168]              

 

             Future Scheduled 1968   Screening for              [facility]

es



             Test         00:00:00     malignant neoplasm of              [facility]



                                       colon (procedure)              



                                       [code = 949775803]              

 

             Future Scheduled 1968   Screening for              [facility]

es



             Test         00:00:00     malignant neoplasm of              [facility]



                                       colon (procedure)              



                                       [code = 108549696]              

 

             Future Scheduled 1968   Screening for              [facility]

es



             Test         00:00:00     malignant neoplasm of              [facility]



                                       colon (procedure)              



                                       [code = 335445391]              

 

             Future Scheduled 1968   Sigmoidoscopy [code =              [facility]



             Test         00:00:00     Sigmoidoscopy]              [facility]

r

 

             Future Scheduled 1968   Screening for              [facility]

es



             Test         00:00:00     malignant neoplasm of              [facility]



                                       breast (procedure)              



                                       [code = 363064986]              

 

             Future Scheduled 1968   CT Colonography              [facility]



             Test         00:00:00     (combo) [code = CT              [facility]

enter



                                       Colonography (combo)]              

 

             Future Scheduled 1968   Screening for              [facility]

es



             Test         00:00:00     malignant neoplasm of              [facility]



                                       colon (procedure)              



                                       [code = 150102840]              

 

             Future Scheduled 1968   Screening for              [facility]

es



             Test         00:00:00     malignant neoplasm of              [facility]



                                       colon (procedure)              



                                       [code = 615568693]              

 

             Future Scheduled 1968   Screening for              [facility]

es



             Test         00:00:00     malignant neoplasm of              [facility]



                                       colon (procedure)              



                                       [code = 997112539]              

 

             Future Scheduled 1968   Screening for              [facility]

es



             Test         00:00:00     malignant neoplasm of              [facility]



                                       colon (procedure)              



                                       [code = 857878675]              

 

             Future Scheduled 1968   Sigmoidoscopy [code =              [facility]



             Test         00:00:00     Sigmoidoscopy]              [facility]

r

 

             Future Scheduled 1968   Screening for              [facility]

es



             Test         00:00:00     malignant neoplasm of              [facility]



                                       breast (procedure)              



                                       [code = 000817109]              

 

             Future Scheduled 1968   CT Colonography              [facility]



             Test         00:00:00     (combo) [code = CT              [facility]

enter



                                       Colonography (combo)]              

 

             Future Scheduled 1968   Screening for              [facility]

es



             Test         00:00:00     malignant neoplasm of              [facility]



                                       colon (procedure)              



                                       [code = 710468187]              

 

             Future Scheduled 1968   Screening for              [facility]

es



             Test         00:00:00     malignant neoplasm of              [facility]



                                       colon (procedure)              



                                       [code = 736945478]              

 

             Future Scheduled 1968   Screening for              [facility]

es



             Test         00:00:00     malignant neoplasm of              [facility]



                                       colon (procedure)              



                                       [code = 795981528]              

 

             Future Scheduled 1968   Screening for              [facility]

es



             Test         00:00:00     malignant neoplasm of              [facility]



                                       colon (procedure)              



                                       [code = 163088103]              

 

             Future Scheduled 1968   Sigmoidoscopy [code =              [facility]

[facility]



             Test         00:00:00     Sigmoidoscopy]              [facility]

r

 

             Future Scheduled 1968   Screening for              [facility]

es



             Test         00:00:00     malignant neoplasm of              [facility]



                                       breast (procedure)              



                                       [code = 958026299]              

 

             Future Scheduled 1968   CT Colonography              [facility]



             Test         00:00:00     (combo) [code = CT              [facility]

enter



                                       Colonography (combo)]              

 

             Future Scheduled 1968   Screening for              [facility]

es



             Test         00:00:00     malignant neoplasm of              [facility]



                                       colon (procedure)              



                                       [code = 894361218]              

 

             Future Scheduled 1968   Screening for              [facility]

es



             Test         00:00:00     malignant neoplasm of              [facility]



                                       colon (procedure)              



                                       [code = 957882879]              

 

             Future Scheduled 1968   Screening for              [facility]

es



             Test         00:00:00     malignant neoplasm of              [facility]



                                       colon (procedure)              



                                       [code = 003589249]              

 

             Future Scheduled 1968   Screening for              [facility]

es



             Test         00:00:00     malignant neoplasm of              [facility]



                                       colon (procedure)              



                                       [code = 555802742]              

 

             Future Scheduled 1968   Sigmoidoscopy [code =              [facility]

[facility]



             Test         00:00:00     Sigmoidoscopy]              [facility]

r

 

             Future Scheduled 1968   Screening for              [facility]

es



             Test         00:00:00     malignant neoplasm of              [facility]



                                       breast (procedure)              



                                       [code = 483870387]              

 

             Future Scheduled 1968   CT [facility]ography              [facility]



             Test         00:00:00     (combo) [code = CT              [facility]

enter



                                       Colonography (combo)]              

 

             Future Scheduled 1968   Screening for              [facility]

es



             Test         00:00:00     malignant neoplasm of              [facility]



                                       colon (procedure)              



                                       [code = 281464958]              

 

             Future Scheduled 1968   Screening for              [facility]

es



             Test         00:00:00     malignant neoplasm of              [facility]



                                       colon (procedure)              



                                       [code = 731690484]              

 

             Future Scheduled 1968   Screening for              [facility]

es



             Test         00:00:00     malignant neoplasm of              [facility]



                                       colon (procedure)              



                                       [code = 668273809]              

 

             Future Scheduled 1968   Screening for              [facility]

es



             Test         00:00:00     malignant neoplasm of              [facility]



                                       colon (procedure)              



                                       [code = 946300587]              

 

             Future Scheduled 1968   Sigmoidoscopy [code =              [facility]

[facility]



             Test         00:00:00     Sigmoidoscopy]              [facility]

r







Encounters







        Start   End     Encounter Admission Attending Care    Care    Encounter 

Source



        Date/Time Date/Time Type    Type    Clinicians Facility Department ID   

   

 

        2023-05-15         Outpatient                 [facility]     [id number]

 [place]



        14:18:14                                                 [id number] [facility]

 

        2023         Outpatient                 [facility]     [id number]

 [place]



        14:16:19                                                 [id number] [facility]

 

        2023         Emergency                 [facility]     [facility]     [id number] 

PIERRE -



        09:45:36                                                         [facility]

 

        2022         Outpatient         SARATH, [facility]     [id number]

-2 [place]



        01:05:17                         MELANIE                  [id number] [facility]

 

        2022         Outpatient         VIRGIE,  [facility]     [id number]

 [place]



        03:15:41                         JOAQUIN                 [id number] [facility]

 

        2022         [facility]     [id number]

 [place]



        15:19:55                         JOAQUIN                 [id number] [facility]

 

        2022         Outpatient                 [facility]     [id number]

 [place]



        15:28:25                                                 [id number] [facility]

 

        2022         Outpatient                 [facility]     [id number]

 [place]



        12:00:51                                                 [id number] [facility]

 

        2022         Outpatient                 [facility]     [id number]

 [place]



        15:13:02                                                 [id number] [facility]

 

        2020         Inpatient                 [facility]   YAMILA    [id number] 

[facility]



        03:17:00                                                 56      [facility]

 

        2023 Emergency X       SU OLMOS    ERT     [id number]

81 [facility]



        09:01:00 14:52:00                 JACK brock [facility]

 

        2023 Emergency         [facility]    1.2.910.675 [id number]

[id number] [facility]



        09:01:00 14:52:00                 Jack HAMM 350.1.13.10         i

[facility] 4.2.7.2.686         [facility]  [phone]         Medi

staci



                                                        [address]             [address]

 

        2023 Emergency         Yosvany, 1.2.840.1 [id number]

[id number] Methodi



        02:46:00 05:35:00                 Alejo 01424.1.1         [address]     [address]



                                        Gianni  3.430.2.7                 Hospit

a



                                                .3.745291                 l



                                                .8                      

 

        2023 Emergency         Yosvany, 1.2.840.1 [id number] 

[id number] Methodi



        02:46:00 05:35:00                 Alejo 74940.1.1         [address]     [address]



                                        Gianni  3.430.2.7                 Hospit

a



                                                .3.460950                 l



                                                .8                      

 

        2023-02-15 2023 Emergency         Justin Whitaker [facility]   [id number]

[id number] [facility]



        17:58:00 00:07:00                 [facility]

 

        2023-02-15 2023 Emergency         Justin Whitaker [facility]   [id number]

[id number] [facility]



        17:58:00 00:07:00                 [facility]

 

        2023-02-15 2023 Emergency ER      JUSTIN WHITAKER [facility]    Emergency 20

[id number] [facility]



        17:58:00 00:07:00                                                 

 

        2023 Documentat         Dangelo, 1.2.840.1 [id number] 

[id number] Methodi



        00:00:00 00:00:00 ion             [facility]         [address]     [address]



                                                3.430.2.7                 Hospit

a



                                                .3.502254                 l



                                                .8                      

 

        2023 Travel                  1.2.840.1 1.2.845.436 2558

[id number] Methodi



        00:00:00 00:00:00                         16178.1.1 350.1.13.43 977     

st



                                                3.430.2.7 0.2.7.3.698         Ho

spita



                                                .3.798392 084.8           l



                                                .8                      

 

        2023 Documentat         Dangelo, 1.2.840.1 [id number]

[id number] Methodi



        00:00:00 00:00:00 ion             Arnaldo 33056.1.1         [address]     [address]



                                                3.430.2.7                 Hospit

a



                                                .3.883954                 l



                                                .8                      

 

        2023 Travel                  1.2.840.1 1.2.762.833 1485

[id number] Methodi



        00:00:00 00:00:00                         14628.1.1 350.1.13.43 977     

st



                                                3.430.2.7 0.2.7.3.698         Ho

spita



                                                .3.308768 084.8           l



                                                .8                      

 

        2023 2023-02-15 Emergency Emergency [facility]   JM0

[id number] 

[facility]



        19:56:00 06:17:00                                         79      

 

        2023 Emergency                 [facility] [id number]

[address]



        14:56:57 23:07:00                                 [address]

 

        2023 Emergency                 [facility] [id number]

9[address]



        14:56:57 23:07:00                                 [address]

 

        2023 Emergency                 [facility]   [id number]

62 [facility]



        19:56:00 19:56:00                                               

 

        2023 Outpatient         Swati [facility]   [id number]

[id number] 



        08:56:57 17:07:00                 Ishan                        



                                        Thomas                          

 

        2023 Emergency E       SWATI [facility]    7500   

 [facility]



        08:56:00 17:07:00                 ISHAN                          

 

        2023 Outpatient R       OVIDIO  [facility]    [id number]

467 Univers



        14:00:00 14:00:00                 ANCELMO brock [facility]

 

        2023 Orders          Doctor GARIBAY    1.2.840.114 [id number]

447 [facility]



        00:00:00 00:00:00 Only            Unassigned, SHONA   350.1.13.10       

  ity of



                                        [facility] 4.2.7.2.686         Roly

as



                                                        [phone]         [address]

 

        2022 Inpatient         SANCHEZ, [facility]    MED       

  [facility]



        19:24:00 19:40:00                 LEXI                         

 

        2022 Emergency E       HEATHER, [facility]     

   [facility]



        14:56:00 18:09:00                 DEMARIO                           

 

        2022 Emergency X       SINGER, [facility]    ERT     [id number]

15 Univers



        12:56:00 15:05:00                 JACK                         delmy [facility]

 

        2022 [facility]         [facility]    1.2.379.780 [id number]

7614 Univers



        12:56:00 15:05:00                 Jack HAMM 350.1.13.10         i

ty of



                                                [place] 4.2.7.2.686         [facility]  [phone]         [address]

 

        2022 Office          Amy  [facility]    1.2.840.114 [id number]

13 Univers



        10:00:00 10:30:00 Visit           [facility]  350.1.13.10         it

y of



                                                [place] 4.2.7.2.686         [place]oly

as



                                                SKYLAR?BLEA [phone]         Me

chloé



                                                [address]



                                                MEDICAL                 



                                                OFFICE                  



                                                BUILDING                 

 

        2022 Outpatient R       AMY  [facility]    [id number]

329 Univers



        10:00:00 10:00:00                 ESCOBAR brock [facility]

 

        2022 Orders          Doctor  [place]    1.2.840.114 [id number]

07 [facility]



        00:00:00 00:00:00 Only            Unassigned, SHONA   350.1.13.10       

  ity of



                                        [facility] 4.2.7.2.686         Roly

as



                                                        [phone]         [address]

 

        2020 Outpatient         ERNESTINE Ferrara    [id number]

220 [facility]



        08:38:00 08:38:00                 [employer]                 75      [facility]

 

        2019 Inpatient 1       Robin Barrios [facility]    PSY     12

[id number]



        23:01:00 13:16:00                 Robin Barrios                         

[facility]

 

        2017 Outpatient DIANA COHEN  [facility]    NUT     [id number]

565 [facility]



        00:00:00 00:00:00                 VENKATA brock [facility]







Results







           Test Description Test Time  Test Comments Results    Result Comments 

Source









                    COMP. METABOLIC PANEL (59216) 2023 16:28:41 









                      Test Item  Value      Reference Range Interpretation Comme

nts









             NA (test code = 6583999580) 137 mmol/L   135-145                   

 

             K (test code = 7426759245) 4.1 mmol/L   3.5-5.0                   

 

             CL (test code = 9749480630) 104 mmol/L                       

 

             CO2 TOTAL (test code = 5553638028) 24 mmol/L    23-31              

       

 

             AGAP (test code = 9001328737) 9            2-16                    

  

 

             BUN (test code = 7422677202) 14 mg/dL     7-23                     

 

 

             GLUCOSE (test code = 1623512838) 114 mg/dL           H       

     

 

             CREATININE (test code = 0.79 mg/dL   0.50-1.04                 



             1555818655)                                         

 

             TOTAL BILI (test code = 1.3 mg/dL    0.1-1.1      H            



             6061716174)                                         

 

             CALCIUM (test code = 4459059302) 9.5 mg/dL    8.6-10.6             

     

 

             T PROTEIN (test code = 8999458241) 7.7 g/dL     6.3-8.2            

       

 

             ALBUMIN (test code = 8871268023) 4.2 g/dL     3.5-5.0              

     

 

             ALK PHOS (test code = 5075389834) 102 U/L                    

      

 

             ALTv (test code = 1742-6) 15 U/L       5-35                      

 

             AST(SGOT) (test code = 2212039148) 19 U/L       13-40              

       

 

             eGFR (test code = 2425210418) 75.8         mL/min/1.73m2           

   

 

             ELENA (test code = ELENA) [employer] of [employer]                    

       



                          Filtration Rate (GFR) and Staging                     

      



                          of Kidney Disease*                           



                          +-----------------------+--------                     

      



                          -------------+-------------------                     

      



                          ------+| GFR (mL/min/1.73 m2) ?|                      

     



                          With Kidney Damage ?| ?Without                        

   



                          Kidney                                 



                          Damage+-----------------------+--                     

      



                          -------------------+-------------                     

      



                          ------------+| ?>90 ? ? ? ? ? ? ?                     

      



                          ? ?| ?Stage one ? ? ? ? ?| ?                          

 



                          Normal ? ? ? ? ? ? ?                           



                          ?+-----------------------+-------                     

      



                          --------------+------------------                     

      



                          -------+| ?60-89 ? ? ? ? ? ? ? ?|                     

      



                          ?Stage two ? ? ? ? ?| ? Decreased                     

      



                          GFR ? ? ? ?                            



                          +-----------------------+--------                     

      



                          -------------+-------------------                     

      



                          ------+| ?30-59 ? ? ? ? ? ? ? ?|                      

     



                          ?Stage three ? ? ? ?| ? Stage                         

  



                          three ? ? ? ? ?                           



                          +-----------------------+--------                     

      



                          -------------+-------------------                     

      



                          ------+| ?15-29 ? ? ? ? ? ? ? ?|                      

     



                          ?Stage four ? ? ? ? | ? Stage                         

  



                          four ? ? ? ? ?                           



                          ?+-----------------------+-------                     

      



                          --------------+------------------                     

      



                          -------+| ?<15 (or dialysis) ? ?|                     

      



                          ?Stage five ? ? ? ? | ? Stage                         

  



                          five ? ? ? ? ?                           



                          ?+-----------------------+-------                     

      



                          --------------+------------------                     

      



                          -------+ *Each stage assumes the                      

     



                          associated GFR level has been in                      

     



                          effect for at least three months.                     

      



                          ?Stages 1 to 5, with or without                       

    



                          kidney disease, indicate chronic                      

     



                          kidney disease. Notes:                           



                          Determination of stages one and                       

    



                          two (with eGFR >59mL/min/1.73 m2)                     

      



                          requires estimation of kidney                         

  



                          damage for at least three months                      

     



                          as defined by structural or                           



                          functional abnormalities of the                       

    



                          kidney, manifested by                           



                          either:Pathological abnormalities                     

      



                          or Markers of kidney damage                           



                          (including abnormalities in the                       

    



                          composition of the blood or urine                     

      



                          or abnormalities in imaging                           



                          tests).                                

 

             Lab Interpretation (test code = Abnormal                           

    



             05707-1)                                            



[facility] WITH [id number] 16:26:23





             Test Item    Value        Reference Range Interpretation Comments

 

             WBC (test code = 14.08        See_Comment  H             [Automated



             7181-2)                                             message] The



                                                                 system which



                                                                 generated this



                                                                 result transmit

gianfranco



                                                                 reference range

:



                                                                 4.30 - 11.10



                                                                 10*3/?L. The



                                                                 reference range



                                                                 was not used to



                                                                 interpret this



                                                                 result as



                                                                 normal/abnormal

.

 

             RBC (test code = 4.39         See_Comment                [Automated



             097-8)                                              message] The



                                                                 system which



                                                                 generated this



                                                                 result transmit

gianfranco



                                                                 reference range

:



                                                                 3.93 - 5.25



                                                                 10*6/?L. The



                                                                 reference range



                                                                 was not used to



                                                                 interpret this



                                                                 result as



                                                                 normal/abnormal

.

 

             HGB (test code = 13.6 g/dL    11.6-15.0                 



             718-7)                                              

 

             HCT (test code = 40.5 %       35.7-45.2                 



             4544-3)                                             

 

             MCV (test code = 92.3 fL      80.6-95.5                 



             787-2)                                              

 

             MCH (test code = 31.0 pg      25.9-32.8                 



             785-6)                                              

 

             MCHC (test code = 33.6 g/dL    31.6-35.1                 



             786-4)                                              

 

             RDW-SD (test code = 41.2 fL      39.0-49.9                 



             14742-7)                                            

 

             RDW-CV (test code = 12.2 %       12.0-15.5                 



             788-0)                                              

 

             PLT (test code = 221          See_Comment                [Automated



             777-3)                                              message] The



                                                                 system which



                                                                 generated this



                                                                 result transmit

gianfranco



                                                                 reference range

:



                                                                 166 - 358 10*3/

?L.



                                                                 The reference



                                                                 range was not u

sed



                                                                 to interpret th

is



                                                                 result as



                                                                 normal/abnormal

.

 

             MPV (test code = 10.1 fL      9.5-12.9                  



             71352-8)                                            

 

             NRBC/100 WBC (test 0.0          See_Comment                [Automat

ed



             code = [id number])                                        message] 

The



                                                                 system which



                                                                 generated this



                                                                 result transmit

gianfranco



                                                                 reference range

:



                                                                 0.0 - 10.0 /100



                                                                 WBCs. The



                                                                 reference range



                                                                 was not used to



                                                                 interpret this



                                                                 result as



                                                                 normal/abnormal

.

 

             NRBC x10^3 (test code              See_Comment                [Auto

mated



             = [id number])                                        message] The



                                                                 system which



                                                                 generated this



                                                                 result transmit

gianfranco



                                                                 reference range

:



                                                                 10*3/?L. The



                                                                 reference range



                                                                 was not used to



                                                                 interpret this



                                                                 result as



                                                                 normal/abnormal

.

 

             GRAN MAT (NEUT) % 86.9 %                                 



             (test code = 770-8)                                        

 

             IMM GRAN % (test code 0.60 %                                 



             = [id number])                                        

 

             LYMPH % (test code = 7.6 %                                  



             736-9)                                              

 

             MONO % (test code = 3.8 %                                  



             5905-5)                                             

 

             EOS % (test code = 0.6 %                                  



             713-8)                                              

 

             BASO % (test code = 0.5 %                                  



             706-2)                                              

 

             GRAN MAT x10^3(ANC) 12.23 10*3/uL 1.88-7.09    H            



             (test code =                                        



             6901148809)                                         

 

             IMM GRAN x10^3 (test 0.09 10*3/uL 0.00-0.06    H            



             code = [id number])                                        

 

             LYMPH x10^3 (test code 1.07 10*3/uL 1.32-3.29    L            



             = 731-0)                                            

 

             MONO x10^3 (test code 0.54 10*3/uL 0.33-0.92                 



             = 742-7)                                            

 

             EOS x10^3 (test code = 0.08 10*3/uL 0.03-0.39                 



             711-2)                                              

 

             BASO x10^3 (test code 0.07 10*3/uL 0.01-0.07                 



             = 704-7)                                            

 

             Lab Interpretation Abnormal                               



             (test code = 55426-5)                                        



[id number]37





             Test Item    Value        Reference Range Interpretation Comments

 

             SARS-CoV-2 (COVID-19) RNA [Presence] Detected                      

         



             in Respiratory specimen by CECIL with                                

        



             probe detection (test code =                                       

 



             48463-7)                                            

 

             Whether patient resides in a No                                    

 



             congregate care setting (test code =                               

         



             59416-4)                                            

 

             Date and time of symptom onset (test Unknown                       

         



             code = 56508-0)                                        

 

             Whether the patient was hospitalized No                            

         



             for condition of interest (test code                               

         



             = 36077-0)                                          

 

             Whether the patient was admitted to No                             

        



             intensive care unit (ICU) for                                      

  



             condition of interest (test code =                                 

       



             82703-4)                                            

 

             Whether patient is employed in a No                                

     



             healthcare setting (test code =                                    

    



             79947-1)                                            

 

             Whether the patient has symptoms No                                

     



             related to condition of interest                                   

     



             (test code = 54338-6)                                        

 

             Pregnancy status (test code = No                                   

  



             68289-9)                                            



MAX ROMO, Urinalysis Rflx Cult/[id number] 20:35:00





             Test Item    Value        Reference Range Interpretation Comments

 

             Color,Urine (test code = UCOL) Yellow       Yellow                 

   

 

             Clarity,Urine (test code = Clear        Clear                     



             UCLAR)                                              

 

             Ph, Urine (test code = UPH) 6.5          5.0-9.0      N            

 

             Specific Gravity,Urine (test 1.015        1.005-1.030  N           

 



             code = USG)                                         

 

             Blood,Urine (test code = UBLD) Negative mg/dL Negative             

     

 

             Protein,Urine (test code = Negative mg/dL Negative                 

 



             UPRO)                                               

 

             Glucose,Urine (UA) (test code Negative mg/dL Negative              

    



             = UGLU)                                             

 

             Ketones,Urine (test code = Negative mg/dL Negative                 

 



             UKET)                                               

 

             Nitrate,Urine (test code = Negative     Negative                  



             UNIT)                                               

 

             Bilirubin,Urine (test code = Negative mg/dL Negative               

   



             UBIL)                                               

 

             Urobilinogen,Urine (test code 1.0 E.U./dL  Normal                  

  



             = UURO)                                             

 

             Leukocyte Esterase,Urine (test Trace mg/dL  Negative     A         

   



             code = ULEU)                                        



Drug Screen,[id number] 20:35:00





             Test Item    Value        Reference Range Interpretation Comments

 

             PCP Phencyclidine Screen,Urine (test Negative     Negative         

         



             code = PCPU)                                        

 

             Amphetamine Screen,Urine (test code Negative     Negative          

        



             = AMPU)                                             

 

             Methadone Screen,Urine (test code = Negative     Negative          

        



             METHU)                                              

 

             Opiate Screen,Urine (test code = Negative     Negative             

     



             UOPIS)                                              

 

             Barbituates Screen,Urine (test code Negative     Negative          

        



             = BARBU)                                            

 

             Benzodiazepines Screen,Urine (test Negative     Negative           

       



             code = UBENZS)                                        

 

             Cocaine Screen,Urine (test code = Negative     Negative            

      



             UCOCS)                                              

 

             Cannabinoid Screen,Urine (test code Negative     Negative          

        



             = UTHCS)                                            

 

             Propoxyphene Screen, Urine (test Negative     Negative             

     



             code = UPROP)                                        



Urine [id number] 20:35:00





             Test Item    Value        Reference Range Interpretation Comments

 

             RBC,Urine (test code = URBC.NP) 0-2 /HPF     0-2                   

    

 

             WBC,Urine (test code = UWBC.NP) 0-5 /HPF     0-5                   

    

 

             Bacteria,Urine (test code = Few /HPF     None Seen    A            



             UBACT.NP)                                           

 

             Squamous Epithelial Cell,Urine 6-10 /HPF    0-5          A         

   



             (test code = USQEPI.NP)                                        

 

             Amorphous Crystals,Urine (test code Few /HPF     None Seen    A    

        



             = UAMSE)                                            

 

             Hyaline Casts,Urine (test code = 0-5 /HPF     None Seen    A       

     



             UHYALC.XX)                                          



Complete Blood Count Auto [id number] 20:19:00





             Test Item    Value        Reference Range Interpretation Comments

 

             White Blood Count (test code = 7.9 x10 3/uL 4.4-10.5     N         

   



             WBCT)                                               

 

             Red Blood Count (test code = 4.27 x10 6/uL 3.75-5.20    N          

  



             RBC)                                                

 

             Hemoglobin (test code = HGBT) 12.9 g/dL    12.2-14.8    N          

  

 

             Hematocrit (test code = HCTT) 40.2 %       36.5-44.4    N          

  

 

             Mean Corpuscular Volume (test 94.10 fL     80..00 N          

  



             code = MCV)                                         

 

             Mean Corpuscular Hemoglobin 30.2 pg      27.0-32.5    N            



             (test code = MCH)                                        

 

             Mean Corpuscular HGB Conc 32.10 g/dL   32.00-37.50  N            



             (test code = MCHC)                                        

 

             RDW Coefficient of Variation 12.4 %       11.5-14.5    N           

 



             (test code = RDWCV)                                        

 

             Platelet Count (test code = 211.0 x10 3/uL 140.0-440.0  N          

  



             PLTT)                                               

 

             Mean Platelet Volume (test 11.1 fL                                



             code = MPV)                                         

 

             Immature Granulocytes % (Auto) 0.3 %        0.0-5.0      N         

   



             (test code = IMMGRAN%)                                        

 

             Neutrophils % (Auto) (test 75.8 %       36.0-70.0    H            



             code = NE%)                                         

 

             Lymphocytes % (Auto) (test 10.6 %       12.0-44.0    L            



             code = LY%)                                         

 

             Monocytes % (Auto) (test code 11.8 %       0.0-11.0     H          

  



             = MO%)                                              

 

             Eosinophils % (Auto) (test 0.9 %        0.0-7.0      N            



             code = EO%)                                         

 

             Basophils % (Auto) (test code 0.6 %        0.0-2.0      N          

  



             = BA%)                                              

 

             Immature Granulocytes # (Auto) 0.02 x10 3/uL                       

    



             (test code = IMMGRAN#)                                        

 

             Neutrophils # (Auto) (test 6.0 x10 3/uL 1.6-7.4      N            



             code = NE#)                                         

 

             Lymphocytes # (Auto) (test 0.83 x10 3/uL 0.50-4.60    N            



             code = LY#)                                         

 

             Monocytes # (Auto) (test code 0.93 x10 3/uL 0.00-1.20    N         

   



             = MO#)                                              

 

             Eosinophils # (Auto) (test 0.07 x10 3/uL 0.00-0.74    N            



             code = EO#)                                         

 

             Basophils # (Auto) (test code 0.05 x10 3/uL 0.00-0.21    N         

   



             = BA#)                                              

 

             nRBC Abs (test code = NRBCA) 0                                     

 

 

             nRBC Pct (test code = NRBCP) 0 %                                   

 



Comprehensive Metabolic [id number] 20:19:00





             Test Item    Value        Reference Range Interpretation Comments

 

             SODIUM (test code = NA) 142.0 mmol/L 136.0-145.0  N            

 

             Potassium,K (test code = 4.2 mmol/L   3.0-5.1      N            



             K)                                                  

 

             Chloride (test code = 110 mmol/L          H            



             CL)                                                 

 

             Carbon Dioxide (test 26 mmol/L    20-31        N            



             code = CO2)                                         

 

             Anion Gap (test code = 6 mmol/L     5-15         N            



             GAP)                                                

 

             Blood Urea Nitrogen 17 mg/dL     9-23         N            



             (test code = BUN)                                        

 

             Creatinine (test code = 0.88 mg/dL   0.55-1.02    N            



             CREATT)                                             

 

             Creatinine Clr Calc 80.94 mL/min                           



             Pharmacy (test code =                                        



             CRCLPHA)                                            

 

             Estimated Glomerular 78           See_Comment  L            Reporte

d eGFR is



             Filt Rate (test code =                                        based

 on the



             EGFR.XX)                                            CKD-EPI 



                                                                 equation thatdo

es



                                                                 not use a race



                                                                 coefficient.



                                                                 Additional



                                                                 information can

be



                                                                 found



                                                                 at:[id number]_c

cb_



                                                                 egfr_summary_fl

andry



                                                                 5.pdf (kidney.o

rg)



                                                                 [Automated



                                                                 message] The



                                                                 system which



                                                                 generated this



                                                                 result transmit

gianfranco



                                                                 reference range

:



                                                                 >=90



                                                                 ml/min/1.73m2. 

The



                                                                 reference range



                                                                 was not used to



                                                                 interpret this



                                                                 result as



                                                                 normal/abnormal

.

 

             BUN/Creatinine Ratio 19 ratio     10-20        N            



             (test code = BCRATIO)                                        

 

             Glucose (test code = 92 mg/dL            N            



             GLU)                                                

 

             Osmolality,Calculated 295.0                                  



             (test code = OSMOC)                                        

 

             Calcium (test code = CA) 9.1 mg/dL    8.3-10.6     N            

 

             Bilirubin,Total (test 0.3 mg/dL    0.2-1.1      N            



             code = BILIT)                                        

 

             Aspartate Amino 22 U/L       0-34         N            



             Transferase (test code =                                        



             AST)                                                

 

             Alanine Aminotransferase 15 U/L       10-49        N            



             (test code = ALT)                                        

 

             Total Protein (test code 6.9 g/dL     5.7-8.2      N            



             = TP)                                               

 

             Albumin Level (test code 3.9 g/dL     3.2-4.8      N            



             = ALB)                                              

 

             Globulin (test code = 3.0 mg/dL    2.3-3.5      N            



             GLOB)                                               

 

             Albumin/Globulin Ratio 1.3 ratio    0.8-2.0      N            



             (test code = AGRATIO)                                        

 

             Alkaline Phosphatase 109 U/L             N            



             (test code = ALP)                                        



Ethanol [id number] 20:19:00





             Test Item    Value        Reference Range Interpretation Comments

 

             Ethanol (test code < 3 mg/dL                              The pharm

acological



             = ETOH)                                             response to blo

od alcohol



                                                                 levels mayvary 

from



                                                                 individual to i

ndividual.



                                                                 The fatal larisa

ntrationhas



                                                                 been reported t

o be



                                                                 >400mg/dL.



HCG, Serum Qual (LAB)2023 20:19:00





             Test Item    Value        Reference Range Interpretation Comments

 

             HCG, Serum Qual (LAB) (test code = Negative     Negative           

       



             HCGQ)                                               



URINE AND [id number] 15:40:00





             Test Item    Value        Reference Range Interpretation Comments

 

             UA Leuk Est (test code Small *ABN*(23                         

  



             = UA Leuk Est) 9:40 AM)                               



[facility] AND [id number] 15:40:00





             Test Item    Value        Reference Range Interpretation Comments

 

             UA RBC (test code = 2            See_Comment                [Automa

gianfranco message] The



             UA RBC)                                             system which ge

nerated this



                                                                 result transmit

gianfranco



                                                                 reference range

: <=2. The



                                                                 reference range

 was not



                                                                 used to interpr

et this



                                                                 result as xenia

l/abnormal.



[facility] AND [id number] 15:40:00





             Test Item    Value        Reference Range Interpretation Comments

 

             UA Sq Epi (test code = UA Sq Epi) Many /LPF                        

      



[facility] AND [id number] 15:40:00





             Test Item    Value        Reference Range Interpretation Comments

 

             UA WBC (test code = 9            See_Comment                [Automa

gianfranco message] The



             UA WBC)                                             system which ge

nerated this



                                                                 result transmit

gianfranco



                                                                 reference range

: <=5. The



                                                                 reference range

 was not



                                                                 used to interpr

et this



                                                                 result as xenia

l/abnormal.



[facility] AND [id number] 15:40:00





             Test Item    Value        Reference Range Interpretation Comments

 

             UA Bacteria (test code = UA Occasional /HPF                        

   



             Bacteria)                                           



[facility] AND [id number] 15:40:00





             Test Item    Value        Reference Range Interpretation Comments

 

             UA Amorph Delmis (test code = Occasional /HPF                        

   



             UA Amorph Delmis)                                        



[facility] AND [id number] 15:40:00





             Test Item    Value        Reference Range Interpretation Comments

 

             UA CaOx Delmis (test code = UA Occasional /HPF                       

    



             CaOx Delmis)                                          



[facility] AND [id number] 15:40:00





             Test Item    Value        Reference Range Interpretation Comments

 

             UA Color (test code = Yellow *NA*(23                          

 



             UA Color)    9:40 AM)                               



[facility] AND [id number] 15:40:00





             Test Item    Value        Reference Range Interpretation Comments

 

             UA Turbidity (test code = Clear (23 9:40                      

     



             UA Turbidity) AM)                                    



[facility] AND [id number] 15:40:00





             Test Item    Value        Reference Range Interpretation Comments

 

             UA Spec Grav (test code = UA Spec 1.010 1                          

      



             Grav)                                               



[facility] AND [id number] 15:40:00





             Test Item    Value        Reference Range Interpretation Comments

 

             UA pH (test code = UA pH) 6.0 1        5.0-8.0                   



[facility] [id number] 15:40:00





             Test Item    Value        Reference Range Interpretation Comments

 

             UA Protein (test code Negative (23 9:40                       

    



             = UA Protein) AM)                                    



[facility] [id number] 15:40:00





             Test Item    Value        Reference Range Interpretation Comments

 

             UA Glucose (test code Negative (23 9:40                       

    



             = UA Glucose) AM)                                    



[facility] [id number] 15:40:00





             Test Item    Value        Reference Range Interpretation Comments

 

             UA Ketones (test code Negative *NA*(23                        

   



             = UA Ketones) 9:40 AM)                               



[facility] [id number] 15:40:00





             Test Item    Value        Reference Range Interpretation Comments

 

             UA Bili (test code = Negative *NA*(23                         

  



             UA Bili)     9:40 AM)                               



[facility] [id number] 15:40:00





             Test Item    Value        Reference Range Interpretation Comments

 

             UA Blood (test code = Negative (23 9:40                       

    



             UA Blood)    AM)                                    



[facility] AND [id number] 15:40:00





             Test Item    Value        Reference Range Interpretation Comments

 

             UA Urobilinogen (test code = UA 0.2          0.1-1.0               

    



             Urobilinogen)                                        



[facility] AND [id number] 15:40:00





             Test Item    Value        Reference Range Interpretation Comments

 

             UA Nitrite (test code Negative (23 9:40                       

    



             = UA Nitrite) AM)                                    



[facility] AND [id number] 15:40:00





             Test Item    Value        Reference Range Interpretation Comments

 

             UA Leuk Est (test code Small *ABN*(23                         

  



             = UA Leuk Est) 9:40 AM)                               



[facility] AND [id number] 15:40:00





             Test Item    Value        Reference Range Interpretation Comments

 

             UA RBC (test code = 2            See_Comment                [Automa

gianfranco message] The



             UA RBC)                                             system which ge

nerated this



                                                                 result transmit

gianfranco



                                                                 reference range

: <=2. The



                                                                 reference range

 was not



                                                                 used to interpr

et this



                                                                 result as xenia

l/abnormal.



[facility] AND [id number] 15:40:00





             Test Item    Value        Reference Range Interpretation Comments

 

             UA Sq Epi (test code = UA Sq Epi) Many /LPF                        

      



[facility] AND [id number] 15:40:00





             Test Item    Value        Reference Range Interpretation Comments

 

             UA WBC (test code = 9            See_Comment                [Automa

gianfranco message] The



             UA WBC)                                             system which ge

nerated this



                                                                 result transmit

gianfranco



                                                                 reference range

: <=5. The



                                                                 reference range

 was not



                                                                 used to interpr

et this



                                                                 result as xenia

l/abnormal.



[facility] AND [id number] 15:40:00





             Test Item    Value        Reference Range Interpretation Comments

 

             UA Bacteria (test code = UA Occasional /HPF                        

   



             Bacteria)                                           



[facility] AND [id number] 15:40:00





             Test Item    Value        Reference Range Interpretation Comments

 

             UA Amorph Delmis (test code = Occasional /HPF                        

   



             UA Amorph Delmis)                                        



[facility] AND [id number] 15:40:00





             Test Item    Value        Reference Range Interpretation Comments

 

             UA CaOx Delmis (test code = UA Occasional /HPF                       

    



             CaOx Delmis)                                          



[facility] AND [id number] 15:40:00





             Test Item    Value        Reference Range Interpretation Comments

 

             UA Color (test code = Yellow *NA*(23                          

 



             UA Color)    9:40 AM)                               



[facility] AND [id number] 15:40:00





             Test Item    Value        Reference Range Interpretation Comments

 

             UA Turbidity (test code = Clear (23 9:40                      

     



             UA Turbidity) AM)                                    



[facility] AND [id number] 15:40:00





             Test Item    Value        Reference Range Interpretation Comments

 

             UA Spec Grav (test code = UA Spec 1.010 1                          

      



             Grav)                                               



[facility] AND [id number] 15:40:00





             Test Item    Value        Reference Range Interpretation Comments

 

             UA pH (test code = UA pH) 6.0 1        5.0-8.0                   



[facility] AND [id number] 15:40:00





             Test Item    Value        Reference Range Interpretation Comments

 

             UA Protein (test code Negative (23 9:40                       

    



             = UA Protein) AM)                                    



[facility] AND [id number] 15:40:00





             Test Item    Value        Reference Range Interpretation Comments

 

             UA Glucose (test code Negative (23 9:40                       

    



             = UA Glucose) AM)                                    



[facility] AND [id number] 15:40:00





             Test Item    Value        Reference Range Interpretation Comments

 

             UA Ketones (test code Negative *NA*(23                        

   



             = UA Ketones) 9:40 AM)                               



[facility] [id number] 15:40:00





             Test Item    Value        Reference Range Interpretation Comments

 

             UA Bili (test code = Negative *NA*(23                         

  



             UA Bili)     9:40 AM)                               



[facility] [id number] 15:40:00





             Test Item    Value        Reference Range Interpretation Comments

 

             UA Blood (test code = Negative (23 9:40                       

    



             UA Blood)    AM)                                    



[facility] [id number] 15:40:00





             Test Item    Value        Reference Range Interpretation Comments

 

             UA Urobilinogen (test code = UA 0.2          0.1-1.0               

    



             Urobilinogen)                                        



[facility] [id number] 15:40:00





             Test Item    Value        Reference Range Interpretation Comments

 

             UA Nitrite (test code Negative (23 9:40                       

    



             = UA Nitrite) AM)                                    



[facility] [id number] 15:40:00





             Test Item    Value        Reference Range Interpretation Comments

 

             UA Leuk Est (test code Small *ABN*(23                         

  



             = UA Leuk Est) 9:40 AM)                               



[facility] [id number] 15:40:00





             Test Item    Value        Reference Range Interpretation Comments

 

             UA RBC (test code = 2            See_Comment                [Automa

gianfranco message] The



             UA RBC)                                             system which ge

nerated this



                                                                 result transmit

gianfranco



                                                                 reference range

: <=2. The



                                                                 reference range

 was not



                                                                 used to interpr

et this



                                                                 result as xenia

l/abnormal.



[facility] [id number] 15:40:00





             Test Item    Value        Reference Range Interpretation Comments

 

             UA Sq Epi (test code = UA Sq Epi) Many /LPF                        

      



[facility] [id number] 15:40:00





             Test Item    Value        Reference Range Interpretation Comments

 

             UA WBC (test code = 9            See_Comment                [Automa

gianfranco message] The



             UA WBC)                                             system which ge

nerated this



                                                                 result transmit

gianfranco



                                                                 reference range

: <=5. The



                                                                 reference range

 was not



                                                                 used to interpr

et this



                                                                 result as xenia

l/abnormal.



[facility] [id number] 15:40:00





             Test Item    Value        Reference Range Interpretation Comments

 

             UA Bacteria (test code = UA Occasional /HPF                        

   



             Bacteria)                                           



[facility] [id number] 15:40:00





             Test Item    Value        Reference Range Interpretation Comments

 

             UA Amorph Delmis (test code = Occasional /HPF                        

   



             UA Amorph Delmis)                                        



[facility] [id number] 15:40:00





             Test Item    Value        Reference Range Interpretation Comments

 

             UA CaOx Delmis (test code = UA Occasional /HPF                       

    



             CaOx Delmis)                                          



[facility] [id number] 15:40:00





             Test Item    Value        Reference Range Interpretation Comments

 

             UA Color (test code = Yellow *NA*(23                          

 



             UA Color)    9:40 AM)                               



[facility] [id number] 15:40:00





             Test Item    Value        Reference Range Interpretation Comments

 

             UA Turbidity (test code = Clear (23 9:40                      

     



             UA Turbidity) AM)                                    



[facility] [id number] 15:40:00





             Test Item    Value        Reference Range Interpretation Comments

 

             UA Spec Grav (test code = UA Spec 1.010 1                          

      



             Grav)                                               



[facility] AND [id number] 15:40:00





             Test Item    Value        Reference Range Interpretation Comments

 

             UA pH (test code = UA pH) 6.0 1        5.0-8.0                   



[facility] AND [id number] 15:40:00





             Test Item    Value        Reference Range Interpretation Comments

 

             UA Protein (test code Negative (23 9:40                       

    



             = UA Protein) AM)                                    



[facility] AND [id number] 15:40:00





             Test Item    Value        Reference Range Interpretation Comments

 

             UA Glucose (test code Negative (23 9:40                       

    



             = UA Glucose) AM)                                    



[facility] AND [id number] 15:40:00





             Test Item    Value        Reference Range Interpretation Comments

 

             UA Ketones (test code Negative *NA*(23                        

   



             = UA Ketones) 9:40 AM)                               



[facility] [id number] 15:40:00





             Test Item    Value        Reference Range Interpretation Comments

 

             UA Bili (test code = Negative *NA*(23                         

  



             UA Bili)     9:40 AM)                               



[facility] [id number] 15:40:00





             Test Item    Value        Reference Range Interpretation Comments

 

             UA Blood (test code = Negative (23 9:40                       

    



             UA Blood)    AM)                                    



[facility] [id number] 15:40:00





             Test Item    Value        Reference Range Interpretation Comments

 

             UA Urobilinogen (test code = UA 0.2          0.1-1.0               

    



             Urobilinogen)                                        



[facility] [id number] 15:40:00





             Test Item    Value        Reference Range Interpretation Comments

 

             UA Nitrite (test code Negative (23 9:40                       

    



             = UA Nitrite) AM)                                    



[facility] AND [id number] 15:40:00





             Test Item    Value        Reference Range Interpretation Comments

 

             UA Leuk Est (test code Small *ABN*(23                         

  



             = UA Leuk Est) 9:40 AM)                               



[facility] AND [id number] 15:40:00





             Test Item    Value        Reference Range Interpretation Comments

 

             UA RBC (test code = 2            See_Comment                [Automa

gianfranco message] The



             UA RBC)                                             system which ge

nerated this



                                                                 result transmit

gianfranco



                                                                 reference range

: <=2. The



                                                                 reference range

 was not



                                                                 used to interpr

et this



                                                                 result as xenia

l/abnormal.



[facility] AND [id number] 15:40:00





             Test Item    Value        Reference Range Interpretation Comments

 

             UA Sq Epi (test code = UA Sq Epi) Many /LPF                        

      



[facility] AND [id number] 15:40:00





             Test Item    Value        Reference Range Interpretation Comments

 

             UA WBC (test code = 9            See_Comment                [Automa

gianfranco message] The



             UA WBC)                                             system which ge

nerated this



                                                                 result transmit

gianfranco



                                                                 reference range

: <=5. The



                                                                 reference range

 was not



                                                                 used to interpr

et this



                                                                 result as xenia

l/abnormal.



[facility] AND [id number] 15:40:00





             Test Item    Value        Reference Range Interpretation Comments

 

             UA Bacteria (test code = UA Occasional /HPF                        

   



             Bacteria)                                           



[facility] [id number] 15:40:00





             Test Item    Value        Reference Range Interpretation Comments

 

             UA Amorph Delmis (test code = Occasional /HPF                        

   



             UA Amorph Delmis)                                        



[facility] [id number] 15:40:00





             Test Item    Value        Reference Range Interpretation Comments

 

             UA CaOx Delmis (test code = UA Occasional /HPF                       

    



             CaOx Delmis)                                          



[facility] AND [id number] 15:40:00





             Test Item    Value        Reference Range Interpretation Comments

 

             UA Color (test code = Yellow *NA*(23                          

 



             UA Color)    9:40 AM)                               



[facility] AND [id number] 15:40:00





             Test Item    Value        Reference Range Interpretation Comments

 

             UA Turbidity (test code = Clear (23 9:40                      

     



             UA Turbidity) AM)                                    



[facility] AND [id number] 15:40:00





             Test Item    Value        Reference Range Interpretation Comments

 

             UA Spec Grav (test code = UA Spec 1.010 1                          

      



             Grav)                                               



[facility] AND [id number] 15:40:00





             Test Item    Value        Reference Range Interpretation Comments

 

             UA Sq Epi (test code = UA Sq Epi) Many /LPF                        

      



[facility] AND [id number] 15:40:00





             Test Item    Value        Reference Range Interpretation Comments

 

             UA pH (test code = UA pH) 6.0 1        5.0-8.0                   



[facility] AND [id number] 15:40:00





             Test Item    Value        Reference Range Interpretation Comments

 

             UA Protein (test code Negative (23 9:40                       

    



             = UA Protein) AM)                                    



[facility] AND [id number] 15:40:00





             Test Item    Value        Reference Range Interpretation Comments

 

             UA Glucose (test code Negative (23 9:40                       

    



             = UA Glucose) AM)                                    



[facility] AND [id number] 15:40:00





             Test Item    Value        Reference Range Interpretation Comments

 

             UA Ketones (test code Negative *NA*(23                        

   



             = UA Ketones) 9:40 AM)                               



[facility] AND [id number] 15:40:00





             Test Item    Value        Reference Range Interpretation Comments

 

             UA Bili (test code = Negative *NA*(23                         

  



             UA Bili)     9:40 AM)                               



[facility] AND [id number] 15:40:00





             Test Item    Value        Reference Range Interpretation Comments

 

             UA Blood (test code = Negative (23 9:40                       

    



             UA Blood)    AM)                                    



[facility] AND [id number] 15:40:00





             Test Item    Value        Reference Range Interpretation Comments

 

             UA Urobilinogen (test code = UA 0.2          0.1-1.0               

    



             Urobilinogen)                                        



[facility] AND [id number] 15:40:00





             Test Item    Value        Reference Range Interpretation Comments

 

             UA Nitrite (test code Negative (23 9:40                       

    



             = UA Nitrite) AM)                                    



[facility] AND [id number] 15:40:00





             Test Item    Value        Reference Range Interpretation Comments

 

             UA Leuk Est (test code Small *ABN*(23                         

  



             = UA Leuk Est) 9:40 AM)                               



[facility] [id number] 15:40:00





             Test Item    Value        Reference Range Interpretation Comments

 

             UA RBC (test code = 2            See_Comment                [Automa

gianfranco message] The



             UA RBC)                                             system which ge

nerated this



                                                                 result transmit

gianfranco



                                                                 reference range

: <=2. The



                                                                 reference range

 was not



                                                                 used to interpr

et this



                                                                 result as xenia

l/abnormal.



[facility] [id number]00





             Test Item    Value        Reference Range Interpretation Comments

 

             UA WBC (test code = 9            See_Comment                [Automa

gianfranco message] The



             UA WBC)                                             system which ge

nerated this



                                                                 result transmit

gianfranco



                                                                 reference range

: <=5. The



                                                                 reference range

 was not



                                                                 used to interpr

et this



                                                                 result as xenia

l/abnormal.



[facility] [id number] 15:40:00





             Test Item    Value        Reference Range Interpretation Comments

 

             UA Bacteria (test code = UA Occasional /HPF                        

   



             Bacteria)                                           



[facility] [id number] 15:40:00





             Test Item    Value        Reference Range Interpretation Comments

 

             UA Amorph Delmis (test code = Occasional /HPF                        

   



             UA Amorph Delmis)                                        



[facility] [id number] 15:40:00





             Test Item    Value        Reference Range Interpretation Comments

 

             UA CaOx Delmis (test code = UA Occasional /HPF                       

    



             CaOx Delmis)                                          



[facility] [id number] 15:40:00





             Test Item    Value        Reference Range Interpretation Comments

 

             UA Color (test code = Yellow *NA*(23                          

 



             UA Color)    9:40 AM)                               



[facility] [id number] 15:40:00





             Test Item    Value        Reference Range Interpretation Comments

 

             UA Sq Epi (test code = UA Sq Epi) Many /LPF                        

      



[facility] [id number] 15:40:00





             Test Item    Value        Reference Range Interpretation Comments

 

             UA WBC (test code = 9            See_Comment                [Automa

gianfranco message] The



             UA WBC)                                             system which ge

nerated this



                                                                 result transmit

gianfranco



                                                                 reference range

: <=5. The



                                                                 reference range

 was not



                                                                 used to interpr

et this



                                                                 result as xenia

l/abnormal.



[facility] [id number] 15:40:00





             Test Item    Value        Reference Range Interpretation Comments

 

             UA Bacteria (test code = UA Occasional /HPF                        

   



             Bacteria)                                           



[facility] [id number] 15:40:00





             Test Item    Value        Reference Range Interpretation Comments

 

             UA Amorph Delmis (test code = Occasional /HPF                        

   



             UA Amorph Delmis)                                        



[facility] [id number] 15:40:00





             Test Item    Value        Reference Range Interpretation Comments

 

             UA CaOx Delmis (test code = UA Occasional /HPF                       

    



             CaOx Delmis)                                          



[facility] [id number] 15:40:00





             Test Item    Value        Reference Range Interpretation Comments

 

             UA Color (test code = Yellow *NA*(23                          

 



             UA Color)    9:40 AM)                               



[facility] [id number] 15:40:00





             Test Item    Value        Reference Range Interpretation Comments

 

             UA Turbidity (test code = Clear (23 9:40                      

     



             UA Turbidity) AM)                                    



[facility] [id number] 15:40:00





             Test Item    Value        Reference Range Interpretation Comments

 

             UA Spec Grav (test code = UA Spec 1.010 1                          

      



             Grav)                                               



[facility] [id number] 15:40:00





             Test Item    Value        Reference Range Interpretation Comments

 

             UA Turbidity (test code = Clear (23 9:40                      

     



             UA Turbidity) AM)                                    



[facility] [id number] 15:40:00





             Test Item    Value        Reference Range Interpretation Comments

 

             UA pH (test code = UA pH) 6.0 1        5.0-8.0                   



[facility] [id number] 15:40:00





             Test Item    Value        Reference Range Interpretation Comments

 

             UA Protein (test code Negative (23 9:40                       

    



             = UA Protein) AM)                                    



[facility] [id number] 15:40:00





             Test Item    Value        Reference Range Interpretation Comments

 

             UA Glucose (test code Negative (23 9:40                       

    



             = UA Glucose) AM)                                    



[facility] [id number] 15:40:00





             Test Item    Value        Reference Range Interpretation Comments

 

             UA Ketones (test code Negative *NA*(23                        

   



             = UA Ketones) 9:40 AM)                               



[facility] [id number] 15:40:00





             Test Item    Value        Reference Range Interpretation Comments

 

             UA Bili (test code = Negative *NA*(23                         

  



             UA Bili)     9:40 AM)                               



[facility] [id number] 15:40:00





             Test Item    Value        Reference Range Interpretation Comments

 

             UA Blood (test code = Negative (23 9:40                       

    



             UA Blood)    AM)                                    



[facility] [id number] 15:40:00





             Test Item    Value        Reference Range Interpretation Comments

 

             UA Urobilinogen (test code = UA 0.2          0.1-1.0               

    



             Urobilinogen)                                        



[facility] [id number] 15:40:00





             Test Item    Value        Reference Range Interpretation Comments

 

             UA Nitrite (test code Negative (23 9:40                       

    



             = UA Nitrite) AM)                                    



[facility] [id number] 15:40:00





             Test Item    Value        Reference Range Interpretation Comments

 

             UA Leuk Est (test code Small *ABN*(23                         

  



             = UA Leuk Est) 9:40 AM)                               



[facility] [id number] 15:40:00





             Test Item    Value        Reference Range Interpretation Comments

 

             UA RBC (test code = 2            See_Comment                [Automa

gianfranco message] The



             UA RBC)                                             system which ge

nerated this



                                                                 result transmit

gianfranco



                                                                 reference range

: <=2. The



                                                                 reference range

 was not



                                                                 used to interpr

et this



                                                                 result as xenia

l/abnormal.



[facility] [id number] 15:40:00





             Test Item    Value        Reference Range Interpretation Comments

 

             UA Spec Grav (test code = UA Spec 1.010 1                          

      



             Grav)                                               



[facility] [id number] 15:40:00





             Test Item    Value        Reference Range Interpretation Comments

 

             UA pH (test code = UA pH) 6.0 1        5.0-8.0                   



[facility] [id number] 15:40:00





             Test Item    Value        Reference Range Interpretation Comments

 

             UA Protein (test code Negative (23 9:40                       

    



             = UA Protein) AM)                                    



[facility] [id number] 15:40:00





             Test Item    Value        Reference Range Interpretation Comments

 

             UA Glucose (test code Negative (23 9:40                       

    



             = UA Glucose) AM)                                    



[facility] [id number] 15:40:00





             Test Item    Value        Reference Range Interpretation Comments

 

             UA Ketones (test code Negative *NA*(23                        

   



             = UA Ketones) 9:40 AM)                               



[facility] [id number] 15:40:00





             Test Item    Value        Reference Range Interpretation Comments

 

             UA Sq Epi (test code = UA Sq Epi) Many /LPF                        

      



[facility] [id number] 15:40:00





             Test Item    Value        Reference Range Interpretation Comments

 

             UA WBC (test code = 9            See_Comment                [Automa

gianfranco message] The



             UA WBC)                                             system which ge

nerated this



                                                                 result transmit

gianfranco



                                                                 reference range

: <=5. The



                                                                 reference range

 was not



                                                                 used to interpr

et this



                                                                 result as xenia

l/abnormal.



[facility] [id number] 15:40:00





             Test Item    Value        Reference Range Interpretation Comments

 

             UA Bacteria (test code = UA Occasional /HPF                        

   



             Bacteria)                                           



[facility] [id number] 15:40:00





             Test Item    Value        Reference Range Interpretation Comments

 

             UA Amorph Delmis (test code = Occasional /HPF                        

   



             UA Amorph Delmis)                                        



[facility] AND [id number] 15:40:00





             Test Item    Value        Reference Range Interpretation Comments

 

             UA CaOx Delmis (test code = UA Occasional /HPF                       

    



             CaOx Delmis)                                          



[facility] [id number] 15:40:00





             Test Item    Value        Reference Range Interpretation Comments

 

             UA Color (test code = Yellow *NA*(23                          

 



             UA Color)    9:40 AM)                               



[facility] [id number] 15:40:00





             Test Item    Value        Reference Range Interpretation Comments

 

             UA Turbidity (test code = Clear (23 9:40                      

     



             UA Turbidity) AM)                                    



[facility] AND [id number] 15:40:00





             Test Item    Value        Reference Range Interpretation Comments

 

             UA Spec Grav (test code = UA Spec 1.010 1                          

      



             Grav)                                               



[facility] [id number] 15:40:00





             Test Item    Value        Reference Range Interpretation Comments

 

             UA Bili (test code = Negative *NA*(23                         

  



             UA Bili)     9:40 AM)                               



[facility] [id number] 15:40:00





             Test Item    Value        Reference Range Interpretation Comments

 

             UA pH (test code = UA pH) 6.0 1        5.0-8.0                   



[facility] [id number] 15:40:00





             Test Item    Value        Reference Range Interpretation Comments

 

             UA Protein (test code Negative (23 9:40                       

    



             = UA Protein) AM)                                    



[facility] AND [id number] 15:40:00





             Test Item    Value        Reference Range Interpretation Comments

 

             UA Glucose (test code Negative (23 9:40                       

    



             = UA Glucose) AM)                                    



[facility] AND [id number] 15:40:00





             Test Item    Value        Reference Range Interpretation Comments

 

             UA Ketones (test code Negative *NA*(23                        

   



             = UA Ketones) 9:40 AM)                               



[facility] AND [id number] 15:40:00





             Test Item    Value        Reference Range Interpretation Comments

 

             UA Bili (test code = Negative *NA*(23                         

  



             UA Bili)     9:40 AM)                               



[facility] AND [id number] 15:40:00





             Test Item    Value        Reference Range Interpretation Comments

 

             UA Blood (test code = Negative (23 9:40                       

    



             UA Blood)    AM)                                    



[facility] AND [id number] 15:40:00





             Test Item    Value        Reference Range Interpretation Comments

 

             UA Urobilinogen (test code = UA 0.2          0.1-1.0               

    



             Urobilinogen)                                        



[facility] AND [id number] 15:40:00





             Test Item    Value        Reference Range Interpretation Comments

 

             UA Nitrite (test code Negative (23 9:40                       

    



             = UA Nitrite) AM)                                    



[facility] AND [id number] 15:40:00





             Test Item    Value        Reference Range Interpretation Comments

 

             UA Leuk Est (test code Small *ABN*(23                         

  



             = UA Leuk Est) 9:40 AM)                               



[facility] AND [id number] 15:40:00





             Test Item    Value        Reference Range Interpretation Comments

 

             UA RBC (test code = 2            See_Comment                [Automa

gianfranco message] The



             UA RBC)                                             system which ge

nerated this



                                                                 result transmit

gianfranco



                                                                 reference range

: <=2. The



                                                                 reference range

 was not



                                                                 used to interpr

et this



                                                                 result as xenia

l/abnormal.



[facility] AND [id number] 15:40:00





             Test Item    Value        Reference Range Interpretation Comments

 

             UA Blood (test code = Negative (23 9:40                       

    



             UA Blood)    AM)                                    



[facility] AND [id number] 15:40:00





             Test Item    Value        Reference Range Interpretation Comments

 

             UA Urobilinogen (test code = UA 0.2          0.1-1.0               

    



             Urobilinogen)                                        



[facility] AND [id number] 15:40:00





             Test Item    Value        Reference Range Interpretation Comments

 

             UA Nitrite (test code Negative (23 9:40                       

    



             = UA Nitrite) AM)                                    



[id number] 15:25:24





             Test Item    Value        Reference Range Interpretation Comments

 

             RDW (test code = RDW) 13.0         11.5-14.5                 



[facility] [id number] 15:25:24





             Test Item    Value        Reference Range Interpretation Comments

 

             Platelet (test code = Platelet) 200          133-450               

    



[facility] [id number] 15:25:24





             Test Item    Value        Reference Range Interpretation Comments

 

             MPV (test code = MPV) 8.6          7.4-10.4                  



[facility] [id number] 15:25:24





             Test Item    Value        Reference Range Interpretation Comments

 

             Segs (test code = Segs) 86.0         45.0-75.0                 



[facility] [id number] 15:25:24





             Test Item    Value        Reference Range Interpretation Comments

 

             Lymphocytes (test code = Lymphocytes) 5.5          20.0-40.0       

          



[facility] [id number] 15:25:24





             Test Item    Value        Reference Range Interpretation Comments

 

             Monocytes (test code = Monocytes) 7.0          2.0-12.0            

      



[facility] [id number] 15:25:24





             Test Item    Value        Reference Range Interpretation Comments

 

             Eosinophils (test code = 1.0          See_Comment                [A

utomated message] The



             Eosinophils)                                        system which ge

nerated



                                                                 this result tra

nsmitted



                                                                 reference range

: <=4.0.



                                                                 The reference r

jihan was



                                                                 not used to int

erpret



                                                                 this result as



                                                                 normal/abnormal

.



[facility] [id number] 15:25:24





             Test Item    Value        Reference Range Interpretation Comments

 

             Basophils (test code = 0.5          See_Comment                [Aut

omated message] The



             Basophils)                                          system which ge

nerated



                                                                 this result tra

nsmitted



                                                                 reference range

: <=1.0.



                                                                 The reference r

jihan was



                                                                 not used to int

erpret



                                                                 this result as



                                                                 normal/abnormal

.



[facility] [id number] 15:25:24





             Test Item    Value        Reference Range Interpretation Comments

 

             Neutrophils # (test code = Neutrophils 8.1          1.5-8.1        

           



             #)                                                  



[place] [id number] 15:25:24





             Test Item    Value        Reference Range Interpretation Comments

 

             Lymphocytes # (test code = Lymphocytes 0.5          1.0-5.5        

           



             #)                                                  



[facility] [id number] 15:25:24





             Test Item    Value        Reference Range Interpretation Comments

 

             Monocytes # (test code 0.7          See_Comment                [Aut

omated message] The



             = Monocytes #)                                        system which 

generated



                                                                 this result tra

nsmitted



                                                                 reference range

: <=0.8.



                                                                 The reference r

jihan was



                                                                 not used to int

erpret



                                                                 this result as



                                                                 normal/abnormal

.



[facility] [id number] 15:25:24





             Test Item    Value        Reference Range Interpretation Comments

 

             Eosinophils # (test code 0.1          See_Comment                [A

utomated message] The



             = Eosinophils #)                                        system whic

h generated



                                                                 this result tra

nsmitted



                                                                 reference range

: <=0.5.



                                                                 The reference r

jihan was



                                                                 not used to int

erpret



                                                                 this result as



                                                                 normal/abnormal

.



[id number] 15:25:24





             Test Item    Value        Reference Range Interpretation Comments

 

             Glucose Lvl (test code = Glucose Lvl) 93           70-99           

          



[facility]QhfjlotPFZQCAJFH3583-84-07 15:25:24





             Test Item    Value        Reference Range Interpretation Comments

 

             BUN (test code = BUN) 19           -                      



[facility]iscoGbpbkwjQTRWPEPYK1615-42-79 15:25:24





             Test Item    Value        Reference Range Interpretation Comments

 

             Glucose Lvl (test code = Glucose Lvl) 93           70-99           

          



[facility]iscoCdqjrexTZUATLMKH9813-79-35 15:25:24





             Test Item    Value        Reference Range Interpretation Comments

 

             BUN (test code = BUN) 19           -                      



[facility]iscoDrzjqnzVADITGNNA9195-02-19 15:25:24





             Test Item    Value        Reference Range Interpretation Comments

 

             Creatinine Lvl (test code = Creatinine 0.85         0.50-1.40      

           



             Lvl)                                                



[facility]iscoDpcygkiOSCKZTZTI2652-25-05 15:25:24





             Test Item    Value        Reference Range Interpretation Comments

 

             Sodium Lvl (test code = Sodium Lvl) 142          135-145           

        



[facility]iscoGcoapaaMLZUQWPIT4314-46-28 15:25:24





             Test Item    Value        Reference Range Interpretation Comments

 

             Potassium Lvl (test code = Potassium 4.4          3.5-5.1          

         



             Lvl)                                                



[facility]iscoIugjyxvJNCPOMWIE6984-16-50 15:25:24





             Test Item    Value        Reference Range Interpretation Comments

 

             Chloride Lvl (test code = Chloride Lvl) 108                  

            



[facility] FriscoAjingneYZBFFFWTC7079-84-06 15:25:24





             Test Item    Value        Reference Range Interpretation Comments

 

             CO2 (test code = CO2) 28           24-32                     



[facility] FriscoAwnefpiRSMAXDRKZ3932-09-55 15:25:24





             Test Item    Value        Reference Range Interpretation Comments

 

             Calcium Lvl (test code = Calcium Lvl) 10.0         8.5-10.5        

          



[facility] FriscoGrtuoeiDYCDTXWLW8249-28-27 15:25:24





             Test Item    Value        Reference Range Interpretation Comments

 

             AGAP (test code = AGAP) 10.4         10.0-20.0                 



[facility] FriscoDetaqcbBAKNLYSLX3003-16-39 15:25:24





             Test Item    Value        Reference Range Interpretation Comments

 

             eGFR (test code = eGFR) 81                                     



[facility] FriscoHdykrvdHRBMNAUPJ7942-62-11 15:25:24





             Test Item    Value        Reference Range Interpretation Comments

 

             Creatinine Lvl (test code = Creatinine 0.85         0.50-1.40      

           



             Lvl)                                                



[facility] FriscoBqwprgvOLENCREMZ2539-43-01 15:25:24





             Test Item    Value        Reference Range Interpretation Comments

 

             Lipase Lvl (test code = Lipase Lvl) 123                      

        



[facility] FriscoLxwoquaGTURQWBZK5871-23-74 15:25:24





             Test Item    Value        Reference Range Interpretation Comments

 

             Total Protein (test code = Total 8.0          6.4-8.4              

     



             Protein)                                            



[facility] FriscoAerenxfRLUKVZUUC2439-89-91 15:25:24





             Test Item    Value        Reference Range Interpretation Comments

 

             Albumin Lvl (test code = Albumin Lvl) 3.7          3.5-5.0         

          



[facility] FriscoIovyntuGFYTZYNFH8089-93-57 15:25:24





             Test Item    Value        Reference Range Interpretation Comments

 

             Globulin (test code = Globulin) 4.3          2.7-4.2               

    



[facility] FriscoTfqmoieLCAZNPZKK2949-52-43 15:25:24





             Test Item    Value        Reference Range Interpretation Comments

 

             A/G Ratio (test code = A/G Ratio) 0.9 1        0.7-1.6             

      



[facility] FriscoEhgoocuDVXHRTRER1790-26-55 15:25:24





             Test Item    Value        Reference Range Interpretation Comments

 

             ALANINE AMINOTRANSFERASE 20           See_Comment                [A

utomated message]



             (test code = ALANINE                                        The sys

tem which



             AMINOTRANSFERASE)                                        generated 

this result



                                                                 transmitted ref

erence



                                                                 range: <=65. Th

e



                                                                 reference range

 was



                                                                 not used to int

erpret



                                                                 this result as



                                                                 normal/abnormal

.



[place] [id number] 15:25:24





             Test Item    Value        Reference Range Interpretation Comments

 

             AST (test code = AST) 19           See_Comment                [Auto

mated message] The



                                                                 system which ge

nerated this



                                                                 result transmit

gianfranco



                                                                 reference range

: <=37. The



                                                                 reference range

 was not



                                                                 used to interpr

et this



                                                                 result as xenia

l/abnormal.



[facility] CenterPmymxtzEURXUYFXL7637-62-80 15:25:24





             Test Item    Value        Reference Range Interpretation Comments

 

             Alk Phos (test code = Alk Phos) 123                          

    



[facility] FriscoVajgduuFAOOMKUPQ5853-66-01 15:25:24





             Test Item    Value        Reference Range Interpretation Comments

 

             Bili Total (test code = Bili Total) 0.3          0.2-1.3           

        



[facility] FriscoUabcvbfSMPKTKRVI2316-43-06 15:25:24





             Test Item    Value        Reference Range Interpretation Comments

 

             Bili Direct (test code no gt        See_Comment                [Aut

omated message] The



             = Bili Direct)                                        system which 

generated



                                                                 this result tra

nsmitted



                                                                 reference range

: <=0.3.



                                                                 The reference r

jihan was



                                                                 not used to int

erpret this



                                                                 result as xenia

l/abnormal.



[facility] CenterJfylxkwTLBHNPAFO0365-15-25 15:25:24





             Test Item    Value        Reference Range Interpretation Comments

 

             Sodium Lvl (test code = Sodium Lvl) 142          135-145           

        



[facility] CenterQydlzfyGSKPWORIO1201-00-24 15:25:24





             Test Item    Value        Reference Range Interpretation Comments

 

             Bili Indirect Unable to    See_Comment                [Automated



             (test code = Bili Calculate                              message] T

he system



             Indirect)                                           which generated



                                                                 this result



                                                                 transmitted



                                                                 reference range

:



                                                                 <=1.0. The



                                                                 reference range

 was



                                                                 not used to



                                                                 interpret this



                                                                 result as



                                                                 normal/abnormal

.



[facility] CenterSqklaetXPVHUKEAZT4403-66-40 15:25:24





             Test Item    Value        Reference Range Interpretation Comments

 

             WBC X 10x3 (test code = WBC X 10x3) 9.4          3.7-10.4          

        



[facility] CenterDbsohccCJDAOUKYLF2173-09-60 15:25:24





             Test Item    Value        Reference Range Interpretation Comments

 

             RBC X 10x6 (test code = RBC X 10x6) 4.59         4.20-5.40         

        



[facility] CenterIxgazwkAFQMVRKRSU5749-40-24 15:25:24





             Test Item    Value        Reference Range Interpretation Comments

 

             Hgb (test code = Hgb) 14.1         12.0-16.0                 



[facility] CenterCetrlcnFQKQRQDODF0621-40-79 15:25:24





             Test Item    Value        Reference Range Interpretation Comments

 

             Hct (test code = Hct) 42.8         36.0-48.0                 



[facility] CenterKsmgufrOOCBMIAZJD9799-72-04 15:25:24





             Test Item    Value        Reference Range Interpretation Comments

 

             MCV (test code = MCV) 93.2         80.0-98.0                 



[facility] HospitalZfayskbMHLBAIYLET3826-11-51 15:25:24





             Test Item    Value        Reference Range Interpretation Comments

 

             MCH (test code = MCH) 30.8 pg      27.0-31.0                 



[facility] HospitalHmuidtiSMAYLJWFOG0333-22-73 15:25:24





             Test Item    Value        Reference Range Interpretation Comments

 

             MCHC (test code = MCHC) 33.0         32.0-36.0                 



[facility] HospitalZlpsbqwFLQZAAKWWD7433-48-71 15:25:24





             Test Item    Value        Reference Range Interpretation Comments

 

             RDW (test code = RDW) 13.0         11.5-14.5                 



[facility] HospitalSbnxcdfVAKPRNKZJA0877-64-09 15:25:24





             Test Item    Value        Reference Range Interpretation Comments

 

             Platelet (test code = Platelet) 200          133-450               

    



[facility] CenterFlggdxqAKYGITMAD5551-13-52 15:25:24





             Test Item    Value        Reference Range Interpretation Comments

 

             Potassium Lvl (test code = Potassium 4.4          3.5-5.1          

         



             Lvl)                                                



[facility] CenterBbfafhbPPBESECFXC5257-30-12 15:25:24





             Test Item    Value        Reference Range Interpretation Comments

 

             MPV (test code = MPV) 8.6          7.4-10.4                  



[facility] HospitalGpmlsheWBPZEBDBAS5668-02-49 15:25:24





             Test Item    Value        Reference Range Interpretation Comments

 

             Segs (test code = Segs) 86.0         45.0-75.0                 



[place] 909693-02-14 15:25:24





             Test Item    Value        Reference Range Interpretation Comments

 

             Lymphocytes (test code = Lymphocytes) 5.5          20.0-40.0       

          



[facility] HospitalVbsdcrpMUOFCWGRFZ2219-03-36 15:25:24





             Test Item    Value        Reference Range Interpretation Comments

 

             Monocytes (test code = Monocytes) 7.0          2.0-12.0            

      



[facility] HospitalTthlrhbOVXVHHJMKT1218-70-10 15:25:24





             Test Item    Value        Reference Range Interpretation Comments

 

             Eosinophils (test code = 1.0          See_Comment                [A

utomated message] The



             Eosinophils)                                        system which ge

nerated



                                                                 this result tra

nsmitted



                                                                 reference range

: <=4.0.



                                                                 The reference r

jihan was



                                                                 not used to int

erpret



                                                                 this result as



                                                                 normal/abnormal

.



[facility]evilleVcgvwjaGXCAJYDLZY3044-57-47 15:25:24





             Test Item    Value        Reference Range Interpretation Comments

 

             Basophils (test code = 0.5          See_Comment                [Aut

omated message] The



             Basophils)                                          system which ge

nerated



                                                                 this result tra

nsmitted



                                                                 reference range

: <=1.0.



                                                                 The reference r

jihan was



                                                                 not used to int

erpret



                                                                 this result as



                                                                 normal/abnormal

.



[facility] CenterDvpehncPMLPJYKPVE5696-61-99 15:25:24





             Test Item    Value        Reference Range Interpretation Comments

 

             Neutrophils # (test code = Neutrophils 8.1          1.5-8.1        

           



             #)                                                  



[facility] CenterDskjbckEANWSYOTJX0147-86-22 15:25:24





             Test Item    Value        Reference Range Interpretation Comments

 

             Lymphocytes # (test code = Lymphocytes 0.5          1.0-5.5        

           



             #)                                                  



[facility] CenterFwvbtwvFHFLZZCJKB7432-68-32 15:25:24





             Test Item    Value        Reference Range Interpretation Comments

 

             Monocytes # (test code 0.7          See_Comment                [Aut

omated message] The



             = Monocytes #)                                        system which 

generated



                                                                 this result tra

nsmitted



                                                                 reference range

: <=0.8.



                                                                 The reference r

jihan was



                                                                 not used to int

erpret



                                                                 this result as



                                                                 normal/abnormal

.



[facility] CenterVbxoqejENDLVCSNHW5604-85-66 15:25:24





             Test Item    Value        Reference Range Interpretation Comments

 

             Eosinophils # (test code 0.1          See_Comment                [A

utomated message] The



             = Eosinophils #)                                        system whic

h generated



                                                                 this result tra

nsmitted



                                                                 reference range

: <=0.5.



                                                                 The reference r

jihan was



                                                                 not used to int

erpret



                                                                 this result as



                                                                 normal/abnormal

.



[facility]evilleUtsymmiRXJBZBFWJ6799-72-19 15:25:24





             Test Item    Value        Reference Range Interpretation Comments

 

             Chloride Lvl (test code = Chloride Lvl) 108                  

            



[facility]evilleRcztrdtANFUTXJNX4675-41-15 15:25:24





             Test Item    Value        Reference Range Interpretation Comments

 

             CO2 (test code = CO2) 28           24-32                     



[facility]evilleYdlcdffLPRAGHOXZ0413-56-94 15:25:24





             Test Item    Value        Reference Range Interpretation Comments

 

             Calcium Lvl (test code = Calcium Lvl) 10.0         8.5-10.5        

          



[facility] BeevilleXnnqiuwVKAIIUACM8053-03-96 15:25:24





             Test Item    Value        Reference Range Interpretation Comments

 

             AGAP (test code = AGAP) 10.4         10.0-20.0                 



[place] [address]-02-14 15:25:24





             Test Item    Value        Reference Range Interpretation Comments

 

             eGFR (test code = eGFR) 81                                     



[facility]iscoYtvpsecXWEWGHJFG1281-55-12 15:25:24





             Test Item    Value        Reference Range Interpretation Comments

 

             Lipase Lvl (test code = Lipase Lvl) 123                      

        



[facility] FriscoLmmjqliMPUUEIJPD8315-13-26 15:25:24





             Test Item    Value        Reference Range Interpretation Comments

 

             Total Protein (test code = Total 8.0          6.4-8.4              

     



             Protein)                                            



[facility] FriscoXhrvmdoSJKAYEITT0089-08-02 15:25:24





             Test Item    Value        Reference Range Interpretation Comments

 

             Albumin Lvl (test code = Albumin Lvl) 3.7          3.5-5.0         

          



[facility] FriscoActkzavRIFKEWKWD4351-85-97 15:25:24





             Test Item    Value        Reference Range Interpretation Comments

 

             Globulin (test code = Globulin) 4.3          2.7-4.2               

    



[facility] FriscoYkulilyFNGITLWLE3274-14-16 15:25:24





             Test Item    Value        Reference Range Interpretation Comments

 

             A/G Ratio (test code = A/G Ratio) 0.9 1        0.7-1.6             

      



[facility] FriscoOpazuzuZAWVETGSM7443-73-39 15:25:24





             Test Item    Value        Reference Range Interpretation Comments

 

             ALANINE AMINOTRANSFERASE 20           See_Comment                [A

utomated message]



             (test code = ALANINE                                        The sys

tem which



             AMINOTRANSFERASE)                                        generated 

this result



                                                                 transmitted ref

erence



                                                                 range: <=65. Th

e



                                                                 reference range

 was



                                                                 not used to int

erpret



                                                                 this result as



                                                                 normal/abnormal

.



[facility] FriscoWjqjyoxPOESLVDYB7829-91-81 15:25:24





             Test Item    Value        Reference Range Interpretation Comments

 

             AST (test code = AST) 19           See_Comment                [Auto

mated message] The



                                                                 system which ge

nerated this



                                                                 result transmit

gianfranco



                                                                 reference range

: <=37. The



                                                                 reference range

 was not



                                                                 used to interpr

et this



                                                                 result as xenia

l/abnormal.



[facility] FriscoFadtxohTLRDRSLQD3992-31-45 15:25:24





             Test Item    Value        Reference Range Interpretation Comments

 

             Alk Phos (test code = Alk Phos) 123                          

    



[facility] FriscoXwrrqeeKSFGJJJAK7551-45-48 15:25:24





             Test Item    Value        Reference Range Interpretation Comments

 

             Bili Total (test code = Bili Total) 0.3          0.2-1.3           

        



[place] [id number] 15:25:24





             Test Item    Value        Reference Range Interpretation Comments

 

             Bili Direct (test code no gt        See_Comment                [Aut

omated message] The



             = Bili Direct)                                        system which 

generated



                                                                 this result tra

nsmitted



                                                                 reference range

: <=0.3.



                                                                 The reference r

jihan was



                                                                 not used to int

erpret this



                                                                 result as xenia

l/abnormal.



[facility] FriscoOcgzrffLCNLREAZP2811-77-16 15:25:24





             Test Item    Value        Reference Range Interpretation Comments

 

             Bili Indirect Unable to    See_Comment                [Automated



             (test code = Bili Calculate                              message] T

he system



             Indirect)                                           which generated



                                                                 this result



                                                                 transmitted



                                                                 reference range

:



                                                                 <=1.0. The



                                                                 reference range

 was



                                                                 not used to



                                                                 interpret this



                                                                 result as



                                                                 normal/abnormal

.



[facility] HospitalIczdesbRNLRBOMFAD8047-24-90 15:25:24





             Test Item    Value        Reference Range Interpretation Comments

 

             WBC X 10x3 (test code = WBC X 10x3) 9.4          3.7-10.4          

        



[facility] CenterRftuctpZDIGNLVCQD8634-61-53 15:25:24





             Test Item    Value        Reference Range Interpretation Comments

 

             RBC X 10x6 (test code = RBC X 10x6) 4.59         4.20-5.40         

        



[facility] HospitalGoxghoyAINIWYMLJI1022-19-24 15:25:24





             Test Item    Value        Reference Range Interpretation Comments

 

             Hgb (test code = Hgb) 14.1         12.0-16.0                 



[facility] HospitalNchlottNBFKCRVCTN7897-92-96 15:25:24





             Test Item    Value        Reference Range Interpretation Comments

 

             Hct (test code = Hct) 42.8         36.0-48.0                 



[facility]ic[facility]wWBMEFHPPBC8598[facility] 15:25:24





             Test Item    Value        Reference Range Interpretation Comments

 

             MCV (test code = MCV) 93.2         80.0-98.0                 



[facility] HospitalEagnhqyQVWWBGLQOB2221-24-50 15:25:24





             Test Item    Value        Reference Range Interpretation Comments

 

             MCH (test code = MCH) 30.8 pg      27.0-31.0                 



[facility] HospitalAcnmhqdJTLXWBTPAL4104-25-81 15:25:24





             Test Item    Value        Reference Range Interpretation Comments

 

             MCHC (test code = MCHC) 33.0         32.0-36.0                 



[facility] HospitalWubvwdjMEQSOGDLVJ6948-50-33 15:25:24





             Test Item    Value        Reference Range Interpretation Comments

 

             RDW (test code = RDW) 13.0         11.5-14.5                 



[facility] HospitalOxkyvsdCTGTYODFVB3004-72-80 15:25:24





             Test Item    Value        Reference Range Interpretation Comments

 

             Platelet (test code = Platelet) 200          133-450               

    



[place] [address]-02-14 15:25:24





             Test Item    Value        Reference Range Interpretation Comments

 

             MPV (test code = MPV) 8.6          7.4-10.4                  



[place] [address]-02-14 15:25:24





             Test Item    Value        Reference Range Interpretation Comments

 

             Segs (test code = Segs) 86.0         45.0-75.0                 



[place] [address]-02-14 15:25:24





             Test Item    Value        Reference Range Interpretation Comments

 

             Lymphocytes (test code = Lymphocytes) 5.5          20.0-40.0       

          



[place] [address]-02-14 15:25:24





             Test Item    Value        Reference Range Interpretation Comments

 

             Monocytes (test code = Monocytes) 7.0          2.0-12.0            

      



[place] [address]-02-14 15:25:24





             Test Item    Value        Reference Range Interpretation Comments

 

             Eosinophils (test code = 1.0          See_Comment                [A

utomated message] The



             Eosinophils)                                        system which ge

nerated



                                                                 this result tra

nsmitted



                                                                 reference range

: <=4.0.



                                                                 The reference r

jihan was



                                                                 not used to int

erpret



                                                                 this result as



                                                                 normal/abnormal

.



[facility]YgjzpbsVFWSLCBNAZ4801[facility]:24





             Test Item    Value        Reference Range Interpretation Comments

 

             Basophils (test code = 0.5          See_Comment                [Aut

omated message] The



             Basophils)                                          system which ge

nerated



                                                                 this result tra

nsmitted



                                                                 reference range

: <=1.0.



                                                                 The reference r

jihna was



                                                                 not used to int

erpret



                                                                 this result as



                                                                 normal/abnormal

.



[facility] CenterEkqnjxvBNHWPZZTCZ4417-39-32 15:25:24





             Test Item    Value        Reference Range Interpretation Comments

 

             Neutrophils # (test code = Neutrophils 8.1          1.5-8.1        

           



             #)                                                  



[place] [address]-14 15:25:24





             Test Item    Value        Reference Range Interpretation Comments

 

             Lymphocytes # (test code = Lymphocytes 0.5          1.0-5.5        

           



             #)                                                  



[place] [address]-14 15:25:24





             Test Item    Value        Reference Range Interpretation Comments

 

             Monocytes # (test code 0.7          See_Comment                [Aut

omated message] The



             = Monocytes #)                                        system which 

generated



                                                                 this result tra

nsmitted



                                                                 reference range

: <=0.8.



                                                                 The reference r

jihan was



                                                                 not used to int

erpret



                                                                 this result as



                                                                 normal/abnormal

.



[facility] HospitalNvklqvzVCBIHYMVUM2664-97-93 15:25:24





             Test Item    Value        Reference Range Interpretation Comments

 

             Eosinophils # (test code 0.1          See_Comment                [A

utomated message] The



             = Eosinophils #)                                        system whic

h generated



                                                                 this result tra

nsmitted



                                                                 reference range

: <=0.5.



                                                                 The reference r

jihan was



                                                                 not used to int

erpret



                                                                 this result as



                                                                 normal/abnormal

.



[facility]evilleYblfezyNJJMYBVKM4156[facility]36-[facility]24





             Test Item    Value        Reference Range Interpretation Comments

 

             Glucose Lvl (test code = Glucose Lvl) 93           70-99           

          



[facility]iscoBlvothnHSJQLZPMG4932-66-86 15:25:24





             Test Item    Value        Reference Range Interpretation Comments

 

             BUN (test code = BUN) 19           7-22                      



[facility] FriscoIhlodidVLBYJOYPA2455-40-84 15:25:24





             Test Item    Value        Reference Range Interpretation Comments

 

             Creatinine Lvl (test code = Creatinine 0.85         0.50-1.40      

           



             Lvl)                                                



[facility]iscoXfsvzetZJVYKISRC4057-61-07 15:25:24





             Test Item    Value        Reference Range Interpretation Comments

 

             Sodium Lvl (test code = Sodium Lvl) 142          135-145           

        



[facility] FriscoAjcpcnvJXVJJSMAS4687-92-20 15:25:24





             Test Item    Value        Reference Range Interpretation Comments

 

             Potassium Lvl (test code = Potassium 4.4          3.5-5.1          

         



             Lvl)                                                



[facility] FriscoYqqqndgKDTRZDSYH1451-69-22 15:25:24





             Test Item    Value        Reference Range Interpretation Comments

 

             Chloride Lvl (test code = Chloride Lvl) 108                  

            



[facility]evilleEuckgobGZYKTBUGK3037-57-06 15:25:24





             Test Item    Value        Reference Range Interpretation Comments

 

             CO2 (test code = CO2) 28           24-32                     



[facility]evilleCxetwjyTZCFGAFOW2203-53-92 15:25:24





             Test Item    Value        Reference Range Interpretation Comments

 

             Calcium Lvl (test code = Calcium Lvl) 10.0         8.5-10.5        

          



[facility]evilleEfqwxcsGZHPMROFV2246-36-97 15:25:24





             Test Item    Value        Reference Range Interpretation Comments

 

             AGAP (test code = AGAP) 10.4         10.0-20.0                 



[facility]evilleXjfnznhWSWNWRTWL2183-98-12 15:25:24





             Test Item    Value        Reference Range Interpretation Comments

 

             eGFR (test code = eGFR) 81                                     



[facility]iscoCnvqgqsWKGXBQWTZ4192-67-96 15:25:24





             Test Item    Value        Reference Range Interpretation Comments

 

             Lipase Lvl (test code = Lipase Lvl) 123                      

        



[facility] FriscoGfhmzxeRPHNWEXUS6702-18-26 15:25:24





             Test Item    Value        Reference Range Interpretation Comments

 

             Total Protein (test code = Total 8.0          6.4-8.4              

     



             Protein)                                            



[facility] FriscoHncohttSEMVUNGAB8311-06-10 15:25:24





             Test Item    Value        Reference Range Interpretation Comments

 

             Albumin Lvl (test code = Albumin Lvl) 3.7          3.5-5.0         

          



[facility] FriscoOxsqcqeTRKWWKWRL6156-57-23 15:25:24





             Test Item    Value        Reference Range Interpretation Comments

 

             Globulin (test code = Globulin) 4.3          2.7-4.2               

    



[facility] FriscoZxjyebzHEOZVXGMH5331-64-88 15:25:24





             Test Item    Value        Reference Range Interpretation Comments

 

             A/G Ratio (test code = A/G Ratio) 0.9 1        0.7-1.6             

      



[facility] FriscoQlluphfTWTKKFPMA6762-63-40 15:25:24





             Test Item    Value        Reference Range Interpretation Comments

 

             ALANINE AMINOTRANSFERASE 20           See_Comment                [A

utomated message]



             (test code = ALANINE                                        The sys

tem which



             AMINOTRANSFERASE)                                        generated 

this result



                                                                 transmitted ref

erence



                                                                 range: <=65. Th

e



                                                                 reference range

 was



                                                                 not used to int

erpret



                                                                 this result as



                                                                 normal/abnormal

.



[facility] FriscoRcnpobpUZHNOQPUR5649-94-66 15:25:24





             Test Item    Value        Reference Range Interpretation Comments

 

             AST (test code = AST) 19           See_Comment                [Auto

mated message] The



                                                                 system which ge

nerated this



                                                                 result transmit

gianfranco



                                                                 reference range

: <=37. The



                                                                 reference range

 was not



                                                                 used to interpr

et this



                                                                 result as xenia

l/abnormal.



[facility] FriscoYkdtlspVOITKWJKB6787-69-35 15:25:24





             Test Item    Value        Reference Range Interpretation Comments

 

             Alk Phos (test code = Alk Phos) 123                          

    



[facility] FriscoHcrxnlpBASPANULJ3020-32-45 15:25:24





             Test Item    Value        Reference Range Interpretation Comments

 

             Bili Total (test code = Bili Total) 0.3          0.2-1.3           

        



[facility] FriscoBmbsbioGTFQNNSWD9137-09-26 15:25:24





             Test Item    Value        Reference Range Interpretation Comments

 

             Bili Direct (test code no gt        See_Comment                [Aut

omated message] The



             = Bili Direct)                                        system which 

generated



                                                                 this result tra

nsmitted



                                                                 reference range

: <=0.3.



                                                                 The reference r

jihan was



                                                                 not used to int

erpret this



                                                                 result as xenia

l/abnormal.



[facility]VqwgtobGTTAOUCBY0993[facility]24





             Test Item    Value        Reference Range Interpretation Comments

 

             Bili Indirect Unable to    See_Comment                [Automated



             (test code = Bili Calculate                              message] T

he system



             Indirect)                                           which generated



                                                                 this result



                                                                 transmitted



                                                                 reference range

:



                                                                 <=1.0. The



                                                                 reference range

 was



                                                                 not used to



                                                                 interpret this



                                                                 result as



                                                                 normal/abnormal

.



[facility] CenterNnkxccvCUDKHIVAKP2998-75-99 15:25:24





             Test Item    Value        Reference Range Interpretation Comments

 

             WBC X 10x3 (test code = WBC X 10x3) 9.4          3.7-10.4          

        



[facility] HospitalArbvcxyPNPXBHQFCY6790-59-34 15:25:24





             Test Item    Value        Reference Range Interpretation Comments

 

             RBC X 10x6 (test code = RBC X 10x6) 4.59         4.20-5.40         

        



[facility] HospitalMbayqisHGWRBMLOKC4546-91-93 15:25:24





             Test Item    Value        Reference Range Interpretation Comments

 

             Hgb (test code = Hgb) 14.1         12.0-16.0                 



[facility] HospitalXsheolmFPHTFESQUG3897-15-04 15:25:24





             Test Item    Value        Reference Range Interpretation Comments

 

             Hct (test code = Hct) 42.8         36.0-48.0                 



[facility] HospitalQzevuzsLJTLBJOSXO6203-30-73 15:25:24





             Test Item    Value        Reference Range Interpretation Comments

 

             MCV (test code = MCV) 93.2         80.0-98.0                 



[facility] HospitalIpmtmyaUFYDTMEIDD0390-45-21 15:25:24





             Test Item    Value        Reference Range Interpretation Comments

 

             MCH (test code = MCH) 30.8 pg      27.0-31.0                 



[facility] HospitalQriqqjnNXLIOXUNUI0449-98-35 15:25:24





             Test Item    Value        Reference Range Interpretation Comments

 

             MCHC (test code = MCHC) 33.0         32.0-36.0                 



[facility] CenterQazbgqgQQCFBRTWHY0096-67-13 15:25:24





             Test Item    Value        Reference Range Interpretation Comments

 

             RDW (test code = RDW) 13.0         11.5-14.5                 



[facility] HospitalEjhnwikTVMOXFTPMP8742-09-13 15:25:24





             Test Item    Value        Reference Range Interpretation Comments

 

             Platelet (test code = Platelet) 200          133-450               

    



[facility] HospitalAghflxcORAQRNQVPH5217-69-66 15:25:24





             Test Item    Value        Reference Range Interpretation Comments

 

             MPV (test code = MPV) 8.6          7.4-10.4                  



[place] [address] 15:25:24





             Test Item    Value        Reference Range Interpretation Comments

 

             Segs (test code = Segs) 86.0         45.0-75.0                 



[place] [address] 15:25:24





             Test Item    Value        Reference Range Interpretation Comments

 

             Lymphocytes (test code = Lymphocytes) 5.5          20.0-40.0       

          



[place] [address] 15:25:24





             Test Item    Value        Reference Range Interpretation Comments

 

             Monocytes (test code = Monocytes) 7.0          2.0-12.0            

      



[place] [address] 15:25:24





             Test Item    Value        Reference Range Interpretation Comments

 

             Eosinophils (test code = 1.0          See_Comment                [A

utomated message] The



             Eosinophils)                                        system which ge

nerated



                                                                 this result tra

nsmitted



                                                                 reference range

: <=4.0.



                                                                 The reference r

jihan was



                                                                 not used to int

erpret



                                                                 this result as



                                                                 normal/abnormal

.



[place] [address] 15:25:24





             Test Item    Value        Reference Range Interpretation Comments

 

             Basophils (test code = 0.5          See_Comment                [Aut

omated message] The



             Basophils)                                          system which ge

nerated



                                                                 this result tra

nsmitted



                                                                 reference range

: <=1.0.



                                                                 The reference r

jihan was



                                                                 not used to int

erpret



                                                                 this result as



                                                                 normal/abnormal

.



[place] [address]-02-14 15:25:24





             Test Item    Value        Reference Range Interpretation Comments

 

             Neutrophils # (test code = Neutrophils 8.1          1.5-8.1        

           



             #)                                                  



[place] [address]-14 15:25:24





             Test Item    Value        Reference Range Interpretation Comments

 

             Lymphocytes # (test code = Lymphocytes 0.5          1.0-5.5        

           



             #)                                                  



[place] [address] 15:25:24





             Test Item    Value        Reference Range Interpretation Comments

 

             Monocytes # (test code 0.7          See_Comment                [Aut

omated message] The



             = Monocytes #)                                        system which 

generated



                                                                 this result tra

nsmitted



                                                                 reference range

: <=0.8.



                                                                 The reference r

jihan was



                                                                 not used to int

erpret



                                                                 this result as



                                                                 normal/abnormal

.



[place] [address] 15:25:24





             Test Item    Value        Reference Range Interpretation Comments

 

             Eosinophils # (test code 0.1          See_Comment                [A

utomated message] The



             = Eosinophils #)                                        system whic

h generated



                                                                 this result tra

nsmitted



                                                                 reference range

: <=0.5.



                                                                 The reference r

jihan was



                                                                 not used to int

erpret



                                                                 this result as



                                                                 normal/abnormal

.



[facility]iscoRfrasvoDHDQLLZWU9098-80-22 15:25:24





             Test Item    Value        Reference Range Interpretation Comments

 

             Glucose Lvl (test code = Glucose Lvl) 93           70-99           

          



[place] [address]53-02-14 15:25:24





             Test Item    Value        Reference Range Interpretation Comments

 

             BUN (test code = BUN) 19           7-22                      



[place] 64[address]53-02-14 15:25:24





             Test Item    Value        Reference Range Interpretation Comments

 

             Creatinine Lvl (test code = Creatinine 0.85         0.50-1.40      

           



             Lvl)                                                



[facility] FriscoQoplnlsYETJTNRBY5755-98-62 15:25:24





             Test Item    Value        Reference Range Interpretation Comments

 

             Sodium Lvl (test code = Sodium Lvl) 142          135-145           

        



[facility] FriscoHqiumarRUEKHBKKT2933-33-83 15:25:24





             Test Item    Value        Reference Range Interpretation Comments

 

             Potassium Lvl (test code = Potassium 4.4          3.5-5.1          

         



             Lvl)                                                



[facility] FriscoMgeopvxDAWZNZXVB1183-14-85 15:25:24





             Test Item    Value        Reference Range Interpretation Comments

 

             Chloride Lvl (test code = Chloride Lvl) 108                  

            



[facility] FriscoMbksaxaUGTTFZZIW9434-31-99 15:25:24





             Test Item    Value        Reference Range Interpretation Comments

 

             CO2 (test code = CO2) 28           24-32                     



[facility] FriscoFtivuwnKXHJXDIQI3236-38-93 15:25:24





             Test Item    Value        Reference Range Interpretation Comments

 

             Calcium Lvl (test code = Calcium Lvl) 10.0         8.5-10.5        

          



[facility] FriscoTnmtxqzOAJLTJIFR5244-97-31 15:25:24





             Test Item    Value        Reference Range Interpretation Comments

 

             AGAP (test code = AGAP) 10.4         10.0-20.0                 



[place] 646453-02-14 15:25:24





             Test Item    Value        Reference Range Interpretation Comments

 

             eGFR (test code = eGFR) 81                                     



[facility] FriscoCxkrcnfVJXYGOMBJ5874-75-38 15:25:24





             Test Item    Value        Reference Range Interpretation Comments

 

             Lipase Lvl (test code = Lipase Lvl) 123                      

        



[place] [id number] 15:25:24





             Test Item    Value        Reference Range Interpretation Comments

 

             Total Protein (test code = Total 8.0          6.4-8.4              

     



             Protein)                                            



[facility]SbshyyiVRCMIHYOR2289[facility]76-73 15:25:24





             Test Item    Value        Reference Range Interpretation Comments

 

             Albumin Lvl (test code = Albumin Lvl) 3.7          3.5-5.0         

          



[facility]iscoXluejlxITYAWLTMT1892-64-32 15:25:24





             Test Item    Value        Reference Range Interpretation Comments

 

             Globulin (test code = Globulin) 4.3          2.7-4.2               

    



[facility]iscoCddywpvNZAFSNRGU8990-43-48 15:25:24





             Test Item    Value        Reference Range Interpretation Comments

 

             A/G Ratio (test code = A/G Ratio) 0.9 1        0.7-1.6             

      



[facility] FriscoIzurlcoJVGDDRSLO8242-73-56 15:25:24





             Test Item    Value        Reference Range Interpretation Comments

 

             ALANINE AMINOTRANSFERASE 20           See_Comment                [A

utomated message]



             (test code = ALANINE                                        The sys

tem which



             AMINOTRANSFERASE)                                        generated 

this result



                                                                 transmitted ref

erence



                                                                 range: <=65. Th

e



                                                                 reference range

 was



                                                                 not used to int

erpret



                                                                 this result as



                                                                 normal/abnormal

.



[facility]iscoPuvmsiaNROJPQCZR8162-31-53 15:25:24





             Test Item    Value        Reference Range Interpretation Comments

 

             AST (test code = AST) 19           See_Comment                [Auto

mated message] The



                                                                 system which ge

nerated this



                                                                 result transmit

gianfranco



                                                                 reference range

: <=37. The



                                                                 reference range

 was not



                                                                 used to interpr

et this



                                                                 result as xenia

l/abnormal.



[facility] FriscoUpumzdoCKJZOMMIP8388-53-65 15:25:24





             Test Item    Value        Reference Range Interpretation Comments

 

             Alk Phos (test code = Alk Phos) 123                          

    



[facility] FriscoKlcrgimKRNMXGIEL3754-24-07 15:25:24





             Test Item    Value        Reference Range Interpretation Comments

 

             Bili Total (test code = Bili Total) 0.3          0.2-1.3           

        



[place] [address]-02-14 15:25:24





             Test Item    Value        Reference Range Interpretation Comments

 

             Bili Direct (test code no gt        See_Comment                [Aut

omated message] The



             = Bili Direct)                                        system which 

generated



                                                                 this result tra

nsmitted



                                                                 reference range

: <=0.3.



                                                                 The reference r

jihan was



                                                                 not used to int

erpret this



                                                                 result as xenia

l/abnormal.



[facility] FriscoBoojefdCLJDAVMMA3989-41-98 15:25:24





             Test Item    Value        Reference Range Interpretation Comments

 

             Bili Indirect Unable to    See_Comment                [Automated



             (test code = Bili Calculate                              message] T

he system



             Indirect)                                           which generated



                                                                 this result



                                                                 transmitted



                                                                 reference range

:



                                                                 <=1.0. The



                                                                 reference range

 was



                                                                 not used to



                                                                 interpret this



                                                                 result as



                                                                 normal/abnormal

.



[place] [address]-02-14 15:25:24





             Test Item    Value        Reference Range Interpretation Comments

 

             WBC X 10x3 (test code = WBC X 10x3) 9.4          3.7-10.4          

        



[place] [address]-02-14 15:25:24





             Test Item    Value        Reference Range Interpretation Comments

 

             RBC X 10x6 (test code = RBC X 10x6) 4.59         4.20-5.40         

        



[place] [address]-02-14 15:25:24





             Test Item    Value        Reference Range Interpretation Comments

 

             Hgb (test code = Hgb) 14.1         12.0-16.0                 



[place] [address]-02-14 15:25:24





             Test Item    Value        Reference Range Interpretation Comments

 

             Hct (test code = Hct) 42.8         36.0-48.0                 



[place] [address]-02-14 15:25:24





             Test Item    Value        Reference Range Interpretation Comments

 

             MCV (test code = MCV) 93.2         80.0-98.0                 



[place] [address]-02-14 15:25:24





             Test Item    Value        Reference Range Interpretation Comments

 

             MCH (test code = MCH) 30.8 pg      27.0-31.0                 



[place] [address]-02-14 15:25:24





             Test Item    Value        Reference Range Interpretation Comments

 

             MCHC (test code = MCHC) 33.0         32.0-36.0                 



[place] [address]-02-14 15:25:24





             Test Item    Value        Reference Range Interpretation Comments

 

             RDW (test code = RDW) 13.0         11.5-14.5                 



[place] [address]-02-14 15:25:24





             Test Item    Value        Reference Range Interpretation Comments

 

             Platelet (test code = Platelet) 200          133-450               

    



[place] [address]-02-14 15:25:24





             Test Item    Value        Reference Range Interpretation Comments

 

             MPV (test code = MPV) 8.6          7.4-10.4                  



[place] [address]-02-14 15:25:24





             Test Item    Value        Reference Range Interpretation Comments

 

             Segs (test code = Segs) 86.0         45.0-75.0                 



[place] [address]-02-14 15:25:24





             Test Item    Value        Reference Range Interpretation Comments

 

             Lymphocytes (test code = Lymphocytes) 5.5          20.0-40.0       

          



[facility] CenterQrttqgyBHJOEMQOCK2652-48-34 15:25:24





             Test Item    Value        Reference Range Interpretation Comments

 

             Monocytes (test code = Monocytes) 7.0          2.0-12.0            

      



[facility] CenterZsgqlnyVOSVPMXRGP9298-97-98 15:25:24





             Test Item    Value        Reference Range Interpretation Comments

 

             Eosinophils (test code = 1.0          See_Comment                [A

utomated message] The



             Eosinophils)                                        system which ge

nerated



                                                                 this result tra

nsmitted



                                                                 reference range

: <=4.0.



                                                                 The reference r

jihan was



                                                                 not used to int

erpret



                                                                 this result as



                                                                 normal/abnormal

.



[facility] CenterGlmsljpFNFIQPKCIZ5657-88-06 15:25:24





             Test Item    Value        Reference Range Interpretation Comments

 

             Basophils (test code = 0.5          See_Comment                [Aut

omated message] The



             Basophils)                                          system which ge

nerated



                                                                 this result tra

nsmitted



                                                                 reference range

: <=1.0.



                                                                 The reference r

jihan was



                                                                 not used to int

erpret



                                                                 this result as



                                                                 normal/abnormal

.



[facility] CenterKbmrixmYKHCMPPETS0165-45-50 15:25:24





             Test Item    Value        Reference Range Interpretation Comments

 

             Neutrophils # (test code = Neutrophils 8.1          1.5-8.1        

           



             #)                                                  



[facility] CenterGdipqieURCLBJSVNE9309-00-41 15:25:24





             Test Item    Value        Reference Range Interpretation Comments

 

             Lymphocytes # (test code = Lymphocytes 0.5          1.0-5.5        

           



             #)                                                  



[facility] CenterJgrexqeHBDGZQCGCE3334-10-89 15:25:24





             Test Item    Value        Reference Range Interpretation Comments

 

             Monocytes # (test code 0.7          See_Comment                [Aut

omated message] The



             = Monocytes #)                                        system which 

generated



                                                                 this result tra

nsmitted



                                                                 reference range

: <=0.8.



                                                                 The reference r

jihan was



                                                                 not used to int

erpret



                                                                 this result as



                                                                 normal/abnormal

.



[facility] CenterTzumyhfRLRTFFGMUK2851-20-83 15:25:24





             Test Item    Value        Reference Range Interpretation Comments

 

             Eosinophils # (test code 0.1          See_Comment                [A

utomated message] The



             = Eosinophils #)                                        system ic

h generated



                                                                 this result tra

nsmitted



                                                                 reference range

: <=0.5.



                                                                 The reference r

jihan was



                                                                 not used to int

erpret



                                                                 this result as



                                                                 normal/abnormal

.



[facility] FriscoDickmmmKCXANASCW2449-06-64 15:25:24





             Test Item    Value        Reference Range Interpretation Comments

 

             Glucose Lvl (test code = Glucose Lvl) 93           70-99           

          



[facility] FriscoYwikkqaIKTQNDFPV4277-89-72 15:25:24





             Test Item    Value        Reference Range Interpretation Comments

 

             BUN (test code = BUN) 19           7-22                      



[facility] FriscoYtqbkarPSRGURGKM9008-44-14 15:25:24





             Test Item    Value        Reference Range Interpretation Comments

 

             Creatinine Lvl (test code = Creatinine 0.85         0.50-1.40      

           



             Lvl)                                                



[facility] FriscoAqrdydpPZWQPDDQZ6728-44-97 15:25:24





             Test Item    Value        Reference Range Interpretation Comments

 

             Sodium Lvl (test code = Sodium Lvl) 142          135-145           

        



[facility] FriscoBsvkhawNTUMJXQIU2364-84-63 15:25:24





             Test Item    Value        Reference Range Interpretation Comments

 

             Potassium Lvl (test code = Potassium 4.4          3.5-5.1          

         



             Lvl)                                                



[facility] FriscoQjwlbfaJZBDFXQSI6232-26-77 15:25:24





             Test Item    Value        Reference Range Interpretation Comments

 

             Chloride Lvl (test code = Chloride Lvl) 108                  

            



[facility] FriscoHnfndkfUTDMKEISM3933-83-27 15:25:24





             Test Item    Value        Reference Range Interpretation Comments

 

             CO2 (test code = CO2) 28           24-32                     



[facility] FriscoFhnnqguBTWRBQSPZ8372-26-84 15:25:24





             Test Item    Value        Reference Range Interpretation Comments

 

             Calcium Lvl (test code = Calcium Lvl) 10.0         8.5-10.5        

          



[facility] FriscoZbdjaqxDAIOPQJKE9852-32-72 15:25:24





             Test Item    Value        Reference Range Interpretation Comments

 

             AGAP (test code = AGAP) 10.4         10.0-20.0                 



[facility] FriscoGqnuaptLNKKWRTBY2910-14-48 15:25:24





             Test Item    Value        Reference Range Interpretation Comments

 

             eGFR (test code = eGFR) 81                                     



[facility] FriscoViffnfpVQISMSICF6855-27-68 15:25:24





             Test Item    Value        Reference Range Interpretation Comments

 

             Lipase Lvl (test code = Lipase Lvl) 123                      

        



[facility] FriscoBfuowulJFBETIVYJ6717-17-23 15:25:24





             Test Item    Value        Reference Range Interpretation Comments

 

             Total Protein (test code = Total 8.0          6.4-8.4              

     



             Protein)                                            



[facility] FriscoOhehtjcXYKEOFIDK8868-05-26 15:25:24





             Test Item    Value        Reference Range Interpretation Comments

 

             Albumin Lvl (test code = Albumin Lvl) 3.7          3.5-5.0         

          



[facility] FriscoVxqekmqHDEXYFCJK5657-23-02 15:25:24





             Test Item    Value        Reference Range Interpretation Comments

 

             Globulin (test code = Globulin) 4.3          2.7-4.2               

    



[facility]evilleEyulcvmBMODGUFHV9444-55-21 [facility]:24





             Test Item    Value        Reference Range Interpretation Comments

 

             A/G Ratio (test code = A/G Ratio) 0.9 1        0.7-1.6             

      



[facility]evillePujdykyCCAHJBNVH9945-55-80 15:25:24





             Test Item    Value        Reference Range Interpretation Comments

 

             ALANINE AMINOTRANSFERASE 20           See_Comment                [A

utomated message]



             (test code = ALANINE                                        The sys

tem which



             AMINOTRANSFERASE)                                        generated 

this result



                                                                 transmitted ref

erence



                                                                 range: <=65. Th

e



                                                                 reference range

 was



                                                                 not used to int

erpret



                                                                 this result as



                                                                 normal/abnormal

.



[facility] BeevilleRvaqsshEQQTUUODM2698-11-11 15:25:24





             Test Item    Value        Reference Range Interpretation Comments

 

             AST (test code = AST) 19           See_Comment                [Auto

mated message] The



                                                                 system which ge

nerated this



                                                                 result transmit

gianrfanco



                                                                 reference range

: <=37. The



                                                                 reference range

 was not



                                                                 used to interpr

et this



                                                                 result as xenia

l/abnormal.



[facility]evilleYsmwjbnACQNDQZKD5261-85-32 15:25:24





             Test Item    Value        Reference Range Interpretation Comments

 

             Alk Phos (test code = Alk Phos) 123                          

    



[facility]evilleXgcjdjfKVTSDPNIL8978-51-94 15:25:24





             Test Item    Value        Reference Range Interpretation Comments

 

             Bili Total (test code = Bili Total) 0.3          0.2-1.3           

        



[facility]evilleFsxzpxcTMNSJNLJV5428-39-48 15:25:24





             Test Item    Value        Reference Range Interpretation Comments

 

             Bili Direct (test code no gt        See_Comment                [Aut

omated message] The



             = Bili Direct)                                        system which 

generated



                                                                 this result tra

nsmitted



                                                                 reference range

: <=0.3.



                                                                 The reference r

jihan was



                                                                 not used to int

erpret this



                                                                 result as xenia

l/abnormal.



[facility] MqjzliwDZPOYRZTB3588-78-40 15:25:24





             Test Item    Value        Reference Range Interpretation Comments

 

             Bili Indirect Unable to    See_Comment                [Automated



             (test code = Bili Calculate                              message] T

he system



             Indirect)                                           which generated



                                                                 this result



                                                                 transmitted



                                                                 reference range

:



                                                                 <=1.0. The



                                                                 reference range

 was



                                                                 not used to



                                                                 interpret this



                                                                 result as



                                                                 normal/abnormal

.



[facility] BeevilleRsjqlndODMCDKLXNI5472-32-10 15:25:24





             Test Item    Value        Reference Range Interpretation Comments

 

             WBC X 10x3 (test code = WBC X 10x3) 9.4          3.7-10.4          

        



[place] 909693-02-14 15:25:24





             Test Item    Value        Reference Range Interpretation Comments

 

             RBC X 10x6 (test code = RBC X 10x6) 4.59         4.20-5.40         

        



[place] 909693-02-14 15:25:24





             Test Item    Value        Reference Range Interpretation Comments

 

             Hgb (test code = Hgb) 14.1         12.0-16.0                 



[facility] CenterVevriiiBTITBOUGUS6379-51-61 15:25:24





             Test Item    Value        Reference Range Interpretation Comments

 

             Hct (test code = Hct) 42.8         36.0-48.0                 



[facility] CenterAkomexvDHMUNQVMSG5815-10-97 15:25:24





             Test Item    Value        Reference Range Interpretation Comments

 

             MCV (test code = MCV) 93.2         80.0-98.0                 



[facility] CenterExubfwmPMKICPNXCY2093-25-01 15:25:24





             Test Item    Value        Reference Range Interpretation Comments

 

             MCH (test code = MCH) 30.8 pg      27.0-31.0                 



[facility] CenterKjfhtukIWLYUCMDPR1332-00-24 15:25:24





             Test Item    Value        Reference Range Interpretation Comments

 

             MCHC (test code = MCHC) 33.0         32.0-36.0                 



[facility] CenterPbitsntILEOUSBQGC3294-26-74 15:25:24





             Test Item    Value        Reference Range Interpretation Comments

 

             RDW (test code = RDW) 13.0         11.5-14.5                 



[facility] CenterCzaxubzQVIZJMTMJD1986-52-35 15:25:24





             Test Item    Value        Reference Range Interpretation Comments

 

             Platelet (test code = Platelet) 200          133-450               

    



[facility] CenterRevscibEVFYZYONPL3007-96-85 15:25:24





             Test Item    Value        Reference Range Interpretation Comments

 

             MPV (test code = MPV) 8.6          7.4-10.4                  



[place] 909693-02-14 15:25:24





             Test Item    Value        Reference Range Interpretation Comments

 

             Segs (test code = Segs) 86.0         45.0-75.0                 



[facility] CenterRrytoyiRRVMPZEEFL7657-10-70 15:25:24





             Test Item    Value        Reference Range Interpretation Comments

 

             Lymphocytes (test code = Lymphocytes) 5.5          20.0-40.0       

          



[facility] CenterEuetpzzTFLAXSCBVJ0632-19-38 15:25:24





             Test Item    Value        Reference Range Interpretation Comments

 

             Monocytes (test code = Monocytes) 7.0          2.0-12.0            

      



[facility] CenterPddqmxwFSHADQPTIN0428-53-81 15:25:24





             Test Item    Value        Reference Range Interpretation Comments

 

             Eosinophils (test code = 1.0          See_Comment                [A

utomated message] The



             Eosinophils)                                        system which ge

nerated



                                                                 this result tra

nsmitted



                                                                 reference range

: <=4.0.



                                                                 The reference r

jihan was



                                                                 not used to int

erpret



                                                                 this result as



                                                                 normal/abnormal

.



[facility] CenterQoxgvimJAWFLZULLT3500-32-83 15:25:24





             Test Item    Value        Reference Range Interpretation Comments

 

             Basophils (test code = 0.5          See_Comment                [Aut

omated message] The



             Basophils)                                          system which ge

nerated



                                                                 this result tra

nsmitted



                                                                 reference range

: <=1.0.



                                                                 The reference r

jihan was



                                                                 not used to int

erpret



                                                                 this result as



                                                                 normal/abnormal

.



[facility] CenterJhfdldnTAZJCNPDNQ8312-43-71 15:25:24





             Test Item    Value        Reference Range Interpretation Comments

 

             Neutrophils # (test code = Neutrophils 8.1          1.5-8.1        

           



             #)                                                  



[facility] CenterJgcncckEFUHPLBIIR3451-31-62 15:25:24





             Test Item    Value        Reference Range Interpretation Comments

 

             Lymphocytes # (test code = Lymphocytes 0.5          1.0-5.5        

           



             #)                                                  



[facility] CenterDybznayJIWZTABRBO8251-20-28 15:25:24





             Test Item    Value        Reference Range Interpretation Comments

 

             Monocytes # (test code 0.7          See_Comment                [Aut

omated message] The



             = Monocytes #)                                        system which 

generated



                                                                 this result tra

nsmitted



                                                                 reference range

: <=0.8.



                                                                 The reference r

jihan was



                                                                 not used to int

erpret



                                                                 this result as



                                                                 normal/abnormal

.



[facility] CenterGqjqhbbANOARABRPP8597-36-19 15:25:24





             Test Item    Value        Reference Range Interpretation Comments

 

             Eosinophils # (test code 0.1          See_Comment                [A

utomated message] The



             = Eosinophils #)                                        system whic

h generated



                                                                 this result tra

nsmitted



                                                                 reference range

: <=0.5.



                                                                 The reference r

jihan was



                                                                 not used to int

erpret



                                                                 this result as



                                                                 normal/abnormal

.



[facility]iscoPatyudyJZVVPCQGG7324-98-37 15:25:24





             Test Item    Value        Reference Range Interpretation Comments

 

             Glucose Lvl (test code = Glucose Lvl) 93           70-99           

          



[place] [id number] 15:25:24





             Test Item    Value        Reference Range Interpretation Comments

 

             BUN (test code = BUN) 19           7-22                      



[place] [address]-02-14 15:25:24





             Test Item    Value        Reference Range Interpretation Comments

 

             Creatinine Lvl (test code = Creatinine 0.85         0.50-1.40      

           



             Lvl)                                                



[facility] FriscoAsdbbwyMKCZIRBZJ9743-05-55 15:25:24





             Test Item    Value        Reference Range Interpretation Comments

 

             Sodium Lvl (test code = Sodium Lvl) 142          135-145           

        



[facility] FriscoQtumkkoGNCOCNXXP7478-63-46 15:25:24





             Test Item    Value        Reference Range Interpretation Comments

 

             Potassium Lvl (test code = Potassium 4.4          3.5-5.1          

         



             Lvl)                                                



[facility] FriscoPorxdkjFCIADPVQB3528-25-21 15:25:24





             Test Item    Value        Reference Range Interpretation Comments

 

             Chloride Lvl (test code = Chloride Lvl) 108                  

            



[facility] FriscoWalrrveQLPVXGGPU1974-59-96 15:25:24





             Test Item    Value        Reference Range Interpretation Comments

 

             CO2 (test code = CO2) 28           24-32                     



[facility] FriscoRhtmrgbRXNZNHVUG5497-39-98 15:25:24





             Test Item    Value        Reference Range Interpretation Comments

 

             Calcium Lvl (test code = Calcium Lvl) 10.0         8.5-10.5        

          



[facility] FriscoNofdgwzOEWBOKUVE0619-49-90 15:25:24





             Test Item    Value        Reference Range Interpretation Comments

 

             AGAP (test code = AGAP) 10.4         10.0-20.0                 



[facility] FriscoJhecgjiUZBERSZOH7411-35-18 15:25:24





             Test Item    Value        Reference Range Interpretation Comments

 

             eGFR (test code = eGFR) 81                                     



[facility] FriscoDgvzcjjGQBHNQSWS5205-92-07 15:25:24





             Test Item    Value        Reference Range Interpretation Comments

 

             Lipase Lvl (test code = Lipase Lvl) 123                      

        



[facility] FriscoHswfrllKKDKVGRUZ4851-48-42 15:25:24





             Test Item    Value        Reference Range Interpretation Comments

 

             Total Protein (test code = Total 8.0          6.4-8.4              

     



             Protein)                                            



[facility] FriscoZukvtnoTBTEOKYZR5252-64-22 15:25:24





             Test Item    Value        Reference Range Interpretation Comments

 

             Albumin Lvl (test code = Albumin Lvl) 3.7          3.5-5.0         

          



[facility] FriscoLqpgdqqTMTGDBSUX3231-34-62 15:25:24





             Test Item    Value        Reference Range Interpretation Comments

 

             Globulin (test code = Globulin) 4.3          2.7-4.2               

    



[facility] FriscoZylnnuyWRVOXWNMF3702-11-95 15:25:24





             Test Item    Value        Reference Range Interpretation Comments

 

             A/G Ratio (test code = A/G Ratio) 0.9 1        0.7-1.6             

      



[facility]evilleVqttjobSFUPTOKDB8582[facility]94-[facility]24





             Test Item    Value        Reference Range Interpretation Comments

 

             ALANINE AMINOTRANSFERASE 20           See_Comment                [A

utomated message]



             (test code = ALANINE                                        The sys

tem which



             AMINOTRANSFERASE)                                        generated 

this result



                                                                 transmitted ref

erence



                                                                 range: <=65. Th

e



                                                                 reference range

 was



                                                                 not used to int

erpret



                                                                 this result as



                                                                 normal/abnormal

.



[facility]evilleEcpjfjkRQEXJDQMN1571-43-59 15:25:24





             Test Item    Value        Reference Range Interpretation Comments

 

             AST (test code = AST) 19           See_Comment                [Auto

mated message] The



                                                                 system which ge

nerated this



                                                                 result transmit

gianfranco



                                                                 reference range

: <=37. The



                                                                 reference range

 was not



                                                                 used to interpr

et this



                                                                 result as xenia

l/abnormal.



[facility]evilleJgeirymIHVVAEKLY6276-29-88 15:25:24





             Test Item    Value        Reference Range Interpretation Comments

 

             Alk Phos (test code = Alk Phos) 123                          

    



[facility]evilleXncxbotPOZPDLKKV8612-16-50 15:25:24





             Test Item    Value        Reference Range Interpretation Comments

 

             Bili Total (test code = Bili Total) 0.3          0.2-1.3           

        



[facility] BeevilleWbzstuxCGRKRSQAK4507-37-88 15:25:24





             Test Item    Value        Reference Range Interpretation Comments

 

             Bili Direct (test code no gt        See_Comment                [Aut

omated message] The



             = Bili Direct)                                        system which 

generated



                                                                 this result tra

nsmitted



                                                                 reference range

: <=0.3.



                                                                 The reference r

jihan was



                                                                 not used to int

erpret this



                                                                 result as xenia

l/abnormal.



[facility]evilleFbeolepODIIFVBIQ1251-63-15 15:25:24





             Test Item    Value        Reference Range Interpretation Comments

 

             Bili Indirect Unable to    See_Comment                [Automated



             (test code = Bili Calculate                              message] T

he system



             Indirect)                                           which generated



                                                                 this result



                                                                 transmitted



                                                                 reference range

:



                                                                 <=1.0. The



                                                                 reference range

 was



                                                                 not used to



                                                                 interpret this



                                                                 result as



                                                                 normal/abnormal

.



[facility]evilleRmjvuqnGVAMMEEFUH3354-93-68 15:25:24





             Test Item    Value        Reference Range Interpretation Comments

 

             WBC X 10x3 (test code = WBC X 10x3) 9.4          3.7-10.4          

        



[facility]evilleLugvxgyNFAYOBNXZJ8631-52-15 15:25:24





             Test Item    Value        Reference Range Interpretation Comments

 

             RBC X 10x6 (test code = RBC X 10x6) 4.59         4.20-5.40         

        



[place] 909693-02-14 15:25:24





             Test Item    Value        Reference Range Interpretation Comments

 

             Hgb (test code = Hgb) 14.1         12.0-16.0                 



[facility] CenterShckfsbSKKZYUPZEH8704-32-66 15:25:24





             Test Item    Value        Reference Range Interpretation Comments

 

             Hct (test code = Hct) 42.8         36.0-48.0                 



[facility] CenterKezzujlLSNUUTAVSW5807-68-47 15:25:24





             Test Item    Value        Reference Range Interpretation Comments

 

             MCV (test code = MCV) 93.2         80.0-98.0                 



[facility] CenterAoeclldFKVIYBGZPC1450-63-53 15:25:24





             Test Item    Value        Reference Range Interpretation Comments

 

             MCH (test code = MCH) 30.8 pg      27.0-31.0                 



[facility] CenterDanoymzEBTUPJTZAF8038-58-11 15:25:24





             Test Item    Value        Reference Range Interpretation Comments

 

             MCHC (test code = MCHC) 33.0         32.0-36.0                 



[facility] CenterAonlxqfYEFQBHEHOE1395-08-01 15:25:24





             Test Item    Value        Reference Range Interpretation Comments

 

             RDW (test code = RDW) 13.0         11.5-14.5                 



[facility] CenterCcqlkdtIORCGBEAYF6048-04-56 15:25:24





             Test Item    Value        Reference Range Interpretation Comments

 

             Platelet (test code = Platelet) 200          133-450               

    



[facility] CenterMopecveUKJGNVQMRD6559-57-10 15:25:24





             Test Item    Value        Reference Range Interpretation Comments

 

             MPV (test code = MPV) 8.6          7.4-10.4                  



[place] 909693-02-14 15:25:24





             Test Item    Value        Reference Range Interpretation Comments

 

             Segs (test code = Segs) 86.0         45.0-75.0                 



[place] 909693-02-14 15:25:24





             Test Item    Value        Reference Range Interpretation Comments

 

             Lymphocytes (test code = Lymphocytes) 5.5          20.0-40.0       

          



[place] 909693-02-14 15:25:24





             Test Item    Value        Reference Range Interpretation Comments

 

             Monocytes (test code = Monocytes) 7.0          2.0-12.0            

      



[facility] CenterDxyccpjGNGLSBONNC5684-85-87 15:25:24





             Test Item    Value        Reference Range Interpretation Comments

 

             Eosinophils (test code = 1.0          See_Comment                [A

utomated message] The



             Eosinophils)                                        system which ge

nerated



                                                                 this result tra

nsmitted



                                                                 reference range

: <=4.0.



                                                                 The reference r

jihan was



                                                                 not used to int

erpret



                                                                 this result as



                                                                 normal/abnormal

.



[facility] CenterNlhciltDEBUTLBMFP4358-80-00 15:25:24





             Test Item    Value        Reference Range Interpretation Comments

 

             Basophils (test code = 0.5          See_Comment                [Aut

omated message] The



             Basophils)                                          system which ge

nerated



                                                                 this result tra

nsmitted



                                                                 reference range

: <=1.0.



                                                                 The reference r

jihan was



                                                                 not used to int

erpret



                                                                 this result as



                                                                 normal/abnormal

.



[facility] CenterJabvnajMIODQOLXRH5963-55-86 15:25:24





             Test Item    Value        Reference Range Interpretation Comments

 

             Neutrophils # (test code = Neutrophils 8.1          1.5-8.1        

           



             #)                                                  



[facility] CenterYfvjmzrAKHGNABTJJ9200-08-70 15:25:24





             Test Item    Value        Reference Range Interpretation Comments

 

             Lymphocytes # (test code = Lymphocytes 0.5          1.0-5.5        

           



             #)                                                  



[facility] CenterFtuheepYXQCRZRLWW7608-11-84 15:25:24





             Test Item    Value        Reference Range Interpretation Comments

 

             Monocytes # (test code 0.7          See_Comment                [Aut

omated message] The



             = Monocytes #)                                        system which 

generated



                                                                 this result tra

nsmitted



                                                                 reference range

: <=0.8.



                                                                 The reference r

jihan was



                                                                 not used to int

erpret



                                                                 this result as



                                                                 normal/abnormal

.



[facility] CenterOgegymlPSCZQLETZG6292-24-67 15:25:24





             Test Item    Value        Reference Range Interpretation Comments

 

             Eosinophils # (test code 0.1          See_Comment                [A

utomated message] The



             = Eosinophils #)                                        system whic

h generated



                                                                 this result tra

nsmitted



                                                                 reference range

: <=0.5.



                                                                 The reference r

jihan was



                                                                 not used to int

erpret



                                                                 this result as



                                                                 normal/abnormal

.



[facility] FriscoZchevlxDFVEHRMRY6509-84-58 15:25:24





             Test Item    Value        Reference Range Interpretation Comments

 

             Glucose Lvl (test code = Glucose Lvl) 93           70-99           

          



[facility] FriscoKvuygloXLXKCJIXR9805-79-45 15:25:24





             Test Item    Value        Reference Range Interpretation Comments

 

             BUN (test code = BUN) 19           7-22                      



[place] [id number] 15:25:24





             Test Item    Value        Reference Range Interpretation Comments

 

             Creatinine Lvl (test code = Creatinine 0.85         0.50-1.40      

           



             Lvl)                                                



[facility] FriscoHejnfkaTGTBSJELI9384-62-39 15:25:24





             Test Item    Value        Reference Range Interpretation Comments

 

             Sodium Lvl (test code = Sodium Lvl) 142          135-145           

        



[facility] FriscoRzhosjtIBUZFMAYH6743-46-62 15:25:24





             Test Item    Value        Reference Range Interpretation Comments

 

             Potassium Lvl (test code = Potassium 4.4          3.5-5.1          

         



             Lvl)                                                



[facility] FriscoLenulcaSVRNOTUMQ8610-74-72 15:25:24





             Test Item    Value        Reference Range Interpretation Comments

 

             Chloride Lvl (test code = Chloride Lvl) 108                  

            



[facility] FriscoJznstanKMLXOMQNG4804-54-84 15:25:24





             Test Item    Value        Reference Range Interpretation Comments

 

             CO2 (test code = CO2) 28           24-32                     



[facility] FriscoAcaxnipZPJPZUCBI5337-29-20 15:25:24





             Test Item    Value        Reference Range Interpretation Comments

 

             Calcium Lvl (test code = Calcium Lvl) 10.0         8.5-10.5        

          



[facility] FriscoFdrkiriAZPBIDOHJ6148-22-33 15:25:24





             Test Item    Value        Reference Range Interpretation Comments

 

             AGAP (test code = AGAP) 10.4         10.0-20.0                 



[facility] FriscoDcbkryfZEQYMADHQ8642-74-26 15:25:24





             Test Item    Value        Reference Range Interpretation Comments

 

             eGFR (test code = eGFR) 81                                     



[facility] FriscoZqhiewaFECOJKBJM2971-98-14 15:25:24





             Test Item    Value        Reference Range Interpretation Comments

 

             Lipase Lvl (test code = Lipase Lvl) 123                      

        



[facility] FriscoBltrddmZZXXQZCCU0225-60-61 15:25:24





             Test Item    Value        Reference Range Interpretation Comments

 

             Total Protein (test code = Total 8.0          6.4-8.4              

     



             Protein)                                            



[facility] FriscoToyipitEYLDYOZFE8419-89-39 15:25:24





             Test Item    Value        Reference Range Interpretation Comments

 

             Albumin Lvl (test code = Albumin Lvl) 3.7          3.5-5.0         

          



[facility] FriscoAstqfjkJVAPNCPPC6804-29-75 15:25:24





             Test Item    Value        Reference Range Interpretation Comments

 

             Globulin (test code = Globulin) 4.3          2.7-4.2               

    



[facility] FriscoJdjcybkVNXBBROJA1490-62-82 15:25:24





             Test Item    Value        Reference Range Interpretation Comments

 

             A/G Ratio (test code = A/G Ratio) 0.9 1        0.7-1.6             

      



[facility] FriscoVzihtaiMCKZPMXDD8337-59-43 15:25:24





             Test Item    Value        Reference Range Interpretation Comments

 

             ALANINE AMINOTRANSFERASE 20           See_Comment                [A

utomated message]



             (test code = ALANINE                                        The sys

tem which



             AMINOTRANSFERASE)                                        generated 

this result



                                                                 transmitted ref

erence



                                                                 range: <=65. Th

e



                                                                 reference range

 was



                                                                 not used to int

erpret



                                                                 this result as



                                                                 normal/abnormal

.



[facility] ImhoenmJPWVEYINR0660-78-69 15:25:24





             Test Item    Value        Reference Range Interpretation Comments

 

             AST (test code = AST) 19           See_Comment                [Auto

mated message] The



                                                                 system which ge

nerated this



                                                                 result transmit

gianfranco



                                                                 reference range

: <=37. The



                                                                 reference range

 was not



                                                                 used to interpr

et this



                                                                 result as xenia

l/abnormal.



[id number]24





             Test Item    Value        Reference Range Interpretation Comments

 

             Alk Phos (test code = Alk Phos) 123                          

    



[facility]anosvoIUPFVNQAK1876-[facility] 15:25:24





             Test Item    Value        Reference Range Interpretation Comments

 

             Bili Total (test code = Bili Total) 0.3          0.2-1.3           

        



[facility][id number] 15:25:24





             Test Item    Value        Reference Range Interpretation Comments

 

             Bili Direct (test code no gt        See_Comment                [Aut

omated message] The



             = Bili Direct)                                        system which 

generated



                                                                 this result tra

nsmitted



                                                                 reference range

: <=0.3.



                                                                 The reference r

jihan was



                                                                 not used to int

erpret this



                                                                 result as xenia

l/abnormal.



[facility]evilleNthhmsbOPDFOTLGL1126-07-92 15:25:24





             Test Item    Value        Reference Range Interpretation Comments

 

             Bili Indirect Unable to    See_Comment                [Automated



             (test code = Bili Calculate                              message] T

he system



             Indirect)                                           which generated



                                                                 this result



                                                                 transmitted



                                                                 reference range

:



                                                                 <=1.0. The



                                                                 reference range

 was



                                                                 not used to



                                                                 interpret this



                                                                 result as



                                                                 normal/abnormal

.



[facility]evilleAdaenmbLLTKGGHFOU2899-10-21 15:25:24





             Test Item    Value        Reference Range Interpretation Comments

 

             WBC X 10x3 (test code = WBC X 10x3) 9.4          3.7-10.4          

        



[facility]evilleLpilredKMYHCOWKNJ7237-60-30 15:25:24





             Test Item    Value        Reference Range Interpretation Comments

 

             RBC X 10x6 (test code = RBC X 10x6) 4.59         4.20-5.40         

        



[facility]evilleHbomirgNQTXPWNJCN7069-58-67 15:25:24





             Test Item    Value        Reference Range Interpretation Comments

 

             Hgb (test code = Hgb) 14.1         12.0-16.0                 



[facility]TH[facility]WLD0403[facility] 15[facility]25[facility]24





             Test Item    Value        Reference Range Interpretation Comments

 

             Hct (test code = Hct) 42.8         36.0-48.0                 



[place] [id number] 15:25:24





             Test Item    Value        Reference Range Interpretation Comments

 

             MCV (test code = MCV) 93.2         80.0-98.0                 



[facility]PiiqvskZRCSIIKEJR4853-42-09 15:25:24





             Test Item    Value        Reference Range Interpretation Comments

 

             MCH (test code = MCH) 30.8 pg      27.0-31.0                 



[facility] CenterCapqjvnRLOBAUUDMR2927-73-26 15:25:24





             Test Item    Value        Reference Range Interpretation Comments

 

             MCHC (test code = MCHC) 33.0         32.0-36.0                 



[facility] METABOLIC PANEL ([id number] 19:59:50





             Test Item    Value        Reference Range Interpretation Comments

 

             NA (test code = 138 mmol/L   135-145                   



             4549752857)                                         

 

             K (test code = 4.3 mmol/L   3.5-5.0                   



             1783935531)                                         

 

             CL (test code = 102 mmol/L                       



             0165522307)                                         

 

             CO2 TOTAL (test code = 23 mmol/L    23-31                     



             6419969597)                                         

 

             AGAP (test code =              2-16                      



             0234642209)                                         

 

             BUN (test code = 18 mg/dL     7-23                      



             0287774682)                                         

 

             GLUCOSE (test code = 110 mg/dL                        



             3950227819)                                         

 

             CREATININE (test code = 0.70 mg/dL   0.50-1.04                 



             3867094907)                                         

 

             TOTAL BILI (test code = 1.3 mg/dL    0.1-1.1      H            



             5129855773)                                         

 

             CALCIUM (test code = 9.5 mg/dL    8.6-10.6                  



             3766507203)                                         

 

             T PROTEIN (test code = 7.4 g/dL     6.3-8.2                   



             0613065319)                                         

 

             ALBUMIN (test code = 4.2 g/dL     3.5-5.0                   



             5022886593)                                         

 

             ALK PHOS (test code = 100 U/L                          



             5976657815)                                         

 

             ALTv (test code = 16 U/L       5-35                      



             1742-6)                                             

 

             AST(SGOT) (test code = 27 U/L       13-40                     



             8008905883)                                         

 

             eGFR (test code =              mL/min/1.73m2              



             7479035843)                                         

 

             ELENA (test code = ELENA) Association of                           



                          Glomerular Filtration                           



                          Rate (GFR) and Staging                           



                          of Kidney Disease*                           



                          +---------------------                           



                          --+-------------------                           



                          --+-------------------                           



                          ------+| GFR                           



                          (mL/min/1.73 m2) ?|                           



                          With Kidney Damage ?|                           



                          ?Without Kidney                           



                          Damage+---------------                           



                          --------+-------------                           



                          --------+-------------                           



                          ------------+| ?>90 ?                           



                          ? ? ? ? ? ? ? ?|                           



                          ?Stage one ? ? ? ? ?|                           



                          ? Normal ? ? ? ? ? ? ?                           



                          ?+--------------------                           



                          ---+------------------                           



                          ---+------------------                           



                          -------+| ?60-89 ? ? ?                           



                          ? ? ? ? ?| ?Stage two                           



                          ? ? ? ? ?| ? Decreased                           



                          GFR ? ? ? ?                            



                          +---------------------                           



                          --+-------------------                           



                          --+-------------------                           



                          ------+| ?30-59 ? ? ?                           



                          ? ? ? ? ?| ?Stage                           



                          three ? ? ? ?| ? Stage                           



                          three ? ? ? ? ?                           



                          +---------------------                           



                          --+-------------------                           



                          --+-------------------                           



                          ------+| ?15-29 ? ? ?                           



                          ? ? ? ? ?| ?Stage four                           



                          ? ? ? ? | ? Stage four                           



                          ? ? ? ? ?                              



                          ?+--------------------                           



                          ---+------------------                           



                          ---+------------------                           



                          -------+| ?<15 (or                           



                          dialysis) ? ?| ?Stage                           



                          five ? ? ? ? | ? Stage                           



                          five ? ? ? ? ?                           



                          ?+--------------------                           



                          ---+------------------                           



                          ---+------------------                           



                          -------+ *Each stage                           



                          assumes the associated                           



                          GFR level has been in                           



                          effect for at least                           



                          three months. ?Stages                           



                          1 to 5, with or                           



                          without kidney                           



                          disease, indicate                           



                          chronic kidney                           



                          disease. Notes:                           



                          Determination of                           



                          stages one and two                           



                          (with eGFR                             



                          >59mL/min/1.73 m2)                           



                          requires estimation of                           



                          kidney damage for at                           



                          least three months as                           



                          defined by structural                           



                          or functional                           



                          abnormalities of the                           



                          kidney, manifested by                           



                          either:Pathological                           



                          abnormalities or                           



                          Markers of kidney                           



                          damage (including                           



                          abnormalities in the                           



                          composition of the                           



                          blood or urine or                           



                          abnormalities in                           



                          imaging tests).                           

 

             Lab Interpretation Abnormal                               



             (test code = 68912-2)                                        



[facility] WITH [id number] 19:22:40





             Test Item    Value        Reference Range Interpretation Comments

 

             WBC (test code =              See_Comment  H             [Automated



             1364-2)                                             message] The sy

stem



                                                                 which generated



                                                                 this result



                                                                 transmitted



                                                                 reference range

:



                                                                 4.30 - 11.10



                                                                 10*3/?L. The



                                                                 reference range

 was



                                                                 not used to



                                                                 interpret this



                                                                 result as



                                                                 normal/abnormal

.

 

             RBC (test code =              See_Comment                [Automated



             446-1)                                              message] The sy

stem



                                                                 which generated



                                                                 this result



                                                                 transmitted



                                                                 reference range

:



                                                                 3.93 - 5.25



                                                                 10*6/?L. The



                                                                 reference range

 was



                                                                 not used to



                                                                 interpret this



                                                                 result as



                                                                 normal/abnormal

.

 

             HGB (test code = 13.0 g/dL    11.6-15.0                 



             718-7)                                              

 

             HCT (test code = 38.4 %       35.7-45.2                 



             4544-3)                                             

 

             MCV (test code = 89.9 fL      80.6-95.5                 



             787-2)                                              

 

             MCH (test code = 30.4 pg      25.9-32.8                 



             785-6)                                              

 

             MCHC (test code = 33.9 g/dL    31.6-35.1                 



             786-4)                                              

 

             RDW-SD (test code = 39.3 fL      39.0-49.9                 



             67586-7)                                            

 

             RDW-CV (test code = 12.2 %       12.0-15.5                 



             788-0)                                              

 

             PLT (test code =              See_Comment                [Automated



             777-3)                                              message] The sy

stem



                                                                 which generated



                                                                 this result



                                                                 transmitted



                                                                 reference range

:



                                                                 166 - 358 10*3/

?L.



                                                                 The reference r

jihan



                                                                 was not used to



                                                                 interpret this



                                                                 result as



                                                                 normal/abnormal

.

 

             MPV (test code = 10.1 fL      9.5-12.9                  



             60043-9)                                            

 

             NRBC/100 WBC (test              See_Comment                [Automat

ed



             code = [id number])                                        message] 

The system



                                                                 which generated



                                                                 this result



                                                                 transmitted



                                                                 reference range

:



                                                                 0.0 - 10.0 /100



                                                                 WBCs. The refer

ence



                                                                 range was not u

sed



                                                                 to interpret th

is



                                                                 result as



                                                                 normal/abnormal

.

 

             NRBC x10^3 (test code <0.01        See_Comment                [Auto

mated



             = 5101928861)                                        message] The s

ystem



                                                                 which generated



                                                                 this result



                                                                 transmitted



                                                                 reference range

:



                                                                 10*3/?L. The



                                                                 reference range

 was



                                                                 not used to



                                                                 interpret this



                                                                 result as



                                                                 normal/abnormal

.

 

             GRAN MAT (NEUT) % 79.4 %                                 



             (test code = 770-8)                                        

 

             IMM GRAN % (test code 0.50 %                                 



             = 4283124530)                                        

 

             LYMPH % (test code = 9.5 %                                  



             736-9)                                              

 

             MONO % (test code = 9.7 %                                  



             5905-5)                                             

 

             EOS % (test code = 0.5 %                                  



             713-8)                                              

 

             BASO % (test code = 0.4 %                                  



             706-2)                                              

 

             GRAN MAT x10^3(ANC) 9.77 10*3/uL 1.88-7.09    H            



             (test code =                                        



             9619963469)                                         

 

             IMM GRAN x10^3 (test 0.06 10*3/uL 0.00-0.06                 



             code = 7424697984)                                        

 

             LYMPH x10^3 (test code 1.17 10*3/uL 1.32-3.29    L            



             = 731-0)                                            

 

             MONO x10^3 (test code 1.19 10*3/uL 0.33-0.92    H            



             = 742-7)                                            

 

             EOS x10^3 (test code = 0.06 10*3/uL 0.03-0.39                 



             711-2)                                              

 

             BASO x10^3 (test code 0.05 10*3/uL 0.01-0.07                 



             = 704-7)                                            

 

             Lab Interpretation Abnormal                               



             (test code = 70131-5)                                        



[facility] METABOLIC [id number] 05:36:00





             Test Item    Value        Reference Range Interpretation Comments

 

             SODIUM (test code = NA) 143 mmol/L   134-147      N            

 

             POTASSIUM (test code = 4.2 mmol/L   3.4-5.0      N            



             K)                                                  

 

             CHLORIDE (test code = 114 mmol/L   100-108      H            



             CL)                                                 

 

             CARBON DIOXIDE (test 24 mmol/L    21-32        N            



             code = CO2)                                         

 

             ANION GAP (test code = 5.0 GAP calc 4.0-15.0     N            



             GAP)                                                

 

             GLUCOSE (test code = 89 MG/DL            N            



             GLU)                                                

 

             BLOOD UREA NITROGEN 17 MG/DL     7-18         N            



             (test code = BUN)                                        

 

             GLOMERULAR FILTRATION >=60 max estimate >60                       



             RATE (test code = GFR) estGFR                                 

 

             CREATININE (test code = 0.9 MG/DL    0.6-1.0      N            



             CREAT)                                              

 

             CALCIUM (test code = CA) 8.3 MG/DL    8.5-10.1     L            



CBC W/AUTO [id number] 05:21:00





             Test Item    Value        Reference Range Interpretation Comments

 

             WHITE BLOOD CELL (test code = 15.2 K/mm3   3.5-11.0     H          

  



             WBC)                                                

 

             RED BLOOD CELL (test code = RBC) 3.67 M/mm3   4.70-6.10    L       

     

 

             HEMOGLOBIN (test code = HGB) 11.3 G/DL    10.4-14.9    N           

 

 

             HEMATOCRIT (test code = HCT) 35.5 %       31.5-44.1    N           

 

 

             MEAN CELL VOLUME (test code = 96.7 Fl      84.5-98.6    N          

  



             MCV)                                                

 

             MEAN CELL HGB (test code = MCH) 30.8 pg      27.0-34.2    N        

    

 

             MEAN CELL HGB CONCETRATION (test 31.8 G/DL    31.5-34.0    N       

     



             code = MCHC)                                        

 

             RED CELL DISTRIBUTION WIDTH (test 14.2 SD      11.5-14.5    N      

      



             code = RDW)                                         

 

             PLATELET COUNT (test code = PLT) 242.0 K/mm3  150-450      N       

     

 

             MEAN PLATELET VOLUME (test code = 10.20 fL     7.0-10.5     N      

      



             MPV)                                                

 

             NEUTROPHIL % (test code = NT%) 78.8 %       40-76        H         

   

 

             LYMPHOCYTE % (test code = LY%) 13.1 %       20.5-51.1    L         

   

 

             MONOCYTE % (test code = MO%) 6.2 %        1.7-9.3      N           

 

 

             EOSINOPHIL % (test code = EO%) 1.6 %        0.0-6.0      N         

   

 

             BASOPHIL % (test code = BA%) 0.3 %        0.0-2.0      N           

 

 

             NEUTROPHIL # (test code = NT#) 11.94 K/mm3  1.8-7.6      H         

   

 

             LYMPHOCYTE # (test code = LY#) 2.0 K/mm3    0.6-3.2      N         

   

 

             MONOCYTE # (test code = MO#) 0.9 K/mm3    0.3-1.1      N           

 

 

             EOSINOPHIL # (test code = EO#) 0.2 K/mm3    0.0-0.4      N         

   

 

             BASOPHIL # (test code = BA#) 0.1 K/mm3    0.0-0.1      N           

 

 

             MANUAL DIFF REQUIRED (test code = NO DIFF/SCN  CRITERIA            

      



             MDIFF)                                              



[id number] 14:09:00





             Test Item    Value        Reference Range Interpretation Comments

 

             LITHIUM (test 0.5 mmol/L   0.6-1.2      L              Detection Li

jerad = 0.1



             code = LITH)                                        <0.1 indicates 

None



                                                                 DetectedPerform

ed At: 



                                                                 [facility] [address]



                                                                 [id number] 

Jeffry KIMBROUGH MD



                                                                 Ph:[phone]



DIQKJBIG--68-28 13:35:00





             Test Item    Value        Reference Range Interpretation Comments

 

             TROPONIN-I (test 0.436 NG/ML  0.000-0.045  HH           Negative: <

/= 0.045



             code = TROPI)                                        Positive: >/= 

0.046



                                                                 Correlation wit

h serial



                                                                 results, other 

cardiac



                                                                 markers, and cl

inical



                                                                 findings is nec

essary to



                                                                 determine the c

linical



                                                                 significance of

 this



                                                                 result. Quantit

ative



                                                                 results using d

ifferent



                                                                 methodologies s

hould not



                                                                 be compared to 

one



                                                                 another as nume

rical



                                                                 results may amanda

yby



                                                                 method.



Completed by Nursing: LOPQHRHUYO--96-28 10:33:00





             Test Item    Value        Reference Range Interpretation Comments

 

             TROPONIN-I (test 0.360 NG/ML  0.000-0.045  HH           Negative: <

/= 0.045



             code = TROPI)                                        Positive: >/= 

0.046



                                                                 Correlation wit

h serial



                                                                 results, other 

cardiac



                                                                 markers, and cl

inical



                                                                 findings is nec

essary to



                                                                 determine the c

linical



                                                                 significance of

 this



                                                                 result. Quantit

ative



                                                                 results using d

ifferent



                                                                 methodologies s

hould not



                                                                 be compared to 

one



                                                                 another as nume

rical



                                                                 results may amanda

yby



                                                                 method.



Completed by Nursing: NOLast Dose Date: 20Last Dose Time: 1200TROPONIN-I
2020 07:23:00





             Test Item    Value        Reference Range Interpretation Comments

 

             TROPONIN-I (test 0.399 NG/ML  0.000-0.045  HH           Negative: <

/= 0.045



             code = TROPI)                                        Positive: >/= 

0.046



                                                                 Correlation wit

h serial



                                                                 results, other 

cardiac



                                                                 markers, and cl

inical



                                                                 findings is nec

essary to



                                                                 determine the c

linical



                                                                 significance of

 this



                                                                 result. Quantit

ative



                                                                 results using d

ifferent



                                                                 methodologies s

hould not



                                                                 be compared to 

one



                                                                 another as nume

rical



                                                                 results may amanda

yby



                                                                 method.



Completed by Nursing: BANJCERBWZ--74-28 05:00:00





             Test Item    Value        Reference Range Interpretation Comments

 

             TROPONIN-I (test 0.555 NG/ML  0.000-0.045  HH           Negative: <

/= 0.045



             code = TROPI)                                        Positive: >/= 

0.046



                                                                 Correlation wit

h serial



                                                                 results, other 

cardiac



                                                                 markers, and cl

inical



                                                                 findings is nec

essary to



                                                                 determine the c

linical



                                                                 significance of

 this



                                                                 result. Quantit

ative



                                                                 results using d

ifferent



                                                                 methodologies s

hould not



                                                                 be compared to 

one



                                                                 another as nume

rical



                                                                 results may amanda

yby



                                                                 method.



Completed by Nursing: NO- XR CHEST 1 -42-58 04:33:00 Name: TYLER EDGE 
[facility] : 1970 Age/S: 49 / F [address]  Unit #: [id number]
[id number] Loc: [place] [address] Phys: Kristy Quiros MD Acct: [id number] Dis Date: 
Status: REG ER PHONE #: [phone] Exam Date: 2020 FAX #: Reason: 
POST CENTRAL PLACEMENT; RIGHT IJ  EXAMS: CPT: [id number] XR CHEST 1 V 66656 
Fluoro Time: DAP (Gy m2): Air Kerma (mGy): HISTORY: Post central line placement,
hypotension Location code: B2 FINDINGS: Frontal view of the chest demonstrates a
mildlyenlarged  cardiomediastinal silhouette. The trachea is midline. The lungs 
are clear. There is no effusion or pneumothorax. The bones are intact. Right IJ 
catheter in good position.  IMPRESSION: Mild cardiomegaly, without acute 
pulmonary process. ** Electronically Signed by ISABEL March on 2020at 
3 ** Reported and signed by: Shree March M.D. CC: Kristy Quiros MD  PAGE 1 
Signed Report Name:TYLER EDGE [facility] : 1970 Age/S: 49 / F 
[id number] Unit #: [id number] Loc:[place] [address] Phys: 
Kristy Quiros MD  Acct: [id number] Dis Date: Status: REG ER PHONE #: [phone]
[phone] Exam Date: 2020 FAX #: Reason: POST CENTRAL PLACEMENT; RIGHT
IJ EXAMS: CPT: [id number] XR CHEST 1 V 10222 Fluoro Time: DAP (Gy m2): Air Kerma 
(mGy): (Continued) Technologist: Duncan Ellis RT(R)(CT) Trnscb Date/Time: 
2020 (433) [id number]  Orig Print D/T: S: 2020 (0436) PAGE2 Signed 
ReportCBC W/AUTO [id number] 04:15:00





             Test Item    Value        Reference Range Interpretation Comments

 

             WHITE BLOOD CELL (test 24.2 K/mm3   3.5-11.0     H            



             code = WBC)                                         

 

             RED BLOOD CELL (test 3.75 M/mm3   4.70-6.10    L            



             code = RBC)                                         

 

             HEMOGLOBIN (test code 11.5 G/DL    10.4-14.9    N            



             = HGB)                                              

 

             HEMATOCRIT (test code 35.2 %       31.5-44.1    N            



             = HCT)                                              

 

             MEAN CELL VOLUME (test 93.9 Fl      84.5-98.6    N            



             code = MCV)                                         

 

             MEAN CELL HGB (test 30.7 pg      27.0-34.2    N            



             code = MCH)                                         

 

             MEAN CELL HGB 32.7 G/DL    31.5-34.0    N            



             CONCETRATION (test                                        



             code = MCHC)                                        

 

             RED CELL DISTRIBUTION 13.8 SD      11.5-14.5    N            



             WIDTH (test code =                                        



             RDW)                                                

 

             PLATELET COUNT (test 234.0 K/mm3  150-450      N            



             code = PLT)                                         

 

             MEAN PLATELET VOLUME 9.80 fL      7.0-10.5     N            



             (test code = MPV)                                        

 

             NEUTROPHIL % (test 82.9 %       40-76        H            



             code = NT%)                                         

 

             LYMPHOCYTE % (test 8.3 %        20.5-51.1    L            



             code = LY%)                                         

 

             MONOCYTE % (test code 7.3 %        1.7-9.3      N            



             = MO%)                                              

 

             EOSINOPHIL % (test 1.4 %        0.0-6.0      N            



             code = EO%)                                         

 

             BASOPHIL % (test code 0.1 %        0.0-2.0      N            



             = BA%)                                              

 

             NEUTROPHIL # (test 20.08 K/mm3  1.8-7.6      H            



             code = NT#)                                         

 

             LYMPHOCYTE # (test 2.0 K/mm3    0.6-3.2      N            



             code = LY#)                                         

 

             MONOCYTE # (test code 1.8 K/mm3    0.3-1.1      H            



             = MO#)                                              

 

             EOSINOPHIL # (test 0.3 K/mm3    0.0-0.4      N            



             code = EO#)                                         

 

             BASOPHIL # (test code 0.0 K/mm3    0.0-0.1      N            



             = BA#)                                              

 

             MANUAL DIFF REQUIRED NO DIFF/SCN  CRITERIA                  SLIDE R

EVIEW



             (test code = MDIFF)                                        CONSISTA

NT WITH AUTO



                                                                 DIFFERENTIAL.



BASIC METABOLIC [id number] 04:11:00





             Test Item    Value        Reference Range Interpretation Comments

 

             SODIUM (test code = NA) 135 mmol/L   134-147      N            

 

             POTASSIUM (test code = K) 4.0 mmol/L   3.4-5.0      N            

 

             CHLORIDE (test code = CL) 106 mmol/L   100-108      N            

 

             CARBON DIOXIDE (test code = CO2) 22 mmol/L    21-32        N       

     

 

             ANION GAP (test code = GAP) 7.0 GAP calc 4.0-15.0     N            

 

             GLUCOSE (test code = GLU) 97 MG/DL            N            

 

             BLOOD UREA NITROGEN (test code = 34 MG/DL     7-18         H       

     



             BUN)                                                

 

             GLOMERULAR FILTRATION RATE (test 39 estGFR    >60          L       

     



             code = GFR)                                         

 

             CREATININE (test code = CREAT) 1.5 MG/DL    0.6-1.0      H         

   

 

             CALCIUM (test code = CA) 8.9 MG/DL    8.5-10.1     N            



LACTIC [id number] 04:11:00





             Test Item    Value        Reference Range Interpretation Comments

 

             LACTIC ACID (test code = LACT) 0.8 mmol/L   0.4-2.0      N         

   



CBC W/AUTO [id number] 03:54:00





             Test Item    Value        Reference Range Interpretation Comments

 

             WHITE BLOOD CELL (test code = 24.2 K/mm3   3.5-11.0     H          

  



             WBC)                                                

 

             RED BLOOD CELL (test code = RBC) 3.75 M/mm3   4.70-6.10    L       

     

 

             HEMOGLOBIN (test code = HGB) 11.5 G/DL    10.4-14.9    N           

 

 

             HEMATOCRIT (test code = HCT) 35.2 %       31.5-44.1    N           

 

 

             MEAN CELL VOLUME (test code = 93.9 Fl      84.5-98.6    N          

  



             MCV)                                                

 

             MEAN CELL HGB (test code = MCH) 30.7 pg      27.0-34.2    N        

    

 

             MEAN CELL HGB CONCETRATION (test 32.7 G/DL    31.5-34.0    N       

     



             code = MCHC)                                        

 

             RED CELL DISTRIBUTION WIDTH (test 13.8 SD      11.5-14.5    N      

      



             code = RDW)                                         

 

             PLATELET COUNT (test code = PLT) 234.0 K/mm3  150-450      N       

     

 

             MEAN PLATELET VOLUME (test code = 9.80 fL      7.0-10.5     N      

      



             MPV)                                                

 

             NEUTROPHIL % (test code = NT%)  %           40-76        H         

   

 

             LYMPHOCYTE % (test code = LY%)  %           20.5-51.1    L         

   

 

             MONOCYTE % (test code = MO%)  %           1.7-9.3      N           

 

 

             EOSINOPHIL % (test code = EO%)  %           0.0-6.0      N         

   

 

             BASOPHIL % (test code = BA%)  %           0.0-2.0      N           

 

 

             NEUTROPHIL # (test code = NT#)  K/mm3       1.8-7.6      H         

   

 

             LYMPHOCYTE # (test code = LY#)  K/mm3       0.6-3.2      N         

   

 

             MONOCYTE # (test code = MO#)  K/mm3       0.3-1.1      H           

 

 

             EOSINOPHIL # (test code = EO#)  K/mm3       0.0-0.4      N         

   

 

             BASOPHIL # (test code = BA#)  K/mm3       0.0-0.1      N           

 

 

             MANUAL DIFF REQUIRED (test code =  DIFF/SCN    CRITERIA            

      



             MDIFF)                                              



Basic Metabolic [id number] 07:54:48





             Test Item    Value        Reference Range Interpretation Comments

 

             Sodium Level (test code = Sodium 142.0 mmol/L 135.0-145.0          

     



             Level)                                              

 

             Potassium Level (test code = 4.8 mmol/L   3.5-5.1                  

 



             Potassium Level)                                        

 

             Chloride Level (test code = 105 mmol/L                       



             Chloride Level)                                        

 

             CO2 (test code = CO2) 25 mmol/L    22-29                     

 

             Anion Gap (test code = Anion 12 mmol/L    7-16                     

 



             Gap)                                                

 

             BUN (test code = BUN) 19.60 mg/dL  6.00-20.00                

 

             Creatinine Level (test code = 0.80 mg/dL   0.50-0.90               

  



             Creatinine Level)                                        

 

             BUN/Creat Ratio (test code = 24                        N           

 



             BUN/Creat Ratio)                                        

 

             Glucose Level (test code = 86 mg/dL                         



             Glucose Level)                                        

 

             Calcium Level (test code = 10.1 mg/dL   8.3-10.5                  



             Calcium Level)                                        



Basic Metabolic [id number] 07:54:48





             Test Item    Value        Reference Range Interpretation Comments

 

             Sodium Level (test 142.0 mmol/L 135.0-145.0               



             code = Sodium Level)                                        

 

             Potassium Level 4.8 mmol/L   3.5-5.1                   



             (test code =                                        



             Potassium Level)                                        

 

             Chloride Level (test 105 mmol/L                       



             code = Chloride                                        



             Level)                                              

 

             CO2 (test code = 25 mmol/L    22-29                     



             CO2)                                                

 

             Anion Gap (test code 12 mmol/L    7-16                      



             = Anion Gap)                                        

 

             BUN (test code = 19.60 mg/dL  6.00-20.00                



             BUN)                                                

 

             Creatinine Level 0.80 mg/dL   0.50-0.90                 



             (test code =                                        



             Creatinine Level)                                        

 

             BUN/Creat Ratio 24                        N            



             (test code =                                        



             BUN/Creat Ratio)                                        

 

             Glucose Level (test 86 mg/dL                         



             code = Glucose                                        



             Level)                                              

 

             Calcium Level (test 10.1 mg/dL   8.3-10.5                  



             code = Calcium                                        



             Level)                                              

 

             eGFR AA (test code = >60                       N            eGFR (e

stimated



             eGFR AA)     mL/min/1.73 m2                           Glomerular



                                                                 Filtration Rate

) is



                                                                 an estimated va

lue,



                                                                 calculated from

 the



                                                                 patient's serum



                                                                 creatinine usin

g the



                                                                 MDRD equation. 

It is



                                                                 NOT the patient

's



                                                                 actual GFR. The

 eGFR



                                                                 provides a more



                                                                 clinically usef

ul



                                                                 measure of kidn

ey



                                                                 disease than se

rum



                                                                 creatinine



                                                                 alone.***This



                                                                 calculation rimma

es



                                                                 sex and race in

to



                                                                 account, if the



                                                                 information is



                                                                 provided. If th

e



                                                                 race is not



                                                                 provided, and t

he



                                                                 patient is



                                                                 -Crystal

n,



                                                                 multiply by 1.2

12.



                                                                 If sex is not



                                                                 provided, and t

he



                                                                 patient is fema

le,



                                                                 multiply by 0.7

42.



                                                                 Results for pat

ients



                                                                 <18 years of ag

e



                                                                 have not been



                                                                 validated by th

e



                                                                 MDRD study and



                                                                 should be



                                                                 interpreted wit

h



                                                                 caution. eGFR R

esult



                                                                 Interpretation:

eGFR



                                                                 > or = 60 is in

 the



                                                                 Normal RangeeGF

R <



                                                                 60 may mean kid

hang



                                                                 diseaseeGFR < 1

5 may



                                                                 mean kidney



                                                                 failure*** Rang

es



                                                                 recommended by 

the



                                                                 [employer]



                                                                 Foundation,



                                                                 http://nkdep.ni

h.gov



Basic Metabolic [id number] 07:54:48





             Test Item    Value        Reference Range Interpretation Comments

 

             Sodium Level (test 142.0 mmol/L 135.0-145.0               



             code = Sodium Level)                                        

 

             Potassium Level 4.8 mmol/L   3.5-5.1                   



             (test code =                                        



             Potassium Level)                                        

 

             Chloride Level (test 105 mmol/L                       



             code = Chloride                                        



             Level)                                              

 

             CO2 (test code = 25 mmol/L    22-29                     



             CO2)                                                

 

             Anion Gap (test code 12 mmol/L    7-16                      



             = Anion Gap)                                        

 

             BUN (test code = 19.60 mg/dL  6.00-20.00                



             BUN)                                                

 

             Creatinine Level 0.80 mg/dL   0.50-0.90                 



             (test code =                                        



             Creatinine Level)                                        

 

             BUN/Creat Ratio 24                        N            



             (test code =                                        



             BUN/Creat Ratio)                                        

 

             Glucose Level (test 86 mg/dL                         



             code = Glucose                                        



             Level)                                              

 

             Calcium Level (test 10.1 mg/dL   8.3-10.5                  



             code = Calcium                                        



             Level)                                              

 

             eGFR AA (test code = >60                       N            eGFR (e

stimated



             eGFR AA)     mL/min/1.73 m2                           Glomerular



                                                                 Filtration Rate

) is



                                                                 an estimated va

lue,



                                                                 calculated from

 the



                                                                 patient's serum



                                                                 creatinine usin

g the



                                                                 MDRD equation. 

It is



                                                                 NOT the patient

's



                                                                 actual GFR. The

 eGFR



                                                                 provides a more



                                                                 clinically usef

ul



                                                                 measure of kidn

ey



                                                                 disease than se

rum



                                                                 creatinine



                                                                 alone.***This



                                                                 calculation rimma

es



                                                                 sex and race in

to



                                                                 account, if the



                                                                 information is



                                                                 provided. If th

e



                                                                 race is not



                                                                 provided, and t

he



                                                                 patient is



                                                                 -Crystal

n,



                                                                 multiply by 1.2

12.



                                                                 If sex is not



                                                                 provided, and t

he



                                                                 patient is fema

le,



                                                                 multiply by 0.7

42.



                                                                 Results for pat

ients



                                                                 <18 years of ag

e



                                                                 have not been



                                                                 validated by [facility]



                                                                 MDRD study and



                                                                 should be



                                                                 interpreted wit

h



                                                                 caution. eGFR R

esult



                                                                 Interpretation:

eGFR



                                                                 > or = 60 is in

 the



                                                                 Normal RangeeGF

R <



                                                                 60 may mean kid

hang



                                                                 diseaseeGFR < 1

5 may



                                                                 mean kidney



                                                                 failure*** Rang

es



                                                                 recommended by 

the



                                                                 [employer]



                                                                 [employer],



                                                                 http://nkdep.ni

h.gov

 

             eGFR Non-AA (test >60.00                    N            eGFR (sheba

mated



             code = eGFR Non-AA) mL/min/1.73 m2                           Glomer

ular



                                                                 Filtration Rate

) is



                                                                 an estimated va

lue,



                                                                 calculated from

 the



                                                                 patient's serum



                                                                 creatinine usin

g the



                                                                 MDRD equation. 

It is



                                                                 NOT the patient

's



                                                                 actual GFR. The

 eGFR



                                                                 provides a more



                                                                 clinically usef

ul



                                                                 measure of kidn

ey



                                                                 disease than se

rum



                                                                 creatinine



                                                                 alone.***This



                                                                 calculation rimma

es



                                                                 sex and race in

to



                                                                 account, if the



                                                                 information is



                                                                 provided. If th

e



                                                                 race is not



                                                                 provided, and t

he



                                                                 patient is



                                                                 -Crystal

n,



                                                                 multiply by 1.2

12.



                                                                 If sex is not



                                                                 provided, and t

he



                                                                 patient is fema

le,



                                                                 multiply by 0.7

42.



                                                                 Results for pat

ients



                                                                 <18 years of ag

e



                                                                 have not been



                                                                 validated by [facility]



                                                                 MDRD study and



                                                                 should be



                                                                 interpreted wit

h



                                                                 caution. eGFR R

esult



                                                                 Interpretation:

eGFR



                                                                 > or = 60 is in

 the



                                                                 Normal RangeeGF

R <



                                                                 60 may mean kid

hang



                                                                 diseaseeGFR < 1

5 may



                                                                 mean kidney



                                                                 failure*** Rang

es



                                                                 recommended by 

the



                                                                 [employer]



                                                                 [employer],



                                                                 http://nkdep.ni

h.gov



Complete Blood Count with [id number] 07:29:42





             Test Item    Value        Reference Range Interpretation Comments

 

             WBC (test code = WBC) 9.3 x10      4.4-10.5                  

 

             RBC (test code = RBC) 4.71 x10     3.75-5.20                 

 

             Hgb (test code = Hgb) 14.4 g/dL    12.2-14.8                 

 

             MCV (test code = MCV) 92.10 fL     80..00              

 

             Hct (test code = Hct) 43.4 %       36.5-44.4                 

 

             MCHC (test code = 33.20 g/dL   32.00-37.50               



             MCHC)                                               

 

             RDW CV (test code = 13.4 %       11.5-14.5                 



             RDW CV)                                             

 

             MCH (test code = MCH) 30.6 pg      27.0-32.5                 

 

             Platelets (test code = 325.0 x10    140.0-440.0               



             Platelets)                                          

 

             MPV (test code = MPV) 9.8 fL                    N            

 

             Slide Review (test Auto         Auto                      Result cr

eated by



             code = Slide Review)                                        [email or web address]_

SLIDE_REV_AUTO

 

             nRBC (test code = 0                         N            



             nRBC)                                               

 

             NRBC Abs (test code = 0.00 x10                  N            



             NRBC Abs)                                           

 

             IPF (test code = IPF) 0 %                       N            



Automated [id number] 07:29:42





             Test Item    Value        Reference Range Interpretation Comments

 

             Neutro Auto (test code = Neutro 66.2 %       36.0-70.0             

    



             Auto)                                               

 

             Lymph Auto (test code = Lymph Auto) 21.1 %       12.0-44.0         

        

 

             Mono Auto (test code = Mono Auto) 6.8 %        0.0-11.0            

      

 

             Eos, Auto (test code = Eos, Auto) 4.8 %        0.0-7.0             

      

 

             Basophil Auto (test code = Basophil 0.9 %        0.0-2.0           

        



             Auto)                                               

 

             Neutro Absolute (test code = Neutro 6.2 x10      1.6-7.4           

        



             Absolute)                                           

 

             Lymph Absolute (test code = Lymph 1.97 x10     .50-4.60            

      



             Absolute)                                           

 

             Mono Absolute (test code = Mono .63 x10      .00-1.20              

    



             Absolute)                                           

 

             Eos Absolute (test code = Eos 0.45 x10     0.00-0.74               

  



             Absolute)                                           

 

             Baso Absolute (test code = Baso 0.08 x10     0.00-0.21             

    



             Absolute)                                           



IG [id number] 07:29:42





             Test Item    Value        Reference Range Interpretation Comments

 

             IG (test code = IG) 0.2 %        0.0-5.0                   

 

             IG Abs (test code = IG Abs) 0 x10                     N            



Thyroid Stimulating [id number] 04:30:30





             Test Item    Value        Reference Range Interpretation Comments

 

             TSH (test code = TSH) 1.360 mIU/mL 0.270-4.200               



RPR [id number] 04:06:17





             Test Item    Value        Reference Range Interpretation Comments

 

             RPR Qual (test code = RPR Qual) Non-Reactive Non-Reactive          

    

 

             Reactive Control (test code = Reactive                             

  



             Reactive Control)                                        

 

             Weak Reactive Control (test Weak Reactive                          

 



             code = Weak Reactive Control)                                      

  

 

             Non-Reactive Control (test code Non-Reactive                       

    



             = Non-Reactive Control)                                        

 

             Lot # (test code = Lot #) [id number]                    N            

 

             Expiration Dt (test code = 10-                N            



             Expiration Dt)                                        



Hemoglobin A1c2019-11-15 18:37:54





             Test Item    Value        Reference Range Interpretation Comments

 

             Hemoglobin A1c (test code 4.7 %        4.8-5.9      L            No

n Diabetic



             = Hemoglobin A1c)                                        4.8-5.9%Di

abetic <7.0%



Lipid Panel2019-11-15 18:08:58





             Test Item    Value        Reference Range Interpretation Comments

 

             Cholesterol Total 189 mg/dL    0-200                     RISK OF HE

ART



             (test code =                                        DISEASEPublishe

d by



             Cholesterol Total)                                        American 

Heart



                                                                 Association Judith

lyte



                                                                 Optimal Borderl

ine



                                                                 Increased RiskC

HOL <200



                                                                 200-239 >240TRI

G <150



                                                                 150-199 >200HDL

 Male



                                                                 >60 <40HDL Fema

le >60



                                                                 <50LDL <100 130

-159



                                                                 >160LDL [facility] is



                                                                 100-129

 

             Triglycerides (test 112 mg/dL    9-200                     



             code = Triglycerides)                                        

 

             HDL (test code = HDL) 44 mg/dL     50-60        L            

 

             LDL (test code = LDL) 122 mg/dL    0-130                     The eq

uation being used



                                                                 in this calcula

tion is



                                                                 LDL = (Chol - H

DL) -



                                                                 (Trig / 5)

 

             VLDL (test code = 22 mg/dL     5-40                      The equati

on being used



             VLDL)                                               in this calcula

tion is



                                                                 VLDL = Trig / 5

 

             Chol/HDL (test code = 4.3 ratio    0.0-4.4                   



             Chol/HDL)                                           

 

             LDL/HDL Ratio (test 3                         N            The equa

tion being used



             code = LDL/HDL Ratio)                                        in thi

s calculation is



                                                                 LDL/HDL Ratio=L

DL



                                                                 Calc/HDL Chol



Complete Blood Count with Differential2019-11-15 07:51:57





             Test Item    Value        Reference Range Interpretation Comments

 

             WBC (test code = WBC) 10.6 x10     4.4-10.5     H            

 

             RBC (test code = RBC) 4.45 x10     3.75-5.20                 

 

             Hgb (test code = Hgb) 13.5 g/dL    12.2-14.8                 

 

             MCV (test code = MCV) 93.30 fL     80..00              

 

             Hct (test code = Hct) 41.5 %       36.5-44.4                 

 

             MCHC (test code = 32.50 g/dL   32.00-37.50               



             MCHC)                                               

 

             RDW CV (test code = 13.7 %       11.5-14.5                 



             RDW CV)                                             

 

             MCH (test code = MCH) 30.3 pg      27.0-32.5                 

 

             Platelets (test code = 356.0 x10    140.0-440.0               



             Platelets)                                          

 

             MPV (test code = MPV) 9.7 fL                    N            

 

             Slide Review (test Auto         Auto                      Result cr

eated by



             code = Slide Review)                                        GL_SJM_

SLIDE_REV_AUTO

 

             nRBC (test code = 0                         N            



             nRBC)                                               

 

             NRBC Abs (test code = 0.00 x10                  N            



             NRBC Abs)                                           

 

             IPF (test code = IPF) 0 %                       N            



Automated Differential2019-11-15 07:51:57





             Test Item    Value        Reference Range Interpretation Comments

 

             Neutro Auto (test code = Neutro 65.4 %       36.0-70.0             

    



             Auto)                                               

 

             Lymph Auto (test code = Lymph Auto) 23.5 %       12.0-44.0         

        

 

             Mono Auto (test code = Mono Auto) 5.7 %        0.0-11.0            

      

 

             Eos, Auto (test code = Eos, Auto) 4.4 %        0.0-7.0             

      

 

             Basophil Auto (test code = Basophil 0.8 %        0.0-2.0           

        



             Auto)                                               

 

             Neutro Absolute (test code = Neutro 6.9 x10      1.6-7.4           

        



             Absolute)                                           

 

             Lymph Absolute (test code = Lymph 2.49 x10     .50-4.60            

      



             Absolute)                                           

 

             Mono Absolute (test code = Mono .60 x10      .00-1.20              

    



             Absolute)                                           

 

             Eos Absolute (test code = Eos 0.47 x10     0.00-0.74               

  



             Absolute)                                           

 

             Baso Absolute (test code = Baso 0.08 x10     0.00-0.21             

    



             Absolute)                                           



IG Flags2019-11-15 07:51:57





             Test Item    Value        Reference Range Interpretation Comments

 

             IG (test code = IG) 0.2 %        0.0-5.0                   

 

             IG Abs (test code = IG Abs) 0 x10                     N            



Urine [id number] 10:18:52





             Test Item    Value        Reference Range Interpretation Comments

 

             ORGANISM (test code = Escherichia coli                           



             ORGANISM)                                           

 

             Amikacin (test code =                           S            



             Amik)                                               

 

             Ampicillin (test code =                           S            



             Amp)                                                

 

             Ampicillin/Sulbactam                           S            



             (test code = Amp/Sul)                                        

 

             Cefazolin (test code =                           S            



             Cefaz)                                              

 

             Cefepime (test code =                           S            



             Cefep)                                              

 

             Cefotaxime (test code =                           S            



             Cefo)                                               

 

             Ceftazidime (test code                           S            



             = Ceftaz)                                           

 

             Ceftriaxone (test code                           S            



             = Ceftri)                                           

 

             Cefuroxime (test code =                           S            



             Cefur)                                              

 

             Ciprofloxacin (test                           S            



             code = Cipro)                                        

 

             Gentamicin (test code =                           S            



             Gent)                                               

 

             Imipenem (test code =                           S            



             Imi)                                                

 

             Levofloxacin (test code                           S            



             = Levo)                                             

 

             Meropenem (test code =                           S            



             Sindi)                                               

 

             Nitrofurantoin (test                           S            



             code = Nitro)                                        

 

             Piperacillin/Tazobactam                           S            



             (test code = Pip/Blaine)                                        

 

             Tobramycin (test code =                           S            



             Tobra)                                              

 

             Trimethoprim/Sulfa                           S            



             (test code = SXT)                                        

 

             Final Report (test code >=100,000 cfu/ml                           



             = Final Report) Escherichia coli                           



                          >=100,000 cfu/ml Alpha                           



                          hemolytic                              



                          Streptococcus (not                           



                          Group D or                             



                          Enterococcus)                           



 C Urine Added by [id number] 09:18:25





             Test Item    Value        Reference Range Interpretation Comments

 

             Lithium Level (test code = 0.58 mmol/L  0.60-1.20    L            



             Lithium Level)                                        



Urine Drug [id number] 01:36:44





             Test Item    Value        Reference Range Interpretation Comments

 

             Amphetamine Screen Ur POSITIVE     Negative     A            



             (test code = Amphetamine                                        



             Screen Ur)                                          

 

             Barbiturate Screen Ur Negative     Negative                  



             (test code = Barbiturate                                        



             Screen Ur)                                          

 

             Benzodiazepines Ur (test Negative     Negative                  



             code = Benzodiazepines                                        



             Ur)                                                 

 

             Cocaine Screen Ur (test Negative     Negative                  



             code = Cocaine Screen                                        



             Ur)                                                 

 

             U Methadone Scr (test Negative     Negative                  



             code = U Methadone Scr)                                        

 

             Opiate Screen Ur (test Negative     Negative                  



             code = Opiate Screen Ur)                                        

 

             U PCP Scrn (test code = Negative     Negative                  



             U PCP Scrn)                                         

 

             Cannabinoid Screen Ur Negative     Negative                  



             (test code = Cannabinoid                                        



             Screen Ur)                                          

 

             U TCA (test code = U Negative     Negative                  The res

ults of all



             TCA)                                                drug screen elinor

ts are



                                                                 only preliminar

y.



                                                                 Clinical



                                                                 consideration a

nd



                                                                 professional ju

dgment



                                                                 should be appli

ed to



                                                                 any drug of abu

se



                                                                 test result,



                                                                 particularly wh

en



                                                                 preliminary pos

itive



                                                                 results are obt

ained.



                                                                 Please order a



                                                                 separate confir

matory



                                                                 test if desired

.



HCG Qualitative [id number] 01:36:43





             Test Item    Value        Reference Range Interpretation Comments

 

             hCG Ur (test code = Negative                               If the r

esult is



             hCG Ur)                                             "Negative" in p

atients



                                                                 suspected to be



                                                                 pregnant, recom

mend



                                                                 retest with a s

ample



                                                                 obtained 48 to 

72



                                                                 hours later, or

 by



                                                                 ordering a



                                                                 quantitative as

say. If



                                                                 the result is



                                                                 "Borderline" te

sting



                                                                 should be repea

gianfranco in



                                                                 48 to 72 hours.

 

             Lot # (test code = [id number]                    N            



             Lot #)                                              

 

             Expiration Dt (test 2020                N            



             code = Expiration Dt)                                        

 

             Neg Control (test Negative                               



             code = Neg Control)                                        

 

             Pos Control (test Positive                               



             code = Pos Control)                                        

 

             Internal QC (test Acceptable                             



             code = Internal QC)                                        



Urinalysis [id number] 01:36:03





             Test Item    Value        Reference Range Interpretation Comments

 

             UA WBC (test code = UA WBC) 0-5          0-5                       

 

             UA RBC (test code = UA RBC) None Seen    0-5                       

 

             UA Bacteria (test code = UA Moderate                  A            



             Bacteria)                                           

 

             UA Squam Epithelial (test code = UA 0-5                            

        



             Squam Epithelial)                                        



Comprehensive Metabolic [id number] 01:23:40





             Test Item    Value        Reference Range Interpretation Comments

 

             Sodium Level (test code = Sodium 139.0 mmol/L 135.0-145.0          

     



             Level)                                              

 

             Potassium Level (test code = 4.4 mmol/L   3.5-5.1                  

 



             Potassium Level)                                        

 

             Chloride Level (test code = 102 mmol/L                       



             Chloride Level)                                        

 

             CO2 (test code = CO2) 23 mmol/L    22-29                     

 

             Anion Gap (test code = Anion 14 mmol/L    7-16                     

 



             Gap)                                                

 

             BUN (test code = BUN) 9.10 mg/dL   6.00-20.00                

 

             Creatinine Level (test code = 1.00 mg/dL   0.50-0.90    H          

  



             Creatinine Level)                                        

 

             BUN/Creat Ratio (test code = 9                         N           

 



             BUN/Creat Ratio)                                        

 

             Glucose Level (test code = 115 mg/dL                        



             Glucose Level)                                        

 

             Calcium Level (test code = 10.1 mg/dL   8.3-10.5                  



             Calcium Level)                                        

 

             Alk Phos (test code = Alk Phos) 106 U/L             H        

    

 

             Bilirubin Total (test code = 0.4 mg/dL    0.1-0.9                  

 



             Bilirubin Total)                                        

 

             Albumin Level (test code = 4.6 g/dL     3.5-5.2                   



             Albumin Level)                                        

 

             Protein Total (test code = 7.7 g/dL     6.4-8.3                   



             Protein Total)                                        

 

             ALT (test code = ALT) 12 U/L       1-33                      

 

             AST (test code = AST) 18 U/L       1-32                      

 

             Globulin (test code = Globulin) 3.1 g/dL     2.9-3.1               

    

 

             A/G Ratio (test code = A/G 1.5 ratio                 N            



             Ratio)                                              



Comprehensive Metabolic [id number] 01:23:40





             Test Item    Value        Reference Range Interpretation Comments

 

             Sodium Level (test 139.0 mmol/L 135.0-145.0               



             code = Sodium Level)                                        

 

             Potassium Level 4.4 mmol/L   3.5-5.1                   



             (test code =                                        



             Potassium Level)                                        

 

             Chloride Level (test 102 mmol/L                       



             code = Chloride                                        



             Level)                                              

 

             CO2 (test code = 23 mmol/L    22-29                     



             CO2)                                                

 

             Anion Gap (test code 14 mmol/L    7-16                      



             = Anion Gap)                                        

 

             BUN (test code = 9.10 mg/dL   6.00-20.00                



             BUN)                                                

 

             Creatinine Level 1.00 mg/dL   0.50-0.90    H            



             (test code =                                        



             Creatinine Level)                                        

 

             BUN/Creat Ratio 9                         N            



             (test code =                                        



             BUN/Creat Ratio)                                        

 

             Glucose Level (test 115 mg/dL                        



             code = Glucose                                        



             Level)                                              

 

             Calcium Level (test 10.1 mg/dL   8.3-10.5                  



             code = Calcium                                        



             Level)                                              

 

             Alk Phos (test code 106 U/L             H            



             = Alk Phos)                                         

 

             Bilirubin Total 0.4 mg/dL    0.1-0.9                   



             (test code =                                        



             Bilirubin Total)                                        

 

             Albumin Level (test 4.6 g/dL     3.5-5.2                   



             code = Albumin                                        



             Level)                                              

 

             Protein Total (test 7.7 g/dL     6.4-8.3                   



             code = Protein                                        



             Total)                                              

 

             ALT (test code = 12 U/L       1-33                      



             ALT)                                                

 

             AST (test code = 18 U/L       1-32                      



             AST)                                                

 

             Globulin (test code 3.1 g/dL     2.9-3.1                   



             = Globulin)                                         

 

             A/G Ratio (test code 1.5 ratio                 N            



             = A/G Ratio)                                        

 

             eGFR AA (test code = >60                       N            eGFR (e

stimated



             eGFR AA)     mL/min/1.73 m2                           Glomerular



                                                                 Filtration Rate

) is



                                                                 an estimated va

lue,



                                                                 calculated from

 the



                                                                 patient's serum



                                                                 creatinine usin

g the



                                                                 MDRD equation. 

It is



                                                                 NOT the patient

's



                                                                 actual GFR. The

 eGFR



                                                                 provides a more



                                                                 clinically usef

ul



                                                                 measure of kidn

ey



                                                                 disease than se

rum



                                                                 creatinine



                                                                 alone.***This



                                                                 calculation rimma

es



                                                                 sex and race in

to



                                                                 account, if the



                                                                 information is



                                                                 provided. If th

e



                                                                 race is not



                                                                 provided, and t

he



                                                                 patient is



                                                                 -Crystal

n,



                                                                 multiply by 1.2

12.



                                                                 If sex is not



                                                                 provided, and t

he



                                                                 patient is fema

le,



                                                                 multiply by 0.7

42.



                                                                 Results for pat

ients



                                                                 <18 years of ag

e



                                                                 have not been



                                                                 validated by th

e



                                                                 MDRD study and



                                                                 should be



                                                                 interpreted wit

h



                                                                 caution. eGFR R

esult



                                                                 Interpretation:

eGFR



                                                                 > or = 60 is in

 the



                                                                 Normal RangeeGF

R <



                                                                 60 may mean kid

hang



                                                                 diseaseeGFR < 1

5 may



                                                                 mean kidney



                                                                 failure*** Rang

es



                                                                 recommended by 

the



                                                                 [employer]



                                                                 [employer],



                                                                 http://nkdep.ni

h.gov



Comprehensive Metabolic [id number] 01:23:40





             Test Item    Value        Reference Range Interpretation Comments

 

             Sodium Level (test 139.0 mmol/L 135.0-145.0               



             code = Sodium Level)                                        

 

             Potassium Level 4.4 mmol/L   3.5-5.1                   



             (test code =                                        



             Potassium Level)                                        

 

             Chloride Level (test 102 mmol/L                       



             code = Chloride                                        



             Level)                                              

 

             CO2 (test code = 23 mmol/L    22-29                     



             CO2)                                                

 

             Anion Gap (test code 14 mmol/L    7-16                      



             = Anion Gap)                                        

 

             BUN (test code = 9.10 mg/dL   6.00-20.00                



             BUN)                                                

 

             Creatinine Level 1.00 mg/dL   0.50-0.90    H            



             (test code =                                        



             Creatinine Level)                                        

 

             BUN/Creat Ratio 9                         N            



             (test code =                                        



             BUN/Creat Ratio)                                        

 

             Glucose Level (test 115 mg/dL                        



             code = Glucose                                        



             Level)                                              

 

             Calcium Level (test 10.1 mg/dL   8.3-10.5                  



             code = Calcium                                        



             Level)                                              

 

             Alk Phos (test code 106 U/L             H            



             = Alk Phos)                                         

 

             Bilirubin Total 0.4 mg/dL    0.1-0.9                   



             (test code =                                        



             Bilirubin Total)                                        

 

             Albumin Level (test 4.6 g/dL     3.5-5.2                   



             code = Albumin                                        



             Level)                                              

 

             Protein Total (test 7.7 g/dL     6.4-8.3                   



             code = Protein                                        



             Total)                                              

 

             ALT (test code = 12 U/L       1-33                      



             ALT)                                                

 

             AST (test code = 18 U/L       1-32                      



             AST)                                                

 

             Globulin (test code 3.1 g/dL     2.9-3.1                   



             = Globulin)                                         

 

             A/G Ratio (test code 1.5 ratio                 N            



             = A/G Ratio)                                        

 

             eGFR AA (test code = >60                       N            eGFR (e

stimated



             eGFR AA)     mL/min/1.73 m2                           Glomerular



                                                                 Filtration Rate

) is



                                                                 an estimated va

lue,



                                                                 calculated from

 the



                                                                 patient's serum



                                                                 creatinine usin

g the



                                                                 MDRD equation. 

It is



                                                                 NOT the patient

's



                                                                 actual GFR. The

 eGFR



                                                                 provides a more



                                                                 clinically usef

ul



                                                                 measure of kidn

ey



                                                                 disease than se

rum



                                                                 creatinine



                                                                 alone.***This



                                                                 calculation rimma

es



                                                                 sex and race in

to



                                                                 account, if the



                                                                 information is



                                                                 provided. If th

e



                                                                 race is not



                                                                 provided, and t

he



                                                                 patient is



                                                                 -Crystal

n,



                                                                 multiply by 1.2

12.



                                                                 If sex is not



                                                                 provided, and t

he



                                                                 patient is fema

le,



                                                                 multiply by 0.7

42.



                                                                 Results for pat

ients



                                                                 <18 years of ag

e



                                                                 have not been



                                                                 validated by th

e



                                                                 MDRD study and



                                                                 should be



                                                                 interpreted wit

h



                                                                 caution. eGFR R

esult



                                                                 Interpretation:

eGFR



                                                                 > or = 60 is in

 the



                                                                 Normal RangeeGF

R <



                                                                 60 may mean kid

hang



                                                                 diseaseeGFR < 1

5 may



                                                                 mean kidney



                                                                 failure*** Rang

es



                                                                 recommended by 

the



                                                                 [employer]



                                                                 [employer],



                                                                 http://nkdep.ni

h.gov

 

             eGFR Non-AA (test 58.45                     N            eGFR (sheba

mated



             code = eGFR Non-AA) mL/min/1.73 m2                           [facility]

ular



                                                                 Filtration Rate

) is



                                                                 an estimated va

lue,



                                                                 calculated from

 the



                                                                 patient's serum



                                                                 creatinine usin

g the



                                                                 MDRD equation. 

It is



                                                                 NOT the patient

's



                                                                 actual GFR. The

 eGFR



                                                                 provides a more



                                                                 clinically usef

ul



                                                                 measure of kidn

ey



                                                                 disease than se

rum



                                                                 creatinine



                                                                 alone.***This



                                                                 calculation rimma

es



                                                                 sex and race in

to



                                                                 account, if the



                                                                 information is



                                                                 provided. If th

e



                                                                 race is not



                                                                 provided, and t

he



                                                                 patient is



                                                                 -Crystal

n,



                                                                 multiply by 1.2

12.



                                                                 If sex is not



                                                                 provided, and t

he



                                                                 patient is fema

le,



                                                                 multiply by 0.7

42.



                                                                 Results for pat

ients



                                                                 <18 years of ag

e



                                                                 have not been



                                                                 validated by 

e



                                                                 MDRD study and



                                                                 should be



                                                                 interpreted wit

h



                                                                 caution. eGFR R

esult



                                                                 Interpretation:

eGFR



                                                                 > or = 60 is in

 the



                                                                 Normal RangeeGF

R <



                                                                 60 may mean kid

hang



                                                                 diseaseeGFR < 1

5 may



                                                                 mean kidney



                                                                 failure*** Rang

es



                                                                 recommended by 

the



                                                                 [employer]



                                                                 Foundation,



                                                                 http://nkdep.ni

h.gov



Alcohol [id number] 01:23:31





             Test Item    Value        Reference Range Interpretation Comments

 

             Ethanol Level (test <0.00 g/dL   0.00-0.01                 [facility]tox[facility]

gianfranco 0.080 g/dL



             code = Ethanol                                        or more



             Level)                                              

 

             Ethanol Inst (test <0                        N            



             code = Ethanol Inst)                                        



Complete Blood Count with [id number] 00:56:12





             Test Item    Value        Reference Range Interpretation Comments

 

             WBC (test code = WBC) 19.4 x10     4.4-10.5     H            

 

             RBC (test code = RBC) 4.53 x10     3.75-5.20                 

 

             Hgb (test code = Hgb) 13.5 g/dL    12.2-14.8                 

 

             Hct (test code = Hct) 41.3 %       36.5-44.4                 

 

             MCV (test code = MCV) 91.20 fL     80..00              

 

             MCHC (test code = 32.70 g/dL   32.00-37.50               



             MCHC)                                               

 

             RDW CV (test code = 13.5 %       11.5-14.5                 



             RDW CV)                                             

 

             MCH (test code = MCH) 29.8 pg      27.0-32.5                 

 

             Platelets (test code = 462.0 x10    140.0-440.0  H            



             Platelets)                                          

 

             MPV (test code = MPV) 9.9 fL                    N            

 

             Slide Review (test Auto         Auto                      Result cr

eated by



             code = Slide Review)                                        [email or web address]

[email or web address]_AUTO

 

             nRBC (test code = 0                         N            



             nRBC)                                               

 

             NRBC Abs (test code = 0.00 x10                  N            



             NRBC Abs)                                           

 

             IPF (test code = IPF) 0 %                       N            



Automated [id number] 00:56:12





             Test Item    Value        Reference Range Interpretation Comments

 

             Neutro Auto (test code = Neutro 83.7 %       36.0-70.0    H        

    



             Auto)                                               

 

             Lymph Auto (test code = Lymph Auto) 8.9 %        12.0-44.0    L    

        

 

             Mono Auto (test code = Mono Auto) 5.0 %        0.0-11.0            

      

 

             Eos, Auto (test code = Eos, Auto) 1.4 %        0.0-7.0             

      

 

             Basophil Auto (test code = Basophil 0.7 %        0.0-2.0           

        



             Auto)                                               

 

             Neutro Absolute (test code = Neutro 16.2 x10     1.6-7.4      H    

        



             Absolute)                                           

 

             Lymph Absolute (test code = Lymph 1.73 x10     .50-4.60            

      



             Absolute)                                           

 

             Mono Absolute (test code = Mono .96 x10      .00-1.20              

    



             Absolute)                                           

 

             Eos Absolute (test code = Eos 0.27 x10     0.00-0.74               

  



             Absolute)                                           

 

             Baso Absolute (test code = Baso 0.13 x10     0.00-0.21             

    



             Absolute)                                           



IG [id number] 00:56:12





             Test Item    Value        Reference Range Interpretation Comments

 

             IG (test code = IG) 0.3 %        0.0-5.0                   

 

             IG Abs (test code = IG Abs) 0 x10                     N            



Urinalysis with Culture, if [id number] 00:55:34





             Test Item    Value        Reference Range Interpretation Comments

 

             UA Color (test code = STRAW        Yellow                    



             UA Color)                                           

 

             UA Appear (test code = CLEAR        Clear                     



             UA Appear)                                          

 

             UA pH (test code = UA 5                         N            



             pH)                                                 

 

             UA Spec Grav (test code 1.001        1.001-1.035               



             = UA Spec Grav)                                        

 

             UA Glucose (test code = NEG          Negative                  



             UA Glucose)                                         

 

             UA Bili (test code = UA NEG          Negative                  



             Bili)                                               

 

             UA Ketones (test code = NEG          Negative                  



             UA Ketones)                                         

 

             UA Blood (test code = NEG          Negative                  



             UA Blood)                                           

 

             UA Protein (test code = NEG          Negative                  



             UA Protein)                                         

 

             UA Urobilinogen (test 0.2 mg/dL                 N            



             code = UA Urobilinogen)                                        

 

             UA Nitrite (test code = POS          Negative     A            



             UA Nitrite)                                         

 

             UA Leuk Est (test code 25 cells/mcL Negative     A            



             = UA Leuk Est)                                        

 

             UA Micro Ind? (test Indicated    Not Indicated A            Result 

created by



             code = UA Micro Ind?)                                        rule



                                                                 [email or web address]_MICRO

_IN



                                                                 D



Urine [id number] 08:13:23





             Test Item    Value        Reference Range Interpretation Comments

 

             ORGANISM (test code = Escherichia coli                           



             ORGANISM)                                           

 

             Amikacin (test code =                           S            



             Amik)                                               

 

             Ampicillin (test code =                           S            



             Amp)                                                

 

             Ampicillin/Sulbactam                           S            



             (test code = Amp/Sul)                                        

 

             Cefazolin (test code =                           S            



             Cefaz)                                              

 

             Cefepime (test code =                           S            



             Cefep)                                              

 

             Cefotaxime (test code =                           S            



             Cefo)                                               

 

             Ceftazidime (test code =                           S            



             Ceftaz)                                             

 

             Ceftriaxone (test code =                           S            



             Ceftri)                                             

 

             Cefuroxime (test code =                           S            



             Cefur)                                              

 

             Ciprofloxacin (test code                           S            



             = Cipro)                                            

 

             Gentamicin (test code =                           S            



             Gent)                                               

 

             Imipenem (test code =                           S            



             Imi)                                                

 

             Levofloxacin (test code                           S            



             = Levo)                                             

 

             Meropenem (test code =                           S            



             Sindi)                                               

 

             Nitrofurantoin (test                           S            



             code = Nitro)                                        

 

             Piperacillin/Tazobactam                           S            



             (test code = Pip/Blaine)                                        

 

             Tobramycin (test code =                           S            



             Tobra)                                              

 

             Trimethoprim/Sulfa (test                           S            



             code = SXT)                                         

 

             Final Report (test code 30,000 cfu/ml                           



             = Final Report) Escherichia coli                           



                          20,000 cfu/ml                           



                          Diphtheroids                           



 C Urine Added by [email or web address] Qualitative2019-09-15 04:57:25





             Test Item    Value        Reference Range Interpretation Comments

 

             RPR Qual (test code = RPR Qual) Non-Reactive Non-Reactive          

    

 

             Reactive Control (test code = Reactive                             

  



             Reactive Control)                                        

 

             Weak Reactive Control (test Weak Reactive                          

 



             code = Weak Reactive Control)                                      

  

 

             Non-Reactive Control (test code Non-Reactive                       

    



             = Non-Reactive Control)                                        

 

             Lot # (test code = Lot #) [id number]                    N            

 

             Expiration Dt (test code = 10                N            



             Expiration Dt)                                        



Thyroid Stimulating Hormone2019-09-15 01:22:43





             Test Item    Value        Reference Range Interpretation Comments

 

             TSH (test code = TSH) 3.370 mIU/mL 0.270-4.200               



Lipid Panel2019-09-15 01:17:51





             Test Item    Value        Reference Range Interpretation Comments

 

             Cholesterol Total 168 mg/dL    0-200                     RISK OF HE

ART



             (test code =                                        DISEASEPublishe

d by



             Cholesterol Total)                                        American 

Heart



                                                                 Association Judith

lyte



                                                                 Optimal Borderl

ine



                                                                 Increased RiskC

HOL <200



                                                                 200-239 >240TRI

G <150



                                                                 150-199 >200HDL

 Male



                                                                 >60 <40HDL Fema

le >60



                                                                 <50LDL <100 130

-159



                                                                 >160LDL Near op

timal is



                                                                 100-129

 

             Triglycerides (test 108 mg/dL    9-200                     



             code = Triglycerides)                                        

 

             HDL (test code = HDL) 46 mg/dL     50-60        L            

 

             LDL (test code = LDL) 100 mg/dL    0-130                     The eq

uation being used



                                                                 in this calcula

tion is



                                                                 LDL = (Chol - H

DL) -



                                                                 (Trig / 5)

 

             VLDL (test code = 22 mg/dL     5-40                      The equati

on being used



             VLDL)                                               in this calcula

tion is



                                                                 VLDL = Trig / 5

 

             Chol/HDL (test code = 3.7 ratio    0.0-4.4                   



             Chol/HDL)                                           

 

             LDL/HDL Ratio (test 2                         N            The equa

tion being used



             code = LDL/HDL Ratio)                                        in thi

s calculation is



                                                                 LDL/HDL Ratio=L

DL



                                                                 Calc/HDL Chol



Lithium Level2019-09-15 01:17:51





             Test Item    Value        Reference Range Interpretation Comments

 

             Lithium Level (test code = 0.81 mmol/L  0.60-1.20                 



             Lithium Level)                                        



Comprehensive Metabolic Panel2019-09-15 00:04:54





             Test Item    Value        Reference Range Interpretation Comments

 

             Sodium Level (test code = Sodium 137.0 mmol/L 135.0-145.0          

     



             Level)                                              

 

             Potassium Level (test code = 4.0 mmol/L   3.5-5.1                  

 



             Potassium Level)                                        

 

             Chloride Level (test code = 103 mmol/L                       



             Chloride Level)                                        

 

             CO2 (test code = CO2) 23 mmol/L    22-29                     

 

             Anion Gap (test code = Anion 11 mmol/L    7-16                     

 



             Gap)                                                

 

             BUN (test code = BUN) 11.50 mg/dL  6.00-20.00                

 

             Creatinine Level (test code = 1.00 mg/dL   0.50-0.90    H          

  



             Creatinine Level)                                        

 

             BUN/Creat Ratio (test code = 12                        N           

 



             BUN/Creat Ratio)                                        

 

             Glucose Level (test code = 108 mg/dL                        



             Glucose Level)                                        

 

             Calcium Level (test code = 10.3 mg/dL   8.3-10.5                  



             Calcium Level)                                        

 

             Alk Phos (test code = Alk Phos) 93 U/L                       

    

 

             Bilirubin Total (test code = 0.5 mg/dL    0.1-0.9                  

 



             Bilirubin Total)                                        

 

             Albumin Level (test code = 4.4 g/dL     3.5-5.2                   



             Albumin Level)                                        

 

             Protein Total (test code = 7.2 g/dL     6.4-8.3                   



             Protein Total)                                        

 

             ALT (test code = ALT) 12 U/L       1-33                      

 

             AST (test code = AST) 17 U/L       1-32                      

 

             Globulin (test code = Globulin) 2.8 g/dL     2.9-3.1      L        

    

 

             A/G Ratio (test code = A/G 1.6 ratio                 N            



             Ratio)                                              



Alcohol Level2019-09-15 00:04:54





             Test Item    Value        Reference Range Interpretation Comments

 

             Ethanol Level (test <0.00 g/dL   0.00-0.01                 [facility]

gianfranco 0.080 g/dL



             code = Ethanol                                        or more



             Level)                                              

 

             Ethanol Inst (test <0                        N            



             code = Ethanol Inst)                                        



Comprehensive Metabolic Panel2019-09-15 00:04:54





             Test Item    Value        Reference Range Interpretation Comments

 

             Sodium Level (test 137.0 mmol/L 135.0-145.0               



             code = Sodium Level)                                        

 

             Potassium Level 4.0 mmol/L   3.5-5.1                   



             (test code =                                        



             Potassium Level)                                        

 

             Chloride Level (test 103 mmol/L                       



             code = Chloride                                        



             Level)                                              

 

             CO2 (test code = 23 mmol/L    22-29                     



             CO2)                                                

 

             Anion Gap (test code 11 mmol/L    7-16                      



             = Anion Gap)                                        

 

             BUN (test code = 11.50 mg/dL  6.00-20.00                



             BUN)                                                

 

             Creatinine Level 1.00 mg/dL   0.50-0.90    H            



             (test code =                                        



             Creatinine Level)                                        

 

             BUN/Creat Ratio 12                        N            



             (test code =                                        



             BUN/Creat Ratio)                                        

 

             Glucose Level (test 108 mg/dL                        



             code = Glucose                                        



             Level)                                              

 

             Calcium Level (test 10.3 mg/dL   8.3-10.5                  



             code = Calcium                                        



             Level)                                              

 

             Alk Phos (test code 93 U/L                           



             = Alk Phos)                                         

 

             Bilirubin Total 0.5 mg/dL    0.1-0.9                   



             (test code =                                        



             Bilirubin Total)                                        

 

             Albumin Level (test 4.4 g/dL     3.5-5.2                   



             code = Albumin                                        



             Level)                                              

 

             Protein Total (test 7.2 g/dL     6.4-8.3                   



             code = Protein                                        



             Total)                                              

 

             ALT (test code = 12 U/L       1-33                      



             ALT)                                                

 

             AST (test code = 17 U/L       1-32                      



             AST)                                                

 

             Globulin (test code 2.8 g/dL     2.9-3.1      L            



             = Globulin)                                         

 

             A/G Ratio (test code 1.6 ratio                 N            



             = A/G Ratio)                                        

 

             eGFR AA (test code = >60                       N            eGFR (e

stimated



             eGFR AA)     mL/min/1.73 m2                           Glomerular



                                                                 Filtration Rate

) is



                                                                 an estimated va

lue,



                                                                 calculated from

 the



                                                                 patient's serum



                                                                 creatinine usin

g the



                                                                 MDRD equation. 

It is



                                                                 NOT the patient

's



                                                                 actual GFR. The

 eGFR



                                                                 provides a more



                                                                 clinically usef

ul



                                                                 measure of kidn

ey



                                                                 disease than se

rum



                                                                 creatinine



                                                                 alone.***This



                                                                 calculation rimma

es



                                                                 sex and race in

to



                                                                 account, if the



                                                                 information is



                                                                 provided. If th

e



                                                                 race is not



                                                                 provided, and t

he



                                                                 patient is



                                                                 -Crystal

n,



                                                                 multiply by 1.2

12.



                                                                 If sex is not



                                                                 provided, and t

he



                                                                 patient is fema

le,



                                                                 multiply by 0.7

42.



                                                                 Results for pat

ients



                                                                 <18 years of ag

e



                                                                 have not been



                                                                 validated by th

e



                                                                 MDRD study and



                                                                 should be



                                                                 interpreted wit

h



                                                                 caution. eGFR R

esult



                                                                 Interpretation:

eGFR



                                                                 > or = 60 is in

 the



                                                                 Normal RangeeGF

R <



                                                                 60 may mean kid

hang



                                                                 diseaseeGFR < 1

5 may



                                                                 mean kidney



                                                                 failure*** Rang

es



                                                                 recommended by 

the



                                                                 [employer]



                                                                 [employer],



                                                                 http://nkdep.ni

h.gov



Comprehensive Metabolic Panel2019-09-15 00:04:54





             Test Item    Value        Reference Range Interpretation Comments

 

             Sodium Level (test 137.0 mmol/L 135.0-145.0               



             code = Sodium Level)                                        

 

             Potassium Level 4.0 mmol/L   3.5-5.1                   



             (test code =                                        



             Potassium Level)                                        

 

             Chloride Level (test 103 mmol/L                       



             code = Chloride                                        



             Level)                                              

 

             CO2 (test code = 23 mmol/L    22-29                     



             CO2)                                                

 

             Anion Gap (test code 11 mmol/L    7-16                      



             = Anion Gap)                                        

 

             BUN (test code = 11.50 mg/dL  6.00-20.00                



             BUN)                                                

 

             Creatinine Level 1.00 mg/dL   0.50-0.90    H            



             (test code =                                        



             Creatinine Level)                                        

 

             BUN/Creat Ratio 12                        N            



             (test code =                                        



             BUN/Creat Ratio)                                        

 

             Glucose Level (test 108 mg/dL                        



             code = Glucose                                        



             Level)                                              

 

             Calcium Level (test 10.3 mg/dL   8.3-10.5                  



             code = Calcium                                        



             Level)                                              

 

             Alk Phos (test code 93 U/L                           



             = Alk Phos)                                         

 

             Bilirubin Total 0.5 mg/dL    0.1-0.9                   



             (test code =                                        



             Bilirubin Total)                                        

 

             Albumin Level (test 4.4 g/dL     3.5-5.2                   



             code = Albumin                                        



             Level)                                              

 

             Protein Total (test 7.2 g/dL     6.4-8.3                   



             code = Protein                                        



             Total)                                              

 

             ALT (test code = 12 U/L       1-33                      



             ALT)                                                

 

             AST (test code = 17 U/L       1-32                      



             AST)                                                

 

             Globulin (test code 2.8 g/dL     2.9-3.1      L            



             = Globulin)                                         

 

             A/G Ratio (test code 1.6 ratio                 N            



             = A/G Ratio)                                        

 

             eGFR AA (test code = >60                       N            eGFR (e

stimated



             eGFR AA)     mL/min/1.73 m2                           Glomerular



                                                                 Filtration Rate

) is



                                                                 an estimated va

lue,



                                                                 calculated from

 the



                                                                 patient's serum



                                                                 creatinine usin

g the



                                                                 MDRD equation. 

It is



                                                                 NOT the patient

's



                                                                 actual GFR. The

 eGFR



                                                                 provides a more



                                                                 clinically usef

ul



                                                                 measure of kidn

ey



                                                                 disease than se

rum



                                                                 creatinine



                                                                 alone.***This



                                                                 calculation rimma

es



                                                                 sex and race in

to



                                                                 account, if the



                                                                 information is



                                                                 provided. If th

e



                                                                 race is not



                                                                 provided, and t

he



                                                                 patient is



                                                                 -Crystal

n,



                                                                 multiply by 1.2

12.



                                                                 If sex is not



                                                                 provided, and t

he



                                                                 patient is fema

le,



                                                                 multiply by 0.7

42.



                                                                 Results for pat

ients



                                                                 <18 years of ag

e



                                                                 have not been



                                                                 validated by [facility]



                                                                 MDRD study and



                                                                 should be



                                                                 interpreted wit

h



                                                                 caution. eGFR R

esult



                                                                 Interpretation:

eGFR



                                                                 > or = 60 is in

 the



                                                                 Normal RangeeGF

R <



                                                                 60 may mean kid

hang



                                                                 diseaseeGFR < 1

5 may



                                                                 mean kidney



                                                                 failure*** Rang

es



                                                                 recommended by 

the



                                                                 [employer]



                                                                 [employer],



                                                                 http://nkdep.ni

h.gov

 

             eGFR Non-AA (test 58.45                     N            eGFR (sheba

mated



             code = eGFR Non-AA) mL/min/1.73 m2                           Glomer

ular



                                                                 Filtration Rate

) is



                                                                 an estimated va

lue,



                                                                 calculated from

 the



                                                                 patient's serum



                                                                 creatinine usin

g the



                                                                 MDRD equation. 

It is



                                                                 NOT the patient

's



                                                                 actual GFR. The

 eGFR



                                                                 provides a more



                                                                 clinically usef

ul



                                                                 measure of kidn

ey



                                                                 disease than se

rum



                                                                 creatinine



                                                                 alone.***This



                                                                 calculation rimma

es



                                                                 sex and race in

to



                                                                 account, if the



                                                                 information is



                                                                 provided. If th

e



                                                                 race is not



                                                                 provided, and t

he



                                                                 patient is



                                                                 -Crystal

n,



                                                                 multiply by 1.2

12.



                                                                 If sex is not



                                                                 provided, and t

he



                                                                 patient is fema

le,



                                                                 multiply by 0.7

42.



                                                                 Results for pat

ients



                                                                 <18 years of ag

e



                                                                 have not been



                                                                 validated by [facility]



                                                                 MDRD study and



                                                                 should be



                                                                 interpreted wit

h



                                                                 caution. eGFR R

esult



                                                                 Interpretation:

eGFR



                                                                 > or = 60 is in

 the



                                                                 Normal RangeeGF

R <



                                                                 60 may mean kid

hang



                                                                 diseaseeGFR < 1

5 may



                                                                 mean kidney



                                                                 failure*** Rang

es



                                                                 recommended by 

the



                                                                 [employer]



                                                                 [employer],



                                                                 http://nkdep.ni

h.gov



Urinalysis Microscopic2019-09-15 00:02:53





             Test Item    Value        Reference Range Interpretation Comments

 

             UA WBC (test code = UA WBC) 6-10         0-5          A            

 

             UA RBC (test code = UA RBC) 0-5          0-5                       

 

             UA Bacteria (test code = UA Bacteria) Many                      A  

          

 

             UA Squam Epithelial (test code = UA TNTC                      A    

        



             Squam Epithelial)                                        



Urine Drug [id number] 23:59:42





             Test Item    Value        Reference Range Interpretation Comments

 

             Amphetamine Screen Ur POSITIVE     Negative     A            



             (test code = Amphetamine                                        



             Screen Ur)                                          

 

             Barbiturate Screen Ur Negative     Negative                  



             (test code = Barbiturate                                        



             Screen Ur)                                          

 

             Benzodiazepines Ur (test POSITIVE     Negative     A            



             code = Benzodiazepines                                        



             Ur)                                                 

 

             Cocaine Screen Ur (test Negative     Negative                  



             code = Cocaine Screen                                        



             Ur)                                                 

 

             U Methadone Scr (test Negative     Negative                  



             code = U Methadone Scr)                                        

 

             Opiate Screen Ur (test Negative     Negative                  



             code = Opiate Screen Ur)                                        

 

             U PCP Scrn (test code = Negative     Negative                  



             U PCP Scrn)                                         

 

             Cannabinoid Screen Ur Negative     Negative                  



             (test code = Cannabinoid                                        



             Screen Ur)                                          

 

             U TCA (test code = U Negative     Negative                  The res

ults of all



             TCA)                                                drug screen elinor

ts are



                                                                 only preliminar

y.



                                                                 Clinical



                                                                 consideration a

nd



                                                                 professional ju

dgment



                                                                 should be appli

ed to



                                                                 any drug of abu

se



                                                                 test result,



                                                                 particularly wh

en



                                                                 preliminary pos

itive



                                                                 results are obt

ained.



                                                                 Please order a



                                                                 separate confir

matory



                                                                 test if desired

.



HCG Qualitative [id number] 23:54:43





             Test Item    Value        Reference Range Interpretation Comments

 

             hCG Ur (test code = Negative                               If the r

esult is



             hCG Ur)                                             "Negative" in p

atients



                                                                 suspected to be



                                                                 pregnant, recom

mend



                                                                 retest with a s

ample



                                                                 obtained 48 to 

72



                                                                 hours later, or

 by



                                                                 ordering a



                                                                 quantitative as

say. If



                                                                 the result is



                                                                 "Borderline" te

sting



                                                                 should be repea

gianfranco in



                                                                 48 to 72 hours.

 

             Lot # (test code = [id number]                    N            



             Lot #)                                              

 

             Expiration Dt (test 2020                  N            



             code = Expiration Dt)                                        

 

             Neg Control (test Negative                               



             code = Neg Control)                                        

 

             Pos Control (test Positive                               



             code = Pos Control)                                        

 

             Internal QC (test Acceptable                             



             code = Internal QC)                                        



Urinalysis with Culture, if [id number] 23:52:04





             Test Item    Value        Reference Range Interpretation Comments

 

             UA Color (test code = UA YELLO        Yellow                    



             Color)                                              

 

             UA Appear (test code = SCLD         Clear        A            



             UA Appear)                                          

 

             UA pH (test code = UA 6.5                                    



             pH)                                                 

 

             UA Spec Grav (test code 1.011        1.001-1.035               



             = UA Spec Grav)                                        

 

             UA Glucose (test code = NEG          Negative                  



             UA Glucose)                                         

 

             UA Bili (test code = UA NEG          Negative                  



             Bili)                                               

 

             UA Ketones (test code = NEG          Negative                  



             UA Ketones)                                         

 

             UA Blood (test code = UA NEG          Negative                  



             Blood)                                              

 

             UA Protein (test code = NEG          Negative                  



             UA Protein)                                         

 

             UA Urobilinogen (test 1 mg/dL      >0.2         A            



             code = UA Urobilinogen)                                        

 

             UA Nitrite (test code = POS          Negative     A            



             UA Nitrite)                                         

 

             UA Leuk Est (test code = NEG          Negative                  



             UA Leuk Est)                                        

 

             UA Micro Ind? (test code Indicated    Not Indicated A            Re

sult created by



             = UA Micro Ind?)                                        rule



                                                                 [id number]

_IND



Automated [id number] 23:48:39





             Test Item    Value        Reference Range Interpretation Comments

 

             Neutro Auto (test code = Neutro 75.7 %       36.0-70.0    H        

    



             Auto)                                               

 

             Lymph Auto (test code = Lymph Auto) 14.1 %       12.0-44.0         

        

 

             Mono Auto (test code = Mono Auto) 7.6 %        0.0-11.0            

      

 

             Eos, Auto (test code = Eos, Auto) 1.8 %        0.0-7.0             

      

 

             Basophil Auto (test code = Basophil 0.5 %        0.0-2.0           

        



             Auto)                                               

 

             Neutro Absolute (test code = Neutro 12.7 x10     1.6-7.4      H    

        



             Absolute)                                           

 

             Lymph Absolute (test code = Lymph 2.38 x10     .50-4.60            

      



             Absolute)                                           

 

             Mono Absolute (test code = Mono 1.28 x10     .00-1.20     H        

    



             Absolute)                                           

 

             Eos Absolute (test code = Eos 0.31 x10     0.00-0.74               

  



             Absolute)                                           

 

             Baso Absolute (test code = Baso 0.09 x10     0.00-0.21             

    



             Absolute)                                           



IG [id number] 23:48:39





             Test Item    Value        Reference Range Interpretation Comments

 

             IG (test code = IG) 0.3 %        0.0-5.0                   

 

             IG Abs (test code = IG Abs) 0 x10                     N            



Complete Blood Count with [id number] 23:48:38





             Test Item    Value        Reference Range Interpretation Comments

 

             WBC (test code = WBC) 16.8 x10     4.4-10.5     H            

 

             RBC (test code = RBC) 4.06 x10     3.75-5.20                 

 

             Hgb (test code = Hgb) 12.7 g/dL    12.2-14.8                 

 

             MCV (test code = MCV) 94.10 fL     80..00              

 

             Hct (test code = Hct) 38.2 %       36.5-44.4                 

 

             MCHC (test code = 33.20 g/dL   32.00-37.50               



             MCHC)                                               

 

             RDW CV (test code = 13.2 %       11.5-14.5                 



             RDW CV)                                             

 

             MCH (test code = MCH) 31.3 pg      27.0-32.5                 

 

             Platelets (test code = 363.0 x10    140.0-440.0               



             Platelets)                                          

 

             MPV (test code = MPV) 10.0 fL                   N            

 

             Slide Review (test Auto         Auto                      Result cr

eated by



             code = Slide Review)                                        [id number]_

SLIDE_REV_AUTO

 

             nRBC (test code = 0                         N            



             nRBC)                                               

 

             NRBC Abs (test code = 0.00 x10                  N            



             NRBC Abs)                                           

 

             IPF (test code = IPF) 0 %                       N            



RPR, [id number] 05:53:00





             Test Item    Value        Reference Range Interpretation Comments

 

             RPR (test code = RPR) Non-Reactive Non-Reactive N            



BHCG, Serum, [id number] 01:39:00





             Test Item    Value        Reference Range Interpretation Comments

 

             Preg Qual [Se] (test code = BSHCG) Negative     Negative     N     

       



BHCG, Serum, [id number] 01:09:00





             Test Item    Value        Reference Range Interpretation Comments

 

             B hCG, Quant (test <1 mIU/mL                              Weeks of 

Gestation



             code = BHCGQT)                                        Ranges (mIU/m

L)3 weeks



                                                                 5.40 - 72.04 we

eks 10.2



                                                                 - 7085 weeks 21

7 - 74096



                                                                 weeks 152 - 321

777 weeks



                                                                  4059 - 4048480

 weeks



                                                                 02750 - 6547811

 weeks



                                                                 14911 - 8865809

0 weeks



                                                                 13425 - 9553997

2 weeks



                                                                 22334 - 1023587

4 weeks



                                                                 63459 - 1033659

 weeks



                                                                 26852 - 3014601

 weeks



                                                                 3704 - 8058549 

weeks



                                                                 8240 - 2660288 

weeks



                                                                 6947 - 40765



Thyroid Stimulating Hormone (TSH)2017 01:09:00





             Test Item    Value        Reference Range Interpretation Comments

 

             TSH (test code = TSH) 0.93 mIU/mL  0.270-4.200  N            



Lipid [id number] 01:05:00





             Test Item    Value        Reference Range Interpretation Comments

 

             Cholesterol (test 163 mg/dL    0-200        N            



             code = CHOL)                                        

 

             Triglycerides (test 108 mg/dL    9-200        N            



             code = TRIG)                                        

 

             HDL (test code = 41 mg/dL     50-60        L            



             HDL)                                                

 

             Chol/HDL (test code 4.0 Ratio    0.0-4.4      N            



             = CHOLPHDL)                                         

 

             LDL, Calculated 100          0-130        N            (NOTE)RISK O

F HEART



             (test code = LDLC)                                        DISEASEPu

blished by



                                                                 American Heart



                                                                 AssociationAnal

yte



                                                                 Optimal Boderli

ne



                                                                 Increased RiskC

HOL <200



                                                                 200-239 >240TRI

G <150



                                                                 150-199 >200HDL

 Male:



                                                                 >60 <40HDL Fema

le: >60



                                                                 <50LDL <100 130

-159



                                                                 >160LDL NEAR OP

TIMAL IS



                                                                 100-129

 

             VLDL (test code = 22 mg/dL     5-40         N            



             VLDL)                                               

 

             LDL/HDL (test code = 2                                      



             LDLPHDL)                                            



Comprehensive Metabolic [id number] 21:02:00





             Test Item    Value        Reference Range Interpretation Comments

 

             Sodium (test code = 140 mmol/L   135-145      N            



             NA)                                                 

 

             Potassium (test 3.6 mmol/L   3.5-5.1      N            



             code = K)                                           

 

             Chloride (test code 103 mmol/L          N            



             = CL)                                               

 

             Carbon Dioxide 26 mmol/L    22-29        N            



             (test code = CO2)                                        

 

             Glucose (test code 89 mg/dL            N            



             = GLU)                                              

 

             Blood Urea Nitrogen 13 mg/dL     6-20         N            



             (test code = BUN)                                        

 

             Creatinine (test 0.8 mg/dL    0.5-0.9      N            



             code = CREAT)                                        

 

             Calcium (test code 10.0 mg/dL   8.3-10.5     N            



             = CA)                                               

 

             Prot Total (test 7.0 g/dL     6.4-8.3      N            



             code = TP)                                          

 

             Albumin (test code 4.2 g/dL     3.5-5.2      N            



             = ALB)                                              

 

             A/G Ratio (test 1.5 Ratio                              



             code = AGRATIO)                                        

 

             Globulin (test code 2.8          2.9-3.1      L            



             = GLOB)                                             

 

             Bili Total (test 0.6 mg/dL    0.1-0.9      N            



             code = TBIL)                                        

 

             Alk Phos (test code 91 U/L              N            



             = APHOS)                                            

 

             AST (test code = 19 U/L       1-32         N            



             AST)                                                

 

             ALT (test code = 14 U/L       1-33         N            



             ALT)                                                

 

             BUN/Creatinine 16.3                                   



             Ratio (test code =                                        



             BCRATIO)                                            

 

             Anion Gap (test 11 mmol/L    7-16         N            



             code = AGAP)                                        

 

             Estimated GFR (test >60                                    eGFR (es

timated



             code = GFR)  mL/min/1.73m2                           Glomerular Nguyễn

tration



                                                                 Rate) is an est

imated



                                                                 value,calculate

d from



                                                                 the patient's s

harman



                                                                 creatinine usin

g the



                                                                 MDRD equation.I

t is



                                                                 NOT the patient

's



                                                                 actual GFR. The

 eGFR



                                                                 provides a more



                                                                 clinicallyusefu

l



                                                                 measure of kidn

ey



                                                                 disease than se

rum



                                                                 creatinine



                                                                 alone.***This



                                                                 calculation rimma

es sex



                                                                 and race into



                                                                 account, if the



                                                                 informationis



                                                                 provided. If th

e race



                                                                 is not provided

, and



                                                                 the patient



                                                                 isAfrican-Ameri

can,



                                                                 multiply by 1.2

12. If



                                                                 sex is not prov

ided,



                                                                 and thepatient 

is



                                                                 female, multipl

y by



                                                                 0.742. Results 

for



                                                                 patients <18 ye

ars



                                                                 ofage have not 

been



                                                                 validated by 

e MDRD



                                                                 study and shoul

d be



                                                                 interpretedwith



                                                                 caution.eGFR Re

sult



                                                                 Interpretation:

eGFR >



                                                                 or = 60 is in t

he



                                                                 Normal RangeeGF

R < 60



                                                                 may mean kidney



                                                                 diseaseeGFR < 1

5 may



                                                                 mean kidney



                                                                 failure***Range

s



                                                                 recommended by 

the



                                                                 [employer]



                                                                 Foundation,http

://nkd



                                                                 ep.nih.gov



Alcohol/Ethanol, [id number] 21:02:00





             Test Item    Value        Reference Range Interpretation Comments

 

             Alcohol, Ethyl <0.01 g/dL   0.00-0.01    N            Intoxicated 0

.080 g/dL



             (test code = ETOH)                                        or more



CBC with [id number] 20:35:00





             Test Item    Value        Reference Range Interpretation Comments

 

             WBC (test code = WBC) 10.3 K/cumm  4.4-10.5     N            

 

             RBC (test code = RBC) 4.37 M/cumm  3.75-5.20    N            

 

             Hemoglobin (test code = HGB) 13.1 gm/dL   12.2-14.8    N           

 

 

             Hematocrit (test code = HCT) 41.5 %       36.5-44.4    N           

 

 

             MCV (test code = MCV) 94.9 fL             N            

 

             MCH (test code = MCH) 30.1 pg      27.0-32.5    N            

 

             MCHC (test code = MCHC) 31.7 g/dL    32.0-37.5    L            

 

             RDW (test code = RDW) 12.9 %       11.5-14.5    N            

 

             Platelet Count (test code = 327 K/cumm   140-440      N            



             PLTCT)                                              

 

             MPV (test code = MPV) 7.0 fL                                 

 

             Diff Method (test code = DIFFM) Auto                               

    

 

             Neutrophil (test code = NEUT) 74.0 %       36-70        H          

  

 

             Lymphocyte (test code = LYMPH) 17.6 %       12-44        N         

   

 

             Monocyte (test code = MONO) 6.4 %        0-11         N            

 

             Eosinophil (test code = EOS) 1.5 %        0-7          N           

 

 

             Basophil (test code = BASO) 0.5 %        0-2          N            

 

             Neutro Abs (test code = ANEUT) 7.6 K/cumm   1.6-7.4      H         

   

 

             Lymph Abs (test code = ALYMPH) 1.8 K/cumm   0.5-4.6      N         

   

 

             Mono Abs (test code = AMONO) 0.7 K/cumm   0.0-1.2      N           

 

 

             Eos Abs (test code = AEOS) 0.16 K/cumm  0.00-0.74    N            

 

             Baso Abs (test code = ABASO) 0.1 K/cumm   0.00-0.21    N           

 



[id number] 20:33:00





             Test Item    Value        Reference Range Interpretation Comments

 

             Amphetamine (test code POSITIVE     Negative     A            For d

iagnostic purposes



             = AMPH)                                             only, positive 

results



                                                                 should always b

e



                                                                 assessedin



                                                                 conjunctionwith

 the



                                                                 patient's medic

al



                                                                 history,clinica

l



                                                                 examination and



                                                                 otherfindings.T

o



                                                                 fulfill legal



                                                                 requirements, a

 more



                                                                 specific altern

ate



                                                                 chemical method

must be



                                                                 used inorder to

 obtain



                                                                 a Confirmed judith

lytical



                                                                 result. GC/MS i

s the



                                                                 preferred confi

rmatory



                                                                 method.

 

             Barbiturates (test Negative     Negative     N            



             code = GENEVIEVE)                                        

 

             Benzodiazepine (test Negative     Negative     N            



             code = IRENE)                                        

 

             Cocaine (test code = Negative     Negative     N            



             COCA)                                               

 

             Methadone (test code = Negative     Negative     N            



             MTHD)                                               

 

             Opiates (test code = Negative     Negative     N            



             OPIA)                                               

 

             PCP (test code = PCP) Negative     Negative     N            

 

             Propoxyphene (test Negative     Negative     N            



             code = PROPOX)                                        

 

             THC (test code = THC) Negative     Negative     N            



Urinalysis [id number] 20:23:00





             Test Item    Value        Reference Range Interpretation Comments

 

             Color (test code = COLOR) Yellow       Yellow,Straw,Pl N           

 



                                       yellow                    

 

             Clarity (test code = Sl Cloudy    Clear        A            



             CLAR)                                               

 

             Specific Gravity (test 1.007        1.001-1.035  N            



             code = SPGR)                                        

 

             pH (test code = PH) 6.0          5.0-9.0      N            

 

             Ketone (test code = KET) Negative mg/dL Negative     N            

 

             Glucose (test code = Negative mg/dL Negative     N            



             GLUCUR)                                             

 

             Protein (test code = Negative mg/dL Negative     N            



             PROT)                                               

 

             Bilirubin (test code = Negative mg/dL Negative     N            



             BILI)                                               

 

             Occult Blood (test code = Moderate     Negative     A            



             UDOB)                                               

 

             Urobilinogen (test code = 0.2 mg/dL    0.2-1.0      N            



             UROB)                                               

 

             Nitrite (test code = NIT) Positive     Negative     A            

 

             Leuk Esterase (test code Moderate     Negative     A            



             = LEUK)                                             

 

             Micros Exam (test code = Indicated                              



             MEXAM)                                              

 

             Epithelial Cells (test 50+ /LPF     0-30         A            



             code = EPI)                                         

 

             WBC, Urine (test code = 41-50 /HPF   0-5          A            



             UWBC)                                               

 

             RBC, Urine (test code = 3-5 /HPF     0-5          A            



             URBC)                                               

 

             Bacteria (test code = Many /HPF                              



             BACT)                                               



[id number] 16:21:00





             Test Item    Value        Reference Range Interpretation Comments

 

             Lithium (test code = LI) 0.6 mmol/l   0.6-1.2                   



[facility] (Ultra Sensitive)2017 16:21:00





             Test Item    Value        Reference Range Interpretation Comments

 

             TSH (test code = TSH) 2.14 mIU/L   0.47-4.68                 



[facility]





             Test Item    Value        Reference Range Interpretation Comments

 

             Glucose (test code 77 mg/dl                         



             = GLU)                                              

 

             BUN (test code = 9.0 mg/dl    6.0-17.0                  



             BUN)                                                

 

             Creatinine (test 0.7 mg/dl    0.4-1.2                   



             code = CREA)                                        

 

             Sodium (test code = 139 mmol/l   137-145                   



             NA)                                                 

 

             Potassium (test 4.7 mmol/l   3.5-5.0                   



             code = K)                                           

 

             Chloride (test code 107 mmol/l                       



             = CL)                                               

 

             CO2 (test code = 22 mmol/l    22-30                     



             CO2)                                                

 

             Anion Gap (test 11                                     



             code = GAP)                                         

 

             Calcium (test code 9.8 mg/dl    8.4-10.2                  



             = CALC)                                             

 

             T Protein (test 8.0 gm/dl    5.1-8.7                   



             code = TP)                                          

 

             Albumin (test code 4.4 gm/dl    3.5-4.6                   



             = ALB)                                              

 

             A/G Ratio (test 1.2 %        1.1-2.2                   



             code = AGRAT)                                        

 

             AST (SGOT) (test 20 U/L       11-36                     



             code = AST)                                         

 

             ALT (SGPT) (test 29 U/L       11-40                     



             code = ALT)                                         

 

             Alkaline Phos (test 108 U/L                          



             code = ALKP)                                        

 

             Total Bilirubin 0.6 mg/dl    0.2-1.2                   



             (test code = TBIL)                                        

 

             Globulin (test code 3.6 gm/dl    2.3-3.5      H            



             = GLOBU)                                            

 

             Calcium, Corrected 9.5 mg/dl    8.4-10.2                  Various f

ormulas exist



             (test code =                                        for corrected s

harman



             CALCCORR)                                           calcium results

, each



                                                                 yielding differ

ent



                                                                 values. This co

rrected



                                                                 result was base

d on



                                                                 the formula: Co

rrected



                                                                 Calcium = Serum

Calcium



                                                                 + [0.8 * ( 4 -



                                                                 SerumAlbumin)]

 

             EGFR if  >60                                    



             American (test code mL/min/1.73m\                           



             = EGFRAA)    S\2                                    

 

             EGFR if Non- >60                                    Estimate

d Glomerular



             American (test code mL/min/1.73m\                           Filtrat

ion Rate (eGFR)



             = EGFRNA)    S\2                                    Reference Inter

vals



                                                                 Decision Points

 for 18



                                                                 years and older

 and



                                                                 average body ma

ss: >=



                                                                 60 Does not exc

lude



                                                                 kidney disease.

 30 -



                                                                 59 Suggests mod

erate



                                                                 chronic kidney 

disease



                                                                 and indicates t

he need



                                                                 for further



                                                                 investigation



                                                                 including asses

sment



                                                                 of proteinuria 

and



                                                                 cardiovascular



                                                                 factors. < 30 U

sually



                                                                 indicates a nee

d for



                                                                 referral for



                                                                 assessment and



                                                                 management of c

hronic



                                                                 kidney failure.



[facility]



Notes







                Date/Time       Note            Provider        Source

 

                2023 20:08:00-00:00  [facility]



                                                                



                                 [address] [place] [address]                 



                                                                



                                  Emergency Department Document                 



                                 Signed                         



                                                                



                                Patient: Tyler Edge Medical Record#: [id number]

[id number]                 



                                : 1970 Acct:[id number]               

  



                                Age/Sex: 52 / F Admit/Reg Date: 23        

         



                                Loc: [facility] Room: Report Number: [id number] 

                



                                Attending Dr: John Melton MD                 



                                                                



                                                                



                                                                



                                                                



                                Psych HPI                       



                                                                



                                - General                       



                                Nursing note reviewed: Yes                 



                                Source: patient                 



                                Mode of arrival: ambulatory                 



                                Limitations: no limitations                 



                                Primary Care Provider: Wiyl Genao         

        



                                                                



                                - General                       



                                Chief Complaint: Psychiatric Symptoms           

      



                                Stated Complaint: PINA                 



                                                                



                                - History of Present Illness                 



                                HPI Narrative:                  



                                                                



                                                    51yo f w/ pmh schizophrenia,

 bipolar d/o, htn, hip fx, brought w/ police w/ pina

for psych eval, pt                                  



                                                    states she has a "low mood,"

 has been feeling depressed for a while, has had 

suicidal ideations in                               



                                                    the past but not currently. 

pt states she was here 2 days ago and tested 

positive for                                        



                                                    methamphetamines and she was

 started on depakote and abilify and thinks that 

has helped w/ her                                   



                                                    suicidal ideations however s

till feels depressed. pt was at [facility] 

this morning and they                               



                                                    found a UTI as well as she w

as pregnant. pt denies any other medical 

complaints. pt states she is h                           



                                                    omeless and has been for 3 w

eeks and thinks that is making her depressed. pt 

[facility] and yeste

rday and they ignored her. pt states she drinks 

alcohol ocassionally, l                             



                                ast drink 3 wks ago. and smoke 2ppd. uses meth. 

(John Melton)                 



                                                                



                                - Related Data                  



                                 Allergies                      



                                                                



                                                                



                                                                



                                Allergy/AdvReac Type Severity Reaction Status Da

te / Time                 



                                                                



                                diphenhydramine Allergy Anxiety Verified 

3 20:13                 



                                                                



                                [From Benadryl]                 



                                                                



                                Penicillins Allergy Difficulty Verified 23

 20:13                 



                                                                



                                 Breathing                      



                                                                



                                risperidone [From Risperdal] Allergy Rash Verifi

ed 23 20:13                 



                                                                



                                                                



                                                                



                                                                



                                Review of Systems                 



                                ROS:                            



                                                                



                                Constitutional: No fevers, chills, sweats, weigh

t loss                 



                                Eye: No visual problems                 



                                ENMT: No ear pain, nasal congestion, sore throat

                 



                                Respiratory: No shortness of breath, cough      

           



                                                    Cardiovascular: No Chest tan

n, palpitations, syncope, swelling in legs, dyspnea

on exertion                                         



                                                    Gastrointestinal: No nausea,

 vomiting, diarrhea, abdominal pain, no difficulty 

swallowing                                          



                                                    Genitourinary: No hematuria,

 no dysuria, no hesitancy, no frequency, no 

incontinence                                        



                                Hema/Lymph: Negative for bruising tendency, swol

alana lymph glands                 



                                                    Endocrine: Negative for exce

ssive thirst, glucose normal, no heat or cold 

intolerance                                         



                                                    Musculoskeletal: No back tan

n, neck pain, joint pain, muscle pain, decreased 

range of motion                                     



                                Integumentary: No rash, pruritus, abrasions, no 

skin ulcers                 



                                                    Neurologic: No focal weaknes

s, no paresthesia, no headaches, numbness or 

tingling, no seizures or                            



                                tremors (John Melton)                 



                                                                



                                Past Medical/Surgical History                 



                                Medical History:                 



                                Medical History (Last Updated 23 @ 22:14 b

y Sudheer Barakat RN)                 



                                Hypertension (Medical) I10                 



                                                                



                                                                



                                                                



                                Family/Social History                 



                                                                



                                - Family History                 



                                Family History: reviewed, not pertinent         

        



                                Family History Of: None Reported                

 



                                                                



                                - Social History                 



                                Living Situation: Homeless                 



                                Do you drink alcohol: No                 



                                Current or Hx of Recreational Drug use: No      

           



                                                                



                                Physical Exam                   



                                Triage Vital Signs:                 



                                                                



                                                                



                                                                



                                                                



                                Temperature 37.4 C 23 20:06               

  



                                                                



                                Temperature Source Oral 23 20:06          

       



                                                                



                                Pulse Rate 89 23 20:06                 



                                                                



                                Respiratory Rate 17 23 20:06              

   



                                                                



                                Blood Pressure 129/55 L 23 20:06          

       



                                                                



                                Blood Pressure Source Automatic Cuff 23 20

:06                 



                                                                



                                Blood Pressure Mean 79 23 20:06           

      



                                                                



                                Blood Pressure Position Sitting 23 20:06  

               



                                                                



                                O2 Sat by Pulse Oximetry 97 23 20:06      

           



                                                                



                                Oxygen Delivery Method 23 20:06           

      



                                                                



                                                                



                                Physical Exam:                  



                                                                



                                CONSTITUTIONAL: no apparent distress, well appea

ring                 



                                SKIN: warm, dry, no jaundice, hives or petechiae

                 



                                                    EYES: pupils are equally rou

nd, extraocular movements intact without nystagmus,

clear conjunctiva,                                  



                                non-icteric sclera                 



                                                    HENT: normocephalic, atrauma

tic, moist mucus membranes, oropharynx clear 

without exudates                                    



                                                    NECK: Nontender and supple w

ith no nuchal rigidity, no lymphadenopathy, full 

range of motion                                     



                                                    PULMONARY: clear to ausculta

tion without wheezes, rhonchi, or rales, normal 

excursion, no accessory                             



                                muscle use and no stridor                 



                                                    CARDIOVASCULAR: regular rate

, rhythm, normal S1 and S2. No appreciated murmurs.

Strong radial pulses                                



                                with intact distal perfusion                 



                                                    GASTROINTESTINAL: soft, non-

tender, non-distended, no palpable masses, no 

rebound or guarding                                 



                                                    PSYCHIATRIC: depressed mood 

and affect, denies si/hi, denies hallucinations 

(John Melton)                                       



                                                                



                                Results/Orders                  



                                                                



                                - Results and Orders                 



                                Result diagrams:                 



                                 23 20:19                 



                                                                



                                 23 20:19                 



                                                                



                                - Results and Orders                 



                                Lab Testing Results                 



                                                                



                                                    23 20:19: WBC 7.9, RBC

 4.27, Hgb 12.9, Hct 40.2, MCV 94.10, MCH 30.2, 

MCHC 32.10, RDW Coeff of                            



                                                    Amanda 12.4, Plt Count 211.0, M

PV 11.1, Immature Gran % (Auto) 0.3, Neut % (Auto) 

75.8 H, Lymph %                                     



                                                    (Auto) 10.6 L, Mono % (Auto)

 11.8 H, Eos % (Auto) 0.9, Baso % (Auto) 0.6, Neut 

# (Auto) 6.0, Lymph #                               



                                                    (Auto) 0.83, Mono # (Auto) 0

.93, Eos # (Auto) 0.07, Baso # (Auto) 0.05, 

Immature Gran # (Auto) 0.02,                           



                                Absolute Nucleated RBC 0, Nucleated RBC % (auto)

 0                 



                                                    23 20:19: Sodium 142.0

, Potassium 4.2, Chloride 110 H, Carbon Dioxide 26,

Anion Gap 6, BUN 17,                                



                                                    Creatinine 0.88, Estimated C

reat Clear 80.94, Estimated GFR 78 L, 

BUN/Creatinine Ratio 19, Glucose                           



                                                    92, Calculated Osmolality 29

5.0, Calcium 9.1, Total Bilirubin 0.3, AST 22, ALT 

15, Alkaline                                        



                                                    Phosphatase 109, Total Prote

in 6.9, Albumin 3.9, Globulin 3.0, Albumin/Globulin

Ratio 1.3, Ethyl                                    



                                Alcohol < 3                     



                                23 20:19: HCG (Qual) Negative             

    



                                                    23 20:35: Urine Opiate

s Screen Negative, Urine Methadone Screen Negative,

Ur Propoxyphene                                     



                                                    Screen Negative, Ur Barbitur

ates Screen Negative, Ur Phencyclidine Scrn 

Negative, Ur Amphetamines                           



                                                    Screen Negative, U Benzodiaz

epines Scrn Negative, Urine Cocaine Screen 

Negative, U Cannabinoids                            



                                Screen Negative                 



                                                    23 20:35: Urine Color 

Yellow, Urine Clarity Clear, Urine pH 6.5, Ur 

Specific Gravity 1.015,                             



                                                    Urine Protein Negative, Urin

e Glucose (UA) Negative, Urine Ketones Negative, 

Urine Blood Negative,                               



                                                    Urine Nitrate Negative, Urin

e Bilirubin Negative, Urine Urobilinogen 1.0, Ur 

Leukocyte Esterase                                  



                                                    Trace A, Urine WBC (Auto) No

t Reportable, Urine RBC (Auto) Not Reportable, 

Urine Casts (Auto) Not                              



                                                    Reportable, U Epithel Cells 

(Auto) Not Reportable, Urine Bacteria (Auto) Not 

Reportable, Urine RBC                               



                                                    0-2, Urine WBC 0-5, Ur Squam

ous Epith Cells 6-10 A, Amorphous Crystals Few A, 

Urine Bacteria Few A,                               



                                Hyaline Casts 0-5 A                 



                                                                



                                                                



                                MDM/COURSE                      



                                 Vital Signs                    



                                                                



                                                                



                                                                



                                Temperature 37.4 C 23 20:06               

  



                                                                



                                Pulse Rate 89 23 20:06                 



                                                                



                                Respiratory Rate 17 23 20:06              

   



                                                                



                                Blood Pressure 129/55 L 23 20:06          

       



                                                                



                                O2 Sat by Pulse Oximetry 97 23 20:06      

           



                                                                



                                                                



                                                                



                                                                



                                                                



                                                                



                                Temperature 37.4 C 23 21:37               

  



                                                                



                                Pulse Rate 90 23 21:37                 



                                                                



                                Respiratory Rate 18 23 21:37              

   



                                                                



                                Blood Pressure 127/68 23 21:37            

     



                                                                



                                O2 Sat by Pulse Oximetry 99 23 21:37      

           



                                                                



                                                                



                                                                



                                                                



                                - [facility]                           



                                Medical decision making narrative:              

   



                                                                



                                23 20:13                  



                                ddx: depression, meth abuse, homeless           

      



                                                                



                                will get labs, medically clear, get psych eval  

               



                                02/15/23 05:25                  



                                                    Labs returned within normal 

limits, no UTI, patient is not pregnant as she 

stated. Patient medi                                



                                malathi cleared.                  



                                                                



                                                    Patient evaluated by Psychia

try and cleared for discharge with outpatient 

follow-up.                                          



                                                                



                                 Escalation of care including admission/observat

ion considered: None                 



                                                     Discussions of management w

ith other providers e.g. Hospitalist, Consultant, 

Case Mgmt, Pharmacy,                                



                                Radiology: None                 



                                 Discussed with Radiology regarding test interpr

etation: None                 



                                                     Independent interpretation:

 (e.g., EKG, rhythm strips, x-rays, CT, Other): 

None                                                



                                                     Additional patient history 

obtained from (e.g. Partner, Parent, Friend, EMS, 

Other): None                                        



                                                     Review of external non-ED r

ecord (e.g. Inpt record, Office record, Outpt 

record, Prior Outpt labs,                           



                                PCP record, Outside ED record, Other): None     

            



                                 Diagnostic tests considered but not performed: 

None                 



                                 Prescription medications considered but not giv

en: None                 



                                 Chronic conditions affecting care (e.g. HTN, DM

): None                 



                                                     Social Determinants of heal

th significantly affecting care (e.g. homeless): 

None (John Melton)                                  



                                                                



                                Discharge Plan                  



                                                                



                                - Discharge                     



                                Clinical Impression:                 



                                Depression                      



                                Qualifiers:                     



                                                     Depression Type: unspecifie

d Qualified Code(s): F32.A - Depression, 

unspecified                                         



                                                                



                                Disposition: Home or Self-Care                 



                                Condition: Good                 



                                Care Plan Goals:                 



                                Return if any issues or concerns arise.         

        



                                Follow up with your PCP or with one of the [facility] Providers/Clinics:                 



                                                                



                                [facility]                

 



                                [address] [place] [address]         

        



                                [phone]                  



                                                                



                                [facility]                  



                                [address] [place] [address]                 



                                [phone]                    



                                                                



                                [facility]                 



                                 [place] [address]                 



                                [phone]                    



                                                                



                                [facility]                 



                                [address] [place] [address]                 



                                [phone]                    



                                                                



                                Dr. Manoj Montero                 



                                [address] [facility]                 



                                [place] [id number]                 



                                [phone]                    



                                                                



                                Dr. Yolie Montalvo                 



                                [address] [facility]                 



                                [place] [address]                 



                                [phone]                    



                                                                



                                [facility]           

      



                                [address] [place] [address]             

    



                                [phone]                  



                                                                



                                Return if your condition worsens/return or any i

ssues or concerns arise.                 



                                Referrals:                      



                                Wily Genao MD [Primary Care Provider] -  

               



                                Print Language: English                 



                                                                



                                - Discharge Data                 



                                Time Seen by Provider: 23 19:59           

      



                                                                



                                                                



                                                                



                                Dictated By: John Melton MD                 



                                Signed By: John Melton MD 02/15/23 0526         

        



                                                                



                                                                



                                                                



                                                                



                                                                



                                DD/DT: 23                 



                                TD/TT: 23 : [id number]  

               



                                                                



                                                                



                                cc: [id number]*                    



                                                                



                                Wily Genao MD                 

 

                2020 15:43:00-00:00 [id number]                       [address] [address]                 



                                                                



                                PATIENT NAME: TYLER EDGE ADMIT DATE: 20

                 



                                ACCOUNT NO: [id number]  ROOM NO: [facility]       

          



                                MEDICAL RECORD NO: [id number] AGE: 49           

      



                                REPORT TYPE: eECHOCARDIOGRAM REPORT SEX: F      

           



                                                                



                                ADMITTING PHYSICIAN: Derian Ferrara MD        

         



                                ATTENDING PHYSICIAN: Derian Ferrara MD        

         



                                                                



                                                                



                                                                



                                *[facility]*             

    



                                [address] [place], [place] [address]                 



                                Phone [phone]                 



                                                                



                                Transthoracic Echocardiogram                 



                                                                



                                Patient: Tyler Edge                 



                                Study Date: 2020 BP: 97 / 82 Location: [facility]                 



                                URN: [id number] MRN: [id number] Account#: [id number]

[id number]                 



                                : 1970 Age: 49 Height: 62 in / 157.5   

              



                                 cm                             



                                Accession#: [id number] Gender: F Weight: 243

 lb / 110.5                 



                                 kg                             



                                BMI/BSA: 44.5 kg/m 2 /                 



                                 2.26 m 2                       



                                                                



                                *Ordering Physician: * Bee Multani            

     



                                                                



                                *Interpreting Physician: * Savanna GainesSonographer: * Eufemia Hodges                 



                                                                



                                ------------------------------------------------

------------------------                 



                                Indications: NSTEMI.                 



                                                                



                                ------------------------------------------------

------------------------                 



                                Study data: Transthoracic echocardiogram. Proced

ure: Transthoracic                 



                                echocardiography was performed. Image quality wa

s adequate. Intravenous                 



                                contrast (agitated saline) was administered. Com

plete 2D, complete                 



                                spectral Doppler, and color Doppler. Location: [facility]. Patient                 



                                status: Inpatient. Patient room number: ER-4. St

udy status: Stat.                 



                                                                



                                ------------------------------------------------

------------------------                 



                                Findings                        



                                                                



                                Left ventricle: The cavity size is normal. Wall 

thickness is mildly                 



                                increased. The estimated ejection fraction is 65

-69%.                 



                                Right ventricle: The cavity size is normal.     

            



                                Left atrium: The atrium is normal in size.      

           



                                Right atrium: The atrium is normal in size.     

            



                                Atrial septum: Echo contrast study shows no shun

t. 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                 



                                                                



                                PATIENT NAME: TYLER EDGE ACCOUNT #: [id number]

158                 



                                                                



                                                                



                                                                



                                primum aneurysm.                 



                                Aorta: Aortic root: The aortic root is normal in

 size.                 



                                Aortic valve: The valve is structurally normal. 

The valve is                 



                                trileaflet. There is no evidence of stenosis. Th

ere is mild                 



                                regurgitation.                  



                                Mitral valve: The valve is structurally normal. 

There is mild                 



                                regurgitation.                  



                                Tricuspid valve: The valve is structurally xenia

l. There is mild                 



                                regurgitation.                  



                                Pulmonic valve: The valve is structurally normal

. There is no                 



                                regurgitation.                  



                                Pericardium: There is no pericardial effusion.  

               



                                Pulmonary arteries:                 



                                Not visualized.                 



                                Systemic veins:                 



                                Inferior vena cava: The vessel is normal in size

.                 



                                                                



                                ------------------------------------------------

------------------------                 



                                Measurements                    



                                                                



                                 Left ventricle Value Ref Aortic valve continued

                 



                                Value Ref                       



                                 CLEMENTINA, LAX 5.0 cm 3.8 - 5.2 Mean v, S            

     



                                2.22 m/sec -----                 



                                 ESD, LAX 3.1 cm 2.2 - 3.5 VTI, S               

  



                                64.8 cm -----                   



                                 ESD/bsa, LAX 1.4 cm/m 2 1.3 - 2.1 LVOT/AV, VTI 

ratio                 



                                0.62 -----                      



                                 FS, LAX 37 % 27 - 45 RABIA, VTI                 



                                2.04 cm 2 -----                 



                                 PW, ED 1.3 cm 0.6 - 0.9 RABIA, Vmax              

   



                                1.94 cm 2 -----                 



                                 IVS/PW, ED 0.97 --------- AR peak v            

     



                                3.71 m/sec -----                 



                                 EF 67  % 54 - 74 AR decel time                 



                                2363 ms -----                   



                                 IVRT 80 ms --------- AR PHT                 



                                685 ms -----                    



                                 E', med ruddy, TDI  6.8 cm/sec >=7.0 AR peak grad

                 



                                55 mm Hg -----                  



                                 E/e', med ruddy, TDI 12 ---------                

 



                                  Mitral valve                  



                                Value Ref                       



                                 LVOT Value Ref Peak E                 



                                0.84 m/sec -----                 



                                 Diam, S 2.05 cm  --------- Peak A              

   



                                0.9 m/sec -----                 



                                 Area 3.3 cm 2 --------- Decel time             

    



                                324 ms -----                    



                                 Peak renny, S  1.8 m/sec --------- PHT           

      



                                78 ms -----                     



                                 Mean renny, S 1.36 m/sec --------- Peak grad, D  

               



                                2.8 mm Hg -----                 



                                 VTI, S  40.1 cm --------- Peak E/A ratio       

          



                                                                



                                PATIENT NAME: TYLER EDGE ACCOUNT #: [id number]

158                 



                                                                



                                                                



                                                                



                                0.94 -----                      



                                 Peak grad, S 13 mm Hg --------- MVA, PHT       

          



                                2.8 cm 2 -----                  



                                 Mean grad, S 8 mm Hg ---------                 



                                  ml --------- Tricuspid valve            

     



                                Value Ref                       



                                 SV/bsa 58 ml/m 2 --------- TR peak v           

      



                                2.83 m/sec <=2.8                 



                                  Peak RV-RA grad, S                 



                                32 mm Hg -----                  



                                 Ventricular septum Value Ref                 



                                 IVS, ED 1.2 cm 0.6 - 0.9 Aortic root           

      



                                Value Ref                       



                                 Root diam, ED MM                 



                                2.85 cm -----                   



                                 Right ventricle Value Ref                 



                                 Pressure, S 37 mm Hg --------- Pulmonary artery

                 



                                Value Ref                       



                                 Pressure, S                    



                                31.0 mm Hg -----                 



                                 Left atrium Value Ref                 



                                 AP dim, ES MM 3.5 cm 2.7 - 3.8 Systemic veins  

               



                                Value Ref                       



                                 LA/Ao root ratio, MM 1.24  --------- Estimated 

CVP                 



                                5 mm Hg -----                   



                                                                



                                 Aortic valve Value Ref Pulmonary veins         

        



                                Value Ref                       



                                 Leaflet sep, MM  1.65 cm --------- A rev durati

on                 



                                140 ms -----                    



                                                                



                                ------------------------------------------------

------------------------                 



                                Conclusions                     



                                                                



                                Summary:                        



                                                                



                                1. Left ventricle: The cavity size is normal. Wa

ll thickness is mildly                 



                                 increased. The estimated ejection fraction is 6

5-69%.                 



                                2. Right ventricle: The RV pressure during systo

le by Doppler is 37 mm                 



                                 Hg.                            



                                3. Atrial septum: Echo contrast study shows no s

hunt. 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                 



                                 primum aneurysm.                 



                                                                



                                Prepared and electronically signed by           

      



                                                                



                                Savanna Gaines MD                 



                                2020 15:43                 



                                                                



                                                                



                                                                



                                                                



                                                                



                                Electronically Signed by DINH Gaines MD on

 20 at 1543                 



                                                                



                                                                



                                PATIENT NAME: TYLER EDGE ACCOUNT #: [id number]

158                 

 

                2020 13:30:00-00:00                                 [facility]        

         



                                Hospitalist Discharge Summary                 



                                REPORT#:[id number] REPORT STATUS: Signed         

        



                                DATE:20 TIME:1330                 



                                                                



                                PATIENT: TYLER EDGE UNIT #: [id number]       

          



                                ACCOUNT#: [id number]  ROOM[address]: [address]      

           



                                : [birth year] AGE: 49 SEX: F ATTEND: Sanjiv Ferrara MD                 



                                ADM DT: 20 AUTHOR: Bee Multani MD        

         



                                                                



                                * ALL edits or amendments must be made on the [employer]/computer document *                 



                                                                



                                                                



                                PCP                             



                                                                



                                PCP                             



                                PCP:                            



                                PCP: No Primary or Family Physician             

    



                                                                



                                Discharge to: home                 



                                                                



                                                                



                                General Information                 



                                Date of admission:                 



                                Observation Start Date:                 



                                Date of admission: 20                 



                                                                



                                Discharge date: 20                 



                                Discharge diagnosis:                 



                                see hosp course                 



                                Hospital course:                 



                                HOSP COURSE                     



                                                                



                                48 yo F admitted with Ggeneralised weaknss and C

P night PTA                 



                                                                



                                ASSESSMENT AND PLAN:                 



                                1. Elevated cardiac enzymes. The patient is star

gianfranco on                 



                                weight-based Lovenox. started the patient on asp

irin, statin.                 



                                Hold beta-blocker because of bradycardia        

         



                                        Per cardiology, they feel the elevated c

ardiac enzymes are secondary to demand 

                                        



                                ischemia and not an STEMI                 



                                                                



                                Had CP PTA but resolved- dc home , dc her stuart hammond

oreq on discharge                 



                                                                



                                2. Symptomatic bradycardia with hypotension. Res

olved                 



                                Off Levophed, off dopamine                 



                                patient reports she may have mistakenly taken mo

re Coreq than her prescribed                 



                                dose at home                    



                                                                



                                3. Leukocytosis, etiology is unclear, However, t

he patient is pancultured.                 



                                Continue the empiric antibiotics, ceftriaxone , 

ID consulted                 



                                po abx on dc                    



                                                                



                                4. History of bipolar. The patient takes lithium

. Lithium level is 0.5                 



                                                                



                                stable for dc                   



                                dc home stable                  



                                                                



                                dc time 33 mins                 



                                Pt. condition on discharge: improved, stable    

             



                                                                



                                Med Rec                         



                                                                



                                Med Rec                         



                                Discharge meds:                 



                                Stop taking the following medications:          

       



                                CARVEDILOL (COREG) 25 MG TAB                 



                                 25 MILLIGRAM ORAL TWICE DAILY WITH MEALS.      

           



                                                                



                                Continue taking these medications:              

   



                                LITHIUM CARBONATE ER (LITHIUM CARBONATE ER) 450 

MG TAB.SA                 



                                 900 MILLIGRAM ORAL DAILY PM                 



                                                                



                                CHOLECALCIFEROL (VITAMIN D3) (VITAMIN D3) 1,000 

UNITS CAP                 



                                 1,000 UNITS ORAL DAILY.                 



                                                                



                                MULTIVITAMIN (MULTI-DAY VITAMIN) 1 TAB TAB      

           



                                 1 TABLET ORAL DAILY.                 



                                                                



                                GABAPENTIN (NEURONTIN) 100 MG CAP               

  



                                  100 MILLIGRAM ORAL THREE TIMES A DAY.         

        



                                                                



                                ALPRAZolam (XANAX) 0.5 MG TAB                 



                                 0.5 MILLIGRAM ORAL EVERY 8 HR AS NEEDED. as nee

ded for ANXIETY                 



                                                                



                                MIRTAZAPINE (REMERON) 30 MG TAB                 



                                  30 MILLIGRAM ORAL DAILY.                 



                                                                



                                ZIPRASIDONE (GEODON) 40 MG CAP                 



                                 40 MILLIGRAM ORAL TWICE DAILY.                 



                                                                



                                MELOXICAM (MOBIC) 7.5 MG TAB                 



                                 7.5 MILLIGRAM ORAL DAILY.                 



                                 Comments:                      



                                 10MG DAILY                     



                                                                



                                ALBUTEROL (PROAIR HFA 90 MCG/ACT) 90 MCG INHALER

                 



                                 1 PUFF INHALATION RT - EVERY 4 HOURS.          

       



                                                                



                                CEPHALEXIN (KEFLEX) 500 MG CAP                 



                                 500 MILLIGRAM ORAL EVERY 6 HOURS.              

   



                                 Qty = 12                       



                                                                



                                                                



                                                                



                                Discharge Instructions                 



                                Activity: as tolerated                 



                                Additional instructions:                 



                                fup with PCP 3-5 days                 



                                Prescriptions: on chart                 



                                Discharge management: greater than 30 mins      

           



                                                                



                                Objective                       



                                                                



                                Physical Exam                   



                                Head/Eyes: atraumatic, clear cornea, EOMI, xenia

l conjunctiva/sclera, normal                 



                                eyelids/periorb., normocephalic, PERRL          

       



                                Cardiovascular: normal capillary refill, regular

 rate rhythm                 



                                Respiratory: aerating well, no distress         

        



                                        Abdomen: non-tender, normal bowel sounds

, soft, no distention, no guarding, no 

                                        



                                hernial, no mass/organomegaly, no rebound       

          



                                Extremities: moves all, normal capillary refill,

 normal range of motion, no                 



                                edema                           



                                Neuro/CNS: alert, oriented X 3                 



                                Psychiatry: normal affect                 



                                                                



                                Electronically Signed by Bee Multani MD on  at 1333                 



                                                                



                                RPT #: [id number]                 



                                ***END OF REPORT***                 



                                                                



                                                                

 

                2020 17:48:00-00:00                                 [facility])        

         



                                Infectious Dis. Progress Note                 



                                REPORT#:[id number] REPORT STATUS: Signed         

        



                                DATE:20 TIME:                 



                                                                



                                PATIENT: TYLER EDGE UNIT #: [id number]       

          



                                ACCOUNT#: [id number] ROOM/BED: [place] [address]      

           



                                : 68 AGE: 49 SEX: F ATTEND: Sanjiv Ferrara MD                 



                                ADM DT: 20 AUTHOR: Serge Kennedy MD                 



                                                                



                                * ALL edits or amendments must be made on the el

[employer]/computer document *                 



                                                                



                                                                



                                Subjective                      



                                Chief Complaint:                 



                                F/u leukocytosis                 



                                HPI:                            



                                Leukocytosis resolving.                 



                                                                



                                Review of Systems                 



                                Constitutional:                 



                                Denies: chills, fever.                 



                                                                



                                Objective                       



                                                                



                                General                         



                                VS/I O:                         



                                Last Documented:                 



                                 Result Date Time                 



                                 Pulse Ox 100  1701                 



                                 B/P  86/47  1701                 



                                 B/P Mean 61  1701                 



                                 Pulse 53  1701                 



                                 Resp 46  1701                 



                                 O2 Delivery Room air  1600                

 



                                 Temp 36.4  1600                 



                                 FiO2 21  0741                 



                                 O2 Flow Rate 0.326770 2052               

  



                                                                



                                Vital Signs                     



                                 Date Temp Pulse Resp B/P B/P Mean Pulse Ox FiO2

                 



                                 - 36.4-37.1 51-67 18-54 /40-77 

  21                 



                                                                



                                24 hour I O ending at 0700:                 



                                  0700  1900                 



                                 Intake Total 4572.60 408.20                 



                                 Output Total 1250 2300                 



                                 Balance 3322.60 -1891.80                 



                                                                



                                 Intake, IV 3432.60  408.20                 



                                 Intake, Oral 1140                 



                                 Number 0                       



                                 Bowel                          



                                 Movements                      



                                  Number Voids 0                 



                                 Output, Urine 1250 2300                 



                                                                



                                Patient Weight                  



                                                                



                                Weight (lb):                    



                                Weight (oz):                    



                                Weight (kg): 110.455                 



                                                                



                                                                



                                Physical Exam                   



                                General appearance: alert, awake                

 



                                Head/Eyes: atraumatic                 



                                Cardiovascular: regular rate rhythm             

    



                                Respiratory: symmetric expansion, no distress   

              



                                Abdomen: soft, no distention                 



                                Musculoskeletal: no joint swelling              

   



                                Neuro/CNS: normal speech                 



                                                                



                                                                



                                Diagnosis, Assessment Plan                 



                                Free Text A P:                  



                                Assessment:                     



                                1. Hypotension, most likely due to NSTEMI.      

           



                                2. Probable septic shock, r/o bacteremia. No res

piratory or urinary symptoms.                 



                                CXR showed no pneumonia. She had diarrhea but it

 is resolving.                 



                                3. Acute kidney failure. Resolving.             

    



                                4. Morbid obesity.                 



                                5. COPD.                        



                                                                



                                Plan:                           



                                1. Leukocytosis resolving.                 



                                2. Continue ceftriaxone. Will likely stop in 1-2

 days depending on culture                 



                                results.                        



                                3. S/p vancomycin IV x 1 dose.                 



                                4. If diarrhea returns, will order stool culture

 and C. difficile.                 



                                                                



                                                                



                                Electronically Signed by Serge Kennedy MD on 20 at 1753                 



                                                                



                                RPT #: [id number]                 



                                ***END OF REPORT***                 



                                                                



                                                                

 

                2020 13:37:00-00:00 [id number]                       [place] [address]                 



                                                                



                                PATIENT NAME: TYLER EDGE  ADMIT DATE: 

0                 



                                ACCOUNT NO: [id number] ROOM NO: .01        

         



                                MEDICAL RECORD NO: [id number] AGE: 49           

      



                                REPORT TYPE: PROGRESS NOTE SEX: F               

  



                                                                



                                ADMITTING PHYSICIAN: Derian Ferrara MD        

         



                                ATTENDING PHYSICIAN: Derian Ferrara MD        

         



                                                                



                                                                



                                DATE: 2020                 



                                                                



                                The patient is seen in the room. I reviewed the 

chart. At this time, troponin                 



                                is borderline high. The patient is already start

ed on Lovenox. The patient                 



                                                    came with hypertension and w

ith normal saline the patient improved. The patient

                                                    



                                had enteritis before. However, the patient also 

has troponins borderline high                 



                                and Lovenox was started and we will continue to 

follow the patient. Possibly,                 



                                        we have to rule out the possibility of a

n acute myocardial infarction. On this 

                                        



                                lady, a troponin borderline high shows there is 

indication of some myocardial                 



                                ischemia.                       



                                                                



                                From my point of view, the patient is awake and 

alert. We will continue the                 



                                present medication as ordered by Dr. Multani and pr

imary physician intensivist.                 



                                                                



                                Dictated By: DINH Soto MD               

  



                                                                



                                WT: PN:L.HIM/BALPA/NTS                 



                                DD: 2020 13:37:18                 



                                DT: 2020 14:57:10                 



                                Conf#: [id number]/DID#: [id number]                 



                                                                



                                Authenticated by Savanna Gaines MD On  01:10:35 PM                 



                                                                



                                                                



                                                                



                                                                



                                                                



                                Electronically Signed by DINH Gaines MD on

 20 at 1310                 



                                                                



                                                                



                                                                



                                                                



                                                                



                                                                



                                                                



                                                                



                                                                



                                                                



                                                                



                                                                



                                                                



                                                                



                                                                



                                PATIENT NAME: TYLER EDGE ACCOUNT #: [id number]

158                 

 

                2020 13:29:00-00:00 [id number]                       [facility]                

 



                                 [address] [place] [address]                 



                                                                



                                PATIENT NAME: TYLER EDGE ADMIT DATE: 20

                 



                                ACCOUNT NO: [id number]  ROOM NO: L.ICU07      

           



                                MEDICAL RECORD NO: [id number] AGE: 49           

      



                                REPORT TYPE: PROGRESS NOTE SEX: F               

  



                                                                



                                ADMITTING PHYSICIAN: Derian Ferrara MD        

         



                                ATTENDING PHYSICIAN: Derian Ferrara MD        

         



                                                                



                                                                



                                DATE: 2020                 



                                                                



                                SUBJECTIVE: The patient is awake, alert. The pat

ient is feeling better. The                 



                                patient's blood pressure is 110/80. The patient'

s normal saline is going                 



                                intravenously. The patient is off of dopamine an

d epinephrine and IV normal                 



                                saline is continusly infusedd 100cc of normal sa

line / hr. The patient was                 



                                transferred from [facility] where she had s

ome significant enteritis;                 



                                        however, the patient was very severely h

ypotensive and was started on dopamine 

                                        



                                and epinephrine. At this time, my point of view,

 the patient is quite stable.                 



                                 She is off of all vasopressors. She is more ronny

rt and able to answer all the                 



                                questions. I will continue to follow the patient

 cardiac wise and the patient                 



                                may be transferred out of ICU. I will leave the 

decision to the primary care                 



                                and intensivist, borderline increase in troponin

 is noted and investigated                 



                                further..                       



                                                                



                                Dictated By: DINH Soto MD               

  



                                                                



                                WT: PN:L.MELISSA/AMIRAPA/NTS                 



                                DD: 2020 13:29:41                 



                                DT: 2020 14:43:35                 



                                Conf#: [id number]/DID#: [id number]                 



                                                                



                                Authenticated and Edited by Savanna Gaines MD On 20 3:24:06 PM                 



                                                                



                                                                



                                                                



                                                                



                                                                



                                Electronically Signed by DINH Gaines MD on

 20 at 1526                 



                                                                



                                                                



                                                                



                                                                



                                                                



                                                                



                                                                



                                                                



                                                                



                                                                



                                                                



                                                                



                                                                



                                                                



                                PATIENT NAME: TYLER EDGE ACCOUNT #: [id number]

[id number]                 

 

                2020 10:32:00-00:00                                 [facility] ([facility])        

         



                                Hospitalist Progress Note                 



                                REPORT#:[id number]  REPORT STATUS: Signed        

         



                                DATE:20 TIME:1032                 



                                                                



                                PATIENT: TYLER EDGE UNIT #: [id number]       

          



                                ACCOUNT#: [id number] ROOM/BED: [place] [address]      

           



                                : 66 AGE: 49 SEX: F ATTEND: Sanjiv Ferrara MD                 



                                ADM DT: 20 AUTHOR: Bee Multani MD        

         



                                                                



                                * ALL edits or amendments must be made on the Spot[employer]/computer document *                 



                                                                



                                                                



                                Subjective                      



                                Chief Complaint:                 



                                No complaints                   



                                                                



                                Review of Systems                 



                                Respiratory:                    



                                Denies: SOB.                    



                                Cardiovascular:                 



                                Denies: chest pain.                 



                                GI:                             



                                Denies: abdominal pain, diarrhea, nausea, vomiti

ng.                 



                                Heme:                           



                                Denies: bleeding.                 



                                Neuro:                          



                                Denies: headache.                 



                                                                



                                Objective                       



                                                                



                                General                         



                                VS/I O:                         



                                Vital Signs:                    



                                 Date Time Temp Pulse Resp B/P B/P Pulse O2 O2 F

low FiO2                 



                                 Mean Ox Delivery Rate                 



                                  0741 96 Room air 21                 



                                  0710 36.4 60 29 112/58 76 100 Room air   

              



                                  0646 60 29 112/58 79                 



                                  0631 60 28 114/61 83 100                 



                                  0619 64 26 144/77 105 99                 



                                  0615 62 23 136/73 98 98                 



                                  0601 63 30 136/74 97 96                 



                                  0545 59  28 120/57 83 100                

 



                                  0530 58 34 120/58 84 100                 



                                  0515 56 30 115/55 77 100                 



                                  0500 63 21 105/54 77 100                 



                                  0445 56 25 113/56 80 100                 



                                  0430 58 28 105/59 74 100                 



                                  0416 55 20 98/56 72 100                 



                                  0400 59                  



                                  0400 24 95/51 70 100                 



                                  0349 37.1 20                 



                                  0346 56 24 102/52 72 99                 



                                  0330 67 38 132/63 91  99                 



                                  0315 61 27 101/55 73 99                 



                                  0300 61 26 92/51 66 98                 



                                  0245 62 21 99/54 74 98                 



                                  0231  62 25 108/55 78 99                 



                                  0215 67 29 123/62 87 98                 



                                  0200 64 29 120/57 82 100                 



                                  0145 61 27 100/58 76  99                 



                                  0130 61 34 98/53 70 99                 



                                  0115 60 27 99/58 75 99                 



                                  0100 60 29 96/52 69 99                 



                                  0045  58 27 101/56 73 100                

 



                                  0030 61 34 99/56 73 99                 



                                  0015 59 29 94/52 70 99                 



                                  0011 60 26 98/48 69  99                 



                                  0006 62 29 83/40 56 99                 



                                  0000 36.8 24                 



                                  0000 62 31 89/51 66 100                 



                                  2358 62 25 92/51  66 100                 



                                  2345 64 27 102/58 77 99                 



                                 02 2330 63 28 96/55 72 100                 



                                 02 2316 64 25 100/55 73 99                 



                                  2300  63 27 119/58 82 95                 



                                  2245 60 18 97/56 74 99                 



                                  2230 58 25 99/55 72 98                 



                                  2215 56 21 97/54 74 99                 



                                  2200 56 25 105/62 78 99                 



                                  2145 57 32 101/65 78 100                 



                                  2130 57 26 101/58 77 98                 



                                  2116  55 38 105/54 74 95                 



                                  2115 58 42 95                 



                                  2104 58 18 91/50 67 99                 



                                  2100 59 25  99                 



                                  2052 99 Room air 0.338727 5 60 27 100                 



                                  2030 61 19  100                 



                                 2015 61 24 100                 



                                 2000 37.1                 



                                 2000 20                  



                                  2000 62 32 100                 



                                  1945  60 38 100                 



                                  1934 61 104/53 75 100                 



                                 02 1916 61 117/53 76 99                 



                                  1900 55 22 104/59 77  100                

 



                                 02 1845 53 23 108/57 80 100                 



                                 02 1816 53 25 108/54 78 99                 



                                  1802 53 20 109/56 80 99                 



                                  1730  53 24 93/54 68                 



                                 02/28 1730 36.4 53 24 93/54 67 100 Room air    

             



                                  1700 57 22 110/55 73 100 Room air        

         



                                  1639 57 22 100/57 71 100 Room air        

         



                                  1631 54 102/59 73 97 Room air            

     



                                  1615 58 23 105/62 76 100 Room air        

         



                                  1558 54 110/58 75 100 Room air           

      



                                  1431 57 115/56 75 100 Room air           

      



                                  1409 99 Room air 21                 



                                  1408  57 21 92/52 65 100 Room air        

         



                                  1345 57 22 98/54 68 100                 



                                  1330 57 102/57 72 100 Room air           

      



                                  1246 59 102/57 72                 



                                 02 1215 59 21 106/57 73 98                 



                                  1200 57 25 106/57 73 99                 



                                  1145 56 107/55 72 100                 



                                  1130 52 27 113/59 77 100                 



                                  1116 53 105/51 69 99                 



                                  1101 51 93/52 65 99                 



                                  1045 52 101/60 73 99                 



                                                                



                                24 hour I O ending at 0700:                 



                                  0700  1900                 



                                 Intake Total 4572.60 408.20                 



                                  Output Total 1250 2300                 



                                 Balance 3322.60 -1891.80                 



                                                                



                                 Intake, IV 3432.60 408.20                 



                                  Intake, Oral 1140                 



                                 Number 0                       



                                 Bowel                          



                                 Movements                      



                                 Number Voids 0                 



                                 Output, Urine 1250 2300                 



                                                                



                                Patient Weight                  



                                                                



                                Weight (lb):                    



                                Weight (oz):                    



                                Weight (kg): 110.455                 



                                                                



                                                                



                                Physical Exam                   



                                General appearance: alert, awake                

 



                                Head/Eyes: atraumatic, clear cornea, EOMI, xenia

l conjunctiva/sclera, normal                 



                                eyelids/periorb., normocephalic, PERRL          

       



                                Cardiovascular: normal capillary refill, regular

 rate rhythm                 



                                Respiratory: aerating well, no distress         

        



                                        Abdomen: non-tender, normal bowel sounds

, soft, no distention, no guarding, no 

                                        



                                hernial, no mass/organomegaly, no rebound       

          



                                Extremities: moves all, normal capillary refill,

 normal range of motion, no                 



                                edema                           



                                Neuro/CNS: alert, oriented X 3                 



                                Psychiatry: normal affect                 



                                                                



                                Results                         



                                Findings/Data:                  



                                Laboratory Tests                 



                                                     



                                 0500 1240                      



                                 Chemistry                      



                                 Sodium (134 - 147 mmol/L) 143                 



                                 Potassium (3.4 - 5.0 mmol/L) 4.2               

  



                                 Chloride (100 - 108 mmol/L) 114 H              

   



                                 Carbon Dioxide (21 - 32 mmol/L) 24             

    



                                 Anion Gap (4.0 - 15.0 GAP calc) 5.0            

     



                                 BUN (7 - 18 MG/DL) 17                 



                                 Creatinine (0.6 - 1.0 MG/DL) 0.9               

  



                                 Glomerular Filtr Rate (>60 estGFR) >=60 max est

imate                 



                                 Glucose (70 - 110 MG/DL) 89                 



                                  Calcium (8.5 - 10.1 MG/DL) 8.3 L              

   



                                 Troponin I (0.000 - 0.045 NG/ML) 0.436 *H      

           



                                                                



                                Laboratory Tests                 



                                                           



                                 0500                           



                                 Hematology                     



                                 WBC (3.5 - 11.0 K/mm3) 15.2 H                 



                                  RBC (4.70 - 6.10 M/mm3) 3.67 L                

 



                                 Hgb (10.4 - 14.9 G/DL) 11.3                 



                                 Hct (31.5 - 44.1 %) 35.5                 



                                 MCV (84.5 - 98.6 Fl) 96.7                 



                                 MCH (27.0 - 34.2 pg) 30.8                 



                                 MCHC (31.5 - 34.0 G/DL) 31.8                 



                                 RDW (11.5 - 14.5 SD) 14.2                 



                                 Plt Count (150 - 450 K/mm3) 242.0              

   



                                 MPV (7.0 - 10.5 fL)  10.20                 



                                 Neut % (Auto) (40 - 76 %) 78.8 H               

  



                                 Lymph % (Auto) (20.5 - 51.1 %) 13.1 L          

       



                                 Mono % (Auto) (1.7 - 9.3 %)  6.2               

  



                                 Eos % (Auto) (0.0 - 6.0 %) 1.6                 



                                 Baso % (Auto) (0.0 - 2.0 %) 0.3                

 



                                 Neut # (Auto) (1.8 - 7.6 K/mm3) 11.94 H        

         



                                 Lymph # (Auto) (0.6 - 3.2 K/mm3) 2.0           

      



                                 Mono # (Auto) (0.3 - 1.1 K/mm3) 0.9            

     



                                 Eos # (Auto) (0.0 - 0.4 K/mm3) 0.2             

    



                                 Baso # (Auto) (0.0 - 0.1 K/mm3) 0.1            

     



                                 Add Manual Diff (CRITERIA DIFF/SCN) NO         

        



                                                                



                                                                



                                                                



                                                                



                                Diagnosis, Assessment  Plan                 



                                                                



                                Free Text DxA P Notes                 



                                Free Text DxA P Notes:                 



                                                                



                                ASSESSMENT AND PLAN:                 



                                1. Elevated cardiac enzymes. The patient is star

gianfranco on                 



                                weight-based Lovenox. started the patient on asp

irin, statin.                 



                                Hold beta-blocker because of bradycardia, start 

lisinopril                 



                                        Per cardiology, they feel the elevated c

ardiac enzymes are secondary to demand 

                                        



                                ischemia and not an STEMI                 



                                2. Symptomatic bradycardia with hypotension. Res

olved                 



                                Off Levophed, off dopamine                 



                                3. Leukocytosis, etiology is unclear, However, t

he patient is pancultured.                 



                                Continue the empiric antibiotics, ceftriaxone , 

ID consulted                 



                                4. History of bipolar. The patient takes lithium

. Lithium level is noted                 



                                                                



                                                                



                                Patient is stable, possible transfer to [facility]

y this afternoon                 



                                                                



                                DNR status noted                 



                                                                



                                Electronically Signed by Bee Multani MD on  at 1036                 



                                                                



                                RPT #: [id number]                 



                                ***END OF REPORT***                 



                                                                



                                                                

 

                2020 21:26:00-00:00                                 [facility]



                                [facility])        

         



                                Pharmacy Prog.Note-Vancomycin                 



                                REPORT#:[id number] REPORT STATUS: Signed         

        



                                DATE:20 TIME:                 



                                                                



                                PATIENT: TYLER EDGE UNIT #: [id number]       

          



                                ACCOUNT#: [id number] ROOM/BED: [place] [address]      

           



                                : [birth year] AGE: 49 SEX: F ATTEND: Sanjiv Ferrara MD                 



                                ADM DT: 20 AUTHOR: Shirin Garcia [facility]   

              



                                                                



                                * ALL edits or amendments must be made on the el

ectronic/computer document *                 



                                                                



                                                                



                                Vancomycin                      



                                                                



                                Vancomycin                      



                                Treatment plan: ONE TIME DOSE                 



                                Rationale:                      



                                 WOULD LIKE ONE TIME DOSE OF 2gm VANCO

MYCIN. HE WILL FOLLOW UP                 



                                        TOMORROW AND EVALUATE PATIENT IF THEY NE

ED CONTINUATION OF THERAPY - 100% RBTO 

                                        



                                .                     



                                                                



                                Electronically Signed by Shirin Garcia [facility] on

 20 at 2127                 



                                                                



                                RPT #: [id number]                 



                                ***END OF REPORT***                 



                                                                



                                                                

 

                2020 20:27:00-00:00                                 [facility]



                                [facility])        

         



                                Infect Disease Consult Note                 



                                REPORT#:[id number] REPORT STATUS: Signed         

        



                                DATE:20 TIME:                 



                                                                



                                PATIENT: TYLER EDGE UNIT #: [id number]       

          



                                ACCOUNT#: [id number] ROOM/BED: ICU07-1      

           



                                : 66 AGE: 49 SEX: F ATTEND: Sanjiv Ferrara MD                 



                                ADM DT: 20 AUTHOR: Serge Kennedy MD                 



                                                                



                                * ALL edits or amendments must be made on the el

ectronic/computer document *                 



                                                                



                                                                



                                History of Present Illness                 



                                Requesting Clinician: Dr. Multani                 



                                Reason for consult:                 



                                Sepsis                          



                                Chief complaint:                 



                                Chest pain                      



                                HPI:                            



                                48 yo female with h/o COPD, bipolar disorder, ob

esity who presented with                 



                                worsening intermittent sharp/pressure chest pain

 for 2-3 days. Pt also had                 



                                liquid diarrhea for 3 days, which is now resolve

d. She denied fever, chills,                 



                                cough, sore throat, dysuria, vomiting. Pt was ad

mitted for hypotension due to                 



                                NSTEMI vs septic shock as WBC was elevated.     

            



                                                                



                                History - Adult longitudinal                 



                                Past medical history:                 



                                Reports: COPD, Depression/mood disorder, Hyperte

nsion.                 



                                Past surgical history:                 



                                Reports: Cholecystectomy.                 



                                Additional surgical history:                 



                                ankle surgery, jaw reconstruction, ear sx       

          



                                Smoking status for patients 13 years old or olde

r: Current every day smoker                 



                                Other social history: Local resident            

     



                                Allergies:                      



                                Coded Allergies:                 



                                Penicillins (Severe, SWELLING 16)         

        



                                                                



                                Ambulatory status: Independent                 



                                                                



                                Review of Systems                 



                                Constitutional:                 



                                Denies: chills, fever.                 



                                Skin:                           



                                Denies: rash.                   



                                Allergy/Immun:                  



                                Denies hives                    



                                Eyes:                           



                                Denies discharge                 



                                ENT:                            



                                Denies: sinus problem, sore throat.             

    



                                Respiratory:                    



                                Reports: SOB.                   



                                Cardiovascular:                 



                                Reports: chest pain.                 



                                GI:                             



                                Reports: diarrhea. Denies: nausea, vomiting.    

             



                                :                             



                                Denies: dysuria.                 



                                Musculoskeletal:                 



                                Denies: extremity swelling.                 



                                Neuro:                          



                                Denies: seizure.                 



                                                                



                                Objective                       



                                                                



                                Physical Exam                   



                                General appearance: obese, alert, awake, oriente

d                 



                                Head/eyes: atraumatic, clear cornea, EOMI, normo

cephalic                 



                                ENT: moist mucosal membranes, normal nose, xenia

l pharynx, normal sinus                 



                                Neck: full range of motion, non-tender, normal t

hyroid                 



                                Cardiovascular: regular rate rhythm             

    



                                Respiratory: symmetric expansion, no distress   

              



                                Abdomen: non-tender, normal bowel sounds, soft, 

no distention, no guarding                 



                                Extremities: no clubbing, no cyanosis, no edema 

                



                                Musculoskeletal: no joint swelling              

   



                                Neuro/CNS: alert, oriented X 3, normal speech   

              



                                Skin: intact, no rash                 



                                Psychiatry: normal affect, normal mood          

       



                                                                



                                                                



                                Diagnosis, Assessment Plan                 



                                                                



                                Free Text DxA P Notes                 



                                Free text DxA P notes:                 



                                Assessment:                     



                                                    1. Hypotension, most likely 

due to NSTEMI although septic shock also within the

                                                    



                                differential.                   



                                2. Probable septic shock, r/o bacteremia. No res

piratory or urinary symptoms.                 



                                CXR showed no pneumonia. She had diarrhea but it

 is resolving.                 



                                3. Acute kidney failure.                 



                                4. Morbid obesity.                 



                                5. COPD.                        



                                                                



                                Plan:                           



                                1. No recent hospitalization per pt, thus this c

an be treated as community                 



                                acquired infection.                 



                                2. Increase ceftriaxone to 2 g IV daily.        

         



                                3. Vancomycin IV x 1 dose.                 



                                4. F/u blood cultures.                 



                                5. If diarrhea persists, will order stool cultur

e and C. difficile.                 



                                                                



                                I spent 45 minutes evaluating and examining the 

patient. Thank you for this                 



                                consult.                        



                                                                



                                Electronically Signed by Serge Kennedy MD on 20 at 2037                 



                                                                



                                [facility] #: [phone]                 



                                ***END OF REPORT***                 



                                                                



                                                                

 

                2020 11:32:00-00:00 [id number]                       [place] [address]                 



                                                                



                                PATIENT NAME: TYLER EDGE ADMIT DATE: 20

                 



                                ACCOUNT NO: [id number] ROOM NO: L.ICU07       

          



                                MEDICAL RECORD NO: [id number]  AGE: 49          

       



                                REPORT TYPE: CONSULTATION SEX: F                

 



                                                                



                                ADMITTING PHYSICIAN: Derian Ferrara MD        

         



                                ATTENDING PHYSICIAN: Derian Ferrara MD        

         



                                                                



                                                                



                                CONSULTATION DATE: 2020                 



                                                                



                                CONSULTING PHYSICIAN: DINH Soto MD      

           



                                                                



                                                    I am on-call for cardiology 

at [facility]. I saw this patient stat

                                                    



                                consult from the hospitalist. The patient was se

en in the ER for diagnosis.                 



                                1. Obesity.                     



                                2. Sepsis.                      



                                3. Hypovolemia.                 



                                        4. History of enteritis, multiple loose 

bowel movements for the last 3 days to 

                                        



                                4 days.                         



                                5. History of lap band surgery was undone.      

           



                                6. History of hypertension.                 



                                7. Possible bronchitis.                 



                                                                



                                HISTORY OF PRESENT ILLNESS: The patient is trans

ferred from [facility].                 



                                She stayed for 2 days for diarrhea. At this time

, I was asked to evaluate the                 



                                patient because of borderline troponin, loss of 

bradycardia 50 per minute and                 



                                                    the patient's blood pressure

 90/70. I examined the patient, reviewed the chart.

                                                    



                                 At this time, EKG does not show any acute ische

flaco changes and the patient's                 



                                troponin is borderline high and WBC count 25,000

 per cubic mm. The patient's                 



                                chest x-ray was normal and the patient's laborat

ory evaluation shows the                 



                                        patient's GFR is about 39 and the patien

t's creatinine is about 1.5 and BUN is 

                                        



                                about 34.                       



                                                                



                                        The patient's echocardiogram are reviewe

d, but it shows only ejection fraction 

                                        



                                65% and without any significant valvular abnorma

lities. No evidence of                 



                                pericardial effusion and no evidence of wall mot

ion abnormalities. At this                 



                                time, the patient did not have a myocardial infa

rction. Borderline increased                 



                                troponin, troponin leak. The patient has got sep

sis and hypotension and I                 



                                        ordered a 500 mL saline bolus followed 2

50 mL normal saline at 3:00 hours, and 

                                        



                                slowly taper out dopamine and Levophed. The zaki

ent will go to ICU. I will                 



                                follow the patient there.                 



                                                                



                                At this time, the patient is in quite stable con

dition. The patient does not                 



                                                    need further cardiac evaluat

ion. We will repeat the troponin in the morning and

                                                    



                                follow the patient along with other physicians i

ncluding the hospitalist.                 



                                                                



                                The patient is quite comfortable.               

  



                                                                



                                Dictated By: DINH Soto MD               

  



                                                                



                                                                



                                PATIENT NAME: TYLER EDGE ACCOUNT #: [id number]

158                 



                                                                



                                                                



                                                                



                                WT: CON:L.HIM/MILTON/NTS                 



                                DD: 2020 11:32:35                 



                                DT: 2020 15:10:23                 



                                Conf#: [id number]/DID#: [id number]                 



                                                                



                                Authenticated by Savanna Gaines MD On  10:48:16 AM                 



                                                                



                                                                



                                                                



                                                                



                                                                



                                Electronically Signed by DINH Gaines MD on

 20 at 1048                 



                                                                



                                                                



                                                                



                                                                



                                                                



                                                                



                                                                



                                                                



                                                                



                                                                



                                                                



                                                                



                                                                



                                                                



                                                                



                                                                



                                                                



                                                                



                                                                



                                                                



                                                                



                                                                



                                                                



                                                                



                                                                



                                                                



                                                                



                                                                



                                                                



                                                                



                                                                



                                                                



                                                                



                                                                



                                                                



                                                                



                                                                



                                                                



                                                                



                                                                



                                                                



                                                                



                                                                



                                PATIENT NAME: TYLER EDGE ACCOUNT #: [id number]

158                 

 

                2020 08:46:00-00:00                                 [facility] ([facility]        

         



                                Hospitalist History Physical                 



                                REPORT#:[id number] REPORT STATUS: Signed         

        



                                DATE:20 TIME:0846                 



                                                                



                                PATIENT: TYLER EDGE UNIT #: [id number]       

          



                                ACCOUNT#: [id number] ROOM/BED: [place] [address]      

           



                                : 68 AGE: 49 SEX: F ATTEND: Sanjiv Ferrara MD                 



                                ADM DT: 20 AUTHOR: Bee Multani MD        

         



                                                                



                                * ALL edits or amendments must be made on the Spot[employer]/computer document *                 



                                                                



                                                                



                                History of Present Illness                 



                                                                



                                HPI                             



                                Chief complaint:                 



                                CHEST PAIN, weakness                 



                                                                



                                History                         



                                                                



                                Medication/Allergy-Vaccine Hx                 



                                Home Medications:                 



                                ALBUTEROL (PROAIR HFA 90 MCG/ACT) 1 PUFF INH RTQ

4H                 



                                ALPRAZolam (XANAX) 0.5 MG PO Q8H PRN PRN ANXIETY

                 



                                CARVEDILOL (COREG) 25 MG PO BID MEALS           

      



                                CHOLECALCIFEROL (VITAMIN D3) (VITAMIN D3) 1,000 

UNITS PO DAILY                 



                                GABAPENTIN (NEURONTIN) 100 MG PO TID            

     



                                LITHIUM CARBONATE  MG PO DAILY PM         

        



                                MELOXICAM (MOBIC) 7.5 MG PO DAILY               

  



                                MIRTAZAPINE (REMERON) 30 MG PO DAILY            

     



                                MULTIVITAMIN (MULTI-DAY VITAMIN) 1 TAB PO DAILY 

                



                                ZIPRASIDONE (GEODON) 40 MG PO BID               

  



                                                                



                                Discontinued Medications                 



                                CIPROFLOXACIN (CIPRO) 750 MG PO Q12H            

     



                                 Discontinued reason: Patient stopped taking    

             



                                HYDROcodone/APAP (NORCO 10/325) 1 TAB PO Q4H PRN

 PRN PAIN                 



                                 Discontinued reason: Patient stopped taking    

             



                                MELOXICAM (MOBIC) 7.5 MG PO BID                 



                                 Discontinued reason: Patient stopped taking    

             



                                                                



                                Allergies:                      



                                Coded Allergies:                 



                                Penicillins (Severe, SWELLING 16)         

        



                                                                



                                                                



                                Objective                       



                                                                



                                Physical Exam                   



                                General appearance: alert, awake                

 



                                                                



                                                                



                                Diagnosis, Assessment Plan                 



                                                                



                                Free Text DxA P Notes                 



                                Free Text DxA P Notes:                 



                                H/P                             



                                dictation ID [id number]                 



                                                                



                                Electronically Signed by Bee Multani MD on  at 0846                 



                                                                



                                [facility] #: [id number]                 



                                ***END OF REPORT***                 



                                                                



                                                                

 

                2020 08:43:00-00:00 [id number]                       [place] [address]                 



                                                                



                                PATIENT NAME: TYLER EDGE ADMIT DATE: 20

                 



                                ACCOUNT NO: [id number] ROOM NO: L.ICU07       

          



                                MEDICAL RECORD NO: [id number] AGE: 49           

      



                                REPORT TYPE: HISTORY AND PHYSICAL SEX: F        

         



                                                                



                                ADMITTING PHYSICIAN: Derian Ferrara MD        

         



                                ATTENDING PHYSICIAN: Derian Ferrara MD        

         



                                                                



                                                                



                                ADMISSION DATE: 2020                 



                                                                



                                PRIMARY CARE PHYSICIAN: Unknown.                

 



                                                                



                                CHIEF COMPLAINT: Generalized weakness and chest 

pain.                 



                                                                



                                HISTORY OF PRESENT ILLNESS: The patient is a 49-

year-old female with past                 



                                medical history of bipolar disorder, schizoaffec

tive disorder, COPD, restless                 



                                        leg syndrome, and bronchitis. The patien

jose was last well 2 days ago. Yesterday, 

                                        



                                she had an episode of chest pain, lasted about 5

 minutes, retrosternal,                 



                                nonradiating, described as sharp. The patient sa

t down to rest. Subsequently,                 



                                        chest pain went away. She reported at th

at time that she had some shortness of 

                                        



                                                    breath and some dizziness an

d subsequently when the pain went away, the patient

                                                    



                                                    went outside to pick her ana maria

l, but she could not make it because she was so 

weak                                                



                                                    and she actually sat down in

 front of her house. She called 911 EMS and she was

                                                    



                                taken to [facility] Health. At [facility], she was                 



                                                    noticed to be hypotensive, s

ystolic blood pressure in the 70s, temperature 

36.9,                                               



                                        respiratory rate of 20, and heart rate o

f 50. Her labs were abnormal with REG. 

                                        



                                Creatinine of 1.9. Troponin was 0.421 NT-BNP was

 2042. The patient was given                 



                                IV fluids, normal saline 3 L boluses and she was

 given ceftriaxone 2 g IV and                 



                                        transferred to [facility] f

or further evaluation. In the ER here, 

                                        



                                                    the patient was noted to be 

hypotensive 93/48 and she was still bradycardic 

down                                                



                                to 248. The patient's EKG shows sinus jon at 5

0 with prolonged QT of 508,                 



                                Q-wave in II, III, and aVF. The patient's tropon

ins were elevated at 0.55 and                 



                                the next one was 0.359 here. The patient was giv

en Lovenox 1 mg/kg bodyweight                 



                                every 12 hours, was started on dopamine drip and

 Levophed. The patient at the                 



                                bedside is complaining of weakness. She denies a

ny chest pain.                 



                                                                



                                PAST MEDICAL HISTORY: As mentioned above.       

          



                                                                



                                PAST SURGICAL HISTORY:                 



                                1. Cholecystectomy.                 



                                2. Right ankle surgery.                 



                                                                



                                SOCIAL HISTORY: Smokes 2 packs of cigarettes a d

ay. Denies any alcohol. She                 



                                does meth.                      



                                                                



                                FAMILY HISTORY: The father had an MI.           

      



                                                                



                                ALLERGIES: PENICILLIN AND RISPERIDONE.          

       



                                                                



                                REVIEW OF SYSTEMS:                 



                                                                



                                PATIENT NAME: TYLER EDGE ACCOUNT #: [id number]

158                 



                                                                



                                                                



                                                                



                                CONSTITUTIONAL: Positive for weakness. Negative 

for fevers.                 



                                CARDIOVASCULAR: Positive for chest pain. Positiv

e for shortness of breath,                 



                                negative for any pitting edema.                 



                                                                



                                REVIEW OF SYSTEMS: A 14-point review of system w

as done and was otherwise                 



                                negative except as mentioned above.             

    



                                                                



                                PHYSICAL EXAMINATION:                 



                                VITAL SIGNS: The patient is afebrile, temperatur

e 36.7, heart rate 55,                 



                                                    respiratory rate of 17, bloo

d pressure 97/82, O2 saturation is 96% on room air.

                                                    



                                GENERAL: The patient is awake and alert, oriente

d x3.                 



                                HEENT: Head is normocephalic and atraumatic. Pup

ils equal and reactive to                 



                                light.                          



                                NECK: No cervical adenopathy or thyromegaly. No 

jugular venous distention.                 



                                CHEST: Clear to auscultation bilaterally. No whe

ezing or rhonchi.                 



                                CARDIOVASCULAR: S1 and S2. No murmur or added so

unds. The patient is                 



                                bradycardic.                    



                                ABDOMEN: Soft, nontender, and nondistended. No o

rganomegaly.                 



                                EXTREMITIES: No cyanosis, clubbing, or edema.   

              



                                NEUROLOGICAL: Nonfocal. Noted that the patient i

s obese.                 



                                                                



                                LABS AND IMAGING: CBC: WBC 24.2, hemoglobin 11.5

, and platelet count of 234.                 



                                CMP: Sodium 135, potassium 4.0, creatinine 1.5. 

Troponins 0.55 and 0.399.                 



                                        Chest x-ray shows no acute cardiopulmona

ry disease. EKG with both bradycardia, 

                                        



                                sinus jon 50, , Q-waves in II, III, and 

aVF.                 



                                                                



                                ASSESSMENT AND PLAN:                 



                                1. Non-ST elevation myocardial infarction. The p

atient is started on                 



                                weight-based Lovenox. I have called the cardiolo

gist, Dr. Gaines who                 



                                evaluated the patient, started the patient on as

pirin, statin. We will hold                 



                                beta blockers and ACE inhibitor secondary to hyp

otension and acute kidney                 



                                injury.                         



                                2. Symptomatic bradycardia with hypotension. Nor

mal sinus rhythm, but the                 



                                patient is symptomatic with hypotension. At this

 point, I will get a stat                 



                                echocardiogram. This is likely secondary to non-

ST-elevation myocardial                 



                                infarction. Continue management for non-ST-eleva

tion myocardial infarction.                 



                                We will continue dopamine drip to titrate for he

art rate of 50. Also on                 



                                Levophed                        



                                3. Leukocytosis, etiology is unclear, may be rel

ated to non-ST-                 



                                elevation myocardial infarction. However, the pa

tient is pancultured.                 



                                Continue the empiric antibiotics, ceftriaxone 1 

g q. 24 hours.                 



                                4. History of bipolar. The patient takes lithium

. I will check a lithium                 



                                level to make sure that some of the symptoms are

 not related to Lithium                 



                                toxicity.                       



                                                                



                                Other comorbidities, we will resume the patient'

s home medications                 



                                and adjust as needed.                 



                                                                



                                Deep venous thrombosis prophylaxis. The patient 

is on                 



                                Lovenox.                        



                                                                



                                Addendum- she says she is DNR, also wanted to ea

t so NPO order was dcd                 



                                                                



                                Critical time spent: I spent 50 mins evaluating 

patient including history and                 



                                                                



                                PATIENT NAME: TYLER EDGE ACCOUNT #: [id number]

158                 



                                                                



                                                                



                                                                



                                examination, medication management and review of

 labs and imaging and                 



                                discussing with the staff                 



                                                                



                                Dictated By: Bee Multani MD                 



                                                                



                                WT: HP:L.HIM/NICOL/NTS                 



                                DD: 2020 08:43:38                 



                                                                



                                DT: 2020 12:45:32                 



                                Conf#: [id number]/DID#: [id number]                 



                                                                



                                Authenticated and Edited by Bee Multani MD O

n 20 7:01:30 PM                 



                                                                



                                                                



                                                                



                                                                



                                                                



                                Electronically Signed by Bee Multani MD on  at 1903                 



                                                                



                                                                



                                                                



                                                                



                                                                



                                                                



                                                                



                                                                



                                                                



                                                                



                                                                



                                                                



                                                                



                                                                



                                                                



                                                                



                                                                



                                                                



                                                                



                                                                



                                                                



                                                                



                                                                



                                                                



                                                                



                                                                



                                                                



                                                                



                                                                



                                                                



                                                                



                                                                



                                                                



                                                                



                                                                



                                                                



                                                                



                                PATIENT NAME: TYLER EDGE ACCOUNT #: [id number]

158                 

 

                2020 06:37:00-00:00 [id number]                       [address] [address]                 



                                                                



                                PATIENT NAME: TYLER EDGE ADMIT DATE: 20

                 



                                ACCOUNT NO: [id number] ROOM NO: L.S201        

         



                                MEDICAL RECORD NO: [id number] AGE: 49           

      



                                REPORT TYPE: eELECTROCARDIOGRAM SEX: F          

       



                                                                



                                ADMITTING PHYSICIAN: Derian Ferrara MD        

         



                                ATTENDING PHYSICIAN: Derian Ferrara MD        

         



                                                                



                                                                



                                Order:                          



                                [id number]                   



                                Test Reason : (Not Selected)                 



                                 Test Date/Time Stamp:                 



                                 06:37:22                 



                                Blood Pressure : 081/039 mmHG                 



                                Vent. Rate : 058 BPM Atrial Rate : 058 BPM      

           



                                 P-R Int : 154 ms QRS Dur : 088 ms              

   



                                 QT Int : 528 ms P-R-T Axes : 089 065 180 degree

s                 



                                 QTc Int : 518 ms                 



                                                                



                                Sinus bradycardia                 



                                Nonspecific ST and T wave abnormality           

      



                                Prolonged QT                    



                                Abnormal ECG                    



                                When compared with ECG of 2020 06:33, (Un

confirmed)                 



                                T wave inversion less evident in Lateral leads  

               



                                QT has lengthened                 



                                Confirmed by SAVANNA RODRIGUEZ MD () on 3

/2020 3:25:18 PM                 



                                                                



                                Referred By: Self Referred Confirmed by:SAVANNA GEORGE MD                 



                                                                



                                                                



                                                                



                                                                



                                                                



                                Electronically Signed by DINH Gaines MD on

 20 at 1525                 



                                                                



                                                                



                                                                



                                                                



                                                                



                                                                



                                                                



                                                                



                                                                



                                                                



                                                                



                                                                



                                                                



                                                                



                                                                



                                                                



                                PATIENT NAME: TYLER EDGE ACCOUNT #: [id number]

158                 

 

                2020 06:33:00-00:00 [id number]                       [address] [address]                 



                                                                



                                PATIENT NAME: TYLER EDGE ADMIT DATE: 20

                 



                                ACCOUNT NO: [id number] ROOM NO: L.S201        

         



                                MEDICAL RECORD NO: [id number]  AGE: 49          

       



                                REPORT TYPE: eELECTROCARDIOGRAM SEX: F          

       



                                                                



                                ADMITTING PHYSICIAN: Derian Ferrara MD        

         



                                ATTENDING PHYSICIAN: Derian Ferrara MD        

         



                                                                



                                                                



                                Order:                          



                                [id number]                   



                                Test Reason : (Not Selected)                 



                                 Test Date/Time Stamp:                 



                                 06:33:44                 



                                Blood Pressure : 081/039 mmHG                 



                                Vent. Rate : 061 BPM Atrial Rate : 061 BPM      

           



                                 P-R Int : 142 ms QRS Dur : 086 ms              

   



                                 QT Int : 436 ms P-R-T Axes : 090 065 180 degree

s                 



                                 QTc Int : 438 ms                 



                                                                



                                Normal sinus rhythm with sinus arrhythmia       

          



                                ST and T wave abnormality, consider lateral isch

emia                 



                                Abnormal ECG                    



                                When compared with ECG of 2020 03:20, (Un

confirmed)                 



                                ST now depressed in Inferior leads              

   



                                Nonspecific T wave abnormality now evident in In

ferior leads                 



                                T wave inversion now evident in Lateral leads   

              



                                QT has shortened                 



                                Confirmed by SAVANNA RODRIGUEZ MD () on 3

/2020 3:25:24 PM                 



                                                                



                                Referred By: Self Referred Confirmed by:SAVANNA GEORGE MD                 



                                                                



                                                                



                                                                



                                                                



                                                                



                                Electronically Signed by DINH Gaines MD on

 20 at 1525                 



                                                                



                                                                



                                                                



                                                                



                                                                



                                                                



                                                                



                                                                



                                                                



                                                                



                                                                



                                                                



                                                                



                                                                



                                                                



                                PATIENT NAME: TYLER EDGE ACCOUNT #: [id number]

158                 

 

                2020 03:34:00-00:00                                 [facility] ([facility]        

         



                                EMERGENCY PROVIDER REPORT                 



                                REPORT#:[id number] REPORT STATUS: Signed         

        



                                DATE:20 TIME:334                 



                                                                



                                PATIENT: TYLER EDGE UNIT #: [id number]       

          



                                ACCOUNT#: [id number] ROOM/BED: [place] [address]      

           



                                : 66 AGE: 49 SEX: F PCP PHYS: No Primar

y or Family Physician                 



                                SERVICE DT: 20 AUTHOR: Kristy Quiros MD  

               



                                                                



                                * ALL edits or amendments must be made on the Spot[employer]/computer document *                 



                                                                



                                                                



                                HPI-Chest Pain 40 and Over                 



                                                                



                                General                         



                                Confirmed Patient Yes                 



                                Patient Type New patient                 



                                Initial Greet Date/Time 20 0323           

      



                                                                



                                Presentation                    



                                Chief Complaint Shortness of breath, Diarrhea   

              



                                Hx Obtained From Patient                 



                                Sudden in Onset? Yes                 



                                Onset Occurred Today                 



                                Symptom Duration Since onset                 



                                Progression since Onset Unchanged               

  



                                )( Migration/Movement None                 



                                Severity: Onset Moderate                 



                                Severity: Current Mild                 



                                Exacerbated by Nothing                 



                                Relieved by Nothing                 



                                                                



                                Context                         



                                Immunization Status                 



                                 General Unknown                 



                                                                



                                Free Text HPI Notes                 



                                Free Text HPI Notes                 



                                        48 y/o F w/ PMHx of HTN, COPD, bipolar d

isorder, and schizophrenia presents to 

                                        



                                ED from [facility] for low blood pressure n

oted for 2 days. Also w/ SOB,                 



                                and diarrhea x 2 days. Per EMS, pt was bradycard

ic and with troponin of 0.42.                 



                                Denies fever, chills and N/V.                 



                                                                



                                                    Portions of this section wer

e scribed by Génesis Matias on 20 at 

0553                                                



                                                                



                                Risk-Chest Pain 40 and Over                 



                                                                



                                Risk Stratification                 



                                )( Coronary Artery Disease Risk factors reviewed

, Hypertension                 



                                )( Thoracic Aortic Dissection Risk factors revie

wed, Hypertension                 



                                )( Pulmonary Embolism Risk factors reviewed     

            



                                )( AMI-Aspirin                  



                                 Aspirin Last 24 Hrs Not indicated              

   



                                )( HEART for MACE                 



                                 )( HEART for MACE Response  Value              

   



                                 History Mod index of suspicion 1               

  



                                 ECG Interpretation Nonspec repol disturb 1     

            



                                 Age Age 45 - 65 1                 



                                 Risk Factors for CAD 1-2 CAD risk factors 1    

             



                                 Troponin 1 to 3x NL troponin 1                 



                                 Total  5                       



                                                                



                                                                



                                                    Portions of this section wer

e scribed by Génesis Matias on 20 at 

0454                                                



                                                                



                                Review of Systems                 



                                                                



                                ROS Statements                  



                                All systems rev  neg except as marked.          

       



                                                                



                                Free Text ROS Notes                 



                                Free Text ROS Notes                 



                                -Constitutional                 



                                Denies: Fever. Chills.                 



                                -GI                             



                                Denies: Nausea, Vomiting. Abdominal pain. consti

pation                 



                                + diarrhea                      



                                -                             



                                Denies: Dysuria, Hematuria,                 



                                -Musculoskeletal                 



                                Denies: Extremity pain, Ext Swelling            

     



                                -Skin                           



                                Denies: Rash, Swelling.                 



                                -Neurologic                     



                                Denies: Numbness, Tingling.                 



                                -Eyes                           



                                Denies:visual loss, blurred vision              

   



                                -Respiratory                    



                                Denies: dyspnea on exertion                 



                                + SOB                           



                                -Cardiovascular                 



                                Denies: Chest pain, Dyspnea on exertion, Edema. 

                



                                -Allergy:                       



                                Denies Hives, Itching                 



                                                                



                                                                



                                                    Portions of this section wer

e scribed by Génesis Matias on 20 at 

0338                                                



                                                                



                                Past Medical History - Adult                 



                                Stated Complaint FROM [facility]; HYPOTENSI

ON                 



                                Allergies                       



                                Coded Allergies:                 



                                Penicillins (Severe, SWELLING 16)         

        



                                                                



                                Home Medications                 



                                Discontinued Scripts                 



                                CIPROFLOXACIN (CIPRO) 750 MG PO Q12H            

     



                                 CIPROFLOXACIN (CIPRO) 750 MG PO Q12H #14 TAB   

              



                                 Prov: 16                 



                                 DC: 20 0347 Patient stopped taking       

          



                                HYDROcodone/APAP (NORCO 10/325) 1 TAB PO Q4H PRN

 PRN PAIN                 



                                 HYDROcodone/APAP (NORCO 10/325) 1 TAB PO Q4H ID

N PRN PAIN #30 TAB                 



                                 Prov: 16                 



                                 DC: 20 0347 Patient stopped taking       

          



                                                                



                                Reported Medications                 



                                LITHIUM CARBONATE  MG PO DAILY PM         

        



                                CHOLECALCIFEROL (VITAMIN D3) (VITAMIN D3) 1,000 

UNITS PO DAILY                 



                                MULTIVITAMIN (MULTI-DAY VITAMIN) 1 TAB PO DAILY 

                



                                GABAPENTIN (NEURONTIN) 100 MG PO TID            

     



                                ALPRAZolam (XANAX) 0.5 MG PO Q8H PRN PRN ANXIETY

                 



                                MIRTAZAPINE (REMERON) 30 MG PO DAILY            

     



                                ZIPRASIDONE (GEODON) 40 MG PO BID               

  



                                CARVEDILOL (COREG) 25 MG PO BID MEALS           

      



                                MELOXICAM (MOBIC) 7.5 MG PO DAILY               

  



                                ALBUTEROL (PROAIR HFA 90 MCG/ACT) 1 PUFF INH RTQ

4H                 



                                                                



                                Discontinued Reported Medications               

  



                                MELOXICAM (MOBIC) 7.5 MG PO BID                 



                                                                



                                                                



                                Review of Nursing Notes Rev avail, and agree    

             



                                Past Medical History:                 



                                Reports: COPD, Depression/mood disorder, Hyperte

nsion.                 



                                Past Surgical History:                 



                                Reports: Cholecystectomy.                 



                                Additional Surgical History                 



                                ankle surgery, jaw reconstruction, ear sx       

          



                                Smoking status for patients 13 years old or olde

r: Unknown,if ever smoked                 



                                Other Social History Local resident             

    



                                Ambulatory Status Independent                 



                                                                



                                                    Portions of this section caesar vargas scribed by Génesis Matias on 20 at 

0457                                                



                                                                



                                Physical Exam                   



                                                                



                                Vital Signs                     



                                Vital Signs                     



                                First Documented:                 



                                 Result Date Time                 



                                 Pulse Ox 100 8                 



                                 B/P 93/48 8                 



                                 B/P Mean 63 318                 



                                  O2 Delivery Room air 318               

  



                                 Temp 36.4 318                 



                                 Pulse 48 318                 



                                 Resp 22 318                 



                                                                



                                Last Documented:                 



                                 Result Date Time                 



                                 Pulse Ox 100 431                 



                                  B/P 102/58 431                 



                                 B/P Mean 72 431                 



                                 O2 Delivery Room air 431                

 



                                 Pulse  48 431                 



                                 Resp 18 431                 



                                 Temp 36.4 318                 



                                                                



                                                                



                                Review of Vital Signs Reviewed                 



                                                                



                                Focused PE                      



                                General/Const **                 



                                 General/Const Awake, Alert                 



                                 Appearance/Presentation                 



                                 Obese, morbidly.                 



                                Resp/Chest **                   



                                 Respiratory/Chest Atraumatic, Breath sounds NL,

 Breath sounds = bilat, No                 



                                respiratory distress, No rales, No rhonchi, No w

heezing                 



                                Cardiovascular **                 



                                 Cardiovascular Heart sounds NL, No gallop, No m

urmurs                 



                                 Heart Rate/Rhythm                 



                                 Bradycardia.                   



                                                                



                                Free Text PE Notes                 



                                Free Text PE Notes                 



                                 General/Const                  



                                 General/Const Awake, Alert                 



                                MS Head                         



                                 Head Atraumatic, Normocephalic                 



                                Eyes                            



                                  Eyes Atraumatic, No scleral icterus           

      



                                MS Neck                         



                                 Neck Atraumatic, Full range of motion          

       



                                Resp/Chest CTAB, -w                 



                                Cardiovascular                  



                                 good cap refill                 



                                Ext: moving all 4 ext, no swelling/ cyanosis not

ed on UE Bl                 



                                Skin                            



                                 Skin no apparent rashes, Dry, Intact           

      



                                Neurologic                      



                                 Neurologic Oriented X3, Speech NL              

   



                                Psych nl affect and mood                 



                                                                



                                                                



                                                    Portions of this section caesar vargas scribed by Génesis Matias on 20 at 

0547                                                



                                                                



                                Interpretation Diagnostics                 



                                                                



                                Lab Results Interpretation                 



                                Results                         



                                Laboratory Tests                 



                                                                



                                                                



                                                                



                                20 034:                  



                                [Embedded Image Not Available]                 



                                Laboratory Tests:                 



                                  034 034                 



                                 Chemistry                      



                                 Sodium (134 - 147 mmol/L) 135                 



                                 Potassium (3.4 - 5.0 mmol/L) 4.0               

  



                                  Chloride (100 - 108 mmol/L) 106               

  



                                 Carbon Dioxide (21 - 32 mmol/L) 22             

    



                                 Anion Gap (4.0 - 15.0 GAP calc)  7.0           

      



                                 BUN (7 - 18 MG/DL) 34 H                 



                                 Creatinine (0.6 - 1.0 MG/DL) 1.5 H             

    



                                 Glomerular Filtr Rate (>60 estGFR) 39 L        

         



                                 Glucose (70 - 110 MG/DL) 97                 



                                 Lactic Acid (0.4 - 2.0 mmol/L) 0.8             

    



                                  Calcium (8.5 - 10.1 MG/DL) 8.9                

 



                                 Troponin I (0.000 - 0.045 NG/ML) 0.555 *H      

           



                                 Hematology                     



                                 WBC (3.5 - 11.0 K/mm3)  24.2 H                 



                                 RBC (4.70 - 6.10 M/mm3) 3.75 L                 



                                 Hgb (10.4 - 14.9 G/DL) 11.5                 



                                 Hct (31.5 - 44.1 %) 35.2                 



                                 MCV (84.5 - 98.6 Fl) 93.9                 



                                 MCH (27.0 - 34.2 pg) 30.7                 



                                 MCHC (31.5 - 34.0 G/DL) 32.7                 



                                 RDW (11.5 - 14.5 SD) 13.8                 



                                 Plt Count (150 - 450 K/mm3)  234.0             

    



                                 MPV (7.0 - 10.5 fL) 9.80                 



                                 Neut % (Auto) (40 - 76 %) 82.9 H               

  



                                 Lymph % (Auto) (20.5 - 51.1 %) 8.3 L           

      



                                 Mono % (Auto) (1.7 - 9.3 %) 7.3                

 



                                 Eos % (Auto) (0.0 - 6.0 %)  1.4                

 



                                 Baso % (Auto) (0.0 - 2.0 %) 0.1                

 



                                 Neut # (Auto) (1.8 - 7.6 K/mm3) 20.08 H        

         



                                 Lymph # (Auto) (0.6 - 3.2 K/mm3) 2.0           

      



                                 Mono # (Auto) (0.3 - 1.1 K/mm3) 1.8 H          

       



                                 Eos # (Auto) (0.0 - 0.4 K/mm3) 0.3             

    



                                  Baso # (Auto) (0.0 - 0.1 K/mm3) 0.0           

      



                                 Add Manual Diff (CRITERIA DIFF/SCN) NO         

        



                                                                



                                Microbiology:                   



                                 Date/Time Procedure - Status                 



                                 Source Growth                  



                                 452 MRSA Screen - ORD                 



                                 NASAL                          



                                 340 Blood Culture - RECD                

 



                                 BLOOD                          



                                                                



                                Recent Impressions:                 



                                RADIOLOGY - XR CHEST 1 V 430             

    



                                *** Report Impression - Status: SIGNED Entered: 

2020                 



                                                                



                                IMPRESSION: Mild cardiomegaly, without acute pul

monary process.                 



                                Impression By: oJse - Shree March M.D.      

           



                                                                



                                                                



                                 Lab Statement                  



                                Laboratory studies reviewed and considered in 

e medical decision-making.                 



                                                                



                                 Imaging Statement                 



                                Radiographic studies reviewed and considered in 

the medical decision-making.                 



                                                                



                                                                



                                Point of Care Testing                 



                                Pulse Oximetry                  



                                  Pulse Ox % 100                 



                                 On: Room air                   



                                 Interpretation Interpreted by me, Pulse oximetr

y normal                 



                                 Time 0318                      



                                                                



                                ECG #1 Interpretation                 



                                ECG Documented in MUSE Yes                 



                                Date 20                   



                                Time 042                       



                                Interpreted by and reviewed by me, ED physician 

                



                                NL ECG Interpretation No STEMI                 



                                Rate 50                         



                                Rhythm Bradycardia                 



                                                                



                                                    Portions of this section wer

e scribed by Génesis Matias on 20 at 

0553                                                



                                                                



                                Procedures                      



                                                                



                                Central Line Placement #1                 



                                Time 0355                       



                                Procedure Performed by ED physician             

    



                                Consent/Setup/Site Prep Verified correct patient

, Informed consent provided,                 



                                Consent from patient, Oxygen administered, Pulse

 oximeter applied, Cardiac                 



                                monitor applied, Hand hygiene observed          

       



                                Skin Preparation Agent Hibiclens - Chlorhexidine

                 



                                Local Anesthesia Lidocaine 1%                 



                                Side/Location/Ultrasound Internal jugular R, Ult

rasound assisted                 



                                                    Catheter/Lumen/Technique Tri

ple lumen, Seldinger technique, Guide wire removed,

                                                    



                                                    Good blood return, Secured w

 catheter device, Secured with tape (and stat lock)

                                                    



                                Number of Attempts 1                 



                                                    Post-Procedure/Complications

 Dressing placed, CXR neg for pneumothorax, Cath 

tip                                                 



                                good position, Condition improved, Tolerated pro

cedure well, Patient stable                 



                                                                



                                                    Portions of this section wer

e scribed by Génesis Matias on 20 at 

0423                                                



                                                                



                                Re-Evaluation MDM                 



                                                                



                                Free Text MDM Notes                 



                                Free Text MDM Notes                 



                                49 lady HPI PE as above                 



                                VS notable for hypotension:                 



                                1. labs                         



                                2. central line                 



                                3. imaging                      



                                4. reassess                     



                                                                



                                Sepsis work-up and antibiotics given at outside 

facility.                 



                                                                



                                Pt. understands and agrees with plan.           

      



                                Discussed case with STANISLAV Monreal.                 



                                Pt understands plan and agrees to it            

     



                                                                



                                                                



                                                                



                                ED Course                       



                                Medication(s) Ordered                 



                                Medication(s) Ordered:                 



                                Autonomic Drugs                 



                                 Sig/Kelle Start time Last                 



                                 Medication Dose Route Stop Time Status Admin   

              



                                 Dopamine HCl/Dextrose 250 ML X1ED STA  045

2 AC                  



                                 IV  0059 0506                 



                                                                



                                Central Nervous System Agents                 



                                 Sig/Kelle Start time Last                 



                                 Medication Dose Route Stop Time Status Admin   

              



                                 Acetaminophen 650 MG Q4H PRN PRN 5 AC 

                



                                 PO  044                  



                                                                



                                Gastrointestinal Drugs                 



                                 Sig/Kelle Start time Last                 



                                 Medication Dose Route Stop Time Status Admin   

              



                                 Docusate Sodium 100 MG Q12H PRN  

AC                 



                                                   



                                  Ondansetron HCl 4 MG Q4H PRN  AC

                 



                                                   



                                                                



                                                                



                                                                



                                                    Portions of this section wer

e scribed by Génesis Matias on 20 at 

0553                                                



                                                                



                                Patient Discharge Departure                 



                                                                



                                Vital Signs/Condition                 



                                Vital Signs                     



                                First Documented:                 



                                 Result Date Time                 



                                  Pulse Ox 100  0318                 



                                 B/P 93/48  0318                 



                                 B/P Mean 63 318                 



                                  O2 Delivery Room air 318               

  



                                 Temp 36.4 318                 



                                 Pulse 48  0318                 



                                 Resp  22 318                 



                                                                



                                Last Documented:                 



                                 Result Date Time                 



                                 Pulse Ox 100  0431                 



                                 B/P  102/58  0431                 



                                 B/P Mean 72  0431                 



                                 O2 Delivery Room air  043                

 



                                 Pulse 48  0431                 



                                 Resp 18  043                 



                                 Temp 36.4 318                 



                                                                



                                All vital signs available at the time of this en

try have been reviewed.                 



                                                                



                                Condition Guarded                 



                                                                



                                Clinical Impression                 



                                Clinical Impression                 



                                Primary Impression: Hypotension                 



                                Secondary Impressions: Bradycardia, Sepsis, SOB 

(shortness of breath)                 



                                                                



                                Disposition Decision                 



                                Admit                           



                                 Admit Physician Name                 



                                 Derian Ferrara MD                 



                                 Admit Physician Hospitalist                 



                                 Request Time 0454                 



                                 Request Date 20                 



                                 )( Admission Accepts Yes                 



                                 )( Accepted Time 0454                 



                                 )( Accepted Date 20                 



                                 Call Information will see patient, agrees with 

eval, agrees with plan                 



                                                                



                                Discharge/Care Plan                 



                                                    Counseled Regarding Diagnosi

s, Lab results, Imaging studies, Need for admission

                                                    



                                 Admit Note                     



                                                    I have spoken with the patie

nt and/or caregivers. I have explained the 

patient's                                           



                                                    condition, diagnoses and farida

atment plan based on the information available to 

me                                                  



                                at this time. I have answered the patient's and/

or caregiver's questions and                 



                                addressed any concerns. The patient and/or careg

donita have as good an                 



                                                    understanding of the patient

's diagnosis, condition and treatment plan as can 

be                                                  



                                                    expected at this point. The 

patient has been stabilized within the capability 

of                                                  



                                        the emergency department. The patient wi

ll be transported for further care and 

                                        



                                management or will be moved to an observation or

 inpatient service. I have                 



                                        communicated with the staff or medical p

ractitioner taking over this patient's 

                                        



                                care.                           



                                                                



                                                                



                                Critical Care                   



                                Time Spent (minutes): 30                 



                                Services Performed Patient management by me, Julien vargas spent at bedside, Reviewing                 



                                test results, Reviewing imaging, Discussing zaki

ent care, Documentation in                 



                                record                          



                                Separately billable procedures excluded from julien vargas.                 



                                Patient was critically ill due to:              

   



                                hypotension                     



                                My treatment and management were:               

  



                                pressors                        



                                 CC Note 1                      



                                                    Total critical care time [30

] minutes. Total critical care time documented does

                                                    



                                not include time spent on separately billed proc

edures or the services of                 



                                residents, students, nurses or physician assista

nts. I personally saw and                 



                                examined the patient. I have reviewed all diagno

stic interpretations and                 



                                                    treatment plans as written. 

I was present for the key portions of any 

procedures                                          



                                                    performed and the inclusive 

time noted in any critical care statement. Critical

                                                    



                                                    care time includes patient m

anagement by me, time spent at the patients 

bedside,                                            



                                        time to review lab and imaging results, 

discussing patient care, documentation 

                                        



                                in the medical record, and time spent with the f

amily or caregiver.                 



                                                                



                                                                



                                Supervising Physician Note                 



                                 Scribe Statement                 



                                Génesis Matias, 20 0340, scribing for

 and in the presence of Dr. Quiros.                           



                                Signed By: Génesis Matias, 20 0340   

              



                                                                



                                 Provider Scribed Statement                 



                                                    I personally performed the s

ervices described in this documentation and 

reviewed                                            



                                the documentation that was dictated to the scrib

e(s) in my presence, and it                 



                                accurately records my words and actions. ELINOR Quiros, 20                 



                                                                



                                                                



                                                    Portions of this section wer

e scribed by Génesis Matias on 20 at 

0553                                                



                                                                



                                Electronically Signed by Kristy Quiros MD on  at 0622                 



                                                                



                                RPT #: [id number]                 



                                ***END OF REPORT***                 

 

                2020 03:20:00-00:00 [id number]                       [facility]                

 



                                 [id number] [place] [id number]                 



                                                                



                                PATIENT NAME: TYLER EDGE ADMIT DATE: 20

                 



                                ACCOUNT NO: [id number] ROOM NO: S201        

         



                                MEDICAL RECORD NO: [id number] AGE: 49           

      



                                REPORT TYPE: eELECTROCARDIOGRAM SEX: F          

       



                                                                



                                ADMITTING PHYSICIAN: Derian Ferrara MD        

         



                                ATTENDING PHYSICIAN: Derian Ferrara MD        

         



                                                                



                                                                



                                Order:                          



                                [id number]                   



                                Test Reason : (Not Selected)                 



                                 Test Date/Time Stamp:                 



                                 03:20:27                 



                                Blood Pressure : 093/048 mmHG                 



                                Vent. Rate : 050 BPM Atrial Rate : 050 BPM      

           



                                 P-R Int : 148 ms QRS Dur : 098 ms              

   



                                 QT Int : 558 ms P-R-T Axes : 064 055 087 degree

s                 



                                 QTc Int : 508 ms                 



                                                                



                                Sinus bradycardia                 



                                Cannot rule out Inferior infarct , age undetermi

minerva                 



                                Prolonged QT                    



                                Abnormal ECG                    



                                No previous ECGs available                 



                                Confirmed by SAVANNA RODRIGUEZ MD ([id number]) on 3

/2020 3:26:38 PM                 



                                                                



                                Referred By: Self Referred Confirmed by:SAVANNA GEORGE MD                 



                                                                



                                                                



                                                                



                                                                



                                                                



                                Electronically Signed by DINH Gaines MD on

 20 at 1526                 



                                                                



                                                                



                                                                



                                                                



                                                                



                                                                



                                                                



                                                                



                                                                



                                                                



                                                                



                                                                



                                                                



                                                                



                                                                



                                                                



                                                                



                                                                



                                PATIENT NAME: TYLER EDGE ACCOUNT #: [id number]

158                 

 

                2018 16:18:55-00:00  [facility]



                                 Discharge Summary                 



                                                                



                                PATIENT NAME: TYLER EDGE PHYSICIAN: Salvatore Brownlee MD                 



                                 ACCOUNT #: [id number] Admitted:                

 



                                 MR NUMBER: [id number] DISCHARGED: 2017 05:4

1:00                 



                                                    

________________________________________________________________________________
                                                    



                                The patient was admitted to Dr. Cohen's team on 

the inpatient unit.                 



                                                                



                                REASON FOR ADMISSION:                 



                                The patient was admitted for suicidal ideation w

ith a chief complaint of "I                 



                                am tired of doing all this." The patient is a 47

-year-old female with a past                 



                                psychiatric history of bipolar disorder and schi

zophrenia. She presented                 



                                with suicidal ideation without a plan going on f

or over 3 days prior to                 



                                admission.                      



                                                                



                                ADMITTING DIAGNOSES:                 



                                Major depressive disorder, recurrent episode, se

sara with mixed features, and                 



                                methamphetamine use disorder.                 



                                                                



                                FINAL DIAGNOSES:                 



                                AXIS I: Bipolar 2 disorder, current depressive e

pisode with psychotic                 



                                features, and methamphetamine use disorder.     

            



                                AXIS II: None.                  



                                AXIS III: Obesity, chronic obstructive pulmonary

 disease, and neuropathy.                 



                                AXIS IV: Poor family relationship, history of dr decker abuse, unemployed, on                 



                                disability.                     



                                                                



                                PRINCIPAL PROCEDURE:                 



                                Psychopharmacotherapy.                 



                                                                



                                SPECIAL PROCEDURES:                 



                                None.                           



                                                                



                                HOSPITAL COURSE:                 



                                Ms. Tyler Edge is a 47-year-old  fem

ronny, who was admitted to Dr. Cohen's team from 2017 to . The patient was on                 



                                unit restrictions along with elopement and stand

suzette PICU precautions. The                 



                                patient on admission was only started on trazodo

ne 50 mg at bedtime p.r.n.                 



                                for sleep and Vistaril 50 mg q. 6 hours p.r.n. f

or anxiety. The patient did                 



                                state that she had medication that she is taking

 at home of lithium 400 mg                 



                                daily, Abilify 30 mg, gabapentin, unknown dose, 

and trazodone, unknown dose.                 



                                We did not start her home medications as she abebe

d they were not working. She                 



                                had stopped taking them a few weeks prior to [facility] admitted. Due to her                 



                                presenting symptoms and her past psychiatric his

tory of bipolar 2, we have                 



                                since started the patient on Latuda 20 mg with l

unch. She desired to stop                 



                                the lithium and Abilify. She states that she had

 an allergy to risperdal and                 



                                she would not take medications that would cause 

her to gain weight. The                 



                                patient did not have any side effects to the Lat

uda. Over the next few days,                 



                                we increased the dose from 20 to 40 mg with lunc

h. We increase the trazodone                 



                                to 100 mg at night for sleep and these changes t

o his medications help                 



                                resolve the patient's presenting symptoms of nancy

cidal ideation, improvement                 



                                in depression, improved sleep, improved appetite

, and improved concentration.                 



                                 She is no longer reporting feelings of hopeless

ness. The patient was                 



                                cooperative during the day.                 



                                                                



                                ASSESSMENT:                     



                                                                



                                 [facility]                 



                                  Discharge Summary                 



                                                                



                                PATIENT NAME: TYLER EDGE PHYSICIAN: Salvatore Brownlee MD                 



                                 ACCOUNT #: [id number] Admitted:                

 



                                 MR NUMBER: [id number]  DISCHARGED: 2017 05:

41:00                 



                                                    

________________________________________________________________________________
                                                    



                                The patient was admitted to Dr. Cohen's team on 

the inpatient unit.                 



                                                                



                                REASON FOR ADMISSION:                 



                                The patient was admitted for suicidal ideation w

ith a chief complaint of "I                 



                                am tired of doing all this." The patient is a 47

-year-old female with a past                 



                                psychiatric history of bipolar disorder and schi

zophrenia. She presented                 



                                with suicidal ideation without a plan going on f

or over 3 days prior to                 



                                admission.                      



                                                                



                                ADMITTING DIAGNOSES:                 



                                Major depressive disorder, recurrent episode, se

sara with mixed features, and                 



                                methamphetamine use disorder.                 



                                                                



                                FINAL DIAGNOSES:                 



                                AXIS I: Bipolar 2 disorder, current depressive e

pisode with psychotic                 



                                features, and methamphetamine use disorder.     

            



                                AXIS II: None.                  



                                AXIS III: Obesity, chronic obstructive pulmonary

 disease, and neuropathy.                 



                                AXIS IV: Poor family relationship, history of dr

brianne abuse, unemployed, on                 



                                disability.                     



                                                                



                                PRINCIPAL PROCEDURE:                 



                                Psychopharmacotherapy.                 



                                                                



                                SPECIAL PROCEDURES:                 



                                None.                           



                                                                



                                HOSPITAL COURSE:                 



                                Ms. Tyler Edge is a 47-year-old  fem

ronny, who was admitted to Dr. Cohen's team from 2017 to . The patient was on                 



                                unit restrictions along with elopement and stand

suzette PICU precautions. The                 



                                patient on admission was only started on trazodo

ne 50 mg at bedtime p.r.n.                 



                                for sleep and Vistaril 50 mg q. 6 hours p.r.n. f

or anxiety. The patient did                 



                                state that she had medication that she is taking

 at home of lithium 400 mg                 



                                daily, Abilify 30 mg, gabapentin, unknown dose, 

and trazodone, unknown dose.                 



                                We did not start her home medications as she abebe

d they were not working. She                 



                                had stopped taking them a few weeks prior to [facility] admitted. Due to her                 



                                presenting symptoms and her past psychiatric his

tory of bipolar 2, we have                 



                                since started the patient on Latuda 20 mg with l

unch. She desired to stop                 



                                the lithium and Abilify. She states that she had

 an allergy to risperdal and                 



                                she would not take medications that would cause 

her to gain weight. The                 



                                patient did not have any side effects to the Lat

uda. Over the next few days,                 



                                we increased the dose from 20 to 40 mg with lunc

h. We increase the trazodone                 



                                to 100 mg at night for sleep and these changes t

o his medications help                 



                                resolve the patient's presenting symptoms of nancy

cidal ideation, improvement                 



                                in depression, improved sleep, improved appetite

, and improved concentration.                 



                                 She is no longer reporting feelings of hopeless

ness. The patient was                 



                                cooperative during the day.                 



                                                                



                                ASSESSMENT:                     



                                                                



                                 [facility]                 



                                 Discharge Summary                 



                                She did not attend group therapy. On the day of 

discharge, she was deemed                 



                                suitable for discharge because she denied suicid

al and homicidal ideations.                 



                                                                



                                She denied audio and visual hallucinations. Her 

mood had improved. Her                 



                                affect had improved her sleep. Judgment and insi

ght had all improved. The                 



                                patient plans to return home. She will continue 

psychotropic medications and                 



                                followup with an outpatient psychiatrist.       

          



                                                                



                                MENTAL STATUS EXAM ON DISCHARGE:                

 



                                The patient appeared well-groomed, well-nourishe

d 47-year-old female. She                 



                                made good eye contact. She had no psychomotor re

tardation or activation.                 



                                She did have a calm attitude. Her speech was reg

ular rate and rhythm with                 



                                good volume. Her mood was good. Her affect was c

ongruent and euthymic.                 



                                Regarding perception, she denied any audio or vi

sual hallucinations. She                 



                                denies any delusions. Thought process was linear

 and goal directed. She                 



                                denied suicidal and homicidal ideations. Insight

 and judgment were fair.                 



                                She is alert and oriented to person, place, time

, and circumstance. Her                 



                                memory and attention are grossly intact. Abstrac

tion is intact. Fund of                 



                                knowledge was appropriate for her age and educat

ion. Her gait was normal.                 



                                                                



                                LABORATORY DATA:                 



                                No relevant labs upon discharge.                

 



                                                                



                                DRUG REACTIONS/INTERACTIONS:                 



                                None.                           



                                                                



                                DISCHARGE MEDICATIONS:                 



                                Were:                           



                                                                



                                 1. Latuda 40 mg per day with lunch.            

     



                                 2. Gabapentin 1200 mg at night, 300 mg with sharon

akfast, and 300 mg wtih                 



                                 lunch.                         



                                                                



                                DISCHARGE INSTRUCTIONS:                 



                                The patient was provided with standard discharge

 instructions. No                 



                                restrictions on diet or physical activity. The p

atient was given driving                 



                                                    restrictions as she is going

 to continue taking gabapentin may cause 

drowsiness.                                         



                                                                



                                FOLLOWUP:                       



                                The patient is scheduled a followup appointment 

with [facility] andhas been                 



                                given specific instructions on how to get to the

 appointment.                 



                                                                



                                DISPOSITION:                    



                                Ms. Tyler Edge is currently stable and tolera

ting all her medications and                 



                                will be discharging to her home. The patient's p

rognosis is poor as                 



                                she has a history of noncompliance; however, if 

she is able to be compliant                 



                                                                



                                 [facility]                 



                                 Discharge Summary                 



                                She did not attend group therapy. On the day of 

discharge, she was deemed                 



                                suitable for discharge because she denied suicid

al and homicidal ideations.                 



                                                                



                                She denied audio and visual hallucinations. Her 

mood had improved. Her                 



                                affect had improved her sleep. Judgment and insi

ght had all improved. The                 



                                patient plans to return home. She will continue 

psychotropic medications and                 



                                followup with an outpatient psychiatrist.       

          



                                                                



                                MENTAL STATUS EXAM ON DISCHARGE:                

 



                                The patient appeared well-groomed, well-nourishe

d 47-year-old female. She                 



                                made good eye contact. She had no psychomotor re

tardation or activation.                 



                                She did have a calm attitude. Her speech was reg

ular rate and rhythm with                 



                                good volume. Her mood was good. Her affect was c

ongruent and euthymic.                 



                                Regarding perception, she denied any audio or vi

sual hallucinations. She                 



                                denies any delusions. Thought process was linear

 and goal directed. She                 



                                denied suicidal and homicidal ideations. Insight

 and judgment were fair.                 



                                She is alert and oriented to person, place, time

, and circumstance. Her                 



                                memory and attention are grossly intact. Abstrac

tion is intact. Fund of                 



                                knowledge was appropriate for her age and educat

ion. Her gait was normal.                 



                                                                



                                LABORATORY DATA:                 



                                No relevant labs upon discharge.                

 



                                                                



                                DRUG REACTIONS/INTERACTIONS:                 



                                None.                           



                                                                



                                DISCHARGE MEDICATIONS:                 



                                Were:                           



                                                                



                                 1. Latuda 40 mg per day with lunch.            

     



                                 2. Gabapentin 1200 mg at night, 300 mg with sharon

akfast, and 300 mg wtih                 



                                 lunch.                         



                                                                



                                DISCHARGE INSTRUCTIONS:                 



                                The patient was provided with standard discharge

 instructions. No                 



                                restrictions on diet or physical activity. The p

atient was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                 



                                                                



                                PATIENT NAME: TYLER EDGE  PHYSICIAN: Norma Judge MD                 



                                 ACCOUNT #: [id number] Admitted:                

 



                                 MR NUMBER: [id number] DISCHARGED:                

 



                                                    

________________________________________________________________________________
                                                    



                                DATE OF SERVICE: 2017                 



                                                                



                                TIME:                           



                                02:42                           



                                                                



                                PRIMARY CARE PHYSICIAN:                 



                                None.                           



                                                                



                                CHIEF COMPLAINT:                 



                                "I am scared, confused, I am supposed to get mar

ried, I have nowhere to go."                 



                                                                



                                HISTORY OF PRESENT ILLNESS:                 



                                The patient is a 47-year-old female with history

 of bipolar disorder, COPD,                 



                                nicotine dependence, crystal meth abuse, who is 

currently homeless. The                 



                                patient presented to the ER stating that she wan

gianfranco to kill herself as she                 



                                could not take it anymore. The patient has been 

off of medicine for the past                 



                                2 to 3 days, she was hearing voices telling her 

to kill herself. She has no                 



                                plan. She also admits to visual and auditory justin

lucinations that she saw                 



                                demons .                        



                                                                



                                REVIEW OF SYMPTOMS:                 



                                Reports left shoulder pain, states since 2 days.

 Denies fever, chills, upper                 



                                respiratory tract complaints, chest pain, shortn

ess of breath, PND,                 



                                orthopnea, abdominal pain, urinary or bowel comp

laints. The patient urinary                 



                                reports burning, increased frequency of urinatio

n. Denies any bowel                 



                                complaints. A 12-point review of symptoms addres

sed otherwise negative.                 



                                                                



                                PAST MEDICAL HISTORY:                 



                                 1. Bipolar disorder.                 



                                 2. COPD.                       



                                 3. Bilateral hearing loss, questionable sensori

neural.                 



                                                                



                                PAST SURGICAL HISTORY:                 



                                 1. Cholecystectomy.                 



                                 2. Bilateral ear surgery, details of which are 

unknown.                 



                                                                



                                ALLERGIES:                      



                                PENICILLIN AND RISPERDAL.                 



                                                                



                                FAMILY HISTORY:                 



                                States that she is not in touch with her family.

                 



                                                                



                                MEDICATIONS:                    



                                Her medicines are trazodone 100 mg at bedtime.  

               



                                                                



                                SOCIAL HISTORY:                 



                                Homeless, smokes 1-1/2 packs of cigarettes a day

. History of smoking crystal                 



                                meth. Occasional alcohol abuse.                 



                                                                



                                PHYSICAL EXAMINATION:                 



                                                                



                                 [facility]                 



                                 History and Physical                 



                                GENERAL: Very flat affect, tearful, crying.     

            



                                VITAL SIGNS: T-max is 100.7, blood pressure is 1

11/82, heart rate 90,                 



                                respiratory rate 20, BMI is 40.                 



                                HEENT: PERRLA. Extraocular muscles intact. Atrau

matic, normocephalic.                 



                                Pallor present.                 



                                Oral cavity, missing dentition.                 



                                NECK: Supple. No JVD. No bruit.                 



                                CVS: S1, S2, is regular. No murmur, gallop, or r

ub.                 



                                CHEST: Bilaterally clear. No rales, rhonchi, or 

wheeze.                 



                                ABDOMEN: Soft, obese, nondistended, nontender. B

owel sounds are present.                 



                                EXTREMITIES: No pedal edema, cyanosis, or clubbi

ng. tenderness on Palpation                 



                                of the left shoulder                 



                                NEUROLOGIC: Awake, alert, and oriented. Motor 5/

5. Sensation is intact.                 



                                PERRLA. Extraocular muscles intact. Facial sensa

tion is normal. Tongue                 



                                midline.                        



                                                                



                                LABORATORY DATA:                 



                                 1. CBC: White count is 10.3, hemoglobin 13.1, h

ematocrit 41.5, platelets                 



                                 of 327.                        



                                 2. Serum pregnancy negative.                 



                                 3. RPR nonreactive.                 



                                 4. Lipid profile: Total cholesterol 163, LDL 10

0.                 



                                 5. TSH 0.93.                   



                                 6. Alcohol negative.                 



                                 7. BMP - sodium 140, potassium 3.6, chloride 10

3, bicarbonate 26, BUN 13,                 



                                 creatinine 0.8. LFTs are within normal range.  

               



                                 8. UA shows evidence of pyuria.                

 



                                                                



                                ASSESSMENT:                     



                                 1. Symptomatic urinary tract infection secondar

y to Escherichia coli most                 



                                 likely resulting in low grade fever.           

      



                                 2. Bipolar disorder, depressed type with suicid

al ideation.                 



                                 3. Chronic obstructive pulmonary disease, stabl

e without any                 



                                 exacerbation.                  



                                 4. Morbid obesity, body mass index 40.         

        



                                 5. Nicotine dependence with failure to quit, mi

ld withdrawal symptoms.                 



                                 6. Substance-induced mood disorder.            

     



                                 7. Amphetamine abuse.                 



                                 8. Homeless without any family support.        

         



                                  9. Poor dentition with cavities.              

   



                                 10. No need for gastrointestinal/deep venous th

rombosis prophylaxis.                 



                                                                



                                PLAN:                           



                                We will start the patient on Levaquin.          

       



                                                                



                                The patient was counseled about smoking and amph

etamine cessation.                 



                                                                



                                Antidepressants with psychiatrist.              

   



                                                                



                                 [facility]                 



                                 History and Physical                 



                                                                



                                                    The patient was counseled ab

out lifestyle modifications and  needs

                                                    



                                to be consulted for placement.                 



                                                                



                                AVOID PENICILLIN AND RISPERDAL SINCE THE PATIENT

 IS ALLERGIC TO IT.                 



                                                                



                                We will follow the patient and address any new p

roblems that may arise.                 



                                                                



                                Case dicussed with pt's nurse.                 



                                                                



                                                                



                                                                



                                                                



                                Norma Judge MD                 



                                                                



                                PSN/CTV                         



                                DD: 2017 14:48                 



                                TD: 2017 17:46                 



                                Job #: [id number]                 



                                                                



                                Electronically Authenticated and Edited by:     

            



                                Norma Judge MD On 2017 07:41 AM CS

T

## 2023-06-06 NOTE — EDPHYS
Physician Documentation                                                                           

 [facility]                                                                 

Name: Alka Judd                                                                               

Age: 52 yrs                                                                                       

Sex: Female                                                                                       

: 1970                                                                                   

MRN: [id number]                                                                                   

Arrival Date: 2023                                                                          

Time: 11:30                                                                                       

Account#: [id number]                                                                            

Bed IW1                                                                                           

Private MD:                                                                                       

ED Physician Jagjit De Los Santos                                                                         

HPI:                                                                                              

                                                                                             

11:50 This 52 yrs old Female presents to ER via Ambulatory with complaints of Hip Pain.       [facility] 

11:50 The patient or guardian reports pain. The complaints affect the right hip. Onset: The   [facility] 

      symptoms/episode began/occurred 12 week(s) ago.                                             

16:18 Patient reports right hip pain for the past 12 weeks. Reports that she had rods put in  [facility] 

      her hip 2 years ago and wanted to ensure that the hardware was intact..                     

                                                                                                  

Historical:                                                                                       

- Allergies:                                                                                      

11:54 PENICILLINS;                                                                            aa5 

11:54 Risperdal;                                                                              aa5 

11:54 Benadryl;                                                                               aa5 

- PMHx:                                                                                           

11:54 Bipolar disorder; Hypertensive disorder;                                                aa5 

- PSHx:                                                                                           

11:54 rig hip surgery;                                                                       aa5 

                                                                                                  

- Immunization history:: Adult Immunizations unknown.                                             

- Social history:: Smoking status: Patient reports the use of cigarette tobacco                   

  products, smokes one pack cigarettes per day.                                                   

                                                                                                  

                                                                                                  

ROS:                                                                                              

11:54 Constitutional: Negative for fever, chills, and weight loss, Eyes: Negative for injury, jh7 

      pain, redness, and discharge, Neck: Negative for injury, pain, and swelling,                

      Cardiovascular: Negative for chest pain, palpitations, and edema, Respiratory: Negative     

      for shortness of breath, cough, wheezing, and pleuritic chest pain, Abdomen/GI:             

      Negative for abdominal pain, nausea, vomiting, diarrhea, and constipation, Skin:            

      Negative for injury, rash, and discoloration, Neuro: Negative for headache, weakness,       

      numbness, tingling, and seizure.                                                            

11:54 MS/extremity: Positive for pain, of the R hip, Negative for acute changes.                  

11:54 All other systems are negative.                                                             

                                                                                                  

Exam:                                                                                             

11:54 Constitutional:  This is a well developed, well nourished patient who is awake, alert,  jh7 

      and in no acute distress. Head/Face:  Normocephalic, atraumatic. Neck:  Trachea             

      midline, no thyromegaly or masses palpated, and no cervical lymphadenopathy.  Supple,       

      full range of motion without nuchal rigidity, or vertebral point tenderness.  No            

      Meningismus. Cardiovascular:  Regular rate and rhythm with a normal S1 and S2.  No          

      gallops, murmurs, or rubs.  Normal PMI, no JVD.  No pulse deficits. Respiratory:  Lungs     

      have equal breath sounds bilaterally, clear to auscultation and percussion.  No rales,      

      rhonchi or wheezes noted.  No increased work of breathing, no retractions or nasal          

      flaring. Back:  No spinal tenderness.  No costovertebral tenderness.  Full range of         

      motion. Skin:  Warm, dry with normal turgor.  Normal color with no rashes, no lesions,      

      and no evidence of cellulitis. Neuro:  Awake and alert, GCS 15, oriented to person,         

      place, time, and situation.  Motor strength 5/5 in all extremities.  Sensory grossly        

      intact.  Normal gait.                                                                       

11:54 Musculoskeletal/extremity: ROM: full active range of motion, in the r hip, Circulation      

      is intact in all extremities. Sensation intact. Weight bearing: able to fully bear          

      weight.                                                                                     

                                                                                                  

Vital Signs:                                                                                      

11:53  / 72; Pulse 66; Resp 18 S; Temp 98(O); Pulse Ox 100% on R/A; Weight 94.35 kg     aa5 

      (R); Height 5 ft. 2 in. (R);                                                                

11:53 Body Mass Index 38.04 (94.35 kg, 157.48 cm)                                             aa5 

                                                                                                  

MDM:                                                                                              

11:35 Patient medically screened.                                                             [facility] 

12:30 Differential diagnosis: hip fracture, bursitis, arthritis, strain. Data reviewed: vital [facility] 

      signs, nurses notes, radiologic studies, plain films. I considered the following            

      discharge prescriptions or medication management in the emergency department                

      Medications were administered in the Emergency Department. See MAR. Care significantly      

      affected by the following chronic conditions: Hypertension. Counseling: I had a             

      detailed discussion with the patient and/or guardian regarding: the historical points,      

      exam findings, and any diagnostic results supporting the discharge/admit diagnosis, to      

      return to the emergency department if symptoms worsen or persist or if there are any        

      questions or concerns that arise at home.                                                   

                                                                                                  

                                                                                             

11:48 Order name: XRAY Hip RIGHT 2 view; Complete Time: 12:26                                 [facility] 

                                                                                                  

Administered Medications:                                                                         

12:18 Drug: Acetaminophen  mg Route: PO;                                                aa5 

                                                                                                  

                                                                                                  

Disposition:                                                                                      

17:09 Co-signature as Attending Physician, Jagjit De Los Santos MD I reviewed the patient's care       rn  

      provided by the Advanced Practice Provider and agree with the diagnosis and treatment       

      plan.                                                                                       

                                                                                                  

Disposition Summary:                                                                              

23 12:26                                                                                    

Discharge Ordered                                                                                 

      Location: Home                                                                          [facility] 

      Problem: chronic                                                                        [facility] 

      Symptoms: are unchanged                                                                 [facility] 

      Condition: Stable                                                                       [facility] 

      Diagnosis                                                                                   

        - Pain in right hip                                                                   [facility] 

      Followup:                                                                               [facility] 

        - With: Private Physician                                                                  

        - When: 2 - 3 days                                                                         

        - Reason: Recheck today's complaints                                                       

      Discharge Instructions:                                                                     

        - Discharge Summary Sheet                                                             [facility] 

        - Hip Pain                                                                            [facility] 

      Forms:                                                                                      

        - Medication Reconciliation Form                                                      [facility] 

        - Thank You Letter                                                                    [facility] 

Signatures:                                                                                       

Dispatcher [employer]                           Jagjit Reis MD MD rn Calderon, Audri, RN                     RN   aa5                                                  

Noris Rivera FNP FNP  [facility]                                                  

                                                                                                  

Corrections: (The following items were deleted from the chart)                                    

16:18 11:50 The patient or guardian reports pain, [place] [id number] 

                                                                                                  

**************************************************************************************************

## 2023-06-06 NOTE — ER
Nurse's Notes                                                                                     

 [facility]                                                                 

Name: Alka Judd                                                                               

Age: 52 yrs                                                                                       

Sex: Female                                                                                       

: 1970                                                                                   

MRN: [id number]                                                                                   

Arrival Date: 2023                                                                          

Time: 11:30                                                                                       

Account#: [id number]                                                                            

Bed IW1                                                                                           

Private MD:                                                                                       

Diagnosis: Pain in right hip                                                                      

                                                                                                  

Presentation:                                                                                     

                                                                                             

11:53 Chief complaint: Patient states: right hip pain x 12 weeks that is getting worse.       aa5 

11:53 Method Of Arrival: Ambulatory                                                           aa5 

11:53 Coronavirus screen: At this time, the client does not indicate any symptoms associated  aa5 

      with coronavirus-19. Ebola Screen: Patient denies travel to an Ebola-affected area in       

      the 21 days before illness onset. Initial Sepsis Screen: Does the patient meet any 2        

      criteria? No. Patient's initial sepsis screen is negative. Does the patient have a          

      suspected source of infection? No. Patient's initial sepsis screen is negative. Risk        

      Assessment: Do you want to hurt yourself or someone else? Patient reports no desire to      

      harm self or others. Onset of symptoms was .                                            

11:53 Acuity: TEMO 4                                                                           aa5 

                                                                                                  

Triage Assessment:                                                                                

11:53 General: Appears comfortable, Behavior is calm, cooperative. Neuro: Level of            aa5 

      Consciousness is awake, alert, obeys commands, Oriented to person, place, time,             

      situation. Respiratory: Airway is patent Respiratory effort is even, unlabored,             

      Respiratory pattern is regular, symmetrical. Derm: Skin is pink, warm \T\ dry.              

                                                                                                  

Historical:                                                                                       

- Allergies:                                                                                      

11:54 PENICILLINS;                                                                            aa5 

11:54 Risperdal;                                                                              aa5 

11:54 Benadryl;                                                                               aa5 

- PMHx:                                                                                           

11:54 Bipolar disorder; Hypertensive disorder;                                                aa5 

- PSHx:                                                                                           

11:54 righ hip surgery;                                                                       aa5 

                                                                                                  

- Immunization history:: Adult Immunizations unknown.                                             

- Social history:: Smoking status: Patient reports the use of cigarette tobacco                   

  products, smokes one pack cigarettes per day.                                                   

                                                                                                  

                                                                                                  

Assessment:                                                                                       

12:18 Reassessment: Patient is alert, oriented x 3, equal unlabored respirations, skin        aa5 

      warm/dry/pink. awaiting x-ray results, pt notified of wait time. .                          

                                                                                                  

Vital Signs:                                                                                      

11:53  / 72; Pulse 66; Resp 18 S; Temp 98(O); Pulse Ox 100% on R/A; Weight 94.35 kg     aa5 

      (R); Height 5 ft. 2 in. (R);                                                                

11:53 Body Mass Index 38.04 (94.35 kg, 157.48 cm)                                             aa5 

                                                                                                  

ED Course:                                                                                        

11:33 Patient arrived in ED.                                                                  am2 

11:34 [facility]                                                          7 

11:35 Jagjit De Los Santos MD is Attending Physician.                                                7 

11:53 Arm band placed on.                                                                     aa5 

11:54 Triage completed.                                                                       aa5 

12:16 XRAY Hip RIGHT 2 view In Process Unspecified.                                           EDMS

12:45 No provider procedures requiring assistance completed. Patient did not have IV access   aa5 

      during this emergency room visit.                                                           

                                                                                                  

Administered Medications:                                                                         

12:18 Drug: Acetaminophen  mg Route: PO;                                                aa5 

                                                                                                  

                                                                                                  

Outcome:                                                                                          

12:26 Discharge ordered by MD.                                                                7 

12:45 Discharged to home ambulatory.                                                          aa5 

12:45 Condition: stable                                                                           

12:45 Discharge instructions given to patient, Instructed on discharge instructions, follow       

      up and referral plans. Demonstrated understanding of instructions, follow-up care.          

12:46 Patient left the ED.                                                                    aa5 

                                                                                                  

Signatures:                                                                                       

Dispatcher [employer]                           EDMS                                                 

Neli Marcum RN                     RN   aa5                                                  

Maria Esther Winston                               am2                                                  

Noris Rivera FNP FNP  [facility]                                                  

                                                                                                  

**************************************************************************************************

## 2023-06-06 NOTE — RAD REPORT
EXAM DESCRIPTION:  RAD - Hip Right 2 View - 6/6/2023 12:14 pm

 

CLINICAL HISTORY:  PAIN

 

COMPARISON:  No comparisons

 

TECHNIQUE:  Right hip, 2 views.

 

FINDINGS:  A right hip prothesis is in place, with cerclage wire along the proximal aspect of the fem
oral stem. Hardware is in expected location, with no evidence of complications. No other suspicious o
sseous lesions. Surrounding soft tissues are unremarkable.

 

IMPRESSION:  No acute osseus abnormality. Satisfactory positioning of right hip arthroplasty.

## 2023-06-07 NOTE — EKG
Test Date:    2023-06-05               Test Time:    17:05:20

Technician:   RYAN                                   

                                                     

MEASUREMENT RESULTS:                                       

Intervals:                                           

Rate:         62                                     

MA:           172                                    

QRSD:         86                                     

QT:           440                                    

QTc:          446                                    

Axis:                                                

P:            62                                     

MA:           172                                    

QRS:          46                                     

T:            63                                     

                                                     

INTERPRETIVE STATEMENTS:                                       

                                                     

Normal sinus rhythm

Normal ECG

Compared to ECG 05/01/2023 14:11:03

Sinus bradycardia no longer present



Electronically Signed On 06-07-23 07:15:08 CDT by Ashok Orta

## 2023-07-02 ENCOUNTER — HOSPITAL ENCOUNTER (EMERGENCY)
Dept: HOSPITAL 97 - ER | Age: 53
Discharge: HOME | End: 2023-07-02
Payer: COMMERCIAL

## 2023-07-02 VITALS — OXYGEN SATURATION: 99 % | TEMPERATURE: 98.5 F | DIASTOLIC BLOOD PRESSURE: 53 MMHG | SYSTOLIC BLOOD PRESSURE: 102 MMHG

## 2023-07-02 DIAGNOSIS — Z88.0: ICD-10-CM

## 2023-07-02 DIAGNOSIS — Z88.8: ICD-10-CM

## 2023-07-02 DIAGNOSIS — F31.32: Primary | ICD-10-CM

## 2023-07-02 PROCEDURE — 99282 EMERGENCY DEPT VISIT SF MDM: CPT

## 2023-07-02 NOTE — XMS REPORT
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



     (PriLOSEC)      2-15 03-                          capsule           [facility]



     40 MG      00:00: 23:59                          (40 mg           [facility]



     capsule      00   :00                           total) by           Center



                                                  mouth           



                                                  daily for           



                                                  30 days.           

 

     omeprazole      -0 2023- No             40mg QD   Take 1           [facility]



     (PriLOSEC)      2-15 03-                          capsule           [facility]



     40 MG      00:00: 23:59                          (40 mg           [facility]



     capsule      00   :00                           total) by           Center



                                                  mouth           



                                                  daily for           



                                                  30 days.           

 

     omeprazole      3-0 2023- No             40mg QD   Take 1           [facility]



     (PriLOSEC)      2-15 03-                          capsule           [facility]



     40 MG      00:00: 23:59                          (40 mg           [facility]



     capsule      00   :00                           total) by           Center



                                                  mouth           



                                                  daily for           



                                                  30 days.           

 

     omeprazole      -0 2023- No             40mg QD   Take 1           [facility]



     (PriLOSEC)      2-15 03-                          capsule           [facility]



     40 MG      00:00: 23:59                          (40 mg           [facility]



     capsule      00   :00                           total) by           Center



                                                  mouth           



                                                  daily for           



                                                  30 days.           

 

     omeprazole      2023-0 3- No             40mg QD   Take 1           [facility]



     (P[facility]LOSEC)      2-15 03-17                          capsule           [facility]



     40 MG      00:00: 23:59                          (40 mg           [facility]



     capsule      00   :00                           total) by           Center



                                                  mouth           



                                                  daily for           



                                                  30 days.           

 

     omeprazole      -0 2023- No             40mg QD   Take 1           [facility]



     (PriLOSEC)      2-15 03-17                          capsule           [facility]



     40 MG      00:00: 23:59                          (40 mg           [facility]



     capsule      00   :00                           total) by           Center



                                                  mouth           



                                                  daily for           



                                                  30 days.           

 

     omeprazole      -0 2023- No             40mg QD   Take 1           [facility]



     (PriLOSEC)      2-15 02-15                          capsule           [facility]



     40 MG      00:00: 00:00                          (40 mg           [facility]



     capsule      00   :00                           total) by           Center



                                                  mouth           



                                                  daily for           



                                                  30 days.           

 

     omeprazole      -0 2023- No             40mg QD   Take 1           [facility]



     (PriLOSEC)      2-15 02-15                          capsule           [facility]



     40 MG      00:00: 00:00                          (40 mg           [facility]



     capsule      00   :00                           total) by           Center



                                                  mouth           



                                                  daily for           



                                                  30 days.           

 

     omeprazole      -0 2023- No             40mg QD   Take 1           [facility]



     (P[facility]LOSEC)      2-15 02-15                          capsule           [facility]



     40 MG      00:00: 00:00                          (40 mg           [facility]



     capsule      00   :00                           total) by           Center



                                                  mouth           



                                                  daily for           



                                                  30 days.           

 

     omeprazole      -0 3- No             40mg QD   Take 1           [facility]



     ([facility]SEC)      2-15 02-15                          capsule           [facility]



     40 MG      00:00: 00:00                          (40 mg           [facility]



     capsule      00   :00                           total) by           Center



                                                  mouth           



                                                  daily for           



                                                  30 days.           

 

     omeprazole      -0 2023- No             40mg QD   Take 1           [facility]



     (PriLOSEC)      2-15 02-15                          capsule           [facility]



     40 MG      00:00: 00:00                          (40 mg           [facility]



     capsule      00   :00                           total) by           Center



                                                  mouth           



                                                  daily for           



                                                  30 days.           

 

     omeprazole      3-0 2023- No             40mg QD   Take 1           [facility]



     ([facility]LOSEC)      2-15 02-15                          capsule           [facility]



     40 MG      00:00: 00:00                          (40 mg           [facility]



     capsule      00   :00                           total) by           Center



                                                  mouth           



                                                  daily for           



                                                  30 days.           

 

     omeprazole      3-0 2023- No             40mg QD   Take 1           [facility]



     ([facility]SEC)      2-15 02-15                          capsule           [facility]



     40 MG      00:00: 00:00                          (40 mg           [facility]



     capsule      00   :00                           total) by           Center



                                                  mouth           



                                                  daily for           



                                                  30 days.           

 

     Pepcid 2023-0      Yes                      20 mg = 1           [facility]



     mg oral      2-14                               tab, PO,           l



     tablet      22:50:                               BID, # 60           [facility]

[facility]



               00                                 tab, 0           



                                                  Refill(s)           

 

     Bentyl 10      -0      Yes                      10 mg = 1           [facility]



     mg oral      2-14                               cap, PO,           l



     capsule      22:50:                               QID-Before           [facility]

[facility]



               00                                 Meals, #           



                                                  40 cap, 0           



                                                  Refill(s)           

 

     Pepcid 2023-0      Yes                      20 mg = 1           [facility]



     mg oral      2-14                               tab, PO,           l



     tablet      22:50:                               BID, # 60           [facility]

[facility]



               00                                 tab, 0           



                                                  Refill(s)           

 

     Bentyl 10      -0      Yes                      10 mg = 1           [facility]



     mg oral      2-14                               cap, PO,           l



     capsule      22:50:                               QID-Before           [facility]

[facility]



               00                                 Meals, #           



                                                  40 cap, 0           



                                                  Refill(s)           

 

     Pepcid 2023-0      Yes                      20 mg = 1           [facility]



     mg oral      2-14                               tab, PO,           l



     tablet      22:50:                               BID, # 60           [facility]

[facility]



               00                                 tab, 0           



                                                  Refill(s)           

 

     Bentyl 10      -0      Yes                      10 mg = 1           [facility]

[facility]



     mg oral      2-14                               cap, PO,           l



     capsule      22:50:                               QID-Before           [facility]

[facility]



               00                                 Meals, #           



                                                  40 cap, 0           



                                                  Refill(s)           

 

     Pepcid 2023-0      Yes                      20 mg = 1           [facility]



     mg oral      2-14                               tab, PO,           l



     tablet      22:50:                               BID, # 60           [facility]

[facility]



               00                                 tab, 0           



                                                  Refill(s)           

 

     Bentyl 10      -0      Yes                      10 mg = 1           [facility]

[facility]



     mg oral      2-14                               cap, PO,           l



     capsule      22:50:                               QID-Before           [facility]

[facility]



               00                                 Meals, #           



                                                  40 cap, 0           



                                                  Refill(s)           

 

     Pepcid 2023-0      Yes                      20 mg = 1           [facility]

[facility]



     mg oral      2-14                               tab, PO,           l



     tablet      22:50:                               BID, # 60           [facility]

[facility]



               00                                 tab, 0           



                                                  Refill(s)           

 

     Bentyl 10      -0      Yes                      10 mg = 1           [facility]

[facility]



     mg oral      2-14                               cap, PO,           l



     capsule      22:50:                               QID-Before           [facility]

[facility]



               00                                 Meals, #           



                                                  40 cap, 0           



                                                  Refill(s)           

 

     Pepcid 20            Yes                      20 mg = 1           [facility]

[facility]



     mg oral      2-14                               tab, PO,           l



     tablet      22:50:                               BID, # 60           [facility]

[facility]



               00                                 tab, 0           



                                                  Refill(s)           

 

     Bentyl 10            Yes                      10 mg = 1           [facility]

[facility]



     mg oral      2-14                               cap, PO,           l



     capsule      22:50:                               QID-Before           [facility]

[facility]



                                                Meals, #           



                                                  40 cap, 0           



                                                  Refill(s)           

 

     Pepcid 20            Yes                      20 mg = 1           [facility]

[facility]



     mg oral      2-14                               tab, PO,           l



     tablet      22:50:                               BID, # 60           [facility]

[facility]



               00                                 tab, 0           



                                                  Refill(s)           

 

     Bentyl 10            Yes                      10 mg = 1           [facility]

[facility]



     mg oral      2-14                               cap, PO,           l



     capsule      22:50:                               QID-Before           [facility]

[facility]



                                                Meals, #           



                                                  40 cap, 0           



                                                  Refill(s)           

 

     Pepcid 20            Yes                      20 mg = 1           [facility]

[facility]



     mg oral      2-14                               tab, PO,           l



     tablet      22:50:                               BID, # 60           [facility]

[facility]



               00                                 tab, 0           



                                                  Refill(s)           

 

     Bentyl 10            Yes                      10 mg = 1           [facility]

[facility]



     mg oral      2-14                               cap, PO,           l



     capsule      22:50:                               QID-Before           [facility]

[facility]



                                                Meals, #           



                                                  40 cap, 0           



                                                  Refill(s)           

 

     ondansetron      2022- No             4mg       4 mg, Slow          

 [facility]



     (ZOFRAN      3-31 03-31                          IV Push,           ity of



     (PF))      20:15: 19:31                          ONCE, 1           [place]



     injection 4      00   :00                           dose, On           Medi

staci



     mg                                           Thu            [facility]



                                                  3/31/22 at           



                                                  1515,           



                                                  Routine           

 

     morpHINE      2022- No             4mg       4 mg, Slow           [facility]

[facility]



     injection 4      3-31 03-31                          IV Push,           ity

 of



     mg        20:15: 19:33                          ONCE, 1           [place]



               00   :00                           dose, On           Medical



                                                  u            [facility]



                                                  3/31/22 at           



                                                  1515, STAT           

 

     No known            No                                      [facility]



     medications      3-31                                              ity of



               11:41:                                              [place]



               00                                                [facility]



                                                                 [facility]

 

     lithium            Yes                      2 (two)           [facility]



     carbonate      3-31                               times a           ity of



      mg      10:29:                               day            [place]



     SR tablet      56                                                [facility]

 

     ziprasidone            Yes                      1 (one)           [facility]

[facility]



     80 mg      3-31                               time each           ity of



     capsule      10:29:                               day            [address]

 

     lithium            Yes                      2 (two)           [facility]



     carbonate      3-31                               times a           ity of



      mg      10:29:                               day            [place]



     SR tablet      56                                                [facility]

 

     ziprasidone            Yes                      1 (one)           [facility]

[facility]



     80 mg      3-31                               time each           ity of



     capsule      10:29:                               day            [address]

 

     lithium            Yes                      2 (two)           [facility]



     carbonate      3-31                               times a           ity of



      mg      10:29:                               day            [place]



     SR tablet      56                                                [facility]

 

     ziprasidone            Yes                      1 (one)           [facility]

vers



     80 mg      3-31                               time each           ity of



     capsule      10:29:                               day            [address]

 

     lithium            Yes                      2 (two)           [facility]



     carbonate      3-31                               times a           ity of



      mg      10:29:                               day            [place]



     SR tablet      56                                                [facility]

 

     ziprasidone            Yes                      1 (one)           [facility]

vers



     80 mg      3-31                               time each           ity of



     capsule      10:29:                               day            [address]

 

     lithium            Yes                      2 (two)           [facility]



     carbonate      3-31                               times a           ity of



      mg      10:29:                               day            [place]



     SR tablet      56                                                [facility]

 

     ziprasidone            Yes                      1 (one)           [facility]

vers



     80 mg      3-31                               time each           ity of



     capsule      10:29:                               day            [address]

 

     atorvastati      2021- No             10mg      Take 10 mg          

 [facility]



     n 10 mg      1-16 11-17                          by mouth.           ity of



     tablet      00:00: 05:59                                         [place]



               00   :00                                          [facility]

 

     atorvastati      2021- No             10mg      Take 10 mg          

 [facility]



     n 10 mg      1-16 11-17                          by mouth.           ity of



     tablet      00:00: 05:59                                         [place]



               00   :00                                          [facility]

 

     lithium            Yes            150mg [id number] Take 150          

 [facility]



     carbonate      7-16                          [id number] mg by           [facility]

lt



     (ESKALITH)      20:13:                          3D   mouth 3           



     150 mg      54                                 times           



     capsule                                         daily with           



                                                  meals.           

 

     DULoxetine            Yes                 QD   Take by           [facility]



     (CYMBALTA)      7-16                               mouth           [facility]



     20 mg      20:13:                               daily.           



     delayed      54                                                



     release                                                        



     capsule                                                        

 

     lithium            Yes            150mg [id number] Take 150          

 [facility]



     carbonate      7-16                          [id number] mg by           [facility]



     (ESKALITH)      20:13:                          3D   mouth 3           



     150 mg      54                                 times           



     capsule                                         daily with           



                                                  meals.           

 

     DULoxetine      -0      Yes                 QD   Take by           [facility]

[facility]



     (CYMBALTA)      7-16                               mouth           Health



     20 mg      20:13:                               daily.           



     delayed      54                                                



     release                                                        



     capsule                                                        

 

     lithium      -0      Yes            150mg [id number] Take 150          

 [facility]



     carbonate      7-16                          [id number] mg by           [facility]



     (ESKALI)      20:13:                          3D   mouth 3           



     150 mg      54                                 times           



     capsule                                         daily with           



                                                  meals.           

 

     DULoxetine      -0      Yes                 QD   Take by           [facility]

[facility]



     (CYMBALTA)      7-16                               mouth           Health



     20 mg      20:13:                               daily.           



     delayed      54                                                



     release                                                        



     capsule                                                        

 

     lithium      -0      Yes            150mg [id number] Take 150          

 [facility]



     carbonate      7-16                          [id number] mg by           [facility]



     (ESKALITH)      20:13:                          3D   mouth 3           



     150 mg      54                                 times           



     capsule                                         daily with           



                                                  meals.           

 

     DULoxetine      -0      Yes                 QD   Take by           [facility]

[facility]



     (CYMBALTA)      7-16                               mouth           Health



     20 mg      20:13:                               daily.           



     delayed      54                                                



     release                                                        



     capsule                                                        

 

     lithium      -0      Yes            150mg Q.[id number] Take 150          

 [facility]



     carbonate      7-16                          [id number] mg by           [facility]



     (ESKALITH)      20:13:                          3D   mouth 3           



     150 mg      54                                 times           



     capsule                                         daily with           



                                                  meals.           

 

     DULoxetine      -0      Yes                 QD   Take by           [facility]

[facility]



     (CYMBALTA)      7-16                               mouth           Health



     20 mg      20:13:                               daily.           



     delayed      54                                                



     release                                                        



     capsule                                                        

 

     lithium      -0      Yes            150mg [id number] Take 150          

 Liriano



     carbonate      7-16                          [id number] mg by           [facility]



     (ESKALITH)      20:13:                          3D   mouth 3           



     150 mg      54                                 times           



     capsule                                         daily with           



                                                  meals.           

 

     DULoxetine      2014-0      Yes                 QD   Take by           Joan

is



     (CYMBALTA)      7-16                               mouth           Health



     20 mg      20:13:                               daily.           



     delayed      54                                                



     release                                                        



     capsule                                                        

 

     lithium      2014-0      Yes            150mg [id number] Take 150          

 Liriano



     carbonate      7-16                          [id number] mg by           [facility]



     (ESKALITH)      20:13:                          3D   mouth 3           



     150 mg      54                                 times           



     capsule                                         daily with           



                                                  meals.           

 

     DULoxetine      2014-0      Yes                 QD   Take by           Joan

is



     (CYMBALTA)      7-16                               mouth           Health



     20 mg      20:13:                               daily.           



     delayed      54                                                



     release                                                        



     capsule                                                        

 

     lithium            Yes            150mg [id number] Take 150          

 Liriano



     carbonate                                [id number] mg by           [facility]



     (ESKALITH)      20:13:                          3D   mouth 3           



     150 mg      54                                 times           



     capsule                                         daily with           



                                                  meals.           

 

     DULoxetine            Yes                 QD   Take by           Joan [facility]



     (CYMBALTA)      7-16                               mouth           Health



     20 mg      20:13:                               daily.           



     delayed      54                                                



     release                                                        



     capsule                                                        

 

     ibuprofen            Yes       Dental 800mg      Take 1           Jeff

ris



     (MOTRIN)      7-16                abscess           tablet by           [facility]



     800 mg      00:00:                               mouth           



     tablet      00                                 every 8           



                                                  hours as           



                                                  needed for           



                                                  Pain.           

 

     ibuprofen            Yes       Dental 800mg      Take 1           Jeff

ris



     (MOTRIN)      7-16                abscess           tablet by           [facility]



     800 mg      00:00:                               mouth           



     tablet      00                                 every 8           



                                                  hours as           



                                                  needed for           



                                                  Pain.           

 

     ibuprofen            Yes       Dental 800mg      Take 1           Jeff

ris



     (MOTRIN)      7-16                abscess           tablet by           [facility]



     800 mg      00:00:                               mouth           



     tablet      00                                 every 8           



                                                  hours as           



                                                  needed for           



                                                  Pain.           

 

     ibuprofen            Yes       Dental 800mg      Take 1           Jeff

ris



     (MOTRIN)      7-16                abscess           tablet by           [facility]



     800 mg      00:00:                               mouth           



     tablet      00                                 every 8           



                                                  hours as           



                                                  needed for           



                                                  Pain.           

 

     ibuprofen            Yes       Dental 800mg      Take 1           Jeff

ris



     (MOTRIN)      7-16                abscess           tablet by           [facility]



     800 mg      00:00:                               mouth           



     tablet      00                                 every 8           



                                                  hours as           



                                                  needed for           



                                                  Pain.           

 

     ibuprofen            Yes       Dental 800mg      Take 1           Jeff

ris



     (MOTRIN)      7-16                abscess           tablet by           [facility]



     800 mg      00:00:                               mouth           



     tablet      00                                 every 8           



                                                  hours as           



                                                  needed for           



                                                  Pain.           

 

     ibuprofen            Yes       Dental 800mg      Take 1           Jeff

ris



     (MOTRIN)      7-16                abscess           tablet by           [facility]



     800 mg      00:00:                               mouth           



     tablet      00                                 every 8           



                                                  hours as           



                                                  needed for           



                                                  Pain.           

 

     ibuprofen            Yes       Dental 800mg      Take 1           Jeff

ris



     (MOTRIN)      7-16                abscess           tablet by           [facility]



     800 mg      00:00:                               mouth           



     tablet      00                                 every 8           



                                                  hours as           



                                                  needed for           



                                                  Pain.           

 

     ibuprofen            Yes       Dental 800mg      Take 1           Jeff

ris



     (MOTRIN)      7-16                abscess           tablet by           [facility]



     800 mg      00:00:                               mouth           



     tablet      00                                 every 8           



                                                  hours as           



                                                  needed for           



                                                  Pain.           

 

     ibuprofen            Yes       Dental 800mg      Take 1           Jeff

ris



     (MOTRIN)      7-16                abscess           tablet by           [facility]

lth



     800 mg      00:00:                               mouth           



     tablet      00                                 every 8           



                                                  hours as           



                                                  needed for           



                                                  Pain.           

 

     ibuprofen            Yes       Dental 800mg      Take 1           Jeff

ris



     (MOTRIN)      7-16                abscess           tablet by           [facility]

lth



     800 mg      00:00:                               mouth           



     tablet      00                                 every 8           



                                                  hours as           



                                                  needed for           



                                                  Pain.           

 

     ibuprofen            Yes       Dental 800mg      Take 1           Jeff

ris



     (MOTRIN)      7-16                abscess           tablet by           [facility]

lth



     800 mg      00:00:                               mouth           



     tablet      00                                 every 8           



                                                  hours as           



                                                  needed for           



                                                  Pain.           

 

     ibuprofen            Yes       Dental 800mg      Take 1           Jeff

ris



     (MOTRIN)      7-16                abscess           tablet by           [facility]

lth



     800 mg      00:00:                               mouth           



     tablet      00                                 every 8           



                                                  hours as           



                                                  needed for           



                                                  Pain.           







Vital Signs







             Vital Name   Observation Time Observation Value Comments     Source

 

             Systolic blood 2023 22:53:00 142 mm[Hg]                Univer

sity [facility] [place]

 

             Diastolic blood 2023 22:53:00 76 mm[Hg]                 [facility]

 

             Heart rate   2023 22:53:00 75 /min                   [facility]

 

             Body temperature 2023 22:53:00 36.78 Danisha                 [facility]

 

             Respiratory rate 2023 22:53:00 18 /min                   [facility]

 

             Body height  2023 22:53:00 157.5 cm                  [facility]

 

             Body weight  2023 22:53:00 94.348 kg                 [facility]

 

             BMI          2023 22:53:00 38.04 kg/m2               [facility]

 

             Oxygen saturation in 2023 22:53:00 98 /min                   

[facility]



             Arterial blood by                                        [place] [facility]

[facility]



             Pulse oximetry                                        Branch

 

             Systolic blood 2023 20:00:00 152 mm[Hg]                [facility]

 

             Diastolic blood 2023 20:00:00 64 mm[Hg]                 [facility]

 

             Heart rate   2023 20:00:00 60 /min                   [facility]

 

             Respiratory rate 2023 20:00:00 21 /min                   [facility]

 

             Oxygen saturation in 2023 20:00:00 96 /min                   

[facility]



             Arterial blood by                                        [place] [facility]

staci



             Pulse oximetry                                        Branch

 

             Body temperature 2023 15:06:00 37.72 Danisha                 Univ

ersity of



                                                                 [facility]



                                                                 Branch

 

             Body height  2023 15:06:00 157.5 cm                  [facility]

ty of



                                                                 [facility]



                                                                 Branch

 

             Body weight  2023 15:06:00 113.399 kg                [facility]

ty of



                                                                 [facility]



                                                                 Branch

 

             BMI          2023 15:06:00 45.73 kg/m2               Univers[facility]

ty of



                                                                 [facility]



                                                                 Branch

 

             HEIGHT       2023-02-15 16:56:00 157.5 cm                  

 

             WEIGHT       2023-02-15 16:56:00 100.245 kg                

 

             HEIGHT       2023-02-15 16:56:00 157.5 cm                  

 

             WEIGHT       2023-02-15 16:56:00 100.245 kg                

 

             HEIGHT       2023-02-15 16:56:00 157.5 cm                  

 

             WEIGHT       2023-02-15 16:56:00 100.245 kg                

 

             Systolic blood 2022 19:30:00 176 mm[Hg]                Univer

sity of



             pressure                                            [facility]

 

             Diastolic blood 2022 19:30:00 94 mm[Hg]                 Unive

rsity of



             pressure                                            [facility]

 

             Heart rate   2022 19:30:00 76 /min                   Universi

ty of



                                                                 [facility]

 

             Respiratory rate 2022 19:30:00 11 /min                   Univ

ersity of



                                                                 [facility]

 

             Oxygen saturation in 2022 19:30:00 95 /min                   

[facility]



             Arterial blood by                                        [place] [facility]

staci



             Pulse oximetry                                        Branch

 

             Body temperature 2022 17:54:00 37.22 Danisha                 Univ

ersity of



                                                                 [facility]



                                                                 Branch

 

             Body height  2022 17:54:00 157.5 cm                  Univers[facility]

ty of



                                                                 [facility]



                                                                 Branch

 

             Body weight  2022 17:54:00 90.719 kg                 Univers[facility]

ty of



                                                                 [facility]



                                                                 Branch

 

             BMI          2022 17:54:00 36.58 kg/m2               Univers[facility]

ty of



                                                                 [facility]



                                                                 Branch

 

             Body height  2022 15:29:00 160 cm                    Univers[facility]

ty of



                                                                 [facility]



                                                                 Branch

 

             Body weight  2022 15:29:00 90.719 kg                 Univers[facility]

ty of



                                                                 [facility]



                                                                 Branch

 

             BMI          2022 15:29:00 35.43 kg/m2               [facility]

 

             Height/Length 2022 08:36:33 160 cm                    



             Measured                                            

 

             Weight Dosing 2022 08:36:33 105.00 kg                 

 

             Body height  2023 08:42:00 157.5 cm                  [facility]

 

             Body weight  2023 08:42:00 100.245 kg                [facility]

 

             BMI          2023 08:42:00 40.42 kg/m2               [facility]

 

             Systolic blood 2023 08:29:26 114 mm[Hg]                [facility]



             pressure                                            

 

             Diastolic blood 2023 08:29:26 76 mm[Hg]                 [facility]



             pressure                                            

 

             Heart rate   2023 08:29:26 86 /min                   [facility]

 

             Body temperature 2023 08:29:26 36.78 Danisha                 [facility]

 

             Respiratory rate 2023 08:29:26 18 /min                   [facility]

 

             Oxygen saturation in 2023 08:29:26 97 /min                   

[facility]



             Arterial blood by                                        



             Pulse oximetry                                        

 

             Systolic blood 2023-02-15 22:01:00 124 mm[Hg]                [facility]

 

             Diastolic blood 2023-02-15 22:01:00 66 mm[Hg]                 [facility]

 

             Heart rate   2023-02-15 22:01:00 88 /min                   [facility]

 

             Body temperature 2023-02-15 22:01:00 37.17 Danisha                 [facility]

 

             Respiratory rate 2023-02-15 22:01:00 16 /min                   [facility]

 

             Oxygen saturation in 2023-02-15 22:01:00 100 /min                  

[facility]



             Arterial blood by                                        Medical Ce

nter



             Pulse oximetry                                        

 

             Body height  2023-02-15 16:56:00 157.5 cm                  [facility]

 

             Body weight  2023-02-15 16:56:00 100.245 kg                [facility]

 

             BMI          2023-02-15 16:56:00 40.42 kg/m2               [facility]

 

             Heart Rate   2023 22:58:16                           [facility]

 Elie

 

             Systolic (mm Hg) 2023 22:58:00                           Danisbarbara Delgadillo

 

             Diastolic (mm Hg) 2023 22:58:00                           Andrew Delgadillo

 

             Temperature Oral (F) 2023 18:24:49 98.5 F                    

Sergio Delgadillo

 

             Height       2023 14:59:00 5 [ft_i]                  [facility]

 [facility]

 

             BMI Calculated 2023 14:59:00                           Fabiano Paez

 

             Weight       2023 14:59:00                           [facility]

 Elie







Procedures







                Procedure       Date / Time     Performing Clinician Source



                                Performed                       

 

                ASSIGNMENT OF BENEFITS 2023 23:23:45 Doctor Unassigned, No

 [facility]

 

                NOTICE OF PRIVACY 2023 22:36:23 Doctor Unassigned, No [facility]



                PRACTICES                       [facility]

 

                CONSENT/REFUSAL FOR 2023 22:31:37 Doctor Unassigned, No [facility]



                DIAGNOSIS AND TREATMENT                 [facility]

 

                [facility]              2023 16[facility]56[facility]



                MAXILLOFACIAL/MANDIBLE                                 Medical B

ranch



                W CONTRAST                                      

 

                LACTIC ACID WHOLE BLOOD 2023 15:57:00 Singer, [facility]

 

                COMP. METABOLIC PANEL 2023 15:56:00 [facility]



                ([id number])                                         [facility] [place]

 

                CBC WITH DIFF   2023 15:56[facility]

 

                COVID-19, INFLUENZA 2023 10:39:00 Alejo Escalante [facility]



                A&B, AND RSV                    Gianni          



                QUALITATIVE RT-PCR                                 

 

                XR CHEST 1 VW PORTABLE 2023 09:16:40 Alejo Escalante [facility]



                                                Gianni          

 

                ASSIGNMENT OF BENEFITS 2023 19:39:54 Doctor Unassigned, No

 [facility]

 

                XR CHEST 1 VW   2022 18[facility]39:34 [facility]

 

                XR PELVIS <3 VW 2022 18:39:34 [facility]

 

                URINALYSIS      2022 18:19:00 [facility]

 

                COMP. METABOLIC PANEL 2022 18:08:00 Singer, Jack [facility]

[facility] [place]



                ([id number])                                         Medical Branch

 

                CBC WITH DIFF   2022 18:08:00 Jack Dhillon o

f [place]



                                                                [facility]

 

                COVID-19 (ID NOW RAPID 2022 18:08:00 Jack Dhillon

[facility]



                TESTING)                                        Medical Branch

 

                REFERRAL-       2022 05:01:00 Doctor Unassigned, No [facility]



                REQUEST/RESPONSE                 Name            Medical Branch

 

                [id number]         2020 00:00:00 CANAL.02        [facility]







Plan of Care







             Planned Activity Planned Date Details      Comments     Source

 

             Future Scheduled 2023-10-01   [facility] Influenza Seasonal              [facility]



             Test         00:00:00     (>/= 19 yrs) [code =              



                                       IMM Influenza Seasonal              



                                       (>/= 19 yrs)]              

 

             Future Scheduled 2023-10-01   IMM Influenza Seasonal              [facility]

[facility]



             Test         00:00:00     (>/= 19 yrs) [code =              



                                       IMM Influenza Seasonal              



                                       (>/= 19 yrs)]              

 

             Future Scheduled 2023-10-01   IMM Influenza Seasonal              [facility]

[facility]



             Test         00:00:00     (>/= 19 yrs) [code =              



                                       IMM Influenza Seasonal              



                                       (>/= 19 yrs)]              

 

             Future Scheduled 2023-10-01   IMM Influenza Seasonal              [facility]

[facility]



             Test         00:00:00     (>/= 19 yrs) [code =              



                                       IMM Influenza Seasonal              



                                       (>/= 19 yrs)]              

 

             Future Scheduled 2023   Influenza Vaccine              [facility]



             Test         00:00:00     (Season Ended) [code =              Medic

al Center



                                       Influenza Vaccine              



                                       (Season Ended)]              

 

             Future Scheduled 2023   INFLUENZA VACCINE              [facility]



             Test         00:00:00     (Season Ended) [code =              [facility]



                                       INFLUENZA VACCINE              



                                       (Season Ended)]              

 

             Future Scheduled 2023   INFLUENZA VACCINE              [facility]



             Test         00:00:00     (Season Ended) [code =              [facility]

[facility] Center



                                       INFLUENZA VACCINE              



                                       (Season Ended)]              

 

             Future Scheduled 2023   INFLUENZA VACCINE              [facility]



             Test         00:00:00     (Season Ended) [code =              [facility]

[facility] Center



                                       INFLUENZA VACCINE              



                                       (Season Ended)]              

 

             Future Scheduled 2023   INFLUENZA VACCINE              [facility]



             Test         00:00:00     (Season Ended) [code =              [facility]

[facility] Center



                                       INFLUENZA VACCINE              



                                       (Season Ended)]              

 

             Future Scheduled 2023   INFLUENZA VACCINE              [facility]



             Test         00:00:00     (Season Ended) [code =              [facility]



                                       INFLUENZA VACCINE              



                                       (Season Ended)]              

 

             Future Scheduled 2023   Screening for              [facility]



             Test         08:38:54     malignant neoplasm of              



                                       colon (procedure)              



                                       [code = [id number]]              

 

             Future Scheduled 2023   Screening for              [facility]



             Test         08:38:54     malignant neoplasm of              



                                       colon (procedure)              



                                       [code = [id number]]              

 

             Future Scheduled 2023   Screening for              [facility]



             Test         08:38:54     malignant neoplasm of              



                                       colon (procedure)              



                                       [code = [id number]]              

 

             Future Scheduled 2023   COVID-19 VACCINE (#1)              [facility]



             Test         08:38:54     [code = COVID-19              



                                       VACCINE (#1)]              

 

             Future Scheduled 2023   Pneumococcal Vaccine:              [facility]



             Test         08:38:54     Pediatrics (0 to 5              



                                       Years) and At-Risk              



                                       Patients (6 to 64              



                                       Years) (1 - PCV) [code              



                                       = Pneumococcal              



                                       Vaccine: Pediatrics (0              



                                       to 5 Years) and              



                                       At-Risk Patients (6 to              



                                       64 Years) (1 - PCV)]              

 

             Future Scheduled 2023   Hepatitis C screening              [facility]



             Test         08:38:54     (procedure) [code =              



                                       [id number]]                

 

             Future Scheduled 2023   Screening for              [facility]



             Test         08:38:54     malignant neoplasm of              



                                       colon (procedure)              



                                       [code = [id number]]              

 

             Future Scheduled 2023   Screening for              [facility]



             Test         08:38:54     malignant neoplasm of              



                                       colon (procedure)              



                                       [code = [id number]]              

 

             Future Scheduled 2023   [facility]ACCINES (1              [facility]



             Test         08:38:54     of 2) [code = SHINGLES              



                                       VACCINES (1 of 2)]              

 

             Future Scheduled 2023   [facility]



             Test         08:38:54     [code = INFLUENZA              



                                       VACCINE]                  

 

             Future Scheduled 2023   COVID-19 VACCINE (#1)              [facility]



             Test         20:12:18     [code = COVID-19              



                                       VACCINE (#1)]              

 

             Future Scheduled 2023   Pneumococcal Vaccine:              [facility]



             Test         20:12:18     Pediatrics (0 to 5              



                                       Years) and At-Risk              



                                       Patients (6 to 64              



                                       Years) (1 - PCV) [code              



                                       = Pneumococcal              



                                       Vaccine: Pediatrics (0              



                                       to 5 Years) and              



                                       At-Risk Patients (6 to              



                                       64 Years) (1 - PCV)]              

 

             Future Scheduled 2023   Hepatitis C screening              [facility]



             Test         20:12:18     (procedure) [code =              



                                       [id number]]                

 

             Future Scheduled 2023   COLONOSCOPY SCREENING              [facility]



             Test         20:12:18     [code = COLONOSCOPY              



                                       SCREENING]                

 

             Future Scheduled 2023   SHINGLES VACCINES (1              Met

[facility]



             Test         20:12:18     of 2) [code = SHINGLES              



                                       VACCINES (1 of 2)]              

 

             Future Scheduled 2023   [facility]ACC[facility]              [facility]



             Test         20:12:18     [code = INFLUENZA              



                                       VACCINE]                  

 

             Future Scheduled 2023   COVID-19 VACCINE (#1)              [facility]



             Test         20:12:18     [code = COVID-19              



                                       VACCINE (#1)]              

 

             Future Scheduled 2023   Pneumococcal Vaccine:              [facility]



             Test         20:12:18     Pediatrics (0 to 5              



                                       Years) and At-Risk              



                                       Patients (6 to 64              



                                       Years) (1 - PCV) [code              



                                       = Pneumococcal              



                                       Vaccine: Pediatrics (0              



                                       to 5 Years) and              



                                       At-Risk Patients (6 to              



                                       64 Years) (1 - PCV)]              

 

             Future Scheduled 2023   Hepatitis C screening              [facility]



             Test         20:12:18     (procedure) [code =              



                                       [id number]]                

 

             Future Scheduled 2023   COLONOSCOPY SCREENING              [facility]



             Test         20:12:18     [code = COLONOSCOPY              



                                       SCREENING]                

 

             Future Scheduled 2023   SHINGLES VACCINES (1              Met

[facility]



             Test         20:12:18     of 2) [code = SHINGLES              



                                       VACCINES (1 of 2)]              

 

             Future Scheduled 2023   [facility]ZA VACCINE              [facility]



             Test         20:12:18     [code = INFLUENZA              



                                       VACCINE]                  

 

             Future Scheduled 2023   COVID-19 VACCINE (#1)              [facility]



             Test         20:12:18     [code = COVID-19              



                                       VACCINE (#1)]              

 

             Future Scheduled 2023   Pneumococcal Vaccine:              [facility]



             Test         20:12:18     Pediatrics (0 to 5              



                                       Years) and At-Risk              



                                       Patients (6 to 64              



                                       Years) (1 - PCV) [code              



                                       = Pneumococcal              



                                       Vaccine: Pediatrics (0              



                                       to 5 Years) and              



                                       At-Risk Patients (6 to              



                                       64 Years) (1 - PCV)]              

 

             Future Scheduled 2023   Hepatitis C screening              [facility]



             Test         20:12:18     (procedure) [code =              



                                       [id number]]                

 

             Future Scheduled 2023   Screening for              [facility]



             Test         20:12:18     malignant neoplasm of              



                                       colon (procedure)              



                                       [code = [id number]]              

 

             Future Scheduled 2023   SHINGLES VACCINES (1              Met

[facility]



             Test         20:12:18     of 2) [code = SHINGLES              



                                       VACCINES (1 of 2)]              

 

             Future Scheduled 2023   [facility]ZA VACCINE              [facility]



             Test         20:12:18     [code = INFLUENZA              



                                       VACCINE]                  

 

             Future Scheduled 2023   COVID-19 VACCINE (#1)              [facility]



             Test         20:12:18     [code = COVID-19              



                                       VACCINE (#1)]              

 

             Future Scheduled 2023   Pneumococcal Vaccine:              [facility]



             Test         20:12:18     Pediatrics (0 to 5              



                                       Years) and At-Risk              



                                       Patients (6 to 64              



                                       Years) (1 - PCV) [code              



                                       = Pneumococcal              



                                       Vaccine: Pediatrics (0              



                                       to 5 Years) and              



                                       At-Risk Patients (6 to              



                                       64 Years) (1 - PCV)]              

 

             Future Scheduled 2023   Hepatitis C screening              [facility]



             Test         20:12:18     (procedure) [code =              



                                       [id number]]                

 

             Future Scheduled 2023   Screening for              [facility]



             Test         20:12:18     malignant neoplasm of              



                                       colon (procedure)              



                                       [code = 580080931]              

 

             Future Scheduled 2023   SHINGLES VACCINES (1              Met

[facility]



             Test         20:12:18     of 2) [code = SHINGLES              



                                       VACCINES (1 of 2)]              

 

             Future Scheduled 2023   [facility]ZA VACCINE              [facility]



             Test         20:12:18     [code = INFLUENZA              



                                       VACCINE]                  

 

             Future Scheduled 2023   COVID-19 VACCINE (#1)              [facility]



             Test         19:28:05     [code = COVID-19              



                                       VACCINE (#1)]              

 

             Future Scheduled 2023   Pneumococcal Vaccine:              [facility]



             Test         19:28:05     Pediatrics (0 to 5              



                                       Years) and At-Risk              



                                       Patients (6 to 64              



                                       Years) (1 - PCV) [code              



                                       = Pneumococcal              



                                       Vaccine: Pediatrics (0              



                                       to 5 Years) and              



                                       At-Risk Patients (6 to              



                                       64 Years) (1 - PCV)]              

 

             Future Scheduled 2023   Hepatitis C screening              Me

[facility]



             Test         19:28:05     (procedure) [code =              



                                       [id number]]                

 

             Future Scheduled 2023   COLONOSCOPY SCREENING              [facility]



             Test         19:28:05     [code = COLONOSCOPY              



                                       SCREENING]                

 

             Future Scheduled 2023   SHINGLES VACCINES (1              Met

[facility]



             Test         19:28:05     of 2) [code = SHINGLES              



                                       VACCINES (1 of 2)]              

 

             Future Scheduled 2023   INFLUENZA VACCINE              [facility]



             Test         19:28:05     [code = INFLUENZA              



                                       VACCINE]                  

 

             Future Scheduled 2023   Medicare IPPE (WELCOME              [facility]

[facility]



             Test         00:00:00     TO MEDICARE) [code =              Medical

 Center



                                       Medicare IPPE (WELCOME              



                                       TO MEDICARE)]              

 

             Future Scheduled 2023   Medicare IPPE (WELCOME              [facility]

[facility]



             Test         00:00:00     TO MEDICARE) [code =              Medical

 Center



                                       Medicare IPPE (WELCOME              



                                       TO MEDICARE)]              

 

             Future Scheduled 2023   Medicare IPPE (WELCOME              [facility]

[facility]



             Test         00:00:00     TO MEDICARE) [code =              Medical

 Center



                                       Medicare IPPE (WELCOME              



                                       TO MEDICARE)]              

 

             Future Scheduled 2023   Medicare IPPE (WELCOME              [facility]

[facility]



             Test         00:00:00     TO MEDICARE) [code =              Medical

 Center



                                       Medicare IPPE (WELCOME              



                                       TO MEDICARE)]              

 

             Future Scheduled 2023   Medicare IPPE (WELCOME              C

[facility]



             Test         00:00:00     TO MEDICARE) [code =              Medical

 Center



                                       Medicare IPPE (WELCOME              



                                       TO MEDICARE)]              

 

             Future Scheduled 2023   Medicare IPPE (WELCOME              [facility]

[facility]



             Test         00:00:00     TO MEDICARE) [code =              Medical

 Center



                                       Medicare IPPE (WELCOME              



                                       TO MEDICARE)]              

 

             Future Scheduled 2023   Medicare IPPE (WELCOME              C

[facility]



             Test         00:00:00     TO MEDICARE) [code =              Medical

 Center



                                       Medicare IPPE (WELCOME              



                                       TO MEDICARE)]              

 

             Future Scheduled 2023   DEPRESSION SCREENING              [facility]



             Test         00:00:00     (12+) [code =              Medical Center



                                       DEPRESSION SCREENING              



                                       (12+)]                    

 

             Future Scheduled 2023   DEPRESSION SCREENING              [facility]



             Test         00:00:00     (12+) [code =              Medical Center



                                       DEPRESSION SCREENING              



                                       (12+)]                    

 

             Future Scheduled 2023   DEPRESSION SCREENING              [facility]



             Test         00:00:00     (12+) [code =              Medical Center



                                       DEPRESSION SCREENING              



                                       (12+)]                    

 

             Future Scheduled 2023   DEPRESSION SCREENING              [facility]



             Test         00:00:00     (12+) [code =              Medical Center



                                       DEPRESSION SCREENING              



                                       (12+)]                    

 

             Future Scheduled 2023   DEPRESSION SCREENING              [facility]



             Test         00:00:00     (12+) [code =              Medical Center



                                       DEPRESSION SCREENING              



                                       (12+)]                    

 

             Future Scheduled 2023   DEPRESSION SCREENING              [facility]



             Test         00:00:00     (12+) [code =              Medical Center



                                       DEPRESSION SCREENING              



                                       (12+)]                    

 

             Future Scheduled 2023   DEPRESSION SCREENING              [facility]



             Test         00:00:00     (12+) [code =              Medical Center



                                       DEPRESSION SCREENING              



                                       (12+)]                    

 

             Future Scheduled 2022-10-01   IMM Influenza Seasonal              [facility]



             Test         00:00:00     (>/= 19 yrs) [code =              



                                       IMM Influenza Seasonal              



                                       (>/= 19 yrs)]              

 

             Future Scheduled 2022-10-01   IMM Influenza Seasonal              [facility]

[facility]



             Test         00:00:00     (>/= 19 yrs) [code =              



                                       IMM Influenza Seasonal              



                                       (>/= 19 yrs)]              

 

             Future Scheduled 2022-10-01   IMM Influenza Seasonal              [facility]



             Test         00:00:00     (>/= 19 yrs) [code =              



                                       IMM Influenza Seasonal              



                                       (>/= 19 yrs)]              

 

             Future Scheduled 2022-10-01   IMM Influenza Seasonal              [facility]



             Test         00:00:00     (>/= 19 yrs) [code =              



                                       IMM Influenza Seasonal              



                                       (>/= 19 yrs)]              

 

             Future Scheduled 2022-10-01   IMM Influenza Seasonal              [facility]



             Test         00:00:00     (>/= 19 yrs) [code =              



                                       IMM Influenza Seasonal              



                                       (>/= 19 yrs)]              

 

             Future Scheduled 2022-10-01   IMM Influenza Seasonal              [facility]



             Test         00:00:00     (>/= 19 yrs) [code =              



                                       IMM Influenza Seasonal              



                                       (>/= 19 yrs)]              

 

             Future Scheduled 2022-10-01   IMM Influenza Seasonal              [facility]



             Test         00:00:00     (>/= 19 yrs) [code =              



                                       IMM Influenza Seasonal              



                                       (>/= 19 yrs)]              

 

             Future Scheduled 2022-10-01   IMM Influenza Seasonal              [facility]

[facility]



             Test         00:00:00     (>/= 19 yrs) [code =              



                                       IMM Influenza Seasonal              



                                       (>/= 19 yrs)]              

 

             Future Scheduled 2022-10-01   IMM Influenza Seasonal              [facility]



             Test         00:00:00     (>/= 19 yrs) [code =              



                                       IMM Influenza Seasonal              



                                       (>/= 19 yrs)]              

 

             Future Scheduled 2022   INFLUENZA VACCINE (#1)              [facility]

[facility]



             Test         00:00:00     [code = INFLUENZA              Medical Ce

nter



                                       VACCINE (#1)]              

 

             Future Scheduled 2018   [facility]              [facility]



             Test         00:00:00     malignant neoplasm of              



                                       colon (procedure)              



                                       [code = [id number]]              

 

             Future Scheduled 2018   SHINGLES VACCINES (1              [facility]



             Test         00:00:00     of 2) [code = SHINGLES              [facility]

[facility] Center



                                       VACCINES (1 of 2)]              

 

             Future Scheduled 2018   Screening for              [facility]



             Test         00:00:00     malignant neoplasm of              



                                       colon (procedure)              



                                       [code = [id number]]              

 

             Future Scheduled 2018   Screening for              [facility]



             Test         00:00:00     malignant neoplasm of              



                                       colon (procedure)              



                                       [code = 055033509]              

 

             Future Scheduled 2018   Screening for              [facility]

[facility]



             Test         00:00:00     malignant neoplasm of              



                                       colon (procedure)              



                                       [code = 652325000]              

 

             Future Scheduled 2018   Screening for              [facility]

[facility]



             Test         00:00:00     malignant neoplasm of              



                                       colon (procedure)              



                                       [code = 449673231]              

 

             Future Scheduled 2018   Screening for              [facility]

[facility]



             Test         00:00:00     malignant neoplasm of              



                                       colon (procedure)              



                                       [code = 507618009]              

 

             Future Scheduled 2018   Screening for              [facility]

[facility]



             Test         00:00:00     malignant neoplasm of              



                                       colon (procedure)              



                                       [code = 949639012]              

 

             Future Scheduled 2018   Screening for              [facility]

[facility]



             Test         00:00:00     malignant neoplasm of              



                                       colon (procedure)              



                                       [code = 410229472]              

 

             Future Scheduled 2018   Screening for              [facility]

[facility]



             Test         00:00:00     malignant neoplasm of              



                                       colon (procedure)              



                                       [code = 660024288]              

 

             Future Scheduled 2018   Screening for              [facility]

[facility]



             Test         00:00:00     malignant neoplasm of              



                                       colon (procedure)              



                                       [code = 401764982]              

 

             Future Scheduled 2018   Screening for              [facility]

[facility]



             Test         00:00:00     malignant neoplasm of              



                                       colon (procedure)              



                                       [code = 532206109]              

 

             Future Scheduled 2018   Screening for              [facility]

[facility]



             Test         00:00:00     malignant neoplasm of              



                                       colon (procedure)              



                                       [code = 183946851]              

 

             Future Scheduled 2018   Screening for              [facility]

[facility]



             Test         00:00:00     malignant neoplasm of              



                                       colon (procedure)              



                                       [code = 140565197]              

 

             Future Scheduled 2018   SHINGLES VACCINES (1              [facility]



             Test         00:00:00     of 2) [code = [facility]              [facility]



                                       VACCINES (1 of 2)]              

 

             Future Scheduled 2018   SHINGLES VACCINES (1              [facility]



             Test         00:00:00     of 2) [code = SHINGLES              [facility]

[facility]



                                       VACCINES (1 of 2)]              

 

             Future Scheduled 2018   SHINGLES VACCINES (1              [facility]



             Test         00:00:00     of 2) [code = SHINGLES              [facility]

[facility]



                                       VACCINES (1 of 2)]              

 

             Future Scheduled 2018   SHINGLES VACCINES (1              [facility]



             Test         00:00:00     of 2) [code = SHINGLES              [facility]

[facility]



                                       VACCINES (1 of 2)]              

 

             Future Scheduled 2018   SHINGLES VACCINES (1              [facility]



             Test         00:00:00     of 2) [code = SHINGLES              [facility]

[facility]



                                       VACCINES (1 of 2)]              

 

             Future Scheduled 2018   SHINGLES VACCINES (1              [facility]



             Test         00:00:00     of 2) [code = SHINGLES              [facility]

[facility]



                                       VACCINES (1 of 2)]              

 

             Future Scheduled 2013   Lipid panel               [facility]

s



             Test         00:00:00     (procedure) [code =              [facility] 

Center



                                       [id number]]                 

 

             Future Scheduled 2013   Lipid panel               [facility]

s



             Test         00:00:00     (procedure) [code =              [facility] 

Center



                                       [id number]]                 

 

             Future Scheduled 2013   Lipid panel               [facility]

s



             Test         00:00:00     (procedure) [code =              [facility] 

Center



                                       [id number]]                 

 

             Future Scheduled 2013   Lipid panel               [facility]

s



             Test         00:00:00     (procedure) [code =              [facility] 

Center



                                       [id number]]                 

 

             Future Scheduled 2013   Lipid panel               [facility]

s



             Test         00:00:00     (procedure) [code =              [facility]



                                       [id number]]                 

 

             Future Scheduled 2013   Lipid panel               [facility]

s



             Test         00:00:00     (procedure) [code =              [facility] 

Center



                                       [id number]]                 

 

             Future Scheduled 2013   Lipid panel               [facility]

s



             Test         00:00:00     (procedure) [code =              [facility]



                                       [id number]]                 

 

             Future Scheduled 2008   [facility]



             Test         00:00:00     (Yearly) [code =              



                                       [facility]              



                                       (Yearly)]                 

 

             Future Scheduled 2008   [facility]



             Test         00:00:00     (Yearly) [code =              



                                       [facility]              



                                       (Yearly)]                 

 

             Future Scheduled 2008   [facility]



             Test         00:00:00     (Yearly) [code =              



                                       [facility]              



                                       (Yearly)]                 

 

             Future Scheduled 2008   [facility]



             Test         00:00:00     (Yearly) [code =              



                                       [facility]              



                                       (Yearly)]                 

 

             Future Scheduled 2008   [facility]



             Test         00:00:00     (Yearly) [code =              



                                       [facility]              



                                       (Yearly)]                 

 

             Future Scheduled 2008   [facility]



             Test         00:00:00     (Yearly) [code =              



                                       [facility]              



                                       (Yearly)]                 

 

             Future Scheduled 2008   [facility]



             Test         00:00:00     (Yearly) [code =              



                                       [facility]              



                                       (Yearly)]                 

 

             Future Scheduled 2008   [facility]



             Test         00:00:00     (Yearly) [code =              



                                       Bre[facility] Cancer [facility]              



                                       (Yearly)]                 

 

             Future Scheduled 2008   [facility]



             Test         00:00:00     (Yearly) [code =              



                                       Bre[facility] Cancer [facility]              



                                       (Yearly)]                 

 

             Future Scheduled 2008   [facility]



             Test         00:00:00     (Yearly) [code =              



                                       Breast Cancer [facility]              



                                       (Yearly)]                 

 

             Future Scheduled 2008   [facility]



             Test         00:00:00     (Yearly) [code =              



                                       Breast Cancer [facility]              



                                       (Yearly)]                 

 

             Future Scheduled 2008   [facility]



             Test         00:00:00     (Yearly) [code =              



                                       Breast Cancer [facility]              



                                       (Yearly)]                 

 

             Future Scheduled 2008   [facility]



             Test         00:00:00     (Yearly) [code =              



                                       Bre[facility] Cancer [facility]              



                                       (Yearly)]                 

 

             Future Scheduled 1998   Screening for              [facility]

[facility]



             Test         00:00:00     malignant neoplasm of              



                                       cervix (procedure)              



                                       [code = 110924779]              

 

             Future Scheduled 1998   Screening for              [facility]

[facility]



             Test         00:00:00     malignant neoplasm of              



                                       cervix (procedure)              



                                       [code = 573703289]              

 

             Future Scheduled 1998   Screening for              [facility]

[facility]



             Test         00:00:00     malignant neoplasm of              



                                       cervix (procedure)              



                                       [code = 121006199]              

 

             Future Scheduled 1998   Screening for              [facility]

[facility]



             Test         00:00:00     malignant neoplasm of              



                                       cervix (procedure)              



                                       [code = 480339381]              

 

             Future Scheduled 1998   Screening for              [facility]

[facility]



             Test         00:00:00     malignant neoplasm of              



                                       cervix (procedure)              



                                       [code = 361304994]              

 

             Future Scheduled 1998   Screening for              [facility]

[facility]



             Test         00:00:00     malignant neoplasm of              



                                       cervix (procedure)              



                                       [code = 342688909]              

 

             Future Scheduled 1998   Screening for              [facility]

[facility]



             Test         00:00:00     malignant neoplasm of              



                                       cervix (procedure)              



                                       [code = 437239470]              

 

             Future Scheduled 1998   Screening for              [facility]

[facility]



             Test         00:00:00     malignant neoplasm of              



                                       cervix (procedure)              



                                       [code = 532930566]              

 

             Future Scheduled 1998   Screening for              [facility]

[facility]



             Test         00:00:00     malignant neoplasm of              



                                       cervix (procedure)              



                                       [code = 250459986]              

 

             Future Scheduled 1998   Screening for              [facility]

[facility]



             Test         00:00:00     malignant neoplasm of              



                                       cervix (procedure)              



                                       [code = 145627308]              

 

             Future Scheduled 1998   Screening for              [facility]

[facility]



             Test         00:00:00     malignant neoplasm of              



                                       cervix (procedure)              



                                       [code = 832239264]              

 

             Future Scheduled 1998   Screening for              [facility]

[facility]



             Test         00:00:00     malignant neoplasm of              



                                       cervix (procedure)              



                                       [code = 503258609]              

 

             Future Scheduled 1998   Screening for              [facility]

[facility]



             Test         00:00:00     malignant neoplasm of              



                                       cervix (procedure)              



                                       [code = 863615601]              

 

             Future Scheduled 1998   Screening for              [facility]

[facility]



             Test         00:00:00     malignant neoplasm of              



                                       cervix (procedure)              



                                       [code = 862654151]              

 

             Future Scheduled 1998   Screening for              [facility]

[facility]



             Test         00:00:00     malignant neoplasm of              



                                       cervix (procedure)              



                                       [code = 867220082]              

 

             Future Scheduled 1998   Screening for              [facility]

[facility]



             Test         00:00:00     malignant neoplasm of              



                                       cervix (procedure)              



                                       [code = 925703573]              

 

             Future Scheduled 1998   Screening for              [facility]

[facility]



             Test         00:00:00     malignant neoplasm of              



                                       cervix (procedure)              



                                       [code = 196087310]              

 

             Future Scheduled 1998   Screening for              [facility]

[facility]



             Test         00:00:00     malignant neoplasm of              



                                       cervix (procedure)              



                                       [code = 645580183]              

 

             Future Scheduled 1998   Screening for              [facility]

[facility]



             Test         00:00:00     malignant neoplasm of              



                                       cervix (procedure)              



                                       [code = 557538184]              

 

             Future Scheduled 1998   Screening for              [facility]

[facility]



             Test         00:00:00     malignant neoplasm of              



                                       cervix (procedure)              



                                       [code = 710220717]              

 

             Future Scheduled 1998   Screening for              [facility]

[facility]



             Test         00:00:00     malignant neoplasm of              



                                       cervix (procedure)              



                                       [code = 604750873]              

 

             Future Scheduled 1998   Screening for              [facility]

[facility]



             Test         00:00:00     malignant neoplasm of              



                                       cervix (procedure)              



                                       [code = 955685935]              

 

             Future Scheduled 1998   Screening for              [facility]

[facility]



             Test         00:00:00     malignant neoplasm of              



                                       cervix (procedure)              



                                       [code = 036074624]              

 

             Future Scheduled 1998   Screening for              [facility]

[facility]



             Test         00:00:00     malignant neoplasm of              



                                       cervix (procedure)              



                                       [code = 342018053]              

 

             Future Scheduled 1998   Screening for              [facility]

[facility]



             Test         00:00:00     malignant neoplasm of              



                                       cervix (procedure)              



                                       [code = 480305662]              

 

             Future Scheduled 1998   Screening for              [facility]

[facility]



             Test         00:00:00     malignant neoplasm of              



                                       cervix (procedure)              



                                       [code = 127404373]              

 

             Future Scheduled 1989   Screening for              [facility]

es



             Test         00:00:00     [facility]              [facility]



                                       cervix (procedure)              



                                       [code = 210602809]              

 

             Future Scheduled 1989   Screening for              [facility]

es



             Test         00:00:00     malignant neoplasm of              [facility]



                                       cervix (procedure)              



                                       [code = 792305170]              

 

             Future Scheduled 1989   Screening for              [facility]

es



             Test         00:00:00     malignant neoplasm of              [facility]



                                       cervix (procedure)              



                                       [code = 878096113]              

 

             Future Scheduled 1989   Screening for              [facility]

es



             Test         00:00:00     malignant neoplasm of              [facility]



                                       cervix (procedure)              



                                       [code = 909006419]              

 

             Future Scheduled 1989   Screening for              [facility]

es



             Test         00:00:00     malignant neoplasm of              [facility]



                                       cervix (procedure)              



                                       [code = 028850451]              

 

             Future Scheduled 1989   Screening for              [facility]

es



             Test         00:00:00     malignant neoplasm of              [facility]



                                       cervix (procedure)              



                                       [code = 491782336]              

 

             Future Scheduled 1989   Screening for              [facility]

es



             Test         00:00:00     malignant neoplasm of              [facility]



                                       cervix (procedure)              



                                       [code = 312020835]              

 

             Future Scheduled 1987   DTAP/TDAP/TD VACCINES              [facility]



             Test         00:00:00     (1 - Tdap) [code =              [facility]

enter



                                       DTAP/TDAP/TD VACCINES              



                                       (1 - Tdap)]               

 

             Future Scheduled 1987   DTAP/TDAP/TD VACCINES              [facility]



             Test         00:00:00     (1 - Tdap) [code =              [facility]

enter



                                       DTAP/TDAP/TD VACCINES              



                                       (1 - Tdap)]               

 

             Future Scheduled 1987   DTAP/TDAP/TD VACCINES              [facility]



             Test         00:00:00     (1 - Tdap) [code =              [facility]

enter



                                       DTAP/TDAP/TD VACCINES              



                                       (1 - Tdap)]               

 

             Future Scheduled 1987   DTAP/TDAP/TD VACCINES              [facility]



             Test         00:00:00     (1 - Tdap) [code =              [facility]

enter



                                       DTAP/TDAP/TD VACCINES              



                                       (1 - Tdap)]               

 

             Future Scheduled 1987   DTAP/TDAP/TD VACCINES              [facility]



             Test         00:00:00     (1 - Tdap) [code =              [facility]

enter



                                       DTAP/TDAP/TD VACCINES              



                                       (1 - Tdap)]               

 

             Future Scheduled 1987   DTAP/TDAP/TD VACCINES              [facility]



             Test         00:00:00     (1 - Tdap) [code =              [facility]

enter



                                       DTAP/TDAP/TD VACCINES              



                                       (1 - Tdap)]               

 

             Future Scheduled 1987   DTAP/TDAP/TD VACCINES              [facility]



             Test         00:00:00     (1 - Tdap) [code =              [facility]

enter



                                       DTAP/TDAP/TD VACCINES              



                                       (1 - Tdap)]               

 

             Future Scheduled 1986   HEPATITIS C SCREENING              [facility]



             Test         00:00:00     [code = HEPATITIS C              [facility] 

Center



                                       SCREENING]                

 

             Future Scheduled 1986   HEPATITIS C SCREENING              [facility]



             Test         00:00:00     [code = HEPATITIS C              [facility] 

Center



                                       SCREENING]                

 

             Future Scheduled 1986   HEPATITIS C SCREENING              [facility]



             Test         00:00:00     [code = HEPATITIS C              Medical 

Center



                                       SCREENING]                

 

             Future Scheduled 1986   HEPATITIS C SCREENING              [facility]



             Test         00:00:00     [code = HEPATITIS C              [facility] 

Center



                                       SCREENING]                

 

             Future Scheduled 1986   HEPATITIS C SCREENING              [facility]



             Test         00:00:00     [code = HEPATITIS C              Medical 

Center



                                       SCREENING]                

 

             Future Scheduled 1986   HEPATITIS C SCREENING              [facility]



             Test         00:00:00     [code = HEPATITIS C              Medical 

Center



                                       SCREENING]                

 

             Future Scheduled 1986   HEPATITIS C SCREENING              [facility]



             Test         00:00:00     [code = HEPATITIS C              Medical 

Center



                                       SCREENING]                

 

             Future Scheduled 1980   Tobacco Cessation              [facility]



             Test         00:00:00     Counseling and              [facility] Cent[facility]

r



                                       Screening (12+) [code              



                                       = Tobacco Cessation              



                                       Counseling and              



                                       Screening (12+)]              

 

             Future Scheduled 1980   Tobacco Cessation              [facility]



             Test         00:00:00     Counseling and              [facility] Cent[facility]

r



                                       Screening (12+) [code              



                                       = Tobacco Cessation              



                                       Counseling and              



                                       Screening (12+)]              

 

             Future Scheduled 1980   Tobacco Cessation              [facility]



             Test         00:00:00     Counseling and              [facility] Cent[facility]

r



                                       Screening (12+) [code              



                                       = Tobacco Cessation              



                                       Counseling and              



                                       Screening (12+)]              

 

             Future Scheduled 1980   Tobacco Cessation              [facility]



             Test         00:00:00     Counseling and              [facility] Cent[facility]

r



                                       Screening (12+) [code              



                                       = Tobacco Cessation              



                                       Counseling and              



                                       Screening (12+)]              

 

             Future Scheduled 1980   Tobacco Cessation              [facility]



             Test         00:00:00     Counseling and              [facility] Cent[facility]

r



                                       Screening (12+) [code              



                                       = Tobacco Cessation              



                                       Counseling and              



                                       Screening (12+)]              

 

             Future Scheduled 1980   Tobacco Cessation              [facility]



             Test         00:00:00     Counseling and              [facility] Cent[facility]

r



                                       Screening (12+) [code              



                                       = Tobacco Cessation              



                                       Counseling and              



                                       Screening (12+)]              

 

             Future Scheduled 1980   Tobacco Cessation              [facility]



             Test         00:00:00     Counseling and              [facility] Cente

r



                                       Screening (12+) [code              



                                       = Tobacco Cessation              



                                       Counseling and              



                                       Screening (12+)]              

 

             Future Scheduled 1974   Pneumococcal Vaccine:              [facility]



             Test         00:00:00     0-64 Years (1 - PCV)              Medical

 Center



                                       [code = Pneumococcal              



                                       Vaccine: 0-64 Years (1              



                                       - PCV)]                   

 

             Future Scheduled 1974   PNEUMOCOCCAL VACCINE              [facility]



             Test         00:00:00     0-64 YRS (1 - PCV)              [facility] C

enter



                                       [code = PNEUMOCOCCAL              



                                       VACCINE 0-64 YRS (1 -              



                                       PCV)]                     

 

             Future Scheduled 1974   PNEUMOCOCCAL VACCINE              [facility]



             Test         00:00:00     0-64 YRS (1 - PCV)              Medical C

enter



                                       [code = PNEUMOCOCCAL              



                                       VACCINE 0-64 YRS (1 -              



                                       PCV)]                     

 

             Future Scheduled 1974   PNEUMOCOCCAL VACCINE              [facility]



             Test         00:00:00     0-64 YRS (1 - PCV)              Medical C

enter



                                       [code = PNEUMOCOCCAL              



                                       VACCINE 0-64 YRS (1 -              



                                       PCV)]                     

 

             Future Scheduled 1974   PNEUMOCOCCAL VACCINE              [facility]



             Test         00:00:00     0-64 YRS (1 - PCV)              Medical C

enter



                                       [code = PNEUMOCOCCAL              



                                       VACCINE 0-64 YRS (1 -              



                                       PCV)]                     

 

             Future Scheduled 1974   PNEUMOCOCCAL VACCINE              [facility]



             Test         00:00:00     0-64 YRS (1 - PCV)              Medical C

enter



                                       [code = PNEUMOCOCCAL              



                                       VACCINE 0-64 YRS (1 -              



                                       PCV)]                     

 

             Future Scheduled 1974   PNEUMOCOCCAL VACCINE              [facility]



             Test         00:00:00     0-64 YRS (1 - PCV)              Medical C

enter



                                       [code = PNEUMOCOCCAL              



                                       VACCINE 0-64 YRS (1 -              



                                       PCV)]                     

 

             Future Scheduled 1969   COVID-19 Vaccine (#1)              [facility]



             Test         00:00:00     [code = COVID-19              



                                       Vaccine (#1)]              

 

             Future Scheduled 1969   COVID-19 VACCINE (#1)              [facility]



             Test         00:00:00     [code = COVID-19              [facility]

[facility]



                                       VACCINE (#1)]              

 

             Future Scheduled 1969   COVID-19 Vaccine (#1)              [facility]



             Test         00:00:00     [code = COVID-19              



                                       Vaccine (#1)]              

 

             Future Scheduled 1969   COVID-19 Vaccine (#1)              [facility]



             Test         00:00:00     [code = COVID-19              



                                       Vaccine (#1)]              

 

             Future Scheduled 1969   COVID-19 Vaccine (#1)              [facility]



             Test         00:00:00     [code = COVID-19              



                                       Vaccine (#1)]              

 

             Future Scheduled 1969   COVID-19 Vaccine (#1)              [facility]



             Test         00:00:00     [code = COVID-19              



                                       Vaccine (#1)]              

 

             Future Scheduled 1969   COVID-19 Vaccine (#1)              [facility]



             Test         00:00:00     [code = COVID-19              



                                       Vaccine (#1)]              

 

             Future Scheduled 1969   COVID-19 Vaccine (#1)              [facility]



             Test         00:00:00     [code = COVID-19              



                                       Vaccine (#1)]              

 

             Future Scheduled 1969   COVID-19 Vaccine (#1)              [facility]



             Test         00:00:00     [code = COVID-19              



                                       Vaccine (#1)]              

 

             Future Scheduled 1969   COVID-19 Vaccine (#1)              [facility]



             Test         00:00:00     [code = COVID-19              



                                       Vaccine (#1)]              

 

             Future Scheduled 1969   COVID-19 Vaccine (#1)              [facility]



             Test         00:00:00     [code = COVID-19              



                                       Vaccine (#1)]              

 

             Future Scheduled 1969   COVID-19 Vaccine (#1)              [facility]



             Test         00:00:00     [code = COVID-19              



                                       Vaccine (#1)]              

 

             Future Scheduled 1969   COVID-19 Vaccine (#1)              [facility]



             Test         00:00:00     [code = COVID-19              



                                       Vaccine (#1)]              

 

             Future Scheduled 1969   COVID-19 Vaccine (#1)              [facility]



             Test         00:00:00     [code = COVID-19              



                                       Vaccine (#1)]              

 

             Future Scheduled 1969   COVID-19 VACCINE (#1)              [facility]



             Test         00:00:00     [code = COVID-19              [facility]

ter



                                       VACCINE (#1)]              

 

             Future Scheduled 1969   COVID-19 VACCINE (#1)              [facility]



             Test         00:00:00     [code = COVID-19              [facility]

ter



                                       VACCINE (#1)]              

 

             Future Scheduled 1969   COVID-19 VACCINE (#1)              [facility]



             Test         00:00:00     [code = COVID-19              [facility]

ter



                                       VACCINE (#1)]              

 

             Future Scheduled 1969   COVID-19 VACCINE (#1)              [facility]



             Test         00:00:00     [code = COVID-19              [facility]

ter



                                       VACCINE (#1)]              

 

             Future Scheduled 1969   COVID-19 VACCINE (#1)              [facility]



             Test         00:00:00     [code = COVID-19              [facility]

ter



                                       VACCINE (#1)]              

 

             Future Scheduled 1969   COVID-19 VACCINE (#1)              [facility]



             Test         00:00:00     [code = COVID-19              [facility]

ter



                                       VACCINE (#1)]              

 

             Future Scheduled 1968   Screening for              [facility]

es



             Test         00:00:00     malignant neoplasm of              [facility]

[facility]



                                       breast (procedure)              



                                       [code = 996700004]              

 

             Future Scheduled 1968   CT Colonography              [facility]



             Test         00:00:00     (combo) [code = CT              [facility]

enter



                                       Colonography (combo)]              

 

             Future Scheduled 1968   Screening for              [facility]

es



             Test         00:00:00     malignant neoplasm of              [facility]

[facility]



                                       colon (procedure)              



                                       [code = 124602456]              

 

             Future Scheduled 1968   Screening for              [facility]

es



             Test         00:00:00     malignant neoplasm of              [facility]



                                       colon (procedure)              



                                       [code = 464198012]              

 

             Future Scheduled 1968   Screening for              [facility]

es



             Test         00:00:00     malignant neoplasm of              [facility]

[facility]



                                       colon (procedure)              



                                       [code = 695937759]              

 

             Future Scheduled 1968   Screening for              [facility]

es



             Test         00:00:00     malignant neoplasm of              [facility]



                                       colon (procedure)              



                                       [code = 730777237]              

 

             Future Scheduled 1968   Sigmoidoscopy [code =              [facility]



             Test         00:00:00     Sigmoidoscopy]              [facility] Cent[facility]

r

 

             Future Scheduled 1968   Screening for              [facility]

es



             Test         00:00:00     malignant neoplasm of              [facility]

[facility] Center



                                       breast (procedure)              



                                       [code = 333851666]              

 

             Future Scheduled 1968   CT Colonography              [facility]



             Test         00:00:00     (combo) [code = CT              [facility]

enter



                                       Colonography (combo)]              

 

             Future Scheduled 1968   Screening for              [facility]

es



             Test         00:00:00     malignant neoplasm of              [facility]

[facility]



                                       colon (procedure)              



                                       [code = 322378310]              

 

             Future Scheduled 1968   Screening for              [facility]

es



             Test         00:00:00     malignant neoplasm of              [facility]



                                       colon (procedure)              



                                       [code = 395640372]              

 

             Future Scheduled 1968   Screening for              [facility]

es



             Test         00:00:00     malignant neoplasm of              [facility]



                                       colon (procedure)              



                                       [code = 959851099]              

 

             Future Scheduled 1968   Screening for              [facility]

es



             Test         00:00:00     malignant neoplasm of              [facility]



                                       colon (procedure)              



                                       [code = 702643501]              

 

             Future Scheduled 1968   Sigmoidoscopy [code =              [facility]



             Test         00:00:00     Sigmoidoscopy]              [facility]

r

 

             Future Scheduled 1968   Screening for              [facility]

es



             Test         00:00:00     malignant neoplasm of              [facility]

[facility] Center



                                       breast (procedure)              



                                       [code = 207142724]              

 

             Future Scheduled 1968   CT Colonography              [facility]



             Test         00:00:00     (combo) [code = CT              [facility]

enter



                                       Colonography (combo)]              

 

             Future Scheduled 1968   Screening for              [facility]

es



             Test         00:00:00     malignant neoplasm of              [facility]

[facility]



                                       colon (procedure)              



                                       [code = 401919442]              

 

             Future Scheduled 1968   Screening for              [facility]

es



             Test         00:00:00     malignant neoplasm of              [facility]



                                       colon (procedure)              



                                       [code = 541442522]              

 

             Future Scheduled 1968   Screening for              [facility]

es



             Test         00:00:00     malignant neoplasm of              [facility]



                                       colon (procedure)              



                                       [code = 602685936]              

 

             Future Scheduled 1968   Screening for              [facility]

es



             Test         00:00:00     malignant neoplasm of              [facility]



                                       colon (procedure)              



                                       [code = 577501931]              

 

             Future Scheduled 1968   Sigmoidoscopy [code =              [facility]



             Test         00:00:00     Sigmoidoscopy]              [facility] Cent[facility]

r

 

             Future Scheduled 1968   Screening for              [facility]

es



             Test         00:00:00     malignant neoplasm of              [facility]

[facility]



                                       breast (procedure)              



                                       [code = 629392317]              

 

             Future Scheduled 1968   CT Colonography              [facility]



             Test         00:00:00     (combo) [code = CT              [facility]

enter



                                       Colonography (combo)]              

 

             Future Scheduled 1968   Screening for              [facility]

es



             Test         00:00:00     malignant neoplasm of              [facility]

[facility]



                                       colon (procedure)              



                                       [code = 065324615]              

 

             Future Scheduled 1968   Screening for              [facility]

es



             Test         00:00:00     malignant neoplasm of              [facility]



                                       colon (procedure)              



                                       [code = 692736133]              

 

             Future Scheduled 1968   Screening for              [facility]

es



             Test         00:00:00     malignant neoplasm of              [facility]

[facility]



                                       colon (procedure)              



                                       [code = 264108153]              

 

             Future Scheduled 1968   Screening for              [facility]

es



             Test         00:00:00     malignant neoplasm of              [facility]

[facility]



                                       colon (procedure)              



                                       [code = 604315633]              

 

             Future Scheduled 1968   Sigmoidoscopy [code =              [facility]



             Test         00:00:00     Sigmoidoscopy]              [facility]

r

 

             Future Scheduled 1968   Screening for              [facility]

es



             Test         00:00:00     malignant neoplasm of              [facility]

[facility]



                                       breast (procedure)              



                                       [code = 821928517]              

 

             Future Scheduled 1968   CT Colonography              [facility]



             Test         00:00:00     (combo) [code = CT              [facility]

enter



                                       Colonography (combo)]              

 

             Future Scheduled 1968   Screening for              [facility]

es



             Test         00:00:00     malignant neoplasm of              [facility]

[facility]



                                       colon (procedure)              



                                       [code = 781452933]              

 

             Future Scheduled 1968   Screening for              [facility]

es



             Test         00:00:00     malignant neoplasm of              [facility]

[facility]



                                       colon (procedure)              



                                       [code = 763774589]              

 

             Future Scheduled 1968   Screening for              [facility]

es



             Test         00:00:00     malignant neoplasm of              [facility]

[facility]



                                       colon (procedure)              



                                       [code = 860445753]              

 

             Future Scheduled 1968   Screening for              [facility]

es



             Test         00:00:00     malignant neoplasm of              [facility]

[facility]



                                       colon (procedure)              



                                       [code = 251542313]              

 

             Future Scheduled 1968   Sigmoidoscopy [code =              [facility]



             Test         00:00:00     Sigmoidoscopy]              [facility] Cent[facility]

r

 

             Future Scheduled 1968   Screening for              [facility]

es



             Test         00:00:00     malignant neoplasm of              [facility]

[facility] Center



                                       breast (procedure)              



                                       [code = 165488537]              

 

             Future Scheduled 1968   CT Colonography              [facility]



             Test         00:00:00     (combo) [code = CT              [facility]

enter



                                       Colonography (combo)]              

 

             Future Scheduled 1968   Screening for              [facility]

es



             Test         00:00:00     malignant neoplasm of              [facility]

[facility]



                                       colon (procedure)              



                                       [code = 499254096]              

 

             Future Scheduled 1968   Screening for              [facility]

es



             Test         00:00:00     malignant neoplasm of              [facility]

[facility]



                                       colon (procedure)              



                                       [code = 427518043]              

 

             Future Scheduled 1968   Screening for              [facility]

es



             Test         00:00:00     malignant neoplasm of              [facility]

[facility]



                                       colon (procedure)              



                                       [code = 297650632]              

 

             Future Scheduled 1968   Screening for              [facility]

es



             Test         00:00:00     malignant neoplasm of              [facility]

[facility]



                                       colon (procedure)              



                                       [code = 027449047]              

 

             Future Scheduled 1968   Sigmoidoscopy [code =              [facility]



             Test         00:00:00     Sigmoidoscopy]              [facility]

r

 

             Future Scheduled 1968   Screening for              [facility]

es



             Test         00:00:00     malignant neoplasm of              [facility]

[facility]



                                       breast (procedure)              



                                       [code = 272380325]              

 

             Future Scheduled 1968   CT Colonography              [facility]

[facility]



             Test         00:00:00     (combo) [code = CT              Medical C

enter



                                       Colonography (combo)]              

 

             Future Scheduled 1968   Screening for              [facility]

es



             Test         00:00:00     malignant neoplasm of              [facility]

[facility]



                                       colon (procedure)              



                                       [code = 155544837]              

 

             Future Scheduled 1968   Screening for              [facility]

es



             Test         00:00:00     malignant neoplasm of              [facility]

[facility]



                                       colon (procedure)              



                                       [code = 342500090]              

 

             Future Scheduled 1968   Screening for              [facility]

es



             Test         00:00:00     malignant neoplasm of              [facility]

[facility]



                                       colon (procedure)              



                                       [code = 520007252]              

 

             Future Scheduled 1968   Screening for              [facility]

es



             Test         00:00:00     malignant neoplasm of              [facility]

[facility] Center



                                       colon (procedure)              



                                       [code = 712940452]              

 

             Future Scheduled 1968   Sigmoidoscopy [code =              [facility]

[facility]



             Test         00:00:00     Sigmoidoscopy]              Medical Leticia

r







Encounters







        Start   End     Encounter Admission Attending Care    Care    Encounter 

Source



        Date/Time Date/Time Type    Type    Clinicians Facility Department ID   

   

 

        2023-05-15         Outpatient                 [facility]     [id number]

 [place]



        14:18:14                                                 [id number] [facility]

 

        2023         Outpatient                 [facility]     [id number]

 [facility]



        14:16:19                                                 [id number] [facility]

 

        2023         Emergency                 [facility]     [facility]     [id number] 

PIERRE -



        09:45:36                                                         [facility]



                                                                        ent

 

        2022         Outpatient         SARATH, [facility]     [id number]

-2 [facility]



        01:05:17                         MELANIE                  [id number] [facility]

 

        2022         Outpatient         VIRGIE,  [facility]     [id number]

 [facility]



        03:15:41                         JOAQUIN                 [id number] [facility]

 

        [facility]     [id number]

 [facility]



        15:19:55                         JOAQUIN                 [id number] [facility]

 

        2022         Outpatient                 [facility]     [id number]

 [facility]



        15:28:25                                                 [id number] [facility]

 

        2022         Outpatient                 [facility]     [id number]

 [facility]



        12:00:51                                                 [id number] [facility]

 

        2022         Outpatient                 [facility]     [id number]

 [facility]



        15:13:02                                                 [id number] [facility]

 

        2020         Inpatient                 [facility]   YAMILA    [id number] 

[facility]



        03:17:00                                                 58      [facility]

 

        2023 Emergency X       ANGELITO [facility]    ERT     [id number]

[id number] [facility]



        17:55:00 19:50:00                 MCKENNA brock [facility]

 

        2023 Emergency         Angelito [facility]    1.2.840.114 10

[id number] [facility]



        17:55:00 19:50:00                 Mckenna HAMM 350.1.13.10         i

Milo 4.2.7.2.686         [facility]  [phone]         Medi

staci



                                                        084             Branch

 

        2023 Emergency X       SINGER, [facility]    ERT     [id number]

81 [facility]



        09:01:00 14:52:00                 JACK brock of



                                                                        [facility]

 

        2023 Emergency         Singer, [facility]    [id number] [id number]

[address] [facility]



        09:01:00 14:52:00                 Jack HAMM 350.1.13.10         i

nell 



                                                [facility] 4.2.7.2.686         [facility]  [phone]         Medi

staci



                                                        084             Branch

 

        2023 Emergency         Yosvany, 1.2.840.1 [id number]

[id number] Methodi



        02:46:00 05:35:00                 Alejo 65682.1.1         [address]     [address]



                                        Gianni  3.430.2.7                 Hospit

a



                                                .3.379987                 l



                                                .8                      

 

        2023 Emergency         Yosvany, 1.2.840.1 [id number] 

[id number] Methodi



        02:46:00 05:35:00                 Alejo 85061.1.1         [address]     [address]



                                        Gianni  3.430.2.7                 Hospit

a



                                                .3.534684                 l



                                                .8                      

 

        2023-02-15 2023 Emergency         Sherman [facility]   [id number]

[id number] [facility]



        17:58:00 00:07:00                 [facility]

 

        2023-02-15 2023 Emergency ER      SHERMAN JUSTIN SLE    Emergency 20

[id number] [facility]



        17:58:00 00:07:00                                                 

 

        2023-02-15 2023 Emergency         ShermanJustin [facility]   [id number] 2

[id number] [facility]



        17:58:00 00:07:00                 [facility]

 

        2023 Documentat         Dangelo, 1.2.840.1 [id number]

[id number] Methodi



        00:00:00 00:00:00 tsering Mcintosh 51164.1.1         147     st



                                                3.430.2.7                 Hospit

a



                                                .3.841591                 l



                                                .8                      

 

        2023 Travel                  1.2.840.1 1.2.557.201 4362

[id number] Methodi



        00:00:00 00:00:00                         28031.1.1 350.1.13.43 977     

st



                                                3.430.2.7 0.2.7.3.698         Ho

spita



                                                .3.294165 084.8           l



                                                .8                      

 

        2023 Documentat         Dangelo, 1.2.840.1 [id number] 210

[id number] Methodi



        00:00:00 00:00:00 ion             Smithosh 91255.1.1         147     st



                                                3.430.2.7                 Hospit

a



                                                .3.330102                 l



                                                .8                      

 

        2023 Travel                  1.2.840.1 1.2.200.252 5840

115695 Methodi



        00:00:00 00:00:00                         74338.1.1 350.1.13.43 977     

st



                                                3.430.2.7 0.2.7.3.698         Ho

spita



                                                .3.529570 084.8           l



                                                .8                      

 

        2023 2023-02-15 Emergency Emergency John Melton [facility]   JM0

[id number] 

[facility]



        19:56:00 06:17:00                                         79      

 

        2023 Emergency                 [facility] [id number]

[address]



        14:56:57 23:07:00                                 [address]

 

        2023 Emergency                 [facility] [id number]

[address]



        14:56:57 23:07:00                                 [address]

 

        2023 Emergency                 [facility]   [id number]

62 [facility]



        19:56:00 19:56:00                                         79      

 

        2023 [facility]         [facility]   [id number]

7975 



        08:56:57 17:07:00                 Ishan                  00      



                                        Thomas                          

 

        2023 Emergency E       SWATI [facility]    7500   

 [facility]



        08:56:00 17:07:00                 ISHAN                          

 

        2023 Outpatient DIANA NOLAN  [facility]    [id number]

467 [facility]



        14:00:00 14:00:00                 ANCELMO brock [facility]

 

        2023 Orders          Doctor  [place]    1.2.840.114 [id number]

447 [facility]



        00:00:00 00:00:00 Only            Unassigned, SHONA   350.1.13.10       

  ity of



                                        [facility] 4.2.7.2.686         Roly

as



                                                        [phone]         [address]

 

        2022 Inpatient         SANCHEZ, [facility]    MED       

  [facility]



        19:24:00 19:40:00                 LEXI                         

 

        2022 Emergency E       HEATHER, [facility]     

   [facility]



        14:56:00 18:09:00                 DEMARIO                           

 

        2022 Emergency X       SINGER, [facility]    ERT     [id number]

15 Univers



        12:56:00 15:05:00                 JACK                         devinjannie [facility]

 

        2022 Emergency         Singer, [facility]    1.2.804.870 6147

7614 [facility]



        12:56:00 15:05:00                 Jack COUGHLINGEOFF 350.1.13.10         i

ty of



                                                [place] 4.2.7.2.686         [facility]  [phone]         Medi

staci



                                                        084             [place]

 

        2022 Office          Amy  [facility]    1.2.840.114 [id number]

13 Univers



        10:00:00 10:30:00 Visit           [facility]  350.1.13.10         it

y of



                                                [place] 4.2.7.2.686         Roly

as



                                                SKYLAR?BLEA [phone]         Me

alys[address]



                                                MEDICAL                 



                                                OFFICE                  



                                                [facility]                 

 

        2022 Outpatient R       AMY  [facility]    [id number]

329 [facility]



        10:00:00 10:00:00                 ESCOBAR                           devinjannie [facility]

 

        2022 Orders          Doctor GARIBAY    1.2.840.114 [id number]

07 [facility]



        00:00:00 00:00:00 Only            Unassigned, SHONA   350.1.13.10       

  ity of



                                        [facility] 4.2.7.2.686         Roly

as



                                                        [phone]         [address]

 

        2020 Outpatient         Josias, [facility]   [facility]    [id number]

220 [facility]



        08:38:00 08:38:00                 Musaddiq                 75      [facility]

 

        2019 Inpatient 1       Robin Barrios [facility]    PSY     12

[id number]



        23:01:00 13:16:00                 DenisDiogo welchRobin                         

[facility]

 

        2017 Outpatient DIANA COHEN  [facility]    NUT     [id number]

565 [facility]



        00:00:00 00:00:00                 VENKATA brock [facility]







Results







           Test Description Test Time  Test Comments Results    Result Comments 

Source









                    COMP. METABOLIC PANEL (75328) 2023 16:28:41 









                      Test Item  Value      Reference Range Interpretation Comme

nts









             NA (test code = 1460889360) 137 mmol/L   135-145                   

 

             K (test code = 5819125058) 4.1 mmol/L   3.5-5.0                   

 

             CL (test code = 8892158053) 104 mmol/L                       

 

             CO2 TOTAL (test code = 9601092628) 24 mmol/L    23-31              

       

 

             AGAP (test code = 5291331341) 9            2-16                    

  

 

             BUN (test code = 7127635915) 14 mg/dL     7-23                     

 

 

             GLUCOSE (test code = 5617664218) 114 mg/dL           H       

     

 

             CREATININE (test code = 0.79 mg/dL   0.50-1.04                 



             7658667139)                                         

 

             TOTAL BILI (test code = 1.3 mg/dL    0.1-1.1      H            



             9082687554)                                         

 

             CALCIUM (test code = 8108416483) 9.5 mg/dL    8.6-10.6             

     

 

             T PROTEIN (test code = 8554627450) 7.7 g/dL     6.3-8.2            

       

 

             ALBUMIN (test code = 6579391769) 4.2 g/dL     3.5-5.0              

     

 

             ALK PHOS (test code = 6104215108) 102 U/L                    

      

 

             ALTv (test code = 1742-6) 15 U/L       5-35                      

 

             AST(SGOT) (test code = 7662059022) 19 U/L       13-40              

       

 

             eGFR (test code = 7578243318) 75.8         mL/min/1.73m2           

   

 

             ELENA (test code = ELENA) Association of Glomerular                    

       



                          Filtration Rate (GFR) and Staging                     

      



                          of Kidney Disease*                           



                          +-----------------------+--------                     

      



                          -------------+-------------------                     

      



                          ------+| GFR (mL/min/1.73 m2) ?|                      

     



                          With Kidney Damage ?| ?Without                        

   



                          Kidney                                 



                          Damage+-----------------------+--                     

      



                          -------------------+-------------                     

      



                          ------------+| ?>90 ? ? ? ? ? ? ?                     

      



                          ? ?| ?Stage one ? ? ? ? ?| ?                          

 



                          Normal ? ? ? ? ? ? ?                           



                          ?+-----------------------+-------                     

      



                          --------------+------------------                     

      



                          -------+| ?60-89 ? ? ? ? ? ? ? ?|                     

      



                          ?Stage two ? ? ? ? ?| ? Decreased                     

      



                          GFR ? ? ? ?                            



                          +-----------------------+--------                     

      



                          -------------+-------------------                     

      



                          ------+| ?30-59 ? ? ? ? ? ? ? ?|                      

     



                          ?Stage three ? ? ? ?| ? Stage                         

  



                          three ? ? ? ? ?                           



                          +-----------------------+--------                     

      



                          -------------+-------------------                     

      



                          ------+| ?15-29 ? ? ? ? ? ? ? ?|                      

     



                          ?Stage four ? ? ? ? | ? Stage                         

  



                          four ? ? ? ? ?                           



                          ?+-----------------------+-------                     

      



                          --------------+------------------                     

      



                          -------+| ?<15 (or dialysis) ? ?|                     

      



                          ?Stage five ? ? ? ? | ? Stage                         

  



                          five ? ? ? ? ?                           



                          ?+-----------------------+-------                     

      



                          --------------+------------------                     

      



                          -------+ *Each stage assumes the                      

     



                          associated GFR level has been in                      

     



                          effect for at least three months.                     

      



                          ?Stages 1 to 5, with or without                       

    



                          kidney disease, indicate chronic                      

     



                          kidney disease. Notes:                           



                          Determination of stages one and                       

    



                          two (with eGFR >59mL/min/1.73 m2)                     

      



                          requires estimation of kidney                         

  



                          damage for at least three months                      

     



                          as defined by structural or                           



                          functional abnormalities of the                       

    



                          kidney, manifested by                           



                          either:Pathological abnormalities                     

      



                          or Markers of kidney damage                           



                          (including abnormalities in the                       

    



                          composition of the blood or urine                     

      



                          or abnormalities in imaging                           



                          tests).                                

 

             Lab Interpretation (test code = Abnormal                           

    



             04544-5)                                            



[facility] WITH [id number] 16:26:23





             Test Item    Value        Reference Range Interpretation Comments

 

             WBC (test code = 14.08        See_Comment  H             [Automated



             5815-2)                                             message] The



                                                                 system which



                                                                 generated this



                                                                 result transmit

gianfranco



                                                                 reference range

:



                                                                 4.30 - 11.10



                                                                 10*3/?L. The



                                                                 reference range



                                                                 was not used to



                                                                 interpret this



                                                                 result as



                                                                 normal/abnormal

.

 

             RBC (test code = 4.39         See_Comment                [Automated



             559-8)                                              message] The



                                                                 system which



                                                                 generated this



                                                                 result transmit

gianfranco



                                                                 reference range

:



                                                                 3.93 - 5.25



                                                                 10*6/?L. The



                                                                 reference range



                                                                 was not used to



                                                                 interpret this



                                                                 result as



                                                                 normal/abnormal

.

 

             HGB (test code = 13.6 g/dL    11.6-15.0                 



             718-7)                                              

 

             HCT (test code = 40.5 %       35.7-45.2                 



             4544-3)                                             

 

             MCV (test code = 92.3 fL      80.6-95.5                 



             787-2)                                              

 

             MCH (test code = 31.0 pg      25.9-32.8                 



             785-6)                                              

 

             MCHC (test code = 33.6 g/dL    31.6-35.1                 



             786-4)                                              

 

             RDW-SD (test code = 41.2 fL      39.0-49.9                 



             57625-7)                                            

 

             RDW-CV (test code = 12.2 %       12.0-15.5                 



             788-0)                                              

 

             PLT (test code = 221          See_Comment                [Automated



             777-3)                                              message] The



                                                                 system which



                                                                 generated this



                                                                 result transmit

gianfranco



                                                                 reference range

:



                                                                 166 - 358 10*3/

?L.



                                                                 The reference



                                                                 range was not u

sed



                                                                 to interpret th

is



                                                                 result as



                                                                 normal/abnormal

.

 

             MPV (test code = 10.1 fL      9.5-12.9                  



             97001-0)                                            

 

             NRBC/100 WBC (test 0.0          See_Comment                [Automat

ed



             code = [id number])                                        message] 

The



                                                                 system which



                                                                 generated this



                                                                 result transmit

gianfranco



                                                                 reference range

:



                                                                 0.0 - 10.0 /100



                                                                 WBCs. The



                                                                 reference range



                                                                 was not used to



                                                                 interpret this



                                                                 result as



                                                                 normal/abnormal

.

 

             NRBC x10^3 (test code              See_Comment                [Auto

mated



             = [id number])                                        message] The



                                                                 system which



                                                                 generated this



                                                                 result transmit

gianfranco



                                                                 reference range

:



                                                                 10*3/?L. The



                                                                 reference range



                                                                 was not used to



                                                                 interpret this



                                                                 result as



                                                                 normal/abnormal

.

 

             GRAN MAT (NEUT) % 86.9 %                                 



             (test code = 770-8)                                        

 

             IMM GRAN % (test code 0.60 %                                 



             = [id number])                                        

 

             LYMPH % (test code = 7.6 %                                  



             736-9)                                              

 

             MONO % (test code = 3.8 %                                  



             5905-5)                                             

 

             EOS % (test code = 0.6 %                                  



             713-8)                                              

 

             BASO % (test code = 0.5 %                                  



             706-2)                                              

 

             GRAN MAT x10^3(ANC) 12.23 10*3/uL 1.88-7.09    H            



             (test code =                                        



             3599866926)                                         

 

             IMM GRAN x10^3 (test 0.09 10*3/uL 0.00-0.06    H            



             code = [id number])                                        

 

             LYMPH x10^3 (test code 1.07 10*3/uL 1.32-3.29    L            



             = 731-0)                                            

 

             MONO x10^3 (test code 0.54 10*3/uL 0.33-0.92                 



             = 742-7)                                            

 

             EOS x10^3 (test code = 0.08 10*3/uL 0.03-0.39                 



             711-2)                                              

 

             BASO x10^3 (test code 0.07 10*3/uL 0.01-0.07                 



             = 704-7)                                            

 

             Lab Interpretation Abnormal                               



             (test code = 08887-6)                                        



[id number]23:37





             Test Item    Value        Reference Range Interpretation Comments

 

             SARS-CoV-2 (COVID-19) RNA [Presence] Detected                      

         



             in Respiratory specimen by CECIL with                                

        



             probe detection (test code =                                       

 



             66352-3)                                            

 

             Whether patient resides in a No                                    

 



             congregate care setting (test code =                               

         



             85674-6)                                            

 

             Date and time of symptom onset (test Unknown                       

         



             code = 25916-5)                                        

 

             Whether the patient was hospitalized No                            

         



             for condition of interest (test code                               

         



             = 16175-3)                                          

 

             Whether the patient was admitted to No                             

        



             intensive care unit (ICU) for                                      

  



             condition of interest (test code =                                 

       



             66189-5)                                            

 

             Whether patient is employed in a No                                

     



             healthcare setting (test code =                                    

    



             47727-6)                                            

 

             Whether the patient has symptoms No                                

     



             related to condition of interest                                   

     



             (test code = 27516-0)                                        

 

             Pregnancy status (test code = No                                   

  



             64869-2)                                            



MAX ROMO, Urinalysis Rflx Cult/[id number] 20:35:00





             Test Item    Value        Reference Range Interpretation Comments

 

             Color,Urine (test code = UCOL) Yellow       Yellow                 

   

 

             Clarity,Urine (test code = Clear        Clear                     



             UCLAR)                                              

 

             Ph, Urine (test code = UPH) 6.5          5.0-9.0      N            

 

             Specific Gravity,Urine (test 1.015        1.005-1.030  N           

 



             code = USG)                                         

 

             Blood,Urine (test code = UBLD) Negative mg/dL Negative             

     

 

             Protein,Urine (test code = Negative mg/dL Negative                 

 



             UPRO)                                               

 

             Glucose,Urine (UA) (test code Negative mg/dL Negative              

    



             = UGLU)                                             

 

             Ketones,Urine (test code = Negative mg/dL Negative                 

 



             UKET)                                               

 

             Nitrate,Urine (test code = Negative     Negative                  



             UNIT)                                               

 

             Bilirubin,Urine (test code = Negative mg/dL Negative               

   



             UBIL)                                               

 

             Urobilinogen,Urine (test code 1.0 E.U./dL  Normal                  

  



             = UURO)                                             

 

             Leukocyte Esterase,Urine (test Trace mg/dL  Negative     A         

   



             code = ULEU)                                        



Drug Screen,[id number] 20:35:00





             Test Item    Value        Reference Range Interpretation Comments

 

             PCP Phencyclidine Screen,Urine (test Negative     Negative         

         



             code = PCPU)                                        

 

             Amphetamine Screen,Urine (test code Negative     Negative          

        



             = AMPU)                                             

 

             Methadone Screen,Urine (test code = Negative     Negative          

        



             METHU)                                              

 

             Opiate Screen,Urine (test code = Negative     Negative             

     



             UOPIS)                                              

 

             Barbituates Screen,Urine (test code Negative     Negative          

        



             = BARBU)                                            

 

             Benzodiazepines Screen,Urine (test Negative     Negative           

       



             code = UBENZS)                                        

 

             Cocaine Screen,Urine (test code = Negative     Negative            

      



             UCOCS)                                              

 

             Cannabinoid Screen,Urine (test code Negative     Negative          

        



             = UTHCS)                                            

 

             Propoxyphene Screen, Urine (test Negative     Negative             

     



             code = UPROP)                                        



Urine [id number] 20:35:00





             Test Item    Value        Reference Range Interpretation Comments

 

             RBC,Urine (test code = URBC.NP) 0-2 /HPF     0-2                   

    

 

             WBC,Urine (test code = UWBC.NP) 0-5 /HPF     0-5                   

    

 

             Bacteria,Urine (test code = Few /HPF     None Seen    A            



             UBACT.NP)                                           

 

             Squamous Epithelial Cell,Urine 6-10 /HPF    0-5          A         

   



             (test code = USQEPI.NP)                                        

 

             Amorphous Crystals,Urine (test code Few /HPF     None Seen    A    

        



             = UAMSE)                                            

 

             Hyaline Casts,Urine (test code = 0-5 /HPF     None Seen    A       

     



             UHYALC.XX)                                          



Complete Blood Count Auto [id number] 20:19:00





             Test Item    Value        Reference Range Interpretation Comments

 

             White Blood Count (test code = 7.9 x10 3/uL 4.4-10.5     N         

   



             WBCT)                                               

 

             Red Blood Count (test code = 4.27 x10 6/uL 3.75-5.20    N          

  



             RBC)                                                

 

             Hemoglobin (test code = HGBT) 12.9 g/dL    12.2-14.8    N          

  

 

             Hematocrit (test code = HCTT) 40.2 %       36.5-44.4    N          

  

 

             Mean Corpuscular Volume (test 94.10 fL     80..00 N          

  



             code = MCV)                                         

 

             Mean Corpuscular Hemoglobin 30.2 pg      27.0-32.5    N            



             (test code = MCH)                                        

 

             Mean Corpuscular HGB Conc 32.10 g/dL   32.00-37.50  N            



             (test code = MCHC)                                        

 

             RDW Coefficient of Variation 12.4 %       11.5-14.5    N           

 



             (test code = RDWCV)                                        

 

             Platelet Count (test code = 211.0 x10 3/uL 140.0-440.0  N          

  



             PLTT)                                               

 

             Mean Platelet Volume (test 11.1 fL                                



             code = MPV)                                         

 

             Immature Granulocytes % (Auto) 0.3 %        0.0-5.0      N         

   



             (test code = IMMGRAN%)                                        

 

             Neutrophils % (Auto) (test 75.8 %       36.0-70.0    H            



             code = NE%)                                         

 

             Lymphocytes % (Auto) (test 10.6 %       12.0-44.0    L            



             code = LY%)                                         

 

             Monocytes % (Auto) (test code 11.8 %       0.0-11.0     H          

  



             = MO%)                                              

 

             Eosinophils % (Auto) (test 0.9 %        0.0-7.0      N            



             code = EO%)                                         

 

             Basophils % (Auto) (test code 0.6 %        0.0-2.0      N          

  



             = BA%)                                              

 

             Immature Granulocytes # (Auto) 0.02 x10 3/uL                       

    



             (test code = IMMGRAN#)                                        

 

             Neutrophils # (Auto) (test 6.0 x10 3/uL 1.6-7.4      N            



             code = NE#)                                         

 

             Lymphocytes # (Auto) (test 0.83 x10 3/uL 0.50-4.60    N            



             code = LY#)                                         

 

             Monocytes # (Auto) (test code 0.93 x10 3/uL 0.00-1.20    N         

   



             = MO#)                                              

 

             Eosinophils # (Auto) (test 0.07 x10 3/uL 0.00-0.74    N            



             code = EO#)                                         

 

             Basophils # (Auto) (test code 0.05 x10 3/uL 0.00-0.21    N         

   



             = BA#)                                              

 

             nRBC Abs (test code = NRBCA) 0                                     

 

 

             nRBC Pct (test code = NRBCP) 0 %                                   

 



Comprehensive Metabolic [id number] 20:19:00





             Test Item    Value        Reference Range Interpretation Comments

 

             SODIUM (test code = NA) 142.0 mmol/L 136.0-145.0  N            

 

             Potassium,K (test code = 4.2 mmol/L   3.0-5.1      N            



             K)                                                  

 

             Chloride (test code = 110 mmol/L          H            



             CL)                                                 

 

             Carbon Dioxide (test 26 mmol/L    20-31        N            



             code = CO2)                                         

 

             Anion Gap (test code = 6 mmol/L     5-15         N            



             GAP)                                                

 

             Blood Urea Nitrogen 17 mg/dL     9-23         N            



             (test code = BUN)                                        

 

             Creatinine (test code = 0.88 mg/dL   0.55-1.02    N            



             CREATT)                                             

 

             Creatinine Clr Calc 80.94 mL/min                           



             Pharmacy (test code =                                        



             CRCLPHA)                                            

 

             Estimated Glomerular 78           See_Comment  L            Reporte

d eGFR is



             Filt Rate (test code =                                        based

 on the



             EGFR.XX)                                            CKD-EPI 



                                                                 equation thatdo

es



                                                                 not use a race



                                                                 coefficient.



                                                                 Additional



                                                                 information can

be



                                                                 found



                                                                 at:[id number]_s

cb_



                                                                 egfr_summary_fl

andry



                                                                 5.pdf (kidney.o

rg)



                                                                 [Automated



                                                                 message] The



                                                                 system which



                                                                 generated this



                                                                 result transmit

gianfranco



                                                                 reference range

:



                                                                 >=90



                                                                 ml/min/1.73m2. 

The



                                                                 reference range



                                                                 was not used to



                                                                 interpret this



                                                                 result as



                                                                 normal/abnormal

.

 

             BUN/Creatinine Ratio 19 ratio     10-20        N            



             (test code = BCRATIO)                                        

 

             Glucose (test code = 92 mg/dL            N            



             GLU)                                                

 

             Osmolality,Calculated 295.0                                  



             (test code = OSMOC)                                        

 

             Calcium (test code = CA) 9.1 mg/dL    8.3-10.6     N            

 

             Bilirubin,Total (test 0.3 mg/dL    0.2-1.1      N            



             code = BILIT)                                        

 

             Aspartate Amino 22 U/L       0-34         N            



             Transferase (test code =                                        



             AST)                                                

 

             Alanine Aminotransferase 15 U/L       10-49        N            



             (test code = ALT)                                        

 

             Total Protein (test code 6.9 g/dL     5.7-8.2      N            



             = TP)                                               

 

             Albumin Level (test code 3.9 g/dL     3.2-4.8      N            



             = ALB)                                              

 

             Globulin (test code = 3.0 mg/dL    2.3-3.5      N            



             GLOB)                                               

 

             Albumin/Globulin Ratio 1.3 ratio    0.8-2.0      N            



             (test code = AGRATIO)                                        

 

             Alkaline Phosphatase 109 U/L             N            



             (test code = ALP)                                        



Ethanol [id number] 20:19:00





             Test Item    Value        Reference Range Interpretation Comments

 

             Ethanol (test code < 3 mg/dL                              The pharm

acological



             = ETOH)                                             response to blo

od alcohol



                                                                 levels mayvary 

from



                                                                 individual to i

ndividual.



                                                                 The fatal larisa

ntrationhas



                                                                 been reported t

o be



                                                                 >400mg/dL.



HCG, Serum Qual (LAB)2023 20:19:00





             Test Item    Value        Reference Range Interpretation Comments

 

             HCG, Serum Qual (LAB) (test code = Negative     Negative           

       



             HCGQ)                                               



URINE AND [id number] 15:40:00





             Test Item    Value        Reference Range Interpretation Comments

 

             UA Leuk Est (test code Small *ABN*(23                         

  



             = UA Leuk Est) 9:40 AM)                               



[id number]





             Test Item    Value        Reference Range Interpretation Comments

 

             UA RBC (test code = 2            See_Comment                [Automa

gianfranco message] The



             UA RBC)                                             system which ge

nerated this



                                                                 result transmit

gianfranco



                                                                 reference range

: <=2. The



                                                                 reference range

 was not



                                                                 used to interpr

et this



                                                                 result as xenia

l/abnormal.



[facility] [id number] 15:40:00





             Test Item    Value        Reference Range Interpretation Comments

 

             UA Sq Epi (test code = UA Sq Epi) Many /LPF                        

      



[facility] [id number] 15:40:00





             Test Item    Value        Reference Range Interpretation Comments

 

             UA WBC (test code = 9            See_Comment                [Automa

gianfranco message] The



             UA WBC)                                             system which ge

nerated this



                                                                 result transmit

gianfranco



                                                                 reference range

: <=5. The



                                                                 reference range

 was not



                                                                 used to interpr

et this



                                                                 result as xenia

l/abnormal.



[facility] [id number] 15:40:00





             Test Item    Value        Reference Range Interpretation Comments

 

             UA Bacteria (test code = UA Occasional /HPF                        

   



             Bacteria)                                           



[facility] [id number] 15:40:00





             Test Item    Value        Reference Range Interpretation Comments

 

             UA Amorph Delmis (test code = Occasional /HPF                        

   



             UA Amorph Delmis)                                        



[facility] [id number] 15:40:00





             Test Item    Value        Reference Range Interpretation Comments

 

             UA CaOx Delmis (test code = UA Occasional /HPF                       

    



             CaOx Delmis)                                          



[facility] [id number] 15:40:00





             Test Item    Value        Reference Range Interpretation Comments

 

             UA Color (test code = Yellow *NA*(23                          

 



             UA Color)    9:40 AM)                               



[facility] [id number] 15:40:00





             Test Item    Value        Reference Range Interpretation Comments

 

             UA Turbidity (test code = Clear (23 9:40                      

     



             UA Turbidity) AM)                                    



[facility] [id number] 15:40:00





             Test Item    Value        Reference Range Interpretation Comments

 

             UA Spec Grav (test code = UA Spec 1.010 1                          

      



             Grav)                                               



[facility] AND [id number] 15:40:00





             Test Item    Value        Reference Range Interpretation Comments

 

             UA pH (test code = UA pH) 6.0 1        5.0-8.0                   



[facility] AND [id number] 15:40:00





             Test Item    Value        Reference Range Interpretation Comments

 

             UA Protein (test code Negative (23 9:40                       

    



             = UA Protein) AM)                                    



[facility] AND [id number] 15:40:00





             Test Item    Value        Reference Range Interpretation Comments

 

             UA Glucose (test code Negative (23 9:40                       

    



             = UA Glucose) AM)                                    



[facility] AND [id number] 15:40:00





             Test Item    Value        Reference Range Interpretation Comments

 

             UA Ketones (test code Negative *NA*(23                        

   



             = UA Ketones) 9:40 AM)                               



[facility] AND [id number] 15:40:00





             Test Item    Value        Reference Range Interpretation Comments

 

             UA Bili (test code = Negative *NA*(23                         

  



             UA Bili)     9:40 AM)                               



[facility] AND [id number] 15:40:00





             Test Item    Value        Reference Range Interpretation Comments

 

             UA Blood (test code = Negative (23 9:40                       

    



             UA Blood)    AM)                                    



[facility] AND [id number] 15:40:00





             Test Item    Value        Reference Range Interpretation Comments

 

             UA Urobilinogen (test code = UA 0.2          0.1-1.0               

    



             Urobilinogen)                                        



[facility] AND [id number] 15:40:00





             Test Item    Value        Reference Range Interpretation Comments

 

             UA Nitrite (test code Negative (23 9:40                       

    



             = UA Nitrite) AM)                                    



[facility] AND [id number] 15:40:00





             Test Item    Value        Reference Range Interpretation Comments

 

             UA Leuk Est (test code Small *ABN*(23                         

  



             = UA Leuk Est) 9:40 AM)                               



[facility] AND [id number] 15:40:00





             Test Item    Value        Reference Range Interpretation Comments

 

             UA RBC (test code = 2            See_Comment                [Automa

gianfranco message] The



             UA RBC)                                             system which ge

nerated this



                                                                 result transmit

gianfranco



                                                                 reference range

: <=2. The



                                                                 reference range

 was not



                                                                 used to interpr

et this



                                                                 result as xenia

l/abnormal.



[facility] [id number] 15:40:00





             Test Item    Value        Reference Range Interpretation Comments

 

             UA Sq Epi (test code = UA Sq Epi) Many /LPF                        

      



[facility] AND [id number] 15:40:00





             Test Item    Value        Reference Range Interpretation Comments

 

             UA WBC (test code = 9            See_Comment                [Automa

gianfranco message] The



             UA WBC)                                             system which ge

nerated this



                                                                 result transmit

gianfranco



                                                                 reference range

: <=5. The



                                                                 reference range

 was not



                                                                 used to interpr

et this



                                                                 result as xenia

l/abnormal.



[facility] AND [id number] 15:40:00





             Test Item    Value        Reference Range Interpretation Comments

 

             UA Bacteria (test code = UA Occasional /HPF                        

   



             Bacteria)                                           



[facility] AND [id number] 15:40:00





             Test Item    Value        Reference Range Interpretation Comments

 

             UA Amorph Delmis (test code = Occasional /HPF                        

   



             UA Amorph Delmis)                                        



[facility] AND [id number] 15:40:00





             Test Item    Value        Reference Range Interpretation Comments

 

             UA CaOx Delmis (test code = UA Occasional /HPF                       

    



             CaOx Delmis)                                          



[facility] AND [id number] 15:40:00





             Test Item    Value        Reference Range Interpretation Comments

 

             UA Color (test code = Yellow *NA*(23                          

 



             UA Color)    9:40 AM)                               



[facility] AND [id number] 15:40:00





             Test Item    Value        Reference Range Interpretation Comments

 

             UA Turbidity (test code = Clear (23 9:40                      

     



             UA Turbidity) AM)                                    



[facility] AND [id number] 15:40:00





             Test Item    Value        Reference Range Interpretation Comments

 

             UA Spec Grav (test code = UA Spec 1.010 1                          

      



             Grav)                                               



[facility] AND [id number] 15:40:00





             Test Item    Value        Reference Range Interpretation Comments

 

             UA pH (test code = UA pH) 6.0 1        5.0-8.0                   



[facility] AND [id number] 15:40:00





             Test Item    Value        Reference Range Interpretation Comments

 

             UA Protein (test code Negative (23 9:40                       

    



             = UA Protein) AM)                                    



[facility] [id number] 15:40:00





             Test Item    Value        Reference Range Interpretation Comments

 

             UA Glucose (test code Negative (23 9:40                       

    



             = UA Glucose) AM)                                    



[facility] [id number] 15:40:00





             Test Item    Value        Reference Range Interpretation Comments

 

             UA Ketones (test code Negative *NA*(23                        

   



             = UA Ketones) 9:40 AM)                               



[facility] [id number] 15:40:00





             Test Item    Value        Reference Range Interpretation Comments

 

             UA Bili (test code = Negative *NA*(23                         

  



             UA Bili)     9:40 AM)                               



[facility] [id number] 15:40:00





             Test Item    Value        Reference Range Interpretation Comments

 

             UA Blood (test code = Negative (23 9:40                       

    



             UA Blood)    AM)                                    



[facility] AND [id number] 15:40:00





             Test Item    Value        Reference Range Interpretation Comments

 

             UA Urobilinogen (test code = UA 0.2          0.1-1.0               

    



             Urobilinogen)                                        



[facility] AND [id number] 15:40:00





             Test Item    Value        Reference Range Interpretation Comments

 

             UA Nitrite (test code Negative (23 9:40                       

    



             = UA Nitrite) AM)                                    



[facility] AND [id number] 15:40:00





             Test Item    Value        Reference Range Interpretation Comments

 

             UA Leuk Est (test code Small *ABN*(23                         

  



             = UA Leuk Est) 9:40 AM)                               



[facility] AND [id number] 15:40:00





             Test Item    Value        Reference Range Interpretation Comments

 

             UA RBC (test code = 2            See_Comment                [Automa

gianfranco message] The



             UA RBC)                                             system which ge

nerated this



                                                                 result transmit

gianfranco



                                                                 reference range

: <=2. The



                                                                 reference range

 was not



                                                                 used to interpr

et this



                                                                 result as xenia

l/abnormal.



[facility] AND [id number] 15:40:00





             Test Item    Value        Reference Range Interpretation Comments

 

             UA Sq Epi (test code = UA Sq Epi) Many /LPF                        

      



[facility] AND [id number] 15:40:00





             Test Item    Value        Reference Range Interpretation Comments

 

             UA WBC (test code = 9            See_Comment                [Automa

gianfranco message] The



             UA WBC)                                             system which ge

nerated this



                                                                 result transmit

gianfranco



                                                                 reference range

: <=5. The



                                                                 reference range

 was not



                                                                 used to interpr

et this



                                                                 result as xenia

l/abnormal.



[facility] AND [id number] 15:40:00





             Test Item    Value        Reference Range Interpretation Comments

 

             UA Bacteria (test code = UA Occasional /HPF                        

   



             Bacteria)                                           



[facility] AND [id number] 15:40:00





             Test Item    Value        Reference Range Interpretation Comments

 

             UA Amorph Delmis (test code = Occasional /HPF                        

   



             UA Amorph Delmis)                                        



[facility] AND [id number] 15:40:00





             Test Item    Value        Reference Range Interpretation Comments

 

             UA CaOx Delmis (test code = UA Occasional /HPF                       

    



             CaOx Delmis)                                          



[facility] AND [id number] 15:40:00





             Test Item    Value        Reference Range Interpretation Comments

 

             UA Color (test code = Yellow *NA*(23                          

 



             UA Color)    9:40 AM)                               



[facility] AND [id number] 15:40:00





             Test Item    Value        Reference Range Interpretation Comments

 

             UA Turbidity (test code = Clear (23 9:40                      

     



             UA Turbidity) AM)                                    



[facility] AND [id number] 15:40:00





             Test Item    Value        Reference Range Interpretation Comments

 

             UA Spec Grav (test code = UA Spec 1.010 1                          

      



             Grav)                                               



[facility] AND [id number] 15:40:00





             Test Item    Value        Reference Range Interpretation Comments

 

             UA pH (test code = UA pH) 6.0 1        5.0-8.0                   



[facility] AND [id number] 15:40:00





             Test Item    Value        Reference Range Interpretation Comments

 

             UA Protein (test code Negative (23 9:40                       

    



             = UA Protein) AM)                                    



[facility] AND [id number] 15:40:00





             Test Item    Value        Reference Range Interpretation Comments

 

             UA Glucose (test code Negative (23 9:40                       

    



             = UA Glucose) AM)                                    



[facility] AND [id number] 15:40:00





             Test Item    Value        Reference Range Interpretation Comments

 

             UA Ketones (test code Negative *NA*(23                        

   



             = UA Ketones) 9:40 AM)                               



[facility] AND [id number] 15:40:00





             Test Item    Value        Reference Range Interpretation Comments

 

             UA Bili (test code = Negative *NA*(23                         

  



             UA Bili)     9:40 AM)                               



[facility] [id number] 15:40:00





             Test Item    Value        Reference Range Interpretation Comments

 

             UA Blood (test code = Negative (23 9:40                       

    



             UA Blood)    AM)                                    



[facility] [id number] 15:40:00





             Test Item    Value        Reference Range Interpretation Comments

 

             UA Urobilinogen (test code = UA 0.2          0.1-1.0               

    



             Urobilinogen)                                        



[facility] [id number] 15:40:00





             Test Item    Value        Reference Range Interpretation Comments

 

             UA Nitrite (test code Negative (23 9:40                       

    



             = UA Nitrite) AM)                                    



[facility] [id number] 15:40:00





             Test Item    Value        Reference Range Interpretation Comments

 

             UA Leuk Est (test code Small *ABN*(23                         

  



             = UA Leuk Est) 9:40 AM)                               



[facility] [id number] 15:40:00





             Test Item    Value        Reference Range Interpretation Comments

 

             UA RBC (test code = 2            See_Comment                [Automa

gianfranco message] The



             UA RBC)                                             system which ge

nerated this



                                                                 result transmit

gianfranco



                                                                 reference range

: <=2. The



                                                                 reference range

 was not



                                                                 used to interpr

et this



                                                                 result as xenia

l/abnormal.



[facility] [id number] 15:40:00





             Test Item    Value        Reference Range Interpretation Comments

 

             UA Sq Epi (test code = UA Sq Epi) Many /LPF                        

      



[facility] [id number] 15:40:00





             Test Item    Value        Reference Range Interpretation Comments

 

             UA WBC (test code = 9            See_Comment                [Automa

gianfranco message] The



             UA WBC)                                             system which ge

nerated this



                                                                 result transmit

gianfranco



                                                                 reference range

: <=5. The



                                                                 reference range

 was not



                                                                 used to interpr

et this



                                                                 result as xenia

l/abnormal.



[facility] [id number] 15:40:00





             Test Item    Value        Reference Range Interpretation Comments

 

             UA Bacteria (test code = UA Occasional /HPF                        

   



             Bacteria)                                           



[facility] [id number] 15:40:00





             Test Item    Value        Reference Range Interpretation Comments

 

             UA Amorph Delmis (test code = Occasional /HPF                        

   



             UA Amorph Delmis)                                        



[facility] [id number] 15:40:00





             Test Item    Value        Reference Range Interpretation Comments

 

             UA CaOx Delmis (test code = UA Occasional /HPF                       

    



             CaOx Delmis)                                          



[facility] AND [id number] 15:40:00





             Test Item    Value        Reference Range Interpretation Comments

 

             UA Color (test code = Yellow *NA*(23                          

 



             UA Color)    9:40 AM)                               



[facility] AND [id number] 15:40:00





             Test Item    Value        Reference Range Interpretation Comments

 

             UA Turbidity (test code = Clear (23 9:40                      

     



             UA Turbidity) AM)                                    



[facility] AND [id number] 15:40:00





             Test Item    Value        Reference Range Interpretation Comments

 

             UA Spec Grav (test code = UA Spec 1.010 1                          

      



             Grav)                                               



[facility] AND [id number] 15:40:00





             Test Item    Value        Reference Range Interpretation Comments

 

             UA pH (test code = UA pH) 6.0 1        5.0-8.0                   



[facility] AND [id number] 15:40:00





             Test Item    Value        Reference Range Interpretation Comments

 

             UA Protein (test code Negative (23 9:40                       

    



             = UA Protein) AM)                                    



[facility] AND [id number] 15:40:00





             Test Item    Value        Reference Range Interpretation Comments

 

             UA Glucose (test code Negative (23 9:40                       

    



             = UA Glucose) AM)                                    



[facility] AND [id number] 15:40:00





             Test Item    Value        Reference Range Interpretation Comments

 

             UA Ketones (test code Negative *NA*(23                        

   



             = UA Ketones) 9:40 AM)                               



[facility] AND [id number] 15:40:00





             Test Item    Value        Reference Range Interpretation Comments

 

             UA Bili (test code = Negative *NA*(23                         

  



             UA Bili)     9:40 AM)                               



[facility] AND [id number] 15:40:00





             Test Item    Value        Reference Range Interpretation Comments

 

             UA Blood (test code = Negative (23 9:40                       

    



             UA Blood)    AM)                                    



[facility] AND [id number] 15:40:00





             Test Item    Value        Reference Range Interpretation Comments

 

             UA Urobilinogen (test code = UA 0.2          0.1-1.0               

    



             Urobilinogen)                                        



[facility] AND [id number] 15:40:00





             Test Item    Value        Reference Range Interpretation Comments

 

             UA Nitrite (test code Negative (23 9:40                       

    



             = UA Nitrite) AM)                                    



[facility] AND [id number] 15:40:00





             Test Item    Value        Reference Range Interpretation Comments

 

             UA Leuk Est (test code Small *ABN*(23                         

  



             = UA Leuk Est) 9:40 AM)                               



[facility] AND [id number] 15:40:00





             Test Item    Value        Reference Range Interpretation Comments

 

             UA RBC (test code = 2            See_Comment                [Automa

gianfranco message] The



             UA RBC)                                             system which ge

nerated this



                                                                 result transmit

gianfranco



                                                                 reference range

: <=2. The



                                                                 reference range

 was not



                                                                 used to interpr

et this



                                                                 result as xenia

l/abnormal.



[facility] AND [id number] 15:40:00





             Test Item    Value        Reference Range Interpretation Comments

 

             UA Sq Epi (test code = UA Sq Epi) Many /LPF                        

      



[facility] AND [id number] 15:40:00





             Test Item    Value        Reference Range Interpretation Comments

 

             UA WBC (test code = 9            See_Comment                [Automa

gianfranco message] The



             UA WBC)                                             system which ge

nerated this



                                                                 result transmit

gianfranco



                                                                 reference range

: <=5. The



                                                                 reference range

 was not



                                                                 used to interpr

et this



                                                                 result as xenia

l/abnormal.



[facility] AND [id number] 15:40:00





             Test Item    Value        Reference Range Interpretation Comments

 

             UA Bacteria (test code = UA Occasional /HPF                        

   



             Bacteria)                                           



[facility] AND [id number] 15:40:00





             Test Item    Value        Reference Range Interpretation Comments

 

             UA Amorph Delmis (test code = Occasional /HPF                        

   



             UA Amorph Delmis)                                        



[facility] AND [id number] 15:40:00





             Test Item    Value        Reference Range Interpretation Comments

 

             UA CaOx Delmis (test code = UA Occasional /HPF                       

    



             CaOx Delmis)                                          



[facility] AND [id number] 15:40:00





             Test Item    Value        Reference Range Interpretation Comments

 

             UA Color (test code = Yellow *NA*(23                          

 



             UA Color)    9:40 AM)                               



[facility] AND [id number] 15:40:00





             Test Item    Value        Reference Range Interpretation Comments

 

             UA Turbidity (test code = Clear (23 9:40                      

     



             UA Turbidity) AM)                                    



[facility] AND [id number] 15:40:00





             Test Item    Value        Reference Range Interpretation Comments

 

             UA Spec Grav (test code = UA Spec 1.010 1                          

      



             Grav)                                               



[facility] AND [id number] 15:40:00





             Test Item    Value        Reference Range Interpretation Comments

 

             UA Sq Epi (test code = UA Sq Epi) Many /LPF                        

      



[facility] AND [id number] 15:40:00





             Test Item    Value        Reference Range Interpretation Comments

 

             UA pH (test code = UA pH) 6.0 1        5.0-8.0                   



[facility] AND [id number] 15:40:00





             Test Item    Value        Reference Range Interpretation Comments

 

             UA Protein (test code Negative (23 9:40                       

    



             = UA Protein) AM)                                    



[facility] AND [id number] 15:40:00





             Test Item    Value        Reference Range Interpretation Comments

 

             UA Glucose (test code Negative (23 9:40                       

    



             = UA Glucose) AM)                                    



[facility] AND [id number] 15:40:00





             Test Item    Value        Reference Range Interpretation Comments

 

             UA Ketones (test code Negative *NA*(23                        

   



             = UA Ketones) 9:40 AM)                               



[facility] AND [id number] 15:40:00





             Test Item    Value        Reference Range Interpretation Comments

 

             UA Bili (test code = Negative *NA*(23                         

  



             UA Bili)     9:40 AM)                               



[facility] AND [id number] 15:40:00





             Test Item    Value        Reference Range Interpretation Comments

 

             UA Blood (test code = Negative (23 9:40                       

    



             UA Blood)    AM)                                    



[facility] AND [id number] 15:40:00





             Test Item    Value        Reference Range Interpretation Comments

 

             UA Urobilinogen (test code = UA 0.2          0.1-1.0               

    



             Urobilinogen)                                        



[facility] AND [id number] 15:40:00





             Test Item    Value        Reference Range Interpretation Comments

 

             UA Nitrite (test code Negative (23 9:40                       

    



             = UA Nitrite) AM)                                    



[facility] AND [id number] 15:40:00





             Test Item    Value        Reference Range Interpretation Comments

 

             UA Leuk Est (test code Small *ABN*(23                         

  



             = UA Leuk Est) 9:40 AM)                               



[facility] AND [id number] 15:40:00





             Test Item    Value        Reference Range Interpretation Comments

 

             UA RBC (test code = 2            See_Comment                [Automa

gianfranco message] The



             UA RBC)                                             system which ge

nerated this



                                                                 result transmit

gianfranco



                                                                 reference range

: <=2. The



                                                                 reference range

 was not



                                                                 used to interpr

et this



                                                                 result as xenia

l/abnormal.



[facility] [id number] 15:40:00





             Test Item    Value        Reference Range Interpretation Comments

 

             UA WBC (test code = 9            See_Comment                [Automa

gianfranco message] The



             UA WBC)                                             system which ge

nerated this



                                                                 result transmit

gianfranco



                                                                 reference range

: <=5. The



                                                                 reference range

 was not



                                                                 used to interpr

et this



                                                                 result as xenia

l/abnormal.



[facility] [id number] 15:40:00





             Test Item    Value        Reference Range Interpretation Comments

 

             UA Bacteria (test code = UA Occasional /HPF                        

   



             Bacteria)                                           



[facility] [id number] 15:40:00





             Test Item    Value        Reference Range Interpretation Comments

 

             UA Amorph Delmis (test code = Occasional /HPF                        

   



             UA Amorph Delmis)                                        



[facility] [id number] 15:40:00





             Test Item    Value        Reference Range Interpretation Comments

 

             UA CaOx Delmis (test code = UA Occasional /HPF                       

    



             CaOx Delmis)                                          



[facility] [id number] 15:40:00





             Test Item    Value        Reference Range Interpretation Comments

 

             UA Color (test code = Yellow *NA*(23                          

 



             UA Color)    9:40 AM)                               



[facility] [id number] 15:40:00





             Test Item    Value        Reference Range Interpretation Comments

 

             UA Sq Epi (test code = UA Sq Epi) Many /LPF                        

      



[facility] [id number] 15:40:00





             Test Item    Value        Reference Range Interpretation Comments

 

             UA WBC (test code = 9            See_Comment                [Automa

gianfranco message] The



             UA WBC)                                             system which ge

nerated this



                                                                 result transmit

gianfranco



                                                                 reference range

: <=5. The



                                                                 reference range

 was not



                                                                 used to interpr

et this



                                                                 result as xenia

l/abnormal.



[facility] [id number] 15:40:00





             Test Item    Value        Reference Range Interpretation Comments

 

             UA Bacteria (test code = UA Occasional /HPF                        

   



             Bacteria)                                           



[facility] [id number] 15:40:00





             Test Item    Value        Reference Range Interpretation Comments

 

             UA Amorph Delmis (test code = Occasional /HPF                        

   



             UA Amorph Delmis)                                        



[facility] [id number] 15:40:00





             Test Item    Value        Reference Range Interpretation Comments

 

             UA CaOx Delmis (test code = UA Occasional /HPF                       

    



             CaOx Delmis)                                          



[facility] [id number] 15:40:00





             Test Item    Value        Reference Range Interpretation Comments

 

             UA Color (test code = Yellow *NA*(23                          

 



             UA Color)    9:40 AM)                               



[facility] [id number] 15:40:00





             Test Item    Value        Reference Range Interpretation Comments

 

             UA Turbidity (test code = Clear (23 9:40                      

     



             UA Turbidity) AM)                                    



[facility] [id number] 15:40:00





             Test Item    Value        Reference Range Interpretation Comments

 

             UA Spec Grav (test code = UA Spec 1.010 1                          

      



             Grav)                                               



[facility] [id number] 15:40:00





             Test Item    Value        Reference Range Interpretation Comments

 

             UA Turbidity (test code = Clear (23 9:40                      

     



             UA Turbidity) AM)                                    



[facility] [id number] 15:40:00





             Test Item    Value        Reference Range Interpretation Comments

 

             UA pH (test code = UA pH) 6.0 1        5.0-8.0                   



[facility] [id number] 15:40:00





             Test Item    Value        Reference Range Interpretation Comments

 

             UA Protein (test code Negative (23 9:40                       

    



             = UA Protein) AM)                                    



[facility] [id number] 15:40:00





             Test Item    Value        Reference Range Interpretation Comments

 

             UA Glucose (test code Negative (23 9:40                       

    



             = UA Glucose) AM)                                    



[facility] [id number] 15:40:00





             Test Item    Value        Reference Range Interpretation Comments

 

             UA Ketones (test code Negative *NA*(23                        

   



             = UA Ketones) 9:40 AM)                               



[facility] [id number] 15:40:00





             Test Item    Value        Reference Range Interpretation Comments

 

             UA Bili (test code = Negative *NA*(23                         

  



             UA Bili)     9:40 AM)                               



[facility] [id number] 15:40:00





             Test Item    Value        Reference Range Interpretation Comments

 

             UA Blood (test code = Negative (23 9:40                       

    



             UA Blood)    AM)                                    



[facility] [id number] 15:40:00





             Test Item    Value        Reference Range Interpretation Comments

 

             UA Urobilinogen (test code = UA 0.2          0.1-1.0               

    



             Urobilinogen)                                        



[facility] AND [id number] 15:40:00





             Test Item    Value        Reference Range Interpretation Comments

 

             UA Nitrite (test code Negative (23 9:40                       

    



             = UA Nitrite) AM)                                    



[facility] AND [id number] 15:40:00





             Test Item    Value        Reference Range Interpretation Comments

 

             UA Leuk Est (test code Small *ABN*(23                         

  



             = UA Leuk Est) 9:40 AM)                               



[facility] AND [id number] 15:40:00





             Test Item    Value        Reference Range Interpretation Comments

 

             UA RBC (test code = 2            See_Comment                [Automa

gianfranco message] The



             UA RBC)                                             system which ge

nerated this



                                                                 result transmit

gianfranco



                                                                 reference range

: <=2. The



                                                                 reference range

 was not



                                                                 used to interpr

et this



                                                                 result as xenia

l/abnormal.



[facility] AND [id number] 15:40:00





             Test Item    Value        Reference Range Interpretation Comments

 

             UA Spec Grav (test code = UA Spec 1.010 1                          

      



             Grav)                                               



[facility] AND [id number] 15:40:00





             Test Item    Value        Reference Range Interpretation Comments

 

             UA pH (test code = UA pH) 6.0 1        5.0-8.0                   



[facility] AND [id number] 15:40:00





             Test Item    Value        Reference Range Interpretation Comments

 

             UA Protein (test code Negative (23 9:40                       

    



             = UA Protein) AM)                                    



[facility] AND [id number] 15:40:00





             Test Item    Value        Reference Range Interpretation Comments

 

             UA Glucose (test code Negative (23 9:40                       

    



             = UA Glucose) AM)                                    



[facility] AND [id number] 15:40:00





             Test Item    Value        Reference Range Interpretation Comments

 

             UA Ketones (test code Negative *NA*(23                        

   



             = UA Ketones) 9:40 AM)                               



[facility] AND [id number] 15:40:00





             Test Item    Value        Reference Range Interpretation Comments

 

             UA Sq Epi (test code = UA Sq Epi) Many /LPF                        

      



[facility] AND [id number] 15:40:00





             Test Item    Value        Reference Range Interpretation Comments

 

             UA WBC (test code = 9            See_Comment                [Automa

gianfranco message] The



             UA WBC)                                             system which ge

nerated this



                                                                 result transmit

gianfranco



                                                                 reference range

: <=5. The



                                                                 reference range

 was not



                                                                 used to interpr

et this



                                                                 result as xenia

l/abnormal.



[facility] [id number] 15:40:00





             Test Item    Value        Reference Range Interpretation Comments

 

             UA Bacteria (test code = UA Occasional /HPF                        

   



             Bacteria)                                           



[facility] [id number] 15:40:00





             Test Item    Value        Reference Range Interpretation Comments

 

             UA Amorph Delmis (test code = Occasional /HPF                        

   



             UA Amorph Dlemis)                                        



[facility] [id number] 15:40:00





             Test Item    Value        Reference Range Interpretation Comments

 

             UA CaOx Delmis (test code = UA Occasional /HPF                       

    



             CaOx Delmis)                                          



[facility] [id number] 15:40:00





             Test Item    Value        Reference Range Interpretation Comments

 

             UA Color (test code = Yellow *NA*(23                          

 



             UA Color)    9:40 AM)                               



[facility] AND [id number] 15:40:00





             Test Item    Value        Reference Range Interpretation Comments

 

             UA Turbidity (test code = Clear (23 9:40                      

     



             UA Turbidity) AM)                                    



[facility] [id number] 15:40:00





             Test Item    Value        Reference Range Interpretation Comments

 

             UA Spec Grav (test code = UA Spec 1.010 1                          

      



             Grav)                                               



[facility] AND [id number] 15:40:00





             Test Item    Value        Reference Range Interpretation Comments

 

             UA Bili (test code = Negative *NA*(23                         

  



             UA Bili)     9:40 AM)                               



[facility] [id number] 15:40:00





             Test Item    Value        Reference Range Interpretation Comments

 

             UA pH (test code = UA pH) 6.0 1        5.0-8.0                   



[facility] [id number] 15:40:00





             Test Item    Value        Reference Range Interpretation Comments

 

             UA Protein (test code Negative (23 9:40                       

    



             = UA Protein) AM)                                    



[facility] [id number] 15:40:00





             Test Item    Value        Reference Range Interpretation Comments

 

             UA Glucose (test code Negative (23 9:40                       

    



             = UA Glucose) AM)                                    



[facility] [id number] 15:40:00





             Test Item    Value        Reference Range Interpretation Comments

 

             UA Ketones (test code Negative *NA*(23                        

   



             = UA Ketones) 9:40 AM)                               



[facility] [id number] 15:40:00





             Test Item    Value        Reference Range Interpretation Comments

 

             UA Bili (test code = Negative *NA*(23                         

  



             UA Bili)     9:40 AM)                               



[facility] [id number] 15:40:00





             Test Item    Value        Reference Range Interpretation Comments

 

             UA Blood (test code = Negative (23 9:40                       

    



             UA Blood)    AM)                                    



[facility] [id number] 15:40:00





             Test Item    Value        Reference Range Interpretation Comments

 

             UA Urobilinogen (test code = UA 0.2          0.1-1.0               

    



             Urobilinogen)                                        



[facility] [id number] 15:40:00





             Test Item    Value        Reference Range Interpretation Comments

 

             UA Nitrite (test code Negative (23 9:40                       

    



             = UA Nitrite) AM)                                    



[facility] [id number] 15:40:00





             Test Item    Value        Reference Range Interpretation Comments

 

             UA Leuk Est (test code Small *ABN*(23                         

  



             = UA Leuk Est) 9:40 AM)                               



[facility] [id number] 15:40:00





             Test Item    Value        Reference Range Interpretation Comments

 

             UA RBC (test code = 2            See_Comment                [Automa

gianfranco message] The



             UA RBC)                                             system which ge

nerated this



                                                                 result transmit

gianfranco



                                                                 reference range

: <=2. The



                                                                 reference range

 was not



                                                                 used to interpr

et this



                                                                 result as xenia

l/abnormal.



[facility] [id number] 15:40:00





             Test Item    Value        Reference Range Interpretation Comments

 

             UA Blood (test code = Negative (23 9:40                       

    



             UA Blood)    AM)                                    



[facility] [id number] 15:40:00





             Test Item    Value        Reference Range Interpretation Comments

 

             UA Urobilinogen (test code = UA 0.2          0.1-1.0               

    



             Urobilinogen)                                        



[facility] [id number] 15:40:00





             Test Item    Value        Reference Range Interpretation Comments

 

             UA Nitrite (test code Negative (23 9:40                       

    



             = UA Nitrite) AM)                                    



[id number] 15:25:24





             Test Item    Value        Reference Range Interpretation Comments

 

             Hct (test code = Hct) 42.8         36.0-48.0                 



[place] [id number] 15:25:24





             Test Item    Value        Reference Range Interpretation Comments

 

             MCV (test code = MCV) 93.2         80.0-98.0                 



[place] [id number] 15:25:24





             Test Item    Value        Reference Range Interpretation Comments

 

             MCH (test code = MCH) 30.8 pg      27.0-31.0                 



[place] [id number] 15:25:24





             Test Item    Value        Reference Range Interpretation Comments

 

             MCHC (test code = MCHC) 33.0         32.0-36.0                 



[place] [id number] 15:25:24





             Test Item    Value        Reference Range Interpretation Comments

 

             RDW (test code = RDW) 13.0         11.5-14.5                 



[id number] 15:25:24





             Test Item    Value        Reference Range Interpretation Comments

 

             Platelet (test code = Platelet) 200          133-450               

    



[id number] 15:25:24





             Test Item    Value        Reference Range Interpretation Comments

 

             MPV (test code = MPV) 8.6          7.4-10.4                  



[place] [address]-02-14 15:25:24





             Test Item    Value        Reference Range Interpretation Comments

 

             Segs (test code = Segs) 86.0         45.0-75.0                 



[place] [address]-02-14 15:25:24





             Test Item    Value        Reference Range Interpretation Comments

 

             Lymphocytes (test code = Lymphocytes) 5.5          20.0-40.0       

          



[place] [address]-02-14 15:25:24





             Test Item    Value        Reference Range Interpretation Comments

 

             Monocytes (test code = Monocytes) 7.0          2.0-12.0            

      



[place] [address]-02-14 15:25:24





             Test Item    Value        Reference Range Interpretation Comments

 

             Eosinophils (test code = 1.0          See_Comment                [A

utomated message] The



             Eosinophils)                                        system which ge

nerated



                                                                 this result tra

nsmitted



                                                                 reference range

: <=4.0.



                                                                 The reference r

jihan was



                                                                 not used to int

erpret



                                                                 this result as



                                                                 normal/abnormal

.



[place] [id number] 15:25:24





             Test Item    Value        Reference Range Interpretation Comments

 

             Basophils (test code = 0.5          See_Comment                [Aut

omated message] The



             Basophils)                                          system which ge

nerated



                                                                 this result tra

nsmitted



                                                                 reference range

: <=1.0.



                                                                 The reference r

jihan was



                                                                 not used to int

erpret



                                                                 this result as



                                                                 normal/abnormal

.



[facility]UhbvcqjKGOAKIRREK4587[facility]25:24





             Test Item    Value        Reference Range Interpretation Comments

 

             Neutrophils # (test code = Neutrophils 8.1          1.5-8.1        

           



             #)                                                  



[facility] CenterLnvmybiIXZIURRZCM2763-50-97 15:25:24





             Test Item    Value        Reference Range Interpretation Comments

 

             Lymphocytes # (test code = Lymphocytes 0.5          1.0-5.5        

           



             #)                                                  



[facility] CenterIxlpsbxNPPSDUBZUM4563-68-12 15:25:24





             Test Item    Value        Reference Range Interpretation Comments

 

             Monocytes # (test code 0.7          See_Comment                [Aut

omated message] The



             = Monocytes #)                                        system which 

generated



                                                                 this result tra

nsmitted



                                                                 reference range

: <=0.8.



                                                                 The reference r

jihan was



                                                                 not used to int

erpret



                                                                 this result as



                                                                 normal/abnormal

.



[facility]AllenBewsltdHPPUYITICR7183-29-86 15:25:24





             Test Item    Value        Reference Range Interpretation Comments

 

             Eosinophils # (test code 0.1          See_Comment                [A

utomated message] The



             = Eosinophils #)                                        system whic

h generated



                                                                 this result tra

nsmitted



                                                                 reference range

: <=0.5.



                                                                 The reference r

jihan was



                                                                 not used to int

erpret



                                                                 this result as



                                                                 normal/abnormal

.



[facility]SkugfxoHPBXBUSLD7980-61-15 15:25:24





             Test Item    Value        Reference Range Interpretation Comments

 

             Glucose Lvl (test code = Glucose Lvl) 93           70-99           

          



[facility]AllenOmdyhjnBMQDOHXHT1796-98-10 15:25:24





             Test Item    Value        Reference Range Interpretation Comments

 

             BUN (test code = BUN) 19                                 



[facility] ResourcesKjlgiizDVMJKTLMO9953-03-34 15:25:24





             Test Item    Value        Reference Range Interpretation Comments

 

             Glucose Lvl (test code = Glucose Lvl) 93           70-99           

          



[facility] CenterIlzbedcSPEMMKNVN6119-94-76 15:25:24





             Test Item    Value        Reference Range Interpretation Comments

 

             BUN (test code = BUN) 19           -                      



[facility] ResourcesHzhkdxkGFXPTCLSO0516-20-14 15:25:24





             Test Item    Value        Reference Range Interpretation Comments

 

             Creatinine Lvl (test code = Creatinine 0.85         0.50-1.40      

           



             Lvl)                                                



[facility] CenterYybghnwGFJPINTQM6554-12-37 15:25:24





             Test Item    Value        Reference Range Interpretation Comments

 

             Sodium Lvl (test code = Sodium Lvl) 142          135-145           

        



[place] 123563-02-14 15:25:24





             Test Item    Value        Reference Range Interpretation Comments

 

             Potassium Lvl (test code = Potassium 4.4          3.5-5.1          

         



             Lvl)                                                



[facility] CenterKumbngbFEFHKOODC4682-34-79 15:25:24





             Test Item    Value        Reference Range Interpretation Comments

 

             Chloride Lvl (test code = Chloride Lvl) 108                  

            



[place] 123563-02-14 15:25:24





             Test Item    Value        Reference Range Interpretation Comments

 

             CO2 (test code = CO2) 28           24-32                     



[place] 123563-02-14 15:25:24





             Test Item    Value        Reference Range Interpretation Comments

 

             Calcium Lvl (test code = Calcium Lvl) 10.0         8.5-10.5        

          



[facility] CenterQhrgpiwKLLUKGQUX8440-49-30 15:25:24





             Test Item    Value        Reference Range Interpretation Comments

 

             AGAP (test code = AGAP) 10.4         10.0-20.0                 



[facility] CenterKwigpguKQQWAAYIJ5970-05-14 15:25:24





             Test Item    Value        Reference Range Interpretation Comments

 

             eGFR (test code = eGFR) 81                                     



[facility] CenterPiuqgvuZYNNZSSQA6125-22-42 15:25:24





             Test Item    Value        Reference Range Interpretation Comments

 

             Creatinine Lvl (test code = Creatinine 0.85         0.50-1.40      

           



             Lvl)                                                



[facility] CenterQazkxidLZOZKMSDS2629-58-29 15:25:24





             Test Item    Value        Reference Range Interpretation Comments

 

             Lipase Lvl (test code = Lipase Lvl) 123                      

        



[facility] CenterKoerfbwRYSEVDJHG6490-44-46 15:25:24





             Test Item    Value        Reference Range Interpretation Comments

 

             Total Protein (test code = Total 8.0          6.4-8.4              

     



             Protein)                                            



[facility] CenterAnoqlrvUWFFNPOGO5426-29-45 15:25:24





             Test Item    Value        Reference Range Interpretation Comments

 

             Albumin Lvl (test code = Albumin Lvl) 3.7          3.5-5.0         

          



[place] 123563-02-14 15:25:24





             Test Item    Value        Reference Range Interpretation Comments

 

             Globulin (test code = Globulin) 4.3          2.7-4.2               

    



[place] 123563-02-14 15:25:24





             Test Item    Value        Reference Range Interpretation Comments

 

             A/G Ratio (test code = A/G Ratio) 0.9 1        0.7-1.6             

      



[facility] ResourcesVwdfnkuDEEHUHNUI0944-35-36 15:25:24





             Test Item    Value        Reference Range Interpretation Comments

 

             ALANINE AMINOTRANSFERASE 20           See_Comment                [A

utomated message]



             (test code = ALANINE                                        The sys

tem which



             AMINOTRANSFERASE)                                        generated 

this result



                                                                 transmitted ref

erence



                                                                 range: <=65. Th

e



                                                                 reference range

 was



                                                                 not used to int

erpret



                                                                 this result as



                                                                 normal/abnormal

.



[facility] ResourcesLdzgdjwHFGASORPE3217-11-26 15:25:24





             Test Item    Value        Reference Range Interpretation Comments

 

             AST (test code = AST) 19           See_Comment                [Auto

mated message] The



                                                                 system which ge

nerated this



                                                                 result transmit

gianfranco



                                                                 reference range

: <=37. The



                                                                 reference range

 was not



                                                                 used to interpr

et this



                                                                 result as xenia

l/abnormal.



[facility] ResourcesAgykpcqCDPIOWQJY7215-34-85 15:25:24





             Test Item    Value        Reference Range Interpretation Comments

 

             Alk Phos (test code = Alk Phos) 123                          

    



[facility] ResourcesTixigabUGQSUKHOJ4623-03-12 15:25:24





             Test Item    Value        Reference Range Interpretation Comments

 

             Bili Total (test code = Bili Total) 0.3          0.2-1.3           

        



[facility] ResourcesKejyezoMBMFJRMGZ6355-72-86 15:25:24





             Test Item    Value        Reference Range Interpretation Comments

 

             Bili Direct (test code no gt        See_Comment                [Aut

omated message] The



             = Bili Direct)                                        system which 

generated



                                                                 this result tra

nsmitted



                                                                 reference range

: <=0.3.



                                                                 The reference r

jihan was



                                                                 not used to int

erpret this



                                                                 result as xenia

l/abnormal.



[facility] ResourcesHatyzbvPSTXWNYEP8219-41-72 15:25:24





             Test Item    Value        Reference Range Interpretation Comments

 

             Sodium Lvl (test code = Sodium Lvl) 142          135-145           

        



[facility] ResourcesDpvhivmITFLUMDBW5408-78-23 15:25:24





             Test Item    Value        Reference Range Interpretation Comments

 

             Bili Indirect Unable to    See_Comment                [Automated



             (test code = Bili Calculate                              message] T

he system



             Indirect)                                           which generated



                                                                 this result



                                                                 transmitted



                                                                 reference range

:



                                                                 <=1.0. The



                                                                 reference range

 was



                                                                 not used to



                                                                 interpret this



                                                                 result as



                                                                 normal/abnormal

.



[facility] ResourcesFoyzdgeQHHORNIYUL2108-16-43 15:25:24





             Test Item    Value        Reference Range Interpretation Comments

 

             WBC X 10x3 (test code = WBC X 10x3) 9.4          3.7-10.4          

        



[facility] CenterAhoodmbOCTZSIBLTE9253-15-53 15:25:24





             Test Item    Value        Reference Range Interpretation Comments

 

             RBC X 10x6 (test code = RBC X 10x6) 4.59         4.20-5.40         

        



[facility] ServicesBsmlcymWCLVMBFCFV3965-90-30 15:25:24





             Test Item    Value        Reference Range Interpretation Comments

 

             Hgb (test code = Hgb) 14.1         12.0-16.0                 



[facility] CenterChezpamIGJTZJIKOF6476-37-27 15:25:24





             Test Item    Value        Reference Range Interpretation Comments

 

             Hct (test code = Hct) 42.8         36.0-48.0                 



[facility] ServicesVwnzkcmVXQHHWDNWX7485-37-52 15:25:24





             Test Item    Value        Reference Range Interpretation Comments

 

             MCV (test code = MCV) 93.2         80.0-98.0                 



[facility] ServicesElddbimKQECNHMNNF5491-51-57 15:25:24





             Test Item    Value        Reference Range Interpretation Comments

 

             MCH (test code = MCH) 30.8 pg      27.0-31.0                 



[facility] CenterQzeowgiWVXNXHZDSO1460-34-85 15:25:24





             Test Item    Value        Reference Range Interpretation Comments

 

             MCHC (test code = MCHC) 33.0         32.0-36.0                 



[facility] CenterEydxpjsUTWHCAWODK2318-28-20 15:25:24





             Test Item    Value        Reference Range Interpretation Comments

 

             RDW (test code = RDW) 13.0         11.5-14.5                 



[facility] CenterOngratiEDWKQWBSJM3979-36-42 15:25:24





             Test Item    Value        Reference Range Interpretation Comments

 

             Platelet (test code = Platelet) 200          133-450               

    



[facility]AllenUxncwlqINMKKAHPF5928-59-45 15:25:24





             Test Item    Value        Reference Range Interpretation Comments

 

             Potassium Lvl (test code = Potassium 4.4          3.5-5.1          

         



             Lvl)                                                



[facility] CenterCclbznpQHSIRCHYWC1924-22-82 15:25:24





             Test Item    Value        Reference Range Interpretation Comments

 

             MPV (test code = MPV) 8.6          7.4-10.4                  



[facility] CenterQtmpfskDYARJCGUWJ9357-63-14 15:25:24





             Test Item    Value        Reference Range Interpretation Comments

 

             Segs (test code = Segs) 86.0         45.0-75.0                 



[facility] CenterPqqxgpbYHTBSCSRFN7428-44-14 15:25:24





             Test Item    Value        Reference Range Interpretation Comments

 

             Lymphocytes (test code = Lymphocytes) 5.5          20.0-40.0       

          



[place] [address]58-02-14 15:25:24





             Test Item    Value        Reference Range Interpretation Comments

 

             Monocytes (test code = Monocytes) 7.0          2.0-12.0            

      



[facility] CenterEhgtdiiIIVBCBANOT2489-67-90 15:25:24





             Test Item    Value        Reference Range Interpretation Comments

 

             Eosinophils (test code = 1.0          See_Comment                [A

utomated message] The



             Eosinophils)                                        system which ge

nerated



                                                                 this result tra

nsmitted



                                                                 reference range

: <=4.0.



                                                                 The reference r

jihan was



                                                                 not used to int

erpret



                                                                 this result as



                                                                 normal/abnormal

.



[facility] CenterWucwvmzXLONPFCDUI5546-59-87 15:25:24





             Test Item    Value        Reference Range Interpretation Comments

 

             Basophils (test code = 0.5          See_Comment                [Aut

omated message] The



             Basophils)                                          system which ge

nerated



                                                                 this result tra

nsmitted



                                                                 reference range

: <=1.0.



                                                                 The reference r

jihan was



                                                                 not used to int

erpret



                                                                 this result as



                                                                 normal/abnormal

.



[facility] CenterYhpqluoHBNIIUFRUF4463-10-11 15:25:24





             Test Item    Value        Reference Range Interpretation Comments

 

             Neutrophils # (test code = Neutrophils 8.1          1.5-8.1        

           



             #)                                                  



[facility] CenterZxdkfxkOHXNLBZARW7868-78-73 15:25:24





             Test Item    Value        Reference Range Interpretation Comments

 

             Lymphocytes # (test code = Lymphocytes 0.5          1.0-5.5        

           



             #)                                                  



[facility] CenterYiqgssqCBKYPQPGXT7729-11-50 15:25:24





             Test Item    Value        Reference Range Interpretation Comments

 

             Monocytes # (test code 0.7          See_Comment                [Aut

omated message] The



             = Monocytes #)                                        system which 

generated



                                                                 this result tra

nsmitted



                                                                 reference range

: <=0.8.



                                                                 The reference r

jihan was



                                                                 not used to int

erpret



                                                                 this result as



                                                                 normal/abnormal

.



[facility] CenterTahbmogVBBUYBKZQU1955-83-75 15:25:24





             Test Item    Value        Reference Range Interpretation Comments

 

             Eosinophils # (test code 0.1          See_Comment                [A

utomated message] The



             = Eosinophils #)                                        system whic

h generated



                                                                 this result tra

nsmitted



                                                                 reference range

: <=0.5.



                                                                 The reference r

jihan was



                                                                 not used to int

erpret



                                                                 this result as



                                                                 normal/abnormal

.



[facility] CenterYkzidyqJGFRIRMVI1956-59-43 15:25:24





             Test Item    Value        Reference Range Interpretation Comments

 

             Chloride Lvl (test code = Chloride Lvl) 108                  

            



[place] 123563-02-14 15:25:24





             Test Item    Value        Reference Range Interpretation Comments

 

             CO2 (test code = CO2) -                     



[facility] CenterKoxkfxwUBXDYMSTM4609-20-05 15:25:24





             Test Item    Value        Reference Range Interpretation Comments

 

             Calcium Lvl (test code = Calcium Lvl) 10.0         8.5-10.5        

          



[facility] CenterGwknjweDYFWKYDSD1871-84-58 15:25:24





             Test Item    Value        Reference Range Interpretation Comments

 

             Glucose Lvl (test code = Glucose Lvl) 93           70-99           

          



[facility] CenterUdkklnpXZYVSGJZN3167-14-72 15:25:24





             Test Item    Value        Reference Range Interpretation Comments

 

             BUN (test code = BUN) 19           -                      



[facility] CenterSqqryyuPFJCOMUYY9673-95-12 15:25:24





             Test Item    Value        Reference Range Interpretation Comments

 

             Creatinine Lvl (test code = Creatinine 0.85         0.50-1.40      

           



             Lvl)                                                



[facility] CenterCgpgjcfSSAOXXPDT2654-48-90 15:25:24





             Test Item    Value        Reference Range Interpretation Comments

 

             Sodium Lvl (test code = Sodium Lvl) 142          135-145           

        



[facility] CenterZrnfsezRZRDEUAHZ3541-73-42 15:25:24





             Test Item    Value        Reference Range Interpretation Comments

 

             Potassium Lvl (test code = Potassium 4.4          3.5-5.1          

         



             Lvl)                                                



[facility] CenterEwthscjIVDWMURCR3425-75-40 15:25:24





             Test Item    Value        Reference Range Interpretation Comments

 

             Chloride Lvl (test code = Chloride Lvl) 108                  

            



[facility] CenterSyewlzcZJACLUUUB9788-63-68 15:25:24





             Test Item    Value        Reference Range Interpretation Comments

 

             CO2 (test code = CO2) -                     



[facility] CenterOrwttvwLJBZXQERF9224-21-12 15:25:24





             Test Item    Value        Reference Range Interpretation Comments

 

             Calcium Lvl (test code = Calcium Lvl) 10.0         8.5-10.5        

          



[facility] CenterRtekuavDEXTLUQZJ9952-12-80 15:25:24





             Test Item    Value        Reference Range Interpretation Comments

 

             AGAP (test code = AGAP) 10.4         10.0-20.0                 



[facility] CenterQakreceNOLSXJMZM6888-12-61 15:25:24





             Test Item    Value        Reference Range Interpretation Comments

 

             eGFR (test code = eGFR) 81                                     



[facility] CenterFnnwjwjHLJTNYUSD4529-02-04 15:25:24





             Test Item    Value        Reference Range Interpretation Comments

 

             AGAP (test code = AGAP) 10.4         10.0-20.0                 



[place] 123563-02-14 15:25:24





             Test Item    Value        Reference Range Interpretation Comments

 

             Lipase Lvl (test code = Lipase Lvl) 123                      

        



[facility] CenterUpnauwsLIDUMUWLF0418-24-90 15:25:24





             Test Item    Value        Reference Range Interpretation Comments

 

             Total Protein (test code = Total 8.0          6.4-8.4              

     



             Protein)                                            



[facility]af[facility]CN[facility]WUW5366[facility]24





             Test Item    Value        Reference Range Interpretation Comments

 

             Albumin Lvl (test code = Albumin Lvl) 3.7          3.5-5.0         

          



[place] 123563-02-14 15:25:24





             Test Item    Value        Reference Range Interpretation Comments

 

             Globulin (test code = Globulin) 4.3          2.7-4.2               

    



[facility]JqultcxLIPQGNQRL1138[facility] 15:25:24





             Test Item    Value        Reference Range Interpretation Comments

 

             A/G Ratio (test code = A/G Ratio) 0.9 1        0.7-1.6             

      



[facility] CenterOhchbgcHKELXGJRX2619-28-58 15:25:24





             Test Item    Value        Reference Range Interpretation Comments

 

             ALANINE AMINOTRANSFERASE 20           See_Comment                [A

utomated message]



             (test code = ALANINE                                        The sys

tem which



             AMINOTRANSFERASE)                                        generated 

this result



                                                                 transmitted ref

erence



                                                                 range: <=65. Th

e



                                                                 reference range

 was



                                                                 not used to int

erpret



                                                                 this result as



                                                                 normal/abnormal

.



[facility] CenterDfvcsupSGXYWPHBI5536-94-47 [facility]:25:24





             Test Item    Value        Reference Range Interpretation Comments

 

             AST (test code = AST) 19           See_Comment                [Auto

mated message] The



                                                                 system which ge

nerated this



                                                                 result transmit

gianfranco



                                                                 reference range

: <=37. The



                                                                 reference range

 was not



                                                                 used to interpr

et this



                                                                 result as xenia

l/abnormal.



[facility] CenterKxfkutiIIWZTWQCB9896-91-35 15:25:24





             Test Item    Value        Reference Range Interpretation Comments

 

             Alk Phos (test code = Alk Phos) 123                          

    



[facility] CenterZztmwueALAWMBVGT3688-50-46 15:25:24





             Test Item    Value        Reference Range Interpretation Comments

 

             Bili Total (test code = Bili Total) 0.3          0.2-1.3           

        



[facility] CenterYufgdvdGTDFWOBHS9978-90-22 15:25:24





             Test Item    Value        Reference Range Interpretation Comments

 

             Bili Direct (test code no gt        See_Comment                [Aut

omated message] The



             = Bili Direct)                                        system which 

generated



                                                                 this result tra

nsmitted



                                                                 reference range

: <=0.3.



                                                                 The reference r

jihan was



                                                                 not used to int

erpret this



                                                                 result as xenia

l/abnormal.



[facility] CenterPnvcxqiTODRIRUBW3480-86-59 15:25:24





             Test Item    Value        Reference Range Interpretation Comments

 

             eGFR (test code = eGFR) 81                                     



[place] 123563-02-14 15:25:24





             Test Item    Value        Reference Range Interpretation Comments

 

             Bili Indirect Unable to    See_Comment                [Automated



             (test code = Bili Calculate                              message] T

he system



             Indirect)                                           which generated



                                                                 this result



                                                                 transmitted



                                                                 reference range

:



                                                                 <=1.0. The



                                                                 reference range

 was



                                                                 not used to



                                                                 interpret this



                                                                 result as



                                                                 normal/abnormal

.



[facility] CenterGzpjyduAXRUFYEMPV0894-06-98 15:25:24





             Test Item    Value        Reference Range Interpretation Comments

 

             WBC X 10x3 (test code = WBC X 10x3) 9.4          3.7-10.4          

        



[place] [address]-02-14 15:25:24





             Test Item    Value        Reference Range Interpretation Comments

 

             RBC X 10x6 (test code = RBC X 10x6) 4.59         4.20-5.40         

        



[place] 574643-02-14 15:25:24





             Test Item    Value        Reference Range Interpretation Comments

 

             Hgb (test code = Hgb) 14.1         12.0-16.0                 



[place] 574643-02-14 15:25:24





             Test Item    Value        Reference Range Interpretation Comments

 

             Hct (test code = Hct) 42.8         36.0-48.0                 



[place] [address]-02-14 15:25:24





             Test Item    Value        Reference Range Interpretation Comments

 

             MCV (test code = MCV) 93.2         80.0-98.0                 



[place] [address]-02-14 15:25:24





             Test Item    Value        Reference Range Interpretation Comments

 

             MCH (test code = MCH) 30.8 pg      27.0-31.0                 



[place] [address]-02-14 15:25:24





             Test Item    Value        Reference Range Interpretation Comments

 

             MCHC (test code = MCHC) 33.0         32.0-36.0                 



[place] 574643-02-14 15:25:24





             Test Item    Value        Reference Range Interpretation Comments

 

             RDW (test code = RDW) 13.0         11.5-14.5                 



[place] 574643-02-14 15:25:24





             Test Item    Value        Reference Range Interpretation Comments

 

             Platelet (test code = Platelet) 200          133-450               

    



[facility]BEIKJPJ[facility]61[facility]24





             Test Item    Value        Reference Range Interpretation Comments

 

             Lipase Lvl (test code = Lipase Lvl) 123                      

        



[place] 574643-02-14 15:25:24





             Test Item    Value        Reference Range Interpretation Comments

 

             MPV (test code = MPV) 8.6          7.4-10.4                  



[place] 574643-02-14 15:25:24





             Test Item    Value        Reference Range Interpretation Comments

 

             Segs (test code = Segs) 86.0         45.0-75.0                 



[place] [address]-02-14 15:25:24





             Test Item    Value        Reference Range Interpretation Comments

 

             Lymphocytes (test code = Lymphocytes) 5.5          20.0-40.0       

          



[place] [address]-02-14 15:25:24





             Test Item    Value        Reference Range Interpretation Comments

 

             Monocytes (test code = Monocytes) 7.0          2.0-12.0            

      



[place] 574643-02-14 15:25:24





             Test Item    Value        Reference Range Interpretation Comments

 

             Eosinophils (test code = 1.0          See_Comment                [A

utomated message] The



             Eosinophils)                                        system which ge

nerated



                                                                 this result tra

nsmitted



                                                                 reference range

: <=4.0.



                                                                 The reference r

jihan was



                                                                 not used to int

erpret



                                                                 this result as



                                                                 normal/abnormal

.



[facility] CenterSiqdvmtKBOXGEWUKS9444-53-24 15:25:24





             Test Item    Value        Reference Range Interpretation Comments

 

             Basophils (test code = 0.5          See_Comment                [Aut

omated message] The



             Basophils)                                          system which ge

nerated



                                                                 this result tra

nsmitted



                                                                 reference range

: <=1.0.



                                                                 The reference r

jihan was



                                                                 not used to int

erpret



                                                                 this result as



                                                                 normal/abnormal

.



[facility] CenterNpqwjarPEBOIAARPK7316-62-96 15:25:24





             Test Item    Value        Reference Range Interpretation Comments

 

             Neutrophils # (test code = Neutrophils 8.1          1.5-8.1        

           



             #)                                                  



[place] [address]-02-14 15:25:24





             Test Item    Value        Reference Range Interpretation Comments

 

             Lymphocytes # (test code = Lymphocytes 0.5          1.0-5.5        

           



             #)                                                  



[place] [address]-02-14 15:25:24





             Test Item    Value        Reference Range Interpretation Comments

 

             Monocytes # (test code 0.7          See_Comment                [Aut

omated message] The



             = Monocytes #)                                        system which 

generated



                                                                 this result tra

nsmitted



                                                                 reference range

: <=0.8.



                                                                 The reference r

jihan was



                                                                 not used to int

erpret



                                                                 this result as



                                                                 normal/abnormal

.



[facility]FhbdqwgSPAWAJCURI9855-42-00 15:25:24





             Test Item    Value        Reference Range Interpretation Comments

 

             Eosinophils # (test code 0.1          See_Comment                [A

utomated message] The



             = Eosinophils #)                                        system whic

h generated



                                                                 this result tra

nsmitted



                                                                 reference range

: <=0.5.



                                                                 The reference r

jihan was



                                                                 not used to int

erpret



                                                                 this result as



                                                                 normal/abnormal

.



[facility] LtvztzwOQXIAPVYX7563-81-26 15:25:24





             Test Item    Value        Reference Range Interpretation Comments

 

             Total Protein (test code = Total 8.0          6.4-8.4              

     



             Protein)                                            



[facility] ResourcesXqmjvsvSVGBFPPOZ8703-43-84 15:25:24





             Test Item    Value        Reference Range Interpretation Comments

 

             Albumin Lvl (test code = Albumin Lvl) 3.7          3.5-5.0         

          



[facility] SlallwuXWTZGCHUE2125-34-51 15:25:24





             Test Item    Value        Reference Range Interpretation Comments

 

             Globulin (test code = Globulin) 4.3          2.7-4.2               

    



[facility] ResourcesYbybxuoFUQSZLMIY1449-47-62 15:25:24





             Test Item    Value        Reference Range Interpretation Comments

 

             A/G Ratio (test code = A/G Ratio) 0.9 1        0.7-1.6             

      



[facility] ResourcesDtadxzoSWZMVAKOV1625-83-66 15:25:24





             Test Item    Value        Reference Range Interpretation Comments

 

             ALANINE AMINOTRANSFERASE 20           See_Comment                [A

utomated message]



             (test code = ALANINE                                        The sys

tem which



             AMINOTRANSFERASE)                                        generated 

this result



                                                                 transmitted ref

erence



                                                                 range: <=65. Th

e



                                                                 reference range

 was



                                                                 not used to int

erpret



                                                                 this result as



                                                                 normal/abnormal

.



[facility] XuvlwyrPXZXLKCSZ4438-53-79 15:25:24





             Test Item    Value        Reference Range Interpretation Comments

 

             AST (test code = AST) 19           See_Comment                [Auto

mated message] The



                                                                 system which ge

nerated this



                                                                 result transmit

gianfranco



                                                                 reference range

: <=37. The



                                                                 reference range

 was not



                                                                 used to interpr

et this



                                                                 result as xenia

l/abnormal.



[facility] GggsqkjRJKCRBRBA7652-52-45 15:25:24





             Test Item    Value        Reference Range Interpretation Comments

 

             Alk Phos (test code = Alk Phos) 123                          

    



[facility] ResourcesFlgvbljHBFAXNVFQ4034-63-68 15:25:24





             Test Item    Value        Reference Range Interpretation Comments

 

             Bili Total (test code = Bili Total) 0.3          0.2-1.3           

        



[facility] CenterTbvhwcyADGCARZFV5521-34-97 15:25:24





             Test Item    Value        Reference Range Interpretation Comments

 

             Bili Direct (test code no gt        See_Comment                [Aut

omated message] The



             = Bili Direct)                                        system which 

generated



                                                                 this result tra

nsmitted



                                                                 reference range

: <=0.3.



                                                                 The reference r

jihan was



                                                                 not used to int

erpret this



                                                                 result as xenia

l/abnormal.



[facility] CenterAplckafSAGJUAMZW7586-91-17 15:25:24





             Test Item    Value        Reference Range Interpretation Comments

 

             Bili Indirect Unable to    See_Comment                [Automated



             (test code = Bili Calculate                              message] T

he system



             Indirect)                                           which generated



                                                                 this result



                                                                 transmitted



                                                                 reference range

:



                                                                 <=1.0. The



                                                                 reference range

 was



                                                                 not used to



                                                                 interpret this



                                                                 result as



                                                                 normal/abnormal

.



[facility] CenterCtlwyrbLNDGJOBAUQ0290-28-40 15:25:24





             Test Item    Value        Reference Range Interpretation Comments

 

             WBC X 10x3 (test code = WBC X 10x3) 9.4          3.7-10.4          

        



[facility] CenterXtgirxuPNXERPHBIB8597-57-10 15:25:24





             Test Item    Value        Reference Range Interpretation Comments

 

             RBC X 10x6 (test code = RBC X 10x6) 4.59         4.20-5.40         

        



[facility] CenterNjocmiiHMGTHWSDNX8485-46-97 15:25:24





             Test Item    Value        Reference Range Interpretation Comments

 

             Hgb (test code = Hgb) 14.1         12.0-16.0                 



[facility] CenterFunevszWFRXCQEDEL4899-88-03 15:25:24





             Test Item    Value        Reference Range Interpretation Comments

 

             Hct (test code = Hct) 42.8         36.0-48.0                 



[facility] CenterJohifcnHGUWEGOOJU2362-89-26 15:25:24





             Test Item    Value        Reference Range Interpretation Comments

 

             MCV (test code = MCV) 93.2         80.0-98.0                 



[facility] CenterHxbdamdDOVRUCMTTB7482-97-28 15:25:24





             Test Item    Value        Reference Range Interpretation Comments

 

             MCH (test code = MCH) 30.8 pg      27.0-31.0                 



[facility] CenterRxaowvvHVRFNKPFAF7918-55-92 15:25:24





             Test Item    Value        Reference Range Interpretation Comments

 

             MCHC (test code = MCHC) 33.0         32.0-36.0                 



[place] [address]643-02-14 15:25:24





             Test Item    Value        Reference Range Interpretation Comments

 

             RDW (test code = RDW) 13.0         11.5-14.5                 



[place] [address]-02-14 15:25:24





             Test Item    Value        Reference Range Interpretation Comments

 

             Platelet (test code = Platelet) 200          133-450               

    



[place] [address]-02-14 15:25:24





             Test Item    Value        Reference Range Interpretation Comments

 

             MPV (test code = MPV) 8.6          7.4-10.4                  



[place] [address]-02-14 15:25:24





             Test Item    Value        Reference Range Interpretation Comments

 

             Segs (test code = Segs) 86.0         45.0-75.0                 



[place] [address]-02-14 15:25:24





             Test Item    Value        Reference Range Interpretation Comments

 

             Lymphocytes (test code = Lymphocytes) 5.5          20.0-40.0       

          



[place] [address]-02-14 15:25:24





             Test Item    Value        Reference Range Interpretation Comments

 

             Monocytes (test code = Monocytes) 7.0          2.0-12.0            

      



[place] 574643-02-14 15:25:24





             Test Item    Value        Reference Range Interpretation Comments

 

             Eosinophils (test code = 1.0          See_Comment                [A

utomated message] The



             Eosinophils)                                        system which ge

nerated



                                                                 this result tra

nsmitted



                                                                 reference range

: <=4.0.



                                                                 The reference r

jihan was



                                                                 not used to int

erpret



                                                                 this result as



                                                                 normal/abnormal

.



[place] [address]-02-14 15:25:24





             Test Item    Value        Reference Range Interpretation Comments

 

             Basophils (test code = 0.5          See_Comment                [Aut

omated message] The



             Basophils)                                          system which ge

nerated



                                                                 this result tra

nsmitted



                                                                 reference range

: <=1.0.



                                                                 The reference r

jihan was



                                                                 not used to int

erpret



                                                                 this result as



                                                                 normal/abnormal

.



[place] [address]-02-14 15:25:24





             Test Item    Value        Reference Range Interpretation Comments

 

             Neutrophils # (test code = Neutrophils 8.1          1.5-8.1        

           



             #)                                                  



[place] [address]-02-14 15:25:24





             Test Item    Value        Reference Range Interpretation Comments

 

             Lymphocytes # (test code = Lymphocytes 0.5          1.0-5.5        

           



             #)                                                  



[place] [address]-02-14 15:25:24





             Test Item    Value        Reference Range Interpretation Comments

 

             Monocytes # (test code 0.7          See_Comment                [Aut

omated message] The



             = Monocytes #)                                        system which 

generated



                                                                 this result tra

nsmitted



                                                                 reference range

: <=0.8.



                                                                 The reference r

jihan was



                                                                 not used to int

erpret



                                                                 this result as



                                                                 normal/abnormal

.



[facility] ResourcesUdpesarJOTIHTGLAP0909-23-57 15:25:24





             Test Item    Value        Reference Range Interpretation Comments

 

             Eosinophils # (test code 0.1          See_Comment                [A

utomated message] The



             = Eosinophils #)                                        system whic

h generated



                                                                 this result tra

nsmitted



                                                                 reference range

: <=0.5.



                                                                 The reference r

jihan was



                                                                 not used to int

erpret



                                                                 this result as



                                                                 normal/abnormal

.



[facility] QswmmriEMJUCSZSI2572-46-20 15:25:24





             Test Item    Value        Reference Range Interpretation Comments

 

             Glucose Lvl (test code = Glucose Lvl) 93           70-99           

          



[facility]DanhqlsNBIHIVKQU0521-64-48 15:25:24





             Test Item    Value        Reference Range Interpretation Comments

 

             BUN (test code = BUN) 19           7-22                      



[facility] ResourcesKcdznxkVSORSZRHF8233-63-26 15:25:24





             Test Item    Value        Reference Range Interpretation Comments

 

             Creatinine Lvl (test code = Creatinine 0.85         0.50-1.40      

           



             Lvl)                                                



[facility] ResourcesVdklqppRYCHKCALI4675-78-39 15:25:24





             Test Item    Value        Reference Range Interpretation Comments

 

             Sodium Lvl (test code = Sodium Lvl) 142          135-145           

        



[facility] ResourcesQzkqkpwLPAUQUHXH0329-23-00 15:25:24





             Test Item    Value        Reference Range Interpretation Comments

 

             Potassium Lvl (test code = Potassium 4.4          3.5-5.1          

         



             Lvl)                                                



[facility] ResourcesLjxipvjNZXHDNJJK0708-06-52 15:25:24





             Test Item    Value        Reference Range Interpretation Comments

 

             Chloride Lvl (test code = Chloride Lvl) 108                  

            



[facility] ResourcesRcekgylJUGTHSFNB3697-84-76 15:25:24





             Test Item    Value        Reference Range Interpretation Comments

 

             CO2 (test code = CO2) 28           24-32                     



[facility] ResourcesXsxamlkACDKVGAOM2051-19-25 15:25:24





             Test Item    Value        Reference Range Interpretation Comments

 

             Calcium Lvl (test code = Calcium Lvl) 10.0         8.5-10.5        

          



[facility] ResourcesJkzbqhfYFRLCBTCI9056-68-65 15:25:24





             Test Item    Value        Reference Range Interpretation Comments

 

             AGAP (test code = AGAP) 10.4         10.0-20.0                 



[place] [address]-02-14 15:25:24





             Test Item    Value        Reference Range Interpretation Comments

 

             eGFR (test code = eGFR) 81                                     



[facility]ZshagijLKKWTBBLG7357[facility] 15[facility]25:24





             Test Item    Value        Reference Range Interpretation Comments

 

             Lipase Lvl (test code = Lipase Lvl) 123                      

        



[facility] CenterAhogkilSLLHPPVSB2680-71-38 15:25:24





             Test Item    Value        Reference Range Interpretation Comments

 

             Total Protein (test code = Total 8.0          6.4-8.4              

     



             Protein)                                            



[facility] CenterFuxakhqBYZTVZPPX5131-38-78 15:25:24





             Test Item    Value        Reference Range Interpretation Comments

 

             Albumin Lvl (test code = Albumin Lvl) 3.7          3.5-5.0         

          



[facility] CenterZhiafqeSHAWDIZSK8115-12-26 15:25:24





             Test Item    Value        Reference Range Interpretation Comments

 

             Globulin (test code = Globulin) 4.3          2.7-4.2               

    



[facility] CenterVmrwyngVZMGZEZLI3658-78-21 15:25:24





             Test Item    Value        Reference Range Interpretation Comments

 

             A/G Ratio (test code = A/G Ratio) 0.9 1        0.7-1.6             

      



[facility] CenterIdgrrvxLRNNOEZXM2296-47-94 15:25:24





             Test Item    Value        Reference Range Interpretation Comments

 

             ALANINE AMINOTRANSFERASE 20           See_Comment                [A

utomated message]



             (test code = ALANINE                                        The sys

tem which



             AMINOTRANSFERASE)                                        generated 

this result



                                                                 transmitted ref

erence



                                                                 range: <=65. Th

e



                                                                 reference range

 was



                                                                 not used to int

erpret



                                                                 this result as



                                                                 normal/abnormal

.



[facility] CenterDaemurfPLKCAJVXE5970-08-22 15:25:24





             Test Item    Value        Reference Range Interpretation Comments

 

             AST (test code = AST) 19           See_Comment                [Auto

mated message] The



                                                                 system which ge

nerated this



                                                                 result transmit

gianfranco



                                                                 reference range

: <=37. The



                                                                 reference range

 was not



                                                                 used to interpr

et this



                                                                 result as xenia

l/abnormal.



[facility] CenterVidagigVOPEGFNVH4550-50-03 15:25:24





             Test Item    Value        Reference Range Interpretation Comments

 

             Alk Phos (test code = Alk Phos) 123                          

    



[facility] CenterKixpyleDQNRCFIJW3512-93-63 15:25:24





             Test Item    Value        Reference Range Interpretation Comments

 

             Bili Total (test code = Bili Total) 0.3          0.2-1.3           

        



[facility] CenterYkihyhfFUGYMWPRY3128-73-84 15:25:24





             Test Item    Value        Reference Range Interpretation Comments

 

             Bili Direct (test code no gt        See_Comment                [Aut

omated message] The



             = Bili Direct)                                        system which 

generated



                                                                 this result tra

nsmitted



                                                                 reference range

: <=0.3.



                                                                 The reference r

jihan was



                                                                 not used to int

erpret this



                                                                 result as xenia

l/abnormal.



[facility] CenterXxzwkzrJZGEEUTFD4391-44-47 15:25:24





             Test Item    Value        Reference Range Interpretation Comments

 

             Bili Indirect Unable to    See_Comment                [Automated



             (test code = Bili Calculate                              message] T

he system



             Indirect)                                           which generated



                                                                 this result



                                                                 transmitted



                                                                 reference range

:



                                                                 <=1.0. The



                                                                 reference range

 was



                                                                 not used to



                                                                 interpret this



                                                                 result as



                                                                 normal/abnormal

.



[facility] CenterUxxzaexDWMBUSVJBZ6949-11-78 15:25:24





             Test Item    Value        Reference Range Interpretation Comments

 

             WBC X 10x3 (test code = WBC X 10x3) 9.4          3.7-10.4          

        



[place] [address]643-02-14 15:25:24





             Test Item    Value        Reference Range Interpretation Comments

 

             RBC X 10x6 (test code = RBC X 10x6) 4.59         4.20-5.40         

        



[facility] CenterGdnxbgqZXWGMZDHTM0070-51-62 15:25:24





             Test Item    Value        Reference Range Interpretation Comments

 

             Hgb (test code = Hgb) 14.1         12.0-16.0                 



[facility] CenterBtwxvegWTUIPVTRQH0966-69-98 15:25:24





             Test Item    Value        Reference Range Interpretation Comments

 

             Hct (test code = Hct) 42.8         36.0-48.0                 



[facility] CenterMhdtuneCXHJCXOTGM9633-94-81 15:25:24





             Test Item    Value        Reference Range Interpretation Comments

 

             MCV (test code = MCV) 93.2         80.0-98.0                 



[facility] CenterRhiyblfOJCLHWUSIA4205-69-90 15:25:24





             Test Item    Value        Reference Range Interpretation Comments

 

             MCH (test code = MCH) 30.8 pg      27.0-31.0                 



[facility] ServicesTbsbqqfLOVJXONXJA2257-91-74 15:25:24





             Test Item    Value        Reference Range Interpretation Comments

 

             MCHC (test code = MCHC) 33.0         32.0-36.0                 



[facility] CenterLvuqofyGHMBOMQMJL8431-85-84 15:25:24





             Test Item    Value        Reference Range Interpretation Comments

 

             RDW (test code = RDW) 13.0         11.5-14.5                 



[facility] CenterLiwteucKYJUFPSGSR1571-87-43 15:25:24





             Test Item    Value        Reference Range Interpretation Comments

 

             Platelet (test code = Platelet) 200          133-450               

    



[place] [address]-02-14 15:25:24





             Test Item    Value        Reference Range Interpretation Comments

 

             MPV (test code = MPV) 8.6          7.4-10.4                  



[place] [address]643-02-14 15:25:24





             Test Item    Value        Reference Range Interpretation Comments

 

             Segs (test code = Segs) 86.0         45.0-75.0                 



[place] [address]-02-14 15:25:24





             Test Item    Value        Reference Range Interpretation Comments

 

             Lymphocytes (test code = Lymphocytes) 5.5          20.0-40.0       

          



[place] [address]-02-14 15:25:24





             Test Item    Value        Reference Range Interpretation Comments

 

             Monocytes (test code = Monocytes) 7.0          2.0-12.0            

      



[place] [address]-02-14 15:25:24





             Test Item    Value        Reference Range Interpretation Comments

 

             Eosinophils (test code = 1.0          See_Comment                [A

utomated message] The



             Eosinophils)                                        system which ge

nerated



                                                                 this result tra

nsmitted



                                                                 reference range

: <=4.0.



                                                                 The reference r

jihan was



                                                                 not used to int

erpret



                                                                 this result as



                                                                 normal/abnormal

.



[facility] CenterLskdthqDZYWDNSVKV3084-30-63 15:25:24





             Test Item    Value        Reference Range Interpretation Comments

 

             Basophils (test code = 0.5          See_Comment                [Aut

omated message] The



             Basophils)                                          system which ge

nerated



                                                                 this result tra

nsmitted



                                                                 reference range

: <=1.0.



                                                                 The reference r

jihan was



                                                                 not used to int

erpret



                                                                 this result as



                                                                 normal/abnormal

.



[facility] CenterRnteyknRKRGWKLBVT9161-20-99 15:25:24





             Test Item    Value        Reference Range Interpretation Comments

 

             Neutrophils # (test code = Neutrophils 8.1          1.5-8.1        

           



             #)                                                  



[place] [address]-02-14 15:25:24





             Test Item    Value        Reference Range Interpretation Comments

 

             Lymphocytes # (test code = Lymphocytes 0.5          1.0-5.5        

           



             #)                                                  



[place] [address]-02-14 15:25:24





             Test Item    Value        Reference Range Interpretation Comments

 

             Monocytes # (test code 0.7          See_Comment                [Aut

omated message] The



             = Monocytes #)                                        system which 

generated



                                                                 this result tra

nsmitted



                                                                 reference range

: <=0.8.



                                                                 The reference r

jihan was



                                                                 not used to int

erpret



                                                                 this result as



                                                                 normal/abnormal

.



[facility] CenterKagouwaIBSHIDTFXX4632-96-93 15:25:24





             Test Item    Value        Reference Range Interpretation Comments

 

             Eosinophils # (test code 0.1          See_Comment                [A

utomated message] The



             = Eosinophils #)                                        system [facility]

h generated



                                                                 this result tra

nsmitted



                                                                 reference range

: <=0.5.



                                                                 The reference r

jihan was



                                                                 not used to int

erpret



                                                                 this result as



                                                                 normal/abnormal

.



[facility] ResourcesAvxahtnOWTAKCGVI0316-51-38 15:25:24





             Test Item    Value        Reference Range Interpretation Comments

 

             Glucose Lvl (test code = Glucose Lvl) 93           70-99           

          



[facility] CenterVpllsyvKQPKUAPUJ4159-53-29 15:25:24





             Test Item    Value        Reference Range Interpretation Comments

 

             BUN (test code = BUN) 19           7-22                      



[facility] CenterKqjefplBXTOWTAID1754-90-76 15:25:24





             Test Item    Value        Reference Range Interpretation Comments

 

             Creatinine Lvl (test code = Creatinine 0.85         0.50-1.40      

           



             Lvl)                                                



[facility] CenterYqpunxhQFUIGDCSG3632-85-71 15:25:24





             Test Item    Value        Reference Range Interpretation Comments

 

             Sodium Lvl (test code = Sodium Lvl) 142          135-145           

        



[facility] CenterHireppkUYFGWZKSZ8685-65-03 15:25:24





             Test Item    Value        Reference Range Interpretation Comments

 

             Potassium Lvl (test code = Potassium 4.4          3.5-5.1          

         



             Lvl)                                                



[facility] CenterIcqkibhBCEISEJFX4061-03-62 15:25:24





             Test Item    Value        Reference Range Interpretation Comments

 

             Chloride Lvl (test code = Chloride Lvl) 108                  

            



[facility] CenterGumwwbwZHJEQHJPP1428-39-94 15:25:24





             Test Item    Value        Reference Range Interpretation Comments

 

             CO2 (test code = CO2) 28           24-32                     



[facility] CenterUjglxxjVRAPKFKHV9910-15-42 15:25:24





             Test Item    Value        Reference Range Interpretation Comments

 

             Calcium Lvl (test code = Calcium Lvl) 10.0         8.5-10.5        

          



[facility] CenterOtmivbyFMCXKIVBK5811-74-21 15:25:24





             Test Item    Value        Reference Range Interpretation Comments

 

             AGAP (test code = AGAP) 10.4         10.0-20.0                 



[facility] CenterAcjdtyxAZVEBTTNS1500-65-57 15:25:24





             Test Item    Value        Reference Range Interpretation Comments

 

             eGFR (test code = eGFR) 81                                     



[facility] CenterPnybijpBCNRLCMPN5725-18-58 15:25:24





             Test Item    Value        Reference Range Interpretation Comments

 

             Lipase Lvl (test code = Lipase Lvl) 123                      

        



[facility] CenterGanffypTOREMNMPK5530-21-39 15:25:24





             Test Item    Value        Reference Range Interpretation Comments

 

             Total Protein (test code = Total 8.0          6.4-8.4              

     



             Protein)                                            



[facility] CenterEhmjkrkJBRAHXCSZ3363-10-29 15:25:24





             Test Item    Value        Reference Range Interpretation Comments

 

             Albumin Lvl (test code = Albumin Lvl) 3.7          3.5-5.0         

          



[facility] CenterZvqunzzZFCDPFISU6434-32-48 15:25:24





             Test Item    Value        Reference Range Interpretation Comments

 

             Globulin (test code = Globulin) 4.3          2.7-4.2               

    



[facility] CenterHzxzuthGWDGFAZBL8064-28-63 15:25:24





             Test Item    Value        Reference Range Interpretation Comments

 

             A/G Ratio (test code = A/G Ratio) 0.9 1        0.7-1.6             

      



[facility] CenterThakexiZKTSAUZDZ7610-95-50 15:25:24





             Test Item    Value        Reference Range Interpretation Comments

 

             ALANINE AMINOTRANSFERASE 20           See_Comment                [A

utomated message]



             (test code = ALANINE                                        The sys

tem which



             AMINOTRANSFERASE)                                        generated 

this result



                                                                 transmitted ref

erence



                                                                 range: <=65. Th

e



                                                                 reference range

 was



                                                                 not used to int

erpret



                                                                 this result as



                                                                 normal/abnormal

.



[facility] CenterMrohflvJCQSGUPEY2328-69-89 15:25:24





             Test Item    Value        Reference Range Interpretation Comments

 

             AST (test code = AST) 19           See_Comment                [Auto

mated message] The



                                                                 system which ge

nerated this



                                                                 result transmit

gianfranco



                                                                 reference range

: <=37. The



                                                                 reference range

 was not



                                                                 used to interpr

et this



                                                                 result as xenia

l/abnormal.



[facility] CenterAowcdhdLLWZUBYJB4934-19-81 15:25:24





             Test Item    Value        Reference Range Interpretation Comments

 

             Alk Phos (test code = Alk Phos) 123                          

    



[facility] CenterGjpdcbqQXXCVIEBC0458-58-96 15:25:24





             Test Item    Value        Reference Range Interpretation Comments

 

             Bili Total (test code = Bili Total) 0.3          0.2-1.3           

        



[facility] CenterSdzheitLUDHGQETA8499-17-93 15:25:24





             Test Item    Value        Reference Range Interpretation Comments

 

             Bili Direct (test code no gt        See_Comment                [Aut

omated message] The



             = Bili Direct)                                        system which 

generated



                                                                 this result tra

nsmitted



                                                                 reference range

: <=0.3.



                                                                 The reference r

jihan was



                                                                 not used to int

erpret this



                                                                 result as xenia

l/abnormal.



[facility] McAllenJcchccsJAZTOAIQE8859-33-69 15:25:24





             Test Item    Value        Reference Range Interpretation Comments

 

             Bili Indirect Unable to    See_Comment                [Automated



             (test code = Bili Calculate                              message] T

he system



             Indirect)                                           which generated



                                                                 this result



                                                                 transmitted



                                                                 reference range

:



                                                                 <=1.0. The



                                                                 reference range

 was



                                                                 not used to



                                                                 interpret this



                                                                 result as



                                                                 normal/abnormal

.



[facility] McAllenDpqiantKWJPKUTIRT1075-07-67 15:25:24





             Test Item    Value        Reference Range Interpretation Comments

 

             WBC X 10x3 (test code = WBC X 10x3) 9.4          3.7-10.4          

        



[facility] ServicesMrlyrlcLNVBZXHPXH6282-07-76 15:25:24





             Test Item    Value        Reference Range Interpretation Comments

 

             RBC X 10x6 (test code = RBC X 10x6) 4.59         4.20-5.40         

        



[facility]AllenDacgaziSEVRUGBXPH6471-45-14 [facility]:25:24





             Test Item    Value        Reference Range Interpretation Comments

 

             Hgb (test code = Hgb) 14.1         12.0-16.0                 



[facility]AllenPwoorsiUBZSZVSWBO0072-40-96 15:25:24





             Test Item    Value        Reference Range Interpretation Comments

 

             Hct (test code = Hct) 42.8         36.0-48.0                 



[facility] McAllenMofjchvYVLPWSGQER0418-31-25 15:25:24





             Test Item    Value        Reference Range Interpretation Comments

 

             MCV (test code = MCV) 93.2         80.0-98.0                 



[facility] McAllenYlwyzboHLXYUVYMDZ2555-98-97 15:25:24





             Test Item    Value        Reference Range Interpretation Comments

 

             MCH (test code = MCH) 30.8 pg      27.0-31.0                 



[facility] ResourcesLfpiqhdYUKFWGKCVV8481-82-86 15:25:24





             Test Item    Value        Reference Range Interpretation Comments

 

             MCHC (test code = MCHC) 33.0         32.0-36.0                 



[facility] ServicesGaqohouJWRIINDIPI2716-12-26 15:25:24





             Test Item    Value        Reference Range Interpretation Comments

 

             RDW (test code = RDW) 13.0         11.5-14.5                 



[facility] McAllenVkhygkhJJYRPESDMD8144-00-13 15:25:24





             Test Item    Value        Reference Range Interpretation Comments

 

             Platelet (test code = Platelet) 200          133-450               

    



[facility] McAllenBydlpeyWSTKTHEGHN6379-97-04 15:25:24





             Test Item    Value        Reference Range Interpretation Comments

 

             MPV (test code = MPV) 8.6          7.4-10.4                  



[place] [address]-02-14 15:25:24





             Test Item    Value        Reference Range Interpretation Comments

 

             Segs (test code = Segs) 86.0         45.0-75.0                 



[place] [address]-02-14 15:25:24





             Test Item    Value        Reference Range Interpretation Comments

 

             Lymphocytes (test code = Lymphocytes) 5.5          20.0-40.0       

          



[place] [address]-02-14 15:25:24





             Test Item    Value        Reference Range Interpretation Comments

 

             Monocytes (test code = Monocytes) 7.0          2.0-12.0            

      



[place] [address]-02-14 15:25:24





             Test Item    Value        Reference Range Interpretation Comments

 

             Eosinophils (test code = 1.0          See_Comment                [A

utomated message] The



             Eosinophils)                                        system which ge

nerated



                                                                 this result tra

nsmitted



                                                                 reference range

: <=4.0.



                                                                 The reference r

jihan was



                                                                 not used to int

erpret



                                                                 this result as



                                                                 normal/abnormal

.



[place] [address]-02-14 15:25:24





             Test Item    Value        Reference Range Interpretation Comments

 

             Basophils (test code = 0.5          See_Comment                [Aut

omated message] The



             Basophils)                                          system which ge

nerated



                                                                 this result tra

nsmitted



                                                                 reference range

: <=1.0.



                                                                 The reference r

jihan was



                                                                 not used to int

erpret



                                                                 this result as



                                                                 normal/abnormal

.



[place]643-02-14 15:25:24





             Test Item    Value        Reference Range Interpretation Comments

 

             Neutrophils # (test code = Neutrophils 8.1          1.5-8.1        

           



             #)                                                  



[place] [address]643-02-14 15:25:24





             Test Item    Value        Reference Range Interpretation Comments

 

             Lymphocytes # (test code = Lymphocytes 0.5          1.0-5.5        

           



             #)                                                  



[place] [address]-02-14 15:25:24





             Test Item    Value        Reference Range Interpretation Comments

 

             Monocytes # (test code 0.7          See_Comment                [Aut

omated message] The



             = Monocytes #)                                        system which 

generated



                                                                 this result tra

nsmitted



                                                                 reference range

: <=0.8.



                                                                 The reference r

jihan was



                                                                 not used to int

erpret



                                                                 this result as



                                                                 normal/abnormal

.



[place] [address]-02-14 15:25:24





             Test Item    Value        Reference Range Interpretation Comments

 

             Eosinophils # (test code 0.1          See_Comment                [A

utomated message] The



             = Eosinophils #)                                        system whic

h generated



                                                                 this result tra

nsmitted



                                                                 reference range

: <=0.5.



                                                                 The reference r

jihan was



                                                                 not used to int

erpret



                                                                 this result as



                                                                 normal/abnormal

.



[facility] CenterKrcxnxkGICDYIHRM8655-43-20 [facility]24





             Test Item    Value        Reference Range Interpretation Comments

 

             Glucose Lvl (test code = Glucose Lvl) 93           70-99           

          



[facility] CenterDwfixwmDRPUWVUDV1200-75-78 15:25:24





             Test Item    Value        Reference Range Interpretation Comments

 

             BUN (test code = BUN) 19           7-22                      



[facility] CenterVdembqkGZQJGTNFY0057-42-46 15:25:24





             Test Item    Value        Reference Range Interpretation Comments

 

             Creatinine Lvl (test code = Creatinine 0.85         0.50-1.40      

           



             Lvl)                                                



[facility] CenterSezmmamYXFSLLYUD5123-97-66 15:25:24





             Test Item    Value        Reference Range Interpretation Comments

 

             Sodium Lvl (test code = Sodium Lvl) 142          135-145           

        



[facility] CenterPbbqbqcCIZVXRPNR9945-81-66 15:25:24





             Test Item    Value        Reference Range Interpretation Comments

 

             Potassium Lvl (test code = Potassium 4.4          3.5-5.1          

         



             Lvl)                                                



[facility] CenterGamrxqlOMERZAWEP6384-49-93 15:25:24





             Test Item    Value        Reference Range Interpretation Comments

 

             Chloride Lvl (test code = Chloride Lvl) 108                  

            



[facility] CenterZqtswjkHCBWKLPBO8181-86-17 15:25:24





             Test Item    Value        Reference Range Interpretation Comments

 

             CO2 (test code = CO2) 28           24-32                     



[facility] CenterWtfgctdLSQZMAVYX7687-13-19 15:25:24





             Test Item    Value        Reference Range Interpretation Comments

 

             Calcium Lvl (test code = Calcium Lvl) 10.0         8.5-10.5        

          



[facility] CenterDaukundBNVQXCZJZ7006-81-39 15:25:24





             Test Item    Value        Reference Range Interpretation Comments

 

             AGAP (test code = AGAP) 10.4         10.0-20.0                 



[facility] CenterPrahtsiRMUUPNWEU6256-76-79 15:25:24





             Test Item    Value        Reference Range Interpretation Comments

 

             eGFR (test code = eGFR) 81                                     



[facility] CenterMnbfsnkQZEUXWMDC3836-48-32 15:25:24





             Test Item    Value        Reference Range Interpretation Comments

 

             Lipase Lvl (test code = Lipase Lvl) 123                      

        



[facility] CenterShepfkyGOEQBHGIB4940-32-42 15:25:24





             Test Item    Value        Reference Range Interpretation Comments

 

             Total Protein (test code = Total 8.0          6.4-8.4              

     



             Protein)                                            



[facility] CenterDpkdeaePKKSMKYVA5564-98-59 15:25:24





             Test Item    Value        Reference Range Interpretation Comments

 

             Albumin Lvl (test code = Albumin Lvl) 3.7          3.5-5.0         

          



[facility] CenterBzxzjgaMGSVDKGWB9973-13-97 15:25:24





             Test Item    Value        Reference Range Interpretation Comments

 

             Globulin (test code = Globulin) 4.3          2.7-4.2               

    



[facility] CenterQytxfcmPQPNPYUUN1758-03-58 15:25:24





             Test Item    Value        Reference Range Interpretation Comments

 

             A/G Ratio (test code = A/G Ratio) 0.9 1        0.7-1.6             

      



[facility] CenterYoxoxgeFAEQBIUNU0491-94-65 15:25:24





             Test Item    Value        Reference Range Interpretation Comments

 

             ALANINE AMINOTRANSFERASE 20           See_Comment                [A

utomated message]



             (test code = ALANINE                                        The sys

tem which



             AMINOTRANSFERASE)                                        generated 

this result



                                                                 transmitted ref

erence



                                                                 range: <=65. Th

e



                                                                 reference range

 was



                                                                 not used to int

erpret



                                                                 this result as



                                                                 normal/abnormal

.



[facility] CenterOvkbhteHPWCINRLG2629-25-81 15:25:24





             Test Item    Value        Reference Range Interpretation Comments

 

             AST (test code = AST) 19           See_Comment                [Auto

mated message] The



                                                                 system which ge

nerated this



                                                                 result transmit

gianfranco



                                                                 reference range

: <=37. The



                                                                 reference range

 was not



                                                                 used to interpr

et this



                                                                 result as xenia

l/abnormal.



[facility] CenterAjcbjhuYQPUKHEXE6128-60-30 15:25:24





             Test Item    Value        Reference Range Interpretation Comments

 

             Alk Phos (test code = Alk Phos) 123                          

    



[facility] CenterPtbcppdDNROKBTNK7462-14-46 15:25:24





             Test Item    Value        Reference Range Interpretation Comments

 

             Bili Total (test code = Bili Total) 0.3          0.2-1.3           

        



[place] [address]3563-02-14 15:25:24





             Test Item    Value        Reference Range Interpretation Comments

 

             Bili Direct (test code no gt        See_Comment                [Aut

omated message] The



             = Bili Direct)                                        system which 

generated



                                                                 this result tra

nsmitted



                                                                 reference range

: <=0.3.



                                                                 The reference r

jihan was



                                                                 not used to int

erpret this



                                                                 result as xenia

l/abnormal.



[facility] ResourcesNrmrynsMELJGCFFB7237-62-28 15:25:24





             Test Item    Value        Reference Range Interpretation Comments

 

             Bili Indirect Unable to    See_Comment                [Automated



             (test code = Bili Calculate                              message] T

he system



             Indirect)                                           which generated



                                                                 this result



                                                                 transmitted



                                                                 reference range

:



                                                                 <=1.0. The



                                                                 reference range

 was



                                                                 not used to



                                                                 interpret this



                                                                 result as



                                                                 normal/abnormal

.



[place] [address]-02-14 15:25:24





             Test Item    Value        Reference Range Interpretation Comments

 

             WBC X 10x3 (test code = WBC X 10x3) 9.4          3.7-10.4          

        



[place] [address]-02-14 15:25:24





             Test Item    Value        Reference Range Interpretation Comments

 

             RBC X 10x6 (test code = RBC X 10x6) 4.59         4.20-5.40         

        



[place] [address]-02-14 15:25:24





             Test Item    Value        Reference Range Interpretation Comments

 

             Hgb (test code = Hgb) 14.1         12.0-16.0                 



[place] [address]-02-14 15:25:24





             Test Item    Value        Reference Range Interpretation Comments

 

             Hct (test code = Hct) 42.8         36.0-48.0                 



[place] [address]-02-14 15:25:24





             Test Item    Value        Reference Range Interpretation Comments

 

             MCV (test code = MCV) 93.2         80.0-98.0                 



[place] [address]-02-14 15:25:24





             Test Item    Value        Reference Range Interpretation Comments

 

             MCH (test code = MCH) 30.8 pg      27.0-31.0                 



[place] [address]-02-14 15:25:24





             Test Item    Value        Reference Range Interpretation Comments

 

             MCHC (test code = MCHC) 33.0         32.0-36.0                 



[place] [address]643-02-14 15:25:24





             Test Item    Value        Reference Range Interpretation Comments

 

             RDW (test code = RDW) 13.0         11.5-14.5                 



[place] [address]-02-14 15:25:24





             Test Item    Value        Reference Range Interpretation Comments

 

             Platelet (test code = Platelet) 200          133-450               

    



[place] [address]-14 15:25:24





             Test Item    Value        Reference Range Interpretation Comments

 

             MPV (test code = MPV) 8.6          7.4-10.4                  



[place] [address]-14 15:25:24





             Test Item    Value        Reference Range Interpretation Comments

 

             Segs (test code = Segs) 86.0         45.0-75.0                 



[place] [address]-02-14 15:25:24





             Test Item    Value        Reference Range Interpretation Comments

 

             Lymphocytes (test code = Lymphocytes) 5.5          20.0-40.0       

          



[facility] CenterMpvymblHCNRWRQTTV3371-39-28 15:25:24





             Test Item    Value        Reference Range Interpretation Comments

 

             Monocytes (test code = Monocytes) 7.0          2.0-12.0            

      



[facility] CenterUpeqfhxDIIVXHBEWM2322-52-44 15:25:24





             Test Item    Value        Reference Range Interpretation Comments

 

             Eosinophils (test code = 1.0          See_Comment                [A

utomated message] The



             Eosinophils)                                        system which ge

nerated



                                                                 this result tra

nsmitted



                                                                 reference range

: <=4.0.



                                                                 The reference r

jihan was



                                                                 not used to int

erpret



                                                                 this result as



                                                                 normal/abnormal

.



[facility] CenterSbwzakgBTSPGTLBYV7961-44-19 15:25:24





             Test Item    Value        Reference Range Interpretation Comments

 

             Basophils (test code = 0.5          See_Comment                [Aut

omated message] The



             Basophils)                                          system which ge

nerated



                                                                 this result tra

nsmitted



                                                                 reference range

: <=1.0.



                                                                 The reference r

jihan was



                                                                 not used to int

erpret



                                                                 this result as



                                                                 normal/abnormal

.



[facility] CenterOnabgmoZUFHJRZURN8647-88-58 15:25:24





             Test Item    Value        Reference Range Interpretation Comments

 

             Neutrophils # (test code = Neutrophils 8.1          1.5-8.1        

           



             #)                                                  



[facility] CenterGxdftkbHWOTVRIEQG9065-31-22 15:25:24





             Test Item    Value        Reference Range Interpretation Comments

 

             Lymphocytes # (test code = Lymphocytes 0.5          1.0-5.5        

           



             #)                                                  



[facility] CenterPetrfnqZOINTKBQNZ5657-85-45 15:25:24





             Test Item    Value        Reference Range Interpretation Comments

 

             Monocytes # (test code 0.7          See_Comment                [Aut

omated message] The



             = Monocytes #)                                        system which 

generated



                                                                 this result tra

nsmitted



                                                                 reference range

: <=0.8.



                                                                 The reference r

jihan was



                                                                 not used to int

erpret



                                                                 this result as



                                                                 normal/abnormal

.



[facility]AllenAsmpspwEQBPIWJGQL7243-48-13 15:25:24





             Test Item    Value        Reference Range Interpretation Comments

 

             Eosinophils # (test code 0.1          See_Comment                [A

utomated message] The



             = Eosinophils #)                                        system whic

h generated



                                                                 this result tra

nsmitted



                                                                 reference range

: <=0.5.



                                                                 The reference r

jihan was



                                                                 not used to int

erpret



                                                                 this result as



                                                                 normal/abnormal

.



[facility] CenterCebgtetEHLFJQEWL6262-23-19 15:25:24





             Test Item    Value        Reference Range Interpretation Comments

 

             Glucose Lvl (test code = Glucose Lvl) 93           70-99           

          



[facility] CenterNnqpgrkAPFSVLTYE0576-51-85 15:25:24





             Test Item    Value        Reference Range Interpretation Comments

 

             BUN (test code = BUN) 19           7-22                      



[facility] CenterWsbykzwZBKOPXLCZ2691-18-86 15:25:24





             Test Item    Value        Reference Range Interpretation Comments

 

             Creatinine Lvl (test code = Creatinine 0.85         0.50-1.40      

           



             Lvl)                                                



[facility] CenterMquztadDYTRMTQRG5647-59-63 15:25:24





             Test Item    Value        Reference Range Interpretation Comments

 

             Sodium Lvl (test code = Sodium Lvl) 142          135-145           

        



[facility] CenterZchlmgiWQHJQYNCD1248-34-58 15:25:24





             Test Item    Value        Reference Range Interpretation Comments

 

             Potassium Lvl (test code = Potassium 4.4          3.5-5.1          

         



             Lvl)                                                



[facility] CenterHeveozsVGJVLCZRD6990-32-49 15:25:24





             Test Item    Value        Reference Range Interpretation Comments

 

             Chloride Lvl (test code = Chloride Lvl) 108                  

            



[facility] CenterUauzkdhAXZYXDMTB7015-41-13 15:25:24





             Test Item    Value        Reference Range Interpretation Comments

 

             CO2 (test code = CO2) 28           24-32                     



[facility] CenterCiubuozEWMPCIZDA5765-63-37 15:25:24





             Test Item    Value        Reference Range Interpretation Comments

 

             Calcium Lvl (test code = Calcium Lvl) 10.0         8.5-10.5        

          



[facility] CenterVokzddmSLWLBCOGG5042-31-35 15:25:24





             Test Item    Value        Reference Range Interpretation Comments

 

             AGAP (test code = AGAP) 10.4         10.0-20.0                 



[facility] CenterUxswpnoIIYMSAYZM6336-51-00 15:25:24





             Test Item    Value        Reference Range Interpretation Comments

 

             eGFR (test code = eGFR) 81                                     



[facility] CenterZantotySRNXRTGMJ7945-06-89 15:25:24





             Test Item    Value        Reference Range Interpretation Comments

 

             Lipase Lvl (test code = Lipase Lvl) 123                      

        



[facility] CenterPulcyaoOVXGYPIBG4981-29-91 15:25:24





             Test Item    Value        Reference Range Interpretation Comments

 

             Total Protein (test code = Total 8.0          6.4-8.4              

     



             Protein)                                            



[facility] CenterHvwtgcvTORSTHSRA1039-86-95 15[facility]25:24





             Test Item    Value        Reference Range Interpretation Comments

 

             Albumin Lvl (test code = Albumin Lvl) 3.7          3.5-5.0         

          



[facility] CenterAdvfglyLFSIFUDGX4566-65-99 15:25:24





             Test Item    Value        Reference Range Interpretation Comments

 

             Globulin (test code = Globulin) 4.3          2.7-4.2               

    



[facility]PdiivggCPLQORJMP0009-35-81 15:25:24





             Test Item    Value        Reference Range Interpretation Comments

 

             A/G Ratio (test code = A/G Ratio) 0.9 1        0.7-1.6             

      



[facility]IkubcdwZJAPBHXYN9362-68-09 15:25:24





             Test Item    Value        Reference Range Interpretation Comments

 

             ALANINE AMINOTRANSFERASE 20           See_Comment                [A

utomated message]



             (test code = ALANINE                                        The sys

tem which



             AMINOTRANSFERASE)                                        generated 

this result



                                                                 transmitted ref

erence



                                                                 range: <=65. Th

e



                                                                 reference range

 was



                                                                 not used to int

erpret



                                                                 this result as



                                                                 normal/abnormal

.



[facility] ResourcesNwyzxqsOJUMKFNPN5947-53-93 15:25:24





             Test Item    Value        Reference Range Interpretation Comments

 

             AST (test code = AST) 19           See_Comment                [Auto

mated message] The



                                                                 system which ge

nerated this



                                                                 result transmit

gianfranco



                                                                 reference range

: <=37. The



                                                                 reference range

 was not



                                                                 used to interpr

et this



                                                                 result as xenia

l/abnormal.



[facility] ResourcesZamljweWFISKZRWO3936-77-17 15:25:24





             Test Item    Value        Reference Range Interpretation Comments

 

             Alk Phos (test code = Alk Phos) 123                          

    



[facility]ErrvmwiWNJUVRMHF2060-69-[facility] 15:25:24





             Test Item    Value        Reference Range Interpretation Comments

 

             Bili Total (test code = Bili Total) 0.3          0.2-1.3           

        



[facility] ResourcesQqdlzawIRMUEAAZO5826-82-31 15:25:24





             Test Item    Value        Reference Range Interpretation Comments

 

             Bili Direct (test code no gt        See_Comment                [Aut

omated message] The



             = Bili Direct)                                        system which 

generated



                                                                 this result tra

nsmitted



                                                                 reference range

: <=0.3.



                                                                 The reference r

jihan was



                                                                 not used to int

erpret this



                                                                 result as xenia

l/abnormal.



[facility] ResourcesBttcxrhTVOVMUVAS7818-41-62 15:25:24





             Test Item    Value        Reference Range Interpretation Comments

 

             Bili Indirect Unable to    See_Comment                [Automated



             (test code = Bili Calculate                              message] T

he system



             Indirect)                                           which generated



                                                                 this result



                                                                 transmitted



                                                                 reference range

:



                                                                 <=1.0. The



                                                                 reference range

 was



                                                                 not used to



                                                                 interpret this



                                                                 result as



                                                                 normal/abnormal

.



[facility]AllenLsobdhwFDXHZPGMYH3387-67-55 15:25:24





             Test Item    Value        Reference Range Interpretation Comments

 

             WBC X 10x3 (test code = WBC X 10x3) 9.4          3.7-10.4          

        



[facility] CenterAqpygpfHVPFAMXIGX5541-07-62 15:25:24





             Test Item    Value        Reference Range Interpretation Comments

 

             RBC X 10x6 (test code = RBC X 10x6) 4.59         4.20-5.40         

        



[place]643-02-14 15:25:24





             Test Item    Value        Reference Range Interpretation Comments

 

             Hgb (test code = Hgb) 14.1         12.0-16.0                 



[place] [address]643-02-14 15:25:24





             Test Item    Value        Reference Range Interpretation Comments

 

             Hct (test code = Hct) 42.8         36.0-48.0                 



[facility] CenterRsowktvNNIMZRKFWV3003-49-75 15:25:24





             Test Item    Value        Reference Range Interpretation Comments

 

             MCV (test code = MCV) 93.2         80.0-98.0                 



[place] 574643-02-14 15:25:24





             Test Item    Value        Reference Range Interpretation Comments

 

             MCH (test code = MCH) 30.8 pg      27.0-31.0                 



[facility] CenterSnrmvczMHTPPEKRIV5448-34-96 15:25:24





             Test Item    Value        Reference Range Interpretation Comments

 

             MCHC (test code = MCHC) 33.0         32.0-36.0                 



[facility] CenterYkltejbTRZKJBYVTI8919-66-02 15:25:24





             Test Item    Value        Reference Range Interpretation Comments

 

             RDW (test code = RDW) 13.0         11.5-14.5                 



[facility] CenterNzmaiqdHZRDOJFFEY0855-66-58 15:25:24





             Test Item    Value        Reference Range Interpretation Comments

 

             Platelet (test code = Platelet) 200          133-450               

    



[facility] CenterBwgtelmBLPITIMMNJ4819-31-61 15:25:24





             Test Item    Value        Reference Range Interpretation Comments

 

             MPV (test code = MPV) 8.6          7.4-10.4                  



[place] [address]-02-14 15:25:24





             Test Item    Value        Reference Range Interpretation Comments

 

             Segs (test code = Segs) 86.0         45.0-75.0                 



[place] [address]643-02-14 15:25:24





             Test Item    Value        Reference Range Interpretation Comments

 

             Lymphocytes (test code = Lymphocytes) 5.5          20.0-40.0       

          



[place] [address]643-02-14 15:25:24





             Test Item    Value        Reference Range Interpretation Comments

 

             Monocytes (test code = Monocytes) 7.0          2.0-12.0            

      



[facility] CenterAkpxwepOMKZYOCGQX0185-45-85 15:25:24





             Test Item    Value        Reference Range Interpretation Comments

 

             Eosinophils (test code = 1.0          See_Comment                [A

utomated message] The



             Eosinophils)                                        system which ge

nerated



                                                                 this result tra

nsmitted



                                                                 reference range

: <=4.0.



                                                                 The reference r

jihan was



                                                                 not used to int

erpret



                                                                 this result as



                                                                 normal/abnormal

.



[facility] CenterLtqsdacJWRUMLDTJR2420-10-33 15:25:24





             Test Item    Value        Reference Range Interpretation Comments

 

             Basophils (test code = 0.5          See_Comment                [Aut

omated message] The



             Basophils)                                          system which ge

nerated



                                                                 this result tra

nsmitted



                                                                 reference range

: <=1.0.



                                                                 The reference r

jihan was



                                                                 not used to int

erpret



                                                                 this result as



                                                                 normal/abnormal

.



[facility] CenterRwgxjmeKEEEGAVLUX0986-64-84 15:25:24





             Test Item    Value        Reference Range Interpretation Comments

 

             Neutrophils # (test code = Neutrophils 8.1          1.5-8.1        

           



             #)                                                  



[facility] CenterHrjbgwqPKAZJTSIOA0712-48-49 15:25:24





             Test Item    Value        Reference Range Interpretation Comments

 

             Lymphocytes # (test code = Lymphocytes 0.5          1.0-5.5        

           



             #)                                                  



[facility] CenterSwzmthvAWHCNSLRDS7873-35-19 15:25:24





             Test Item    Value        Reference Range Interpretation Comments

 

             Monocytes # (test code 0.7          See_Comment                [Aut

omated message] The



             = Monocytes #)                                        system which 

generated



                                                                 this result tra

nsmitted



                                                                 reference range

: <=0.8.



                                                                 The reference r

jihan was



                                                                 not used to int

erpret



                                                                 this result as



                                                                 normal/abnormal

.



[facility] CenterXcqhjziNSVCTTEGSR5190-09-97 15:25:24





             Test Item    Value        Reference Range Interpretation Comments

 

             Eosinophils # (test code 0.1          See_Comment                [A

utomated message] The



             = Eosinophils #)                                        system whic

h generated



                                                                 this result tra

nsmitted



                                                                 reference range

: <=0.5.



                                                                 The reference r

jihan was



                                                                 not used to int

erpret



                                                                 this result as



                                                                 normal/abnormal

.



[facility] CenterOaodxodFHMBVQOQT6135-80-63 15:25:24





             Test Item    Value        Reference Range Interpretation Comments

 

             Glucose Lvl (test code = Glucose Lvl) 93           70-99           

          



[facility] CenterLyvyttkYUKPAVQTO3052-32-56 15:25:24





             Test Item    Value        Reference Range Interpretation Comments

 

             BUN (test code = BUN) 19           7-22                      



[place] 123563-02-14 15:25:24





             Test Item    Value        Reference Range Interpretation Comments

 

             Creatinine Lvl (test code = Creatinine 0.85         0.50-1.40      

           



             Lvl)                                                



[facility] CenterOekihoiOZOLYFTKC7550-40-57 15:25:24





             Test Item    Value        Reference Range Interpretation Comments

 

             Sodium Lvl (test code = Sodium Lvl) 142          135-145           

        



[facility] CenterXkrztstPXMUIABAZ6889-01-89 15:25:24





             Test Item    Value        Reference Range Interpretation Comments

 

             Potassium Lvl (test code = Potassium 4.4          3.5-5.1          

         



             Lvl)                                                



[facility] CenterNwgopmkJTCSPLHJA0565-92-27 15:25:24





             Test Item    Value        Reference Range Interpretation Comments

 

             Chloride Lvl (test code = Chloride Lvl) 108                  

            



[facility] CenterMkgpuozINTASZBKI1793-01-18 15:25:24





             Test Item    Value        Reference Range Interpretation Comments

 

             CO2 (test code = CO2) 28           24-32                     



[facility] CenterVpzgtloQIMFUSGOU7770-90-50 15:25:24





             Test Item    Value        Reference Range Interpretation Comments

 

             Calcium Lvl (test code = Calcium Lvl) 10.0         8.5-10.5        

          



[facility] CenterShlvsuuILSLIHADG2193-97-90 15:25:24





             Test Item    Value        Reference Range Interpretation Comments

 

             AGAP (test code = AGAP) 10.4         10.0-20.0                 



[facility] CenterBlspjtcIJBALIVAP3093-28-16 15:25:24





             Test Item    Value        Reference Range Interpretation Comments

 

             eGFR (test code = eGFR) 81                                     



[facility] CenterSfqeuupQNEZHVNLU5695-14-69 15:25:24





             Test Item    Value        Reference Range Interpretation Comments

 

             Lipase Lvl (test code = Lipase Lvl) 123                      

        



[facility] CenterFhmzpysIUDYMTOSC5913-35-21 15:25:24





             Test Item    Value        Reference Range Interpretation Comments

 

             Total Protein (test code = Total 8.0          6.4-8.4              

     



             Protein)                                            



[facility] CenterMzkesieKFOMFDNBY0472-56-93 15:25:24





             Test Item    Value        Reference Range Interpretation Comments

 

             Albumin Lvl (test code = Albumin Lvl) 3.7          3.5-5.0         

          



[facility] CenterMirzpssLZMJUDDZI2385-70-41 15:25:24





             Test Item    Value        Reference Range Interpretation Comments

 

             Globulin (test code = Globulin) 4.3          2.7-4.2               

    



[facility] CenterTtuvqioQGZMXFWOQ7900-20-76 15:25:24





             Test Item    Value        Reference Range Interpretation Comments

 

             A/G Ratio (test code = A/G Ratio) 0.9 1        0.7-1.6             

      



[facility] ResourcesZrybwkbQQYDVAXDQ4609-08-16 15:25:24





             Test Item    Value        Reference Range Interpretation Comments

 

             ALANINE AMINOTRANSFERASE 20           See_Comment                [A

utomated message]



             (test code = ALANINE                                        The sys

tem which



             AMINOTRANSFERASE)                                        generated 

this result



                                                                 transmitted ref

erence



                                                                 range: <=65. Th

e



                                                                 reference range

 was



                                                                 not used to int

erpret



                                                                 this result as



                                                                 normal/abnormal

.



[facility] ResourcesVmcdcobPJLREJVEN3915-35-66 15:25:24





             Test Item    Value        Reference Range Interpretation Comments

 

             AST (test code = AST) 19           See_Comment                [Auto

mated message] The



                                                                 system which ge

nerated this



                                                                 result transmit

gianfranco



                                                                 reference range

: <=37. The



                                                                 reference range

 was not



                                                                 used to interpr

et this



                                                                 result as xenia

l/abnormal.



[facility] ResourcesIzktbnnSTKESDHEM9073-23-67 15:25:24





             Test Item    Value        Reference Range Interpretation Comments

 

             Alk Phos (test code = Alk Phos) 123                          

    



[facility] ResourcesCiunhfgEHNOLIJKA1881-16-79 15:25:24





             Test Item    Value        Reference Range Interpretation Comments

 

             Bili Total (test code = Bili Total) 0.3          0.2-1.3           

        



[facility] ResourcesSdilszjSWVVTTEHZ1740-20-31 15:25:24





             Test Item    Value        Reference Range Interpretation Comments

 

             Bili Direct (test code no gt        See_Comment                [Aut

omated message] The



             = Bili Direct)                                        system which 

generated



                                                                 this result tra

nsmitted



                                                                 reference range

: <=0.3.



                                                                 The reference r

jihan was



                                                                 not used to int

erpret this



                                                                 result as xenia

l/abnormal.



[facility] ResourcesLfjzkjkLAIAVNNTP4701-39-75 15:25:24





             Test Item    Value        Reference Range Interpretation Comments

 

             Bili Indirect Unable to    See_Comment                [Automated



             (test code = Bili Calculate                              message] T

he system



             Indirect)                                           which generated



                                                                 this result



                                                                 transmitted



                                                                 reference range

:



                                                                 <=1.0. The



                                                                 reference range

 was



                                                                 not used to



                                                                 interpret this



                                                                 result as



                                                                 normal/abnormal

.



[facility] ResourcesDwcnqzmISEOPQSNME7836-38-46 15:25:24





             Test Item    Value        Reference Range Interpretation Comments

 

             WBC X 10x3 (test code = WBC X 10x3) 9.4          3.7-10.4          

        



[facility] ResourcesMjnanhsANXSSDVERU0482-99-44 15:25:24





             Test Item    Value        Reference Range Interpretation Comments

 

             RBC X 10x6 (test code = RBC X 10x6) 4.59         4.20-5.40         

        



[facility] CenterQnlokfpCMEPSUMEVH7739-39-11 15:25:24





             Test Item    Value        Reference Range Interpretation Comments

 

             Hgb (test code = Hgb) 14.1         12.0-16.0                 



[facility] METABOLIC PANEL ([id number] 19:59:50





             Test Item    Value        Reference Range Interpretation Comments

 

             NA (test code = 138 mmol/L   135-145                   



             [id number])                                         

 

             K (test code = 4.3 mmol/L   3.5-5.0                   



             [id number])                                         

 

             CL (test code = 102 mmol/L                       



             [id number])                                         

 

             CO2 TOTAL (test code = 23 mmol/L    23-31                     



             [id number])                                         

 

             AGAP (test code =              2-16                      



             [id number])                                         

 

             BUN (test code = 18 mg/dL     7-23                      



             [id number])                                         

 

             GLUCOSE (test code = 110 mg/dL                        



             [id number])                                         

 

             CREATININE (test code = 0.70 mg/dL   0.50-1.04                 



             [id number])                                         

 

             TOTAL BILI (test code = 1.3 mg/dL    0.1-1.1      H            



             [id number])                                         

 

             CALCIUM (test code = 9.5 mg/dL    8.6-10.6                  



             [id number])                                         

 

             T PROTEIN (test code = 7.4 g/dL     6.3-8.2                   



             7372273576)                                         

 

             ALBUMIN (test code = 4.2 g/dL     3.5-5.0                   



             [id number])                                         

 

             ALK PHOS (test code = 100 U/L                          



             [id number])                                         

 

             ALTv (test code = 16 U/L       5-35                      



             2-6)                                             

 

             AST(SGOT) (test code = 27 U/L       13-40                     



             [id number])                                         

 

             eGFR (test code =              mL/min/1.73m2              



             [id number])                                         

 

             ELENA (test code = ELENA) [employer] of                           



                          Glomerular Filtration                           



                          Rate (GFR) and Staging                           



                          of Kidney Disease*                           



                          +---------------------                           



                          --+-------------------                           



                          --+-------------------                           



                          ------+| GFR                           



                          (mL/min/1.73 m2) ?|                           



                          With Kidney Damage ?|                           



                          ?Without Kidney                           



                          Damage+---------------                           



                          --------+-------------                           



                          --------+-------------                           



                          ------------+| ?>90 ?                           



                          ? ? ? ? ? ? ? ?|                           



                          ?Stage one ? ? ? ? ?|                           



                          ? Normal ? ? ? ? ? ? ?                           



                          ?+--------------------                           



                          ---+------------------                           



                          ---+------------------                           



                          -------+| ?60-89 ? ? ?                           



                          ? ? ? ? ?| ?Stage two                           



                          ? ? ? ? ?| ? Decreased                           



                          GFR ? ? ? ?                            



                          +---------------------                           



                          --+-------------------                           



                          --+-------------------                           



                          ------+| ?30-59 ? ? ?                           



                          ? ? ? ? ?| ?Stage                           



                          three ? ? ? ?| ? Stage                           



                          three ? ? ? ? ?                           



                          +---------------------                           



                          --+-------------------                           



                          --+-------------------                           



                          ------+| ?15-29 ? ? ?                           



                          ? ? ? ? ?| ?Stage four                           



                          ? ? ? ? | ? Stage four                           



                          ? ? ? ? ?                              



                          ?+--------------------                           



                          ---+------------------                           



                          ---+------------------                           



                          -------+| ?<15 (or                           



                          dialysis) ? ?| ?Stage                           



                          five ? ? ? ? | ? Stage                           



                          five ? ? ? ? ?                           



                          ?+--------------------                           



                          ---+------------------                           



                          ---+------------------                           



                          -------+ *Each stage                           



                          assumes the associated                           



                          GFR level has been in                           



                          effect for at least                           



                          three months. ?Stages                           



                          1 to 5, with or                           



                          without kidney                           



                          disease, indicate                           



                          chronic kidney                           



                          disease. Notes:                           



                          Determination of                           



                          stages one and two                           



                          (with eGFR                             



                          >59mL/min/1.73 m2)                           



                          requires estimation of                           



                          kidney damage for at                           



                          least three months as                           



                          defined by structural                           



                          or functional                           



                          abnormalities of the                           



                          kidney, manifested by                           



                          either:Pathological                           



                          abnormalities or                           



                          Markers of kidney                           



                          damage (including                           



                          abnormalities in the                           



                          composition of the                           



                          blood or urine or                           



                          abnormalities in                           



                          imaging tests).                           

 

             Lab Interpretation Abnormal                               



             (test code = 93901-3)                                        



[facility] WITH [id number] 19:22:40





             Test Item    Value        Reference Range Interpretation Comments

 

             WBC (test code =              See_Comment  H             [Automated



             5890-2)                                             message] The sy

stem



                                                                 which generated



                                                                 this result



                                                                 transmitted



                                                                 reference range

:



                                                                 4.30 - 11.10



                                                                 10*3/?L. The



                                                                 reference range

 was



                                                                 not used to



                                                                 interpret this



                                                                 result as



                                                                 normal/abnormal

.

 

             RBC (test code =              See_Comment                [Automated



             879-8)                                              message] The sy

stem



                                                                 which generated



                                                                 this result



                                                                 transmitted



                                                                 reference range

:



                                                                 3.93 - 5.25



                                                                 10*6/?L. The



                                                                 reference range

 was



                                                                 not used to



                                                                 interpret this



                                                                 result as



                                                                 normal/abnormal

.

 

             HGB (test code = 13.0 g/dL    11.6-15.0                 



             718-7)                                              

 

             HCT (test code = 38.4 %       35.7-45.2                 



             4544-3)                                             

 

             MCV (test code = 89.9 fL      80.6-95.5                 



             787-2)                                              

 

             MCH (test code = 30.4 pg      25.9-32.8                 



             785-6)                                              

 

             MCHC (test code = 33.9 g/dL    31.6-35.1                 



             786-4)                                              

 

             RDW-SD (test code = 39.3 fL      39.0-49.9                 



             23168-9)                                            

 

             RDW-CV (test code = 12.2 %       12.0-15.5                 



             788-0)                                              

 

             PLT (test code =              See_Comment                [Automated



             777-3)                                              message] The sy

stem



                                                                 which generated



                                                                 this result



                                                                 transmitted



                                                                 reference range

:



                                                                 166 - 358 10*3/

?L.



                                                                 The reference r

jihan



                                                                 was not used to



                                                                 interpret this



                                                                 result as



                                                                 normal/abnormal

.

 

             MPV (test code = 10.1 fL      9.5-12.9                  



             26744-3)                                            

 

             NRBC/100 WBC (test              See_Comment                [Automat

ed



             code = [id number])                                        message] 

The system



                                                                 which generated



                                                                 this result



                                                                 transmitted



                                                                 reference range

:



                                                                 0.0 - 10.0 /100



                                                                 WBCs. The refer

ence



                                                                 range was not u

sed



                                                                 to interpret th

is



                                                                 result as



                                                                 normal/abnormal

.

 

             NRBC x10^3 (test code <0.01        See_Comment                [Auto

mated



             = [id number])                                        message] The s

ystem



                                                                 which generated



                                                                 this result



                                                                 transmitted



                                                                 reference range

:



                                                                 10*3/?L. The



                                                                 reference range

 was



                                                                 not used to



                                                                 interpret this



                                                                 result as



                                                                 normal/abnormal

.

 

             GRAN MAT (NEUT) % 79.4 %                                 



             (test code = 770-8)                                        

 

             IMM GRAN % (test code 0.50 %                                 



             = [id number])                                        

 

             LYMPH % (test code = 9.5 %                                  



             736-9)                                              

 

             MONO % (test code = 9.7 %                                  



             5905-5)                                             

 

             EOS % (test code = 0.5 %                                  



             713-8)                                              

 

             BASO % (test code = 0.4 %                                  



             706-2)                                              

 

             GRAN MAT x10^3(ANC) 9.77 10*3/uL 1.88-7.09    H            



             (test code =                                        



             [id number])                                         

 

             IMM GRAN x10^3 (test 0.06 10*3/uL 0.00-0.06                 



             code = [id number])                                        

 

             LYMPH x10^3 (test code 1.17 10*3/uL 1.32-3.29    L            



             = 731-0)                                            

 

             MONO x10^3 (test code 1.19 10*3/uL 0.33-0.92    H            



             = 742-7)                                            

 

             EOS x10^3 (test code = 0.06 10*3/uL 0.03-0.39                 



             711-2)                                              

 

             BASO x10^3 (test code 0.05 10*3/uL 0.01-0.07                 



             = 704-7)                                            

 

             Lab Interpretation Abnormal                               



             (test code = 40557-2)                                        



[id number]00





             Test Item    Value        Reference Range Interpretation Comments

 

             SODIUM (test code = NA) 143 mmol/L   134-147      N            

 

             POTASSIUM (test code = 4.2 mmol/L   3.4-5.0      N            



             K)                                                  

 

             CHLORIDE (test code = 114 mmol/L   100-108      H            



             CL)                                                 

 

             CARBON DIOXIDE (test 24 mmol/L    21-32        N            



             code = CO2)                                         

 

             ANION GAP (test code = 5.0 GAP calc 4.0-15.0     N            



             GAP)                                                

 

             GLUCOSE (test code = 89 MG/DL            N            



             GLU)                                                

 

             BLOOD UREA NITROGEN 17 MG/DL     7-18         N            



             (test code = BUN)                                        

 

             GLOMERULAR FILTRATION >=60 max estimate >60                       



             RATE (test code = GFR) estGFR                                 

 

             CREATININE (test code = 0.9 MG/DL    0.6-1.0      N            



             CREAT)                                              

 

             CALCIUM (test code = CA) 8.3 MG/DL    8.5-10.1     L            



CBC W/AUTO [id number] 05:21:00





             Test Item    Value        Reference Range Interpretation Comments

 

             WHITE BLOOD CELL (test code = 15.2 K/mm3   3.5-11.0     H          

  



             WBC)                                                

 

             RED BLOOD CELL (test code = RBC) 3.67 M/mm3   4.70-6.10    L       

     

 

             HEMOGLOBIN (test code = HGB) 11.3 G/DL    10.4-14.9    N           

 

 

             HEMATOCRIT (test code = HCT) 35.5 %       31.5-44.1    N           

 

 

             MEAN CELL VOLUME (test code = 96.7 Fl      84.5-98.6    N          

  



             MCV)                                                

 

             MEAN CELL HGB (test code = MCH) 30.8 pg      27.0-34.2    N        

    

 

             MEAN CELL HGB CONCETRATION (test 31.8 G/DL    31.5-34.0    N       

     



             code = MCHC)                                        

 

             RED CELL DISTRIBUTION WIDTH (test 14.2 SD      11.5-14.5    N      

      



             code = RDW)                                         

 

             PLATELET COUNT (test code = PLT) 242.0 K/mm3  150-450      N       

     

 

             MEAN PLATELET VOLUME (test code = 10.20 fL     7.0-10.5     N      

      



             MPV)                                                

 

             NEUTROPHIL % (test code = NT%) 78.8 %       40-76        H         

   

 

             LYMPHOCYTE % (test code = LY%) 13.1 %       20.5-51.1    L         

   

 

             MONOCYTE % (test code = MO%) 6.2 %        1.7-9.3      N           

 

 

             EOSINOPHIL % (test code = EO%) 1.6 %        0.0-6.0      N         

   

 

             BASOPHIL % (test code = BA%) 0.3 %        0.0-2.0      N           

 

 

             NEUTROPHIL # (test code = NT#) 11.94 K/mm3  1.8-7.6      H         

   

 

             LYMPHOCYTE # (test code = LY#) 2.0 K/mm3    0.6-3.2      N         

   

 

             MONOCYTE # (test code = MO#) 0.9 K/mm3    0.3-1.1      N           

 

 

             EOSINOPHIL # (test code = EO#) 0.2 K/mm3    0.0-0.4      N         

   

 

             BASOPHIL # (test code = BA#) 0.1 K/mm3    0.0-0.1      N           

 

 

             MANUAL DIFF REQUIRED (test code = NO DIFF/SCN  CRITERIA            

      



             MDIFF)                                              



[id number] 14:09:00





             Test Item    Value        Reference Range Interpretation Comments

 

             LITHIUM (test 0.5 mmol/L   0.6-1.2      L              Detection Li

jerda = 0.1



             code = LITH)                                        <0.1 indicates 

None



                                                                 DetectedPerform

ed At: 



                                                                 [facility] [address]



                                                                 [id number] 

Jeffry KIMBROUGH MD



                                                                 Ph:[phone]



[id number] 13:35:00





             Test Item    Value        Reference Range Interpretation Comments

 

             TROPONIN-I (test 0.436 NG/ML  0.000-0.045  HH           Negative: <

/= 0.045



             code = TROPI)                                        Positive: >/= 

0.046



                                                                 Correlation wit

h serial



                                                                 results, other 

cardiac



                                                                 markers, and cl

inical



                                                                 findings is nec

essary to



                                                                 determine the c

linical



                                                                 significance of

 this



                                                                 result. Quantit

ative



                                                                 results using d

ifferent



                                                                 methodologies s

hould not



                                                                 be compared to 

one



                                                                 another as nume

rical



                                                                 results may amanda

yby



                                                                 method.



Completed by Nursing: NIFSHCUNTD--91-28 10:33:00





             Test Item    Value        Reference Range Interpretation Comments

 

             TROPONIN-I (test 0.360 NG/ML  0.000-0.045  HH           Negative: <

/= 0.045



             code = TROPI)                                        Positive: >/= 

0.046



                                                                 Correlation wit

h serial



                                                                 results, other 

cardiac



                                                                 markers, and cl

inical



                                                                 findings is nec

essary to



                                                                 determine the c

linical



                                                                 significance of

 this



                                                                 result. Quantit

ative



                                                                 results using d

ifferent



                                                                 methodologies s

hould not



                                                                 be compared to 

one



                                                                 another as nume

rical



                                                                 results may amanda

yby



                                                                 method.



Completed by Nursing: NOLast Dose Date: 20Last Dose Time: 1200TROPONIN-I
2020 07:23:00





             Test Item    Value        Reference Range Interpretation Comments

 

             TROPONIN-I (test 0.399 NG/ML  0.000-0.045  HH           Negative: <

/= 0.045



             code = TROPI)                                        Positive: >/= 

0.046



                                                                 Correlation wit

h serial



                                                                 results, other 

cardiac



                                                                 markers, and cl

inical



                                                                 findings is nec

essary to



                                                                 determine the c

linical



                                                                 significance of

 this



                                                                 result. Quantit

ative



                                                                 results using d

ifferent



                                                                 methodologies s

hould not



                                                                 be compared to 

one



                                                                 another as nume

rical



                                                                 results may amanda

yby



                                                                 method.



Completed by Nursing: CJTMNUNCYS--01-28 05:00:00





             Test Item    Value        Reference Range Interpretation Comments

 

             TROPONIN-I (test 0.555 NG/ML  0.000-0.045  HH           Negative: <

/= 0.045



             code = TROPI)                                        Positive: >/= 

0.046



                                                                 Correlation wit

h serial



                                                                 results, other 

cardiac



                                                                 markers, and cl

inical



                                                                 findings is nec

essary to



                                                                 determine the c

linical



                                                                 significance of

 this



                                                                 result. Quantit

ative



                                                                 results using d

ifferent



                                                                 methodologies s

hould not



                                                                 be compared to 

one



                                                                 another as nume

rical



                                                                 results may amanda

yby



                                                                 method.



Completed by Nursing: NO- XR CHEST 1 -41-99 04:33:00 Name: TYLER JUDD 
[facility] : 1970 Age/S: 49 / F [id number] [address] Unit #: [id number]
2 Loc: [place] [address] Phys: Kristy Quiros MD Acct: [id number] Dis Date:  
Status: REG ER PHONE#: [phone] Exam Date: 2020 043 FAX #: Reason: 
POST CENTRAL PLACEMENT; RIGHT IJ EXAMS: CPT: [id number] XR CHEST 1 V 21502 Fluoro
Time: DAP (Gy m2): Air Kerma (mGy): HISTORY: Post central lineplacement, 
hypotension Location code: B2 FINDINGS: Frontal view of the chest demonstrates a
mildly enlarged cardiomediastinal silhouette. The trachea is midline. The lungs 
are clear. There is no effusion or pneumothorax. The bones are intact. Right IJ 
catheter in good position. IMPRESSION: Mild cardiomegaly, without acute 
pulmonary process. ** Electronically Signed by ISABEL March on 2020 at 0
433 ** Reported and signed by: Shree March M.D. CC: Kristy Quiros MD  PAGE 1 
Signed Report Name: TYLER JUDD Dola : 1970 Age/S: 49 / F 
[id number] [address] Unit #: [id number] Loc: [place] [address] Phys: 
Kristy Quiros MD  Acct: [id number] Dis Date: Status: REG ER PHONE #: [phone].
[id number] Exam Date: 2020 FAX #:  Reason: POST CENTRAL PLACEMENT; RIGHT IJ
EXAMS: CPT: [id number] XR CHEST 1 V 50594  Fluoro Time: DAP (Gy m2): Air Kerma 
(mGy): (Continued) Technologist: Duncan Ellis,RT(R)(CT) Trnscb Date/Time: 2020
(0433) [id number] Orig Print D/T: S: 2020 (0436) PAGE 2 Signed ReportCBC 
W/AUTO [id number] 04:15:00





             Test Item    Value        Reference Range Interpretation Comments

 

             WHITE BLOOD CELL (test 24.2 K/mm3   3.5-11.0     H            



             code = WBC)                                         

 

             RED BLOOD CELL (test 3.75 M/mm3   4.70-6.10    L            



             code = RBC)                                         

 

             HEMOGLOBIN (test code 11.5 G/DL    10.4-14.9    N            



             = HGB)                                              

 

             HEMATOCRIT (test code 35.2 %       31.5-44.1    N            



             = HCT)                                              

 

             MEAN CELL VOLUME (test 93.9 Fl      84.5-98.6    N            



             code = MCV)                                         

 

             MEAN CELL HGB (test 30.7 pg      27.0-34.2    N            



             code = MCH)                                         

 

             MEAN CELL HGB 32.7 G/DL    31.5-34.0    N            



             CONCETRATION (test                                        



             code = MCHC)                                        

 

             RED CELL DISTRIBUTION 13.8 SD      11.5-14.5    N            



             WIDTH (test code =                                        



             RDW)                                                

 

             PLATELET COUNT (test 234.0 K/mm3  150-450      N            



             code = PLT)                                         

 

             MEAN PLATELET VOLUME 9.80 fL      7.0-10.5     N            



             (test code = MPV)                                        

 

             NEUTROPHIL % (test 82.9 %       40-76        H            



             code = NT%)                                         

 

             LYMPHOCYTE % (test 8.3 %        20.5-51.1    L            



             code = LY%)                                         

 

             MONOCYTE % (test code 7.3 %        1.7-9.3      N            



             = MO%)                                              

 

             EOSINOPHIL % (test 1.4 %        0.0-6.0      N            



             code = EO%)                                         

 

             BASOPHIL % (test code 0.1 %        0.0-2.0      N            



             = BA%)                                              

 

             NEUTROPHIL # (test 20.08 K/mm3  1.8-7.6      H            



             code = NT#)                                         

 

             LYMPHOCYTE # (test 2.0 K/mm3    0.6-3.2      N            



             code = LY#)                                         

 

             MONOCYTE # (test code 1.8 K/mm3    0.3-1.1      H            



             = MO#)                                              

 

             EOSINOPHIL # (test 0.3 K/mm3    0.0-0.4      N            



             code = EO#)                                         

 

             BASOPHIL # (test code 0.0 K/mm3    0.0-0.1      N            



             = BA#)                                              

 

             MANUAL DIFF REQUIRED NO DIFF/SCN  CRITERIA                  SLIDE R

EVIEW



             (test code = MDIFF)                                        CONSISTA

NT WITH AUTO



                                                                 DIFFERENTIAL.



BASIC METABOLIC [id number] 04:11:00





             Test Item    Value        Reference Range Interpretation Comments

 

             SODIUM (test code = NA) 135 mmol/L   134-147      N            

 

             POTASSIUM (test code = K) 4.0 mmol/L   3.4-5.0      N            

 

             CHLORIDE (test code = CL) 106 mmol/L   100-108      N            

 

             CARBON DIOXIDE (test code = CO2) 22 mmol/L    21-32        N       

     

 

             ANION GAP (test code = GAP) 7.0 GAP calc 4.0-15.0     N            

 

             GLUCOSE (test code = GLU) 97 MG/DL            N            

 

             BLOOD UREA NITROGEN (test code = 34 MG/DL     7-18         H       

     



             BUN)                                                

 

             GLOMERULAR FILTRATION RATE (test 39 estGFR    >60          L       

     



             code = GFR)                                         

 

             CREATININE (test code = CREAT) 1.5 MG/DL    0.6-1.0      H         

   

 

             CALCIUM (test code = CA) 8.9 MG/DL    8.5-10.1     N            



LACTIC [id number] 04:11:00





             Test Item    Value        Reference Range Interpretation Comments

 

             LACTIC ACID (test code = LACT) 0.8 mmol/L   0.4-2.0      N         

   



CBC W/AUTO [id number] 03:54:00





             Test Item    Value        Reference Range Interpretation Comments

 

             WHITE BLOOD CELL (test code = 24.2 K/mm3   3.5-11.0     H          

  



             WBC)                                                

 

             RED BLOOD CELL (test code = RBC) 3.75 M/mm3   4.70-6.10    L       

     

 

             HEMOGLOBIN (test code = HGB) 11.5 G/DL    10.4-14.9    N           

 

 

             HEMATOCRIT (test code = HCT) 35.2 %       31.5-44.1    N           

 

 

             MEAN CELL VOLUME (test code = 93.9 Fl      84.5-98.6    N          

  



             MCV)                                                

 

             MEAN CELL HGB (test code = MCH) 30.7 pg      27.0-34.2    N        

    

 

             MEAN CELL HGB CONCETRATION (test 32.7 G/DL    31.5-34.0    N       

     



             code = MCHC)                                        

 

             RED CELL DISTRIBUTION WIDTH (test 13.8 SD      11.5-14.5    N      

      



             code = RDW)                                         

 

             PLATELET COUNT (test code = PLT) 234.0 K/mm3  150-450      N       

     

 

             MEAN PLATELET VOLUME (test code = 9.80 fL      7.0-10.5     N      

      



             MPV)                                                

 

             NEUTROPHIL % (test code = NT%)  %           40-76        H         

   

 

             LYMPHOCYTE % (test code = LY%)  %           20.5-51.1    L         

   

 

             MONOCYTE % (test code = MO%)  %           1.7-9.3      N           

 

 

             EOSINOPHIL % (test code = EO%)  %           0.0-6.0      N         

   

 

             BASOPHIL % (test code = BA%)  %           0.0-2.0      N           

 

 

             NEUTROPHIL # (test code = NT#)  K/mm3       1.8-7.6      H         

   

 

             LYMPHOCYTE # (test code = LY#)  K/mm3       0.6-3.2      N         

   

 

             MONOCYTE # (test code = MO#)  K/mm3       0.3-1.1      H           

 

 

             EOSINOPHIL # (test code = EO#)  K/mm3       0.0-0.4      N         

   

 

             BASOPHIL # (test code = BA#)  K/mm3       0.0-0.1      N           

 

 

             MANUAL DIFF REQUIRED (test code =  DIFF/SCN    CRITERIA            

      



             MDIFF)                                              



Basic Metabolic [id number] 07:54:48





             Test Item    Value        Reference Range Interpretation Comments

 

             Sodium Level (test code = Sodium 142.0 mmol/L 135.0-145.0          

     



             Level)                                              

 

             Potassium Level (test code = 4.8 mmol/L   3.5-5.1                  

 



             Potassium Level)                                        

 

             Chloride Level (test code = 105 mmol/L                       



             Chloride Level)                                        

 

             CO2 (test code = CO2) 25 mmol/L    22-29                     

 

             Anion Gap (test code = Anion 12 mmol/L    7-16                     

 



             Gap)                                                

 

             BUN (test code = BUN) 19.60 mg/dL  6.00-20.00                

 

             Creatinine Level (test code = 0.80 mg/dL   0.50-0.90               

  



             Creatinine Level)                                        

 

             BUN/Creat Ratio (test code = 24                        N           

 



             BUN/Creat Ratio)                                        

 

             Glucose Level (test code = 86 mg/dL                         



             Glucose Level)                                        

 

             Calcium Level (test code = 10.1 mg/dL   8.3-10.5                  



             Calcium Level)                                        



Basic Metabolic [id number] 07:54:48





             Test Item    Value        Reference Range Interpretation Comments

 

             Sodium Level (test 142.0 mmol/L 135.0-145.0               



             code = Sodium Level)                                        

 

             Potassium Level 4.8 mmol/L   3.5-5.1                   



             (test code =                                        



             Potassium Level)                                        

 

             Chloride Level (test 105 mmol/L                       



             code = Chloride                                        



             Level)                                              

 

             CO2 (test code = 25 mmol/L    22-29                     



             CO2)                                                

 

             Anion Gap (test code 12 mmol/L    7-16                      



             = Anion Gap)                                        

 

             BUN (test code = 19.60 mg/dL  6.00-20.00                



             BUN)                                                

 

             Creatinine Level 0.80 mg/dL   0.50-0.90                 



             (test code =                                        



             Creatinine Level)                                        

 

             BUN/Creat Ratio 24                        N            



             (test code =                                        



             BUN/Creat Ratio)                                        

 

             Glucose Level (test 86 mg/dL                         



             code = Glucose                                        



             Level)                                              

 

             Calcium Level (test 10.1 mg/dL   8.3-10.5                  



             code = Calcium                                        



             Level)                                              

 

             eGFR AA (test code = >60                       N            eGFR (e

stimated



             eGFR AA)     mL/min/1.73 m2                           Glomerular



                                                                 Filtration Rate

) is



                                                                 an estimated va

lue,



                                                                 calculated from

 the



                                                                 patient's serum



                                                                 creatinine usin

g the



                                                                 MDRD equation. 

It is



                                                                 NOT the patient

's



                                                                 actual GFR. The

 eGFR



                                                                 provides a more



                                                                 clinically usef

ul



                                                                 measure of kidn

ey



                                                                 disease than se

rum



                                                                 creatinine



                                                                 alone.***This



                                                                 calculation rimma

es



                                                                 sex and race in

to



                                                                 account, if the



                                                                 information is



                                                                 provided. If th

e



                                                                 race is not



                                                                 provided, and t

he



                                                                 patient is



                                                                 -Crystal

n,



                                                                 multiply by 1.2

12.



                                                                 If sex is not



                                                                 provided, and t

he



                                                                 patient is fema

le,



                                                                 multiply by 0.7

42.



                                                                 Results for pat

ients



                                                                 <18 years of ag

e



                                                                 have not been



                                                                 validated by [facility]



                                                                 MDRD study and



                                                                 should be



                                                                 interpreted wit

h



                                                                 caution. eGFR R

esult



                                                                 Interpretation:

eGFR



                                                                 > or = 60 is in

 the



                                                                 Normal RangeeGF

R <



                                                                 60 may mean kid

hang



                                                                 diseaseeGFR < 1

5 may



                                                                 mean kidney



                                                                 failure*** Rang

es



                                                                 recommended by 

the



                                                                 [employer]



                                                                 Foundation,



                                                                 http://nkdep.ni

h.gov



Basic Metabolic [id number] 07:54:48





             Test Item    Value        Reference Range Interpretation Comments

 

             Sodium Level (test 142.0 mmol/L 135.0-145.0               



             code = Sodium Level)                                        

 

             Potassium Level 4.8 mmol/L   3.5-5.1                   



             (test code =                                        



             Potassium Level)                                        

 

             Chloride Level (test 105 mmol/L                       



             code = Chloride                                        



             Level)                                              

 

             CO2 (test code = 25 mmol/L    22-29                     



             CO2)                                                

 

             Anion Gap (test code 12 mmol/L    7-16                      



             = Anion Gap)                                        

 

             BUN (test code = 19.60 mg/dL  6.00-20.00                



             BUN)                                                

 

             Creatinine Level 0.80 mg/dL   0.50-0.90                 



             (test code =                                        



             Creatinine Level)                                        

 

             BUN/Creat Ratio 24                        N            



             (test code =                                        



             BUN/Creat Ratio)                                        

 

             Glucose Level (test 86 mg/dL                         



             code = Glucose                                        



             Level)                                              

 

             Calcium Level (test 10.1 mg/dL   8.3-10.5                  



             code = Calcium                                        



             Level)                                              

 

             eGFR AA (test code = >60                       N            eGFR (e

stimated



             eGFR AA)     mL/min/1.73 m2                           Glomerular



                                                                 Filtration Rate

) is



                                                                 an estimated va

lue,



                                                                 calculated from

 the



                                                                 patient's serum



                                                                 creatinine usin

g the



                                                                 MDRD equation. 

It is



                                                                 NOT the patient

's



                                                                 actual GFR. The

 eGFR



                                                                 provides a more



                                                                 clinically usef

ul



                                                                 measure of kidn

ey



                                                                 disease than se

rum



                                                                 creatinine



                                                                 alone.***This



                                                                 calculation rimma

es



                                                                 sex and race in

to



                                                                 account, if the



                                                                 information is



                                                                 provided. If th

e



                                                                 race is not



                                                                 provided, and t

he



                                                                 patient is



                                                                 -Crystal

n,



                                                                 multiply by 1.2

12.



                                                                 If sex is not



                                                                 provided, and t

he



                                                                 patient is fema

le,



                                                                 multiply by 0.7

42.



                                                                 Results for pat

ients



                                                                 <18 years of ag

e



                                                                 have not been



                                                                 validated by [facility]



                                                                 MDRD study and



                                                                 should be



                                                                 interpreted wit

h



                                                                 caution. eGFR R

esult



                                                                 Interpretation:

eGFR



                                                                 > or = 60 is in

 the



                                                                 Normal RangeeGF

R <



                                                                 60 may mean kid

hang



                                                                 diseaseeGFR < 1

5 may



                                                                 mean kidney



                                                                 failure*** Rang

es



                                                                 recommended by 

the



                                                                 [employer]



                                                                 [employer],



                                                                 http://nkdep.ni

h.gov

 

             eGFR Non-AA (test >60.00                    N            eGFR (sheba

mated



             code = eGFR Non-AA) mL/min/1.73 m2                           Glomer

ular



                                                                 Filtration Rate

) is



                                                                 an estimated va

lue,



                                                                 calculated from

 the



                                                                 patient's serum



                                                                 creatinine usin

g the



                                                                 MDRD equation. 

It is



                                                                 NOT the patient

's



                                                                 actual GFR. The

 eGFR



                                                                 provides a more



                                                                 clinically usef

ul



                                                                 measure of kidn

ey



                                                                 disease than se

rum



                                                                 creatinine



                                                                 alone.***This



                                                                 calculation rimma

es



                                                                 sex and race in

to



                                                                 account, if the



                                                                 information is



                                                                 provided. If th

e



                                                                 race is not



                                                                 provided, and t

he



                                                                 patient is



                                                                 -Crystal

n,



                                                                 multiply by 1.2

12.



                                                                 If sex is not



                                                                 provided, and t

he



                                                                 patient is fema

le,



                                                                 multiply by 0.7

42.



                                                                 Results for pat

ients



                                                                 <18 years of ag

e



                                                                 have not been



                                                                 validated by th

e



                                                                 MDRD study and



                                                                 should be



                                                                 interpreted wit

h



                                                                 caution. eGFR R

esult



                                                                 Interpretation:

eGFR



                                                                 > or = 60 is in

 the



                                                                 Normal RangeeGF

R <



                                                                 60 may mean kid

hang



                                                                 diseaseeGFR < 1

5 may



                                                                 mean kidney



                                                                 failure*** Rang

es



                                                                 recommended by 

the



                                                                 [employer]



                                                                 Foundation,



                                                                 http://nkdep.ni

h.gov



Complete Blood Count with [id number] 07:29:42





             Test Item    Value        Reference Range Interpretation Comments

 

             WBC (test code = WBC) 9.3 x10      4.4-10.5                  

 

             RBC (test code = RBC) 4.71 x10     3.75-5.20                 

 

             Hgb (test code = Hgb) 14.4 g/dL    12.2-14.8                 

 

             MCV (test code = MCV) 92.10 fL     80..00              

 

             Hct (test code = Hct) 43.4 %       36.5-44.4                 

 

             MCHC (test code = 33.20 g/dL   32.00-37.50               



             MCHC)                                               

 

             RDW CV (test code = 13.4 %       11.5-14.5                 



             RDW CV)                                             

 

             MCH (test code = MCH) 30.6 pg      27.0-32.5                 

 

             Platelets (test code = 325.0 x10    140.0-440.0               



             Platelets)                                          

 

             MPV (test code = MPV) 9.8 fL                    N            

 

             Slide Review (test Auto         Auto                      Result cr

eated by



             code = Slide Review)                                        [email or web address]_

SLIDE_REV_AUTO

 

             nRBC (test code = 0                         N            



             nRBC)                                               

 

             NRBC Abs (test code = 0.00 x10                  N            



             NRBC Abs)                                           

 

             IPF (test code = IPF) 0 %                       N            



Automated [id number] 07:29:42





             Test Item    Value        Reference Range Interpretation Comments

 

             Neutro Auto (test code = Neutro 66.2 %       36.0-70.0             

    



             Auto)                                               

 

             Lymph Auto (test code = Lymph Auto) 21.1 %       12.0-44.0         

        

 

             Mono Auto (test code = Mono Auto) 6.8 %        0.0-11.0            

      

 

             Eos, Auto (test code = Eos, Auto) 4.8 %        0.0-7.0             

      

 

             Basophil Auto (test code = Basophil 0.9 %        0.0-2.0           

        



             Auto)                                               

 

             Neutro Absolute (test code = Neutro 6.2 x10      1.6-7.4           

        



             Absolute)                                           

 

             Lymph Absolute (test code = Lymph 1.97 x10     .50-4.60            

      



             Absolute)                                           

 

             Mono Absolute (test code = Mono .63 x10      .00-1.20              

    



             Absolute)                                           

 

             Eos Absolute (test code = Eos 0.45 x10     0.00-0.74               

  



             Absolute)                                           

 

             Baso Absolute (test code = Baso 0.08 x10     0.00-0.21             

    



             Absolute)                                           



IG [id number] 07:29:42





             Test Item    Value        Reference Range Interpretation Comments

 

             IG (test code = IG) 0.2 %        0.0-5.0                   

 

             IG Abs (test code = IG Abs) 0 x10                     N            



Thyroid Stimulating [id number] 04:30:30





             Test Item    Value        Reference Range Interpretation Comments

 

             TSH (test code = TSH) 1.360 mIU/mL 0.270-4.200               



RPR [id number] 04:06:17





             Test Item    Value        Reference Range Interpretation Comments

 

             RPR Qual (test code = RPR Qual) Non-Reactive Non-Reactive          

    

 

             Reactive Control (test code = Reactive                             

  



             Reactive Control)                                        

 

             Weak Reactive Control (test Weak Reactive                          

 



             code = Weak Reactive Control)                                      

  

 

             Non-Reactive Control (test code Non-Reactive                       

    



             = Non-Reactive Control)                                        

 

             Lot # (test code = Lot #) [id number]                    N            

 

             Expiration Dt (test code = 10-                N            



             Expiration Dt)                                        



Hemoglobin A1c2019-11-15 18:37:54





             Test Item    Value        Reference Range Interpretation Comments

 

             Hemoglobin A1c (test code 4.7 %        4.8-5.9      L            No

n Diabetic



             = Hemoglobin A1c)                                        4.8-5.9%Di

abetic <7.0%



Lipid Panel2019-11-15 18:08:58





             Test Item    Value        Reference Range Interpretation Comments

 

             Cholesterol Total 189 mg/dL    0-200                     RISK OF HE

ART



             (test code =                                        DISEASEPublishe

d by



             Cholesterol Total)                                        American 

Heart



                                                                 Association Judith

lyte



                                                                 Optimal Borderl

ine



                                                                 Increased RiskC

HOL <200



                                                                 200-239 >240TRI

G <150



                                                                 150-199 >200HDL

 Male



                                                                 >60 <40HDL Fema

le >60



                                                                 <50LDL <100 130

-159



                                                                 >160LDL Near op

timal is



                                                                 100-129

 

             Triglycerides (test 112 mg/dL    9-200                     



             code = Triglycerides)                                        

 

             HDL (test code = HDL) 44 mg/dL     50-60        L            

 

             LDL (test code = LDL) 122 mg/dL    0-130                     The eq

uation being used



                                                                 in this calcula

tion is



                                                                 LDL = (Chol - H

DL) -



                                                                 (Trig / 5)

 

             VLDL (test code = 22 mg/dL     5-40                      The equati

on being used



             VLDL)                                               in this calcula

tion is



                                                                 VLDL = Trig / 5

 

             Chol/HDL (test code = 4.3 ratio    0.0-4.4                   



             Chol/HDL)                                           

 

             LDL/HDL Ratio (test 3                         N            The equa

tion being used



             code = LDL/HDL Ratio)                                        in thi

s calculation is



                                                                 LDL/HDL Ratio=L

DL



                                                                 Calc/HDL Chol



Complete Blood Count with Differential2019-11-15 07:51:57





             Test Item    Value        Reference Range Interpretation Comments

 

             WBC (test code = WBC) 10.6 x10     4.4-10.5     H            

 

             RBC (test code = RBC) 4.45 x10     3.75-5.20                 

 

             Hgb (test code = Hgb) 13.5 g/dL    12.2-14.8                 

 

             MCV (test code = MCV) 93.30 fL     80..00              

 

             Hct (test code = Hct) 41.5 %       36.5-44.4                 

 

             MCHC (test code = 32.50 g/dL   32.00-37.50               



             MCHC)                                               

 

             RDW CV (test code = 13.7 %       11.5-14.5                 



             RDW CV)                                             

 

             MCH (test code = MCH) 30.3 pg      27.0-32.5                 

 

             Platelets (test code = 356.0 x10    140.0-440.0               



             Platelets)                                          

 

             MPV (test code = MPV) 9.7 fL                    N            

 

             Slide Review (test Auto         Auto                      Result cr

eated by



             code = Slide Review)                                        GL_SJM_

SLIDE_REV_AUTO

 

             nRBC (test code = 0                         N            



             nRBC)                                               

 

             NRBC Abs (test code = 0.00 x10                  N            



             NRBC Abs)                                           

 

             IPF (test code = IPF) 0 %                       N            



Automated Differential2019-11-15 07:51:57





             Test Item    Value        Reference Range Interpretation Comments

 

             Neutro Auto (test code = Neutro 65.4 %       36.0-70.0             

    



             Auto)                                               

 

             Lymph Auto (test code = Lymph Auto) 23.5 %       12.0-44.0         

        

 

             Mono Auto (test code = Mono Auto) 5.7 %        0.0-11.0            

      

 

             Eos, Auto (test code = Eos, Auto) 4.4 %        0.0-7.0             

      

 

             Basophil Auto (test code = Basophil 0.8 %        0.0-2.0           

        



             Auto)                                               

 

             Neutro Absolute (test code = Neutro 6.9 x10      1.6-7.4           

        



             Absolute)                                           

 

             Lymph Absolute (test code = Lymph 2.49 x10     .50-4.60            

      



             Absolute)                                           

 

             Mono Absolute (test code = Mono .60 x10      .00-1.20              

    



             Absolute)                                           

 

             Eos Absolute (test code = Eos 0.47 x10     0.00-0.74               

  



             Absolute)                                           

 

             Baso Absolute (test code = Baso 0.08 x10     0.00-0.21             

    



             Absolute)                                           



IG Flags2019-11-15 07:51:57





             Test Item    Value        Reference Range Interpretation Comments

 

             IG (test code = IG) 0.2 %        0.0-5.0                   

 

             IG Abs (test code = IG Abs) 0 x10                     N            



Urine [id number] 10:18:52





             Test Item    Value        Reference Range Interpretation Comments

 

             ORGANISM (test code = Escherichia coli                           



             ORGANISM)                                           

 

             Amikacin (test code =                           S            



             Amik)                                               

 

             Ampicillin (test code =                           S            



             Amp)                                                

 

             Ampicillin/Sulbactam                           S            



             (test code = Amp/Sul)                                        

 

             Cefazolin (test code =                           S            



             Cefaz)                                              

 

             Cefepime (test code =                           S            



             Cefep)                                              

 

             Cefotaxime (test code =                           S            



             Cefo)                                               

 

             Ceftazidime (test code                           S            



             = Ceftaz)                                           

 

             Ceftriaxone (test code                           S            



             = Ceftri)                                           

 

             Cefuroxime (test code =                           S            



             Cefur)                                              

 

             Ciprofloxacin (test                           S            



             code = Cipro)                                        

 

             Gentamicin (test code =                           S            



             Gent)                                               

 

             Imipenem (test code =                           S            



             Imi)                                                

 

             Levofloxacin (test code                           S            



             = Levo)                                             

 

             Meropenem (test code =                           S            



             Sindi)                                               

 

             Nitrofurantoin (test                           S            



             code = Nitro)                                        

 

             Piperacillin/Tazobactam                           S            



             (test code = Pip/Blaine)                                        

 

             Tobramycin (test code =                           S            



             Tobra)                                              

 

             Trimethoprim/Sulfa                           S            



             (test code = SXT)                                        

 

             Final Report (test code >=100,000 cfu/ml                           



             = Final Report) Escherichia coli                           



                          >=100,000 cfu/ml Alpha                           



                          hemolytic                              



                          Streptococcus (not                           



                          Group D or                             



                          Enterococcus)                           



 C Urine Added by [email or web address] [id number] 09:18:25





             Test Item    Value        Reference Range Interpretation Comments

 

             Lithium Level (test code = 0.58 mmol/L  0.60-1.20    L            



             Lithium Level)                                        



Urine Drug [id number] 01:36:44





             Test Item    Value        Reference Range Interpretation Comments

 

             Amphetamine Screen Ur POSITIVE     Negative     A            



             (test code = Amphetamine                                        



             Screen Ur)                                          

 

             Barbiturate Screen Ur Negative     Negative                  



             (test code = Barbiturate                                        



             Screen Ur)                                          

 

             Benzodiazepines Ur (test Negative     Negative                  



             code = Benzodiazepines                                        



             Ur)                                                 

 

             Cocaine Screen Ur (test Negative     Negative                  



             code = Cocaine Screen                                        



             Ur)                                                 

 

             U Methadone Scr (test Negative     Negative                  



             code = U Methadone Scr)                                        

 

             Opiate Screen Ur (test Negative     Negative                  



             code = Opiate Screen Ur)                                        

 

             U PCP Scrn (test code = Negative     Negative                  



             U PCP Scrn)                                         

 

             Cannabinoid Screen Ur Negative     Negative                  



             (test code = Cannabinoid                                        



             Screen Ur)                                          

 

             U TCA (test code = U Negative     Negative                  The res

ults of all



             TCA)                                                drug screen elinor

ts are



                                                                 only preliminar

y.



                                                                 Clinical



                                                                 consideration a

nd



                                                                 professional ju

dgment



                                                                 should be appli

ed to



                                                                 any drug of abu

se



                                                                 test result,



                                                                 particularly wh

en



                                                                 preliminary pos

itive



                                                                 results are obt

ained.



                                                                 Please order a



                                                                 separate confir

matory



                                                                 test if desired

.



HCG Qualitative [id number] 01:36:43





             Test Item    Value        Reference Range Interpretation Comments

 

             hCG Ur (test code = Negative                               If the r

esult is



             hCG Ur)                                             "Negative" in p

atients



                                                                 suspected to be



                                                                 pregnant, recom

mend



                                                                 retest with a s

ample



                                                                 obtained 48 to 

72



                                                                 hours later, or

 by



                                                                 ordering a



                                                                 quantitative as

say. If



                                                                 the result is



                                                                 "Borderline" te

sting



                                                                 should be repea

gianfranco in



                                                                 48 to 72 hours.

 

             Lot # (test code = [id number]                    N            



             Lot #)                                              

 

             Expiration Dt (test 2020                N            



             code = Expiration Dt)                                        

 

             Neg Control (test Negative                               



             code = Neg Control)                                        

 

             Pos Control (test Positive                               



             code = Pos Control)                                        

 

             Internal QC (test Acceptable                             



             code = Internal QC)                                        



Urinalysis [id number] 01:36:03





             Test Item    Value        Reference Range Interpretation Comments

 

             UA WBC (test code = UA WBC) 0-5          0-5                       

 

             UA RBC (test code = UA RBC) None Seen    0-5                       

 

             UA Bacteria (test code = UA Moderate                  A            



             Bacteria)                                           

 

             UA Squam Epithelial (test code = UA 0-5                            

        



             Squam Epithelial)                                        



Comprehensive Metabolic [id number] 01:23:40





             Test Item    Value        Reference Range Interpretation Comments

 

             Sodium Level (test code = Sodium 139.0 mmol/L 135.0-145.0          

     



             Level)                                              

 

             Potassium Level (test code = 4.4 mmol/L   3.5-5.1                  

 



             Potassium Level)                                        

 

             Chloride Level (test code = 102 mmol/L                       



             Chloride Level)                                        

 

             CO2 (test code = CO2) 23 mmol/L    22-29                     

 

             Anion Gap (test code = Anion 14 mmol/L    7-16                     

 



             Gap)                                                

 

             BUN (test code = BUN) 9.10 mg/dL   6.00-20.00                

 

             Creatinine Level (test code = 1.00 mg/dL   0.50-0.90    H          

  



             Creatinine Level)                                        

 

             BUN/Creat Ratio (test code = 9                         N           

 



             BUN/Creat Ratio)                                        

 

             Glucose Level (test code = 115 mg/dL                        



             Glucose Level)                                        

 

             Calcium Level (test code = 10.1 mg/dL   8.3-10.5                  



             Calcium Level)                                        

 

             Alk Phos (test code = Alk Phos) 106 U/L             H        

    

 

             Bilirubin Total (test code = 0.4 mg/dL    0.1-0.9                  

 



             Bilirubin Total)                                        

 

             Albumin Level (test code = 4.6 g/dL     3.5-5.2                   



             Albumin Level)                                        

 

             Protein Total (test code = 7.7 g/dL     6.4-8.3                   



             Protein Total)                                        

 

             ALT (test code = ALT) 12 U/L       1-33                      

 

             AST (test code = AST) 18 U/L       1-32                      

 

             Globulin (test code = Globulin) 3.1 g/dL     2.9-3.1               

    

 

             A/G Ratio (test code = A/G 1.5 ratio                 N            



             Ratio)                                              



Comprehensive Metabolic [id number] 01:23:40





             Test Item    Value        Reference Range Interpretation Comments

 

             Sodium Level (test 139.0 mmol/L 135.0-145.0               



             code = Sodium Level)                                        

 

             Potassium Level 4.4 mmol/L   3.5-5.1                   



             (test code =                                        



             Potassium Level)                                        

 

             Chloride Level (test 102 mmol/L                       



             code = Chloride                                        



             Level)                                              

 

             CO2 (test code = 23 mmol/L    22-29                     



             CO2)                                                

 

             Anion Gap (test code 14 mmol/L    7-16                      



             = Anion Gap)                                        

 

             BUN (test code = 9.10 mg/dL   6.00-20.00                



             BUN)                                                

 

             Creatinine Level 1.00 mg/dL   0.50-0.90    H            



             (test code =                                        



             Creatinine Level)                                        

 

             BUN/Creat Ratio 9                         N            



             (test code =                                        



             BUN/Creat Ratio)                                        

 

             Glucose Level (test 115 mg/dL                        



             code = Glucose                                        



             Level)                                              

 

             Calcium Level (test 10.1 mg/dL   8.3-10.5                  



             code = Calcium                                        



             Level)                                              

 

             Alk Phos (test code 106 U/L             H            



             = Alk Phos)                                         

 

             Bilirubin Total 0.4 mg/dL    0.1-0.9                   



             (test code =                                        



             Bilirubin Total)                                        

 

             Albumin Level (test 4.6 g/dL     3.5-5.2                   



             code = Albumin                                        



             Level)                                              

 

             Protein Total (test 7.7 g/dL     6.4-8.3                   



             code = Protein                                        



             Total)                                              

 

             ALT (test code = 12 U/L       1-33                      



             ALT)                                                

 

             AST (test code = 18 U/L       1-32                      



             AST)                                                

 

             Globulin (test code 3.1 g/dL     2.9-3.1                   



             = Globulin)                                         

 

             A/G Ratio (test code 1.5 ratio                 N            



             = A/G Ratio)                                        

 

             eGFR AA (test code = >60                       N            eGFR (e

stimated



             eGFR AA)     mL/min/1.73 m2                           Glomerular



                                                                 Filtration Rate

) is



                                                                 an estimated va

lue,



                                                                 calculated from

 the



                                                                 patient's serum



                                                                 creatinine usin

g the



                                                                 MDRD equation. 

It is



                                                                 NOT the patient

's



                                                                 actual GFR. The

 eGFR



                                                                 provides a more



                                                                 clinically usef

ul



                                                                 measure of kidn

ey



                                                                 disease than se

rum



                                                                 creatinine



                                                                 alone.***This



                                                                 calculation rimma

es



                                                                 sex and race in

to



                                                                 account, if the



                                                                 information is



                                                                 provided. If th

e



                                                                 race is not



                                                                 provided, and t

he



                                                                 patient is



                                                                 -Crystal

n,



                                                                 multiply by 1.2

12.



                                                                 If sex is not



                                                                 provided, and t

he



                                                                 patient is fema

le,



                                                                 multiply by 0.7

42.



                                                                 Results for pat

ients



                                                                 <18 years of ag

e



                                                                 have not been



                                                                 validated by th

e



                                                                 MDRD study and



                                                                 should be



                                                                 interpreted wit

h



                                                                 caution. eGFR R

esult



                                                                 Interpretation:

eGFR



                                                                 > or = 60 is in

 the



                                                                 Normal RangeeGF

R <



                                                                 60 may mean kid

hang



                                                                 diseaseeGFR < 1

5 may



                                                                 mean kidney



                                                                 failure*** Rang

es



                                                                 recommended by 

the



                                                                 [employer]



                                                                 [employer],



                                                                 http://nkdep.ni

h.gov



Comprehensive Metabolic [id number] 01:23:40





             Test Item    Value        Reference Range Interpretation Comments

 

             Sodium Level (test 139.0 mmol/L 135.0-145.0               



             code = Sodium Level)                                        

 

             Potassium Level 4.4 mmol/L   3.5-5.1                   



             (test code =                                        



             Potassium Level)                                        

 

             Chloride Level (test 102 mmol/L                       



             code = Chloride                                        



             Level)                                              

 

             CO2 (test code = 23 mmol/L    22-29                     



             CO2)                                                

 

             Anion Gap (test code 14 mmol/L    7-16                      



             = Anion Gap)                                        

 

             BUN (test code = 9.10 mg/dL   6.00-20.00                



             BUN)                                                

 

             Creatinine Level 1.00 mg/dL   0.50-0.90    H            



             (test code =                                        



             Creatinine Level)                                        

 

             BUN/Creat Ratio 9                         N            



             (test code =                                        



             BUN/Creat Ratio)                                        

 

             Glucose Level (test 115 mg/dL                        



             code = Glucose                                        



             Level)                                              

 

             Calcium Level (test 10.1 mg/dL   8.3-10.5                  



             code = Calcium                                        



             Level)                                              

 

             Alk Phos (test code 106 U/L             H            



             = Alk Phos)                                         

 

             Bilirubin Total 0.4 mg/dL    0.1-0.9                   



             (test code =                                        



             Bilirubin Total)                                        

 

             Albumin Level (test 4.6 g/dL     3.5-5.2                   



             code = Albumin                                        



             Level)                                              

 

             Protein Total (test 7.7 g/dL     6.4-8.3                   



             code = Protein                                        



             Total)                                              

 

             ALT (test code = 12 U/L       1-33                      



             ALT)                                                

 

             AST (test code = 18 U/L       1-32                      



             AST)                                                

 

             Globulin (test code 3.1 g/dL     2.9-3.1                   



             = Globulin)                                         

 

             A/G Ratio (test code 1.5 ratio                 N            



             = A/G Ratio)                                        

 

             eGFR AA (test code = >60                       N            eGFR (e

stimated



             eGFR AA)     mL/min/1.73 m2                           Glomerular



                                                                 Filtration Rate

) is



                                                                 an estimated va

lue,



                                                                 calculated from

 the



                                                                 patient's serum



                                                                 creatinine usin

g the



                                                                 MDRD equation. 

It is



                                                                 NOT the patient

's



                                                                 actual GFR. The

 eGFR



                                                                 provides a more



                                                                 clinically usef

ul



                                                                 measure of kidn

ey



                                                                 disease than se

rum



                                                                 creatinine



                                                                 alone.***This



                                                                 calculation rimma

es



                                                                 sex and race in

to



                                                                 account, if the



                                                                 information is



                                                                 provided. If th

e



                                                                 race is not



                                                                 provided, and t

he



                                                                 patient is



                                                                 -Crystal

n,



                                                                 multiply by 1.2

12.



                                                                 If sex is not



                                                                 provided, and t

he



                                                                 patient is fema

le,



                                                                 multiply by 0.7

42.



                                                                 Results for pat

ients



                                                                 <18 years of ag

e



                                                                 have not been



                                                                 validated by [facility]



                                                                 MDRD study and



                                                                 should be



                                                                 interpreted wit

h



                                                                 caution. eGFR R

esult



                                                                 Interpretation:

eGFR



                                                                 > or = 60 is in

 the



                                                                 Normal RangeeGF

R <



                                                                 60 may mean kid

hang



                                                                 diseaseeGFR < 1

5 may



                                                                 mean kidney



                                                                 failure*** Rang

es



                                                                 recommended by 

the



                                                                 [employer]



                                                                 [employer],



                                                                 http://nkdep.ni

h.gov

 

             eGFR Non-AA (test 58.45                     N            eGFR (sheba

mated



             code = eGFR Non-AA) mL/min/1.73 m2                           Glomer

ular



                                                                 Filtration Rate

) is



                                                                 an estimated va

lue,



                                                                 calculated from

 the



                                                                 patient's serum



                                                                 creatinine usin

g the



                                                                 MDRD equation. 

It is



                                                                 NOT the patient

's



                                                                 actual GFR. The

 eGFR



                                                                 provides a more



                                                                 clinically usef

ul



                                                                 measure of kidn

ey



                                                                 disease than se

rum



                                                                 creatinine



                                                                 alone.***This



                                                                 calculation rimma

es



                                                                 sex and race in

to



                                                                 account, if the



                                                                 information is



                                                                 provided. If th

e



                                                                 race is not



                                                                 provided, and t

he



                                                                 patient is



                                                                 -Crystal

n,



                                                                 multiply by 1.2

12.



                                                                 If sex is not



                                                                 provided, and t

he



                                                                 patient is fema

le,



                                                                 multiply by 0.7

42.



                                                                 Results for pat

ients



                                                                 <18 years of ag

e



                                                                 have not been



                                                                 validated by [facility]



                                                                 MDRD study and



                                                                 should be



                                                                 interpreted wit

h



                                                                 caution. eGFR R

esult



                                                                 Interpretation:

eGFR



                                                                 > or = 60 is in

 the



                                                                 Normal RangeeGF

R <



                                                                 60 may mean kid

hang



                                                                 diseaseeGFR < 1

5 may



                                                                 mean kidney



                                                                 failure*** Rang

es



                                                                 recommended by 

the



                                                                 [employer]



                                                                 [employer],



                                                                 http://nkdep.ni

h.gov



Alcohol [id number] 01:23:31





             Test Item    Value        Reference Range Interpretation Comments

 

             Ethanol Level (test <0.00 g/dL   0.00-0.01                 [facility]

gianfranco 0.080 g/dL



             code = Ethanol                                        or more



             Level)                                              

 

             Ethanol Inst (test <0                        N            



             code = Ethanol Inst)                                        



Complete Blood Count with [id number] 00:56:12





             Test Item    Value        Reference Range Interpretation Comments

 

             WBC (test code = WBC) 19.4 x10     4.4-10.5     H            

 

             RBC (test code = RBC) 4.53 x10     3.75-5.20                 

 

             Hgb (test code = Hgb) 13.5 g/dL    12.2-14.8                 

 

             Hct (test code = Hct) 41.3 %       36.5-44.4                 

 

             MCV (test code = MCV) 91.20 fL     80..00              

 

             MCHC (test code = 32.70 g/dL   32.00-37.50               



             MCHC)                                               

 

             RDW CV (test code = 13.5 %       11.5-14.5                 



             RDW CV)                                             

 

             MCH (test code = MCH) 29.8 pg      27.0-32.5                 

 

             Platelets (test code = 462.0 x10    140.0-440.0  H            



             Platelets)                                          

 

             MPV (test code = MPV) 9.9 fL                    N            

 

             Slide Review (test Auto         Auto                      Result cr

eated by



             code = Slide Review)                                        [email or web address]

[email or web address]_AUTO

 

             nRBC (test code = 0                         N            



             nRBC)                                               

 

             NRBC Abs (test code = 0.00 x10                  N            



             NRBC Abs)                                           

 

             IPF (test code = IPF) 0 %                       N            



Automated [id number] 00:56:12





             Test Item    Value        Reference Range Interpretation Comments

 

             Neutro Auto (test code = Neutro 83.7 %       36.0-70.0    H        

    



             Auto)                                               

 

             Lymph Auto (test code = Lymph Auto) 8.9 %        12.0-44.0    L    

        

 

             Mono Auto (test code = Mono Auto) 5.0 %        0.0-11.0            

      

 

             Eos, Auto (test code = Eos, Auto) 1.4 %        0.0-7.0             

      

 

             Basophil Auto (test code = Basophil 0.7 %        0.0-2.0           

        



             Auto)                                               

 

             Neutro Absolute (test code = Neutro 16.2 x10     1.6-7.4      H    

        



             Absolute)                                           

 

             Lymph Absolute (test code = Lymph 1.73 x10     .50-4.60            

      



             Absolute)                                           

 

             Mono Absolute (test code = Mono .96 x10      .00-1.20              

    



             Absolute)                                           

 

             Eos Absolute (test code = Eos 0.27 x10     0.00-0.74               

  



             Absolute)                                           

 

             Baso Absolute (test code = Baso 0.13 x10     0.00-0.21             

    



             Absolute)                                           



IG [id number] 00:56:12





             Test Item    Value        Reference Range Interpretation Comments

 

             IG (test code = IG) 0.3 %        0.0-5.0                   

 

             IG Abs (test code = IG Abs) 0 x10                     N            



Urinalysis with Culture, if [id number] 00:55:34





             Test Item    Value        Reference Range Interpretation Comments

 

             UA Color (test code = STRAW        Yellow                    



             UA Color)                                           

 

             UA Appear (test code = CLEAR        Clear                     



             UA Appear)                                          

 

             UA pH (test code = UA 5                         N            



             pH)                                                 

 

             UA Spec Grav (test code 1.001        1.001-1.035               



             = UA Spec Grav)                                        

 

             UA Glucose (test code = NEG          Negative                  



             UA Glucose)                                         

 

             UA Bili (test code = UA NEG          Negative                  



             Bili)                                               

 

             UA Ketones (test code = NEG          Negative                  



             UA Ketones)                                         

 

             UA Blood (test code = NEG          Negative                  



             UA Blood)                                           

 

             UA Protein (test code = NEG          Negative                  



             UA Protein)                                         

 

             UA Urobilinogen (test 0.2 mg/dL                 N            



             code = UA Urobilinogen)                                        

 

             UA Nitrite (test code = POS          Negative     A            



             UA Nitrite)                                         

 

             UA Leuk Est (test code 25 cells/mcL Negative     A            



             = UA Leuk Est)                                        

 

             UA Micro Ind? (test Indicated    Not Indicated A            Result 

created by



             code = UA Micro Ind?)                                        rule



                                                                 [email or web address]

_IN



                                                                 D



Urine [id number] 08:13:23





             Test Item    Value        Reference Range Interpretation Comments

 

             ORGANISM (test code = Escherichia coli                           



             ORGANISM)                                           

 

             Amikacin (test code =                           S            



             Amik)                                               

 

             Ampicillin (test code =                           S            



             Amp)                                                

 

             Ampicillin/Sulbactam                           S            



             (test code = Amp/Sul)                                        

 

             Cefazolin (test code =                           S            



             Cefaz)                                              

 

             Cefepime (test code =                           S            



             Cefep)                                              

 

             Cefotaxime (test code =                           S            



             Cefo)                                               

 

             Ceftazidime (test code =                           S            



             Ceftaz)                                             

 

             Ceftriaxone (test code =                           S            



             Ceftri)                                             

 

             Cefuroxime (test code =                           S            



             Cefur)                                              

 

             Ciprofloxacin (test code                           S            



             = Cipro)                                            

 

             Gentamicin (test code =                           S            



             Gent)                                               

 

             Imipenem (test code =                           S            



             Imi)                                                

 

             Levofloxacin (test code                           S            



             = Levo)                                             

 

             Meropenem (test code =                           S            



             Sindi)                                               

 

             Nitrofurantoin (test                           S            



             code = Nitro)                                        

 

             Piperacillin/Tazobactam                           S            



             (test code = Pip/Blaine)                                        

 

             Tobramycin (test code =                           S            



             Tobra)                                              

 

             Trimethoprim/Sulfa (test                           S            



             code = SXT)                                         

 

             Final Report (test code 30,000 cfu/ml                           



             = Final Report) Escherichia coli                           



                          20,000 cfu/ml                           



                          Diphtheroids                           



 C Urine Added by [email or web address] Qualitative2019-09-15 04:57:25





             Test Item    Value        Reference Range Interpretation Comments

 

             RPR Qual (test code = RPR Qual) Non-Reactive Non-Reactive          

    

 

             Reactive Control (test code = Reactive                             

  



             Reactive Control)                                        

 

             Weak Reactive Control (test Weak Reactive                          

 



             code = Weak Reactive Control)                                      

  

 

             Non-Reactive Control (test code Non-Reactive                       

    



             = Non-Reactive Control)                                        

 

             Lot # (test code = Lot #) [id number]                    N            

 

             Expiration Dt (test code = 10.31.                N            



             Expiration Dt)                                        



Thyroid Stimulating Hormone2019-09-15 01:22:43





             Test Item    Value        Reference Range Interpretation Comments

 

             TSH (test code = TSH) 3.370 mIU/mL 0.270-4.200               



Lipid Panel2019-09-15 01:17:51





             Test Item    Value        Reference Range Interpretation Comments

 

             Cholesterol Total 168 mg/dL    0-200                     RISK OF HE

ART



             (test code =                                        DISEASEPublishe

d by



             Cholesterol Total)                                        American 

Heart



                                                                 Association Judith

lyte



                                                                 Optimal Borderl

ine



                                                                 Increased RiskC

HOL <200



                                                                 200-239 >240TRI

G <150



                                                                 150-199 >200HDL

 Male



                                                                 >60 <40HDL Fema

le >60



                                                                 <50LDL <100 130

-159



                                                                 >160LDL Near op

[facility] is



                                                                 100-129

 

             Triglycerides (test 108 mg/dL    9-200                     



             code = Triglycerides)                                        

 

             HDL (test code = HDL) 46 mg/dL     50-60        L            

 

             LDL (test code = LDL) 100 mg/dL    0-130                     The eq

uation being used



                                                                 in this calcula

tion is



                                                                 LDL = (Chol - H

DL) -



                                                                 (Trig / 5)

 

             VLDL (test code = 22 mg/dL     5-40                      The equati

on being used



             VLDL)                                               in this calcula

tion is



                                                                 VLDL = Trig / 5

 

             Chol/HDL (test code = 3.7 ratio    0.0-4.4                   



             Chol/HDL)                                           

 

             LDL/HDL Ratio (test 2                         N            The equa

tion being used



             code = LDL/HDL Ratio)                                        in thi

s calculation is



                                                                 LDL/HDL Ratio=L

DL



                                                                 Calc/HDL Chol



Lithium Level2019-09-15 01:17:51





             Test Item    Value        Reference Range Interpretation Comments

 

             Lithium Level (test code = 0.81 mmol/L  0.60-1.20                 



             Lithium Level)                                        



Comprehensive Metabolic Panel2019-09-15 00:04:54





             Test Item    Value        Reference Range Interpretation Comments

 

             Sodium Level (test code = Sodium 137.0 mmol/L 135.0-145.0          

     



             Level)                                              

 

             Potassium Level (test code = 4.0 mmol/L   3.5-5.1                  

 



             Potassium Level)                                        

 

             Chloride Level (test code = 103 mmol/L                       



             Chloride Level)                                        

 

             CO2 (test code = CO2) 23 mmol/L    22-29                     

 

             Anion Gap (test code = Anion 11 mmol/L    7-16                     

 



             Gap)                                                

 

             BUN (test code = BUN) 11.50 mg/dL  6.00-20.00                

 

             Creatinine Level (test code = 1.00 mg/dL   0.50-0.90    H          

  



             Creatinine Level)                                        

 

             BUN/Creat Ratio (test code = 12                        N           

 



             BUN/Creat Ratio)                                        

 

             Glucose Level (test code = 108 mg/dL                        



             Glucose Level)                                        

 

             Calcium Level (test code = 10.3 mg/dL   8.3-10.5                  



             Calcium Level)                                        

 

             Alk Phos (test code = Alk Phos) 93 U/L                       

    

 

             Bilirubin Total (test code = 0.5 mg/dL    0.1-0.9                  

 



             Bilirubin Total)                                        

 

             Albumin Level (test code = 4.4 g/dL     3.5-5.2                   



             Albumin Level)                                        

 

             Protein Total (test code = 7.2 g/dL     6.4-8.3                   



             Protein Total)                                        

 

             ALT (test code = ALT) 12 U/L       1-33                      

 

             AST (test code = AST) 17 U/L       1-32                      

 

             Globulin (test code = Globulin) 2.8 g/dL     2.9-3.1      L        

    

 

             A/G Ratio (test code = A/G 1.6 ratio                 N            



             Ratio)                                              



Alcohol Level2019-09-15 00:04:54





             Test Item    Value        Reference Range Interpretation Comments

 

             Ethanol Level (test <0.00 g/dL   0.00-0.01                 Intoxica

gianfranco 0.080 g/dL



             code = Ethanol                                        or more



             Level)                                              

 

             Ethanol Inst (test <0                        N            



             code = Ethanol Inst)                                        



Comprehensive Metabolic Panel2019-09-15 00:04:54





             Test Item    Value        Reference Range Interpretation Comments

 

             Sodium Level (test 137.0 mmol/L 135.0-145.0               



             code = Sodium Level)                                        

 

             Potassium Level 4.0 mmol/L   3.5-5.1                   



             (test code =                                        



             Potassium Level)                                        

 

             Chloride Level (test 103 mmol/L                       



             code = Chloride                                        



             Level)                                              

 

             CO2 (test code = 23 mmol/L    22-29                     



             CO2)                                                

 

             Anion Gap (test code 11 mmol/L    7-16                      



             = Anion Gap)                                        

 

             BUN (test code = 11.50 mg/dL  6.00-20.00                



             BUN)                                                

 

             Creatinine Level 1.00 mg/dL   0.50-0.90    H            



             (test code =                                        



             Creatinine Level)                                        

 

             BUN/Creat Ratio 12                        N            



             (test code =                                        



             BUN/Creat Ratio)                                        

 

             Glucose Level (test 108 mg/dL                        



             code = Glucose                                        



             Level)                                              

 

             Calcium Level (test 10.3 mg/dL   8.3-10.5                  



             code = Calcium                                        



             Level)                                              

 

             Alk Phos (test code 93 U/L                           



             = Alk Phos)                                         

 

             Bilirubin Total 0.5 mg/dL    0.1-0.9                   



             (test code =                                        



             Bilirubin Total)                                        

 

             Albumin Level (test 4.4 g/dL     3.5-5.2                   



             code = Albumin                                        



             Level)                                              

 

             Protein Total (test 7.2 g/dL     6.4-8.3                   



             code = Protein                                        



             Total)                                              

 

             ALT (test code = 12 U/L       1-33                      



             ALT)                                                

 

             AST (test code = 17 U/L       1-32                      



             AST)                                                

 

             Globulin (test code 2.8 g/dL     2.9-3.1      L            



             = Globulin)                                         

 

             A/G Ratio (test code 1.6 ratio                 N            



             = A/G Ratio)                                        

 

             eGFR AA (test code = >60                       N            eGFR (e

stimated



             eGFR AA)     mL/min/1.73 m2                           Glomerular



                                                                 Filtration Rate

) is



                                                                 an estimated va

lue,



                                                                 calculated from

 the



                                                                 patient's serum



                                                                 creatinine usin

g the



                                                                 MDRD equation. 

It is



                                                                 NOT the patient

's



                                                                 actual GFR. The

 eGFR



                                                                 provides a more



                                                                 clinically usef

ul



                                                                 measure of kidn

ey



                                                                 disease than se

rum



                                                                 creatinine



                                                                 alone.***This



                                                                 calculation rimma

es



                                                                 sex and race in

to



                                                                 account, if the



                                                                 information is



                                                                 provided. If th

e



                                                                 race is not



                                                                 provided, and t

he



                                                                 patient is



                                                                 -Crystal

n,



                                                                 multiply by 1.2

12.



                                                                 If sex is not



                                                                 provided, and t

he



                                                                 patient is fema

le,



                                                                 multiply by 0.7

42.



                                                                 Results for pat

gogos



                                                                 <18 years of ag

e



                                                                 have not been



                                                                 validated by [facility]



                                                                 MDRD study and



                                                                 should be



                                                                 interpreted wit

h



                                                                 caution. eGFR R

esult



                                                                 Interpretation:

eGFR



                                                                 > or = 60 is in

 the



                                                                 Normal RangeeGF

R <



                                                                 60 may mean kid

hang



                                                                 diseaseeGFR < 1

5 may



                                                                 mean kidney



                                                                 failure*** Rang

es



                                                                 recommended by 

the



                                                                 [employer]



                                                                 Foundation,



                                                                 http://nkdep.ni

h.gov



Comprehensive Metabolic Panel2019-09-15 00:04:54





             Test Item    Value        Reference Range Interpretation Comments

 

             Sodium Level (test 137.0 mmol/L 135.0-145.0               



             code = Sodium Level)                                        

 

             Potassium Level 4.0 mmol/L   3.5-5.1                   



             (test code =                                        



             Potassium Level)                                        

 

             Chloride Level (test 103 mmol/L                       



             code = Chloride                                        



             Level)                                              

 

             CO2 (test code = 23 mmol/L    22-29                     



             CO2)                                                

 

             Anion Gap (test code 11 mmol/L    7-16                      



             = Anion Gap)                                        

 

             BUN (test code = 11.50 mg/dL  6.00-20.00                



             BUN)                                                

 

             Creatinine Level 1.00 mg/dL   0.50-0.90    H            



             (test code =                                        



             Creatinine Level)                                        

 

             BUN/Creat Ratio 12                        N            



             (test code =                                        



             BUN/Creat Ratio)                                        

 

             Glucose Level (test 108 mg/dL                        



             code = Glucose                                        



             Level)                                              

 

             Calcium Level (test 10.3 mg/dL   8.3-10.5                  



             code = Calcium                                        



             Level)                                              

 

             Alk Phos (test code 93 U/L                           



             = Alk Phos)                                         

 

             Bilirubin Total 0.5 mg/dL    0.1-0.9                   



             (test code =                                        



             Bilirubin Total)                                        

 

             Albumin Level (test 4.4 g/dL     3.5-5.2                   



             code = Albumin                                        



             Level)                                              

 

             Protein Total (test 7.2 g/dL     6.4-8.3                   



             code = Protein                                        



             Total)                                              

 

             ALT (test code = 12 U/L       1-33                      



             ALT)                                                

 

             AST (test code = 17 U/L       1-32                      



             AST)                                                

 

             Globulin (test code 2.8 g/dL     2.9-3.1      L            



             = Globulin)                                         

 

             A/G Ratio (test code 1.6 ratio                 N            



             = A/G Ratio)                                        

 

             eGFR AA (test code = >60                       N            eGFR (e

stimated



             eGFR AA)     mL/min/1.73 m2                           Glomerular



                                                                 Filtration Rate

) is



                                                                 an estimated va

lue,



                                                                 calculated from

 the



                                                                 patient's serum



                                                                 creatinine usin

g the



                                                                 MDRD equation. 

It is



                                                                 NOT the patient

's



                                                                 actual GFR. The

 eGFR



                                                                 provides a more



                                                                 clinically usef

ul



                                                                 measure of kidn

ey



                                                                 disease than se

rum



                                                                 creatinine



                                                                 alone.***This



                                                                 calculation rimma

es



                                                                 sex and race in

to



                                                                 account, if the



                                                                 information is



                                                                 provided. If th

e



                                                                 race is not



                                                                 provided, and t

he



                                                                 patient is



                                                                 -Crystal

n,



                                                                 multiply by 1.2

12.



                                                                 If sex is not



                                                                 provided, and t

he



                                                                 patient is fema

le,



                                                                 multiply by 0.7

42.



                                                                 Results for pat

ients



                                                                 <18 years of ag

e



                                                                 have not been



                                                                 validated by [facility]



                                                                 MDRD study and



                                                                 should be



                                                                 interpreted wit

h



                                                                 caution. eGFR R

esult



                                                                 Interpretation:

eGFR



                                                                 > or = 60 is in

 the



                                                                 Normal RangeeGF

R <



                                                                 60 may mean kid

hang



                                                                 diseaseeGFR < 1

5 may



                                                                 mean kidney



                                                                 failure*** Rang

es



                                                                 recommended by 

the



                                                                 [employer]



                                                                 [employer],



                                                                 http://nkdep.ni

h.gov

 

             eGFR Non-AA (test 58.45                     N            eGFR (sheba

mated



             code = eGFR Non-AA) mL/min/1.73 m2                           Glomer

ular



                                                                 Filtration Rate

) is



                                                                 an estimated va

lue,



                                                                 calculated from

 the



                                                                 patient's serum



                                                                 creatinine usin

g the



                                                                 MDRD equation. 

It is



                                                                 NOT the patient

's



                                                                 actual GFR. The

 eGFR



                                                                 provides a more



                                                                 clinically usef

ul



                                                                 measure of kidn

ey



                                                                 disease than se

rum



                                                                 creatinine



                                                                 alone.***This



                                                                 calculation rimma

es



                                                                 sex and race in

to



                                                                 account, if the



                                                                 information is



                                                                 provided. If th

e



                                                                 race is not



                                                                 provided, and t

he



                                                                 patient is



                                                                 -Crystal

n,



                                                                 multiply by 1.2

12.



                                                                 If sex is not



                                                                 provided, and t

he



                                                                 patient is fema

le,



                                                                 multiply by 0.7

42.



                                                                 Results for pat

ients



                                                                 <18 years of ag

e



                                                                 have not been



                                                                 validated by [facility]



                                                                 MDRD study and



                                                                 should be



                                                                 interpreted wit

h



                                                                 caution. eGFR R

esult



                                                                 Interpretation:

eGFR



                                                                 > or = 60 is in

 the



                                                                 Normal RangeeGF

R <



                                                                 60 may mean kid

hang



                                                                 diseaseeGFR < 1

5 may



                                                                 mean kidney



                                                                 failure*** Rang

es



                                                                 recommended by 

the



                                                                 [employer]



                                                                 [employer],



                                                                 http://nkdep.ni

h.gov



Urinalysis Microscopic2019-09-15 00:02:53





             Test Item    Value        Reference Range Interpretation Comments

 

             UA WBC (test code = UA WBC) 6-10         0-5          A            

 

             UA RBC (test code = UA RBC) 0-5          0-5                       

 

             UA Bacteria (test code = UA Bacteria) Many                      A  

          

 

             UA Squam Epithelial (test code = UA TNTC                      A    

        



             Squam Epithelial)                                        



Urine Drug [id number] 23:59:42





             Test Item    Value        Reference Range Interpretation Comments

 

             Amphetamine Screen Ur POSITIVE     Negative     A            



             (test code = Amphetamine                                        



             Screen Ur)                                          

 

             Barbiturate Screen Ur Negative     Negative                  



             (test code = Barbiturate                                        



             Screen Ur)                                          

 

             Benzodiazepines Ur (test POSITIVE     Negative     A            



             code = Benzodiazepines                                        



             Ur)                                                 

 

             Cocaine Screen Ur (test Negative     Negative                  



             code = Cocaine Screen                                        



             Ur)                                                 

 

             U Methadone Scr (test Negative     Negative                  



             code = U Methadone Scr)                                        

 

             Opiate Screen Ur (test Negative     Negative                  



             code = Opiate Screen Ur)                                        

 

             U PCP Scrn (test code = Negative     Negative                  



             U PCP Scrn)                                         

 

             Cannabinoid Screen Ur Negative     Negative                  



             (test code = Cannabinoid                                        



             Screen Ur)                                          

 

             U TCA (test code = U Negative     Negative                  The res

ults of all



             TCA)                                                drug screen elinor

ts are



                                                                 only preliminar

y.



                                                                 Clinical



                                                                 consideration a

nd



                                                                 professional ju

dgment



                                                                 should be appli

ed to



                                                                 any drug of abu

se



                                                                 test result,



                                                                 particularly wh

en



                                                                 preliminary pos

itive



                                                                 results are obt

ained.



                                                                 Please order a



                                                                 separate confir

matory



                                                                 test if desired

.



HCG Qualitative [id number] 23:54:43





             Test Item    Value        Reference Range Interpretation Comments

 

             hCG Ur (test code = Negative                               If the r

esult is



             hCG Ur)                                             "Negative" in p

atients



                                                                 suspected to be



                                                                 pregnant, recom

mend



                                                                 retest with a s

ample



                                                                 obtained 48 to 

72



                                                                 hours later, or

 by



                                                                 ordering a



                                                                 quantitative as

say. If



                                                                 the result is



                                                                 "Borderline" te

sting



                                                                 should be repea

gianfranco in



                                                                 48 to 72 hours.

 

             Lot # (test code = [id number]                    N            



             Lot #)                                              

 

             Expiration Dt (test [id number]                  N            



             code = Expiration Dt)                                        

 

             Neg Control (test Negative                               



             code = Neg Control)                                        

 

             Pos Control (test Positive                               



             code = Pos Control)                                        

 

             Internal QC (test Acceptable                             



             code = Internal QC)                                        



Urinalysis with Culture, if [id number] 23:52:04





             Test Item    Value        Reference Range Interpretation Comments

 

             UA Color (test code = UA YELLO        Yellow                    



             Color)                                              

 

             UA Appear (test code = SCLD         Clear        A            



             UA Appear)                                          

 

             UA pH (test code = UA 6.5                                    



             pH)                                                 

 

             UA Spec Grav (test code 1.011        1.001-1.035               



             = UA Spec Grav)                                        

 

             UA Glucose (test code = NEG          Negative                  



             UA Glucose)                                         

 

             UA Bili (test code = UA NEG          Negative                  



             Bili)                                               

 

             UA Ketones (test code = NEG          Negative                  



             UA Ketones)                                         

 

             UA Blood (test code = UA NEG          Negative                  



             Blood)                                              

 

             UA Protein (test code = NEG          Negative                  



             UA Protein)                                         

 

             UA Urobilinogen (test 1 mg/dL      >0.2         A            



             code = UA Urobilinogen)                                        

 

             UA Nitrite (test code = POS          Negative     A            



             UA Nitrite)                                         

 

             UA Leuk Est (test code = NEG          Negative                  



             UA Leuk Est)                                        

 

             UA Micro Ind? (test code Indicated    Not Indicated A            Re

sult created by



             = UA Micro Ind?)                                        rule



                                                                 [id number]

_IND



Automated [id number] 23:48:39





             Test Item    Value        Reference Range Interpretation Comments

 

             Neutro Auto (test code = Neutro 75.7 %       36.0-70.0    H        

    



             Auto)                                               

 

             Lymph Auto (test code = Lymph Auto) 14.1 %       12.0-44.0         

        

 

             Mono Auto (test code = Mono Auto) 7.6 %        0.0-11.0            

      

 

             Eos, Auto (test code = Eos, Auto) 1.8 %        0.0-7.0             

      

 

             Basophil Auto (test code = Basophil 0.5 %        0.0-2.0           

        



             Auto)                                               

 

             Neutro Absolute (test code = Neutro 12.7 x10     1.6-7.4      H    

        



             Absolute)                                           

 

             Lymph Absolute (test code = Lymph 2.38 x10     .50-4.60            

      



             Absolute)                                           

 

             Mono Absolute (test code = Mono 1.28 x10     .00-1.20     H        

    



             Absolute)                                           

 

             Eos Absolute (test code = Eos 0.31 x10     0.00-0.74               

  



             Absolute)                                           

 

             Baso Absolute (test code = Baso 0.09 x10     0.00-0.21             

    



             Absolute)                                           



IG [id number] 23:48:39





             Test Item    Value        Reference Range Interpretation Comments

 

             IG (test code = IG) 0.3 %        0.0-5.0                   

 

             IG Abs (test code = IG Abs) 0 x10                     N            



Complete Blood Count with [id number] 23:48:38





             Test Item    Value        Reference Range Interpretation Comments

 

             WBC (test code = WBC) 16.8 x10     4.4-10.5     H            

 

             RBC (test code = RBC) 4.06 x10     3.75-5.20                 

 

             Hgb (test code = Hgb) 12.7 g/dL    12.2-14.8                 

 

             MCV (test code = MCV) 94.10 fL     80..00              

 

             Hct (test code = Hct) 38.2 %       36.5-44.4                 

 

             MCHC (test code = 33.20 g/dL   32.00-37.50               



             MCHC)                                               

 

             RDW CV (test code = 13.2 %       11.5-14.5                 



             RDW CV)                                             

 

             MCH (test code = MCH) 31.3 pg      27.0-32.5                 

 

             Platelets (test code = 363.0 x10    140.0-440.0               



             Platelets)                                          

 

             MPV (test code = MPV) 10.0 fL                   N            

 

             Slide Review (test Auto         Auto                      Result cr

eated by



             code = Slide Review)                                        [email or web address]_

SLIDE_REV_AUTO

 

             nRBC (test code = 0                         N            



             nRBC)                                               

 

             NRBC Abs (test code = 0.00 x10                  N            



             NRBC Abs)                                           

 

             IPF (test code = IPF) 0 %                       N            



RPR, [id number] 05:53:00





             Test Item    Value        Reference Range Interpretation Comments

 

             RPR (test code = RPR) Non-Reactive Non-Reactive N            



BHCG, Serum, [id number] 01:39:00





             Test Item    Value        Reference Range Interpretation Comments

 

             Preg Qual [Se] (test code = BSHCG) Negative     Negative     N     

       



BHCG, Serum, [id number] 01:09:00





             Test Item    Value        Reference Range Interpretation Comments

 

             B hCG, Quant (test <1 mIU/mL                              Weeks of 

Gestation



             code = BHCGQT)                                        Ranges (mIU/m

L)3 weeks



                                                                 5.40 - 72.04 we

eks 10.2



                                                                 - 7085 weeks 21

7 - 65920



                                                                 weeks 152 - 321

777 weeks



                                                                 4059 - 2543041 

weeks



                                                                 74029 - 3326588

 weeks



                                                                 05345 - 7480402

0 weeks



                                                                 87681 - 1888135

2 weeks



                                                                 61725 - 7852469

4 weeks



                                                                 88848 - 3197914

 weeks



                                                                 61445 - 1776874

 weeks



                                                                 1304 - 4749434 

weeks



                                                                 8240 - 1774628 

weeks



                                                                 6081 - 63360



Thyroid Stimulating Hormone (TSH)2017 01:09:00





             Test Item    Value        Reference Range Interpretation Comments

 

             TSH (test code = TSH) 0.93 mIU/mL  0.270-4.200  N            



Lipid [id number] 01:05:00





             Test Item    Value        Reference Range Interpretation Comments

 

             Cholesterol (test 163 mg/dL    0-200        N            



             code = CHOL)                                        

 

             Triglycerides (test 108 mg/dL    9-200        N            



             code = TRIG)                                        

 

             HDL (test code = 41 mg/dL     50-60        L            



             HDL)                                                

 

             Chol/HDL (test code 4.0 Ratio    0.0-4.4      N            



             = CHOLPHDL)                                         

 

             LDL, Calculated 100          0-130        N            (NOTE)RISK O

F HEART



             (test code = LDLC)                                        DISEASEPu

blished by



                                                                 American Heart



                                                                 AssociationAnal

yte



                                                                 Optimal Boderli

ne



                                                                 Increased RiskC

HOL <200



                                                                 200-239 >240TRI

G <150



                                                                 150-199 >200HDL

 Male:



                                                                 >60 <40HDL Fema

le: >60



                                                                 <50LDL <100 130

-159



                                                                 >160LDL NEAR OP

TIMAL IS



                                                                 100-129

 

             VLDL (test code = 22 mg/dL     5-40         N            



             VLDL)                                               

 

             LDL/HDL (test code = 2                                      



             LDLPHDL)                                            



Comprehensive Metabolic [id number] 21:02:00





             Test Item    Value        Reference Range Interpretation Comments

 

             Sodium (test code = 140 mmol/L   135-145      N            



             NA)                                                 

 

             Potassium (test 3.6 mmol/L   3.5-5.1      N            



             code = K)                                           

 

             Chloride (test code 103 mmol/L          N            



             = CL)                                               

 

             Carbon Dioxide 26 mmol/L    22-29        N            



             (test code = CO2)                                        

 

             Glucose (test code 89 mg/dL            N            



             = GLU)                                              

 

             Blood Urea Nitrogen 13 mg/dL     6-20         N            



             (test code = BUN)                                        

 

             Creatinine (test 0.8 mg/dL    0.5-0.9      N            



             code = CREAT)                                        

 

             Calcium (test code 10.0 mg/dL   8.3-10.5     N            



             = CA)                                               

 

             Prot Total (test 7.0 g/dL     6.4-8.3      N            



             code = TP)                                          

 

             Albumin (test code 4.2 g/dL     3.5-5.2      N            



             = ALB)                                              

 

             A/G Ratio (test 1.5 Ratio                              



             code = AGRATIO)                                        

 

             Globulin (test code 2.8          2.9-3.1      L            



             = GLOB)                                             

 

             Bili Total (test 0.6 mg/dL    0.1-0.9      N            



             code = TBIL)                                        

 

             Alk Phos (test code 91 U/L              N            



             = APHOS)                                            

 

             AST (test code = 19 U/L       1-32         N            



             AST)                                                

 

             ALT (test code = 14 U/L       1-33         N            



             ALT)                                                

 

             BUN/Creatinine 16.3                                   



             Ratio (test code =                                        



             BCRATIO)                                            

 

             Anion Gap (test 11 mmol/L    7-16         N            



             code = AGAP)                                        

 

             Estimated GFR (test >60                                    eGFR (es

timated



             code = GFR)  mL/min/1.73m2                           Glomerular Nguyễn

tration



                                                                 Rate) is an est

imated



                                                                 value,calculate

d from



                                                                 the patient's s

harman



                                                                 creatinine usin

g the



                                                                 MDRD equation.I

t is



                                                                 NOT the patient

's



                                                                 actual GFR. The

 eGFR



                                                                 provides a more



                                                                 clinicallyusefu

l



                                                                 measure of kidn

ey



                                                                 disease than se

rum



                                                                 creatinine



                                                                 alone.***This



                                                                 calculation rimma

es sex



                                                                 and race into



                                                                 account, if the



                                                                 informationis



                                                                 provided. If th

e race



                                                                 is not provided

, and



                                                                 the patient



                                                                 isAfrican-Ameri

can,



                                                                 multiply by 1.2

12. If



                                                                 sex is not prov

ided,



                                                                 and thepatient 

is



                                                                 female, multipl

y by



                                                                 0.742. Results 

for



                                                                 patients <18 ye

ars



                                                                 ofage have not 

been



                                                                 validated by 

e MDRD



                                                                 study and shoul

d be



                                                                 interpretedwith



                                                                 caution.eGFR Re

sult



                                                                 Interpretation:

eGFR >



                                                                 or = 60 is in t

he



                                                                 Normal RangeeGF

R < 60



                                                                 may mean kidney



                                                                 diseaseeGFR < 1

5 may



                                                                 mean kidney



                                                                 failure***Range

s



                                                                 recommended by 

the



                                                                 [employer]



                                                                 Foundation,http

://nkd



                                                                 ep.nih.gov



Alcohol/Ethanol, [id number] 21:02:00





             Test Item    Value        Reference Range Interpretation Comments

 

             Alcohol, Ethyl <0.01 g/dL   0.00-0.01    N            Intoxicated 0

.080 g/dL



             (test code = ETOH)                                        or more



CBC with [id number] 20:35:00





             Test Item    Value        Reference Range Interpretation Comments

 

             WBC (test code = WBC) 10.3 K/cumm  4.4-10.5     N            

 

             RBC (test code = RBC) 4.37 M/cumm  3.75-5.20    N            

 

             Hemoglobin (test code = HGB) 13.1 gm/dL   12.2-14.8    N           

 

 

             Hematocrit (test code = HCT) 41.5 %       36.5-44.4    N           

 

 

             MCV (test code = MCV) 94.9 fL             N            

 

             MCH (test code = MCH) 30.1 pg      27.0-32.5    N            

 

             MCHC (test code = MCHC) 31.7 g/dL    32.0-37.5    L            

 

             RDW (test code = RDW) 12.9 %       11.5-14.5    N            

 

             Platelet Count (test code = 327 K/cumm   140-440      N            



             PLTCT)                                              

 

             MPV (test code = MPV) 7.0 fL                                 

 

             Diff Method (test code = DIFFM) Auto                               

    

 

             Neutrophil (test code = NEUT) 74.0 %       36-70        H          

  

 

             Lymphocyte (test code = LYMPH) 17.6 %       12-44        N         

   

 

             Monocyte (test code = MONO) 6.4 %        0-11         N            

 

             Eosinophil (test code = EOS) 1.5 %        0-7          N           

 

 

             Basophil (test code = BASO) 0.5 %        0-2          N            

 

             Neutro Abs (test code = ANEUT) 7.6 K/cumm   1.6-7.4      H         

   

 

             Lymph Abs (test code = ALYMPH) 1.8 K/cumm   0.5-4.6      N         

   

 

             Mono Abs (test code = AMONO) 0.7 K/cumm   0.0-1.2      N           

 

 

             Eos Abs (test code = AEOS) 0.16 K/cumm  0.00-0.74    N            

 

             Baso Abs (test code = ABASO) 0.1 K/cumm   0.00-0.21    N           

 



[id number] 20:33:00





             Test Item    Value        Reference Range Interpretation Comments

 

             Amphetamine (test code POSITIVE     Negative     A            For d

iagnostic purposes



             = AMPH)                                             only, positive 

results



                                                                 should always b

e



                                                                 assessedin



                                                                 conjunctionwith

 the



                                                                 patient's medic

al



                                                                 history,clinica

l



                                                                 examination and



                                                                 otherfindings.T

o



                                                                 fulfill legal



                                                                 requirements, a

 more



                                                                 specific altern

ate



                                                                 chemical method

must be



                                                                 used inorder to

 obtain



                                                                 a Confirmed judith

lytical



                                                                 result. GC/MS i

s the



                                                                 preferred confi

rmatory



                                                                 method.

 

             Barbiturates (test Negative     Negative     N            



             code = GENEVIEVE)                                        

 

             Benzodiazepine (test Negative     Negative     N            



             code = IRENE)                                        

 

             Cocaine (test code = Negative     Negative     N            



             COCA)                                               

 

             Methadone (test code = Negative     Negative     N            



             MTHD)                                               

 

             Opiates (test code = Negative     Negative     N            



             OPIA)                                               

 

             PCP (test code = PCP) Negative     Negative     N            

 

             Propoxyphene (test Negative     Negative     N            



             code = PROPOX)                                        

 

             THC (test code = THC) Negative     Negative     N            



Urinalysis [id number] 20:23:00





             Test Item    Value        Reference Range Interpretation Comments

 

             Color (test code = COLOR) Yellow       Yellow,Straw,Pl N           

 



                                       yellow                    

 

             Clarity (test code = Sl Cloudy    Clear        A            



             CLAR)                                               

 

             Specific Gravity (test 1.007        1.001-1.035  N            



             code = SPGR)                                        

 

             pH (test code = PH) 6.0          5.0-9.0      N            

 

             Ketone (test code = KET) Negative mg/dL Negative     N            

 

             Glucose (test code = Negative mg/dL Negative     N            



             GLUCUR)                                             

 

             Protein (test code = Negative mg/dL Negative     N            



             PROT)                                               

 

             Bilirubin (test code = Negative mg/dL Negative     N            



             BILI)                                               

 

             Occult Blood (test code = Moderate     Negative     A            



             UDOB)                                               

 

             Urobilinogen (test code = 0.2 mg/dL    0.2-1.0      N            



             UROB)                                               

 

             Nitrite (test code = NIT) Positive     Negative     A            

 

             Leuk Esterase (test code Moderate     Negative     A            



             = LEUK)                                             

 

             Micros Exam (test code = Indicated                              



             MEXAM)                                              

 

             Epithelial Cells (test 50+ /LPF     0-30         A            



             code = EPI)                                         

 

             WBC, Urine (test code = 41-50 /HPF   0-5          A            



             UWBC)                                               

 

             RBC, Urine (test code = 3-5 /HPF     0-5          A            



             URBC)                                               

 

             Bacteria (test code = Many /HPF                              



             BACT)                                               



[id number] 16:21:00





             Test Item    Value        Reference Range Interpretation Comments

 

             Lithium (test code = LI) 0.6 mmol/l   0.6-1.2                   



[facility] (Ultra Sensitive)2017 16:21:00





             Test Item    Value        Reference Range Interpretation Comments

 

             TSH (test code = TSH) 2.14 mIU/L   0.47-4.68                 



[facility]





             Test Item    Value        Reference Range Interpretation Comments

 

             Glucose (test code 77 mg/dl                         



             = GLU)                                              

 

             BUN (test code = 9.0 mg/dl    6.0-17.0                  



             BUN)                                                

 

             Creatinine (test 0.7 mg/dl    0.4-1.2                   



             code = CREA)                                        

 

             Sodium (test code = 139 mmol/l   137-145                   



             NA)                                                 

 

             Potassium (test 4.7 mmol/l   3.5-5.0                   



             code = K)                                           

 

             Chloride (test code 107 mmol/l                       



             = CL)                                               

 

             CO2 (test code = 22 mmol/l    22-30                     



             CO2)                                                

 

             Anion Gap (test 11                                     



             code = GAP)                                         

 

             Calcium (test code 9.8 mg/dl    8.4-10.2                  



             = CALC)                                             

 

             T Protein (test 8.0 gm/dl    5.1-8.7                   



             code = TP)                                          

 

             Albumin (test code 4.4 gm/dl    3.5-4.6                   



             = ALB)                                              

 

             A/G Ratio (test 1.2 %        1.1-2.2                   



             code = AGRAT)                                        

 

             AST (SGOT) (test 20 U/L       11-36                     



             code = AST)                                         

 

             ALT (SGPT) (test 29 U/L       11-40                     



             code = ALT)                                         

 

             Alkaline Phos (test 108 U/L                          



             code = ALKP)                                        

 

             Total Bilirubin 0.6 mg/dl    0.2-1.2                   



             (test code = TBIL)                                        

 

             Globulin (test code 3.6 gm/dl    2.3-3.5      H            



             = GLOBU)                                            

 

             Calcium, Corrected 9.5 mg/dl    8.4-10.2                  Various f

ormulas exist



             (test code =                                        for corrected s

harman



             CALCCORR)                                           calcium results

, each



                                                                 yielding differ

ent



                                                                 values. This co

rrected



                                                                 result was base

d on



                                                                 the formula: Co

rrected



                                                                 Calcium = Serum

Calcium



                                                                 + [0.8 * ( 4 -



                                                                 SerumAlbumin)]

 

             EGFR if  >60                                    



             American (test code mL/min/1.73m\                           



             = EGFRAA)    S\2                                    

 

             EGFR if Non- >60                                    Estimate

d Glomerular



             American (test code mL/min/1.73m\                           Filtrat

ion Rate (eGFR)



             = EGFRNA)    S\2                                    Reference Inter

vals



                                                                 Decision Points

 for 18



                                                                 years and older

 and



                                                                 average body ma

ss: >=



                                                                 60 Does not exc

lude



                                                                 kidney disease.

 30 -



                                                                 59 Suggests mod

erate



                                                                 chronic kidney 

disease



                                                                 and indicates t

he need



                                                                 for further



                                                                 investigation



                                                                 including asses

sment



                                                                 of proteinuria 

and



                                                                 cardiovascular



                                                                 factors. < 30 U

sually



                                                                 indicates a nee

d for



                                                                 referral for



                                                                 assessment and



                                                                 management of c

hronic



                                                                 kidney failure.



[facility]



Notes







                Date/Time       Note            Provider        Source

 

                2023 20:08:00-00:00   [facility]



                                                                



                                 [address] [place] [address]                 



                                                                



                                 Emergency Department Document                 



                                 Signed                         



                                                                



                                Patient: Tyler Judd Medical Record#: [id number]

[id number]                 



                                : 1970 Acct:[id number]               

  



                                Age/Sex: 52 / F Admit/Reg Date: 23        

         



                                Loc: [facility] Room: Report Number: [id number] 

                



                                Attending Dr: John Melton MD                 



                                                                



                                                                



                                                                



                                                                



                                Psych HPI                       



                                                                



                                - General                       



                                Nursing note reviewed: Yes                 



                                Source: patient                 



                                Mode of arrival: ambulatory                 



                                Limitations: no limitations                 



                                Primary Care Provider: Wily Genao         

        



                                                                



                                - General                       



                                Chief Complaint: Psychiatric Symptoms           

      



                                Stated Complaint: PINA                 



                                                                



                                - History of Present Illness                 



                                HPI Narrative:                  



                                                                



                                                    51yo f w/ pmh schizophrenia,

 bipolar d/o, htn, hip fx, brought w/ police w/ pina

for psych eval, pt                                  



                                                    states she has a "low mood,"

 has been feeling depressed for a while, has had 

suicidal ideations in                               



                                                    the past but not currently. 

pt states she was here 2 days ago and tested 

positive for                                        



                                                    methamphetamines and she was

 started on depakote and abilify and thinks that 

has helped w/ her                                   



                                                    suicidal ideations however s

till feels depressed. pt was at [facility] 

this morning and they                               



                                                    found a UTI as well as she w

as pregnant. pt denies any other medical 

complaints. pt states she is h                           



                                                    omeless and has been for 3 w

eeks and thinks that is making her depressed. pt 

[facility] and [facility]

rday and they ignored her. pt states she drinks 

alcohol ocassionally, l                             



                                ast drink 3 wks ago. and smoke 2ppd. uses meth. 

(John Melton)                 



                                                                



                                - Related Data                  



                                  Allergies                     



                                                                



                                                                



                                                                



                                Allergy/AdvReac Type Severity Reaction Status Da

te / Time                 



                                                                



                                diphenhydramine Allergy Anxiety Verified 

3 20:13                 



                                                                



                                [From Benadryl]                 



                                                                



                                Penicillins Allergy Difficulty Verified 23

 20:13                 



                                                                



                                 Breathing                      



                                                                



                                risperidone [From Risperdal] Allergy Rash Verifi

ed 23 20:13                 



                                                                



                                                                



                                                                



                                                                



                                Review of Systems                 



                                ROS:                            



                                                                



                                Constitutional: No fevers, chills, sweats, weigh

t loss                 



                                Eye: No visual problems                 



                                ENMT: No ear pain, nasal congestion, sore throat

                 



                                Respiratory: No shortness of breath, cough      

           



                                                    Cardiovascular: No Chest tan

n, palpitations, syncope, swelling in legs, dyspnea

on exertion                                         



                                                    Gastrointestinal: No nausea,

 vomiting, diarrhea, abdominal pain, no difficulty 

swallowing                                          



                                                    Genitourinary: No hematuria,

 no dysuria, no hesitancy, no frequency, no 

incontinence                                        



                                Hema/Lymph: Negative for bruising tendency, swol

alana lymph glands                 



                                                    Endocrine: Negative for exce

ssive thirst, glucose normal, no heat or cold 

intolerance                                         



                                                    Musculoskeletal: No back tan

n, neck pain, joint pain, muscle pain, decreased 

range of motion                                     



                                Integumentary: No rash, pruritus, abrasions, no 

skin ulcers                 



                                                    Neurologic: No focal weaknes

s, no paresthesia, no headaches, numbness or 

tingling, no seizures or                            



                                tremors (John Melton)                 



                                                                



                                Past Medical/Surgical History                 



                                Medical History:                 



                                Medical History (Last Updated 23 @ 22:14 Eden Barakat RN)                 



                                Hypertension (Medical) I10                 



                                                                



                                                                



                                                                



                                Family/Social History                 



                                                                



                                - Family History                 



                                Family History: reviewed, not pertinent         

        



                                Family History Of: None Reported                

 



                                                                



                                - Social History                 



                                Living Situation: Homeless                 



                                Do you drink alcohol: No                 



                                Current or Hx of Recreational Drug use: No      

           



                                                                



                                Physical Exam                   



                                Triage Vital Signs:                 



                                                                



                                                                



                                                                



                                                                



                                Temperature 37.4 C 23 20:06               

  



                                                                



                                Temperature Source Oral 23 20:06          

       



                                                                



                                Pulse Rate 89 23 20:06                 



                                                                



                                Respiratory Rate 17 23 20:06              

   



                                                                



                                Blood Pressure 129/55 L 23 20:06          

       



                                                                



                                Blood Pressure Source Automatic Cuff 23 20

:06                 



                                                                



                                Blood Pressure Mean 79 23 20:06           

      



                                                                



                                Blood Pressure Position Sitting 23 20:06  

               



                                                                



                                O2 Sat by Pulse Oximetry 97 23 20:06      

           



                                                                



                                Oxygen Delivery Method 23 20:06           

      



                                                                



                                                                



                                Physical Exam:                  



                                                                



                                CONSTITUTIONAL: no apparent distress, well appea

ring                 



                                SKIN: warm, dry, no jaundice, hives or petechiae

                 



                                                    EYES: pupils are equally rou

nd, extraocular movements intact without nystagmus,

clear conjunctiva,                                  



                                non-icteric sclera                 



                                                    HENT: normocephalic, atrauma

tic, moist mucus membranes, oropharynx clear 

without exudates                                    



                                                    NECK: Nontender and supple w

ith no nuchal rigidity, no lymphadenopathy, full 

range of motion                                     



                                                    PULMONARY: clear to ausculta

tion without wheezes, rhonchi, or rales, normal 

excursion, no accessory                             



                                muscle use and no stridor                 



                                                    CARDIOVASCULAR: regular rate

, rhythm, normal S1 and S2. No appreciated murmurs.

Strong radial pulses                                



                                with intact distal perfusion                 



                                                    GASTROINTESTINAL: soft, non-

tender, non-distended, no palpable masses, no 

rebound or guarding                                 



                                                    PSYCHIATRIC: depressed mood 

and affect, denies si/hi, denies hallucinations 

(John Melton)                                       



                                                                



                                Results/Orders                  



                                                                



                                - Results and Orders                 



                                Result diagrams:                 



                                 23 20:19                 



                                                                



                                 23 20:19                 



                                                                



                                - Results and Orders                 



                                Lab Testing Results                 



                                                                



                                                    23 20:19: WBC 7.9, RBC

 4.27, Hgb 12.9, Hct 40.2, MCV 94.10, MCH 30.2, 

MCHC 32.10, RDW Coeff of                            



                                                    Amanda 12.4, Plt Count 211.0, M

PV 11.1, Immature Gran % (Auto) 0.3, Neut % (Auto) 

75.8 H, Lymph %                                     



                                                    (Auto) 10.6 L, Mono % (Auto)

 11.8 H, Eos % (Auto) 0.9, Baso % (Auto) 0.6, Neut 

# (Auto) 6.0, Lymph #                               



                                                    (Auto) 0.83, Mono # (Auto) 0

.93, Eos # (Auto) 0.07, Baso # (Auto) 0.05, 

Immature Gran # (Auto) 0.02,                           



                                Absolute Nucleated RBC 0, Nucleated RBC % (auto)

 0                 



                                                    23 20:19: Sodium 142.0

, Potassium 4.2, Chloride 110 H, Carbon Dioxide 26,

Anion Gap 6, BUN 17,                                



                                                    Creatinine 0.88, Estimated C

reat Clear 80.94, Estimated GFR 78 L, 

BUN/Creatinine Ratio 19, Glucose                           



                                                    92, Calculated Osmolality 29

5.0, Calcium 9.1, Total Bilirubin 0.3, AST 22, ALT 

15, Alkaline                                        



                                                    Phosphatase 109, Total Prote

in 6.9, Albumin 3.9, Globulin 3.0, Albumin/Globulin

Ratio 1.3, Ethyl                                    



                                Alcohol < 3                     



                                23 20:19: HCG (Qual) Negative             

    



                                                    23 20:35: Urine Opiate

s Screen Negative, Urine Methadone Screen Negative,

Ur Propoxyphene                                     



                                                    Screen Negative, Ur Barbitur

ates Screen Negative, Ur Phencyclidine Scrn 

Negative, Ur Amphetamines                           



                                                    Screen Negative, U Benzodiaz

epines Scrn Negative, Urine Cocaine Screen 

Negative, U Cannabinoids                            



                                Screen Negative                 



                                                    23 20:35: Urine Color 

Yellow, Urine Clarity Clear, Urine pH 6.5, Ur 

Specific Gravity 1.015,                             



                                                    Urine Protein Negative, Urin

e Glucose (UA) Negative, Urine Ketones Negative, 

Urine Blood Negative,                               



                                                    Urine Nitrate Negative, Urin

e Bilirubin Negative, Urine Urobilinogen 1.0, Ur 

Leukocyte Esterase                                  



                                                    Trace A, Urine WBC (Auto) No

t Reportable, Urine RBC (Auto) Not Reportable, 

Urine Casts (Auto) Not                              



                                                    Reportable, U Epithel Cells 

(Auto) Not Reportable, Urine Bacteria (Auto) Not 

Reportable, Urine RBC                               



                                                    0-2, Urine WBC 0-5, Ur Squam

ous Epith Cells 6-10 A, Amorphous Crystals Few A, 

Urine Bacteria Few A,                               



                                Hyaline Casts 0-5 A                 



                                                                



                                                                



                                MDM/COURSE                      



                                  Vital Signs                   



                                                                



                                                                



                                                                



                                Temperature 37.4 C 23 20:06               

  



                                                                



                                Pulse Rate 89 23 20:06                 



                                                                



                                Respiratory Rate 17 23 20:06              

   



                                                                



                                Blood Pressure 129/55 L 23 20:06          

       



                                                                



                                O2 Sat by Pulse Oximetry 97 23 20:06      

           



                                                                



                                                                



                                                                



                                                                



                                                                



                                                                



                                Temperature 37.4 C 23 21:37               

  



                                                                



                                Pulse Rate 90 23 21:37                 



                                                                



                                Respiratory Rate 18 23 21:37              

   



                                                                



                                Blood Pressure 127/68 23 21:37            

     



                                                                



                                O2 Sat by Pulse Oximetry 99 23 21:37      

           



                                                                



                                                                



                                                                



                                                                



                                - [facility]                           



                                Medical decision making narrative:              

   



                                                                



                                23 20:13                  



                                ddx: depression, meth abuse, homeless           

      



                                                                



                                will get labs, medically clear, get psych eval  

               



                                02/15/23 05:25                  



                                                    Labs returned within normal 

limits, no UTI, patient is not pregnant as she 

stated. Patient medi                                



                                malathi cleared.                  



                                                                



                                                    Patient evaluated by Psychia

try and cleared for discharge with outpatient 

follow-up.                                          



                                                                



                                 Escalation of care including admission/observat

ion considered: None                 



                                                     Discussions of management w

ith other providers e.g. Hospitalist, Consultant, 

Case Mgmt, Pharmacy,                                



                                Radiology: None                 



                                 Discussed with Radiology regarding test interpr

etation: None                 



                                                     Independent interpretation:

 (e.g., EKG, rhythm strips, x-rays, CT, Other): 

None                                                



                                                     Additional patient history 

obtained from (e.g. Partner, Parent, Friend, EMS, 

Other): None                                        



                                                     Review of external non-ED r

ecord (e.g. Inpt record, Office record, Outpt 

record, Prior Outpt labs,                           



                                PCP record, Outside ED record, Other): None     

            



                                 Diagnostic tests considered but not performed: 

None                 



                                 Prescription medications considered but not giv

en: None                 



                                 Chronic conditions affecting care (e.g. HTN, DM

): None                 



                                                     Social Determinants of heal

th significantly affecting care (e.g. homeless): 

None (John Melton)                                  



                                                                



                                Discharge Plan                  



                                                                



                                - Discharge                     



                                Clinical Impression:                 



                                Depression                      



                                Qualifiers:                     



                                                     Depression Type: unspecifie

d Qualified Code(s): F32.A - Depression, 

unspecified                                         



                                                                



                                Disposition: Home or Self-Care                 



                                Condition: Good                 



                                Care Plan Goals:                 



                                Return if any issues or concerns arise.         

        



                                Follow up with your PCP or with one of the [facility] Providers/Clinics:                 



                                                                



                                [facility]                

 



                                [address] [place], [place] [id number] [phone]                  



                                                                



                                Eliazar Pedro                  



                                [address] [place] [address]                 



                                [phone]                    



                                                                



                                [facility]                 



                                 [place] [address]                 



                                [phone]                    



                                                                



                                [facility]                 



                                [address] [place] [address]                 



                                [phone]                    



                                                                



                                Dr. Manoj Montero                 



                                [address]                 



                                [place] [address]                 



                                [phone]                    



                                                                



                                Dr. Yolie Montalvo                 



                                [id number]                 



                                [place] [address]                 



                                [phone]                    



                                                                



                                [facility]           

      



                                [address] [place] [address]             

    



                                [phone]                  



                                                                



                                Return if your condition worsens/return or any i

ssues or concerns arise.                 



                                Referrals:                      



                                Wily Genao MD [Primary Care Provider] -  

               



                                Print Language: English                 



                                                                



                                - Discharge Data                 



                                Time Seen by Provider: 23 19:59           

      



                                                                



                                                                



                                                                



                                Dictated By: John Melton MD                 



                                Signed By: John Melton MD 02/15/23 0526         

        



                                                                



                                                                



                                                                



                                                                



                                                                



                                DD/DT: 23                 



                                TD/TT: 23 : JOSE ELIASICarla  

               



                                                                



                                                                



                                cc: [id number]*                    



                                                                



                                Wily Genao MD                 

 

                2020 15:43:00-00:00 [id number]                       [address] [address]                 



                                                                



                                PATIENT NAME: TYLER JUDD ADMIT DATE: 20

                 



                                ACCOUNT NO: [id number] ROOM NO: L.S201        

         



                                MEDICAL RECORD NO: [id number] AGE: 49           

      



                                REPORT TYPE: eECHOCARDIOGRAM REPORT SEX: F      

           



                                                                



                                ADMITTING PHYSICIAN: Derian Ferrara MD        

         



                                ATTENDING PHYSICIAN: Derian Ferrara MD        

         



                                                                



                                                                



                                                                



                                *[facility]*             

    



                                [address] [place] [id number]                 



                                Phone [phone]                 



                                                                



                                Transthoracic Echocardiogram                 



                                                                



                                Patient: Tyler Judd                 



                                Study Date: 2020  BP: 97 / 82 Location: [facility]                 



                                URN: [id number] MRN: [id number] Account#: [id number]

[id number]                 



                                : 1970 Age: 49 Height: 62 in / 157.5   

              



                                  cm                            



                                Accession#: [id number] Gender: F Weight: 243

 lb / 110.5                 



                                 kg                             



                                BMI/BSA: 44.5 kg/m 2 /                 



                                 2.26 m 2                       



                                                                



                                *Ordering Physician: * Bee Multani            

     



                                                                



                                *Interpreting Physician: * Savanna GainesSonographer: * Eufemia Hodges                 



                                                                



                                ------------------------------------------------

------------------------                 



                                Indications: NSTEMI.                 



                                                                



                                ------------------------------------------------

------------------------                 



                                Study data: Transthoracic echocardiogram. Proced

ure: Transthoracic                 



                                echocardiography was performed. Image quality wa

s adequate. Intravenous                 



                                contrast (agitated saline) was administered. Com

plete 2D, complete                 



                                spectral Doppler, and color Doppler. [facility] Patient                 



                                status: Inpatient. Patient room number: ER-4. St

udy status: Stat.                 



                                                                



                                ------------------------------------------------

------------------------                 



                                Findings                        



                                                                



                                Left ventricle: The cavity size is normal. Wall 

thickness is mildly                 



                                increased. The estimated ejection fraction is 65

-69%.                 



                                Right ventricle: The cavity size is normal.     

            



                                Left atrium: The atrium is normal in size.      

           



                                Right atrium: The atrium is normal in size.     

            



                                Atrial septum: Echo contrast study shows no shun

t. 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                 



                                                                



                                PATIENT NAME: TYLER JUDD ACCOUNT #: [id number]

158                 



                                                                



                                                                



                                                                



                                primum aneurysm.                 



                                Aorta: Aortic root: The aortic root is normal in

 size.                 



                                Aortic valve: The valve is structurally normal. 

The valve is                 



                                trileaflet. There is no evidence of stenosis. Th

ere is mild                 



                                regurgitation.                  



                                Mitral valve: The valve is structurally normal. 

There is mild                 



                                regurgitation.                  



                                Tricuspid valve: The valve is structurally xenia

l. There is mild                 



                                regurgitation.                  



                                Pulmonic valve: The valve is structurally normal

. There is no                 



                                regurgitation.                  



                                Pericardium: There is no pericardial effusion.  

               



                                Pulmonary arteries:                 



                                Not visualized.                 



                                Systemic veins:                 



                                Inferior vena cava: The vessel is normal in size

.                 



                                                                



                                ------------------------------------------------

------------------------                 



                                Measurements                    



                                                                



                                 Left ventricle Value Ref Aortic valve continued

                 



                                Value Ref                       



                                 CLEMENTINA, LAX 5.0 cm 3.8 - 5.2 Mean v, S            

     



                                2.22 m/sec -----                 



                                 ESD, LAX 3.1 cm 2.2 - 3.5 VTI, S               

  



                                64.8 cm -----                   



                                 ESD/bsa, LAX 1.4 cm/m 2 1.3 - 2.1 LVOT/AV, VTI 

ratio                 



                                0.62 -----                      



                                 FS, LAX 37 % 27 - 45 RABIA, VTI                 



                                2.04 cm 2 -----                 



                                 PW, ED 1.3 cm 0.6 - 0.9 RABIA, Vmax              

   



                                1.94 cm 2 -----                 



                                 IVS/PW, ED 0.97 --------- AR peak v            

     



                                3.71 m/sec -----                 



                                 EF 67 % 54 - 74 AR decel time                 



                                2363 ms -----                   



                                 IVRT 80 ms --------- AR PHT                 



                                685 ms -----                    



                                 E', med ruddy, TDI 6.8 cm/sec >=7.0 AR peak grad 

                



                                55 mm Hg -----                  



                                 E/e', med ruddy, TDI 12  ---------               

  



                                 Mitral valve                   



                                Value Ref                       



                                 LVOT Value Ref Peak E                 



                                0.84 m/sec -----                 



                                 Diam, S 2.05 cm --------- Peak A               

  



                                0.9 m/sec -----                 



                                 Area 3.3 cm 2 --------- Decel time             

    



                                324 ms -----                    



                                 Peak renny, S 1.8 m/sec --------- PHT            

     



                                78 ms -----                     



                                 Mean renny, S 1.36 m/sec --------- Peak grad, D  

               



                                2.8 mm Hg -----                 



                                 VTI, S 40.1 cm --------- Peak E/A ratio        

         



                                                                



                                PATIENT NAME: TYLER JUDD  ACCOUNT #: [id number]

3158                 



                                                                



                                                                



                                                                



                                0.94 -----                      



                                 Peak grad, S 13 mm Hg --------- MVA, PHT       

          



                                2.8 cm 2 -----                  



                                 Mean grad, S  8 mm Hg ---------                

 



                                  ml --------- Tricuspid valve            

     



                                Value Ref                       



                                 SV/bsa 58 ml/m 2 --------- TR peak v           

      



                                2.83 m/sec <=2.8                 



                                 Peak RV-RA grad, S                 



                                32 mm Hg -----                  



                                 Ventricular septum Value Ref                 



                                 IVS, ED 1.2 cm 0.6 - 0.9 Aortic root           

      



                                Value Ref                       



                                 Root diam, ED MM                 



                                2.85 cm -----                   



                                 Right ventricle Value Ref                 



                                 Pressure, S 37 mm Hg --------- Pulmonary artery

                 



                                Value Ref                       



                                  Pressure, S                   



                                31.0 mm Hg -----                 



                                 Left atrium Value Ref                 



                                 AP dim, ES MM 3.5 cm 2.7 - 3.8 Systemic veins  

               



                                Value Ref                       



                                 LA/Ao root ratio, MM 1.24 --------- Estimated C

                 



                                5 mm Hg -----                   



                                                                



                                 Aortic valve Value Ref  Pulmonary veins        

         



                                Value Ref                       



                                 Leaflet sep, MM 1.65 cm --------- A rev duratio

n                 



                                140 ms -----                    



                                                                



                                ------------------------------------------------

------------------------                 



                                Conclusions                     



                                                                



                                Summary:                        



                                                                



                                1. Left ventricle: The cavity size is normal. Wa

ll thickness is mildly                 



                                 increased. The estimated ejection fraction is 6

5-69%.                 



                                2. Right ventricle: The RV pressure during systo

le by Doppler is 37 mm                 



                                 Hg.                            



                                3. Atrial septum: Echo contrast study shows no s

hunt. 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                 



                                 primum aneurysm.                 



                                                                



                                Prepared and electronically signed by           

      



                                                                



                                Savanna Gaines MD                 



                                2020 15:43                 



                                                                



                                                                



                                                                



                                                                



                                                                



                                Electronically Signed by DINH Gaines MD on

 20 at 1543                 



                                                                



                                                                



                                PATIENT NAME: TYLER JUDD ACCOUNT #: [id number]

158                 

 

                2020 13:30:00-00:00                                 [facility]        

         



                                Hospitalist Discharge Summary                 



                                REPORT#:[id number] REPORT STATUS: Signed         

        



                                DATE:20 TIME:1330                 



                                                                



                                PATIENT: TYLER JUDD UNIT #: [id number]       

          



                                ACCOUNT#: [id number] ROOM/BED: [place] [address]       

          



                                : [birth year] AGE: 49 SEX: F ATTEND: Sanjiv Ferrara MD                 



                                ADM DT: 20  AUTHOR: Bee Multani MD       

          



                                                                



                                * ALL edits or amendments must be made on the [employer] document *                 



                                                                



                                                                



                                PCP                             



                                                                



                                PCP                             



                                PCP:                            



                                PCP: No Primary or Family Physician             

    



                                                                



                                Discharge to: home                 



                                                                



                                                                



                                General Information                 



                                Date of admission:                 



                                Observation Start Date:                 



                                Date of admission: 20                 



                                                                



                                Discharge date: 20                 



                                Discharge diagnosis:                 



                                see hosp course                 



                                Hospital course:                 



                                HOSP COURSE                     



                                                                



                                48 yo F admitted with Ggeneralised weaknss and C

P night PTA                 



                                                                



                                ASSESSMENT AND PLAN:                 



                                1. Elevated cardiac enzymes. The patient is star

gianfranco on                 



                                weight-based Lovenox. started the patient on asp

irin, statin.                 



                                Hold beta-blocker because of bradycardia        

         



                                        Per cardiology, they feel the elevated c

ardiac enzymes are secondary to demand 

                                        



                                ischemia and not an STEMI                 



                                                                



                                Had CP PTA but resolved- dc home , dc her hoem c

oreq on discharge                 



                                                                



                                2. Symptomatic bradycardia with hypotension. Res

olved                 



                                Off Levophed, off dopamine                 



                                patient reports she may have mistakenly taken mo

re Coreq than her prescribed                 



                                dose at home                    



                                                                



                                3. Leukocytosis, etiology is unclear, However, t

he patient is pancultured.                 



                                Continue the empiric antibiotics, ceftriaxone , 

ID consulted                 



                                po abx on dc                    



                                                                



                                4. History of bipolar. The patient takes lithium

. Lithium level is 0.5                 



                                                                



                                stable for dc                   



                                dc home stable                  



                                                                



                                dc time 33 mins                 



                                Pt. condition on discharge: improved, stable    

             



                                                                



                                Med Rec                         



                                                                



                                Med Rec                         



                                Discharge meds:                 



                                Stop taking the following medications:          

       



                                CARVEDILOL (COREG) 25 MG TAB                 



                                 25 MILLIGRAM ORAL TWICE DAILY WITH MEALS.      

           



                                                                



                                Continue taking these medications:              

   



                                LITHIUM CARBONATE ER (LITHIUM CARBONATE ER) 450 

MG TAB.SA                 



                                 900 MILLIGRAM ORAL DAILY PM                 



                                                                



                                CHOLECALCIFEROL (VITAMIN D3) (VITAMIN D3) 1,000 

UNITS CAP                 



                                 1,000 UNITS ORAL DAILY.                 



                                                                



                                MULTIVITAMIN (MULTI-DAY VITAMIN) 1 TAB TAB      

           



                                 1 TABLET ORAL DAILY.                 



                                                                



                                GABAPENTIN (NEURONTIN) 100 MG CAP               

  



                                 100 MILLIGRAM ORAL THREE TIMES A DAY.          

       



                                                                



                                ALPRAZolam (XANAX) 0.5 MG TAB                 



                                 0.5 MILLIGRAM ORAL EVERY 8 HR AS NEEDED. as nee

ded for ANXIETY                 



                                                                



                                MIRTAZAPINE (REMERON) 30 MG TAB                 



                                 30 MILLIGRAM ORAL DAILY.                 



                                                                



                                ZIPRASIDONE (GEODON) 40 MG CAP                 



                                 40 MILLIGRAM ORAL TWICE DAILY.                 



                                                                



                                MELOXICAM (MOBIC) 7.5 MG TAB                 



                                 7.5 MILLIGRAM ORAL DAILY.                 



                                 Comments:                      



                                 10MG DAILY                     



                                                                



                                ALBUTEROL (PROAIR HFA 90 MCG/ACT) 90 MCG INHALER

                 



                                 1 PUFF INHALATION RT - EVERY 4 HOURS.          

       



                                                                



                                CEPHALEXIN (KEFLEX) 500 MG CAP                 



                                 500 MILLIGRAM ORAL EVERY 6 HOURS.              

   



                                 Qty = 12                       



                                                                



                                                                



                                                                



                                Discharge Instructions                 



                                Activity: as tolerated                 



                                Additional instructions:                 



                                fup with PCP 3-5 days                 



                                Prescriptions: on chart                 



                                Discharge management: greater than 30 mins      

           



                                                                



                                Objective                       



                                                                



                                Physical Exam                   



                                Head/Eyes: atraumatic, clear cornea, EOMI, exnia

l conjunctiva/sclera, normal                 



                                eyelids/periorb., normocephalic, PERRL          

       



                                Cardiovascular: normal capillary refill, regular

 rate rhythm                 



                                Respiratory: aerating well, no distress         

        



                                        Abdomen: non-tender, normal bowel sounds

, soft, no distention, no guarding, no 

                                        



                                hernial, no mass/organomegaly, no rebound       

          



                                Extremities: moves all, normal capillary refill,

 normal range of motion, no                 



                                edema                           



                                Neuro/CNS: alert, oriented X 3                 



                                Psychiatry: normal affect                 



                                                                



                                Electronically Signed by Bee Multani MD on  at 1333                 



                                                                



                                RPT #: [id number]                 



                                ***END OF REPORT***                 



                                                                



                                                                

 

                2020 17:48:00-00:00                                 [facility] ([facility])        

         



                                Infectious Dis. Progress Note                 



                                REPORT#:[id number] REPORT STATUS: Signed         

        



                                DATE:20 TIME:174                 



                                                                



                                PATIENT: TYLER JUDD UNIT #: [id number]       

          



                                ACCOUNT#: [id number]  ROOM/BED: [address]     

            



                                : 68 AGE: 49 SEX: F ATTEND: Sanjiv Ferrara MD                 



                                ADM DT: 20 AUTHOR: Serge Kennedy MD                 



                                                                



                                * ALL edits or amendments must be made on the el

[employer]/computer document *                 



                                                                



                                                                



                                Subjective                      



                                Chief Complaint:                 



                                F/u leukocytosis                 



                                HPI:                            



                                Leukocytosis resolving.                 



                                                                



                                Review of Systems                 



                                Constitutional:                 



                                Denies: chills, fever.                 



                                                                



                                Objective                       



                                                                



                                General                         



                                VS/I O:                         



                                Last Documented:                 



                                 Result Date Time                 



                                  Pulse Ox 100  1701                 



                                 B/P 86/47  1701                 



                                 B/P Mean 61  1701                 



                                 Pulse  53  1701                 



                                 Resp 46  1701                 



                                 O2 Delivery Room air  1600                

 



                                 Temp 36.4  1600                 



                                 FiO2 21  0741                 



                                 O2 Flow Rate 0.179226 2052               

  



                                                                



                                Vital Signs                     



                                 Date Temp Pulse Resp B/P B/P Mean Pulse Ox FiO2

                 



                                 - 36.4-37.1 51-67 18-54 /40-77 

  21                 



                                                                



                                24 hour I O ending at 0700:                 



                                   0700  1900                 



                                 Intake Total 4572.60 408.20                 



                                 Output Total 1250 2300                 



                                 Balance 3322.60 -1891.80                 



                                                                



                                 Intake, IV 3432.60 408.20                 



                                 Intake, Oral 1140                 



                                 Number 0                       



                                  Bowel                         



                                 Movements                      



                                 Number Voids 0                 



                                 Output, Urine 1250 2300                 



                                                                



                                Patient Weight                  



                                                                



                                Weight (lb):                    



                                Weight (oz):                    



                                Weight (kg): 110.455                 



                                                                



                                                                



                                Physical Exam                   



                                General appearance: alert, awake                

 



                                Head/Eyes: atraumatic                 



                                Cardiovascular: regular rate rhythm             

    



                                Respiratory: symmetric expansion, no distress   

              



                                Abdomen: soft, no distention                 



                                Musculoskeletal: no joint swelling              

   



                                Neuro/CNS: normal speech                 



                                                                



                                                                



                                Diagnosis, Assessment Plan                 



                                Free Text A P:                  



                                Assessment:                     



                                1. Hypotension, most likely due to NSTEMI.      

           



                                2. Probable septic shock, r/o bacteremia. No res

piratory or urinary symptoms.                 



                                CXR showed no pneumonia. She had diarrhea but it

 is resolving.                 



                                3. Acute kidney failure. Resolving.             

    



                                4. Morbid obesity.                 



                                5. COPD.                        



                                                                



                                Plan:                           



                                1. Leukocytosis resolving.                 



                                2. Continue ceftriaxone. Will likely stop in 1-2

 days depending on culture                 



                                results.                        



                                3. S/p vancomycin IV x 1 dose.                 



                                4. If diarrhea returns, will order stool culture

 and C. difficile.                 



                                                                



                                                                



                                Electronically Signed by Serge Kennedy MD on 20 at 1753                 



                                                                



                                RPT #: [id number]                 



                                ***END OF REPORT***                 



                                                                



                                                                

 

                2020 13:37:00-00:00 [id number]                       [facility]                

 



                                 [address] [place] [address]                 



                                                                



                                PATIENT NAME: TYLER JUDD ADMIT DATE: 20

                 



                                ACCOUNT NO: [id number] ROOM NO:         

         



                                MEDICAL RECORD NO: [id number] AGE: 49           

      



                                REPORT TYPE: PROGRESS NOTE SEX: F               

  



                                                                



                                ADMITTING PHYSICIAN: Derian Ferrara MD        

         



                                ATTENDING PHYSICIAN: Derian Ferrara MD        

         



                                                                



                                                                



                                DATE: 2020                 



                                                                



                                The patient is seen in the room. I reviewed the 

chart. At this time, troponin                 



                                is borderline high. The patient is already start

ed on Lovenox. The patient                 



                                                    came with hypertension and w

ith normal saline the patient improved. The patient

                                                    



                                had enteritis before. However, the patient also 

has troponins borderline high                 



                                and Lovenox was started and we will continue to 

follow the patient. Possibly,                 



                                        we have to rule out the possibility of a

n acute myocardial infarction. On this 

                                        



                                lady, a troponin borderline high shows there is 

indication of some myocardial                 



                                ischemia.                       



                                                                



                                From my point of view, the patient is awake and 

alert. We will continue the                 



                                present medication as ordered by Dr. Multani and pr

imary physician intensivist.                 



                                                                



                                Dictated By: DINH Soto MD               

  



                                                                



                                WT: PN:LTIFFANIE/MILTON/SAMUEL                 



                                DD: 2020 13:37:18                 



                                DT: 2020 14:57:10                 



                                Conf#: [id number]/DID#: [id number]                 



                                                                



                                Authenticated by Savanna Gaines MD On  01:10:35 PM                 



                                                                



                                                                



                                                                



                                                                



                                                                



                                Electronically Signed by DINH Gaines MD on

 20 at 1310                 



                                                                



                                                                



                                                                



                                                                



                                                                



                                                                



                                                                



                                                                



                                                                



                                                                



                                                                



                                                                



                                                                



                                                                



                                                                



                                PATIENT NAME: TYLER JUDD  ACCOUNT #: [id number]

[id number]                 

 

                2020 13:29:00-00:00 [id number]                       [facility]



                                  [facility]               

  



                                 [address] [place] [address]                 



                                                                



                                PATIENT NAME: TYLER JUDD  ADMIT DATE: 

0                 



                                ACCOUNT NO: [id number] ROOM NO: L.ICU07       

          



                                MEDICAL RECORD NO: [id number] AGE: 49           

      



                                REPORT TYPE: PROGRESS NOTE SEX: F               

  



                                                                



                                ADMITTING PHYSICIAN: Derian Ferrara MD        

         



                                ATTENDING PHYSICIAN: Derian Ferrara MD        

         



                                                                



                                                                



                                DATE:  2020                 



                                                                



                                SUBJECTIVE: The patient is awake, alert. The pat

ient is feeling better. The                 



                                patient's blood pressure is 110/80. The patient'

s normal saline is going                 



                                intravenously. The patient is off of dopamine an

d epinephrine and IV normal                 



                                saline is continusly infusedd 100cc of normal sa

line / hr. The patient was                 



                                transferred from [facility] where she had s

ome significant enteritis;                 



                                        however, the patient was very severely h

ypotensive and was started on dopamine 

                                        



                                and epinephrine. At this time, my point of view,

 the patient is quite stable.                 



                                 She is off of all vasopressors. She is more ronny

rt and able to answer all the                 



                                questions. I will continue to follow the patient

 cardiac wise and the patient                 



                                may be transferred out of ICU. I will leave the 

decision to the primary care                 



                                and intensivist, borderline increase in troponin

 is noted and investigated                 



                                further..                       



                                                                



                                Dictated By: DINH Soto MD               

  



                                                                



                                WT: PN:LTIFFANIE/MILTON/SAMUEL                 



                                DD: 2020 13:29:41                 



                                DT: 2020 14:43:35                 



                                Conf#: [id number]/DID#: [id number]                 



                                                                



                                Authenticated and Edited by Savanna Gaines MD On 20 3:24:06 PM                 



                                                                



                                                                



                                                                



                                                                



                                                                



                                Electronically Signed by DINH Gaines MD on

 20 at 1526                 



                                                                



                                                                



                                                                



                                                                



                                                                



                                                                



                                                                



                                                                



                                                                



                                                                



                                                                



                                                                



                                                                



                                                                



                                PATIENT NAME: TYLER JUDD ACCOUNT #: [id number]

[id number]                 

 

                2020 10:32[facility]00-00:00                                 [facility]        

         



                                Hospitalist Progress Note                 



                                REPORT#:[id number] REPORT STATUS: Signed         

        



                                DATE:20 TIME:1032                 



                                                                



                                PATIENT: TYLER JUDD UNIT #: [id number]       

          



                                ACCOUNT#: [id number] ROOM/BED: L.ICU07-1      

           



                                : 66 AGE: 49 SEX: F ATTEND: Sanjiv Ferrara MD                 



                                ADM DT: 20 AUTHOR: Bee Multani MD        

         



                                                                



                                * ALL edits or amendments must be made on the [employer]/computer document *                 



                                                                



                                                                



                                Subjective                      



                                Chief Complaint:                 



                                No complaints                   



                                                                



                                Review of Systems                 



                                Respiratory:                    



                                Denies: SOB.                    



                                Cardiovascular:                 



                                Denies: chest pain.                 



                                GI:                             



                                Denies: abdominal pain, diarrhea, nausea, vomiti

ng.                 



                                Heme:                           



                                Denies: bleeding.                 



                                Neuro:                          



                                Denies: headache.                 



                                                                



                                Objective                       



                                                                



                                General                         



                                VS/I O:                         



                                Vital Signs:                    



                                 Date Time Temp Pulse Resp B/P B/P Pulse O2 O2 F

low FiO2                 



                                 Mean Ox  Delivery Rate                 



                                  0741 96 Room air 21                 



                                  0710 36.4 60 29 112/58 76 100 Room air   

              



                                  0646 60  29 112/58 79                 



                                  0631 60 28 114/61 83 100                 



                                  0619 64 26 144/77 105 99                 



                                  0615 62 23 136/73 98 98                 



                                  0601 63 30 136/74 97 96                 



                                  0545 59 28 120/57 83 100                 



                                  0530 58 34 120/58 84 100                 



                                  0515 56 30 115/55 77 100                 



                                  0500 63 21 105/54 77 100                 



                                  0445 56 25 113/56 80 100                 



                                  0430 58 28 105/59 74 100                 



                                  0416 55 20 98/56 72 100                 



                                  0400 59                  



                                  0400 24 95/51 70 100                 



                                  0349 37.1 20                 



                                  0346 56  24 102/52 72 99                 



                                  0330 67 38 132/63 91 99                 



                                  0315 61 27 101/55 73 99                 



                                  0300 61 26 92/51 66 98                 



                                  0245 62 21 99/54 74 98                 



                                  0231 62 25 108/55 78 99                 



                                  0215 67 29 123/62 87 98                 



                                  0200 64 29 120/57 82 100                 



                                  0145 61 27 100/58 76 99                 



                                  0130 61 34 98/53 70 99                 



                                  0115 60 27 99/58 75 99                 



                                   0100 60 29 96/52 69 99                 



                                  0045 58 27 101/56 73 100                 



                                  0030 61 34 99/56 73 99                 



                                  0015 59 29 94/52 70 99                 



                                  0011 60 26 98/48 69 99                 



                                  0006 62 29 83/40 56 99                 



                                  0000 36.8 24                 



                                  0000 62 31 89/51 66 100                 



                                  2358 62 25 92/51 66 100                 



                                  2345 64 27 102/58 77 99                 



                                  2330 63 28 96/55 72 100                 



                                  2316 64 25 100/55 73 99                 



                                  2300 63 27 119/58 82 95                 



                                  2245 60 18 97/56 74 99                 



                                  2230 58 25 99/55 72 98                 



                                  2215 56 21 97/54 74 99                 



                                  2200 56 25 105/62 78 99                 



                                  2145 57 32 101/65 78 100                 



                                  2130 57 26 101/58 77 98                 



                                  2116 55 38 105/54 74 95                 



                                  2115 58 42 95                 



                                  2104 58 18 91/50 67 99                 



                                  2100 59 25 99                 



                                  2052 99 Room air 0.586534 21             

    



                                  2045 60  27 100                 



                                  2030 61 19 100                 



                                 2015 61 24 100                 



                                 2000 37.1                 



                                 2000 20                  



                                  2000 62 32 100                 



                                  1945 60 38 100                 



                                  1934 61 104/53 75 100                 



                                  1916 61  117/53 76 99                 



                                  1900 55 22 104/59 77 100                 



                                  1845 53 23 108/57 80 100                 



                                  1816 53 25 108/54 78 99                 



                                  1802 53 20 109/56 80 99                 



                                  1730 53 24 93/54 68                 



                                  1730 36.4 53 24 93/54 67 100 Room air    

             



                                  1700 57 22 110/55 73 100 Room air        

         



                                  1639 57 22 100/57 71 100 Room air        

         



                                  1631 54 102/59 73 97 Room air            

     



                                  1615 58 23 105/62 76 100 Room air        

         



                                  1558 54 110/58 75 100 Room air           

      



                                  1431 57 115/56 75 100 Room air           

      



                                  1409  99 Room air 21                 



                                  1408 57 21 92/52 65 100 Room air         

        



                                  1345 57 22 98/54 68 100                 



                                  1330  57 102/57 72 100 Room air          

       



                                  1246 59 102/57 72                 



                                  1215 59 21 106/57 73 98                 



                                  1200 57 25 106/57  73 99                 



                                  1145 56 107/55 72 100                 



                                  1130 52 27 113/59 77 100                 



                                  1116 53 105/51 69 99                 



                                  1101  51 93/52 65 99                 



                                  1045 52 101/60 73 99                 



                                                                



                                24 hour I O ending at 0700:                 



                                  0700  1900                 



                                 Intake Total 4572.60 408.20                 



                                 Output Total 1250 2300                 



                                 Balance 3322.60 -1891.80                 



                                                                



                                  Intake, IV 3432.60 408.20                 



                                 Intake, Oral 1140                 



                                 Number 0                       



                                 Bowel                          



                                  Movements                     



                                 Number Voids 0                 



                                 Output, Urine 1250 2300                 



                                                                



                                Patient Weight                  



                                                                



                                Weight (lb):                    



                                Weight (oz):                    



                                Weight (kg): 110.455                 



                                                                



                                                                



                                Physical Exam                   



                                General appearance: alert, awake                

 



                                Head/Eyes: atraumatic, clear cornea, EOMI, xenia

l conjunctiva/sclera, normal                 



                                eyelids/periorb., normocephalic, PERRL          

       



                                Cardiovascular: normal capillary refill, regular

 rate rhythm                 



                                Respiratory: aerating well, no distress         

        



                                        Abdomen: non-tender, normal bowel sounds

, soft, no distention, no guarding, no 

                                        



                                hernial, no mass/organomegaly, no rebound       

          



                                Extremities: moves all, normal capillary refill,

 normal range of motion, no                 



                                edema                           



                                Neuro/CNS: alert, oriented X 3                 



                                Psychiatry: normal affect                 



                                                                



                                Results                         



                                Findings/Data:                  



                                Laboratory Tests                 



                                                     



                                  0500 1240                     



                                 Chemistry                      



                                 Sodium (134 - 147 mmol/L) 143                 



                                 Potassium (3.4 - 5.0 mmol/L) 4.2               

  



                                  Chloride (100 - 108 mmol/L) 114 H             

    



                                 Carbon Dioxide (21 - 32 mmol/L) 24             

    



                                 Anion Gap (4.0 - 15.0 GAP calc) 5.0            

     



                                  BUN (7 - 18 MG/DL) 17                 



                                 Creatinine (0.6 - 1.0 MG/DL) 0.9               

  



                                 Glomerular Filtr Rate (>60 estGFR) >=60 max est

imate                 



                                 Glucose (70 - 110 MG/DL) 89                 



                                 Calcium (8.5 - 10.1 MG/DL) 8.3 L               

  



                                 Troponin I (0.000 - 0.045 NG/ML) 0.436 *H      

           



                                                                



                                Laboratory Tests                 



                                                           



                                 0500                           



                                 Hematology                     



                                 WBC (3.5 - 11.0 K/mm3) 15.2 H                 



                                 RBC (4.70 - 6.10 M/mm3) 3.67 L                 



                                 Hgb (10.4 - 14.9 G/DL)  11.3                 



                                 Hct (31.5 - 44.1 %) 35.5                 



                                 MCV (84.5 - 98.6 Fl) 96.7                 



                                 MCH (27.0 - 34.2 pg)  30.8                 



                                 MCHC (31.5 - 34.0 G/DL) 31.8                 



                                 RDW (11.5 - 14.5 SD) 14.2                 



                                 Plt Count (150 - 450 K/mm3)  242.0             

    



                                 MPV (7.0 - 10.5 fL) 10.20                 



                                 Neut % (Auto) (40 - 76 %) 78.8 H               

  



                                 Lymph % (Auto) (20.5 - 51.1 %) 13.1 L          

       



                                 Mono % (Auto) (1.7 - 9.3 %) 6.2                

 



                                 Eos % (Auto) (0.0 - 6.0 %) 1.6                 



                                 Baso % (Auto) (0.0 - 2.0 %) 0.3                

 



                                 Neut # (Auto) (1.8 - 7.6 K/mm3) 11.94 H        

         



                                 Lymph # (Auto) (0.6 - 3.2 K/mm3) 2.0           

      



                                 Mono # (Auto) (0.3 - 1.1 K/mm3) 0.9            

     



                                 Eos # (Auto) (0.0 - 0.4 K/mm3) 0.2             

    



                                 Baso # (Auto) (0.0 - 0.1 K/mm3) 0.1            

     



                                 Add Manual Diff (CRITERIA DIFF/SCN) NO         

        



                                                                



                                                                



                                                                



                                                                



                                Diagnosis, Assessment Plan                 



                                                                



                                Free Text DxA P Notes                 



                                Free Text DxA P Notes:                 



                                                                



                                ASSESSMENT AND PLAN:                 



                                1. Elevated cardiac enzymes. The patient is star

gianfranco on                 



                                weight-based Lovenox. started the patient on asp

irin, statin.                 



                                Hold beta-blocker because of bradycardia, start 

lisinopril                 



                                        Per cardiology, they feel the elevated c

ardiac enzymes are secondary to demand 

                                        



                                ischemia and not an STEMI                 



                                2. Symptomatic bradycardia with hypotension. Res

olved                 



                                Off Levophed, off dopamine                 



                                3. Leukocytosis, etiology is unclear, However, t

he patient is pancultured.                 



                                Continue the empiric antibiotics, ceftriaxone , 

ID consulted                 



                                4. History of bipolar. The patient takes lithium

. Lithium level is noted                 



                                                                



                                                                



                                Patient is stable, possible transfer to [facility] this afternoon                 



                                                                



                                DNR status noted                 



                                                                



                                Electronically Signed by Bee Multani MD on  at 1036                 



                                                                



                                RPT #: [id number]                 



                                ***END OF REPORT***                 



                                                                



                                                                

 

                2020 21:26:00-00:00                                 [facility]



                                [facility])        

         



                                Pharmacy Prog.Note-Vancomycin                 



                                REPORT#:[id number] REPORT STATUS: Signed         

        



                                DATE:20 TIME:                 



                                                                



                                PATIENT: TYLER JUDD UNIT #: [id number]       

          



                                ACCOUNT#: [id number] ROOM/BED: [place] [address]      

           



                                : [birth year] AGE: 49 SEX: F ATTEND: Sanjiv Ferrara MD                 



                                ADM DT: 20 AUTHOR: Shirin Garcia [facility]   

              



                                                                



                                * ALL edits or amendments must be made on the [employer]arient document *                 



                                                                



                                                                



                                Vancomycin                      



                                                                



                                Vancomycin                      



                                Treatment plan: ONE TIME DOSE                 



                                Rationale:                      



                                 WOULD LIKE ONE TIME DOSE OF 2gm VANCO

MYCIN. HE WILL FOLLOW UP                 



                                        TOMORROW AND EVALUATE PATIENT IF THEY NE

ED CONTINUATION OF THERAPY - 100% RBTO 

                                        



                                .                     



                                                                



                                Electronically Signed by Shirin Garcia [facility] on

 20 at 2127                 



                                                                



                                RPT #: [id number]                 



                                ***END OF REPORT***                 



                                                                



                                                                

 

                2020 [facility]27[facility]00[facility]00[facility]00                                 [facility]



                                [facility])        

         



                                Infect Disease Consult Note                 



                                REPORT#:[id number] REPORT STATUS: Signed         

        



                                DATE:20 TIME:                 



                                                                



                                PATIENT: TYLER JUDD UNIT #: [id number]       

          



                                ACCOUNT#: [id number] ROOM/BED: [place] [address]      

           



                                : [birth year] AGE: 49 SEX: F ATTEND: Sanjvi Ferrara MD                 



                                ADM DT: 20 AUTHOR: Serge Kennedy MD                 



                                                                



                                * ALL edits or amendments must be made on the [employer]CDN/[employer]arient document *                 



                                                                



                                                                



                                History of Present Illness                 



                                Requesting Clinician: Dr. Multani                 



                                Reason for consult:                 



                                Sepsis                          



                                Chief complaint:                 



                                Chest pain                      



                                HPI:                            



                                48 yo female with h/o COPD, bipolar disorder, ob

esity who presented with                 



                                worsening intermittent sharp/pressure chest pain

 for 2-3 days. Pt also had                 



                                liquid diarrhea for 3 days, which is now resolve

d. She denied fever, chills,                 



                                cough, sore throat, dysuria, vomiting. Pt was ad

mitted for hypotension due to                 



                                NSTEMI vs septic shock as WBC was elevated.     

            



                                                                



                                History - Adult longitudinal                 



                                Past medical history:                 



                                Reports: COPD, Depression/mood disorder, Hyperte

nsion.                 



                                Past surgical history:                 



                                Reports: Cholecystectomy.                 



                                Additional surgical history:                 



                                ankle surgery, jaw reconstruction, ear sx       

          



                                Smoking status for patients 13 years old or olde

r: Current every day smoker                 



                                Other social history: Local resident            

     



                                Allergies:                      



                                Coded Allergies:                 



                                Penicillins (Severe, SWELLING 16)         

        



                                                                



                                Ambulatory status: Independent                 



                                                                



                                Review of Systems                 



                                Constitutional:                 



                                Denies: chills, fever.                 



                                Skin:                           



                                Denies: rash.                   



                                Allergy/Immun:                  



                                Denies hives                    



                                Eyes:                           



                                Denies discharge                 



                                ENT:                            



                                Denies: sinus problem, sore throat.             

    



                                Respiratory:                    



                                Reports: SOB.                   



                                Cardiovascular:                 



                                Reports: chest pain.                 



                                GI:                             



                                Reports: diarrhea. Denies: nausea, vomiting.    

             



                                :                             



                                Denies: dysuria.                 



                                Musculoskeletal:                 



                                Denies: extremity swelling.                 



                                Neuro:                          



                                Denies: seizure.                 



                                                                



                                Objective                       



                                                                



                                Physical Exam                   



                                General appearance: obese, alert, awake, oriente

d                 



                                Head/eyes: atraumatic, clear cornea, EOMI, normo

cephalic                 



                                ENT: moist mucosal membranes, normal nose, xenia

l pharynx, normal sinus                 



                                Neck: full range of motion, non-tender, normal t

hyroid                 



                                Cardiovascular: regular rate rhythm             

    



                                Respiratory: symmetric expansion, no distress   

              



                                Abdomen: non-tender, normal bowel sounds, soft, 

no distention, no guarding                 



                                Extremities: no clubbing, no cyanosis, no edema 

                



                                Musculoskeletal: no joint swelling              

   



                                Neuro/CNS: alert, oriented X 3, normal speech   

              



                                Skin: intact, no rash                 



                                Psychiatry: normal affect, normal mood          

       



                                                                



                                                                



                                Diagnosis, Assessment Plan                 



                                                                



                                Free Text DxA P Notes                 



                                Free text DxA P notes:                 



                                Assessment:                     



                                                    1. Hypotension, most likely 

due to NSTEMI although septic shock also within the

                                                    



                                differential.                   



                                2. Probable septic shock, r/o bacteremia. No res

piratory or urinary symptoms.                 



                                CXR showed no pneumonia. She had diarrhea but it

 is resolving.                 



                                3. Acute kidney failure.                 



                                4. Morbid obesity.                 



                                5. COPD.                        



                                                                



                                Plan:                           



                                1. No recent hospitalization per pt, thus this c

an be treated as community                 



                                acquired infection.                 



                                2. Increase ceftriaxone to 2 g IV daily.        

         



                                3. Vancomycin IV x 1 dose.                 



                                4. F/u blood cultures.                 



                                5. If diarrhea persists, will order stool cultur

e and C. difficile.                 



                                                                



                                I spent 45 minutes evaluating and examining the 

patient. Thank you for this                 



                                consult.                        



                                                                



                                Electronically Signed by Serge Kennedy MD on 20 at 2037                 



                                                                



                                RPT #: [id number]                 



                                ***END OF REPORT***                 



                                                                



                                                                

 

                2020 11:32:00-00:00 [id number]                       [facility]                

 



                                 [id number] [place] [id number]                 



                                                                



                                PATIENT NAME: TYLER JUDD ADMIT DATE: 20

                 



                                ACCOUNT NO: [id number]  ROOM NO: [id number]      

           



                                MEDICAL RECORD NO: [id number] AGE: 49           

      



                                REPORT TYPE: CONSULTATION SEX: F                

 



                                                                



                                ADMITTING PHYSICIAN: Derian Ferrara MD        

         



                                ATTENDING PHYSICIAN: Derian Ferrara MD        

         



                                                                



                                                                



                                CONSULTATION DATE: 2020                 



                                                                



                                CONSULTING PHYSICIAN: DINH Soto MD      

           



                                                                



                                                    I am on-call for cardiology 

at [facility]. I saw this patient stat

                                                    



                                consult from the hospitalist. The patient was se

en in the ER for diagnosis.                 



                                1. Obesity.                     



                                2. Sepsis.                      



                                3. Hypovolemia.                 



                                        4. History of enteritis, multiple loose 

bowel movements for the last 3 days to 

                                        



                                4 days.                         



                                5. History of lap band surgery was undone.      

           



                                6. History of hypertension.                 



                                7. Possible bronchitis.                 



                                                                



                                HISTORY OF PRESENT ILLNESS: The patient is trans

ferred from [facility].                 



                                She stayed for 2 days for diarrhea. At this time

, I was asked to evaluate the                 



                                patient because of borderline troponin, loss of 

bradycardia 50 per minute and                 



                                                    the patient's blood pressure

 90/70. I examined the patient, reviewed the chart.

                                                    



                                 At this time, EKG does not show any acute ische

flaco changes and the patient's                 



                                troponin is borderline high and WBC count 25,000

 per cubic mm. The patient's                 



                                chest x-ray was normal and the patient's laborat

ory evaluation shows the                 



                                        patient's GFR is about 39 and the patien

t's creatinine is about 1.5 and BUN is 

                                        



                                about 34.                       



                                                                



                                        The patient's echocardiogram are reviewe

d, but it shows only ejection fraction 

                                        



                                65% and without any significant valvular abnorma

lities. No evidence of                 



                                pericardial effusion and no evidence of wall mot

ion abnormalities. At this                 



                                time, the patient did not have a myocardial infa

rction. Borderline increased                 



                                troponin, troponin leak. The patient has got sep

sis and hypotension and I                 



                                        ordered a 500 mL saline bolus followed 2

50 mL normal saline at 3:00 hours, and 

                                        



                                slowly taper out dopamine and Levophed. The zaki

ent will go to ICU. I will                 



                                follow the patient there.                 



                                                                



                                At this time, the patient is in quite stable con

dition. The patient does not                 



                                                    need further cardiac evaluat

ion. We will repeat the troponin in the morning and

                                                    



                                follow the patient along with other physicians i

ncluding the hospitalist.                 



                                                                



                                The patient is quite comfortable.               

  



                                                                



                                Dictated By: DINH Soto MD               

  



                                                                



                                                                



                                PATIENT NAME: TYLER JUDD ACCOUNT #: [id number]

158                 



                                                                



                                                                



                                                                



                                WT: CON:L.HIM/MILTON/NTS                 



                                DD: 2020 11:32:35                 



                                DT: 2020 15:10:23                 



                                Conf#: [id number]/DID#: [id number]                 



                                                                



                                Authenticated by Savanna Gaines MD On  10:48:16 AM                 



                                                                



                                                                



                                                                



                                                                



                                                                



                                Electronically Signed by DINH Gaines MD on

 20 at 1048                 



                                                                



                                                                



                                                                



                                                                



                                                                



                                                                



                                                                



                                                                



                                                                



                                                                



                                                                



                                                                



                                                                



                                                                



                                                                



                                                                



                                                                



                                                                



                                                                



                                                                



                                                                



                                                                



                                                                



                                                                



                                                                



                                                                



                                                                



                                                                



                                                                



                                                                



                                                                



                                                                



                                                                



                                                                



                                                                



                                                                



                                                                



                                                                



                                                                



                                                                



                                                                



                                                                



                                                                



                                PATIENT NAME: TYLER JUDD ACCOUNT #: [id number]

158                 

 

                2020 08:46:00-00:00                                 [facility]        

         



                                Hospitalist History Physical                 



                                REPORT#:[id number] REPORT STATUS: Signed         

        



                                DATE:20 TIME:0846                 



                                                                



                                PATIENT: TYLER JUDD UNIT #: [id number]       

          



                                ACCOUNT#: [id number] ROOM/BED: [place] [address]      

           



                                : 68 AGE: 49 SEX: F ATTEND: Sanjiv Ferrara MD                 



                                ADM DT: 20 AUTHOR: Bee Multani MD        

         



                                                                



                                * ALL edits or amendments must be made on the el

[employer]/computer document *                 



                                                                



                                                                



                                History of Present Illness                 



                                                                



                                HPI                             



                                Chief complaint:                 



                                CHEST PAIN, weakness                 



                                                                



                                History                         



                                                                



                                Medication/Allergy-Vaccine Hx                 



                                Home Medications:                 



                                ALBUTEROL (PROAIR HFA 90 MCG/ACT) 1 PUFF INH RTQ

4H                 



                                ALPRAZolam (XANAX) 0.5 MG PO Q8H PRN PRN ANXIETY

                 



                                CARVEDILOL (COREG) 25 MG PO BID MEALS           

      



                                CHOLECALCIFEROL (VITAMIN D3) (VITAMIN D3) 1,000 

UNITS PO DAILY                 



                                GABAPENTIN (NEURONTIN) 100 MG PO TID            

     



                                LITHIUM CARBONATE  MG PO DAILY PM         

        



                                MELOXICAM (MOBIC) 7.5 MG PO DAILY               

  



                                MIRTAZAPINE (REMERON) 30 MG PO DAILY            

     



                                MULTIVITAMIN (MULTI-DAY VITAMIN) 1 TAB PO DAILY 

                



                                ZIPRASIDONE (GEODON) 40 MG PO BID               

  



                                                                



                                Discontinued Medications                 



                                CIPROFLOXACIN (CIPRO) 750 MG PO Q12H            

     



                                 Discontinued reason: Patient stopped taking    

             



                                HYDROcodone/APAP (NORCO 10/325) 1 TAB PO Q4H PRN

 PRN PAIN                 



                                 Discontinued reason: Patient stopped taking    

             



                                MELOXICAM (MOBIC) 7.5 MG PO BID                 



                                 Discontinued reason: Patient stopped taking    

             



                                                                



                                Allergies:                      



                                Coded Allergies:                 



                                Penicillins (Severe, SWELLING 16)         

        



                                                                



                                                                



                                Objective                       



                                                                



                                Physical Exam                   



                                General appearance: alert, awake                

 



                                                                



                                                                



                                Diagnosis, Assessment Plan                 



                                                                



                                Free Text DxA P Notes                 



                                Free Text DxA P Notes:                 



                                H/P                             



                                dictation ID [id number]                 



                                                                



                                Electronically Signed by Bee Multani MD on  at 0846                 



                                                                



                                [facility] #: [id number]                 



                                ***END OF REPORT***                 



                                                                



                                                                

 

                2020 08:43:00-00:00 [id number]                       [address] [address]                 



                                                                



                                PATIENT NAME: TYLER JUDD ADMIT DATE: 20

                 



                                ACCOUNT NO: [id number] ROOM NO: [place] [address]       

          



                                MEDICAL RECORD NO: [id number] AGE: 49           

      



                                REPORT TYPE: HISTORY AND PHYSICAL SEX: F        

         



                                                                



                                ADMITTING PHYSICIAN: Derian Ferrara MD        

         



                                ATTENDING PHYSICIAN: Derian Ferrara MD        

         



                                                                



                                                                



                                ADMISSION DATE: 2020                 



                                                                



                                PRIMARY CARE PHYSICIAN: Unknown.                

 



                                                                



                                CHIEF COMPLAINT: Generalized weakness and chest 

pain.                 



                                                                



                                HISTORY OF PRESENT ILLNESS: The patient is a 49-

year-old female with past                 



                                medical history of bipolar disorder, schizoaffec

tive disorder, COPD, restless                 



                                        leg syndrome, and bronchitis. The patien

t was last well 2 days ago. Yesterday, 

                                        



                                she had an episode of chest pain, lasted about 5

 minutes, retrosternal,                 



                                nonradiating, described as sharp. The patient sa

t down to rest. Subsequently,                 



                                        chest pain went away. She reported at th

at time that she had some shortness of 

                                        



                                                    breath and some dizziness an

d subsequently when the pain went away, the patient

                                                    



                                                    went outside to pick her ana maria

l, but she could not make it because she was so 

weak                                                



                                                    and she actually sat down in

 front of her house. She called 911 EMS and she was

                                                    



                                taken to [facility]. At [facility], she was                 



                                                    noticed to be hypotensive, s

ystolic blood pressure in the 70s, temperature 

36.9,                                               



                                        respiratory rate of 20, and heart rate o

f 50. Her labs were abnormal with REG. 

                                        



                                Creatinine of 1.9. Troponin was 0.421 NT-BNP was

 2042. The patient was given                 



                                IV fluids, normal saline 3 L boluses and she was

 given ceftriaxone 2 g IV and                 



                                        transferred to [facility] f

or further evaluation. In the ER here, 

                                        



                                                    the patient was noted to be 

hypotensive 93/48 and she was still bradycardic 

down                                                



                                to 248. The patient's EKG shows sinus jon at 5

0 with prolonged QT of 508,                 



                                Q-wave in II, III, and aVF. The patient's tropon

ins were elevated at 0.55 and                 



                                the next one was 0.359 here. The patient was giv

en Lovenox 1 mg/kg bodyweight                 



                                every 12 hours, was started on dopamine drip and

 Levophed. The patient at the                 



                                bedside is complaining of weakness. She denies a

ny chest pain.                 



                                                                



                                PAST MEDICAL HISTORY: As mentioned above.       

          



                                                                



                                PAST SURGICAL HISTORY:                 



                                1. Cholecystectomy.                 



                                2. Right ankle surgery.                 



                                                                



                                SOCIAL HISTORY: Smokes 2 packs of cigarettes a d

ay. Denies any alcohol. She                 



                                does meth.                      



                                                                



                                FAMILY HISTORY: The father had an MI.           

      



                                                                



                                ALLERGIES: PENICILLIN AND RISPERIDONE.          

       



                                                                



                                REVIEW OF SYSTEMS:                 



                                                                



                                PATIENT NAME: TYLER JUDD ACCOUNT #: [id number]

158                 



                                                                



                                                                



                                                                



                                CONSTITUTIONAL: Positive for weakness. Negative 

for fevers.                 



                                CARDIOVASCULAR: Positive for chest pain. Positiv

e for shortness of breath,                 



                                negative for any pitting edema.                 



                                                                



                                REVIEW OF SYSTEMS: A 14-point review of system w

as done and was otherwise                 



                                negative except as mentioned above.             

    



                                                                



                                PHYSICAL EXAMINATION:                 



                                VITAL SIGNS: The patient is afebrile, temperatur

e 36.7, heart rate 55,                 



                                                    respiratory rate of 17, bloo

d pressure 97/82, O2 saturation is 96% on room air.

                                                    



                                GENERAL: The patient is awake and alert, oriente

d x3.                 



                                HEENT:  Head is normocephalic and atraumatic. Pu

pils equal and reactive to                 



                                light.                          



                                NECK: No cervical adenopathy or thyromegaly. No 

jugular venous distention.                 



                                CHEST: Clear to auscultation bilaterally. No whe

ezing or rhonchi.                 



                                CARDIOVASCULAR: S1 and S2. No murmur or added so

unds. The patient is                 



                                bradycardic.                    



                                ABDOMEN: Soft, nontender, and nondistended. No o

rganomegaly.                 



                                EXTREMITIES: No cyanosis, clubbing, or edema.   

              



                                NEUROLOGICAL: Nonfocal. Noted that the patient i

s obese.                 



                                                                



                                LABS AND IMAGING: CBC: WBC 24.2, hemoglobin 11.5

, and platelet count of 234.                 



                                CMP: Sodium 135, potassium 4.0, creatinine 1.5. 

Troponins 0.55 and 0.399.                 



                                        Chest x-ray shows no acute cardiopulmona

ry disease. EKG with both bradycardia, 

                                        



                                sinus jon 50, , Q-waves in II, III, and 

aVF.                 



                                                                



                                ASSESSMENT AND PLAN:                 



                                1. Non-ST elevation myocardial infarction. The p

shantel is started on                 



                                weight-based Lovenox. I have called the cardiolo

gist, Dr. Gaines who                 



                                evaluated the patient, started the patient on as

pirin, statin. We will hold                 



                                beta blockers and ACE inhibitor secondary to hyp

otension and acute kidney                 



                                injury.                         



                                2. Symptomatic bradycardia with hypotension. Nor

mal sinus rhythm, but the                 



                                patient is symptomatic with hypotension. At this

 point, I will get a stat                 



                                echocardiogram. This is likely secondary to non-

ST-elevation myocardial                 



                                infarction. Continue management for non-ST-eleva

tion myocardial infarction.                 



                                We will continue dopamine drip to titrate for he

art rate of 50. Also on                 



                                Levophed                        



                                3. Leukocytosis, etiology is unclear, may be rel

ated to non-ST-                 



                                elevation myocardial infarction. However, the pa

tient is pancultured.                 



                                Continue the empiric antibiotics, ceftriaxone 1 

g q. 24 hours.                 



                                4. History of bipolar. The patient takes lithium

. I will check a lithium                 



                                level to make sure that some of the symptoms are

 not related to Lithium                 



                                toxicity.                       



                                                                



                                Other comorbidities, we will resume the patient'

s home medications                 



                                and adjust as needed.                 



                                                                



                                Deep venous thrombosis prophylaxis. The patient 

is on                 



                                Lovenox.                        



                                                                



                                Addendum- she says she is DNR, also wanted to ea

t so NPO order was dcd                 



                                                                



                                Critical time spent: I spent 50 mins evaluating 

patient including history and                 



                                                                



                                PATIENT NAME: TYLER JUDD ACCOUNT #: [id number]

158                 



                                                                



                                                                



                                                                



                                examination, medication management and review of

 labs and imaging and                 



                                discussing with the staff                 



                                                                



                                Dictated By: Bee Multani MD                 



                                                                



                                WT: HP:L.HIM/DADOL/NTS                 



                                DD: 2020 08:43:38                 



                                                                



                                DT: 2020 12:45:32                 



                                Conf#: [id number]/DID#: [id number]                 



                                                                



                                Authenticated and Edited by Bee Multani MD O

n 20 7:01:30 PM                 



                                                                



                                                                



                                                                



                                                                



                                                                



                                Electronically Signed by Bee Multani MD on  at 1903                 



                                                                



                                                                



                                                                



                                                                



                                                                



                                                                



                                                                



                                                                



                                                                



                                                                



                                                                



                                                                



                                                                



                                                                



                                                                



                                                                



                                                                



                                                                



                                                                



                                                                



                                                                



                                                                



                                                                



                                                                



                                                                



                                                                



                                                                



                                                                



                                                                



                                                                



                                                                



                                                                



                                                                



                                                                



                                                                



                                                                



                                                                



                                PATIENT NAME: TYLER JUDD ACCOUNT #: [id number]

[id number]                 

 

                2020 06:37:00-00:00 [id number]                       [address] [address]                 



                                                                



                                PATIENT NAME: TYLER JUDD ADMIT DATE: 20

                 



                                ACCOUNT NO: [id number] ROOM NO: \[facility]\""        

         



                                MEDICAL RECORD NO: [id number] AGE: 49           

      



                                REPORT TYPE: eELECTROCARDIOGRAM SEX: F          

       



                                                                



                                ADMITTING PHYSICIAN: Derian Ferrara MD        

         



                                ATTENDING PHYSICIAN: Derian Ferrara MD        

         



                                                                



                                                                



                                Order:                          



                                [id number]                   



                                Test Reason : (Not Selected)                 



                                 Test Date/Time Stamp:                 



                                 06:37:22                 



                                Blood Pressure : 081/039 mmHG                 



                                Vent. Rate : 058 BPM Atrial Rate : 058 BPM      

           



                                 P-R Int : 154 ms QRS Dur : 088 ms              

   



                                 QT Int : 528 ms P-R-T Axes : 089 065 180 degree

s                 



                                 QTc Int : 518 ms                 



                                                                



                                Sinus bradycardia                 



                                Nonspecific ST and T wave abnormality           

      



                                Prolonged QT                    



                                Abnormal ECG                    



                                When compared with ECG of 2020 06:33, (Un

confirmed)                 



                                T wave inversion less evident in Lateral leads  

               



                                QT has lengthened                 



                                Confirmed by SAVANNA RODRIGUEZ MD () on 3

/2020 3:25:18 PM                 



                                                                



                                Referred By: Self Referred Confirmed by:SAVANNA GEORGE MD                 



                                                                



                                                                



                                                                



                                                                



                                                                



                                Electronically Signed by DINH Gaines MD on

 20 at 1525                 



                                                                



                                                                



                                                                



                                                                



                                                                



                                                                



                                                                



                                                                



                                                                



                                                                



                                                                



                                                                



                                                                



                                                                



                                                                



                                                                



                                PATIENT NAME: TYLER JUDD ACCOUNT #: [id number]

158                 

 

                2020 06:33:00-00:00 [id number]                       [facility]                

 



                                 [address] [place] [address]                 



                                                                



                                PATIENT NAME: TYLER JUDD ADMIT DATE: 20

                 



                                ACCOUNT NO: [id number]  ROOM NO: \[facility]\""       

          



                                MEDICAL RECORD NO: [id number] AGE: 49           

      



                                REPORT TYPE: eELECTROCARDIOGRAM SEX: F          

       



                                                                



                                ADMITTING PHYSICIAN: Derian Ferrara MD        

         



                                ATTENDING PHYSICIAN: Derian Ferrara MD        

         



                                                                



                                                                



                                Order:                          



                                [id number]                   



                                Test Reason : (Not Selected)                 



                                 Test Date/Time Stamp:                 



                                 06:33:44                 



                                Blood Pressure : 081/039 mmHG                 



                                Vent. Rate : 061 BPM Atrial Rate : 061 BPM      

           



                                 P-R Int : 142 ms QRS Dur : 086 ms              

   



                                 QT Int : 436 ms P-R-T Axes : 090 065 180 degree

s                 



                                 QTc Int : 438 ms                 



                                                                



                                Normal sinus rhythm with sinus arrhythmia       

          



                                ST and T wave abnormality, consider lateral isch

emia                 



                                Abnormal ECG                    



                                When compared with ECG of 2020 03:20, (Un

confirmed)                 



                                ST now depressed in Inferior leads              

   



                                Nonspecific T wave abnormality now evident in In

ferior leads                 



                                T wave inversion now evident in Lateral leads   

              



                                QT has shortened                 



                                Confirmed by SAVANNA RODRIGUEZ MD () on 3

/2020 3:25:24 PM                 



                                                                



                                Referred By: Self Referred Confirmed by:SAVANNA GEORGE MD                 



                                                                



                                                                



                                                                



                                                                



                                                                



                                Electronically Signed by DINH Gaines MD on

 20 at 1525                 



                                                                



                                                                



                                                                



                                                                



                                                                



                                                                



                                                                



                                                                



                                                                



                                                                



                                                                



                                                                



                                                                



                                                                



                                                                



                                PATIENT NAME: TYLER JUDD ACCOUNT #: [id number]

158                 

 

                2020 03:34:00-00:00                                 [facility]        

         



                                EMERGENCY PROVIDER REPORT                 



                                REPORT#:[id number] REPORT STATUS: Signed         

        



                                DATE:20 TIME:0334                 



                                                                



                                PATIENT: TYLER JUDD UNIT #: [id number]       

          



                                ACCOUNT#: [id number]  ROOM/BED: [place] [address]     

            



                                : 68 AGE: 49 SEX: F PCP PHYS: No Primar

y or Family Physician                 



                                SERVICE DT: 20 AUTHOR: Kristy Quiros MD  

               



                                                                



                                * ALL edits or amendments must be made on the el

[employer]/computer document *                 



                                                                



                                                                



                                HPI-Chest Pain 40 and Over                 



                                                                



                                General                         



                                Confirmed Patient Yes                 



                                Patient Type New patient                 



                                Initial Greet Date/Time 20 0323           

      



                                                                



                                Presentation                    



                                Chief Complaint Shortness of breath, Diarrhea   

              



                                Hx Obtained From Patient                 



                                Sudden in Onset? Yes                 



                                Onset Occurred Today                 



                                Symptom Duration Since onset                 



                                Progression since Onset Unchanged               

  



                                )( Migration/Movement None                 



                                Severity: Onset Moderate                 



                                Severity: Current Mild                 



                                Exacerbated by Nothing                 



                                Relieved by Nothing                 



                                                                



                                Context                         



                                Immunization Status                 



                                 General Unknown                 



                                                                



                                Free Text HPI Notes                 



                                Free Text HPI Notes                 



                                        48 y/o F w/ PMHx of HTN, COPD, bipolar d

isorder, and schizophrenia presents to 

                                        



                                ED from [facility] for low blood pressure n

oted for 2 days. Also w/ SOB,                 



                                and diarrhea x 2 days. Per EMS, pt was bradycard

ic and with troponin of 0.42.                 



                                Denies fever, chills and N/V.                 



                                                                



                                                    Portions of this section wer

e scribed by Génesis Matias on 20 at 

0553                                                



                                                                



                                Risk-Chest Pain 40 and Over                 



                                                                



                                Risk Stratification                 



                                )( Coronary Artery Disease Risk factors reviewed

, Hypertension                 



                                )( Thoracic Aortic Dissection Risk factors revie

wed, Hypertension                 



                                )( Pulmonary Embolism Risk factors reviewed     

            



                                )( AMI-Aspirin                  



                                 Aspirin Last 24 Hrs Not indicated              

   



                                )( HEART for MACE                 



                                 )( HEART for MACE Response Value               

  



                                 History Mod index of suspicion 1               

  



                                 ECG Interpretation Nonspec repol disturb 1     

            



                                 Age Age 45 - 65 1                 



                                 Risk Factors for CAD 1-2 CAD risk factors 1    

             



                                 Troponin  1 to 3x NL troponin 1                

 



                                 Total 5                        



                                                                



                                                                



                                                    Portions of this section wer

e scribed by Génesis Matias on 20 at 

0454                                                



                                                                



                                Review of Systems                 



                                                                



                                ROS Statements                  



                                All systems rev neg except as marked.           

      



                                                                



                                Free Text ROS Notes                 



                                Free Text ROS Notes                 



                                -Constitutional                 



                                Denies: Fever. Chills.                 



                                -GI                             



                                Denies: Nausea, Vomiting. Abdominal pain. consti

pation                 



                                + diarrhea                      



                                -                             



                                Denies: Dysuria, Hematuria,                 



                                -Musculoskeletal                 



                                Denies: Extremity pain, Ext Swelling            

     



                                -Skin                           



                                Denies: Rash, Swelling.                 



                                -Neurologic                     



                                Denies: Numbness, Tingling.                 



                                -Eyes                           



                                Denies:visual loss, blurred vision              

   



                                -Respiratory                    



                                Denies: dyspnea on exertion                 



                                + SOB                           



                                -Cardiovascular                 



                                Denies: Chest pain, Dyspnea on exertion, Edema. 

                



                                -Allergy:                       



                                Denies Hives, Itching                 



                                                                



                                                                



                                                    Portions of this section wer

e scribed by Génesis Matias on 20 at 

0338                                                



                                                                



                                Past Medical History - Adult                 



                                Stated Complaint FROM [facility]; HYPOTENSI

ON                 



                                Allergies                       



                                Coded Allergies:                 



                                Penicillins (Severe, SWELLING 16)         

        



                                                                



                                Home Medications                 



                                Discontinued Scripts                 



                                CIPROFLOXACIN (CIPRO) 750 MG PO Q12H            

     



                                 CIPROFLOXACIN (CIPRO) 750 MG PO Q12H #14 TAB   

              



                                 Prov: 16                 



                                 DC: 20 034 Patient stopped taking       

          



                                HYDROcodone/APAP (NORCO 10/325) 1 TAB PO Q4H PRN

 PRN PAIN                 



                                 HYDROcodone/APAP (NORCO 10/325) 1 TAB PO Q4H OR

N PRN PAIN #30 TAB                 



                                 Prov: 16                 



                                 DC: 20 034 Patient stopped taking       

          



                                                                



                                Reported Medications                 



                                LITHIUM CARBONATE  MG PO DAILY PM         

        



                                CHOLECALCIFEROL (VITAMIN D3) (VITAMIN D3) 1,000 

UNITS PO DAILY                 



                                MULTIVITAMIN (MULTI-DAY VITAMIN) 1 TAB PO DAILY 

                



                                GABAPENTIN (NEURONTIN) 100 MG PO TID            

     



                                ALPRAZolam (XANAX) 0.5 MG PO Q8H PRN PRN ANXIETY

                 



                                MIRTAZAPINE (REMERON) 30 MG PO DAILY            

     



                                ZIPRASIDONE (GEODON) 40 MG PO BID               

  



                                CARVEDILOL (COREG) 25 MG PO BID MEALS           

      



                                MELOXICAM (MOBIC) 7.5 MG PO DAILY               

  



                                ALBUTEROL (PROAIR HFA 90 MCG/ACT) 1 PUFF INH RTQ

4H                 



                                                                



                                Discontinued Reported Medications               

  



                                MELOXICAM (MOBIC) 7.5 MG PO BID                 



                                                                



                                                                



                                Review of Nursing Notes Rev avail, and agree    

             



                                Past Medical History:                 



                                Reports: COPD, Depression/mood disorder, Hyperte

nsion.                 



                                Past Surgical History:                 



                                Reports: Cholecystectomy.                 



                                Additional Surgical History                 



                                ankle surgery, jaw reconstruction, ear sx       

          



                                Smoking status for patients 13 years old or olde

r: Unknown,if ever smoked                 



                                Other Social History Local resident             

    



                                Ambulatory Status Independent                 



                                                                



                                                    Portions of this section caesar vargas scribed by Génesis Matias on 20 at 

0457                                                



                                                                



                                Physical Exam                   



                                                                



                                Vital Signs                     



                                Vital Signs                     



                                First Documented:                 



                                 Result Date Time                 



                                 Pulse Ox 100 318                 



                                 B/P 93/48 318                 



                                 B/P Mean 63 318                 



                                 O2 Delivery Room air 318                

 



                                 Temp 36.4 318                 



                                  Pulse 48 318                 



                                 Resp                  



                                                                



                                Last Documented:                 



                                 Result Date Time                 



                                 Pulse Ox 100 431                 



                                 B/P 102/58 431                 



                                 B/P Mean 72 431                 



                                  O2 Delivery Room air 431               

  



                                 Pulse 48 431                 



                                 Resp 18 431                 



                                 Temp  36.4 318                 



                                                                



                                                                



                                Review of Vital Signs Reviewed                 



                                                                



                                Focused PE                      



                                General/Const **                 



                                 General/Const Awake, Alert                 



                                 Appearance/Presentation                 



                                 Obese, morbidly.                 



                                Resp/Chest **                   



                                 Respiratory/Chest Atraumatic, Breath sounds NL,

 Breath sounds = bilat, No                 



                                respiratory distress, No rales, No rhonchi, No w

heezing                 



                                Cardiovascular **                 



                                 Cardiovascular Heart sounds NL, No gallop, No m

urmurs                 



                                 Heart Rate/Rhythm                 



                                 Bradycardia.                   



                                                                



                                Free Text PE Notes                 



                                Free Text PE Notes                 



                                 General/Const                  



                                 General/Const Awake, Alert                 



                                MS Head                         



                                 Head Atraumatic, Normocephalic                 



                                Eyes                            



                                 Eyes Atraumatic, No scleral icterus            

     



                                MS Neck                         



                                 Neck Atraumatic, Full range of motion          

       



                                Resp/Chest CTAB, -w                 



                                Cardiovascular                  



                                 good cap refill                 



                                Ext: moving all 4 ext, no swelling/ cyanosis not

ed on UE Bl                 



                                Skin                            



                                 Skin no apparent rashes, Dry, Intact           

      



                                Neurologic                      



                                 Neurologic Oriented X3, Speech NL              

   



                                Psych nl affect and mood                 



                                                                



                                                                



                                                    Portions of this section wer

e scribed by Génesis Matias on 20 at 

0547                                                



                                                                



                                Interpretation Diagnostics                 



                                                                



                                Lab Results Interpretation                 



                                Results                         



                                Laboratory Tests                 



                                                                



                                                                



                                                                



                                20 0342:                  



                                [Embedded Image Not Available]                 



                                Laboratory Tests:                 



                                                  



                                 0342 0342 0342                 



                                 Chemistry                      



                                 Sodium (134 - 147 mmol/L) 135                 



                                  Potassium (3.4 - 5.0 mmol/L) 4.0              

   



                                 Chloride (100 - 108 mmol/L) 106                

 



                                 Carbon Dioxide (21 - 32 mmol/L)  22            

     



                                 Anion Gap (4.0 - 15.0 GAP calc) 7.0            

     



                                 BUN (7 - 18 MG/DL) 34 H                 



                                 Creatinine (0.6 - 1.0 MG/DL) 1.5 H             

    



                                 Glomerular Filtr Rate (>60 estGFR) 39 L        

         



                                 Glucose (70 - 110 MG/DL) 97                 



                                 Lactic Acid (0.4 - 2.0 mmol/L) 0.8             

    



                                 Calcium (8.5 - 10.1 MG/DL) 8.9                 



                                 Troponin I (0.000 - 0.045 NG/ML) 0.555 *H      

           



                                 Hematology                     



                                 WBC (3.5 - 11.0 K/mm3) 24.2 H                 



                                 RBC (4.70 - 6.10 M/mm3) 3.75 L                 



                                 Hgb (10.4 - 14.9 G/DL) 11.5                 



                                 Hct (31.5 - 44.1 %) 35.2                 



                                 MCV (84.5 - 98.6 Fl) 93.9                 



                                 MCH (27.0 - 34.2 pg) 30.7                 



                                 MCHC (31.5 - 34.0 G/DL) 32.7                 



                                 RDW (11.5 - 14.5 SD)  13.8                 



                                 Plt Count (150 - 450 K/mm3) 234.0              

   



                                 MPV (7.0 - 10.5 fL) 9.80                 



                                 Neut % (Auto) (40 - 76 %) 82.9 H               

  



                                 Lymph % (Auto) (20.5 - 51.1 %) 8.3 L           

      



                                 Mono % (Auto) (1.7 - 9.3 %)  7.3               

  



                                 Eos % (Auto) (0.0 - 6.0 %) 1.4                 



                                 Baso % (Auto) (0.0 - 2.0 %) 0.1                

 



                                 Neut # (Auto) (1.8 - 7.6 K/mm3) 20.08 H        

         



                                 Lymph # (Auto) (0.6 - 3.2 K/mm3) 2.0           

      



                                 Mono # (Auto) (0.3 - 1.1 K/mm3) 1.8 H          

       



                                  Eos # (Auto) (0.0 - 0.4 K/mm3) 0.3            

     



                                 Baso # (Auto) (0.0 - 0.1 K/mm3) 0.0            

     



                                 Add Manual Diff (CRITERIA DIFF/SCN)  NO        

         



                                                                



                                Microbiology:                   



                                 Date/Time Procedure - Status                 



                                 Source Growth                  



                                 452 MRSA Screen - ORD                 



                                 NASAL                          



                                  034 Blood Culture - RECD                

 



                                 BLOOD                          



                                                                



                                Recent Impressions:                 



                                RADIOLOGY - XR CHEST 1 V 430             

    



                                *** Report Impression - Status: SIGNED Entered: 

2020                 



                                                                



                                IMPRESSION: Mild cardiomegaly, without acute pul

monary process.                 



                                Impression By: [email or web address] - Shree March M.D.      

           



                                                                



                                                                



                                 Lab Statement                  



                                Laboratory studies reviewed and considered in [facility] medical decision-making.                 



                                                                



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                 



                                Ms. Tyler Judd is a 47-year-old  fem

ronny, who was admitted to Dr. Cohen's team from 2017 to . The patient was on                 



                                unit restrictions along with elopement and stand

suzette PICU precautions. The                 



                                patient on admission was only started on trazodo

ne 50 mg at bedtime p.r.n.                 



                                for sleep and Vistaril 50 mg q. 6 hours p.r.n. f

or anxiety. The patient did                 



                                state that she had medication that she is taking

 at home of lithium 400 mg                 



                                daily, Abilify 30 mg, gabapentin, unknown dose, 

and trazodone, unknown dose.                 



                                We did not start her home medications as she abebe

d they were not working. She                 



                                had stopped taking them a few weeks prior to [facility] admitted. Due to her                 



                                presenting symptoms and her past psychiatric his

tory of bipolar 2, we have                 



                                since started the patient on Latuda 20 mg with l

unch. She desired to stop                 



                                the lithium and Abilify. She states that she had

 an allergy to risperdal and                 



                                she would not take medications that would cause 

her to gain weight. The                 



                                patient did not have any side effects to the Lat

uda. Over the next few days,                 



                                we increased the dose from 20 to 40 mg with lunc

h.  We increase the trazodone                 



                                to 100 mg at night for sleep and these changes t

o his medications help                 



                                resolve the patient's presenting symptoms of nancy

cidal ideation, improvement                 



                                in depression, improved sleep, improved appetite

, and improved concentration.                 



                                 She is no longer reporting feelings of hopeless

ness. The patient was                 



                                cooperative during the day.                 



                                                                



                                ASSESSMENT:                     



                                                                



                                 [facility]                 



                                 Discharge Summary                 



                                                                



                                PATIENT NAME: TYLER JUDD PHYSICIAN: Salvatore Brownlee MD                 



                                 ACCOUNT #: [id number] Admitted:                

 



                                 MR NUMBER: [id number] DISCHARGED: 2017 05:4

1:00                 



                                                    

________________________________________________________________________________
                                                    



                                The patient was admitted to Dr. Cohen's team on 

the inpatient unit.                 



                                                                



                                REASON FOR ADMISSION:                 



                                The patient was admitted for suicidal ideation w

ith a chief complaint of "I                 



                                am tired of doing all this." The patient is a 47

-year-old female with a past                 



                                psychiatric history of bipolar disorder and schi

zophrenia. She presented                 



                                with suicidal ideation without a plan going on f

or over 3 days prior to                 



                                admission.                      



                                                                



                                ADMITTING DIAGNOSES:                 



                                Major depressive disorder, recurrent episode, se

sara with mixed features, and                 



                                methamphetamine use disorder.                 



                                                                



                                FINAL DIAGNOSES:                 



                                AXIS I: Bipolar 2 disorder, current depressive e

pisode with psychotic                 



                                features, and methamphetamine use disorder.     

            



                                AXIS II: None.                  



                                AXIS III: Obesity, chronic obstructive pulmonary

 disease, and neuropathy.                 



                                AXIS IV: Poor family relationship, history of dr

brianne abuse, unemployed, on                 



                                disability.                     



                                                                



                                PRINCIPAL PROCEDURE:                 



                                Psychopharmacotherapy.                 



                                                                



                                SPECIAL PROCEDURES:                 



                                None.                           



                                                                



                                HOSPITAL COURSE:                 



                                Ms. Tyler Judd is a 47-year-old  fem

ronny, who was admitted to Dr. Cohen's team from 2017 to . The patient was on                 



                                unit restrictions along with elopement and stand

suzette PICU precautions. The                 



                                patient on admission was only started on trazodo

ne 50 mg at bedtime p.r.n.                 



                                for sleep and Vistaril 50 mg q. 6 hours p.r.n. f

or anxiety. The patient did                 



                                state that she had medication that she is taking

 at home of lithium 400 mg                 



                                daily, Abilify 30 mg, gabapentin, unknown dose, 

and trazodone, unknown dose.                 



                                We did not start her home medications as she abebe

d they were not working. She                 



                                had stopped taking them a few weeks prior to [facility] admitted. Due to her                 



                                presenting symptoms and her past psychiatric his

tory of bipolar 2, we have                 



                                since started the patient on Latuda 20 mg with l

unch. She desired to stop                 



                                the lithium and Abilify. She states that she had

 an allergy to risperdal and                 



                                she would not take medications that would cause 

her to gain weight. The                 



                                patient did not have any side effects to the Lat

uda. Over the next few days,                 



                                we increased the dose from 20 to 40 mg with lunc

h. We increase the trazodone                 



                                to 100 mg at night for sleep and these changes t

o his medications help                 



                                resolve the patient's presenting symptoms of nancy

cidal ideation, improvement                 



                                in depression, improved sleep, improved appetite

, and improved concentration.                 



                                 She is no longer reporting feelings of hopeless

ness. The patient was                 



                                cooperative during the day.                 



                                                                



                                ASSESSMENT:                     



                                                                



                                 [facility]                 



                                 Discharge Summary                 



                                She did not attend group therapy. On the day of 

discharge, she was deemed                 



                                suitable for discharge because she denied suicid

al and homicidal ideations.                 



                                                                



                                She denied audio and visual hallucinations. Her 

mood had improved. Her                 



                                affect had improved her sleep. Judgment and insi

ght had all improved. The                 



                                patient plans to return home. She will continue 

psychotropic medications and                 



                                followup with an outpatient psychiatrist.       

          



                                                                



                                MENTAL STATUS EXAM ON DISCHARGE:                

 



                                The patient appeared well-groomed, well-nourishe

d 47-year-old female. She                 



                                made good eye contact. She had no psychomotor re

tardation or activation.                 



                                She did have a calm attitude. Her speech was reg

ular rate and rhythm with                 



                                good volume. Her mood was good. Her affect was c

ongruent and euthymic.                 



                                Regarding perception, she denied any audio or vi

sual hallucinations. She                 



                                denies any delusions. Thought process was linear

 and goal directed. She                 



                                denied suicidal and homicidal ideations. Insight

 and judgment were fair.                 



                                She is alert and oriented to person, place, time

, and circumstance. Her                 



                                memory and attention are grossly intact. Abstrac

tion is intact. Fund of                 



                                knowledge was appropriate for her age and educat

ion. Her gait was normal.                 



                                                                



                                LABORATORY DATA:                 



                                No relevant labs upon discharge.                

 



                                                                



                                DRUG REACTIONS/INTERACTIONS:                 



                                None.                           



                                                                



                                DISCHARGE MEDICATIONS:                 



                                Were:                           



                                                                



                                 1. Latuda 40 mg per day with lunch.            

     



                                 2. Gabapentin 1200 mg at night, 300 mg with sharon

akfast, and 300 mg wtih                 



                                 lunch.                         



                                                                



                                DISCHARGE INSTRUCTIONS:                 



                                The patient was provided with standard discharge

 instructions. No                 



                                restrictions on diet or physical activity. The p

atient was given driving                 



                                                    restrictions as she is going

 to continue taking gabapentin may cause 

drowsiness.                                         



                                                                



                                FOLLOWUP:                       



                                The patient is scheduled a followup appointment 

with [facility]                 



                                given specific instructions on how to get to the

 appointment.                 



                                                                



                                DISPOSITION:                    



                                Ms. Tyler Judd is currently stable and tolera

ting all her medications and                 



                                will be discharging to her home. The patient's p

rognosis is poor as                 



                                she has a history of noncompliance; however, if 

she is able to be compliant                 



                                                                



                                 [facility]                 



                                 Discharge Summary                 



                                She did not attend group therapy. On the day of 

discharge, she was deemed                 



                                suitable for discharge because she denied suicid

al and homicidal ideations.                 



                                                                



                                She denied audio and visual hallucinations. Her 

mood had improved. Her                 



                                affect had improved her sleep. Judgment and insi

ght had all improved. The                 



                                patient plans to return home. She will continue 

psychotropic medications and                 



                                followup with an outpatient psychiatrist.       

          



                                                                



                                MENTAL STATUS EXAM ON DISCHARGE:                

 



                                The patient appeared well-groomed, well-nourishe

d 47-year-old female. She                 



                                made good eye contact. She had no psychomotor re

tardation or activation.                 



                                She did have a calm attitude. Her speech was reg

ular rate and rhythm with                 



                                good volume. Her mood was good. Her affect was c

ongruent and euthymic.                 



                                Regarding perception, she denied any audio or vi

sual hallucinations. She                 



                                denies any delusions. Thought process was linear

 and goal directed. She                 



                                denied suicidal and homicidal ideations. Insight

 and judgment were fair.                 



                                She is alert and oriented to person, place, time

, and circumstance. Her                 



                                memory and attention are grossly intact. Abstrac

tion is intact. Fund of                 



                                knowledge was appropriate for her age and educat

ion. Her gait was normal.                 



                                                                



                                LABORATORY DATA:                 



                                No relevant labs upon discharge.                

 



                                                                



                                DRUG REACTIONS/INTERACTIONS:                 



                                None.                           



                                                                



                                DISCHARGE MEDICATIONS:                 



                                Were:                           



                                                                



                                 1. Latuda 40 mg per day with lunch.            

     



                                 2. Gabapentin 1200 mg at night, 300 mg with sharon

akfast, and 300 mg wtih                 



                                 lunch.                         



                                                                



                                DISCHARGE INSTRUCTIONS:                 



                                The patient was provided with standard discharge

 instructions. No                 



                                restrictions on diet or physical activity. The p

atient was given driving                 



                                                    restrictions as she is going

 to continue taking gabapentin may cause 

drowsiness.                                         



                                                                



                                FOLLOWUP:                       



                                The patient is scheduled a followup appointment 

with [facility] andhas been                 



                                given specific instructions on how to get to the

 appointment.                 



                                                                



                                DISPOSITION:                    



                                Ms. Tyler Judd is currently stable and tolera

ting all her medications and                 



                                will be discharging to her home. The patient's p

rognosis is poor as                 



                                she has a history of noncompliance; however, if 

she is able to be compliant                 



                                                                



                                 [facility]                 



                                 Discharge Summary                 



                                with her psychotropic medications and consistent

 with outpatient followup,                 



                                she should do very well. She has been encouraged

 to abstain from illicit                 



                                drug use and to not discontinue any psychotropic

 medications without the                 



                                guidance of her outpatient psychiatrist. The hammad bowens has also met with the                 



                                 on the unit to obtain appropriate 

followup. The importance of                 



                                following through with this plan has been review

ed with the patient, with the                 



                                understanding that compliance will be crucial to

 her recovery. She has been                 



                                                                



                                                    given the [facility] crisis 

hotline #[phone] and information about [facility] if she wishes to obtain therapy.

                 



                                                                



                                                                



                                                                



                                                                



                                MD THUAN Roman/MIKE                      



                                DD: 2018 21:40                 



                                TD: 01/10/2018 00:53                 



                                Job #: [id number]                 



                                                                



                                Electronically Authenticated and Edited by:     

            



                                Salvatore Brownlee MD On 01/10/2018 08:48 PM CST       

          



                                 [facility]                 



                                 Discharge Summary                 



                                with her psychotropic medications and consistent

 with outpatient followup,                 



                                she should do very well. She has been encouraged

 to abstain from illicit                 



                                drug use and to not discontinue any psychotropic

 medications without the                 



                                guidance of her outpatient psychiatrist. The pat

ient has also met with the                 



                                 on the unit to obtain appropriate 

followup. The importance of                 



                                following through with this plan has been review

ed with the patient, with the                 



                                understanding that compliance will be crucial to

 her recovery. She has been                 



                                                                



                                                    given the [facility] crisis 

hotline #[phone] and information about [facility] if she wishes to obtain therapy.

                 



                                                                



                                                                



                                                                



                                                                



                                MD THUAN Roman/MIKE                      



                                DD: 2018 21:40                 



                                TD: 01/10/2018 00:53                 



                                Job #: [id number]                 



                                                                



                                Electronically Authenticated and Edited by:     

            



                                Salvatore Brownlee MD On 01/10/2018 08:48 PM CST       

          



                                Electronically Authenticated by:                

 



                                Venkata Cohen MD On 2018 04:18 PM [facility]  

               

 

                2017 21:03:34-00:00  [facility] [place], [place]



                                 Psych Eval                     



                                                                



                                PATIENT NAME: TYLER JUDD PHYSICIAN: Salvatore Brownlee MD                 



                                 ACCOUNT #: [id number] Admitted:                

 



                                 MR NUMBER: [id number] DISCHARGED:                

 



                                                    

________________________________________________________________________________
                                                    



                                Psych Eval                      



                                Patient Name: TYLER JUDD Date of            

     



                                Service:                        



                                Patient ID: [id number] YOB: 1970 

                



                                Clinician: Salvatore Brownlee MD User Field 1: J        

         



                                Encounter Visit: [facility] User Field 3: J            

     



                                Referring Venkata Cohen MD                 



                                Clinician:                      



                                                                



                                ATTENDING:                      



                                Venkata Cohen MD                 



                                                                



                                INFORMANT:                      



                                Information was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                   



                                 6. Alcohol negative.                 



                                 7. BMP - sodium 140, potassium 3.6, chloride 10

3, bicarbonate 26, BUN 13,                 



                                 creatinine 0.8. LFTs are within normal range.  

               



                                 8. UA shows evidence of pyuria.                

 



                                                                



                                ASSESSMENT:                     



                                 1. Symptomatic urinary tract infection secondar

y to Escherichia coli most                 



                                 likely resulting in low grade fever.           

      



                                 2. Bipolar disorder, depressed type with suicid

al ideation.                 



                                 3. Chronic obstructive pulmonary disease, stabl

e without any                 



                                 exacerbation.                  



                                 4. Morbid obesity, body mass index 40.         

        



                                 5. Nicotine dependence with failure to quit, mi

ld withdrawal symptoms.                 



                                 6. Substance-induced mood disorder.            

     



                                 7. Amphetamine abuse.                 



                                 8. Homeless without any family support.        

         



                                 9. Poor dentition with cavities.               

  



                                 10. No need for gastrointestinal/deep venous th

rombosis prophylaxis.                 



                                                                



                                PLAN:                           



                                We will start the patient on Levaquin.          

       



                                                                



                                The patient was counseled about smoking and amph

etamine cessation.                 



                                                                



                                Antidepressants with psychiatrist.              

   



                                                                



                                 [facility]                 



                                 History and Physical                 



                                                                



                                                    The patient was counseled ab

out lifestyle modifications and  needs

                                                    



                                to be consulted for placement.                 



                                                                



                                AVOID PENICILLIN AND RISPERDAL SINCE THE PATIENT

 IS ALLERGIC TO IT.                 



                                                                



                                We will follow the patient and address any new p

roblems that may arise.                 



                                                                



                                Case dicussed with pt's nurse.                 



                                                                



                                                                



                                                                



                                                                



                                Norma Judge MD                 



                                                                



                                PSN/CTV                         



                                DD: 2017 14:48                 



                                TD: 2017 17:46                 



                                Job #: [id number]                 



                                                                



                                Electronically Authenticated and Edited by:     

            



                                Norma Judge MD On 2017 07:41 AM CS

T

## 2023-07-02 NOTE — ER
Nurse's Notes                                                                                     

 [facility]                                                                 

Name: Alka Judd                                                                               

Age: 52 yrs                                                                                       

Sex: Female                                                                                       

: 1970                                                                                   

MRN: [id number]                                                                                   

Arrival Date: 2023                                                                          

Time: 04:18                                                                                       

Account#: [id number]                                                                            

Bed 6                                                                                             

Private MD:                                                                                       

Diagnosis: Auditory hallucinations;Bipolar disorder, current episode depressed, moderate          

                                                                                                  

Presentation:                                                                                     

                                                                                             

04:23 Chief complaint: Patient states: "The  brought me here cause they think I'm crazy   as6 

      but my ex aunt and ex sister are trying to kill me and I need to protect myself".           

      Coronavirus screen: At this time, the client does not indicate any symptoms associated      

      with coronavirus-19. Ebola Screen: No symptoms or risks identified at this time.            

      Initial Sepsis Screen: Does the patient meet any 2 criteria? No. Patient's initial          

      sepsis screen is negative. Does the patient have a suspected source of infection? No.       

      Patient's initial sepsis screen is negative. Risk Assessment: Do you want to hurt           

      yourself or someone else? Patient reports no desire to harm self or others. Onset of        

      symptoms was 2023.                                                                 

04:23 Method Of Arrival: Ambulatory                                                           as6 

04:23 Acuity: TEMO 4                                                                           as6 

                                                                                                  

Historical:                                                                                       

- Allergies:                                                                                      

04:28 PENICILLINS;                                                                            as6 

04:28 Risperdal;                                                                              as6 

04:28 Benadryl;                                                                               as6 

- PMHx:                                                                                           

04:28 Bipolar disorder; Hypertensive disorder;                                                as6 

- PSHx:                                                                                           

04:28 righ hip surgery;                                                                       as6 

                                                                                                  

- Immunization history:: Client reports having NOT received the Covid vaccine.                    

- Social history:: Smoking status: Patient reports the use of cigarette tobacco                   

  products, smokes one pack cigarettes per day.                                                   

- Family history:: not pertinent.                                                                 

                                                                                                  

                                                                                                  

Screenin:51 [facility] ED Fall Risk Assessment (Adult) History of falling in the last 3 months,       kd3 

      including since admission No falls in past 3 months (0 pts) Confusion or Disorientation     

      No (0 pts) Intoxicated or Sedated No (0 pts) Impaired Gait No (0 pts) Mobility Assist       

      Device Used No (0 pt) Altered Elimination No (0 pt) Score/Fall Risk Level 0 - 2 = Low       

      Risk Maintained a safe environment. Abuse screen: Denies threats or abuse. Denies           

      injuries from another. Tuberculosis screening: No symptoms or risk factors identified.      

04:52 Nutritional screening: No deficits noted.                                               kd3 

                                                                                                  

Assessment:                                                                                       

04:50 General: Appears in no apparent distress. Behavior is calm, cooperative. Pain: Denies   kd3 

      pain. Neuro: Level of Consciousness is awake, alert, obeys commands, Oriented to            

      person, place, time, situation. Respiratory: Airway is patent Trachea midline               

      Respiratory effort is even, unlabored, Respiratory pattern is regular, symmetrical.         

                                                                                                  

Vital Signs:                                                                                      

04:23  / 53; Pulse 71; Resp 18 S; Temp 98.5(O); Pulse Ox 99% on R/A; Weight 94.35 kg    as6 

      (R); Height 5 ft. 2 in. (R); Pain 7/10;                                                     

04:23 Body Mass Index 38.04 (94.35 kg, 157.48 cm)                                             as6 

04:23 Pain Scale: Adult                                                                       as6 

                                                                                                  

ED Course:                                                                                        

04:19 Patient arrived in ED.                                                                  ja2 

04:28 Triage completed.                                                                       as6 

04:29 Arm band placed on.                                                                     as6 

04:31 Tawanda Cormier MD is Attending Physician.                                           sp4 

04:32 Ellen Amado, RN is Primary Nurse.                                                    kd3 

04:41 Ángel Delgadillo MD is Referral Physician.                                                sp4 

04:51 No provider procedures requiring assistance completed. Patient did not have IV access   kd3 

      during this emergency room visit.                                                           

04:52 Patient has correct armband on for positive identification.                             kd3 

                                                                                                  

Administered Medications:                                                                         

No medications were administered                                                                  

                                                                                                  

                                                                                                  

Medication:                                                                                       

04:52 VIS not applicable for this client.                                                     kd3 

                                                                                                  

Outcome:                                                                                          

04:42 Discharge ordered by MD.                                                                sp4 

04:51 Discharged to home ambulatory.                                                          kd3 

04:51 Condition: stable                                                                           

04:51 Discharge instructions given to patient, Instructed on discharge instructions, follow       

      up and referral plans. Demonstrated understanding of instructions, follow-up care.          

04:52 Patient left the ED.                                                                    kd3 

                                                                                                  

Signatures:                                                                                       

Gui Samantha                           ja2                                                  

Ramone Montgomery RN RN   as6                                                  

Ellen Amado RN RN   kd3                                                  

Tawanda Comrier MD MD   sp4                                                  

                                                                                                  

**************************************************************************************************

## 2023-07-02 NOTE — EDPHYS
Physician Documentation                                                                           

 [facility]                                                                 

Name: Alka Judd                                                                               

Age: 52 yrs                                                                                       

Sex: Female                                                                                       

: 1970                                                                                   

MRN: [id number]                                                                                   

Arrival Date: 2023                                                                          

Time: 04:18                                                                                       

Account#: [id number]                                                                            

Bed 6                                                                                             

Private MD:                                                                                       

ED Physician Tawanda Cormier                                                                    

HPI:                                                                                              

                                                                                             

04:32 This 52 yrs old  Female presents to ER via Ambulatory with complaints of Mental sp4 

      Health.                                                                                     

04:36 52-year-old female with history of bipolar disorder presents because she is hearing     sp4 

      voices. Patient denied any suicidal ideation. Denied any homicidal ideation. Patient        

      states she has been hearing voices for the past several years. . Patient states police      

      made her come to the emergency room for evaluation. .                                       

                                                                                                  

Historical:                                                                                       

- Allergies:                                                                                      

04:28 PENICILLINS;                                                                            as6 

04:28 Risperdal;                                                                              as6 

04:28 Benadryl;                                                                               as6 

- PMHx:                                                                                           

04:28 Bipolar disorder; Hypertensive disorder;                                                as6 

- PSHx:                                                                                           

04:28 righ hip surgery;                                                                       as6 

                                                                                                  

- Immunization history:: Client reports having NOT received the Covid vaccine.                    

- Social history:: Smoking status: Patient reports the use of cigarette tobacco                   

  products, smokes one pack cigarettes per day.                                                   

- Family history:: not pertinent.                                                                 

                                                                                                  

                                                                                                  

ROS:                                                                                              

04:36 Constitutional: Negative for fever, chills, and weight loss, Eyes: Negative for injury, sp4 

      pain, redness, and discharge, ENT: Negative for injury, pain, and discharge, Neck:          

      Negative for injury, pain, and swelling, Cardiovascular: Negative for chest pain,           

      palpitations, and edema, Respiratory: Negative for shortness of breath, cough,              

      wheezing, and pleuritic chest pain, Abdomen/GI: Negative for abdominal pain, nausea,        

      vomiting, diarrhea, and constipation, Back: Negative for injury and pain, : Negative      

      for injury, bleeding, discharge, and swelling, MS/Extremity: Negative for injury and        

      deformity, Skin: Negative for injury, rash, and discoloration, Neuro: Negative for          

      headache, weakness, numbness, tingling, and seizure, Psych: Negative for depression,        

      anxiety,   positive for auditory hallucinations, negative for suicidality                   

      Allergy/Immunology: Negative for hives, rash, and allergies   Endocrine: Negative for       

      neck swelling, polydipsia, polyuria, polyphagia, and weight changes                         

      Hematologic/Lymphatic: Negative for swollen nodes, abnormal bleeding, and unusual           

      bruising                                                                                    

                                                                                                  

Exam:                                                                                             

04:36 Constitutional:  This is a well developed, well nourished patient who is awake, alert,  sp4 

      and in no acute distress.  On exam patient found sitting in her room and eating chips       

      with cheese deep Head/Face:  Normocephalic, atraumatic. Eyes:  Pupils equal round and       

      reactive to light, extra-ocular motions intact.  Lids and lashes normal.  Conjunctiva       

      and sclera are not injected.  Cornea within normal limits.  Periorbital areas with no       

      swelling, redness, or edema. ENT:  Nares patent. No nasal discharge, no septal              

      abnormalities noted.  Tympanic membranes are normal and external auditory canals are        

      clear.  Oropharynx with no redness, swelling, or masses, exudates, or evidence of           

      obstruction, uvula midline.  Mucous membranes moist. Neck:  Trachea midline, no             

      thyromegaly or masses palpated, and no cervical lymphadenopathy.  Supple, full range of     

      motion without nuchal rigidity, or vertebral point tenderness.  Chest/axilla:  Normal       

      chest wall appearance and motion.  Nontender with no deformity.  No lesions are             

      appreciated. Cardiovascular:  Regular rate and rhythm with a normal S1 and S2.  No          

      gallops, murmurs, or rubs.  Normal PMI, no JVD.  No pulse deficits. Respiratory:  Lungs     

      have equal breath sounds bilaterally, clear to auscultation and percussion.  No rales,      

      rhonchi or wheezes noted.  No increased work of breathing, no retractions or nasal          

      flaring. Abdomen/GI:  Soft, non-tender, with normal bowel sounds.  No distension or         

      tympany.  No guarding or rebound.  No evidence of tenderness throughout. Back:  No          

      spinal tenderness.  No costovertebral tenderness.  Skin:  Warm, dry with normal turgor.     

       Normal color with no rashes, no lesions, and no evidence of cellulitis. MS/ Extremity:     

       Pulses equal, no cyanosis.  Neurovascular intact.  Full, normal range of motion.           

      Neuro:  Awake and alert, GCS 15, oriented to person, place,  Cranial nerves II-XII          

      grossly intact.  Motor strength 5/5 in all extremities.  Sensory grossly intact.            

      Psych:  Awake, alert, with orientation to person, place ,   reports auditory                

      hallucinations, denied suicidal ideation or plan.                                           

                                                                                                  

Vital Signs:                                                                                      

04:23  / 53; Pulse 71; Resp 18 S; Temp 98.5(O); Pulse Ox 99% on R/A; Weight 94.35 kg    as6 

      (R); Height 5 ft. 2 in. (R); Pain 7/10;                                                     

04:23 Body Mass Index 38.04 (94.35 kg, 157.48 cm)                                             as6 

04:23 Pain Scale: Adult                                                                       as6 

                                                                                                  

MDM:                                                                                              

04:36 Differential Diagnosis altered mental status, Acute psychosis, chronic bipolar disorder sp4 

      with auditory hallucinations, organic psychosis. Data reviewed: vital signs, nurses         

      notes, old medical records. ED course: Patient states she has been hearing voices for       

      the past several years and these have not changed. Patient states that she is not           

      suicidal has no suicidal plan, has no homicidal thoughts. At this time further              

      evaluation is not necessary since patient is auditory hallucination is a chronic            

      ongoing issue. No new psychiatric problems elicited on examination today, .                 

04:42 Patient medically screened.                                                             sp4 

                                                                                                  

Administered Medications:                                                                         

No medications were administered                                                                  

                                                                                                  

                                                                                                  

Disposition Summary:                                                                              

23 04:42                                                                                    

Discharge Ordered                                                                                 

      Location: Home                                                                          sp4 

      Problem: new                                                                            sp4 

      Symptoms: are unchanged                                                                 sp4 

      Condition: Stable                                                                       sp4 

      Diagnosis                                                                                   

        - Auditory hallucinations                                                             sp4 

        - Bipolar disorder, current episode depressed, moderate                               sp4 

      Followup:                                                                               sp4 

        - With: Ángel Delgadillo MD                                                                  

        - When: 5 - 6 days                                                                         

        - Reason: Recheck today's complaints                                                       

      Discharge Instructions:                                                                     

        - Discharge Summary Sheet                                                             sp4 

        - Managing Bipolar Disorder                                                           sp4 

      Forms:                                                                                      

        - [email or web address]                                                 sp4 

Signatures:                                                                                       

Ramone Montgomery RN                      RN   as6                                                  

Taawnda Cormier MD MD   sp4                                                  

                                                                                                  

**************************************************************************************************

## 2023-07-10 ENCOUNTER — HOSPITAL ENCOUNTER (EMERGENCY)
Dept: HOSPITAL 97 - ER | Age: 53
Discharge: HOME | End: 2023-07-10
Payer: COMMERCIAL

## 2023-07-10 VITALS — SYSTOLIC BLOOD PRESSURE: 136 MMHG | OXYGEN SATURATION: 96 % | DIASTOLIC BLOOD PRESSURE: 84 MMHG

## 2023-07-10 DIAGNOSIS — F31.9: ICD-10-CM

## 2023-07-10 DIAGNOSIS — M25.512: Primary | ICD-10-CM
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
## 2023-07-10 DIAGNOSIS — Z88.0: ICD-10-CM

## 2023-07-10 DIAGNOSIS — Z88.8: ICD-10-CM

## 2023-07-10 PROCEDURE — 99284 EMERGENCY DEPT VISIT MOD MDM: CPT
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
## 2023-07-10 NOTE — RAD REPORT
EXAM DESCRIPTION:  RAD - Elbow Left 3 View - 7/10/2023 6:34 am

 

CLINICAL HISTORY:  53 years   Female   PAIN

 

COMPARISON:  None

 

FINDINGS:  3 views of the left elbow.

Normal mineralization.

No acute fracture or dislocation.

Joint spaces are maintained.

Soft tissue mineralization in the distal humeral soft tissues.

 

IMPRESSION:  No acute bony finding.

 

Electronically signed by:   Marquis Brown MD   7/10/2023 6:49 AM CDT Workstation: [id number]

 

 

 

Due to temporary technical issues with the PACS/Fluency reporting system, reports are being signed by
 the in house radiologist without review as a courtesy to ensure prompt reporting. The interpreting r
adiologist is fully responsible for the content of the report.

## 2023-07-10 NOTE — RAD REPORT
EXAM DESCRIPTION:  RAD - Shoulder  Left 2 View - 7/10/2023 6:10 am

 

CLINICAL HISTORY:  The patient is 53 years old and is Female; PAIN      [facility]

 

TECHNIQUE:  Two or more views of the left shoulder.

 

COMPARISON:  No relevant prior studies available.

 

FINDINGS:  BONES/JOINTS:   Acromioclavicular joint space narrowing with mild spurring.

        No acute fracture.   No dislocation.

SOFT TISSUES:   Unremarkable.

 

IMPRESSION:  Mild acromioclavicular degenerative arthritis.

 

Electronically signed by:   Scott Bradley MD   7/10/2023 8:02 AM CDT Workstation: [id number]

 

 

 

Due to temporary technical issues with the PACS/Fluency reporting system, reports are being signed by
 the in house radiologist without review as a courtesy to ensure prompt reporting. The interpreting r
adiologist is fully responsible for the content of the report.
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
## 2023-07-10 NOTE — XMS REPORT
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           



                                                  for up to           



                                                  30 days.           

 

     promethazin      -0 3- No             5mL  Q.25D Take 5 mL          

 [facility]



     e-DM                                by mouth 4           st



     (PROMETHAZI      00:00: 04:59                          (four)           [facility]

[facility]



     NE-DM)      00   :00                           times a           l



     6.25-15                                         day as           



     mg/5 mL                                         needed for           



     syrup                                         cough for           



                                                  up to 30           



                                                  days.           

 

     ibuprofen      -0 - No             600mg Q6H  Take 1           [facility]

[facility]



     (ADVIL) 600                                tablet           st



     MG tablet      00:00: 04:59                          (600 mg           [facility]

[facility]



               00   :00                           total) by           l



                                                  mouth           



                                                  every 6           



                                                  (six)           



                                                  hours as           



                                                  needed for           



                                                  mild pain,           



                                                  headaches           



                                                  or fever           



                                                  for up to           



                                                  30 days.           

 

     omeprazole      -0 - No             40mg QD   Take 1           [facility]



     (PriLOSEC)      2-15 03-17                          capsule           [facility]



     40 MG      00:00: 23:59                          (40 mg           [facility]



     capsule      00   :00                           total) by           Center



                                                  mouth           



                                                  daily for           



                                                  30 days.           

 

     omeprazole      3-0 2023- No             40mg QD   Take 1           [facility]



     (PriLOSEC)      2-15 03-17                          capsule           [facility]



     40 MG      00:00: 23:59                          (40 mg           [facility]



     capsule      00   :00                           total) by           Center



                                                  mouth           



                                                  daily for           



                                                  30 days.           

 

     omeprazole      3-0 2023- No             40mg QD   Take 1           [facility]



     (PriLOSEC)      2-15 03-17                          capsule           [facility]



     40 MG      00:00: 23:59                          (40 mg           [facility]



     capsule      00   :00                           total) by           Center



                                                  mouth           



                                                  daily for           



                                                  30 days.           

 

     omeprazole      -0 2023- No             40mg QD   Take 1           [facility]



     (PriLOSEC)      2-15 03-17                          capsule           [facility]



     40 MG      00:00: 23:59                          (40 mg           [facility]



     capsule      00   :00                           total) by           Center



                                                  mouth           



                                                  daily for           



                                                  30 days.           

 

     omeprazole      3-0 2023- No             40mg QD   Take 1           [facility]



     (PriLOSEC)      2-15 -17                          capsule           [facility]



     40 MG      00:00: 23:59                          (40 mg           [facility]



     capsule      00   :00                           total) by           Center



                                                  mouth           



                                                  daily for           



                                                  30 days.           

 

     omeprazole      -0 2023- No             40mg QD   Take 1           [facility]



     (PriLOSEC)      2-15 -17                          capsule           [facility]



     40 MG      00:00: 23:59                          (40 mg           [facility]



     capsule      00   :00                           total) by           Center



                                                  mouth           



                                                  daily for           



                                                  30 days.           

 

     omeprazole      3-0 2023- No             40mg QD   Take 1           [facility]



     (PriLOSEC)      2-15 -17                          capsule           [facility]



     40 MG      00:00: 23:59                          (40 mg           [facility]



     capsule      00   :00                           total) by           Center



                                                  mouth           



                                                  daily for           



                                                  30 days.           

 

     omeprazole      3-0 2023- No             40mg QD   Take 1           [facility]



     (PriLOSEC)      2-15 03-17                          capsule           [facility]



     40 MG      00:00: 23:59                          (40 mg           [facility]



     capsule      00   :00                           total) by           Center



                                                  mouth           



                                                  daily for           



                                                  30 days.           

 

     omeprazole      2023-0 2023- No             40mg QD   Take 1           [facility]



     (PriLOSEC)      2-15 02-15                          capsule           [facility]



     40 MG      00:00: 00:00                          (40 mg           [facility]



     capsule      00   :00                           total) by           Center



                                                  mouth           



                                                  daily for           



                                                  30 days.           

 

     omeprazole      2023-0 2023- No             40mg QD   Take 1           [facility]



     (PriLOSEC)      2-15 02-15                          capsule           [facility]



     40 MG      00:00: 00:00                          (40 mg           [facility]



     capsule      00   :00                           total) by           Center



                                                  mouth           



                                                  daily for           



                                                  30 days.           

 

     omeprazole      -2023- No             40mg QD   Take 1           [facility]



     (PriLOSEC)      2-15 02-15                          capsule           [facility]



     40 MG      00:00: 00:00                          (40 mg           [facility]



     capsule      00   :00                           total) by           Center



                                                  mouth           



                                                  daily for           



                                                  30 days.           

 

     omeprazole      -2023- No             40mg QD   Take 1           [facility]



     (PriLOSEC)      2-15 02-15                          capsule           [facility]



     40 MG      00:00: 00:00                          (40 mg           [facility]



     capsule      00   :00                           total) by           Center



                                                  mouth           



                                                  daily for           



                                                  30 days.           

 

     omeprazole      -2023- No             40mg QD   Take 1           [facility]



     (PriLOSEC)      2-15 02-15                          capsule           [facility]



     40 MG      00:00: 00:00                          (40 mg           [facility]



     capsule      00   :00                           total) by           Center



                                                  mouth           



                                                  daily for           



                                                  30 days.           

 

     omeprazole      -2023- No             40mg QD   Take 1           [facility]



     (PriLOSEC)      2-15 02-15                          capsule           [facility]



     40 MG      00:00: 00:00                          (40 mg           [facility]



     capsule      00   :00                           total) by           Center



                                                  mouth           



                                                  daily for           



                                                  30 days.           

 

     omeprazole      -0 - No             40mg QD   Take 1           [facility]



     (PriLOSEC)      2-15 02-15                          capsule           [facility]



     40 MG      00:00: 00:00                          (40 mg           [facility]



     capsule      00   :00                           total) by           Center



                                                  mouth           



                                                  daily for           



                                                  30 days.           

 

     omeprazole      -0 3- No             40mg QD   Take 1           [facility]



     (PriLOSEC)      2-15 02-15                          capsule           [facility]



     40 MG      00:00: 00:00                          (40 mg           [facility]



     capsule      00   :00                           total) by           Center



                                                  mouth           



                                                  daily for           



                                                  30 days.           

 

     Pepcid       Yes                      20 mg = 1           [facility]

[facility]



     mg oral      2-14                               tab, PO,           l



     tablet      22:50:                               BID, # 60           [facility]

[facility]



               00                                 tab, 0           



                                                  Refill(s)           

 

     Bentyl 10      2023-0      Yes                      10 mg = 1           [facility]

[facility]



     mg oral      2-14                               cap, PO,           l



     capsule      22:50:                               QID-Before           [facility]

[facility]



               00                                 Meals, #           



                                                  40 cap, 0           



                                                  Refill(s)           

 

     Pepcid 20      2023-0      Yes                      20 mg = 1           [facility]

[facility]



     mg oral      2-14                               tab, PO,           l



     tablet      22:50:                               BID, # 60           [facility]

[facility]



               00                                 tab, 0           



                                                  Refill(s)           

 

     Bentyl 10      2023-0      Yes                      10 mg = 1           [facility]



     mg oral      2-14                               cap, PO,           l



     capsule      22:50:                               QID-Before           [facility]

[facility]



               00                                 Meals, #           



                                                  40 cap, 0           



                                                  Refill(s)           

 

     Pepcid 0      Yes                      20 mg = 1           [facility]

[facility]



     mg oral      2-14                               tab, PO,           l



     tablet      22:50:                               BID, # 60           [facility]

[facility]



               00                                 tab, 0           



                                                  Refill(s)           

 

     Bentyl 10      2023-0      Yes                      10 mg = 1           [facility]

[facility]



     mg oral      2-14                               cap, PO,           l



     capsule      22:50:                               QID-Before           [facility]

[facility]



               00                                 Meals, #           



                                                  40 cap, 0           



                                                  Refill(s)           

 

     Pepcid 0      Yes                      20 mg = 1           [facility]

[facility]



     mg oral      2-14                               tab, PO,           l



     tablet      22:50:                               BID, # 60           [facility]

[facility]



               00                                 tab, 0           



                                                  Refill(s)           

 

     Bentyl 10      2023-0      Yes                      10 mg = 1           [facility]

[facility]



     mg oral      2-14                               cap, PO,           l



     capsule      22:50:                               QID-Before           [facility]

[facility]



               00                                 Meals, #           



                                                  40 cap, 0           



                                                  Refill(s)           

 

     Pepcid 2023-0      Yes                      20 mg = 1           [facility]

[facility]



     mg oral      2-14                               tab, PO,           l



     tablet      22:50:                               BID, # 60           [facility]

[facility]



               00                                 tab, 0           



                                                  Refill(s)           

 

     Bentyl 10      2023-0      Yes                      10 mg = 1           [facility]

[facility]



     mg oral      2-14                               cap, PO,           l



     capsule      22:50:                               QID-Before           [facility]

[facility]



                                                Meals, #           



                                                  40 cap, 0           



                                                  Refill(s)           

 

     Pepcid 0      Yes                      20 mg = 1           [facility]

[facility]



     mg oral      2-14                               tab, PO,           l



     tablet      22:50:                               BID, # 60           [facility]

[facility]



               00                                 tab, 0           



                                                  Refill(s)           

 

     Bentyl 10      0      Yes                      10 mg = 1           [facility]

[facility]



     mg oral      2-14                               cap, PO,           l



     capsule      22:50:                               QID-Before           [facility]

[facility]



               00                                 Meals, #           



                                                  40 cap, 0           



                                                  Refill(s)           

 

     Pepcid 0      Yes                      20 mg = 1           [facility]

[facility]



     mg oral      2-14                               tab, PO,           l



     tablet      22:50:                               BID, # 60           [facility]

[facility]



               00                                 tab, 0           



                                                  Refill(s)           

 

     Bentyl 10            Yes                      10 mg = 1           [facility]

[facility]



     mg oral      2-14                               cap, PO,           l



     capsule      22:50:                               QID-Before           [facility]

[facility]



               00                                 Meals, #           



                                                  40 cap, 0           



                                                  Refill(s)           

 

     Pepcid 20            Yes                      20 mg = 1           [facility]

[facility]



     mg oral      2-14                               tab, PO,           l



     tablet      22:50:                               BID, # 60           [facility]

[facility]



               00                                 tab, 0           



                                                  Refill(s)           

 

     Bentyl 10            Yes                      10 mg = 1           [facility]

[facility]



     mg oral      2-14                               cap, PO,           l



     capsule      22:50:                               QID-Before           [facility]

[facility]



               00                                 Meals, #           



                                                  40 cap, 0           



                                                  Refill(s)           

 

     Pepcid 20            Yes                      20 mg = 1           [facility]

[facility]



     mg oral      2-14                               tab, PO,           l



     tablet      22:50:                               BID, # 60           [facility]

[facility]



               00                                 tab, 0           



                                                  Refill(s)           

 

     Bentyl 10            Yes                      10 mg = 1           [facility]

[facility]



     mg oral      2-14                               cap, PO,           l



     capsule      22:50:                               QID-Before           [facility]

[facility]



               00                                 Meals, #           



                                                  40 cap, 0           



                                                  Refill(s)           

 

     ondansetron      2022- No             4mg       4 mg, Slow          

 [facility]



     (ZOFRAN      3-31 03-31                          IV Push,           ity of



     (PF))      20:15: 19:31                          ONCE, 1           [place]



     injection 4      00   :00                           dose, On           Medi

staci



     mg                                           Thu            [facility]



                                                  3/31/22 at           



                                                  1515,           



                                                  Routine           

 

     morpHINE      2022- No             4mg       4 mg, Slow           Un

[facility]



     injection 4      3-31 03-31                          IV Push,           ity

 of



     mg        20:15: 19:33                          ONCE, 1           [place]



               00   :00                           dose, On           [facility]



                                                  u            [facility]



                                                  3/31/22 at           



                                                  1515, STAT           

 

     No known            No                                      [facility]



     medications      3-31                                              ity of



               11:41:                                              [place]



               00                                                [facility]



                                                                 [facility]

 

     lithium            Yes                      2 (two)           [facility]



     carbonate      3-31                               times a           ity of



      mg      10:29:                               day            [place]



     SR tablet      [address] [place] [address]



                                                                 [facility]

 

     ziprasidone            Yes                      1 (one)           [facility]

[facility]



     80 mg      3-31                               time each           ity of



     capsule      10:29:                               day            [address]

 

     lithium            Yes                      2 (two)           [facility]



     carbonate      3-31                               times a           ity of



      mg      10:29:                               day            [place]



     SR tablet      56                                                [facility]

 

     ziprasidone            Yes                      1 (one)           [facility]

vers



     80 mg      3-31                               time each           ity of



     capsule      10:29:                               day            [place]



               [address] [place] [address]

 

     lithium            Yes                      2 (two)           [facility]



     carbonate      3-31                               times a           ity of



      mg      10:29:                               day            [place]



     SR tablet      56                                                [facility]

 

     ziprasidone            Yes                      1 (one)           [facility]

vers



     80 mg      3-31                               time each           ity of



     capsule      10:29:                               day            [place]



               [address] [place] [address]

 

     lithium            Yes                      2 (two)           [facility]



     carbonate      3-31                               times a           ity of



      mg      10:29:                               day            [place]



     SR tablet      56                                                [facility]

 

     ziprasidone            Yes                      1 (one)           [facility]i

vers



     80 mg      3-31                               time each           ity of



     capsule      10:29:                               day            [address]

 

     lithium            Yes                      2 (two)           [facility]



     carbonate      3-31                               times a           ity of



      mg      10:29:                               day            [place]



     SR tablet      56                                                [facility]

 

     ziprasidone            Yes                      1 (one)           [facility]

vers



     80 mg      3-31                               time each           ity of



     capsule      10:29:                               day            [address]

 

     atorvastati      2021- No             10mg      Take 10 mg          

 [facility]



     n 10 mg      1-16 11-17                          by mouth.           ity of



     tablet      00:00: 05:59                                         [place]



               00   :00                                          [facility]

 

     atorvastati      2021- No             10mg      Take 10 mg          

 [facility]



     n 10 mg      1-16 11-17                          by mouth.           ity of



     tablet      00:00: 05:59                                         [place]



               00   :00                                          [facility]

 

     lithium            Yes            150mg Q.[id number] Take 150          

 [place]



     carbonate      7-16                          [id number] mg by           [facility]



     (ESKALITH)      20:13:                          3D   mouth 3           



     150 mg      54                                 times           



     capsule                                         daily with           



                                                  meals.           

 

     DULoxetine      0      Yes                 QD   Take by           [facility]



     (CYMBALTA)      7-16                               mouth           Health



     20 mg      20:13:                               daily.           



     delayed      54                                                



     release                                                        



     capsule                                                        

 

     lithium            Yes            150mg Q.[id number] Take 150          

 [place]



     carbonate      7-16                          [id number] mg by           [facility]



     (ESKALITH)      20:13:                          3D   mouth 3           



     150 mg      54                                 times           



     capsule                                         daily with           



                                                  meals.           

 

     DULoxetine      -0      Yes                 QD   Take by           Joan

is



     (CYMBALTA)      7-16                               mouth           Health



     20 mg      20:13:                               daily.           



     delayed      54                                                



     release                                                        



     capsule                                                        

 

     lithium      -0      Yes            150mg Q.[id number] Take 150          

 [place]



     carbonate      7-16                          [id number] mg by           [facility]



     (ESKALITH)      20:13:                          3D   mouth 3           



     150 mg      54                                 times           



     capsule                                         daily with           



                                                  meals.           

 

     DULoxetine      -0      Yes                 QD   Take by           Joan

is



     (CYMBALTA)      7-16                               mouth           Health



     20 mg      20:13:                               daily.           



     delayed      54                                                



     release                                                        



     capsule                                                        

 

     lithium      -0      Yes            150mg Q.[id number] Take 150          

 [place]



     carbonate      7-16                          [id number] mg by           [facility]



     (ESKALITH)      20:13:                          3D   mouth 3           



     150 mg      54                                 times           



     capsule                                         daily with           



                                                  meals.           

 

     DULoxetine      -0      Yes                 QD   Take by           Joan

is



     (CYMBALTA)      7-16                               mouth           Health



     20 mg      20:13:                               daily.           



     delayed      54                                                



     release                                                        



     capsule                                                        

 

     lithium      -0      Yes            150mg Q.[id number] Take 150          

 [place]



     carbonate      7-16                          [id number] mg by           [facility]



     (ESKALITH)      20:13:                          3D   mouth 3           



     150 mg      54                                 times           



     capsule                                         daily with           



                                                  meals.           

 

     DULoxetine      -0      Yes                 QD   Take by           Joan

is



     (CYMBALTA)      7-16                               mouth           Health



     20 mg      20:13:                               daily.           



     delayed      54                                                



     release                                                        



     capsule                                                        

 

     lithium      -0      Yes            150mg Q.[id number] Take 150          

 [place]



     carbonate      7-16                          [id number] mg by           [facility]



     (ESKALITH)      20:13:                          3D   mouth 3           



     150 mg      54                                 times           



     capsule                                         daily with           



                                                  meals.           

 

     DULoxetine      2014-0      Yes                 QD   Take by           Joan

is



     (CYMBALTA)      7-16                               mouth           Health



     20 mg      20:13:                               daily.           



     delayed      54                                                



     release                                                        



     capsule                                                        

 

     lithium      -0      Yes            150mg Q.[id number] Take 150          

 [place]



     carbonate      7-16                          [id number] mg by           [facility]



     (ESKALITH)      20:13:                          3D   mouth 3           



     150 mg      54                                 times           



     capsule                                         daily with           



                                                  meals.           

 

     DULoxetine      2014-0      Yes                 QD   Take by           Joan

is



     (CYMBALTA)      7-16                               mouth           Health



     20 mg      20:13:                               daily.           



     delayed      54                                                



     release                                                        



     capsule                                                        

 

     lithium      -0      Yes            150mg Q.[id number] Take 150          

 [place]



     carbonate      7-16                          [id number] mg by           [facility]



     (ESKALITH)      20:13:                          3D   mouth 3           



     150 mg      54                                 times           



     capsule                                         daily with           



                                                  meals.           

 

     DULoxetine            Yes                 QD   Take by           [facility]



     (CYMBALTA)      7-16                               mouth           Health



     20 mg      20:13:                               daily.           



     delayed      54                                                



     release                                                        



     capsule                                                        

 

     ibuprofen            Yes       Dental 800mg      Take 1           Jeff

ris



     (MOTRIN)      7-16                abscess           tablet by           [facility]

lt



     800 mg      00:00:                               mouth           



     tablet      00                                 every 8           



                                                  hours as           



                                                  needed for           



                                                  Pain.           

 

     ibuprofen            Yes       Dental 800mg      Take 1           Jeff

ris



     (MOTRIN)      7-16                abscess           tablet by           [facility]

lt



     800 mg      00:00:                               mouth           



     tablet      00                                 every 8           



                                                  hours as           



                                                  needed for           



                                                  Pain.           

 

     ibuprofen            Yes       Dental 800mg      Take 1           Jeff

ris



     (MOTRIN)      7-16                abscess           tablet by           a

lt



     800 mg      00:00:                               mouth           



     tablet      00                                 every 8           



                                                  hours as           



                                                  needed for           



                                                  Pain.           

 

     ibuprofen            Yes       Dental 800mg      Take 1           Jeff

ris



     (MOTRIN)      7-16                abscess           tablet by           [facility]

lt



     800 mg      00:00:                               mouth           



     tablet      00                                 every 8           



                                                  hours as           



                                                  needed for           



                                                  Pain.           

 

     ibuprofen            Yes       Dental 800mg      Take 1           Jeff

ris



     (MOTRIN)      7-16                abscess           tablet by           [facility]

lt



     800 mg      00:00:                               mouth           



     tablet      00                                 every 8           



                                                  hours as           



                                                  needed for           



                                                  Pain.           

 

     ibuprofen            Yes       Dental 800mg      Take 1           Jeff

ris



     (MOTRIN)      7-16                abscess           tablet by           [facility]

lt



     800 mg      00:00:                               mouth           



     tablet      00                                 every 8           



                                                  hours as           



                                                  needed for           



                                                  Pain.           

 

     ibuprofen            Yes       Dental 800mg      Take 1           Jeff

ris



     (MOTRIN)      7-16                abscess           tablet by           [facility]

lt



     800 mg      00:00:                               mouth           



     tablet      00                                 every 8           



                                                  hours as           



                                                  needed for           



                                                  Pain.           

 

     ibuprofen            Yes       Dental 800mg      Take 1           Jeff

ris



     (MOTRIN)      7-16                abscess           tablet by           [facility]

lt



     800 mg      00:00:                               mouth           



     tablet      00                                 every 8           



                                                  hours as           



                                                  needed for           



                                                  Pain.           

 

     ibuprofen            Yes       Dental 800mg      Take 1           Jeff

ris



     (MOTRIN)      7-16                abscess           tablet by           a

lth



     800 mg      00:00:                               mouth           



     tablet      00                                 every 8           



                                                  hours as           



                                                  needed for           



                                                  Pain.           

 

     ibuprofen            Yes       Dental 800mg      Take 1           Jeff

ris



     (MOTRIN)      7-16                abscess           tablet by           a

lth



     800 mg      00:00:                               mouth           



     tablet      00                                 every 8           



                                                  hours as           



                                                  needed for           



                                                  Pain.           

 

     ibuprofen            Yes       Dental 800mg      Take 1           Jeff

ris



     (MOTRIN)      7-16                abscess           tablet by           [facility]

lth



     800 mg      00:00:                               mouth           



     tablet      00                                 every 8           



                                                  hours as           



                                                  needed for           



                                                  Pain.           

 

     ibuprofen            Yes       Dental 800mg      Take 1           Jeff

ris



     (MOTRIN)      7-16                abscess           tablet by           [facility]

lth



     800 mg      00:00:                               mouth           



     tablet      00                                 every 8           



                                                  hours as           



                                                  needed for           



                                                  Pain.           

 

     ibuprofen            Yes       Dental 800mg      Take 1           Jeff

ris



     (MOTRIN)      7-16                abscess           tablet by           [facility]

lth



     800 mg      00:00:                               mouth           



     tablet      00                                 every 8           



                                                  hours as           



                                                  needed for           



                                                  Pain.           

 

     ibuprofen            Yes       Dental 800mg      Take 1           Jeff

ris



     (MOTRIN)      7-16                abscess           tablet by           [facility]

lth



     800 mg      00:00:                               mouth           



     tablet      00                                 every 8           



                                                  hours as           



                                                  needed for           



                                                  Pain.           







Vital Signs







             Vital Name   Observation Time Observation Value Comments     Source

 

             Systolic blood 2023 22:53:00 142 mm[Hg]                [facility]

 

             Diastolic blood 2023 22:53:00 76 mm[Hg]                 [facility]

 

             Heart rate   2023 22:53:00 75 /min                   [facility]

 

             Body temperature 2023 22:53:00 36.78 Danisha                 [facility]

 

             Respiratory rate 2023 22:53:00 18 /min                   [facility]

 

             Body height  2023 22:53:00 157.5 cm                  [facility]

 

             Body weight  2023 22:53:00 94.348 kg                 [facility]

 

             BMI          2023 22:53:00 38.04 kg/m2               [facility]

 

             Oxygen saturation in 2023 22:53:00 98 /min                   

[facility] blood by                                        [place] [facility]

[facility]



             Pulse oximetry                                        [facility]

 

             Systolic blood 2023 20:00:00 152 mm[Hg]                [facility]

 

             Diastolic blood 2023 20:00:00 64 mm[Hg]                 [facility]

 

             Heart rate   2023 20:00:00 60 /min                   [facility]

 

             Respiratory rate 2023 20:00:00 21 /min                   [facility]

 

             Oxygen saturation in 2023 20:00:00 96 /min                   

[facility]



             Arterial blood by                                        [place] [facility]

st[facility]



             Pulse oximetry                                        [facility]

 

             Body temperature 2023 15:06:00 37.72 Danisha                 [facility]

ers[facility] of



                                                                 [facility]



                                                                 Branch

 

             Body height  2023 15:06:00 157.5 cm                  [facility]

[facility]



                                                                 [place] [facility]



                                                                 [facility]

 

             Body weight  2023 15:06:00 113.399 kg                [facility]

[facility] of



                                                                 [facility]



                                                                 Branch

 

             BMI          2023 15:06:00 45.73 kg/m2               [facility]

[facility]



                                                                 [facility]



                                                                 [facility]

 

             HEIGHT       2023-02-15 16:56:00 157.5 cm                  

 

             WEIGHT       2023-02-15 16:56:00 100.245 kg                

 

             HEIGHT       2023-02-15 16:56:00 157.5 cm                  

 

             WEIGHT       2023-02-15 16:56:00 100.245 kg                

 

             HEIGHT       2023-02-15 16:56:00 157.5 cm                  

 

             WEIGHT       2023-02-15 16:56:00 100.245 kg                

 

             Systolic blood 2022 19:30:00 176 mm[Hg]                Univer

sity of



             pressure                                            [facility]

 

             Diastolic blood 2022 19:30:00 94 mm[Hg]                 [facility]



             pressure                                            [facility]

 

             Heart rate   2022 19:30:00 76 /min                   [facility]

ty of



                                                                 [facility]

 

             Respiratory rate 2022 19:30:00 11 /min                   Univ

ersity of



                                                                 [facility]

 

             Oxygen saturation in 2022 19:30:00 95 /min                   

[facility]



             Arterial blood by                                        [place] [facility]

staci



             Pulse oximetry                                        Branch

 

             Body temperature 2022 17:54:00 37.22 Danisha                 [facility]

ers[facility] of



                                                                 [facility]



                                                                 [facility]anch

 

             Body height  2022 17:54:00 157.5 cm                  [facility]

ty of



                                                                 [place] [facility]



                                                                 Branch

 

             Body weight  2022 17:54:00 90.719 kg                 [facility]

ty of



                                                                 [facility]



                                                                 Branch

 

             BMI          2022 17:54:00 36.58 kg/m2               [facility]

ty of



                                                                 [facility]



                                                                 Branch

 

             Body height  2022 15:29:00 160 cm                    [facility]

ty of



                                                                 [facility]



                                                                 Branch

 

             Body weight  2022 15:29:00 90.719 kg                 [facility]

ty of



                                                                 [place] [facility]



                                                                 Branch

 

             BMI          2022 15:29:00 35.43 kg/m2               [facility]

 

             Height/Length 2022 08:36:33 160 cm                    



             Measured                                            

 

             Weight Dosing 2022 08:36:33 105.00 kg                 

 

             Body height  2023 08:42:00 157.5 cm                  [facility]

 

             Body weight  2023 08:42:00 100.245 kg                [facility]

 

             BMI          2023 08:42:00 40.42 kg/m2               [facility]

 

             Systolic blood 2023 08:29:26 114 mm[Hg]                [facility]



             pressure                                            

 

             Diastolic blood 2023 08:29:26 76 mm[Hg]                 [facility]



             pressure                                            

 

             Heart rate   2023 08:29:26 86 /min                   [facility]

 

             Body temperature 2023 08:29:26 36.78 Danisha                 [facility]

 

             Respiratory rate 2023 08:29:26 18 /min                   [facility]

 

             Oxygen saturation in 2023 08:29:26 97 /min                   

[facility]



             Arterial blood by                                        



             Pulse oximetry                                        

 

             Systolic blood 2023-02-15 22:01:00 124 mm[Hg]                [facility]

 

             Diastolic blood 2023-02-15 22:01:00 66 mm[Hg]                 [facility]

 

             Heart rate   2023-02-15 22:01:00 88 /min                   [facility]

 

             Body temperature 2023-02-15 22:01:00 37.17 Danisha                 [facility]

 

             Respiratory rate 2023-02-15 22:01:00 16 /min                   [facility]

 

             Oxygen saturation in 2023-02-15 22:01:00 100 /min                  

[facility]



             Arterial blood by                                        [facility]

nter



             Pulse oximetry                                        

 

             Body height  2023-02-15 16:56:00 157.5 cm                  [facility]

 

             Body weight  2023-02-15 16:56:00 100.245 kg                [facility]

 

             BMI          2023-02-15 16:56:00 40.42 kg/m2               [facility]

 

             Heart Rate   2023 22:58:16                           [facility]

 Elie

 

             Systolic (mm Hg) 2023 22:58:00                           [facility]

 

             Diastolic (mm Hg) 2023 22:58:00                           Andrew Delgadillo

 

             Temperature Oral (F) 2023 18:24:49 98.5 F                    

Sergio Delgadillo

 

             Height       2023 14:59:00 5 [ft[facility]i]                  [facility]

 [place]

 

             BMI Calculated 2023 14:59:00                           Fabiano Paez

 

             Weight       2023 14:59:00                           [facility]

 [place]







Procedures







                Procedure       Date / Time     Performing Clinician Source



                                Performed                       

 

                ASSIGNMENT OF BENEFITS 2023 23:23:45 Doctor Unassigned, No

 General acute hospital

 

                NOTICE OF PRIVACY 2023 22:36:23 Doctor Unassigned, No [facility]



                PRACTICES                       [facility]

 

                CONSENT/REFUSAL FOR 2023 22:31:37 Doctor Unassigned, No [facility]



                DIAGNOSIS AND TREATMENT                 [facility]

 

                CT              [facility]23 16[facility]56:[facility]



                MAXILLOFACIAL/MANDIBLE                                 Medical B

ranch



                W CONTRAST                                      

 

                LACTIC ACID WHOLE BLOOD 2023 15:57:00 Singer, [facility]

 

                COMP. METABOLIC PANEL 2023 15:56:00 [facility]



                ([id number])                                         Medical [place]

 

                CBC WITH DIFF   2023 15:56:00 [facility]

 

                COVID-19, INFLUENZA 2023 10:39:00 Alejo Escalante [facility]



                A&B, AND RSV                    Gianni          



                QUALITATIVE RT-PCR                                 

 

                XR CHEST 1 VW PORTABLE 2023 09:16:40 Alejo Escalante [facility]



                                                Gianni          

 

                ASSIGNMENT OF BENEFITS 2023 19:39:54 Doctor Unassigned, No

 General acute hospital

 

                XR CHEST 1 VW   2022 18:39:34 [facility]

 

                XR PELVIS <3 VW 2022 18:39:34 [facility]

 

                URINALYSIS      2022 18:19:00 [facility]

 

                COMP. METABOLIC PANEL 2022 18:08:00 Singer, [facility]

[facility] [place]



                ([id number])                                         Medical Branch

 

                CBC WITH DIFF   2022 18:08:00 Jack Dhillon o

[facility] [place]



                                                                Medical Branch

 

                COVID-19 (ID NOW RAPID 2022 18:08:00 Jack Dhillon

[facility]



                TESTING)                                        Medical Branch

 

                REFERRAL-       2022 05:01:00 Doctor Unassigned, No [facility]



                REQUEST/RESPONSE                 Name            Medical Branch

 

                [id number]         2020 00:00:00 CANAL.02        [facility]







Plan of Care







             Planned Activity Planned Date Details      Comments     Source

 

             Future Scheduled 2023-10-01   IMM Influenza Seasonal              [facility]

[facility]



             Test         00:00:00     (>/= 19 yrs) [code =              



                                       IMM Influenza Seasonal              



                                       (>/= 19 yrs)]              

 

             Future Scheduled 2023-10-01   IMM Influenza Seasonal              [facility]

[facility]



             Test         00:00:00     (>/= 19 yrs) [code =              



                                       IMM Influenza Seasonal              



                                       (>/= 19 yrs)]              

 

             Future Scheduled 2023-10-01   IMM Influenza Seasonal              [facility]

[facility]



             Test         00:00:00     (>/= 19 yrs) [code =              



                                       IMM Influenza Seasonal              



                                       (>/= 19 yrs)]              

 

             Future Scheduled 2023-10-01   IMM Influenza Seasonal              [facility]

[facility]



             Test         00:00:00     (>/= 19 yrs) [code =              



                                       IMM Influenza Seasonal              



                                       (>/= 19 yrs)]              

 

             Future Scheduled 2023-10-01   IMM Influenza Seasonal              [facility]

[facility]



             Test         00:00:00     (>/= 19 yrs) [code =              



                                       IMM Influenza Seasonal              



                                       (>/= 19 yrs)]              

 

             Future Scheduled 2023   Influenza Vaccine (#1)              C

[facility]



             Test         00:00:00     [code = Influenza              Medical Ce

nter



                                       Vaccine (#1)]              

 

             Future Scheduled 2023   INFL[facility]ENZA VACCINE              [facility]



             Test         00:00:00     (Season Ended) [code =              Medic

al Center



                                       INFLUENZA VACCINE              



                                       (Season Ended)]              

 

             Future Scheduled 2023   INFLUENZA VACCINE              [facility]



             Test         00:00:00     (Season Ended) [code =              Medic

al Center



                                       INFLUENZA VACCINE              



                                       (Season Ended)]              

 

             Future Scheduled 2023   INFLUENZA VACCINE              [facility]



             Test         00:00:00     (Season Ended) [code =              Medic

al Center



                                       INFLUENZA VACCINE              



                                       (Season Ended)]              

 

             Future Scheduled 2023   Influenza Vaccine              [facility]



             Test         00:00:00     (Season Ended) [code =              Medic

al Center



                                       Influenza Vaccine              



                                       (Season Ended)]              

 

             Future Scheduled 2023   INFLUENZA VACCINE              [facility]



             Test         00:00:00     (Season Ended) [code =              Medic

al Center



                                       INFLUENZA VACCINE              



                                       (Season Ended)]              

 

             Future Scheduled 2023   INFLUENZA VACCINE              [facility]



             Test         00:00:00     (Season Ended) [code =              Medic

al Center



                                       INFLUENZA VACCINE              



                                       (Season Ended)]              

 

             Future Scheduled 2023   [facility]



             Test         08:38:54     malignant neoplasm of              



                                       colon (procedure)              



                                       [code = 236929638]              

 

             Future Scheduled 2023   [facility]



             Test         08:38:54     malignant neoplasm of              



                                       colon (procedure)              



                                       [code = 247323172]              

 

             Future Scheduled 2023   [facility]



             Test         08:38:54     malignant neoplasm of              



                                       colon (procedure)              



                                       [code = 323812149]              

 

             Future Scheduled 2023   COVID-19 VACCINE (#1)              [facility]



             Test         08:38:54     [code = COVID-19              



                                       VACCINE (#1)]              

 

             Future Scheduled 2023   Pneumococcal Vaccine:              [facility]



             Test         08:38:54     Pediatrics (0 to 5              



                                       Years) and At-Risk              



                                       Patients (6 to 64              



                                       Years) (1 - PCV) [code              



                                       = Pneumococcal              



                                       Vaccine: Pediatrics (0              



                                       to 5 Years) and              



                                       At-Risk Patients (6 to              



                                       64 Years) (1 - PCV)]              

 

             Future Scheduled 2023   Hepatitis C screening              [facility]



             Test         08:38:54     (procedure) [code =              



                                       453403646]                

 

             Future Scheduled 2023   [facility]



             Test         08:38:54     malignant neoplasm of              



                                       colon (procedure)              



                                       [code = 185638108]              

 

             Future Scheduled 2023   [facility]



             Test         08:38:54     malignant neoplasm of              



                                       colon (procedure)              



                                       [code = 861260408]              

 

             Future Scheduled 2023   [facility]INGLES VACCINES (1              [facility]



             Test         08:38:54     of 2) [code = SHINGLES              



                                       VACCINES (1 of 2)]              

 

             Future Scheduled 2023   INFLU[facility]ZA VACCINE              [facility]



             Test         08:38:54     [code = INFLUENZA              



                                       VACCINE]                  

 

             Future Scheduled 2023   [facility]



             Test         08:38:54     malignant neoplasm of              



                                       colon (procedure)              



                                       [code = 185304614]              

 

             Future Scheduled 2023   COVID-19 VACCINE (#1)              [facility]



             Test         08:38:54     [code = COVID-19              



                                       VACCINE (#1)]              

 

             Future Scheduled 2023   Pneumococcal Vaccine:              [facility]



             Test         08:38:54     Pediatrics (0 to 5              



                                       Years) and At-Risk              



                                       Patients (6 to 64              



                                       Years) (1 - PCV) [code              



                                       = Pneumococcal              



                                       Vaccine: Pediatrics (0              



                                       to 5 Years) and              



                                       At-Risk Patients (6 to              



                                       64 Years) (1 - PCV)]              

 

             Future Scheduled 2023   Hepatitis C screening              [facility]



             Test         08:38:54     (procedure) [code =              



                                       093293831]                

 

             Future Scheduled 2023   [facility]



             Test         08:38:54     malignant neoplasm of              



                                       colon (procedure)              



                                       [code = 722274420]              

 

             Future Scheduled 2023   [facility]



             Test         08:38:54     malignant neoplasm of              



                                       colon (procedure)              



                                       [code = 466702382]              

 

             Future Scheduled 2023   SHINGLES VACCINES (1              [facility]



             Test         08:38:54     of 2) [code = SHINGLES              



                                       VACCINES (1 of 2)]              

 

             Future Scheduled 2023   INFLUENZA VACCINE              [facility]



             Test         08:38:54     [code = INFLUENZA              



                                       VACCINE]                  

 

             Future Scheduled 2023   [facility]



             Test         08:38:54     malignant neoplasm of              



                                       colon (procedure)              



                                       [code = 084739343]              

 

             Future Scheduled 2023   [facility]



             Test         08:38:54     malignant neoplasm of              



                                       colon (procedure)              



                                       [code = 508318062]              

 

             Future Scheduled 2023   COVID-19 VACCINE (#1)              [facility]



             Test         20:12:18     [code = COVID-19              



                                       VACCINE (#1)]              

 

             Future Scheduled 2023   Pneumococcal Vaccine:              [facility]



             Test         20:12:18     Pediatrics (0 to 5              



                                       Years) and At-Risk              



                                       Patients (6 to 64              



                                       Years) (1 - PCV) [code              



                                       = Pneumococcal              



                                       Vaccine: Pediatrics (0              



                                       to 5 Years) and              



                                       At-Risk Patients (6 to              



                                       64 Years) (1 - PCV)]              

 

             Future Scheduled 2023   Hepatitis C screening              [facility]



             Test         20:12:18     (procedure) [code =              



                                       988297569]                

 

             Future Scheduled 2023   COLONOSCOPY SCREENING              [facility]



             Test         20:12:18     [code = COLONOSCOPY              



                                       SCREENING]                

 

             Future Scheduled 2023   SHINGLES VACCINES (1              Met

[facility]



             Test         20:12:18     of 2) [code = SHINGLES              



                                       VACCINES (1 of 2)]              

 

             Future Scheduled 2023   INFLUENZA VACCINE              [facility]



             Test         20:12:18     [code = INFLUENZA              



                                       VACCINE]                  

 

             Future Scheduled 2023   COVID-19 VACCINE (#1)              [facility]



             Test         20:12:18     [code = COVID-19              



                                       VACCINE (#1)]              

 

             Future Scheduled 2023   Pneumococcal Vaccine:              [facility]



             Test         20:12:18     Pediatrics (0 to 5              



                                       Years) and At-Risk              



                                       Patients (6 to 64              



                                       Years) (1 - PCV) [code              



                                       = Pneumococcal              



                                       Vaccine: Pediatrics (0              



                                       to 5 Years) and              



                                       At-Risk Patients (6 to              



                                       64 Years) (1 - PCV)]              

 

             Future Scheduled 2023   Hepatitis C screening              [facility]



             Test         20:12:18     (procedure) [code =              



                                       558107327]                

 

             Future Scheduled 2023   COLONOSCOPY SCREENING              [facility]



             Test         20:12:18     [code = COLONOSCOPY              



                                       SCREENING]                

 

             Future Scheduled 2023   SHINGLES VACCINES (1              Met

[facility]



             Test         20:12:18     of 2) [code = SHINGLES              



                                       VACCINES (1 of 2)]              

 

             Future Scheduled 2023   INFLUENZA VACCINE              [facility]



             Test         20:12:18     [code = INFLUENZA              



                                       VACCINE]                  

 

             Future Scheduled 2023   COVID-19 VACCINE (#1)              [facility]



             Test         20:12:18     [code = COVID-19              



                                       VACCINE (#1)]              

 

             Future Scheduled 2023   Pneumococcal Vaccine:              [facility]



             Test         20:12:18     Pediatrics (0 to 5              



                                       Years) and At-Risk              



                                       Patients (6 to 64              



                                       Years) (1 - PCV) [code              



                                       = Pneumococcal              



                                       Vaccine: Pediatrics (0              



                                       to 5 Years) and              



                                       At-Risk Patients (6 to              



                                       64 Years) (1 - PCV)]              

 

             Future Scheduled 2023   Hepatitis C screening              [facility]



             Test         20:12:18     (procedure) [code =              



                                       494744394]                

 

             Future Scheduled 2023   [facility]



             Test         20:12:18     malignant neoplasm of              



                                       colon (procedure)              



                                       [code = 030073077]              

 

             Future Scheduled 2023   SHINGLES VACCINES (1              Met

[facility]



             Test         20:12:18     of 2) [code = SHINGLES              



                                       VACCINES (1 of 2)]              

 

             Future Scheduled 2023   INFLUENZA VACCINE              [facility]



             Test         20:12:18     [code = INFLUENZA              



                                       VACCINE]                  

 

             Future Scheduled 2023   COVID-19 VACCINE (#1)              [facility]



             Test         20:12:18     [code = COVID-19              



                                       VACCINE (#1)]              

 

             Future Scheduled 2023   Pneumococcal Vaccine:              [facility]



             Test         20:12:18     Pediatrics (0 to 5              



                                       Years) and At-Risk              



                                       Patients (6 to 64              



                                       Years) (1 - PCV) [code              



                                       = Pneumococcal              



                                       Vaccine: Pediatrics (0              



                                       to 5 Years) and              



                                       At-Risk Patients (6 to              



                                       64 Years) (1 - PCV)]              

 

             Future Scheduled 2023   Hepatitis C screening              [facility]



             Test         20:12:18     (procedure) [code =              



                                       577860281]                

 

             Future Scheduled 2023   [facility]



             Test         20:12:18     malignant neoplasm of              



                                       colon (procedure)              



                                       [code = 457566005]              

 

             Future Scheduled 2023   SHINGLES VACCINES (1              Met

[facility]



             Test         20:12:18     of 2) [code = SHINGLES              



                                       VACCINES (1 of 2)]              

 

             Future Scheduled 2023   INFLUENZA VACCINE              [facility]



             Test         20:12:18     [code = INFLUENZA              



                                       VACCINE]                  

 

             Future Scheduled 2023   COVID-19 VACCINE (#1)              [facility]



             Test         19:28:05     [code = COVID-19              



                                       VACCINE (#1)]              

 

             Future Scheduled 2023   Pneumococcal Vaccine:              [facility]



             Test         19:28:05     Pediatrics (0 to 5              



                                       Years) and At-Risk              



                                       Patients (6 to 64              



                                       Years) (1 - PCV) [code              



                                       = Pneumococcal              



                                       Vaccine: Pediatrics (0              



                                       to 5 Years) and              



                                       At-Risk Patients (6 to              



                                       64 Years) (1 - PCV)]              

 

             Future Scheduled 2023   Hepatitis C screening              [facility]



             Test         19:28:05     (procedure) [code =              



                                       [id number]]                

 

             Future Scheduled 2023   COLONOSCOPY SCREENING              [facility]



             Test         19:28:05     [code = COLONOSCOPY              



                                       SCREENING]                

 

             Future Scheduled 2023   SHINGLES VACCINES (1              Met

[facility]



             Test         19:28:05     of 2) [code = SHINGLES              



                                       VACCINES (1 of 2)]              

 

             Future Scheduled 2023   INFLUENZA VACCINE              [facility]

[facility]



             Test         19:28:05     [code = INFLUENZA              



                                       VACCINE]                  

 

             Future Scheduled 2023   Medicare IPPE (WELCOME              C

[facility]



             Test         00:00:00     TO MEDICARE) [code =              Medical

 Center



                                       Medicare IPPE (WELCOME              



                                       TO MEDICARE)]              

 

             Future Scheduled 2023   Medicare IPPE (WELCOME              C

[facility]



             Test         00:00:00     TO MEDICARE) [code =              Medical

 Center



                                       Medicare IPPE (WELCOME              



                                       TO MEDICARE)]              

 

             Future Scheduled 2023   Medicare IPPE (WELCOME              C

[facility]



             Test         00:00:00     TO MEDICARE) [code =              Medical

 Center



                                       Medicare IPPE (WELCOME              



                                       TO MEDICARE)]              

 

             Future Scheduled 2023   Medicare IPPE (WELCOME              C

[facility]



             Test         00:00:00     TO MEDICARE) [code =              Medical

 Center



                                       Medicare IPPE (WELCOME              



                                       TO MEDICARE)]              

 

             Future Scheduled 2023   Medicare IPPE (WELCOME              C

[facility]



             Test         00:00:00     TO MEDICARE) [code =              Medical

 Center



                                       Medicare IPPE (WELCOME              



                                       TO MEDICARE)]              

 

             Future Scheduled 2023   Medicare IPPE (WELCOME              C

[facility]



             Test         00:00:00     TO MEDICARE) [code =              Medical

 Center



                                       Medicare IPPE (WELCOME              



                                       TO MEDICARE)]              

 

             Future Scheduled 2023   Medicare IPPE (WELCOME              C

[facility]



             Test         00:00:00     TO MEDICARE) [code =              Medical

 Center



                                       Medicare IPPE (WELCOME              



                                       TO MEDICARE)]              

 

             Future Scheduled 2023   Medicare IPPE (WELCOME              C

[facility]



             Test         00:00:00     TO MEDICARE) [code =              Medical

 Center



                                       Medicare IPPE (WELCOME              



                                       TO MEDICARE)]              

 

             Future Scheduled 2023   DEPRESSION SCREENING              [facility]



             Test         00:00:00     (12+) [code =              Medical Center



                                       DEPRESSION SCREENING              



                                       (12+)]                    

 

             Future Scheduled 2023   DEPRESSION SCREENING              [facility]



             Test         00:00:00     (12+) [code =              Medical Center



                                       DEPRESSION SCREENING              



                                       (12+)]                    

 

             Future Scheduled 2023   DEPRESSION SCREENING              [facility]



             Test         00:00:00     (12+) [code =              Medical Center



                                       DEPRESSION SCREENING              



                                       (12+)]                    

 

             Future Scheduled 2023   DEPRESSION SCREENING              [facility]



             Test         00:00:00     (12+) [code =              Medical Center



                                       DEPRESSION SCREENING              



                                       (12+)]                    

 

             Future Scheduled 2023   DEPRESSION SCREENING              [facility]



             Test         00:00:00     (12+) [code =              Medical Center



                                       DEPRESSION SCREENING              



                                       (12+)]                    

 

             Future Scheduled 2023   DEPRESSION SCREENING              [facility]



             Test         00:00:00     (12+) [code =              Medical Center



                                       DEPRESSION SCREENING              



                                       (12+)]                    

 

             Future Scheduled 2023   DEPRESSION SCREENING              [facility]



             Test         00:00:00     (12+) [code =              Medical Center



                                       DEPRESSION SCREENING              



                                       (12+)]                    

 

             Future Scheduled 2023   DEPRESSION SCREENING              [facility]



             Test         00:00:00     (12+) [code =              Medical Center



                                       DEPRESSION SCREENING              



                                       (12+)]                    

 

             Future Scheduled 2022-10-01   IMM Influenza Seasonal              H

[facility]



             Test         00:00:00     (>/= 19 yrs) [code =              



                                       IMM Influenza Seasonal              



                                       (>/= 19 yrs)]              

 

             Future Scheduled 2022-10-01   IMM Influenza Seasonal              H

[facility]



             Test         00:00:00     (>/= 19 yrs) [code =              



                                       IMM Influenza Seasonal              



                                       (>/= 19 yrs)]              

 

             Future Scheduled 2022-10-01   IMM Influenza Seasonal              H

[facility]



             Test         00:00:00     (>/= 19 yrs) [code =              



                                       IMM Influenza Seasonal              



                                       (>/= 19 yrs)]              

 

             Future Scheduled 2022-10-01   IMM Influenza Seasonal              H

[facility]



             Test         00:00:00     (>/= 19 yrs) [code =              



                                       IMM Influenza Seasonal              



                                       (>/= 19 yrs)]              

 

             Future Scheduled 2022-10-01   IMM Influenza Seasonal              H

[facility]



             Test         00:00:00     (>/= 19 yrs) [code =              



                                       IMM Influenza Seasonal              



                                       (>/= 19 yrs)]              

 

             Future Scheduled 2022-10-01   IMM Influenza Seasonal              H

[facility]



             Test         00:00:00     (>/= 19 yrs) [code =              



                                       IMM Influenza Seasonal              



                                       (>/= 19 yrs)]              

 

             Future Scheduled 2022-10-01   IMM Influenza Seasonal              H

[facility]



             Test         00:00:00     (>/= 19 yrs) [code =              



                                       IMM Influenza Seasonal              



                                       (>/= 19 yrs)]              

 

             Future Scheduled 2022-10-01   IMM Influenza Seasonal              H

[facility]



             Test         00:00:00     (>/= 19 yrs) [code =              



                                       IMM Influenza Seasonal              



                                       (>/= 19 yrs)]              

 

             Future Scheduled 2022-10-01   IMM Influenza Seasonal              H

[facility]



             Test         00:00:00     (>/= 19 yrs) [code =              



                                       IMM Influenza Seasonal              



                                       (>/= 19 yrs)]              

 

             Future Scheduled 2022   INFLUENZA VACCINE (#1)              C

[facility]



             Test         00:00:00     [code = INFLUENZA              Medical Ce

nter



                                       VACCINE (#1)]              

 

             Future Scheduled 2018   Screening for              [facility]

[facility]



             Test         00:00:00     malignant neoplasm of              



                                       colon (procedure)              



                                       [code = [id number]]              

 

             Future Scheduled 2018   Screening for              [facility]

[facility]



             Test         00:00:00     malignant neoplasm of              



                                       colon (procedure)              



                                       [code = [id number]]              

 

             Future Scheduled 2018   Screening for              [facility]

[facility]



             Test         00:00:00     malignant neoplasm of              



                                       colon (procedure)              



                                       [code = [id number]]              

 

             Future Scheduled 2018   Screening for              [facility]

[facility]



             Test         00:00:00     malignant neoplasm of              



                                       colon (procedure)              



                                       [code = 242917984]              

 

             Future Scheduled 2018   Screening for              [facility]

[facility]



             Test         00:00:00     malignant neoplasm of              



                                       colon (procedure)              



                                       [code = [id number]]              

 

             Future Scheduled 2018   Screening for              [facility]

[facility]



             Test         00:00:00     malignant neoplasm of              



                                       colon (procedure)              



                                       [code = 741174676]              

 

             Future Scheduled 2018   Screening for              [facility]

[facility]



             Test         00:00:00     malignant neoplasm of              



                                       colon (procedure)              



                                       [code = 921899598]              

 

             Future Scheduled 2018   Screening for              [facility]

[facility]



             Test         00:00:00     malignant neoplasm of              



                                       colon (procedure)              



                                       [code = 944372498]              

 

             Future Scheduled 2018   Screening for              [facility]

[facility]



             Test         00:00:00     malignant neoplasm of              



                                       colon (procedure)              



                                       [code = 041617186]              

 

             Future Scheduled 2018   Screening for              [facility]

[facility]



             Test         00:00:00     malignant neoplasm of              



                                       colon (procedure)              



                                       [code = 954632777]              

 

             Future Scheduled 2018   Screening for              [facility]

[facility]



             Test         00:00:00     malignant neoplasm of              



                                       colon (procedure)              



                                       [code = 928932755]              

 

             Future Scheduled 2018   Screening for              [facility]

[facility]



             Test         00:00:00     malignant neoplasm of              



                                       colon (procedure)              



                                       [code = 753989678]              

 

             Future Scheduled 2018   Screening for              [facility]

[facility]



             Test         00:00:00     malignant neoplasm of              



                                       colon (procedure)              



                                       [code = 859468599]              

 

             Future Scheduled 2018   Screening for              [facility]

[facility]



             Test         00:00:00     malignant neoplasm of              



                                       colon (procedure)              



                                       [code = 570223303]              

 

             Future Scheduled 2018   SHINGLES VACCINES (1              [facility]



             Test         00:00:00     of 2) [code = SHINGLES              [facility]

[facility] Center



                                       VACCINES (1 of 2)]              

 

             Future Scheduled 2018   SHINGLES VACCINES (1              [facility]



             Test         00:00:00     of 2) [code = SHINGLES              Medic

al Center



                                       VACCINES (1 of 2)]              

 

             Future Scheduled 2018   SHINGLES VACCINES (1              [facility]



             Test         00:00:00     of 2) [code = SHINGLES              Medic

al Center



                                       VACCINES (1 of 2)]              

 

             Future Scheduled 2018   SHINGLES VACCINES (1              [facility]



             Test         00:00:00     of 2) [code = SHINGLES              Medic

al Center



                                       VACCINES (1 of 2)]              

 

             Future Scheduled 2018   SHINGLES VACCINES (1              [facility]



             Test         00:00:00     of 2) [code = SHINGLES              Medic

al Center



                                       VACCINES (1 of 2)]              

 

             Future Scheduled 2018   SHINGLES VACCINES (1              [facility]



             Test         00:00:00     of 2) [code = SHINGLES              Medic

al Center



                                       VACCINES (1 of 2)]              

 

             Future Scheduled 2018   SHINGLES VACCINES (1              [facility]



             Test         00:00:00     of 2) [code = SHINGLES              Medic

al Center



                                       VACCINES (1 of 2)]              

 

             Future Scheduled 2018   SHINGLES VACCINES (1              [facility]



             Test         00:00:00     of 2) [code = SHINGLES              Medic

al Center



                                       VACCINES (1 of 2)]              

 

             Future Scheduled 2013   Lipid panel               [facility]

s



             Test         00:00:00     (procedure) [code =              [facility] 

Center



                                       [id number]]                 

 

             Future Scheduled 2013   Lipid panel               [facility]

s



             Test         00:00:00     (procedure) [code =              [facility] 

Center



                                       [id number]]                 

 

             Future Scheduled 2013   Lipid panel               [facility]

s



             Test         00:00:00     (procedure) [code =              [facility] 

Center



                                       [id number]]                 

 

             Future Scheduled 2013   Lipid panel               [facility]

s



             Test         00:00:00     (procedure) [code =              [facility]



                                       [id number]]                 

 

             Future Scheduled 2013   Lipid panel               [facility]

s



             Test         00:00:00     (procedure) [code =              [facility] 

Center



                                       [id number]]                 

 

             Future Scheduled 2013   Lipid panel               [facility]

s



             Test         00:00:00     (procedure) [code =              [facility] 

Center



                                       [id number]]                 

 

             Future Scheduled 2013   Lipid panel               [facility]

s



             Test         00:00:00     (procedure) [code =              [facility]



                                       [id number]]                 

 

             Future Scheduled 2013   Lipid panel               [facility]

s



             Test         00:00:00     (procedure) [code =              [facility]



                                       [id number]]                 

 

             Future Scheduled 2008   [facility]



             Test         00:00:00     (Yearly) [code =              



                                       Breast Cancer [facility]              



                                       (Yearly)]                 

 

             Future Scheduled 2008   [facility]



             Test         00:00:00     (Yearly) [code =              



                                       Breast Cancer Scrn              



                                       (Yearly)]                 

 

             Future Scheduled 2008   [facility] Cancer [facility]



             Test         00:00:00     (Yearly) [code =              



                                       Breast Cancer Scrn              



                                       (Yearly)]                 

 

             Future Scheduled 2008   [facility] Cancer [facility]



             Test         00:00:00     (Yearly) [code =              



                                       Breast Cancer Scrn              



                                       (Yearly)]                 

 

             Future Scheduled 2008   [facility] Cancer [facility]



             Test         00:00:00     (Yearly) [code =              



                                       Breast Cancer Scrn              



                                       (Yearly)]                 

 

             Future Scheduled 2008   [facility] Cancer [facility]



             Test         00:00:00     (Yearly) [code =              



                                       Breast Cancer Scrn              



                                       (Yearly)]                 

 

             Future Scheduled 2008   [facility] Cancer [facility]



             Test         00:00:00     (Yearly) [code =              



                                       Breast Cancer Scrn              



                                       (Yearly)]                 

 

             Future Scheduled 2008   [facility] Cancer [facility]



             Test         00:00:00     (Yearly) [code =              



                                       Breast Cancer Scrn              



                                       (Yearly)]                 

 

             Future Scheduled 2008   [facility] Cancer [facility]



             Test         00:00:00     (Yearly) [code =              



                                       Breast Cancer Scrn              



                                       (Yearly)]                 

 

             Future Scheduled 2008   [facility] Cancer [facility]



             Test         00:00:00     (Yearly) [code =              



                                       Breast Cancer Scrn              



                                       (Yearly)]                 

 

             Future Scheduled 2008   [facility] Cancer [facility]



             Test         00:00:00     (Yearly) [code =              



                                       Breast Cancer Scrn              



                                       (Yearly)]                 

 

             Future Scheduled 2008   [facility] Cancer [facility]



             Test         00:00:00     (Yearly) [code =              



                                       Breast Cancer Scrn              



                                       (Yearly)]                 

 

             Future Scheduled 2008   [facility] Cancer [facility]



             Test         00:00:00     (Yearly) [code =              



                                       Breast Cancer Scrn              



                                       (Yearly)]                 

 

             Future Scheduled 2008   [facility] Cancer [facility]



             Test         00:00:00     (Yearly) [code =              



                                       Breast Cancer Scrn              



                                       (Yearly)]                 

 

             Future Scheduled 1998   Screening for              [facility]

[facility]



             Test         00:00:00     malignant neoplasm of              



                                       cervix (procedure)              



                                       [code = 521372252]              

 

             Future Scheduled 1998   Screening for              [facility]

[facility]



             Test         00:00:00     malignant neoplasm of              



                                       cervix (procedure)              



                                       [code = 973656086]              

 

             Future Scheduled 1998   Screening for              [facility]

[facility]



             Test         00:00:00     malignant neoplasm of              



                                       cervix (procedure)              



                                       [code = 932502563]              

 

             Future Scheduled 1998   Screening for              [facility]

[facility]



             Test         00:00:00     malignant neoplasm of              



                                       cervix (procedure)              



                                       [code = 413871874]              

 

             Future Scheduled 1998   Screening for              [facility]

[facility]



             Test         00:00:00     malignant neoplasm of              



                                       cervix (procedure)              



                                       [code = [id number]]              

 

             Future Scheduled 1998   Screening for              [facility]

[facility]



             Test         00:00:00     malignant neoplasm of              



                                       cervix (procedure)              



                                       [code = 421098784]              

 

             Future Scheduled 1998   Screening for              [facility]

[facility]



             Test         00:00:00     malignant neoplasm of              



                                       cervix (procedure)              



                                       [code = 645809758]              

 

             Future Scheduled 1998   Screening for              [facility]

[facility]



             Test         00:00:00     malignant neoplasm of              



                                       cervix (procedure)              



                                       [code = 398344004]              

 

             Future Scheduled 1998   Screening for              [facility]

[facility]



             Test         00:00:00     malignant neoplasm of              



                                       cervix (procedure)              



                                       [code = 388567521]              

 

             Future Scheduled 1998   Screening for              [facility]

[facility]



             Test         00:00:00     malignant neoplasm of              



                                       cervix (procedure)              



                                       [code = [id number]]              

 

             Future Scheduled 1998   Screening for              [facility]

[facility]



             Test         00:00:00     malignant neoplasm of              



                                       cervix (procedure)              



                                       [code = [id number]]              

 

             Future Scheduled 1998   Screening for              [facility]

[facility]



             Test         00:00:00     malignant neoplasm of              



                                       cervix (procedure)              



                                       [code = 667383670]              

 

             Future Scheduled 1998   Screening for              [facility]

[facility]



             Test         00:00:00     malignant neoplasm of              



                                       cervix (procedure)              



                                       [code = 730696832]              

 

             Future Scheduled 1998   Screening for              [facility]

[facility]



             Test         00:00:00     malignant neoplasm of              



                                       cervix (procedure)              



                                       [code = 109209498]              

 

             Future Scheduled 1998   Screening for              [facility]

[facility]



             Test         00:00:00     malignant neoplasm of              



                                       cervix (procedure)              



                                       [code = 149357737]              

 

             Future Scheduled 1998   Screening for              [facility]

[facility]



             Test         00:00:00     malignant neoplasm of              



                                       cervix (procedure)              



                                       [code = 279781167]              

 

             Future Scheduled 1998   Screening for              [facility]

[facility]



             Test         00:00:00     malignant neoplasm of              



                                       cervix (procedure)              



                                       [code = [id number]]              

 

             Future Scheduled 1998   Screening for              [facility]

[facility]



             Test         00:00:00     malignant neoplasm of              



                                       cervix (procedure)              



                                       [code = 508717904]              

 

             Future Scheduled 1998   Screening for              [facility]

[facility]



             Test         00:00:00     malignant neoplasm of              



                                       cervix (procedure)              



                                       [code = 930980928]              

 

             Future Scheduled 1998   Screening for              [facility]

[facility]



             Test         00:00:00     malignant neoplasm of              



                                       cervix (procedure)              



                                       [code = 600333678]              

 

             Future Scheduled 1998   Screening for              [facility]

[facility]



             Test         00:00:00     malignant neoplasm of              



                                       cervix (procedure)              



                                       [code = [id number]]              

 

             Future Scheduled 1998   Screening for              [facility]

[facility]



             Test         00:00:00     malignant neoplasm of              



                                       cervix (procedure)              



                                       [code = 932493707]              

 

             Future Scheduled 1998   Screening for              [facility]

[facility]



             Test         00:00:00     malignant neoplasm of              



                                       cervix (procedure)              



                                       [code = 748104786]              

 

             Future Scheduled 1998   Screening for              [facility]

[facility]



             Test         00:00:00     malignant neoplasm of              



                                       cervix (procedure)              



                                       [code = 730605781]              

 

             Future Scheduled 1998   Screening for              [facility]

[facility]



             Test         00:00:00     malignant neoplasm of              



                                       cervix (procedure)              



                                       [code = 760027511]              

 

             Future Scheduled 1998   Screening for              [facility]

[facility]



             Test         00:00:00     malignant neoplasm of              



                                       cervix (procedure)              



                                       [code = 946428054]              

 

             Future Scheduled 1998   Screening for              [facility]

[facility]



             Test         00:00:00     malignant neoplasm of              



                                       cervix (procedure)              



                                       [code = 819007566]              

 

             Future Scheduled 1998   Screening for              [facility]

[facility]



             Test         00:00:00     malignant neoplasm of              



                                       cervix (procedure)              



                                       [code = 353379316]              

 

             Future Scheduled 1989   Screening for              [facility]

es



             Test         00:00:00     [facility]



                                       cervix (procedure)              



                                       [code = 175516850]              

 

             Future Scheduled 1989   Screening for              [facility]

es



             Test         00:00:00     malignant neoplasm [facility]              [facility]



                                       cervix (procedure)              



                                       [code = 331673200]              

 

             Future Scheduled 1989   Screening for              [facility]

es



             Test         00:00:00     malignant neoplasm [facility]              [facility]



                                       cervix (procedure)              



                                       [code = 090310632]              

 

             Future Scheduled 1989   Screening for              [facility]

es



             Test         00:00:00     malignant neoplasm [facility]              [facility]



                                       cervix (procedure)              



                                       [code = 870815223]              

 

             Future Scheduled 1989   Screening for              [facility]

es



             Test         00:00:00     malignant neoplasm [facility]              [facility]



                                       cervix (procedure)              



                                       [code = 503823299]              

 

             Future Scheduled 1989   Screening for              [facility]

es



             Test         00:00:00     malignant neoplasm of              [facility]



                                       cervix (procedure)              



                                       [code = 899562356]              

 

             Future Scheduled 1989   Screening for              [facility]

es



             Test         00:00:00     malignant neoplasm of              [facility]



                                       cervix (procedure)              



                                       [code = 123398750]              

 

             Future Scheduled 1989   Screening for              [facility]

es



             Test         00:00:00     malignant neoplasm of              [facility]



                                       cervix (procedure)              



                                       [code = 204243757]              

 

             Future Scheduled 1987   DTAP/TDAP/TD VACCINES              [facility]



             Test         00:00:00     (1 - Tdap) [code =              [facility]

enter



                                       DTAP/TDAP/TD VACCINES              



                                       (1 - Tdap)]               

 

             Future Scheduled 1987   DTAP/TDAP/TD VACCINES              [facility]



             Test         00:00:00     (1 - Tdap) [code =              [facility]

enter



                                       DTAP/TDAP/TD VACCINES              



                                       (1 - Tdap)]               

 

             Future Scheduled 1987   DTAP/TDAP/TD VACCINES              [facility]



             Test         00:00:00     (1 - Tdap) [code =              [facility]

enter



                                       DTAP/TDAP/TD VACCINES              



                                       (1 - Tdap)]               

 

             Future Scheduled 1987   DTAP/TDAP/TD VACCINES              [facility]



             Test         00:00:00     (1 - Tdap) [code =              [facility]

enter



                                       DTAP/TDAP/TD VACCINES              



                                       (1 - Tdap)]               

 

             Future Scheduled 1987   DTAP/TDAP/TD VACCINES              [facility]



             Test         00:00:00     (1 - Tdap) [code =              [facility]

enter



                                       DTAP/TDAP/TD VACCINES              



                                       (1 - Tdap)]               

 

             Future Scheduled 1987   DTAP/TDAP/TD VACCINES              [facility]



             Test         00:00:00     (1 - Tdap) [code =              [facility]

enter



                                       DTAP/TDAP/TD VACCINES              



                                       (1 - Tdap)]               

 

             Future Scheduled 1987   DTAP/TDAP/TD VACCINES              [facility]



             Test         00:00:00     (1 - Tdap) [code =              [facility]

enter



                                       DTAP/TDAP/TD VACCINES              



                                       (1 - Tdap)]               

 

             Future Scheduled 1987   DTAP/TDAP/TD VACCINES              [facility]



             Test         00:00:00     (1 - Tdap) [code =              [facility]

enter



                                       DTAP/TDAP/TD VACCINES              



                                       (1 - Tdap)]               

 

             Future Scheduled 1986   HEPATITIS C SCREENING              [facility]



             Test         00:00:00     [code = HEPATITIS C              [facility]



                                       SCREENING]                

 

             Future Scheduled 1986   HEPATITIS C SCREENING              [facility]



             Test         00:00:00     [code = HEPATITIS C              [facility]



                                       SCREENING]                

 

             Future Scheduled 1986   HEPATITIS C SCREENING              [facility]



             Test         00:00:00     [code = HEPATITIS C              [facility]



                                       SCREENING]                

 

             Future Scheduled 1986   HEPATITIS C SCREENING              [facility]



             Test         00:00:00     [code = HEPATITIS C              [facility]



                                       SCREENING]                

 

             Future Scheduled 1986   HEPATITIS C SCREENING              [facility]



             Test         00:00:00     [code = HEPATITIS C              [facility]



                                       SCREENING]                

 

             Future Scheduled 1986   HEPATITIS C SCREENING              [facility]



             Test         00:00:00     [code = HEPATITIS C              [facility]



                                       SCREENING]                

 

             Future Scheduled 1986   HEPATITIS C SCREENING              [facility]



             Test         00:00:00     [code = HEPATITIS C              [facility] 

Center



                                       SCREENING]                

 

             Future Scheduled 1986   HEPATITIS C SCREENING              [facility]



             Test         00:00:00     [code = HEPATITIS C              [facility]



                                       SCREENING]                

 

             Future Scheduled 1980   Tobacco Cessation              [facility]



             Test         00:00:00     [facility]sel[facility] and              [facility]

r



                                       Screening (12+) [code              



                                       = Tobacco Cessation              



                                       Counseling and              



                                       Screening (12+)]              

 

             Future Scheduled 1980   Tobacco Cessation              [facility]



             Test         00:00:00     Counsel[facility] and              [facility]

r



                                       Screening (12+) [code              



                                       = Tobacco Cessation              



                                       Counseling and              



                                       Screening (12+)]              

 

             Future Scheduled 1980   Tobacco Cessation              [facility]



             Test         00:00:00     Counseling and              [facility]

r



                                       Screening (12+) [code              



                                       = Tobacco Cessation              



                                       Counseling and              



                                       Screening (12+)]              

 

             Future Scheduled 1980   Tobacco Cessation              [facility]



             Test         00:00:00     Counseling and              [facility]

r



                                       Screening (12+) [code              



                                       = Tobacco Cessation              



                                       Counseling and              



                                       Screening (12+)]              

 

             Future Scheduled 1980   Tobacco Cessation              [facility]



             Test         00:00:00     Counseling and              [facility]

r



                                       Screening (12+) [code              



                                       = Tobacco Cessation              



                                       Counseling and              



                                       Screening (12+)]              

 

             Future Scheduled 1980   Tobacco Cessation              [facility]



             Test         00:00:00     Counseling and              [facility]

r



                                       Screening (12+) [code              



                                       = Tobacco Cessation              



                                       Counseling and              



                                       Screening (12+)]              

 

             Future Scheduled 1980   Tobacco Cessation              [facility]



             Test         00:00:00     Counseling and              [facility]

r



                                       Screening (12+) [code              



                                       = Tobacco Cessation              



                                       Counseling and              



                                       Screening (12+)]              

 

             Future Scheduled 1980   Tobacco Cessation              [facility]



             Test         00:00:00     Counseling and              [facility]

r



                                       Screening (12+) [code              



                                       = Tobacco Cessation              



                                       Counseling and              



                                       Screening (12+)]              

 

             Future Scheduled 1974   Pneumococcal Vaccine:              [facility]



             Test         00:00:00     0-64 Years (1 - PCV)              [facility]

 [facility]



                                       [code = Pneumococcal              



                                       Vaccine: 0-64 Years (1              



                                       - PCV)]                   

 

             Future Scheduled 1974   PNEUMOCOCCAL VACCINE              [facility]



             Test         00:00:00     0-64 YRS (1 - PCV)              [facility]

enter



                                       [code = PNEUMOCOCCAL              



                                       VACCINE 0-64 YRS (1 -              



                                       PCV)]                     

 

             Future Scheduled 1974   PNEUMOCOCCAL VACCINE              [facility]



             Test         00:00:00     0-64 YRS (1 - PCV)              [facility]

enter



                                       [code = PNEUMOCOCCAL              



                                       VACCINE 0-64 YRS (1 -              



                                       PCV)]                     

 

             Future Scheduled 1974   Pneumococcal Vaccine:              [facility]



             Test         00:00:00     0-64 Years (1 - PCV)              [facility]

 Center



                                       [code = Pneumococcal              



                                       Vaccine: 0-64 Years (1              



                                       - PCV)]                   

 

             Future Scheduled 1974   PNEUMOCOCCAL VACCINE              [facility]



             Test         00:00:00     0-64 YRS (1 - PCV)              [facility]

enter



                                       [code = PNEUMOCOCCAL              



                                       VACCINE 0-64 YRS (1 -              



                                       PCV)]                     

 

             Future Scheduled 1974   PNEUMOCOCCAL VACCINE              [facility]



             Test         00:00:00     0-64 YRS (1 - PCV)              [facility]

enter



                                       [code = PNEUMOCOCCAL              



                                       VACCINE 0-64 YRS (1 -              



                                       PCV)]                     

 

             Future Scheduled 1974   PNEUMOCOCCAL VACCINE              [facility]



             Test         00:00:00     0-64 YRS (1 - PCV)              [facility]

enter



                                       [code = PNEUMOCOCCAL              



                                       VACCINE 0-64 YRS (1 -              



                                       PCV)]                     

 

             Future Scheduled 1974   PNEUMOCOCCAL VACCINE              [facility]



             Test         00:00:00     0-64 YRS (1 - PCV)              [facility]

enter



                                       [code = PNEUMOCOCCAL              



                                       VACCINE 0-64 YRS (1 -              



                                       PCV)]                     

 

             Future Scheduled 1969   COVID-19 Vaccine (#1)              [facility]



             Test         00:00:00     [code = COVID-19              



                                       Vaccine (#1)]              

 

             Future Scheduled 1969   COVID-19 VACCINE (#1)              [facility]



             Test         00:00:00     [code = COVID-19              [facility]

[facility]



                                       VACCINE (#1)]              

 

             Future Scheduled 1969   COVID-19 Vaccine (#1)              [facility]



             Test         00:00:00     [code = COVID-19              



                                       Vaccine (#1)]              

 

             Future Scheduled 1969   COVID-19 Vaccine (#1)              [facility]



             Test         00:00:00     [code = COVID-19              



                                       Vaccine (#1)]              

 

             Future Scheduled 1969   COVID-19 Vaccine (#1)              [facility]



             Test         00:00:00     [code = COVID-19              



                                       Vaccine (#1)]              

 

             Future Scheduled 1969   COVID-19 Vaccine (#1)              [facility]



             Test         00:00:00     [code = COVID-19              



                                       Vaccine (#1)]              

 

             Future Scheduled 1969   COVID-19 Vaccine (#1)              [facility]



             Test         00:00:00     [code = COVID-19              



                                       Vaccine (#1)]              

 

             Future Scheduled 1969   COVID-19 Vaccine (#1)              [facility]



             Test         00:00:00     [code = COVID-19              



                                       Vaccine (#1)]              

 

             Future Scheduled 1969   COVID-19 Vaccine (#1)              [facility]



             Test         00:00:00     [code = COVID-19              



                                       Vaccine (#1)]              

 

             Future Scheduled 1969   COVID-19 Vaccine (#1)              [facility]



             Test         00:00:00     [code = COVID-19              



                                       Vaccine (#1)]              

 

             Future Scheduled 1969   COVID-19 Vaccine (#1)              [facility]



             Test         00:00:00     [code = COVID-19              



                                       Vaccine (#1)]              

 

             Future Scheduled 1969   COVID-19 Vaccine (#1)              [facility]



             Test         00:00:00     [code = COVID-19              



                                       Vaccine (#1)]              

 

             Future Scheduled 1969   COVID-19 Vaccine (#1)              [facility]



             Test         00:00:00     [code = COVID-19              



                                       Vaccine (#1)]              

 

             Future Scheduled 1969   COVID-19 Vaccine (#1)              [facility]



             Test         00:00:00     [code = COVID-19              



                                       Vaccine (#1)]              

 

             Future Scheduled 1969   COVID-19 Vaccine (#1)              [facility]



             Test         00:00:00     [code = COVID-19              



                                       Vaccine (#1)]              

 

             Future Scheduled 1969   COVID-19 VACCINE (#1)              [facility]



             Test         00:00:00     [code = COVID-19              [facility]

[facility]



                                       VACCINE (#1)]              

 

             Future Scheduled 1969   COVID-19 VACCINE (#1)              [facility]



             Test         00:00:00     [code = COVID-19              [facility]

[facility]



                                       VACCINE (#1)]              

 

             Future Scheduled 1969   COVID-19 VACCINE (#1)              [facility]



             Test         00:00:00     [code = COVID-19              Medical Jessie

ter



                                       VACCINE (#1)]              

 

             Future Scheduled 1969   COVID-19 VACCINE (#1)              [facility]

[facility]



             Test         00:00:00     [code = COVID-19              Medical [facility]ie

ter



                                       VACCINE (#1)]              

 

             Future Scheduled 1969   COVID-19 VACCINE (#1)              [facility]

[facility]



             Test         00:00:00     [code = COVID-19              Medical [facility]ie

ter



                                       VACCINE (#1)]              

 

             Future Scheduled 1969   COVID-19 VACCINE (#1)              [facility]

[facility]



             Test         00:00:00     [code = COVID-19              Medical [facility]ie

ter



                                       VACCINE (#1)]              

 

             Future Scheduled 1969   COVID-19 VACCINE (#1)              [facility]

[facility]



             Test         00:00:00     [code = COVID-19              Medical [facility]ie

ter



                                       VACCINE (#1)]              

 

             Future Scheduled 1968   Screening for              [facility]

es



             Test         00:00:00     malignant neoplasm of              [facility]



                                       breast (procedure)              



                                       [code = 377668351]              

 

             Future Scheduled 1968   CT Colonography              [facility]



             Test         00:00:00     (combo) [code = CT              [facility]

enter



                                       Colonography (combo)]              

 

             Future Scheduled 1968   Screening for              [facility]

es



             Test         00:00:00     malignant neoplasm of              [facility]



                                       colon (procedure)              



                                       [code = 215216197]              

 

             Future Scheduled 1968   Screening for              [facility]

es



             Test         00:00:00     malignant neoplasm of              [facility]



                                       colon (procedure)              



                                       [code = 706159323]              

 

             Future Scheduled 1968   Screening for              [facility]

es



             Test         00:00:00     malignant neoplasm of              [facility]



                                       colon (procedure)              



                                       [code = 743659545]              

 

             Future Scheduled 1968   Screening for              [facility]

es



             Test         00:00:00     malignant neoplasm of              [facility]



                                       colon (procedure)              



                                       [code = 074106377]              

 

             Future Scheduled 1968   Sigmoidoscopy [code =              [facility]



             Test         00:00:00     Sigmoidoscopy]              [facility]

r

 

             Future Scheduled 1968   Screening for              [facility]

es



             Test         00:00:00     malignant neoplasm of              [facility]

[facility]



                                       breast (procedure)              



                                       [code = 951986518]              

 

             Future Scheduled 1968   CT Colonography              [facility]



             Test         00:00:00     (combo) [code = CT              [facility]

enter



                                       Colonography (combo)]              

 

             Future Scheduled 1968   Screening for              [facility]

es



             Test         00:00:00     malignant neoplasm of              [facility]



                                       colon (procedure)              



                                       [code = 783106331]              

 

             Future Scheduled 1968   Screening for              [facility]

es



             Test         00:00:00     malignant neoplasm of              [facility]



                                       colon (procedure)              



                                       [code = 734385972]              

 

             Future Scheduled 1968   Screening for              [facility]

es



             Test         00:00:00     malignant neoplasm of              [facility]



                                       colon (procedure)              



                                       [code = 750213757]              

 

             Future Scheduled 1968   Screening for              [facility]

es



             Test         00:00:00     malignant neoplasm of              [facility]



                                       colon (procedure)              



                                       [code = 251410504]              

 

             Future Scheduled 1968   Sigmoidoscopy [code =              [facility]

[facility]



             Test         00:00:00     Sigmoidoscopy]              [facility] regalado

 

             Future Scheduled 1968   Screening for              [facility]

es



             Test         00:00:00     malignant neoplasm of              [facility]



                                       breast (procedure)              



                                       [code = 222713346]              

 

             Future Scheduled 1968   CT Colonography              [facility]



             Test         00:00:00     (combo) [code = CT              [facility]

enter



                                       Colonography (combo)]              

 

             Future Scheduled 1968   Screening for              [facility]

es



             Test         00:00:00     malignant neoplasm of              [facility]



                                       colon (procedure)              



                                       [code = 719091982]              

 

             Future Scheduled 1968   Screening for              [facility]

es



             Test         00:00:00     malignant neoplasm of              [facility]



                                       colon (procedure)              



                                       [code = 236191994]              

 

             Future Scheduled 1968   Screening for              [facility]

es



             Test         00:00:00     malignant neoplasm of              [facility]



                                       colon (procedure)              



                                       [code = 378922622]              

 

             Future Scheduled 1968   Screening for              [facility]

es



             Test         00:00:00     malignant neoplasm of              [facility]



                                       colon (procedure)              



                                       [code = 436008565]              

 

             Future Scheduled 1968   Sigmoidoscopy [code =              [facility]



             Test         00:00:00     Sigmoidoscopy]              [facility]

r

 

             Future Scheduled 1968   Screening for              [facility]

es



             Test         00:00:00     malignant neoplasm of              [facility]

[facility]



                                       breast (procedure)              



                                       [code = 972429146]              

 

             Future Scheduled 1968   CT Colonography              [facility]



             Test         00:00:00     (combo) [code = CT              [facility]

enter



                                       Colonography (combo)]              

 

             Future Scheduled 1968   Screening for              [facility]

es



             Test         00:00:00     malignant neoplasm of              [facility]



                                       colon (procedure)              



                                       [code = 364394944]              

 

             Future Scheduled 1968   Screening for              [facility]

es



             Test         00:00:00     malignant neoplasm of              [facility]



                                       colon (procedure)              



                                       [code = 299181384]              

 

             Future Scheduled 1968   Screening for              [facility]

es



             Test         00:00:00     malignant neoplasm of              [facility]



                                       colon (procedure)              



                                       [code = 282384007]              

 

             Future Scheduled 1968   Screening for              [facility]

es



             Test         00:00:00     malignant neoplasm of              [facility]



                                       colon (procedure)              



                                       [code = 171045851]              

 

             Future Scheduled 1968   Sigmoidoscopy [code =              [facility]



             Test         00:00:00     Sigmoidoscopy]              [facility]

r

 

             Future Scheduled 1968   Screening for              [facility]

es



             Test         00:00:00     malignant neoplasm of              [facility]

[facility]



                                       breast (procedure)              



                                       [code = 441412401]              

 

             Future Scheduled 1968   CT Colonography              [facility]



             Test         00:00:00     (combo) [code = CT              [facility]

enter



                                       Colonography (combo)]              

 

             Future Scheduled 1968   Screening for              [facility]

es



             Test         00:00:00     malignant neoplasm of              [facility]



                                       colon (procedure)              



                                       [code = 841239825]              

 

             Future Scheduled 1968   Screening for              [facility]

es



             Test         00:00:00     malignant neoplasm of              [facility]



                                       colon (procedure)              



                                       [code = 545241544]              

 

             Future Scheduled 1968   Screening for              [facility]

es



             Test         00:00:00     malignant neoplasm of              [facility]



                                       colon (procedure)              



                                       [code = 236287182]              

 

             Future Scheduled 1968   Screening for              [facility]

es



             Test         00:00:00     malignant neoplasm of              [facility]



                                       colon (procedure)              



                                       [code = 336558825]              

 

             Future Scheduled 1968   Sigmoidoscopy [code =              [facility]



             Test         00:00:00     Sigmoidoscopy]              [facility]

r

 

             Future Scheduled 1968   Screening for              [facility]

es



             Test         00:00:00     malignant neoplasm of              [facility]

[facility]



                                       breast (procedure)              



                                       [code = 279769264]              

 

             Future Scheduled 1968   CT Colonography              [facility]



             Test         00:00:00     (combo) [code = CT              [facility]

enter



                                       Colonography (combo)]              

 

             Future Scheduled 1968   Screening for              [facility]

es



             Test         00:00:00     malignant neoplasm of              [facility]



                                       colon (procedure)              



                                       [code = 795639353]              

 

             Future Scheduled 1968   Screening for              [facility]

es



             Test         00:00:00     malignant neoplasm of              [facility]

[facility]



                                       colon (procedure)              



                                       [code = 809136850]              

 

             Future Scheduled 1968   Screening for              [facility]

es



             Test         00:00:00     malignant neoplasm of              [facility]

[facility]



                                       colon (procedure)              



                                       [code = 084622883]              

 

             Future Scheduled 1968   Screening for              [facility]

es



             Test         00:00:00     malignant neoplasm of              [facility]



                                       colon (procedure)              



                                       [code = 349588816]              

 

             Future Scheduled 1968   Sigmoidoscopy [code =              [facility]



             Test         00:00:00     Sigmoidoscopy]              [facility]

r

 

             Future Scheduled 1968   Screening for              [facility]

es



             Test         00:00:00     malignant neoplasm of              [facility]

[facility]



                                       breast (procedure)              



                                       [code = 735775136]              

 

             Future Scheduled 1968   CT Colonography              [facility]



             Test         00:00:00     (combo) [code = CT              Medical C

enter



                                       Colonography (combo)]              

 

             Future Scheduled 1968   Screening for              [facility]

es



             Test         00:00:00     malignant neoplasm of              [facility]

[facility]



                                       colon (procedure)              



                                       [code = 626356032]              

 

             Future Scheduled 1968   Screening for              [facility]

es



             Test         00:00:00     malignant neoplasm of              [facility]

[facility]



                                       colon (procedure)              



                                       [code = 981068721]              

 

             Future Scheduled 1968   Screening for              [facility]

es



             Test         00:00:00     malignant neoplasm of              [facility]

[facility]



                                       colon (procedure)              



                                       [code = 636566347]              

 

             Future Scheduled 1968   Screening for              [facility]

es



             Test         00:00:00     malignant neoplasm of              [facility]

[facility]



                                       colon (procedure)              



                                       [code = 560356301]              

 

             Future Scheduled 1968   Sigmoidoscopy [code =              [facility]



             Test         00:00:00     Sigmoidoscopy]              [facility]

r

 

             Future Scheduled 1968   Screening for              [facility]

es



             Test         00:00:00     malignant neoplasm of              [facility]

[facility]



                                       breast (procedure)              



                                       [code = 658468995]              

 

             Future Scheduled 1968   CT Colonography              [facility]



             Test         00:00:00     (combo) [code = CT              Medical C

enter



                                       Colonography (combo)]              

 

             Future Scheduled 1968   Screening for              [facility]

es



             Test         00:00:00     malignant neoplasm of              [facility]

[facility]



                                       colon (procedure)              



                                       [code = 640849613]              

 

             Future Scheduled 1968   Screening for              [facility]

es



             Test         00:00:00     malignant neoplasm of              [facility]

[facility]



                                       colon (procedure)              



                                       [code = 585769405]              

 

             Future Scheduled 1968   Screening for              [facility]

es



             Test         00:00:00     malignant neoplasm of              [facility]

[facility]



                                       colon (procedure)              



                                       [code = 239524170]              

 

             Future Scheduled 1968   Screening for              [facility]

es



             Test         00:00:00     malignant neoplasm of              [facility]

[facility]



                                       colon (procedure)              



                                       [code = 741900271]              

 

             Future Scheduled 1968   Sigmoidoscopy [code =              [facility]

[facility]



             Test         00:00:00     Sigmoidoscopy]              Medical [facility]

r







Encounters







        Start   End     Encounter Admission Attending Care    Care    Encounter 

Source



        Date/Time Date/Time Type    Type    Clinicians Facility Department ID   

   

 

        2023-05-15         Outpatient                 [facility]     [id number]

 [place]



        14[place]18:14                                                 [id number] [facility]

 

        2023         Outpatient                 [facility]     [id number]

 [place]



        14:16:19                                                 [id number] [facility]

 

        2023         Emergency                 [facility]     [id number] 

PIERRE -



        09:45:36                                                         Will



                                                                        Fire



                                                                        Depart



                                                                        ent

 

        2022         Outpatient         SARATH, [facility]     [id number]

-[id number] [place]



        01:05:17                         MELANIE                  [id number] [facility]

 

        2022         Outpatient         VIRGIE,  [facility]     [id number]

 [place]



        03:15:41                         JOAQUIN                 [id number] [facility]

 

        2022         Outpatient         YOUNG,  [facility]     [id number]

 [place]



        15:19:55                         JOAQUIN                 [id number] [facility]

 

        2022         Outpatient                 [facility]     [id number]

 [place]



        15:28:25                                                 [id number] [facility]

 

        2022         Outpatient                 [facility]     [id number]

 [place]



        12:00:51                                                 [id number] [facility]

 

        2022         Outpatient                 [facility]     [id number]

 [place]



        15:13:02                                                 [id number] [facility]

 

        2020         Inpatient                 [facility]   YAMILA    [id number] 

[facility]



        03:17:00                                                 56      [facility]

 

        2023 Emergency EULALIO EATON [facility]    ERT     [id number]

[id number] [facility]



        17:55:00 19:50:00                 MCKENNA                          devinjannie [facility]

 

        2023 [facility]         [facility]    1.2.840.114 [id number]

[id number] [facility]



        17:55:00 19:50:00                 Mckenna HAMM 350.1.13.10         i

ty [facility] 4.2.7.2.686         [facility]  [phone]         [address]

 

        [facility]     [id number]

[id number] [facility]



        09:01:00 14:52:00                 JACK brock [facility]

 

        2023 Emergency         [facility]    [id number]

[id number] [facility]



        09:01:00 14:52:00                 Jack HAMM 350.1.13.10         i

ty [facility] 4.2.7.2.686         [facility]  [phone]         [address]

 

        2023 Emergency         Yosvany 1.2.840.1 [id number]

[id number] [facility]



        02:46:00 05:35:00                 Alejo 85820.1.1         [address]



                                        Gianni  3.430.2.7                 Hospit

a



                                                .3.318392                 l



                                                .8                      

 

        2023 Emergency         Yosvany 1.2.840.1 [id number]

[id number] [facility]



        02:46:00 05:35:00                 Alejo 84854.1.1         [address]



                                        Gianni  3.430.2.7                 Hospit

a



                                                .3.567632                 l



                                                .8                      

 

        2023-02-15 2023 Emergency         Justin Whitaker [facility]   [id number]

[id number] [facility]



        17:58:00 00:07:00                 [facility]

 

        2023-02-15 2023 Emergency         Justin Whitaker [facility]   [id number]

[id number] [facility]



        17:58:00 00:07:00                 [facility]

 

        2023-02-15 2023 Emergency ER      [facility]    Emergency 20

[id number] [facility]



        17:58:00 00:07:00                                                 

 

        2023 Documentat         Dangelo, 1.2.840.1 [id number]

[id number] Methodi



        00:00:00 00:00:00 ion             Smithosh 29534.1.1         [address]



                                                3.430.2.7                 Hospit

a



                                                .3.440427                 l



                                                .8                      

 

        2023 Travel                  1.2.840.1 1.2.491.948 [id number]

[id number] Methodi



        00:00:00 00:00:00                         50806.1.1 350.1.13.43 [address]     

st



                                                3.430.2.7 0.2.7.3.698         

spita



                                                .3.641808 084.8           l



                                                .8                      

 

        2023 Documentat         Dangelo, 1.2.840.1 [id number]

[id number] Methodi



        00:00:00 00:00:00 ion             Smithosh 70180.1.1         [address]     [address]



                                                3.430.2.7                 Hospit

a



                                                .3.140361                 l



                                                .8                      

 

        2023 Travel                  1.2.840.1 1.2.229.885 2619

[id number] Methodi



        00:00:00 00:00:00                         15795.1.1 350.1.13.43 9[address]     

st



                                                3.430.2.7 0.2.7.3.698         

spita



                                                .3.760795 084.8           l



                                                .8                      

 

        2023 2023-02-15 Emergency Emergency John Melton [facility]   JM0

[id number] 

[facility]



        19:56:00 06:17:00                                         79      

 

        2023 Emergency                 [facility] [id number]

[address]



        14:56:57 23:07:00                                 Elie 00      [facility]

 

        2023 Emergency                 [facility] [id number]

[address]



        14:56:57 23:07:00                                 Elie 00      [facility]

 

        2023 Emergency                 [facility]   [id number]

62 [facility]



        19:56:00 19:56:00                                         79      

 

        2023 Outpatient         Swati [facility]   [id number]

[id number] 



        08:56:57 17:07:00                 Ishan                        



                                        Thomas                          

 

        2023 Emergency E       SWATI, [facility]       

 MH



        08:56:00 17:07:00                 ISHAN                          

 

        2023 Outpatient DIANA NOLAN  [facility]    [id number]

467 [facility]



        14:00:00 14:00:00                 ANCELMO brock [facility]

 

        2023 Orders          Doctor  [place]    [id number]

447 [facility]



        00:00:00 00:00:00 Only            Unassigned, SHONA   350.1.13.10       

  ity of



                                        [facility] 4.2.7.2.686         [place]oly

as



                                                        [phone]         [address]

 

        2022 Inpatient         SANCHEZ SW    MED       

  [facility]



        19:24:00 19:40:00                 LEXI                         

 

        2022 Emergency E       HEATHER, [facility]     

   MH



        14:56:00 18:09:00                 DEMARIO                           

 

        2022 Emergency X       SINGER, [facility]    ERT     [id number]

15 Univers



        12:56:00 15:05:00                 JACK brock [facility]

 

        2022 Emergency         Shaanita [facility]    [id number]

[id number] [facility]



        12:56:00 15:05:00                 Jack HAMM 350.1.13.10         i

ty of



                                                JENNIFER 4.2.7.2.686         [place]

[facility]  [phone]         Medi

staci



                                                        [address]             [place]

 

        2022 Office          Elena  [facility]    [id number]

13 Univers



        10:00:00 10:30:00 Visit           [facility]  350.1.13.10         it

y of



                                                [place] 4.2.7.2.686         [place]

as



                                                SKYLAR?BLEA [phone]         [address]



                                                MEDICAL                 



                                                OFFICE                  



                                                [facility]                 

 

        2022 Outpatient DIANA REYES  [facility]    [id number]

329 [facility]



        10:00:00 10:00:00                 ESCOBAR                           [facility]

 

        2022 Orders          Doctor  [place]    [id number]

07 [facility]



        00:00:00 00:00:00 Only            Unassigned, SHONA   350.1.13.10       

  ity of



                                        [facility] 4.2.7.2.686         [place]oly

as



                                                        [phone]         Medi

staci



                                                        [address]             [place]

 

        2020 Outpatient         ERNESTINE Ferrara   OUTD    [id number]

220 [facility]



        08:38:00 08:38:00                 [employer]                 75      [facility]

 

        2019 Inpatient 1       Robin Barrios [facility]    PSY     [address]

[id number]



        23:01:00 13:16:00                 Robin Barrios                         

[facility]

 

        2017 Outpatient DIANA COHEN  [facility]    NUT     [id number]

565 [facility]



        00:00:00 00:00:00                 VENKATA                         jannie [facility]







Results







           Test Description Test Time  Test Comments Results    Result Comments 

Source









                    COMP. METABOLIC PANEL (64008) 2023 16:28:41 









                      Test Item  Value      Reference Range Interpretation Comme

nts









             NA (test code = 5112596173) 137 mmol/L   135-145                   

 

             K (test code = 0568698658) 4.1 mmol/L   3.5-5.0                   

 

             CL (test code = 7411808526) 104 mmol/L                       

 

             CO2 TOTAL (test code = 6350637143) 24 mmol/L    23-31              

       

 

             AGAP (test code = 2317928429) 9            2-16                    

  

 

             BUN (test code = 3247820538) 14 mg/dL     7-23                     

 

 

             GLUCOSE (test code = 2721011124) 114 mg/dL           H       

     

 

             CREATININE (test code = 0.79 mg/dL   0.50-1.04                 



             4118057650)                                         

 

             TOTAL BILI (test code = 1.3 mg/dL    0.1-1.1      H            



             0502651292)                                         

 

             CALCIUM (test code = 6735901212) 9.5 mg/dL    8.6-10.6             

     

 

             T PROTEIN (test code = 1501896202) 7.7 g/dL     6.3-8.2            

       

 

             ALBUMIN (test code = 6645355411) 4.2 g/dL     3.5-5.0              

     

 

             ALK PHOS (test code = 0675576935) 102 U/L                    

      

 

             ALTv (test code = 1742-6) 15 U/L       5-35                      

 

             AST(SGOT) (test code = 5600656460) 19 U/L       13-40              

       

 

             eGFR (test code = 8226532639) 75.8         mL/min/1.73m2           

   

 

             ELENA (test code = ELENA) [employer] of [employer]                    

       



                          Filtration Rate (GFR) and Staging                     

      



                          of Kidney Disease*                           



                          +-----------------------+--------                     

      



                          -------------+-------------------                     

      



                          ------+| GFR (mL/min/1.73 m2) ?|                      

     



                          With Kidney Damage ?| ?Without                        

   



                          Kidney                                 



                          Damage+-----------------------+--                     

      



                          -------------------+-------------                     

      



                          ------------+| ?>90 ? ? ? ? ? ? ?                     

      



                          ? ?| ?Stage one ? ? ? ? ?| ?                          

 



                          Normal ? ? ? ? ? ? ?                           



                          ?+-----------------------+-------                     

      



                          --------------+------------------                     

      



                          -------+| ?60-89 ? ? ? ? ? ? ? ?|                     

      



                          ?Stage two ? ? ? ? ?| ? Decreased                     

      



                          GFR ? ? ? ?                            



                          +-----------------------+--------                     

      



                          -------------+-------------------                     

      



                          ------+| ?30-59 ? ? ? ? ? ? ? ?|                      

     



                          ?Stage three ? ? ? ?| ? Stage                         

  



                          three ? ? ? ? ?                           



                          +-----------------------+--------                     

      



                          -------------+-------------------                     

      



                          ------+| ?15-29 ? ? ? ? ? ? ? ?|                      

     



                          ?Stage four ? ? ? ? | ? Stage                         

  



                          four ? ? ? ? ?                           



                          ?+-----------------------+-------                     

      



                          --------------+------------------                     

      



                          -------+| ?<15 (or dialysis) ? ?|                     

      



                          ?Stage five ? ? ? ? | ? Stage                         

  



                          five ? ? ? ? ?                           



                          ?+-----------------------+-------                     

      



                          --------------+------------------                     

      



                          -------+ *Each stage assumes the                      

     



                          associated GFR level has been in                      

     



                          effect for at least three months.                     

      



                          ?Stages 1 to 5, with or without                       

    



                          kidney disease, indicate chronic                      

     



                          kidney disease. Notes:                           



                          Determination of stages one and                       

    



                          two (with eGFR >59mL/min/1.73 m2)                     

      



                          requires estimation of kidney                         

  



                          damage for at least three months                      

     



                          as defined by structural or                           



                          functional abnormalities of the                       

    



                          kidney, manifested by                           



                          either:Pathological abnormalities                     

      



                          or Markers of kidney damage                           



                          (including abnormalities in the                       

    



                          composition of the blood or urine                     

      



                          or abnormalities in imaging                           



                          tests).                                

 

             Lab Interpretation (test code = Abnormal                           

    



             18299-7)                                            



[facility] WITH [id number] 16:26:23





             Test Item    Value        Reference Range Interpretation Comments

 

             WBC (test code = 14.08        See_Comment  H             [Automated



             6656-2)                                             message] The



                                                                 system which



                                                                 generated this



                                                                 result transmit

gianfranco



                                                                 reference range

:



                                                                 4.30 - 11.10



                                                                 10*3/?L. The



                                                                 reference range



                                                                 was not used to



                                                                 interpret this



                                                                 result as



                                                                 normal/abnormal

.

 

             RBC (test code = 4.39         See_Comment                [Automated



             111-8)                                              message] The



                                                                 system which



                                                                 generated this



                                                                 result transmit

gianfranco



                                                                 reference range

:



                                                                 3.93 - 5.25



                                                                 10*6/?L. The



                                                                 reference range



                                                                 was not used to



                                                                 interpret this



                                                                 result as



                                                                 normal/abnormal

.

 

             HGB (test code = 13.6 g/dL    11.6-15.0                 



             718-7)                                              

 

             HCT (test code = 40.5 %       35.7-45.2                 



             4544-3)                                             

 

             MCV (test code = 92.3 fL      80.6-95.5                 



             787-2)                                              

 

             MCH (test code = 31.0 pg      25.9-32.8                 



             785-6)                                              

 

             MCHC (test code = 33.6 g/dL    31.6-35.1                 



             786-4)                                              

 

             RDW-SD (test code = 41.2 fL      39.0-49.9                 



             86975-8)                                            

 

             RDW-CV (test code = 12.2 %       12.0-15.5                 



             788-0)                                              

 

             PLT (test code = 221          See_Comment                [Automated



             247-3)                                              message] The



                                                                 system which



                                                                 generated this



                                                                 result transmit

gianfranco



                                                                 reference range

:



                                                                 166 - 358 10*3/

?L.



                                                                 The reference



                                                                 range was not u

sed



                                                                 to interpret th

is



                                                                 result as



                                                                 normal/abnormal

.

 

             MPV (test code = 10.1 fL      9.5-12.9                  



             83934-9)                                            

 

             NRBC/100 WBC (test 0.0          See_Comment                [Automat

ed



             code = 9714074628)                                        message] 

The



                                                                 system which



                                                                 generated this



                                                                 result transmit

gianfranco



                                                                 reference range

:



                                                                 0.0 - 10.0 /100



                                                                 WBCs. The



                                                                 reference range



                                                                 was not used to



                                                                 interpret this



                                                                 result as



                                                                 normal/abnormal

.

 

             NRBC x10^3 (test code              See_Comment                [Auto

mated



             = 0139297226)                                        message] The



                                                                 system which



                                                                 generated this



                                                                 result transmit

gianfranco



                                                                 reference range

:



                                                                 10*3/?L. The



                                                                 reference range



                                                                 was not used to



                                                                 interpret this



                                                                 result as



                                                                 normal/abnormal

.

 

             GRAN MAT (NEUT) % 86.9 %                                 



             (test code = 770-8)                                        

 

             IMM GRAN % (test code 0.60 %                                 



             = 3349410827)                                        

 

             LYMPH % (test code = 7.6 %                                  



             736-9)                                              

 

             MONO % (test code = 3.8 %                                  



             5905-5)                                             

 

             EOS % (test code = 0.6 %                                  



             713-8)                                              

 

             BASO % (test code = 0.5 %                                  



             706-2)                                              

 

             GRAN MAT x10^3(ANC) 12.23 10*3/uL 1.88-7.09    H            



             (test code =                                        



             0231298301)                                         

 

             IMM GRAN x10^3 (test 0.09 10*3/uL 0.00-0.06    H            



             code = 0531003783)                                        

 

             LYMPH x10^3 (test code 1.07 10*3/uL 1.32-3.29    L            



             = 731-0)                                            

 

             MONO x10^3 (test code 0.54 10*3/uL 0.33-0.92                 



             = 742-7)                                            

 

             EOS x10^3 (test code = 0.08 10*3/uL 0.03-0.39                 



             711-2)                                              

 

             BASO x10^3 (test code 0.07 10*3/uL 0.01-0.07                 



             = 704-7)                                            

 

             Lab Interpretation Abnormal                               



             (test code = 14342-3)                                        



[id number]23:37





             Test Item    Value        Reference Range Interpretation Comments

 

             SARS-CoV-2 (COVID-19) RNA [Presence] Detected                      

         



             in Respiratory specimen by CECIL with                                

        



             probe detection (test code =                                       

 



             78175-5)                                            

 

             Whether patient resides in a [facility] care setting (test code =                               

         



             77495-2)                                            

 

             Date and time of symptom onset (test Unknown                       

         



             code = 95743-2)                                        

 

             Whether the patient was hospitalized No                            

         



             for condition of interest (test code                               

         



             = 81908-4)                                          

 

             Whether the patient was admitted to No                             

        



             intensive care unit (ICU) for                                      

  



             condition of interest (test code =                                 

       



             42844-7)                                            

 

             Whether patient is employed in a No                                

     



             healthcare setting (test code =                                    

    



             46995-1)                                            

 

             Whether the patient has symptoms No                                

     



             related to condition of interest                                   

     



             (test code = 77238-2)                                        

 

             Pregnancy status (test code = No                                   

  



             52274-0)                                            



MAX ROMO, Urinalysis Rflx Cult/[id number] 20:35:00





             Test Item    Value        Reference Range Interpretation Comments

 

             Color,Urine (test code = UCOL) Yellow       Yellow                 

   

 

             Clarity,Urine (test code = Clear        Clear                     



             UCLAR)                                              

 

             Ph, Urine (test code = UPH) 6.5          5.0-9.0      N            

 

             Specific Gravity,Urine (test 1.015        1.005-1.030  N           

 



             code = USG)                                         

 

             Blood,Urine (test code = UBLD) Negative mg/dL Negative             

     

 

             Protein,Urine (test code = Negative mg/dL Negative                 

 



             UPRO)                                               

 

             Glucose,Urine (UA) (test code Negative mg/dL Negative              

    



             = UGLU)                                             

 

             Ketones,Urine (test code = Negative mg/dL Negative                 

 



             UKET)                                               

 

             Nitrate,Urine (test code = Negative     Negative                  



             UNIT)                                               

 

             Bilirubin,Urine (test code = Negative mg/dL Negative               

   



             UBIL)                                               

 

             Urobilinogen,Urine (test code 1.0 E.U./dL  Normal                  

  



             = UURO)                                             

 

             Leukocyte Esterase,Urine (test Trace mg/dL  Negative     A         

   



             code = ULEU)                                        



Drug Screen,[id number] 20:35:00





             Test Item    Value        Reference Range Interpretation Comments

 

             PCP Phencyclidine Screen,Urine (test Negative     Negative         

         



             code = PCPU)                                        

 

             Amphetamine Screen,Urine (test code Negative     Negative          

        



             = AMPU)                                             

 

             Methadone Screen,Urine (test code = Negative     Negative          

        



             METHU)                                              

 

             Opiate Screen,Urine (test code = Negative     Negative             

     



             UOPIS)                                              

 

             Barbituates Screen,Urine (test code Negative     Negative          

        



             = BARBU)                                            

 

             Benzodiazepines Screen,Urine (test Negative     Negative           

       



             code = UBENZS)                                        

 

             Cocaine Screen,Urine (test code = Negative     Negative            

      



             UCOCS)                                              

 

             Cannabinoid Screen,Urine (test code Negative     Negative          

        



             = UTHCS)                                            

 

             Propoxyphene Screen, Urine (test Negative     Negative             

     



             code = UPROP)                                        



Urine [id number] 20:35:00





             Test Item    Value        Reference Range Interpretation Comments

 

             RBC,Urine (test code = URBC.NP) 0-2 /HPF     0-2                   

    

 

             WBC,Urine (test code = UWBC.NP) 0-5 /HPF     0-5                   

    

 

             Bacteria,Urine (test code = Few /HPF     None Seen    A            



             UBACT.NP)                                           

 

             Squamous Epithelial Cell,Urine 6-10 /HPF    0-5          A         

   



             (test code = USQEPI.NP)                                        

 

             Amorphous Crystals,Urine (test code Few /HPF     None Seen    A    

        



             = UAMSE)                                            

 

             Hyaline Casts,Urine (test code = 0-5 /HPF     None Seen    A       

     



             UHYALC.XX)                                          



Complete Blood Count Auto [id number] 20:19:00





             Test Item    Value        Reference Range Interpretation Comments

 

             White Blood Count (test code = 7.9 x10 3/uL 4.4-10.5     N         

   



             WBCT)                                               

 

             Red Blood Count (test code = 4.27 x10 6/uL 3.75-5.20    N          

  



             RBC)                                                

 

             Hemoglobin (test code = HGBT) 12.9 g/dL    12.2-14.8    N          

  

 

             Hematocrit (test code = HCTT) 40.2 %       36.5-44.4    N          

  

 

             Mean Corpuscular Volume (test 94.10 fL     80..00 N          

  



             code = MCV)                                         

 

             Mean Corpuscular Hemoglobin 30.2 pg      27.0-32.5    N            



             (test code = MCH)                                        

 

             Mean Corpuscular HGB Conc 32.10 g/dL   32.00-37.50  N            



             (test code = MCHC)                                        

 

             RDW Coefficient of Variation 12.4 %       11.5-14.5    N           

 



             (test code = RDWCV)                                        

 

             Platelet Count (test code = 211.0 x10 3/uL 140.0-440.0  N          

  



             PLTT)                                               

 

             Mean Platelet Volume (test 11.1 fL                                



             code = MPV)                                         

 

             Immature Granulocytes % (Auto) 0.3 %        0.0-5.0      N         

   



             (test code = IMMGRAN%)                                        

 

             Neutrophils % (Auto) (test 75.8 %       36.0-70.0    H            



             code = NE%)                                         

 

             Lymphocytes % (Auto) (test 10.6 %       12.0-44.0    L            



             code = LY%)                                         

 

             Monocytes % (Auto) (test code 11.8 %       0.0-11.0     H          

  



             = MO%)                                              

 

             Eosinophils % (Auto) (test 0.9 %        0.0-7.0      N            



             code = EO%)                                         

 

             Basophils % (Auto) (test code 0.6 %        0.0-2.0      N          

  



             = BA%)                                              

 

             Immature Granulocytes # (Auto) 0.02 x10 3/uL                       

    



             (test code = IMMGRAN#)                                        

 

             Neutrophils # (Auto) (test 6.0 x10 3/uL 1.6-7.4      N            



             code = NE#)                                         

 

             Lymphocytes # (Auto) (test 0.83 x10 3/uL 0.50-4.60    N            



             code = LY#)                                         

 

             Monocytes # (Auto) (test code 0.93 x10 3/uL 0.00-1.20    N         

   



             = MO#)                                              

 

             Eosinophils # (Auto) (test 0.07 x10 3/uL 0.00-0.74    N            



             code = EO#)                                         

 

             Basophils # (Auto) (test code 0.05 x10 3/uL 0.00-0.21    N         

   



             = BA#)                                              

 

             nRBC Abs (test code = NRBCA) 0                                     

 

 

             nRBC Pct (test code = NRBCP) 0 %                                   

 



Comprehensive Metabolic [id number] 20:19:00





             Test Item    Value        Reference Range Interpretation Comments

 

             SODIUM (test code = NA) 142.0 mmol/L 136.0-145.0  N            

 

             Potassium,K (test code = 4.2 mmol/L   3.0-5.1      N            



             K)                                                  

 

             Chloride (test code = 110 mmol/L          H            



             CL)                                                 

 

             Carbon Dioxide (test 26 mmol/L    20-31        N            



             code = CO2)                                         

 

             Anion Gap (test code = 6 mmol/L     5-15         N            



             GAP)                                                

 

             Blood Urea Nitrogen 17 mg/dL     9-23         N            



             (test code = BUN)                                        

 

             Creatinine (test code = 0.88 mg/dL   0.55-1.02    N            



             CREATT)                                             

 

             Creatinine Clr Calc 80.94 mL/min                           



             Pharmacy (test code =                                        



             CRCLPHA)                                            

 

             Estimated Glomerular 78           See_Comment  L            Reporte

d eGFR is



             Filt Rate (test code =                                        based

 on the



             EGFR.XX)                                            CKD-



                                                                 equation thatdo

es



                                                                 not use a race



                                                                 coefficient.



                                                                 Additional



                                                                 information can

be



                                                                 found



                                                                 at:[id number]

cb_



                                                                 egfr_summary_fl

andry



                                                                 5.pdf (kidney.o

rg)



                                                                 [Automated



                                                                 message] The



                                                                 system which



                                                                 generated this



                                                                 result transmit

gianfranco



                                                                 reference range

:



                                                                 >=90



                                                                 ml/min/1.73m2. 

The



                                                                 reference range



                                                                 was not used to



                                                                 interpret this



                                                                 result as



                                                                 normal/abnormal

.

 

             BUN/Creatinine Ratio 19 ratio     10-20        N            



             (test code = BCRATIO)                                        

 

             Glucose (test code = 92 mg/dL            N            



             GLU)                                                

 

             Osmolality,Calculated 295.0                                  



             (test code = OSMOC)                                        

 

             Calcium (test code = CA) 9.1 mg/dL    8.3-10.6     N            

 

             Bilirubin,Total (test 0.3 mg/dL    0.2-1.1      N            



             code = BILIT)                                        

 

             Aspartate Amino 22 U/L       0-34         N            



             Transferase (test code =                                        



             AST)                                                

 

             Alanine Aminotransferase 15 U/L       10-49        N            



             (test code = ALT)                                        

 

             Total Protein (test code 6.9 g/dL     5.7-8.2      N            



             = TP)                                               

 

             Albumin Level (test code 3.9 g/dL     3.2-4.8      N            



             = ALB)                                              

 

             Globulin (test code = 3.0 mg/dL    2.3-3.5      N            



             GLOB)                                               

 

             Albumin/Globulin Ratio 1.3 ratio    0.8-2.0      N            



             (test code = AGRATIO)                                        

 

             Alkaline Phosphatase 109 U/L             N            



             (test code = ALP)                                        



Ethanol [id number] 20:19:00





             Test Item    Value        Reference Range Interpretation Comments

 

             Ethanol (test code < 3 mg/dL                              The pharm

acological



             = ETOH)                                             response to blo

od alcohol



                                                                 levels mayvary 

from



                                                                 individual to i

ndividual.



                                                                 The fatal larisa

ntrationhas



                                                                 been reported t

o be



                                                                 >400mg/dL.



HCG, Serum Qual (LAB)2023 20:19:00





             Test Item    Value        Reference Range Interpretation Comments

 

             HCG, Serum Qual (LAB) (test code = Negative     Negative           

       



             HCGQ)                                               



[facility] AND [id number] 15:40:00





             Test Item    Value        Reference Range Interpretation Comments

 

             UA Protein (test code Negative (23 9:40                       

    



             = UA Protein) AM)                                    



[facility] [id number]:40:00





             Test Item    Value        Reference Range Interpretation Comments

 

             UA Glucose (test code Negative (23 9:40                       

    



             = UA Glucose) AM)                                    



[facility] [id number] 15:40:00





             Test Item    Value        Reference Range Interpretation Comments

 

             UA Ketones (test code Negative *NA*(23                        

   



             = UA Ketones) 9:40 AM)                               



[facility] [id number] 15:40:00





             Test Item    Value        Reference Range Interpretation Comments

 

             UA Sq Epi (test code = UA Sq Epi) Many /LPF                        

      



[facility] [id number] 15:40:00





             Test Item    Value        Reference Range Interpretation Comments

 

             UA WBC (test code = 9            See_Comment                [Automa

gianfranco message] The



             UA WBC)                                             system which ge

nerated this



                                                                 result transmit

gianfranco



                                                                 reference range

: <=5. The



                                                                 reference range

 was not



                                                                 used to interpr

et this



                                                                 result as xenia

l/abnormal.



[facility] [id number] 15:40:00





             Test Item    Value        Reference Range Interpretation Comments

 

             UA Bacteria (test code = UA Occasional /HPF                        

   



             Bacteria)                                           



[facility] [id number] 15:40:00





             Test Item    Value        Reference Range Interpretation Comments

 

             UA Amorph Delmis (test code = Occasional /HPF                        

   



             UA Amorph Delmis)                                        



[facility] [id number] 15:40:00





             Test Item    Value        Reference Range Interpretation Comments

 

             UA CaOx Delmis (test code = UA Occasional /HPF                       

    



             CaOx Delmis)                                          



[facility] [id number] 15:40:00





             Test Item    Value        Reference Range Interpretation Comments

 

             UA Color (test code = Yellow *NA*(23                          

 



             UA Color)    9:40 AM)                               



[facility] [id number] 15:40:00





             Test Item    Value        Reference Range Interpretation Comments

 

             UA Turbidity (test code = Clear (23 9:40                      

     



             UA Turbidity) AM)                                    



[facility] [id number] 15:40:00





             Test Item    Value        Reference Range Interpretation Comments

 

             UA Spec Grav (test code = UA Spec 1.010 1                          

      



             Grav)                                               



[facility] [id number] 15:40:00





             Test Item    Value        Reference Range Interpretation Comments

 

             UA Bili (test code = Negative *NA*(23                         

  



             UA Bili)     9:40 AM)                               



[facility] [id number] 15:40:00





             Test Item    Value        Reference Range Interpretation Comments

 

             UA pH (test code = UA pH) 6.0 1        5.0-8.0                   



[facility] [id number] 15:40:00





             Test Item    Value        Reference Range Interpretation Comments

 

             UA Protein (test code Negative (23 9:40                       

    



             = UA Protein) AM)                                    



[facility] [id number] 15:40:00





             Test Item    Value        Reference Range Interpretation Comments

 

             UA Glucose (test code Negative (23 9:40                       

    



             = UA Glucose) AM)                                    



[facility] [id number] 15:40:00





             Test Item    Value        Reference Range Interpretation Comments

 

             UA Ketones (test code Negative *NA*(23                        

   



             = UA Ketones) 9:40 AM)                               



[facility] [id number] 15:40:00





             Test Item    Value        Reference Range Interpretation Comments

 

             UA Bili (test code = Negative *NA*(23                         

  



             UA Bili)     9:40 AM)                               



[facility] [id number] 15:40:00





             Test Item    Value        Reference Range Interpretation Comments

 

             UA Blood (test code = Negative (23 9:40                       

    



             UA Blood)    AM)                                    



[facility] AND [id number] 15:40:00





             Test Item    Value        Reference Range Interpretation Comments

 

             UA Urobilinogen (test code = UA 0.2          0.1-1.0               

    



             Urobilinogen)                                        



[facility] AND [id number] 15:40:00





             Test Item    Value        Reference Range Interpretation Comments

 

             UA Nitrite (test code Negative (23 9:40                       

    



             = UA Nitrite) AM)                                    



[facility] AND [id number] 15:40:00





             Test Item    Value        Reference Range Interpretation Comments

 

             UA Leuk Est (test code Small *ABN*(23                         

  



             = UA Leuk Est) 9:40 AM)                               



[facility] AND [id number] 15:40:00





             Test Item    Value        Reference Range Interpretation Comments

 

             UA RBC (test code = 2            See_Comment                [Automa

gianfranco message] The



             UA RBC)                                             system which ge

nerated this



                                                                 result transmit

gianfranco



                                                                 reference range

: <=2. The



                                                                 reference range

 was not



                                                                 used to interpr

et this



                                                                 result as xenia

l/abnormal.



[facility] [id number] 15:40:00





             Test Item    Value        Reference Range Interpretation Comments

 

             UA Blood (test code = Negative (23 9:40                       

    



             UA Blood)    AM)                                    



[facility] [id number] 15:40:00





             Test Item    Value        Reference Range Interpretation Comments

 

             UA Urobilinogen (test code = UA 0.2          0.1-1.0               

    



             Urobilinogen)                                        



[facility] AND [id number] 15:40:00





             Test Item    Value        Reference Range Interpretation Comments

 

             UA Nitrite (test code Negative (23 9:40                       

    



             = UA Nitrite) AM)                                    



[facility] AND [id number] 15:40:00





             Test Item    Value        Reference Range Interpretation Comments

 

             UA Leuk Est (test code Small *ABN*(23                         

  



             = UA Leuk Est) 9:40 AM)                               



[facility] AND [id number] 15:40:00





             Test Item    Value        Reference Range Interpretation Comments

 

             UA RBC (test code = 2            See_Comment                [Automa

gianfranco message] The



             UA RBC)                                             system which ge

nerated this



                                                                 result transmit

gianfranco



                                                                 reference range

: <=2. The



                                                                 reference range

 was not



                                                                 used to interpr

et this



                                                                 result as xenia

l/abnormal.



[facility] [id number] 15:40:00





             Test Item    Value        Reference Range Interpretation Comments

 

             UA Sq Epi (test code = UA Sq Epi) Many /LPF                        

      



[facility] AND [id number] 15:40:00





             Test Item    Value        Reference Range Interpretation Comments

 

             UA WBC (test code = 9            See_Comment                [Automa

gianfranco message] The



             UA WBC)                                             system which ge

nerated this



                                                                 result transmit

gianfranco



                                                                 reference range

: <=5. The



                                                                 reference range

 was not



                                                                 used to interpr

et this



                                                                 result as xenia

l/abnormal.



[facility] AND [id number] 15:40:00





             Test Item    Value        Reference Range Interpretation Comments

 

             UA Bacteria (test code = UA Occasional /HPF                        

   



             Bacteria)                                           



[facility] AND [id number] 15:40:00





             Test Item    Value        Reference Range Interpretation Comments

 

             UA Amorph Delmis (test code = Occasional /HPF                        

   



             UA Amorph Delmis)                                        



[facility] AND [id number] 15:40:00





             Test Item    Value        Reference Range Interpretation Comments

 

             UA CaOx Delmis (test code = UA Occasional /HPF                       

    



             CaOx Delmis)                                          



[facility] AND [id number] 15:40:00





             Test Item    Value        Reference Range Interpretation Comments

 

             UA Color (test code = Yellow *NA*(23                          

 



             UA Color)    9:40 AM)                               



[facility] AND [id number] 15:40:00





             Test Item    Value        Reference Range Interpretation Comments

 

             UA Turbidity (test code = Clear (23 9:40                      

     



             UA Turbidity) AM)                                    



[facility] [id number] 15:40:00





             Test Item    Value        Reference Range Interpretation Comments

 

             UA Spec Grav (test code = UA Spec 1.010 1                          

      



             Grav)                                               



[facility] [id number] [facility]40[facility]00





             Test Item    Value        Reference Range Interpretation Comments

 

             UA pH (test code = UA pH) 6.0 1        5.0-8.0                   



[facility] [id number] 15:40:00





             Test Item    Value        Reference Range Interpretation Comments

 

             UA Protein (test code Negative (23 9:40                       

    



             = UA Protein) AM)                                    



[facility] [id number] 15:40:00





             Test Item    Value        Reference Range Interpretation Comments

 

             UA Glucose (test code Negative (23 9:40                       

    



             = UA Glucose) AM)                                    



[facility] [id number] 15:40:00





             Test Item    Value        Reference Range Interpretation Comments

 

             UA Ketones (test code Negative *NA*(23                        

   



             = UA Ketones) 9:40 AM)                               



[facility] [id number] 15:40:00





             Test Item    Value        Reference Range Interpretation Comments

 

             UA Bili (test code = Negative *NA*(23                         

  



             UA Bili)     9:40 AM)                               



[facility] [id number] 15:40:00





             Test Item    Value        Reference Range Interpretation Comments

 

             UA Blood (test code = Negative (23 9:40                       

    



             UA Blood)    AM)                                    



[facility] AND [id number] 15:40:00





             Test Item    Value        Reference Range Interpretation Comments

 

             UA Urobilinogen (test code = UA 0.2          0.1-1.0               

    



             Urobilinogen)                                        



[facility] [id number] 15:40:00





             Test Item    Value        Reference Range Interpretation Comments

 

             UA Nitrite (test code Negative (23 9:40                       

    



             = UA Nitrite) AM)                                    



[facility] [id number] 15:40:00





             Test Item    Value        Reference Range Interpretation Comments

 

             UA Leuk Est (test code Small *ABN*(23                         

  



             = UA Leuk Est) 9:40 AM)                               



[facility] [id number] 15:40:00





             Test Item    Value        Reference Range Interpretation Comments

 

             UA RBC (test code = 2            See_Comment                [Automa

gianfranco message] The



             UA RBC)                                             system which ge

nerated this



                                                                 result transmit

gianfranco



                                                                 reference range

: <=2. The



                                                                 reference range

 was not



                                                                 used to interpr

et this



                                                                 result as xenia

l/abnormal.



[facility] AND [id number] 15:40:00





             Test Item    Value        Reference Range Interpretation Comments

 

             UA Sq Epi (test code = UA Sq Epi) Many /LPF                        

      



[facility] AND [id number] 15:40:00





             Test Item    Value        Reference Range Interpretation Comments

 

             UA WBC (test code = 9            See_Comment                [Automa

gianfranco message] The



             UA WBC)                                             system which ge

nerated this



                                                                 result transmit

gianfranco



                                                                 reference range

: <=5. The



                                                                 reference range

 was not



                                                                 used to interpr

et this



                                                                 result as xenia

l/abnormal.



[facility] AND [id number] 15:40:00





             Test Item    Value        Reference Range Interpretation Comments

 

             UA Bacteria (test code = UA Occasional /HPF                        

   



             Bacteria)                                           



[facility] AND [id number] 15:40:00





             Test Item    Value        Reference Range Interpretation Comments

 

             UA Amorph Delmis (test code = Occasional /HPF                        

   



             UA Amorph Delmis)                                        



[facility] AND [id number] 15:40:00





             Test Item    Value        Reference Range Interpretation Comments

 

             UA CaOx Delmis (test code = UA Occasional /HPF                       

    



             CaOx Delmis)                                          



[facility] AND [id number] 15:40:00





             Test Item    Value        Reference Range Interpretation Comments

 

             UA Color (test code = Yellow *NA*(23                          

 



             UA Color)    9:40 AM)                               



[facility] AND [id number] 15:40:00





             Test Item    Value        Reference Range Interpretation Comments

 

             UA Turbidity (test code = Clear (23 9:40                      

     



             UA Turbidity) AM)                                    



[facility] AND [id number] 15:40:00





             Test Item    Value        Reference Range Interpretation Comments

 

             UA Spec Grav (test code = UA Spec 1.010 1                          

      



             Grav)                                               



[facility] AND [id number] 15:40:00





             Test Item    Value        Reference Range Interpretation Comments

 

             UA pH (test code = UA pH) 6.0 1        5.0-8.0                   



[facility] AND [id number] 15:40:00





             Test Item    Value        Reference Range Interpretation Comments

 

             UA Protein (test code Negative (23 9:40                       

    



             = UA Protein) AM)                                    



[facility] AND [id number] 15:40:00





             Test Item    Value        Reference Range Interpretation Comments

 

             UA Glucose (test code Negative (23 9:40                       

    



             = UA Glucose) AM)                                    



[facility] AND [id number] 15:40:00





             Test Item    Value        Reference Range Interpretation Comments

 

             UA Ketones (test code Negative *NA*(23                        

   



             = UA Ketones) 9:40 AM)                               



[facility] AND [id number] 15:40:00





             Test Item    Value        Reference Range Interpretation Comments

 

             UA Bili (test code = Negative *NA*(23                         

  



             UA Bili)     9:40 AM)                               



[facility] AND [id number] 15:40:00





             Test Item    Value        Reference Range Interpretation Comments

 

             UA Blood (test code = Negative (23 9:40                       

    



             UA Blood)    AM)                                    



[facility] AND [id number] 15:40:00





             Test Item    Value        Reference Range Interpretation Comments

 

             UA Urobilinogen (test code = UA 0.2          0.1-1.0               

    



             Urobilinogen)                                        



[facility] AND [id number] 15:40:00





             Test Item    Value        Reference Range Interpretation Comments

 

             UA Nitrite (test code Negative (23 9:40                       

    



             = UA Nitrite) AM)                                    



[facility] AND [id number] 15:40:00





             Test Item    Value        Reference Range Interpretation Comments

 

             UA Leuk Est (test code Small *ABN*(23                         

  



             = UA Leuk Est) 9:40 AM)                               



[facility] AND [id number] 15:40:00





             Test Item    Value        Reference Range Interpretation Comments

 

             UA RBC (test code = 2            See_Comment                [Automa

gianfranco message] The



             UA RBC)                                             system which ge

nerated this



                                                                 result transmit

gianfranco



                                                                 reference range

: <=2. The



                                                                 reference range

 was not



                                                                 used to interpr

et this



                                                                 result as xenia

l/abnormal.



[facility] AND [id number] 15:40:00





             Test Item    Value        Reference Range Interpretation Comments

 

             UA Sq Epi (test code = UA Sq Epi) Many /LPF                        

      



[facility] AND [id number] 15:40:00





             Test Item    Value        Reference Range Interpretation Comments

 

             UA WBC (test code = 9            See_Comment                [Automa

gianfranco message] The



             UA WBC)                                             system which ge

nerated this



                                                                 result transmit

gianfranco



                                                                 reference range

: <=5. The



                                                                 reference range

 was not



                                                                 used to interpr

et this



                                                                 result as xenia

l/abnormal.



[facility] [id number] 15:40:00





             Test Item    Value        Reference Range Interpretation Comments

 

             UA Bacteria (test code = UA Occasional /HPF                        

   



             Bacteria)                                           



[facility] AND [id number] 15:40:00





             Test Item    Value        Reference Range Interpretation Comments

 

             UA Amorph Delmis (test code = Occasional /HPF                        

   



             UA Amorph Delmis)                                        



[facility] AND [id number] 15:40:00





             Test Item    Value        Reference Range Interpretation Comments

 

             UA CaOx Delmis (test code = UA Occasional /HPF                       

    



             CaOx Delmis)                                          



[facility] AND [id number] 15:40:00





             Test Item    Value        Reference Range Interpretation Comments

 

             UA Color (test code = Yellow *NA*(23                          

 



             UA Color)    9:40 AM)                               



[facility] AND [id number] 15:40:00





             Test Item    Value        Reference Range Interpretation Comments

 

             UA Turbidity (test code = Clear (23 9:40                      

     



             UA Turbidity) AM)                                    



[facility] AND [id number] 15:40:00





             Test Item    Value        Reference Range Interpretation Comments

 

             UA Spec Grav (test code = UA Spec 1.010 1                          

      



             Grav)                                               



[facility] AND [id number] 15:40:00





             Test Item    Value        Reference Range Interpretation Comments

 

             UA pH (test code = UA pH) 6.0 1        5.0-8.0                   



[facility] AND [id number] 15:40:00





             Test Item    Value        Reference Range Interpretation Comments

 

             UA Protein (test code Negative (23 9:40                       

    



             = UA Protein) AM)                                    



[facility] AND [id number] 15:40:00





             Test Item    Value        Reference Range Interpretation Comments

 

             UA Glucose (test code Negative (23 9:40                       

    



             = UA Glucose) AM)                                    



[facility] AND [id number] 15:40:00





             Test Item    Value        Reference Range Interpretation Comments

 

             UA Ketones (test code Negative *NA*(23                        

   



             = UA Ketones) 9:40 AM)                               



[facility] AND [id number] 15:40:00





             Test Item    Value        Reference Range Interpretation Comments

 

             UA Bili (test code = Negative *NA*(23                         

  



             UA Bili)     9:40 AM)                               



[facility] [id number] 15:40:00





             Test Item    Value        Reference Range Interpretation Comments

 

             UA Blood (test code = Negative (23 9:40                       

    



             UA Blood)    AM)                                    



[facility] [id number] 15:40:00





             Test Item    Value        Reference Range Interpretation Comments

 

             UA Urobilinogen (test code = UA 0.2          0.1-1.0               

    



             Urobilinogen)                                        



[facility] [id number] 15:40:00





             Test Item    Value        Reference Range Interpretation Comments

 

             UA Nitrite (test code Negative (23 9:40                       

    



             = UA Nitrite) AM)                                    



[facility] [id number] 15:40:00





             Test Item    Value        Reference Range Interpretation Comments

 

             UA Leuk Est (test code Small *ABN*(23                         

  



             = UA Leuk Est) 9:40 AM)                               



[facility] [id number] 15:40:00





             Test Item    Value        Reference Range Interpretation Comments

 

             UA RBC (test code = 2            See_Comment                [Automa

gianfranco message] The



             UA RBC)                                             system which ge

nerated this



                                                                 result transmit

gianfranco



                                                                 reference range

: <=2. The



                                                                 reference range

 was not



                                                                 used to interpr

et this



                                                                 result as xenia

l/abnormal.



[facility] [id number] 15:40:00





             Test Item    Value        Reference Range Interpretation Comments

 

             UA Sq Epi (test code = UA Sq Epi) Many /LPF                        

      



[facility] [id number] 15:40:00





             Test Item    Value        Reference Range Interpretation Comments

 

             UA WBC (test code = 9            See_Comment                [Automa

gianfranco message] The



             UA WBC)                                             system which ge

nerated this



                                                                 result transmit

gianfranco



                                                                 reference range

: <=5. The



                                                                 reference range

 was not



                                                                 used to interpr

et this



                                                                 result as xenia

l/abnormal.



[facility] [id number] 15:40:00





             Test Item    Value        Reference Range Interpretation Comments

 

             UA Bacteria (test code = UA Occasional /HPF                        

   



             Bacteria)                                           



[facility] [id number] 15:40:00





             Test Item    Value        Reference Range Interpretation Comments

 

             UA Amorph Delmis (test code = Occasional /HPF                        

   



             UA Amorph Delmis)                                        



[facility] [id number] 15:40:00





             Test Item    Value        Reference Range Interpretation Comments

 

             UA CaOx Delmis (test code = UA Occasional /HPF                       

    



             CaOx Delmis)                                          



[facility] AND [id number] 15:40:00





             Test Item    Value        Reference Range Interpretation Comments

 

             UA Color (test code = Yellow *NA*(23                          

 



             UA Color)    9:40 AM)                               



[facility] [id number] 15:40:00





             Test Item    Value        Reference Range Interpretation Comments

 

             UA Turbidity (test code = Clear (23 9:40                      

     



             UA Turbidity) AM)                                    



[facility] [id number] 15:40:00





             Test Item    Value        Reference Range Interpretation Comments

 

             UA Spec Grav (test code = UA Spec 1.010 1                          

      



             Grav)                                               



[facility] AND [id number] 15:40:00





             Test Item    Value        Reference Range Interpretation Comments

 

             UA pH (test code = UA pH) 6.0 1        5.0-8.0                   



[facility] AND [id number] 15:40:00





             Test Item    Value        Reference Range Interpretation Comments

 

             UA Protein (test code Negative (23 9:40                       

    



             = UA Protein) AM)                                    



[facility] AND [id number] 15:40:00





             Test Item    Value        Reference Range Interpretation Comments

 

             UA Glucose (test code Negative (23 9:40                       

    



             = UA Glucose) AM)                                    



[facility] AND [id number] 15:40:00





             Test Item    Value        Reference Range Interpretation Comments

 

             UA Ketones (test code Negative *NA*(23                        

   



             = UA Ketones) 9:40 AM)                               



[facility] AND [id number] 15:40:00





             Test Item    Value        Reference Range Interpretation Comments

 

             UA Bili (test code = Negative *NA*(23                         

  



             UA Bili)     9:40 AM)                               



[facility] [id number] 15:40:00





             Test Item    Value        Reference Range Interpretation Comments

 

             UA Blood (test code = Negative (23 9:40                       

    



             UA Blood)    AM)                                    



[facility] [id number] 15:40:00





             Test Item    Value        Reference Range Interpretation Comments

 

             UA Urobilinogen (test code = UA 0.2          0.1-1.0               

    



             Urobilinogen)                                        



[facility] AND [id number] 15:40:00





             Test Item    Value        Reference Range Interpretation Comments

 

             UA Nitrite (test code Negative (23 9:40                       

    



             = UA Nitrite) AM)                                    



[facility] [id number] 15:40:00





             Test Item    Value        Reference Range Interpretation Comments

 

             UA Leuk Est (test code Small *ABN*(23                         

  



             = UA Leuk Est) 9:40 AM)                               



[facility] [id number] 15:40:00





             Test Item    Value        Reference Range Interpretation Comments

 

             UA RBC (test code = 2            See_Comment                [Automa

gianfranco message] The



             UA RBC)                                             system which ge

nerated this



                                                                 result transmit

gianfranco



                                                                 reference range

: <=2. The



                                                                 reference range

 was not



                                                                 used to interpr

et this



                                                                 result as xenia

l/abnormal.



[facility] [id number] 15:40:00





             Test Item    Value        Reference Range Interpretation Comments

 

             UA Sq Epi (test code = UA Sq Epi) Many /LPF                        

      



[facility] AND [id number] 15:40:00





             Test Item    Value        Reference Range Interpretation Comments

 

             UA WBC (test code = 9            See_Comment                [Automa

gianfranco message] The



             UA WBC)                                             system which ge

nerated this



                                                                 result transmit

gianfranco



                                                                 reference range

: <=5. The



                                                                 reference range

 was not



                                                                 used to interpr

et this



                                                                 result as xenia

l/abnormal.



[facility] AND [id number] 15:40:00





             Test Item    Value        Reference Range Interpretation Comments

 

             UA Bacteria (test code = UA Occasional /HPF                        

   



             Bacteria)                                           



[facility] [id number] 15:40:00





             Test Item    Value        Reference Range Interpretation Comments

 

             UA Amorph Delmis (test code = Occasional /HPF                        

   



             UA Amorph Delmis)                                        



[facility] [id number] 15:40:00





             Test Item    Value        Reference Range Interpretation Comments

 

             UA CaOx Delmis (test code = UA Occasional /HPF                       

    



             CaOx Delmis)                                          



[facility] [id number] 15:40:00





             Test Item    Value        Reference Range Interpretation Comments

 

             UA Color (test code = Yellow *NA*(23                          

 



             UA Color)    9:40 AM)                               



[facility] [id number] 15:40:00





             Test Item    Value        Reference Range Interpretation Comments

 

             UA Turbidity (test code = Clear (23 9:40                      

     



             UA Turbidity) AM)                                    



[facility] [id number] 15:40:00





             Test Item    Value        Reference Range Interpretation Comments

 

             UA Spec Grav (test code = UA Spec 1.010 1                          

      



             Grav)                                               



[facility] AND [id number] 15:40:00





             Test Item    Value        Reference Range Interpretation Comments

 

             UA pH (test code = UA pH) 6.0 1        5.0-8.0                   



[facility] AND [id number] 15:40:00





             Test Item    Value        Reference Range Interpretation Comments

 

             UA Protein (test code Negative (23 9:40                       

    



             = UA Protein) AM)                                    



[facility] AND [id number] 15:40:00





             Test Item    Value        Reference Range Interpretation Comments

 

             UA Glucose (test code Negative (23 9:40                       

    



             = UA Glucose) AM)                                    



[facility] AND [id number] 15:40:00





             Test Item    Value        Reference Range Interpretation Comments

 

             UA Ketones (test code Negative *NA*(23                        

   



             = UA Ketones) 9:40 AM)                               



[facility] AND [id number] 15:40:00





             Test Item    Value        Reference Range Interpretation Comments

 

             UA Bili (test code = Negative *NA*(23                         

  



             UA Bili)     9:40 AM)                               



[facility] AND [id number] 15:40:00





             Test Item    Value        Reference Range Interpretation Comments

 

             UA Blood (test code = Negative (23 9:40                       

    



             UA Blood)    AM)                                    



[facility] AND [id number] 15:40:00





             Test Item    Value        Reference Range Interpretation Comments

 

             UA Urobilinogen (test code = UA 0.2          0.1-1.0               

    



             Urobilinogen)                                        



[facility] AND [id number] 15:40:00





             Test Item    Value        Reference Range Interpretation Comments

 

             UA Nitrite (test code Negative (23 9:40                       

    



             = UA Nitrite) AM)                                    



[facility] AND [id number] 15:40:00





             Test Item    Value        Reference Range Interpretation Comments

 

             UA Leuk Est (test code Small *ABN*(23                         

  



             = UA Leuk Est) 9:40 AM)                               



[facility] AND [id number] 15:40:00





             Test Item    Value        Reference Range Interpretation Comments

 

             UA RBC (test code = 2            See_Comment                [Automa

gianfranco message] The



             UA RBC)                                             system which ge

nerated this



                                                                 result transmit

gianfranco



                                                                 reference range

: <=2. The



                                                                 reference range

 was not



                                                                 used to interpr

et this



                                                                 result as xenia

l/abnormal.



[facility] [id number] 15:40:00





             Test Item    Value        Reference Range Interpretation Comments

 

             UA Sq Epi (test code = UA Sq Epi) Many /LPF                        

      



[facility] [id number] 15:40:00





             Test Item    Value        Reference Range Interpretation Comments

 

             UA WBC (test code = 9            See_Comment                [Automa

gianfranco message] The



             UA WBC)                                             system which ge

nerated this



                                                                 result transmit

gianfranco



                                                                 reference range

: <=5. The



                                                                 reference range

 was not



                                                                 used to interpr

et this



                                                                 result as xenia

l/abnormal.



[facility] [id number] 15:40:00





             Test Item    Value        Reference Range Interpretation Comments

 

             UA Bacteria (test code = UA Occasional /HPF                        

   



             Bacteria)                                           



[facility] [id number] 15:40:00





             Test Item    Value        Reference Range Interpretation Comments

 

             UA Amorph Delmis (test code = Occasional /HPF                        

   



             UA Amorph Delmis)                                        



[facility] [id number] 15:40:00





             Test Item    Value        Reference Range Interpretation Comments

 

             UA CaOx Delmis (test code = UA Occasional /HPF                       

    



             CaOx Delmis)                                          



[facility] [id number] 15:40:00





             Test Item    Value        Reference Range Interpretation Comments

 

             UA Color (test code = Yellow *NA*(23                          

 



             UA Color)    9:40 AM)                               



[facility] [id number] 15:40:00





             Test Item    Value        Reference Range Interpretation Comments

 

             UA Turbidity (test code = Clear (23 9:40                      

     



             UA Turbidity) AM)                                    



[facility] [id number] 15:40:00





             Test Item    Value        Reference Range Interpretation Comments

 

             UA Spec Grav (test code = UA Spec 1.010 1                          

      



             Grav)                                               



[facility] [id number] 15:40:00





             Test Item    Value        Reference Range Interpretation Comments

 

             UA Sq Epi (test code = UA Sq Epi) Many /LPF                        

      



[facility] [id number] 15:40:00





             Test Item    Value        Reference Range Interpretation Comments

 

             UA pH (test code = UA pH) 6.0 1        5.0-8.0                   



[facility] [id number] 15:40:00





             Test Item    Value        Reference Range Interpretation Comments

 

             UA Protein (test code Negative (23 9:40                       

    



             = UA Protein) AM)                                    



[facility] [id number] 15:40:00





             Test Item    Value        Reference Range Interpretation Comments

 

             UA Glucose (test code Negative (23 9:40                       

    



             = UA Glucose) AM)                                    



[facility] [id number] 15:40:00





             Test Item    Value        Reference Range Interpretation Comments

 

             UA Ketones (test code Negative *NA*(23                        

   



             = UA Ketones) 9:40 AM)                               



[facility] [id number] 15:40:00





             Test Item    Value        Reference Range Interpretation Comments

 

             UA Bili (test code = Negative *NA*(23                         

  



             UA Bili)     9:40 AM)                               



[facility] [id number] 15:40:00





             Test Item    Value        Reference Range Interpretation Comments

 

             UA Blood (test code = Negative (23 9:40                       

    



             UA Blood)    AM)                                    



[facility] AND [id number] 15:40:00





             Test Item    Value        Reference Range Interpretation Comments

 

             UA Urobilinogen (test code = UA 0.2          0.1-1.0               

    



             Urobilinogen)                                        



[facility] AND [id number] 15:40:00





             Test Item    Value        Reference Range Interpretation Comments

 

             UA Nitrite (test code Negative (23 9:40                       

    



             = UA Nitrite) AM)                                    



[facility] AND [id number] 15:40:00





             Test Item    Value        Reference Range Interpretation Comments

 

             UA Leuk Est (test code Small *ABN*(23                         

  



             = UA Leuk Est) 9:40 AM)                               



[facility] AND [id number] 15:40:00





             Test Item    Value        Reference Range Interpretation Comments

 

             UA RBC (test code = 2            See_Comment                [Automa

gianfranco message] The



             UA RBC)                                             system which ge

nerated this



                                                                 result transmit

gianfranco



                                                                 reference range

: <=2. The



                                                                 reference range

 was not



                                                                 used to interpr

et this



                                                                 result as xenia

l/abnormal.



[facility] [id number] 15:40:00





             Test Item    Value        Reference Range Interpretation Comments

 

             UA WBC (test code = 9            See_Comment                [Automa

gianfranco message] The



             UA WBC)                                             system which ge

nerated this



                                                                 result transmit

gianfranco



                                                                 reference range

: <=5. The



                                                                 reference range

 was not



                                                                 used to interpr

et this



                                                                 result as xenia

l/abnormal.



[facility] [id number] 15:40:00





             Test Item    Value        Reference Range Interpretation Comments

 

             UA Bacteria (test code = UA Occasional /HPF                        

   



             Bacteria)                                           



[facility] [id number] 15:40:00





             Test Item    Value        Reference Range Interpretation Comments

 

             UA Amorph Delmis (test code = Occasional /HPF                        

   



             UA Amorph Delmis)                                        



[facility] [id number] 15:40:00





             Test Item    Value        Reference Range Interpretation Comments

 

             UA CaOx Delmis (test code = UA Occasional /HPF                       

    



             CaOx Delmis)                                          



[facility] [id number] 15:40:00





             Test Item    Value        Reference Range Interpretation Comments

 

             UA Color (test code = Yellow *NA*(23                          

 



             UA Color)    9:40 AM)                               



[facility] AND [id number] 15:40:00





             Test Item    Value        Reference Range Interpretation Comments

 

             UA Sq Epi (test code = UA Sq Epi) Many /LPF                        

      



[facility] AND [id number] 15:40:00





             Test Item    Value        Reference Range Interpretation Comments

 

             UA WBC (test code = 9            See_Comment                [Automa

gianfranco message] The



             UA WBC)                                             system which ge

nerated this



                                                                 result transmit

gianfranco



                                                                 reference range

: <=5. The



                                                                 reference range

 was not



                                                                 used to interpr

et this



                                                                 result as xenia

l/abnormal.



[facility] AND [id number] 15:40:00





             Test Item    Value        Reference Range Interpretation Comments

 

             UA Bacteria (test code = UA Occasional /HPF                        

   



             Bacteria)                                           



[facility] AND [id number] 15:40:00





             Test Item    Value        Reference Range Interpretation Comments

 

             UA Amorph Delmis (test code = Occasional /HPF                        

   



             UA Amorph Delmis)                                        



[facility] AND [id number] 15:40:00





             Test Item    Value        Reference Range Interpretation Comments

 

             UA CaOx Delmis (test code = UA Occasional /HPF                       

    



             CaOx Delmis)                                          



[facility] AND [id number] 15:40:00





             Test Item    Value        Reference Range Interpretation Comments

 

             UA Color (test code = Yellow *NA*(23                          

 



             UA Color)    9:40 AM)                               



[facility] [id number] 15:40:00





             Test Item    Value        Reference Range Interpretation Comments

 

             UA Turbidity (test code = Clear (23 9:40                      

     



             UA Turbidity) AM)                                    



[facility] [id number] 15:40:00





             Test Item    Value        Reference Range Interpretation Comments

 

             UA Spec Grav (test code = UA Spec 1.010 1                          

      



             Grav)                                               



[facility] [id number] 15:40:00





             Test Item    Value        Reference Range Interpretation Comments

 

             UA Turbidity (test code = Clear (23 9:40                      

     



             UA Turbidity) AM)                                    



[facility] [id number] 15:40:00





             Test Item    Value        Reference Range Interpretation Comments

 

             UA pH (test code = UA pH) 6.0 1        5.0-8.0                   



[facility] [id number] 15:40:00





             Test Item    Value        Reference Range Interpretation Comments

 

             UA Protein (test code Negative (23 9:40                       

    



             = UA Protein) AM)                                    



[facility] [id number] 15:40:00





             Test Item    Value        Reference Range Interpretation Comments

 

             UA Glucose (test code Negative (23 9:40                       

    



             = UA Glucose) AM)                                    



[facility] [id number] 15:40:00





             Test Item    Value        Reference Range Interpretation Comments

 

             UA Ketones (test code Negative *NA*(23                        

   



             = UA Ketones) 9:40 AM)                               



[facility] [id number] 15:40:00





             Test Item    Value        Reference Range Interpretation Comments

 

             UA Bili (test code = Negative *NA*(23                         

  



             UA Bili)     9:40 AM)                               



[facility] [id number] 15:40:00





             Test Item    Value        Reference Range Interpretation Comments

 

             UA Blood (test code = Negative (23 9:40                       

    



             UA Blood)    AM)                                    



[facility] [id number] 15:40:00





             Test Item    Value        Reference Range Interpretation Comments

 

             UA Urobilinogen (test code = UA 0.2          0.1-1.0               

    



             Urobilinogen)                                        



[facility] [id number] 15:40:00





             Test Item    Value        Reference Range Interpretation Comments

 

             UA Nitrite (test code Negative (23 9:40                       

    



             = UA Nitrite) AM)                                    



[facility] [id number] 15:40:00





             Test Item    Value        Reference Range Interpretation Comments

 

             UA Leuk Est (test code Small *ABN*(2/14/23                         

  



             = UA Leuk Est) 9:40 AM)                               



[facility] AND [id number] 15:40:00





             Test Item    Value        Reference Range Interpretation Comments

 

             UA RBC (test code = 2            See_Comment                [Automa

gianfranco message] The



             UA RBC)                                             system which ge

nerated this



                                                                 result transmit

gianfranco



                                                                 reference range

: <=2. The



                                                                 reference range

 was not



                                                                 used to interpr

et this



                                                                 result as xenia

l/abnormal.



[facility] AND [id number] 15:40:00





             Test Item    Value        Reference Range Interpretation Comments

 

             UA Spec Grav (test code = UA Spec 1.010 1                          

      



             Grav)                                               



[facility] AND [id number] 15:40:00





             Test Item    Value        Reference Range Interpretation Comments

 

             UA pH (test code = UA pH) 6.0 1        5.0-8.0                   



[facility] [id number] 15:25:24





             Test Item    Value        Reference Range Interpretation Comments

 

             Total Protein (test code = Total 8.0          6.4-8.4              

     



             Protein)                                            



[facility] [place], [id number] 15:25:24





             Test Item    Value        Reference Range Interpretation Comments

 

             Albumin Lvl (test code = Albumin Lvl) 3.7          3.5-5.0         

          



[facility] [id number] 15:25:24





             Test Item    Value        Reference Range Interpretation Comments

 

             Globulin (test code = Globulin) 4.3          2.7-4.2               

    



[facility] [id number] 15:25:24





             Test Item    Value        Reference Range Interpretation Comments

 

             A/G Ratio (test code = A/G Ratio) 0.9 1        0.7-1.6             

      



[facility], [id number] 15:25:24





             Test Item    Value        Reference Range Interpretation Comments

 

             ALANINE AMINOTRANSFERASE 20           See_Comment                [A

utomated message]



             (test code = ALANINE                                        The sys

tem which



             AMINOTRANSFERASE)                                        generated 

this result



                                                                 transmitted ref

erence



                                                                 range: <=65. Th

e



                                                                 reference range

 was



                                                                 not used to int

erpret



                                                                 this result as



                                                                 normal/abnormal

.



[facility] [id number] 15:25:24





             Test Item    Value        Reference Range Interpretation Comments

 

             AST (test code = AST) 19           See_Comment                [Auto

mated message] The



                                                                 system which ge

nerated this



                                                                 result transmit

gianfranco



                                                                 reference range

: <=37. The



                                                                 reference range

 was not



                                                                 used to interpr

et this



                                                                 result as xenia

l/abnormal.



[facility] [place], [id number] 15:25:24





             Test Item    Value        Reference Range Interpretation Comments

 

             Alk Phos (test code = Alk Phos) 123                          

    



[facility] [place], [id number] 15:25:24





             Test Item    Value        Reference Range Interpretation Comments

 

             Bili Total (test code = Bili Total) 0.3          0.2-1.3           

        



[facility] [place], [id number] 15:25:24





             Test Item    Value        Reference Range Interpretation Comments

 

             Bili Direct (test code no gt        See_Comment                [Aut

omated message] The



             = Bili Direct)                                        system which 

generated



                                                                 this result tra

nsmitted



                                                                 reference range

: <=0.3.



                                                                 The reference r

jihan was



                                                                 not used to int

erpret this



                                                                 result as xenia

l/abnormal.



[facility] [place], TXAzenryhXNWGKVJAU1063-85-02 15:25:24





             Test Item    Value        Reference Range Interpretation Comments

 

             Bili Indirect Unable to    See_Comment                [Automated



             (test code = Bili Calculate                              message] T

he system



             Indirect)                                           which generated



                                                                 this result



                                                                 transmitted



                                                                 reference range

:



                                                                 <=1.0. The



                                                                 reference range

 was



                                                                 not used to



                                                                 interpret this



                                                                 result as



                                                                 normal/abnormal

.



[facility]OL[facility]61[facility]24





             Test Item    Value        Reference Range Interpretation Comments

 

             WBC X 10x3 (test code = WBC X 10x3) 9.4          3.7-10.4          

        



[facility] [place], TXFjvgprwOUDTRESTAA9181-85-80 15:25:24





             Test Item    Value        Reference Range Interpretation Comments

 

             RBC X 10x6 (test code = RBC X 10x6) 4.59         4.20-5.40         

        



[facility] [place], TXTxnzmlfJASZWONQFA4509-02-17 15:25:24





             Test Item    Value        Reference Range Interpretation Comments

 

             Hgb (test code = Hgb) 14.1         12.0-16.0                 



[place]183-02-14 15:25:24





             Test Item    Value        Reference Range Interpretation Comments

 

             Hct (test code = Hct) 42.8         36.0-48.0                 



[place] [address]-02-14 15:25:24





             Test Item    Value        Reference Range Interpretation Comments

 

             MCV (test code = MCV) 93.2         80.0-98.0                 



[place] 476183-02-14 15:25:24





             Test Item    Value        Reference Range Interpretation Comments

 

             MCH (test code = MCH) 30.8 pg      27.0-31.0                 



[place] [address]-02-14 15:25:24





             Test Item    Value        Reference Range Interpretation Comments

 

             MCHC (test code = MCHC) 33.0         32.0-36.0                 



[place] 476183-02-14 15:25:24





             Test Item    Value        Reference Range Interpretation Comments

 

             RDW (test code = RDW) 13.0         11.5-14.5                 



[place] 476183-02-14 15:25:24





             Test Item    Value        Reference Range Interpretation Comments

 

             Platelet (test code = Platelet) 200          133-450               

    



[place] 476183-02-14 15:25:24





             Test Item    Value        Reference Range Interpretation Comments

 

             MPV (test code = MPV) 8.6          7.4-10.4                  



[place] 476183-02-14 15:25:24





             Test Item    Value        Reference Range Interpretation Comments

 

             Segs (test code = Segs) 86.0         45.0-75.0                 



[place] 476183-02-14 15:25:24





             Test Item    Value        Reference Range Interpretation Comments

 

             Lymphocytes (test code = Lymphocytes) 5.5          20.0-40.0       

          



[place] 476183-02-14 15:25:24





             Test Item    Value        Reference Range Interpretation Comments

 

             Monocytes (test code = Monocytes) 7.0          2.0-12.0            

      



[facility] [place], TXVabmjicONPSWISILN7650-15-65 15:25:24





             Test Item    Value        Reference Range Interpretation Comments

 

             Eosinophils (test code = 1.0          See_Comment                [A

utomated message] The



             Eosinophils)                                        system which ge

nerated



                                                                 this result tra

nsmitted



                                                                 reference range

: <=4.0.



                                                                 The reference r

jihan was



                                                                 not used to int

erpret



                                                                 this result as



                                                                 normal/abnormal

.



[facility] [place], TXBgyohuzLZQGBBWUNN9782-14-55 15:25:24





             Test Item    Value        Reference Range Interpretation Comments

 

             Basophils (test code = 0.5          See_Comment                [Aut

omated message] The



             Basophils)                                          system which ge

nerated



                                                                 this result tra

nsmitted



                                                                 reference range

: <=1.0.



                                                                 The reference r

jihan was



                                                                 not used to int

erpret



                                                                 this result as



                                                                 normal/abnormal

.



[place] 476183-02-14 15:25:24





             Test Item    Value        Reference Range Interpretation Comments

 

             Neutrophils # (test code = Neutrophils 8.1          1.5-8.1        

           



             #)                                                  



[place] 476183-02-14 15:25:24





             Test Item    Value        Reference Range Interpretation Comments

 

             Lymphocytes # (test code = Lymphocytes 0.5          1.0-5.5        

           



             #)                                                  



[facility] [place], TXYtyuyfpJDWRTIWOSI8813-38-25 15:25:24





             Test Item    Value        Reference Range Interpretation Comments

 

             Monocytes # (test code 0.7          See_Comment                [Aut

omated message] The



             = Monocytes #)                                        system which 

generated



                                                                 this result tra

nsmitted



                                                                 reference range

: <=0.8.



                                                                 The reference r

jihan was



                                                                 not used to int

erpret



                                                                 this result as



                                                                 normal/abnormal

.



[facility] [place], TXVlswnqfYUZBVYBMIB5111-89-10 15:25:24





             Test Item    Value        Reference Range Interpretation Comments

 

             Eosinophils # (test code 0.1          See_Comment                [A

utomated message] The



             = Eosinophils #)                                        system whic

h generated



                                                                 this result tra

nsmitted



                                                                 reference range

: <=0.5.



                                                                 The reference r

jihan was



                                                                 not used to int

erpret



                                                                 this result as



                                                                 normal/abnormal

.



[facility] [place], TXCmdoycePVUTALNJQ2957-61-10 15:25:24





             Test Item    Value        Reference Range Interpretation Comments

 

             Glucose Lvl (test code = Glucose Lvl) 93           70-99           

          



[facility] [place], TXIdgtpfxHCEZMAGHN8009-93-74 15:25:24





             Test Item    Value        Reference Range Interpretation Comments

 

             BUN (test code = BUN) 19           7-22                      



[place] [address]-02-14 15:25:24





             Test Item    Value        Reference Range Interpretation Comments

 

             Creatinine Lvl (test code = Creatinine 0.85         0.50-1.40      

           



             Lvl)                                                



[facility] [place], TXXwsrzmuATNKMJQYC6486-19-41 15:25:24





             Test Item    Value        Reference Range Interpretation Comments

 

             Sodium Lvl (test code = Sodium Lvl) 142          135-145           

        



[facility] [place], TXJvtyucfFYVUUNASU6358-11-43 15:25:24





             Test Item    Value        Reference Range Interpretation Comments

 

             Potassium Lvl (test code = Potassium 4.4          3.5-5.1          

         



             Lvl)                                                



[place] [address]-02-14 15:25:24





             Test Item    Value        Reference Range Interpretation Comments

 

             Chloride Lvl (test code = Chloride Lvl) 108                  

            



[place] 318183-02-14 15:25:24





             Test Item    Value        Reference Range Interpretation Comments

 

             CO2 (test code = CO2) 28           24-32                     



[place] 318183-02-14 15:25:24





             Test Item    Value        Reference Range Interpretation Comments

 

             Calcium Lvl (test code = Calcium Lvl) 10.0         8.5-10.5        

          



[facility] [place], TXOfkspsmKCBFNVTOR8201-21-61 15:25:24





             Test Item    Value        Reference Range Interpretation Comments

 

             AGAP (test code = AGAP) 10.4         10.0-20.0                 



[facility] [place], TXMnpnruqDRFUBCSJB2271-58-35 15:25:24





             Test Item    Value        Reference Range Interpretation Comments

 

             eGFR (test code = eGFR) 81                                     



[facility] [place], TXDmstczvBAISSINYB6489-65-72 15:25:24





             Test Item    Value        Reference Range Interpretation Comments

 

             Lipase Lvl (test code = Lipase Lvl) 123                      

        



[facility] [place], TXCssdaejBFABCTECS4457-66-99 15:25:24





             Test Item    Value        Reference Range Interpretation Comments

 

             Total Protein (test code = Total 8.0          6.4-8.4              

     



             Protein)                                            



[facility] [place], TXZpneusbTNULARHIU7090-58-63 15:25:24





             Test Item    Value        Reference Range Interpretation Comments

 

             Albumin Lvl (test code = Albumin Lvl) 3.7          3.5-5.0         

          



[facility] [place], TXFiqatdwGERYJLTKO9155-03-36 15:25:24





             Test Item    Value        Reference Range Interpretation Comments

 

             Globulin (test code = Globulin) 4.3          2.7-4.2               

    



[facility] [place], TXMewujaqURMYALRSM9364-82-02 15:25:24





             Test Item    Value        Reference Range Interpretation Comments

 

             A/G Ratio (test code = A/G Ratio) 0.9 1        0.7-1.6             

      



[facility] [place], TXVhfnagoEQOTTIHQE6700-05-39 15:25:24





             Test Item    Value        Reference Range Interpretation Comments

 

             ALANINE AMINOTRANSFERASE 20           See_Comment                [A

utomated message]



             (test code = ALANINE                                        The sys

tem which



             AMINOTRANSFERASE)                                        generated 

this result



                                                                 transmitted ref

erence



                                                                 range: <=65. Th

e



                                                                 reference range

 was



                                                                 not used to int

erpret



                                                                 this result as



                                                                 normal/abnormal

.



[facility] [place], TXSbeybciOALMUUSRP5376-02-35 15:25:24





             Test Item    Value        Reference Range Interpretation Comments

 

             AST (test code = AST) 19           See_Comment                [Auto

mated message] The



                                                                 system which ge

nerated this



                                                                 result transmit

gianfranco



                                                                 reference range

: <=37. The



                                                                 reference range

 was not



                                                                 used to interpr

et this



                                                                 result as xenia

l/abnormal.



[facility] [place], TXGjicqdxBVIRXWFGK2995-10-64 15:25:24





             Test Item    Value        Reference Range Interpretation Comments

 

             Alk Phos (test code = Alk Phos) 123                          

    



[facility] [place], TXWdakgkcZYSLVZVZC3447-64-34 15:25:24





             Test Item    Value        Reference Range Interpretation Comments

 

             Bili Total (test code = Bili Total) 0.3          0.2-1.3           

        



[facility] [place], TXKsklogbXKBJVZNFN9169-45-20 15:25:24





             Test Item    Value        Reference Range Interpretation Comments

 

             Bili Direct (test code no gt        See_Comment                [Aut

omated message] The



             = Bili Direct)                                        system which 

generated



                                                                 this result tra

nsmitted



                                                                 reference range

: <=0.3.



                                                                 The reference r

jihan was



                                                                 not used to int

erpret this



                                                                 result as xenia

l/abnormal.



[facility] [place], TXNqkezzkULAMGHOUC9548-08-27 15:25:24





             Test Item    Value        Reference Range Interpretation Comments

 

             Bili Indirect Unable to    See_Comment                [Automated



             (test code = Bili Calculate                              message] T

he system



             Indirect)                                           which generated



                                                                 this result



                                                                 transmitted



                                                                 reference range

:



                                                                 <=1.0. The



                                                                 reference range

 was



                                                                 not used to



                                                                 interpret this



                                                                 result as



                                                                 normal/abnormal

.



[facility] [place], TXInmeobwMQUMFTKTIR0279-89-60 15:25:24





             Test Item    Value        Reference Range Interpretation Comments

 

             WBC X 10x3 (test code = WBC X 10x3) 9.4          3.7-10.4          

        



[place] [address]183-02-14 15:25:24





             Test Item    Value        Reference Range Interpretation Comments

 

             RBC X 10x6 (test code = RBC X 10x6) 4.59         4.20-5.40         

        



[facility] [place], TXAabalmsDYAAYUQVHM8714-17-78 15:25:24





             Test Item    Value        Reference Range Interpretation Comments

 

             Hgb (test code = Hgb) 14.1         12.0-16.0                 



[facility] [place], TXWmkhphtZTHMJOFALW4061-37-59 15:25:24





             Test Item    Value        Reference Range Interpretation Comments

 

             Hct (test code = Hct) 42.8         36.0-48.0                 



[place] [address]-02-14 15:25:24





             Test Item    Value        Reference Range Interpretation Comments

 

             MCV (test code = MCV) 93.2         80.0-98.0                 



[facility] [place], TXAahxplfIMCPUCCVDH5302-22-85 15:25:24





             Test Item    Value        Reference Range Interpretation Comments

 

             MCH (test code = MCH) 30.8 pg      27.0-31.0                 



[facility] [place], TXCqjqzjzQDOTSLLOFX4886-99-05 15:25:24





             Test Item    Value        Reference Range Interpretation Comments

 

             MCHC (test code = MCHC) 33.0         32.0-36.0                 



[facility] [place], TXIjkgjlgEZVSDZYFCW5344-26-17 15:25:24





             Test Item    Value        Reference Range Interpretation Comments

 

             RDW (test code = RDW) 13.0         11.5-14.5                 



[place] [address]-02-14 15:25:24





             Test Item    Value        Reference Range Interpretation Comments

 

             Platelet (test code = Platelet) 200          133-450               

    



[place] [address]-02-14 15:25:24





             Test Item    Value        Reference Range Interpretation Comments

 

             MPV (test code = MPV) 8.6          7.4-10.4                  



[place] [address]-02-14 15:25:24





             Test Item    Value        Reference Range Interpretation Comments

 

             Segs (test code = Segs) 86.0         45.0-75.0                 



[place] [address]-02-14 15:25:24





             Test Item    Value        Reference Range Interpretation Comments

 

             Lymphocytes (test code = Lymphocytes) 5.5          20.0-40.0       

          



[place] [address]-02-14 15:25:24





             Test Item    Value        Reference Range Interpretation Comments

 

             Monocytes (test code = Monocytes) 7.0          2.0-12.0            

      



[place] [address]-14 15:25:24





             Test Item    Value        Reference Range Interpretation Comments

 

             Eosinophils (test code = 1.0          See_Comment                [A

utomated message] The



             Eosinophils)                                        system which ge

nerated



                                                                 this result tra

nsmitted



                                                                 reference range

: <=4.0.



                                                                 The reference r

jihan was



                                                                 not used to int

erpret



                                                                 this result as



                                                                 normal/abnormal

.



[place] [address]-14 15:25:24





             Test Item    Value        Reference Range Interpretation Comments

 

             Basophils (test code = 0.5          See_Comment                [Aut

omated message] The



             Basophils)                                          system which ge

nerated



                                                                 this result tra

nsmitted



                                                                 reference range

: <=1.0.



                                                                 The reference r

jihan was



                                                                 not used to int

erpret



                                                                 this result as



                                                                 normal/abnormal

.



[place] [address]-14 15:25:24





             Test Item    Value        Reference Range Interpretation Comments

 

             Neutrophils # (test code = Neutrophils 8.1          1.5-8.1        

           



             #)                                                  



[place] [address]-02-14 15:25:24





             Test Item    Value        Reference Range Interpretation Comments

 

             Lymphocytes # (test code = Lymphocytes 0.5          1.0-5.5        

           



             #)                                                  



[place] [address]-02-14 15:25:24





             Test Item    Value        Reference Range Interpretation Comments

 

             Monocytes # (test code 0.7          See_Comment                [Aut

omated message] The



             = Monocytes #)                                        system which 

generated



                                                                 this result tra

nsmitted



                                                                 reference range

: <=0.8.



                                                                 The reference r

jihan was



                                                                 not used to int

erpret



                                                                 this result as



                                                                 normal/abnormal

.



[facility]XEBBVFX4646[facility]:25:24





             Test Item    Value        Reference Range Interpretation Comments

 

             Eosinophils # (test code 0.1          See_Comment                [A

utomated message] The



             = Eosinophils #)                                        system whic

h generated



                                                                 this result tra

nsmitted



                                                                 reference range

: <=0.5.



                                                                 The reference r

jihan was



                                                                 not used to int

erpret



                                                                 this result as



                                                                 normal/abnormal

.



[facility]ojhbzxRVPSORBKI0496-08-31 15:25:24





             Test Item    Value        Reference Range Interpretation Comments

 

             Glucose Lvl (test code = Glucose Lvl) 93           70-99           

          



[facility]xzvzfuOCAABYAGS9941-82-64 15:25:24





             Test Item    Value        Reference Range Interpretation Comments

 

             BUN (test code = BUN) 19                                 



[facility]iscoXfesfvmCJMADFNSH1815-52-72 15:25:24





             Test Item    Value        Reference Range Interpretation Comments

 

             Glucose Lvl (test code = Glucose Lvl) 93           70-99           

          



[facility] [place], TXPxjbaieDRCVOCMDM4217-11-56 15:25:24





             Test Item    Value        Reference Range Interpretation Comments

 

             BUN (test code = BUN) 19                                 



[facility]iscoZuxkzefMJACQWFBA0088-79-63 15:25:24





             Test Item    Value        Reference Range Interpretation Comments

 

             Creatinine Lvl (test code = Creatinine 0.85         0.50-1.40      

           



             Lvl)                                                



[facility]iscoLxgwecePAAIFZTFP5376-23-72 15:25:24





             Test Item    Value        Reference Range Interpretation Comments

 

             Sodium Lvl (test code = Sodium Lvl) 142          135-145           

        



[facility]iscoIqwrmuvOKGRXRIOZ8142-36-49 15:25:24





             Test Item    Value        Reference Range Interpretation Comments

 

             Potassium Lvl (test code = Potassium 4.4          3.5-5.1          

         



             Lvl)                                                



[facility] FriscoSgugsfxMPTFRGDON4003-59-74 15:25:24





             Test Item    Value        Reference Range Interpretation Comments

 

             Chloride Lvl (test code = Chloride Lvl) 108                  

            



[facility]iscoRhbgmotCMEHHBEYD4661-21-69 15:25:24





             Test Item    Value        Reference Range Interpretation Comments

 

             CO2 (test code = CO2) 28           -32                     



[facility] FriscoApfkwzfUEWLWBGCK6718-13-95 15:25:24





             Test Item    Value        Reference Range Interpretation Comments

 

             Calcium Lvl (test code = Calcium Lvl) 10.0         8.5-10.5        

          



[facility] [place], TXTsgdwhdZFSJSRIIF0915-18-34 15:25:24





             Test Item    Value        Reference Range Interpretation Comments

 

             AGAP (test code = AGAP) 10.4         10.0-20.0                 



[facility] [place], TXNktvjhsBKTMVTYPC4209-60-73 15:25:24





             Test Item    Value        Reference Range Interpretation Comments

 

             eGFR (test code = eGFR) 81                                     



[facility] [place], TXGmaclxzOJYZELUOR5175-19-04 15:25:24





             Test Item    Value        Reference Range Interpretation Comments

 

             Creatinine Lvl (test code = Creatinine 0.85         0.50-1.40      

           



             Lvl)                                                



[facility] [place], TXOhcprejYPKHYZTSM9060-65-56 15:25:24





             Test Item    Value        Reference Range Interpretation Comments

 

             Lipase Lvl (test code = Lipase Lvl) 123                      

        



[facility] [place], TXYglgyqcJSIVRVXQM7324-48-39 15:25:24





             Test Item    Value        Reference Range Interpretation Comments

 

             Total Protein (test code = Total 8.0          6.4-8.4              

     



             Protein)                                            



[facility] [place], TXYrziflxFRBYQJNMP4958-02-82 15:25:24





             Test Item    Value        Reference Range Interpretation Comments

 

             Albumin Lvl (test code = Albumin Lvl) 3.7          3.5-5.0         

          



[facility] [place], TXEpyemncQPLOAOTLE6709-16-05 15:25:24





             Test Item    Value        Reference Range Interpretation Comments

 

             Globulin (test code = Globulin) 4.3          2.7-4.2               

    



[facility] [place], TXQwhykbqISYRBIVGX8325-26-02 15:25:24





             Test Item    Value        Reference Range Interpretation Comments

 

             A/G Ratio (test code = A/G Ratio) 0.9 1        0.7-1.6             

      



[facility] [place], TXWhawvllMLQZIKJRA0247-44-45 15:25:24





             Test Item    Value        Reference Range Interpretation Comments

 

             ALANINE AMINOTRANSFERASE 20           See_Comment                [A

utomated message]



             (test code = ALANINE                                        The sys

tem which



             AMINOTRANSFERASE)                                        generated 

this result



                                                                 transmitted ref

erence



                                                                 range: <=65. Th

e



                                                                 reference range

 was



                                                                 not used to int

erpret



                                                                 this result as



                                                                 normal/abnormal

.



[facility] [place], TXMmtfzavEMUJNGHZX2963-46-00 15:25:24





             Test Item    Value        Reference Range Interpretation Comments

 

             AST (test code = AST) 19           See_Comment                [Auto

mated message] The



                                                                 system which ge

nerated this



                                                                 result transmit

gianfranco



                                                                 reference range

: <=37. The



                                                                 reference range

 was not



                                                                 used to interpr

et this



                                                                 result as xenia

l/abnormal.



[facility] [place], TXCmwvnclNOIPWPGUX6509-64-83 15:25:24





             Test Item    Value        Reference Range Interpretation Comments

 

             Alk Phos (test code = Alk Phos) 123                          

    



[facility] [place], TXIlusparRHQUDJONS9065-77-57 15:25:24





             Test Item    Value        Reference Range Interpretation Comments

 

             Bili Total (test code = Bili Total) 0.3          0.2-1.3           

        



[facility] [place], TXEdnhblgVFJJBJTLT3159-26-21 15:25:24





             Test Item    Value        Reference Range Interpretation Comments

 

             Bili Direct (test code no gt        See_Comment                [Aut

omated message] The



             = Bili Direct)                                        system which 

generated



                                                                 this result tra

nsmitted



                                                                 reference range

: <=0.3.



                                                                 The reference r

jihan was



                                                                 not used to int

erpret this



                                                                 result as xenia

l/abnormal.



[facility] [place], TXTbtbupoPBTSCLNCF1486-72-75 15:25:24





             Test Item    Value        Reference Range Interpretation Comments

 

             Sodium Lvl (test code = Sodium Lvl) 142          135-145           

        



[facility] [place], TXYzejnsxNJRZEXHUA8515-27-13 15:25:24





             Test Item    Value        Reference Range Interpretation Comments

 

             Bili Indirect Unable to    See_Comment                [Automated



             (test code = Bili Calculate                              message] T

he system



             Indirect)                                           which generated



                                                                 this result



                                                                 transmitted



                                                                 reference range

:



                                                                 <=1.0. The



                                                                 reference range

 was



                                                                 not used to



                                                                 interpret this



                                                                 result as



                                                                 normal/abnormal

.



[facility] [place], TXSjvdecvBURUQTMHSK5292-32-90 15:25:24





             Test Item    Value        Reference Range Interpretation Comments

 

             WBC X 10x3 (test code = WBC X 10x3) 9.4          3.7-10.4          

        



[place]183-02-14 15:25:24





             Test Item    Value        Reference Range Interpretation Comments

 

             RBC X 10x6 (test code = RBC X 10x6) 4.59         4.20-5.40         

        



[facility] [place], TXFvvbbpsNYMOIZFXJX5328-57-61 15:25:24





             Test Item    Value        Reference Range Interpretation Comments

 

             Hgb (test code = Hgb) 14.1         12.0-16.0                 



[place] 476183-02-14 15:25:24





             Test Item    Value        Reference Range Interpretation Comments

 

             Hct (test code = Hct) 42.8         36.0-48.0                 



[place] 476183-02-14 15:25:24





             Test Item    Value        Reference Range Interpretation Comments

 

             MCV (test code = MCV) 93.2         80.0-98.0                 



[place] 476183-02-14 15:25:24





             Test Item    Value        Reference Range Interpretation Comments

 

             MCH (test code = MCH) 30.8 pg      27.0-31.0                 



[facility] [place], TXBmxnhvsXKTTGZRKFL9659-07-65 15:25:24





             Test Item    Value        Reference Range Interpretation Comments

 

             MCHC (test code = MCHC) 33.0         32.0-36.0                 



[place] 476183-02-14 15:25:24





             Test Item    Value        Reference Range Interpretation Comments

 

             RDW (test code = RDW) 13.0         11.5-14.5                 



[place] 476183-02-14 15:25:24





             Test Item    Value        Reference Range Interpretation Comments

 

             Platelet (test code = Platelet) 200          133-450               

    



[facility]woodFyrilhnJNYPQSNOM8786-75-83 15:25:24





             Test Item    Value        Reference Range Interpretation Comments

 

             Potassium Lvl (test code = Potassium 4.4          3.5-5.1          

         



             Lvl)                                                



[facility] [place], TXXdpmkcnHVOPPKWLYX3060-14-35 15:25:24





             Test Item    Value        Reference Range Interpretation Comments

 

             MPV (test code = MPV) 8.6          7.4-10.4                  



[facility] [place], TXIvstihdNOXKJXPGYL1130-56-73 15:25:24





             Test Item    Value        Reference Range Interpretation Comments

 

             Segs (test code = Segs) 86.0         45.0-75.0                 



[facility] [place], TXMxfewzkNPAPGQUVZG7324-21-41 15:25:24





             Test Item    Value        Reference Range Interpretation Comments

 

             Lymphocytes (test code = Lymphocytes) 5.5          20.0-40.0       

          



[facility] [place], TXRixbjioLITZDREOVF4470-00-93 15:25:24





             Test Item    Value        Reference Range Interpretation Comments

 

             Monocytes (test code = Monocytes) 7.0          2.0-12.0            

      



[place] [address]-02-14 15:25:24





             Test Item    Value        Reference Range Interpretation Comments

 

             Eosinophils (test code = 1.0          See_Comment                [A

utomated message] The



             Eosinophils)                                        system which ge

nerated



                                                                 this result tra

nsmitted



                                                                 reference range

: <=4.0.



                                                                 The reference r

jihan was



                                                                 not used to int

erpret



                                                                 this result as



                                                                 normal/abnormal

.



[facility] [place], TXIacwdyeUYIXLONBPY0672-69-63 15:25:24





             Test Item    Value        Reference Range Interpretation Comments

 

             Basophils (test code = 0.5          See_Comment                [Aut

omated message] The



             Basophils)                                          system which ge

nerated



                                                                 this result tra

nsmitted



                                                                 reference range

: <=1.0.



                                                                 The reference r

jihan was



                                                                 not used to int

erpret



                                                                 this result as



                                                                 normal/abnormal

.



[facility]iscoUdkpttqXYWRDRVHFW8966-90-45 15:25:24





             Test Item    Value        Reference Range Interpretation Comments

 

             Neutrophils # (test code = Neutrophils 8.1          1.5-8.1        

           



             #)                                                  



[facility] [place], TXIbecopuLMQEAGGTIF2148-07-08 15:25:24





             Test Item    Value        Reference Range Interpretation Comments

 

             Lymphocytes # (test code = Lymphocytes 0.5          1.0-5.5        

           



             #)                                                  



[facility] [place], TXHbjbfpjBNUASORHBY8334-89-30 15:25:24





             Test Item    Value        Reference Range Interpretation Comments

 

             Monocytes # (test code 0.7          See_Comment                [Aut

omated message] The



             = Monocytes #)                                        system which 

generated



                                                                 this result tra

nsmitted



                                                                 reference range

: <=0.8.



                                                                 The reference r

jihan was



                                                                 not used to int

erpret



                                                                 this result as



                                                                 normal/abnormal

.



[facility] [place], TXVcxgcixELYCQKEZCS6412-27-54 15:25:24





             Test Item    Value        Reference Range Interpretation Comments

 

             Eosinophils # (test code 0.1          See_Comment                [A

utomated message] The



             = Eosinophils #)                                        system whic

h generated



                                                                 this result tra

nsmitted



                                                                 reference range

: <=0.5.



                                                                 The reference r

jihan was



                                                                 not used to int

erpret



                                                                 this result as



                                                                 normal/abnormal

.



[facility] BrownwoodLeuyhtlQYNHTDULZ0396-64-93 15:25:24





             Test Item    Value        Reference Range Interpretation Comments

 

             Chloride Lvl (test code = Chloride Lvl) 108                  

            



[facility]iscoWuoupnwKCVZKYVUG5666-14-51 15:25:24





             Test Item    Value        Reference Range Interpretation Comments

 

             CO2 (test code = CO2) 28           24-32                     



[facility] FriscoUwysxaoOQNZHUUAH1983-56-25 15:25:24





             Test Item    Value        Reference Range Interpretation Comments

 

             Calcium Lvl (test code = Calcium Lvl) 10.0         8.5-10.5        

          



[facility] FriscoLozswbtEPTNIZUTB6938-17-29 15:25:24





             Test Item    Value        Reference Range Interpretation Comments

 

             Glucose Lvl (test code = Glucose Lvl) 93           70-99           

          



[facility] [place], TXEeslzlaQIDTQZNOS4600-46-18 15:25:24





             Test Item    Value        Reference Range Interpretation Comments

 

             BUN (test code = BUN) 19           7-22                      



[facility] FriscoZwxxowlPFLFNDMMA0789-31-85 15:25:24





             Test Item    Value        Reference Range Interpretation Comments

 

             Creatinine Lvl (test code = Creatinine 0.85         0.50-1.40      

           



             Lvl)                                                



[facility] [place], TXWlnvsolCDQYREWYK6627-17-44 15:25:24





             Test Item    Value        Reference Range Interpretation Comments

 

             Sodium Lvl (test code = Sodium Lvl) 142          135-145           

        



[facility] [place], TXNptnkibJYYXCUDIT7246-47-22 15:25:24





             Test Item    Value        Reference Range Interpretation Comments

 

             Potassium Lvl (test code = Potassium 4.4          3.5-5.1          

         



             Lvl)                                                



[facility] [place], TXLlcndidXUNWMYHMH3290-75-09 15:25:24





             Test Item    Value        Reference Range Interpretation Comments

 

             Chloride Lvl (test code = Chloride Lvl) 108                  

            



[facility] [place], TXPdxhwscYKEBGCNYH9805-85-86 15:25:24





             Test Item    Value        Reference Range Interpretation Comments

 

             CO2 (test code = CO2) 28           24-32                     



[facility] [place], TXTxcccpeIOVQZPQIN6460-01-77 15:25:24





             Test Item    Value        Reference Range Interpretation Comments

 

             Calcium Lvl (test code = Calcium Lvl) 10.0         8.5-10.5        

          



[facility] [place], TXXfmbdpaTYSSQNZZT1898-77-25 15:25:24





             Test Item    Value        Reference Range Interpretation Comments

 

             AGAP (test code = AGAP) 10.4         10.0-20.0                 



[facility] [place], TXKytqsegYCLIWKEDC9463-52-56 15:25:24





             Test Item    Value        Reference Range Interpretation Comments

 

             eGFR (test code = eGFR) 81                                     



[facility] [place], TXTcjgzqdAZXXHILVA6374-84-34 15:25:24





             Test Item    Value        Reference Range Interpretation Comments

 

             AGAP (test code = AGAP) 10.4         10.0-20.0                 



[facility] [place], TXJegqxiwFREDWXPVC2667-14-01 15:25:24





             Test Item    Value        Reference Range Interpretation Comments

 

             Lipase Lvl (test code = Lipase Lvl) 123                      

        



[facility] [place], TXZlrnjckKBJHWNMLB4207-03-17 15:25:24





             Test Item    Value        Reference Range Interpretation Comments

 

             Total Protein (test code = Total 8.0          6.4-8.4              

     



             Protein)                                            



[facility] [place], TXRalrmfrQOFDCEVDF9850-50-18 15:25:24





             Test Item    Value        Reference Range Interpretation Comments

 

             Albumin Lvl (test code = Albumin Lvl) 3.7          3.5-5.0         

          



[facility] [place], TXFhfcyybCTCEVHLEN3013-90-66 15:25:24





             Test Item    Value        Reference Range Interpretation Comments

 

             Globulin (test code = Globulin) 4.3          2.7-4.2               

    



[facility] [place], TXQfgblnjGYPZYBKTB9335-02-03 15:25:24





             Test Item    Value        Reference Range Interpretation Comments

 

             A/G Ratio (test code = A/G Ratio) 0.9 1        0.7-1.6             

      



[facility] [place], TXStyznvfXATZKJAKE9465-93-56 15:25:24





             Test Item    Value        Reference Range Interpretation Comments

 

             ALANINE AMINOTRANSFERASE 20           See_Comment                [A

utomated message]



             (test code = ALANINE                                        The sys

tem which



             AMINOTRANSFERASE)                                        generated 

this result



                                                                 transmitted ref

erence



                                                                 range: <=65. Th

e



                                                                 reference range

 was



                                                                 not used to int

erpret



                                                                 this result as



                                                                 normal/abnormal

.



[facility] FriscoGujeaggXPIRFWDGC7023-40-07 15:25:24





             Test Item    Value        Reference Range Interpretation Comments

 

             AST (test code = AST) 19           See_Comment                [Auto

mated message] The



                                                                 system which ge

nerated this



                                                                 result transmit

gianfranco



                                                                 reference range

: <=37. The



                                                                 reference range

 was not



                                                                 used to interpr

et this



                                                                 result as xenia

l/abnormal.



[facility] FriscoPvkxeyoOMRVXGBPD2261-63-13 15:25:24





             Test Item    Value        Reference Range Interpretation Comments

 

             Alk Phos (test code = Alk Phos) 123                          

    



[facility] BrownwoodVhhhsgzBUJNMQNMY6288-71-78 15:25:24





             Test Item    Value        Reference Range Interpretation Comments

 

             Bili Total (test code = Bili Total) 0.3          0.2-1.3           

        



[facility] BrownwoodDucdegyXDVNAOPBG7808-16-68 15:25:24





             Test Item    Value        Reference Range Interpretation Comments

 

             Bili Direct (test code no gt        See_Comment                [Aut

omated message] The



             = Bili Direct)                                        system which 

generated



                                                                 this result tra

nsmitted



                                                                 reference range

: <=0.3.



                                                                 The reference r

jihan was



                                                                 not used to int

erpret this



                                                                 result as xenia

l/abnormal.



[facility]tkhrLZZPJVQZY9607[facility]50-39 15:25:24





             Test Item    Value        Reference Range Interpretation Comments

 

             eGFR (test code = eGFR) 81                                     



[facility]woodAqrvpilCOSJZCYXH4700-79-52 15:25:24





             Test Item    Value        Reference Range Interpretation Comments

 

             Bili Indirect Unable to    See_Comment                [Automated



             (test code = Bili Calculate                              message] T

he system



             Indirect)                                           which generated



                                                                 this result



                                                                 transmitted



                                                                 reference range

:



                                                                 <=1.0. The



                                                                 reference range

 was



                                                                 not used to



                                                                 interpret this



                                                                 result as



                                                                 normal/abnormal

.



[facility]iscoFqgnmjrEMYDQELTPH2565-25-83 15:25:24





             Test Item    Value        Reference Range Interpretation Comments

 

             WBC X 10x3 (test code = WBC X 10x3) 9.4          3.7-10.4          

        



[facility] [place], TXLbxgqjkPBQUMSBNFQ8284-02-52 15:25:24





             Test Item    Value        Reference Range Interpretation Comments

 

             RBC X 10x6 (test code = RBC X 10x6) 4.59         4.20-5.40         

        



[place] 476183-02-14 15:25:24





             Test Item    Value        Reference Range Interpretation Comments

 

             Hgb (test code = Hgb) 14.1         12.0-16.0                 



[facility] [place], TXAnriulxPTVVSCPSZG6695-45-13 15:25:24





             Test Item    Value        Reference Range Interpretation Comments

 

             Hct (test code = Hct) 42.8         36.0-48.0                 



[facility] [place], TXSkcozzsDKZMMAVAEG1833-66-54 15:25:24





             Test Item    Value        Reference Range Interpretation Comments

 

             MCV (test code = MCV) 93.2         80.0-98.0                 



[facility] [place], TXPqjbzteMDNBQPIUUD4504-63-86 15:25:24





             Test Item    Value        Reference Range Interpretation Comments

 

             MCH (test code = MCH) 30.8 pg      27.0-31.0                 



[facility] [place], TXYqtvskrGQBPZMGZRV8289-65-60 15:25:24





             Test Item    Value        Reference Range Interpretation Comments

 

             MCHC (test code = MCHC) 33.0         32.0-36.0                 



[facility] [place], TXAfgahijAOMWCDXRSL3114-41-99 15:25:24





             Test Item    Value        Reference Range Interpretation Comments

 

             RDW (test code = RDW) 13.0         11.5-14.5                 



[facility] [place], TXVqaiuurXYOIOUKNXN7495-90-82 15:25:24





             Test Item    Value        Reference Range Interpretation Comments

 

             Platelet (test code = Platelet) 200          133-450               

    



[facility]woodHsxrxyzPONNZUNEO3486-71-28 15:25:24





             Test Item    Value        Reference Range Interpretation Comments

 

             Lipase Lvl (test code = Lipase Lvl) 123                      

        



[facility] [place], TXRvcnaywIPCBLMWQZW0513-10-43 15:25:24





             Test Item    Value        Reference Range Interpretation Comments

 

             MPV (test code = MPV) 8.6          7.4-10.4                  



[facility] [place], TXWylgbfvOUDMMUHLGL8757-50-43 15:25:24





             Test Item    Value        Reference Range Interpretation Comments

 

             Segs (test code = Segs) 86.0         45.0-75.0                 



[facility] [place], TXDvjekuwWRHPSORDBQ7019-82-67 15:25:24





             Test Item    Value        Reference Range Interpretation Comments

 

             Lymphocytes (test code = Lymphocytes) 5.5          20.0-40.0       

          



[place] 476183-02-14 15:25:24





             Test Item    Value        Reference Range Interpretation Comments

 

             Monocytes (test code = Monocytes) 7.0          2.0-12.0            

      



[facility] [place], TXAlxkohmJVEBODFPWI4323-61-81 15:25:24





             Test Item    Value        Reference Range Interpretation Comments

 

             Eosinophils (test code = 1.0          See_Comment                [A

utomated message] The



             Eosinophils)                                        system which ge

nerated



                                                                 this result tra

nsmitted



                                                                 reference range

: <=4.0.



                                                                 The reference r

jihan was



                                                                 not used to int

erpret



                                                                 this result as



                                                                 normal/abnormal

.



[facility] [place], TXPvoemhbBGNKCOYCDW4511-32-98 15:25:24





             Test Item    Value        Reference Range Interpretation Comments

 

             Basophils (test code = 0.5          See_Comment                [Aut

omated message] The



             Basophils)                                          system which ge

nerated



                                                                 this result tra

nsmitted



                                                                 reference range

: <=1.0.



                                                                 The reference r

jihan was



                                                                 not used to int

erpret



                                                                 this result as



                                                                 normal/abnormal

.



[facility] [place], TXIlgqlavHYHZBPMBLP0854-49-63 15:25:24





             Test Item    Value        Reference Range Interpretation Comments

 

             Neutrophils # (test code = Neutrophils 8.1          1.5-8.1        

           



             #)                                                  



[facility] [place], TXDpkznwsQEWLARZXOV5104-21-41 15:25:24





             Test Item    Value        Reference Range Interpretation Comments

 

             Lymphocytes # (test code = Lymphocytes 0.5          1.0-5.5        

           



             #)                                                  



[facility] [place], TXUkzpxakFOUBEHUWYE0069-88-50 15:25:24





             Test Item    Value        Reference Range Interpretation Comments

 

             Monocytes # (test code 0.7          See_Comment                [Aut

omated message] The



             = Monocytes #)                                        system which 

generated



                                                                 this result tra

nsmitted



                                                                 reference range

: <=0.8.



                                                                 The reference r

jihan was



                                                                 not used to int

erpret



                                                                 this result as



                                                                 normal/abnormal

.



[facility] [place], TXDjobwreGQCCFAYNKL1780-89-89 15:25:24





             Test Item    Value        Reference Range Interpretation Comments

 

             Eosinophils # (test code 0.1          See_Comment                [A

utomated message] The



             = Eosinophils #)                                        system whic

h generated



                                                                 this result tra

nsmitted



                                                                 reference range

: <=0.5.



                                                                 The reference r

jihan was



                                                                 not used to int

erpret



                                                                 this result as



                                                                 normal/abnormal

.



[facility] [place], TXSlsymqfXGHVUNVKB3605-94-49 15:25:24





             Test Item    Value        Reference Range Interpretation Comments

 

             Total Protein (test code = Total 8.0          6.4-8.4              

     



             Protein)                                            



[place] 318183-02-14 15:25:24





             Test Item    Value        Reference Range Interpretation Comments

 

             Albumin Lvl (test code = Albumin Lvl) 3.7          3.5-5.0         

          



[place] 318183-02-14 15:25:24





             Test Item    Value        Reference Range Interpretation Comments

 

             Globulin (test code = Globulin) 4.3          2.7-4.2               

    



[place] 318183-02-14 15:25:24





             Test Item    Value        Reference Range Interpretation Comments

 

             Glucose Lvl (test code = Glucose Lvl) 93           70-99           

          



[facility] [place], TXWlusqkbPICMJVUNF8619-89-46 15:25:24





             Test Item    Value        Reference Range Interpretation Comments

 

             BUN (test code = BUN) 19           7-22                      



[facility] [place], TXGdrxfflJGCXCSECE3692-76-96 15:25:24





             Test Item    Value        Reference Range Interpretation Comments

 

             Creatinine Lvl (test code = Creatinine 0.85         0.50-1.40      

           



             Lvl)                                                



[facility] [place], TXRcaumsfZSVEBFHSY7065-15-84 15:25:24





             Test Item    Value        Reference Range Interpretation Comments

 

             Sodium Lvl (test code = Sodium Lvl) 142          135-145           

        



[facility] [place], TXJlhkwsaMSUQZORVG1882-97-89 15:25:24





             Test Item    Value        Reference Range Interpretation Comments

 

             Potassium Lvl (test code = Potassium 4.4          3.5-5.1          

         



             Lvl)                                                



[facility] [place], TXMtgoemoTDDEEAMCQ1444-08-33 15:25:24





             Test Item    Value        Reference Range Interpretation Comments

 

             Chloride Lvl (test code = Chloride Lvl) 108                  

            



[facility] [place], TXOlaplwzPVGBUUSQQ3890-24-72 15:25:24





             Test Item    Value        Reference Range Interpretation Comments

 

             CO2 (test code = CO2) 28           24-32                     



[facility] [place], TXTjahsfkWJSSCFOJU2468-15-90 15:25:24





             Test Item    Value        Reference Range Interpretation Comments

 

             Calcium Lvl (test code = Calcium Lvl) 10.0         8.5-10.5        

          



[facility] [place], TXDkkucalVOEKPEZTT4192-65-47 15:25:24





             Test Item    Value        Reference Range Interpretation Comments

 

             AGAP (test code = AGAP) 10.4         10.0-20.0                 



[place] 318183-02-14 15:25:24





             Test Item    Value        Reference Range Interpretation Comments

 

             eGFR (test code = eGFR) 81                                     



[facility] [place], TXSxdwxgiULEBXWSFG8655-70-78 15:25:24





             Test Item    Value        Reference Range Interpretation Comments

 

             A/G Ratio (test code = A/G Ratio) 0.9 1        0.7-1.6             

      



[facility] [place], TXWqqtxpiCEIBZEBUZ2101-24-51 15:25:24





             Test Item    Value        Reference Range Interpretation Comments

 

             Lipase Lvl (test code = Lipase Lvl) 123                      

        



[facility] [place], TXRyhvwaeBJSGWLHOP6029-23-76 15:25:24





             Test Item    Value        Reference Range Interpretation Comments

 

             Total Protein (test code = Total 8.0          6.4-8.4              

     



             Protein)                                            



[facility] [place], TXXsmjkmuGEQUBBRST4872-42-12 15:25:24





             Test Item    Value        Reference Range Interpretation Comments

 

             Albumin Lvl (test code = Albumin Lvl) 3.7          3.5-5.0         

          



[facility] [place], TXJdeexneHOIOZNSKR5756-16-27 15:25:24





             Test Item    Value        Reference Range Interpretation Comments

 

             Globulin (test code = Globulin) 4.3          2.7-4.2               

    



[facility] [place], TXWjyqonhRLBEBKWAE1242-64-35 15:25:24





             Test Item    Value        Reference Range Interpretation Comments

 

             A/G Ratio (test code = A/G Ratio) 0.9 1        0.7-1.6             

      



[facility] [place], TXWgrcmxdJQSFYDAVX2644-96-42 15:25:24





             Test Item    Value        Reference Range Interpretation Comments

 

             ALANINE AMINOTRANSFERASE 20           See_Comment                [A

utomated message]



             (test code = ALANINE                                        The sys

tem which



             AMINOTRANSFERASE)                                        generated 

this result



                                                                 transmitted ref

erence



                                                                 range: <=65. Th

e



                                                                 reference range

 was



                                                                 not used to int

erpret



                                                                 this result as



                                                                 normal/abnormal

.



[facility] [place], TXYtpeqxtHJPNWSASV9879-23-42 15:25:24





             Test Item    Value        Reference Range Interpretation Comments

 

             AST (test code = AST) 19           See_Comment                [Auto

mated message] The



                                                                 system which ge

nerated this



                                                                 result transmit

gianfranco



                                                                 reference range

: <=37. The



                                                                 reference range

 was not



                                                                 used to interpr

et this



                                                                 result as xenia

l/abnormal.



[facility] [place], TXVywflkbCFCVCGAEG8120-20-57 15:25:24





             Test Item    Value        Reference Range Interpretation Comments

 

             Alk Phos (test code = Alk Phos) 123                          

    



[facility] [place], TXNfsxuznGVHXFYZSQ8319-07-97 15:25:24





             Test Item    Value        Reference Range Interpretation Comments

 

             Bili Total (test code = Bili Total) 0.3          0.2-1.3           

        



[facility] [place], TXSvvhxlwXENXMRPJU0028-22-48 15:25:24





             Test Item    Value        Reference Range Interpretation Comments

 

             Bili Direct (test code no gt        See_Comment                [Aut

omated message] The



             = Bili Direct)                                        system which 

generated



                                                                 this result tra

nsmitted



                                                                 reference range

: <=0.3.



                                                                 The reference r

jihan was



                                                                 not used to int

erpret this



                                                                 result as xenia

l/abnormal.



[facility] [place], TXLxnfsznASRNDAGOM6721-46-72 15:25:24





             Test Item    Value        Reference Range Interpretation Comments

 

             ALANINE AMINOTRANSFERASE 20           See_Comment                [A

utomated message]



             (test code = ALANINE                                        The sys

tem which



             AMINOTRANSFERASE)                                        generated 

this result



                                                                 transmitted ref

erence



                                                                 range: <=65. Th

e



                                                                 reference range

 was



                                                                 not used to int

erpret



                                                                 this result as



                                                                 normal/abnormal

.



[facility]woodCrtbpeuKCASOOPCK5655-18-83 15:25:24





             Test Item    Value        Reference Range Interpretation Comments

 

             Bili Indirect Unable to    See_Comment                [Automated



             (test code = Bili Calculate                              message] T

he system



             Indirect)                                           which generated



                                                                 this result



                                                                 transmitted



                                                                 reference range

:



                                                                 <=1.0. The



                                                                 reference range

 was



                                                                 not used to



                                                                 interpret this



                                                                 result as



                                                                 normal/abnormal

.



[facility] BrownwoodTnckzjrDFRDLUCFUL7961-75-68 15:25:24





             Test Item    Value        Reference Range Interpretation Comments

 

             WBC X 10x3 (test code = WBC X 10x3) 9.4          3.7-10.4          

        



[facility] BrownwoodKzaxmuoABLRCVHYOY0820-63-24 15:25:24





             Test Item    Value        Reference Range Interpretation Comments

 

             RBC X 10x6 (test code = RBC X 10x6) 4.59         4.20-5.40         

        



[facility]woodBqxjcjiPQYGLTVYLZ4674-13-84 15:25:24





             Test Item    Value        Reference Range Interpretation Comments

 

             Hgb (test code = Hgb) 14.1         12.0-16.0                 



[facility] BrownwoodLttfrgmSUOGKTBBJF1689-27-47 15:25:24





             Test Item    Value        Reference Range Interpretation Comments

 

             Hct (test code = Hct) 42.8         36.0-48.0                 



[facility]woodCwfajxvEEWNIGSRNA4746-68-41 15:25:24





             Test Item    Value        Reference Range Interpretation Comments

 

             MCV (test code = MCV) 93.2         80.0-98.0                 



[place] 476183-02-14 15:25:24





             Test Item    Value        Reference Range Interpretation Comments

 

             MCH (test code = MCH) 30.8 pg      27.0-31.0                 



[facility]iscoMitkvkpWHQDIJDOSG6168-26-44 15:25:24





             Test Item    Value        Reference Range Interpretation Comments

 

             MCHC (test code = MCHC) 33.0         32.0-36.0                 



[place] [address]-02-14 15:25:24





             Test Item    Value        Reference Range Interpretation Comments

 

             RDW (test code = RDW) 13.0         11.5-14.5                 



[place] [address]-02-14 15:25:24





             Test Item    Value        Reference Range Interpretation Comments

 

             Platelet (test code = Platelet) 200          133-450               

    



[place] 318183-02-14 15:25:24





             Test Item    Value        Reference Range Interpretation Comments

 

             AST (test code = AST) 19           See_Comment                [Auto

mated message] The



                                                                 system which ge

nerated this



                                                                 result transmit

gianfranco



                                                                 reference range

: <=37. The



                                                                 reference range

 was not



                                                                 used to interpr

et this



                                                                 result as xenia

l/abnormal.



[facility] [place], TXAieqnrkVIPSSBACEQ1610-63-51 15:25:24





             Test Item    Value        Reference Range Interpretation Comments

 

             MPV (test code = MPV) 8.6          7.4-10.4                  



[place] 476183-02-14 15:25:24





             Test Item    Value        Reference Range Interpretation Comments

 

             Segs (test code = Segs) 86.0         45.0-75.0                 



[place] 476183-02-14 15:25:24





             Test Item    Value        Reference Range Interpretation Comments

 

             Lymphocytes (test code = Lymphocytes) 5.5          20.0-40.0       

          



[facility] [place], TXTvhrthbLOKXPJJYBX1843-98-81 15:25:24





             Test Item    Value        Reference Range Interpretation Comments

 

             Monocytes (test code = Monocytes) 7.0          2.0-12.0            

      



[facility] [place], TXNejlveaZWRNIYBHIS3948-12-02 15:25:24





             Test Item    Value        Reference Range Interpretation Comments

 

             Eosinophils (test code = 1.0          See_Comment                [A

utomated message] The



             Eosinophils)                                        system which ge

nerated



                                                                 this result tra

nsmitted



                                                                 reference range

: <=4.0.



                                                                 The reference r

jihan was



                                                                 not used to int

erpret



                                                                 this result as



                                                                 normal/abnormal

.



[place] 476183-02-14 15:25:24





             Test Item    Value        Reference Range Interpretation Comments

 

             Basophils (test code = 0.5          See_Comment                [Aut

omated message] The



             Basophils)                                          system which ge

nerated



                                                                 this result tra

nsmitted



                                                                 reference range

: <=1.0.



                                                                 The reference r

jihan was



                                                                 not used to int

erpret



                                                                 this result as



                                                                 normal/abnormal

.



[place] 476183-02-14 15:25:24





             Test Item    Value        Reference Range Interpretation Comments

 

             Neutrophils # (test code = Neutrophils 8.1          1.5-8.1        

           



             #)                                                  



[place] 476183-02-14 15:25:24





             Test Item    Value        Reference Range Interpretation Comments

 

             Lymphocytes # (test code = Lymphocytes 0.5          1.0-5.5        

           



             #)                                                  



[facility] [place], [place]VvsxhjbBNRSMUEPPS4049-19-21 15:25:24





             Test Item    Value        Reference Range Interpretation Comments

 

             Monocytes # (test code 0.7          See_Comment                [Aut

omated message] The



             = Monocytes #)                                        system which 

generated



                                                                 this result tra

nsmitted



                                                                 reference range

: <=0.8.



                                                                 The reference r

jihan was



                                                                 not used to int

erpret



                                                                 this result as



                                                                 normal/abnormal

.



[facility] [place], [place]LyodoxyXAHNATLQXR1442-01-20 15:25:24





             Test Item    Value        Reference Range Interpretation Comments

 

             Eosinophils # (test code 0.1          See_Comment                [A

utomated message] The



             = Eosinophils #)                                        system whic

h generated



                                                                 this result tra

nsmitted



                                                                 reference range

: <=0.5.



                                                                 The reference r

jihan was



                                                                 not used to int

erpret



                                                                 this result as



                                                                 normal/abnormal

.



[facility] [place], TXYrkhksdIGKBSQMUO4329-97-15 15:25:24





             Test Item    Value        Reference Range Interpretation Comments

 

             Alk Phos (test code = Alk Phos) 123                          

    



[facility] [place], TXTllouryYODIHMQPC1433-04-90 15:25:24





             Test Item    Value        Reference Range Interpretation Comments

 

             Bili Total (test code = Bili Total) 0.3          0.2-1.3           

        



[facility] [place], TXHbrhktiSWCUHOSCQ6504-92-75 15:25:24





             Test Item    Value        Reference Range Interpretation Comments

 

             Bili Direct (test code no gt        See_Comment                [Aut

omated message] The



             = Bili Direct)                                        system which 

generated



                                                                 this result tra

nsmitted



                                                                 reference range

: <=0.3.



                                                                 The reference r

jihan was



                                                                 not used to int

erpret this



                                                                 result as xenia

l/abnormal.



[facility]





             Test Item    Value        Reference Range Interpretation Comments

 

             Bili Indirect Unable to    See_Comment                [Automated



             (test code = Bili Calculate                              message] T

he system



             Indirect)                                           which generated



                                                                 this result



                                                                 transmitted



                                                                 reference range

:



                                                                 <=1.0. The



                                                                 reference range

 was



                                                                 not used to



                                                                 interpret this



                                                                 result as



                                                                 normal/abnormal

.



[facility] [place], TXHemklsrPEHGZQIMBJ8727-14-01 15:25:24





             Test Item    Value        Reference Range Interpretation Comments

 

             WBC X 10x3 (test code = WBC X 10x3) 9.4          3.7-10.4          

        



[place] 476183-02-14 15:25:24





             Test Item    Value        Reference Range Interpretation Comments

 

             RBC X 10x6 (test code = RBC X 10x6) 4.59         4.20-5.40         

        



[place] 476183-02-14 15:25:24





             Test Item    Value        Reference Range Interpretation Comments

 

             Hgb (test code = Hgb) 14.1         12.0-16.0                 



[facility] [place], [place]LeoeosjVFTYCVVFVM7138-26-19 15:25:24





             Test Item    Value        Reference Range Interpretation Comments

 

             Hct (test code = Hct) 42.8         36.0-48.0                 



[facility] [place], TXVvsjziaYBCORZOMXI9622-56-64 15:25:24





             Test Item    Value        Reference Range Interpretation Comments

 

             MCV (test code = MCV) 93.2         80.0-98.0                 



[place] 476183-02-14 15:25:24





             Test Item    Value        Reference Range Interpretation Comments

 

             MCH (test code = MCH) 30.8 pg      27.0-31.0                 



[facility] [place], TXSrwslihSTMYEYQUDG4156-47-47 15:25:24





             Test Item    Value        Reference Range Interpretation Comments

 

             MCHC (test code = MCHC) 33.0         32.0-36.0                 



[facility] [place], TXZtjcjliDVKRUFWIKI1125-38-85 15:25:24





             Test Item    Value        Reference Range Interpretation Comments

 

             RDW (test code = RDW) 13.0         11.5-14.5                 



[facility] [place], TXQwshgazGCGLLSVOJP5263-41-28 15:25:24





             Test Item    Value        Reference Range Interpretation Comments

 

             Platelet (test code = Platelet) 200          133-450               

    



[facility] [place], TXAhgyuqvFUDYNWRBIX2820-24-06 15:25:24





             Test Item    Value        Reference Range Interpretation Comments

 

             MPV (test code = MPV) 8.6          7.4-10.4                  



[place] [address]-02-14 15:25:24





             Test Item    Value        Reference Range Interpretation Comments

 

             Segs (test code = Segs) 86.0         45.0-75.0                 



[place] 476183-02-14 15:25:24





             Test Item    Value        Reference Range Interpretation Comments

 

             Lymphocytes (test code = Lymphocytes) 5.5          20.0-40.0       

          



[place] 476183-02-14 15:25:24





             Test Item    Value        Reference Range Interpretation Comments

 

             Monocytes (test code = Monocytes) 7.0          2.0-12.0            

      



[place] 476183-02-14 15:25:24





             Test Item    Value        Reference Range Interpretation Comments

 

             Eosinophils (test code = 1.0          See_Comment                [A

utomated message] The



             Eosinophils)                                        system which ge

nerated



                                                                 this result tra

nsmitted



                                                                 reference range

: <=4.0.



                                                                 The reference r

jiahn was



                                                                 not used to int

erpret



                                                                 this result as



                                                                 normal/abnormal

.



[facility] [place], TXDtjipgeBJRBTZMQMT0672-54-65 15:25:24





             Test Item    Value        Reference Range Interpretation Comments

 

             Basophils (test code = 0.5          See_Comment                [Aut

omated message] The



             Basophils)                                          system which ge

nerated



                                                                 this result tra

nsmitted



                                                                 reference range

: <=1.0.



                                                                 The reference r

jihan was



                                                                 not used to int

erpret



                                                                 this result as



                                                                 normal/abnormal

.



[facility] [place], TXTatlqcfZLMEREPKCD6973-95-05 15:25:24





             Test Item    Value        Reference Range Interpretation Comments

 

             Neutrophils # (test code = Neutrophils 8.1          1.5-8.1        

           



             #)                                                  



[facility] [place], TXMwlduooRJKJNUMCAW5688-65-26 15:25:24





             Test Item    Value        Reference Range Interpretation Comments

 

             Lymphocytes # (test code = Lymphocytes 0.5          1.0-5.5        

           



             #)                                                  



[facility] [place], TXWlffmjcDGFLSTRYOP7599-50-78 15:25:24





             Test Item    Value        Reference Range Interpretation Comments

 

             Monocytes # (test code 0.7          See_Comment                [Aut

omated message] The



             = Monocytes #)                                        system which 

generated



                                                                 this result tra

nsmitted



                                                                 reference range

: <=0.8.



                                                                 The reference r

jihan was



                                                                 not used to int

erpret



                                                                 this result as



                                                                 normal/abnormal

.



[facility] [place], TXPksrfxmINZEBMXSIA6560-43-46 15:25:24





             Test Item    Value        Reference Range Interpretation Comments

 

             Eosinophils # (test code 0.1          See_Comment                [A

utomated message] The



             = Eosinophils #)                                        system whic

h generated



                                                                 this result tra

nsmitted



                                                                 reference range

: <=0.5.



                                                                 The reference r

jihan was



                                                                 not used to int

erpret



                                                                 this result as



                                                                 normal/abnormal

.



[facility] [place], TXZhwcpruDXRCMHLGE2716-57-05 15:25:24





             Test Item    Value        Reference Range Interpretation Comments

 

             Glucose Lvl (test code = Glucose Lvl) 93           70-99           

          



[facility] [place], TXKzabyxvUMKUBHNXO4467-39-61 15:25:24





             Test Item    Value        Reference Range Interpretation Comments

 

             BUN (test code = BUN) 19           7-22                      



[place]183-02-14 15:25:24





             Test Item    Value        Reference Range Interpretation Comments

 

             Creatinine Lvl (test code = Creatinine 0.85         0.50-1.40      

           



             Lvl)                                                



[facility] [place], TXFvkypuyLOEVLOOVS2933-16-43 15:25:24





             Test Item    Value        Reference Range Interpretation Comments

 

             Sodium Lvl (test code = Sodium Lvl) 142          135-145           

        



[facility] [place], TXUrdplcuIKZWCKPFE0308-80-19 15:25:24





             Test Item    Value        Reference Range Interpretation Comments

 

             Potassium Lvl (test code = Potassium 4.4          3.5-5.1          

         



             Lvl)                                                



[facility] [place], TXUnrjdtgRHKRHHAYT1293-89-93 15:25:24





             Test Item    Value        Reference Range Interpretation Comments

 

             Chloride Lvl (test code = Chloride Lvl) 108                  

            



[facility] [place], TXIcweicaBGXPIEPXN9547-27-78 15:25:24





             Test Item    Value        Reference Range Interpretation Comments

 

             CO2 (test code = CO2) 28           24-32                     



[facility] [place], TXJjkspzqIBFJOTNZS9735-51-27 15:25:24





             Test Item    Value        Reference Range Interpretation Comments

 

             Calcium Lvl (test code = Calcium Lvl) 10.0         8.5-10.5        

          



[facility] [place], TXPnoohlvVSZDADREQ0588-08-05 15:25:24





             Test Item    Value        Reference Range Interpretation Comments

 

             AGAP (test code = AGAP) 10.4         10.0-20.0                 



[facility] [place], TXSzxntcqMJVZUHBHX7220-58-00 15:25:24





             Test Item    Value        Reference Range Interpretation Comments

 

             eGFR (test code = eGFR) 81                                     



[facility] [place], TXXbbfxulXRIKMIUSG0903-22-56 15:25:24





             Test Item    Value        Reference Range Interpretation Comments

 

             Lipase Lvl (test code = Lipase Lvl) 123                      

        



[facility] [place], TXThbjwoiUKOXSGXOU2540-38-96 15:25:24





             Test Item    Value        Reference Range Interpretation Comments

 

             Total Protein (test code = Total 8.0          6.4-8.4              

     



             Protein)                                            



[facility] [place], TXTcoernjOMMZGBNZN3402-81-55 15:25:24





             Test Item    Value        Reference Range Interpretation Comments

 

             Albumin Lvl (test code = Albumin Lvl) 3.7          3.5-5.0         

          



[facility] [place], TXVjgdstiAPHXHAFHB2944-73-99 15:25:24





             Test Item    Value        Reference Range Interpretation Comments

 

             Globulin (test code = Globulin) 4.3          2.7-4.2               

    



[facility] [place], TXDbcenyjOLNPFXKLC0210-40-02 15:25:24





             Test Item    Value        Reference Range Interpretation Comments

 

             A/G Ratio (test code = A/G Ratio) 0.9 1        0.7-1.6             

      



[facility]:25:24





             Test Item    Value        Reference Range Interpretation Comments

 

             ALANINE AMINOTRANSFERASE 20           See_Comment                [A

utomated message]



             (test code = ALANINE                                        The sys

tem which



             AMINOTRANSFERASE)                                        generated 

this result



                                                                 transmitted ref

erence



                                                                 range: <=65. Th

e



                                                                 reference range

 was



                                                                 not used to int

erpret



                                                                 this result as



                                                                 normal/abnormal

.



[facility]msmyttPQLVUDRVG8093-81-52 15:25:24





             Test Item    Value        Reference Range Interpretation Comments

 

             AST (test code = AST) 19           See_Comment                [Auto

mated message] The



                                                                 system which ge

nerated this



                                                                 result transmit

gianfranco



                                                                 reference range

: <=37. The



                                                                 reference range

 was not



                                                                 used to interpr

et this



                                                                 result as xenia

l/abnormal.



[facility]iscoFqorxbiLQVFKYTOV6191-42-90 15:25:24





             Test Item    Value        Reference Range Interpretation Comments

 

             Alk Phos (test code = Alk Phos) 123                          

    



[facility]iscoWvahztsJZANTWCMM1440-09-29 15:25:24





             Test Item    Value        Reference Range Interpretation Comments

 

             Bili Total (test code = Bili Total) 0.3          0.2-1.3           

        



[facility] FriscoNodnlnbUFTCTMRNS6992-85-84 15:25:24





             Test Item    Value        Reference Range Interpretation Comments

 

             Bili Direct (test code no gt        See_Comment                [Aut

omated message] The



             = Bili Direct)                                        system which 

generated



                                                                 this result tra

nsmitted



                                                                 reference range

: <=0.3.



                                                                 The reference r

jihan was



                                                                 not used to int

erpret this



                                                                 result as xenia

l/abnormal.



[facility] FriscoGsbbbgzNJCWHGPMQ8507-56-90 15:25:24





             Test Item    Value        Reference Range Interpretation Comments

 

             Bili Indirect Unable to    See_Comment                [Automated



             (test code = Bili Calculate                              message] T

he system



             Indirect)                                           which generated



                                                                 this result



                                                                 transmitted



                                                                 reference range

:



                                                                 <=1.0. The



                                                                 reference range

 was



                                                                 not used to



                                                                 interpret this



                                                                 result as



                                                                 normal/abnormal

.



[facility] FriscoRkgaxecMIYISNRIMJ4008-67-09 15:25:24





             Test Item    Value        Reference Range Interpretation Comments

 

             WBC X 10x3 (test code = WBC X 10x3) 9.4          3.7-10.4          

        



[facility]iscoTpvbgjrWPMGAMJHQA0208-54-85 15:25:24





             Test Item    Value        Reference Range Interpretation Comments

 

             RBC X 10x6 (test code = RBC X 10x6) 4.59         4.20-5.40         

        



[place]183-02-14 15:25:24





             Test Item    Value        Reference Range Interpretation Comments

 

             Hgb (test code = Hgb) 14.1         12.0-16.0                 



[place]183-02-14 15:25:24





             Test Item    Value        Reference Range Interpretation Comments

 

             Hct (test code = Hct) 42.8         36.0-48.0                 



[facility] [place], TXPveripnTGZSFDZMUU7888-80-41 15:25:24





             Test Item    Value        Reference Range Interpretation Comments

 

             MCV (test code = MCV) 93.2         80.0-98.0                 



[place]183-02-14 15:25:24





             Test Item    Value        Reference Range Interpretation Comments

 

             MCH (test code = MCH) 30.8 pg      27.0-31.0                 



[facility] [place], TXLtogwflGOZXAWGPXF0261-57-73 15:25:24





             Test Item    Value        Reference Range Interpretation Comments

 

             MCHC (test code = MCHC) 33.0         32.0-36.0                 



[facility] [place], TXPfbdprzDOKZWHYCZO3836-97-18 15:25:24





             Test Item    Value        Reference Range Interpretation Comments

 

             RDW (test code = RDW) 13.0         11.5-14.5                 



[facility] [place], TXWbglcndQWTROWHHRX0762-83-30 15:25:24





             Test Item    Value        Reference Range Interpretation Comments

 

             Platelet (test code = Platelet) 200          133-450               

    



[facility] [place], TXSljfglpLGZGKUANNT1369-77-48 15:25:24





             Test Item    Value        Reference Range Interpretation Comments

 

             MPV (test code = MPV) 8.6          7.4-10.4                  



[facility] [place], TXFjevyntABWTARGTHA6536-55-26 15:25:24





             Test Item    Value        Reference Range Interpretation Comments

 

             Segs (test code = Segs) 86.0         45.0-75.0                 



[place] 476183-02-14 15:25:24





             Test Item    Value        Reference Range Interpretation Comments

 

             Lymphocytes (test code = Lymphocytes) 5.5          20.0-40.0       

          



[place] [address]-02-14 15:25:24





             Test Item    Value        Reference Range Interpretation Comments

 

             Monocytes (test code = Monocytes) 7.0          2.0-12.0            

      



[place] 476183-02-14 15:25:24





             Test Item    Value        Reference Range Interpretation Comments

 

             Eosinophils (test code = 1.0          See_Comment                [A

utomated message] The



             Eosinophils)                                        system which ge

nerated



                                                                 this result tra

nsmitted



                                                                 reference range

: <=4.0.



                                                                 The reference r

jihan was



                                                                 not used to int

erpret



                                                                 this result as



                                                                 normal/abnormal

.



[facility] [place], TXGviwgfuWQADWNVNHH0171-07-16 15:25:24





             Test Item    Value        Reference Range Interpretation Comments

 

             Basophils (test code = 0.5          See_Comment                [Aut

omated message] The



             Basophils)                                          system which ge

nerated



                                                                 this result tra

nsmitted



                                                                 reference range

: <=1.0.



                                                                 The reference r

jihan was



                                                                 not used to int

erpret



                                                                 this result as



                                                                 normal/abnormal

.



[facility] [place], TXMhticwjOCRVKXITFA1546-81-45 15:25:24





             Test Item    Value        Reference Range Interpretation Comments

 

             Neutrophils # (test code = Neutrophils 8.1          1.5-8.1        

           



             #)                                                  



[facility] [place], TXGhvauptBJHBDOYPRV8997-84-94 15:25:24





             Test Item    Value        Reference Range Interpretation Comments

 

             Lymphocytes # (test code = Lymphocytes 0.5          1.0-5.5        

           



             #)                                                  



[facility] [place], TXByjiqqkDWQJZRJQYC0436-29-59 15:25:24





             Test Item    Value        Reference Range Interpretation Comments

 

             Monocytes # (test code 0.7          See_Comment                [Aut

omated message] The



             = Monocytes #)                                        system which 

generated



                                                                 this result tra

nsmitted



                                                                 reference range

: <=0.8.



                                                                 The reference r

jihan was



                                                                 not used to int

erpret



                                                                 this result as



                                                                 normal/abnormal

.



[facility] [place], TXKiqpbpxWLBXFCTATP0612-41-04 15:25:24





             Test Item    Value        Reference Range Interpretation Comments

 

             Eosinophils # (test code 0.1          See_Comment                [A

utomated message] The



             = Eosinophils #)                                        system whic

h generated



                                                                 this result tra

nsmitted



                                                                 reference range

: <=0.5.



                                                                 The reference r

jihan was



                                                                 not used to int

erpret



                                                                 this result as



                                                                 normal/abnormal

.



[facility] [place], TXNpclydqDSWWCKOGQ7876-25-84 15:25:24





             Test Item    Value        Reference Range Interpretation Comments

 

             Glucose Lvl (test code = Glucose Lvl) 93           70-99           

          



[place] 318183-02-14 15:25:24





             Test Item    Value        Reference Range Interpretation Comments

 

             BUN (test code = BUN) 19           7-22                      



[place] 318183-02-14 15:25:24





             Test Item    Value        Reference Range Interpretation Comments

 

             Creatinine Lvl (test code = Creatinine 0.85         0.50-1.40      

           



             Lvl)                                                



[facility] [place], TXRpikwdtNZIDJRLEI7793-89-90 15:25:24





             Test Item    Value        Reference Range Interpretation Comments

 

             Sodium Lvl (test code = Sodium Lvl) 142          135-145           

        



[facility] [place], TXBkblitjPQGFZIGVF5938-01-89 15:25:24





             Test Item    Value        Reference Range Interpretation Comments

 

             Potassium Lvl (test code = Potassium 4.4          3.5-5.1          

         



             Lvl)                                                



[facility] [place], TXHpdodfcODFYNMFFA9701-53-19 15:25:24





             Test Item    Value        Reference Range Interpretation Comments

 

             Chloride Lvl (test code = Chloride Lvl) 108                  

            



[facility] [place], TXWutdiboJEFZGVKNV6731-22-94 15:25:24





             Test Item    Value        Reference Range Interpretation Comments

 

             CO2 (test code = CO2) 28           24-32                     



[facility] [place], TXCrgknjmMIGEIGXVE0862-71-54 15:25:24





             Test Item    Value        Reference Range Interpretation Comments

 

             Calcium Lvl (test code = Calcium Lvl) 10.0         8.5-10.5        

          



[facility] [place], TXBsmldycANDEIBKRT2737-22-44 15:25:24





             Test Item    Value        Reference Range Interpretation Comments

 

             AGAP (test code = AGAP) 10.4         10.0-20.0                 



[facility] [place], TXSvovndiHYHLUKBDC5370-16-44 15:25:24





             Test Item    Value        Reference Range Interpretation Comments

 

             eGFR (test code = eGFR) 81                                     



[facility] [place], TXWecgsvqJZRIBRPCJ8314-64-37 15:25:24





             Test Item    Value        Reference Range Interpretation Comments

 

             Lipase Lvl (test code = Lipase Lvl) 123                      

        



[facility] [place], TXGyyvjjjVGPNJXCVS5917-52-57 15:25:24





             Test Item    Value        Reference Range Interpretation Comments

 

             Total Protein (test code = Total 8.0          6.4-8.4              

     



             Protein)                                            



[facility] [place], TXKxchzsiEAGZOOYJW6850-40-80 15:25:24





             Test Item    Value        Reference Range Interpretation Comments

 

             Albumin Lvl (test code = Albumin Lvl) 3.7          3.5-5.0         

          



[facility] [place], TXOezcqibXBJPXKPRT9005-66-11 15:25:24





             Test Item    Value        Reference Range Interpretation Comments

 

             Globulin (test code = Globulin) 4.3          2.7-4.2               

    



[facility] [place], TXTctpltsZVRQXPLBP0204-51-58 15:25:24





             Test Item    Value        Reference Range Interpretation Comments

 

             A/G Ratio (test code = A/G Ratio) 0.9 1        0.7-1.6             

      



[facility] [place], TXDlxtexgGDWAHWYBF5509-01-43 15:25:24





             Test Item    Value        Reference Range Interpretation Comments

 

             ALANINE AMINOTRANSFERASE 20           See_Comment                [A

utomated message]



             (test code = ALANINE                                        The sys

tem which



             AMINOTRANSFERASE)                                        generated 

this result



                                                                 transmitted ref

erence



                                                                 range: <=65. Th

e



                                                                 reference range

 was



                                                                 not used to int

erpret



                                                                 this result as



                                                                 normal/abnormal

.



[facility]ft[facility]XQSPWUXE9291[facility]53-33 15:25:24





             Test Item    Value        Reference Range Interpretation Comments

 

             AST (test code = AST) 19           See_Comment                [Auto

mated message] The



                                                                 system which ge

nerated this



                                                                 result transmit

gianfranco



                                                                 reference range

: <=37. The



                                                                 reference range

 was not



                                                                 used to interpr

et this



                                                                 result as xenia

l/abnormal.



[facility]iscoHhllvtrUPPCBRBBO4608-99-29 15:25:24





             Test Item    Value        Reference Range Interpretation Comments

 

             Alk Phos (test code = Alk Phos) 123                          

    



[facility]iscoObdhrbwQBPLJUZOC1214-69-33 15:25:24





             Test Item    Value        Reference Range Interpretation Comments

 

             Bili Total (test code = Bili Total) 0.3          0.2-1.3           

        



[facility]iscoZsynjbqFNXYYZQQC4562-46-99 15:25:24





             Test Item    Value        Reference Range Interpretation Comments

 

             Bili Direct (test code no gt        See_Comment                [Aut

omated message] The



             = Bili Direct)                                        system which 

generated



                                                                 this result tra

nsmitted



                                                                 reference range

: <=0.3.



                                                                 The reference r

jihan was



                                                                 not used to int

erpret this



                                                                 result as xenia

l/abnormal.



[facility]iscoHwvujdvWRJQNXMZR4734-95-54 15:25:24





             Test Item    Value        Reference Range Interpretation Comments

 

             Bili Indirect Unable to    See_Comment                [Automated



             (test code = Bili Calculate                              message] T

he system



             Indirect)                                           which generated



                                                                 this result



                                                                 transmitted



                                                                 reference range

:



                                                                 <=1.0. The



                                                                 reference range

 was



                                                                 not used to



                                                                 interpret this



                                                                 result as



                                                                 normal/abnormal

.



[facility]iscoZvruaarTYGIJDPWRR0602-00-50 15:25:24





             Test Item    Value        Reference Range Interpretation Comments

 

             WBC X 10x3 (test code = WBC X 10x3) 9.4          3.7-10.4          

        



[facility] FriscoYmpswdyABOURPVKMN4873-21-63 15:25:24





             Test Item    Value        Reference Range Interpretation Comments

 

             RBC X 10x6 (test code = RBC X 10x6) 4.59         4.20-5.40         

        



[facility] FriscoDozzuajVJGTHEWAKU7085-33-21 15:25:24





             Test Item    Value        Reference Range Interpretation Comments

 

             Hgb (test code = Hgb) 14.1         12.0-16.0                 



[facility]iscoInfmobdGKNBSELPGZ7875-86-85 15:25:24





             Test Item    Value        Reference Range Interpretation Comments

 

             Hct (test code = Hct) 42.8         36.0-48.0                 



[place] [address]-02-14 15:25:24





             Test Item    Value        Reference Range Interpretation Comments

 

             MCV (test code = MCV) 93.2         80.0-98.0                 



[place] 476183-02-14 15:25:24





             Test Item    Value        Reference Range Interpretation Comments

 

             MCH (test code = MCH) 30.8 pg      27.0-31.0                 



[place] [address]-02-14 15:25:24





             Test Item    Value        Reference Range Interpretation Comments

 

             MCHC (test code = MCHC) 33.0         32.0-36.0                 



[place]183-02-14 15:25:24





             Test Item    Value        Reference Range Interpretation Comments

 

             RDW (test code = RDW) 13.0         11.5-14.5                 



[place] 476183-02-14 15:25:24





             Test Item    Value        Reference Range Interpretation Comments

 

             Platelet (test code = Platelet) 200          133-450               

    



[facility] [place], TXDoaohjzPQUFSDTYOS0664-25-99 15:25:24





             Test Item    Value        Reference Range Interpretation Comments

 

             MPV (test code = MPV) 8.6          7.4-10.4                  



[place] 476183-02-14 15:25:24





             Test Item    Value        Reference Range Interpretation Comments

 

             Segs (test code = Segs) 86.0         45.0-75.0                 



[place] 476183-02-14 15:25:24





             Test Item    Value        Reference Range Interpretation Comments

 

             Lymphocytes (test code = Lymphocytes) 5.5          20.0-40.0       

          



[place] [address]-02-14 15:25:24





             Test Item    Value        Reference Range Interpretation Comments

 

             Monocytes (test code = Monocytes) 7.0          2.0-12.0            

      



[place] [address]-02-14 15:25:24





             Test Item    Value        Reference Range Interpretation Comments

 

             Eosinophils (test code = 1.0          See_Comment                [A

utomated message] The



             Eosinophils)                                        system which ge

nerated



                                                                 this result tra

nsmitted



                                                                 reference range

: <=4.0.



                                                                 The reference r

jihan was



                                                                 not used to int

erpret



                                                                 this result as



                                                                 normal/abnormal

.



[place]183-02-14 15:25:24





             Test Item    Value        Reference Range Interpretation Comments

 

             Basophils (test code = 0.5          See_Comment                [Aut

omated message] The



             Basophils)                                          system which ge

nerated



                                                                 this result tra

nsmitted



                                                                 reference range

: <=1.0.



                                                                 The reference r

jihan was



                                                                 not used to int

erpret



                                                                 this result as



                                                                 normal/abnormal

.



[facility]woodAtoaqydCDAHIUOMIF7324-97-90 [facility]:24





             Test Item    Value        Reference Range Interpretation Comments

 

             Neutrophils # (test code = Neutrophils 8.1          1.5-8.1        

           



             #)                                                  



[facility] [place], TXUzrjhevHYEDEXKRKH5998-62-19 15:25:24





             Test Item    Value        Reference Range Interpretation Comments

 

             Lymphocytes # (test code = Lymphocytes 0.5          1.0-5.5        

           



             #)                                                  



[facility] [place], TXPxsausoLLFYPQOEAS2004-90-17 15:25:24





             Test Item    Value        Reference Range Interpretation Comments

 

             Monocytes # (test code 0.7          See_Comment                [Aut

omated message] The



             = Monocytes #)                                        system which 

generated



                                                                 this result tra

nsmitted



                                                                 reference range

: <=0.8.



                                                                 The reference r

jihan was



                                                                 not used to int

erpret



                                                                 this result as



                                                                 normal/abnormal

.



[facility] [place], TXLpfnvrxWAHUKDBNGH9817-60-94 15:25:24





             Test Item    Value        Reference Range Interpretation Comments

 

             Eosinophils # (test code 0.1          See_Comment                [A

utomated message] The



             = Eosinophils #)                                        system whic

h generated



                                                                 this result tra

nsmitted



                                                                 reference range

: <=0.5.



                                                                 The reference r

jihan was



                                                                 not used to int

erpret



                                                                 this result as



                                                                 normal/abnormal

.



[facility] [place], TXAnnkvebCSRZIEEFZ2616-47-72 15:25:24





             Test Item    Value        Reference Range Interpretation Comments

 

             Glucose Lvl (test code = Glucose Lvl) 93           70-99           

          



[facility] [place], TXBppcoitCDDTTSLKB3763-17-84 15:25:24





             Test Item    Value        Reference Range Interpretation Comments

 

             BUN (test code = BUN) 19           7-22                      



[place] [address]-02-14 15:25:24





             Test Item    Value        Reference Range Interpretation Comments

 

             Creatinine Lvl (test code = Creatinine 0.85         0.50-1.40      

           



             Lvl)                                                



[facility] [place], TXBycjhdkZTJEKDXRV9768-92-81 15:25:24





             Test Item    Value        Reference Range Interpretation Comments

 

             Sodium Lvl (test code = Sodium Lvl) 142          135-145           

        



[place]183-02-14 15:25:24





             Test Item    Value        Reference Range Interpretation Comments

 

             Potassium Lvl (test code = Potassium 4.4          3.5-5.1          

         



             Lvl)                                                



[facility] [place], TXOktntesUNBNUJMOV1110-21-11 15:25:24





             Test Item    Value        Reference Range Interpretation Comments

 

             Chloride Lvl (test code = Chloride Lvl) 108                  

            



[facility] [place], TXItlqbacSQGZUYZVH6999-93-42 15:25:24





             Test Item    Value        Reference Range Interpretation Comments

 

             CO2 (test code = CO2) 28           24-32                     



[facility] [place], TXKkbwbcnHBUXHNDGX7401-19-51 15:25:24





             Test Item    Value        Reference Range Interpretation Comments

 

             Calcium Lvl (test code = Calcium Lvl) 10.0         8.5-10.5        

          



[facility] [place], TXStvfqslEISLBJDJA0359-33-85 15:25:24





             Test Item    Value        Reference Range Interpretation Comments

 

             AGAP (test code = AGAP) 10.4         10.0-20.0                 



[facility] [place], TXGrpevzjENRFYNOOH2427-37-83 15:25:24





             Test Item    Value        Reference Range Interpretation Comments

 

             eGFR (test code = eGFR) 81                                     



[facility] [place], TXEsnbrjtRQTRFSKSM2415-45-39 15:25:24





             Test Item    Value        Reference Range Interpretation Comments

 

             Lipase Lvl (test code = Lipase Lvl) 123                      

        



[facility] [place], TXTesqqaeWDZPSNFMM3959-81-77 15:25:24





             Test Item    Value        Reference Range Interpretation Comments

 

             Total Protein (test code = Total 8.0          6.4-8.4              

     



             Protein)                                            



[facility] [place], TXLxilrytJBHASXZAF6779-82-21 15:25:24





             Test Item    Value        Reference Range Interpretation Comments

 

             Albumin Lvl (test code = Albumin Lvl) 3.7          3.5-5.0         

          



[facility] [place], TXVxayvtaFZKZHEAYW5090-77-39 15:25:24





             Test Item    Value        Reference Range Interpretation Comments

 

             Globulin (test code = Globulin) 4.3          2.7-4.2               

    



[facility] [place], [place]PgaewdsNJQUKVLBQ4368-06-04 15:25:24





             Test Item    Value        Reference Range Interpretation Comments

 

             A/G Ratio (test code = A/G Ratio) 0.9 1        0.7-1.6             

      



[facility] [place], TXJidjuswNTLKCZCBJ5317-46-47 15:25:24





             Test Item    Value        Reference Range Interpretation Comments

 

             ALANINE AMINOTRANSFERASE 20           See_Comment                [A

utomated message]



             (test code = ALANINE                                        The sys

tem which



             AMINOTRANSFERASE)                                        generated 

this result



                                                                 transmitted ref

erence



                                                                 range: <=65. Th

e



                                                                 reference range

 was



                                                                 not used to int

erpret



                                                                 this result as



                                                                 normal/abnormal

.



[facility] [place], [place]YuaijemVLZAHTAEI3597-71-38 15:25:24





             Test Item    Value        Reference Range Interpretation Comments

 

             AST (test code = AST) 19           See_Comment                [Auto

mated message] The



                                                                 system which ge

nerated this



                                                                 result transmit

gianfranco



                                                                 reference range

: <=37. The



                                                                 reference range

 was not



                                                                 used to interpr

et this



                                                                 result as xenia

l/abnormal.



[facility] [place], TXQoskdjsZIETNHXJG5174-62-31 15:25:24





             Test Item    Value        Reference Range Interpretation Comments

 

             Alk Phos (test code = Alk Phos) 123                          

    



[facility] [place], TXBlkzpqcXCTYRKLLR0320-48-65 15:25:24





             Test Item    Value        Reference Range Interpretation Comments

 

             Bili Total (test code = Bili Total) 0.3          0.2-1.3           

        



[facility] [place], TXAiwhtnsZSFIHTLPT2897-76-46 15:25:24





             Test Item    Value        Reference Range Interpretation Comments

 

             Bili Direct (test code no gt        See_Comment                [Aut

omated message] The



             = Bili Direct)                                        system which 

generated



                                                                 this result tra

nsmitted



                                                                 reference range

: <=0.3.



                                                                 The reference r

jihan was



                                                                 not used to int

erpret this



                                                                 result as xenia

l/abnormal.



[facility] [place], TXEbwumliDJJLPONKZ8581-72-20 15:25:24





             Test Item    Value        Reference Range Interpretation Comments

 

             Bili Indirect Unable to    See_Comment                [Automated



             (test code = Bili Calculate                              message] T

he system



             Indirect)                                           which generated



                                                                 this result



                                                                 transmitted



                                                                 reference range

:



                                                                 <=1.0. The



                                                                 reference range

 was



                                                                 not used to



                                                                 interpret this



                                                                 result as



                                                                 normal/abnormal

.



[facility]





             Test Item    Value        Reference Range Interpretation Comments

 

             WBC X 10x3 (test code = WBC X 10x3) 9.4          3.7-10.4          

        



[place]183-02-14 15:25:24





             Test Item    Value        Reference Range Interpretation Comments

 

             RBC X 10x6 (test code = RBC X 10x6) 4.59         4.20-5.40         

        



[facility]umcdwlEAOQXEGNLF4220-93-82 15:25:24





             Test Item    Value        Reference Range Interpretation Comments

 

             Hgb (test code = Hgb) 14.1         12.0-16.0                 



[facility] [place], TXYyahsoiIFDRMHAJTF5570-89-40 15:25:24





             Test Item    Value        Reference Range Interpretation Comments

 

             Hct (test code = Hct) 42.8         36.0-48.0                 



[facility] [place], TXTyroarpJNCTUMZESC0417-86-76 15:25:24





             Test Item    Value        Reference Range Interpretation Comments

 

             MCV (test code = MCV) 93.2         80.0-98.0                 



[place] [address]-02-14 15:25:24





             Test Item    Value        Reference Range Interpretation Comments

 

             MCH (test code = MCH) 30.8 pg      27.0-31.0                 



[place] [address]-02-14 15:25:24





             Test Item    Value        Reference Range Interpretation Comments

 

             MCHC (test code = MCHC) 33.0         32.0-36.0                 



[place] [address]-02-14 15:25:24





             Test Item    Value        Reference Range Interpretation Comments

 

             RDW (test code = RDW) 13.0         11.5-14.5                 



[place] [address]-02-14 15:25:24





             Test Item    Value        Reference Range Interpretation Comments

 

             Platelet (test code = Platelet) 200          133-450               

    



[place] 476183-02-14 15:25:24





             Test Item    Value        Reference Range Interpretation Comments

 

             MPV (test code = MPV) 8.6          7.4-10.4                  



[place] [address]-02-14 15:25:24





             Test Item    Value        Reference Range Interpretation Comments

 

             Segs (test code = Segs) 86.0         45.0-75.0                 



[place] [address]-02-14 15:25:24





             Test Item    Value        Reference Range Interpretation Comments

 

             Lymphocytes (test code = Lymphocytes) 5.5          20.0-40.0       

          



[place] [address]-02-14 15:25:24





             Test Item    Value        Reference Range Interpretation Comments

 

             Monocytes (test code = Monocytes) 7.0          2.0-12.0            

      



[place] [address]-02-14 15:25:24





             Test Item    Value        Reference Range Interpretation Comments

 

             Eosinophils (test code = 1.0          See_Comment                [A

utomated message] The



             Eosinophils)                                        system which ge

nerated



                                                                 this result tra

nsmitted



                                                                 reference range

: <=4.0.



                                                                 The reference r

jihan was



                                                                 not used to int

erpret



                                                                 this result as



                                                                 normal/abnormal

.



[place] [address]-02-14 15:25:24





             Test Item    Value        Reference Range Interpretation Comments

 

             Basophils (test code = 0.5          See_Comment                [Aut

omated message] The



             Basophils)                                          system which ge

nerated



                                                                 this result tra

nsmitted



                                                                 reference range

: <=1.0.



                                                                 The reference r

jihan was



                                                                 not used to int

erpret



                                                                 this result as



                                                                 normal/abnormal

.



[place] [address]-02-14 15:25:24





             Test Item    Value        Reference Range Interpretation Comments

 

             Neutrophils # (test code = Neutrophils 8.1          1.5-8.1        

           



             #)                                                  



[place] [address]-02-14 15:25:24





             Test Item    Value        Reference Range Interpretation Comments

 

             Lymphocytes # (test code = Lymphocytes 0.5          1.0-5.5        

           



             #)                                                  



[place] [address]-02-14 15:25:24





             Test Item    Value        Reference Range Interpretation Comments

 

             Monocytes # (test code 0.7          See_Comment                [Aut

omated message] The



             = Monocytes #)                                        system which 

generated



                                                                 this result tra

nsmitted



                                                                 reference range

: <=0.8.



                                                                 The reference r

jihan was



                                                                 not used to int

erpret



                                                                 this result as



                                                                 normal/abnormal

.



[place] [address]-02-14 15:25:24





             Test Item    Value        Reference Range Interpretation Comments

 

             Eosinophils # (test code 0.1          See_Comment                [A

utomated message] The



             = Eosinophils #)                                        system whic

h generated



                                                                 this result tra

nsmitted



                                                                 reference range

: <=0.5.



                                                                 The reference r

jihan was



                                                                 not used to int

erpret



                                                                 this result as



                                                                 normal/abnormal

.



[facility] [place], TXYyxmzanBXNBRNEXH2300-69-33 15:25:24





             Test Item    Value        Reference Range Interpretation Comments

 

             Glucose Lvl (test code = Glucose Lvl) 93           70-99           

          



[place] [address]-02-14 15:25:24





             Test Item    Value        Reference Range Interpretation Comments

 

             BUN (test code = BUN) 19           7-22                      



[place] [address]-02-14 15:25:24





             Test Item    Value        Reference Range Interpretation Comments

 

             Creatinine Lvl (test code = Creatinine 0.85         0.50-1.40      

           



             Lvl)                                                



[place]183-02-14 15:25:24





             Test Item    Value        Reference Range Interpretation Comments

 

             Sodium Lvl (test code = Sodium Lvl) 142          135-145           

        



[place] [address]-02-14 15:25:24





             Test Item    Value        Reference Range Interpretation Comments

 

             Potassium Lvl (test code = Potassium 4.4          3.5-5.1          

         



             Lvl)                                                



[place] [address]-02-14 15:25:24





             Test Item    Value        Reference Range Interpretation Comments

 

             Chloride Lvl (test code = Chloride Lvl) 108                  

            



[place] [address]183-02-14 15:25:24





             Test Item    Value        Reference Range Interpretation Comments

 

             CO2 (test code = CO2) 28           24-32                     



[facility] [place], TXLnndcfrWZLBCLRON3707-91-25 15:25:24





             Test Item    Value        Reference Range Interpretation Comments

 

             Calcium Lvl (test code = Calcium Lvl) 10.0         8.5-10.5        

          



[facility] [place], TXIpgcnmrGBPCGGAWR0554-51-07 15:25:24





             Test Item    Value        Reference Range Interpretation Comments

 

             AGAP (test code = AGAP) 10.4         10.0-20.0                 



[facility] [place], TXZozqkgyUTMSWUXBU5495-16-77 15:25:24





             Test Item    Value        Reference Range Interpretation Comments

 

             eGFR (test code = eGFR) 81                                     



[facility] [place], TXGvcacuoZJICQZBQA7762-65-39 15:25:24





             Test Item    Value        Reference Range Interpretation Comments

 

             Lipase Lvl (test code = Lipase Lvl) 123                      

        



[facility] METABOLIC PANEL ([id number] 19:59:50





             Test Item    Value        Reference Range Interpretation Comments

 

             NA (test code = 138 mmol/L   135-145                   



             [id number])                                         

 

             K (test code = 4.3 mmol/L   3.5-5.0                   



             4905396770)                                         

 

             CL (test code = 102 mmol/L                       



             8504301881)                                         

 

             CO2 TOTAL (test code = 23 mmol/L    23-31                     



             [id number])                                         

 

             AGAP (test code =              2-16                      



             [id number])                                         

 

             BUN (test code = 18 mg/dL     7-23                      



             0555097021)                                         

 

             GLUCOSE (test code = 110 mg/dL                        



             2913753518)                                         

 

             CREATININE (test code = 0.70 mg/dL   0.50-1.04                 



             1826477346)                                         

 

             TOTAL BILI (test code = 1.3 mg/dL    0.1-1.1      H            



             0232525967)                                         

 

             CALCIUM (test code = 9.5 mg/dL    8.6-10.6                  



             9231089791)                                         

 

             T PROTEIN (test code = 7.4 g/dL     6.3-8.2                   



             4282972738)                                         

 

             ALBUMIN (test code = 4.2 g/dL     3.5-5.0                   



             8110553359)                                         

 

             ALK PHOS (test code = 100 U/L                          



             8846843452)                                         

 

             ALTv (test code = 16 U/L       5-35                      



             1742-6)                                             

 

             AST(SGOT) (test code = 27 U/L       13-40                     



             9528671498)                                         

 

             eGFR (test code =              mL/min/1.73m2              



             8851360776)                                         

 

             ELENA (test code = ELENA) Association of                           



                          Glomerular Filtration                           



                          Rate (GFR) and Staging                           



                          of Kidney Disease*                           



                          +---------------------                           



                          --+-------------------                           



                          --+-------------------                           



                          ------+| GFR                           



                          (mL/min/1.73 m2) ?|                           



                          With Kidney Damage ?|                           



                          ?Without Kidney                           



                          Damage+---------------                           



                          --------+-------------                           



                          --------+-------------                           



                          ------------+| ?>90 ?                           



                          ? ? ? ? ? ? ? ?|                           



                          ?Stage one ? ? ? ? ?|                           



                          ? Normal ? ? ? ? ? ? ?                           



                          ?+--------------------                           



                          ---+------------------                           



                          ---+------------------                           



                          -------+| ?60-89 ? ? ?                           



                          ? ? ? ? ?| ?Stage two                           



                          ? ? ? ? ?| ? Decreased                           



                          GFR ? ? ? ?                            



                          +---------------------                           



                          --+-------------------                           



                          --+-------------------                           



                          ------+| ?30-59 ? ? ?                           



                          ? ? ? ? ?| ?Stage                           



                          three ? ? ? ?| ? Stage                           



                          three ? ? ? ? ?                           



                          +---------------------                           



                          --+-------------------                           



                          --+-------------------                           



                          ------+| ?15-29 ? ? ?                           



                          ? ? ? ? ?| ?Stage four                           



                          ? ? ? ? | ? Stage four                           



                          ? ? ? ? ?                              



                          ?+--------------------                           



                          ---+------------------                           



                          ---+------------------                           



                          -------+| ?<15 (or                           



                          dialysis) ? ?| ?Stage                           



                          five ? ? ? ? | ? Stage                           



                          five ? ? ? ? ?                           



                          ?+--------------------                           



                          ---+------------------                           



                          ---+------------------                           



                          -------+ *Each stage                           



                          assumes the associated                           



                          GFR level has been in                           



                          effect for at least                           



                          three months. ?Stages                           



                          1 to 5, with or                           



                          without kidney                           



                          disease, indicate                           



                          chronic kidney                           



                          disease. Notes:                           



                          Determination of                           



                          stages one and two                           



                          (with eGFR                             



                          >59mL/min/1.73 m2)                           



                          requires estimation of                           



                          kidney damage for at                           



                          least three months as                           



                          defined by structural                           



                          or functional                           



                          abnormalities of the                           



                          kidney, manifested by                           



                          either:Pathological                           



                          abnormalities or                           



                          Markers of kidney                           



                          damage (including                           



                          abnormalities in the                           



                          composition of the                           



                          blood or urine or                           



                          abnormalities in                           



                          imaging tests).                           

 

             Lab Interpretation Abnormal                               



             (test code = 39722-5)                                        



[facility] WITH [id number] 19:22:40





             Test Item    Value        Reference Range Interpretation Comments

 

             WBC (test code =              See_Comment  H             [Automated



             7454-2)                                             message] The sy

stem



                                                                 which generated



                                                                 this result



                                                                 transmitted



                                                                 reference range

:



                                                                 4.30 - 11.10



                                                                 10*3/?L. The



                                                                 reference range

 was



                                                                 not used to



                                                                 interpret this



                                                                 result as



                                                                 normal/abnormal

.

 

             RBC (test code =              See_Comment                [Automated



             578-8)                                              message] The sy

stem



                                                                 which generated



                                                                 this result



                                                                 transmitted



                                                                 reference range

:



                                                                 3.93 - 5.25



                                                                 10*6/?L. The



                                                                 reference range

 was



                                                                 not used to



                                                                 interpret this



                                                                 result as



                                                                 normal/abnormal

.

 

             HGB (test code = 13.0 g/dL    11.6-15.0                 



             718-7)                                              

 

             HCT (test code = 38.4 %       35.7-45.2                 



             4544-3)                                             

 

             MCV (test code = 89.9 fL      80.6-95.5                 



             787-2)                                              

 

             MCH (test code = 30.4 pg      25.9-32.8                 



             785-6)                                              

 

             MCHC (test code = 33.9 g/dL    31.6-35.1                 



             786-4)                                              

 

             RDW-SD (test code = 39.3 fL      39.0-49.9                 



             67826-9)                                            

 

             RDW-CV (test code = 12.2 %       12.0-15.5                 



             788-0)                                              

 

             PLT (test code =              See_Comment                [Automated



             777-3)                                              message] The sy

stem



                                                                 which generated



                                                                 this result



                                                                 transmitted



                                                                 reference range

:



                                                                 166 - 358 10*3/

?L.



                                                                 The reference r

jihan



                                                                 was not used to



                                                                 interpret this



                                                                 result as



                                                                 normal/abnormal

.

 

             MPV (test code = 10.1 fL      9.5-12.9                  



             72842-2)                                            

 

             NRBC/100 WBC (test              See_Comment                [Automat

ed



             code = [id number])                                        message] 

The system



                                                                 which generated



                                                                 this result



                                                                 transmitted



                                                                 reference range

:



                                                                 0.0 - 10.0 /100



                                                                 WBCs. The refer

ence



                                                                 range was not u

sed



                                                                 to interpret th

is



                                                                 result as



                                                                 normal/abnormal

.

 

             NRBC x10^3 (test code <0.01        See_Comment                [Auto

mated



             = [id number])                                        message] The s

ystem



                                                                 which generated



                                                                 this result



                                                                 transmitted



                                                                 reference range

:



                                                                 10*3/?L. The



                                                                 reference range

 was



                                                                 not used to



                                                                 interpret this



                                                                 result as



                                                                 normal/abnormal

.

 

             GRAN MAT (NEUT) % 79.4 %                                 



             (test code = 770-8)                                        

 

             IMM GRAN % (test code 0.50 %                                 



             = [id number])                                        

 

             LYMPH % (test code = 9.5 %                                  



             736-9)                                              

 

             MONO % (test code = 9.7 %                                  



             5905-5)                                             

 

             EOS % (test code = 0.5 %                                  



             713-8)                                              

 

             BASO % (test code = 0.4 %                                  



             706-2)                                              

 

             GRAN MAT x10^3(ANC) 9.77 10*3/uL 1.88-7.09    H            



             (test code =                                        



             1526907696)                                         

 

             IMM GRAN x10^3 (test 0.06 10*3/uL 0.00-0.06                 



             code = 1367368309)                                        

 

             LYMPH x10^3 (test code 1.17 10*3/uL 1.32-3.29    L            



             = 731-0)                                            

 

             MONO x10^3 (test code 1.19 10*3/uL 0.33-0.92    H            



             = 742-7)                                            

 

             EOS x10^3 (test code = 0.06 10*3/uL 0.03-0.39                 



             711-2)                                              

 

             BASO x10^3 (test code 0.05 10*3/uL 0.01-0.07                 



             = 704-7)                                            

 

             Lab Interpretation Abnormal                               



             (test code = 84227-3)                                        



[facility] [id number] 05:36:00





             Test Item    Value        Reference Range Interpretation Comments

 

             SODIUM (test code = NA) 143 mmol/L   134-147      N            

 

             POTASSIUM (test code = 4.2 mmol/L   3.4-5.0      N            



             K)                                                  

 

             CHLORIDE (test code = 114 mmol/L   100-108      H            



             CL)                                                 

 

             CARBON DIOXIDE (test 24 mmol/L    21-32        N            



             code = CO2)                                         

 

             ANION GAP (test code = 5.0 GAP calc 4.0-15.0     N            



             GAP)                                                

 

             GLUCOSE (test code = 89 MG/DL            N            



             GLU)                                                

 

             BLOOD UREA NITROGEN 17 MG/DL     7-18         N            



             (test code = BUN)                                        

 

             GLOMERULAR FILTRATION >=60 max estimate >60                       



             RATE (test code = GFR) estGFR                                 

 

             CREATININE (test code = 0.9 MG/DL    0.6-1.0      N            



             CREAT)                                              

 

             CALCIUM (test code = CA) 8.3 MG/DL    8.5-10.1     L            



CBC W/AUTO [id number] 05:21:00





             Test Item    Value        Reference Range Interpretation Comments

 

             WHITE BLOOD CELL (test code = 15.2 K/mm3   3.5-11.0     H          

  



             WBC)                                                

 

             RED BLOOD CELL (test code = RBC) 3.67 M/mm3   4.70-6.10    L       

     

 

             HEMOGLOBIN (test code = HGB) 11.3 G/DL    10.4-14.9    N           

 

 

             HEMATOCRIT (test code = HCT) 35.5 %       31.5-44.1    N           

 

 

             MEAN CELL VOLUME (test code = 96.7 Fl      84.5-98.6    N          

  



             MCV)                                                

 

             MEAN CELL HGB (test code = MCH) 30.8 pg      27.0-34.2    N        

    

 

             MEAN CELL HGB CONCETRATION (test 31.8 G/DL    31.5-34.0    N       

     



             code = MCHC)                                        

 

             RED CELL DISTRIBUTION WIDTH (test 14.2 SD      11.5-14.5    N      

      



             code = RDW)                                         

 

             PLATELET COUNT (test code = PLT) 242.0 K/mm3  150-450      N       

     

 

             MEAN PLATELET VOLUME (test code = 10.20 fL     7.0-10.5     N      

      



             MPV)                                                

 

             NEUTROPHIL % (test code = NT%) 78.8 %       40-76        H         

   

 

             LYMPHOCYTE % (test code = LY%) 13.1 %       20.5-51.1    L         

   

 

             MONOCYTE % (test code = MO%) 6.2 %        1.7-9.3      N           

 

 

             EOSINOPHIL % (test code = EO%) 1.6 %        0.0-6.0      N         

   

 

             BASOPHIL % (test code = BA%) 0.3 %        0.0-2.0      N           

 

 

             NEUTROPHIL # (test code = NT#) 11.94 K/mm3  1.8-7.6      H         

   

 

             LYMPHOCYTE # (test code = LY#) 2.0 K/mm3    0.6-3.2      N         

   

 

             MONOCYTE # (test code = MO#) 0.9 K/mm3    0.3-1.1      N           

 

 

             EOSINOPHIL # (test code = EO#) 0.2 K/mm3    0.0-0.4      N         

   

 

             BASOPHIL # (test code = BA#) 0.1 K/mm3    0.0-0.1      N           

 

 

             MANUAL DIFF REQUIRED (test code = NO DIFF/SCN  CRITERIA            

      



             MDIFF)                                              



[id number] 14:09:00





             Test Item    Value        Reference Range Interpretation Comments

 

             LITHIUM (test 0.5 mmol/L   0.6-1.2      L             Detection Lopez

it = 0.1



             code = LITH)                                        <0.1 indicates 

None



                                                                 DetectedPerform

ed At: 



                                                                 [facility] [address]



                                                                 [id number] 

Jeffry KIMBROUGH MD



                                                                 Ph:[phone]



MZEXIRLY--75-28 13:35:00





             Test Item    Value        Reference Range Interpretation Comments

 

             TROPONIN-I (test 0.436 NG/ML  0.000-0.045  HH           Negative: <

/= 0.045



             code = TROPI)                                        Positive: >/= 

0.046



                                                                 Correlation wit

h serial



                                                                 results, other 

cardiac



                                                                 markers, and cl

inical



                                                                 findings is nec

essary to



                                                                 determine the c

linical



                                                                 significance of

 this



                                                                 result. Quantit

ative



                                                                 results using d

ifferent



                                                                 methodologies s

hould not



                                                                 be compared to 

one



                                                                 another as nume

rical



                                                                 results may amanda

yby



                                                                 method.



Completed by Nursing: [id number]--91-28 10:33:00





             Test Item    Value        Reference Range Interpretation Comments

 

             TROPONIN-I (test 0.360 NG/ML  0.000-0.045  HH           Negative: <

/= 0.045



             code = TROPI)                                        Positive: >/= 

0.046



                                                                 Correlation wit

h serial



                                                                 results, other 

cardiac



                                                                 markers, and cl

inical



                                                                 findings is nec

essary to



                                                                 determine the c

linical



                                                                 significance of

 this



                                                                 result. Quantit

ative



                                                                 results using d

ifferent



                                                                 methodologies s

hould not



                                                                 be compared to 

one



                                                                 another as nume

rical



                                                                 results may amanda

yby



                                                                 method.



Completed by Nursing: NOLast Dose Date: 20Las Dose Time: 1200TROPONIN-I
2020 07:23:00





             Test Item    Value        Reference Range Interpretation Comments

 

             TROPONIN-I (test 0.399 NG/ML  0.000-0.045  HH           Negative: <

/= 0.045



             code = TROPI)                                        Positive: >/= 

0.046



                                                                 Correlation wit

h serial



                                                                 results, other 

cardiac



                                                                 markers, and cl

inical



                                                                 findings is nec

essary to



                                                                 determine the c

linical



                                                                 significance of

 this



                                                                 result. Quantit

ative



                                                                 results using d

ifferent



                                                                 methodologies s

hould not



                                                                 be compared to 

one



                                                                 another as nume

rical



                                                                 results may amanda

yby



                                                                 method.



Completed by Nursing: [id number]--17-28 05:00:00





             Test Item    Value        Reference Range Interpretation Comments

 

             TROPONIN-I (test 0.555 NG/ML  0.000-0.045  HH           Negative: <

/= 0.045



             code = TROPI)                                        Positive: >/= 

0.046



                                                                 Correlation wit

h serial



                                                                 results, other 

cardiac



                                                                 markers, and cl

inical



                                                                 findings is nec

essary to



                                                                 determine the c

linical



                                                                 significance of

 this



                                                                 result. Quantit

ative



                                                                 results using d

ifferent



                                                                 methodologies s

hould not



                                                                 be compared to 

one



                                                                 another as nume

rical



                                                                 results may amanda

yby



                                                                 method.



Completed by Nursing: NO- XR CHEST 1 -15-29 04:33:00 Name: TYLER JUDD 
[facility] : 1970 Age/S: 49 / F [id number] [facility] [place] Unit #: [id number]
2 Loc: [place] [id number] Phys: Kristy Quiros MD Acct: [id number] Dis Date: 
Status: REG ER PHONE #: [phone] Exam Date: 2020 FAX #: Reason: 
POST CENTRAL PLACEMENT; RIGHT IJ EXAMS: CPT: [id number] XR CHEST 1 V 20089 Fluoro
Time: DAP (Gy m2): Air Kerma (mGy):  HISTORY: Post central lineplacement, 
hypotension Location code: B2 FINDINGS: Frontal view of the chest demonstrates a
mildly enlarged cardiomediastinal silhouette. The trachea is midline. The lungs 
are clear. There is no effusion or pneumothorax. The bones are intact. Right IJ 
catheter in good position. IMPRESSION: Mild cardiomegaly, without acute 
pulmonary process. ** Electronically Signed by ISABEL March on 2020 at 0
433 ** Reported and signed by: Shree March M.D.  CC: Kristy Quiros MD  PAGE 1 
Signed Report Name: TYLER JUDD [facility] : 1970 Age/S: 49 / F 
[address] [address] [place] Unit #: [id number] Loc: [place] [address] Phys: 
Kristy Quiros MD Acct: [id number] Dis Date: Status: REG ER PHONE #: [phone].
[phone] Exam Date: 2020 FAX #: Reason: POST CENTRAL PLACEMENT; RIGHT IJ 
EXAMS: CPT: [id number] XR CHEST 1 V 39758 Fluoro Time:  DAP (Gy m2): Air Kerma 
(mGy): (Continued) Technologist: Duncan Ellis RT(R)(CT) Trnscb Date/Time: 
2020 (0433) [id number]  Orig Print D/T: S: 2020 (0436) PAGE 2 Signed 
ReportCBC W/AUTO [id number] 04:15:00





             Test Item    Value        Reference Range Interpretation Comments

 

             WHITE BLOOD CELL (test 24.2 K/mm3   3.5-11.0     H            



             code = WBC)                                         

 

             RED BLOOD CELL (test 3.75 M/mm3   4.70-6.10    L            



             code = RBC)                                         

 

             HEMOGLOBIN (test code 11.5 G/DL    10.4-14.9    N            



             = HGB)                                              

 

             HEMATOCRIT (test code 35.2 %       31.5-44.1    N            



             = HCT)                                              

 

             MEAN CELL VOLUME (test 93.9 Fl      84.5-98.6    N            



             code = MCV)                                         

 

             MEAN CELL HGB (test 30.7 pg      27.0-34.2    N            



             code = MCH)                                         

 

             MEAN CELL HGB 32.7 G/DL    31.5-34.0    N            



             CONCETRATION (test                                        



             code = MCHC)                                        

 

             RED CELL DISTRIBUTION 13.8 SD      11.5-14.5    N            



             WIDTH (test code =                                        



             RDW)                                                

 

             PLATELET COUNT (test 234.0 K/mm3  150-450      N            



             code = PLT)                                         

 

             MEAN PLATELET VOLUME 9.80 fL      7.0-10.5     N            



             (test code = MPV)                                        

 

             NEUTROPHIL % (test 82.9 %       40-76        H            



             code = NT%)                                         

 

             LYMPHOCYTE % (test 8.3 %        20.5-51.1    L            



             code = LY%)                                         

 

             MONOCYTE % (test code 7.3 %        1.7-9.3      N            



             = MO%)                                              

 

             EOSINOPHIL % (test 1.4 %        0.0-6.0      N            



             code = EO%)                                         

 

             BASOPHIL % (test code 0.1 %        0.0-2.0      N            



             = BA%)                                              

 

             NEUTROPHIL # (test 20.08 K/mm3  1.8-7.6      H            



             code = NT#)                                         

 

             LYMPHOCYTE # (test 2.0 K/mm3    0.6-3.2      N            



             code = LY#)                                         

 

             MONOCYTE # (test code 1.8 K/mm3    0.3-1.1      H            



             = MO#)                                              

 

             EOSINOPHIL # (test 0.3 K/mm3    0.0-0.4      N            



             code = EO#)                                         

 

             BASOPHIL # (test code 0.0 K/mm3    0.0-0.1      N            



             = BA#)                                              

 

             MANUAL DIFF REQUIRED NO DIFF/SCN  CRITERIA                  SLIDE R

EVIEW



             (test code = MDIFF)                                        CONSISTA

NT WITH AUTO



                                                                 DIFFERENTIAL.



BASIC METABOLIC [id number] 04:11:00





             Test Item    Value        Reference Range Interpretation Comments

 

             SODIUM (test code = NA) 135 mmol/L   134-147      N            

 

             POTASSIUM (test code = K) 4.0 mmol/L   3.4-5.0      N            

 

             CHLORIDE (test code = CL) 106 mmol/L   100-108      N            

 

             CARBON DIOXIDE (test code = CO2) 22 mmol/L    21-32        N       

     

 

             ANION GAP (test code = GAP) 7.0 GAP calc 4.0-15.0     N            

 

             GLUCOSE (test code = GLU) 97 MG/DL            N            

 

             BLOOD UREA NITROGEN (test code = 34 MG/DL     7-18         H       

     



             BUN)                                                

 

             GLOMERULAR FILTRATION RATE (test 39 estGFR    >60          L       

     



             code = GFR)                                         

 

             CREATININE (test code = CREAT) 1.5 MG/DL    0.6-1.0      H         

   

 

             CALCIUM (test code = CA) 8.9 MG/DL    8.5-10.1     N            



LACTIC [id number] 04:11:00





             Test Item    Value        Reference Range Interpretation Comments

 

             LACTIC ACID (test code = LACT) 0.8 mmol/L   0.4-2.0      N         

   



CBC W/AUTO [id number] 03:54:00





             Test Item    Value        Reference Range Interpretation Comments

 

             WHITE BLOOD CELL (test code = 24.2 K/mm3   3.5-11.0     H          

  



             WBC)                                                

 

             RED BLOOD CELL (test code = RBC) 3.75 M/mm3   4.70-6.10    L       

     

 

             HEMOGLOBIN (test code = HGB) 11.5 G/DL    10.4-14.9    N           

 

 

             HEMATOCRIT (test code = HCT) 35.2 %       31.5-44.1    N           

 

 

             MEAN CELL VOLUME (test code = 93.9 Fl      84.5-98.6    N          

  



             MCV)                                                

 

             MEAN CELL HGB (test code = MCH) 30.7 pg      27.0-34.2    N        

    

 

             MEAN CELL HGB CONCETRATION (test 32.7 G/DL    31.5-34.0    N       

     



             code = MCHC)                                        

 

             RED CELL DISTRIBUTION WIDTH (test 13.8 SD      11.5-14.5    N      

      



             code = RDW)                                         

 

             PLATELET COUNT (test code = PLT) 234.0 K/mm3  150-450      N       

     

 

             MEAN PLATELET VOLUME (test code = 9.80 fL      7.0-10.5     N      

      



             MPV)                                                

 

             NEUTROPHIL % (test code = NT%)  %           40-76        H         

   

 

             LYMPHOCYTE % (test code = LY%)  %           20.5-51.1    L         

   

 

             MONOCYTE % (test code = MO%)  %           1.7-9.3      N           

 

 

             EOSINOPHIL % (test code = EO%)  %           0.0-6.0      N         

   

 

             BASOPHIL % (test code = BA%)  %           0.0-2.0      N           

 

 

             NEUTROPHIL # (test code = NT#)  K/mm3       1.8-7.6      H         

   

 

             LYMPHOCYTE # (test code = LY#)  K/mm3       0.6-3.2      N         

   

 

             MONOCYTE # (test code = MO#)  K/mm3       0.3-1.1      H           

 

 

             EOSINOPHIL # (test code = EO#)  K/mm3       0.0-0.4      N         

   

 

             BASOPHIL # (test code = BA#)  K/mm3       0.0-0.1      N           

 

 

             MANUAL DIFF REQUIRED (test code =  DIFF/SCN    CRITERIA            

      



             MDIFF)                                              



Basic Metabolic [id number] 07:54:48





             Test Item    Value        Reference Range Interpretation Comments

 

             Sodium Level (test code = Sodium 142.0 mmol/L 135.0-145.0          

     



             Level)                                              

 

             Potassium Level (test code = 4.8 mmol/L   3.5-5.1                  

 



             Potassium Level)                                        

 

             Chloride Level (test code = 105 mmol/L                       



             Chloride Level)                                        

 

             CO2 (test code = CO2) 25 mmol/L    22-29                     

 

             Anion Gap (test code = Anion 12 mmol/L    7-16                     

 



             Gap)                                                

 

             BUN (test code = BUN) 19.60 mg/dL  6.00-20.00                

 

             Creatinine Level (test code = 0.80 mg/dL   0.50-0.90               

  



             Creatinine Level)                                        

 

             BUN/Creat Ratio (test code = 24                        N           

 



             BUN/Creat Ratio)                                        

 

             Glucose Level (test code = 86 mg/dL                         



             Glucose Level)                                        

 

             Calcium Level (test code = 10.1 mg/dL   8.3-10.5                  



             Calcium Level)                                        



Basic Metabolic [id number] 07:54:48





             Test Item    Value        Reference Range Interpretation Comments

 

             Sodium Level (test 142.0 mmol/L 135.0-145.0               



             code = Sodium Level)                                        

 

             Potassium Level 4.8 mmol/L   3.5-5.1                   



             (test code =                                        



             Potassium Level)                                        

 

             Chloride Level (test 105 mmol/L                       



             code = Chloride                                        



             Level)                                              

 

             CO2 (test code = 25 mmol/L    22-29                     



             CO2)                                                

 

             Anion Gap (test code 12 mmol/L    7-16                      



             = Anion Gap)                                        

 

             BUN (test code = 19.60 mg/dL  6.00-20.00                



             BUN)                                                

 

             Creatinine Level 0.80 mg/dL   0.50-0.90                 



             (test code =                                        



             Creatinine Level)                                        

 

             BUN/Creat Ratio 24                        N            



             (test code =                                        



             BUN/Creat Ratio)                                        

 

             Glucose Level (test 86 mg/dL                         



             code = Glucose                                        



             Level)                                              

 

             Calcium Level (test 10.1 mg/dL   8.3-10.5                  



             code = Calcium                                        



             Level)                                              

 

             eGFR AA (test code = >60                       N            eGFR (e

stimated



             eGFR AA)     mL/min/1.73 m2                           Glomerular



                                                                 Filtration Rate

) is



                                                                 an estimated va

lue,



                                                                 calculated from

 the



                                                                 patient's serum



                                                                 creatinine usin

g the



                                                                 MDRD equation. 

It is



                                                                 NOT the patient

's



                                                                 actual GFR. The

 eGFR



                                                                 provides a more



                                                                 clinically usef

ul



                                                                 measure of kidn

ey



                                                                 disease than se

rum



                                                                 creatinine



                                                                 alone.***This



                                                                 calculation rimma

es



                                                                 sex and race in

to



                                                                 account, if the



                                                                 information is



                                                                 provided. If th

e



                                                                 race is not



                                                                 provided, and t

he



                                                                 patient is



                                                                 -Crystal

n,



                                                                 multiply by 1.2

12.



                                                                 If sex is not



                                                                 provided, and t

he



                                                                 patient is fema

le,



                                                                 multiply by 0.7

42.



                                                                 Results for pat

ients



                                                                 <18 years of ag

e



                                                                 have not been



                                                                 validated by 

e



                                                                 MDRD study and



                                                                 should be



                                                                 interpreted wit

h



                                                                 caution. eGFR R

esult



                                                                 Interpretation:

eGFR



                                                                 > or = 60 is in

 the



                                                                 Normal RangeeGF

R <



                                                                 60 may mean kid

hang



                                                                 diseaseeGFR < 1

5 may



                                                                 mean kidney



                                                                 failure*** Rang

es



                                                                 recommended by 

the



                                                                 [employer]



                                                                 [employer],



                                                                 http://nkdep.ni

h.gov



Basic Metabolic [id number] 07:54:48





             Test Item    Value        Reference Range Interpretation Comments

 

             Sodium Level (test 142.0 mmol/L 135.0-145.0               



             code = Sodium Level)                                        

 

             Potassium Level 4.8 mmol/L   3.5-5.1                   



             (test code =                                        



             Potassium Level)                                        

 

             Chloride Level (test 105 mmol/L                       



             code = Chloride                                        



             Level)                                              

 

             CO2 (test code = 25 mmol/L    22-29                     



             CO2)                                                

 

             Anion Gap (test code 12 mmol/L    7-16                      



             = Anion Gap)                                        

 

             BUN (test code = 19.60 mg/dL  6.00-20.00                



             BUN)                                                

 

             Creatinine Level 0.80 mg/dL   0.50-0.90                 



             (test code =                                        



             Creatinine Level)                                        

 

             BUN/Creat Ratio 24                        N            



             (test code =                                        



             BUN/Creat Ratio)                                        

 

             Glucose Level (test 86 mg/dL                         



             code = Glucose                                        



             Level)                                              

 

             Calcium Level (test 10.1 mg/dL   8.3-10.5                  



             code = Calcium                                        



             Level)                                              

 

             eGFR AA (test code = >60                       N            eGFR (e

stimated



             eGFR AA)     mL/min/1.73 m2                           Glomerular



                                                                 Filtration Rate

) is



                                                                 an estimated va

lue,



                                                                 calculated from

 the



                                                                 patient's serum



                                                                 creatinine usin

g the



                                                                 MDRD equation. 

It is



                                                                 NOT the patient

's



                                                                 actual GFR. The

 eGFR



                                                                 provides a more



                                                                 clinically usef

ul



                                                                 measure of kidn

ey



                                                                 disease than se

rum



                                                                 creatinine



                                                                 alone.***This



                                                                 calculation rimma

es



                                                                 sex and race in

to



                                                                 account, if the



                                                                 information is



                                                                 provided. If th

e



                                                                 race is not



                                                                 provided, and t

he



                                                                 patient is



                                                                 -Crystal

n,



                                                                 multiply by 1.2

12.



                                                                 If sex is not



                                                                 provided, and t

he



                                                                 patient is fema

le,



                                                                 multiply by 0.7

42.



                                                                 Results for pat

ients



                                                                 <18 years of ag

e



                                                                 have not been



                                                                 validated by [facility]



                                                                 MDRD study and



                                                                 should be



                                                                 interpreted wit

h



                                                                 caution. eGFR R

esult



                                                                 Interpretation:

eGFR



                                                                 > or = 60 is in

 the



                                                                 Normal RangeeGF

R <



                                                                 60 may mean kid

hang



                                                                 diseaseeGFR < 1

5 may



                                                                 mean kidney



                                                                 failure*** Rang

es



                                                                 recommended by 

the



                                                                 [employer]



                                                                 [employer],



                                                                 http://nkdep.ni

h.gov

 

             eGFR Non-AA (test >60.00                    N            eGFR (sheba

mated



             code = eGFR Non-AA) mL/min/1.73 m2                           Glomer

ular



                                                                 Filtration Rate

) is



                                                                 an estimated va

lue,



                                                                 calculated from

 the



                                                                 patient's serum



                                                                 creatinine usin

g the



                                                                 MDRD equation. 

It is



                                                                 NOT the patient

's



                                                                 actual GFR. The

 eGFR



                                                                 provides a more



                                                                 clinically usef

ul



                                                                 measure of kidn

ey



                                                                 disease than se

rum



                                                                 creatinine



                                                                 alone.***This



                                                                 calculation rimma

es



                                                                 sex and race in

to



                                                                 account, if the



                                                                 information is



                                                                 provided. If th

e



                                                                 race is not



                                                                 provided, and t

he



                                                                 patient is



                                                                 -Crystal

n,



                                                                 multiply by 1.2

12.



                                                                 If sex is not



                                                                 provided, and t

he



                                                                 patient is fema

le,



                                                                 multiply by 0.7

42.



                                                                 Results for pat

ients



                                                                 <18 years of ag

e



                                                                 have not been



                                                                 validated by [facility]



                                                                 MDRD study and



                                                                 should be



                                                                 interpreted wit

h



                                                                 caution. eGFR R

esult



                                                                 Interpretation:

eGFR



                                                                 > or = 60 is in

 the



                                                                 Normal RangeeGF

R <



                                                                 60 may mean kid

hang



                                                                 diseaseeGFR < 1

5 may



                                                                 mean kidney



                                                                 failure*** Rang

es



                                                                 recommended by 

the



                                                                 [employer]



                                                                 [employer],



                                                                 http://nkdep.ni

h.gov



Complete Blood Count with [id number] 07:29:42





             Test Item    Value        Reference Range Interpretation Comments

 

             WBC (test code = WBC) 9.3 x10      4.4-10.5                  

 

             RBC (test code = RBC) 4.71 x10     3.75-5.20                 

 

             Hgb (test code = Hgb) 14.4 g/dL    12.2-14.8                 

 

             MCV (test code = MCV) 92.10 fL     80..00              

 

             Hct (test code = Hct) 43.4 %       36.5-44.4                 

 

             MCHC (test code = 33.20 g/dL   32.00-37.50               



             MCHC)                                               

 

             RDW CV (test code = 13.4 %       11.5-14.5                 



             RDW CV)                                             

 

             MCH (test code = MCH) 30.6 pg      27.0-32.5                 

 

             Platelets (test code = 325.0 x10    140.0-440.0               



             Platelets)                                          

 

             MPV (test code = MPV) 9.8 fL                    N            

 

             Slide Review (test Auto         Auto                      Result cr

eated by



             code = Slide Review)                                        [id number]

SLIDE_REV_AUTO

 

             nRBC (test code = 0                         N            



             nRBC)                                               

 

             NRBC Abs (test code = 0.00 x10                  N            



             NRBC Abs)                                           

 

             IPF (test code = IPF) 0 %                       N            



Automated [id number] 07:29:42





             Test Item    Value        Reference Range Interpretation Comments

 

             Neutro Auto (test code = Neutro 66.2 %       36.0-70.0             

    



             Auto)                                               

 

             Lymph Auto (test code = Lymph Auto) 21.1 %       12.0-44.0         

        

 

             Mono Auto (test code = Mono Auto) 6.8 %        0.0-11.0            

      

 

             Eos, Auto (test code = Eos, Auto) 4.8 %        0.0-7.0             

      

 

             Basophil Auto (test code = Basophil 0.9 %        0.0-2.0           

        



             Auto)                                               

 

             Neutro Absolute (test code = Neutro 6.2 x10      1.6-7.4           

        



             Absolute)                                           

 

             Lymph Absolute (test code = Lymph 1.97 x10     .50-4.60            

      



             Absolute)                                           

 

             Mono Absolute (test code = Mono .63 x10      .00-1.20              

    



             Absolute)                                           

 

             Eos Absolute (test code = Eos 0.45 x10     0.00-0.74               

  



             Absolute)                                           

 

             Baso Absolute (test code = Baso 0.08 x10     0.00-0.21             

    



             Absolute)                                           



IG [id number] 07:29:42





             Test Item    Value        Reference Range Interpretation Comments

 

             IG (test code = IG) 0.2 %        0.0-5.0                   

 

             IG Abs (test code = IG Abs) 0 x10                     N            



Thyroid Stimulating [id number] 04:30:30





             Test Item    Value        Reference Range Interpretation Comments

 

             TSH (test code = TSH) 1.360 mIU/mL 0.270-4.200               



RPR [id number] 04:06:17





             Test Item    Value        Reference Range Interpretation Comments

 

             RPR Qual (test code = RPR Qual) Non-Reactive Non-Reactive          

    

 

             Reactive Control (test code = Reactive                             

  



             Reactive Control)                                        

 

             Weak Reactive Control (test Weak Reactive                          

 



             code = Weak Reactive Control)                                      

  

 

             Non-Reactive Control (test code Non-Reactive                       

    



             = Non-Reactive Control)                                        

 

             Lot # (test code = Lot #) [id number]                    N            

 

             Expiration Dt (test code = 10-                N            



             Expiration Dt)                                        



Hemoglobin A1c2019-11-15 18:37:54





             Test Item    Value        Reference Range Interpretation Comments

 

             Hemoglobin A1c (test code 4.7 %        4.8-5.9      L            No

n Diabetic



             = Hemoglobin A1c)                                        4.8-5.9%Di

abetic <7.0%



Lipid Panel2019-11-15 18:08:58





             Test Item    Value        Reference Range Interpretation Comments

 

             Cholesterol Total 189 mg/dL    0-200                     RISK OF HE

ART



             (test code =                                        DISEASEPublishe

d by



             Cholesterol Total)                                        American 

Heart



                                                                 Association Judith

lyte



                                                                 Optimal Borderl

ine



                                                                 Increased RiskC

HOL <200



                                                                 200-239 >240TRI

G <150



                                                                 150-199 >200HDL

 Male



                                                                 >60 <40HDL Fema

le >60



                                                                 <50LDL <100 130

-159



                                                                 >160LDL Near op

[facility] is



                                                                 100-129

 

             Triglycerides (test 112 mg/dL    9-200                     



             code = Triglycerides)                                        

 

             HDL (test code = HDL) 44 mg/dL     50-60        L            

 

             LDL (test code = LDL) 122 mg/dL    0-130                     The eq

uation being used



                                                                 in this calcula

tion is



                                                                 LDL = (Chol - H

DL) -



                                                                 (Trig / 5)

 

             VLDL (test code = 22 mg/dL     5-40                      The equati

on being used



             VLDL)                                               in this calcula

tion is



                                                                 VLDL = Trig / 5

 

             Chol/HDL (test code = 4.3 ratio    0.0-4.4                   



             Chol/HDL)                                           

 

             LDL/HDL Ratio (test 3                         N            The equa

tion being used



             code = LDL/HDL Ratio)                                        in thi

s calculation is



                                                                 LDL/HDL Ratio=L

DL



                                                                 Calc/HDL Chol



Complete Blood Count with Differential2019-11-15 07:51:57





             Test Item    Value        Reference Range Interpretation Comments

 

             WBC (test code = WBC) 10.6 x10     4.4-10.5     H            

 

             RBC (test code = RBC) 4.45 x10     3.75-5.20                 

 

             Hgb (test code = Hgb) 13.5 g/dL    12.2-14.8                 

 

             MCV (test code = MCV) 93.30 fL     80..00              

 

             Hct (test code = Hct) 41.5 %       36.5-44.4                 

 

             MCHC (test code = 32.50 g/dL   32.00-37.50               



             MCHC)                                               

 

             RDW CV (test code = 13.7 %       11.5-14.5                 



             RDW CV)                                             

 

             MCH (test code = MCH) 30.3 pg      27.0-32.5                 

 

             Platelets (test code = 356.0 x10    140.0-440.0               



             Platelets)                                          

 

             MPV (test code = MPV) 9.7 fL                    N            

 

             Slide Review (test Auto         Auto                      Result cr

eated by



             code = Slide Review)                                        [email or web address]

[email or web address]

 

             nRBC (test code = 0                         N            



             nRBC)                                               

 

             NRBC Abs (test code = 0.00 x10                  N            



             NRBC Abs)                                           

 

             IPF (test code = IPF) 0 %                       N            



Automated Differential2019-11-15 07:51:57





             Test Item    Value        Reference Range Interpretation Comments

 

             Neutro Auto (test code = Neutro 65.4 %       36.0-70.0             

    



             Auto)                                               

 

             Lymph Auto (test code = Lymph Auto) 23.5 %       12.0-44.0         

        

 

             Mono Auto (test code = Mono Auto) 5.7 %        0.0-11.0            

      

 

             Eos, Auto (test code = Eos, Auto) 4.4 %        0.0-7.0             

      

 

             Basophil Auto (test code = Basophil 0.8 %        0.0-2.0           

        



             Auto)                                               

 

             Neutro Absolute (test code = Neutro 6.9 x10      1.6-7.4           

        



             Absolute)                                           

 

             Lymph Absolute (test code = Lymph 2.49 x10     .50-4.60            

      



             Absolute)                                           

 

             Mono Absolute (test code = Mono .60 x10      .00-1.20              

    



             Absolute)                                           

 

             Eos Absolute (test code = Eos 0.47 x10     0.00-0.74               

  



             Absolute)                                           

 

             Baso Absolute (test code = Baso 0.08 x10     0.00-0.21             

    



             Absolute)                                           



IG Flags2019-11-15 07:51:57





             Test Item    Value        Reference Range Interpretation Comments

 

             IG (test code = IG) 0.2 %        0.0-5.0                   

 

             IG Abs (test code = IG Abs) 0 x10                     N            



Urine [id number] 10:18:52





             Test Item    Value        Reference Range Interpretation Comments

 

             ORGANISM (test code = Escherichia coli                           



             ORGANISM)                                           

 

             Amikacin (test code =                           S            



             Amik)                                               

 

             Ampicillin (test code =                           S            



             Amp)                                                

 

             Ampicillin/Sulbactam                           S            



             (test code = Amp/Sul)                                        

 

             Cefazolin (test code =                           S            



             Cefaz)                                              

 

             Cefepime (test code =                           S            



             Cefep)                                              

 

             Cefotaxime (test code =                           S            



             Cefo)                                               

 

             Ceftazidime (test code                           S            



             = Ceftaz)                                           

 

             Ceftriaxone (test code                           S            



             = Ceftri)                                           

 

             Cefuroxime (test code =                           S            



             Cefur)                                              

 

             Ciprofloxacin (test                           S            



             code = Cipro)                                        

 

             Gentamicin (test code =                           S            



             Gent)                                               

 

             Imipenem (test code =                           S            



             Imi)                                                

 

             Levofloxacin (test code                           S            



             = Levo)                                             

 

             Meropenem (test code =                           S            



             Sindi)                                               

 

             Nitrofurantoin (test                           S            



             code = Nitro)                                        

 

             Piperacillin/Tazobactam                           S            



             (test code = Pip/Blaine)                                        

 

             Tobramycin (test code =                           S            



             Tobra)                                              

 

             Trimethoprim/Sulfa                           S            



             (test code = SXT)                                        

 

             Final Report (test code >=100,000 cfu/ml                           



             = Final Report) Escherichia coli                           



                          >=100,000 cfu/ml Alpha                           



                          hemolytic                              



                          Streptococcus (not                           



                          Group D or                             



                          Enterococcus)                           



 C Urine Added by [email or web address] [id number] 09:18:25





             Test Item    Value        Reference Range Interpretation Comments

 

             Lithium Level (test code = 0.58 mmol/L  0.60-1.20    L            



             Lithium Level)                                        



Urine Drug [id number] 01:36:44





             Test Item    Value        Reference Range Interpretation Comments

 

             Amphetamine Screen Ur POSITIVE     Negative     A            



             (test code = Amphetamine                                        



             Screen Ur)                                          

 

             Barbiturate Screen Ur Negative     Negative                  



             (test code = Barbiturate                                        



             Screen Ur)                                          

 

             Benzodiazepines Ur (test Negative     Negative                  



             code = Benzodiazepines                                        



             Ur)                                                 

 

             Cocaine Screen Ur (test Negative     Negative                  



             code = Cocaine Screen                                        



             Ur)                                                 

 

             U Methadone Scr (test Negative     Negative                  



             code = U Methadone Scr)                                        

 

             Opiate Screen Ur (test Negative     Negative                  



             code = Opiate Screen Ur)                                        

 

             U PCP Scrn (test code = Negative     Negative                  



             U PCP Scrn)                                         

 

             Cannabinoid Screen Ur Negative     Negative                  



             (test code = Cannabinoid                                        



             Screen Ur)                                          

 

             U TCA (test code = U Negative     Negative                  The res

ults of all



             TCA)                                                drug screen elinor

ts are



                                                                 only preliminar

y.



                                                                 Clinical



                                                                 consideration a

nd



                                                                 professional ju

dgment



                                                                 should be appli

ed to



                                                                 any drug of abu

se



                                                                 test result,



                                                                 particularly wh

en



                                                                 preliminary pos

itive



                                                                 results are obt

ained.



                                                                 Please order a



                                                                 separate confir

matory



                                                                 test if desired

.



HCG Qualitative [id number] 01:36:43





             Test Item    Value        Reference Range Interpretation Comments

 

             hCG Ur (test code = Negative                               If the r

esult is



             hCG Ur)                                             "Negative" in p

atients



                                                                 suspected to be



                                                                 pregnant, recom

mend



                                                                 retest with a s

ample



                                                                 obtained 48 to 

72



                                                                 hours later, or

 by



                                                                 ordering a



                                                                 quantitative as

say. If



                                                                 the result is



                                                                 "Borderline" te

sting



                                                                 should be repea

gianfranco in



                                                                 48 to 72 hours.

 

             Lot # (test code = [id number]                    N            



             Lot #)                                              

 

             Expiration Dt (test 2020                N            



             code = Expiration Dt)                                        

 

             Neg Control (test Negative                               



             code = Neg Control)                                        

 

             Pos Control (test Positive                               



             code = Pos Control)                                        

 

             Internal QC (test Acceptable                             



             code = Internal QC)                                        



Urinalysis [id number] 01:36:03





             Test Item    Value        Reference Range Interpretation Comments

 

             UA WBC (test code = UA WBC) 0-5          0-5                       

 

             UA RBC (test code = UA RBC) None Seen    0-5                       

 

             UA Bacteria (test code = UA Moderate                  A            



             Bacteria)                                           

 

             UA Squam Epithelial (test code = UA 0-5                            

        



             Squam Epithelial)                                        



Comprehensive Metabolic [id number] 01:23:40





             Test Item    Value        Reference Range Interpretation Comments

 

             Sodium Level (test code = Sodium 139.0 mmol/L 135.0-145.0          

     



             Level)                                              

 

             Potassium Level (test code = 4.4 mmol/L   3.5-5.1                  

 



             Potassium Level)                                        

 

             Chloride Level (test code = 102 mmol/L                       



             Chloride Level)                                        

 

             CO2 (test code = CO2) 23 mmol/L    22-29                     

 

             Anion Gap (test code = Anion 14 mmol/L    7-16                     

 



             Gap)                                                

 

             BUN (test code = BUN) 9.10 mg/dL   6.00-20.00                

 

             Creatinine Level (test code = 1.00 mg/dL   0.50-0.90    H          

  



             Creatinine Level)                                        

 

             BUN/Creat Ratio (test code = 9                         N           

 



             BUN/Creat Ratio)                                        

 

             Glucose Level (test code = 115 mg/dL                        



             Glucose Level)                                        

 

             Calcium Level (test code = 10.1 mg/dL   8.3-10.5                  



             Calcium Level)                                        

 

             Alk Phos (test code = Alk Phos) 106 U/L             H        

    

 

             Bilirubin Total (test code = 0.4 mg/dL    0.1-0.9                  

 



             Bilirubin Total)                                        

 

             Albumin Level (test code = 4.6 g/dL     3.5-5.2                   



             Albumin Level)                                        

 

             Protein Total (test code = 7.7 g/dL     6.4-8.3                   



             Protein Total)                                        

 

             ALT (test code = ALT) 12 U/L       1-33                      

 

             AST (test code = AST) 18 U/L       1-32                      

 

             Globulin (test code = Globulin) 3.1 g/dL     2.9-3.1               

    

 

             A/G Ratio (test code = A/G 1.5 ratio                 N            



             Ratio)                                              



Comprehensive Metabolic [id number] 01:23:40





             Test Item    Value        Reference Range Interpretation Comments

 

             Sodium Level (test 139.0 mmol/L 135.0-145.0               



             code = Sodium Level)                                        

 

             Potassium Level 4.4 mmol/L   3.5-5.1                   



             (test code =                                        



             Potassium Level)                                        

 

             Chloride Level (test 102 mmol/L                       



             code = Chloride                                        



             Level)                                              

 

             CO2 (test code = 23 mmol/L    22-29                     



             CO2)                                                

 

             Anion Gap (test code 14 mmol/L    7-16                      



             = Anion Gap)                                        

 

             BUN (test code = 9.10 mg/dL   6.00-20.00                



             BUN)                                                

 

             Creatinine Level 1.00 mg/dL   0.50-0.90    H            



             (test code =                                        



             Creatinine Level)                                        

 

             BUN/Creat Ratio 9                         N            



             (test code =                                        



             BUN/Creat Ratio)                                        

 

             Glucose Level (test 115 mg/dL                        



             code = Glucose                                        



             Level)                                              

 

             Calcium Level (test 10.1 mg/dL   8.3-10.5                  



             code = Calcium                                        



             Level)                                              

 

             Alk Phos (test code 106 U/L             H            



             = Alk Phos)                                         

 

             Bilirubin Total 0.4 mg/dL    0.1-0.9                   



             (test code =                                        



             Bilirubin Total)                                        

 

             Albumin Level (test 4.6 g/dL     3.5-5.2                   



             code = Albumin                                        



             Level)                                              

 

             Protein Total (test 7.7 g/dL     6.4-8.3                   



             code = Protein                                        



             Total)                                              

 

             ALT (test code = 12 U/L       1-33                      



             ALT)                                                

 

             AST (test code = 18 U/L       1-32                      



             AST)                                                

 

             Globulin (test code 3.1 g/dL     2.9-3.1                   



             = Globulin)                                         

 

             A/G Ratio (test code 1.5 ratio                 N            



             = A/G Ratio)                                        

 

             eGFR AA (test code = >60                       N            eGFR (e

stimated



             eGFR AA)     mL/min/1.73 m2                           Glomerular



                                                                 Filtration Rate

) is



                                                                 an estimated va

lue,



                                                                 calculated from

 the



                                                                 patient's serum



                                                                 creatinine usin

g the



                                                                 MDRD equation. 

It is



                                                                 NOT the patient

's



                                                                 actual GFR. The

 eGFR



                                                                 provides a more



                                                                 clinically usef

ul



                                                                 measure of kidn

ey



                                                                 disease than se

rum



                                                                 creatinine



                                                                 alone.***This



                                                                 calculation rimma

es



                                                                 sex and race in

to



                                                                 account, if the



                                                                 information is



                                                                 provided. If th

e



                                                                 race is not



                                                                 provided, and t

he



                                                                 patient is



                                                                 -Crystal

n,



                                                                 multiply by 1.2

12.



                                                                 If sex is not



                                                                 provided, and t

he



                                                                 patient is fema

le,



                                                                 multiply by 0.7

42.



                                                                 Results for pat

ients



                                                                 <18 years of ag

e



                                                                 have not been



                                                                 validated by th

e



                                                                 MDRD study and



                                                                 should be



                                                                 interpreted wit

h



                                                                 caution. eGFR R

esult



                                                                 Interpretation:

eGFR



                                                                 > or = 60 is in

 the



                                                                 Normal RangeeGF

R <



                                                                 60 may mean kid

hang



                                                                 diseaseeGFR < 1

5 may



                                                                 mean kidney



                                                                 failure*** Rang

es



                                                                 recommended by 

the



                                                                 [employer]



                                                                 [employer],



                                                                 http://nkdep.ni

h.gov



Comprehensive Metabolic [id number] 01:23:40





             Test Item    Value        Reference Range Interpretation Comments

 

             Sodium Level (test 139.0 mmol/L 135.0-145.0               



             code = Sodium Level)                                        

 

             Potassium Level 4.4 mmol/L   3.5-5.1                   



             (test code =                                        



             Potassium Level)                                        

 

             Chloride Level (test 102 mmol/L                       



             code = Chloride                                        



             Level)                                              

 

             CO2 (test code = 23 mmol/L    22-29                     



             CO2)                                                

 

             Anion Gap (test code 14 mmol/L    7-16                      



             = Anion Gap)                                        

 

             BUN (test code = 9.10 mg/dL   6.00-20.00                



             BUN)                                                

 

             Creatinine Level 1.00 mg/dL   0.50-0.90    H            



             (test code =                                        



             Creatinine Level)                                        

 

             BUN/Creat Ratio 9                         N            



             (test code =                                        



             BUN/Creat Ratio)                                        

 

             Glucose Level (test 115 mg/dL                        



             code = Glucose                                        



             Level)                                              

 

             Calcium Level (test 10.1 mg/dL   8.3-10.5                  



             code = Calcium                                        



             Level)                                              

 

             Alk Phos (test code 106 U/L             H            



             = Alk Phos)                                         

 

             Bilirubin Total 0.4 mg/dL    0.1-0.9                   



             (test code =                                        



             Bilirubin Total)                                        

 

             Albumin Level (test 4.6 g/dL     3.5-5.2                   



             code = Albumin                                        



             Level)                                              

 

             Protein Total (test 7.7 g/dL     6.4-8.3                   



             code = Protein                                        



             Total)                                              

 

             ALT (test code = 12 U/L       1-33                      



             ALT)                                                

 

             AST (test code = 18 U/L       1-32                      



             AST)                                                

 

             Globulin (test code 3.1 g/dL     2.9-3.1                   



             = Globulin)                                         

 

             A/G Ratio (test code 1.5 ratio                 N            



             = A/G Ratio)                                        

 

             eGFR AA (test code = >60                       N            eGFR (e

stimated



             eGFR AA)     mL/min/1.73 m2                           Glomerular



                                                                 Filtration Rate

) is



                                                                 an estimated va

lue,



                                                                 calculated from

 the



                                                                 patient's serum



                                                                 creatinine usin

g the



                                                                 MDRD equation. 

It is



                                                                 NOT the patient

's



                                                                 actual GFR. The

 eGFR



                                                                 provides a more



                                                                 clinically usef

ul



                                                                 measure of kidn

ey



                                                                 disease than se

rum



                                                                 creatinine



                                                                 alone.***This



                                                                 calculation rimma

es



                                                                 sex and race in

to



                                                                 account, if the



                                                                 information is



                                                                 provided. If th

e



                                                                 race is not



                                                                 provided, and t

he



                                                                 patient is



                                                                 -Crystal

n,



                                                                 multiply by 1.2

12.



                                                                 If sex is not



                                                                 provided, and t

he



                                                                 patient is fema

le,



                                                                 multiply by 0.7

42.



                                                                 Results for pat

ients



                                                                 <18 years of ag

e



                                                                 have not been



                                                                 validated by th

e



                                                                 MDRD study and



                                                                 should be



                                                                 interpreted wit

h



                                                                 caution. eGFR R

esult



                                                                 Interpretation:

eGFR



                                                                 > or = 60 is in

 the



                                                                 Normal RangeeGF

R <



                                                                 60 may mean kid

hang



                                                                 diseaseeGFR < 1

5 may



                                                                 mean kidney



                                                                 failure*** Rang

es



                                                                 recommended by 

the



                                                                 [employer]



                                                                 [employer],



                                                                 http://nkdep.ni

h.gov

 

             eGFR Non-AA (test 58.45                     N            eGFR (sheba

mated



             code = eGFR Non-AA) mL/min/1.73 m2                           Glomer

ular



                                                                 Filtration Rate

) is



                                                                 an estimated va

lue,



                                                                 calculated from

 the



                                                                 patient's serum



                                                                 creatinine usin

g the



                                                                 MDRD equation. 

It is



                                                                 NOT the patient

's



                                                                 actual GFR. The

 eGFR



                                                                 provides a more



                                                                 clinically usef

ul



                                                                 measure of kidn

ey



                                                                 disease than se

rum



                                                                 creatinine



                                                                 alone.***This



                                                                 calculation rimma

es



                                                                 sex and race in

to



                                                                 account, if the



                                                                 information is



                                                                 provided. If th

e



                                                                 race is not



                                                                 provided, and t

he



                                                                 patient is



                                                                 -Crystal

n,



                                                                 multiply by 1.2

12.



                                                                 If sex is not



                                                                 provided, and t

he



                                                                 patient is suman

le,



                                                                 multiply by 0.7

42.



                                                                 Results for pat

ients



                                                                 <18 years of ag

e



                                                                 have not been



                                                                 validated by 

e



                                                                 MDRD study and



                                                                 should be



                                                                 interpreted wit

h



                                                                 caution. eGFR R

esult



                                                                 Interpretation:

eGFR



                                                                 > or = 60 is in

 the



                                                                 Normal RangeeGF

R <



                                                                 60 may mean kid

hang



                                                                 diseaseeGFR < 1

5 may



                                                                 mean kidney



                                                                 failure*** Rang

es



                                                                 recommended by 

the



                                                                 [employer]



                                                                 [employer],



                                                                 http://nkdep.ni

h.gov



Alcohol [id number] 01:23:31





             Test Item    Value        Reference Range Interpretation Comments

 

             Ethanol Level (test <0.00 g/dL   0.00-0.01                 [facility]

gianfranco 0.080 g/dL



             code = Ethanol                                        or more



             Level)                                              

 

             Ethanol Inst (test <0                        N            



             code = Ethanol Inst)                                        



Complete Blood Count with [id number] 00:56:12





             Test Item    Value        Reference Range Interpretation Comments

 

             WBC (test code = WBC) 19.4 x10     4.4-10.5     H            

 

             RBC (test code = RBC) 4.53 x10     3.75-5.20                 

 

             Hgb (test code = Hgb) 13.5 g/dL    12.2-14.8                 

 

             Hct (test code = Hct) 41.3 %       36.5-44.4                 

 

             MCV (test code = MCV) 91.20 fL     80..00              

 

             MCHC (test code = 32.70 g/dL   32.00-37.50               



             MCHC)                                               

 

             RDW CV (test code = 13.5 %       11.5-14.5                 



             RDW CV)                                             

 

             MCH (test code = MCH) 29.8 pg      27.0-32.5                 

 

             Platelets (test code = 462.0 x10    140.0-440.0  H            



             Platelets)                                          

 

             MPV (test code = MPV) 9.9 fL                    N            

 

             Slide Review (test Auto         Auto                      Result cr

eated by



             code = Slide Review)                                        [email or web address]

[email or web address]

 

             nRBC (test code = 0                         N            



             nRBC)                                               

 

             NRBC Abs (test code = 0.00 x10                  N            



             NRBC Abs)                                           

 

             IPF (test code = IPF) 0 %                       N            



Automated [id number] 00:56:12





             Test Item    Value        Reference Range Interpretation Comments

 

             Neutro Auto (test code = Neutro 83.7 %       36.0-70.0    H        

    



             Auto)                                               

 

             Lymph Auto (test code = Lymph Auto) 8.9 %        12.0-44.0    L    

        

 

             Mono Auto (test code = Mono Auto) 5.0 %        0.0-11.0            

      

 

             Eos, Auto (test code = Eos, Auto) 1.4 %        0.0-7.0             

      

 

             Basophil Auto (test code = Basophil 0.7 %        0.0-2.0           

        



             Auto)                                               

 

             Neutro Absolute (test code = Neutro 16.2 x10     1.6-7.4      H    

        



             Absolute)                                           

 

             Lymph Absolute (test code = Lymph 1.73 x10     .50-4.60            

      



             Absolute)                                           

 

             Mono Absolute (test code = Mono .96 x10      .00-1.20              

    



             Absolute)                                           

 

             Eos Absolute (test code = Eos 0.27 x10     0.00-0.74               

  



             Absolute)                                           

 

             Baso Absolute (test code = Baso 0.13 x10     0.00-0.21             

    



             Absolute)                                           



IG [id number] 00:56:12





             Test Item    Value        Reference Range Interpretation Comments

 

             IG (test code = IG) 0.3 %        0.0-5.0                   

 

             IG Abs (test code = IG Abs) 0 x10                     N            



Urinalysis with Culture, if [id number] 00:55:34





             Test Item    Value        Reference Range Interpretation Comments

 

             UA Color (test code = STRAW        Yellow                    



             UA Color)                                           

 

             UA Appear (test code = CLEAR        Clear                     



             UA Appear)                                          

 

             UA pH (test code = UA 5                         N            



             pH)                                                 

 

             UA Spec Grav (test code 1.001        1.001-1.035               



             = UA Spec Grav)                                        

 

             UA Glucose (test code = NEG          Negative                  



             UA Glucose)                                         

 

             UA Bili (test code = UA NEG          Negative                  



             Bili)                                               

 

             UA Ketones (test code = NEG          Negative                  



             UA Ketones)                                         

 

             UA Blood (test code = NEG          Negative                  



             UA Blood)                                           

 

             UA Protein (test code = NEG          Negative                  



             UA Protein)                                         

 

             UA Urobilinogen (test 0.2 mg/dL                 N            



             code = UA Urobilinogen)                                        

 

             UA Nitrite (test code = POS          Negative     A            



             UA Nitrite)                                         

 

             UA Leuk Est (test code 25 cells/mcL Negative     A            



             = UA Leuk Est)                                        

 

             UA Micro Ind? (test Indicated    Not Indicated A            Result 

created by



             code = UA Micro Ind?)                                        rule



                                                                 [email or web address]

_IN



                                                                 D



Urine [id number] 08:13:23





             Test Item    Value        Reference Range Interpretation Comments

 

             ORGANISM (test code = Escherichia coli                           



             ORGANISM)                                           

 

             Amikacin (test code =                           S            



             Amik)                                               

 

             Ampicillin (test code =                           S            



             Amp)                                                

 

             Ampicillin/Sulbactam                           S            



             (test code = Amp/Sul)                                        

 

             Cefazolin (test code =                           S            



             Cefaz)                                              

 

             Cefepime (test code =                           S            



             Cefep)                                              

 

             Cefotaxime (test code =                           S            



             Cefo)                                               

 

             Ceftazidime (test code =                           S            



             Ceftaz)                                             

 

             Ceftriaxone (test code =                           S            



             Ceftri)                                             

 

             Cefuroxime (test code =                           S            



             Cefur)                                              

 

             Ciprofloxacin (test code                           S            



             = Cipro)                                            

 

             Gentamicin (test code =                           S            



             Gent)                                               

 

             Imipenem (test code =                           S            



             Imi)                                                

 

             Levofloxacin (test code                           S            



             = Levo)                                             

 

             Meropenem (test code =                           S            



             Sindi)                                               

 

             Nitrofurantoin (test                           S            



             code = Nitro)                                        

 

             Piperacillin/Tazobactam                           S            



             (test code = Pip/Blaine)                                        

 

             Tobramycin (test code =                           S            



             Tobra)                                              

 

             Trimethoprim/Sulfa (test                           S            



             code = SXT)                                         

 

             Final Report (test code 30,000 cfu/ml                           



             = Final Report) Escherichia coli                           



                          20,000 cfu/ml                           



                          Diphtheroids                           



 C Urine Added by [email or web address] Qualitative2019-09-15 04:57:25





             Test Item    Value        Reference Range Interpretation Comments

 

             RPR Qual (test code = RPR Qual) Non-Reactive Non-Reactive          

    

 

             Reactive Control (test code = Reactive                             

  



             Reactive Control)                                        

 

             Weak Reactive Control (test Weak Reactive                          

 



             code = Weak Reactive Control)                                      

  

 

             Non-Reactive Control (test code Non-Reactive                       

    



             = Non-Reactive Control)                                        

 

             Lot # (test code = Lot #) [id number]                    N            

 

             Expiration Dt (test code = 10.31.2020                N            



             Expiration Dt)                                        



Thyroid Stimulating Hormone2019-09-15 01:22:43





             Test Item    Value        Reference Range Interpretation Comments

 

             TSH (test code = TSH) 3.370 mIU/mL 0.270-4.200               



Lipid Panel2019-09-15 01:17:51





             Test Item    Value        Reference Range Interpretation Comments

 

             Cholesterol Total 168 mg/dL    0-200                     RISK OF HE

ART



             (test code =                                        DISEASEPublishe

d by



             Cholesterol Total)                                        American 

Heart



                                                                 Association Judith

lyte



                                                                 Optimal Borderl

ine



                                                                 Increased RiskC

HOL <200



                                                                 200-239 >240TRI

G <150



                                                                 150-199 >200HDL

 Male



                                                                 >60 <40HDL Fema

le >60



                                                                 <50LDL <100 130

-159



                                                                 >160LDL Near op

timal is



                                                                 100-129

 

             Triglycerides (test 108 mg/dL    9-200                     



             code = Triglycerides)                                        

 

             HDL (test code = HDL) 46 mg/dL     50-60        L            

 

             LDL (test code = LDL) 100 mg/dL    0-130                     The eq

uation being used



                                                                 in this calcula

tion is



                                                                 LDL = (Chol - H

DL) -



                                                                 (Trig / 5)

 

             VLDL (test code = 22 mg/dL     5-40                      The equati

on being used



             VLDL)                                               in this calcula

tion is



                                                                 VLDL = Trig / 5

 

             Chol/HDL (test code = 3.7 ratio    0.0-4.4                   



             Chol/HDL)                                           

 

             LDL/HDL Ratio (test 2                         N            The equa

tion being used



             code = LDL/HDL Ratio)                                        in thi

s calculation is



                                                                 LDL/HDL Ratio=L

DL



                                                                 Calc/HDL Chol



Lithium Level2019-09-15 01:17:51





             Test Item    Value        Reference Range Interpretation Comments

 

             Lithium Level (test code = 0.81 mmol/L  0.60-1.20                 



             Lithium Level)                                        



Comprehensive Metabolic Panel2019-09-15 00:04:54





             Test Item    Value        Reference Range Interpretation Comments

 

             Sodium Level (test code = Sodium 137.0 mmol/L 135.0-145.0          

     



             Level)                                              

 

             Potassium Level (test code = 4.0 mmol/L   3.5-5.1                  

 



             Potassium Level)                                        

 

             Chloride Level (test code = 103 mmol/L                       



             Chloride Level)                                        

 

             CO2 (test code = CO2) 23 mmol/L    22-29                     

 

             Anion Gap (test code = Anion 11 mmol/L    7-16                     

 



             Gap)                                                

 

             BUN (test code = BUN) 11.50 mg/dL  6.00-20.00                

 

             Creatinine Level (test code = 1.00 mg/dL   0.50-0.90    H          

  



             Creatinine Level)                                        

 

             BUN/Creat Ratio (test code = 12                        N           

 



             BUN/Creat Ratio)                                        

 

             Glucose Level (test code = 108 mg/dL                        



             Glucose Level)                                        

 

             Calcium Level (test code = 10.3 mg/dL   8.3-10.5                  



             Calcium Level)                                        

 

             Alk Phos (test code = Alk Phos) 93 U/L                       

    

 

             Bilirubin Total (test code = 0.5 mg/dL    0.1-0.9                  

 



             Bilirubin Total)                                        

 

             Albumin Level (test code = 4.4 g/dL     3.5-5.2                   



             Albumin Level)                                        

 

             Protein Total (test code = 7.2 g/dL     6.4-8.3                   



             Protein Total)                                        

 

             ALT (test code = ALT) 12 U/L       1-33                      

 

             AST (test code = AST) 17 U/L       1-32                      

 

             Globulin (test code = Globulin) 2.8 g/dL     2.9-3.1      L        

    

 

             A/G Ratio (test code = A/G 1.6 ratio                 N            



             Ratio)                                              



Alcohol Level2019-09-15 00:04:54





             Test Item    Value        Reference Range Interpretation Comments

 

             Ethanol Level (test <0.00 g/dL   0.00-0.01                 [facility]

gianfranco 0.080 g/dL



             code = Ethanol                                        or more



             Level)                                              

 

             Ethanol Inst (test <0                        N            



             code = Ethanol Inst)                                        



Comprehensive Metabolic Panel2019-09-15 00:04:54





             Test Item    Value        Reference Range Interpretation Comments

 

             Sodium Level (test 137.0 mmol/L 135.0-145.0               



             code = Sodium Level)                                        

 

             Potassium Level 4.0 mmol/L   3.5-5.1                   



             (test code =                                        



             Potassium Level)                                        

 

             Chloride Level (test 103 mmol/L                       



             code = Chloride                                        



             Level)                                              

 

             CO2 (test code = 23 mmol/L    22-29                     



             CO2)                                                

 

             Anion Gap (test code 11 mmol/L    7-16                      



             = Anion Gap)                                        

 

             BUN (test code = 11.50 mg/dL  6.00-20.00                



             BUN)                                                

 

             Creatinine Level 1.00 mg/dL   0.50-0.90    H            



             (test code =                                        



             Creatinine Level)                                        

 

             BUN/Creat Ratio 12                        N            



             (test code =                                        



             BUN/Creat Ratio)                                        

 

             Glucose Level (test 108 mg/dL                        



             code = Glucose                                        



             Level)                                              

 

             Calcium Level (test 10.3 mg/dL   8.3-10.5                  



             code = Calcium                                        



             Level)                                              

 

             Alk Phos (test code 93 U/L                           



             = Alk Phos)                                         

 

             Bilirubin Total 0.5 mg/dL    0.1-0.9                   



             (test code =                                        



             Bilirubin Total)                                        

 

             Albumin Level (test 4.4 g/dL     3.5-5.2                   



             code = Albumin                                        



             Level)                                              

 

             Protein Total (test 7.2 g/dL     6.4-8.3                   



             code = Protein                                        



             Total)                                              

 

             ALT (test code = 12 U/L       1-33                      



             ALT)                                                

 

             AST (test code = 17 U/L       1-32                      



             AST)                                                

 

             Globulin (test code 2.8 g/dL     2.9-3.1      L            



             = Globulin)                                         

 

             A/G Ratio (test code 1.6 ratio                 N            



             = A/G Ratio)                                        

 

             eGFR AA (test code = >60                       N            eGFR (e

stimated



             eGFR AA)     mL/min/1.73 m2                           Glomerular



                                                                 Filtration Rate

) is



                                                                 an estimated va

lue,



                                                                 calculated from

 the



                                                                 patient's serum



                                                                 creatinine usin

g the



                                                                 MDRD equation. 

It is



                                                                 NOT the patient

's



                                                                 actual GFR. The

 eGFR



                                                                 provides a more



                                                                 clinically usef

ul



                                                                 measure of kidn

ey



                                                                 disease than se

rum



                                                                 creatinine



                                                                 alone.***This



                                                                 calculation rimma

es



                                                                 sex and race in

to



                                                                 account, if the



                                                                 information is



                                                                 provided. If th

e



                                                                 race is not



                                                                 provided, and t

he



                                                                 patient is



                                                                 -Crystal

n,



                                                                 multiply by 1.2

12.



                                                                 If sex is not



                                                                 provided, and t

he



                                                                 patient is fema

le,



                                                                 multiply by 0.7

42.



                                                                 Results for pat

ients



                                                                 <18 years of ag

e



                                                                 have not been



                                                                 validated by th

e



                                                                 MDRD study and



                                                                 should be



                                                                 interpreted wit

h



                                                                 caution. eGFR R

esult



                                                                 Interpretation:

eGFR



                                                                 > or = 60 is in

 the



                                                                 Normal RangeeGF

R <



                                                                 60 may mean kid

hang



                                                                 diseaseeGFR < 1

5 may



                                                                 mean kidney



                                                                 failure*** Rang

es



                                                                 recommended by 

the



                                                                 [employer]



                                                                 [employer],



                                                                 http://nkdep.ni

h.gov



Comprehensive Metabolic Panel2019-09-15 00:04:54





             Test Item    Value        Reference Range Interpretation Comments

 

             Sodium Level (test 137.0 mmol/L 135.0-145.0               



             code = Sodium Level)                                        

 

             Potassium Level 4.0 mmol/L   3.5-5.1                   



             (test code =                                        



             Potassium Level)                                        

 

             Chloride Level (test 103 mmol/L                       



             code = Chloride                                        



             Level)                                              

 

             CO2 (test code = 23 mmol/L    22-29                     



             CO2)                                                

 

             Anion Gap (test code 11 mmol/L    7-16                      



             = Anion Gap)                                        

 

             BUN (test code = 11.50 mg/dL  6.00-20.00                



             BUN)                                                

 

             Creatinine Level 1.00 mg/dL   0.50-0.90    H            



             (test code =                                        



             Creatinine Level)                                        

 

             BUN/Creat Ratio 12                        N            



             (test code =                                        



             BUN/Creat Ratio)                                        

 

             Glucose Level (test 108 mg/dL                        



             code = Glucose                                        



             Level)                                              

 

             Calcium Level (test 10.3 mg/dL   8.3-10.5                  



             code = Calcium                                        



             Level)                                              

 

             Alk Phos (test code 93 U/L                           



             = Alk Phos)                                         

 

             Bilirubin Total 0.5 mg/dL    0.1-0.9                   



             (test code =                                        



             Bilirubin Total)                                        

 

             Albumin Level (test 4.4 g/dL     3.5-5.2                   



             code = Albumin                                        



             Level)                                              

 

             Protein Total (test 7.2 g/dL     6.4-8.3                   



             code = Protein                                        



             Total)                                              

 

             ALT (test code = 12 U/L       1-33                      



             ALT)                                                

 

             AST (test code = 17 U/L       1-32                      



             AST)                                                

 

             Globulin (test code 2.8 g/dL     2.9-3.1      L            



             = Globulin)                                         

 

             A/G Ratio (test code 1.6 ratio                 N            



             = A/G Ratio)                                        

 

             eGFR AA (test code = >60                       N            eGFR (e

stimated



             eGFR AA)     mL/min/1.73 m2                           Glomerular



                                                                 Filtration Rate

) is



                                                                 an estimated va

lue,



                                                                 calculated from

 the



                                                                 patient's serum



                                                                 creatinine usin

g the



                                                                 MDRD equation. 

It is



                                                                 NOT the patient

's



                                                                 actual GFR. The

 eGFR



                                                                 provides a more



                                                                 clinically usef

ul



                                                                 measure of kidn

ey



                                                                 disease than se

rum



                                                                 creatinine



                                                                 alone.***This



                                                                 calculation rimma

es



                                                                 sex and race in

to



                                                                 account, if the



                                                                 information is



                                                                 provided. If th

e



                                                                 race is not



                                                                 provided, and t

he



                                                                 patient is



                                                                 -Crystal

n,



                                                                 multiply by 1.2

12.



                                                                 If sex is not



                                                                 provided, and t

he



                                                                 patient is fema

le,



                                                                 multiply by 0.7

42.



                                                                 Results for pat

ients



                                                                 <18 years of ag

e



                                                                 have not been



                                                                 validated by [facility]



                                                                 MDRD study and



                                                                 should be



                                                                 interpreted wit

h



                                                                 caution. eGFR R

esult



                                                                 Interpretation:

eGFR



                                                                 > or = 60 is in

 the



                                                                 Normal RangeeGF

R <



                                                                 60 may mean kid

hang



                                                                 diseaseeGFR < 1

5 may



                                                                 mean kidney



                                                                 failure*** Rang

es



                                                                 recommended by 

the



                                                                 [employer]



                                                                 [employer],



                                                                 http://nkdep.ni

h.gov

 

             eGFR Non-AA (test 58.45                     N            eGFR (sheba

mated



             code = eGFR Non-AA) mL/min/1.73 m2                           Glomer

ular



                                                                 Filtration Rate

) is



                                                                 an estimated va

lue,



                                                                 calculated from

 the



                                                                 patient's serum



                                                                 creatinine usin

g the



                                                                 MDRD equation. 

It is



                                                                 NOT the patient

's



                                                                 actual GFR. The

 eGFR



                                                                 provides a more



                                                                 clinically usef

ul



                                                                 measure of kidn

ey



                                                                 disease than se

rum



                                                                 creatinine



                                                                 alone.***This



                                                                 calculation rimma

es



                                                                 sex and race in

to



                                                                 account, if the



                                                                 information is



                                                                 provided. If th

e



                                                                 race is not



                                                                 provided, and t

he



                                                                 patient is



                                                                 -Crystal

n,



                                                                 multiply by 1.2

12.



                                                                 If sex is not



                                                                 provided, and t

he



                                                                 patient is fema

le,



                                                                 multiply by 0.7

42.



                                                                 Results for pat

ients



                                                                 <18 years of ag

e



                                                                 have not been



                                                                 validated by [facility]



                                                                 MDRD study and



                                                                 should be



                                                                 interpreted wit

h



                                                                 caution. eGFR R

esult



                                                                 Interpretation:

eGFR



                                                                 > or = 60 is in

 the



                                                                 Normal RangeeGF

R <



                                                                 60 may mean kid

hang



                                                                 diseaseeGFR < 1

5 may



                                                                 mean kidney



                                                                 failure*** Rang

es



                                                                 recommended by 

the



                                                                 [employer]



                                                                 Foundation,



                                                                 http://nkdep.ni

h.gov



Urinalysis Microscopic2019-09-15 00:02:53





             Test Item    Value        Reference Range Interpretation Comments

 

             UA WBC (test code = UA WBC) 6-10         0-5          A            

 

             UA RBC (test code = UA RBC) 0-5          0-5                       

 

             UA Bacteria (test code = UA Bacteria) Many                      A  

          

 

             UA Squam Epithelial (test code = UA TNTC                      A    

        



             Squam Epithelial)                                        



Urine Drug [id number] 23:59:42





             Test Item    Value        Reference Range Interpretation Comments

 

             Amphetamine Screen Ur POSITIVE     Negative     A            



             (test code = Amphetamine                                        



             Screen Ur)                                          

 

             Barbiturate Screen Ur Negative     Negative                  



             (test code = Barbiturate                                        



             Screen Ur)                                          

 

             Benzodiazepines Ur (test POSITIVE     Negative     A            



             code = Benzodiazepines                                        



             Ur)                                                 

 

             Cocaine Screen Ur (test Negative     Negative                  



             code = Cocaine Screen                                        



             Ur)                                                 

 

             U Methadone Scr (test Negative     Negative                  



             code = U Methadone Scr)                                        

 

             Opiate Screen Ur (test Negative     Negative                  



             code = Opiate Screen Ur)                                        

 

             U PCP Scrn (test code = Negative     Negative                  



             U PCP Scrn)                                         

 

             Cannabinoid Screen Ur Negative     Negative                  



             (test code = Cannabinoid                                        



             Screen Ur)                                          

 

             U TCA (test code = U Negative     Negative                  The res

ults of all



             TCA)                                                drug screen elinor

ts are



                                                                 only preliminar

y.



                                                                 Clinical



                                                                 consideration a

nd



                                                                 professional ju

dgment



                                                                 should be appli

ed to



                                                                 any drug of abu

se



                                                                 test result,



                                                                 particularly wh

en



                                                                 preliminary pos

itive



                                                                 results are obt

ained.



                                                                 Please order a



                                                                 separate confir

matory



                                                                 test if desired

.



HCG Qualitative [id number] 23:54:43





             Test Item    Value        Reference Range Interpretation Comments

 

             hCG Ur (test code = Negative                               If the r

esult is



             hCG Ur)                                             "Negative" in p

atients



                                                                 suspected to be



                                                                 pregnant, recom

mend



                                                                 retest with a s

ample



                                                                 obtained 48 to 

72



                                                                 hours later, or

 by



                                                                 ordering a



                                                                 quantitative as

say. If



                                                                 the result is



                                                                 "Borderline" te

sting



                                                                 should be repea

gianfranco in



                                                                 48 to 72 hours.

 

             Lot # (test code = [id number]                    N            



             Lot #)                                              

 

             Expiration Dt (test 2020                  N            



             code = Expiration Dt)                                        

 

             Neg Control (test Negative                               



             code = Neg Control)                                        

 

             Pos Control (test Positive                               



             code = Pos Control)                                        

 

             Internal QC (test Acceptable                             



             code = Internal QC)                                        



Urinalysis with Culture, if [id number] 23:52:04





             Test Item    Value        Reference Range Interpretation Comments

 

             UA Color (test code = UA YELLO        Yellow                    



             Color)                                              

 

             UA Appear (test code = SCLD         Clear        A            



             UA Appear)                                          

 

             UA pH (test code = UA 6.5                                    



             pH)                                                 

 

             UA Spec Grav (test code 1.011        1.001-1.035               



             = UA Spec Grav)                                        

 

             UA Glucose (test code = NEG          Negative                  



             UA Glucose)                                         

 

             UA Bili (test code = UA NEG          Negative                  



             Bili)                                               

 

             UA Ketones (test code = NEG          Negative                  



             UA Ketones)                                         

 

             UA Blood (test code = UA NEG          Negative                  



             Blood)                                              

 

             UA Protein (test code = NEG          Negative                  



             UA Protein)                                         

 

             UA Urobilinogen (test 1 mg/dL      >0.2         A            



             code = UA Urobilinogen)                                        

 

             UA Nitrite (test code = POS          Negative     A            



             UA Nitrite)                                         

 

             UA Leuk Est (test code = NEG          Negative                  



             UA Leuk Est)                                        

 

             UA Micro Ind? (test code Indicated    Not Indicated A            Re

sult created by



             = UA Micro Ind?)                                        rule



                                                                 [id number]_MICRO

_IND



Automated [id number] 23:48:39





             Test Item    Value        Reference Range Interpretation Comments

 

             Neutro Auto (test code = Neutro 75.7 %       36.0-70.0    H        

    



             Auto)                                               

 

             Lymph Auto (test code = Lymph Auto) 14.1 %       12.0-44.0         

        

 

             Mono Auto (test code = Mono Auto) 7.6 %        0.0-11.0            

      

 

             Eos, Auto (test code = Eos, Auto) 1.8 %        0.0-7.0             

      

 

             Basophil Auto (test code = Basophil 0.5 %        0.0-2.0           

        



             Auto)                                               

 

             Neutro Absolute (test code = Neutro 12.7 x10     1.6-7.4      H    

        



             Absolute)                                           

 

             Lymph Absolute (test code = Lymph 2.38 x10     .50-4.60            

      



             Absolute)                                           

 

             Mono Absolute (test code = Mono 1.28 x10     .00-1.20     H        

    



             Absolute)                                           

 

             Eos Absolute (test code = Eos 0.31 x10     0.00-0.74               

  



             Absolute)                                           

 

             Baso Absolute (test code = Baso 0.09 x10     0.00-0.21             

    



             Absolute)                                           



IG [id number] 23:48:39





             Test Item    Value        Reference Range Interpretation Comments

 

             IG (test code = IG) 0.3 %        0.0-5.0                   

 

             IG Abs (test code = IG Abs) 0 x10                     N            



Complete Blood Count with [id number] 23:48:38





             Test Item    Value        Reference Range Interpretation Comments

 

             WBC (test code = WBC) 16.8 x10     4.4-10.5     H            

 

             RBC (test code = RBC) 4.06 x10     3.75-5.20                 

 

             Hgb (test code = Hgb) 12.7 g/dL    12.2-14.8                 

 

             MCV (test code = MCV) 94.10 fL     80..00              

 

             Hct (test code = Hct) 38.2 %       36.5-44.4                 

 

             MCHC (test code = 33.20 g/dL   32.00-37.50               



             MCHC)                                               

 

             RDW CV (test code = 13.2 %       11.5-14.5                 



             RDW CV)                                             

 

             MCH (test code = MCH) 31.3 pg      27.0-32.5                 

 

             Platelets (test code = 363.0 x10    140.0-440.0               



             Platelets)                                          

 

             MPV (test code = MPV) 10.0 fL                   N            

 

             Slide Review (test Auto         Auto                      Result cr

eated by



             code = Slide Review)                                        GL_SJM_

SLIDE_REV_AUTO

 

             nRBC (test code = 0                         N            



             nRBC)                                               

 

             NRBC Abs (test code = 0.00 x10                  N            



             NRBC Abs)                                           

 

             IPF (test code = IPF) 0 %                       N            



RPR, [id number] 05:53:00





             Test Item    Value        Reference Range Interpretation Comments

 

             RPR (test code = RPR) Non-Reactive Non-Reactive N            



BHCG, Serum, [id number] 01:39:00





             Test Item    Value        Reference Range Interpretation Comments

 

             Preg Qual [Se] (test code = BSHCG) Negative     Negative     N     

       



BHCG, Serum, [id number] 01:09:00





             Test Item    Value        Reference Range Interpretation Comments

 

             B hCG, Quant (test <1 mIU/mL                              Weeks of 

Gestation



             code = BHCGQT)                                        Ranges (mIU/m

L)3 weeks



                                                                 5.40 - 72.04 we

eks 10.2



                                                                 - 7085 weeks 21

7 - 44508



                                                                 weeks 152 - 321

777 weeks



                                                                 4059 - 2568052 

weeks



                                                                 50103 - 9328916

 weeks



                                                                 84518 - 7271876

0 weeks



                                                                 17682 - 3316187

2 weeks



                                                                 56075 - 4530137

4 weeks



                                                                 46923 - 1961557

 weeks



                                                                 36747 - 6563448

 weeks



                                                                 8904 - 6471160 

weeks



                                                                 5500 - 2265397 

weeks



                                                                 4487 - 42490



Thyroid Stimulating Hormone (TSH)2017 01:09:00





             Test Item    Value        Reference Range Interpretation Comments

 

             TSH (test code = TSH) 0.93 mIU/mL  0.270-4.200  N            



Lipid [id number] 01:05:00





             Test Item    Value        Reference Range Interpretation Comments

 

             Cholesterol (test 163 mg/dL    0-200        N            



             code = CHOL)                                        

 

             Triglycerides (test 108 mg/dL    9-200        N            



             code = TRIG)                                        

 

             HDL (test code = 41 mg/dL     50-60        L            



             HDL)                                                

 

             Chol/HDL (test code 4.0 Ratio    0.0-4.4      N            



             = CHOLPHDL)                                         

 

             LDL, Calculated 100          0-130        N            (NOTE)RISK O

F HEART



             (test code = LDLC)                                        DISEASEPu

blished by



                                                                 American Heart



                                                                 AssociationAnal

yte



                                                                 Optimal Boderli

ne



                                                                 Increased RiskC

HOL <200



                                                                 200-239 >240TRI

G <150



                                                                 150-199 >200HDL

 Male:



                                                                 >60 <40HDL Fema

le: >60



                                                                 <50LDL  <100 13

0-159



                                                                 >160LDL NEAR OP

TIMAL IS



                                                                 100-129

 

             VLDL (test code = 22 mg/dL     5-40         N            



             VLDL)                                               

 

             LDL/HDL (test code = 2                                      



             LDLPHDL)                                            



Comprehensive Metabolic [id number] 21:02:00





             Test Item    Value        Reference Range Interpretation Comments

 

             Sodium (test code = 140 mmol/L   135-145      N            



             NA)                                                 

 

             Potassium (test 3.6 mmol/L   3.5-5.1      N            



             code = K)                                           

 

             Chloride (test code 103 mmol/L          N            



             = CL)                                               

 

             Carbon Dioxide 26 mmol/L    22-29        N            



             (test code = CO2)                                        

 

             Glucose (test code 89 mg/dL            N            



             = GLU)                                              

 

             Blood Urea Nitrogen 13 mg/dL     6-20         N            



             (test code = BUN)                                        

 

             Creatinine (test 0.8 mg/dL    0.5-0.9      N            



             code = CREAT)                                        

 

             Calcium (test code 10.0 mg/dL   8.3-10.5     N            



             = CA)                                               

 

             Prot Total (test 7.0 g/dL     6.4-8.3      N            



             code = TP)                                          

 

             Albumin (test code 4.2 g/dL     3.5-5.2      N            



             = ALB)                                              

 

             A/G Ratio (test 1.5 Ratio                              



             code = AGRATIO)                                        

 

             Globulin (test code 2.8          2.9-3.1      L            



             = GLOB)                                             

 

             Bili Total (test 0.6 mg/dL    0.1-0.9      N            



             code = TBIL)                                        

 

             Alk Phos (test code 91 U/L              N            



             = APHOS)                                            

 

             AST (test code = 19 U/L       1-32         N            



             AST)                                                

 

             ALT (test code = 14 U/L       1-33         N            



             ALT)                                                

 

             BUN/Creatinine 16.3                                   



             Ratio (test code =                                        



             BCRATIO)                                            

 

             Anion Gap (test 11 mmol/L    7-16         N            



             code = AGAP)                                        

 

             Estimated GFR (test >60                                    eGFR (es

timated



             code = GFR)  mL/min/1.73m2                           Glomerular Nguyễn

tration



                                                                 Rate) is an est

imated



                                                                 value,calculate

d from



                                                                 the patient's s

harman



                                                                 creatinine usin

g the



                                                                 MDRD equation.I

t is



                                                                 NOT the patient

's



                                                                 actual GFR. The

 eGFR



                                                                 provides a more



                                                                 clinicallyusefu

l



                                                                 measure of kidn

ey



                                                                 disease than se

rum



                                                                 creatinine



                                                                 alone.***This



                                                                 calculation rimma

es sex



                                                                 and race into



                                                                 account, if the



                                                                 informationis



                                                                 provided. If th

e race



                                                                 is not provided

, and



                                                                 the patient



                                                                 isAfrican-Ameri

can,



                                                                 multiply by 1.2

12. If



                                                                 sex is not prov

ided,



                                                                 and thepatient 

is



                                                                 female, multipl

y by



                                                                 0.742. Results 

for



                                                                 patients <18 ye

ars



                                                                 ofage have not 

been



                                                                 validated by th

e MDRD



                                                                 study and shoul

d be



                                                                 interpretedwith



                                                                 caution.eGFR Re

sult



                                                                 Interpretation:

eGFR >



                                                                 or = 60 is in t

he



                                                                 Normal RangeeGF

R < 60



                                                                 may mean kidney



                                                                 diseaseeGFR < 1

5 may



                                                                 mean kidney



                                                                 failure***Range

s



                                                                 recommended by 

the



                                                                 [employer] Kid[employer]



                                                                 Foundation,http

://nkd



                                                                 ep.nih.gov



Alcohol/Ethanol, [id number] 21:02:00





             Test Item    Value        Reference Range Interpretation Comments

 

             Alcohol, Ethyl <0.01 g/dL   0.00-0.01    N            Intoxicated 0

.080 g/dL



             (test code = ETOH)                                        or more



CBC with [id number] 20:35:00





             Test Item    Value        Reference Range Interpretation Comments

 

             WBC (test code = WBC) 10.3 K/cumm  4.4-10.5     N            

 

             RBC (test code = RBC) 4.37 M/cumm  3.75-5.20    N            

 

             Hemoglobin (test code = HGB) 13.1 gm/dL   12.2-14.8    N           

 

 

             Hematocrit (test code = HCT) 41.5 %       36.5-44.4    N           

 

 

             MCV (test code = MCV) 94.9 fL             N            

 

             MCH (test code = MCH) 30.1 pg      27.0-32.5    N            

 

             MCHC (test code = MCHC) 31.7 g/dL    32.0-37.5    L            

 

             RDW (test code = RDW) 12.9 %       11.5-14.5    N            

 

             Platelet Count (test code = 327 K/cumm   140-440      N            



             PLTCT)                                              

 

             MPV (test code = MPV) 7.0 fL                                 

 

             Diff Method (test code = DIFFM) Auto                               

    

 

             Neutrophil (test code = NEUT) 74.0 %       36-70        H          

  

 

             Lymphocyte (test code = LYMPH) 17.6 %       12-44        N         

   

 

             Monocyte (test code = MONO) 6.4 %        0-11         N            

 

             Eosinophil (test code = EOS) 1.5 %        0-7          N           

 

 

             Basophil (test code = BASO) 0.5 %        0-2          N            

 

             Neutro Abs (test code = ANEUT) 7.6 K/cumm   1.6-7.4      H         

   

 

             Lymph Abs (test code = ALYMPH) 1.8 K/cumm   0.5-4.6      N         

   

 

             Mono Abs (test code = AMONO) 0.7 K/cumm   0.0-1.2      N           

 

 

             Eos Abs (test code = AEOS) 0.16 K/cumm  0.00-0.74    N            

 

             Baso Abs (test code = ABASO) 0.1 K/cumm   0.00-0.21    N           

 



[id number] 20:33:00





             Test Item    Value        Reference Range Interpretation Comments

 

             Amphetamine (test code POSITIVE     Negative     A            For d

iagnostic purposes



             = AMPH)                                             only, positive 

results



                                                                 should always b

e



                                                                 assessedin



                                                                 conjunctionwith

 the



                                                                 patient's medic

al



                                                                 history,clinica

l



                                                                 examination and



                                                                 otherfindings.T

o



                                                                 fulfill legal



                                                                 requirements, a

 more



                                                                 specific altern

ate



                                                                 chemical method

must be



                                                                 used inorder to

 obtain



                                                                 a Confirmed judith

lytical



                                                                 result. GC/MS i

s the



                                                                 preferred confi

rmatory



                                                                 method.

 

             Barbiturates (test Negative     Negative     N            



             code = GENEVIEVE)                                        

 

             Benzodiazepine (test Negative     Negative     N            



             code = IRENE)                                        

 

             Cocaine (test code = Negative     Negative     N            



             COCA)                                               

 

             Methadone (test code = Negative     Negative     N            



             MTHD)                                               

 

             Opiates (test code = Negative     Negative     N            



             OPIA)                                               

 

             PCP (test code = PCP) Negative     Negative     N            

 

             Propoxyphene (test Negative     Negative     N            



             code = PROPOX)                                        

 

             THC (test code = THC) Negative     Negative     N            



Urinalysis [id number] 20:23:00





             Test Item    Value        Reference Range Interpretation Comments

 

             Color (test code = COLOR) Yellow       Yellow,Straw,Pl N           

 



                                       yellow                    

 

             Clarity (test code = Sl Cloudy    Clear        A            



             CLAR)                                               

 

             Specific Gravity (test 1.007        1.001-1.035  N            



             code = SPGR)                                        

 

             pH (test code = PH) 6.0          5.0-9.0      N            

 

             Ketone (test code = KET) Negative mg/dL Negative     N            

 

             Glucose (test code = Negative mg/dL Negative     N            



             GLUCUR)                                             

 

             Protein (test code = Negative mg/dL Negative     N            



             PROT)                                               

 

             Bilirubin (test code = Negative mg/dL Negative     N            



             BILI)                                               

 

             Occult Blood (test code = Moderate     Negative     A            



             UDOB)                                               

 

             Urobilinogen (test code = 0.2 mg/dL    0.2-1.0      N            



             UROB)                                               

 

             Nitrite (test code = NIT) Positive     Negative     A            

 

             Leuk Esterase (test code Moderate     Negative     A            



             = LEUK)                                             

 

             Micros Exam (test code = Indicated                              



             MEXAM)                                              

 

             Epithelial Cells (test 50+ /LPF     0-30         A            



             code = EPI)                                         

 

             WBC, Urine (test code = 41-50 /HPF   0-5          A            



             UWBC)                                               

 

             RBC, Urine (test code = 3-5 /HPF     0-5          A            



             URBC)                                               

 

             Bacteria (test code = Many /HPF                              



             BACT)                                               



[id number] 16:21:00





             Test Item    Value        Reference Range Interpretation Comments

 

             Lithium (test code = LI) 0.6 mmol/l   0.6-1.2                   



[facility] ([facility])2017 16:21:00





             Test Item    Value        Reference Range Interpretation Comments

 

             TSH (test code = TSH) 2.14 mIU/L   0.47-4.68                 



[facility]





             Test Item    Value        Reference Range Interpretation Comments

 

             Glucose (test code 77 mg/dl                         



             = GLU)                                              

 

             BUN (test code = 9.0 mg/dl    6.0-17.0                  



             BUN)                                                

 

             Creatinine (test 0.7 mg/dl    0.4-1.2                   



             code = CREA)                                        

 

             Sodium (test code = 139 mmol/l   137-145                   



             NA)                                                 

 

             Potassium (test 4.7 mmol/l   3.5-5.0                   



             code = K)                                           

 

             Chloride (test code 107 mmol/l                       



             = CL)                                               

 

             CO2 (test code = 22 mmol/l    22-30                     



             CO2)                                                

 

             Anion Gap (test 11                                     



             code = GAP)                                         

 

             Calcium (test code 9.8 mg/dl    8.4-10.2                  



             = CALC)                                             

 

             T Protein (test 8.0 gm/dl    5.1-8.7                   



             code = TP)                                          

 

             Albumin (test code 4.4 gm/dl    3.5-4.6                   



             = ALB)                                              

 

             A/G Ratio (test 1.2 %        1.1-2.2                   



             code = AGRAT)                                        

 

             AST (SGOT) (test 20 U/L       11-36                     



             code = AST)                                         

 

             ALT (SGPT) (test 29 U/L       11-40                     



             code = ALT)                                         

 

             Alkaline Phos (test 108 U/L                          



             code = ALKP)                                        

 

             Total Bilirubin 0.6 mg/dl    0.2-1.2                   



             (test code = TBIL)                                        

 

             Globulin (test code 3.6 gm/dl    2.3-3.5      H            



             = GLOBU)                                            

 

             Calcium, Corrected 9.5 mg/dl    8.4-10.2                  Various f

ormulas exist



             (test code =                                        for corrected s

harman



             CALCCORR)                                           calcium results

, each



                                                                 yielding differ

ent



                                                                 values. This co

rrected



                                                                 result was base

d on



                                                                 the formula: Co

rrected



                                                                 Calcium = Serum

Calcium



                                                                 + [0.8 * ( 4 -



                                                                 SerumAlbumin)]

 

             EGFR if  >60                                    



             American (test code mL/min/1.73m\                           



             = EGFRAA)    S\2                                    

 

             EGFR if Non- >60                                    Estimate

d Glomerular



             American (test code mL/min/1.73m\                           Filtrat

ion Rate (eGFR)



             = EGFRNA)    S\2                                    Reference Inter

vals



                                                                 Decision Points

 for 18



                                                                 years and older

 and



                                                                 average body ma

ss: >=



                                                                 60 Does not exc

lude



                                                                 kidney disease.

 30 -



                                                                 59 Suggests mod

erate



                                                                 chronic kidney 

disease



                                                                 and indicates t

he need



                                                                 for further



                                                                 investigation



                                                                 including asses

sment



                                                                 of proteinuria 

and



                                                                 cardiovascular



                                                                 factors. < 30 U

sually



                                                                 indicates a nee

d for



                                                                 referral for



                                                                 assessment and



                                                                 management of c

hronic



                                                                 kidney failure.



[facility]



Notes







                Date/Time       Note            Provider        Source

 

                2023 20:08:00-00:00  [facility]



                                                                



                                 [address] [place] [address]                 



                                                                



                                 Emergency Department Document                 



                                  Signed                        



                                                                



                                Patient: Tyler Judd Medical Record#: [id number]

[id number]                 



                                : 1970  Acct:[id number]              

   



                                Age/Sex: 52 / F Admit/Reg Date: 23        

         



                                Loc: [facility] Room: Report Number: [id number] 

                



                                Attending Dr: John Melton MD                 



                                                                



                                                                



                                                                



                                                                



                                Psych HPI                       



                                                                



                                - General                       



                                Nursing note reviewed: Yes                 



                                Source: patient                 



                                Mode of arrival: ambulatory                 



                                Limitations: no limitations                 



                                Primary Care Provider: Wily Genao         

        



                                                                



                                - General                       



                                Chief Complaint: Psychiatric Symptoms           

      



                                Stated Complaint: PINA                 



                                                                



                                - History of Present Illness                 



                                HPI Narrative:                  



                                                                



                                                    51yo f w/ pmh schizophrenia,

 bipolar d/o, htn, hip fx, brought w/ police w/ pina

for psych eval, pt                                  



                                                    states she has a "low mood,"

 has been feeling depressed for a while, has had 

suicidal ideations in                               



                                                    the past but not currently. 

pt states she was here 2 days ago and tested 

positive for                                        



                                                    methamphetamines and she was

 started on depakote and abilify and thinks that 

has helped w/ her                                   



                                                    suicidal ideations however s

till feels depressed. pt was at [facility] 

this morning and they                               



                                                    found a UTI as well as she w

as pregnant. pt denies any other medical 

complaints. pt states she is h                           



                                                    omeless and has been for 3 w

eeks and thinks that is making her depressed. pt 

[facility] and yeste

rday and they ignored her. pt states she drinks 

alcohol ocassionally, l                             



                                ast drink 3 wks ago. and smoke 2ppd. uses meth. 

(John Melton)                 



                                                                



                                - Related Data                  



                                 Allergies                      



                                                                



                                                                



                                                                



                                Allergy/AdvReac Type Severity Reaction Status Da

te / Time                 



                                                                



                                diphenhydramine Allergy Anxiety Verified 

3 20:13                 



                                                                



                                [From Benadryl]                 



                                                                



                                Penicillins Allergy Difficulty Verified 23

 20:13                 



                                                                



                                 Breathing                      



                                                                



                                risperidone [From Risperdal] Allergy Rash Verifi

ed 23 20:13                 



                                                                



                                                                



                                                                



                                                                



                                Review of Systems                 



                                ROS:                            



                                                                



                                Constitutional: No fevers, chills, sweats, weigh

t loss                 



                                Eye: No visual problems                 



                                ENMT: No ear pain, nasal congestion, sore throat

                 



                                Respiratory: No shortness of breath, cough      

           



                                                    Cardiovascular: No Chest tan

n, palpitations, syncope, swelling in legs, dyspnea

on exertion                                         



                                                    Gastrointestinal: No nausea,

 vomiting, diarrhea, abdominal pain, no difficulty 

swallowing                                          



                                                    Genitourinary: No hematuria,

 no dysuria, no hesitancy, no frequency, no 

incontinence                                        



                                Hema/Lymph: Negative for bruising tendency, swol

alana lymph glands                 



                                                    Endocrine: Negative for exce

ssive thirst, glucose normal, no heat or cold 

intolerance                                         



                                                    Musculoskeletal: No back tan

n, neck pain, joint pain, muscle pain, decreased 

range of motion                                     



                                Integumentary: No rash, pruritus, abrasions, no 

skin ulcers                 



                                                    Neurologic: No focal weaknes

s, no paresthesia, no headaches, numbness or 

tingling, no seizures or                            



                                tremors (John Melton)                 



                                                                



                                Past Medical/Surgical History                 



                                Medical History:                 



                                Medical History (Last Updated 23 @ 22:14 b

y Sudheer Barakat RN)                 



                                Hypertension (Medical) I10                 



                                                                



                                                                



                                                                



                                Family/Social History                 



                                                                



                                - Family History                 



                                Family History: reviewed, not pertinent         

        



                                Family History Of: None Reported                

 



                                                                



                                - Social History                 



                                Living Situation: Homeless                 



                                Do you drink alcohol: No                 



                                Current or Hx of Recreational Drug use: No      

           



                                                                



                                Physical Exam                   



                                Triage Vital Signs:                 



                                                                



                                                                



                                                                



                                                                



                                Temperature 37.4 C 23 20:06               

  



                                                                



                                Temperature Source Oral 23 20:06          

       



                                                                



                                Pulse Rate 89 23 20:06                 



                                                                



                                Respiratory Rate 17 23 20:06              

   



                                                                



                                Blood Pressure 129/55 L 23 20:06          

       



                                                                



                                Blood Pressure Source Automatic Cuff 23 20

:06                 



                                                                



                                Blood Pressure Mean 79 23 20:06           

      



                                                                



                                Blood Pressure Position Sitting 23 20:06  

               



                                                                



                                O2 Sat by Pulse Oximetry 97 23 20:06      

           



                                                                



                                Oxygen Delivery Method 23 20:06           

      



                                                                



                                                                



                                Physical Exam:                  



                                                                



                                CONSTITUTIONAL: no apparent distress, well appea

ring                 



                                SKIN: warm, dry, no jaundice, hives or petechiae

                 



                                                    EYES: pupils are equally rou

nd, extraocular movements intact without nystagmus,

clear conjunctiva,                                  



                                non-icteric sclera                 



                                                    HENT: normocephalic, atrauma

tic, moist mucus membranes, oropharynx clear 

without exudates                                    



                                                    NECK: Nontender and supple w

ith no nuchal rigidity, no lymphadenopathy, full 

range of motion                                     



                                                    PULMONARY: clear to ausculta

tion without wheezes, rhonchi, or rales, normal 

excursion, no accessory                             



                                muscle use and no stridor                 



                                                    CARDIOVASCULAR: regular rate

, rhythm, normal S1 and S2. No appreciated murmurs.

Strong radial pulses                                



                                with intact distal perfusion                 



                                                    GASTROINTESTINAL: soft, non-

tender, non-distended, no palpable masses, no 

rebound or guarding                                 



                                                    PSYCHIATRIC: depressed mood 

and affect, denies si/hi, denies hallucinations 

(John Melton)                                       



                                                                



                                Results/Orders                  



                                                                



                                - Results and Orders                 



                                Result diagrams:                 



                                 23 20:19                 



                                                                



                                 23 20:19                 



                                                                



                                - Results and Orders                 



                                Lab Testing Results                 



                                                                



                                                    23 20:19: WBC 7.9, RBC

 4.27, Hgb 12.9, Hct 40.2, MCV 94.10, MCH 30.2, 

MCHC 32.10, RDW Coeff of                            



                                                    Amanda 12.4, Plt Count 211.0, M

PV 11.1, Immature Gran % (Auto) 0.3, Neut % (Auto) 

75.8 H, Lymph %                                     



                                                    (Auto) 10.6 L, Mono % (Auto)

 11.8 H, Eos % (Auto) 0.9, Baso % (Auto) 0.6, Neut 

# (Auto) 6.0, Lymph #                               



                                                    (Auto) 0.83, Mono # (Auto) 0

.93, Eos # (Auto) 0.07, Baso # (Auto) 0.05, 

Immature Gran # (Auto) 0.02,                           



                                Absolute Nucleated RBC 0, Nucleated RBC % (auto)

 0                 



                                                    23 20:19: Sodium 142.0

, Potassium 4.2, Chloride 110 H, Carbon Dioxide 26,

Anion Gap 6, BUN 17,                                



                                                    Creatinine 0.88, Estimated C

reat Clear 80.94, Estimated GFR 78 L, 

BUN/Creatinine Ratio 19, Glucose                           



                                                    92, Calculated Osmolality 29

5.0, Calcium 9.1, Total Bilirubin 0.3, AST 22, ALT 

15, Alkaline                                        



                                                    Phosphatase 109, Total Prote

in 6.9, Albumin 3.9, Globulin 3.0, Albumin/Globulin

Ratio 1.3, Ethyl                                    



                                Alcohol < 3                     



                                23 20:19: HCG (Qual) Negative             

    



                                                    23 20:35: Urine Opiate

s Screen Negative, Urine Methadone Screen Negative,

Ur Propoxyphene                                     



                                                    Screen Negative, Ur Barbitur

ates Screen Negative, Ur Phencyclidine Scrn 

Negative, Ur Amphetamines                           



                                                    Screen Negative, U Benzodiaz

epines Scrn Negative, Urine Cocaine Screen 

Negative, U Cannabinoids                            



                                Screen Negative                 



                                                    23 20:35: Urine Color 

Yellow, Urine Clarity Clear, Urine pH 6.5, Ur 

Specific Gravity 1.015,                             



                                                    Urine Protein Negative, Urin

e Glucose (UA) Negative, Urine Ketones Negative, 

Urine Blood Negative,                               



                                                    Urine Nitrate Negative, Urin

e Bilirubin Negative, Urine Urobilinogen 1.0, Ur 

Leukocyte Esterase                                  



                                                    Trace A, Urine WBC (Auto) No

t Reportable, Urine RBC (Auto) Not Reportable, 

Urine Casts (Auto) Not                              



                                                    Reportable, U Epithel Cells 

(Auto) Not Reportable, Urine Bacteria (Auto) Not 

Reportable, Urine RBC                               



                                                    0-2, Urine WBC 0-5, Ur Squam

ous Epith Cells 6-10 A, Amorphous Crystals Few A, 

Urine Bacteria Few A,                               



                                Hyaline Casts 0-5 A                 



                                                                



                                                                



                                MDM/COURSE                      



                                 Vital Signs                    



                                                                



                                                                



                                                                



                                Temperature 37.4 C 23 20:06               

  



                                                                



                                Pulse Rate 89 23 20:06                 



                                                                



                                Respiratory Rate 17 23 20:06              

   



                                                                



                                Blood Pressure 129/55 L 23 20:06          

       



                                                                



                                O2 Sat by Pulse Oximetry 97 23 20:06      

           



                                                                



                                                                



                                                                



                                                                



                                                                



                                                                



                                Temperature 37.4 C 23 21:37               

  



                                                                



                                Pulse Rate 90 23 21:37                 



                                                                



                                Respiratory Rate 18 23 21:37              

   



                                                                



                                Blood Pressure 127/68 23 21:37            

     



                                                                



                                O2 Sat by Pulse Oximetry 99 23 21:37      

           



                                                                



                                                                



                                                                



                                                                



                                - [facility]                           



                                Medical decision making narrative:              

   



                                                                



                                23 20:13                  



                                ddx: depression, meth abuse, homeless           

      



                                                                



                                will get labs, medically clear, get psych eval  

               



                                02/15/23 05:25                  



                                                    Labs returned within normal 

limits, no UTI, patient is not pregnant as she 

stated. Patient medi                                



                                malathi cleared.                  



                                                                



                                                    Patient evaluated by Psychia

try and cleared for discharge with outpatient 

follow-up.                                          



                                                                



                                 Escalation of care including admission/observat

ion considered: None                 



                                                     Discussions of management w

ith other providers e.g. Hospitalist, Consultant, 

Case Mgmt, Pharmacy,                                



                                Radiology: None                 



                                 Discussed with Radiology regarding test interpr

etation: None                 



                                                     Independent interpretation:

 (e.g., EKG, rhythm strips, x-rays, CT, Other): 

None                                                



                                                     Additional patient history 

obtained from (e.g. Partner, Parent, Friend, EMS, 

Other): None                                        



                                                     Review of external non-ED r

ecord (e.g. Inpt record, Office record, Outpt 

record, Prior Outpt labs,                           



                                PCP record, Outside ED record, Other): None     

            



                                 Diagnostic tests considered but not performed: 

None                 



                                 Prescription medications considered but not giv

en: None                 



                                 Chronic conditions affecting care (e.g. HTN, DM

): None                 



                                                     Social Determinants of heal

th significantly affecting care (e.g. homeless): 

None (John Melton)                                  



                                                                



                                Discharge Plan                  



                                                                



                                - Discharge                     



                                Clinical Impression:                 



                                Depression                      



                                Qualifiers:                     



                                                     Depression Type: unspecifie

d Qualified Code(s): F32.A - Depression, 

unspecified                                         



                                                                



                                Disposition: Home or Self-Care                 



                                Condition: Good                 



                                Care Plan Goals:                 



                                Return if any issues or concerns arise.         

        



                                Follow up with your PCP or with one of the [facility] Providers/Clinics:                 



                                                                



                                [facility]                

 



                                [address] [place] [address]         

        



                                [phone]                  



                                                                



                                [facility]                  



                                [address] [place] [address]                 



                                [phone]                    



                                                                



                                [facility]                 



                                 [place] [address]                 



                                [phone]                    



                                                                



                                [facility]                 



                                [address] [place] [address]                 



                                [phone]                    



                                                                



                                Dr. Manoj Montero                 



                                [address]                 



                                [place] [address]                 



                                [phone]                    



                                                                



                                Dr. Yolie Montalvo                 



                                [address]                 



                                [place] [address]                 



                                [phone]                    



                                                                



                                [facility]           

      



                                [address] [place] [address]             

    



                                [phone]                  



                                                                



                                Return if your condition worsens/return or any i

ssues or concerns arise.                 



                                Referrals:                      



                                Wily Genao MD [Primary Care Provider] -  

               



                                Print Language: English                 



                                                                



                                - Discharge Data                 



                                Time Seen by Provider: 23 19:59           

      



                                                                



                                                                



                                                                



                                Dictated By: John Melton MD                 



                                Signed By: John Melton MD 02/15/23 0526         

        



                                                                



                                                                



                                                                



                                                                



                                                                



                                DD/DT: 23                 



                                TD/TT: 23 : JOSE ELIASICarla  

               



                                                                



                                                                



                                cc: FABIOLA*                    



                                                                



                                Wily Genao MD                 

 

                2020 15:43:00-00:00 [id number]                       [address] [address]                 



                                                                



                                PATIENT NAME: TYLER JUDD ADMIT DATE: 20

                 



                                ACCOUNT NO: [id number] ROOM NO: [id number]        

         



                                MEDICAL RECORD NO: [id number] AGE: 49           

      



                                REPORT TYPE: eECHOCARDIOGRAM REPORT SEX: F      

           



                                                                



                                ADMITTING PHYSICIAN: Derian Ferrara MD        

         



                                ATTENDING PHYSICIAN: Derian Ferrara MD        

         



                                                                



                                                                



                                                                



                                *[facility]*             

    



                                [address] [place], [place] [address]                 



                                Phone [phone]                 



                                                                



                                Transthoracic Echocardiogram                 



                                                                



                                Patient: Tyler Judd                 



                                Study Date: 2020 BP: 97 / 82 Location: [facility]                 



                                URN: [id number] MRN: [id number] Account#: [id number]

[id number]                 



                                : 1970 Age: 49  Height: 62 in / 157.5  

               



                                 cm                             



                                Accession#: [id number] Gender: F Weight: 243

 lb / 110.5                 



                                  kg                            



                                BMI/BSA: 44.5 kg/m 2 /                 



                                 2.26 m 2                       



                                                                



                                *Ordering Physician: * Bee Multani            

     



                                                                



                                *Interpreting Physician: * Savanna GainesSonographer: * Eufemia Hodges                 



                                                                



                                ------------------------------------------------

------------------------                 



                                Indications: NSTEMI.                 



                                                                



                                ------------------------------------------------

------------------------                 



                                Study data: Transthoracic echocardiogram. Proced

ure: Transthoracic                 



                                echocardiography was performed. Image quality wa

s adequate. Intravenous                 



                                contrast (agitated saline) was administered. Com

plete 2D, complete                 



                                spectral Doppler, and color Doppler. Location: [facility]. Patient                 



                                status: Inpatient. Patient room number: ER-4. St

udy status: Stat.                 



                                                                



                                ------------------------------------------------

------------------------                 



                                Findings                        



                                                                



                                Left ventricle: The cavity size is normal. Wall 

thickness is mildly                 



                                increased. The estimated ejection fraction is 65

-69%.                 



                                Right ventricle: The cavity size is normal.     

            



                                Left atrium: The atrium is normal in size.      

           



                                Right atrium: The atrium is normal in size.     

            



                                Atrial septum: Echo contrast study shows no shun

t. 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                 



                                                                



                                PATIENT NAME: TYLER JUDD ACCOUNT #: [id number]

158                 



                                                                



                                                                



                                                                



                                primum aneurysm.                 



                                Aorta: Aortic root: The aortic root is normal in

 size.                 



                                Aortic valve: The valve is structurally normal. 

The valve is                 



                                trileaflet. There is no evidence of stenosis. Th

ere is mild                 



                                regurgitation.                  



                                Mitral valve:  The valve is structurally normal.

 There is mild                 



                                regurgitation.                  



                                Tricuspid valve: The valve is structurally xenia

l. There is mild                 



                                regurgitation.                  



                                Pulmonic valve: The valve is structurally normal

. There is no                 



                                regurgitation.                  



                                Pericardium: There is no pericardial effusion.  

               



                                Pulmonary arteries:                 



                                Not visualized.                 



                                Systemic veins:                 



                                Inferior vena cava: The vessel is normal in size

.                 



                                                                



                                ------------------------------------------------

------------------------                 



                                Measurements                    



                                                                



                                 Left ventricle Value Ref Aortic valve continued

                 



                                Value Ref                       



                                 CLEMENTINA, LAX 5.0 cm 3.8 - 5.2 Mean v, S            

     



                                2.22 m/sec -----                 



                                 ESD, LAX 3.1 cm 2.2 - 3.5 VTI, S               

  



                                64.8 cm -----                   



                                 ESD/bsa, LAX 1.4 cm/m 2 1.3 - 2.1 LVOT/AV, VTI 

ratio                 



                                0.62 -----                      



                                 FS, LAX 37 %  27 - 45 RABIA, VTI                 



                                2.04 cm 2 -----                 



                                 PW, ED 1.3 cm 0.6 - 0.9 RABIA, Vmax              

   



                                1.94 cm 2 -----                 



                                 IVS/PW, ED  0.97 --------- AR peak v           

      



                                3.71 m/sec -----                 



                                 EF 67 % 54 - 74 AR decel time                 



                                2363 ms -----                   



                                 IVRT  80 ms --------- AR PHT                 



                                685 ms -----                    



                                 E', med ruddy, TDI 6.8 cm/sec >=7.0 AR peak grad 

                



                                55 mm Hg -----                  



                                 E/e', med ruddy, TDI 12 ---------                

 



                                 Mitral valve                   



                                Value Ref                       



                                 LVOT Value Ref Peak E                 



                                0.84 m/sec -----                 



                                 Diam, S 2.05 cm --------- Peak A               

  



                                0.9 m/sec -----                 



                                 Area  3.3 cm 2 --------- Decel time            

     



                                324 ms -----                    



                                 Peak renny, S 1.8 m/sec --------- PHT            

     



                                78 ms -----                     



                                 Mean renny, S 1.36 m/sec --------- Peak grad, D  

               



                                2.8 mm Hg -----                 



                                 VTI, S 40.1 cm --------- Peak E/A ratio        

         



                                                                



                                PATIENT NAME: TYLER JUDD ACCOUNT #: [id number]

158                 



                                                                



                                                                



                                                                



                                0.94 -----                      



                                 Peak grad, S 13 mm Hg --------- MVA, PHT       

          



                                2.8 cm 2 -----                  



                                 Mean grad, S 8 mm Hg ---------                 



                                  ml --------- Tricuspid valve            

     



                                Value Ref                       



                                 SV/bsa  58 ml/m 2 --------- TR peak v          

       



                                2.83 m/sec <=2.8                 



                                 Peak RV-RA grad, S                 



                                32 mm Hg -----                  



                                 Ventricular septum Value Ref                 



                                 IVS, ED 1.2 cm 0.6 - 0.9 Aortic root           

      



                                Value Ref                       



                                  Root diam, ED MM                 



                                2.85 cm -----                   



                                 Right ventricle Value Ref                 



                                 Pressure, S 37 mm Hg --------- Pulmonary artery

                 



                                Value  Ref                      



                                 Pressure, S                    



                                31.0 mm Hg -----                 



                                 Left atrium Value Ref                 



                                 AP dim, ES MM 3.5 cm 2.7 - 3.8 Systemic veins  

               



                                Value Ref                       



                                 LA/Ao root ratio, MM 1.24 --------- Estimated C

                 



                                5 mm Hg -----                   



                                                                



                                 Aortic valve Value Ref Pulmonary veins         

        



                                Value Ref                       



                                 Leaflet sep, MM 1.65 cm --------- A rev jaimeo

n                 



                                140 ms -----                    



                                                                



                                ------------------------------------------------

------------------------                 



                                Conclusions                     



                                                                



                                Summary:                        



                                                                



                                1. Left ventricle: The cavity size is normal. Wa

ll thickness is mildly                 



                                  increased. The estimated ejection fraction is 

65-69%.                 



                                2. Right ventricle: The RV pressure during systo

le by Doppler is 37 mm                 



                                 Hg.                            



                                3. Atrial septum: Echo contrast study shows no s

hunt. 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                 



                                 primum aneurysm.                 



                                                                



                                Prepared and electronically signed by           

      



                                                                



                                Savanna Gaines MD                 



                                2020 15:43                 



                                                                



                                                                



                                                                



                                                                



                                                                



                                Electronically Signed by DINH Gaines MD on

 20 at 1543                 



                                                                



                                                                



                                PATIENT NAME: TYLER JUDD ACCOUNT #: [id number]

158                 

 

                2020 13:30:00-00:00                                 [facility] ([facility])        

         



                                Hospitalist Discharge Summary                 



                                REPORT#:[id number] REPORT STATUS: Signed         

        



                                DATE:20 TIME:1330                 



                                                                



                                PATIENT: TYLER JUDD UNIT #: [id number]       

          



                                ACCOUNT#: [id number] ROOM/BED: [place] [address]       

          



                                : [birth year] AGE: 49 SEX: F ATTEND: Sanjiv Ferrara MD                 



                                ADM DT: 20 AUTHOR: Bee Multani MD        

         



                                                                



                                * ALL edits or amendments must be made on the [employer]/computer document *                 



                                                                



                                                                



                                PCP                             



                                                                



                                PCP                             



                                PCP:                            



                                PCP: No Primary or Family Physician             

    



                                                                



                                Discharge to: home                 



                                                                



                                                                



                                General Information                 



                                Date of admission:                 



                                Observation Start Date:                 



                                Date of admission: 20                 



                                                                



                                Discharge date: 20                 



                                Discharge diagnosis:                 



                                see hosp course                 



                                Hospital course:                 



                                HOSP COURSE                     



                                                                



                                48 yo F admitted with Ggeneralised weaknss and C

P night PTA                 



                                                                



                                ASSESSMENT AND PLAN:                 



                                1. Elevated cardiac enzymes. The patient is star

gianfranco on                 



                                weight-based Lovenox. started the patient on asp

irin, statin.                 



                                Hold beta-blocker because of bradycardia        

         



                                        Per cardiology, they feel the elevated c

ardiac enzymes are secondary to demand 

                                        



                                ischemia and not an STEMI                 



                                                                



                                Had CP PTA but resolved- dc home , dc her hoem c

oreq on discharge                 



                                                                



                                2. Symptomatic bradycardia with hypotension. Res

olved                 



                                Off Levophed, off dopamine                 



                                patient reports she may have mistakenly taken mo

re Coreq than her prescribed                 



                                dose at home                    



                                                                



                                3. Leukocytosis, etiology is unclear, However, t

he patient is pancultured.                 



                                Continue the empiric antibiotics, ceftriaxone , 

ID consulted                 



                                po abx on dc                    



                                                                



                                4. History of bipolar. The patient takes lithium

. Lithium level is 0.5                 



                                                                



                                stable for dc                   



                                dc home stable                  



                                                                



                                dc time 33 mins                 



                                Pt. condition on discharge: improved, stable    

             



                                                                



                                Med Rec                         



                                                                



                                Med Rec                         



                                Discharge meds:                 



                                Stop taking the following medications:          

       



                                CARVEDILOL (COREG) 25 MG TAB                 



                                 25 MILLIGRAM ORAL TWICE DAILY WITH MEALS.      

           



                                                                



                                Continue taking these medications:              

   



                                LITHIUM CARBONATE ER (LITHIUM CARBONATE ER) 450 

MG TAB.SA                 



                                 900 MILLIGRAM ORAL DAILY PM                 



                                                                



                                CHOLECALCIFEROL (VITAMIN D3) (VITAMIN D3) 1,000 

UNITS CAP                 



                                 1,000 UNITS ORAL DAILY.                 



                                                                



                                MULTIVITAMIN (MULTI-DAY VITAMIN) 1 TAB TAB      

           



                                 1 TABLET ORAL DAILY.                 



                                                                



                                GABAPENTIN (NEURONTIN) 100 MG CAP               

  



                                 100 MILLIGRAM ORAL THREE TIMES A DAY.          

       



                                                                



                                ALPRAZolam (XANAX) 0.5 MG TAB                 



                                  0.5 MILLIGRAM ORAL EVERY 8 HR AS NEEDED. as ne

eded for ANXIETY                 



                                                                



                                MIRTAZAPINE (REMERON) 30 MG TAB                 



                                 30 MILLIGRAM ORAL DAILY.                 



                                                                



                                ZIPRASIDONE (GEODON) 40 MG CAP                 



                                 40 MILLIGRAM ORAL TWICE DAILY.                 



                                                                



                                MELOXICAM (MOBIC) 7.5 MG TAB                 



                                 7.5 MILLIGRAM ORAL DAILY.                 



                                 Comments:                      



                                 10MG DAILY                     



                                                                



                                ALBUTEROL (PROAIR HFA 90 MCG/ACT) 90 MCG INHALER

                 



                                 1 PUFF INHALATION RT - EVERY 4 HOURS.          

       



                                                                



                                CEPHALEXIN (KEFLEX) 500 MG CAP                 



                                 500 MILLIGRAM ORAL EVERY 6 HOURS.              

   



                                 Qty = 12                       



                                                                



                                                                



                                                                



                                Discharge Instructions                 



                                Activity: as tolerated                 



                                Additional instructions:                 



                                fup with PCP 3-5 days                 



                                Prescriptions: on chart                 



                                Discharge management: greater than 30 mins      

           



                                                                



                                Objective                       



                                                                



                                Physical Exam                   



                                Head/Eyes: atraumatic, clear cornea, EOMI, xenia

l conjunctiva/sclera, normal                 



                                eyelids/periorb., normocephalic, PERRL          

       



                                Cardiovascular: normal capillary refill, regular

 rate rhythm                 



                                Respiratory: aerating well, no distress         

        



                                        Abdomen: non-tender, normal bowel sounds

, soft, no distention, no guarding, no 

                                        



                                hernial, no mass/organomegaly, no rebound       

          



                                Extremities: moves all, normal capillary refill,

 normal range of motion, no                 



                                edema                           



                                Neuro/CNS: alert, oriented X 3                 



                                Psychiatry: normal affect                 



                                                                



                                Electronically Signed by Bee Multani MD on  at 1333                 



                                                                



                                RPT #: [id number]                 



                                ***END OF REPORT***                 



                                                                



                                                                

 

                2020 17:48:00-00:00                                 [facility] ([facility])        

         



                                Infectious Dis. Progress Note                 



                                REPORT#:[id number] REPORT STATUS: Signed         

        



                                DATE:20 TIME:1748                 



                                                                



                                PATIENT: TYLER JUDD UNIT #: [id number]       

          



                                ACCOUNT#: [id number] ROOM/BED: [place] [address]      

           



                                : [birth year] AGE: 49 SEX: F ATTEND: Sanjiv Ferrara MD                 



                                ADM DT: 20 AUTHOR: Serge Kennedy MD                 



                                                                



                                * ALL edits or amendments must be made on the el

[employer]/computer document *                 



                                                                



                                                                



                                Subjective                      



                                Chief Complaint:                 



                                F/u leukocytosis                 



                                HPI:                            



                                Leukocytosis resolving.                 



                                                                



                                Review of Systems                 



                                Constitutional:                 



                                Denies: chills, fever.                 



                                                                



                                Objective                       



                                                                



                                General                         



                                VS/I O:                         



                                Last Documented:                 



                                 Result Date Time                 



                                 Pulse Ox 100  1701                 



                                 B/P 86/47  1701                 



                                 B/P Mean 61  1701                 



                                 Pulse 53  1701                 



                                 Resp 46  1701                 



                                 O2 Delivery Room air  1600                

 



                                  Temp 36.4  1600                 



                                 FiO2 21  0741                 



                                 O2 Flow Rate 0.923670 2052               

  



                                                                



                                Vital Signs                     



                                 Date Temp Pulse Resp B/P B/P Mean Pulse Ox FiO2

                 



                                 - 36.4-37.1 51-67 18-54 /40-77 

  21                 



                                                                



                                24 hour I O ending at 0700:                 



                                  0700  1900                 



                                 Intake Total 4572.60 408.20                 



                                 Output Total 1250 2300                 



                                  Balance 3322.60 -1891.80                 



                                                                



                                 Intake, IV 3432.60 408.20                 



                                 Intake, Oral 1140                 



                                 Number 0                       



                                 Bowel                          



                                 Movements                      



                                 Number Voids 0                 



                                 Output, Urine 1250 2300                 



                                                                



                                Patient Weight                  



                                                                



                                Weight (lb):                    



                                Weight (oz):                    



                                Weight (kg): 110.455                 



                                                                



                                                                



                                Physical Exam                   



                                General appearance: alert, awake                

 



                                Head/Eyes: atraumatic                 



                                Cardiovascular: regular rate rhythm             

    



                                Respiratory: symmetric expansion, no distress   

              



                                Abdomen: soft, no distention                 



                                Musculoskeletal: no joint swelling              

   



                                Neuro/CNS: normal speech                 



                                                                



                                                                



                                Diagnosis, Assessment Plan                 



                                Free Text A P:                  



                                Assessment:                     



                                1. Hypotension, most likely due to NSTEMI.      

           



                                2. Probable septic shock, r/o bacteremia. No res

piratory or urinary symptoms.                 



                                CXR showed no pneumonia. She had diarrhea but it

 is resolving.                 



                                3. Acute kidney failure. Resolving.             

    



                                4. Morbid obesity.                 



                                5. COPD.                        



                                                                



                                Plan:                           



                                1. Leukocytosis resolving.                 



                                2. Continue ceftriaxone. Will likely stop in 1-2

 days depending on culture                 



                                results.                        



                                3. S/p vancomycin IV x 1 dose.                 



                                4. If diarrhea returns, will order stool culture

 and C. difficile.                 



                                                                



                                                                



                                Electronically Signed by Serge Kennedy MD on 20 at 1753                 



                                                                



                                RPT #: [id number]                 



                                ***END OF REPORT***                 



                                                                



                                                                

 

                2020 13:37:00-00:00 [id number]                       [facility]                

 



                                 [id number] [place] [address]                 



                                                                



                                PATIENT NAME: TYLER JUDD ADMIT DATE: 20

                 



                                ACCOUNT NO: [id number]  ROOM NO: L.S201       

          



                                MEDICAL RECORD NO: [id number] AGE: 49           

      



                                REPORT TYPE: PROGRESS NOTE SEX: F               

  



                                                                



                                ADMITTING PHYSICIAN: Derian Ferrara MD        

         



                                ATTENDING PHYSICIAN: Derian Ferrara MD        

         



                                                                



                                                                



                                DATE: 2020                 



                                                                



                                The patient is seen in the room. I reviewed the 

chart. At this time, troponin                 



                                is borderline high. The patient is already start

ed on Lovenox. The patient                 



                                                    came with hypertension and w

ith normal saline the patient improved. The patient

                                                    



                                        had enteritis before.  However, the zaki

ent also has troponins borderline high 

                                        



                                and Lovenox was started and we will continue to 

follow the patient. Possibly,                 



                                        we have to rule out the possibility of a

n acute myocardial infarction. On this 

                                        



                                lady, a troponin borderline high shows there is 

indication of some myocardial                 



                                ischemia.                       



                                                                



                                From my point of view, the patient is awake and 

alert. We will continue the                 



                                present medication as ordered by Dr. Multani and pr

ary physician intensivist.                 



                                                                



                                Dictated By: DINH Soto MD               

  



                                                                



                                WT: PN:L.HIM/BALPA/NTS                 



                                DD: 2020 13:37:18                 



                                DT: 2020 14:57:10                 



                                Conf#: [id number]/DID#: [id number]                 



                                                                



                                Authenticated by Savanna Gaines MD On  01:10:35 PM                 



                                                                



                                                                



                                                                



                                                                



                                                                



                                Electronically Signed by DINH Gaines MD on

 20 at 1310                 



                                                                



                                                                



                                                                



                                                                



                                                                



                                                                



                                                                



                                                                



                                                                



                                                                



                                                                



                                                                



                                                                



                                                                



                                                                



                                PATIENT NAME: TYLER JUDD ACCOUNT #: [id number]

158                 

 

                2020 13:29:00-00:00 [id number]                       [facility]                

 



                                 [id number] [place] [id number]                 



                                                                



                                PATIENT NAME: TYLER JUDD ADMIT DATE: 20

                 



                                ACCOUNT NO: [id number] ROOM NO: L.ICU07       

          



                                MEDICAL RECORD NO: [id number]  AGE: 49          

       



                                REPORT TYPE: PROGRESS NOTE SEX: F               

  



                                                                



                                ADMITTING PHYSICIAN: Derian Ferrara MD        

         



                                ATTENDING PHYSICIAN: Derian Ferrara MD        

         



                                                                



                                                                



                                DATE: 2020                 



                                                                



                                SUBJECTIVE: The patient is awake, alert. The pat

ient is feeling better. The                 



                                patient's blood pressure is 110/80. The patient'

s normal saline is going                 



                                intravenously. The patient is off of dopamine an

d epinephrine and IV normal                 



                                saline is continusly infusedd 100cc of normal sa

line / hr. The patient was                 



                                transferred from [facility] where she had s

ome significant enteritis;                 



                                        however, the patient was very severely h

ypotensive and was started on dopamine 

                                        



                                and epinephrine. At this time, my point of view,

 the patient is quite stable.                 



                                 She is off of all vasopressors. She is more ronny

rt and able to answer all the                 



                                questions. I will continue to follow the patient

 cardiac wise and the patient                 



                                may be transferred out of ICU. I will leave the 

decision to the primary care                 



                                and intensivist, borderline increase in troponin

 is noted and investigated                 



                                further..                       



                                                                



                                Dictated By: DINH Soto MD               

  



                                                                



                                WT: PN:LCortesHIM/BALPA/NTS                 



                                DD: 2020 13:29:41                 



                                DT: 2020 14:43:35                 



                                Conf#: [id number]/DID#: [id number]                 



                                                                



                                Authenticated and Edited by Savanna Gaines MD On 20 3:24:06 PM                 



                                                                



                                                                



                                                                



                                                                



                                                                



                                Electronically Signed by DINH Gaines MD on

 20 at 1526                 



                                                                



                                                                



                                                                



                                                                



                                                                



                                                                



                                                                



                                                                



                                                                



                                                                



                                                                



                                                                



                                                                



                                                                



                                PATIENT NAME: TYLER JUDD ACCOUNT #: [id number]

158                 

 

                2020 10:32:00-00:00                                 [facility]        

         



                                Hospitalist Progress Note                 



                                REPORT#:[id number] REPORT STATUS: Signed         

        



                                DATE:20 TIME:1032                 



                                                                



                                PATIENT: TYLER JUDD  UNIT #: [id number]      

           



                                ACCOUNT#: [id number] ROOM/BED: [place] [address]      

           



                                : 68 AGE: 49 SEX: F ATTEND: Sanjiv Ferrara MD                 



                                ADM DT: 20  AUTHOR: Bee Multani MD       

          



                                                                



                                * ALL edits or amendments must be made on the [employer]/computer document *                 



                                                                



                                                                



                                Subjective                      



                                Chief Complaint:                 



                                No complaints                   



                                                                



                                Review of Systems                 



                                Respiratory:                    



                                Denies: SOB.                    



                                Cardiovascular:                 



                                Denies: chest pain.                 



                                GI:                             



                                Denies: abdominal pain, diarrhea, nausea, vomiti

ng.                 



                                Heme:                           



                                Denies: bleeding.                 



                                Neuro:                          



                                Denies: headache.                 



                                                                



                                Objective                       



                                                                



                                General                         



                                VS/I O:                         



                                Vital Signs:                    



                                 Date Time Temp Pulse Resp B/P B/P Pulse O2 O2 F

low FiO2                 



                                  Mean Ox Delivery Rate                 



                                  0741 96 Room air 21                 



                                  0710 36.4 60 29 112/58 76 100 Room air   

              



                                  0646 60 29 112/58 79                 



                                  0631 60 28 114/61 83 100                 



                                  0619 64 26 144/77 105 99                 



                                  0615 62  23 136/73 98 98                 



                                  0601 63 30 136/74 97 96                 



                                  0545 59 28 120/57 83 100                 



                                  0530 58 34 120/58 84 100                 



                                  0515 56 30 115/55 77 100                 



                                  0500 63 21 105/54 77 100                 



                                  0445 56 25 113/56 80 100                 



                                  0430 58  28 105/59 74 100                

 



                                  0416 55 20 98/56 72 100                 



                                  0400 59                  



                                  0400 24 95/51 70 100                 



                                  0349 37.1  20                 



                                  0346 56 24 102/52 72 99                 



                                 02/29 0330 67 38 132/63 91 99                 



                                  0315 61 27 101/55 73 99                 



                                  0300  61 26 92/51 66 98                 



                                  0245 62 21 99/54 74 98                 



                                  0231 62 25 108/55 78 99                 



                                  0215 67 29 123/62 87  98                 



                                  0200 64 29 120/57 82 100                 



                                  0145 61 27 100/58 76 99                 



                                  0130 61 34 98/53 70 99                 



                                  0115  60 27 99/58 75 99                 



                                  0100 60 29 96/52 69 99                 



                                  0045 58 27 101/56 73 100                 



                                  0030 61 34 99/56 73 99                 



                                  0015 59 29 94/52 70 99                 



                                  0011 60 26 98/48 69 99                 



                                  0006 62 29 83/40 56 99                 



                                  0000 36.8  24                 



                                  0000 62 31 89/51 66 100                 



                                 02 2358 62 25 92/51 66 100                 



                                  2345 64 27 102/58 77 99                 



                                 02 2330  63 28 96/55 72 100                 



                                 02 2316 64 25 100/55 73 99                 



                                  2300 63 27 119/58 82 95                 



                                  2245 60 18 97/56 74 99                 



                                  2230 58 25 99/55 72 98                 



                                  2215 56 21 97/54 74 99                 



                                  2200 56 25 105/62 78 99                 



                                  2145  57 32 101/65 78 100                

 



                                  2130 57 26 101/58 77 98                 



                                  2116 55 38 105/54 74 95                 



                                  2115 58 42  95                 



                                  2104 58 18 91/50 67 99                 



                                  2100 59 25 99                 



                                  2052 99 Room air 0.409393  21            

     



                                  2045 60 27 100                 



                                  2030 61 19 100                 



                                 2015 61 24 100                 



                                 2000 37.1                 



                                 2000 20                  



                                  2000 62 32 100                 



                                  1945 60 38 100                 



                                  1934 61 104/53 75  100                 



                                  1916 61 117/53 76 99                 



                                 02 1900 55 22 104/59 77 100                 



                                 02 1845 53 23 108/57 80 100                 



                                 02 1816  53 25 108/54 78 99                 



                                  1802 53 20 109/56 80 99                 



                                  1730 53 24 93/54 68                 



                                  1730 36.4 53 24 93/54 67 100 Room air    

             



                                  1700 57 22 110/55 73 100 Room air        

         



                                  1639 57 22 100/57 71 100 Room air        

         



                                  1631 54 102/59 73 97 Room air            

     



                                  1615 58 23 105/62 76 100 Room air        

         



                                  1558 54 110/58 75 100 Room air           

      



                                  1431 57 115/56 75 100  Room air          

       



                                  1409 99 Room air 21                 



                                  1408 57 21 92/52 65 100 Room air         

        



                                  1345 57 22 98/54 68 100                 



                                  1330 57 102/57 72 100 Room air           

      



                                  1246 59 102/57 72                 



                                  1215 59 21 106/57 73 98                 



                                  1200 57 25 106/57 73 99                 



                                  1145 56 107/55 72 100                 



                                  1130 52 27 113/59 77 100                 



                                  1116 53 105/51 69 99                 



                                  1101 51 93/52 65 99                 



                                  1045 52 101/60 73 99                 



                                                                



                                24 hour I O ending at 0700:                 



                                   0700  1900                 



                                 Intake Total 4572.60 408.20                 



                                 Output Total 1250 2300                 



                                 Balance  3322.60 -1891.80                 



                                                                



                                 Intake, IV 3432.60 408.20                 



                                 Intake, Oral 1140                 



                                 Number 0                       



                                  Bowel                         



                                 Movements                      



                                 Number Voids 0                 



                                 Output, Urine 1250 2300                 



                                                                



                                Patient Weight                  



                                                                



                                Weight (lb):                    



                                Weight (oz):                    



                                Weight (kg): 110.455                 



                                                                



                                                                



                                Physical Exam                   



                                General appearance: alert, awake                

 



                                Head/Eyes: atraumatic, clear cornea, EOMI, xenia

l conjunctiva/sclera, normal                 



                                eyelids/periorb., normocephalic, PERRL          

       



                                Cardiovascular: normal capillary refill, regular

 rate rhythm                 



                                Respiratory: aerating well, no distress         

        



                                        Abdomen: non-tender, normal bowel sounds

, soft, no distention, no guarding, no 

                                        



                                hernial, no mass/organomegaly, no rebound       

          



                                Extremities: moves all, normal capillary refill,

 normal range of motion, no                 



                                edema                           



                                Neuro/CNS: alert, oriented X 3                 



                                Psychiatry: normal affect                 



                                                                



                                Results                         



                                Findings/Data:                  



                                Laboratory Tests                 



                                                     



                                  0500 1240                     



                                 Chemistry                      



                                 Sodium (134 - 147 mmol/L) 143                 



                                 Potassium (3.4 - 5.0 mmol/L) 4.2               

  



                                 Chloride (100 - 108 mmol/L) 114 H              

   



                                 Carbon Dioxide (21 - 32 mmol/L) 24             

    



                                 Anion Gap (4.0 - 15.0 GAP calc) 5.0            

     



                                 BUN (7 - 18 MG/DL) 17                 



                                 Creatinine (0.6 - 1.0 MG/DL) 0.9               

  



                                 Glomerular Filtr Rate (>60 estGFR) >=60 max est

imate                 



                                 Glucose (70 - 110 MG/DL) 89                 



                                 Calcium (8.5 - 10.1 MG/DL) 8.3 L               

  



                                 Troponin I (0.000 - 0.045 NG/ML) 0.436 *H      

           



                                                                



                                Laboratory Tests                 



                                                           



                                  0500                          



                                 Hematology                     



                                 WBC (3.5 - 11.0 K/mm3) 15.2 H                 



                                 RBC (4.70 - 6.10 M/mm3) 3.67 L                 



                                 Hgb (10.4 - 14.9 G/DL) 11.3                 



                                 Hct (31.5 - 44.1 %) 35.5                 



                                 MCV (84.5 - 98.6 Fl) 96.7                 



                                  MCH (27.0 - 34.2 pg) 30.8                 



                                 MCHC (31.5 - 34.0 G/DL) 31.8                 



                                 RDW (11.5 - 14.5 SD) 14.2                 



                                  Plt Count (150 - 450 K/mm3) 242.0             

    



                                 MPV (7.0 - 10.5 fL) 10.20                 



                                 Neut % (Auto) (40 - 76 %) 78.8 H               

  



                                  Lymph % (Auto) (20.5 - 51.1 %) 13.1 L         

        



                                 Mono % (Auto) (1.7 - 9.3 %) 6.2                

 



                                 Eos % (Auto) (0.0 - 6.0 %) 1.6                 



                                  Baso % (Auto) (0.0 - 2.0 %) 0.3               

  



                                 Neut # (Auto) (1.8 - 7.6 K/mm3) 11.94 H        

         



                                 Lymph # (Auto) (0.6 - 3.2 K/mm3) 2.0           

      



                                 Mono # (Auto) (0.3 - 1.1 K/mm3) 0.9            

     



                                 Eos # (Auto) (0.0 - 0.4 K/mm3) 0.2             

    



                                 Baso # (Auto) (0.0 - 0.1 K/mm3) 0.1            

     



                                 Add Manual Diff (CRITERIA DIFF/SCN) NO         

        



                                                                



                                                                



                                                                



                                                                



                                Diagnosis, Assessment Plan                 



                                                                



                                Free Text DxA P Notes                 



                                Free Text DxA P Notes:                 



                                                                



                                ASSESSMENT AND PLAN:                 



                                1. Elevated cardiac enzymes. The patient is star

gianfranco on                 



                                weight-based Lovenox. started the patient on asp

irin, statin.                 



                                Hold beta-blocker because of bradycardia, start 

lisinopril                 



                                        Per cardiology, they feel the elevated c

ardiac enzymes are secondary to demand 

                                        



                                ischemia and not an STEMI                 



                                2. Symptomatic bradycardia with hypotension. Res

olved                 



                                Off Levophed, off dopamine                 



                                3. Leukocytosis, etiology is unclear, However, t

he patient is pancultured.                 



                                Continue the empiric antibiotics, ceftriaxone , 

ID consulted                 



                                4. History of bipolar. The patient takes lithium

. Lithium level is noted                 



                                                                



                                                                



                                Patient is stable, possible transfer to [facility]

y this afternoon                 



                                                                



                                DNR status noted                 



                                                                



                                Electronically Signed by Bee Multani MD on  at 1036                 



                                                                



                                RPT #: [id number]                 



                                ***END OF REPORT***                 



                                                                



                                                                

 

                2020 21:26:00-00:00                                 [facility]



                                [facility])        

         



                                Pharmacy Prog.Note-Vancomycin                 



                                REPORT#:[id number] REPORT STATUS: Signed         

        



                                DATE:20 TIME:                 



                                                                



                                PATIENT: TYLER JUDD  UNIT #: [id number]      

           



                                ACCOUNT#: [id number] ROOM/BED: [place] [address]      

           



                                : 68 AGE: 49 SEX: F ATTEND: Sanjiv Ferrara MD                 



                                ADM DT: 20  AUTHOR: Shirin Garcia [facility]  

               



                                                                



                                * ALL edits or amendments must be made on the el

[employer]/computer document *                 



                                                                



                                                                



                                Vancomycin                      



                                                                



                                Vancomycin                      



                                Treatment plan: ONE TIME DOSE                 



                                Rationale:                      



                                 WOULD LIKE ONE TIME DOSE OF 2gm VANCO

MYCIN. HE WILL FOLLOW UP                 



                                        TOMORROW AND EVALUATE PATIENT IF THEY NE

ED CONTINUATION OF THERAPY - 100% RBTO 

                                        



                                .                     



                                                                



                                Electronically Signed by Shirin Garcia [facility] on

 20 at 2127                 



                                                                



                                RPT #: [id number]                 



                                ***END OF REPORT***                 



                                                                



                                                                

 

                2020 20:27:00-00:00                                 [facility]M



                                [facility] ([facility])        

         



                                Infect Disease Consult Note                 



                                REPORT#:[id number] REPORT STATUS: Signed         

        



                                DATE:20 TIME:                 



                                                                



                                PATIENT: TYLER JUDD UNIT #: [id number]       

          



                                ACCOUNT#: [id number] ROOM/BED: [address]      

           



                                : [birth year] AGE: 49 SEX: F ATTEND: Sanjiv Ferrara MD                 



                                ADM DT: 20 AUTHOR: Serge Kennedy MD                 



                                                                



                                * ALL edits or amendments must be made on the el

[employer]/computer document *                 



                                                                



                                                                



                                History of Present Illness                 



                                Requesting Clinician: Dr. Multani                 



                                Reason for consult:                 



                                Sepsis                          



                                Chief complaint:                 



                                Chest pain                      



                                HPI:                            



                                48 yo female with h/o COPD, bipolar disorder, ob

esity who presented with                 



                                worsening intermittent sharp/pressure chest pain

 for 2-3 days. Pt also had                 



                                liquid diarrhea for 3 days, which is now resolve

d. She denied fever, chills,                 



                                cough, sore throat, dysuria, vomiting. Pt was ad

mitted for hypotension due to                 



                                NSTEMI vs septic shock as WBC was elevated.     

            



                                                                



                                History - Adult longitudinal                 



                                Past medical history:                 



                                Reports: COPD, Depression/mood disorder, Hyperte

nsion.                 



                                Past surgical history:                 



                                Reports: Cholecystectomy.                 



                                Additional surgical history:                 



                                ankle surgery, jaw reconstruction, ear sx       

          



                                Smoking status for patients 13 years old or olde

r: Current every day smoker                 



                                Other social history: Local resident            

     



                                Allergies:                      



                                Coded Allergies:                 



                                Penicillins (Severe, SWELLING 16)         

        



                                                                



                                Ambulatory status: Independent                 



                                                                



                                Review of Systems                 



                                Constitutional:                 



                                Denies: chills, fever.                 



                                Skin:                           



                                Denies: rash.                   



                                Allergy/Immun:                  



                                Denies hives                    



                                Eyes:                           



                                Denies discharge                 



                                ENT:                            



                                Denies: sinus problem, sore throat.             

    



                                Respiratory:                    



                                Reports: SOB.                   



                                Cardiovascular:                 



                                Reports: chest pain.                 



                                GI:                             



                                Reports: diarrhea. Denies: nausea, vomiting.    

             



                                :                             



                                Denies: dysuria.                 



                                Musculoskeletal:                 



                                Denies: extremity swelling.                 



                                Neuro:                          



                                Denies: seizure.                 



                                                                



                                Objective                       



                                                                



                                Physical Exam                   



                                General appearance: obese, alert, awake, oriente

d                 



                                Head/eyes: atraumatic, clear cornea, EOMI, normo

cephalic                 



                                ENT: moist mucosal membranes, normal nose, xenia

l pharynx, normal sinus                 



                                Neck: full range of motion, non-tender, normal t

hyroid                 



                                Cardiovascular: regular rate rhythm             

    



                                Respiratory: symmetric expansion, no distress   

              



                                Abdomen: non-tender, normal bowel sounds, soft, 

no distention, no guarding                 



                                Extremities: no clubbing, no cyanosis, no edema 

                



                                Musculoskeletal: no joint swelling              

   



                                Neuro/CNS: alert, oriented X 3, normal speech   

              



                                Skin: intact, no rash                 



                                Psychiatry: normal affect, normal mood          

       



                                                                



                                                                



                                Diagnosis, Assessment Plan                 



                                                                



                                Free Text DxA P Notes                 



                                Free text DxA P notes:                 



                                Assessment:                     



                                                    1. Hypotension, most likely 

due to NSTEMI although septic shock also within the

                                                    



                                differential.                   



                                2. Probable septic shock, r/o bacteremia. No res

piratory or urinary symptoms.                 



                                CXR showed no pneumonia. She had diarrhea but it

 is resolving.                 



                                3. Acute kidney failure.                 



                                4. Morbid obesity.                 



                                5. COPD.                        



                                                                



                                Plan:                           



                                1. No recent hospitalization per pt, thus this c

an be treated as community                 



                                acquired infection.                 



                                2. Increase ceftriaxone to 2 g IV daily.        

         



                                3. Vancomycin IV x 1 dose.                 



                                4. F/u blood cultures.                 



                                5. If diarrhea persists, will order stool cultur

e and C. difficile.                 



                                                                



                                I spent 45 minutes evaluating and examining the 

patient. Thank you for this                 



                                consult.                        



                                                                



                                Electronically Signed by Serge Kennedy MD on 20 at 2037                 



                                                                



                                [facility] #: [id number]                 



                                ***END OF REPORT***                 



                                                                



                                                                

 

                2020 11:32:00-00:00 [id number]                       [place] [address]                 



                                                                



                                PATIENT NAME: TYLER JUDD ADMIT DATE: 20

                 



                                ACCOUNT NO: [id number] ROOM NO: L.ICU07       

          



                                MEDICAL RECORD NO: [id number] AGE: 49           

      



                                REPORT TYPE: CONSULTATION SEX: F                

 



                                                                



                                ADMITTING PHYSICIAN: Derian Ferrara MD        

         



                                ATTENDING PHYSICIAN: Derian Ferrara MD        

         



                                                                



                                                                



                                CONSULTATION DATE: 2020                 



                                                                



                                CONSULTING PHYSICIAN: DINH Soto MD      

           



                                                                



                                                    I am on-call for cardiology 

at [facility]. I saw this patient stat

                                                    



                                consult from the hospitalist. The patient was se

en in the ER for diagnosis.                 



                                1. Obesity.                     



                                2. Sepsis.                      



                                3. Hypovolemia.                 



                                        4. History of enteritis, multiple loose 

bowel movements for the last 3 days to 

                                        



                                4 days.                         



                                5. History of lap band surgery was undone.      

           



                                6. History of hypertension.                 



                                7. Possible bronchitis.                 



                                                                



                                HISTORY OF PRESENT ILLNESS: The patient is trans

ferred from [facility].                 



                                She stayed for 2 days for diarrhea. At this time

, I was asked to evaluate the                 



                                patient because of borderline troponin, loss of 

bradycardia 50 per minute and                 



                                                    the patient's blood pressure

 90/70. I examined the patient, reviewed the chart.

                                                    



                                 At this time, EKG does not show any acute ische

flaco changes and the patient's                 



                                troponin is borderline high and WBC count 25,000

 per cubic mm. The patient's                 



                                chest x-ray was normal and the patient's laborat

ory evaluation shows the                 



                                        patient's GFR is about 39 and the patien

t's creatinine is about 1.5 and BUN is 

                                        



                                about 34.                       



                                                                



                                        The patient's echocardiogram are reviewe

d, but it shows only ejection fraction 

                                        



                                65% and without any significant valvular abnorma

lities. No evidence of                 



                                pericardial effusion and no evidence of wall mot

ion abnormalities. At this                 



                                time, the patient did not have a myocardial infa

rction. Borderline increased                 



                                troponin, troponin leak. The patient has got sep

sis and hypotension and I                 



                                        ordered a 500 mL saline bolus followed 2

50 mL normal saline at 3:00 hours, and 

                                        



                                slowly taper out dopamine and Levophed. The zaki

ent will go to ICU. I will                 



                                follow the patient there.                 



                                                                



                                At this time, the patient is in quite stable con

dition. The patient does not                 



                                                    need further cardiac evaluat

ion. We will repeat the troponin in the morning and

                                                    



                                follow the patient along with other physicians i

ncluding the hospitalist.                 



                                                                



                                The patient is quite comfortable.               

  



                                                                



                                Dictated By: DINH Soto MD               

  



                                                                



                                                                



                                PATIENT NAME: TYLER JUDD ACCOUNT #: [id number]

158                 



                                                                



                                                                



                                                                



                                WT: CON:L.HIM/BALPA/NTS                 



                                DD: 2020 11:32:35                 



                                DT: 2020 15:10:23                 



                                Conf#: [id number]/DID#: [id number]                 



                                                                



                                Authenticated by Savanna Gaines MD On  10:48:16 AM                 



                                                                



                                                                



                                                                



                                                                



                                                                



                                Electronically Signed by DINH Gaines MD on

 20 at 1048                 



                                                                



                                                                



                                                                



                                                                



                                                                



                                                                



                                                                



                                                                



                                                                



                                                                



                                                                



                                                                



                                                                



                                                                



                                                                



                                                                



                                                                



                                                                



                                                                



                                                                



                                                                



                                                                



                                                                



                                                                



                                                                



                                                                



                                                                



                                                                



                                                                



                                                                



                                                                



                                                                



                                                                



                                                                



                                                                



                                                                



                                                                



                                                                



                                                                



                                                                



                                                                



                                                                



                                                                



                                PATIENT NAME: TYLER JUDD ACCOUNT #: [id number]

158                 

 

                2020 08:46:00-00:00                                 [facility] ([facility])        

         



                                Hospitalist History Physical                 



                                REPORT#:[id number] REPORT STATUS: Signed         

        



                                DATE:20 TIME:0846                 



                                                                



                                PATIENT: TYLER JUDD UNIT #: [id number]       

          



                                ACCOUNT#: [id number] ROOM/BED: [place] [address]      

           



                                : 68 AGE: 49 SEX: F ATTEND: Sanjiv Ferrara MD                 



                                ADM DT: 20  AUTHOR: Bee Multani MD       

          



                                                                



                                * ALL edits or amendments must be made on the [employer]/computer document *                 



                                                                



                                                                



                                History of Present Illness                 



                                                                



                                HPI                             



                                Chief complaint:                 



                                CHEST PAIN, weakness                 



                                                                



                                History                         



                                                                



                                Medication/Allergy-Vaccine Hx                 



                                Home Medications:                 



                                ALBUTEROL (PROAIR HFA 90 MCG/ACT) 1 PUFF INH RTQ

4H                 



                                ALPRAZolam (XANAX) 0.5 MG PO Q8H PRN PRN ANXIETY

                 



                                CARVEDILOL (COREG) 25 MG PO BID MEALS           

      



                                CHOLECALCIFEROL (VITAMIN D3) (VITAMIN D3) 1,000 

UNITS PO DAILY                 



                                GABAPENTIN (NEURONTIN) 100 MG PO TID            

     



                                LITHIUM CARBONATE  MG PO DAILY PM         

        



                                MELOXICAM (MOBIC) 7.5 MG PO DAILY               

  



                                MIRTAZAPINE (REMERON) 30 MG PO DAILY            

     



                                MULTIVITAMIN (MULTI-DAY VITAMIN) 1 TAB PO DAILY 

                



                                ZIPRASIDONE (GEODON) 40 MG PO BID               

  



                                                                



                                Discontinued Medications                 



                                CIPROFLOXACIN (CIPRO) 750 MG PO Q12H            

     



                                 Discontinued reason: Patient stopped taking    

             



                                HYDROcodone/APAP (NORCO 10/325) 1 TAB PO Q4H PRN

 PRN PAIN                 



                                 Discontinued reason: Patient stopped taking    

             



                                MELOXICAM (MOBIC) 7.5 MG PO BID                 



                                 Discontinued reason: Patient stopped taking    

             



                                                                



                                Allergies:                      



                                Coded Allergies:                 



                                Penicillins (Severe, SWELLING 16)         

        



                                                                



                                                                



                                Objective                       



                                                                



                                Physical Exam                   



                                General appearance: alert, awake                

 



                                                                



                                                                



                                Diagnosis, Assessment Plan                 



                                                                



                                Free Text DxA P Notes                 



                                Free Text DxA P Notes:                 



                                H/P                             



                                dictation ID [id number]                 



                                                                



                                Electronically Signed by Bee Multani MD on  at 0846                 



                                                                



                                [facility] #: [id number]                 



                                ***END OF REPORT***                 



                                                                



                                                                

 

                2020 08:43:00-00:00 [id number]                       [place] [id number]                 



                                                                



                                PATIENT NAME: TYLER JUDD ADMIT DATE: 20

                 



                                ACCOUNT NO: [id number] ROOM NO: [place] [address]       

          



                                MEDICAL RECORD NO: [id number] AGE: 49           

      



                                REPORT TYPE: HISTORY AND PHYSICAL SEX: F        

         



                                                                



                                ADMITTING PHYSICIAN: Derian Ferrara MD        

         



                                ATTENDING PHYSICIAN: Derian Ferrara MD        

         



                                                                



                                                                



                                ADMISSION DATE: 2020                 



                                                                



                                PRIMARY CARE PHYSICIAN: Unknown.                

 



                                                                



                                CHIEF COMPLAINT: Generalized weakness and chest 

pain.                 



                                                                



                                HISTORY OF PRESENT ILLNESS: The patient is a 49-

year-old female with past                 



                                medical history of bipolar disorder, schizoaffec

tive disorder, COPD, restless                 



                                        leg syndrome, and bronchitis. The patien

t was last well 2 days ago. Yesterday, 

                                        



                                she had an episode of chest pain, lasted about 5

 minutes, retrosternal,                 



                                nonradiating, described as sharp. The patient sa

t down to rest. Subsequently,                 



                                        chest pain went away. She reported at th

at time that she had some shortness of 

                                        



                                                    breath and some dizziness an

d subsequently when the pain went away, the patient

                                                    



                                                    went outside to pick her ana maria

l, but she could not make it because she was so 

weak                                                



                                                    and she actually sat down in

 front of her house. She called 911 EMS and she was

                                                    



                                taken to [facility]. At [facility], she was                 



                                                    noticed to be hypotensive, s

ystolic blood pressure in the 70s, temperature 

36.9,                                               



                                        respiratory rate of 20, and heart rate o

f 50. Her labs were abnormal with REG. 

                                        



                                Creatinine of 1.9. Troponin was 0.421 NT-BNP was

 2042. The patient was given                 



                                IV fluids, normal saline 3 L boluses and she was

 given ceftriaxone 2 g IV and                 



                                        transferred to [facility] f

or further evaluation. In the ER here, 

                                        



                                                    the patient was noted to be 

hypotensive 93/48 and she was still bradycardic 

down                                                



                                to 248. The patient's EKG shows sinus jon at 5

0 with prolonged QT of 508,                 



                                Q-wave in II, III, and aVF. The patient's tropon

ins were elevated at 0.55 and                 



                                the next one was 0.359 here. The patient was giv

en Lovenox 1 mg/kg bodyweight                 



                                every 12 hours, was started on dopamine drip and

 Levophed. The patient at the                 



                                bedside is complaining of weakness. She denies a

ny chest pain.                 



                                                                



                                PAST MEDICAL HISTORY: As mentioned above.       

          



                                                                



                                PAST SURGICAL HISTORY:                 



                                1. Cholecystectomy.                 



                                2. Right ankle surgery.                 



                                                                



                                SOCIAL HISTORY: Smokes 2 packs of cigarettes a d

ay. Denies any alcohol. She                 



                                does meth.                      



                                                                



                                FAMILY HISTORY: The father had an MI.           

      



                                                                



                                ALLERGIES: PENICILLIN AND RISPERIDONE.          

       



                                                                



                                REVIEW OF SYSTEMS:                 



                                                                



                                PATIENT NAME: TYLER JUDD ACCOUNT #: [id number]

158                 



                                                                



                                                                



                                                                



                                CONSTITUTIONAL: Positive for weakness. Negative 

for fevers.                 



                                CARDIOVASCULAR: Positive for chest pain. Positiv

e for shortness of breath,                 



                                negative for any pitting edema.                 



                                                                



                                REVIEW OF SYSTEMS: A 14-point review of system w

as done and was otherwise                 



                                negative except as mentioned above.             

    



                                                                



                                PHYSICAL EXAMINATION:                 



                                VITAL SIGNS: The patient is afebrile, temperatur

e 36.7, heart rate 55,                 



                                                    respiratory rate of 17, bloo

d pressure 97/82, O2 saturation is 96% on room air.

                                                    



                                GENERAL: The patient is awake and alert, oriente

d x3.                 



                                HEENT: Head is normocephalic and atraumatic. Pup

ils equal and reactive to                 



                                light.                          



                                NECK: No cervical adenopathy or thyromegaly. No 

jugular venous distention.                 



                                CHEST: Clear to auscultation bilaterally. No whe

ezing or rhonchi.                 



                                CARDIOVASCULAR: S1 and S2. No murmur or added so

unds. The patient is                 



                                bradycardic.                    



                                ABDOMEN: Soft, nontender, and nondistended. No o

rganomegaly.                 



                                EXTREMITIES: No cyanosis, clubbing, or edema.   

              



                                NEUROLOGICAL: Nonfocal. Noted that the patient i

s obese.                 



                                                                



                                LABS AND IMAGING: CBC: WBC 24.2, hemoglobin 11.5

, and platelet count of 234.                 



                                CMP: Sodium 135, potassium 4.0, creatinine 1.5. 

Troponins 0.55 and 0.399.                 



                                        Chest x-ray shows no acute cardiopulmona

ry disease. EKG with both bradycardia, 

                                        



                                sinus jon 50, , Q-waves in II, III, and 

aVF.                 



                                                                



                                ASSESSMENT AND PLAN:                 



                                1. Non-ST elevation myocardial infarction. The p

atient is started on                 



                                weight-based Lovenox. I have called the cardiolo

gist, Dr. Gaines who                 



                                evaluated the patient, started the patient on as

pirin, statin. We will hold                 



                                beta blockers and ACE inhibitor secondary to hyp

otension and acute kidney                 



                                injury.                         



                                2. Symptomatic bradycardia with hypotension. Nor

mal sinus rhythm, but the                 



                                patient is symptomatic with hypotension. At this

 point, I will get a stat                 



                                echocardiogram. This is likely secondary to non-

ST-elevation myocardial                 



                                infarction. Continue management for non-ST-eleva

tion myocardial infarction.                 



                                We will continue dopamine drip to titrate for he

art rate of 50. Also on                 



                                Levophed                        



                                3. Leukocytosis, etiology is unclear, may be rel

ated to non-ST-                 



                                elevation myocardial infarction. However, the pa

buffy is pancultured.                 



                                Continue the empiric antibiotics, ceftriaxone 1 

g q. 24 hours.                 



                                4. History of bipolar. The patient takes lithium

. I will check a lithium                 



                                level to make sure that some of the symptoms are

 not related to Lithium                 



                                toxicity.                       



                                                                



                                Other comorbidities, we will resume the patient'

s home medications                 



                                and adjust as needed.                 



                                                                



                                Deep venous thrombosis prophylaxis. The patient 

is on                 



                                Lovenox.                        



                                                                



                                Addendum- she says she is DNR, also wanted to ea

t so NPO order was dcd                 



                                                                



                                Critical time spent: I spent 50 mins evaluating 

patient including history and                 



                                                                



                                PATIENT NAME: TYLER JUDD ACCOUNT #: [id number]

158                 



                                                                



                                                                



                                                                



                                examination, medication management and review of

 labs and imaging and                 



                                discussing with the staff                 



                                                                



                                Dictated By: Bee Multani MD                 



                                                                



                                WT: [id number]/NTS                 



                                DD: 2020 08:43:38                 



                                                                



                                DT: 2020 12:45:32                 



                                Conf#: [id number]/DID#: [id number]                 



                                                                



                                Authenticated and Edited by Bee Multani MD O

n 20 7:01:30 PM                 



                                                                



                                                                



                                                                



                                                                



                                                                



                                Electronically Signed by Bee Multani MD on  at 1903                 



                                                                



                                                                



                                                                



                                                                



                                                                



                                                                



                                                                



                                                                



                                                                



                                                                



                                                                



                                                                



                                                                



                                                                



                                                                



                                                                



                                                                



                                                                



                                                                



                                                                



                                                                



                                                                



                                                                



                                                                



                                                                



                                                                



                                                                



                                                                



                                                                



                                                                



                                                                



                                                                



                                                                



                                                                



                                                                



                                                                



                                                                



                                PATIENT NAME: TYLER JUDD ACCOUNT #: [id number]

158                 

 

                2020 06:37:00-00:00 [id number]                       [address] [address]                 



                                                                



                                PATIENT NAME: TYLER JUDD  ADMIT DATE: 

0                 



                                ACCOUNT NO: [id number] ROOM NO: [facility]        

         



                                MEDICAL RECORD NO: [id number] AGE: 49           

      



                                REPORT TYPE: eELECTROCARDIOGRAM SEX: F          

       



                                                                



                                ADMITTING PHYSICIAN: Derian Ferrara MD        

         



                                ATTENDING PHYSICIAN: Derian Ferrara MD        

         



                                                                



                                                                



                                Order:                          



                                [id number]                   



                                Test Reason : (Not Selected)                 



                                 Test Date/Time Stamp:                 



                                 06:37:22                 



                                Blood Pressure : 081/039 mmHG                 



                                Vent. Rate : 058 BPM Atrial Rate : 058 BPM      

           



                                 P-R Int : 154 ms  QRS Dur : 088 ms             

    



                                 QT Int : 528 ms P-R-T Axes : 089 065 180 degree

s                 



                                 QTc Int : 518 ms                 



                                                                



                                Sinus bradycardia                 



                                Nonspecific ST and T wave abnormality           

      



                                Prolonged QT                    



                                Abnormal ECG                    



                                When compared with ECG of 2020 06:33, (Un

confirmed)                 



                                T wave inversion less evident in Lateral leads  

               



                                QT has lengthened                 



                                Confirmed by SAVANNA RODRIGUEZ MD () on 3

/2020 3:25:18 PM                 



                                                                



                                Referred By: Self Referred Confirmed by:SAAVNNA GEORGE MD                 



                                                                



                                                                



                                                                



                                                                



                                                                



                                Electronically Signed by DINH Gaines MD on

 20 at 1525                 



                                                                



                                                                



                                                                



                                                                



                                                                



                                                                



                                                                



                                                                



                                                                



                                                                



                                                                



                                                                



                                                                



                                                                



                                                                



                                                                



                                PATIENT NAME: TYLER JUDD ACCOUNT #: [id number]

[id number]                 

 

                2020 06:33:00-00:00 [id number]                       [address] [address]                 



                                                                



                                PATIENT NAME: TYLER JUDD ADMIT DATE: 20

                 



                                ACCOUNT NO: [id number] ROOM NO: [facility]        

         



                                MEDICAL RECORD NO: [id number] AGE: 49           

      



                                REPORT TYPE: eELECTROCARDIOGRAM SEX: F          

       



                                                                



                                ADMITTING PHYSICIAN: Derian Ferrara MD        

         



                                ATTENDING PHYSICIAN: Derian Ferrara MD        

         



                                                                



                                                                



                                Order:                          



                                [id number]                   



                                Test Reason : (Not Selected)                 



                                 Test Date/Time Stamp:                 



                                 06:33:44                 



                                Blood Pressure : 081/039 mmHG                 



                                Vent. Rate : 061 BPM Atrial Rate : 061 BPM      

           



                                 P-R Int : 142 ms QRS Dur : 086 ms              

   



                                 QT Int : 436 ms P-R-T Axes : 090 065 180 degree

s                 



                                 QTc Int : 438 ms                 



                                                                



                                Normal sinus rhythm with sinus arrhythmia       

          



                                ST and T wave abnormality, consider lateral isch

emia                 



                                Abnormal ECG                    



                                When compared with ECG of 2020 03:20, (Un

confirmed)                 



                                ST now depressed in Inferior leads              

   



                                Nonspecific T wave abnormality now evident in In

ferior leads                 



                                T wave inversion now evident in Lateral leads   

              



                                QT has shortened                 



                                Confirmed by SAVANNA RODRIGUEZ MD () on 3

/2020 3:25:24 PM                 



                                                                



                                Referred By: Self Referred Confirmed by:SAVANNA GEORGE MD                 



                                                                



                                                                



                                                                



                                                                



                                                                



                                Electronically Signed by DINH Gaines MD on

 20 at 1525                 



                                                                



                                                                



                                                                



                                                                



                                                                



                                                                



                                                                



                                                                



                                                                



                                                                



                                                                



                                                                



                                                                



                                                                



                                                                



                                PATIENT NAME: TYLER JUDD ACCOUNT #: [id number]

[id number]                 

 

                2020 03:34:00-00:00                                 [facility] ([facility]        

         



                                EMERGENCY PROVIDER REPORT                 



                                REPORT#:[id number] REPORT STATUS: Signed         

        



                                DATE:20 TIME:033                 



                                                                



                                PATIENT: TYLER JUDD UNIT #: [id number]       

          



                                ACCOUNT#: [id number] ROOM/BED: [place] [address]      

           



                                : 66 AGE: 49 SEX: F PCP PHYS: No Primar

y or Family Physician                 



                                SERVICE DT: 20 AUTHOR: Kristy Quiros MD  

               



                                                                



                                * ALL edits or amendments must be made on the [employer]/computer document *                 



                                                                



                                                                



                                HPI-Chest Pain 40 and Over                 



                                                                



                                General                         



                                Confirmed Patient Yes                 



                                Patient Type New patient                 



                                Initial Greet Date/Time 20 0323           

      



                                                                



                                Presentation                    



                                Chief Complaint Shortness of breath, Diarrhea   

              



                                Hx Obtained From Patient                 



                                Sudden in Onset? Yes                 



                                Onset Occurred Today                 



                                Symptom Duration Since onset                 



                                Progression since Onset Unchanged               

  



                                )( Migration/Movement None                 



                                Severity: Onset Moderate                 



                                Severity: Current Mild                 



                                Exacerbated by Nothing                 



                                Relieved by Nothing                 



                                                                



                                Context                         



                                Immunization Status                 



                                 General Unknown                 



                                                                



                                Free Text HPI Notes                 



                                Free Text HPI Notes                 



                                        48 y/o F w/ PMHx of HTN, COPD, bipolar d

isorder, and schizophrenia presents to 

                                        



                                ED from [facility] for low blood pressure n

oted for 2 days. Also w/ SOB,                 



                                and diarrhea x 2 days. Per EMS, pt was bradycard

ic and with troponin of 0.42.                 



                                Denies fever, chills and N/V.                 



                                                                



                                                    Portions of this section wer

e scribed by Génesis Matias on 20 at 

0553                                                



                                                                



                                Risk-Chest Pain 40 and Over                 



                                                                



                                Risk Stratification                 



                                )( Coronary Artery Disease Risk factors reviewed

, Hypertension                 



                                )( Thoracic Aortic Dissection Risk factors revie

wed, Hypertension                 



                                )( Pulmonary Embolism Risk factors reviewed     

            



                                )( AMI-Aspirin                  



                                 Aspirin Last 24 Hrs Not indicated              

   



                                )( HEART for MACE                 



                                 )( HEART for MACE Response Value               

  



                                 History Mod index of suspicion 1               

  



                                  ECG Interpretation Nonspec repol disturb 1    

             



                                 Age Age 45 - 65 1                 



                                 Risk Factors for CAD 1-2 CAD risk factors  1   

              



                                 Troponin 1 to 3x NL troponin 1                 



                                 Total 5                        



                                                                



                                                                



                                                    Portions of this section wer

e scribed by Génesis Matias on 20 at 

0454                                                



                                                                



                                Review of Systems                 



                                                                



                                ROS Statements                  



                                All systems rev neg except as marked.           

      



                                                                



                                Free Text ROS Notes                 



                                Free Text ROS Notes                 



                                -Constitutional                 



                                Denies: Fever. Chills.                 



                                -GI                             



                                Denies: Nausea, Vomiting. Abdominal pain. consti

pation                 



                                + diarrhea                      



                                -                             



                                Denies: Dysuria, Hematuria,                 



                                -Musculoskeletal                 



                                Denies: Extremity pain, Ext Swelling            

     



                                -Skin                           



                                Denies: Rash, Swelling.                 



                                -Neurologic                     



                                Denies: Numbness, Tingling.                 



                                -Eyes                           



                                Denies:visual loss, blurred vision              

   



                                -Respiratory                    



                                Denies: dyspnea on exertion                 



                                + SOB                           



                                -Cardiovascular                 



                                Denies: Chest pain, Dyspnea on exertion, Edema. 

                



                                -Allergy:                       



                                Denies Hives, Itching                 



                                                                



                                                                



                                                    Portions of this section wer

e scribed by Génesis Matias on 20 at 

0338                                                



                                                                



                                Past Medical History - Adult                 



                                Stated Complaint FROM [facility]; HYPOTENSI

ON                 



                                Allergies                       



                                Coded Allergies:                 



                                Penicillins (Severe, SWELLING 16)         

        



                                                                



                                Home Medications                 



                                Discontinued Scripts                 



                                CIPROFLOXACIN (CIPRO) 750 MG PO Q12H            

     



                                 CIPROFLOXACIN (CIPRO) 750 MG PO Q12H #14 TAB   

              



                                 Prov: 16                 



                                 DC: 20 0347 Patient stopped taking       

          



                                HYDROcodone/APAP (NORCO 10/325) 1 TAB PO Q4H PRN

 PRN PAIN                 



                                 HYDROcodone/APAP (NORCO 10/325) 1 TAB PO Q4H WV

N PRN PAIN #30 TAB                 



                                 Prov: 16                 



                                 DC: 20 0347 Patient stopped taking       

          



                                                                



                                Reported Medications                 



                                LITHIUM CARBONATE  MG PO DAILY PM         

        



                                CHOLECALCIFEROL (VITAMIN D3) (VITAMIN D3) 1,000 

UNITS PO DAILY                 



                                MULTIVITAMIN (MULTI-DAY VITAMIN) 1 TAB PO DAILY 

                



                                GABAPENTIN (NEURONTIN) 100 MG PO TID            

     



                                ALPRAZolam (XANAX) 0.5 MG PO Q8H PRN PRN ANXIETY

                 



                                MIRTAZAPINE (REMERON) 30 MG PO DAILY            

     



                                ZIPRASIDONE (GEODON) 40 MG PO BID               

  



                                CARVEDILOL (COREG) 25 MG PO BID MEALS           

      



                                MELOXICAM (MOBIC) 7.5 MG PO DAILY               

  



                                ALBUTEROL (PROAIR HFA 90 MCG/ACT) 1 PUFF INH RTQ

4H                 



                                                                



                                Discontinued Reported Medications               

  



                                MELOXICAM (MOBIC) 7.5 MG PO BID                 



                                                                



                                                                



                                Review of Nursing Notes Rev avail, and agree    

             



                                Past Medical History:                 



                                Reports: COPD, Depression/mood disorder, Hyperte

nsion.                 



                                Past Surgical History:                 



                                Reports: Cholecystectomy.                 



                                Additional Surgical History                 



                                ankle surgery, jaw reconstruction, ear sx       

          



                                Smoking status for patients 13 years old or olde

r: Unknown,if ever smoked                 



                                Other Social History Local resident             

    



                                Ambulatory Status Independent                 



                                                                



                                                    Portions of this section caesar vargas scribed by Génesis Matias on 20 at 

0457                                                



                                                                



                                Physical Exam                   



                                                                



                                Vital Signs                     



                                Vital Signs                     



                                First Documented:                 



                                 Result Date Time                 



                                 Pulse Ox 100  0318                 



                                  B/P 93/48 8                 



                                 B/P Mean 63 318                 



                                 O2 Delivery Room air 318                

 



                                 Temp  36.4 318                 



                                 Pulse 48 318                 



                                 Resp 22 318                 



                                                                



                                Last Documented:                 



                                  Result Date Time                 



                                 Pulse Ox 100  0431                 



                                 B/P 102/58  043                 



                                 B/P Mean  72 431                 



                                 O2 Delivery Room air 431                

 



                                 Pulse 48 431                 



                                 Resp 18 431                 



                                 Temp 36.4 318                 



                                                                



                                                                



                                Review of Vital Signs Reviewed                 



                                                                



                                Focused PE                      



                                General/Const **                 



                                 General/Const Awake, Alert                 



                                 Appearance/Presentation                 



                                 Obese, morbidly.                 



                                Resp/Chest **                   



                                 Respiratory/Chest Atraumatic, Breath sounds NL,

 Breath sounds = bilat, No                 



                                respiratory distress, No rales, No rhonchi, No w

heezing                 



                                Cardiovascular **                 



                                 Cardiovascular Heart sounds NL, No gallop, No m

urmurs                 



                                 Heart Rate/Rhythm                 



                                 Bradycardia.                   



                                                                



                                Free Text PE Notes                 



                                Free Text PE Notes                 



                                 General/Const                  



                                 General/Const Awake, Alert                 



                                MS Head                         



                                 Head Atraumatic, Normocephalic                 



                                Eyes                            



                                 Eyes Atraumatic, No scleral icterus            

     



                                MS Neck                         



                                 Neck Atraumatic, Full range of motion          

       



                                Resp/Chest CTAB, -w                 



                                Cardiovascular                  



                                 good cap refill                 



                                Ext: moving all 4 ext, no swelling/ cyanosis not

ed on UE Bl                 



                                Skin                            



                                 Skin no apparent rashes, Dry, Intact           

      



                                Neurologic                      



                                 Neurologic Oriented X3, Speech NL              

   



                                Psych nl affect and mood                 



                                                                



                                                                



                                                    Portions of this section caesar vargas scribed by Génesis Matias on 20 at 

0547                                                



                                                                



                                Interpretation Diagnostics                 



                                                                



                                Lab Results Interpretation                 



                                Results                         



                                Laboratory Tests                 



                                                                



                                                                



                                                                



                                20 0342:                  



                                [Embedded Image Not Available]                 



                                Laboratory Tests:                 



                                                   



                                 0342 0342 0342                 



                                 Chemistry                      



                                 Sodium (134 - 147 mmol/L)  135                 



                                 Potassium (3.4 - 5.0 mmol/L) 4.0               

  



                                 Chloride (100 - 108 mmol/L) 106                

 



                                 Carbon Dioxide (21 - 32 mmol/L) 22             

    



                                 Anion Gap (4.0 - 15.0 GAP calc) 7.0            

     



                                 BUN (7 - 18 MG/DL) 34 H                 



                                 Creatinine (0.6 - 1.0 MG/DL) 1.5 H             

    



                                 Glomerular Filtr Rate (>60 estGFR) 39 L        

         



                                 Glucose (70 - 110 MG/DL)  97                 



                                 Lactic Acid (0.4 - 2.0 mmol/L) 0.8             

    



                                 Calcium (8.5 - 10.1 MG/DL) 8.9                 



                                 Troponin I (0.000 - 0.045 NG/ML) 0.555 *H      

           



                                 Hematology                     



                                 WBC (3.5 - 11.0 K/mm3) 24.2 H                 



                                 RBC (4.70 - 6.10 M/mm3)  3.75 L                

 



                                 Hgb (10.4 - 14.9 G/DL) 11.5                 



                                 Hct (31.5 - 44.1 %) 35.2                 



                                 MCV (84.5 - 98.6 Fl) 93.9                 



                                 MCH (27.0 - 34.2 pg) 30.7                 



                                 MCHC (31.5 - 34.0 G/DL) 32.7                 



                                  RDW (11.5 - 14.5 SD) 13.8                 



                                 Plt Count (150 - 450 K/mm3) 234.0              

   



                                 MPV (7.0 - 10.5 fL)  9.80                 



                                 Neut % (Auto) (40 - 76 %) 82.9 H               

  



                                 Lymph % (Auto) (20.5 - 51.1 %) 8.3 L           

      



                                 Mono % (Auto) (1.7 - 9.3 %) 7.3                

 



                                 Eos % (Auto) (0.0 - 6.0 %) 1.4                 



                                 Baso % (Auto) (0.0 - 2.0 %) 0.1                

 



                                 Neut # (Auto) (1.8 - 7.6 K/mm3) 20.08 H        

         



                                 Lymph # (Auto) (0.6 - 3.2 K/mm3) 2.0           

      



                                 Mono # (Auto) (0.3 - 1.1 K/mm3)  1.8 H         

        



                                 Eos # (Auto) (0.0 - 0.4 K/mm3) 0.3             

    



                                 Baso # (Auto) (0.0 - 0.1 K/mm3) 0.0            

     



                                 Add Manual Diff (CRITERIA DIFF/SCN) NO         

        



                                                                



                                Microbiology:                   



                                 Date/Time Procedure - Status                 



                                 Source Growth                  



                                  452 MRSA Screen - ORD                 



                                 NASAL                          



                                 02/28 0340 Blood Culture - RECD                

 



                                 BLOOD                          



                                                                



                                Recent Impressions:                 



                                RADIOLOGY - XR CHEST 1 V 430             

    



                                *** Report Impression - Status: SIGNED Entered: 

2020                 



                                                                



                                IMPRESSION: Mild cardiomegaly, without acute pul

monary process.                 



                                Impression By: [email or web address] Damaris March M.D.      

           



                                                                



                                                                



                                 Lab Statement                  



                                Laboratory studies reviewed and considered in [facility] medical decision-making.                 



                                                                



                                 Imaging Statement                 



                                Radiographic studies reviewed and considered in 

the medical decision-making.                 



                                                                



                                                                



                                Point of Care Testing                 



                                Pulse Oximetry                  



                                 Pulse Ox % 100                 



                                 On: Room air                   



                                 Interpretation Interpreted by me, Pulse oximetr

y normal                 



                                 Time 0318                      



                                                                



                                ECG #1 Interpretation                 



                                ECG Documented in MUSE Yes                 



                                Date 20                   



                                Time 042                       



                                Interpreted by and reviewed by me, ED physician 

                



                                NL ECG Interpretation No STEMI                 



                                Rate 50                         



                                Rhythm Bradycardia                 



                                                                



                                                    Portions of this section wer

e scribed by Génesis Matias on 20 at 

0553                                                



                                                                



                                Procedures                      



                                                                



                                Central Line Placement #1                 



                                Time 0355                       



                                Procedure Performed by ED physician             

    



                                Consent/Setup/Site Prep Verified correct patient

, Informed consent provided,                 



                                Consent from patient, Oxygen administered, Pulse

 oximeter applied, Cardiac                 



                                monitor applied, Hand hygiene observed          

       



                                Skin Preparation Agent Hibiclens - Chlorhexidine

                 



                                Local Anesthesia Lidocaine 1%                 



                                Side/Location/Ultrasound Internal jugular R, Ult

rasound assisted                 



                                                    Catheter/Lumen/Technique Tri

ple lumen, Seldinger technique, Guide wire removed,

                                                    



                                                    Good blood return, Secured w

 catheter device, Secured with tape (and stat lock)

                                                    



                                Number of Attempts 1                 



                                                    Post-Procedure/Complications

 Dressing placed, CXR neg for pneumothorax, Cath 

tip                                                 



                                good position, Condition improved, Tolerated pro

cedure well, Patient stable                 



                                                                



                                                    Portions of this section wer

e scribed by Génesis Matias on 20 at 

0423                                                



                                                                



                                Re-Evaluation MDM                 



                                                                



                                Free Text MDM Notes                 



                                Free Text MDM Notes                 



                                49 lady HPI PE as above                 



                                VS notable for hypotension:                 



                                1. labs                         



                                2. central line                 



                                3. imaging                      



                                4. reassess                     



                                                                



                                Sepsis work-up and antibiotics given at outside 

facility.                 



                                                                



                                Pt. understands and agrees with plan.           

      



                                Discussed case with STANISLAV Monreal.                 



                                Pt understands plan and agrees to it            

     



                                                                



                                                                



                                                                



                                ED Course                       



                                Medication(s) Ordered                 



                                Medication(s) Ordered:                 



                                Autonomic Drugs                 



                                  Sig/Kelle Start time Last                 



                                 Medication Dose Route Stop Time Status Admin   

              



                                 Dopamine HCl/Dextrose 250 ML X1ED STA  045

2 AC                  



                                 IV  0059 0506                 



                                                                



                                Central Nervous System Agents                 



                                 Sig/Kelle Start time Last                 



                                  Medication Dose Route Stop Time Status Admin  

               



                                 Acetaminophen 650 MG Q4H PRN PRN  0445 AC 

                



                                 PO  044                  



                                                                



                                Gastrointestinal Drugs                 



                                 Sig/Kelle Start time Last                 



                                 Medication Dose Route Stop Time Status Admin   

              



                                 Docusate Sodium 100 MG Q12H PRN PRN  0445 

AC                 



                                                   



                                 Ondansetron HCl 4 MG Q4H PRN PRN  0445 AC 

                



                                                   



                                                                



                                                                



                                                                



                                                    Portions of this section wer

e scribed by Génesis Matias on 20 at 

0553                                                



                                                                



                                Patient Discharge Departure                 



                                                                



                                Vital Signs/Condition                 



                                Vital Signs                     



                                First Documented:                 



                                 Result Date Time                 



                                 Pulse Ox 100  0318                 



                                 B/P  93/48  0318                 



                                 B/P Mean 63  0318                 



                                 O2 Delivery Room air  0318                

 



                                 Temp 36.4   0318                 



                                 Pulse 48  0318                 



                                 Resp 22  0318                 



                                                                



                                Last Documented:                 



                                  Result Date Time                 



                                 Pulse Ox 100  0431                 



                                 B/P 102/58  0431                 



                                 B/P Mean 72  0431                 



                                 O2 Delivery Room air  0431                

 



                                 Pulse 48  0431                 



                                 Resp 18  0431                 



                                  Temp 36.4  0318                 



                                                                



                                All vital signs available at the time of this en

try have been reviewed.                 



                                                                



                                Condition Guarded                 



                                                                



                                Clinical Impression                 



                                Clinical Impression                 



                                Primary Impression: Hypotension                 



                                Secondary Impressions: Bradycardia, Sepsis, SOB 

(shortness of breath)                 



                                                                



                                Disposition Decision                 



                                Admit                           



                                 Admit Physician Name                 



                                 Derian Ferrara MD                 



                                 Admit Physician Hospitalist                 



                                 Request Time 0454                 



                                 Request Date 20                 



                                 )( Admission Accepts Yes                 



                                 )( Accepted Time 0454                 



                                 )( Accepted Date 20                 



                                 Call Information will see patient, agrees with 

eval, agrees with plan                 



                                                                



                                Discharge/Care Plan                 



                                                    Counseled Regarding Diagnosi

s, Lab results, Imaging studies, Need for admission

                                                    



                                 Admit Note                     



                                                    I have spoken with the patie

nt and/or caregivers. I have explained the 

patient's                                           



                                                    condition, diagnoses and farida

atment plan based on the information available to 

me                                                  



                                at this time. I have answered the patient's and/

or caregiver's questions and                 



                                addressed any concerns. The patient and/or careg

donita have as good an                 



                                                    understanding of the patient

's diagnosis, condition and treatment plan as can 

be                                                  



                                                    expected at this point. The 

patient has been stabilized within the capability 

of                                                  



                                        the emergency department. The patient wi

ll be transported for further care and 

                                        



                                management or will be moved to an observation or

 inpatient service. I have                 



                                        communicated with the staff or medical p

ractitioner taking over this patient's 

                                        



                                care.                           



                                                                



                                                                



                                Critical Care                   



                                Time Spent (minutes): 30                 



                                Services Performed Patient management by me, Julien vargas spent at bedside, Reviewing                 



                                test results, Reviewing imaging, Discussing zaki

ent care, Documentation in                 



                                record                          



                                Separately billable procedures excluded from julien

alicia.                 



                                Patient was critically ill due to:              

   



                                hypotension                     



                                My treatment and management were:               

  



                                pressors                        



                                 CC Note 1                      



                                                    Total critical care time [30

] minutes. Total critical care time documented does

                                                    



                                not include time spent on separately billed proc

edures or the services of                 



                                residents, students, nurses or physician assista

nts. I personally saw and                 



                                examined the patient. I have reviewed all diagno

stic interpretations and                 



                                                    treatment plans as written. 

I was present for the key portions of any 

procedures                                          



                                                    performed and the inclusive 

time noted in any critical care statement. Critical

                                                    



                                                    care time includes patient m

anagement by me, time spent at the patients 

bedside,                                            



                                        time to review lab and imaging results, 

discussing patient care, documentation 

                                        



                                in the medical record, and time spent with the f

amily or caregiver.                 



                                                                



                                                                



                                Supervising Physician Note                 



                                 Scribe Statement                 



                                Génesis Matias, 20 0340, scribing for

 and in the presence of Dr. Quiros.                           



                                Signed By: Génesis Matias, 20 0340   

              



                                                                



                                 Provider Scribed Statement                 



                                                    I personally performed the s

ervices described in this documentation and 

reviewed                                            



                                the documentation that was dictated to the scrib

e(s) in my presence, and it                 



                                accurately records my words and actions. LEINOR Quiros, 20                 



                                                                



                                                                



                                                    Portions of this section wer

e scribed by Génesis Matias on 20 at 

0553                                                



                                                                



                                Electronically Signed by Kristy Quiros MD on  at 0622                 



                                                                



                                [facility] #: [id number]                 



                                ***END OF REPORT***                 

 

                2020 03:20:00-00:00 [id number]                       [address] [address]                 



                                                                



                                PATIENT NAME: TYLER JUDD ADMIT DATE: 20

                 



                                ACCOUNT NO: [id number] ROOM NO: [id number]        

         



                                MEDICAL RECORD NO: [id number] AGE: 49           

      



                                REPORT TYPE: eELECTROCARDIOGRAM  SEX: F         

        



                                                                



                                ADMITTING PHYSICIAN: Derian Ferrara MD        

         



                                ATTENDING PHYSICIAN: Derian Ferrara MD        

         



                                                                



                                                                



                                Order:                          



                                [id number]                   



                                Test Reason : (Not Selected)                 



                                 Test Date/Time Stamp:                 



                                 03:20:27                 



                                Blood Pressure : 093/048 mmHG                 



                                Vent. Rate : 050 BPM Atrial Rate : 050 BPM      

           



                                 P-R Int : 148 ms QRS Dur : 098 ms              

   



                                 QT Int : 558 ms P-R-T Axes : 064 055 087 degree

s                 



                                 QTc Int : 508 ms                 



                                                                



                                Sinus bradycardia                 



                                Cannot rule out Inferior infarct , age undetermi

minerva                 



                                Prolonged QT                    



                                Abnormal ECG                    



                                No previous ECGs available                 



                                Confirmed by SAVANNA RODRIGUEZ MD ([id number]) on 3

/2020 3:26:38 PM                 



                                                                



                                Referred By: Self Referred Confirmed by:SAVANNA GEORGE MD                 



                                                                



                                                                



                                                                



                                                                



                                                                



                                Electronically Signed by DINH Gaines MD on

 20 at 1526                 



                                                                



                                                                



                                                                



                                                                



                                                                



                                                                



                                                                



                                                                



                                                                



                                                                



                                                                



                                                                



                                                                



                                                                



                                                                



                                                                



                                                                



                                                                



                                PATIENT NAME: TYLER JUDD ACCOUNT #: [id number]

158                 

 

                2018 16:18:55-00:00  [facility]



                                 Discharge Summary                 



                                                                



                                PATIENT NAME: JUDDTYLER GRANADO PHYSICIAN: Salvatore Brownlee MD                 



                                 ACCOUNT #: [id number] Admitted:                

 



                                 MR NUMBER: [id number] DISCHARGED: 2017 05:4

1:00                 



                                                    

________________________________________________________________________________
                                                    



                                The patient was admitted to Dr. Cohen's team on 

the inpatient unit.                 



                                                                



                                REASON FOR ADMISSION:                 



                                The patient was admitted for suicidal ideation w

ith a chief complaint of "I                 



                                am tired of doing all this." The patient is a 47

-year-old female with a past                 



                                psychiatric history of bipolar disorder and schi

zophrenia. She presented                 



                                with suicidal ideation without a plan going on f

or over 3 days prior to                 



                                admission.                      



                                                                



                                ADMITTING DIAGNOSES:                 



                                Major depressive disorder, recurrent episode, se

sara with mixed features, and                 



                                methamphetamine use disorder.                 



                                                                



                                FINAL DIAGNOSES:                 



                                AXIS I: Bipolar 2 disorder, current depressive e

pisode with psychotic                 



                                features, and methamphetamine use disorder.     

            



                                AXIS II: None.                  



                                AXIS III: Obesity, chronic obstructive pulmonary

 disease, and neuropathy.                 



                                AXIS IV: Poor family relationship, history of dr

brianne abuse, unemployed, on                 



                                disability.                     



                                                                



                                PRINCIPAL PROCEDURE:                 



                                Psychopharmacotherapy.                 



                                                                



                                SPECIAL PROCEDURES:                 



                                None.                           



                                                                



                                HOSPITAL COURSE:                 



                                Ms. Tyler Judd is a 47-year-old  fem

ronny, who was admitted to Dr. Cohen's team from 2017 to . The patient was on                 



                                unit restrictions along with elopement and stand

suzette PICU precautions. The                 



                                patient on admission was only started on trazodo

ne 50 mg at bedtime p.r.n.                 



                                for sleep and Vistaril 50 mg q. 6 hours p.r.n. f

or anxiety. The patient did                 



                                state that she had medication that she is taking

 at home of lithium 400 mg                 



                                daily, Abilify 30 mg, gabapentin, unknown dose, 

and trazodone, unknown dose.                 



                                We did not start her home medications as she abebe

d they were not working. She                 



                                had stopped taking them a few weeks prior to [facility] admitted. Due to her                 



                                presenting symptoms and her past psychiatric his

tory of bipolar 2, we have                 



                                since started the patient on Latuda 20 mg with l

unch. She desired to stop                 



                                the lithium and Abilify. She states that she had

 an allergy to risperdal and                 



                                she would not take medications that would cause 

her to gain weight. The                 



                                patient did not have any side effects to the Lat

uda. Over the next few days,                 



                                we increased the dose from 20 to 40 mg with lunc

h. We increase the trazodone                 



                                to 100 mg at night for sleep and these changes t

o his medications help                 



                                resolve the patient's presenting symptoms of nancy

cidal ideation, improvement                 



                                in depression, improved sleep, improved appetite

, and improved concentration.                 



                                 She is no longer reporting feelings of hopeless

ness. The patient was                 



                                cooperative during the day.                 



                                                                



                                ASSESSMENT:                     



                                                                



                                 [facility]                 



                                 Discharge Summary                 



                                                                



                                PATIENT NAME: TYLER JUDD PHYSICIAN: Salvatore Brownlee MD                 



                                 ACCOUNT #: [id number] Admitted:                

 



                                 MR NUMBER: [id number] DISCHARGED: 2017 05:4

1:00                 



                                                    

________________________________________________________________________________
                                                    



                                The patient was admitted to Dr. Cohen's team on 

the inpatient unit.                 



                                                                



                                REASON FOR ADMISSION:                 



                                The patient was admitted for suicidal ideation w

ith a chief complaint of "I                 



                                am tired of doing all this." The patient is a 47

-year-old female with a past                 



                                psychiatric history of bipolar disorder and schi

zophrenia. She presented                 



                                with suicidal ideation without a plan going on f

or over 3 days prior to                 



                                admission.                      



                                                                



                                ADMITTING DIAGNOSES:                 



                                Major depressive disorder, recurrent episode, se

sara with mixed features, and                 



                                methamphetamine use disorder.                 



                                                                



                                FINAL DIAGNOSES:                 



                                AXIS I: Bipolar 2 disorder, current depressive e

pisode with psychotic                 



                                features, and methamphetamine use disorder.     

            



                                AXIS II: None.                  



                                AXIS III: Obesity, chronic obstructive pulmonary

 disease, and neuropathy.                 



                                AXIS IV: Poor family relationship, history of dr decker abuse, unemployed, on                 



                                disability.                     



                                                                



                                PRINCIPAL PROCEDURE:                 



                                Psychopharmacotherapy.                 



                                                                



                                SPECIAL PROCEDURES:                 



                                None.                           



                                                                



                                HOSPITAL COURSE:                 



                                Ms. Tyler Judd is a 47-year-old  fem

ronny, who was admitted to Dr. Cohen's team from 2017 to . The patient was on                 



                                unit restrictions along with elopement and stand

suzette PICU precautions. The                 



                                patient on admission was only started on trazodo

ne 50 mg at bedtime p.r.n.                 



                                for sleep and Vistaril 50 mg q. 6 hours p.r.n. f

or anxiety. The patient did                 



                                state that she had medication that she is taking

 at home of lithium 400 mg                 



                                daily, Abilify 30 mg, gabapentin, unknown dose, 

and trazodone, unknown dose.                 



                                We did not start her home medications as she abebe

d they were not working. She                 



                                had stopped taking them a few weeks prior to [facility] admitted. Due to her                 



                                presenting symptoms and her past psychiatric his

tory of bipolar 2, we have                 



                                since started the patient on Latuda 20 mg with l

unch. She desired to stop                 



                                the lithium and Abilify. She states that she had

 an allergy to risperdal and                 



                                she would not take medications that would cause 

her to gain weight. The                 



                                patient did not have any side effects to the Lat

uda. Over the next few days,                 



                                we increased the dose from 20 to 40 mg with lunc

h. We increase the trazodone                 



                                to 100 mg at night for sleep and these changes t

o his medications help                 



                                resolve the patient's presenting symptoms of nancy

cidal ideation, improvement                 



                                in depression, improved sleep, improved appetite

, and improved concentration.                 



                                 She is no longer reporting feelings of hopeless

ness. The patient was                 



                                cooperative during the day.                 



                                                                



                                ASSESSMENT:                     



                                                                



                                 [facility]                 



                                 Discharge Summary                 



                                She did not attend group therapy. On the day of 

discharge, she was deemed                 



                                suitable for discharge because she denied suicid

al and homicidal ideations.                 



                                                                



                                She denied audio and visual hallucinations. Her 

mood had improved. Her                 



                                affect had improved her sleep. Judgment and insi

ght had all improved. The                 



                                patient plans to return home. She will continue 

psychotropic medications and                 



                                followup with an outpatient psychiatrist.       

          



                                                                



                                MENTAL STATUS EXAM ON DISCHARGE:                

 



                                The patient appeared well-groomed, well-nourishe

d 47-year-old female. She                 



                                made good eye contact. She had no psychomotor re

tardation or activation.                 



                                She did have a calm attitude. Her speech was reg

ular rate and rhythm with                 



                                good volume. Her mood was good. Her affect was c

ongruent and euthymic.                 



                                Regarding perception, she denied any audio or vi

sual hallucinations. She                 



                                denies any delusions. Thought process was linear

 and goal directed. She                 



                                denied suicidal and homicidal ideations. Insight

 and judgment were fair.                 



                                She is alert and oriented to person, place, time

, and circumstance. Her                 



                                memory and attention are grossly intact. Abstrac

tion is intact. Fund of                 



                                knowledge was appropriate for her age and educat

ion. Her gait was normal.                 



                                                                



                                LABORATORY DATA:                 



                                No relevant labs upon discharge.                

 



                                                                



                                DRUG REACTIONS/INTERACTIONS:                 



                                None.                           



                                                                



                                DISCHARGE MEDICATIONS:                 



                                Were:                           



                                                                



                                 1. Latuda 40 mg per day with lunch.            

     



                                 2. Gabapentin 1200 mg at night, 300 mg with sharon

akfast, and 300 mg wtih                 



                                 lunch.                         



                                                                



                                DISCHARGE INSTRUCTIONS:                 



                                The patient was provided with standard discharge

 instructions. No                 



                                restrictions on diet or physical activity. The p

atient was given driving                 



                                                    restrictions as she is going

 to continue taking gabapentin may cause 

drowsiness.                                         



                                                                



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 loss o

f interest in things she                 



                                enjoyed, guilt, low energy, poor concentration, 

psychomotor retardation, and                 



                                suicidal ideation. She also screened positive fo

r alli as she has had times                 



                                in her life, but not currently where she is able

 to stay up for 3-4 days and                 



                                have an abundant amount of energy during this ti

me. She was awake, alert,                 



                                oriented, and she was not on any drugs at that t

diallo. She also has had a time                 



                                in her life where she was having audio and visua

l hallucinations.                 



                                                                



                                DIAGNOSES:                      



                                AXIS I: Major depressive disorder, recurrent epi

sode, severe, with mixed                 



                                features versus amphetamine use disorder versus 

schizoaffective disorder.                 



                                AXIS II: None.                  



                                AXIS III: Chronic obstructive pulmonary disease,

 and unspecified neuropathy.                 



                                AXIS IV: Poor social support, recent relationshi

p difficulties, unemployed.                 



                                                                



                                PLAN:                           



                                                                



                                Patient Name: TYLER JUDD Account Number: [id number]

[id number]                 



                                                                



                                Ms. Tyler Judd will be admitted to Dr. Cohen'

s service on the [facility]                 



                                Inpatient Unit and placed on standard PICU preca

utions and unit restrictions.                 



                                 She will be started on trazodone 100 mg at bedt

diallo for sleep and Vistaril 50                 



                                mg q. 6 hours p.r.n. for anxiety.  She will be o

ffered nicotine replacement                 



                                in the form of.patch or gum. Internal medicine h

as been consulted for current                 



                                medical needs. She will be monitored daily while

 on the unit. We will plan                 



                                to discharge to her parents' home. She has been 

encouraged to attend all                 



                                group therapy sessions to participate in her farida

atment and to bring any                 



                                concerns to the attention of the treatment team.

                 



                                                                



                                                                



                                                                



                                                                



                                                                



                                MD Venkata Roman MD                 



                                                                



                                Date Dictated: 2017                 



                                Date 2017                 



                                Transcribed:                    



                                /MAGGI                          



                                Job #: [id number]                 



                                                                



                                cc:Venkata Cohen MD                 



                                                                



                                Electronically Authenticated and Edited by:     

            



                                Salvatore Brownlee MD On 2017 09:43 PM CST       

          



                                Electronically Authenticated by:                

 



                                Venkata Cohen MD On 2018 01:11 PM CST  

               

 

                2017 21:02:54-00:00  [facility]



                                 History and Physical                 



                                                                



                                PATIENT NAME: TYLER JUDD PHYSICIAN: Norma Judge MD                 



                                 ACCOUNT #: [id number] Admitted:                

 



                                 MR NUMBER: [id number] DISCHARGED:                

 



                                                    

________________________________________________________________________________
                                                    



                                DATE OF SERVICE: 2017                 



                                                                



                                TIME:                           



                                02:42                           



                                                                



                                PRIMARY CARE PHYSICIAN:                 



                                None.                           



                                                                



                                CHIEF COMPLAINT:                 



                                "I am scared, confused, I am supposed to get mar

ried, I have nowhere to go."                 



                                                                



                                HISTORY OF PRESENT ILLNESS:                 



                                The patient is a 47-year-old female with history

 of bipolar disorder, COPD,                 



                                nicotine dependence, crystal meth abuse, who is 

currently homeless. The                 



                                patient presented to the ER stating that she wan

gianfranco to kill herself as she                 



                                could not take it anymore. The patient has been 

off of medicine for the past                 



                                2 to 3 days, she was hearing voices telling her 

to kill herself. She has no                 



                                plan. She also admits to visual and auditory justin

lucinations that she saw                 



                                demons .                        



                                                                



                                REVIEW OF SYMPTOMS:                 



                                Reports left shoulder pain, states since 2 days.

 Denies fever, chills, upper                 



                                respiratory tract complaints, chest pain, shortn

ess of breath, PND,                 



                                orthopnea, abdominal pain, urinary or bowel comp

laints. The patient urinary                 



                                reports burning, increased frequency of urinatio

n. Denies any bowel                 



                                complaints. A 12-point review of symptoms addres

sed otherwise negative.                 



                                                                



                                PAST MEDICAL HISTORY:                 



                                 1. Bipolar disorder.                 



                                 2. COPD.                       



                                 3. Bilateral hearing loss, questionable sensori

neural.                 



                                                                



                                PAST SURGICAL HISTORY:                 



                                 1. Cholecystectomy.                 



                                 2. Bilateral ear surgery, details of which are 

unknown.                 



                                                                



                                ALLERGIES:                      



                                PENICILLIN AND RISPERDAL.                 



                                                                



                                FAMILY HISTORY:                 



                                States that she is not in touch with her family.

                 



                                                                



                                MEDICATIONS:                    



                                Her medicines are trazodone 100 mg at bedtime.  

               



                                                                



                                SOCIAL HISTORY:                 



                                Homeless, smokes 1-1/2 packs of cigarettes a day

. History of smoking crystal                 



                                meth. Occasional alcohol abuse.                 



                                                                



                                PHYSICAL EXAMINATION:                 



                                                                



                                 [facility]                 



                                 History and Physical                 



                                GENERAL: Very flat affect, tearful, crying.     

            



                                VITAL SIGNS: T-max is 100.7, blood pressure is 1

11/82, heart rate 90,                 



                                respiratory rate 20, BMI is 40.                 



                                HEENT: PERRLA. Extraocular muscles intact. Atrau

matic, normocephalic.                 



                                Pallor present.                 



                                Oral cavity, missing dentition.                 



                                NECK: Supple. No JVD. No bruit.                 



                                CVS: S1, S2, is regular. No murmur, gallop, or r

ub.                 



                                CHEST: Bilaterally clear. No rales, rhonchi, or 

wheeze.                 



                                ABDOMEN: Soft, obese, nondistended, nontender. B

owel sounds are present.                 



                                EXTREMITIES: No pedal edema, cyanosis, or clubbi

ng. tenderness on Palpation                 



                                of the left shoulder                 



                                NEUROLOGIC: Awake, alert, and oriented. Motor 5/

5. Sensation is intact.                 



                                PERRLA. Extraocular muscles intact. Facial sensa

tion is normal. Tongue                 



                                midline.                        



                                                                



                                LABORATORY DATA:                 



                                 1. CBC: White count is 10.3, hemoglobin 13.1, h

ematocrit 41.5, platelets                 



                                 of 327.                        



                                 2. Serum pregnancy negative.                 



                                 3. RPR nonreactive.                 



                                 4. Lipid profile: Total cholesterol 163, LDL 10

0.                 



                                 5. TSH 0.93.                   



                                 6. Alcohol negative.                 



                                 7. BMP - sodium 140, potassium 3.6, chloride 10

3, bicarbonate 26, BUN 13,                 



                                 creatinine 0.8. LFTs are within normal range.  

               



                                 8. UA shows evidence of pyuria.                

 



                                                                



                                ASSESSMENT:                     



                                 1. Symptomatic urinary tract infection secondar

y to Escherichia coli most                 



                                 likely resulting in low grade fever.           

      



                                 2. Bipolar disorder, depressed type with suicid

al ideation.                 



                                 3. Chronic obstructive pulmonary disease, stabl

e without any                 



                                 exacerbation.                  



                                 4. Morbid obesity, body mass index 40.         

        



                                 5. Nicotine dependence with failure to quit, mi

ld withdrawal symptoms.                 



                                 6. Substance-induced mood disorder.            

     



                                 7. Amphetamine abuse.                 



                                 8. Homeless without any family support.        

         



                                 9. Poor dentition with cavities.               

  



                                 10. No need for gastrointestinal/deep venous th

rombosis prophylaxis.                 



                                                                



                                PLAN:                           



                                We will start the patient on Levaquin.          

       



                                                                



                                The patient was counseled about smoking and amph

etamine cessation.                 



                                                                



                                Antidepressants with psychiatrist.              

   



                                                                



                                 [facility]                 



                                 History and Physical                 



                                                                



                                                    The patient was counseled ab

out lifestyle modifications and  needs

                                                    



                                to be consulted for placement.                 



                                                                



                                AVOID PENICILLIN AND RISPERDAL SINCE THE PATIENT

 IS ALLERGIC TO IT.                 



                                                                



                                We will follow the patient and address any new p

roblems that may arise.                 



                                                                



                                Case dicussed with pt's nurse.                 



                                                                



                                                                



                                                                



                                                                



                                Norma Judge MD                 



                                                                



                                PSN/CTV                         



                                DD: 2017 14:48                 



                                TD: 2017 17:46                 



                                Job #: [id number]                 



                                                                



                                Electronically Authenticated and Edited by:     

            



                                Norma Judge MD On 2017 07:41 AM CS

T

## 2023-08-23 ENCOUNTER — HOSPITAL ENCOUNTER (EMERGENCY)
Dept: HOSPITAL 97 - ER | Age: 53
Discharge: HOME | End: 2023-08-23
Payer: COMMERCIAL

## 2023-08-23 VITALS — OXYGEN SATURATION: 98 % | DIASTOLIC BLOOD PRESSURE: 110 MMHG | TEMPERATURE: 98.1 F | SYSTOLIC BLOOD PRESSURE: 136 MMHG

## 2023-08-23 DIAGNOSIS — Z88.8: ICD-10-CM

## 2023-08-23 DIAGNOSIS — Z88.0: ICD-10-CM

## 2023-08-23 DIAGNOSIS — Z72.0: ICD-10-CM

## 2023-08-23 DIAGNOSIS — S60.222A: ICD-10-CM

## 2023-08-23 DIAGNOSIS — N39.0: Primary | ICD-10-CM

## 2023-08-23 DIAGNOSIS — I10: ICD-10-CM

## 2023-08-23 PROCEDURE — 81001 URINALYSIS AUTO W/SCOPE: CPT

## 2023-08-23 NOTE — EDPHYS
Physician Documentation                                                                           

 [facility]                                                                 

Name: Alka Judd                                                                               

Age: 53 yrs                                                                                       

Sex: Female                                                                                       

: 1970                                                                                   

MRN: [id number]                                                                                   

Arrival Date: 2023                                                                          

Time: 13:10                                                                                       

Account#: [id number]                                                                            

Bed 12                                                                                            

Private MD:                                                                                       

ED Physician Markos Urbina                                                                     

HPI:                                                                                              

                                                                                             

15:34 This 53 yrs old Female presents to ER via Ambulatory with complaints of UTI, bladder    rt  

      infection, Hand Injury.                                                                     

15:34 Patient presents to the ED with about 1 week of dysuria, darker colored urine. Patient  rt  

      has not been on antibiotics for some time. She also reports of bruising to her left         

      middle finger at the proximal nail fold, stating that she does not know how she injured     

      it but does not believe that it is broken. She denies other acute complaints at this        

      time. Symptoms are mild in severity, no other aggravating or alleviating factors..          

                                                                                                  

Historical:                                                                                       

- Allergies:                                                                                      

13:25 PENICILLINS;                                                                            iw  

13:25 Benadryl;                                                                               iw  

13:25 Risperdal;                                                                              iw  

- PMHx:                                                                                           

13:25 Bipolar disorder; Chronic Drug abuser; Hypertensive disorder;                           iw  

- PSHx:                                                                                           

13:25 righ hip surgery;                                                                       iw  

                                                                                                  

- Immunization history:: Adult Immunizations.                                                     

- Social history:: Smoking status: Patient reports the use of cigarette tobacco                   

  products, Reported history of juuling and/or vaping.                                            

                                                                                                  

                                                                                                  

ROS:                                                                                              

15:39 Constitutional: Negative for fever, chills, and weight loss, Cardiovascular: Negative   rt  

      for chest pain, palpitations, and edema, Respiratory: Negative for shortness of breath,     

      cough, wheezing, and pleuritic chest pain, Abdomen/GI: Negative for abdominal pain,         

      nausea, vomiting, diarrhea, and constipation, Skin: Negative for injury, rash, and          

      discoloration.                                                                              

15:39 : Positive for burning with urination, Darker urine color.                                

15:39 MS/extremity: Positive for contusion, pain.                                                 

15:39 MS/extremity: Negative for decreased range of motion.                                       

                                                                                                  

Exam:                                                                                             

15:39 Constitutional:  This is a well developed, well nourished patient who is awake, alert,  rt  

      and in no acute distress. Head/Face:  Normocephalic, atraumatic. Chest/axilla:  Normal      

      chest wall appearance and motion.  Nontender with no deformity.  No lesions are             

      appreciated. Cardiovascular:  Regular rate and rhythm with a normal S1 and S2.  No          

      gallops, murmurs, or rubs.  Normal PMI, no JVD.  No pulse deficits. Respiratory:  Lungs     

      have equal breath sounds bilaterally, clear to auscultation and percussion.  No rales,      

      rhonchi or wheezes noted.  No increased work of breathing, no retractions or nasal          

      flaring. Abdomen/GI:  Soft, non-tender, with normal bowel sounds.  No distension or         

      tympany.  No guarding or rebound.  No evidence of tenderness throughout. Skin:  Warm,       

      dry with normal turgor.  Normal color with no rashes, no lesions, and no evidence of        

      cellulitis.                                                                                 

15:39 Musculoskeletal/extremity: Minimal bruising noted to the left third MCP, no tenderness,     

      deformity noted, full range of motion..                                                     

                                                                                                  

Vital Signs:                                                                                      

13:23  / 110; Pulse 62; Resp 18; Temp 98.1; Pulse Ox 98% ; Weight 99.79 kg; Height 5    iw  

      ft. 2 in. ; Pain 7/10;                                                                      

13:23 Body Mass Index 40.24 (99.79 kg, 157.48 cm)                                             iw  

13:23 Pain Scale: Adult                                                                       iw  

                                                                                                  

MDM:                                                                                              

13:31 Patient medically screened.                                                             rt  

15:39 Differential diagnosis: Contusion, fracture, UTI, kidney stone. Data reviewed: vital    rt  

      signs, nurses notes, lab test result(s). Test considered but Not performed: X-ray:          

      Offered patient an x-ray, she states that she does not believe that it is broken,           

      therefore, x-rays were necessary.. Counseling: I had a detailed discussion with the         

      patient and/or guardian regarding the historical points, exam findings, and any             

      diagnostic results supporting the discharge/admit diagnosis, lab results, the need for      

      outpatient follow up.                                                                       

                                                                                                  

                                                                                             

13:39 Order name: Urinalysis w/ reflexes; Complete Time: 14:06                                iw  

                                                                                                  

Administered Medications:                                                                         

No medications were administered                                                                  

                                                                                                  

                                                                                                  

Disposition Summary:                                                                              

23 14:14                                                                                    

Discharge Ordered                                                                                 

      Location: Home                                                                          rt  

      Problem: new                                                                            rt  

      Symptoms: are unchanged                                                                 rt  

      Condition: Stable                                                                       rt  

      Diagnosis                                                                                   

        - UTI/ Urinary tract infection, site not specified                                    rt  

        - Contusion of left hand                                                              rt  

      Followup:                                                                               rt  

        - With: Markos Urbina MD                                                               

        - When: 7 - 10 days                                                                        

        - Reason:                                                                                  

      Discharge Instructions:                                                                     

        - Discharge Summary Sheet                                                             rt  

        - Contusion                                                                           rt  

        - Urinary Tract Infection, Adult                                                      rt  

      Forms:                                                                                      

        - Medication Reconciliation Form                                                      rt  

        - Thank You Letter                                                                    rt  

        - Antibiotic Education                                                                rt  

        - Prescription Opioid Use                                                             rt  

        - Patient Portal Instructions                                                         rt  

        - Leadership Thank You Letter                                                         rt  

      Prescriptions:                                                                              

        - Macrobid 100 mg Oral Capsule                                                             

            - take 1 capsule by ORAL route every 12 hours for 7 days; 14 capsule; Refills: 0, rt  

      Product Selection Permitted                                                                 

Signatures:                                                                                       

Dispatcher [employer]                           Raquel Turner, JÚNIOR                     RN   Markos Webb MD MD   rt                                                   

                                                                                                  

**************************************************************************************************

## 2023-08-23 NOTE — ER
Nurse's Notes                                                                                     

 [facility]                                                                 

Name: Alka Judd                                                                               

Age: 53 yrs                                                                                       

Sex: Female                                                                                       

: 1970                                                                                   

MRN: [id number]                                                                                   

Arrival Date: 2023                                                                          

Time: 13:10                                                                                       

Account#: [id number]                                                                            

Bed 12                                                                                            

Private MD:                                                                                       

Diagnosis: UTI/ Urinary tract infection, site not specified;Contusion of left hand                

                                                                                                  

Presentation:                                                                                     

                                                                                             

13:23 Chief complaint: Patient states: has pain with urination and dark yellow urine X 10     iw  

      days, also her left hand is swollen and bruised , thinks she bumped it into something.      

      Coronavirus screen: At this time, the client does not indicate any symptoms associated      

      with coronavirus-19. Ebola Screen: Patient negative for fever greater than or equal to      

      101.5 degrees Fahrenheit, and additional compatible Ebola Virus Disease symptoms            

      Patient denies exposure to infectious person. Patient denies travel to an                   

      Ebola-affected area in the 21 days before illness onset. No symptoms or risks               

      identified at this time. Initial Sepsis Screen: Does the patient meet any 2 criteria?       

      No. Patient's initial sepsis screen is negative. Does the patient have a suspected          

      source of infection? No. Patient's initial sepsis screen is negative. Risk Assessment:      

      Do you want to hurt yourself or someone else? Patient reports no desire to harm self or     

      others. Onset of symptoms was 2023.                                              

13:23 Method Of Arrival: Ambulatory                                                           iw  

13:23 Acuity: TEMO 4                                                                           iw  

                                                                                                  

Triage Assessment:                                                                                

14:35 General: Appears in no apparent distress. comfortable, Behavior is calm, cooperative.   cm10

      Injury Description: None.                                                                   

                                                                                                  

Historical:                                                                                       

- Allergies:                                                                                      

13:25 PENICILLINS;                                                                            iw  

13:25 Benadryl;                                                                               iw  

13:25 Risperdal;                                                                              iw  

- PMHx:                                                                                           

13:25 Bipolar disorder; Chronic Drug abuser; Hypertensive disorder;                           iw  

- PSHx:                                                                                           

13:25 righ hip surgery;                                                                       iw  

                                                                                                  

- Immunization history:: Adult Immunizations.                                                     

- Social history:: Smoking status: Patient reports the use of cigarette tobacco                   

  products, Reported history of juuling and/or vaping.                                            

                                                                                                  

                                                                                                  

Screenin:34 [facility] ED Fall Risk Assessment (Adult) History of falling in the last 3 months,       cm10

      including since admission No falls in past 3 months (0 pts) Confusion or Disorientation     

      No (0 pts) Intoxicated or Sedated No (0 pts) Impaired Gait No (0 pts) Mobility Assist       

      Device Used No (0 pt) Altered Elimination No (0 pt) Score/Fall Risk Level 0 - 2 = Low       

      Risk Oriented to surroundings, Maintained a safe environment. Abuse screen: Denies          

      threats or abuse. Denies injuries from another. Nutritional screening: No deficits          

      noted. Tuberculosis screening: No symptoms or risk factors identified.                      

                                                                                                  

Assessment:                                                                                       

14:33 Reassessment: Patient and/or family updated on plan of care and expected duration. Pain cm10

      level reassessed. Patient is alert, oriented x 3, equal unlabored respirations, skin        

      warm/dry/pink. Pain: Denies pain. Neuro: No deficits noted. Level of Consciousness is       

      awake, alert, obeys commands, Oriented to person, place. Respiratory: No deficits           

      noted. Airway is patent Respiratory effort is even, unlabored, Respiratory pattern is       

      regular, symmetrical. Musculoskeletal: No deficits noted.                                   

                                                                                                  

Vital Signs:                                                                                      

13:23  / 110; Pulse 62; Resp 18; Temp 98.1; Pulse Ox 98% ; Weight 99.79 kg; Height 5    iw  

      ft. 2 in. ; Pain 7/10;                                                                      

13:23 Body Mass Index 40.24 (99.79 kg, 157.48 cm)                                             iw  

13:23 Pain Scale: Adult                                                                       iw  

                                                                                                  

ED Course:                                                                                        

13:13 Patient arrived in ED.                                                                  im  

13:14 Markos Urbina MD is Attending Physician.                                            rt  

13:25 Triage completed.                                                                       iw  

13:25 Arm band placed on.                                                                     iw  

13:50 Urinalysis w/ reflexes Sent.                                                            iw  

14:14 Markos Urbina MD is Referral Physician.                                             rt  

14:31 Becca Solano, RN is Primary Nurse.                                                cm10

14:34 Patient has correct armband on for positive identification. Call light in reach.        cm10

      Provided Education on: N/A.                                                                 

14:35 No provider procedures requiring assistance completed. Patient did not have IV access   cm10

      during this emergency room visit.                                                           

                                                                                                  

Administered Medications:                                                                         

No medications were administered                                                                  

                                                                                                  

                                                                                                  

Medication:                                                                                       

14:34 VIS not applicable for this client.                                                     cm10

                                                                                                  

Outcome:                                                                                          

14:14 Discharge ordered by MD.                                                                rt  

14:35 Discharged to home ambulatory.                                                          cm10

14:35 Condition: good                                                                             

14:35 Discharge instructions given to patient, Instructed on discharge instructions, follow       

      up and referral plans. medication usage.                                                    

14:35 Demonstrated understanding of instructions, follow-up care, medications, Prescriptions      

      given X 1.                                                                                  

14:35 Patient left the ED.                                                                    cm10

                                                                                                  

Signatures:                                                                                       

Raquel Julien RN                     RN   iw                                                   

Markos Urbina MD MD   rt                                                   

Maritza Vaughn Clarissa, RN                  RN   cm10                                                 

                                                                                                  

Corrections: (The following items were deleted from the chart)                                    

13:26 13:23 Pulse 62bpm; Resp 18bpm; Pulse Ox 98%; Temp 98.1F; 99.79 kg; Height 5 ft. 2 in.;  iw  

      BMI: 40.2; Pain 7/10, Adult; iw                                                             

                                                                                                  

**************************************************************************************************

## 2023-08-23 NOTE — XMS REPORT
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                                         cough for           



                                                  up to 30           



                                                  days.           

 

     ibuprofen      -0 - No             600mg Q6H  Take 1           [facility]

[facility]



     (ADVIL) 600      -                          tablet           st



     MG tablet      00:00: 04:59                          (600 mg           [facility]

[facility]



               00   :00                           total) by           l



                                                  mouth           



                                                  every 6           



                                                  (six)           



                                                  hours as           



                                                  needed for           



                                                  mild pain,           



                                                  headaches           



                                                  or fever           



                                                  for up to           



                                                  30 days.           

 

     promethazin      3-0 2023- No             5mL  Q.25D Take 5 mL          

 [facility]



     e-DM                                by mouth 4           st



     (PROMETHAZI      00:00: 04:59                          (four)           [facility]

[facility]



     NE-DM)      00   :00                           times a           l



     6.25-15                                         day as           



     mg/5 mL                                         needed for           



     syrup                                         cough for           



                                                  up to 30           



                                                  days.           

 

     ibuprofen      -0 2023- No             600mg Q6H  Take 1           [facility]

[facility]



     (ADVIL) 600      -19                          tablet           st



     MG tablet      00:00: 04:59                          (600 mg           [facility]

[facility]



               00   :00                           total) by           l



                                                  mouth           



                                                  every 6           



                                                  (six)           



                                                  hours as           



                                                  needed for           



                                                  mild pain,           



                                                  headaches           



                                                  or fever           



                                                  for up to           



                                                  30 days.           

 

     promethazin      -2023- No             5mL  Q.25D Take 5 mL          

 Methodi



     e-DM                                by mouth 4           st



     (PROMETHAZI      00:00: 04:59                          (four)           [facility]

[facility]



     NE-DM)      00   :00                           times a           l



     6.25-15                                         day as           



     mg/5 mL                                         needed for           



     syrup                                         cough for           



                                                  up to 30           



                                                  days.           

 

     ibuprofen      -0 3- No             600mg Q6H  Take 1           [facility]

[facility]



     (ADVIL) 600                                tablet           st



     MG tablet      00:00: 04:59                          (600 mg           [facility]

[facility]



               00   :00                           total) by           l



                                                  mouth           



                                                  every 6           



                                                  (six)           



                                                  hours as           



                                                  needed for           



                                                  mild pain,           



                                                  headaches           



                                                  or fever           



                                                  for up to           



                                                  30 days.           

 

     omeprazole      -0 2023- No             40mg QD   Take 1           [facility]



     (PriLOSEC)      2-15 03-17                          capsule           [facility]



     40 MG      00:00: 23:59                          (40 mg           [facility]



     capsule      00   :00                           total) by           Center



                                                  mouth           



                                                  daily for           



                                                  30 days.           

 

     omeprazole      3-0 2023- No             40mg QD   Take 1           [facility]



     (PriLOSEC)      2-15 03-17                          capsule           [facility]



     40 MG      00:00: 23:59                          (40 mg           [facility]



     capsule      00   :00                           total) by           Center



                                                  mouth           



                                                  daily for           



                                                  30 days.           

 

     omeprazole      3-0 2023- No             40mg QD   Take 1           [facility]



     (PriLOSEC)      2-15 03-17                          capsule           [facility]



     40 MG      00:00: 23:59                          (40 mg           [facility]



     capsule      00   :00                           total) by           Center



                                                  mouth           



                                                  daily for           



                                                  30 days.           

 

     omeprazole      2023-0 2023- No             40mg QD   Take 1           [facility]



     (P[facility]LOSEC)      2-15 -17                          capsule           [facility]



     40 MG      00:00: 23:59                          (40 mg           [facility]



     capsule      00   :00                           total) by           Center



                                                  mouth           



                                                  daily for           



                                                  30 days.           

 

     omeprazole      2023-0 2023- No             40mg QD   Take 1           [facility]



     ([facility]LOSEC)      2-15 -17                          capsule           [facility]



     40 MG      00:00: 23:59                          (40 mg           [facility]



     capsule      00   :00                           total) by           Center



                                                  mouth           



                                                  daily for           



                                                  30 days.           

 

     omeprazole      2023-0 2023- No             40mg QD   Take 1           [facility]



     ([facility]LO[facility])      2-15 03-17                          capsule           [facility]



     40 MG      00:00: 23:59                          (40 mg           [facility]



     capsule      00   :00                           total) by           Center



                                                  mouth           



                                                  daily for           



                                                  30 days.           

 

     omeprazole      -0 2023- No             40mg QD   Take 1           [facility]



     ([facility]LOSEC)      2-15 03-17                          capsule           [facility]



     40 MG      00:00: 23:59                          (40 mg           [facility]



     capsule      00   :00                           total) by           Center



                                                  mouth           



                                                  daily for           



                                                  30 days.           

 

     omeprazole      -0 2023- No             40mg QD   Take 1           [facility]



     ([facility]LOSEC)      2-15 03-17                          capsule           [facility]



     40 MG      00:00: 23:59                          (40 mg           [facility]



     capsule      00   :00                           total) by           Center



                                                  mouth           



                                                  daily for           



                                                  30 days.           

 

     omeprazole      -0 2023- No             40mg QD   Take 1           [facility]



     ([facility]LO[facility])      2-15 03-17                          capsule           [facility]



     40 MG      00:00: 23:59                          (40 mg           [facility]



     capsule      00   :00                           total) by           Center



                                                  mouth           



                                                  daily for           



                                                  30 days.           

 

     omeprazole      -0 3- No             40mg QD   Take 1           [facility]



     ([facility]LO[facility])      2-15 02-15                          capsule           [facility]



     40 MG      00:00: 00:00                          (40 mg           [facility]



     capsule      00   :00                           total) by           Center



                                                  mouth           



                                                  daily for           



                                                  30 days.           

 

     omeprazole      -0 3- No             40mg QD   Take 1           [facility]



     ([facility]LO[facility])      2-15 02-15                          capsule           [facility]



     40 MG      00:00: 00:00                          (40 mg           [facility]



     capsule      00   :00                           total) by           Center



                                                  mouth           



                                                  daily for           



                                                  30 days.           

 

     omeprazole      -0 2023- No             40mg QD   Take 1           [facility]



     (P[facility]LOSEC)      2-15 02-15                          capsule           [facility]



     40 MG      00:00: 00:00                          (40 mg           [facility]



     capsule      00   :00                           total) by           Center



                                                  mouth           



                                                  daily for           



                                                  30 days.           

 

     omeprazole      3-0 2023- No             40mg QD   Take 1           [facility]



     ([facility]LO[facility])      2-15 02-15                          capsule           [facility]



     40 MG      00:00: 00:00                          (40 mg           [facility]



     capsule      00   :00                           total) by           Center



                                                  mouth           



                                                  daily for           



                                                  30 days.           

 

     omeprazole      3-0 2023- No             40mg QD   Take 1           [facility]



     ([facility])      2-15 02-15                          capsule           [facility]



     40 MG      00:00: 00:00                          (40 mg           [facility]



     capsule      00   :00                           total) by           Center



                                                  mouth           



                                                  daily for           



                                                  30 days.           

 

     omeprazole      -0 2023- No             40mg QD   Take 1           [facility]



     ([facility])      2-15 02-15                          capsule           [facility]



     40 MG      00:00: 00:00                          (40 mg           [facility]



     capsule      00   :00                           total) by           Center



                                                  mouth           



                                                  daily for           



                                                  30 days.           

 

     omeprazole      -0 2023- No             40mg QD   Take 1           [facility]



     ([facility])      2-15 02-15                          capsule           [facility]



     40 MG      00:00: 00:00                          (40 mg           [facility]



     capsule      00   :00                           total) by           Center



                                                  mouth           



                                                  daily for           



                                                  30 days.           

 

     omeprazole      -0 2023- No             40mg QD   Take 1           [facility]



     ([facility])      2-15 02-15                          capsule           [facility]



     40 MG      00:00: 00:00                          (40 mg           [facility]



     capsule      00   :00                           total) by           Center



                                                  mouth           



                                                  daily for           



                                                  30 days.           

 

     omeprazole      -0 2023- No             40mg QD   Take 1           [facility]



     ([facility])      2-15 02-15                          capsule           [facility]



     40 MG      00:00: 00:00                          (40 mg           [facility]



     capsule      00   :00                           total) by           Center



                                                  mouth           



                                                  daily for           



                                                  30 days.           

 

     Pepcid 2023-0      Yes                      20 mg = 1           [facility]

[facility]



     mg oral      2-14                               tab, PO,           l



     tablet      22:50:                               BID, # 60           [facility]

[facility]



               00                                 tab, 0           



                                                  Refill(s)           

 

     Bentyl 10      -0      Yes                      10 mg = 1           [facility]



     mg oral      2-14                               cap, PO,           l



     capsule      22:50:                               QID-Before           [facility]

[facility]



               00                                 Meals, #           



                                                  40 cap, 0           



                                                  Refill(s)           

 

     Pepcid 2023-0      Yes                      20 mg = 1           [facility]



     mg oral      2-14                               tab, PO,           l



     tablet      22:50:                               BID, # 60           [facility]

[facility]



               00                                 tab, 0           



                                                  Refill(s)           

 

     Bentyl 10      -0      Yes                      10 mg = 1           [facility]



     mg oral      2-14                               cap, PO,           l



     capsule      22:50:                               QID-Before           [facility]

[facility]



               00                                 Meals, #           



                                                  40 cap, 0           



                                                  Refill(s)           

 

     Pepcid 20      -0      Yes                      20 mg = 1           [facility]



     mg oral      2-14                               tab, PO,           l



     tablet      22:50:                               BID, # 60           [facility]

[facility]



               00                                 tab, 0           



                                                  Refill(s)           

 

     Bentyl 10      -0      Yes                      10 mg = 1           [facility]



     mg oral      2-14                               cap, PO,           l



     capsule      22:50:                               QID-Before           [facility]

[facility]



               00                                 Meals, #           



                                                  40 cap, 0           



                                                  Refill(s)           

 

     Pepcid 0      Yes                      20 mg = 1           [facility]



     mg oral      2-14                               tab, PO,           l



     tablet      22:50:                               BID, # 60           [facility]

[facility]



               00                                 tab, 0           



                                                  Refill(s)           

 

     Bentyl 10      2023-0      Yes                      10 mg = 1           [facility]



     mg oral      2-14                               cap, PO,           l



     capsule      22:50:                               QID-Before           [facility]

[facility]



               00                                 Meals, #           



                                                  40 cap, 0           



                                                  Refill(s)           

 

     Pepcid 0      Yes                      20 mg = 1           [facility]

[facility]



     mg oral      2-14                               tab, PO,           l



     tablet      22:50:                               BID, # 60           [facility]

[facility]



               00                                 tab, 0           



                                                  Refill(s)           

 

     Bentyl 10      2023-0      Yes                      10 mg = 1           [facility]



     mg oral      2-14                               cap, PO,           l



     capsule      22:50:                               QID-Before           [facility]

[facility]



               00                                 Meals, #           



                                                  40 cap, 0           



                                                  Refill(s)           

 

     Pepcid 0      Yes                      20 mg = 1           [facility]



     mg oral      2-14                               tab, PO,           l



     tablet      22:50:                               BID, # 60           [facility]

[facility]



               00                                 tab, 0           



                                                  Refill(s)           

 

     Bentyl 10      2023-0      Yes                      10 mg = 1           [facility]



     mg oral      2-14                               cap, PO,           l



     capsule      22:50:                               QID-Before           [facility]

[facility]



               00                                 Meals, #           



                                                  40 cap, 0           



                                                  Refill(s)           

 

     Pepcid 0      Yes                      20 mg = 1           [facility]



     mg oral      2-14                               tab, PO,           l



     tablet      22:50:                               BID, # 60           [facility]

[facility]



               00                                 tab, 0           



                                                  Refill(s)           

 

     Bentyl 10      0      Yes                      10 mg = 1           [facility]



     mg oral      2-14                               cap, PO,           l



     capsule      22:50:                               QID-Before           [facility]

[facility]



               00                                 Meals, #           



                                                  40 cap, 0           



                                                  Refill(s)           

 

     Pepcid 2023-0      Yes                      20 mg = 1           [facility]



     mg oral      2-14                               tab, PO,           l



     tablet      22:50:                               BID, # 60           [facility]

[facility]



               00                                 tab, 0           



                                                  Refill(s)           

 

     Bentyl 10      -0      Yes                      10 mg = 1           [facility]



     mg oral      2-14                               cap, PO,           l



     capsule      22:50:                               QID-Before           [facility]

[facility]



               00                                 Meals, #           



                                                  40 cap, 0           



                                                  Refill(s)           

 

     Pepcid 20            Yes                      20 mg = 1           [facility]



     mg oral      2-14                               tab, PO,           l



     tablet      22:50:                               BID, # 60           [facility]

[facility]



               00                                 tab, 0           



                                                  Refill(s)           

 

     Bentyl 10            Yes                      10 mg = 1           [facility]



     mg oral      2-14                               cap, PO,           l



     capsule      22:50:                               QID-Before           [facility]

[facility]



               00                                 Meals, #           



                                                  40 cap, 0           



                                                  Refill(s)           

 

     Pepcid 20            Yes                      20 mg = 1           [facility]

[facility]



     mg oral      2-14                               tab, PO,           l



     tablet      22:50:                               BID, # 60           [facility]

[facility]



               00                                 tab, 0           



                                                  Refill(s)           

 

     Bentyl 10            Yes                      10 mg = 1           [facility]



     mg oral      2-14                               cap, PO,           l



     capsule      22:50:                               QID-Before           [facility]

[facility]



               00                                 Meals, #           



                                                  40 cap, 0           



                                                  Refill(s)           

 

     ondansetron      2022- No             4mg       4 mg, Slow          

 [facility]



     (ZOFRAN      3-31 03-31                          IV Push,           ity of



     (PF))      20:15: 19:31                          ONCE, 1           [place]



     injection 4      00   :00                           dose, On           [facility]

[facility]



     mg                                           Thu            [facility]



                                                  3/31/22 at           



                                                  1515,           



                                                  Routine           

 

     morpHINE      2022- No             4mg       4 mg, Slow           Un

donita



     injection 4      3-31 03-31                          IV Push,           ity

 of



     mg        20:15: 19:33                          ONCE, 1           [place]



               00   :00                           dose, On           Medical



                                                  Thu            Branch



                                                  3/31/22 at           



                                                  1515, STAT           

 

     No known            No                                      [facility]



     medications      3-31                                              ity of



               11:41:                                              [place]



               00                                                Medical



                                                                 Branch

 

     lithium            Yes                      2 (two)           [facility]



     carbonate      3-31                               times a           ity of



      mg      10:29:                               day            [place]



     SR tablet      [address] [place] [address]



                                                                 Branch

 

     ziprasidone            Yes                      1 (one)           Uni

vers



     80 mg      3-31                               time each           ity of



     capsule      10:29:                               day            [address]



                                                                 Branch

 

     lithium            Yes                      2 (two)           [facility]



     carbonate      3-31                               times a           ity of



      mg      10:29:                               day            [place]



     SR tablet      56                                                Medical



                                                                 Branch

 

     ziprasidone            Yes                      1 (one)           Uni

vers



     80 mg      3-31                               time each           ity of



     capsule      10:29:                               day            [place]



               [address] [place] [address]

 

     lithium            Yes                      2 (two)           [facility]



     carbonate      3-31                               times a           ity of



      mg      10:29:                               day            [place]



     SR tablet      56                                                [facility]

 

     ziprasidone            Yes                      1 (one)           Uni

vers



     80 mg      3-31                               time each           ity of



     capsule      10:29:                               day            [address]

 

     lithium            Yes                      2 (two)           [facility]



     carbonate      3-31                               times a           ity of



      mg      10:29:                               day            [place]



     SR tablet      56                                                [facility]

 

     ziprasidone            Yes                      1 (one)           Uni

vers



     80 mg      3-31                               time each           ity of



     capsule      10:29:                               day            [address]

 

     lithium            Yes                      2 (two)           [facility]



     carbonate      3-31                               times a           ity of



      mg      10:29:                               day            [place]



     SR tablet      56                                                [facility]

 

     ziprasidone            Yes                      1 (one)           Uni

vers



     80 mg      3-31                               time each           ity of



     capsule      10:29:                               day            [address]

 

     atorvastati      2021- No             10mg      Take 10 mg          

 [facility]



     n 10 mg      1-16 11-17                          by mouth.           ity of



     tablet      00:00: 05:59                                         [place]



               00   :00                                          [facility]

 

     atorvastati      2021- No             10mg      Take 10 mg          

 [facility]



     n 10 mg      -16 11-17                          by mouth.           ity of



     tablet      00:00: 05:59                                         [place]



               00   :00                                          [facility]

 

     lithium            Yes            150mg Q.[id number] Take 150          

 [facility]



     carbonate      7-16                          [id number] mg by           [facility]



     (ESLI)      20:13:                          3D   mouth 3           



     150 mg      54                                 times           



     capsule                                         daily with           



                                                  meals.           

 

     DULoxetine      0      Yes                 QD   Take by           [facility]

is



     (CYMBALTA)      7-16                               mouth           Health



     20 mg      20:13:                               daily.           



     delayed      54                                                



     release                                                        



     capsule                                                        

 

     lithium            Yes            150mg Q.[id number] Take 150          

 [facility]



     carbonate      7-16                          [id number] mg by           [facility]



     (ESKALITH)      20:13:                          3D   mouth 3           



     150 mg      54                                 times           



     capsule                                         daily with           



                                                  meals.           

 

     DULoxetine      -0      Yes                 QD   Take by           [facility]

is



     (CYMBALTA)      7-16                               mouth           Health



     20 mg      20:13:                               daily.           



     delayed      54                                                



     release                                                        



     capsule                                                        

 

     lithium            Yes            150mg Q.[id number] Take 150          

 [facility]



     carbonate      7-16                          [id number] mg by           [facility]



     (ESKALITH)      20:13:                          3D   mouth 3           



     150 mg      54                                 times           



     capsule                                         daily with           



                                                  meals.           

 

     DULoxetine      -0      Yes                 QD   Take by           [facility]

is



     (CYMBALTA)      7-16                               mouth           Health



     20 mg      20:13:                               daily.           



     delayed      54                                                



     release                                                        



     capsule                                                        

 

     lithium      2014-0      Yes            150mg Q.[id number] Take 150          

 [facility]



     carbonate      7-16                          [id number] mg by           [facility]



     (ESKALITH)      20:13:                          3D   mouth 3           



     150 mg      54                                 times           



     capsule                                         daily with           



                                                  meals.           

 

     DULoxetine      -0      Yes                 QD   Take by           [facility]

is



     (CYMBALTA)      7-16                               mouth           Health



     20 mg      20:13:                               daily.           



     delayed      54                                                



     release                                                        



     capsule                                                        

 

     lithium      -0      Yes            150mg Q.[id number] Take 150          

 [facility]



     carbonate      7-16                          [id number] mg by           [facility]



     (ESKALITH)      20:13:                          3D   mouth 3           



     150 mg      54                                 times           



     capsule                                         daily with           



                                                  meals.           

 

     DULoxetine      -0      Yes                 QD   Take by           [facility]

is



     (CYMBALTA)      7-16                               mouth           Health



     20 mg      20:13:                               daily.           



     delayed      54                                                



     release                                                        



     capsule                                                        

 

     lithium      -0      Yes            150mg Q.[id number] Take 150          

 [facility]



     carbonate      7-16                          [id number] mg by           [facility]



     (ESKALITH)      20:13:                          3D   mouth 3           



     150 mg      54                                 times           



     capsule                                         daily with           



                                                  meals.           

 

     DULoxetine      -0      Yes                 QD   Take by           [facility]

is



     (CYMBALTA)      7-16                               mouth           Health



     20 mg      20:13:                               daily.           



     delayed      54                                                



     release                                                        



     capsule                                                        

 

     lithium      -0      Yes            150mg Q.[id number] Take 150          

 [facility]



     carbonate      7-16                          [id number] mg by           [facility]



     (ESKALITH)      20:13:                          3D   mouth 3           



     150 mg      54                                 times           



     capsule                                         daily with           



                                                  meals.           

 

     DULoxetine      2014-0      Yes                 QD   Take by           [facility]

is



     (CYMBALTA)      7-16                               mouth           Health



     20 mg      20:13:                               daily.           



     delayed      54                                                



     release                                                        



     capsule                                                        

 

     lithium      2014-0      Yes            150mg Q.[id number] Take 150          

 [facility]



     carbonate      7-16                          [id number] mg by           [facility]



     (ESKALITH)      20:13:                          3D   mouth 3           



     150 mg      54                                 times           



     capsule                                         daily with           



                                                  meals.           

 

     DULoxetine      2014-0      Yes                 QD   Take by           [facility]

is



     (CYMBALTA)      7-16                               mouth           Health



     20 mg      20:13:                               daily.           



     delayed      54                                                



     release                                                        



     capsule                                                        

 

     ibuprofen            Yes       Dental 800mg      Take 1           [facility]

ris



     (MOTRIN)      7-16                abscess           tablet by           [facility]

lt



     800 mg      00:00:                               mouth           



     tablet      00                                 every 8           



                                                  hours as           



                                                  needed for           



                                                  Pain.           

 

     ibuprofen            Yes       Dental 800mg      Take 1           [facility]

ris



     (MOTRIN)      7-16                abscess           tablet by           a

lth



     800 mg      00:00:                               mouth           



     tablet      00                                 every 8           



                                                  hours as           



                                                  needed for           



                                                  Pain.           

 

     ibuprofen            Yes       Dental 800mg      Take 1           [facility]

ris



     (MOTRIN)      7-16                abscess           tablet by           a

lt



     800 mg      00:00:                               mouth           



     tablet      00                                 every 8           



                                                  hours as           



                                                  needed for           



                                                  Pain.           

 

     ibuprofen            Yes       Dental 800mg      Take 1           [facility]

ris



     (MOTRIN)      7-16                abscess           tablet by           a

lt



     800 mg      00:00:                               mouth           



     tablet      00                                 every 8           



                                                  hours as           



                                                  needed for           



                                                  Pain.           

 

     ibuprofen            Yes       Dental 800mg      Take 1           [facility]

ris



     (MOTRIN)      7-16                abscess           tablet by           [facility]

lt



     800 mg      00:00:                               mouth           



     tablet      00                                 every 8           



                                                  hours as           



                                                  needed for           



                                                  Pain.           

 

     ibuprofen            Yes       Dental 800mg      Take 1           [facility]

ris



     (MOTRIN)      7-16                abscess           tablet by           [facility]

lt



     800 mg      00:00:                               mouth           



     tablet      00                                 every 8           



                                                  hours as           



                                                  needed for           



                                                  Pain.           

 

     ibuprofen            Yes       Dental 800mg      Take 1           [facility]

ris



     (MOTRIN)      7-16                abscess           tablet by           [facility]

lt



     800 mg      00:00:                               mouth           



     tablet      00                                 every 8           



                                                  hours as           



                                                  needed for           



                                                  Pain.           

 

     ibuprofen            Yes       Dental 800mg      Take 1           [facility]

ris



     (MOTRIN)      7-16                abscess           tablet by           [facility]

lt



     800 mg      00:00:                               mouth           



     tablet      00                                 every 8           



                                                  hours as           



                                                  needed for           



                                                  Pain.           

 

     ibuprofen            Yes       Dental 800mg      Take 1           [facility]

ris



     (MOTRIN)      7-16                abscess           tablet by           [facility]

lt



     800 mg      00:00:                               mouth           



     tablet      00                                 every 8           



                                                  hours as           



                                                  needed for           



                                                  Pain.           

 

     ibuprofen            Yes       Dental 800mg      Take 1           [facility]

ris



     (MOTRIN)      7-16                abscess           tablet by           a

lt



     800 mg      00:00:                               mouth           



     tablet      00                                 every 8           



                                                  hours as           



                                                  needed for           



                                                  Pain.           

 

     ibuprofen            Yes       Dental 800mg      Take 1           [facility]

ris



     (MOTRIN)      7-16                abscess           tablet by           a

lth



     800 mg      00:00:                               mouth           



     tablet      00                                 every 8           



                                                  hours as           



                                                  needed for           



                                                  Pain.           

 

     ibuprofen            Yes       Dental 800mg      Take 1           [facility]

ris



     (MOTRIN)      7-16                abscess           tablet by           [facility]

lth



     800 mg      00:00:                               mouth           



     tablet      00                                 every 8           



                                                  hours as           



                                                  needed for           



                                                  Pain.           

 

     ibuprofen            Yes       Dental 800mg      Take 1           [facility]

ris



     (MOTRIN)      7-16                abscess           tablet by           [facility]

lth



     800 mg      00:00:                               mouth           



     tablet      00                                 every 8           



                                                  hours as           



                                                  needed for           



                                                  Pain.           

 

     ibuprofen            Yes       Dental 800mg      Take 1           [facility]

ris



     (MOTRIN)      7-16                abscess           tablet by           [facility]

lth



     800 mg      00:00:                               mouth           



     tablet      00                                 every 8           



                                                  hours as           



                                                  needed for           



                                                  Pain.           

 

     ibuprofen            Yes       Dental 800mg      Take 1           [facility]

ris



     (MOTRIN)      7-16                abscess           tablet by           [facility]

lth



     800 mg      00:00:                               mouth           



     tablet      00                                 every 8           



                                                  hours as           



                                                  needed for           



                                                  Pain.           







Vital Signs







             Vital Name   Observation Time Observation Value Comments     Source

 

             Systolic blood 2023 22:53:00 142 mm[Hg]                [facility]

sity [facility]

 

             Diastolic blood 2023 22:53:00 76 mm[Hg]                 [facility]

 

             Heart rate   2023 22:53:00 75 /min                   [facility]

 

             Body temperature 2023 22:53:00 36.78 Danisha                 [facility]

 

             Respiratory rate 2023 22:53:00 18 /min                   [facility]

 

             Body height  2023 22:53:00 157.5 cm                  [facility]

 

             Body weight  2023 22:53:00 94.348 kg                 [facility]

 

             BMI          2023 22:53:00 38.04 kg/m2               [facility]

 

             Oxygen saturation in 2023 22:53:00 98 /min                   

[facility]



             Arterial blood by                                        [place] [facility]

staci



             Pulse oximetry                                        [facility]

 

             Systolic blood 2023 20:00:00 152 mm[Hg]                [facility]

 

             Diastolic blood 2023 20:00:00 64 mm[Hg]                 [facility]

 

             Heart rate   2023 20:00:00 60 /min                   [facility]

 

             Respiratory rate 2023 20:00:00 21 /min                   [facility]

 

             Oxygen saturation in 2023 20:00:00 96 /min                   

[facility]



             Arterial blood by                                        [place] [facility]

staci



             Pulse oximetry                                        [facility]

 

             Body temperature 2023 15:06:00 37.72 Danisha                 [facility]

ersity of



                                                                 [place] [facility]



                                                                 [facility]

 

             Body height  2023 15:06:00 157.5 cm                  [facility]

ty of



                                                                 [place] [facility]



                                                                 [facility]

 

             Body weight  2023 15:06:00 113.399 kg                [facility]

ty of



                                                                 [place] [facility]



                                                                 [facility]

 

             BMI          2023 15:06:00 45.73 kg/m2               [facility]

ty of



                                                                 [place] [facility]



                                                                 [facility]

 

             HEIGHT       2023-02-15 16:56:00 157.5 cm                  

 

             WEIGHT       2023-02-15 16:56:00 100.245 kg                

 

             HEIGHT       2023-02-15 16:56:00 157.5 cm                  

 

             WEIGHT       2023-02-15 16:56:00 100.245 kg                

 

             HEIGHT       2023-02-15 16:56:00 157.5 cm                  

 

             WEIGHT       2023-02-15 16:56:00 100.245 kg                

 

             Systolic blood 2022 19:30:00 176 mm[Hg]                Univer

sity of



             pressure                                            [facility]

 

             Diastolic blood 2022 19:30:00 94 mm[Hg]                 Unive

rsity of



             pressure                                            [facility]

 

             Heart rate   2022 19:30:00 76 /min                   Universi

ty of



                                                                 [facility]



                                                                 Branch

 

             Respiratory rate 2022 19:30:00 11 /min                   Univ

ersity of



                                                                 [facility]

 

             Oxygen saturation in 2022 19:30:00 95 /min                   

[facility]



             Arterial blood by                                        [facility]

staci



             Pulse oximetry                                        Branch

 

             Body temperature 2022 17:54:00 37.22 Danisha                 Univ

ersity of



                                                                 [facility]



                                                                 Branch

 

             Body height  2022 17:54:00 157.5 cm                  Universi

ty of



                                                                 [facility]



                                                                 Branch

 

             Body weight  2022 17:54:00 90.719 kg                 Universi

ty of



                                                                 [facility]



                                                                 Branch

 

             BMI          2022 17:54:00 36.58 kg/m2               Universi

ty of



                                                                 [facility]



                                                                 Branch

 

             Body height  2022 15:29:00 160 cm                    Universi

ty of



                                                                 [facility]



                                                                 Branch

 

             Body weight  2022 15:29:00 90.719 kg                 Universi

ty of



                                                                 [facility]



                                                                 Branch

 

             BMI          2022 15:29:00 35.43 kg/m2               [facility]

 

             Height/Length 2022 08:36:33 160 cm                    



             Measured                                            

 

             Weight Dosing 2022 08:36:33 105.00 kg                 

 

             Body height  2023 08:42:00 157.5 cm                  [facility]

 

             Body weight  2023 08:42:00 100.245 kg                [facility]

 

             BMI          2023 08:42:00 40.42 kg/m2               [facility]

 

             Systolic blood 2023 08:29:26 114 mm[Hg]                [facility]



             pressure                                            

 

             Diastolic blood 2023 08:29:26 76 mm[Hg]                 [facility]



             pressure                                            

 

             Heart rate   2023 08:29:26 86 /min                   [facility]

 

             Body temperature 2023 08:29:26 36.78 Danisha                 [facility]

 

             Respiratory rate 2023 08:29:26 18 /min                   [facility]

 

             Oxygen saturation in 2023 08:29:26 97 /min                   

[facility]



             Arterial blood by                                        



             Pulse oximetry                                        

 

             Systolic blood 2023-02-15 22:01:00 124 mm[Hg]                [facility]

 

             Diastolic blood 2023-02-15 22:01:00 66 mm[Hg]                 [facility]

 

             Heart rate   2023-02-15 22:01:00 88 /min                   [facility]

 

             Body temperature 2023-02-15 22:01:00 37.17 Danisha                 [facility]

 

             Respiratory rate 2023-02-15 22:01:00 16 /min                   [facility]

 

             Oxygen saturation in 2023-02-15 22:01:00 100 /min                  

[facility]



             Arterial blood by                                        Medical Ce

nter



             Pulse oximetry                                        

 

             Body height  2023-02-15 16:56:00 157.5 cm                  [facility]

 

             Body weight  2023-02-15 16:56:00 100.245 kg                [facility]

 

             BMI          2023-02-15 16:56:00 40.42 kg/m2               [facility]

 

             Heart Rate   2023 22:58:16                           [facility]

 [place]

 

             Systolic (mm Hg) 2023 22:58:00                           [facility] [place]

 

             Diastolic (mm Hg) 2023 22:58:00                           [facility]

[facility] [place]

 

             Temperature Oral (F) 2023 18:24:49 98.5 F                    

Sergio Delgadillo

 

             Height       2023 14:59:00 5 [ft_i]                  [facility]

 [place]

 

             BMI Calculated 2023 14:59:00                           Fabiano Paez

 

             Weight       2023 14:59:00                           Sergio Schreiberann







Procedures







                Procedure       Date / Time     Performing Clinician Source



                                Performed                       

 

                ASSIGNMENT OF BENEFITS 2023 23:23:45 Doctor Unassigned, No

 [facility]

 

                NOTICE OF PRIVACY 2023 22:36:23 Doctor Unassigned, No [facility]



                PRACTICES                       [facility]

 

                CONSENT/REFUSAL FOR 2023 22:31:37 Doctor Unassigned, No [facility]



                DIAGNOSIS AND TREATMENT                 [facility]

 

                CT              [facility]23 16:56:55 [facility]



                MAXILLOFACIAL/MANDIBLE                                 Medical B

ranch



                W CONTRAST                                      

 

                LACTIC AC[facility] WHOLE BLOOD [facility]23 15[facility]57[facility]

 

                COMP. METABOLIC PANEL 2023 15:56:00 [facility]



                ([id number])                                         [facility]

 

                CBC WITH DIFF   2023 15:56:00 [facility]

 

                COVID-19, INFLUENZA 2023 10:39:00 Alejo Escalante [facility]



                A&B, AND RSV                    [facility]          



                QUALITATIVE RT-PCR                                 

 

                XR CHEST 1 VW PORTABLE 2023 09:16:40 Alejo Escalante [facility]



                                                [facility]ianni          

 

                ASSIGNMENT OF BENEFITS 2023 19:39:54 Doctor Unassigned, No

 [facility]

 

                XR CHEST 1 VW   2022 18[facility]39:34 [facility]

 

                XR PELVIS <3 VW 2022 18:39:34 [facility]

 

                URINALYSIS      2022 18:19:00 [facility]

 

                COMP. METABOLIC PANEL 2022 18:08:00 [facility] [place]



                ([id number])                                         [facility] Branch

 

                CBC WITH DIFF   2022 18:08:00 Jack Dhillon o

f [place]



                                                                [facility]

 

                COVID-19 (ID NOW RAPID 2022 18:08:00 Jack Dhillon

[facility]



                TESTING)                                        Medical Branch

 

                REFERRAL-       2022 05:01:00 Doctor Unassigned, Nimisha [facility]



                REQUEST/RESPONSE                 Name            Medical Branch

 

                [id number]         2020 00:00:00 CANAL.02        [facility]







Plan of Care







             Planned Activity Planned Date Details      Comments     Source

 

             Future Scheduled 2023-10-01   IMM Influenza Seasonal              [facility]

[facility]



             Test         00:00:00     (>/= 19 yrs) [code = IMM              



                                       Influenza Seasonal (>/=              



                                       19 yrs)]                  

 

             Future Scheduled 2023-10-01   IMM Influenza Seasonal              H

[facility]



             Test         00:00:00     (>/= 19 yrs) [code = IMM              



                                       Influenza Seasonal (>/=              



                                       19 yrs)]                  

 

             Future Scheduled 2023-10-01   IMM Influenza Seasonal              H

[facility]



             Test         00:00:00     (>/= 19 yrs) [code = IMM              



                                       Influenza Seasonal (>/=              



                                       19 yrs)]                  

 

             Future Scheduled 2023-10-01   IMM Influenza Seasonal              H

[facility]



             Test         00:00:00     (>/= 19 yrs) [code = IMM              



                                       Influenza Seasonal (>/=              



                                       19 yrs)]                  

 

             Future Scheduled 2023-10-01   IMM Influenza Seasonal              H

[facility]



             Test         00:00:00     (>/= 19 yrs) [code = IMM              



                                       Influenza Seasonal (>/=              



                                       19 yrs)]                  

 

             Future Scheduled 2023-10-01   IMM Influenza Seasonal              H

[facility]



             Test         00:00:00     (>/= 19 yrs) [code = IMM              



                                       Influenza Seasonal (>/=              



                                       19 yrs)]                  

 

             Future Scheduled 2023   Influenza Vaccine (#1)              C

[facility]



             Test         00:00:00     [code = Influenza Vaccine              Me

dical Center



                                       (#1)]                     

 

             Future Scheduled 2023   INFLUENZA VACCINE (Season            

  [facility]



             Test         00:00:00     Ended) [code = INFLUENZA              Med

ical Center



                                       VACCINE (Season Ended)]              

 

             Future Scheduled 2023   INFLUENZA VACCINE (Season            

  [facility]



             Test         00:00:00     Ended) [code = INFLUENZA              Med

ical Center



                                       VACCINE (Season Ended)]              

 

             Future Scheduled 2023   INFLUENZA VACCINE (Season            

  [facility]



             Test         00:00:00     Ended) [code = INFLUENZA              Med

ical Center



                                       VACCINE (Season Ended)]              

 

             Future Scheduled 2023   Influenza Vaccine (Season            

  [facility]



             Test         00:00:00     Ended) [code = Influenza              Med

ical Center



                                       Vaccine (Season Ended)]              

 

             Future Scheduled 2023   Influenza Vaccine (#1)              C

[facility]



             Test         00:00:00     [code = Influenza Vaccine              Me

dical Center



                                       (#1)]                     

 

             Future Scheduled 2023   INFLUENZA VACCINE (Season            

  [facility]



             Test         00:00:00     Ended) [code = INFLUENZA              Med

ical Center



                                       VACCINE (Season Ended)]              

 

             Future Scheduled 2023   INFLUENZA VACCINE (Season            

  [facility]



             Test         00:00:00     Ended) [code = INFLUENZA              Med

ical Center



                                       VACCINE (Season Ended)]              

 

             Future Scheduled 2023   Screening for malignant              

[facility]



             Test         07:22:33     neoplasm of colon              Hospital



                                       (procedure) [code =              



                                       122805030]                

 

             Future Scheduled 2023   Screening for malignant              

[facility]



             Test         07:22:33     neoplasm of colon              Hospital



                                       (procedure) [code =              



                                       505197799]                

 

             Future Scheduled 2023   Screening for malignant              

[facility]



             Test         07:22:33     neoplasm of colon              Hospital



                                       (procedure) [code =              



                                       121859804]                

 

             Future Scheduled 2023   COVID-19 VACCINE (#1)              [facility]

[facility]



             Test         07:22:33     [code = COVID-19 VACCINE              Hos

pital



                                       (#1)]                     

 

             Future Scheduled 2023   Pneumococcal Vaccine:              Me

[facility]



             Test         07:22:33     Pediatrics (0 to 5 Years)              Ho

spital



                                       and At-Risk Patients (6              



                                       to 64 Years) (1 - PCV)              



                                       [code = Pneumococcal              



                                       Vaccine: Pediatrics (0 to              



                                       5 Years) and At-Risk              



                                       Patients (6 to 64 Years)              



                                       (1 - PCV)]                

 

             Future Scheduled 2023   Hepatitis C screening              [facility]

[facility]



             Test         07:22:33     (procedure) [code =              Hospital



                                       636499958]                

 

             Future Scheduled 2023   Screening for malignant              

[facility]



             Test         07:22:33     neoplasm of colon              Hospital



                                       (procedure) [code =              



                                       409060608]                

 

             Future Scheduled 2023   Screening for malignant              

[facility]



             Test         07:22:33     neoplasm of colon              Hospital



                                       (procedure) [code =              



                                       793661245]                

 

             Future Scheduled 2023   SHINGLES VACCINES (1 of              

[facility]



             Test         07:22:33     2) [code = SHINGLES              Hospital



                                       VACCINES (1 of 2)]              

 

             Future Scheduled 2023   INFLUENZA VACCINE [code =            

  [facility]



             Test         07:22:33     INFLUENZA VACCINE]              Hospital

 

             Future Scheduled 2023   Screening for malignant              

[facility]



             Test         08:38:54     neoplasm of colon              Hospital



                                       (procedure) [code =              



                                       504320871]                

 

             Future Scheduled 2023   COVID-19 VACCINE (#1)              [facility]

[facility]



             Test         08:38:54     [code = COVID-19 VACCINE              Hos

pital



                                       (#1)]                     

 

             Future Scheduled 2023   Pneumococcal Vaccine:              Me

[facility]



             Test         08:38:54     Pediatrics (0 to 5 Years)              Ho

spital



                                       and At-Risk Patients (6              



                                       to 64 Years) (1 - PCV)              



                                       [code = Pneumococcal              



                                       Vaccine: Pediatrics (0 to              



                                       5 Years) and At-Risk              



                                       Patients (6 to 64 Years)              



                                       (1 - PCV)]                

 

             Future Scheduled 2023   Hepatitis C screening              [facility]

[facility]



             Test         08:38:54     (procedure) [code =              Hospital



                                       [id number]]                

 

             Future Scheduled 2023   Screening for malignant              

[facility]



             Test         08:38:54     neoplasm of colon              Hospital



                                       (procedure) [code =              



                                       571445105]                

 

             Future Scheduled 2023   Screening for malignant              

[facility]



             Test         08:38:54     neoplasm of colon              Hospital



                                       (procedure) [code =              



                                       571567042]                

 

             Future Scheduled 2023   SHINGLES VACCINES (1 of              

[facility]



             Test         08:38:54     2) [code = SHINGLES              Hospital



                                       VACCINES (1 of 2)]              

 

             Future Scheduled 2023   INFLUENZA VACCINE [code =            

  [facility]



             Test         08:38:54     INFLUENZA VACCINE]              Hospital

 

             Future Scheduled 2023   Screening for malignant              

[facility]



             Test         08:38:54     neoplasm of colon              Hospital



                                       (procedure) [code =              



                                       736278005]                

 

             Future Scheduled 2023   Screening for malignant              

[facility]



             Test         08:38:54     neoplasm of colon              Hospital



                                       (procedure) [code =              



                                       468749496]                

 

             Future Scheduled 2023   Screening for malignant              

[facility]



             Test         08:38:54     neoplasm of colon              Hospital



                                       (procedure) [code =              



                                       537023660]                

 

             Future Scheduled 2023   COVID-19 VACCINE (#1)              [facility]

[facility]



             Test         08:38:54     [code = COVID-19 VACCINE              Hos

pital



                                       (#1)]                     

 

             Future Scheduled 2023   Pneumococcal Vaccine:              Me

[facility]



             Test         08:38:54     Pediatrics (0 to 5 Years)              Ho

spital



                                       and At-Risk Patients (6              



                                       to 64 Years) (1 - PCV)              



                                       [code = Pneumococcal              



                                       Vaccine: Pediatrics (0 to              



                                       5 Years) and At-Risk              



                                       Patients (6 to 64 Years)              



                                       (1 - PCV)]                

 

             Future Scheduled 2023   Hepatitis C screening              Me

[facility]



             Test         08:38:54     (procedure) [code =              Hospital



                                       [id number]]                

 

             Future Scheduled 2023   Screening for malignant              

[facility]



             Test         08:38:54     neoplasm of colon              Hospital



                                       (procedure) [code =              



                                       787489455]                

 

             Future Scheduled 2023   Screening for malignant              

[facility]



             Test         08:38:54     neoplasm of colon              Hospital



                                       (procedure) [code =              



                                       687299135]                

 

             Future Scheduled 2023   SHINGLES VACCINES (1 of              

[facility]



             Test         08:38:54     2) [code = SHINGLES              Hospital



                                       VACCINES (1 of 2)]              

 

             Future Scheduled 2023   INFLUENZA VACCINE [code =            

  [facility]



             Test         08:38:54     INFLUENZA VACCINE]              Hospital

 

             Future Scheduled 2023   Screening for malignant              

[facility]



             Test         08:38:54     neoplasm of colon              Hospital



                                       (procedure) [code =              



                                       031359972]                

 

             Future Scheduled 2023   Screening for malignant              

[facility]



             Test         08:38:54     neoplasm of colon              Hospital



                                       (procedure) [code =              



                                       462651156]                

 

             Future Scheduled 2023   COVID-19 VACCINE (#1)              [facility]

[facility]



             Test         20:12:18     [code = COVID-19 VACCINE              Hos

pital



                                       (#1)]                     

 

             Future Scheduled 2023   Pneumococcal Vaccine:              Me

[facility]



             Test         20:12:18     Pediatrics (0 to 5 Years)              Ho

spital



                                       and At-Risk Patients (6              



                                       to 64 Years) (1 - PCV)              



                                       [code = Pneumococcal              



                                       Vaccine: Pediatrics (0 to              



                                       5 Years) and At-Risk              



                                       Patients (6 to 64 Years)              



                                       (1 - PCV)]                

 

             Future Scheduled 2023   Hepatitis C screening              Me

[facility]



             Test         20:12:18     (procedure) [code =              Hospital



                                       [id number]]                

 

             Future Scheduled 2023   COLONOSCOPY SCREENING              Me

[facility]



             Test         20:12:18     [code = COLONOSCOPY              Hospital



                                       SCREENING]                

 

             Future Scheduled 2023   SHINGLES VACCINES (1 of              

[facility]



             Test         20:12:18     2) [code = SHINGLES              Hospital



                                       VACCINES (1 of 2)]              

 

             Future Scheduled 2023   INFLUENZA VACCINE [code =            

  [facility]



             Test         20:12:18     INFLUENZA VACCINE]              Hospital

 

             Future Scheduled 2023   COVID-19 VACCINE (#1)              [facility]

[facility]



             Test         20:12:18     [code = COVID-19 VACCINE              Hos

pital



                                       (#1)]                     

 

             Future Scheduled 2023   Pneumococcal Vaccine:              [facility]

[facility]



             Test         20:12:18     Pediatrics (0 to 5 Years)              Ho

spital



                                       and At-Risk Patients (6              



                                       to 64 Years) (1 - PCV)              



                                       [code = Pneumococcal              



                                       Vaccine: Pediatrics (0 to              



                                       5 Years) and At-Risk              



                                       Patients (6 to 64 Years)              



                                       (1 - PCV)]                

 

             Future Scheduled 2023   Hepatitis C screening              [facility]

[facility]



             Test         20:12:18     (procedure) [code =              Hospital



                                       [id number]]                

 

             Future Scheduled 2023   COLONOSCOPY SCREENING              [facility]

[facility]



             Test         20:12:18     [code = COLONOSCOPY              Hospital



                                       SCREENING]                

 

             Future Scheduled 2023   SHINGLES VACCINES (1 of              

[facility]



             Test         20:12:18     2) [code = SHINGLES              Hospital



                                       VACCINES (1 of 2)]              

 

             Future Scheduled 2023   INFLUENZA VACCINE [code =            

  [facility]



             Test         20:12:18     INFLUENZA VACCINE]              Hospital

 

             Future Scheduled 2023   COVID-19 VACCINE (#1)              [facility]

[facility]



             Test         20:12:18     [code = COVID-19 VACCINE              Hos

pital



                                       (#1)]                     

 

             Future Scheduled 2023   Pneumococcal Vaccine:              [facility]

[facility]



             Test         20:12:18     Pediatrics (0 to 5 Years)              Ho

spital



                                       and At-Risk Patients (6              



                                       to 64 Years) (1 - PCV)              



                                       [code = Pneumococcal              



                                       Vaccine: Pediatrics (0 to              



                                       5 Years) and At-Risk              



                                       Patients (6 to 64 Years)              



                                       (1 - PCV)]                

 

             Future Scheduled 2023   Hepatitis C screening              [facility]

[facility]



             Test         20:12:18     (procedure) [code =              Hospital



                                       [id number]]                

 

             Future Scheduled 2023   Screening for malignant              

[facility]



             Test         20:12:18     neoplasm of colon              Hospital



                                       (procedure) [code =              



                                       [id number]]                

 

             Future Scheduled 2023   SHINGLES VACCINES (1 of              

[facility]



             Test         20:12:18     2) [code = SHINGLES              Hospital



                                       VACCINES (1 of 2)]              

 

             Future Scheduled 2023   INFLUENZA VACCINE [code =            

  [facility]



             Test         20:12:18     INFLUENZA VACCINE]              Hospital

 

             Future Scheduled 2023   COVID-19 VACCINE (#1)              [facility]

[facility]



             Test         20:12:18     [code = COVID-19 VACCINE              Hos

pital



                                       (#1)]                     

 

             Future Scheduled 2023   Pneumococcal Vaccine:              [facility]

[facility]



             Test         20:12:18     Pediatrics (0 to 5 Years)              Ho

spital



                                       and At-Risk Patients (6              



                                       to 64 Years) (1 - PCV)              



                                       [code = Pneumococcal              



                                       Vaccine: Pediatrics (0 to              



                                       5 Years) and At-Risk              



                                       Patients (6 to 64 Years)              



                                       (1 - PCV)]                

 

             Future Scheduled 2023   Hepatitis C screening              [facility]

[facility]



             Test         20:12:18     (procedure) [code =              Hospital



                                       [id number]]                

 

             Future Scheduled 2023   Screening for malignant              

[facility]



             Test         20:12:18     neoplasm of colon              Hospital



                                       (procedure) [code =              



                                       [id number]]                

 

             Future Scheduled 2023   SHINGLES VACCINES (1 of              

[facility]



             Test         20:12:18     2) [code = SHINGLES              Hospital



                                       VACCINES (1 of 2)]              

 

             Future Scheduled 2023   INFLUENZA VACCINE [code =            

  [facility]



             Test         20:12:18     INFLUENZA VACCINE]              Hospital

 

             Future Scheduled 2023   COVID-19 VACCINE (#1)              [facility]

[facility]



             Test         19:28:05     [code = COVID-19 VACCINE              Hos

pital



                                       (#1)]                     

 

             Future Scheduled 2023   Pneumococcal Vaccine:              [facility]

[facility]



             Test         19:28:05     Pediatrics (0 to 5 Years)              Ho

spital



                                       and At-Risk Patients (6              



                                       to 64 Years) (1 - PCV)              



                                       [code = Pneumococcal              



                                       Vaccine: Pediatrics (0 to              



                                       5 Years) and At-Risk              



                                       Patients (6 to 64 Years)              



                                       (1 - PCV)]                

 

             Future Scheduled 2023   Hepatitis C screening              [facility]

[facility]



             Test         19:28:05     (procedure) [code =              Hospital



                                       [id number]]                

 

             Future Scheduled 2023   COLONOSCOPY SCREENING              [facility]

[facility]



             Test         19:28:05     [code = COLONOSCOPY              Hospital



                                       SCREENING]                

 

             Future Scheduled 2023   SHINGLES VACCINES (1 of              

[facility]



             Test         19:28:05     2) [code = SHINGLES              Hospital



                                       VACCINES (1 of 2)]              

 

             Future Scheduled 2023   INFLUENZA VACCINE [code =            

  [facility]



             Test         19:28:05     INFLUENZA VACCINE]              Hospital

 

             Future Scheduled 2023   Medicare IPPE (WELCOME TO            

  [facility]



             Test         00:00:00     MEDICARE) [code =              Medical Ce

nter



                                       Medicare IPPE (WELCOME TO              



                                       MEDICARE)]                

 

             Future Scheduled 2023   Medicare IPPE (WELCOME TO            

  [facility]



             Test         00:00:00     MEDICARE) [code =              Medical Ce

nter



                                       Medicare IPPE (WELCOME TO              



                                       MEDICARE)]                

 

             Future Scheduled 2023   Medicare IPPE (WELCOME TO            

  [facility]



             Test         00:00:00     MEDICARE) [code =              Medical Ce

nter



                                       Medicare IPPE (WELCOME TO              



                                       MEDICARE)]                

 

             Future Scheduled 2023   Medicare IPPE (WELCOME TO            

  [facility]



             Test         00:00:00     MEDICARE) [code =              Medical Ce

nter



                                       Medicare IPPE (WELCOME TO              



                                       MEDICARE)]                

 

             Future Scheduled 2023   Medicare IPPE (WELCOME TO            

  [facility]



             Test         00:00:00     MEDICARE) [code =              Medical Ce

nter



                                       Medicare IPPE (WELCOME TO              



                                       MEDICARE)]                

 

             Future Scheduled 2023   Medicare IPPE (WELCOME TO            

  [facility]



             Test         00:00:00     MEDICARE) [code =              Medical Ce

nter



                                       Medicare IPPE (WELCOME TO              



                                       MEDICARE)]                

 

             Future Scheduled 2023   Medicare IPPE (WELCOME TO            

  [facility]



             Test         00:00:00     MEDICARE) [code =              Medical Ce

nter



                                       Medicare IPPE (WELCOME TO              



                                       MEDICARE)]                

 

             Future Scheduled 2023   Medicare IPPE (WELCOME TO            

  [facility]



             Test         00:00:00     MEDICARE) [code =              Medical Ce

nter



                                       Medicare IPPE (WELCOME TO              



                                       MEDICARE)]                

 

             Future Scheduled 2023   Medicare IPPE (WELCOME TO            

  [facility]



             Test         00:00:00     MEDICARE) [code =              Medical Ce

nter



                                       Medicare IPPE (WELCOME TO              



                                       MEDICARE)]                

 

             Future Scheduled 2023   DEPRESSION SCREENING              [facility]



             Test         00:00:00     (12+) [code = DEPRESSION              Med

ical Center



                                       SCREENING (12+)]              

 

             Future Scheduled 2023   DEPRESSION SCREENING              [facility]



             Test         00:00:00     (12+) [code = DEPRESSION              Med

ical Center



                                       SCREENING (12+)]              

 

             Future Scheduled 2023   DEPRESSION SCREENING              [facility]



             Test         00:00:00     (12+) [code = DEPRESSION              Med

ical Center



                                       SCREENING (12+)]              

 

             Future Scheduled 2023   DEPRESSION SCREENING              [facility]



             Test         00:00:00     (12+) [code = DEPRESSION              Med

ical Center



                                       SCREENING (12+)]              

 

             Future Scheduled 2023   DEPRESSION SCREENING              [facility]



             Test         00:00:00     (12+) [code = DEPRESSION              Med

ical Center



                                       SCREENING (12+)]              

 

             Future Scheduled 2023   DEPRESSION SCREENING              [facility]



             Test         00:00:00     (12+) [code = DEPRESSION              Med

ical Center



                                       SCREENING (12+)]              

 

             Future Scheduled 2023   DEPRESSION SCREENING              [facility]



             Test         00:00:00     (12+) [code = DEPRESSION              Med

ical Center



                                       SCREENING (12+)]              

 

             Future Scheduled 2023   DEPRESSION SCREENING              [facility]



             Test         00:00:00     (12+) [code = DEPRESSION              Med

ical Center



                                       SCREENING (12+)]              

 

             Future Scheduled 2023   DEPRESSION SCREENING              [facility]



             Test         00:00:00     (12+) [code = DEPRESSION              Med

ical Center



                                       SCREENING (12+)]              

 

             Future Scheduled 2022-10-01   IMM Influenza Seasonal              [facility]



             Test         00:00:00     (>/= 19 yrs) [code = IMM              



                                       Influenza Seasonal (>/=              



                                       19 yrs)]                  

 

             Future Scheduled 2022-10-01   IMM Influenza Seasonal              H

[facility]



             Test         00:00:00     (>/= 19 yrs) [code = IMM              



                                       Influenza Seasonal (>/=              



                                       19 yrs)]                  

 

             Future Scheduled 2022-10-01   IMM Influenza Seasonal              H

[facility]



             Test         00:00:00     (>/= 19 yrs) [code = IMM              



                                       Influenza Seasonal (>/=              



                                       19 yrs)]                  

 

             Future Scheduled 2022-10-01   IMM Influenza Seasonal              H

[facility]



             Test         00:00:00     (>/= 19 yrs) [code = IMM              



                                       Influenza Seasonal (>/=              



                                       19 yrs)]                  

 

             Future Scheduled 2022-10-01   IMM Influenza Seasonal              H

[facility]



             Test         00:00:00     (>/= 19 yrs) [code = IMM              



                                       Influenza Seasonal (>/=              



                                       19 yrs)]                  

 

             Future Scheduled 2022-10-01   IMM Influenza Seasonal              H

[facility]



             Test         00:00:00     (>/= 19 yrs) [code = IMM              



                                       Influenza Seasonal (>/=              



                                       19 yrs)]                  

 

             Future Scheduled 2022-10-01   IMM Influenza Seasonal              H

[facility]



             Test         00:00:00     (>/= 19 yrs) [code = IMM              



                                       Influenza Seasonal (>/=              



                                       19 yrs)]                  

 

             Future Scheduled 2022-10-01   IMM Influenza Seasonal              H

[facility]



             Test         00:00:00     (>/= 19 yrs) [code = IMM              



                                       Influenza Seasonal (>/=              



                                       19 yrs)]                  

 

             Future Scheduled 2022-10-01   IMM Influenza Seasonal              H

[facility]



             Test         00:00:00     (>/= 19 yrs) [code = IMM              



                                       Influenza Seasonal (>/=              



                                       19 yrs)]                  

 

             Future Scheduled 2022   INFLUENZA VACCINE (#1)              [facility]

[facility]



             Test         00:00:00     [code = INFLUENZA VACCINE              [facility]



                                       (#1)]                     

 

             Future Scheduled 2018   Screening for malignant              

[facility]



             Test         00:00:00     neoplasm of colon              



                                       (procedure) [code =              



                                       [id number]]                

 

             Future Scheduled 2018   SHINGLES VACCINES (1 of              

[facility]



             Test         00:00:00     2) [code = [facility]



                                       VACCINES (1 of 2)]              

 

             Future Scheduled 2018   Screening for malignant              

[facility]



             Test         00:00:00     neoplasm of colon              



                                       (procedure) [code =              



                                       404960309]                

 

             Future Scheduled 2018   Screening for malignant              

[facility]



             Test         00:00:00     neoplasm of colon              



                                       (procedure) [code =              



                                       876608062]                

 

             Future Scheduled 2018   Screening for malignant              

[facility]



             Test         00:00:00     neoplasm of colon              



                                       (procedure) [code =              



                                       083625486]                

 

             Future Scheduled 2018   Screening for malignant              

[facility]



             Test         00:00:00     neoplasm of colon              



                                       (procedure) [code =              



                                       444116185]                

 

             Future Scheduled 2018   Screening for malignant              

[facility]



             Test         00:00:00     neoplasm of colon              



                                       (procedure) [code =              



                                       449455539]                

 

             Future Scheduled 2018   Screening for malignant              

[facility]



             Test         00:00:00     neoplasm of colon              



                                       (procedure) [code =              



                                       233268210]                

 

             Future Scheduled 2018   Screening for malignant              

[facility]



             Test         00:00:00     neoplasm of colon              



                                       (procedure) [code =              



                                       530358384]                

 

             Future Scheduled 2018   Screening for malignant              

[facility]



             Test         00:00:00     neoplasm of colon              



                                       (procedure) [code =              



                                       884408379]                

 

             Future Scheduled 2018   Screening for malignant              

[facility]



             Test         00:00:00     neoplasm of colon              



                                       (procedure) [code =              



                                       264031967]                

 

             Future Scheduled 2018   Screening for malignant              

[facility]



             Test         00:00:00     neoplasm of colon              



                                       (procedure) [code =              



                                       945432202]                

 

             Future Scheduled 2018   Screening for malignant              

[facility]



             Test         00:00:00     neoplasm of colon              



                                       (procedure) [code =              



                                       466295649]                

 

             Future Scheduled 2018   Screening for malignant              

[facility]



             Test         00:00:00     neoplasm of colon              



                                       (procedure) [code =              



                                       841787836]                

 

             Future Scheduled 2018   Screening for malignant              

[facility]



             Test         00:00:00     neoplasm of colon              



                                       (procedure) [code =              



                                       013504795]                

 

             Future Scheduled 2018   Screening for malignant              

[facility]



             Test         00:00:00     neoplasm of colon              



                                       (procedure) [code =              



                                       324617059]                

 

             Future Scheduled 2018   SHINGLES VACCINES (1 of              

[facility]



             Test         00:00:00     2) [code = SHINGLES              Medical 

Center



                                       VACCINES (1 of 2)]              

 

             Future Scheduled 2018   SHINGLES VACCINES (1 of              

[facility]



             Test         00:00:00     2) [code = SHINGLES              Medical 

Center



                                       VACCINES (1 of 2)]              

 

             Future Scheduled 2018   SHINGLES VACCINES (1 of              

[facility]



             Test         00:00:00     2) [code = SHINGLES              Medical 

Center



                                       VACCINES (1 of 2)]              

 

             Future Scheduled 2018   SHINGLES VACCINES (1 of              

[facility]



             Test         00:00:00     2) [code = SHINGLES              Medical 

Center



                                       VACCINES (1 of 2)]              

 

             Future Scheduled 2018   SHINGLES VACCINES (1 of              

[facility]



             Test         00:00:00     2) [code = SHINGLES              Medical 

Center



                                       VACCINES (1 of 2)]              

 

             Future Scheduled 2018   SHINGLES VACCINES (1 of              

[facility]



             Test         00:00:00     2) [code = SHINGLES              Medical 

Center



                                       VACCINES (1 of 2)]              

 

             Future Scheduled 2018   SHINGLES VACCINES (1 of              

[facility]



             Test         00:00:00     2) [code = SHINGLES              [facility] 

Center



                                       VACCINES (1 of 2)]              

 

             Future Scheduled 2018   SHINGLES VACCINES (1 of              

[facility]



             Test         00:00:00     2) [code = SHINGLES              [facility] 

Center



                                       VACCINES (1 of 2)]              

 

             Future Scheduled 2013   Lipid panel (procedure)              

[facility]



             Test         00:00:00     [code = [id number]]              Medical Ce

nter

 

             Future Scheduled 2013   Lipid panel (procedure)              

[facility]



             Test         00:00:00     [code = 38777932]              Medical Ce

nter

 

             Future Scheduled 2013   Lipid panel (procedure)              

[facility]



             Test         00:00:00     [code = 04121146]              Medical Ce

nter

 

             Future Scheduled 2013   Lipid panel (procedure)              

[facility]



             Test         00:00:00     [code = 91228873]              Medical Ce

nter

 

             Future Scheduled 2013   Lipid panel (procedure)              

[facility]



             Test         00:00:00     [code = 36517710]              Medical Ce

nter

 

             Future Scheduled 2013   Lipid panel (procedure)              

[facility]



             Test         00:00:00     [code = 52075610]              Medical Ce

nter

 

             Future Scheduled 2013   Lipid panel (procedure)              

[facility]



             Test         00:00:00     [code = [id number]]              Medical Ce

nter

 

             Future Scheduled 2013   Lipid panel (procedure)              

[facility]



             Test         00:00:00     [code = [id number]]              Medical Ce

nter

 

             Future Scheduled 2013   Lipid panel (procedure)              

[facility]



             Test         00:00:00     [code = 36540791]              Medical [facility]

nter

 

             Future Scheduled 2008   Breast Cancer Scrn              [facility]



             Test         00:00:00     (Yearly) [code = Breast              



                                       Cancer Scrn (Yearly)]              

 

             Future Scheduled 2008   Breast Cancer Scrn              [facility]



             Test         00:00:00     (Yearly) [code = Breast              



                                       Cancer Scrn (Yearly)]              

 

             Future Scheduled 2008   Breast Cancer Scrn              [facility]



             Test         00:00:00     (Yearly) [code = Breast              



                                       Cancer Scrn (Yearly)]              

 

             Future Scheduled 2008   Breast Cancer Scrn              [facility]



             Test         00:00:00     (Yearly) [code = Breast              



                                       Cancer Scrn (Yearly)]              

 

             Future Scheduled 2008   Breast Cancer Scrn              [facility]



             Test         00:00:00     (Yearly) [code = Breast              



                                       Cancer Scrn (Yearly)]              

 

             Future Scheduled 2008   Breast Cancer Scrn              [facility]



             Test         00:00:00     (Yearly) [code = Breast              



                                       Cancer Scrn (Yearly)]              

 

             Future Scheduled 2008   Breast Cancer Scrn              [facility]



             Test         00:00:00     (Yearly) [code = Breast              



                                       Cancer Scrn (Yearly)]              

 

             Future Scheduled 2008   Breast Cancer Scrn              [facility]



             Test         00:00:00     (Yearly) [code = Breast              



                                       Cancer Scrn (Yearly)]              

 

             Future Scheduled 2008   Breast Cancer Scrn              [facility]



             Test         00:00:00     (Yearly) [code = Breast              



                                       Cancer Scrn (Yearly)]              

 

             Future Scheduled 2008   Breast Cancer Scrn              [facility]



             Test         00:00:00     (Yearly) [code = Breast              



                                       Cancer Scrn (Yearly)]              

 

             Future Scheduled 2008   Breast Cancer Scrn              [facility]



             Test         00:00:00     (Yearly) [code = Breast              



                                       Cancer Scrn (Yearly)]              

 

             Future Scheduled 2008   Breast Cancer Scrn              [facility]



             Test         00:00:00     (Yearly) [code = Breast              



                                       Cancer Scrn (Yearly)]              

 

             Future Scheduled 2008   Breast Cancer Scrn              [facility]



             Test         00:00:00     (Yearly) [code = Breast              



                                       Cancer Scrn (Yearly)]              

 

             Future Scheduled 2008   Breast Cancer Scrn              [facility]



             Test         00:00:00     (Yearly) [code = Breast              



                                       Cancer Scrn (Yearly)]              

 

             Future Scheduled 2008   Breast Cancer Scrn              [facility]



             Test         00:00:00     (Yearly) [code = Breast              



                                       Cancer Scrn (Yearly)]              

 

             Future Scheduled 1998   Screening for malignant              

[facility]



             Test         00:00:00     neoplasm of cervix              



                                       (procedure) [code =              



                                       713798731]                

 

             Future Scheduled 1998   Screening for malignant              

[facility]



             Test         00:00:00     neoplasm of cervix              



                                       (procedure) [code =              



                                       001777842]                

 

             Future Scheduled 1998   Screening for malignant              

[facility]



             Test         00:00:00     neoplasm of cervix              



                                       (procedure) [code =              



                                       024431113]                

 

             Future Scheduled 1998   Screening for malignant              

[facility]



             Test         00:00:00     neoplasm of cervix              



                                       (procedure) [code =              



                                       405525804]                

 

             Future Scheduled 1998   Screening for malignant              

[facility]



             Test         00:00:00     neoplasm of cervix              



                                       (procedure) [code =              



                                       747348019]                

 

             Future Scheduled 1998   Screening for malignant              

[facility]



             Test         00:00:00     neoplasm of cervix              



                                       (procedure) [code =              



                                       215155070]                

 

             Future Scheduled 1998   Screening for malignant              

[facility]



             Test         00:00:00     neoplasm of cervix              



                                       (procedure) [code =              



                                       523065916]                

 

             Future Scheduled 1998   Screening for malignant              

[facility]



             Test         00:00:00     neoplasm of cervix              



                                       (procedure) [code =              



                                       943707416]                

 

             Future Scheduled 1998   Screening for malignant              

[facility]



             Test         00:00:00     neoplasm of cervix              



                                       (procedure) [code =              



                                       081018411]                

 

             Future Scheduled 1998   Screening for malignant              

[facility]



             Test         00:00:00     neoplasm of cervix              



                                       (procedure) [code =              



                                       048916496]                

 

             Future Scheduled 1998   Screening for malignant              

[facility]



             Test         00:00:00     neoplasm of cervix              



                                       (procedure) [code =              



                                       139691072]                

 

             Future Scheduled 1998   Screening for malignant              

[facility]



             Test         00:00:00     neoplasm of cervix              



                                       (procedure) [code =              



                                       231686183]                

 

             Future Scheduled 1998   Screening for malignant              

[facility]



             Test         00:00:00     neoplasm of cervix              



                                       (procedure) [code =              



                                       600381642]                

 

             Future Scheduled 1998   Screening for malignant              

[facility]



             Test         00:00:00     neoplasm of cervix              



                                       (procedure) [code =              



                                       542311178]                

 

             Future Scheduled 1998   Screening for malignant              

[facility]



             Test         00:00:00     neoplasm of cervix              



                                       (procedure) [code =              



                                       722653475]                

 

             Future Scheduled 1998   Screening for malignant              

[facility]



             Test         00:00:00     neoplasm of cervix              



                                       (procedure) [code =              



                                       383040362]                

 

             Future Scheduled 1998   Screening for malignant              

[facility]



             Test         00:00:00     neoplasm of cervix              



                                       (procedure) [code =              



                                       733973871]                

 

             Future Scheduled 1998   Screening for malignant              

[facility]



             Test         00:00:00     neoplasm of cervix              



                                       (procedure) [code =              



                                       655218885]                

 

             Future Scheduled 1998   Screening for malignant              

[facility]



             Test         00:00:00     neoplasm of cervix              



                                       (procedure) [code =              



                                       992228695]                

 

             Future Scheduled 1998   Screening for malignant              

[facility]



             Test         00:00:00     neoplasm of cervix              



                                       (procedure) [code =              



                                       319177560]                

 

             Future Scheduled 1998   Screening for malignant              

[facility]



             Test         00:00:00     neoplasm of cervix              



                                       (procedure) [code =              



                                       757518481]                

 

             Future Scheduled 1998   Screening for malignant              

[facility]



             Test         00:00:00     neoplasm of cervix              



                                       (procedure) [code =              



                                       121610274]                

 

             Future Scheduled 1998   Screening for malignant              

[facility]



             Test         00:00:00     neoplasm of cervix              



                                       (procedure) [code =              



                                       921687562]                

 

             Future Scheduled 1998   Screening for malignant              

[facility]



             Test         00:00:00     neoplasm of cervix              



                                       (procedure) [code =              



                                       209293717]                

 

             Future Scheduled 1998   Screening for malignant              

[facility]



             Test         00:00:00     neoplasm of cervix              



                                       (procedure) [code =              



                                       346315374]                

 

             Future Scheduled 1998   Screening for malignant              

[facility]



             Test         00:00:00     neoplasm of cervix              



                                       (procedure) [code =              



                                       210705573]                

 

             Future Scheduled 1998   Screening for malignant              

[facility]



             Test         00:00:00     neoplasm of cervix              



                                       (procedure) [code =              



                                       143761605]                

 

             Future Scheduled 1998   Screening for malignant              

[facility]



             Test         00:00:00     neoplasm of cervix              



                                       (procedure) [code =              



                                       520423648]                

 

             Future Scheduled 1998   Screening for malignant              

[facility]



             Test         00:00:00     neoplasm of cervix              



                                       (procedure) [code =              



                                       164181187]                

 

             Future Scheduled 1998   Screening for malignant              

[facility]



             Test         00:00:00     neoplasm of cervix              



                                       (procedure) [code =              



                                       789482265]                

 

             Future Scheduled 1989   Screening for malignant              

[facility]



             Test         00:00:00     neoplasm of cervix              [facility]

enter



                                       (procedure) [code =              



                                       352353653]                

 

             Future Scheduled 1989   Screening for malignant              

[facility]



             Test         00:00:00     neoplasm of cervix              [facility]

enter



                                       (procedure) [code =              



                                       300733422]                

 

             Future Scheduled 1989   Screening for malignant              

[facility]



             Test         00:00:00     neoplasm of cervix              [facility]

enter



                                       (procedure) [code =              



                                       021015674]                

 

             Future Scheduled 1989   Screening for malignant              

[facility]



             Test         00:00:00     neoplasm of cervix              [facility]

enter



                                       (procedure) [code =              



                                       376680354]                

 

             Future Scheduled 1989   Screening for malignant              

[facility]



             Test         00:00:00     neoplasm of cervix              [facility]

enter



                                       (procedure) [code =              



                                       615022376]                

 

             Future Scheduled 1989   Screening for malignant              

[facility]



             Test         00:00:00     neoplasm of cervix              [facility]

enter



                                       (procedure) [code =              



                                       796645758]                

 

             Future Scheduled 1989   Screening for malignant              

[facility]



             Test         00:00:00     neoplasm of cervix              [facility]

enter



                                       (procedure) [code =              



                                       237156198]                

 

             Future Scheduled 1989   Screening for malignant              

[facility]



             Test         00:00:00     neoplasm of cervix              [facility]

enter



                                       (procedure) [code =              



                                       156141815]                

 

             Future Scheduled 1989   Screening for malignant              

[facility]



             Test         00:00:00     neoplasm of cervix              [facility]

enter



                                       (procedure) [code =              



                                       469130114]                

 

             Future Scheduled 1987   DTAP/TDAP/TD VACCINES (1             

 [facility]



             Test         00:00:00     - Tdap) [code =              [facility]

[facility]



                                       DTAP/TDAP/TD VACCINES (1              



                                       - Tdap)]                  

 

             Future Scheduled 1987   DTAP/TDAP/TD VACCINES (1             

 [facility]



             Test         00:00:00     - Tdap) [code =              [facility]

[facility]



                                       DTAP/TDAP/TD VACCINES (1              



                                       - Tdap)]                  

 

             Future Scheduled 1987   DTAP/TDAP/TD VACCINES (1             

 [facility]



             Test         00:00:00     - Tdap) [code =              [facility]

[facility]



                                       DTAP/TDAP/TD VACCINES (1              



                                       - Tdap)]                  

 

             Future Scheduled 1987   DTAP/TDAP/TD VACCINES (1             

 [facility]



             Test         00:00:00     - Tdap) [code =              [facility]

[facility]



                                       DTAP/TDAP/TD VACCINES (1              



                                       - Tdap)]                  

 

             Future Scheduled 1987   DTAP/TDAP/TD VACCINES (1             

 [facility]



             Test         00:00:00     - Tdap) [code =              [facility]

[facility]



                                       DTAP/TDAP/TD VACCINES (1              



                                       - Tdap)]                  

 

             Future Scheduled 1987   DTAP/TDAP/TD VACCINES (1             

 [facility]



             Test         00:00:00     - Tdap) [code =              [facility]

er



                                       DTAP/TDAP/TD VACCINES (1              



                                       - Tdap)]                  

 

             Future Scheduled 1987   DTAP/TDAP/TD VACCINES (1             

 [facility]



             Test         00:00:00     - Tdap) [code =              [facility]

[facility]



                                       DTAP/TDAP/TD VACCINES (1              



                                       - Tdap)]                  

 

             Future Scheduled 1987   DTAP/TDAP/TD VACCINES (1             

 [facility]



             Test         00:00:00     - Tdap) [code =              [facility]

er



                                       DTAP/TDAP/TD VACCINES (1              



                                       - Tdap)]                  

 

             Future Scheduled 1987   DTAP/TDAP/TD VACCINES (1             

 [facility]



             Test         00:00:00     - Tdap) [code =              [facility]

[facility]



                                       DTAP/TDAP/TD VACCINES (1              



                                       - Tdap)]                  

 

             Future Scheduled 1986   HEPATITIS C SCREENING              [facility]



             Test         00:00:00     [code = HEPATITIS C              [facility]



                                       SCREENING]                

 

             Future Scheduled 1986   HEPATITIS C SCREENING              [facility]

[facility]



             Test         00:00:00     [code = HEPATITIS C              [facility] 

Center



                                       SCREENING]                

 

             Future Scheduled 1986   HEPATITIS C SCREENING              [facility]

[facility]



             Test         00:00:00     [code = HEPATITIS C              [facility] 

Center



                                       SCREENING]                

 

             Future Scheduled 1986   HEPATITIS C SCREENING              [facility]

[facility]



             Test         00:00:00     [code = HEPATITIS C              [facility] 

Center



                                       SCREENING]                

 

             Future Scheduled 1986   HEPATITIS C SCREENING              [facility]

[facility]



             Test         00:00:00     [code = HEPATITIS C              [facility] 

Center



                                       SCREENING]                

 

             Future Scheduled 1986   HEPATITIS C SCREENING              [facility]

[facility]



             Test         00:00:00     [code = HEPATITIS C              [facility] 

Center



                                       SCREENING]                

 

             Future Scheduled 1986   HEPATITIS C SCREENING              [facility]

[facility]



             Test         00:00:00     [code = HEPATITIS C              [facility] 

Center



                                       SCREENING]                

 

             Future Scheduled 1986   HEPATITIS C SCREENING              [facility]

[facility]



             Test         00:00:00     [code = HEPATITIS C              [facility] 

Center



                                       SCREENING]                

 

             Future Scheduled 1986   HEPATITIS C SCREENING              [facility]

[facility]



             Test         00:00:00     [code = HEPATITIS C              [facility] 

Center



                                       SCREENING]                

 

             Future Scheduled 1983   Human immunodeficiency              C

[facility]



             Test         00:00:00     virus screening              [facility]

er



                                       (procedure) [code =              



                                       298890377]                

 

             Future Scheduled 1980   Tobacco Cessation              [facility]



             Test         00:00:00     Counseling and Screening              Med

[facility]



                                       (12+) [code = Tobacco              



                                       Cessation Counseling and              



                                       Screening (12+)]              

 

             Future Scheduled 1980   Tobacco Cessation              [facility]



             Test         00:00:00     Counseling and Screening              Med

ical Center



                                       (12+) [code = Tobacco              



                                       Cessation Counseling and              



                                       Screening (12+)]              

 

             Future Scheduled 1980   Tobacco Cessation              [facility]



             Test         00:00:00     Counseling and Screening              Med

[facility] Center



                                       (12+) [code = Tobacco              



                                       Cessation Counseling and              



                                       Screening (12+)]              

 

             Future Scheduled 1980   Tobacco Cessation              [facility]



             Test         00:00:00     Counseling and Screening              Med

ical Center



                                       (12+) [code = Tobacco              



                                       Cessation Counseling and              



                                       Screening (12+)]              

 

             Future Scheduled 1980   Tobacco Cessation              [facility]



             Test         00:00:00     Counseling and Screening              Med

ical Center



                                       (12+) [code = Tobacco              



                                       Cessation Counseling and              



                                       Screening (12+)]              

 

             Future Scheduled 1980   Tobacco Cessation              [facility]



             Test         00:00:00     Counseling and Screening              Med

ical Center



                                       (12+) [code = Tobacco              



                                       Cessation Counseling and              



                                       Screening (12+)]              

 

             Future Scheduled 1980   Tobacco Cessation              [facility]



             Test         00:00:00     Counseling and Screening              Med

ical Center



                                       (12+) [code = Tobacco              



                                       Cessation Counseling and              



                                       Screening (12+)]              

 

             Future Scheduled 1980   Tobacco Cessation              [facility]



             Test         00:00:00     Counseling and Screening              Med

ical Center



                                       (12+) [code = Tobacco              



                                       Cessation Counseling and              



                                       Screening (12+)]              

 

             Future Scheduled 1980   Tobacco Cessation              [facility]



             Test         00:00:00     Counseling and Screening              Med

ical Center



                                       (12+) [code = Tobacco              



                                       Cessation Counseling and              



                                       Screening (12+)]              

 

             Future Scheduled 1974   Pneumococcal Vaccine:              [facility]



             Test         00:00:00     0-64 Years (1 - PCV)              Medical

 Center



                                       [code = Pneumococcal              



                                       Vaccine: 0-64 Years (1 -              



                                       PCV)]                     

 

             Future Scheduled 1974   PNEUMOCOCCAL VACCINE 0-64            

  [facility]



             Test         00:00:00     YRS (1 - PCV) [code =              Medica

l Center



                                       PNEUMOCOCCAL VACCINE 0-64              



                                       YRS (1 - PCV)]              

 

             Future Scheduled 1974   PNEUMOCOCCAL VACCINE 0-64            

  [facility]



             Test         00:00:00     YRS (1 - PCV) [code =              Medica

l Center



                                       PNEUMOCOCCAL VACCINE 0-64              



                                       YRS (1 - PCV)]              

 

             Future Scheduled 1974   Pneumococcal Vaccine:              [facility]



             Test         00:00:00     0-64 Years (1 - PCV)              Medical

 Center



                                       [code = Pneumococcal              



                                       Vaccine: 0-64 Years (1 -              



                                       PCV)]                     

 

             Future Scheduled 1974   Pneumococcal Vaccine:              [facility]



             Test         00:00:00     0-64 Years (1 - PCV)              Medical

 Center



                                       [code = Pneumococcal              



                                       Vaccine: 0-64 Years (1 -              



                                       PCV)]                     

 

             Future Scheduled 1974   PNEUMOCOCCAL VACCINE 0-64            

  [facility]



             Test         00:00:00     YRS (1 - PCV) [code =              Medica

l Center



                                       PNEUMOCOCCAL VACCINE 0-64              



                                       YRS (1 - PCV)]              

 

             Future Scheduled 1974   PNEUMOCOCCAL VACCINE 0-64            

  [facility]



             Test         00:00:00     YRS (1 - PCV) [code =              Medica

l Center



                                       PNEUMOCOCCAL VACCINE 0-64              



                                       YRS (1 - PCV)]              

 

             Future Scheduled 1974   PNEUMOCOCCAL VACCINE 0-64            

  [facility]



             Test         00:00:00     YRS (1 - PCV) [code =              Medica

l Center



                                       PNEUMOCOCCAL VACCINE 0-64              



                                       YRS (1 - PCV)]              

 

             Future Scheduled 1974   PNEUMOCOCCAL VACCINE 0-64            

  [facility]



             Test         00:00:00     YRS (1 - PCV) [code =              Medica

l Center



                                       PNEUMOCOCCAL VACCINE 0-64              



                                       YRS (1 - PCV)]              

 

             Future Scheduled 1969   COVID-19 Vaccine (#1)              [place]

[facility]



             Test         00:00:00     [code = COVID-19 Vaccine              



                                       (#1)]                     

 

             Future Scheduled 1969   COVID-19 VACCINE (#1)              [facility]



             Test         00:00:00     [code = COVID-19 VACCINE              Med

[facility]



                                       (#1)]                     

 

             Future Scheduled 1969   COVID-19 Vaccine (#1)              [place]

[facility]



             Test         00:00:00     [code = COVID-19 Vaccine              



                                       (#1)]                     

 

             Future Scheduled 1969   COVID-19 Vaccine (#1)              [place]

[facility]



             Test         00:00:00     [code = COVID-19 Vaccine              



                                       (#1)]                     

 

             Future Scheduled 1969   COVID-19 Vaccine (#1)              [place]

[facility]



             Test         00:00:00     [code = COVID-19 Vaccine              



                                       (#1)]                     

 

             Future Scheduled 1969   COVID-19 Vaccine (#1)              [place]

[facility]



             Test         00:00:00     [code = COVID-19 Vaccine              



                                       (#1)]                     

 

             Future Scheduled 1969   COVID-19 Vaccine (#1)              [place]

[facility]



             Test         00:00:00     [code = COVID-19 Vaccine              



                                       (#1)]                     

 

             Future Scheduled 1969   COVID-19 Vaccine (#1)              [place]

[facility]



             Test         00:00:00     [code = COVID-19 Vaccine              



                                       (#1)]                     

 

             Future Scheduled 1969   COVID-19 Vaccine (#1)              [place]

[facility]



             Test         00:00:00     [code = COVID-19 Vaccine              



                                       (#1)]                     

 

             Future Scheduled 1969   COVID-19 Vaccine (#1)              [place]

[facility]



             Test         00:00:00     [code = COVID-19 Vaccine              



                                       (#1)]                     

 

             Future Scheduled 1969   COVID-19 Vaccine (#1)              [place]

[facility]



             Test         00:00:00     [code = COVID-19 Vaccine              



                                       (#1)]                     

 

             Future Scheduled 1969   COVID-19 Vaccine (#1)              [place]

[facility]



             Test         00:00:00     [code = COVID-19 Vaccine              



                                       (#1)]                     

 

             Future Scheduled 1969   COVID-19 Vaccine (#1)              [place]

[facility]



             Test         00:00:00     [code = COVID-19 Vaccine              



                                       (#1)]                     

 

             Future Scheduled 1969   COVID-19 Vaccine (#1)              [place]

[facility]



             Test         00:00:00     [code = COVID-19 Vaccine              



                                       (#1)]                     

 

             Future Scheduled 1969   COVID-19 Vaccine (#1)              [place]

[facility]



             Test         00:00:00     [code = COVID-19 Vaccine              



                                       (#1)]                     

 

             Future Scheduled 1969   COVID-19 Vaccine (#1)              [place]

[facility]



             Test         00:00:00     [code = COVID-19 Vaccine              



                                       (#1)]                     

 

             Future Scheduled 1969   COVID-19 VACCINE (#1)              [facility]



             Test         00:00:00     [code = COVID-19 VACCINE              Med

ical Center



                                       (#1)]                     

 

             Future Scheduled 1969   COVID-19 VACCINE (#1)              [facility]



             Test         00:00:00     [code = COVID-19 VACCINE              Med

ical Center



                                       (#1)]                     

 

             Future Scheduled 1969   COVID-19 VACCINE (#1)              [facility]



             Test         00:00:00     [code = COVID-19 VACCINE              Med

ical Center



                                       (#1)]                     

 

             Future Scheduled 1969   COVID-19 VACCINE (#1)              [facility]



             Test         00:00:00     [code = COVID-19 VACCINE              Med

ical Center



                                       (#1)]                     

 

             Future Scheduled 1969   COVID-19 VACCINE (#1)              [facility]



             Test         00:00:00     [code = COVID-19 VACCINE              Med

ical Center



                                       (#1)]                     

 

             Future Scheduled 1969   COVID-19 VACCINE (#1)              [facility]



             Test         00:00:00     [code = COVID-19 VACCINE              Med

ical Center



                                       (#1)]                     

 

             Future Scheduled 1969   COVID-19 VACCINE (#1)              [facility]



             Test         00:00:00     [code = COVID-19 VACCINE              Med

ical Center



                                       (#1)]                     

 

             Future Scheduled 1969   COVID-19 VACCINE (#1)              [facility]



             Test         00:00:00     [code = COVID-19 VACCINE              Med

ical Center



                                       (#1)]                     

 

             Future Scheduled 1968   Screening for malignant              

[facility]



             Test         00:00:00     neoplasm of breast              Medical C

enter



                                       (procedure) [code =              



                                       944996507]                

 

             Future Scheduled 1968   CT Colonography (combo)              

[facility]



             Test         00:00:00     [code = CT Colonography              [facility]



                                       (combo)]                  

 

             Future Scheduled 1968   Screening for malignant              

[facility]



             Test         00:00:00     neoplasm of colon              Medical Ce

nter



                                       (procedure) [code =              



                                       [id number]]                

 

             Future Scheduled 1968   Screening for malignant              

[facility]



             Test         00:00:00     neoplasm of colon              Medical Ce

nter



                                       (procedure) [code =              



                                       089292355]                

 

             Future Scheduled 1968   Screening for malignant              

[facility]



             Test         00:00:00     neoplasm of colon              Medical Ce

nter



                                       (procedure) [code =              



                                       [id number]]                

 

             Future Scheduled 1968   Screening for malignant              

[facility]



             Test         00:00:00     neoplasm of colon              Medical Ce

nter



                                       (procedure) [code =              



                                       [id number]]                

 

             Future Scheduled 1968   Sigmoidoscopy [code =              [facility]



             Test         00:00:00     Sigmoidoscopy]              [facility] Cent[facility]

r

 

             Future Scheduled 1968   Screening for malignant              

[facility]



             Test         00:00:00     neoplasm of breast              Medical C

enter



                                       (procedure) [code =              



                                       [id number]]                

 

             Future Scheduled 1968   CT Colonography (combo)              

[facility]



             Test         00:00:00     [code = CT Colonography              [facility]

[facility]



                                       (combo)]                  

 

             Future Scheduled 1968   Screening for malignant              

[facility]



             Test         00:00:00     neoplasm of colon              Medical Ce

nter



                                       (procedure) [code =              



                                       [id number]]                

 

             Future Scheduled 1968   Screening for malignant              

[facility]



             Test         00:00:00     neoplasm of colon              Medical Ce

nter



                                       (procedure) [code =              



                                       772788968]                

 

             Future Scheduled 1968   Screening for malignant              

[facility]



             Test         00:00:00     neoplasm of colon              Medical Ce

nter



                                       (procedure) [code =              



                                       100283450]                

 

             Future Scheduled 1968   Screening for malignant              

[facility]



             Test         00:00:00     neoplasm of colon              Medical Ce

nter



                                       (procedure) [code =              



                                       777176490]                

 

             Future Scheduled 1968   Sigmoidoscopy [code =              [facility]



             Test         00:00:00     Sigmoidoscopy]              [facility] Cent[facility]

r

 

             Future Scheduled 1968   Screening for malignant              

[facility]



             Test         00:00:00     neoplasm of breast              Medical C

enter



                                       (procedure) [code =              



                                       [id number]]                

 

             Future Scheduled 1968   CT Colonography (combo)              

[facility]



             Test         00:00:00     [code = CT Colonography              [facility]

[facility] Center



                                       (combo)]                  

 

             Future Scheduled 1968   Screening for malignant              

[facility]



             Test         00:00:00     neoplasm of colon              Medical Ce

nter



                                       (procedure) [code =              



                                       [id number]]                

 

             Future Scheduled 1968   Screening for malignant              

[facility]



             Test         00:00:00     neoplasm of colon              Medical Ce

nter



                                       (procedure) [code =              



                                       757030565]                

 

             Future Scheduled 1968   Screening for malignant              

[facility]



             Test         00:00:00     neoplasm of colon              Medical Ce

nter



                                       (procedure) [code =              



                                       522717961]                

 

             Future Scheduled 1968   Screening for malignant              

[facility]



             Test         00:00:00     neoplasm of colon              Medical Ce

nter



                                       (procedure) [code =              



                                       596011119]                

 

             Future Scheduled 1968   Sigmoidoscopy [code =              [facility]



             Test         00:00:00     Sigmoidoscopy]              [facility] Cent[facility]

r

 

             Future Scheduled 1968   Screening for malignant              

[facility]



             Test         00:00:00     neoplasm of breast              Medical C

enter



                                       (procedure) [code =              



                                       130635980]                

 

             Future Scheduled 1968   CT Colonography (combo)              

[facility]



             Test         00:00:00     [code = CT Colonography              Medi

staci Center



                                       (combo)]                  

 

             Future Scheduled 1968   Screening for malignant              

[facility]



             Test         00:00:00     neoplasm of colon              Medical Ce

nter



                                       (procedure) [code =              



                                       864012228]                

 

             Future Scheduled 1968   Screening for malignant              

[facility]



             Test         00:00:00     neoplasm of colon              Medical Ce

nter



                                       (procedure) [code =              



                                       857026648]                

 

             Future Scheduled 1968   Screening for malignant              

[facility]



             Test         00:00:00     neoplasm of colon              Medical Ce

nter



                                       (procedure) [code =              



                                       [id number]]                

 

             Future Scheduled 1968   Screening for malignant              

[facility]



             Test         00:00:00     neoplasm of colon              Medical Ce

nter



                                       (procedure) [code =              



                                       451564396]                

 

             Future Scheduled 1968   Sigmoidoscopy [code =              [facility]



             Test         00:00:00     [facility]

[facility]

 

             Future Scheduled 1968   Screening for malignant              

[facility]



             Test         00:00:00     neoplasm of breast              Medical C

enter



                                       (procedure) [code =              



                                       289766005]                

 

             Future Scheduled 1968   CT Colonography (combo)              

[facility]



             Test         00:00:00     [code = CT Colonography              [facility]i

[facility]aci Center



                                       (combo)]                  

 

             Future Scheduled 1968   Screening for malignant              

[facility]



             Test         00:00:00     neoplasm of colon              Medical Ce

nter



                                       (procedure) [code =              



                                       467577569]                

 

             Future Scheduled 1968   Screening for malignant              

[facility]



             Test         00:00:00     neoplasm of colon              Medical Ce

nter



                                       (procedure) [code =              



                                       280710086]                

 

             Future Scheduled 1968   Screening for malignant              

[facility]



             Test         00:00:00     neoplasm of colon              Medical Ce

nter



                                       (procedure) [code =              



                                       824194751]                

 

             Future Scheduled 1968   Screening for malignant              

[facility]



             Test         00:00:00     neoplasm of colon              Medical Ce

nter



                                       (procedure) [code =              



                                       205926035]                

 

             Future Scheduled 1968   Sigmoidoscopy [code =              [facility]



             Test         00:00:00     Sigmoidoscopy]              [facility] Cent[facility]

r

 

             Future Scheduled 1968   Screening for malignant              

[facility]



             Test         00:00:00     neoplasm of breast              Medical C

enter



                                       (procedure) [code =              



                                       598099452]                

 

             Future Scheduled 1968   CT Colonography (combo)              

[facility]



             Test         00:00:00     [code = CT Colonography              Medi

[facility]aci Center



                                       (combo)]                  

 

             Future Scheduled 1968   Screening for malignant              

[facility]



             Test         00:00:00     neoplasm of colon              Medical Ce

nter



                                       (procedure) [code =              



                                       949571697]                

 

             Future Scheduled 1968   Screening for malignant              

[facility]



             Test         00:00:00     neoplasm of colon              Medical Ce

nter



                                       (procedure) [code =              



                                       856517074]                

 

             Future Scheduled 1968   Screening for malignant              

[facility]



             Test         00:00:00     neoplasm of colon              Medical Ce

nter



                                       (procedure) [code =              



                                       489553204]                

 

             Future Scheduled 1968   Screening for malignant              

[facility]



             Test         00:00:00     neoplasm of colon              Medical Ce

nter



                                       (procedure) [code =              



                                       729212683]                

 

             Future Scheduled 1968   Sigmoidoscopy [code =              [facility]



             Test         00:00:00     Sigmoidoscopy]              [facility] Cent[facility]

r

 

             Future Scheduled 1968   Screening for malignant              

[facility]



             Test         00:00:00     neoplasm of breast              Medical C

enter



                                       (procedure) [code =              



                                       383779501]                

 

             Future Scheduled 1968   CT Colonography (combo)              

[facility]



             Test         00:00:00     [code = CT Colonography              Medi

[facility]aci Center



                                       (combo)]                  

 

             Future Scheduled 1968   Screening for malignant              

[facility]



             Test         00:00:00     neoplasm of colon              Medical Ce

nter



                                       (procedure) [code =              



                                       073021115]                

 

             Future Scheduled 1968   Screening for malignant              

[facility]



             Test         00:00:00     neoplasm of colon              Medical Ce

nter



                                       (procedure) [code =              



                                       424568125]                

 

             Future Scheduled 1968   Screening for malignant              

[facility]



             Test         00:00:00     neoplasm of colon              Medical Ce

nter



                                       (procedure) [code =              



                                       728234015]                

 

             Future Scheduled 1968   Screening for malignant              

[facility]



             Test         00:00:00     neoplasm of colon              Medical Ce

nter



                                       (procedure) [code =              



                                       842879548]                

 

             Future Scheduled 1968   Sigmoidoscopy [code =              [facility]



             Test         00:00:00     Sigmoidoscopy]              Medical Cente

r

 

             Future Scheduled 1968   Screening for malignant              

[facility]



             Test         00:00:00     neoplasm of breast              Medical C

enter



                                       (procedure) [code =              



                                       642482280]                

 

             Future Scheduled 1968   CT Colonography (combo)              

[facility]



             Test         00:00:00     [code = CT Colonography              Medi

staci Center



                                       (combo)]                  

 

             Future Scheduled 1968   Screening for malignant              

[facility]



             Test         00:00:00     neoplasm of colon              Medical Ce

nter



                                       (procedure) [code =              



                                       179062234]                

 

             Future Scheduled 1968   Screening for malignant              

[facility]



             Test         00:00:00     neoplasm of colon              Medical Ce

nter



                                       (procedure) [code =              



                                       547105520]                

 

             Future Scheduled 1968   Screening for malignant              

[facility]



             Test         00:00:00     neoplasm of colon              Medical Ce

nter



                                       (procedure) [code =              



                                       019582791]                

 

             Future Scheduled 1968   Screening for malignant              

[facility]



             Test         00:00:00     neoplasm of colon              Medical Ce

nter



                                       (procedure) [code =              



                                       299088102]                

 

             Future Scheduled 1968   Sigmoidoscopy [code =              CH

[facility]



             Test         00:00:00     Sigmoidoscopy]              Medical Cente

r

 

             Future Scheduled 1968   Screening for malignant              

[facility]



             Test         00:00:00     neoplasm of breast              Medical C

enter



                                       (procedure) [code =              



                                       040479722]                

 

             Future Scheduled 1968   CT Colonography (combo)              

[facility]



             Test         00:00:00     [code = CT Colonography              Medi

staci Center



                                       (combo)]                  

 

             Future Scheduled 1968   Screening for malignant              

[facility]



             Test         00:00:00     neoplasm of colon              Medical Ce

nter



                                       (procedure) [code =              



                                       304710447]                

 

             Future Scheduled 1968   Screening for malignant              

[facility]



             Test         00:00:00     neoplasm of colon              Medical Ce

nter



                                       (procedure) [code =              



                                       153545492]                

 

             Future Scheduled 1968   Screening for malignant              

[facility]



             Test         00:00:00     neoplasm of colon              Medical Ce

nter



                                       (procedure) [code =              



                                       918358674]                

 

             Future Scheduled 1968   Screening for malignant              

[facility]



             Test         00:00:00     neoplasm of colon              Medical Ce

nter



                                       (procedure) [code =              



                                       390959158]                

 

             Future Scheduled 1968   Sigmoidoscopy [code =              CH

[facility]



             Test         00:00:00     Sigmoidoscopy]              Medical Leticia regalado







Encounters







        Start   End     Encounter Admission Attending Care    Care    Encounter 

Source



        Date/Time Date/Time Type    Type    Clinicians Facility Department ID   

   

 

        2023         Outpatient                 [facility]     [id number]

 [place]



        10:11:24                                                 [id number] [facility]

 

        2023-05-15         Outpatient                 [facility]     [id number]

 [place]



        14:18:14                                                 [id number] [facility]

 

        2023         Outpatient                 [facility]     [id number]

 [place]



        14:16:19                                                 [id number] [facility]

 

        2023         Emergency                 [facility]     [facility]     [id number] 

PIERRE -



        09:45:36                                                         [facility]



                                                                        ent

 

        2022         Outpatient         SARATH, [facility]     [id number]

-2 [place]



        01:05:17                         MELANIE                  [id number] [facility]

 

        2022         Outpatient         VIRGIE,  [facility]     [id number]

 [place]



        03:15:41                         JOAQUIN                 [id number] [facility]

 

        2022         Outpatient         VIRGIE,  [facility]     [id number]

 [place]



        15:19:55                         JOAQUIN                 [id number] [facility]

 

        2022         Outpatient                 [facility]     [id number]

 [place]



        15:28:25                                                 [id number] [facility]

 

        2022         Outpatient                 [facility]     [id number]

 [place]



        12:00:51                                                 [id number] [facility]

 

        2022         Outpatient                 [facility]     [id number]

 [place]



        15:13:02                                                 [id number] [facility]

 

        2020         Inpatient                 [facility]   YAMILA    [id number] 

[facility]



        03:17:00                                                 58      [facility]

 

        2023 Emergency X       ANGELITO, [facility]    ERT     [id number]

[id number] [facility]



        17:55:00 19:50:00                 MCKENNA brock [facility]

 

        2023 Emergency         Angelito, [facility]    1.2.840.114 [id number]

[id number] Univers



        17:55:00 19:50:00                 Mckenna HAMM 350.1.13.10         i

[facility] 4.2.7.2.686         [facility]  [phone]         [address]

 

        2023 Emergency X       SINGER, [facility]    ERT     [id number]

81 Univers



        09:01:00 14:52:00                 JACK brock of



                                                                        [facility]

 

        2023 Emergency         [facility]    1.2.159.125 [id number]

[id number] Univers



        09:01:00 14:52:00                 Jack HAMM 350.1.13.10         i

[facility] 4.2.7.2.686         [facility]  [phone]         Medi

staci



                                                        084             Branch

 

        2023 Emergency         Yosvany, 1.2.840.1 [id number]

[id number] Methodi



        02:46:00 05:35:00                 Alejo 08773.1.1         [address]



                                        Gianni  3.430.2.7                 Hospit

a



                                                .3.504519                 l



                                                .8                      

 

        2023 Emergency         Yosvany, 1.2.840.1 [id number] 210

[id number] Methodi



        02:46:00 05:35:00                 Alejo 25687.1.1         [address]     [address]



                                        Gianni  3.430.2.7                 Hospit

a



                                                .3.954260                 l



                                                .8                      

 

        2023-02-15 2023 Emergency ER      [facility]    Emergency 20

[id number] [facility]



        17:58:00 00:07:00                                                 

 

        2023-02-15 2023 Emergency         [facility]   [id number]

[id number] [facility]



        17:58:00 00:07:00                 [facility]

 

        2023-02-15 2023 Emergency         [facility]in [facility]   [id number]

[id number] [facility]



        17:58:00 00:07:00                 [facility]

 

        2023 Documentat         Dangelo, 1.2.840.1 [id number]

[id number] Methodi



        00:00:00 00:00:00 tsering Mcintosh 39606.1.1         [address]     [address]



                                                3.430.2.7                 Hospit

a



                                                .3.276517                 l



                                                .8                      

 

        2023 Travel                  1.2.840.1 1.2.588.507 [id number]

[id number] Methodi



        00:00:00 00:00:00                         73474.1.1 350.1.13.43 977     

st



                                                3.430.2.7 0.2.7.3.698         Ho

spita



                                                .3.109183 084.8           l



                                                .8                      

 

        2023 Documentat         Dangelo, 1.2.840.1 [id number]

[id number] Methodi



        00:00:00 00:00:00 ion             Smithosh 85053.1.1         [address]     st



                                                3.430.2.7                 Hospit

a



                                                .3.563578                 l



                                                .8                      

 

        2023 Travel                  1.2.840.1 1.2.770.567 8134

[id number] Methodi



        00:00:00 00:00:00                         42386.1.1 350.1.13.43 977     

st



                                                3.430.2.7 0.2.7.3.698         Ho

spita



                                                .3.276308 084.8           l



                                                .8                      

 

        2023 2023-02-15 Emergency Emergency Geronimo [facility]   JM0

[id number] 

[facility]



        19:56:00 06:17:00                                         79      

 

        2023 Emergency                 [facility] [id number]

[id number]



        14:56:57 23:07:00                                 [address]

 

        2023 Emergency                 [facility] [id number]

[address]



        14:56:57 23:07:00                                 [address]

 

        2023 Emergency                 [facility]   [id number]

[id number] [facility]



        19:56:00 19:56:00                                               

 

        2023 Outpatient         Mateo [facility]   [id number]

[id number] 



        08:56:57 17:07:00                 Ishan                  00      



                                        Thomas                          

 

        2023 Emergency ANGIE GALINDO [facility]    [id number]   

 [facility]



        08:56:00 17:07:00                 ISHAN                          

 

        2023 Outpatient DIANA NOLAN  [facility]    [id number]

[id number] Univers



        14:00:00 14:00:00                 ANCELMO                         delmy [facility]

 

        2023 Orders          Doctor  [place]    1.2.840.114 [id number]

447 Univers



        00:00:00 00:00:00 Only            Unassigned, SHONA   350.1.13.10       

  ity of



                                        [facility] 4.2.7.2.686         [place]oly

as



                                                        [phone]         Medi

staci



                                                        009             Branch

 

        2022 Inpatient         SANCHEZ, [facility]    MED       

  [facility]



        19:24:00 19:40:00                 LEXI                         

 

        2022 Emergency E       HEATHER, [facility]     

   [facility]



        14:56:00 18:09:00                 DEMARIO                           

 

        2022 [facility]     [id number]

15 Univers



        12:56:00 15:05:00                 JACK brock [facility]

 

        2022 Emergency         [facility]    1.2.314.316 [id number]

[id number] Univers



        12:56:00 15:05:00                 Jack HAMM 350.1.13.10         i

ty of



                                                [place] 4.2.7.2.686         [place]

[facility]  [phone]         Medi

staci



                                                        [address]             [place]

 

        2022 Office          [facility]    1.2.840.114 [id number]

13 Univers



        10:00:00 10:30:00 Visit           [facility]  350.1.13.10         it

y of



                                                NORIS 4.2.7.2.686         Roly

as



                                                SKYLAR?BLEA [phone]         [address]



                                                MEDICAL                 



                                                OFFICE                  



                                                BUILDING                 

 

        [facility]    [id number]

[facility]



        10:00:00 10:00:00                 ESCOBAR brock [facility]

 

        2022 Orders          Doctor GARIBAY    1.2.840.114 [id number]

07 Univers



        00:00:00 00:00:00 Only            Unassigned, SHONA   350.1.13.10       

  ity of



                                        [facility] 4.2.7.2.686         [place]oly

as



                                                        [phone]         Medi

staci



                                                        009             Branch

 

        2020 Outpatient         ERNESTINE Ferrara   OUTD    [id number]

[id number] [facility]



        08:38:00 08:38:00                 Musaddiq                 75      [facility]

 

        2019 Inpatient 1       Robin Barrios [facility]    PSY     12

[id number]



        23:01:00 13:16:00                 Robin Barrios                         

[facility]

 

        2017 Outpatient DIANA COHEN  [facility]    NUT     [id number]

565 Univers



        00:00:00 00:00:00                 VENKATA                         ity [facility]







Results







           Test Description Test Time  Test Comments Results    Result Comments 

Source









                    COMP. METABOLIC PANEL (49092) 2023 16:28:41 









                      Test Item  Value      Reference Range Interpretation Comme

nts









             NA (test code = 9325784129) 137 mmol/L   135-145                   

 

             K (test code = 7927766807) 4.1 mmol/L   3.5-5.0                   

 

             CL (test code = 5245608692) 104 mmol/L                       

 

             CO2 TOTAL (test code = 7443840133) 24 mmol/L    23-31              

       

 

             AGAP (test code = 2199338028) 9            2-16                    

  

 

             BUN (test code = 6815664541) 14 mg/dL     7-23                     

 

 

             GLUCOSE (test code = 1778102191) 114 mg/dL           H       

     

 

             CREATININE (test code = 0.79 mg/dL   0.50-1.04                 



             2768760363)                                         

 

             TOTAL BILI (test code = 1.3 mg/dL    0.1-1.1      H            



             4472420706)                                         

 

             CALCIUM (test code = 0818648631) 9.5 mg/dL    8.6-10.6             

     

 

             T PROTEIN (test code = 6178191651) 7.7 g/dL     6.3-8.2            

       

 

             ALBUMIN (test code = 2335594118) 4.2 g/dL     3.5-5.0              

     

 

             ALK PHOS (test code = 8400949696) 102 U/L                    

      

 

             ALTv (test code = 1742-6) 15 U/L       5-35                      

 

             AST(SGOT) (test code = 6123530341) 19 U/L       13-40              

       

 

             eGFR (test code = 9635042956) 75.8         mL/min/1.73m2           

   

 

             ELENA (test code = ELENA) Association of Glomerular                    

       



                          Filtration Rate (GFR) and Staging                     

      



                          of Kidney Disease*                           



                          +-----------------------+--------                     

      



                          -------------+-------------------                     

      



                          ------+| GFR (mL/min/1.73 m2) ?|                      

     



                          With Kidney Damage ?| ?Without                        

   



                          Kidney                                 



                          Damage+-----------------------+--                     

      



                          -------------------+-------------                     

      



                          ------------+| ?>90 ? ? ? ? ? ? ?                     

      



                          ? ?| ?Stage one ? ? ? ? ?| ?                          

 



                          Normal ? ? ? ? ? ? ?                           



                          ?+-----------------------+-------                     

      



                          --------------+------------------                     

      



                          -------+| ?60-89 ? ? ? ? ? ? ? ?|                     

      



                          ?Stage two ? ? ? ? ?| ? Decreased                     

      



                          GFR ? ? ? ?                            



                          +-----------------------+--------                     

      



                          -------------+-------------------                     

      



                          ------+| ?30-59 ? ? ? ? ? ? ? ?|                      

     



                          ?Stage three ? ? ? ?| ? Stage                         

  



                          three ? ? ? ? ?                           



                          +-----------------------+--------                     

      



                          -------------+-------------------                     

      



                          ------+| ?15-29 ? ? ? ? ? ? ? ?|                      

     



                          ?Stage four ? ? ? ? | ? Stage                         

  



                          four ? ? ? ? ?                           



                          ?+-----------------------+-------                     

      



                          --------------+------------------                     

      



                          -------+| ?<15 (or dialysis) ? ?|                     

      



                          ?Stage five ? ? ? ? | ? Stage                         

  



                          five ? ? ? ? ?                           



                          ?+-----------------------+-------                     

      



                          --------------+------------------                     

      



                          -------+ *Each stage assumes the                      

     



                          associated GFR level has been in                      

     



                          effect for at least three months.                     

      



                          ?Stages 1 to 5, with or without                       

    



                          kidney disease, indicate chronic                      

     



                          kidney disease. Notes:                           



                          Determination of stages one and                       

    



                          two (with eGFR >59mL/min/1.73 m2)                     

      



                          requires estimation of kidney                         

  



                          damage for at least three months                      

     



                          as defined by structural or                           



                          functional abnormalities of the                       

    



                          kidney, manifested by                           



                          either:Pathological abnormalities                     

      



                          or Markers of kidney damage                           



                          (including abnormalities in the                       

    



                          composition of the blood or urine                     

      



                          or abnormalities in imaging                           



                          tests).                                

 

             Lab Interpretation (test code = Abnormal                           

    



             67529-7)                                            



[facility] WITH [id number] 16:26:23





             Test Item    Value        Reference Range Interpretation Comments

 

             WBC (test code = 14.08        See_Comment  H             [Automated



             7867-2)                                             message] The



                                                                 system which



                                                                 generated this



                                                                 result transmit

gianfranco



                                                                 reference range

:



                                                                 4.30 - 11.10



                                                                 10*3/?L. The



                                                                 reference range



                                                                 was not used to



                                                                 interpret this



                                                                 result as



                                                                 normal/abnormal

.

 

             RBC (test code = 4.39         See_Comment                [Automated



             247-8)                                              message] The



                                                                 system which



                                                                 generated this



                                                                 result transmit

gianfranco



                                                                 reference range

:



                                                                 3.93 - 5.25



                                                                 10*6/?L. The



                                                                 reference range



                                                                 was not used to



                                                                 interpret this



                                                                 result as



                                                                 normal/abnormal

.

 

             HGB (test code = 13.6 g/dL    11.6-15.0                 



             718-7)                                              

 

             HCT (test code = 40.5 %       35.7-45.2                 



             4544-3)                                             

 

             MCV (test code = 92.3 fL      80.6-95.5                 



             787-2)                                              

 

             MCH (test code = 31.0 pg      25.9-32.8                 



             785-6)                                              

 

             MCHC (test code = 33.6 g/dL    31.6-35.1                 



             786-4)                                              

 

             RDW-SD (test code = 41.2 fL      39.0-49.9                 



             86523-7)                                            

 

             RDW-CV (test code = 12.2 %       12.0-15.5                 



             788-0)                                              

 

             PLT (test code = 221          See_Comment                [Automated



             777-3)                                              message] The



                                                                 system which



                                                                 generated this



                                                                 result transmit

gianfranco



                                                                 reference range

:



                                                                 166 - 358 10*3/

?L.



                                                                 The reference



                                                                 range was not u

sed



                                                                 to interpret th

is



                                                                 result as



                                                                 normal/abnormal

.

 

             MPV (test code = 10.1 fL      9.5-12.9                  



             62574-9)                                            

 

             NRBC/100 WBC (test 0.0          See_Comment                [Automat

ed



             code = [id number])                                        message] 

The



                                                                 system which



                                                                 generated this



                                                                 result transmit

gianfranco



                                                                 reference range

:



                                                                 0.0 - 10.0 /100



                                                                 WBCs. The



                                                                 reference range



                                                                 was not used to



                                                                 interpret this



                                                                 result as



                                                                 normal/abnormal

.

 

             NRBC x10^3 (test code              See_Comment                [Auto

mated



             = [id number])                                        message] The



                                                                 system which



                                                                 generated this



                                                                 result transmit

gianfranco



                                                                 reference range

:



                                                                 10*3/?L. The



                                                                 reference range



                                                                 was not used to



                                                                 interpret this



                                                                 result as



                                                                 normal/abnormal

.

 

             GRAN MAT (NEUT) % 86.9 %                                 



             (test code = 770-8)                                        

 

             IMM GRAN % (test code 0.60 %                                 



             = [id number])                                        

 

             LYMPH % (test code = 7.6 %                                  



             736-9)                                              

 

             MONO % (test code = 3.8 %                                  



             5905-5)                                             

 

             EOS % (test code = 0.6 %                                  



             713-8)                                              

 

             BASO % (test code = 0.5 %                                  



             706-2)                                              

 

             GRAN MAT x10^3(ANC) 12.23 10*3/uL 1.88-7.09    H            



             (test code =                                        



             6454621649)                                         

 

             IMM GRAN x10^3 (test 0.09 10*3/uL 0.00-0.06    H            



             code = [id number])                                        

 

             LYMPH x10^3 (test code 1.07 10*3/uL 1.32-3.29    L            



             = 731-0)                                            

 

             MONO x10^3 (test code 0.54 10*3/uL 0.33-0.92                 



             = 742-7)                                            

 

             EOS x10^3 (test code = 0.08 10*3/uL 0.03-0.39                 



             711-2)                                              

 

             BASO x10^3 (test code 0.07 10*3/uL 0.01-0.07                 



             = 704-7)                                            

 

             Lab Interpretation Abnormal                               



             (test code = 10756-8)                                        



[id number]37





             Test Item    Value        Reference Range Interpretation Comments

 

             SARS-CoV-2 (COVID-19) RNA [Presence] Detected                      

         



             in Respiratory specimen by CECIL with                                

        



             probe detection (test code =                                       

 



             25532-6)                                            

 

             Whether patient resides in a No                                    

 



             congregate care setting (test code =                               

         



             19104-7)                                            

 

             Date and time of symptom onset (test Unknown                       

         



             code = 59720-0)                                        

 

             Whether the patient was hospitalized No                            

         



             for condition of interest (test code                               

         



             = 08482-2)                                          

 

             Whether the patient was admitted to No                             

        



             intensive care unit (ICU) for                                      

  



             condition of interest (test code =                                 

       



             28582-6)                                            

 

             Whether patient is employed in a No                                

     



             healthcare setting (test code =                                    

    



             82395-8)                                            

 

             Whether the patient has symptoms No                                

     



             related to condition of interest                                   

     



             (test code = 36912-1)                                        

 

             Pregnancy status (test code = No                                   

  



             80530-3)                                            



MAX ROMO, Urinalysis Rflx Cult/[id number] 20:35:00





             Test Item    Value        Reference Range Interpretation Comments

 

             Color,Urine (test code = UCOL) Yellow       Yellow                 

   

 

             Clarity,Urine (test code = Clear        Clear                     



             UCLAR)                                              

 

             Ph, Urine (test code = UPH) 6.5          5.0-9.0      N            

 

             Specific Gravity,Urine (test 1.015        1.005-1.030  N           

 



             code = USG)                                         

 

             Blood,Urine (test code = UBLD) Negative mg/dL Negative             

     

 

             Protein,Urine (test code = Negative mg/dL Negative                 

 



             UPRO)                                               

 

             Glucose,Urine (UA) (test code Negative mg/dL Negative              

    



             = UGLU)                                             

 

             Ketones,Urine (test code = Negative mg/dL Negative                 

 



             UKET)                                               

 

             Nitrate,Urine (test code = Negative     Negative                  



             UNIT)                                               

 

             Bilirubin,Urine (test code = Negative mg/dL Negative               

   



             UBIL)                                               

 

             Urobilinogen,Urine (test code 1.0 E.U./dL  Normal                  

  



             = UURO)                                             

 

             Leukocyte Esterase,Urine (test Trace mg/dL  Negative     A         

   



             code = ULEU)                                        



Drug Screen,[id number] 20:35:00





             Test Item    Value        Reference Range Interpretation Comments

 

             PCP Phencyclidine Screen,Urine (test Negative     Negative         

         



             code = PCPU)                                        

 

             Amphetamine Screen,Urine (test code Negative     Negative          

        



             = AMPU)                                             

 

             Methadone Screen,Urine (test code = Negative     Negative          

        



             METHU)                                              

 

             Opiate Screen,Urine (test code = Negative     Negative             

     



             UOPIS)                                              

 

             Barbituates Screen,Urine (test code Negative     Negative          

        



             = BARBU)                                            

 

             Benzodiazepines Screen,Urine (test Negative     Negative           

       



             code = UBENZS)                                        

 

             Cocaine Screen,Urine (test code = Negative     Negative            

      



             UCOCS)                                              

 

             Cannabinoid Screen,Urine (test code Negative     Negative          

        



             = UTHCS)                                            

 

             Propoxyphene Screen, Urine (test Negative     Negative             

     



             code = UPROP)                                        



Urine [id number] 20:35:00





             Test Item    Value        Reference Range Interpretation Comments

 

             RBC,Urine (test code = URBC.NP) 0-2 /HPF     0-2                   

    

 

             WBC,Urine (test code = UWBC.NP) 0-5 /HPF     0-5                   

    

 

             Bacteria,Urine (test code = Few /HPF     None Seen    A            



             UBACT.NP)                                           

 

             Squamous Epithelial Cell,Urine 6-10 /HPF    0-5          A         

   



             (test code = USQEPI.NP)                                        

 

             Amorphous Crystals,Urine (test code Few /HPF     None Seen    A    

        



             = UAMSE)                                            

 

             Hyaline Casts,Urine (test code = 0-5 /HPF     None Seen    A       

     



             UHYALC.XX)                                          



Complete Blood Count Auto [id number] 20:19:00





             Test Item    Value        Reference Range Interpretation Comments

 

             White Blood Count (test code = 7.9 x10 3/uL 4.4-10.5     N         

   



             WBCT)                                               

 

             Red Blood Count (test code = 4.27 x10 6/uL 3.75-5.20    N          

  



             RBC)                                                

 

             Hemoglobin (test code = HGBT) 12.9 g/dL    12.2-14.8    N          

  

 

             Hematocrit (test code = HCTT) 40.2 %       36.5-44.4    N          

  

 

             Mean Corpuscular Volume (test 94.10 fL     80..00 N          

  



             code = MCV)                                         

 

             Mean Corpuscular Hemoglobin 30.2 pg      27.0-32.5    N            



             (test code = MCH)                                        

 

             Mean Corpuscular HGB Conc 32.10 g/dL   32.00-37.50  N            



             (test code = MCHC)                                        

 

             RDW Coefficient of Variation 12.4 %       11.5-14.5    N           

 



             (test code = RDWCV)                                        

 

             Platelet Count (test code = 211.0 x10 3/uL 140.0-440.0  N          

  



             PLTT)                                               

 

             Mean Platelet Volume (test 11.1 fL                                



             code = MPV)                                         

 

             Immature Granulocytes % (Auto) 0.3 %        0.0-5.0      N         

   



             (test code = IMMGRAN%)                                        

 

             Neutrophils % (Auto) (test 75.8 %       36.0-70.0    H            



             code = NE%)                                         

 

             Lymphocytes % (Auto) (test 10.6 %       12.0-44.0    L            



             code = LY%)                                         

 

             Monocytes % (Auto) (test code 11.8 %       0.0-11.0     H          

  



             = MO%)                                              

 

             Eosinophils % (Auto) (test 0.9 %        0.0-7.0      N            



             code = EO%)                                         

 

             Basophils % (Auto) (test code 0.6 %        0.0-2.0      N          

  



             = BA%)                                              

 

             Immature Granulocytes # (Auto) 0.02 x10 3/uL                       

    



             (test code = IMMGRAN#)                                        

 

             Neutrophils # (Auto) (test 6.0 x10 3/uL 1.6-7.4      N            



             code = NE#)                                         

 

             Lymphocytes # (Auto) (test 0.83 x10 3/uL 0.50-4.60    N            



             code = LY#)                                         

 

             Monocytes # (Auto) (test code 0.93 x10 3/uL 0.00-1.20    N         

   



             = MO#)                                              

 

             Eosinophils # (Auto) (test 0.07 x10 3/uL 0.00-0.74    N            



             code = EO#)                                         

 

             Basophils # (Auto) (test code 0.05 x10 3/uL 0.00-0.21    N         

   



             = BA#)                                              

 

             nRBC Abs (test code = NRBCA) 0                                     

 

 

             nRBC Pct (test code = NRBCP) 0 %                                   

 



Comprehensive Metabolic [id number] 20:19:00





             Test Item    Value        Reference Range Interpretation Comments

 

             SODIUM (test code = NA) 142.0 mmol/L 136.0-145.0  N            

 

             Potassium,K (test code = 4.2 mmol/L   3.0-5.1      N            



             K)                                                  

 

             Chloride (test code = 110 mmol/L          H            



             CL)                                                 

 

             Carbon Dioxide (test 26 mmol/L    20-31        N            



             code = CO2)                                         

 

             Anion Gap (test code = 6 mmol/L     5-15         N            



             GAP)                                                

 

             Blood Urea Nitrogen 17 mg/dL     9-23         N            



             (test code = BUN)                                        

 

             Creatinine (test code = 0.88 mg/dL   0.55-1.02    N            



             CREATT)                                             

 

             Creatinine Clr Calc 80.94 mL/min                           



             Pharmacy (test code =                                        



             CRCLPHA)                                            

 

             Estimated Glomerular 78           See_Comment  L            Reporte

d eGFR is



             Filt Rate (test code =                                        based

 on the



             EGFR.XX)                                            CKD-EPI 



                                                                 equation thatdo

es



                                                                 not use a race



                                                                 coefficient.



                                                                 Additional



                                                                 information can

be



                                                                 found



                                                                 at:[id number]_s

cb_



                                                                 egfr_summary_fl

andry



                                                                 5.pdf (kidney.o

rg)



                                                                 [Automated



                                                                 message] The



                                                                 system which



                                                                 generated this



                                                                 result transmit

gianfranco



                                                                 reference range

:



                                                                 >=90



                                                                 ml/min/1.73m2. 

The



                                                                 reference range



                                                                 was not used to



                                                                 interpret this



                                                                 result as



                                                                 normal/abnormal

.

 

             BUN/Creatinine Ratio 19 ratio     10-20        N            



             (test code = BCRATIO)                                        

 

             Glucose (test code = 92 mg/dL            N            



             GLU)                                                

 

             Osmolality,Calculated 295.0                                  



             (test code = OSMOC)                                        

 

             Calcium (test code = CA) 9.1 mg/dL    8.3-10.6     N            

 

             Bilirubin,Total (test 0.3 mg/dL    0.2-1.1      N            



             code = BILIT)                                        

 

             Aspartate Amino 22 U/L       0-34         N            



             Transferase (test code =                                        



             AST)                                                

 

             Alanine Aminotransferase 15 U/L       10-49        N            



             (test code = ALT)                                        

 

             Total Protein (test code 6.9 g/dL     5.7-8.2      N            



             = TP)                                               

 

             Albumin Level (test code 3.9 g/dL     3.2-4.8      N            



             = ALB)                                              

 

             Globulin (test code = 3.0 mg/dL    2.3-3.5      N            



             GLOB)                                               

 

             Albumin/Globulin Ratio 1.3 ratio    0.8-2.0      N            



             (test code = AGRATIO)                                        

 

             Alkaline Phosphatase 109 U/L             N            



             (test code = ALP)                                        



Ethanol [id number] 20:19:00





             Test Item    Value        Reference Range Interpretation Comments

 

             Ethanol (test code < 3 mg/dL                              The pharm

acological



             = ETOH)                                             response to blo

od alcohol



                                                                 levels mayvary 

from



                                                                 individual to i

ndividual.



                                                                 The fatal larisa

ntrationhas



                                                                 been reported t

o be



                                                                 >400mg/dL.



HCG, Serum Qual (LAB)2023 20:19:00





             Test Item    Value        Reference Range Interpretation Comments

 

             HCG, Serum Qual (LAB) (test code = Negative     Negative           

       



             HCGQ)                                               



URINE AND [id number] 15:40:00





             Test Item    Value        Reference Range Interpretation Comments

 

             UA pH (test code = UA pH) 6.0 1        5.0-8.0                   



[facility] [id number] 15:40:00





             Test Item    Value        Reference Range Interpretation Comments

 

             UA Protein (test code Negative (23 9:40                       

    



             = UA Protein) AM)                                    



[facility] [id number] 15:40:00





             Test Item    Value        Reference Range Interpretation Comments

 

             UA Glucose (test code Negative (23 9:40                       

    



             = UA Glucose) AM)                                    



[facility] AND [id number] 15:40:00





             Test Item    Value        Reference Range Interpretation Comments

 

             UA Ketones (test code Negative *NA*(23                        

   



             = UA Ketones) 9:40 AM)                               



[facility] [id number] 15:40:00





             Test Item    Value        Reference Range Interpretation Comments

 

             UA Sq Epi (test code = UA Sq Epi) Many /LPF                        

      



[facility] AND [id number] 15:40:00





             Test Item    Value        Reference Range Interpretation Comments

 

             UA WBC (test code = 9            See_Comment                [Automa

gianfranco message] The



             UA WBC)                                             system which ge

nerated this



                                                                 result transmit

gianfranco



                                                                 reference range

: <=5. The



                                                                 reference range

 was not



                                                                 used to interpr

et this



                                                                 result as xenia

l/abnormal.



[facility] [id number] 15:40:00





             Test Item    Value        Reference Range Interpretation Comments

 

             UA Bacteria (test code = UA Occasional /HPF                        

   



             Bacteria)                                           



[facility] [id number] 15:40:00





             Test Item    Value        Reference Range Interpretation Comments

 

             UA Amorph Delmis (test code = Occasional /HPF                        

   



             UA Amorph Delmis)                                        



[facility] [id number] 15:40:00





             Test Item    Value        Reference Range Interpretation Comments

 

             UA CaOx Delmis (test code = UA Occasional /HPF                       

    



             CaOx Delmis)                                          



[facility] [id number] 15:40:00





             Test Item    Value        Reference Range Interpretation Comments

 

             UA Color (test code = Yellow *NA*(23                          

 



             UA Color)    9:40 AM)                               



[facility] [id number] 15:40:00





             Test Item    Value        Reference Range Interpretation Comments

 

             UA Turbidity (test code = Clear (23 9:40                      

     



             UA Turbidity) AM)                                    



[facility] [id number] 15:40:00





             Test Item    Value        Reference Range Interpretation Comments

 

             UA Spec Grav (test code = UA Spec 1.010 1                          

      



             Grav)                                               



[facility] [id number] 15:40:00





             Test Item    Value        Reference Range Interpretation Comments

 

             UA Bili (test code = Negative *NA*(23                         

  



             UA Bili)     9:40 AM)                               



[facility] [id number] 15:40:00





             Test Item    Value        Reference Range Interpretation Comments

 

             UA pH (test code = UA pH) 6.0 1        5.0-8.0                   



[facility] [id number] 15:40:00





             Test Item    Value        Reference Range Interpretation Comments

 

             UA Protein (test code Negative (23 9:40                       

    



             = UA Protein) AM)                                    



[facility] [id number] 15:40:00





             Test Item    Value        Reference Range Interpretation Comments

 

             UA Glucose (test code Negative (23 9:40                       

    



             = UA Glucose) AM)                                    



[facility] [id number] 15:40:00





             Test Item    Value        Reference Range Interpretation Comments

 

             UA Ketones (test code Negative *NA*(23                        

   



             = UA Ketones) 9:40 AM)                               



[facility] [id number] 15:40:00





             Test Item    Value        Reference Range Interpretation Comments

 

             UA Bili (test code = Negative *NA*(23                         

  



             UA Bili)     9:40 AM)                               



[facility] [id number] 15:40:00





             Test Item    Value        Reference Range Interpretation Comments

 

             UA Blood (test code = Negative (23 9:40                       

    



             UA Blood)    AM)                                    



[facility] [id number] 15:40:00





             Test Item    Value        Reference Range Interpretation Comments

 

             UA Urobilinogen (test code = UA 0.2          0.1-1.0               

    



             Urobilinogen)                                        



[facility] [id number] 15:40:00





             Test Item    Value        Reference Range Interpretation Comments

 

             UA Nitrite (test code Negative (23 9:40                       

    



             = UA Nitrite) AM)                                    



[facility] [id number] 15:40:00





             Test Item    Value        Reference Range Interpretation Comments

 

             UA Leuk Est (test code Small *ABN*(23                         

  



             = UA Leuk Est) 9:40 AM)                               



[facility] [id number] 15:40:00





             Test Item    Value        Reference Range Interpretation Comments

 

             UA RBC (test code = 2            See_Comment                [Automa

gianfranco message] The



             UA RBC)                                             system which ge

nerated this



                                                                 result transmit

gianfranco



                                                                 reference range

: <=2. The



                                                                 reference range

 was not



                                                                 used to interpr

et this



                                                                 result as xenia

l/abnormal.



[facility] [id number] 15:40:00





             Test Item    Value        Reference Range Interpretation Comments

 

             UA Blood (test code = Negative (23 9:40                       

    



             UA Blood)    AM)                                    



[facility] [id number] 15:40:00





             Test Item    Value        Reference Range Interpretation Comments

 

             UA Urobilinogen (test code = UA 0.2          0.1-1.0               

    



             Urobilinogen)                                        



[facility] [id number] 15:40:00





             Test Item    Value        Reference Range Interpretation Comments

 

             UA Nitrite (test code Negative (23 9:40                       

    



             = UA Nitrite) AM)                                    



[facility] [id number] 15:40:00





             Test Item    Value        Reference Range Interpretation Comments

 

             UA Leuk Est (test code Small *ABN*(23                         

  



             = UA Leuk Est) 9:40 AM)                               



[facility] [id number] 15:40:00





             Test Item    Value        Reference Range Interpretation Comments

 

             UA RBC (test code = 2            See_Comment                [Automa

gianfranco message] The



             UA RBC)                                             system which ge

nerated this



                                                                 result transmit

gianfrnaco



                                                                 reference range

: <=2. The



                                                                 reference range

 was not



                                                                 used to interpr

et this



                                                                 result as xenia

l/abnormal.



[facility] [id number] 15:40:00





             Test Item    Value        Reference Range Interpretation Comments

 

             UA Sq Epi (test code = UA Sq Epi) Many /LPF                        

      



[facility] [id number] 15:40:00





             Test Item    Value        Reference Range Interpretation Comments

 

             UA WBC (test code = 9            See_Comment                [Automa

gianfranco message] The



             UA WBC)                                             system which ge

nerated this



                                                                 result transmit

gianfranco



                                                                 reference range

: <=5. The



                                                                 reference range

 was not



                                                                 used to interpr

et this



                                                                 result as xenia

l/abnormal.



[facility] AND [id number] 15:40:00





             Test Item    Value        Reference Range Interpretation Comments

 

             UA Bacteria (test code = UA Occasional /HPF                        

   



             Bacteria)                                           



[facility] AND [id number] 15:40:00





             Test Item    Value        Reference Range Interpretation Comments

 

             UA Amorph Delmis (test code = Occasional /HPF                        

   



             UA Amorph Delmis)                                        



[facility] AND [id number] 15:40:00





             Test Item    Value        Reference Range Interpretation Comments

 

             UA CaOx Delmis (test code = UA Occasional /HPF                       

    



             CaOx Delmis)                                          



[facility] AND [id number] 15:40:00





             Test Item    Value        Reference Range Interpretation Comments

 

             UA Color (test code = Yellow *NA*(23                          

 



             UA Color)    9:40 AM)                               



[facility] AND [id number] 15:40:00





             Test Item    Value        Reference Range Interpretation Comments

 

             UA Turbidity (test code = Clear (23 9:40                      

     



             UA Turbidity) AM)                                    



[facility] AND [id number] 15:40:00





             Test Item    Value        Reference Range Interpretation Comments

 

             UA Spec Grav (test code = UA Spec 1.010 1                          

      



             Grav)                                               



[facility] AND [id number] 15:40:00





             Test Item    Value        Reference Range Interpretation Comments

 

             UA pH (test code = UA pH) 6.0 1        5.0-8.0                   



[facility] AND [id number] 15:40:00





             Test Item    Value        Reference Range Interpretation Comments

 

             UA Protein (test code Negative (23 9:40                       

    



             = UA Protein) AM)                                    



[facility] AND [id number] 15:40:00





             Test Item    Value        Reference Range Interpretation Comments

 

             UA Glucose (test code Negative (23 9:40                       

    



             = UA Glucose) AM)                                    



[facility] AND [id number] 15:40:00





             Test Item    Value        Reference Range Interpretation Comments

 

             UA Ketones (test code Negative *NA*(23                        

   



             = UA Ketones) 9:40 AM)                               



[facility] [id number] 15:40:00





             Test Item    Value        Reference Range Interpretation Comments

 

             UA Bili (test code = Negative *NA*(23                         

  



             UA Bili)     9:40 AM)                               



[facility] [id number] 15:40:00





             Test Item    Value        Reference Range Interpretation Comments

 

             UA Blood (test code = Negative (23 9:40                       

    



             UA Blood)    AM)                                    



[facility] [id number] 15:40:00





             Test Item    Value        Reference Range Interpretation Comments

 

             UA Urobilinogen (test code = UA 0.2          0.1-1.0               

    



             Urobilinogen)                                        



[facility] [id number] 15:40:00





             Test Item    Value        Reference Range Interpretation Comments

 

             UA Nitrite (test code Negative (23 9:40                       

    



             = UA Nitrite) AM)                                    



[facility] [id number] 15:40:00





             Test Item    Value        Reference Range Interpretation Comments

 

             UA Leuk Est (test code Small *ABN*(23                         

  



             = UA Leuk Est) 9:40 AM)                               



[facility] [id number] 15:40:00





             Test Item    Value        Reference Range Interpretation Comments

 

             UA RBC (test code = 2            See_Comment                [Automa

gianfranco message] The



             UA RBC)                                             system which ge

nerated this



                                                                 result transmit

gianfranco



                                                                 reference range

: <=2. The



                                                                 reference range

 was not



                                                                 used to interpr

et this



                                                                 result as xenia

l/abnormal.



[facility] [id number] 15:40:00





             Test Item    Value        Reference Range Interpretation Comments

 

             UA Sq Epi (test code = UA Sq Epi) Many /LPF                        

      



[facility] [id number] 15:40:00





             Test Item    Value        Reference Range Interpretation Comments

 

             UA WBC (test code = 9            See_Comment                [Automa

gianfranco message] The



             UA WBC)                                             system which ge

nerated this



                                                                 result transmit

gianfranco



                                                                 reference range

: <=5. The



                                                                 reference range

 was not



                                                                 used to interpr

et this



                                                                 result as xenia

l/abnormal.



[facility] [id number] 15:40:00





             Test Item    Value        Reference Range Interpretation Comments

 

             UA Bacteria (test code = UA Occasional /HPF                        

   



             Bacteria)                                           



[facility] [id number] 15:40:00





             Test Item    Value        Reference Range Interpretation Comments

 

             UA Amorph Delmis (test code = Occasional /HPF                        

   



             UA Amorph Delmis)                                        



[facility] [id number] 15:40:00





             Test Item    Value        Reference Range Interpretation Comments

 

             UA CaOx Delmis (test code = UA Occasional /HPF                       

    



             CaOx Delmis)                                          



[facility] [id number] 15:40:00





             Test Item    Value        Reference Range Interpretation Comments

 

             UA Color (test code = Yellow *NA*(23                          

 



             UA Color)    9:40 AM)                               



[facility] [id number] 15:40:00





             Test Item    Value        Reference Range Interpretation Comments

 

             UA Turbidity (test code = Clear (23 9:40                      

     



             UA Turbidity) AM)                                    



[facility] [id number] 15:40:00





             Test Item    Value        Reference Range Interpretation Comments

 

             UA Spec Grav (test code = UA Spec 1.010 1                          

      



             Grav)                                               



[facility] AND [id number] 15:40:00





             Test Item    Value        Reference Range Interpretation Comments

 

             UA pH (test code = UA pH) 6.0 1        5.0-8.0                   



[facility] [id number] 15:40:00





             Test Item    Value        Reference Range Interpretation Comments

 

             UA Protein (test code Negative (23 9:40                       

    



             = UA Protein) AM)                                    



[facility] [id number] 15:40:00





             Test Item    Value        Reference Range Interpretation Comments

 

             UA Glucose (test code Negative (23 9:40                       

    



             = UA Glucose) AM)                                    



[facility] [id number] 15:40:00





             Test Item    Value        Reference Range Interpretation Comments

 

             UA Ketones (test code Negative *NA*(23                        

   



             = UA Ketones) 9:40 AM)                               



[facility] [id number] 15:40:00





             Test Item    Value        Reference Range Interpretation Comments

 

             UA Bili (test code = Negative *NA*(23                         

  



             UA Bili)     9:40 AM)                               



[facility] [id number] 15:40:00





             Test Item    Value        Reference Range Interpretation Comments

 

             UA Blood (test code = Negative (23 9:40                       

    



             UA Blood)    AM)                                    



[facility] [id number] 15:40:00





             Test Item    Value        Reference Range Interpretation Comments

 

             UA Urobilinogen (test code = UA 0.2          0.1-1.0               

    



             Urobilinogen)                                        



[facility] AND [id number] 15:40:00





             Test Item    Value        Reference Range Interpretation Comments

 

             UA Nitrite (test code Negative (23 9:40                       

    



             = UA Nitrite) AM)                                    



[facility] AND [id number] 15:40:00





             Test Item    Value        Reference Range Interpretation Comments

 

             UA Leuk Est (test code Small *ABN*(23                         

  



             = UA Leuk Est) 9:40 AM)                               



[facility] AND [id number] 15:40:00





             Test Item    Value        Reference Range Interpretation Comments

 

             UA RBC (test code = 2            See_Comment                [Automa

gianfranco message] The



             UA RBC)                                             system which ge

nerated this



                                                                 result transmit

gianfranco



                                                                 reference range

: <=2. The



                                                                 reference range

 was not



                                                                 used to interpr

et this



                                                                 result as xenia

l/abnormal.



[facility] [id number] 15:40:00





             Test Item    Value        Reference Range Interpretation Comments

 

             UA Sq Epi (test code = UA Sq Epi) Many /LPF                        

      



[facility] AND [id number] 15:40:00





             Test Item    Value        Reference Range Interpretation Comments

 

             UA WBC (test code = 9            See_Comment                [Automa

gianfranco message] The



             UA WBC)                                             system which ge

nerated this



                                                                 result transmit

gianfranco



                                                                 reference range

: <=5. The



                                                                 reference range

 was not



                                                                 used to interpr

et this



                                                                 result as xenia

l/abnormal.



[facility] AND [id number] 15:40:00





             Test Item    Value        Reference Range Interpretation Comments

 

             UA Bacteria (test code = UA Occasional /HPF                        

   



             Bacteria)                                           



[facility] AND [id number] 15:40:00





             Test Item    Value        Reference Range Interpretation Comments

 

             UA Amorph Delmis (test code = Occasional /HPF                        

   



             UA Amorph Delmis)                                        



[facility] AND [id number] 15:40:00





             Test Item    Value        Reference Range Interpretation Comments

 

             UA CaOx Delmis (test code = UA Occasional /HPF                       

    



             CaOx Delmis)                                          



[facility] AND [id number] 15:40:00





             Test Item    Value        Reference Range Interpretation Comments

 

             UA Color (test code = Yellow *NA*(23                          

 



             UA Color)    9:40 AM)                               



[facility] AND [id number] 15:40:00





             Test Item    Value        Reference Range Interpretation Comments

 

             UA Turbidity (test code = Clear (23 9:40                      

     



             UA Turbidity) AM)                                    



[facility] [id number] 15:40:00





             Test Item    Value        Reference Range Interpretation Comments

 

             UA Spec Grav (test code = UA Spec 1.010 1                          

      



             Grav)                                               



[facility] [id number] 15:40:00





             Test Item    Value        Reference Range Interpretation Comments

 

             UA pH (test code = UA pH) 6.0 1        5.0-8.0                   



[facility] [id number] 15:40:00





             Test Item    Value        Reference Range Interpretation Comments

 

             UA Protein (test code Negative (23 9:40                       

    



             = UA Protein) AM)                                    



[facility] [id number] 15:40:00





             Test Item    Value        Reference Range Interpretation Comments

 

             UA Glucose (test code Negative (23 9:40                       

    



             = UA Glucose) AM)                                    



[facility] [id number] 15:40:00





             Test Item    Value        Reference Range Interpretation Comments

 

             UA Ketones (test code Negative *NA*(23                        

   



             = UA Ketones) 9:40 AM)                               



[facility] [id number] 15:40:00





             Test Item    Value        Reference Range Interpretation Comments

 

             UA Bili (test code = Negative *NA*(23                         

  



             UA Bili)     9:40 AM)                               



[facility] [id number] 15:40:00





             Test Item    Value        Reference Range Interpretation Comments

 

             UA Blood (test code = Negative (23 9:40                       

    



             UA Blood)    AM)                                    



[facility] [id number] 15:40:00





             Test Item    Value        Reference Range Interpretation Comments

 

             UA Urobilinogen (test code = UA 0.2          0.1-1.0               

    



             Urobilinogen)                                        



[facility] [id number] 15:40:00





             Test Item    Value        Reference Range Interpretation Comments

 

             UA Nitrite (test code Negative (23 9:40                       

    



             = UA Nitrite) AM)                                    



[facility] [id number] 15:40:00





             Test Item    Value        Reference Range Interpretation Comments

 

             UA Leuk Est (test code Small *ABN*(23                         

  



             = UA Leuk Est) 9:40 AM)                               



[facility] [id number] 15:40:00





             Test Item    Value        Reference Range Interpretation Comments

 

             UA RBC (test code = 2            See_Comment                [Automa

gianfranco message] The



             UA RBC)                                             system which ge

nerated this



                                                                 result transmit

gianfranco



                                                                 reference range

: <=2. The



                                                                 reference range

 was not



                                                                 used to interpr

et this



                                                                 result as xenia

l/abnormal.



[facility] AND [id number] 15:40:00





             Test Item    Value        Reference Range Interpretation Comments

 

             UA Sq Epi (test code = UA Sq Epi) Many /LPF                        

      



[facility] AND [id number] 15:40:00





             Test Item    Value        Reference Range Interpretation Comments

 

             UA WBC (test code = 9            See_Comment                [Automa

gianfranco message] The



             UA WBC)                                             system which ge

nerated this



                                                                 result transmit

gianfranco



                                                                 reference range

: <=5. The



                                                                 reference range

 was not



                                                                 used to interpr

et this



                                                                 result as xenia

l/abnormal.



[facility] AND [id number] 15:40:00





             Test Item    Value        Reference Range Interpretation Comments

 

             UA Bacteria (test code = UA Occasional /HPF                        

   



             Bacteria)                                           



[facility] AND [id number] 15:40:00





             Test Item    Value        Reference Range Interpretation Comments

 

             UA Amorph Delmis (test code = Occasional /HPF                        

   



             UA Amorph Delmis)                                        



[facility] AND [id number] 15:40:00





             Test Item    Value        Reference Range Interpretation Comments

 

             UA CaOx Delmis (test code = UA Occasional /HPF                       

    



             CaOx Delmis)                                          



[facility] AND [id number] 15:40:00





             Test Item    Value        Reference Range Interpretation Comments

 

             UA Color (test code = Yellow *NA*(23                          

 



             UA Color)    9:40 AM)                               



[facility] AND [id number] 15:40:00





             Test Item    Value        Reference Range Interpretation Comments

 

             UA Turbidity (test code = Clear (23 9:40                      

     



             UA Turbidity) AM)                                    



[facility] AND [id number] 15:40:00





             Test Item    Value        Reference Range Interpretation Comments

 

             UA Spec Grav (test code = UA Spec 1.010 1                          

      



             Grav)                                               



[facility] AND [id number] 15:40:00





             Test Item    Value        Reference Range Interpretation Comments

 

             UA pH (test code = UA pH) 6.0 1        5.0-8.0                   



[facility] AND [id number] 15:40:00





             Test Item    Value        Reference Range Interpretation Comments

 

             UA Protein (test code Negative (23 9:40                       

    



             = UA Protein) AM)                                    



[facility] AND [id number] 15:40:00





             Test Item    Value        Reference Range Interpretation Comments

 

             UA Glucose (test code Negative (23 9:40                       

    



             = UA Glucose) AM)                                    



[facility] AND [id number] 15:40:00





             Test Item    Value        Reference Range Interpretation Comments

 

             UA Ketones (test code Negative *NA*(23                        

   



             = UA Ketones) 9:40 AM)                               



[facility] AND [id number] 15:40:00





             Test Item    Value        Reference Range Interpretation Comments

 

             UA Bili (test code = Negative *NA*(23                         

  



             UA Bili)     9:40 AM)                               



[facility] AND [id number] 15:40:00





             Test Item    Value        Reference Range Interpretation Comments

 

             UA Blood (test code = Negative (23 9:40                       

    



             UA Blood)    AM)                                    



[facility] AND [id number] 15:40:00





             Test Item    Value        Reference Range Interpretation Comments

 

             UA Urobilinogen (test code = UA 0.2          0.1-1.0               

    



             Urobilinogen)                                        



[facility] AND [id number] 15:40:00





             Test Item    Value        Reference Range Interpretation Comments

 

             UA Nitrite (test code Negative (23 9:40                       

    



             = UA Nitrite) AM)                                    



[facility] AND [id number] 15:40:00





             Test Item    Value        Reference Range Interpretation Comments

 

             UA Leuk Est (test code Small *ABN*(23                         

  



             = UA Leuk Est) 9:40 AM)                               



[facility] AND [id number] 15:40:00





             Test Item    Value        Reference Range Interpretation Comments

 

             UA RBC (test code = 2            See_Comment                [Automa

gianfranco message] The



             UA RBC)                                             system which ge

nerated this



                                                                 result transmit

gianfranco



                                                                 reference range

: <=2. The



                                                                 reference range

 was not



                                                                 used to interpr

et this



                                                                 result as xenia

l/abnormal.



[facility] AND [id number] 15:40:00





             Test Item    Value        Reference Range Interpretation Comments

 

             UA Sq Epi (test code = UA Sq Epi) Many /LPF                        

      



[facility] AND [id number] 15:40:00





             Test Item    Value        Reference Range Interpretation Comments

 

             UA WBC (test code = 9            See_Comment                [Automa

gianfranco message] The



             UA WBC)                                             system which ge

nerated this



                                                                 result transmit

gianfranco



                                                                 reference range

: <=5. The



                                                                 reference range

 was not



                                                                 used to interpr

et this



                                                                 result as xenia

l/abnormal.



[facility] AND [id number] 15:40:00





             Test Item    Value        Reference Range Interpretation Comments

 

             UA Bacteria (test code = UA Occasional /HPF                        

   



             Bacteria)                                           



[facility] AND [id number] 15:40:00





             Test Item    Value        Reference Range Interpretation Comments

 

             UA Amorph Delmis (test code = Occasional /HPF                        

   



             UA Amorph Delmis)                                        



[facility] AND [id number] 15:40:00





             Test Item    Value        Reference Range Interpretation Comments

 

             UA CaOx Delmis (test code = UA Occasional /HPF                       

    



             CaOx Delmis)                                          



[facility] AND [id number] 15:40:00





             Test Item    Value        Reference Range Interpretation Comments

 

             UA Color (test code = Yellow *NA*(23                          

 



             UA Color)    9:40 AM)                               



[facility] AND [id number] 15:40:00





             Test Item    Value        Reference Range Interpretation Comments

 

             UA Turbidity (test code = Clear (23 9:40                      

     



             UA Turbidity) AM)                                    



[facility] AND [id number] 15:40:00





             Test Item    Value        Reference Range Interpretation Comments

 

             UA Spec Grav (test code = UA Spec 1.010 1                          

      



             Grav)                                               



[facility] AND [id number] 15:40:00





             Test Item    Value        Reference Range Interpretation Comments

 

             UA pH (test code = UA pH) 6.0 1        5.0-8.0                   



[facility] AND [id number] 15:40:00





             Test Item    Value        Reference Range Interpretation Comments

 

             UA Protein (test code Negative (23 9:40                       

    



             = UA Protein) AM)                                    



[facility] AND [id number] 15:40:00





             Test Item    Value        Reference Range Interpretation Comments

 

             UA Glucose (test code Negative (23 9:40                       

    



             = UA Glucose) AM)                                    



[facility] AND [id number] 15:40:00





             Test Item    Value        Reference Range Interpretation Comments

 

             UA Ketones (test code Negative *NA*(23                        

   



             = UA Ketones) 9:40 AM)                               



[facility] AND [id number] 15:40:00





             Test Item    Value        Reference Range Interpretation Comments

 

             UA Bili (test code = Negative *NA*(23                         

  



             UA Bili)     9:40 AM)                               



[facility] [id number] 15:40:00





             Test Item    Value        Reference Range Interpretation Comments

 

             UA Blood (test code = Negative (23 9:40                       

    



             UA Blood)    AM)                                    



[facility] [id number] 15:40:00





             Test Item    Value        Reference Range Interpretation Comments

 

             UA Urobilinogen (test code = UA 0.2          0.1-1.0               

    



             Urobilinogen)                                        



[facility] [id number] 15:40:00





             Test Item    Value        Reference Range Interpretation Comments

 

             UA Nitrite (test code Negative (23 9:40                       

    



             = UA Nitrite) AM)                                    



[facility] AND [id number] 15:40:00





             Test Item    Value        Reference Range Interpretation Comments

 

             UA Leuk Est (test code Small *ABN*(23                         

  



             = UA Leuk Est) 9:40 AM)                               



[facility] [id number] 15:40:00





             Test Item    Value        Reference Range Interpretation Comments

 

             UA RBC (test code = 2            See_Comment                [Automa

gianfranco message] The



             UA RBC)                                             system which ge

nerated this



                                                                 result transmit

gianfranco



                                                                 reference range

: <=2. The



                                                                 reference range

 was not



                                                                 used to interpr

et this



                                                                 result as xenia

l/abnormal.



[facility] [id number] 15:40:00





             Test Item    Value        Reference Range Interpretation Comments

 

             UA Sq Epi (test code = UA Sq Epi) Many /LPF                        

      



[facility] AND [id number] 15:40:00





             Test Item    Value        Reference Range Interpretation Comments

 

             UA WBC (test code = 9            See_Comment                [Automa

gianfranco message] The



             UA WBC)                                             system which ge

nerated this



                                                                 result transmit

gianfranco



                                                                 reference range

: <=5. The



                                                                 reference range

 was not



                                                                 used to interpr

et this



                                                                 result as xenia

l/abnormal.



[facility] [id number] 15:40:00





             Test Item    Value        Reference Range Interpretation Comments

 

             UA Bacteria (test code = UA Occasional /HPF                        

   



             Bacteria)                                           



[facility] [id number] 15:40:00





             Test Item    Value        Reference Range Interpretation Comments

 

             UA Amorph Delmis (test code = Occasional /HPF                        

   



             UA Amorph Delmis)                                        



[facility] [id number] 15:40:00





             Test Item    Value        Reference Range Interpretation Comments

 

             UA CaOx Delmis (test code = UA Occasional /HPF                       

    



             CaOx Delmis)                                          



[facility] [id number] 15:40:00





             Test Item    Value        Reference Range Interpretation Comments

 

             UA Color (test code = Yellow *NA*(23                          

 



             UA Color)    9:40 AM)                               



[facility] [id number] 15:40:00





             Test Item    Value        Reference Range Interpretation Comments

 

             UA Turbidity (test code = Clear (23 9:40                      

     



             UA Turbidity) AM)                                    



[facility] [id number] 15:40:00





             Test Item    Value        Reference Range Interpretation Comments

 

             UA Spec Grav (test code = UA Spec 1.010 1                          

      



             Grav)                                               



[facility] AND [id number] 15:40:00





             Test Item    Value        Reference Range Interpretation Comments

 

             UA pH (test code = UA pH) 6.0 1        5.0-8.0                   



[facility] [id number] 15:40:00





             Test Item    Value        Reference Range Interpretation Comments

 

             UA Protein (test code Negative (23 9:40                       

    



             = UA Protein) AM)                                    



[facility] [id number] 15:40:00





             Test Item    Value        Reference Range Interpretation Comments

 

             UA Glucose (test code Negative (23 9:40                       

    



             = UA Glucose) AM)                                    



[facility] [id number] 15:40:00





             Test Item    Value        Reference Range Interpretation Comments

 

             UA Ketones (test code Negative *NA*(23                        

   



             = UA Ketones) 9:40 AM)                               



[facility] AND [id number] 15:40:00





             Test Item    Value        Reference Range Interpretation Comments

 

             UA Bili (test code = Negative *NA*(23                         

  



             UA Bili)     9:40 AM)                               



[facility] [id number] 15:40:00





             Test Item    Value        Reference Range Interpretation Comments

 

             UA Blood (test code = Negative (23 9:40                       

    



             UA Blood)    AM)                                    



[facility] [id number] 15:40:00





             Test Item    Value        Reference Range Interpretation Comments

 

             UA Urobilinogen (test code = UA 0.2          0.1-1.0               

    



             Urobilinogen)                                        



[facility] [id number] 15:40:00





             Test Item    Value        Reference Range Interpretation Comments

 

             UA Nitrite (test code Negative (23 9:40                       

    



             = UA Nitrite) AM)                                    



[facility] AND [id number] 15:40:00





             Test Item    Value        Reference Range Interpretation Comments

 

             UA Leuk Est (test code Small *ABN*(23                         

  



             = UA Leuk Est) 9:40 AM)                               



[facility] [id number] 15:40:00





             Test Item    Value        Reference Range Interpretation Comments

 

             UA RBC (test code = 2            See_Comment                [Automa

gianfranco message] The



             UA RBC)                                             system which ge

nerated this



                                                                 result transmit

gianfranco



                                                                 reference range

: <=2. The



                                                                 reference range

 was not



                                                                 used to interpr

et this



                                                                 result as xenia

l/abnormal.



[facility] AND [id number] 15:40:00





             Test Item    Value        Reference Range Interpretation Comments

 

             UA Sq Epi (test code = UA Sq Epi) Many /LPF                        

      



[facility] AND [id number] 15:40:00





             Test Item    Value        Reference Range Interpretation Comments

 

             UA WBC (test code = 9            See_Comment                [Automa

gianfranco message] The



             UA WBC)                                             system which ge

nerated this



                                                                 result transmit

gianfranco



                                                                 reference range

: <=5. The



                                                                 reference range

 was not



                                                                 used to interpr

et this



                                                                 result as xenia

l/abnormal.



[facility] AND [id number] 15:40:00





             Test Item    Value        Reference Range Interpretation Comments

 

             UA Bacteria (test code = UA Occasional /HPF                        

   



             Bacteria)                                           



[facility] AND [id number] 15:40:00





             Test Item    Value        Reference Range Interpretation Comments

 

             UA Amorph Delmis (test code = Occasional /HPF                        

   



             UA Amorph Delmis)                                        



[facility] [id number] 15:40:00





             Test Item    Value        Reference Range Interpretation Comments

 

             UA CaOx Delmis (test code = UA Occasional /HPF                       

    



             CaOx Delmis)                                          



[facility] [id number] 15:40:00





             Test Item    Value        Reference Range Interpretation Comments

 

             UA Color (test code = Yellow *NA*(23                          

 



             UA Color)    9:40 AM)                               



[facility] AND [id number] 15:40:00





             Test Item    Value        Reference Range Interpretation Comments

 

             UA Turbidity (test code = Clear (23 9:40                      

     



             UA Turbidity) AM)                                    



[facility] AND [id number] 15:40:00





             Test Item    Value        Reference Range Interpretation Comments

 

             UA Spec Grav (test code = UA Spec 1.010 1                          

      



             Grav)                                               



[facility] AND [id number] 15:40:00





             Test Item    Value        Reference Range Interpretation Comments

 

             UA Sq Epi (test code = UA Sq Epi) Many /LPF                        

      



[facility] AND [id number] 15:40:00





             Test Item    Value        Reference Range Interpretation Comments

 

             UA pH (test code = UA pH) 6.0 1        5.0-8.0                   



[facility] AND [id number] 15:40:00





             Test Item    Value        Reference Range Interpretation Comments

 

             UA Protein (test code Negative (23 9:40                       

    



             = UA Protein) AM)                                    



[facility] AND [id number] 15:40:00





             Test Item    Value        Reference Range Interpretation Comments

 

             UA Glucose (test code Negative (23 9:40                       

    



             = UA Glucose) AM)                                    



[facility] AND [id number] 15:40:00





             Test Item    Value        Reference Range Interpretation Comments

 

             UA Ketones (test code Negative *NA*(23                        

   



             = UA Ketones) 9:40 AM)                               



[facility] AND [id number] 15:40:00





             Test Item    Value        Reference Range Interpretation Comments

 

             UA Bili (test code = Negative *NA*(23                         

  



             UA Bili)     9:40 AM)                               



[facility] AND [id number] 15:40:00





             Test Item    Value        Reference Range Interpretation Comments

 

             UA Blood (test code = Negative (23 9:40                       

    



             UA Blood)    AM)                                    



[facility] AND [id number] 15:40:00





             Test Item    Value        Reference Range Interpretation Comments

 

             UA Urobilinogen (test code = UA 0.2          0.1-1.0               

    



             Urobilinogen)                                        



[facility] AND [id number] 15:40:00





             Test Item    Value        Reference Range Interpretation Comments

 

             UA Nitrite (test code Negative (23 9:40                       

    



             = UA Nitrite) AM)                                    



[facility] AND [id number] 15:40:00





             Test Item    Value        Reference Range Interpretation Comments

 

             UA Leuk Est (test code Small *ABN*(23                         

  



             = UA Leuk Est) 9:40 AM)                               



[facility] [id number] 15:40:00





             Test Item    Value        Reference Range Interpretation Comments

 

             UA RBC (test code = 2            See_Comment                [Automa

gianfranco message] The



             UA RBC)                                             system which ge

nerated this



                                                                 result transmit

gianfranco



                                                                 reference range

: <=2. The



                                                                 reference range

 was not



                                                                 used to interpr

et this



                                                                 result as xenia

l/abnormal.



[facility] [id number] 15:40:00





             Test Item    Value        Reference Range Interpretation Comments

 

             UA WBC (test code = 9            See_Comment                [Automa

gianfranco message] The



             UA WBC)                                             system which ge

nerated this



                                                                 result transmit

gianfranco



                                                                 reference range

: <=5. The



                                                                 reference range

 was not



                                                                 used to interpr

et this



                                                                 result as xenia

l/abnormal.



[facility] [id number] 15:40:00





             Test Item    Value        Reference Range Interpretation Comments

 

             UA Bacteria (test code = UA Occasional /HPF                        

   



             Bacteria)                                           



[facility] [id number] 15:40:00





             Test Item    Value        Reference Range Interpretation Comments

 

             UA Amorph Delmis (test code = Occasional /HPF                        

   



             UA Amorph Delmis)                                        



[facility] [id number] 15:40:00





             Test Item    Value        Reference Range Interpretation Comments

 

             UA CaOx Delmis (test code = UA Occasional /HPF                       

    



             CaOx Delmis)                                          



[facility] [id number] 15:40:00





             Test Item    Value        Reference Range Interpretation Comments

 

             UA Color (test code = Yellow *NA*(23                          

 



             UA Color)    9:40 AM)                               



[facility] [id number] 15:40:00





             Test Item    Value        Reference Range Interpretation Comments

 

             UA Sq Epi (test code = UA Sq Epi) Many /LPF                        

      



[facility] [id number] 15:40:00





             Test Item    Value        Reference Range Interpretation Comments

 

             UA WBC (test code = 9            See_Comment                [Automa

gianfranco message] The



             UA WBC)                                             system which ge

nerated this



                                                                 result transmit

gianfranco



                                                                 reference range

: <=5. The



                                                                 reference range

 was not



                                                                 used to interpr

et this



                                                                 result as xenia

l/abnormal.



[facility] [id number] 15:40:00





             Test Item    Value        Reference Range Interpretation Comments

 

             UA Bacteria (test code = UA Occasional /HPF                        

   



             Bacteria)                                           



[facility] [id number] 15:40:00





             Test Item    Value        Reference Range Interpretation Comments

 

             UA Amorph Delmis (test code = Occasional /HPF                        

   



             UA Amorph Delmis)                                        



[facility] AND [id number] 15:40:00





             Test Item    Value        Reference Range Interpretation Comments

 

             UA CaOx Delmis (test code = UA Occasional /HPF                       

    



             CaOx Delmis)                                          



[facility] AND [id number] 15:40:00





             Test Item    Value        Reference Range Interpretation Comments

 

             UA Color (test code = Yellow *NA*(23                          

 



             UA Color)    9:40 AM)                               



[facility] AND [id number] 15:40:00





             Test Item    Value        Reference Range Interpretation Comments

 

             UA Turbidity (test code = Clear (23 9:40                      

     



             UA Turbidity) AM)                                    



[facility] AND [id number] 15:40:00





             Test Item    Value        Reference Range Interpretation Comments

 

             UA Spec Grav (test code = UA Spec 1.010 1                          

      



             Grav)                                               



[facility] AND [id number] 15:40:00





             Test Item    Value        Reference Range Interpretation Comments

 

             UA Turbidity (test code = Clear (23 9:40                      

     



             UA Turbidity) AM)                                    



[facility] AND [id number] 15:40:00





             Test Item    Value        Reference Range Interpretation Comments

 

             UA pH (test code = UA pH) 6.0 1        5.0-8.0                   



[facility] AND [id number] 15:40:00





             Test Item    Value        Reference Range Interpretation Comments

 

             UA Protein (test code Negative (23 9:40                       

    



             = UA Protein) AM)                                    



[facility] AND [id number] 15:40:00





             Test Item    Value        Reference Range Interpretation Comments

 

             UA Glucose (test code Negative (23 9:40                       

    



             = UA Glucose) AM)                                    



[facility] AND [id number] 15:40:00





             Test Item    Value        Reference Range Interpretation Comments

 

             UA Ketones (test code Negative *NA*(23                        

   



             = UA Ketones) 9:40 AM)                               



[facility] AND [id number] 15:40:00





             Test Item    Value        Reference Range Interpretation Comments

 

             UA Bili (test code = Negative *NA*(23                         

  



             UA Bili)     9:40 AM)                               



[facility] AND [id number] 15:40:00





             Test Item    Value        Reference Range Interpretation Comments

 

             UA Blood (test code = Negative (23 9:40                       

    



             UA Blood)    AM)                                    



[facility] [id number] 15:40:00





             Test Item    Value        Reference Range Interpretation Comments

 

             UA Urobilinogen (test code = UA 0.2          0.1-1.0               

    



             Urobilinogen)                                        



[facility] AND [id number] 15:40:00





             Test Item    Value        Reference Range Interpretation Comments

 

             UA Nitrite (test code Negative (23 9:40                       

    



             = UA Nitrite) AM)                                    



[facility] AND [id number] 15:40:00





             Test Item    Value        Reference Range Interpretation Comments

 

             UA Leuk Est (test code Small *ABN*(23                         

  



             = UA Leuk Est) 9:40 AM)                               



[facility] AND [id number] 15:40:00





             Test Item    Value        Reference Range Interpretation Comments

 

             UA RBC (test code = 2            See_Comment                [Automa

gianfranco message] The



             UA RBC)                                             system which ge

nerated this



                                                                 result transmit

gianfranco



                                                                 reference range

: <=2. The



                                                                 reference range

 was not



                                                                 used to interpr

et this



                                                                 result as xenia

l/abnormal.



[facility] AND [id number] 15:40:00





             Test Item    Value        Reference Range Interpretation Comments

 

             UA Spec Grav (test code = UA Spec 1.010 1                          

      



             Grav)                                               



[id number] 15:25:24





             Test Item    Value        Reference Range Interpretation Comments

 

             Creatinine Lvl (test code = Creatinine 0.85         0.50-1.40      

           



             Lvl)                                                



[facility] [id number] 15:25:24





             Test Item    Value        Reference Range Interpretation Comments

 

             Sodium Lvl (test code = Sodium Lvl) 142          135-145           

        



[facility] [id number] 15:25:24





             Test Item    Value        Reference Range Interpretation Comments

 

             Potassium Lvl (test code = Potassium 4.4          3.5-5.1          

         



             Lvl)                                                



[facility] CentralYxysoczLERDXEWST9374-08-74 15:25:24





             Test Item    Value        Reference Range Interpretation Comments

 

             Chloride Lvl (test code = Chloride Lvl) 108                  

            



[facility] [id number] 15:25:24





             Test Item    Value        Reference Range Interpretation Comments

 

             CO2 (test code = CO2) 28           24-32                     



[facility] CentralIltpbeyJXSZECEBB7956-31-27 15:25:24





             Test Item    Value        Reference Range Interpretation Comments

 

             Calcium Lvl (test code = Calcium Lvl) 10.0         8.5-10.5        

          



[facility] CentralOpivwciUEWVTMYCP8564-30-72 15:25:24





             Test Item    Value        Reference Range Interpretation Comments

 

             AGAP (test code = AGAP) 10.4         10.0-20.0                 



[facility] [id number] 15:25:24





             Test Item    Value        Reference Range Interpretation Comments

 

             eGFR (test code = eGFR) 81                                     



[facility] CentralUbsziehKJYMYVEZC7783-25-32 15:25:24





             Test Item    Value        Reference Range Interpretation Comments

 

             Lipase Lvl (test code = Lipase Lvl) 123                      

        



[facility] CentralZgaihpvOKRVZAZNZ0759-99-52 15:25:24





             Test Item    Value        Reference Range Interpretation Comments

 

             Total Protein (test code = Total 8.0          6.4-8.4              

     



             Protein)                                            



[facility] Cent[id number] 15:25:24





             Test Item    Value        Reference Range Interpretation Comments

 

             Albumin Lvl (test code = Albumin Lvl) 3.7          3.5-5.0         

          



[facility] CentralVwyrrggSONEJWBBP2559-91-10 15:25:24





             Test Item    Value        Reference Range Interpretation Comments

 

             Globulin (test code = Globulin) 4.3          2.7-4.2               

    



[facility] CentralTqhgkdiAXNEHEIAG6726-48-47 15:25:24





             Test Item    Value        Reference Range Interpretation Comments

 

             A/G Ratio (test code = A/G Ratio) 0.9 1        0.7-1.6             

      



[facility] CentralFwkglrjWERXJQTYJ0886-25-58 15:25:24





             Test Item    Value        Reference Range Interpretation Comments

 

             ALANINE AMINOTRANSFERASE 20           See_Comment                [A

utomated message]



             (test code = ALANINE                                        The sys

tem which



             AMINOTRANSFERASE)                                        generated 

this result



                                                                 transmitted ref

erence



                                                                 range: <=65. Th

e



                                                                 reference range

 was



                                                                 not used to int

erpret



                                                                 this result as



                                                                 normal/abnormal

.



[facility] [id number] 15:25:24





             Test Item    Value        Reference Range Interpretation Comments

 

             AST (test code = AST) 19           See_Comment                [Auto

mated message] The



                                                                 system which ge

nerated this



                                                                 result transmit

gianfranco



                                                                 reference range

: <=37. The



                                                                 reference range

 was not



                                                                 used to interpr

et this



                                                                 result as xenia

l/abnormal.



[facility] CentralXaimwusNWEWAAHAZ1790-62-49 15:25:24





             Test Item    Value        Reference Range Interpretation Comments

 

             Alk Phos (test code = Alk Phos) 123                          

    



[facility] CentralKuptjcfAGDASWLLW0897-72-09 15:25:24





             Test Item    Value        Reference Range Interpretation Comments

 

             Bili Total (test code = Bili Total) 0.3          0.2-1.3           

        



[facility] CentralYkqqmvpVPAMNWEPR3359-74-67 15:25:24





             Test Item    Value        Reference Range Interpretation Comments

 

             Bili Direct (test code no gt        See_Comment                [Aut

omated message] The



             = Bili Direct)                                        system which 

generated



                                                                 this result tra

nsmitted



                                                                 reference range

: <=0.3.



                                                                 The reference r

jihan was



                                                                 not used to int

erpret this



                                                                 result as xenia

l/abnormal.



[facility] CentralWomcfesAWFUVNQNX2337-99-92 15:25:24





             Test Item    Value        Reference Range Interpretation Comments

 

             Bili Indirect Unable to    See_Comment                [Automated



             (test code = Bili Calculate                              message] T

he system



             Indirect)                                           which generated



                                                                 this result



                                                                 transmitted



                                                                 reference range

:



                                                                 <=1.0. The



                                                                 reference range

 was



                                                                 not used to



                                                                 interpret this



                                                                 result as



                                                                 normal/abnormal

.



[facility]ASDIHPBRHZ[facility]63[facility]24





             Test Item    Value        Reference Range Interpretation Comments

 

             WBC X 10x3 (test code = WBC X 10x3) 9.4          3.7-10.4          

        



[id number] 15:25:24





             Test Item    Value        Reference Range Interpretation Comments

 

             RBC X 10x6 (test code = RBC X 10x6) 4.59         4.20-5.40         

        



[facility]JykzryvFVKCXIGTMK1199-24-65 15:25:24





             Test Item    Value        Reference Range Interpretation Comments

 

             Hgb (test code = Hgb) 14.1         12.0-16.0                 



[facility]PekreoqPCAWUCTTJL9875-94-76 15:25:24





             Test Item    Value        Reference Range Interpretation Comments

 

             Hct (test code = Hct) 42.8         36.0-48.0                 



[place] [id number] 15:25:24





             Test Item    Value        Reference Range Interpretation Comments

 

             MCV (test code = MCV) 93.2         80.0-98.0                 



[facility] SouthIudknpcLUUURCHFEG5221-07-64 15:25:24





             Test Item    Value        Reference Range Interpretation Comments

 

             MCH (test code = MCH) 30.8 pg      27.0-31.0                 



[facility] SouthDyedvtbGJJJYUWXKY0737-82-52 15:25:24





             Test Item    Value        Reference Range Interpretation Comments

 

             MCHC (test code = MCHC) 33.0         32.0-36.0                 



[place] [id number] 15:25:24





             Test Item    Value        Reference Range Interpretation Comments

 

             RDW (test code = RDW) 13.0         11.5-14.5                 



[place] [id number] 15:25:24





             Test Item    Value        Reference Range Interpretation Comments

 

             Platelet (test code = Platelet) 200          133-450               

    



[place] [address]-02-14 15:25:24





             Test Item    Value        Reference Range Interpretation Comments

 

             MPV (test code = MPV) 8.6          7.4-10.4                  



[place] [address]-02-14 15:25:24





             Test Item    Value        Reference Range Interpretation Comments

 

             Segs (test code = Segs) 86.0         45.0-75.0                 



[place] [address]-02-14 15:25:24





             Test Item    Value        Reference Range Interpretation Comments

 

             Lymphocytes (test code = Lymphocytes) 5.5          20.0-40.0       

          



[place] [address]-02-14 15:25:24





             Test Item    Value        Reference Range Interpretation Comments

 

             Monocytes (test code = Monocytes) 7.0          2.0-12.0            

      



[place] [address]-02-14 15:25:24





             Test Item    Value        Reference Range Interpretation Comments

 

             Eosinophils (test code = 1.0          See_Comment                [A

utomated message] The



             Eosinophils)                                        system which ge

nerated



                                                                 this result tra

nsmitted



                                                                 reference range

: <=4.0.



                                                                 The reference r

jihan was



                                                                 not used to int

erpret



                                                                 this result as



                                                                 normal/abnormal

.



[place] [address]-02-14 15:25:24





             Test Item    Value        Reference Range Interpretation Comments

 

             Basophils (test code = 0.5          See_Comment                [Aut

omated message] The



             Basophils)                                          system which ge

nerated



                                                                 this result tra

nsmitted



                                                                 reference range

: <=1.0.



                                                                 The reference r

jihan was



                                                                 not used to int

erpret



                                                                 this result as



                                                                 normal/abnormal

.



[place] [address]-02-14 15:25:24





             Test Item    Value        Reference Range Interpretation Comments

 

             Neutrophils # (test code = Neutrophils 8.1          1.5-8.1        

           



             #)                                                  



[place] [address]-02-14 15:25:24





             Test Item    Value        Reference Range Interpretation Comments

 

             Lymphocytes # (test code = Lymphocytes 0.5          1.0-5.5        

           



             #)                                                  



[place] [address]-02-14 15:25:24





             Test Item    Value        Reference Range Interpretation Comments

 

             Monocytes # (test code 0.7          See_Comment                [Aut

omated message] The



             = Monocytes #)                                        system which 

generated



                                                                 this result tra

nsmitted



                                                                 reference range

: <=0.8.



                                                                 The reference r

jihan was



                                                                 not used to int

erpret



                                                                 this result as



                                                                 normal/abnormal

.



[facility]





             Test Item    Value        Reference Range Interpretation Comments

 

             Eosinophils # (test code 0.1          See_Comment                [A

utomated message] The



             = Eosinophils #)                                        system whic

h generated



                                                                 this result tra

nsmitted



                                                                 reference range

: <=0.5.



                                                                 The reference r

jihan was



                                                                 not used to int

erpret



                                                                 this result as



                                                                 normal/abnormal

.



[facility] PedfujhXZQQXUXRK0203-84-00 15:25:24





             Test Item    Value        Reference Range Interpretation Comments

 

             Glucose Lvl (test code = Glucose Lvl) 93           70-99           

          



[facility]KmkalxuHAPCHSHTJ4946[facility]13-86 15:25:24





             Test Item    Value        Reference Range Interpretation Comments

 

             BUN (test code = BUN) 19           7-22                      



[facility]iscoLfjwwgqEQFGQBHYI8270-70-77 15:25:24





             Test Item    Value        Reference Range Interpretation Comments

 

             Creatinine Lvl (test code = Creatinine 0.85         0.50-1.40      

           



             Lvl)                                                



[facility]esocjvHHOTZDDLV0927-29-04 15:25:24





             Test Item    Value        Reference Range Interpretation Comments

 

             Sodium Lvl (test code = Sodium Lvl) 142          135-145           

        



[facility]iscoJpedhlpWUIZMOOKW5072-09-19 15:25:24





             Test Item    Value        Reference Range Interpretation Comments

 

             Potassium Lvl (test code = Potassium 4.4          3.5-5.1          

         



             Lvl)                                                



[facility]iscoPiobfodCQOJQDZYG6984-29-04 15:25:24





             Test Item    Value        Reference Range Interpretation Comments

 

             Chloride Lvl (test code = Chloride Lvl) 108                  

            



[facility]iscoNsgldtuZHSOCLFAJ5844-74-39 15:25:24





             Test Item    Value        Reference Range Interpretation Comments

 

             CO2 (test code = CO2) 28           24-32                     



[facility]iscoXkibkawOLLIXQQXS2419-30-71 15:25:24





             Test Item    Value        Reference Range Interpretation Comments

 

             Calcium Lvl (test code = Calcium Lvl) 10.0         8.5-10.5        

          



[facility]iscoEotztetBQQRCKNTL7495-62-36 15:25:24





             Test Item    Value        Reference Range Interpretation Comments

 

             AGAP (test code = AGAP) 10.4         10.0-20.0                 



[facility]EeohprpMFEUTBXNX1594-58-48 15:25:24





             Test Item    Value        Reference Range Interpretation Comments

 

             eGFR (test code = eGFR) 81                                     



[facility] CentralMldgkdqYDPADKXEK2459-67-30 15:25:24





             Test Item    Value        Reference Range Interpretation Comments

 

             Lipase Lvl (test code = Lipase Lvl) 123                      

        



[facility] CentralWpycjtzCZTRAWLYT5696-50-57 15:25:24





             Test Item    Value        Reference Range Interpretation Comments

 

             Total Protein (test code = Total 8.0          6.4-8.4              

     



             Protein)                                            



[facility] CentralTdrzdolNBPPBVJGA8459-64-13 15:25:24





             Test Item    Value        Reference Range Interpretation Comments

 

             Albumin Lvl (test code = Albumin Lvl) 3.7          3.5-5.0         

          



[facility] CentralSntuouaXZVCRHLGM0431-19-55 15:25:24





             Test Item    Value        Reference Range Interpretation Comments

 

             Globulin (test code = Globulin) 4.3          2.7-4.2               

    



[facility] CentralJdchzdjNIZYMGNCU5065-64-21 15:25:24





             Test Item    Value        Reference Range Interpretation Comments

 

             A/G Ratio (test code = A/G Ratio) 0.9 1        0.7-1.6             

      



[facility] CentralXpjsdyuGTGTBYJIZ3453-64-44 15:25:24





             Test Item    Value        Reference Range Interpretation Comments

 

             ALANINE AMINOTRANSFERASE 20           See_Comment                [A

utomated message]



             (test code = ALANINE                                        The sys

tem which



             AMINOTRANSFERASE)                                        generated 

this result



                                                                 transmitted ref

erence



                                                                 range: <=65. Th

e



                                                                 reference range

 was



                                                                 not used to int

erpret



                                                                 this result as



                                                                 normal/abnormal

.



[facility] CentralSkyncvpMMRCZVWOI2564-61-56 15:25:24





             Test Item    Value        Reference Range Interpretation Comments

 

             AST (test code = AST) 19           See_Comment                [Auto

mated message] The



                                                                 system which ge

nerated this



                                                                 result transmit

gianfranco



                                                                 reference range

: <=37. The



                                                                 reference range

 was not



                                                                 used to interpr

et this



                                                                 result as xenia

l/abnormal.



[facility] CentralQqekmudSMWLLUCPH6440-66-96 15:25:24





             Test Item    Value        Reference Range Interpretation Comments

 

             Alk Phos (test code = Alk Phos) 123                          

    



[facility] CentralHawjluyMAERXPBBN4722-55-86 15:25:24





             Test Item    Value        Reference Range Interpretation Comments

 

             Bili Total (test code = Bili Total) 0.3          0.2-1.3           

        



[facility] CentralNkdkanaHSZRNBATZ9073-92-51 15:25:24





             Test Item    Value        Reference Range Interpretation Comments

 

             Bili Direct (test code no gt        See_Comment                [Aut

omated message] The



             = Bili Direct)                                        system which 

generated



                                                                 this result tra

nsmitted



                                                                 reference range

: <=0.3.



                                                                 The reference r

jihan was



                                                                 not used to int

erpret this



                                                                 result as xenia

l/abnormal.



[facility] CentralEoerppzEQYZSGICD4905-67-53 15:25:24





             Test Item    Value        Reference Range Interpretation Comments

 

             Bili Indirect Unable to    See_Comment                [Automated



             (test code = Bili Calculate                              message] T

he system



             Indirect)                                           which generated



                                                                 this result



                                                                 transmitted



                                                                 reference range

:



                                                                 <=1.0. The



                                                                 reference range

 was



                                                                 not used to



                                                                 interpret this



                                                                 result as



                                                                 normal/abnormal

.



[facility]AabgvsrLRJRTSOTZF6313[facility]81-30 [facility]:24





             Test Item    Value        Reference Range Interpretation Comments

 

             WBC X 10x3 (test code = WBC X 10x3) 9.4          3.7-10.4          

        



[facility] SouthOgnkwluMWWBKJVNAP1254-37-28 15:25:24





             Test Item    Value        Reference Range Interpretation Comments

 

             RBC X 10x6 (test code = RBC X 10x6) 4.59         4.20-5.40         

        



[facility] SouthPjenaziPFRTXCTEVI7301-58-10 15:25:24





             Test Item    Value        Reference Range Interpretation Comments

 

             Hgb (test code = Hgb) 14.1         12.0-16.0                 



[facility] SouthQelhhtmPBOVHUANGL5141-28-82 15:25:24





             Test Item    Value        Reference Range Interpretation Comments

 

             Hct (test code = Hct) 42.8         36.0-48.0                 



[facility] SouthLahykrpWBZOLGLPYH6146-09-11 15:25:24





             Test Item    Value        Reference Range Interpretation Comments

 

             MCV (test code = MCV) 93.2         80.0-98.0                 



[facility] SouthMhsozmlQMMUTUTJVJ7463-33-14 15:25:24





             Test Item    Value        Reference Range Interpretation Comments

 

             MCH (test code = MCH) 30.8 pg      27.0-31.0                 



[place]313-02-14 15:25:24





             Test Item    Value        Reference Range Interpretation Comments

 

             MCHC (test code = MCHC) 33.0         32.0-36.0                 



[facility] SouthVnovmixEBCLJFXMMR4521-40-27 15:25:24





             Test Item    Value        Reference Range Interpretation Comments

 

             RDW (test code = RDW) 13.0         11.5-14.5                 



[facility] SouthMfewgguDESDNJOKSB0065-95-92 15:25:24





             Test Item    Value        Reference Range Interpretation Comments

 

             Platelet (test code = Platelet) 200          133-450               

    



[place]313-02-14 15:25:24





             Test Item    Value        Reference Range Interpretation Comments

 

             MPV (test code = MPV) 8.6          7.4-10.4                  



[place]313-02-14 15:25:24





             Test Item    Value        Reference Range Interpretation Comments

 

             Segs (test code = Segs) 86.0         45.0-75.0                 



[place] [address]-02-14 15:25:24





             Test Item    Value        Reference Range Interpretation Comments

 

             Lymphocytes (test code = Lymphocytes) 5.5          20.0-40.0       

          



[place] [address]-02-14 15:25:24





             Test Item    Value        Reference Range Interpretation Comments

 

             Monocytes (test code = Monocytes) 7.0          2.0-12.0            

      



[place]313-02-14 15:25:24





             Test Item    Value        Reference Range Interpretation Comments

 

             Eosinophils (test code = 1.0          See_Comment                [A

utomated message] The



             Eosinophils)                                        system which ge

nerated



                                                                 this result tra

nsmitted



                                                                 reference range

: <=4.0.



                                                                 The reference r

jihan was



                                                                 not used to int

erpret



                                                                 this result as



                                                                 normal/abnormal

.



[facility] SouthKrxwiecYLGMGFVPRH5141-58-97 15:25:24





             Test Item    Value        Reference Range Interpretation Comments

 

             Basophils (test code = 0.5          See_Comment                [Aut

omated message] The



             Basophils)                                          system which ge

nerated



                                                                 this result tra

nsmitted



                                                                 reference range

: <=1.0.



                                                                 The reference r

jihan was



                                                                 not used to int

erpret



                                                                 this result as



                                                                 normal/abnormal

.



[facility] SouthCeoxaagOQYDOQUCPJ6549-73-37 15:25:24





             Test Item    Value        Reference Range Interpretation Comments

 

             Neutrophils # (test code = Neutrophils 8.1          1.5-8.1        

           



             #)                                                  



[place] [address]-02-14 15:25:24





             Test Item    Value        Reference Range Interpretation Comments

 

             Lymphocytes # (test code = Lymphocytes 0.5          1.0-5.5        

           



             #)                                                  



[place] [address]-02-14 15:25:24





             Test Item    Value        Reference Range Interpretation Comments

 

             Monocytes # (test code 0.7          See_Comment                [Aut

omated message] The



             = Monocytes #)                                        system which 

generated



                                                                 this result tra

nsmitted



                                                                 reference range

: <=0.8.



                                                                 The reference r

jihan was



                                                                 not used to int

erpret



                                                                 this result as



                                                                 normal/abnormal

.



[facility] SouthXymzgebXXXQPARWQF8789-62-27 15:25:24





             Test Item    Value        Reference Range Interpretation Comments

 

             Eosinophils # (test code 0.1          See_Comment                [A

utomated message] The



             = Eosinophils #)                                        system Locationic

Whatser generated



                                                                 this result tra

nsmitted



                                                                 reference range

: <=0.5.



                                                                 The reference r

jihan was



                                                                 not used to int

erpret



                                                                 this result as



                                                                 normal/abnormal

.



[facility]ZblapzfAWFZTMNOS3632[facility]50-88 15:25:24





             Test Item    Value        Reference Range Interpretation Comments

 

             Glucose Lvl (test code = Glucose Lvl) 93           70-99           

          



[place] 014053-02-14 15:25:24





             Test Item    Value        Reference Range Interpretation Comments

 

             BUN (test code = BUN)                       



[facility]iscoQqynifyUUTFCLNDY9491-60-36 15:25:24





             Test Item    Value        Reference Range Interpretation Comments

 

             Glucose Lvl (test code = Glucose Lvl) 93           -           

          



[facility] CentralSswpxdgYSNYXXWSY7676-91-44 15:25:24





             Test Item    Value        Reference Range Interpretation Comments

 

             BUN (test code = BUN)                       



[facility]iscoFtovrofLGLSECHPM9668-35-57 15:25:24





             Test Item    Value        Reference Range Interpretation Comments

 

             Creatinine Lvl (test code = Creatinine 0.85         0.50-1.40      

           



             Lvl)                                                



[facility]iscoMtzxqdfULXYMMQKR5004-48-37 15:25:24





             Test Item    Value        Reference Range Interpretation Comments

 

             Sodium Lvl (test code = Sodium Lvl) 142          135-145           

        



[facility]iscoLfvubpaMHWSGKPON4436-62-60 15:25:24





             Test Item    Value        Reference Range Interpretation Comments

 

             Potassium Lvl (test code = Potassium 4.4          3.5-5.1          

         



             Lvl)                                                



[facility]iscoSegayjvISBXAVDIZ6596-44-46 15:25:24





             Test Item    Value        Reference Range Interpretation Comments

 

             Chloride Lvl (test code = Chloride Lvl) 108                  

            



[facility]iscoLwipbleIZKYBSXDP5358-03-12 15:25:24





             Test Item    Value        Reference Range Interpretation Comments

 

             CO2 (test code = CO2) -                     



[facility] CentralOsmlldvIWVDUVPEZ2253-90-22 15:25:24





             Test Item    Value        Reference Range Interpretation Comments

 

             Calcium Lvl (test code = Calcium Lvl) 10.0         8.5-10.5        

          



[facility]iscoMrgqxaeUFRJJUUIJ2280-90-50 15:25:24





             Test Item    Value        Reference Range Interpretation Comments

 

             AGAP (test code = AGAP) 10.4         10.0-20.0                 



[facility] CentralZzeuyjeGOFSAVLNM0138-61-50 15:25:24





             Test Item    Value        Reference Range Interpretation Comments

 

             eGFR (test code = eGFR) 81                                     



[facility] CentralCrlahreFDJHUIIWD2641-18-59 15:25:24





             Test Item    Value        Reference Range Interpretation Comments

 

             Creatinine Lvl (test code = Creatinine 0.85         0.50-1.40      

           



             Lvl)                                                



[facility] CentralOiymowiZZDQLCECL1437-97-49 15:25:24





             Test Item    Value        Reference Range Interpretation Comments

 

             Lipase Lvl (test code = Lipase Lvl) 123                      

        



[facility] CentralKtzgcwgMSTGHHVGE7181-14-54 15:25:24





             Test Item    Value        Reference Range Interpretation Comments

 

             Total Protein (test code = Total 8.0          6.4-8.4              

     



             Protein)                                            



[facility] CentralGnoeaqrXUIWXIMLG4808-95-33 15:25:24





             Test Item    Value        Reference Range Interpretation Comments

 

             Albumin Lvl (test code = Albumin Lvl) 3.7          3.5-5.0         

          



[facility] CentralMvzmkfpYJUCTBBJB3535-43-62 15:25:24





             Test Item    Value        Reference Range Interpretation Comments

 

             Globulin (test code = Globulin) 4.3          2.7-4.2               

    



[facility] CentralKsfqwegSNLVUSPKH5069-78-02 15:25:24





             Test Item    Value        Reference Range Interpretation Comments

 

             A/G Ratio (test code = A/G Ratio) 0.9 1        0.7-1.6             

      



[facility]GibkuijGMGJZJCDU9515-30-32 15:25:24





             Test Item    Value        Reference Range Interpretation Comments

 

             ALANINE AMINOTRANSFERASE 20           See_Comment                [A

utomated message]



             (test code = ALANINE                                        The sys

tem which



             AMINOTRANSFERASE)                                        generated 

this result



                                                                 transmitted ref

erence



                                                                 range: <=65. Th

e



                                                                 reference range

 was



                                                                 not used to int

erpret



                                                                 this result as



                                                                 normal/abnormal

.



[facility] CentralCxsviluOFWDVBOSB5898-54-52 15:25:24





             Test Item    Value        Reference Range Interpretation Comments

 

             AST (test code = AST) 19           See_Comment                [Auto

mated message] The



                                                                 system which ge

nerated this



                                                                 result transmit

gianfranco



                                                                 reference range

: <=37. The



                                                                 reference range

 was not



                                                                 used to interpr

et this



                                                                 result as xenia

l/abnormal.



[facility] CentralPmsqkspLTMTFCSMO6950-18-80 15:25:24





             Test Item    Value        Reference Range Interpretation Comments

 

             Alk Phos (test code = Alk Phos) 123                          

    



[facility] CentralKosimqeMKOXFKCQU9733-25-47 15:25:24





             Test Item    Value        Reference Range Interpretation Comments

 

             Bili Total (test code = Bili Total) 0.3          0.2-1.3           

        



[place] 014053-02-14 15:25:24





             Test Item    Value        Reference Range Interpretation Comments

 

             Bili Direct (test code no gt        See_Comment                [Aut

omated message] The



             = Bili Direct)                                        system which 

generated



                                                                 this result tra

nsmitted



                                                                 reference range

: <=0.3.



                                                                 The reference r

jihan was



                                                                 not used to int

erpret this



                                                                 result as xenia

l/abnormal.



[facility] CentralVoywiorNKZDLXLNX9431-52-01 15:25:24





             Test Item    Value        Reference Range Interpretation Comments

 

             Sodium Lvl (test code = Sodium Lvl) 142          135-145           

        



[facility] CentralLhmppttZYOEPCQCA8621-39-05 15:25:24





             Test Item    Value        Reference Range Interpretation Comments

 

             Bili Indirect Unable to    See_Comment                [Automated



             (test code = Bili Calculate                              message] T

he system



             Indirect)                                           which generated



                                                                 this result



                                                                 transmitted



                                                                 reference range

:



                                                                 <=1.0. The



                                                                 reference range

 was



                                                                 not used to



                                                                 interpret this



                                                                 result as



                                                                 normal/abnormal

.



[facility]WadzrqiLDSJTPHURV9888[facility]24





             Test Item    Value        Reference Range Interpretation Comments

 

             WBC X 10x3 (test code = WBC X 10x3) 9.4          3.7-10.4          

        



[place] [id number] 15:25:24





             Test Item    Value        Reference Range Interpretation Comments

 

             RBC X 10x6 (test code = RBC X 10x6) 4.59         4.20-5.40         

        



[facility] SouthWlsbsemSUXHMZHQPO2892-31-41 15:25:24





             Test Item    Value        Reference Range Interpretation Comments

 

             Hgb (test code = Hgb) 14.1         12.0-16.0                 



[place] [address]-02-14 15:25:24





             Test Item    Value        Reference Range Interpretation Comments

 

             Hct (test code = Hct) 42.8         36.0-48.0                 



[place] [address]-02-14 15:25:24





             Test Item    Value        Reference Range Interpretation Comments

 

             MCV (test code = MCV) 93.2         80.0-98.0                 



[place] [address]-02-14 15:25:24





             Test Item    Value        Reference Range Interpretation Comments

 

             MCH (test code = MCH) 30.8 pg      27.0-31.0                 



[place] 605313-02-14 15:25:24





             Test Item    Value        Reference Range Interpretation Comments

 

             MCHC (test code = MCHC) 33.0         32.0-36.0                 



[facility] SouthGamuxdaBEDGJNAKNM0318-95-97 15:25:24





             Test Item    Value        Reference Range Interpretation Comments

 

             RDW (test code = RDW) 13.0         11.5-14.5                 



[facility] SouthXcrcesqAMYURIXHZV3118-51-21 15:25:24





             Test Item    Value        Reference Range Interpretation Comments

 

             Platelet (test code = Platelet) 200          133-450               

    



[facility]qgvzxhUXXSYFSTD2149[facility]12-82 15:25:24





             Test Item    Value        Reference Range Interpretation Comments

 

             Potassium Lvl (test code = Potassium 4.4          3.5-5.1          

         



             Lvl)                                                



[facility] SouthOquvcxnDCMJRUCNBM9949-89-05 15:25:24





             Test Item    Value        Reference Range Interpretation Comments

 

             MPV (test code = MPV) 8.6          7.4-10.4                  



[facility] SouthItbzjzpMARKQKTJDP0458-80-22 15:25:24





             Test Item    Value        Reference Range Interpretation Comments

 

             Segs (test code = Segs) 86.0         45.0-75.0                 



[facility] SouthAaczwkvMUCVQCSALP0742-77-26 15:25:24





             Test Item    Value        Reference Range Interpretation Comments

 

             Lymphocytes (test code = Lymphocytes) 5.5          20.0-40.0       

          



[facility] SouthDebewumFYMCWWBAYM2768-65-67 15:25:24





             Test Item    Value        Reference Range Interpretation Comments

 

             Monocytes (test code = Monocytes) 7.0          2.0-12.0            

      



[facility] SouthGyindqmWVCYHOMGTA8545-01-96 15:25:24





             Test Item    Value        Reference Range Interpretation Comments

 

             Eosinophils (test code = 1.0          See_Comment                [A

utomated message] The



             Eosinophils)                                        system which ge

nerated



                                                                 this result tra

nsmitted



                                                                 reference range

: <=4.0.



                                                                 The reference r

jihan was



                                                                 not used to int

erpret



                                                                 this result as



                                                                 normal/abnormal

.



[facility] SouthFlqwcheYVWRYJWIZQ4318-33-34 15:25:24





             Test Item    Value        Reference Range Interpretation Comments

 

             Basophils (test code = 0.5          See_Comment                [Aut

omated message] The



             Basophils)                                          system which ge

nerated



                                                                 this result tra

nsmitted



                                                                 reference range

: <=1.0.



                                                                 The reference r

jihan was



                                                                 not used to int

erpret



                                                                 this result as



                                                                 normal/abnormal

.



[facility] SouthZfchkfdAJHJJAYKBL8934-29-47 15:25:24





             Test Item    Value        Reference Range Interpretation Comments

 

             Neutrophils # (test code = Neutrophils 8.1          1.5-8.1        

           



             #)                                                  



[facility] CenterPnmzvgvXXSXTPKIXR1815-30-40 15:25:24





             Test Item    Value        Reference Range Interpretation Comments

 

             Lymphocytes # (test code = Lymphocytes 0.5          1.0-5.5        

           



             #)                                                  



[facility] SouthVtqhtaoCQLXHIHIQS0019-09-22 15:25:24





             Test Item    Value        Reference Range Interpretation Comments

 

             Monocytes # (test code 0.7          See_Comment                [Aut

omated message] The



             = Monocytes #)                                        system which 

generated



                                                                 this result tra

nsmitted



                                                                 reference range

: <=0.8.



                                                                 The reference r

jihan was



                                                                 not used to int

erpret



                                                                 this result as



                                                                 normal/abnormal

.



[facility] CenterNxxajlbXQXSJMGTUI3772-94-26 15:25:24





             Test Item    Value        Reference Range Interpretation Comments

 

             Eosinophils # (test code 0.1          See_Comment                [A

utomated message] The



             = Eosinophils #)                                        system whic

h generated



                                                                 this result tra

nsmitted



                                                                 reference range

: <=0.5.



                                                                 The reference r

jihan was



                                                                 not used to int

erpret



                                                                 this result as



                                                                 normal/abnormal

.



[facility]





             Test Item    Value        Reference Range Interpretation Comments

 

             Chloride Lvl (test code = Chloride Lvl) 108                  

            



[facility]iscoVuhknmjRQKVRHLST1072-28-63 15:25:24





             Test Item    Value        Reference Range Interpretation Comments

 

             CO2 (test code = CO2) 28           24-32                     



[facility]iscoTbkirjaTQVJVLWVO5774-99-32 15:25:24





             Test Item    Value        Reference Range Interpretation Comments

 

             Calcium Lvl (test code = Calcium Lvl) 10.0         8.5-10.5        

          



[facility]iscoLnyudtkCNLZRTKKO1565-52-23 15:25:24





             Test Item    Value        Reference Range Interpretation Comments

 

             Glucose Lvl (test code = Glucose Lvl) 93           70-99           

          



[facility]iscoMketbmtTTLWOSUPW5013-77-85 15:25:24





             Test Item    Value        Reference Range Interpretation Comments

 

             BUN (test code = BUN) 19           7-22                      



[facility]iscoTmefosmZVZCBYOJA4336-62-96 15:25:24





             Test Item    Value        Reference Range Interpretation Comments

 

             Creatinine Lvl (test code = Creatinine 0.85         0.50-1.40      

           



             Lvl)                                                



[facility]iscoImienvmTKTADVRMC2874-48-74 15:25:24





             Test Item    Value        Reference Range Interpretation Comments

 

             Sodium Lvl (test code = Sodium Lvl) 142          135-145           

        



[place] [address]14053-02-14 15:25:24





             Test Item    Value        Reference Range Interpretation Comments

 

             Potassium Lvl (test code = Potassium 4.4          3.5-5.1          

         



             Lvl)                                                



[facility] CentralSuymqlaVHJKVHRAO1578-13-02 15:25:24





             Test Item    Value        Reference Range Interpretation Comments

 

             Chloride Lvl (test code = Chloride Lvl) 108                  

            



[place] 014053-02-14 15:25:24





             Test Item    Value        Reference Range Interpretation Comments

 

             CO2 (test code = CO2) 28           24-32                     



[facility] CentralLbgjbwsLMZXASSTD9998-06-17 15:25:24





             Test Item    Value        Reference Range Interpretation Comments

 

             Calcium Lvl (test code = Calcium Lvl) 10.0         8.5-10.5        

          



[facility] CentralAbaxpkjPJDDAWHMG1675-04-07 15:25:24





             Test Item    Value        Reference Range Interpretation Comments

 

             AGAP (test code = AGAP) 10.4         10.0-20.0                 



[facility] CentralJczmekzKXAVMOSFC0646-57-45 15:25:24





             Test Item    Value        Reference Range Interpretation Comments

 

             eGFR (test code = eGFR) 81                                     



[facility] CentralOnksgfaYXIPQWDSN5906-91-25 15:25:24





             Test Item    Value        Reference Range Interpretation Comments

 

             AGAP (test code = AGAP) 10.4         10.0-20.0                 



[facility] CentralBgswyxxOUYNJAGWY7131-64-17 15:25:24





             Test Item    Value        Reference Range Interpretation Comments

 

             Lipase Lvl (test code = Lipase Lvl) 123                      

        



[facility] CentralZurqbslFVZLHMNWO0430-69-88 15:25:24





             Test Item    Value        Reference Range Interpretation Comments

 

             Total Protein (test code = Total 8.0          6.4-8.4              

     



             Protein)                                            



[facility] CentralGdcsypwVMGXMEZFI7379-69-62 15:25:24





             Test Item    Value        Reference Range Interpretation Comments

 

             Albumin Lvl (test code = Albumin Lvl) 3.7          3.5-5.0         

          



[place] [address]-02-14 15:25:24





             Test Item    Value        Reference Range Interpretation Comments

 

             Globulin (test code = Globulin) 4.3          2.7-4.2               

    



[place] 014053-02-14 15:25:24





             Test Item    Value        Reference Range Interpretation Comments

 

             A/G Ratio (test code = A/G Ratio) 0.9 1        0.7-1.6             

      



[place] 014053-02-14 15:25:24





             Test Item    Value        Reference Range Interpretation Comments

 

             ALANINE AMINOTRANSFERASE 20           See_Comment                [A

utomated message]



             (test code = ALANINE                                        The sys

tem which



             AMINOTRANSFERASE)                                        generated 

this result



                                                                 transmitted ref

erence



                                                                 range: <=65. Th

e



                                                                 reference range

 was



                                                                 not used to int

erpret



                                                                 this result as



                                                                 normal/abnormal

.



[facility] CenterFtgerqlNMBRIDOPV8194-53-78 15:25:24





             Test Item    Value        Reference Range Interpretation Comments

 

             AST (test code = AST) 19           See_Comment                [Auto

mated message] The



                                                                 system which ge

nerated this



                                                                 result transmit

gianfranco



                                                                 reference range

: <=37. The



                                                                 reference range

 was not



                                                                 used to interpr

et this



                                                                 result as xenia

l/abnormal.



[facility]iscoRfatgwqUIZDQAXUI8632-06-47 15:25:24





             Test Item    Value        Reference Range Interpretation Comments

 

             Alk Phos (test code = Alk Phos) 123                          

    



[facility] CentralGpjaofhUTSZVXCIY9512-58-32 15:25:24





             Test Item    Value        Reference Range Interpretation Comments

 

             Bili Total (test code = Bili Total) 0.3          0.2-1.3           

        



[facility] CentralJhazziaHJAYEVMXE0004-56-01 15:25:24





             Test Item    Value        Reference Range Interpretation Comments

 

             Bili Direct (test code no gt        See_Comment                [Aut

omated message] The



             = Bili Direct)                                        system which 

generated



                                                                 this result tra

nsmitted



                                                                 reference range

: <=0.3.



                                                                 The reference r

jihan was



                                                                 not used to int

erpret this



                                                                 result as xenia

l/abnormal.



[facility] CenterQqtwureVHJGLKHHQ0142-46-66 15:25:24





             Test Item    Value        Reference Range Interpretation Comments

 

             eGFR (test code = eGFR) 81                                     



[facility] CentralGslymbkIXKJAUWVQ5653-66-43 15:25:24





             Test Item    Value        Reference Range Interpretation Comments

 

             Bili Indirect Unable to    See_Comment                [Automated



             (test code = Bili Calculate                              message] T

he system



             Indirect)                                           which generated



                                                                 this result



                                                                 transmitted



                                                                 reference range

:



                                                                 <=1.0. The



                                                                 reference range

 was



                                                                 not used to



                                                                 interpret this



                                                                 result as



                                                                 normal/abnormal

.



[facility] CenterEsxpkquNELRYMDCBQ7348-77-84 15:25:24





             Test Item    Value        Reference Range Interpretation Comments

 

             WBC X 10x3 (test code = WBC X 10x3) 9.4          3.7-10.4          

        



[facility] CenterZdwvjxpMQTAWUACOL7487-21-06 15:25:24





             Test Item    Value        Reference Range Interpretation Comments

 

             RBC X 10x6 (test code = RBC X 10x6) 4.59         4.20-5.40         

        



[facility] CenterFhxtfhcZQSYIHUDGS9320-28-36 15:25:24





             Test Item    Value        Reference Range Interpretation Comments

 

             Hgb (test code = Hgb) 14.1         12.0-16.0                 



[facility] CenterNhkgghbTFFPPUCMXQ8462-91-92 15:25:24





             Test Item    Value        Reference Range Interpretation Comments

 

             Hct (test code = Hct) 42.8         36.0-48.0                 



[facility]JjkkongQJUOQAGCNG7357-16-15 15:25:24





             Test Item    Value        Reference Range Interpretation Comments

 

             MCV (test code = MCV) 93.2         80.0-98.0                 



[facility]iscoRejcjcnQVNUVGODBW6486-53-15 15:25:24





             Test Item    Value        Reference Range Interpretation Comments

 

             MCH (test code = MCH) 30.8 pg      27.0-31.0                 



[facility] CenterEesjklnBDDSOEGBOE6318-97-26 15:25:24





             Test Item    Value        Reference Range Interpretation Comments

 

             MCHC (test code = MCHC) 33.0         32.0-36.0                 



[facility] GladwinFhbhbttSLSNDAJOLP6736-19-22 15:25:24





             Test Item    Value        Reference Range Interpretation Comments

 

             RDW (test code = RDW) 13.0         11.5-14.5                 



[facility] CenterLobswizDWGUSVZMCC7923-40-76 15:25:24





             Test Item    Value        Reference Range Interpretation Comments

 

             Platelet (test code = Platelet) 200          133-450               

    



[facility] CenterUdhhkmyDQUIULUZK2530-82-25 15:25:24





             Test Item    Value        Reference Range Interpretation Comments

 

             Lipase Lvl (test code = Lipase Lvl) 123                      

        



[facility] GladwinGtawfalUOQPBKOTAN5486-11-53 15:25:24





             Test Item    Value        Reference Range Interpretation Comments

 

             MPV (test code = MPV) 8.6          7.4-10.4                  



[facility] CenterJqdwuoxPTODLXGRMZ5641-54-94 15:25:24





             Test Item    Value        Reference Range Interpretation Comments

 

             Segs (test code = Segs) 86.0         45.0-75.0                 



[facility] CenterPhbyibuCYHHNLIXQY1254-37-99 15:25:24





             Test Item    Value        Reference Range Interpretation Comments

 

             Lymphocytes (test code = Lymphocytes) 5.5          20.0-40.0       

          



[facility]iscoRsutngvIWANTPDTUA7048-17-56 15:25:24





             Test Item    Value        Reference Range Interpretation Comments

 

             Monocytes (test code = Monocytes) 7.0          2.0-12.0            

      



[facility] CenterArtfegjYRSTRPTOSC3634-18-78 15:25:24





             Test Item    Value        Reference Range Interpretation Comments

 

             Eosinophils (test code = 1.0          See_Comment                [A

utomated message] The



             Eosinophils)                                        system which ge

nerated



                                                                 this result tra

nsmitted



                                                                 reference range

: <=4.0.



                                                                 The reference r

jihan was



                                                                 not used to int

erpret



                                                                 this result as



                                                                 normal/abnormal

.



[facility]LLHXALC5749[facility]25:24





             Test Item    Value        Reference Range Interpretation Comments

 

             Basophils (test code = 0.5          See_Comment                [Aut

omated message] The



             Basophils)                                          system which ge

nerated



                                                                 this result tra

nsmitted



                                                                 reference range

: <=1.0.



                                                                 The reference r

jihan was



                                                                 not used to int

erpret



                                                                 this result as



                                                                 normal/abnormal

.



[facility]BzuokucXYRVQZRYIZ6425[facility]98-91 15[facility]25:24





             Test Item    Value        Reference Range Interpretation Comments

 

             Neutrophils # (test code = Neutrophils 8.1          1.5-8.1        

           



             #)                                                  



[facility]OhdzuxjMTDLGOLPOT6362-53-74 15:25:24





             Test Item    Value        Reference Range Interpretation Comments

 

             Lymphocytes # (test code = Lymphocytes 0.5          1.0-5.5        

           



             #)                                                  



[facility] SouthZlopixpZLKJHUFIXV3997-18-82 15:25:24





             Test Item    Value        Reference Range Interpretation Comments

 

             Monocytes # (test code 0.7          See_Comment                [Aut

omated message] The



             = Monocytes #)                                        system which 

generated



                                                                 this result tra

nsmitted



                                                                 reference range

: <=0.8.



                                                                 The reference r

jihan was



                                                                 not used to int

erpret



                                                                 this result as



                                                                 normal/abnormal

.



[facility] SouthJjejcukWYRRUYYVBJ0540-98-47 15:25:24





             Test Item    Value        Reference Range Interpretation Comments

 

             Eosinophils # (test code 0.1          See_Comment                [A

utomated message] The



             = Eosinophils #)                                        system whic

h generated



                                                                 this result tra

nsmitted



                                                                 reference range

: <=0.5.



                                                                 The reference r

jihan was



                                                                 not used to int

erpret



                                                                 this result as



                                                                 normal/abnormal

.



[facility]BiyntuaQQQFIQHTS1581-68-36 [facility]:24





             Test Item    Value        Reference Range Interpretation Comments

 

             Total Protein (test code = Total 8.0          6.4-8.4              

     



             Protein)                                            



[place] 014053-02-14 15:25:24





             Test Item    Value        Reference Range Interpretation Comments

 

             Albumin Lvl (test code = Albumin Lvl) 3.7          3.5-5.0         

          



[place] 014053-02-14 15:25:24





             Test Item    Value        Reference Range Interpretation Comments

 

             Globulin (test code = Globulin) 4.3          2.7-4.2               

    



[facility] CentralArqzkciLPXNTDGYZ8326-26-05 15:25:24





             Test Item    Value        Reference Range Interpretation Comments

 

             Glucose Lvl (test code = Glucose Lvl) 93           70-99           

          



[facility] CentralWhcdpbfKNOVYZIZV5917-54-56 15:25:24





             Test Item    Value        Reference Range Interpretation Comments

 

             BUN (test code = BUN) 19           7-22                      



[facility] CentralXmdqqhdHXMTJHSIU7683-69-74 15:25:24





             Test Item    Value        Reference Range Interpretation Comments

 

             Creatinine Lvl (test code = Creatinine 0.85         0.50-1.40      

           



             Lvl)                                                



[facility] CentralVvxykpuBZANAZXJQ1857-41-80 15:25:24





             Test Item    Value        Reference Range Interpretation Comments

 

             Sodium Lvl (test code = Sodium Lvl) 142          135-145           

        



[facility] CentralIaeiljnQVWXMIFAC3177-80-74 15:25:24





             Test Item    Value        Reference Range Interpretation Comments

 

             Potassium Lvl (test code = Potassium 4.4          3.5-5.1          

         



             Lvl)                                                



[facility] CentralCmxcypyVRAVQNFGH5494-82-88 15:25:24





             Test Item    Value        Reference Range Interpretation Comments

 

             Chloride Lvl (test code = Chloride Lvl) 108                  

            



[facility] CentralKnfcbuhVJHBPQEDO3015-01-31 15:25:24





             Test Item    Value        Reference Range Interpretation Comments

 

             CO2 (test code = CO2) 28           24-32                     



[facility] CentralGvvxuzhNMOIAZIWX8608-07-42 15:25:24





             Test Item    Value        Reference Range Interpretation Comments

 

             Calcium Lvl (test code = Calcium Lvl) 10.0         8.5-10.5        

          



[facility] CentralQyxkoflMPXZKDLIH7985-58-91 15:25:24





             Test Item    Value        Reference Range Interpretation Comments

 

             AGAP (test code = AGAP) 10.4         10.0-20.0                 



[facility] CentralClwbvofQGPRJUTPX0779-77-31 15:25:24





             Test Item    Value        Reference Range Interpretation Comments

 

             eGFR (test code = eGFR) 81                                     



[facility] CentralLvrsswhRZYQKOTVQ9347-95-59 15:25:24





             Test Item    Value        Reference Range Interpretation Comments

 

             A/G Ratio (test code = A/G Ratio) 0.9 1        0.7-1.6             

      



[facility] CentralGxizyqfLXNUAAWCL6412-52-31 15:25:24





             Test Item    Value        Reference Range Interpretation Comments

 

             Lipase Lvl (test code = Lipase Lvl) 123                      

        



[place] [address]-02-14 15:25:24





             Test Item    Value        Reference Range Interpretation Comments

 

             Total Protein (test code = Total 8.0          6.4-8.4              

     



             Protein)                                            



[facility] CentralJzgfgniCMQXJLIZL5095-94-61 15:25:24





             Test Item    Value        Reference Range Interpretation Comments

 

             Albumin Lvl (test code = Albumin Lvl) 3.7          3.5-5.0         

          



[facility] CentralJihnoduXEJXEOTGZ3878-57-86 15:25:24





             Test Item    Value        Reference Range Interpretation Comments

 

             Globulin (test code = Globulin) 4.3          2.7-4.2               

    



[place] [address]-02-14 15:25:24





             Test Item    Value        Reference Range Interpretation Comments

 

             A/G Ratio (test code = A/G Ratio) 0.9 1        0.7-1.6             

      



[facility] CentralVcjalvaRNHLLIFDU2624-25-67 15:25:24





             Test Item    Value        Reference Range Interpretation Comments

 

             ALANINE AMINOTRANSFERASE 20           See_Comment                [A

utomated message]



             (test code = ALANINE                                        The sys

tem which



             AMINOTRANSFERASE)                                        generated 

this result



                                                                 transmitted ref

erence



                                                                 range: <=65. Th

e



                                                                 reference range

 was



                                                                 not used to int

erpret



                                                                 this result as



                                                                 normal/abnormal

.



[facility] CentralTtctlqpAWRMVFZGI8364-00-23 15:25:24





             Test Item    Value        Reference Range Interpretation Comments

 

             AST (test code = AST) 19           See_Comment                [Auto

mated message] The



                                                                 system which ge

nerated this



                                                                 result transmit

gianfranco



                                                                 reference range

: <=37. The



                                                                 reference range

 was not



                                                                 used to interpr

et this



                                                                 result as xenia

l/abnormal.



[facility]gswoxlXWZFXGTGB6313[facility]24





             Test Item    Value        Reference Range Interpretation Comments

 

             Alk Phos (test code = Alk Phos) 123                          

    



[facility] CentralQzerxelZJCWHUKDD9072-61-72 15:25:24





             Test Item    Value        Reference Range Interpretation Comments

 

             Bili Total (test code = Bili Total) 0.3          0.2-1.3           

        



[facility] CentralAyjmqluIPREFZVXS3027-65-55 15:25:24





             Test Item    Value        Reference Range Interpretation Comments

 

             Bili Direct (test code no gt        See_Comment                [Aut

omated message] The



             = Bili Direct)                                        system which 

generated



                                                                 this result tra

nsmitted



                                                                 reference range

: <=0.3.



                                                                 The reference r

jihan was



                                                                 not used to int

erpret this



                                                                 result as xenia

l/abnormal.



[facility]iscoDhnnzaqXTTWCZBQM3519-26-57 15:25:24





             Test Item    Value        Reference Range Interpretation Comments

 

             ALANINE AMINOTRANSFERASE 20           See_Comment                [A

utomated message]



             (test code = ALANINE                                        The sys

tem which



             AMINOTRANSFERASE)                                        generated 

this result



                                                                 transmitted ref

erence



                                                                 range: <=65. Th

e



                                                                 reference range

 was



                                                                 not used to int

erpret



                                                                 this result as



                                                                 normal/abnormal

.



[facility] CentralLnungaxDFNZSAVHI6894-97-41 15:25:24





             Test Item    Value        Reference Range Interpretation Comments

 

             Bili Indirect Unable to    See_Comment                [Automated



             (test code = Bili Calculate                              message] T

he system



             Indirect)                                           which generated



                                                                 this result



                                                                 transmitted



                                                                 reference range

:



                                                                 <=1.0. The



                                                                 reference range

 was



                                                                 not used to



                                                                 interpret this



                                                                 result as



                                                                 normal/abnormal

.



[facility]BO[facility]WSG[facility]676[facility]





             Test Item    Value        Reference Range Interpretation Comments

 

             WBC X 10x3 (test code = WBC X 10x3) 9.4          3.7-10.4          

        



[facility] SouthXziwzqwPYNOVJURWZ6484-57-77 15:25:24





             Test Item    Value        Reference Range Interpretation Comments

 

             RBC X 10x6 (test code = RBC X 10x6) 4.59         4.20-5.40         

        



[facility]winPbqfqwoOPGYIZHZSX9663-71-96 15:25:24





             Test Item    Value        Reference Range Interpretation Comments

 

             Hgb (test code = Hgb) 14.1         12.0-16.0                 



[facility] GladwinRappnasPCAVDWZOMR3279-18-22 15:25:24





             Test Item    Value        Reference Range Interpretation Comments

 

             Hct (test code = Hct) 42.8         36.0-48.0                 



[facility] GladwinHlpgpnxNSZUTOWZSF7360-72-37 15:25:24





             Test Item    Value        Reference Range Interpretation Comments

 

             MCV (test code = MCV) 93.2         80.0-98.0                 



[facility] CenterMbjqgzjTEEQRPRWTF3728-32-39 15:25:24





             Test Item    Value        Reference Range Interpretation Comments

 

             MCH (test code = MCH) 30.8 pg      27.0-31.0                 



[facility] GladwinMlnnnfzVFRXOGHAYN6011-38-22 15:25:24





             Test Item    Value        Reference Range Interpretation Comments

 

             MCHC (test code = MCHC) 33.0         32.0-36.0                 



[facility] GladwinDyujgfjMSZQEFQFBK1448-27-18 15:25:24





             Test Item    Value        Reference Range Interpretation Comments

 

             RDW (test code = RDW) 13.0         11.5-14.5                 



[facility] GladwinDmrcuxlCZSTRUFJPY2417-98-40 15:25:24





             Test Item    Value        Reference Range Interpretation Comments

 

             Platelet (test code = Platelet) 200          133-450               

    



[place] [address]14053-02-14 15:25:24





             Test Item    Value        Reference Range Interpretation Comments

 

             AST (test code = AST) 19           See_Comment                [Auto

mated message] The



                                                                 system which ge

nerated this



                                                                 result transmit

gianfranco



                                                                 reference range

: <=37. The



                                                                 reference range

 was not



                                                                 used to interpr

et this



                                                                 result as xenia

l/abnormal.



[place] [address]313-02-14 15:25:24





             Test Item    Value        Reference Range Interpretation Comments

 

             MPV (test code = MPV) 8.6          7.4-10.4                  



[place] [address]-02-14 15:25:24





             Test Item    Value        Reference Range Interpretation Comments

 

             Segs (test code = Segs) 86.0         45.0-75.0                 



[place] [address]-02-14 15:25:24





             Test Item    Value        Reference Range Interpretation Comments

 

             Lymphocytes (test code = Lymphocytes) 5.5          20.0-40.0       

          



[place] [address]-02-14 15:25:24





             Test Item    Value        Reference Range Interpretation Comments

 

             Monocytes (test code = Monocytes) 7.0          2.0-12.0            

      



[place] [address]-02-14 15:25:24





             Test Item    Value        Reference Range Interpretation Comments

 

             Eosinophils (test code = 1.0          See_Comment                [A

utomated message] The



             Eosinophils)                                        system which ge

nerated



                                                                 this result tra

nsmitted



                                                                 reference range

: <=4.0.



                                                                 The reference r

jihan was



                                                                 not used to int

erpret



                                                                 this result as



                                                                 normal/abnormal

.



[facility]UxcgmljWACZXVRVUV2910[facility]72-03 [facility]:24





             Test Item    Value        Reference Range Interpretation Comments

 

             Basophils (test code = 0.5          See_Comment                [Aut

omated message] The



             Basophils)                                          system which ge

nerated



                                                                 this result tra

nsmitted



                                                                 reference range

: <=1.0.



                                                                 The reference r

jihan was



                                                                 not used to int

erpret



                                                                 this result as



                                                                 normal/abnormal

.



[facility] SouthRajrsuyZKRRHCHTKL5808-40-05 15:25:24





             Test Item    Value        Reference Range Interpretation Comments

 

             Neutrophils # (test code = Neutrophils 8.1          1.5-8.1        

           



             #)                                                  



[place] [address]-02-14 15:25:24





             Test Item    Value        Reference Range Interpretation Comments

 

             Lymphocytes # (test code = Lymphocytes 0.5          1.0-5.5        

           



             #)                                                  



[facility] SgiitxsKVXZSFPQNN2733-93-04 15:25:24





             Test Item    Value        Reference Range Interpretation Comments

 

             Monocytes # (test code 0.7          See_Comment                [Aut

omated message] The



             = Monocytes #)                                        system which 

generated



                                                                 this result tra

nsmitted



                                                                 reference range

: <=0.8.



                                                                 The reference r

jihan was



                                                                 not used to int

erpret



                                                                 this result as



                                                                 normal/abnormal

.



[facility]SbolglvDOVXGMNZUD9244-66-48 15:25:24





             Test Item    Value        Reference Range Interpretation Comments

 

             Eosinophils # (test code 0.1          See_Comment                [A

utomated message] The



             = Eosinophils #)                                        system whic

h generated



                                                                 this result tra

nsmitted



                                                                 reference range

: <=0.5.



                                                                 The reference r

jihan was



                                                                 not used to int

erpret



                                                                 this result as



                                                                 normal/abnormal

.



[facility]iscoXwjtpgnAICPNIWHL4109-15-61 15:25:24





             Test Item    Value        Reference Range Interpretation Comments

 

             Alk Phos (test code = Alk Phos) 123                          

    



[facility]iscoFcqcabvYNDGCCEGT2079-35-92 15:25:24





             Test Item    Value        Reference Range Interpretation Comments

 

             Bili Total (test code = Bili Total) 0.3          0.2-1.3           

        



[facility]iscoMemvhuxAOVMDSRTA9226-93-61 15:25:24





             Test Item    Value        Reference Range Interpretation Comments

 

             Bili Direct (test code no gt        See_Comment                [Aut

omated message] The



             = Bili Direct)                                        system which 

generated



                                                                 this result tra

nsmitted



                                                                 reference range

: <=0.3.



                                                                 The reference r

jihan was



                                                                 not used to int

erpret this



                                                                 result as xenia

l/abnormal.



[facility]iscoPnnybxqUUVSEWLOV4641-04-65 15:25:24





             Test Item    Value        Reference Range Interpretation Comments

 

             Glucose Lvl (test code = Glucose Lvl) 93           70-99           

          



[facility]iscoWmgcufvNKXBQBHBV5568-05-79 15:25:24





             Test Item    Value        Reference Range Interpretation Comments

 

             BUN (test code = BUN) 19           7-22                      



[facility]iscoCipvfnqFXODKPZMR7575-43-22 15:25:24





             Test Item    Value        Reference Range Interpretation Comments

 

             Creatinine Lvl (test code = Creatinine 0.85         0.50-1.40      

           



             Lvl)                                                



[facility]iscoYtytewnIFDTPMPOD7660-84-99 15:25:24





             Test Item    Value        Reference Range Interpretation Comments

 

             Sodium Lvl (test code = Sodium Lvl) 142          135-145           

        



[facility]iscoNuixvphYFUGHOTHW6774-65-53 15:25:24





             Test Item    Value        Reference Range Interpretation Comments

 

             Potassium Lvl (test code = Potassium 4.4          3.5-5.1          

         



             Lvl)                                                



[facility]HljrludNKUEKWAOR9030-47-98 15:25:24





             Test Item    Value        Reference Range Interpretation Comments

 

             Chloride Lvl (test code = Chloride Lvl) 108                  

            



[facility] CentralRpwxxljRDBBGNQNJ0605-09-87 15:25:24





             Test Item    Value        Reference Range Interpretation Comments

 

             CO2 (test code = CO2) 28           24-32                     



[facility] CentralCkhsweaYFFAYLULY8999-21-84 15:25:24





             Test Item    Value        Reference Range Interpretation Comments

 

             Calcium Lvl (test code = Calcium Lvl) 10.0         8.5-10.5        

          



[facility] CentralNvztfrcOILYIYSBN4547-24-90 15:25:24





             Test Item    Value        Reference Range Interpretation Comments

 

             AGAP (test code = AGAP) 10.4         10.0-20.0                 



[facility] CentralGdizsneKWLDSMOGV2145-26-20 15:25:24





             Test Item    Value        Reference Range Interpretation Comments

 

             eGFR (test code = eGFR) 81                                     



[facility] CentralCnqlhaaIIRSDEUWG3170-38-56 15:25:24





             Test Item    Value        Reference Range Interpretation Comments

 

             Bili Indirect Unable to    See_Comment                [Automated



             (test code = Bili Calculate                              message] T

he system



             Indirect)                                           which generated



                                                                 this result



                                                                 transmitted



                                                                 reference range

:



                                                                 <=1.0. The



                                                                 reference range

 was



                                                                 not used to



                                                                 interpret this



                                                                 result as



                                                                 normal/abnormal

.



[facility] CentralMrmiypxJWDSMCQSK6894-26-21 15:25:24





             Test Item    Value        Reference Range Interpretation Comments

 

             Lipase Lvl (test code = Lipase Lvl) 123                      

        



[facility] CentralWjgkwoaLHNOYJFNK8586-12-57 15:25:24





             Test Item    Value        Reference Range Interpretation Comments

 

             Total Protein (test code = Total 8.0          6.4-8.4              

     



             Protein)                                            



[facility] CentralCmoczgiCUJQTXVMC3527-13-43 15:25:24





             Test Item    Value        Reference Range Interpretation Comments

 

             Albumin Lvl (test code = Albumin Lvl) 3.7          3.5-5.0         

          



[facility] CentralMcoeqtpDPOGTQGOD5348-52-17 15:25:24





             Test Item    Value        Reference Range Interpretation Comments

 

             Globulin (test code = Globulin) 4.3          2.7-4.2               

    



[facility] CentralVdgestzNJUTTWEWT5120-00-53 15:25:24





             Test Item    Value        Reference Range Interpretation Comments

 

             A/G Ratio (test code = A/G Ratio) 0.9 1        0.7-1.6             

      



[facility] UpbcocjKOMISCADF7293-14-78 15:25:24





             Test Item    Value        Reference Range Interpretation Comments

 

             ALANINE AMINOTRANSFERASE 20           See_Comment                [A

utomated message]



             (test code = ALANINE                                        The sys

tem which



             AMINOTRANSFERASE)                                        generated 

this result



                                                                 transmitted ref

erence



                                                                 range: <=65. Th

e



                                                                 reference range

 was



                                                                 not used to int

erpret



                                                                 this result as



                                                                 normal/abnormal

.



[facility]NB[facility]20[facility]





             Test Item    Value        Reference Range Interpretation Comments

 

             AST (test code = AST) 19           See_Comment                [Auto

mated message] The



                                                                 system which ge

nerated this



                                                                 result transmit

gianfranco



                                                                 reference range

: <=37. The



                                                                 reference range

 was not



                                                                 used to interpr

et this



                                                                 result as xenia

l/abnormal.



[facility]iscoDdvxnqrHSECIUBKH9281-26-45 15:25:24





             Test Item    Value        Reference Range Interpretation Comments

 

             Alk Phos (test code = Alk Phos) 123                          

    



[facility]iscoRcjxfljNGDCETVEP1620-09-33 15:25:24





             Test Item    Value        Reference Range Interpretation Comments

 

             Bili Total (test code = Bili Total) 0.3          0.2-1.3           

        



[facility]iscoBgtbmdyCRUAFLDGT4293-76-05 15:25:24





             Test Item    Value        Reference Range Interpretation Comments

 

             Bili Direct (test code no gt        See_Comment                [Aut

omated message] The



             = Bili Direct)                                        system which 

generated



                                                                 this result tra

nsmitted



                                                                 reference range

: <=0.3.



                                                                 The reference r

jihan was



                                                                 not used to int

erpret this



                                                                 result as xenia

l/abnormal.



[facility]iscoFqxyorsMKTPIBOLPX7865-86-86 15:25:24





             Test Item    Value        Reference Range Interpretation Comments

 

             WBC X 10x3 (test code = WBC X 10x3) 9.4          3.7-10.4          

        



[facility]iscoZzvcivjHTNIPXMJU0751-19-34 15:25:24





             Test Item    Value        Reference Range Interpretation Comments

 

             Bili Indirect Unable to    See_Comment                [Automated



             (test code = Bili Calculate                              message] T

he system



             Indirect)                                           which generated



                                                                 this result



                                                                 transmitted



                                                                 reference range

:



                                                                 <=1.0. The



                                                                 reference range

 was



                                                                 not used to



                                                                 interpret this



                                                                 result as



                                                                 normal/abnormal

.



[facility]iscoUlflwtvAVIVCVJQVA0890-58-47 15:25:24





             Test Item    Value        Reference Range Interpretation Comments

 

             WBC X 10x3 (test code = WBC X 10x3) 9.4          3.7-10.4          

        



[facility]PlqrfvdPZDQSCTRQJ2855-91-64 [facility]:24





             Test Item    Value        Reference Range Interpretation Comments

 

             RBC X 10x6 (test code = RBC X 10x6) 4.59         4.20-5.40         

        



[facility] SouthVzrretyRHQEKHBSPW6173-86-44 15:25:24





             Test Item    Value        Reference Range Interpretation Comments

 

             Hgb (test code = Hgb) 14.1         12.0-16.0                 



[facility] SouthIwpdgixBRTDZCHXNR9493-04-91 15:25:24





             Test Item    Value        Reference Range Interpretation Comments

 

             Hct (test code = Hct) 42.8         36.0-48.0                 



[facility] SouthHmwaevgPRMJMHZSNV8485-66-89 15:25:24





             Test Item    Value        Reference Range Interpretation Comments

 

             MCV (test code = MCV) 93.2         80.0-98.0                 



[facility] SouthFbmkpudPDXZGDSKTP9587-89-08 15:25:24





             Test Item    Value        Reference Range Interpretation Comments

 

             MCH (test code = MCH) 30.8 pg      27.0-31.0                 



[facility] SouthAbuoxqqXNLECZRSME1864-18-32 15:25:24





             Test Item    Value        Reference Range Interpretation Comments

 

             MCHC (test code = MCHC) 33.0         32.0-36.0                 



[facility] SouthMfvagauEDJCODTFPC4626-83-27 15:25:24





             Test Item    Value        Reference Range Interpretation Comments

 

             RDW (test code = RDW) 13.0         11.5-14.5                 



[facility] SouthPzptjmdBYGQVTULAD5183-55-92 15:25:24





             Test Item    Value        Reference Range Interpretation Comments

 

             Platelet (test code = Platelet) 200          133-450               

    



[facility] SouthToffzjhACETPWKLWT2028-82-20 15:25:24





             Test Item    Value        Reference Range Interpretation Comments

 

             RBC X 10x6 (test code = RBC X 10x6) 4.59         4.20-5.40         

        



[facility] SouthAgjopuzBBHETIRUOH3879-07-77 15:25:24





             Test Item    Value        Reference Range Interpretation Comments

 

             MPV (test code = MPV) 8.6          7.4-10.4                  



[facility] SouthBixjqipZVGARIEWEI5381-90-28 15:25:24





             Test Item    Value        Reference Range Interpretation Comments

 

             Segs (test code = Segs) 86.0         45.0-75.0                 



[facility] SouthVbitdzeOIQSMWTWAE6371-21-56 15:25:24





             Test Item    Value        Reference Range Interpretation Comments

 

             Lymphocytes (test code = Lymphocytes) 5.5          20.0-40.0       

          



[place] [address]-02-14 15:25:24





             Test Item    Value        Reference Range Interpretation Comments

 

             Monocytes (test code = Monocytes) 7.0          2.0-12.0            

      



[place] [address]313-02-14 15:25:24





             Test Item    Value        Reference Range Interpretation Comments

 

             Eosinophils (test code = 1.0          See_Comment                [A

utomated message] The



             Eosinophils)                                        system which ge

nerated



                                                                 this result tra

nsmitted



                                                                 reference range

: <=4.0.



                                                                 The reference r

jihan was



                                                                 not used to int

erpret



                                                                 this result as



                                                                 normal/abnormal

.



[place] [address]313-02-14 15:25:24





             Test Item    Value        Reference Range Interpretation Comments

 

             Basophils (test code = 0.5          See_Comment                [Aut

omated message] The



             Basophils)                                          system which ge

nerated



                                                                 this result tra

nsmitted



                                                                 reference range

: <=1.0.



                                                                 The reference r

jihan was



                                                                 not used to int

erpret



                                                                 this result as



                                                                 normal/abnormal

.



[facility] SouthFokauugJSTRKEJSHD2999-94-74 15:25:24





             Test Item    Value        Reference Range Interpretation Comments

 

             Neutrophils # (test code = Neutrophils 8.1          1.5-8.1        

           



             #)                                                  



[facility] SouthQlfwjfnLUYCXMARJL4150-29-34 15:25:24





             Test Item    Value        Reference Range Interpretation Comments

 

             Lymphocytes # (test code = Lymphocytes 0.5          1.0-5.5        

           



             #)                                                  



[facility] SouthWwtzbcaHHHILXBIKB0941-40-07 15:25:24





             Test Item    Value        Reference Range Interpretation Comments

 

             Monocytes # (test code 0.7          See_Comment                [Aut

omated message] The



             = Monocytes #)                                        system which 

generated



                                                                 this result tra

nsmitted



                                                                 reference range

: <=0.8.



                                                                 The reference r

jihan was



                                                                 not used to int

erpret



                                                                 this result as



                                                                 normal/abnormal

.



[place] [address]313-02-14 15:25:24





             Test Item    Value        Reference Range Interpretation Comments

 

             Eosinophils # (test code 0.1          See_Comment                [A

utomated message] The



             = Eosinophils #)                                        system whic

h generated



                                                                 this result tra

nsmitted



                                                                 reference range

: <=0.5.



                                                                 The reference r

jihan was



                                                                 not used to int

erpret



                                                                 this result as



                                                                 normal/abnormal

.



[place] [address]313-02-14 15:25:24





             Test Item    Value        Reference Range Interpretation Comments

 

             Hgb (test code = Hgb) 14.1         12.0-16.0                 



[place] [address]313-02-14 15:25:24





             Test Item    Value        Reference Range Interpretation Comments

 

             Hct (test code = Hct) 42.8         36.0-48.0                 



[place] [address]-02-14 15:25:24





             Test Item    Value        Reference Range Interpretation Comments

 

             MCV (test code = MCV) 93.2         80.0-98.0                 



[place] [address]-02-14 15:25:24





             Test Item    Value        Reference Range Interpretation Comments

 

             MCH (test code = MCH) 30.8 pg      27.0-31.0                 



[place] [address]-02-14 15:25:24





             Test Item    Value        Reference Range Interpretation Comments

 

             MCHC (test code = MCHC) 33.0         32.0-36.0                 



[place] [address]313-02-14 15:25:24





             Test Item    Value        Reference Range Interpretation Comments

 

             RDW (test code = RDW) 13.0         11.5-14.5                 



[facility] SouthSzkzglaQWOXWQSCUM6404-27-59 15:25:24





             Test Item    Value        Reference Range Interpretation Comments

 

             Platelet (test code = Platelet) 200          133-450               

    



[facility] SouthPxwoffwVAWOOUASGS3859-58-24 15:25:24





             Test Item    Value        Reference Range Interpretation Comments

 

             MPV (test code = MPV) 8.6          7.4-10.4                  



[facility] SouthCigcuwxNWGETYDXVR8395-71-41 15:25:24





             Test Item    Value        Reference Range Interpretation Comments

 

             Segs (test code = Segs) 86.0         45.0-75.0                 



[facility] SouthAjjficuPZJVZWUGSV1668-85-68 15:25:24





             Test Item    Value        Reference Range Interpretation Comments

 

             Lymphocytes (test code = Lymphocytes) 5.5          20.0-40.0       

          



[place] [address]-02-14 15:25:24





             Test Item    Value        Reference Range Interpretation Comments

 

             Monocytes (test code = Monocytes) 7.0          2.0-12.0            

      



[place] [address]-02-14 15:25:24





             Test Item    Value        Reference Range Interpretation Comments

 

             Eosinophils (test code = 1.0          See_Comment                [A

utomated message] The



             Eosinophils)                                        system which ge

nerated



                                                                 this result tra

nsmitted



                                                                 reference range

: <=4.0.



                                                                 The reference r

jihan was



                                                                 not used to int

erpret



                                                                 this result as



                                                                 normal/abnormal

.



[place] [address]313-02-14 15:25:24





             Test Item    Value        Reference Range Interpretation Comments

 

             Basophils (test code = 0.5          See_Comment                [Aut

omated message] The



             Basophils)                                          system which ge

nerated



                                                                 this result tra

nsmitted



                                                                 reference range

: <=1.0.



                                                                 The reference r

jihan was



                                                                 not used to int

erpret



                                                                 this result as



                                                                 normal/abnormal

.



[facility] SouthZognogpRVSLKSCLWZ0810-10-41 15:25:24





             Test Item    Value        Reference Range Interpretation Comments

 

             Neutrophils # (test code = Neutrophils 8.1          1.5-8.1        

           



             #)                                                  



[facility] SouthTbptbuqARLYYQAFVK4083-01-79 15:25:24





             Test Item    Value        Reference Range Interpretation Comments

 

             Lymphocytes # (test code = Lymphocytes 0.5          1.0-5.5        

           



             #)                                                  



[facility] SouthXhlafzdLYGZHDDFVL2610-73-52 15:25:24





             Test Item    Value        Reference Range Interpretation Comments

 

             Monocytes # (test code 0.7          See_Comment                [Aut

omated message] The



             = Monocytes #)                                        system which 

generated



                                                                 this result tra

nsmitted



                                                                 reference range

: <=0.8.



                                                                 The reference r

jihan was



                                                                 not used to int

erpret



                                                                 this result as



                                                                 normal/abnormal

.



[facility] SouthMdbltgfKARXZLCNXN9832-34-60 15:25:24





             Test Item    Value        Reference Range Interpretation Comments

 

             Eosinophils # (test code 0.1          See_Comment                [A

utomated message] The



             = Eosinophils #)                                        system whic

h generated



                                                                 this result tra

nsmitted



                                                                 reference range

: <=0.5.



                                                                 The reference r

jihan was



                                                                 not used to int

erpret



                                                                 this result as



                                                                 normal/abnormal

.



[facility] CentralWdslrjiXGFPVDRLO2794-69-97 15:25:24





             Test Item    Value        Reference Range Interpretation Comments

 

             Glucose Lvl (test code = Glucose Lvl) 93           70-99           

          



[facility] CentralUuyfachNVYYNTURT2438-29-48 15:25:24





             Test Item    Value        Reference Range Interpretation Comments

 

             BUN (test code = BUN) 19           7-22                      



[place] [address]-02-14 15:25:24





             Test Item    Value        Reference Range Interpretation Comments

 

             Creatinine Lvl (test code = Creatinine 0.85         0.50-1.40      

           



             Lvl)                                                



[facility] CentralXphxokpDXMAEFLPW6513-87-98 15:25:24





             Test Item    Value        Reference Range Interpretation Comments

 

             Sodium Lvl (test code = Sodium Lvl) 142          135-145           

        



[place] [address]14053-02-14 15:25:24





             Test Item    Value        Reference Range Interpretation Comments

 

             Potassium Lvl (test code = Potassium 4.4          3.5-5.1          

         



             Lvl)                                                



[facility] CentralYvpnepgBZRZVCNAP9468-86-10 15:25:24





             Test Item    Value        Reference Range Interpretation Comments

 

             Chloride Lvl (test code = Chloride Lvl) 108                  

            



[facility] CentralBxudrpoNJHINBKEG1983-87-27 15:25:24





             Test Item    Value        Reference Range Interpretation Comments

 

             CO2 (test code = CO2) 28           24-32                     



[facility] CentralEnmzwykXBPRCKBKB3681-73-07 15:25:24





             Test Item    Value        Reference Range Interpretation Comments

 

             Calcium Lvl (test code = Calcium Lvl) 10.0         8.5-10.5        

          



[facility] CentralWjqjwwpKHHTGSGSE6412-93-09 15:25:24





             Test Item    Value        Reference Range Interpretation Comments

 

             AGAP (test code = AGAP) 10.4         10.0-20.0                 



[facility]TxouawtAQMKWSQUQ5474-68-73 15:25:24





             Test Item    Value        Reference Range Interpretation Comments

 

             eGFR (test code = eGFR) 81                                     



[facility] CentralJzfhazbYWYHOPBLW3328-18-29 15:25:24





             Test Item    Value        Reference Range Interpretation Comments

 

             Lipase Lvl (test code = Lipase Lvl) 123                      

        



[facility] CentralPzvqdbeFNHLBPIIF2461-93-20 15:25:24





             Test Item    Value        Reference Range Interpretation Comments

 

             Total Protein (test code = Total 8.0          6.4-8.4              

     



             Protein)                                            



[facility] CentralAowilvlMMRGXJNBB6525-15-48 15:25:24





             Test Item    Value        Reference Range Interpretation Comments

 

             Albumin Lvl (test code = Albumin Lvl) 3.7          3.5-5.0         

          



[facility] CentralDccphiyZIUTUOUYP3188-66-94 15:25:24





             Test Item    Value        Reference Range Interpretation Comments

 

             Globulin (test code = Globulin) 4.3          2.7-4.2               

    



[facility] CentralPuwasihMTMPVBWKZ5016-11-45 15:25:24





             Test Item    Value        Reference Range Interpretation Comments

 

             A/G Ratio (test code = A/G Ratio) 0.9 1        0.7-1.6             

      



[facility] CentralMvccjukXMLABIZLK3965-02-44 15:25:24





             Test Item    Value        Reference Range Interpretation Comments

 

             ALANINE AMINOTRANSFERASE 20           See_Comment                [A

utomated message]



             (test code = ALANINE                                        The sys

tem which



             AMINOTRANSFERASE)                                        generated 

this result



                                                                 transmitted ref

erence



                                                                 range: <=65. Th

e



                                                                 reference range

 was



                                                                 not used to int

erpret



                                                                 this result as



                                                                 normal/abnormal

.



[facility] CentralEizkwfaJJXXWWAIJ5476-51-33 15:25:24





             Test Item    Value        Reference Range Interpretation Comments

 

             AST (test code = AST) 19           See_Comment                [Auto

mated message] The



                                                                 system which ge

nerated this



                                                                 result transmit

gianfranco



                                                                 reference range

: <=37. The



                                                                 reference range

 was not



                                                                 used to interpr

et this



                                                                 result as xenia

l/abnormal.



[place] 014053-02-14 15:25:24





             Test Item    Value        Reference Range Interpretation Comments

 

             Alk Phos (test code = Alk Phos) 123                          

    



[facility] CentralWtvnmotKONAGYVBX3028-69-39 15:25:24





             Test Item    Value        Reference Range Interpretation Comments

 

             Bili Total (test code = Bili Total) 0.3          0.2-1.3           

        



[facility] CentralVeoqttjHMYJAPSBF8878-68-37 15:25:24





             Test Item    Value        Reference Range Interpretation Comments

 

             Bili Direct (test code no gt        See_Comment                [Aut

omated message] The



             = Bili Direct)                                        system which 

generated



                                                                 this result tra

nsmitted



                                                                 reference range

: <=0.3.



                                                                 The reference r

jihan was



                                                                 not used to int

erpret this



                                                                 result as xenia

l/abnormal.



[facility] CentralBbkhgtzVEFYLORXZ8204-50-64 15:25:24





             Test Item    Value        Reference Range Interpretation Comments

 

             Bili Indirect Unable to    See_Comment                [Automated



             (test code = Bili Calculate                              message] T

he system



             Indirect)                                           which generated



                                                                 this result



                                                                 transmitted



                                                                 reference range

:



                                                                 <=1.0. The



                                                                 reference range

 was



                                                                 not used to



                                                                 interpret this



                                                                 result as



                                                                 normal/abnormal

.



[facility]ACKCUHHRWA6044[facility]





             Test Item    Value        Reference Range Interpretation Comments

 

             WBC X 10x3 (test code = WBC X 10x3) 9.4          3.7-10.4          

        



[place] [address]313-02-14 15:25:24





             Test Item    Value        Reference Range Interpretation Comments

 

             RBC X 10x6 (test code = RBC X 10x6) 4.59         4.20-5.40         

        



[place] 605313-02-14 15:25:24





             Test Item    Value        Reference Range Interpretation Comments

 

             Hgb (test code = Hgb) 14.1         12.0-16.0                 



[place] [address]313-02-14 15:25:24





             Test Item    Value        Reference Range Interpretation Comments

 

             Hct (test code = Hct) 42.8         36.0-48.0                 



[place] [address]313-02-14 15:25:24





             Test Item    Value        Reference Range Interpretation Comments

 

             MCV (test code = MCV) 93.2         80.0-98.0                 



[place] [address]313-02-14 15:25:24





             Test Item    Value        Reference Range Interpretation Comments

 

             MCH (test code = MCH) 30.8 pg      27.0-31.0                 



[place] [address]-02-14 15:25:24





             Test Item    Value        Reference Range Interpretation Comments

 

             MCHC (test code = MCHC) 33.0         32.0-36.0                 



[place] [address]313-02-14 15:25:24





             Test Item    Value        Reference Range Interpretation Comments

 

             RDW (test code = RDW) 13.0         11.5-14.5                 



[place] [address]-02-14 15:25:24





             Test Item    Value        Reference Range Interpretation Comments

 

             Platelet (test code = Platelet) 200          133-450               

    



[place] [address]313-02-14 15:25:24





             Test Item    Value        Reference Range Interpretation Comments

 

             MPV (test code = MPV) 8.6          7.4-10.4                  



[place] [address]313-02-14 15:25:24





             Test Item    Value        Reference Range Interpretation Comments

 

             Segs (test code = Segs) 86.0         45.0-75.0                 



[place] [address]-02-14 15:25:24





             Test Item    Value        Reference Range Interpretation Comments

 

             Lymphocytes (test code = Lymphocytes) 5.5          20.0-40.0       

          



[place] [address]-02-14 15:25:24





             Test Item    Value        Reference Range Interpretation Comments

 

             Monocytes (test code = Monocytes) 7.0          2.0-12.0            

      



[place] [address]-02-14 15:25:24





             Test Item    Value        Reference Range Interpretation Comments

 

             Eosinophils (test code = 1.0          See_Comment                [A

utomated message] The



             Eosinophils)                                        system which ge

nerated



                                                                 this result tra

nsmitted



                                                                 reference range

: <=4.0.



                                                                 The reference r

jihan was



                                                                 not used to int

erpret



                                                                 this result as



                                                                 normal/abnormal

.



[place] [address]-02-14 15:25:24





             Test Item    Value        Reference Range Interpretation Comments

 

             Basophils (test code = 0.5          See_Comment                [Aut

omated message] The



             Basophils)                                          system which ge

nerated



                                                                 this result tra

nsmitted



                                                                 reference range

: <=1.0.



                                                                 The reference r

jihan was



                                                                 not used to int

erpret



                                                                 this result as



                                                                 normal/abnormal

.



[place] [address]-02-14 15:25:24





             Test Item    Value        Reference Range Interpretation Comments

 

             Neutrophils # (test code = Neutrophils 8.1          1.5-8.1        

           



             #)                                                  



[place] [address]-02-14 15:25:24





             Test Item    Value        Reference Range Interpretation Comments

 

             Lymphocytes # (test code = Lymphocytes 0.5          1.0-5.5        

           



             #)                                                  



[place] [address]-02-14 15:25:24





             Test Item    Value        Reference Range Interpretation Comments

 

             Monocytes # (test code 0.7          See_Comment                [Aut

omated message] The



             = Monocytes #)                                        system which 

generated



                                                                 this result tra

nsmitted



                                                                 reference range

: <=0.8.



                                                                 The reference r

jihan was



                                                                 not used to int

erpret



                                                                 this result as



                                                                 normal/abnormal

.



[place] [address]-02-14 15:25:24





             Test Item    Value        Reference Range Interpretation Comments

 

             Eosinophils # (test code 0.1          See_Comment                [A

utomated message] The



             = Eosinophils #)                                        system whic

h generated



                                                                 this result tra

nsmitted



                                                                 reference range

: <=0.5.



                                                                 The reference r

jihan was



                                                                 not used to int

erpret



                                                                 this result as



                                                                 normal/abnormal

.



[facility] CentralEcywfbeCPWXNEQWG1265-53-32 15:25:24





             Test Item    Value        Reference Range Interpretation Comments

 

             Glucose Lvl (test code = Glucose Lvl) 93           70-99           

          



[place] [address]-02-14 15:25:24





             Test Item    Value        Reference Range Interpretation Comments

 

             BUN (test code = BUN) 19           7-22                      



[place] [address]-02-14 15:25:24





             Test Item    Value        Reference Range Interpretation Comments

 

             Creatinine Lvl (test code = Creatinine 0.85         0.50-1.40      

           



             Lvl)                                                



[place] [address]14053-02-14 15:25:24





             Test Item    Value        Reference Range Interpretation Comments

 

             Sodium Lvl (test code = Sodium Lvl) 142          135-145           

        



[place] [address]-02-14 15:25:24





             Test Item    Value        Reference Range Interpretation Comments

 

             Potassium Lvl (test code = Potassium 4.4          3.5-5.1          

         



             Lvl)                                                



[place] [address]-02-14 15:25:24





             Test Item    Value        Reference Range Interpretation Comments

 

             Chloride Lvl (test code = Chloride Lvl) 108                  

            



[place] 014053-02-14 15:25:24





             Test Item    Value        Reference Range Interpretation Comments

 

             CO2 (test code = CO2) 28           24-32                     



[place] [address]-02-14 15:25:24





             Test Item    Value        Reference Range Interpretation Comments

 

             Calcium Lvl (test code = Calcium Lvl) 10.0         8.5-10.5        

          



[place] [address]-02-14 15:25:24





             Test Item    Value        Reference Range Interpretation Comments

 

             AGAP (test code = AGAP) 10.4         10.0-20.0                 



[place] [address]14053-02-14 15:25:24





             Test Item    Value        Reference Range Interpretation Comments

 

             eGFR (test code = eGFR) 81                                     



[facility] CentralYazxwzfRZCBCHYWN0335-76-35 15:25:24





             Test Item    Value        Reference Range Interpretation Comments

 

             Lipase Lvl (test code = Lipase Lvl) 123                      

        



[facility] CentralUefogomYBPDAXDLQ0089-77-47 15:25:24





             Test Item    Value        Reference Range Interpretation Comments

 

             Total Protein (test code = Total 8.0          6.4-8.4              

     



             Protein)                                            



[facility] CentralUmrvwgzEOJWCJMIN7317-12-94 15:25:24





             Test Item    Value        Reference Range Interpretation Comments

 

             Albumin Lvl (test code = Albumin Lvl) 3.7          3.5-5.0         

          



[facility] CentralRlaonpqMANQKEMQT2297-62-06 15:25:24





             Test Item    Value        Reference Range Interpretation Comments

 

             Globulin (test code = Globulin) 4.3          2.7-4.2               

    



[facility] CentralRmlfirsNCLPUBCKY6308-27-24 15:25:24





             Test Item    Value        Reference Range Interpretation Comments

 

             A/G Ratio (test code = A/G Ratio) 0.9 1        0.7-1.6             

      



[place] [address]-02-14 15:25:24





             Test Item    Value        Reference Range Interpretation Comments

 

             ALANINE AMINOTRANSFERASE 20           See_Comment                [A

utomated message]



             (test code = ALANINE                                        The sys

tem which



             AMINOTRANSFERASE)                                        generated 

this result



                                                                 transmitted ref

erence



                                                                 range: <=65. Th

e



                                                                 reference range

 was



                                                                 not used to int

erpret



                                                                 this result as



                                                                 normal/abnormal

.



[facility] CentralOehmkxfZDZNZLECG9549-48-32 15:25:24





             Test Item    Value        Reference Range Interpretation Comments

 

             AST (test code = AST) 19           See_Comment                [Auto

mated message] The



                                                                 system which ge

nerated this



                                                                 result transmit

gianfranco



                                                                 reference range

: <=37. The



                                                                 reference range

 was not



                                                                 used to interpr

et this



                                                                 result as xenia

l/abnormal.



[facility] CentralFzrllnrNSHEUZMMD8398-24-59 15:25:24





             Test Item    Value        Reference Range Interpretation Comments

 

             Alk Phos (test code = Alk Phos) 123                          

    



[facility] CentralHusvxcsFNKDRBGST9104-05-94 15:25:24





             Test Item    Value        Reference Range Interpretation Comments

 

             Bili Total (test code = Bili Total) 0.3          0.2-1.3           

        



[facility] CentralUcupjyeZHGHKGAYZ8575-66-37 15:25:24





             Test Item    Value        Reference Range Interpretation Comments

 

             Bili Direct (test code no gt        See_Comment                [Aut

omated message] The



             = Bili Direct)                                        system which 

generated



                                                                 this result tra

nsmitted



                                                                 reference range

: <=0.3.



                                                                 The reference r

jihan was



                                                                 not used to int

erpret this



                                                                 result as xenia

l/abnormal.



[facility] CentralKgidnqbGQFKIWPSX4314-66-72 15:25:24





             Test Item    Value        Reference Range Interpretation Comments

 

             Bili Indirect Unable to    See_Comment                [Automated



             (test code = Bili Calculate                              message] T

he system



             Indirect)                                           which generated



                                                                 this result



                                                                 transmitted



                                                                 reference range

:



                                                                 <=1.0. The



                                                                 reference range

 was



                                                                 not used to



                                                                 interpret this



                                                                 result as



                                                                 normal/abnormal

.



[facility]72[facility]





             Test Item    Value        Reference Range Interpretation Comments

 

             WBC X 10x3 (test code = WBC X 10x3) 9.4          3.7-10.4          

        



[place] [address]313-02-14 15:25:24





             Test Item    Value        Reference Range Interpretation Comments

 

             RBC X 10x6 (test code = RBC X 10x6) 4.59         4.20-5.40         

        



[facility]FjjknceSNMWWXGHVI3270[facility]-56 [facility]24





             Test Item    Value        Reference Range Interpretation Comments

 

             Hgb (test code = Hgb) 14.1         12.0-16.0                 



[place] [address]-02-14 15:25:24





             Test Item    Value        Reference Range Interpretation Comments

 

             Hct (test code = Hct) 42.8         36.0-48.0                 



[place] [address]-02-14 15:25:24





             Test Item    Value        Reference Range Interpretation Comments

 

             MCV (test code = MCV) 93.2         80.0-98.0                 



[place] [address]313-02-14 15:25:24





             Test Item    Value        Reference Range Interpretation Comments

 

             MCH (test code = MCH) 30.8 pg      27.0-31.0                 



[place] [address]-02-14 15:25:24





             Test Item    Value        Reference Range Interpretation Comments

 

             MCHC (test code = MCHC) 33.0         32.0-36.0                 



[place] [address]-02-14 15:25:24





             Test Item    Value        Reference Range Interpretation Comments

 

             RDW (test code = RDW) 13.0         11.5-14.5                 



[place] [address]-02-14 15:25:24





             Test Item    Value        Reference Range Interpretation Comments

 

             Platelet (test code = Platelet) 200          133-450               

    



[place] [address]-02-14 15:25:24





             Test Item    Value        Reference Range Interpretation Comments

 

             MPV (test code = MPV) 8.6          7.4-10.4                  



[place] [address]-02-14 15:25:24





             Test Item    Value        Reference Range Interpretation Comments

 

             Segs (test code = Segs) 86.0         45.0-75.0                 



[place] [address]-02-14 15:25:24





             Test Item    Value        Reference Range Interpretation Comments

 

             Lymphocytes (test code = Lymphocytes) 5.5          20.0-40.0       

          



[place] [address]-02-14 15:25:24





             Test Item    Value        Reference Range Interpretation Comments

 

             Monocytes (test code = Monocytes) 7.0          2.0-12.0            

      



[place] [address]313-02-14 15:25:24





             Test Item    Value        Reference Range Interpretation Comments

 

             Eosinophils (test code = 1.0          See_Comment                [A

utomated message] The



             Eosinophils)                                        system which ge

nerated



                                                                 this result tra

nsmitted



                                                                 reference range

: <=4.0.



                                                                 The reference r

jihan was



                                                                 not used to int

erpret



                                                                 this result as



                                                                 normal/abnormal

.



[place] [address]-02-14 15:25:24





             Test Item    Value        Reference Range Interpretation Comments

 

             Basophils (test code = 0.5          See_Comment                [Aut

omated message] The



             Basophils)                                          system which ge

nerated



                                                                 this result tra

nsmitted



                                                                 reference range

: <=1.0.



                                                                 The reference r

jihan was



                                                                 not used to int

erpret



                                                                 this result as



                                                                 normal/abnormal

.



[place] [address]-02-14 15:25:24





             Test Item    Value        Reference Range Interpretation Comments

 

             Neutrophils # (test code = Neutrophils 8.1          1.5-8.1        

           



             #)                                                  



[place] [address]-02-14 15:25:24





             Test Item    Value        Reference Range Interpretation Comments

 

             Lymphocytes # (test code = Lymphocytes 0.5          1.0-5.5        

           



             #)                                                  



[facility]ER[facility]BAODLW[facility]24





             Test Item    Value        Reference Range Interpretation Comments

 

             Monocytes # (test code 0.7          See_Comment                [Aut

omated message] The



             = Monocytes #)                                        system which 

generated



                                                                 this result tra

nsmitted



                                                                 reference range

: <=0.8.



                                                                 The reference r

jihan was



                                                                 not used to int

erpret



                                                                 this result as



                                                                 normal/abnormal

.



[facility]TjhznjuPZDZVXOELY6662[facility]42-33 15:25:24





             Test Item    Value        Reference Range Interpretation Comments

 

             Eosinophils # (test code 0.1          See_Comment                [A

utomated message] The



             = Eosinophils #)                                        system whic

h generated



                                                                 this result tra

nsmitted



                                                                 reference range

: <=0.5.



                                                                 The reference r

jihan was



                                                                 not used to int

erpret



                                                                 this result as



                                                                 normal/abnormal

.



[facility]potsltRNPMYZSQH8504[facility]97-07 [facility]25:24





             Test Item    Value        Reference Range Interpretation Comments

 

             Glucose Lvl (test code = Glucose Lvl) 93           70-99           

          



[facility] CentralZyylksrMDNZEMNYV1950-46-88 15:25:24





             Test Item    Value        Reference Range Interpretation Comments

 

             BUN (test code = BUN) 19           7-22                      



[facility] CentralXwxdrbpTHCPWRATW6013-35-20 15:25:24





             Test Item    Value        Reference Range Interpretation Comments

 

             Creatinine Lvl (test code = Creatinine 0.85         0.50-1.40      

           



             Lvl)                                                



[facility] CentralHmriygvYRXFSCLZW6055-41-63 15:25:24





             Test Item    Value        Reference Range Interpretation Comments

 

             Sodium Lvl (test code = Sodium Lvl) 142          135-145           

        



[facility] CentralAazgdogURMTZPARL8661-09-02 15:25:24





             Test Item    Value        Reference Range Interpretation Comments

 

             Potassium Lvl (test code = Potassium 4.4          3.5-5.1          

         



             Lvl)                                                



[place] 014053-02-14 15:25:24





             Test Item    Value        Reference Range Interpretation Comments

 

             Chloride Lvl (test code = Chloride Lvl) 108                  

            



[facility] CentralGisrtthWLRGXYYKW3317-29-98 15:25:24





             Test Item    Value        Reference Range Interpretation Comments

 

             CO2 (test code = CO2) 28           24-32                     



[facility] CentralGaivkanPLJEHGBFU4395-09-51 15:25:24





             Test Item    Value        Reference Range Interpretation Comments

 

             Calcium Lvl (test code = Calcium Lvl) 10.0         8.5-10.5        

          



[facility] CentralCxjspnjGMQVNDOTO0008-31-16 15:25:24





             Test Item    Value        Reference Range Interpretation Comments

 

             AGAP (test code = AGAP) 10.4         10.0-20.0                 



[facility] CentralHdswpnaUAAMJZLPW7033-89-01 15:25:24





             Test Item    Value        Reference Range Interpretation Comments

 

             eGFR (test code = eGFR) 81                                     



[facility] CentralIsqswsuWYEDRESQL8378-58-08 15:25:24





             Test Item    Value        Reference Range Interpretation Comments

 

             Lipase Lvl (test code = Lipase Lvl) 123                      

        



[facility] CentralOjsfxlnAUDBQPGBL1462-98-40 15:25:24





             Test Item    Value        Reference Range Interpretation Comments

 

             Total Protein (test code = Total 8.0          6.4-8.4              

     



             Protein)                                            



[facility] CentralKcgijuyYDOOYEBOX1871-40-15 15:25:24





             Test Item    Value        Reference Range Interpretation Comments

 

             Albumin Lvl (test code = Albumin Lvl) 3.7          3.5-5.0         

          



[facility] CentralAmpuibfMEOOHWEHS1624-96-60 15:25:24





             Test Item    Value        Reference Range Interpretation Comments

 

             Globulin (test code = Globulin) 4.3          2.7-4.2               

    



[facility] CentralWthclysOSFYWJMXP9923-56-26 15:25:24





             Test Item    Value        Reference Range Interpretation Comments

 

             A/G Ratio (test code = A/G Ratio) 0.9 1        0.7-1.6             

      



[facility] CentralUxbinsfRFSISIFYN4416-84-76 15:25:24





             Test Item    Value        Reference Range Interpretation Comments

 

             ALANINE AMINOTRANSFERASE 20           See_Comment                [A

utomated message]



             (test code = ALANINE                                        The sys

tem which



             AMINOTRANSFERASE)                                        generated 

this result



                                                                 transmitted ref

erence



                                                                 range: <=65. Th

e



                                                                 reference range

 was



                                                                 not used to int

erpret



                                                                 this result as



                                                                 normal/abnormal

.



[facility] CentralDkehlggOSRBXAIVM5879-76-92 15:25:24





             Test Item    Value        Reference Range Interpretation Comments

 

             AST (test code = AST) 19           See_Comment                [Auto

mated message] The



                                                                 system which ge

nerated this



                                                                 result transmit

gianfranco



                                                                 reference range

: <=37. The



                                                                 reference range

 was not



                                                                 used to interpr

et this



                                                                 result as xenia

l/abnormal.



[facility] CentralLfpguchLKIYZHYXP2100-84-70 15:25:24





             Test Item    Value        Reference Range Interpretation Comments

 

             Alk Phos (test code = Alk Phos) 123                          

    



[facility] CentralEarkfrmRHXISYMUP1734-79-31 15:25:24





             Test Item    Value        Reference Range Interpretation Comments

 

             Bili Total (test code = Bili Total) 0.3          0.2-1.3           

        



[facility] CentralZquvzweZLRUHAMOR4659-79-03 15:25:24





             Test Item    Value        Reference Range Interpretation Comments

 

             Bili Direct (test code no gt        See_Comment                [Aut

omated message] The



             = Bili Direct)                                        system which 

generated



                                                                 this result tra

nsmitted



                                                                 reference range

: <=0.3.



                                                                 The reference r

jihan was



                                                                 not used to int

erpret this



                                                                 result as xenia

l/abnormal.



[facility] CentralFjtjsrmRLGPPIQAC2610-67-19 15:25:24





             Test Item    Value        Reference Range Interpretation Comments

 

             Bili Indirect Unable to    See_Comment                [Automated



             (test code = Bili Calculate                              message] T

he system



             Indirect)                                           which generated



                                                                 this result



                                                                 transmitted



                                                                 reference range

:



                                                                 <=1.0. The



                                                                 reference range

 was



                                                                 not used to



                                                                 interpret this



                                                                 result as



                                                                 normal/abnormal

.



[facility] SouthUhbpuwfREPCYQIXRQ4370-75-87 15:25:24





             Test Item    Value        Reference Range Interpretation Comments

 

             WBC X 10x3 (test code = WBC X 10x3) 9.4          3.7-10.4          

        



[facility] SouthRuyugprXKGPIUAGNE8664-58-27 15:25:24





             Test Item    Value        Reference Range Interpretation Comments

 

             RBC X 10x6 (test code = RBC X 10x6) 4.59         4.20-5.40         

        



[facility] SouthFvvnhuwLCOUMMPECL3705-12-51 15:25:24





             Test Item    Value        Reference Range Interpretation Comments

 

             Hgb (test code = Hgb) 14.1         12.0-16.0                 



[facility] SouthHxwkojjQYAIIHPMCJ6049-49-77 15:25:24





             Test Item    Value        Reference Range Interpretation Comments

 

             Hct (test code = Hct) 42.8         36.0-48.0                 



[facility] SouthVjesanoRSQHJBWBXU5522-43-64 15:25:24





             Test Item    Value        Reference Range Interpretation Comments

 

             MCV (test code = MCV) 93.2         80.0-98.0                 



[facility] SouthEmajsfgIRNFTMYKHR9768-36-46 15:25:24





             Test Item    Value        Reference Range Interpretation Comments

 

             MCH (test code = MCH) 30.8 pg      27.0-31.0                 



[facility] SouthNtpmoblNAYXDKZCOG8058-86-67 15:25:24





             Test Item    Value        Reference Range Interpretation Comments

 

             MCHC (test code = MCHC) 33.0         32.0-36.0                 



[facility] SouthHwjputmDFGHYEUQXG5958-30-10 15:25:24





             Test Item    Value        Reference Range Interpretation Comments

 

             RDW (test code = RDW) 13.0         11.5-14.5                 



[place] [address]-02-14 15:25:24





             Test Item    Value        Reference Range Interpretation Comments

 

             Platelet (test code = Platelet) 200          133-450               

    



[place] [address]-02-14 15:25:24





             Test Item    Value        Reference Range Interpretation Comments

 

             MPV (test code = MPV) 8.6          7.4-10.4                  



[place] [address]-02-14 15:25:24





             Test Item    Value        Reference Range Interpretation Comments

 

             Segs (test code = Segs) 86.0         45.0-75.0                 



[place] [address]-02-14 15:25:24





             Test Item    Value        Reference Range Interpretation Comments

 

             Lymphocytes (test code = Lymphocytes) 5.5          20.0-40.0       

          



[place] [address]-02-14 15:25:24





             Test Item    Value        Reference Range Interpretation Comments

 

             Monocytes (test code = Monocytes) 7.0          2.0-12.0            

      



[place] [address]-02-14 15:25:24





             Test Item    Value        Reference Range Interpretation Comments

 

             Eosinophils (test code = 1.0          See_Comment                [A

utomated message] The



             Eosinophils)                                        system which ge

nerated



                                                                 this result tra

nsmitted



                                                                 reference range

: <=4.0.



                                                                 The reference r

jihan was



                                                                 not used to int

erpret



                                                                 this result as



                                                                 normal/abnormal

.



[place]313-02-14 15:25:24





             Test Item    Value        Reference Range Interpretation Comments

 

             Basophils (test code = 0.5          See_Comment                [Aut

omated message] The



             Basophils)                                          system which ge

nerated



                                                                 this result tra

nsmitted



                                                                 reference range

: <=1.0.



                                                                 The reference r

jihan was



                                                                 not used to int

erpret



                                                                 this result as



                                                                 normal/abnormal

.



[place] [address]-02-14 15:25:24





             Test Item    Value        Reference Range Interpretation Comments

 

             Neutrophils # (test code = Neutrophils 8.1          1.5-8.1        

           



             #)                                                  



[place] [address]-02-14 15:25:24





             Test Item    Value        Reference Range Interpretation Comments

 

             Lymphocytes # (test code = Lymphocytes 0.5          1.0-5.5        

           



             #)                                                  



[place] [address]-02-14 15:25:24





             Test Item    Value        Reference Range Interpretation Comments

 

             Monocytes # (test code 0.7          See_Comment                [Aut

omated message] The



             = Monocytes #)                                        system which 

generated



                                                                 this result tra

nsmitted



                                                                 reference range

: <=0.8.



                                                                 The reference r

jihan was



                                                                 not used to int

erpret



                                                                 this result as



                                                                 normal/abnormal

.



[facility] SouthBofvrfpHMFOIWORJX4154-59-48 15:25:24





             Test Item    Value        Reference Range Interpretation Comments

 

             Eosinophils # (test code 0.1          See_Comment                [A

utomated message] The



             = Eosinophils #)                                        system whic

h generated



                                                                 this result tra

nsmitted



                                                                 reference range

: <=0.5.



                                                                 The reference r

jihan was



                                                                 not used to int

erpret



                                                                 this result as



                                                                 normal/abnormal

.



[facility] CentralGcsdrnqNGXANKEJF6830-25-47 15:25:24





             Test Item    Value        Reference Range Interpretation Comments

 

             Glucose Lvl (test code = Glucose Lvl) 93           70-99           

          



[facility] CentralHzgjmizTCNBPVVPG7558-80-23 15:25:24





             Test Item    Value        Reference Range Interpretation Comments

 

             BUN (test code = BUN) 19           7-22                      



[facility] METABOLIC PANEL ([id number] 19:59:50





             Test Item    Value        Reference Range Interpretation Comments

 

             NA (test code = 138 mmol/L   135-145                   



             [id number])                                         

 

             K (test code = 4.3 mmol/L   3.5-5.0                   



             [id number])                                         

 

             CL (test code = 102 mmol/L                       



             3248661466)                                         

 

             CO2 TOTAL (test code = 23 mmol/L    23-31                     



             [id number])                                         

 

             AGAP (test code =              2-16                      



             [id number])                                         

 

             BUN (test code = 18 mg/dL     7-23                      



             9856121478)                                         

 

             GLUCOSE (test code = 110 mg/dL                        



             [id number])                                         

 

             CREATININE (test code = 0.70 mg/dL   0.50-1.04                 



             5229562720)                                         

 

             TOTAL BILI (test code = 1.3 mg/dL    0.1-1.1      H            



             6020573070)                                         

 

             CALCIUM (test code = 9.5 mg/dL    8.6-10.6                  



             3679336593)                                         

 

             T PROTEIN (test code = 7.4 g/dL     6.3-8.2                   



             [id number])                                         

 

             ALBUMIN (test code = 4.2 g/dL     3.5-5.0                   



             9055517232)                                         

 

             ALK PHOS (test code = 100 U/L                          



             0295532177)                                         

 

             ALTv (test code = 16 U/L       5-35                      



             1742-6)                                             

 

             AST(SGOT) (test code = 27 U/L       13-40                     



             [id number])                                         

 

             eGFR (test code =              mL/min/1.73m2              



             1615359295)                                         

 

             ELENA (test code = ELENA) Association of                           



                          Glomerular Filtration                           



                          Rate (GFR) and Staging                           



                          of Kidney Disease*                           



                          +---------------------                           



                          --+-------------------                           



                          --+-------------------                           



                          ------+| GFR                           



                          (mL/min/1.73 m2) ?|                           



                          With Kidney Damage ?|                           



                          ?Without Kidney                           



                          Damage+---------------                           



                          --------+-------------                           



                          --------+-------------                           



                          ------------+| ?>90 ?                           



                          ? ? ? ? ? ? ? ?|                           



                          ?Stage one ? ? ? ? ?|                           



                          ? Normal ? ? ? ? ? ? ?                           



                          ?+--------------------                           



                          ---+------------------                           



                          ---+------------------                           



                          -------+| ?60-89 ? ? ?                           



                          ? ? ? ? ?| ?Stage two                           



                          ? ? ? ? ?| ? Decreased                           



                          GFR ? ? ? ?                            



                          +---------------------                           



                          --+-------------------                           



                          --+-------------------                           



                          ------+| ?30-59 ? ? ?                           



                          ? ? ? ? ?| ?Stage                           



                          three ? ? ? ?| ? Stage                           



                          three ? ? ? ? ?                           



                          +---------------------                           



                          --+-------------------                           



                          --+-------------------                           



                          ------+| ?15-29 ? ? ?                           



                          ? ? ? ? ?| ?Stage four                           



                          ? ? ? ? | ? Stage four                           



                          ? ? ? ? ?                              



                          ?+--------------------                           



                          ---+------------------                           



                          ---+------------------                           



                          -------+| ?<15 (or                           



                          dialysis) ? ?| ?Stage                           



                          five ? ? ? ? | ? Stage                           



                          five ? ? ? ? ?                           



                          ?+--------------------                           



                          ---+------------------                           



                          ---+------------------                           



                          -------+ *Each stage                           



                          assumes the associated                           



                          GFR level has been in                           



                          effect for at least                           



                          three months. ?Stages                           



                          1 to 5, with or                           



                          without kidney                           



                          disease, indicate                           



                          chronic kidney                           



                          disease. Notes:                           



                          Determination of                           



                          stages one and two                           



                          (with eGFR                             



                          >59mL/min/1.73 m2)                           



                          requires estimation of                           



                          kidney damage for at                           



                          least three months as                           



                          defined by structural                           



                          or functional                           



                          abnormalities of the                           



                          kidney, manifested by                           



                          either:Pathological                           



                          abnormalities or                           



                          Markers of kidney                           



                          damage (including                           



                          abnormalities in the                           



                          composition of the                           



                          blood or urine or                           



                          abnormalities in                           



                          imaging tests).                           

 

             Lab Interpretation Abnormal                               



             (test code = 54758-3)                                        



[facility] WITH [id number] 19:22:40





             Test Item    Value        Reference Range Interpretation Comments

 

             WBC (test code =              See_Comment  H             [Automated



             8160-2)                                             message] The sy

stem



                                                                 which generated



                                                                 this result



                                                                 transmitted



                                                                 reference range

:



                                                                 4.30 - 11.10



                                                                 10*3/?L. The



                                                                 reference range

 was



                                                                 not used to



                                                                 interpret this



                                                                 result as



                                                                 normal/abnormal

.

 

             RBC (test code =              See_Comment                [Automated



             699-8)                                              message] The sy

stem



                                                                 which generated



                                                                 this result



                                                                 transmitted



                                                                 reference range

:



                                                                 3.93 - 5.25



                                                                 10*6/?L. The



                                                                 reference range

 was



                                                                 not used to



                                                                 interpret this



                                                                 result as



                                                                 normal/abnormal

.

 

             HGB (test code = 13.0 g/dL    11.6-15.0                 



             718-7)                                              

 

             HCT (test code = 38.4 %       35.7-45.2                 



             4544-3)                                             

 

             MCV (test code = 89.9 fL      80.6-95.5                 



             787-2)                                              

 

             MCH (test code = 30.4 pg      25.9-32.8                 



             785-6)                                              

 

             MCHC (test code = 33.9 g/dL    31.6-35.1                 



             786-4)                                              

 

             RDW-SD (test code = 39.3 fL      39.0-49.9                 



             72561-3)                                            

 

             RDW-CV (test code = 12.2 %       12.0-15.5                 



             788-0)                                              

 

             PLT (test code =              See_Comment                [Automated



             777-3)                                              message] The sy

stem



                                                                 which generated



                                                                 this result



                                                                 transmitted



                                                                 reference range

:



                                                                 166 - 358 10*3/

?L.



                                                                 The reference r

jihan



                                                                 was not used to



                                                                 interpret this



                                                                 result as



                                                                 normal/abnormal

.

 

             MPV (test code = 10.1 fL      9.5-12.9                  



             90774-7)                                            

 

             NRBC/100 WBC (test              See_Comment                [Automat

ed



             code = [id number])                                        message] 

The system



                                                                 which generated



                                                                 this result



                                                                 transmitted



                                                                 reference range

:



                                                                 0.0 - 10.0 /100



                                                                 WBCs. The refer

ence



                                                                 range was not u

sed



                                                                 to interpret th

is



                                                                 result as



                                                                 normal/abnormal

.

 

             NRBC x10^3 (test code <0.01        See_Comment                [Auto

mated



             = [id number])                                        message] The s

ystem



                                                                 which generated



                                                                 this result



                                                                 transmitted



                                                                 reference range

:



                                                                 10*3/?L. The



                                                                 reference range

 was



                                                                 not used to



                                                                 interpret this



                                                                 result as



                                                                 normal/abnormal

.

 

             GRAN MAT (NEUT) % 79.4 %                                 



             (test code = 770-8)                                        

 

             IMM GRAN % (test code 0.50 %                                 



             = [id number])                                        

 

             LYMPH % (test code = 9.5 %                                  



             736-9)                                              

 

             MONO % (test code = 9.7 %                                  



             5905-5)                                             

 

             EOS % (test code = 0.5 %                                  



             713-8)                                              

 

             BASO % (test code = 0.4 %                                  



             706-2)                                              

 

             GRAN MAT x10^3(ANC) 9.77 10*3/uL 1.88-7.09    H            



             (test code =                                        



             0293235257)                                         

 

             IMM GRAN x10^3 (test 0.06 10*3/uL 0.00-0.06                 



             code = 1613128081)                                        

 

             LYMPH x10^3 (test code 1.17 10*3/uL 1.32-3.29    L            



             = 731-0)                                            

 

             MONO x10^3 (test code 1.19 10*3/uL 0.33-0.92    H            



             = 742-7)                                            

 

             EOS x10^3 (test code = 0.06 10*3/uL 0.03-0.39                 



             711-2)                                              

 

             BASO x10^3 (test code 0.05 10*3/uL 0.01-0.07                 



             = 704-7)                                            

 

             Lab Interpretation Abnormal                               



             (test code = 75875-5)                                        



[facility] METABOLIC [id number] 05:36:00





             Test Item    Value        Reference Range Interpretation Comments

 

             SODIUM (test code = NA) 143 mmol/L   134-147      N            

 

             POTASSIUM (test code = 4.2 mmol/L   3.4-5.0      N            



             K)                                                  

 

             CHLORIDE (test code = 114 mmol/L   100-108      H            



             CL)                                                 

 

             CARBON DIOXIDE (test 24 mmol/L    21-32        N            



             code = CO2)                                         

 

             ANION GAP (test code = 5.0 GAP calc 4.0-15.0     N            



             GAP)                                                

 

             GLUCOSE (test code = 89 MG/DL            N            



             GLU)                                                

 

             BLOOD UREA NITROGEN 17 MG/DL     7-18         N            



             (test code = BUN)                                        

 

             GLOMERULAR FILTRATION >=60 max estimate >60                       



             RATE (test code = GFR) estGFR                                 

 

             CREATININE (test code = 0.9 MG/DL    0.6-1.0      N            



             CREAT)                                              

 

             CALCIUM (test code = CA) 8.3 MG/DL    8.5-10.1     L            



CBC W/AUTO [id number] 05:21:00





             Test Item    Value        Reference Range Interpretation Comments

 

             WHITE BLOOD CELL (test code = 15.2 K/mm3   3.5-11.0     H          

  



             WBC)                                                

 

             RED BLOOD CELL (test code = RBC) 3.67 M/mm3   4.70-6.10    L       

     

 

             HEMOGLOBIN (test code = HGB) 11.3 G/DL    10.4-14.9    N           

 

 

             HEMATOCRIT (test code = HCT) 35.5 %       31.5-44.1    N           

 

 

             MEAN CELL VOLUME (test code = 96.7 Fl      84.5-98.6    N          

  



             MCV)                                                

 

             MEAN CELL HGB (test code = MCH) 30.8 pg      27.0-34.2    N        

    

 

             MEAN CELL HGB CONCETRATION (test 31.8 G/DL    31.5-34.0    N       

     



             code = MCHC)                                        

 

             RED CELL DISTRIBUTION WIDTH (test 14.2 SD      11.5-14.5    N      

      



             code = RDW)                                         

 

             PLATELET COUNT (test code = PLT) 242.0 K/mm3  150-450      N       

     

 

             MEAN PLATELET VOLUME (test code = 10.20 fL     7.0-10.5     N      

      



             MPV)                                                

 

             NEUTROPHIL % (test code = NT%) 78.8 %       40-76        H         

   

 

             LYMPHOCYTE % (test code = LY%) 13.1 %       20.5-51.1    L         

   

 

             MONOCYTE % (test code = MO%) 6.2 %        1.7-9.3      N           

 

 

             EOSINOPHIL % (test code = EO%) 1.6 %        0.0-6.0      N         

   

 

             BASOPHIL % (test code = BA%) 0.3 %        0.0-2.0      N           

 

 

             NEUTROPHIL # (test code = NT#) 11.94 K/mm3  1.8-7.6      H         

   

 

             LYMPHOCYTE # (test code = LY#) 2.0 K/mm3    0.6-3.2      N         

   

 

             MONOCYTE # (test code = MO#) 0.9 K/mm3    0.3-1.1      N           

 

 

             EOSINOPHIL # (test code = EO#) 0.2 K/mm3    0.0-0.4      N         

   

 

             BASOPHIL # (test code = BA#) 0.1 K/mm3    0.0-0.1      N           

 

 

             MANUAL DIFF REQUIRED (test code = NO DIFF/SCN  CRITERIA            

      



             MDIFF)                                              



[id number] 14:09:00





             Test Item    Value        Reference Range Interpretation Comments

 

             LITHIUM (test 0.5 mmol/L   0.6-1.2      L             Detection Lopez

it = 0.1



             code = LITH)                                        <0.1 indicates 

None



                                                                 DetectedPerform

ed At: 



                                                                 [facility] [address]



                                                                 [id number] 

Jeffry KIMBROUGH MD



                                                                 Ph:[phone]



YTLGRHDW--38-28 13:35:00





             Test Item    Value        Reference Range Interpretation Comments

 

             TROPONIN-I (test 0.436 NG/ML  0.000-0.045  HH           Negative: <

/= 0.045



             code = TROPI)                                        Positive: >/= 

0.046



                                                                 Correlation wit

h serial



                                                                 results, other 

cardiac



                                                                 markers, and cl

inical



                                                                 findings is nec

essary to



                                                                 determine the c

linical



                                                                 significance of

 this



                                                                 result. Quantit

ative



                                                                 results using d

ifferent



                                                                 methodologies s

hould not



                                                                 be compared to 

one



                                                                 another as nume

rical



                                                                 results may amanda

yby



                                                                 method.



Completed by Nursing: HHCHIXTKXM--44-28 10:33:00





             Test Item    Value        Reference Range Interpretation Comments

 

             TROPONIN-I (test 0.360 NG/ML  0.000-0.045  HH           Negative: <

/= 0.045



             code = TROPI)                                        Positive: >/= 

0.046



                                                                 Correlation wit

h serial



                                                                 results, other 

cardiac



                                                                 markers, and cl

inical



                                                                 findings is nec

essary to



                                                                 determine the c

linical



                                                                 significance of

 this



                                                                 result. Quantit

ative



                                                                 results using d

ifferent



                                                                 methodologies s

hould not



                                                                 be compared to 

one



                                                                 another as nume

rical



                                                                 results may amanda

yby



                                                                 method.



Completed by Nursing: NOLast Dose Date: 20Las Dose Time: 1200TROPONIN-I
2020 07:23:00





             Test Item    Value        Reference Range Interpretation Comments

 

             TROPONIN-I (test 0.399 NG/ML  0.000-0.045  HH           Negative: <

/= 0.045



             code = TROPI)                                        Positive: >/= 

0.046



                                                                 Correlation wit

h serial



                                                                 results, other 

cardiac



                                                                 markers, and cl

inical



                                                                 findings is nec

essary to



                                                                 determine the c

linical



                                                                 significance of

 this



                                                                 result. Quantit

ative



                                                                 results using d

ifferent



                                                                 methodologies s

hould not



                                                                 be compared to 

one



                                                                 another as nume

rical



                                                                 results may amanda

yby



                                                                 method.



Completed by Nursing: OENEBVRYKS--80-28 05:00:00





             Test Item    Value        Reference Range Interpretation Comments

 

             TROPONIN-I (test 0.555 NG/ML  0.000-0.045  HH           Negative: <

/= 0.045



             code = TROPI)                                        Positive: >/= 

0.046



                                                                 Correlation wit

h serial



                                                                 results, other 

cardiac



                                                                 markers, and cl

inical



                                                                 findings is nec

essary to



                                                                 determine the c

linical



                                                                 significance of

 this



                                                                 result. Quantit

ative



                                                                 results using d

ifferent



                                                                 methodologies s

hould not



                                                                 be compared to 

one



                                                                 another as nume

rical



                                                                 results may amanda

yby



                                                                 method.



Completed by Nursing: NO- XR CHEST 1 -61-82 04:33:00 Name[facility] 1970 [facility]S: 49 / F  [id number] [address] [facility] Unit #: [id number]
32 Loc: [place] [address] Phys: Kristy Quiros MD Acct: [id number] Dis Date: 
Status: REG ER PHONE#: [phone] Exam Date: 2020 FAX #: Reason: 
POST CENTRAL PLACEMENT; RIGHT IJ EXAMS: CPT: [id number] XR CHEST 1 V 74180 Fluoro
Time:  DAP (Gy m2): Air Kerma (mGy): HISTORY: Post central line placement, 
hypotension Location code: B2 FINDINGS: Frontal view of the chest demonstrates a
mildly enlarged cardiomediastinal silhouette. The trachea is midline. The lungs 
are clear. There is no effusion or pneumothorax. The bones are intact. Right IJ 
catheter in good position. IMPRESSION: Mild cardiomegaly, without acute 
pulmonary process. ** Electronically Signed by ISABEL March on 2020 at
0433 **  Reported and signed by: Shree March M.D. CC: Kristy Quiros MD  PAGE 1 
Signed Report Name: TYLER JUDD [facility]  : 1970 Age/S: 49 / F 
[address] [facility] Unit #: [id number] Loc: [place] [address] Phys: 
Kristy Quiros MD  Acct: [id number] Dis Date: Status: REG ER PHONE #: [phone]
[phone] Exam Date: 2020  FAX #: Reason: POST CENTRAL PLACEMENT; RIGHT
IJ EXAMS: CPT: [id number] XR CHEST 1 V  05160 Fluoro Time: DAP (Gy m2): Air Kerma
(mGy): (Continued) Technologist: Duncan Ellis, RT(R)(CT) Trnscb Date/Time: 
2020 (0433) DanielRK5 Orig Print D/T: S: 2020 (0436) PAGE2  Signed 
ReportCBC W/AUTO [id number] 04:15:00





             Test Item    Value        Reference Range Interpretation Comments

 

             WHITE BLOOD CELL (test 24.2 K/mm3   3.5-11.0     H            



             code = WBC)                                         

 

             RED BLOOD CELL (test 3.75 M/mm3   4.70-6.10    L            



             code = RBC)                                         

 

             HEMOGLOBIN (test code 11.5 G/DL    10.4-14.9    N            



             = HGB)                                              

 

             HEMATOCRIT (test code 35.2 %       31.5-44.1    N            



             = HCT)                                              

 

             MEAN CELL VOLUME (test 93.9 Fl      84.5-98.6    N            



             code = MCV)                                         

 

             MEAN CELL HGB (test 30.7 pg      27.0-34.2    N            



             code = MCH)                                         

 

             MEAN CELL HGB 32.7 G/DL    31.5-34.0    N            



             CONCETRATION (test                                        



             code = MCHC)                                        

 

             RED CELL DISTRIBUTION 13.8 SD      11.5-14.5    N            



             WIDTH (test code =                                        



             RDW)                                                

 

             PLATELET COUNT (test 234.0 K/mm3  150-450      N            



             code = PLT)                                         

 

             MEAN PLATELET VOLUME 9.80 fL      7.0-10.5     N            



             (test code = MPV)                                        

 

             NEUTROPHIL % (test 82.9 %       40-76        H            



             code = NT%)                                         

 

             LYMPHOCYTE % (test 8.3 %        20.5-51.1    L            



             code = LY%)                                         

 

             MONOCYTE % (test code 7.3 %        1.7-9.3      N            



             = MO%)                                              

 

             EOSINOPHIL % (test 1.4 %        0.0-6.0      N            



             code = EO%)                                         

 

             BASOPHIL % (test code 0.1 %        0.0-2.0      N            



             = BA%)                                              

 

             NEUTROPHIL # (test 20.08 K/mm3  1.8-7.6      H            



             code = NT#)                                         

 

             LYMPHOCYTE # (test 2.0 K/mm3    0.6-3.2      N            



             code = LY#)                                         

 

             MONOCYTE # (test code 1.8 K/mm3    0.3-1.1      H            



             = MO#)                                              

 

             EOSINOPHIL # (test 0.3 K/mm3    0.0-0.4      N            



             code = EO#)                                         

 

             BASOPHIL # (test code 0.0 K/mm3    0.0-0.1      N            



             = BA#)                                              

 

             MANUAL DIFF REQUIRED NO DIFF/SCN  CRITERIA                  SLIDE R

EVIEW



             (test code = MDIFF)                                        CONSISTA

NT WITH AUTO



                                                                 DIFFERENTIAL.



BASIC METABOLIC [id number] 04:11:00





             Test Item    Value        Reference Range Interpretation Comments

 

             SODIUM (test code = NA) 135 mmol/L   134-147      N            

 

             POTASSIUM (test code = K) 4.0 mmol/L   3.4-5.0      N            

 

             CHLORIDE (test code = CL) 106 mmol/L   100-108      N            

 

             CARBON DIOXIDE (test code = CO2) 22 mmol/L    21-32        N       

     

 

             ANION GAP (test code = GAP) 7.0 GAP calc 4.0-15.0     N            

 

             GLUCOSE (test code = GLU) 97 MG/DL            N            

 

             BLOOD UREA NITROGEN (test code = 34 MG/DL     7-18         H       

     



             BUN)                                                

 

             GLOMERULAR FILTRATION RATE (test 39 estGFR    >60          L       

     



             code = GFR)                                         

 

             CREATININE (test code = CREAT) 1.5 MG/DL    0.6-1.0      H         

   

 

             CALCIUM (test code = CA) 8.9 MG/DL    8.5-10.1     N            



LACTIC [id number] 04:11:00





             Test Item    Value        Reference Range Interpretation Comments

 

             LACTIC ACID (test code = LACT) 0.8 mmol/L   0.4-2.0      N         

   



CBC W/AUTO [id number] 03:54:00





             Test Item    Value        Reference Range Interpretation Comments

 

             WHITE BLOOD CELL (test code = 24.2 K/mm3   3.5-11.0     H          

  



             WBC)                                                

 

             RED BLOOD CELL (test code = RBC) 3.75 M/mm3   4.70-6.10    L       

     

 

             HEMOGLOBIN (test code = HGB) 11.5 G/DL    10.4-14.9    N           

 

 

             HEMATOCRIT (test code = HCT) 35.2 %       31.5-44.1    N           

 

 

             MEAN CELL VOLUME (test code = 93.9 Fl      84.5-98.6    N          

  



             MCV)                                                

 

             MEAN CELL HGB (test code = MCH) 30.7 pg      27.0-34.2    N        

    

 

             MEAN CELL HGB CONCETRATION (test 32.7 G/DL    31.5-34.0    N       

     



             code = MCHC)                                        

 

             RED CELL DISTRIBUTION WIDTH (test 13.8 SD      11.5-14.5    N      

      



             code = RDW)                                         

 

             PLATELET COUNT (test code = PLT) 234.0 K/mm3  150-450      N       

     

 

             MEAN PLATELET VOLUME (test code = 9.80 fL      7.0-10.5     N      

      



             MPV)                                                

 

             NEUTROPHIL % (test code = NT%)  %           40-76        H         

   

 

             LYMPHOCYTE % (test code = LY%)  %           20.5-51.1    L         

   

 

             MONOCYTE % (test code = MO%)  %           1.7-9.3      N           

 

 

             EOSINOPHIL % (test code = EO%)  %           0.0-6.0      N         

   

 

             BASOPHIL % (test code = BA%)  %           0.0-2.0      N           

 

 

             NEUTROPHIL # (test code = NT#)  K/mm3       1.8-7.6      H         

   

 

             LYMPHOCYTE # (test code = LY#)  K/mm3       0.6-3.2      N         

   

 

             MONOCYTE # (test code = MO#)  K/mm3       0.3-1.1      H           

 

 

             EOSINOPHIL # (test code = EO#)  K/mm3       0.0-0.4      N         

   

 

             BASOPHIL # (test code = BA#)  K/mm3       0.0-0.1      N           

 

 

             MANUAL DIFF REQUIRED (test code =  DIFF/SCN    CRITERIA            

      



             MDIFF)                                              



Basic Metabolic [id number] 07:54:48





             Test Item    Value        Reference Range Interpretation Comments

 

             Sodium Level (test code = Sodium 142.0 mmol/L 135.0-145.0          

     



             Level)                                              

 

             Potassium Level (test code = 4.8 mmol/L   3.5-5.1                  

 



             Potassium Level)                                        

 

             Chloride Level (test code = 105 mmol/L                       



             Chloride Level)                                        

 

             CO2 (test code = CO2) 25 mmol/L    22-29                     

 

             Anion Gap (test code = Anion 12 mmol/L    7-16                     

 



             Gap)                                                

 

             BUN (test code = BUN) 19.60 mg/dL  6.00-20.00                

 

             Creatinine Level (test code = 0.80 mg/dL   0.50-0.90               

  



             Creatinine Level)                                        

 

             BUN/Creat Ratio (test code = 24                        N           

 



             BUN/Creat Ratio)                                        

 

             Glucose Level (test code = 86 mg/dL                         



             Glucose Level)                                        

 

             Calcium Level (test code = 10.1 mg/dL   8.3-10.5                  



             Calcium Level)                                        



Basic Metabolic [id number] 07:54:48





             Test Item    Value        Reference Range Interpretation Comments

 

             Sodium Level (test 142.0 mmol/L 135.0-145.0               



             code = Sodium Level)                                        

 

             Potassium Level 4.8 mmol/L   3.5-5.1                   



             (test code =                                        



             Potassium Level)                                        

 

             Chloride Level (test 105 mmol/L                       



             code = Chloride                                        



             Level)                                              

 

             CO2 (test code = 25 mmol/L    22-29                     



             CO2)                                                

 

             Anion Gap (test code 12 mmol/L    7-16                      



             = Anion Gap)                                        

 

             BUN (test code = 19.60 mg/dL  6.00-20.00                



             BUN)                                                

 

             Creatinine Level 0.80 mg/dL   0.50-0.90                 



             (test code =                                        



             Creatinine Level)                                        

 

             BUN/Creat Ratio 24                        N            



             (test code =                                        



             BUN/Creat Ratio)                                        

 

             Glucose Level (test 86 mg/dL                         



             code = Glucose                                        



             Level)                                              

 

             Calcium Level (test 10.1 mg/dL   8.3-10.5                  



             code = Calcium                                        



             Level)                                              

 

             eGFR AA (test code = >60                       N            eGFR (e

stimated



             eGFR AA)     mL/min/1.73 m2                           Glomerular



                                                                 Filtration Rate

) is



                                                                 an estimated va

lue,



                                                                 calculated from

 the



                                                                 patient's serum



                                                                 creatinine usin

g the



                                                                 MDRD equation. 

It is



                                                                 NOT the patient

's



                                                                 actual GFR. The

 eGFR



                                                                 provides a more



                                                                 clinically usef

ul



                                                                 measure of kidn

ey



                                                                 disease than se

rum



                                                                 creatinine



                                                                 alone.***This



                                                                 calculation rimma

es



                                                                 sex and race in

to



                                                                 account, if the



                                                                 information is



                                                                 provided. If th

e



                                                                 race is not



                                                                 provided, and t

he



                                                                 patient is



                                                                 -Crystal

n,



                                                                 multiply by 1.2

12.



                                                                 If sex is not



                                                                 provided, and t

he



                                                                 patient is fema

le,



                                                                 multiply by 0.7

42.



                                                                 Results for pat

ients



                                                                 <18 years of ag

e



                                                                 have not been



                                                                 validated by th

e



                                                                 MDRD study and



                                                                 should be



                                                                 interpreted wit

h



                                                                 caution. eGFR R

esult



                                                                 Interpretation:

eGFR



                                                                 > or = 60 is in

 the



                                                                 Normal RangeeGF

R <



                                                                 60 may mean kid

hang



                                                                 diseaseeGFR < 1

5 may



                                                                 mean kidney



                                                                 failure*** Rang

es



                                                                 recommended by 

the



                                                                 [employer]



                                                                 [employer],



                                                                 http://nkdep.ni

h.gov



Basic Metabolic [id number] 07:54:48





             Test Item    Value        Reference Range Interpretation Comments

 

             Sodium Level (test 142.0 mmol/L 135.0-145.0               



             code = Sodium Level)                                        

 

             Potassium Level 4.8 mmol/L   3.5-5.1                   



             (test code =                                        



             Potassium Level)                                        

 

             Chloride Level (test 105 mmol/L                       



             code = Chloride                                        



             Level)                                              

 

             CO2 (test code = 25 mmol/L    22-29                     



             CO2)                                                

 

             Anion Gap (test code 12 mmol/L    7-16                      



             = Anion Gap)                                        

 

             BUN (test code = 19.60 mg/dL  6.00-20.00                



             BUN)                                                

 

             Creatinine Level 0.80 mg/dL   0.50-0.90                 



             (test code =                                        



             Creatinine Level)                                        

 

             BUN/Creat Ratio 24                        N            



             (test code =                                        



             BUN/Creat Ratio)                                        

 

             Glucose Level (test 86 mg/dL                         



             code = Glucose                                        



             Level)                                              

 

             Calcium Level (test 10.1 mg/dL   8.3-10.5                  



             code = Calcium                                        



             Level)                                              

 

             eGFR AA (test code = >60                       N            eGFR (e

stimated



             eGFR AA)     mL/min/1.73 m2                           Glomerular



                                                                 Filtration Rate

) is



                                                                 an estimated va

lue,



                                                                 calculated from

 the



                                                                 patient's serum



                                                                 creatinine usin

g the



                                                                 MDRD equation. 

It is



                                                                 NOT the patient

's



                                                                 actual GFR. The

 eGFR



                                                                 provides a more



                                                                 clinically usef

ul



                                                                 measure of kidn

ey



                                                                 disease than se

rum



                                                                 creatinine



                                                                 alone.***This



                                                                 calculation rimma

es



                                                                 sex and race in

to



                                                                 account, if the



                                                                 information is



                                                                 provided. If th

e



                                                                 race is not



                                                                 provided, and t

he



                                                                 patient is



                                                                 -Crystal

n,



                                                                 multiply by 1.2

12.



                                                                 If sex is not



                                                                 provided, and t

he



                                                                 patient is fema

le,



                                                                 multiply by 0.7

42.



                                                                 Results for pat

ients



                                                                 <18 years of ag

e



                                                                 have not been



                                                                 validated by [facility]



                                                                 MDRD study and



                                                                 should be



                                                                 interpreted wit

h



                                                                 caution. eGFR R

esult



                                                                 Interpretation:

eGFR



                                                                 > or = 60 is in

 the



                                                                 Normal RangeeGF

R <



                                                                 60 may mean kid

hang



                                                                 diseaseeGFR < 1

5 may



                                                                 mean kidney



                                                                 failure*** Rang

es



                                                                 recommended by 

the



                                                                 [employer]



                                                                 [employer],



                                                                 http://nkdep.ni

h.gov

 

             eGFR Non-AA (test >60.00                    N            eGFR (sheba

mated



             code = eGFR Non-AA) mL/min/1.73 m2                           Glomer

ular



                                                                 Filtration Rate

) is



                                                                 an estimated va

lue,



                                                                 calculated from

 the



                                                                 patient's serum



                                                                 creatinine usin

g the



                                                                 MDRD equation. 

It is



                                                                 NOT the patient

's



                                                                 actual GFR. The

 eGFR



                                                                 provides a more



                                                                 clinically usef

ul



                                                                 measure of kidn

ey



                                                                 disease than se

rum



                                                                 creatinine



                                                                 alone.***This



                                                                 calculation rimma

es



                                                                 sex and race in

to



                                                                 account, if the



                                                                 information is



                                                                 provided. If th

e



                                                                 race is not



                                                                 provided, and t

he



                                                                 patient is



                                                                 -Crystal

n,



                                                                 multiply by 1.2

12.



                                                                 If sex is not



                                                                 provided, and t

he



                                                                 patient is fema

le,



                                                                 multiply by 0.7

42.



                                                                 Results for pat

ients



                                                                 <18 years of ag

e



                                                                 have not been



                                                                 validated by [facility]



                                                                 MDRD study and



                                                                 should be



                                                                 interpreted wit

h



                                                                 caution. eGFR R

esult



                                                                 Interpretation:

eGFR



                                                                 > or = 60 is in

 the



                                                                 Normal RangeeGF

R <



                                                                 60 may mean kid

hang



                                                                 diseaseeGFR < 1

5 may



                                                                 mean kidney



                                                                 failure*** Rang

es



                                                                 recommended by 

the



                                                                 [employer]



                                                                 [employer],



                                                                 http://nkdep.ni

h.gov



Complete Blood Count with [id number] 07:29:42





             Test Item    Value        Reference Range Interpretation Comments

 

             WBC (test code = WBC) 9.3 x10      4.4-10.5                  

 

             RBC (test code = RBC) 4.71 x10     3.75-5.20                 

 

             Hgb (test code = Hgb) 14.4 g/dL    12.2-14.8                 

 

             MCV (test code = MCV) 92.10 fL     80..00              

 

             Hct (test code = Hct) 43.4 %       36.5-44.4                 

 

             MCHC (test code = 33.20 g/dL   32.00-37.50               



             MCHC)                                               

 

             RDW CV (test code = 13.4 %       11.5-14.5                 



             RDW CV)                                             

 

             MCH (test code = MCH) 30.6 pg      27.0-32.5                 

 

             Platelets (test code = 325.0 x10    140.0-440.0               



             Platelets)                                          

 

             MPV (test code = MPV) 9.8 fL                    N            

 

             Slide Review (test Auto         Auto                      Result cr

eated by



             code = Slide Review)                                        [email or web address]_

SLIDE_REV_AUTO

 

             nRBC (test code = 0                         N            



             nRBC)                                               

 

             NRBC Abs (test code = 0.00 x10                  N            



             NRBC Abs)                                           

 

             IPF (test code = IPF) 0 %                       N            



Automated [id number] 07:29:42





             Test Item    Value        Reference Range Interpretation Comments

 

             Neutro Auto (test code = Neutro 66.2 %       36.0-70.0             

    



             Auto)                                               

 

             Lymph Auto (test code = Lymph Auto) 21.1 %       12.0-44.0         

        

 

             Mono Auto (test code = Mono Auto) 6.8 %        0.0-11.0            

      

 

             Eos, Auto (test code = Eos, Auto) 4.8 %        0.0-7.0             

      

 

             Basophil Auto (test code = Basophil 0.9 %        0.0-2.0           

        



             Auto)                                               

 

             Neutro Absolute (test code = Neutro 6.2 x10      1.6-7.4           

        



             Absolute)                                           

 

             Lymph Absolute (test code = Lymph 1.97 x10     .50-4.60            

      



             Absolute)                                           

 

             Mono Absolute (test code = Mono .63 x10      .00-1.20              

    



             Absolute)                                           

 

             Eos Absolute (test code = Eos 0.45 x10     0.00-0.74               

  



             Absolute)                                           

 

             Baso Absolute (test code = Baso 0.08 x10     0.00-0.21             

    



             Absolute)                                           



IG [id number] 07:29:42





             Test Item    Value        Reference Range Interpretation Comments

 

             IG (test code = IG) 0.2 %        0.0-5.0                   

 

             IG Abs (test code = IG Abs) 0 x10                     N            



Thyroid Stimulating [id number] 04:30:30





             Test Item    Value        Reference Range Interpretation Comments

 

             TSH (test code = TSH) 1.360 mIU/mL 0.270-4.200               



RPR [id number] 04:06:17





             Test Item    Value        Reference Range Interpretation Comments

 

             RPR Qual (test code = RPR Qual) Non-Reactive Non-Reactive          

    

 

             Reactive Control (test code = Reactive                             

  



             Reactive Control)                                        

 

             Weak Reactive Control (test Weak Reactive                          

 



             code = Weak Reactive Control)                                      

  

 

             Non-Reactive Control (test code Non-Reactive                       

    



             = Non-Reactive Control)                                        

 

             Lot # (test code = Lot #) [id number]                    N            

 

             Expiration Dt (test code = 10-                N            



             Expiration Dt)                                        



Hemoglobin A1c2019-11-15 18:37:54





             Test Item    Value        Reference Range Interpretation Comments

 

             Hemoglobin A1c (test code 4.7 %        4.8-5.9      L            No

n Diabetic



             = Hemoglobin A1c)                                        4.8-5.9%Di

abetic <7.0%



Lipid Panel2019-11-15 18:08:58





             Test Item    Value        Reference Range Interpretation Comments

 

             Cholesterol Total 189 mg/dL    0-200                     RISK OF HE

ART



             (test code =                                        DISEASEPublishe

d by



             Cholesterol Total)                                        American 

Heart



                                                                 Association Judith

lyte



                                                                 Optimal Borderl

ine



                                                                 Increased RiskC

HOL <200



                                                                 200-239 >240TRI

G <150



                                                                 150-199 >200HDL

 Male



                                                                 >60 <40HDL Fema

le >60



                                                                 <50LDL <100 130

-159



                                                                 >160LDL [facility] is



                                                                 100-129

 

             Triglycerides (test 112 mg/dL    9-200                     



             code = Triglycerides)                                        

 

             HDL (test code = HDL) 44 mg/dL     50-60        L            

 

             LDL (test code = LDL) 122 mg/dL    0-130                     The eq

uation being used



                                                                 in this calcula

tion is



                                                                 LDL = (Chol - H

DL) -



                                                                 (Trig / 5)

 

             VLDL (test code = 22 mg/dL     5-40                      The equati

on being used



             VLDL)                                               in this calcula

tion is



                                                                 VLDL = Trig / 5

 

             Chol/HDL (test code = 4.3 ratio    0.0-4.4                   



             Chol/HDL)                                           

 

             LDL/HDL Ratio (test 3                         N            The equa

tion being used



             code = LDL/HDL Ratio)                                        in thi

s calculation is



                                                                 LDL/HDL Ratio=L

DL



                                                                 Calc/HDL Chol



Complete Blood Count with Differential2019-11-15 07:51:57





             Test Item    Value        Reference Range Interpretation Comments

 

             WBC (test code = WBC) 10.6 x10     4.4-10.5     H            

 

             RBC (test code = RBC) 4.45 x10     3.75-5.20                 

 

             Hgb (test code = Hgb) 13.5 g/dL    12.2-14.8                 

 

             MCV (test code = MCV) 93.30 fL     80..00              

 

             Hct (test code = Hct) 41.5 %       36.5-44.4                 

 

             MCHC (test code = 32.50 g/dL   32.00-37.50               



             MCHC)                                               

 

             RDW CV (test code = 13.7 %       11.5-14.5                 



             RDW CV)                                             

 

             MCH (test code = MCH) 30.3 pg      27.0-32.5                 

 

             Platelets (test code = 356.0 x10    140.0-440.0               



             Platelets)                                          

 

             MPV (test code = MPV) 9.7 fL                    N            

 

             Slide Review (test Auto         Auto                      Result cr

eated by



             code = Slide Review)                                        [email or web address]_

SLIDE_REV_AUTO

 

             nRBC (test code = 0                         N            



             nRBC)                                               

 

             NRBC Abs (test code = 0.00 x10                  N            



             NRBC Abs)                                           

 

             IPF (test code = IPF) 0 %                       N            



Automated Differential2019-11-15 07:51:57





             Test Item    Value        Reference Range Interpretation Comments

 

             Neutro Auto (test code = Neutro 65.4 %       36.0-70.0             

    



             Auto)                                               

 

             Lymph Auto (test code = Lymph Auto) 23.5 %       12.0-44.0         

        

 

             Mono Auto (test code = Mono Auto) 5.7 %        0.0-11.0            

      

 

             Eos, Auto (test code = Eos, Auto) 4.4 %        0.0-7.0             

      

 

             Basophil Auto (test code = Basophil 0.8 %        0.0-2.0           

        



             Auto)                                               

 

             Neutro Absolute (test code = Neutro 6.9 x10      1.6-7.4           

        



             Absolute)                                           

 

             Lymph Absolute (test code = Lymph 2.49 x10     .50-4.60            

      



             Absolute)                                           

 

             Mono Absolute (test code = Mono .60 x10      .00-1.20              

    



             Absolute)                                           

 

             Eos Absolute (test code = Eos 0.47 x10     0.00-0.74               

  



             Absolute)                                           

 

             Baso Absolute (test code = Baso 0.08 x10     0.00-0.21             

    



             Absolute)                                           



IG Flags2019-11-15 07:51:57





             Test Item    Value        Reference Range Interpretation Comments

 

             IG (test code = IG) 0.2 %        0.0-5.0                   

 

             IG Abs (test code = IG Abs) 0 x10                     N            



Urine [id number] 10:18:52





             Test Item    Value        Reference Range Interpretation Comments

 

             ORGANISM (test code = Escherichia coli                           



             ORGANISM)                                           

 

             Amikacin (test code =                           S            



             Amik)                                               

 

             Ampicillin (test code =                           S            



             Amp)                                                

 

             Ampicillin/Sulbactam                           S            



             (test code = Amp/Sul)                                        

 

             Cefazolin (test code =                           S            



             Cefaz)                                              

 

             Cefepime (test code =                           S            



             Cefep)                                              

 

             Cefotaxime (test code =                           S            



             Cefo)                                               

 

             Ceftazidime (test code                           S            



             = Ceftaz)                                           

 

             Ceftriaxone (test code                           S            



             = Ceftri)                                           

 

             Cefuroxime (test code =                           S            



             Cefur)                                              

 

             Ciprofloxacin (test                           S            



             code = Cipro)                                        

 

             Gentamicin (test code =                           S            



             Gent)                                               

 

             Imipenem (test code =                           S            



             Imi)                                                

 

             Levofloxacin (test code                           S            



             = Levo)                                             

 

             Meropenem (test code =                           S            



             Sindi)                                               

 

             Nitrofurantoin (test                           S            



             code = Nitro)                                        

 

             Piperacillin/Tazobactam                           S            



             (test code = Pip/Blaine)                                        

 

             Tobramycin (test code =                           S            



             Tobra)                                              

 

             Trimethoprim/Sulfa                           S            



             (test code = SXT)                                        

 

             Final Report (test code >=100,000 cfu/ml                           



             = Final Report) Escherichia coli                           



                          >=100,000 cfu/ml Alpha                           



                          hemolytic                              



                          Streptococcus (not                           



                          Group D or                             



                          Enterococcus)                           



 C Urine Added by [email or web address] [id number] 09:18:25





             Test Item    Value        Reference Range Interpretation Comments

 

             Lithium Level (test code = 0.58 mmol/L  0.60-1.20    L            



             Lithium Level)                                        



Urine Drug [id number] 01:36:44





             Test Item    Value        Reference Range Interpretation Comments

 

             Amphetamine Screen Ur POSITIVE     Negative     A            



             (test code = Amphetamine                                        



             Screen Ur)                                          

 

             Barbiturate Screen Ur Negative     Negative                  



             (test code = Barbiturate                                        



             Screen Ur)                                          

 

             Benzodiazepines Ur (test Negative     Negative                  



             code = Benzodiazepines                                        



             Ur)                                                 

 

             Cocaine Screen Ur (test Negative     Negative                  



             code = Cocaine Screen                                        



             Ur)                                                 

 

             U Methadone Scr (test Negative     Negative                  



             code = U Methadone Scr)                                        

 

             Opiate Screen Ur (test Negative     Negative                  



             code = Opiate Screen Ur)                                        

 

             U PCP Scrn (test code = Negative     Negative                  



             U PCP Scrn)                                         

 

             Cannabinoid Screen Ur Negative     Negative                  



             (test code = Cannabinoid                                        



             Screen Ur)                                          

 

             U TCA (test code = U Negative     Negative                  The res

ults of all



             TCA)                                                drug screen elinor

ts are



                                                                 only preliminar

y.



                                                                 Clinical



                                                                 consideration a

nd



                                                                 professional ju

dgment



                                                                 should be appli

ed to



                                                                 any drug of abu

se



                                                                 test result,



                                                                 particularly wh

en



                                                                 preliminary pos

itive



                                                                 results are obt

ained.



                                                                 Please order a



                                                                 separate confir

matory



                                                                 test if desired

.



HCG Qualitative [id number] 01:36:43





             Test Item    Value        Reference Range Interpretation Comments

 

             hCG Ur (test code = Negative                               If the r

esult is



             hCG Ur)                                             "Negative" in p

atients



                                                                 suspected to be



                                                                 pregnant, recom

mend



                                                                 retest with a s

ample



                                                                 obtained 48 to 

72



                                                                 hours later, or

 by



                                                                 ordering a



                                                                 quantitative as

say. If



                                                                 the result is



                                                                 "Borderline" te

sting



                                                                 should be repea

gianfranco in



                                                                 48 to 72 hours.

 

             Lot # (test code = [id number]                    N            



             Lot #)                                              

 

             Expiration Dt (test 2020                N            



             code = Expiration Dt)                                        

 

             Neg Control (test Negative                               



             code = Neg Control)                                        

 

             Pos Control (test Positive                               



             code = Pos Control)                                        

 

             Internal QC (test Acceptable                             



             code = Internal QC)                                        



Urinalysis [id number] 01:36:03





             Test Item    Value        Reference Range Interpretation Comments

 

             UA WBC (test code = UA WBC) 0-5          0-5                       

 

             UA RBC (test code = UA RBC) None Seen    0-5                       

 

             UA Bacteria (test code = UA Moderate                  A            



             Bacteria)                                           

 

             UA Squam Epithelial (test code = UA 0-5                            

        



             Squam Epithelial)                                        



Comprehensive Metabolic [id number] 01:23:40





             Test Item    Value        Reference Range Interpretation Comments

 

             Sodium Level (test code = Sodium 139.0 mmol/L 135.0-145.0          

     



             Level)                                              

 

             Potassium Level (test code = 4.4 mmol/L   3.5-5.1                  

 



             Potassium Level)                                        

 

             Chloride Level (test code = 102 mmol/L                       



             Chloride Level)                                        

 

             CO2 (test code = CO2) 23 mmol/L    22-29                     

 

             Anion Gap (test code = Anion 14 mmol/L    7-16                     

 



             Gap)                                                

 

             BUN (test code = BUN) 9.10 mg/dL   6.00-20.00                

 

             Creatinine Level (test code = 1.00 mg/dL   0.50-0.90    H          

  



             Creatinine Level)                                        

 

             BUN/Creat Ratio (test code = 9                         N           

 



             BUN/Creat Ratio)                                        

 

             Glucose Level (test code = 115 mg/dL                        



             Glucose Level)                                        

 

             Calcium Level (test code = 10.1 mg/dL   8.3-10.5                  



             Calcium Level)                                        

 

             Alk Phos (test code = Alk Phos) 106 U/L             H        

    

 

             Bilirubin Total (test code = 0.4 mg/dL    0.1-0.9                  

 



             Bilirubin Total)                                        

 

             Albumin Level (test code = 4.6 g/dL     3.5-5.2                   



             Albumin Level)                                        

 

             Protein Total (test code = 7.7 g/dL     6.4-8.3                   



             Protein Total)                                        

 

             ALT (test code = ALT) 12 U/L       1-33                      

 

             AST (test code = AST) 18 U/L       1-32                      

 

             Globulin (test code = Globulin) 3.1 g/dL     2.9-3.1               

    

 

             A/G Ratio (test code = A/G 1.5 ratio                 N            



             Ratio)                                              



Comprehensive Metabolic [id number] 01:23:40





             Test Item    Value        Reference Range Interpretation Comments

 

             Sodium Level (test 139.0 mmol/L 135.0-145.0               



             code = Sodium Level)                                        

 

             Potassium Level 4.4 mmol/L   3.5-5.1                   



             (test code =                                        



             Potassium Level)                                        

 

             Chloride Level (test 102 mmol/L                       



             code = Chloride                                        



             Level)                                              

 

             CO2 (test code = 23 mmol/L    22-29                     



             CO2)                                                

 

             Anion Gap (test code 14 mmol/L    7-16                      



             = Anion Gap)                                        

 

             BUN (test code = 9.10 mg/dL   6.00-20.00                



             BUN)                                                

 

             Creatinine Level 1.00 mg/dL   0.50-0.90    H            



             (test code =                                        



             Creatinine Level)                                        

 

             BUN/Creat Ratio 9                         N            



             (test code =                                        



             BUN/Creat Ratio)                                        

 

             Glucose Level (test 115 mg/dL                        



             code = Glucose                                        



             Level)                                              

 

             Calcium Level (test 10.1 mg/dL   8.3-10.5                  



             code = Calcium                                        



             Level)                                              

 

             Alk Phos (test code 106 U/L             H            



             = Alk Phos)                                         

 

             Bilirubin Total 0.4 mg/dL    0.1-0.9                   



             (test code =                                        



             Bilirubin Total)                                        

 

             Albumin Level (test 4.6 g/dL     3.5-5.2                   



             code = Albumin                                        



             Level)                                              

 

             Protein Total (test 7.7 g/dL     6.4-8.3                   



             code = Protein                                        



             Total)                                              

 

             ALT (test code = 12 U/L       1-33                      



             ALT)                                                

 

             AST (test code = 18 U/L       1-32                      



             AST)                                                

 

             Globulin (test code 3.1 g/dL     2.9-3.1                   



             = Globulin)                                         

 

             A/G Ratio (test code 1.5 ratio                 N            



             = A/G Ratio)                                        

 

             eGFR AA (test code = >60                       N            eGFR (e

stimated



             eGFR AA)     mL/min/1.73 m2                           Glomerular



                                                                 Filtration Rate

) is



                                                                 an estimated va

lue,



                                                                 calculated from

 the



                                                                 patient's serum



                                                                 creatinine usin

g the



                                                                 MDRD equation. 

It is



                                                                 NOT the patient

's



                                                                 actual GFR. The

 eGFR



                                                                 provides a more



                                                                 clinically usef

ul



                                                                 measure of kidn

ey



                                                                 disease than se

rum



                                                                 creatinine



                                                                 alone.***This



                                                                 calculation rimma

es



                                                                 sex and race in

to



                                                                 account, if the



                                                                 information is



                                                                 provided. If th

e



                                                                 race is not



                                                                 provided, and t

he



                                                                 patient is



                                                                 -Crystal

n,



                                                                 multiply by 1.2

12.



                                                                 If sex is not



                                                                 provided, and t

he



                                                                 patient is fema

le,



                                                                 multiply by 0.7

42.



                                                                 Results for pat

ients



                                                                 <18 years of ag

e



                                                                 have not been



                                                                 validated by th

e



                                                                 MDRD study and



                                                                 should be



                                                                 interpreted wit

h



                                                                 caution. eGFR R

esult



                                                                 Interpretation:

eGFR



                                                                 > or = 60 is in

 the



                                                                 Normal RangeeGF

R <



                                                                 60 may mean kid

hang



                                                                 diseaseeGFR < 1

5 may



                                                                 mean kidney



                                                                 failure*** Rang

es



                                                                 recommended by 

the



                                                                 [employer]



                                                                 [employer],



                                                                 http://nkdep.ni

h.gov



Comprehensive Metabolic [id number] 01:23:40





             Test Item    Value        Reference Range Interpretation Comments

 

             Sodium Level (test 139.0 mmol/L 135.0-145.0               



             code = Sodium Level)                                        

 

             Potassium Level 4.4 mmol/L   3.5-5.1                   



             (test code =                                        



             Potassium Level)                                        

 

             Chloride Level (test 102 mmol/L                       



             code = Chloride                                        



             Level)                                              

 

             CO2 (test code = 23 mmol/L    22-29                     



             CO2)                                                

 

             Anion Gap (test code 14 mmol/L    7-16                      



             = Anion Gap)                                        

 

             BUN (test code = 9.10 mg/dL   6.00-20.00                



             BUN)                                                

 

             Creatinine Level 1.00 mg/dL   0.50-0.90    H            



             (test code =                                        



             Creatinine Level)                                        

 

             BUN/Creat Ratio 9                         N            



             (test code =                                        



             BUN/Creat Ratio)                                        

 

             Glucose Level (test 115 mg/dL                        



             code = Glucose                                        



             Level)                                              

 

             Calcium Level (test 10.1 mg/dL   8.3-10.5                  



             code = Calcium                                        



             Level)                                              

 

             Alk Phos (test code 106 U/L             H            



             = Alk Phos)                                         

 

             Bilirubin Total 0.4 mg/dL    0.1-0.9                   



             (test code =                                        



             Bilirubin Total)                                        

 

             Albumin Level (test 4.6 g/dL     3.5-5.2                   



             code = Albumin                                        



             Level)                                              

 

             Protein Total (test 7.7 g/dL     6.4-8.3                   



             code = Protein                                        



             Total)                                              

 

             ALT (test code = 12 U/L       1-33                      



             ALT)                                                

 

             AST (test code = 18 U/L       1-32                      



             AST)                                                

 

             Globulin (test code 3.1 g/dL     2.9-3.1                   



             = Globulin)                                         

 

             A/G Ratio (test code 1.5 ratio                 N            



             = A/G Ratio)                                        

 

             eGFR AA (test code = >60                       N            eGFR (e

stimated



             eGFR AA)     mL/min/1.73 m2                           Glomerular



                                                                 Filtration Rate

) is



                                                                 an estimated va

lue,



                                                                 calculated from

 the



                                                                 patient's serum



                                                                 creatinine usin

g the



                                                                 MDRD equation. 

It is



                                                                 NOT the patient

's



                                                                 actual GFR. The

 eGFR



                                                                 provides a more



                                                                 clinically usef

ul



                                                                 measure of kidn

ey



                                                                 disease than se

rum



                                                                 creatinine



                                                                 alone.***This



                                                                 calculation rimma

es



                                                                 sex and race in

to



                                                                 account, if the



                                                                 information is



                                                                 provided. If th

e



                                                                 race is not



                                                                 provided, and t

he



                                                                 patient is



                                                                 -Crystal

n,



                                                                 multiply by 1.2

12.



                                                                 If sex is not



                                                                 provided, and t

he



                                                                 patient is fema

le,



                                                                 multiply by 0.7

42.



                                                                 Results for pat

ients



                                                                 <18 years of ag

e



                                                                 have not been



                                                                 validated by th

e



                                                                 MDRD study and



                                                                 should be



                                                                 interpreted wit

h



                                                                 caution. eGFR R

esult



                                                                 Interpretation:

eGFR



                                                                 > or = 60 is in

 the



                                                                 Normal RangeeGF

R <



                                                                 60 may mean kid

hang



                                                                 diseaseeGFR < 1

5 may



                                                                 mean kidney



                                                                 failure*** Rang

es



                                                                 recommended by 

the



                                                                 [employer]



                                                                 [employer],



                                                                 http://nkdep.ni

h.gov

 

             eGFR Non-AA (test 58.45                     N            eGFR (sheba

mated



             code = eGFR Non-AA) mL/min/1.73 m2                           Glomer

ular



                                                                 Filtration Rate

) is



                                                                 an estimated va

lue,



                                                                 calculated from

 the



                                                                 patient's serum



                                                                 creatinine usin

g the



                                                                 MDRD equation. 

It is



                                                                 NOT the patient

's



                                                                 actual GFR. The

 eGFR



                                                                 provides a more



                                                                 clinically usef

ul



                                                                 measure of kidn

ey



                                                                 disease than se

rum



                                                                 creatinine



                                                                 alone.***This



                                                                 calculation rimma

es



                                                                 sex and race in

to



                                                                 account, if the



                                                                 information is



                                                                 provided. If th

e



                                                                 race is not



                                                                 provided, and t

he



                                                                 patient is



                                                                 -Crystal

n,



                                                                 multiply by 1.2

12.



                                                                 If sex is not



                                                                 provided, and t

he



                                                                 patient is fema

le,



                                                                 multiply by 0.7

42.



                                                                 Results for pat

ients



                                                                 <18 years of ag

e



                                                                 have not been



                                                                 validated by th

e



                                                                 MDRD study and



                                                                 should be



                                                                 interpreted wit

h



                                                                 caution. eGFR R

esult



                                                                 Interpretation:

eGFR



                                                                 > or = 60 is in

 the



                                                                 Normal RangeeGF

R <



                                                                 60 may mean kid

hang



                                                                 diseaseeGFR < 1

5 may



                                                                 mean kidney



                                                                 failure*** Rang

es



                                                                 recommended by 

the



                                                                 [employer]



                                                                 [employer],



                                                                 http://nkdep.ni

h.gov



Alcohol [id number] 01:23:31





             Test Item    Value        Reference Range Interpretation Comments

 

             Ethanol Level (test <0.00 g/dL   0.00-0.01                 [facility]

gianfranco 0.080 g/dL



             code = Ethanol                                        or more



             Level)                                              

 

             Ethanol Inst (test <0                        N            



             code = Ethanol Inst)                                        



Complete Blood Count with [id number] 00:56:12





             Test Item    Value        Reference Range Interpretation Comments

 

             WBC (test code = WBC) 19.4 x10     4.4-10.5     H            

 

             RBC (test code = RBC) 4.53 x10     3.75-5.20                 

 

             Hgb (test code = Hgb) 13.5 g/dL    12.2-14.8                 

 

             Hct (test code = Hct) 41.3 %       36.5-44.4                 

 

             MCV (test code = MCV) 91.20 fL     80..00              

 

             MCHC (test code = 32.70 g/dL   32.00-37.50               



             MCHC)                                               

 

             RDW CV (test code = 13.5 %       11.5-14.5                 



             RDW CV)                                             

 

             MCH (test code = MCH) 29.8 pg      27.0-32.5                 

 

             Platelets (test code = 462.0 x10    140.0-440.0  H            



             Platelets)                                          

 

             MPV (test code = MPV) 9.9 fL                    N            

 

             Slide Review (test Auto         Auto                      Result cr

eated by



             code = Slide Review)                                        [email or web address]

[email or web address]

 

             nRBC (test code = 0                         N            



             nRBC)                                               

 

             NRBC Abs (test code = 0.00 x10                  N            



             NRBC Abs)                                           

 

             IPF (test code = IPF) 0 %                       N            



Automated [id number] 00:56:12





             Test Item    Value        Reference Range Interpretation Comments

 

             Neutro Auto (test code = Neutro 83.7 %       36.0-70.0    H        

    



             Auto)                                               

 

             Lymph Auto (test code = Lymph Auto) 8.9 %        12.0-44.0    L    

        

 

             Mono Auto (test code = Mono Auto) 5.0 %        0.0-11.0            

      

 

             Eos, Auto (test code = Eos, Auto) 1.4 %        0.0-7.0             

      

 

             Basophil Auto (test code = Basophil 0.7 %        0.0-2.0           

        



             Auto)                                               

 

             Neutro Absolute (test code = Neutro 16.2 x10     1.6-7.4      H    

        



             Absolute)                                           

 

             Lymph Absolute (test code = Lymph 1.73 x10     .50-4.60            

      



             Absolute)                                           

 

             Mono Absolute (test code = Mono .96 x10      .00-1.20              

    



             Absolute)                                           

 

             Eos Absolute (test code = Eos 0.27 x10     0.00-0.74               

  



             Absolute)                                           

 

             Baso Absolute (test code = Baso 0.13 x10     0.00-0.21             

    



             Absolute)                                           



IG [id number] 00:56:12





             Test Item    Value        Reference Range Interpretation Comments

 

             IG (test code = IG) 0.3 %        0.0-5.0                   

 

             IG Abs (test code = IG Abs) 0 x10                     N            



Urinalysis with Culture, if [id number] 00:55:34





             Test Item    Value        Reference Range Interpretation Comments

 

             UA Color (test code = STRAW        Yellow                    



             UA Color)                                           

 

             UA Appear (test code = CLEAR        Clear                     



             UA Appear)                                          

 

             UA pH (test code = UA 5                         N            



             pH)                                                 

 

             UA Spec Grav (test code 1.001        1.001-1.035               



             = UA Spec Grav)                                        

 

             UA Glucose (test code = NEG          Negative                  



             UA Glucose)                                         

 

             UA Bili (test code = UA NEG          Negative                  



             Bili)                                               

 

             UA Ketones (test code = NEG          Negative                  



             UA Ketones)                                         

 

             UA Blood (test code = NEG          Negative                  



             UA Blood)                                           

 

             UA Protein (test code = NEG          Negative                  



             UA Protein)                                         

 

             UA Urobilinogen (test 0.2 mg/dL                 N            



             code = UA Urobilinogen)                                        

 

             UA Nitrite (test code = POS          Negative     A            



             UA Nitrite)                                         

 

             UA Leuk Est (test code 25 cells/mcL Negative     A            



             = UA Leuk Est)                                        

 

             UA Micro Ind? (test Indicated    Not Indicated A            Result 

created by



             code = UA Micro Ind?)                                        rule



                                                                 [email or web address]

_IN



                                                                 D



Urine [id number] 08:13:23





             Test Item    Value        Reference Range Interpretation Comments

 

             ORGANISM (test code = Escherichia coli                           



             ORGANISM)                                           

 

             Amikacin (test code =                           S            



             Amik)                                               

 

             Ampicillin (test code =                           S            



             Amp)                                                

 

             Ampicillin/Sulbactam                           S            



             (test code = Amp/Sul)                                        

 

             Cefazolin (test code =                           S            



             Cefaz)                                              

 

             Cefepime (test code =                           S            



             Cefep)                                              

 

             Cefotaxime (test code =                           S            



             Cefo)                                               

 

             Ceftazidime (test code =                           S            



             Ceftaz)                                             

 

             Ceftriaxone (test code =                           S            



             Ceftri)                                             

 

             Cefuroxime (test code =                           S            



             Cefur)                                              

 

             Ciprofloxacin (test code                           S            



             = Cipro)                                            

 

             Gentamicin (test code =                           S            



             Gent)                                               

 

             Imipenem (test code =                           S            



             Imi)                                                

 

             Levofloxacin (test code                           S            



             = Levo)                                             

 

             Meropenem (test code =                           S            



             Sindi)                                               

 

             Nitrofurantoin (test                           S            



             code = Nitro)                                        

 

             Piperacillin/Tazobactam                           S            



             (test code = Pip/Blaine)                                        

 

             Tobramycin (test code =                           S            



             Tobra)                                              

 

             Trimethoprim/Sulfa (test                           S            



             code = SXT)                                         

 

             Final Report (test code 30,000 cfu/ml                           



             = Final Report) Escherichia coli                           



                          20,000 cfu/ml                           



                          Diphtheroids                           



 C Urine Added by [email or web address] Qualitative2019-09-15 04:57:25





             Test Item    Value        Reference Range Interpretation Comments

 

             RPR Qual (test code = RPR Qual) Non-Reactive Non-Reactive          

    

 

             Reactive Control (test code = Reactive                             

  



             Reactive Control)                                        

 

             Weak Reactive Control (test Weak Reactive                          

 



             code = Weak Reactive Control)                                      

  

 

             Non-Reactive Control (test code Non-Reactive                       

    



             = Non-Reactive Control)                                        

 

             Lot # (test code = Lot #) [id number]                    N            

 

             Expiration Dt (test code = 10                N            



             Expiration Dt)                                        



Thyroid Stimulating Hormone2019-09-15 01:22:43





             Test Item    Value        Reference Range Interpretation Comments

 

             TSH (test code = TSH) 3.370 mIU/mL 0.270-4.200               



Lipid Panel2019-09-15 01:17:51





             Test Item    Value        Reference Range Interpretation Comments

 

             Cholesterol Total 168 mg/dL    0-200                     RISK OF HE

ART



             (test code =                                        DISEASEPublishe

d by



             Cholesterol Total)                                        American 

Heart



                                                                 Association Judith

lyte



                                                                 Optimal Borderl

ine



                                                                 Increased RiskC

HOL <200



                                                                 200-239 >240TRI

G <150



                                                                 150-199 >200HDL

 Male



                                                                 >60 <40HDL Fema

le >60



                                                                 <50LDL <100 130

-159



                                                                 >160LDL Near op

timal is



                                                                 100-129

 

             Triglycerides (test 108 mg/dL    9-200                     



             code = Triglycerides)                                        

 

             HDL (test code = HDL) 46 mg/dL     50-60        L            

 

             LDL (test code = LDL) 100 mg/dL    0-130                     The eq

uation being used



                                                                 in this calcula

tion is



                                                                 LDL = (Chol - H

DL) -



                                                                 (Trig / 5)

 

             VLDL (test code = 22 mg/dL     5-40                      The equati

on being used



             VLDL)                                               in this calcula

tion is



                                                                 VLDL = Trig / 5

 

             Chol/HDL (test code = 3.7 ratio    0.0-4.4                   



             Chol/HDL)                                           

 

             LDL/HDL Ratio (test 2                         N            The equa

tion being used



             code = LDL/HDL Ratio)                                        in thi

s calculation is



                                                                 LDL/HDL Ratio=L

DL



                                                                 Calc/HDL Chol



Lithium Level2019-09-15 01:17:51





             Test Item    Value        Reference Range Interpretation Comments

 

             Lithium Level (test code = 0.81 mmol/L  0.60-1.20                 



             Lithium Level)                                        



Comprehensive Metabolic Panel2019-09-15 00:04:54





             Test Item    Value        Reference Range Interpretation Comments

 

             Sodium Level (test code = Sodium 137.0 mmol/L 135.0-145.0          

     



             Level)                                              

 

             Potassium Level (test code = 4.0 mmol/L   3.5-5.1                  

 



             Potassium Level)                                        

 

             Chloride Level (test code = 103 mmol/L                       



             Chloride Level)                                        

 

             CO2 (test code = CO2) 23 mmol/L    22-29                     

 

             Anion Gap (test code = Anion 11 mmol/L    7-16                     

 



             Gap)                                                

 

             BUN (test code = BUN) 11.50 mg/dL  6.00-20.00                

 

             Creatinine Level (test code = 1.00 mg/dL   0.50-0.90    H          

  



             Creatinine Level)                                        

 

             BUN/Creat Ratio (test code = 12                        N           

 



             BUN/Creat Ratio)                                        

 

             Glucose Level (test code = 108 mg/dL                        



             Glucose Level)                                        

 

             Calcium Level (test code = 10.3 mg/dL   8.3-10.5                  



             Calcium Level)                                        

 

             Alk Phos (test code = Alk Phos) 93 U/L                       

    

 

             Bilirubin Total (test code = 0.5 mg/dL    0.1-0.9                  

 



             Bilirubin Total)                                        

 

             Albumin Level (test code = 4.4 g/dL     3.5-5.2                   



             Albumin Level)                                        

 

             Protein Total (test code = 7.2 g/dL     6.4-8.3                   



             Protein Total)                                        

 

             ALT (test code = ALT) 12 U/L       1-33                      

 

             AST (test code = AST) 17 U/L       1-32                      

 

             Globulin (test code = Globulin) 2.8 g/dL     2.9-3.1      L        

    

 

             A/G Ratio (test code = A/G 1.6 ratio                 N            



             Ratio)                                              



Alcohol Level2019-09-15 00:04:54





             Test Item    Value        Reference Range Interpretation Comments

 

             Ethanol Level (test <0.00 g/dL   0.00-0.01                 Intoxica

gianfranco 0.080 g/dL



             code = Ethanol                                        or more



             Level)                                              

 

             Ethanol Inst (test <0                        N            



             code = Ethanol Inst)                                        



Comprehensive Metabolic Panel2019-09-15 00:04:54





             Test Item    Value        Reference Range Interpretation Comments

 

             Sodium Level (test 137.0 mmol/L 135.0-145.0               



             code = Sodium Level)                                        

 

             Potassium Level 4.0 mmol/L   3.5-5.1                   



             (test code =                                        



             Potassium Level)                                        

 

             Chloride Level (test 103 mmol/L                       



             code = Chloride                                        



             Level)                                              

 

             CO2 (test code = 23 mmol/L    22-29                     



             CO2)                                                

 

             Anion Gap (test code 11 mmol/L    7-16                      



             = Anion Gap)                                        

 

             BUN (test code = 11.50 mg/dL  6.00-20.00                



             BUN)                                                

 

             Creatinine Level 1.00 mg/dL   0.50-0.90    H            



             (test code =                                        



             Creatinine Level)                                        

 

             BUN/Creat Ratio 12                        N            



             (test code =                                        



             BUN/Creat Ratio)                                        

 

             Glucose Level (test 108 mg/dL                        



             code = Glucose                                        



             Level)                                              

 

             Calcium Level (test 10.3 mg/dL   8.3-10.5                  



             code = Calcium                                        



             Level)                                              

 

             Alk Phos (test code 93 U/L                           



             = Alk Phos)                                         

 

             Bilirubin Total 0.5 mg/dL    0.1-0.9                   



             (test code =                                        



             Bilirubin Total)                                        

 

             Albumin Level (test 4.4 g/dL     3.5-5.2                   



             code = Albumin                                        



             Level)                                              

 

             Protein Total (test 7.2 g/dL     6.4-8.3                   



             code = Protein                                        



             Total)                                              

 

             ALT (test code = 12 U/L       1-33                      



             ALT)                                                

 

             AST (test code = 17 U/L       1-32                      



             AST)                                                

 

             Globulin (test code 2.8 g/dL     2.9-3.1      L            



             = Globulin)                                         

 

             A/G Ratio (test code 1.6 ratio                 N            



             = A/G Ratio)                                        

 

             eGFR AA (test code = >60                       N            eGFR (e

stimated



             eGFR AA)     mL/min/1.73 m2                           Glomerular



                                                                 Filtration Rate

) is



                                                                 an estimated va

lue,



                                                                 calculated from

 the



                                                                 patient's serum



                                                                 creatinine usin

g the



                                                                 MDRD equation. 

It is



                                                                 NOT the patient

's



                                                                 actual GFR. The

 eGFR



                                                                 provides a more



                                                                 clinically usef

ul



                                                                 measure of kidn

ey



                                                                 disease than se

rum



                                                                 creatinine



                                                                 alone.***This



                                                                 calculation rimma

es



                                                                 sex and race in

to



                                                                 account, if the



                                                                 information is



                                                                 provided. If th

e



                                                                 race is not



                                                                 provided, and t

he



                                                                 patient is



                                                                 -Crystal

n,



                                                                 multiply by 1.2

12.



                                                                 If sex is not



                                                                 provided, and t

he



                                                                 patient is fema

le,



                                                                 multiply by 0.7

42.



                                                                 Results for pat

ients



                                                                 <18 years of ag

e



                                                                 have not been



                                                                 validated by th

e



                                                                 MDRD study and



                                                                 should be



                                                                 interpreted wit

h



                                                                 caution. eGFR R

esult



                                                                 Interpretation:

eGFR



                                                                 > or = 60 is in

 the



                                                                 Normal RangeeGF

R <



                                                                 60 may mean kid

hang



                                                                 diseaseeGFR < 1

5 may



                                                                 mean kidney



                                                                 failure*** Rang

es



                                                                 recommended by 

the



                                                                 [employer]



                                                                 [employer],



                                                                 http://nkdep.ni

h.gov



Comprehensive Metabolic Panel2019-09-15 00:04:54





             Test Item    Value        Reference Range Interpretation Comments

 

             Sodium Level (test 137.0 mmol/L 135.0-145.0               



             code = Sodium Level)                                        

 

             Potassium Level 4.0 mmol/L   3.5-5.1                   



             (test code =                                        



             Potassium Level)                                        

 

             Chloride Level (test 103 mmol/L                       



             code = Chloride                                        



             Level)                                              

 

             CO2 (test code = 23 mmol/L    22-29                     



             CO2)                                                

 

             Anion Gap (test code 11 mmol/L    7-16                      



             = Anion Gap)                                        

 

             BUN (test code = 11.50 mg/dL  6.00-20.00                



             BUN)                                                

 

             Creatinine Level 1.00 mg/dL   0.50-0.90    H            



             (test code =                                        



             Creatinine Level)                                        

 

             BUN/Creat Ratio 12                        N            



             (test code =                                        



             BUN/Creat Ratio)                                        

 

             Glucose Level (test 108 mg/dL                        



             code = Glucose                                        



             Level)                                              

 

             Calcium Level (test 10.3 mg/dL   8.3-10.5                  



             code = Calcium                                        



             Level)                                              

 

             Alk Phos (test code 93 U/L                           



             = Alk Phos)                                         

 

             Bilirubin Total 0.5 mg/dL    0.1-0.9                   



             (test code =                                        



             Bilirubin Total)                                        

 

             Albumin Level (test 4.4 g/dL     3.5-5.2                   



             code = Albumin                                        



             Level)                                              

 

             Protein Total (test 7.2 g/dL     6.4-8.3                   



             code = Protein                                        



             Total)                                              

 

             ALT (test code = 12 U/L       1-33                      



             ALT)                                                

 

             AST (test code = 17 U/L       1-32                      



             AST)                                                

 

             Globulin (test code 2.8 g/dL     2.9-3.1      L            



             = Globulin)                                         

 

             A/G Ratio (test code 1.6 ratio                 N            



             = A/G Ratio)                                        

 

             eGFR AA (test code = >60                       N            eGFR (e

stimated



             eGFR AA)     mL/min/1.73 m2                           Glomerular



                                                                 Filtration Rate

) is



                                                                 an estimated va

lue,



                                                                 calculated from

 the



                                                                 patient's serum



                                                                 creatinine usin

g the



                                                                 MDRD equation. 

It is



                                                                 NOT the patient

's



                                                                 actual GFR. The

 eGFR



                                                                 provides a more



                                                                 clinically usef

ul



                                                                 measure of kidn

ey



                                                                 disease than se

rum



                                                                 creatinine



                                                                 alone.***This



                                                                 calculation rimma

es



                                                                 sex and race in

to



                                                                 account, if the



                                                                 information is



                                                                 provided. If th

e



                                                                 race is not



                                                                 provided, and t

he



                                                                 patient is



                                                                 -Crystal

n,



                                                                 multiply by 1.2

12.



                                                                 If sex is not



                                                                 provided, and t

he



                                                                 patient is fema

le,



                                                                 multiply by 0.7

42.



                                                                 Results for pat

ients



                                                                 <18 years of ag

e



                                                                 have not been



                                                                 validated by th

e



                                                                 MDRD study and



                                                                 should be



                                                                 interpreted wit

h



                                                                 caution. eGFR R

esult



                                                                 Interpretation:

eGFR



                                                                 > or = 60 is in

 the



                                                                 Normal RangeeGF

R <



                                                                 60 may mean kid

hang



                                                                 diseaseeGFR < 1

5 may



                                                                 mean kidney



                                                                 failure*** Rang

es



                                                                 recommended by 

the



                                                                 [employer]



                                                                 [employer],



                                                                 http://nkdep.ni

h.gov

 

             eGFR Non-AA (test 58.45                     N            eGFR (sheba

mated



             code = eGFR Non-AA) mL/min/1.73 m2                           Glomer

ular



                                                                 Filtration Rate

) is



                                                                 an estimated va

lue,



                                                                 calculated from

 the



                                                                 patient's serum



                                                                 creatinine usin

g the



                                                                 MDRD equation. 

It is



                                                                 NOT the patient

's



                                                                 actual GFR. The

 eGFR



                                                                 provides a more



                                                                 clinically usef

ul



                                                                 measure of kidn

ey



                                                                 disease than se

rum



                                                                 creatinine



                                                                 alone.***This



                                                                 calculation rimma

es



                                                                 sex and race in

to



                                                                 account, if the



                                                                 information is



                                                                 provided. If th

e



                                                                 race is not



                                                                 provided, and t

he



                                                                 patient is



                                                                 -Crystal

n,



                                                                 multiply by 1.2

12.



                                                                 If sex is not



                                                                 provided, and t

he



                                                                 patient is fema

le,



                                                                 multiply by 0.7

42.



                                                                 Results for pat

ients



                                                                 <18 years of ag

e



                                                                 have not been



                                                                 validated by th

e



                                                                 MDRD study and



                                                                 should be



                                                                 interpreted wit

h



                                                                 caution. eGFR R

esult



                                                                 Interpretation:

eGFR



                                                                 > or = 60 is in

 the



                                                                 Normal RangeeGF

R <



                                                                 60 may mean kid

hang



                                                                 diseaseeGFR < 1

5 may



                                                                 mean kidney



                                                                 failure*** Rang

es



                                                                 recommended by 

the



                                                                 [employer]



                                                                 [employer],



                                                                 http://nkdep.ni

h.gov



Urinalysis Microscopic2019-09-15 00:02:53





             Test Item    Value        Reference Range Interpretation Comments

 

             UA WBC (test code = UA WBC) 6-10         0-5          A            

 

             UA RBC (test code = UA RBC) 0-5          0-5                       

 

             UA Bacteria (test code = UA Bacteria) Many                      A  

          

 

             UA Squam Epithelial (test code = UA TNTC                      A    

        



             Squam Epithelial)                                        



Urine Drug [id number] 23:59:42





             Test Item    Value        Reference Range Interpretation Comments

 

             Amphetamine Screen Ur POSITIVE     Negative     A            



             (test code = Amphetamine                                        



             Screen Ur)                                          

 

             Barbiturate Screen Ur Negative     Negative                  



             (test code = Barbiturate                                        



             Screen Ur)                                          

 

             Benzodiazepines Ur (test POSITIVE     Negative     A            



             code = Benzodiazepines                                        



             Ur)                                                 

 

             Cocaine Screen Ur (test Negative     Negative                  



             code = Cocaine Screen                                        



             Ur)                                                 

 

             U Methadone Scr (test Negative     Negative                  



             code = U Methadone Scr)                                        

 

             Opiate Screen Ur (test Negative     Negative                  



             code = Opiate Screen Ur)                                        

 

             U PCP Scrn (test code = Negative     Negative                  



             U PCP Scrn)                                         

 

             Cannabinoid Screen Ur Negative     Negative                  



             (test code = Cannabinoid                                        



             Screen Ur)                                          

 

             U TCA (test code = U Negative     Negative                  The res

ults of all



             TCA)                                                drug screen elinor

ts are



                                                                 only preliminar

y.



                                                                 Clinical



                                                                 consideration a

nd



                                                                 professional ju

dgment



                                                                 should be appli

ed to



                                                                 any drug of abu

se



                                                                 test result,



                                                                 particularly wh

en



                                                                 preliminary pos

itive



                                                                 results are obt

ained.



                                                                 Please order a



                                                                 separate confir

matory



                                                                 test if desired

.



HCG Qualitative [id number] 23:54:43





             Test Item    Value        Reference Range Interpretation Comments

 

             hCG Ur (test code = Negative                               If the r

esult is



             hCG Ur)                                             "Negative" in p

atients



                                                                 suspected to be



                                                                 pregnant, recom

mend



                                                                 retest with a s

ample



                                                                 obtained 48 to 

72



                                                                 hours later, or

 by



                                                                 ordering a



                                                                 quantitative as

say. If



                                                                 the result is



                                                                 "Borderline" te

sting



                                                                 should be repea

gianfranco in



                                                                 48 to 72 hours.

 

             Lot # (test code = [id number]                    N            



             Lot #)                                              

 

             Expiration Dt (test 2020                  N            



             code = Expiration Dt)                                        

 

             Neg Control (test Negative                               



             code = Neg Control)                                        

 

             Pos Control (test Positive                               



             code = Pos Control)                                        

 

             Internal QC (test Acceptable                             



             code = Internal QC)                                        



Urinalysis with Culture, if [id number] 23:52:04





             Test Item    Value        Reference Range Interpretation Comments

 

             UA Color (test code = UA YELLO        Yellow                    



             Color)                                              

 

             UA Appear (test code = SCLD         Clear        A            



             UA Appear)                                          

 

             UA pH (test code = UA 6.5                                    



             pH)                                                 

 

             UA Spec Grav (test code 1.011        1.001-1.035               



             = UA Spec Grav)                                        

 

             UA Glucose (test code = NEG          Negative                  



             UA Glucose)                                         

 

             UA Bili (test code = UA NEG          Negative                  



             Bili)                                               

 

             UA Ketones (test code = NEG          Negative                  



             UA Ketones)                                         

 

             UA Blood (test code = UA NEG          Negative                  



             Blood)                                              

 

             UA Protein (test code = NEG          Negative                  



             UA Protein)                                         

 

             UA Urobilinogen (test 1 mg/dL      >0.2         A            



             code = UA Urobilinogen)                                        

 

             UA Nitrite (test code = POS          Negative     A            



             UA Nitrite)                                         

 

             UA Leuk Est (test code = NEG          Negative                  



             UA Leuk Est)                                        

 

             UA Micro Ind? (test code Indicated    Not Indicated A            Re

sult created by



             = UA Micro Ind?)                                        rule



                                                                 [id number]

_IND



Automated [id number] 23:48:39





             Test Item    Value        Reference Range Interpretation Comments

 

             Neutro Auto (test code = Neutro 75.7 %       36.0-70.0    H        

    



             Auto)                                               

 

             Lymph Auto (test code = Lymph Auto) 14.1 %       12.0-44.0         

        

 

             Mono Auto (test code = Mono Auto) 7.6 %        0.0-11.0            

      

 

             Eos, Auto (test code = Eos, Auto) 1.8 %        0.0-7.0             

      

 

             Basophil Auto (test code = Basophil 0.5 %        0.0-2.0           

        



             Auto)                                               

 

             Neutro Absolute (test code = Neutro 12.7 x10     1.6-7.4      H    

        



             Absolute)                                           

 

             Lymph Absolute (test code = Lymph 2.38 x10     .50-4.60            

      



             Absolute)                                           

 

             Mono Absolute (test code = Mono 1.28 x10     .00-1.20     H        

    



             Absolute)                                           

 

             Eos Absolute (test code = Eos 0.31 x10     0.00-0.74               

  



             Absolute)                                           

 

             Baso Absolute (test code = Baso 0.09 x10     0.00-0.21             

    



             Absolute)                                           



IG [id number] 23:48:39





             Test Item    Value        Reference Range Interpretation Comments

 

             IG (test code = IG) 0.3 %        0.0-5.0                   

 

             IG Abs (test code = IG Abs) 0 x10                     N            



Complete Blood Count with [id number] 23:48:38





             Test Item    Value        Reference Range Interpretation Comments

 

             WBC (test code = WBC) 16.8 x10     4.4-10.5     H            

 

             RBC (test code = RBC) 4.06 x10     3.75-5.20                 

 

             Hgb (test code = Hgb) 12.7 g/dL    12.2-14.8                 

 

             MCV (test code = MCV) 94.10 fL     80..00              

 

             Hct (test code = Hct) 38.2 %       36.5-44.4                 

 

             MCHC (test code = 33.20 g/dL   32.00-37.50               



             MCHC)                                               

 

             RDW CV (test code = 13.2 %       11.5-14.5                 



             RDW CV)                                             

 

             MCH (test code = MCH) 31.3 pg      27.0-32.5                 

 

             Platelets (test code = 363.0 x10    140.0-440.0               



             Platelets)                                          

 

             MPV (test code = MPV) 10.0 fL                   N            

 

             Slide Review (test Auto         Auto                      Result cr

eated by



             code = Slide Review)                                        [email or web address]_

SLIDE_REV_AUTO

 

             nRBC (test code = 0                         N            



             nRBC)                                               

 

             NRBC Abs (test code = 0.00 x10                  N            



             NRBC Abs)                                           

 

             IPF (test code = IPF) 0 %                       N            



RPR, [id number] 05:53:00





             Test Item    Value        Reference Range Interpretation Comments

 

             RPR (test code = RPR) Non-Reactive Non-Reactive N            



BHCG, Serum, [id number] 01:39:00





             Test Item    Value        Reference Range Interpretation Comments

 

             Preg Qual [Se] (test code = BSHCG) Negative     Negative     N     

       



BHCG, Serum, [id number] 01:09:00





             Test Item    Value        Reference Range Interpretation Comments

 

             B hCG, Quant (test <1 mIU/mL                              Weeks of 

Gestation



             code = BHCGQT)                                        Ranges (mIU/m

L)3 weeks



                                                                 5.40 - 72.04 we

eks 10.2



                                                                 - 7085 weeks 21

7 - 08106



                                                                 weeks  152 - 32

1777



                                                                 weeks 4059 - 15

[id number]



                                                                 weeks 36359 - 1

[id number]



                                                                 weeks 39860 - 1

[id number]



                                                                 weeks 58360 - 1

[id number]



                                                                 weeks 32577 - 2

[id number]



                                                                 weeks 68510 - 9

[id number]



                                                                 weeks 86241 - 6

[id number]



                                                                 weeks 8904 - 55

23147



                                                                 weeks 8240 - 51

33843



                                                                 weeks 9649 - 55

271



Thyroid Stimulating Hormone (TSH)2017 01:09:00





             Test Item    Value        Reference Range Interpretation Comments

 

             TSH (test code = TSH) 0.93 mIU/mL  0.270-4.200  N            



Lipid [id number] 01:05:00





             Test Item    Value        Reference Range Interpretation Comments

 

             Cholesterol (test 163 mg/dL    0-200        N            



             code = CHOL)                                        

 

             Triglycerides (test 108 mg/dL    9-200        N            



             code = TRIG)                                        

 

             HDL (test code = 41 mg/dL     50-60        L            



             HDL)                                                

 

             Chol/HDL (test code 4.0 Ratio    0.0-4.4      N            



             = CHOLPHDL)                                         

 

             LDL, Calculated 100          0-130        N            (NOTE)RISK O

F HEART



             (test code = LDLC)                                        DISEASEPu

blished by



                                                                 American Heart



                                                                 AssociationAnal

yte



                                                                 Optimal Boderli

ne



                                                                 Increased RiskC

HOL <200



                                                                 200-239 >240TRI

G  <150



                                                                 150-199 >200HDL

 Male:



                                                                 >60 <40HDL Fema

le: >60



                                                                 <50LDL <100 130

-159



                                                                 >160LDL NEAR OP

[facility]



                                                                 100-129

 

             VLDL (test code = 22 mg/dL     5-40         N            



             VLDL)                                               

 

             LDL/HDL (test code = 2                                      



             LDLPHDL)                                            



Comprehensive Metabolic [id number] 21:02:00





             Test Item    Value        Reference Range Interpretation Comments

 

             Sodium (test code = 140 mmol/L   135-145      N            



             NA)                                                 

 

             Potassium (test 3.6 mmol/L   3.5-5.1      N            



             code = K)                                           

 

             Chloride (test code 103 mmol/L          N            



             = CL)                                               

 

             Carbon Dioxide 26 mmol/L    22-29        N            



             (test code = CO2)                                        

 

             Glucose (test code 89 mg/dL            N            



             = GLU)                                              

 

             Blood Urea Nitrogen 13 mg/dL     6-20         N            



             (test code = BUN)                                        

 

             Creatinine (test 0.8 mg/dL    0.5-0.9      N            



             code = CREAT)                                        

 

             Calcium (test code 10.0 mg/dL   8.3-10.5     N            



             = CA)                                               

 

             Prot Total (test 7.0 g/dL     6.4-8.3      N            



             code = TP)                                          

 

             Albumin (test code 4.2 g/dL     3.5-5.2      N            



             = ALB)                                              

 

             A/G Ratio (test 1.5 Ratio                              



             code = AGRATIO)                                        

 

             Globulin (test code 2.8          2.9-3.1      L            



             = GLOB)                                             

 

             Bili Total (test 0.6 mg/dL    0.1-0.9      N            



             code = TBIL)                                        

 

             Alk Phos (test code 91 U/L              N            



             = APHOS)                                            

 

             AST (test code = 19 U/L       1-32         N            



             AST)                                                

 

             ALT (test code = 14 U/L       1-33         N            



             ALT)                                                

 

             BUN/Creatinine 16.3                                   



             Ratio (test code =                                        



             BCRATIO)                                            

 

             Anion Gap (test 11 mmol/L    7-16         N            



             code = AGAP)                                        

 

             Estimated GFR (test >60                                    eGFR (es

timated



             code = GFR)  mL/min/1.73m2                           Glomerular Nguyễn

tration



                                                                 Rate) is an est

imated



                                                                 value,calculate

d from



                                                                 the patient's s

harman



                                                                 creatinine usin

g the



                                                                 MDRD equation.I

t is



                                                                 NOT the patient

's



                                                                 actual GFR. The

 eGFR



                                                                 provides a more



                                                                 clinicallyusefu

l



                                                                 measure of kidn

ey



                                                                 disease than se

rum



                                                                 creatinine



                                                                 alone.***This



                                                                 calculation rimma

es sex



                                                                 and race into



                                                                 account, if the



                                                                 informationis



                                                                 provided. If th

e race



                                                                 is not provided

, and



                                                                 the patient



                                                                 isAfrican-Ameri

can,



                                                                 multiply by 1.2

12. If



                                                                 sex is not prov

ided,



                                                                 and thepatient 

is



                                                                 female, multipl

y by



                                                                 0.742. Results 

for



                                                                 patients <18 ye

ars



                                                                 ofage have not 

been



                                                                 validated by th

e MDRD



                                                                 study and shoul

d be



                                                                 interpretedwith



                                                                 caution.eGFR Re

sult



                                                                 Interpretation:

eGFR >



                                                                 or = 60 is in t

he



                                                                 Normal RangeeGF

R < 60



                                                                 may mean kidney



                                                                 diseaseeGFR < 1

5 may



                                                                 mean kidney



                                                                 failure***Range

s



                                                                 recommended by 

the



                                                                 [employer]



                                                                 Foundation,http

://nkd



                                                                 ep.nih.gov



Alcohol/Ethanol, [id number] 21:02:00





             Test Item    Value        Reference Range Interpretation Comments

 

             Alcohol, Ethyl <0.01 g/dL   0.00-0.01    N            Intoxicated 0

.080 g/dL



             (test code = ETOH)                                        or more



CBC with [id number] 20:35:00





             Test Item    Value        Reference Range Interpretation Comments

 

             WBC (test code = WBC) 10.3 K/cumm  4.4-10.5     N            

 

             RBC (test code = RBC) 4.37 M/cumm  3.75-5.20    N            

 

             Hemoglobin (test code = HGB) 13.1 gm/dL   12.2-14.8    N           

 

 

             Hematocrit (test code = HCT) 41.5 %       36.5-44.4    N           

 

 

             MCV (test code = MCV) 94.9 fL             N            

 

             MCH (test code = MCH) 30.1 pg      27.0-32.5    N            

 

             MCHC (test code = MCHC) 31.7 g/dL    32.0-37.5    L            

 

             RDW (test code = RDW) 12.9 %       11.5-14.5    N            

 

             Platelet Count (test code = 327 K/cumm   140-440      N            



             PLTCT)                                              

 

             MPV (test code = MPV) 7.0 fL                                 

 

             Diff Method (test code = DIFFM) Auto                               

    

 

             Neutrophil (test code = NEUT) 74.0 %       36-70        H          

  

 

             Lymphocyte (test code = LYMPH) 17.6 %       12-44        N         

   

 

             Monocyte (test code = MONO) 6.4 %        0-11         N            

 

             Eosinophil (test code = EOS) 1.5 %        0-7          N           

 

 

             Basophil (test code = BASO) 0.5 %        0-2          N            

 

             Neutro Abs (test code = ANEUT) 7.6 K/cumm   1.6-7.4      H         

   

 

             Lymph Abs (test code = ALYMPH) 1.8 K/cumm   0.5-4.6      N         

   

 

             Mono Abs (test code = AMONO) 0.7 K/cumm   0.0-1.2      N           

 

 

             Eos Abs (test code = AEOS) 0.16 K/cumm  0.00-0.74    N            

 

             Baso Abs (test code = ABASO) 0.1 K/cumm   0.00-0.21    N           

 



[id number] 20:33:00





             Test Item    Value        Reference Range Interpretation Comments

 

             Amphetamine (test code POSITIVE     Negative     A            For d

iagnostic purposes



             = AMPH)                                             only, positive 

results



                                                                 should always b

e



                                                                 assessedin



                                                                 conjunctionwith

 the



                                                                 patient's medic

al



                                                                 history,clinica

l



                                                                 examination and



                                                                 otherfindings.T

o



                                                                 fulfill legal



                                                                 requirements, a

 more



                                                                 specific altern

ate



                                                                 chemical method

must be



                                                                 used inorder to

 obtain



                                                                 a Confirmed judith

lytical



                                                                 result. GC/MS i

s the



                                                                 preferred confi

rmatory



                                                                 method.

 

             Barbiturates (test Negative     Negative     N            



             code = GENEVIEVE)                                        

 

             Benzodiazepine (test Negative     Negative     N            



             code = IRENE)                                        

 

             Cocaine (test code = Negative     Negative     N            



             COCA)                                               

 

             Methadone (test code = Negative     Negative     N            



             MTHD)                                               

 

             Opiates (test code = Negative     Negative     N            



             OPIA)                                               

 

             PCP (test code = PCP) Negative     Negative     N            

 

             Propoxyphene (test Negative     Negative     N            



             code = PROPOX)                                        

 

             THC (test code = THC) Negative     Negative     N            



Urinalysis [id number] 20:23:00





             Test Item    Value        Reference Range Interpretation Comments

 

             Color (test code = COLOR) Yellow       Yellow,Straw,Pl N           

 



                                       yellow                    

 

             Clarity (test code = Sl Cloudy    Clear        A            



             CLAR)                                               

 

             Specific Gravity (test 1.007        1.001-1.035  N            



             code = SPGR)                                        

 

             pH (test code = PH) 6.0          5.0-9.0      N            

 

             Ketone (test code = KET) Negative mg/dL Negative     N            

 

             Glucose (test code = Negative mg/dL Negative     N            



             GLUCUR)                                             

 

             Protein (test code = Negative mg/dL Negative     N            



             PROT)                                               

 

             Bilirubin (test code = Negative mg/dL Negative     N            



             BILI)                                               

 

             Occult Blood (test code = Moderate     Negative     A            



             UDOB)                                               

 

             Urobilinogen (test code = 0.2 mg/dL    0.2-1.0      N            



             UROB)                                               

 

             Nitrite (test code = NIT) Positive     Negative     A            

 

             Leuk Esterase (test code Moderate     Negative     A            



             = LEUK)                                             

 

             Micros Exam (test code = Indicated                              



             MEXAM)                                              

 

             Epithelial Cells (test 50+ /LPF     0-30         A            



             code = EPI)                                         

 

             WBC, Urine (test code = 41-50 /HPF   0-5          A            



             UWBC)                                               

 

             RBC, Urine (test code = 3-5 /HPF     0-5          A            



             URBC)                                               

 

             Bacteria (test code = Many /HPF                              



             BACT)                                               



[id number] 16:21:00





             Test Item    Value        Reference Range Interpretation Comments

 

             Lithium (test code = LI) 0.6 mmol/l   0.6-1.2                   



[facility] (Ultra Sensitive)2017 16:21:00





             Test Item    Value        Reference Range Interpretation Comments

 

             TSH (test code = TSH) 2.14 mIU/L   0.47-4.68                 



[facility]





             Test Item    Value        Reference Range Interpretation Comments

 

             Glucose (test code 77 mg/dl                         



             = GLU)                                              

 

             BUN (test code = 9.0 mg/dl    6.0-17.0                  



             BUN)                                                

 

             Creatinine (test 0.7 mg/dl    0.4-1.2                   



             code = CREA)                                        

 

             Sodium (test code = 139 mmol/l   137-145                   



             NA)                                                 

 

             Potassium (test 4.7 mmol/l   3.5-5.0                   



             code = K)                                           

 

             Chloride (test code 107 mmol/l                       



             = CL)                                               

 

             CO2 (test code = 22 mmol/l    22-30                     



             CO2)                                                

 

             Anion Gap (test 11                                     



             code = GAP)                                         

 

             Calcium (test code 9.8 mg/dl    8.4-10.2                  



             = CALC)                                             

 

             T Protein (test 8.0 gm/dl    5.1-8.7                   



             code = TP)                                          

 

             Albumin (test code 4.4 gm/dl    3.5-4.6                   



             = ALB)                                              

 

             A/G Ratio (test 1.2 %        1.1-2.2                   



             code = AGRAT)                                        

 

             AST (SGOT) (test 20 U/L       11-36                     



             code = AST)                                         

 

             ALT (SGPT) (test 29 U/L       11-40                     



             code = ALT)                                         

 

             Alkaline Phos (test 108 U/L                          



             code = ALKP)                                        

 

             Total Bilirubin 0.6 mg/dl    0.2-1.2                   



             (test code = TBIL)                                        

 

             Globulin (test code 3.6 gm/dl    2.3-3.5      H            



             = GLOBU)                                            

 

             Calcium, Corrected 9.5 mg/dl    8.4-10.2                  Various f

ormulas exist



             (test code =                                        for corrected s

harman



             CALCCORR)                                           calcium results

, each



                                                                 yielding differ

ent



                                                                 values. This co

rrected



                                                                 result was base

d on



                                                                 the formula: Co

rrected



                                                                 Calcium = Serum

Calcium



                                                                 + [0.8 * ( 4 -



                                                                 SerumAlbumin)]

 

             EGFR if  >60                                    



             American (test code mL/min/1.73m\                           



             = EGFRAA)    S\2                                    

 

             EGFR if Non- >60                                    Estimate

d Glomerular



             American (test code mL/min/1.73m\                           Filtrat

ion Rate (eGFR)



             = EGFRNA)    S\2                                    Reference Inter

vals



                                                                 Decision Points

 for 18



                                                                 years and older

 and



                                                                 average body ma

ss: >=



                                                                 60 Does not exc

lude



                                                                 kidney disease.

 30 -



                                                                 59  Suggests mo

derate



                                                                 chronic kidney 

disease



                                                                 and indicates t

he need



                                                                 for further



                                                                 investigation



                                                                 including asses

sment



                                                                 of proteinuria 

and



                                                                 cardiovascular



                                                                 factors. < 30 U

sually



                                                                 indicates a nee

d for



                                                                 referral for



                                                                 assessment and



                                                                 management of c

hronic



                                                                 kidney failure.



[facility]



Notes







                Date/Time       Note            Provider        Source

 

                2023 20:08:00-00:00  [facility]



                                                                



                                 [address] [place] [address]                 



                                                                



                                 Emergency Department Document                 



                                 Signed                         



                                                                



                                Patient: Tyler Judd  Medical Record#: SJ

[id number]                 



                                : 1970 Acct:[id number]               

  



                                Age/Sex: 52 / F  Admit/Reg Date: 23       

          



                                Loc: [facility] Room: Report Number: [id number] 

                



                                Attending Dr: John eMlton MD                 



                                                                



                                                                



                                                                



                                                                



                                Psych HPI                       



                                                                



                                - General                       



                                Nursing note reviewed: Yes                 



                                Source: patient                 



                                Mode of arrival: ambulatory                 



                                Limitations: no limitations                 



                                Primary Care Provider: Wily Genao         

        



                                                                



                                - General                       



                                Chief Complaint: Psychiatric Symptoms           

      



                                Stated Complaint: PINA                 



                                                                



                                - History of Present Illness                 



                                HPI Narrative:                  



                                                                



                                                    51yo f w/ pmh schizophrenia,

 bipolar d/o, htn, hip fx, brought w/ police w/ pina

for psych eval, pt                                  



                                                    states she has a "low mood,"

 has been feeling depressed for a while, has had 

suicidal ideations in                               



                                                    the past but not currently. 

pt states she was here 2 days ago and tested 

positive for                                        



                                                    methamphetamines and she was

 started on depakote and abilify and thinks that 

has helped w/ her                                   



                                                    suicidal ideations however s

till feels depressed. pt was at [facility] 

this morning and they                               



                                                    found a UTI as well as she w

as pregnant. pt denies any other medical 

complaints. pt states she is h                           



                                                    omeless and has been for 3 w

eeks and thinks that is making her depressed. pt 

[facility] and [facility]este

rday and they ignored her. pt states she drinks 

alcohol ocassionally, l                             



                                ast drink 3 wks ago. and smoke 2ppd. uses meth. 

(John Melton)                 



                                                                



                                - Related Data                  



                                 Allergies                      



                                                                



                                                                



                                                                



                                Allergy/AdvReac Type Severity Reaction Status Da

te / Time                 



                                                                



                                diphenhydramine Allergy Anxiety Verified 

3 20:13                 



                                                                



                                [From Benadryl]                 



                                                                



                                Penicillins Allergy Difficulty Verified 23

 20:13                 



                                                                



                                 Breathing                      



                                                                



                                risperidone [From Risperdal] Allergy Rash Verifi

ed 23 20:13                 



                                                                



                                                                



                                                                



                                                                



                                Review of Systems                 



                                ROS:                            



                                                                



                                Constitutional: No fevers, chills, sweats, weigh

t loss                 



                                Eye: No visual problems                 



                                ENMT: No ear pain, nasal congestion, sore throat

                 



                                Respiratory: No shortness of breath, cough      

           



                                                    Cardiovascular: No Chest tan

n, palpitations, syncope, swelling in legs, dyspnea

on exertion                                         



                                                    Gastrointestinal: No nausea,

 vomiting, diarrhea, abdominal pain, no difficulty 

swallowing                                          



                                                    Genitourinary: No hematuria,

 no dysuria, no hesitancy, no frequency, no 

incontinence                                        



                                Hema/Lymph: Negative for bruising tendency, swol

alana lymph glands                 



                                                    Endocrine: Negative for exce

ssive thirst, glucose normal, no heat or cold 

intolerance                                         



                                                    Musculoskeletal: No back tan

n, neck pain, joint pain, muscle pain, decreased 

range of motion                                     



                                Integumentary: No rash, pruritus, abrasions, no 

skin ulcers                 



                                                    Neurologic: No focal weaknes

s, no paresthesia, no headaches, numbness or 

tingling, no seizures or                            



                                tremors (John Melton)                 



                                                                



                                Past Medical/Surgical History                 



                                Medical History:                 



                                Medical History (Last Updated 23 @ 22:14 b

y Sudheer Barakat RN)                 



                                Hypertension (Medical) I10                 



                                                                



                                                                



                                                                



                                Family/Social History                 



                                                                



                                - Family History                 



                                Family History: reviewed, not pertinent         

        



                                Family History Of: None Reported                

 



                                                                



                                - Social History                 



                                Living Situation: Homeless                 



                                Do you drink alcohol: No                 



                                Current or Hx of Recreational Drug use: No      

           



                                                                



                                Physical Exam                   



                                Triage Vital Signs:                 



                                                                



                                                                



                                                                



                                                                



                                Temperature 37.4 C 23 20:06               

  



                                                                



                                Temperature Source Oral 23 20:06          

       



                                                                



                                Pulse Rate 89 23 20:06                 



                                                                



                                Respiratory Rate 17 23 20:06              

   



                                                                



                                Blood Pressure 129/55 L 23 20:06          

       



                                                                



                                Blood Pressure Source Automatic Cuff 23 20

:06                 



                                                                



                                Blood Pressure Mean 79 23 20:06           

      



                                                                



                                Blood Pressure Position Sitting 23 20:06  

               



                                                                



                                O2 Sat by Pulse Oximetry 97 23 20:06      

           



                                                                



                                Oxygen Delivery Method 23 20:06           

      



                                                                



                                                                



                                Physical Exam:                  



                                                                



                                CONSTITUTIONAL: no apparent distress, well appea

ring                 



                                SKIN: warm, dry, no jaundice, hives or petechiae

                 



                                                    EYES: pupils are equally rou

nd, extraocular movements intact without nystagmus,

clear conjunctiva,                                  



                                non-icteric sclera                 



                                                    HENT: normocephalic, atrauma

tic, moist mucus membranes, oropharynx clear 

without exudates                                    



                                                    NECK: Nontender and supple w

ith no nuchal rigidity, no lymphadenopathy, full 

range of motion                                     



                                                    PULMONARY: clear to ausculta

tion without wheezes, rhonchi, or rales, normal 

excursion, no accessory                             



                                muscle use and no stridor                 



                                                    CARDIOVASCULAR: regular rate

, rhythm, normal S1 and S2. No appreciated murmurs.

Strong radial pulses                                



                                with intact distal perfusion                 



                                                    GASTROINTESTINAL: soft, non-

tender, non-distended, no palpable masses, no 

rebound or guarding                                 



                                                    PSYCHIATRIC: depressed mood 

and affect, denies si/hi, denies hallucinations 

(John Melton)                                       



                                                                



                                Results/Orders                  



                                                                



                                - Results and Orders                 



                                Result diagrams:                 



                                  23 20:19                 



                                                                



                                 23 20:19                 



                                                                



                                - Results and Orders                 



                                Lab Testing Results                 



                                                                



                                                    23 20:19: WBC 7.9, RBC

 4.27, Hgb 12.9, Hct 40.2, MCV 94.10, MCH 30.2, 

MCHC 32.10, RDW Coeff of                            



                                                    Amanda 12.4, Plt Count 211.0, M

PV 11.1, Immature Gran % (Auto) 0.3, Neut % (Auto) 

75.8 H, Lymph %                                     



                                                    (Auto) 10.6 L, Mono % (Auto)

 11.8 H, Eos % (Auto) 0.9, Baso % (Auto) 0.6, Neut 

# (Auto) 6.0, Lymph #                               



                                                    (Auto) 0.83, Mono # (Auto) 0

.93, Eos # (Auto) 0.07, Baso # (Auto) 0.05, 

Immature Gran # (Auto) 0.02,                           



                                Absolute Nucleated RBC 0, Nucleated RBC % (auto)

 0                 



                                                    23 20:19: Sodium 142.0

, Potassium 4.2, Chloride 110 H, Carbon Dioxide 26,

Anion Gap 6, BUN 17,                                



                                                    Creatinine 0.88, Estimated C

reat Clear 80.94, Estimated GFR 78 L, 

BUN/Creatinine Ratio 19, Glucose                           



                                                    92, Calculated Osmolality 29

5.0, Calcium 9.1, Total Bilirubin 0.3, AST 22, ALT 

15, Alkaline                                        



                                                    Phosphatase 109, Total Prote

in 6.9, Albumin 3.9, Globulin 3.0, Albumin/Globulin

Ratio 1.3, Ethyl                                    



                                Alcohol < 3                     



                                23 20:19: HCG (Qual) Negative             

    



                                                    23 20:35: Urine Opiate

s Screen Negative, Urine Methadone Screen Negative,

Ur Propoxyphene                                     



                                                    Screen Negative, Ur Barbitur

ates Screen Negative, Ur Phencyclidine Scrn 

Negative, Ur Amphetamines                           



                                                    Screen Negative, U Benzodiaz

epines Scrn Negative, Urine Cocaine Screen 

Negative, U Cannabinoids                            



                                Screen Negative                 



                                                    23 20:35: Urine Color 

Yellow, Urine Clarity Clear, Urine pH 6.5, Ur 

Specific Gravity 1.015,                             



                                                    Urine Protein Negative, Urin

e Glucose (UA) Negative, Urine Ketones Negative, 

Urine Blood Negative,                               



                                                    Urine Nitrate Negative, Urin

e Bilirubin Negative, Urine Urobilinogen 1.0, Ur 

Leukocyte Esterase                                  



                                                    Trace A, Urine WBC (Auto) No

t Reportable, Urine RBC (Auto) Not Reportable, 

Urine Casts (Auto) Not                              



                                                    Reportable, U Epithel Cells 

(Auto) Not Reportable, Urine Bacteria (Auto) Not 

Reportable, Urine RBC                               



                                                    0-2, Urine WBC 0-5, Ur Squam

ous Epith Cells 6-10 A, Amorphous Crystals Few A, 

Urine Bacteria Few A,                               



                                Hyaline Casts 0-5 A                 



                                                                



                                                                



                                MDM/COURSE                      



                                 Vital Signs                    



                                                                



                                                                



                                                                



                                Temperature 37.4 C 23 20:06               

  



                                                                



                                Pulse Rate 89 23 20:06                 



                                                                



                                Respiratory Rate 17 23 20:06              

   



                                                                



                                Blood Pressure 129/55 L 23 20:06          

       



                                                                



                                O2 Sat by Pulse Oximetry 97 23 20:06      

           



                                                                



                                                                



                                                                



                                                                



                                                                



                                                                



                                Temperature 37.4 C 23 21:37               

  



                                                                



                                Pulse Rate 90 23 21:37                 



                                                                



                                Respiratory Rate 18 23 21:37              

   



                                                                



                                Blood Pressure 127/68 23 21:37            

     



                                                                



                                O2 Sat by Pulse Oximetry 99 23 21:37      

           



                                                                



                                                                



                                                                



                                                                



                                - [facility]                           



                                Medical decision making narrative:              

   



                                                                



                                23 20:13                  



                                ddx: depression, meth abuse, homeless           

      



                                                                



                                will get labs, medically clear, get psych eval  

               



                                02/15/23 05:25                  



                                                    Labs returned within normal 

limits, no UTI, patient is not pregnant as she 

stated. Patient medi                                



                                malathi cleared.                  



                                                                



                                                    Patient evaluated by Psychia

try and cleared for discharge with outpatient 

follow-up.                                          



                                                                



                                 Escalation of care including admission/observat

ion considered: None                 



                                                     Discussions of management w

ith other providers e.g. Hospitalist, Consultant, 

Case Mgmt, Pharmacy,                                



                                Radiology: None                 



                                 Discussed with Radiology regarding test interpr

etation: None                 



                                                     Independent interpretation:

 (e.g., EKG, rhythm strips, x-rays, CT, Other): 

None                                                



                                                     Additional patient history 

obtained from (e.g. Partner, Parent, Friend, EMS, 

Other): None                                        



                                                     Review of external non-ED r

ecord (e.g. Inpt record, Office record, Outpt 

record, Prior Outpt labs,                           



                                PCP record, Outside ED record, Other): None     

            



                                 Diagnostic tests considered but not performed: 

None                 



                                 Prescription medications considered but not giv

en: None                 



                                 Chronic conditions affecting care (e.g. HTN, DM

): None                 



                                                     Social Determinants of heal

th significantly affecting care (e.g. homeless): 

None (John Melton)                                  



                                                                



                                Discharge Plan                  



                                                                



                                - Discharge                     



                                Clinical Impression:                 



                                Depression                      



                                Qualifiers:                     



                                                     Depression Type: unspecifie

d Qualified Code(s): F32.A - Depression, 

unspecified                                         



                                                                



                                Disposition: Home or Self-Care                 



                                Condition: Good                 



                                Care Plan Goals:                 



                                Return if any issues or concerns arise.         

        



                                Follow up with your PCP or with one of the [facility] Providers/Clinics:                 



                                                                



                                [facility]                

 



                                [address] [place] [address]         

        



                                [phone]                  



                                                                



                                [facility]                  



                                [address] [place] [address]                 



                                [phone]                    



                                                                



                                [facility]                 



                                 [place] [address]                 



                                [phone]                    



                                                                



                                [facility]                 



                                [address] [place] [address]                 



                                [phone]                    



                                                                



                                Dr. Manoj Montero                 



                                [address]                 



                                [place] [address]                 



                                [phone]                    



                                                                



                                Dr. Yolie Montalvo                 



                                [id number]                 



                                [place] [address]                 



                                [phone]                    



                                                                



                                [facility]           

      



                                [address] [place] [address]             

    



                                [phone]                  



                                                                



                                Return if your condition worsens/return or any i

ssues or concerns arise.                 



                                Referrals:                      



                                Wily Genao MD [Primary Care Provider] -  

               



                                Print Language: English                 



                                                                



                                - Discharge Data                 



                                Time Seen by Provider: 23 19:59           

      



                                                                



                                                                



                                                                



                                Dictated By: John Melton MD                 



                                Signed By: John Melton MD 02/15/23 0526         

        



                                                                



                                                                



                                                                



                                                                



                                                                



                                DD/DT: 23                 



                                TD/TT: 23 : [id number]  

               



                                                                



                                                                



                                cc: [id number]*                    



                                                                



                                Wily Genao MD                 

 

                2020 15:43:00-00:00 [id number]                       [place] [address]                 



                                                                



                                PATIENT NAME: TYLER JUDD ADMIT DATE: 20

                 



                                ACCOUNT NO: [id number] ROOM NO: [facility]        

         



                                MEDICAL RECORD NO: [id number] AGE: 49           

      



                                REPORT TYPE: eECHOCARDIOGRAM REPORT SEX: F      

           



                                                                



                                ADMITTING PHYSICIAN: Derian Ferrara MD        

         



                                ATTENDING PHYSICIAN: Derian Ferrara MD        

         



                                                                



                                                                



                                                                



                                *[facility]*             

    



                                [address] [place], [place] [address]                 



                                Phone [phone]                 



                                                                



                                Transthoracic Echocardiogram                 



                                                                



                                Patient: Tyler Judd                 



                                Study Date: 2020 BP: 97 / 82 Location: [facility]                 



                                URN: [id number] MRN: [id number] Account#: [id number]

[id number]                 



                                : 1970 Age: 49 Height: 62 in / 157.5   

              



                                  cm                            



                                Accession#: [id number] Gender: F Weight: 243

 lb / 110.5                 



                                 kg                             



                                BMI/BSA: 44.5 kg/m 2 /                 



                                 2.26 m 2                       



                                                                



                                *Ordering Physician: * Bee Multani            

     



                                                                



                                *Interpreting Physician: * Savanna GainesSonographer: * Eufemia Hodges                 



                                                                



                                ------------------------------------------------

------------------------                 



                                Indications: NSTEMI.                 



                                                                



                                ------------------------------------------------

------------------------                 



                                Study data: Transthoracic echocardiogram. Proced

ure: Transthoracic                 



                                echocardiography was performed. Image quality wa

s adequate. Intravenous                 



                                contrast (agitated saline) was administered. Com

plete 2D, complete                 



                                spectral Doppler, and color Doppler. Location: [facility]. Patient                 



                                status: Inpatient. Patient room number: ER-4. St

udy status: Stat.                 



                                                                



                                ------------------------------------------------

------------------------                 



                                Findings                        



                                                                



                                Left ventricle: The cavity size is normal. Wall 

thickness is mildly                 



                                increased. The estimated ejection fraction is 65

-69%.                 



                                Right ventricle: The cavity size is normal.     

            



                                Left atrium: The atrium is normal in size.      

           



                                Right atrium: The atrium is normal in size.     

            



                                Atrial septum: Echo contrast study shows no shun

t. 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                 



                                                                



                                PATIENT NAME: TYLER JUDD  ACCOUNT #: [id number]

[id number]                 



                                                                



                                                                



                                                                



                                primum aneurysm.                 



                                Aorta: Aortic root: The aortic root is normal in

 size.                 



                                Aortic valve: The valve is structurally normal. 

The valve is                 



                                trileaflet. There is no evidence of stenosis. Th

ere is mild                 



                                regurgitation.                  



                                Mitral valve: The valve is structurally normal. 

There is mild                 



                                regurgitation.                  



                                Tricuspid valve: The valve is structurally xenia

l. There is mild                 



                                regurgitation.                  



                                Pulmonic valve: The valve is structurally normal

. There is no                 



                                regurgitation.                  



                                Pericardium: There is no pericardial effusion.  

               



                                Pulmonary arteries:                 



                                Not visualized.                 



                                Systemic veins:                 



                                Inferior vena cava: The vessel is normal in size

.                 



                                                                



                                ------------------------------------------------

------------------------                 



                                Measurements                    



                                                                



                                 Left ventricle Value Ref Aortic valve continued

                 



                                Value Ref                       



                                 CLEMENTINA, LAX 5.0 cm 3.8 - 5.2 Mean v, S            

     



                                2.22 m/sec -----                 



                                 ESD, LAX 3.1 cm 2.2 - 3.5 VTI, S               

  



                                64.8 cm -----                   



                                 ESD/bsa, LAX 1.4  cm/m 2 1.3 - 2.1 LVOT/AV, VTI

 ratio                 



                                0.62 -----                      



                                 FS, LAX 37 % 27 - 45 RABIA, VTI                 



                                2.04 cm 2 -----                 



                                 PW, ED  1.3 cm 0.6 - 0.9 RABIA, Vmax             

    



                                1.94 cm 2 -----                 



                                 IVS/PW, ED 0.97 --------- AR peak v            

     



                                3.71 m/sec -----                 



                                 EF  67 % 54 - 74 AR decel time                 



                                2363 ms -----                   



                                 IVRT 80 ms --------- AR PHT                 



                                685 ms -----                    



                                 E', med ruddy, TDI 6.8 cm/sec >=7.0 AR peak grad 

                



                                55 mm Hg -----                  



                                 E/e', med ruddy, TDI 12 ---------                

 



                                  Mitral valve                  



                                Value Ref                       



                                 LVOT Value Ref Peak E                 



                                0.84 m/sec -----                 



                                 Diam, S  2.05 cm --------- Peak A              

   



                                0.9 m/sec -----                 



                                 Area 3.3 cm 2 --------- Decel time             

    



                                324 ms -----                    



                                 Peak renny, S 1.8 m/sec --------- PHT            

     



                                78 ms -----                     



                                 Mean renny, S 1.36 m/sec --------- Peak grad, D  

               



                                2.8 mm Hg -----                 



                                 VTI, S 40.1 cm --------- Peak E/A ratio        

         



                                                                



                                PATIENT NAME: TYLER JUDD ACCOUNT #: [id number]

[id number]                 



                                                                



                                                                



                                                                



                                0.94 -----                      



                                 Peak grad, S 13 mm Hg --------- MVA, PHT       

          



                                2.8 cm 2 -----                  



                                 Mean grad, S 8 mm Hg ---------                 



                                 SV  132 ml --------- Tricuspid valve           

      



                                Value Ref                       



                                 SV/bsa 58 ml/m 2 --------- TR peak v           

      



                                2.83 m/sec <=2.8                 



                                  Peak RV-RA grad, S                 



                                32 mm Hg -----                  



                                 Ventricular septum Value Ref                 



                                 IVS, ED 1.2 cm 0.6 - 0.9 Aortic root           

      



                                Value Ref                       



                                 Root diam, ED MM                 



                                2.85 cm -----                   



                                 Right ventricle Value Ref                 



                                 Pressure, S 37 mm Hg --------- Pulmonary artery

                 



                                Value Ref                       



                                 Pressure, S                    



                                31.0 mm Hg -----                 



                                 Left atrium Value Ref                 



                                 AP dim, ES MM 3.5 cm 2.7 - 3.8 Systemic veins  

               



                                Value Ref                       



                                 LA/Ao root ratio, MM 1.24 --------- Estimated C

                 



                                5 mm Hg -----                   



                                                                



                                 Aortic valve Value Ref Pulmonary veins         

        



                                Value Ref                       



                                 Leaflet sep, MM 1.65 cm --------- A rev jaimeo

n                 



                                140 ms -----                    



                                                                



                                ------------------------------------------------

------------------------                 



                                Conclusions                     



                                                                



                                Summary:                        



                                                                



                                1. Left ventricle: The cavity size is normal. Wa

ll thickness is mildly                 



                                 increased. The estimated ejection fraction is 6

5-69%.                 



                                2. Right ventricle: The RV pressure during systo

le by Doppler is 37 mm                 



                                 Hg.                            



                                3. Atrial septum: Echo contrast study shows no s

hunt. 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                 



                                 primum aneurysm.                 



                                                                



                                Prepared and electronically signed by           

      



                                                                



                                Savanna Gaines MD                 



                                2020 15:43                 



                                                                



                                                                



                                                                



                                                                



                                                                



                                Electronically Signed by DINH Gaines MD on

 20 at 1543                 



                                                                



                                                                



                                PATIENT NAME: TYLER JUDD ACCOUNT #: [id number]

158                 

 

                2020 13:30:00-00:00                                 [facility] ([facility] Discharge Summary                 



                                REPORT#:[id number] REPORT STATUS: Signed         

        



                                DATE:20 TIME:1330                 



                                                                



                                PATIENT: TYLER JUDD UNIT #: [id number]       

          



                                ACCOUNT#: [id number] ROOM/BED: [place] [address]       

          



                                : [birth year] AGE: 49 SEX: F ATTEND: Sanjiv Ferrara MD                 



                                ADM DT: 20 AUTHOR: Bee Multani MD        

         



                                                                



                                * ALL edits or amendments must be made on the CareToSave/computer document *                 



                                                                



                                                                



                                PCP                             



                                                                



                                PCP                             



                                PCP:                            



                                PCP: No Primary or Family Physician             

    



                                                                



                                Discharge to: home                 



                                                                



                                                                



                                General Information                 



                                Date of admission:                 



                                Observation Start Date:                 



                                Date of admission: 20                 



                                                                



                                Discharge date: 20                 



                                Discharge diagnosis:                 



                                see hosp course                 



                                Hospital course:                 



                                HOSP COURSE                     



                                                                



                                48 yo F admitted with Ggeneralised weaknss and C

P night PTA                 



                                                                



                                ASSESSMENT AND PLAN:                 



                                1. Elevated cardiac enzymes. The patient is star

gianfranco on                 



                                weight-based Lovenox. started the patient on asp

irin, statin.                 



                                Hold beta-blocker because of bradycardia        

         



                                        Per cardiology, they feel the elevated c

ardiac enzymes are secondary to demand 

                                        



                                ischemia and not an STEMI                 



                                                                



                                Had CP PTA but resolved- dc home , dc her hoem c

oreq on discharge                 



                                                                



                                2. Symptomatic bradycardia with hypotension. Res

olved                 



                                Off Levophed, off dopamine                 



                                patient reports she may have mistakenly taken mo

re Coreq than her prescribed                 



                                dose at home                    



                                                                



                                3. Leukocytosis, etiology is unclear, However, t

he patient is pancultured.                 



                                Continue the empiric antibiotics, ceftriaxone , 

ID consulted                 



                                po abx on dc                    



                                                                



                                4. History of bipolar. The patient takes lithium

. Lithium level is 0.5                 



                                                                



                                stable for dc                   



                                dc home stable                  



                                                                



                                dc time 33 mins                 



                                Pt. condition on discharge: improved, stable    

             



                                                                



                                Med Rec                         



                                                                



                                Med Rec                         



                                Discharge meds:                 



                                Stop taking the following medications:          

       



                                CARVEDILOL (COREG) 25 MG TAB                 



                                 25 MILLIGRAM ORAL TWICE DAILY WITH MEALS.      

           



                                                                



                                Continue taking these medications:              

   



                                LITHIUM CARBONATE ER (LITHIUM CARBONATE ER) 450 

MG TAB.SA                 



                                 900 MILLIGRAM ORAL DAILY PM                 



                                                                



                                CHOLECALCIFEROL (VITAMIN D3) (VITAMIN D3) 1,000 

UNITS CAP                 



                                 1,000 UNITS ORAL DAILY.                 



                                                                



                                MULTIVITAMIN (MULTI-DAY VITAMIN) 1 TAB TAB      

           



                                 1 TABLET ORAL DAILY.                 



                                                                



                                GABAPENTIN (NEURONTIN) 100 MG CAP               

  



                                 100 MILLIGRAM ORAL THREE TIMES A DAY.          

       



                                                                



                                ALPRAZolam (XANAX) 0.5 MG TAB                 



                                 0.5 MILLIGRAM ORAL EVERY 8 HR AS NEEDED. as nee

ded for ANXIETY                 



                                                                



                                MIRTAZAPINE (REMERON) 30 MG TAB                 



                                 30 MILLIGRAM ORAL DAILY.                 



                                                                



                                ZIPRASIDONE (GEODON) 40 MG CAP                 



                                 40 MILLIGRAM ORAL TWICE DAILY.                 



                                                                



                                MELOXICAM (MOBIC) 7.5 MG TAB                 



                                 7.5 MILLIGRAM ORAL DAILY.                 



                                 Comments:                      



                                 10MG DAILY                     



                                                                



                                ALBUTEROL (PROAIR HFA 90 MCG/ACT) 90 MCG INHALER

                 



                                 1 PUFF INHALATION RT - EVERY 4 HOURS.          

       



                                                                



                                CEPHALEXIN (KEFLEX) 500 MG CAP                 



                                  500 MILLIGRAM ORAL EVERY 6 HOURS.             

    



                                 Qty = 12                       



                                                                



                                                                



                                                                



                                Discharge Instructions                 



                                Activity: as tolerated                 



                                Additional instructions:                 



                                fup with PCP 3-5 days                 



                                Prescriptions: on chart                 



                                Discharge management: greater than 30 mins      

           



                                                                



                                Objective                       



                                                                



                                Physical Exam                   



                                Head/Eyes: atraumatic, clear cornea, EOMI, xenia

l conjunctiva/sclera, normal                 



                                eyelids/periorb., normocephalic, PERRL          

       



                                Cardiovascular: normal capillary refill, regular

 rate rhythm                 



                                Respiratory: aerating well, no distress         

        



                                        Abdomen: non-tender, normal bowel sounds

, soft, no distention, no guarding, no 

                                        



                                hernial, no mass/organomegaly, no rebound       

          



                                Extremities: moves all, normal capillary refill,

 normal range of motion, no                 



                                edema                           



                                Neuro/CNS: alert, oriented X 3                 



                                Psychiatry: normal affect                 



                                                                



                                Electronically Signed by Bee Multani MD on  at 1333                 



                                                                



                                RPT #: [id number]                 



                                ***END OF REPORT***                 



                                                                



                                                                

 

                2020 17:48:00-00:00                                 [facility] ([facility])        

         



                                Infectious Dis. Progress Note                 



                                REPORT#:[id number] REPORT STATUS: Signed         

        



                                DATE:20 TIME:                 



                                                                



                                PATIENT: TYLER JUDD UNIT #: [id number]       

          



                                ACCOUNT#: [id number] ROOM/BED: [place] [address]      

           



                                : [birth year] AGE: 49 SEX: F ATTEND: Sanjiv Ferrara MD                 



                                ADM DT: 20 AUTHOR: Serge Kennedy MD                 



                                                                



                                * ALL edits or amendments must be made on the CareToSave/computer document *                 



                                                                



                                                                



                                Subjective                      



                                Chief Complaint:                 



                                F/u leukocytosis                 



                                HPI:                            



                                Leukocytosis resolving.                 



                                                                



                                Review of Systems                 



                                Constitutional:                 



                                Denies: chills, fever.                 



                                                                



                                Objective                       



                                                                



                                General                         



                                VS/I O:                         



                                Last Documented:                 



                                 Result Date Time                 



                                 Pulse Ox 100  1701                 



                                  B/P 86/47  1701                 



                                 B/P Mean 61  1701                 



                                 Pulse 53  1701                 



                                  Resp 46  1701                 



                                 O2 Delivery Room air  1600                

 



                                 Temp 36.4  1600                 



                                 FiO2  21  0741                 



                                 O2 Flow Rate 0.628055 2052               

  



                                                                



                                Vital Signs                     



                                 Date Temp Pulse Resp B/P B/P Mean Pulse Ox FiO2

                 



                                 - 36.4-37.1 51-67 18-54 /40-77 

  21                 



                                                                



                                24 hour I O ending at 0700:                 



                                  0700  1900                 



                                  Intake Total 4572.60 408.20                 



                                 Output Total 1250 2300                 



                                 Balance 3322.60 -1891.80                 



                                                                



                                 Intake, IV 3432.60 408.20                 



                                 Intake, Oral 1140                 



                                 Number 0                       



                                 Bowel                          



                                 Movements                      



                                 Number Voids 0                 



                                 Output, Urine 1250 2300                 



                                                                



                                Patient Weight                  



                                                                



                                Weight (lb):                    



                                Weight (oz):                    



                                Weight (kg): 110.455                 



                                                                



                                                                



                                Physical Exam                   



                                General appearance: alert, awake                

 



                                Head/Eyes: atraumatic                 



                                Cardiovascular: regular rate rhythm             

    



                                Respiratory: symmetric expansion, no distress   

              



                                Abdomen: soft, no distention                 



                                Musculoskeletal: no joint swelling              

   



                                Neuro/CNS: normal speech                 



                                                                



                                                                



                                Diagnosis, Assessment Plan                 



                                Free Text A P:                  



                                Assessment:                     



                                1. Hypotension, most likely due to NSTEMI.      

           



                                2. Probable septic shock, r/o bacteremia. No res

piratory or urinary symptoms.                 



                                CXR showed no pneumonia. She had diarrhea but it

 is resolving.                 



                                3. Acute kidney failure. Resolving.             

    



                                4. Morbid obesity.                 



                                5. COPD.                        



                                                                



                                Plan:                           



                                1. Leukocytosis resolving.                 



                                2. Continue ceftriaxone. Will likely stop in 1-2

 days depending on culture                 



                                results.                        



                                3. S/p vancomycin IV x 1 dose.                 



                                4. If diarrhea returns, will order stool culture

 and C. difficile.                 



                                                                



                                                                



                                Electronically Signed by Serge Kennedy MD on 20 at 1753                 



                                                                



                                RPT #: [id number]                 



                                ***END OF REPORT***                 



                                                                



                                                                

 

                2020 13:37:00-00:00 [id number]                       [place] [address]                 



                                                                



                                PATIENT NAME: TYLER JUDD ADMIT DATE: 20

                 



                                ACCOUNT NO: [id number] ROOM NO: [facility]        

         



                                MEDICAL RECORD NO: [id number] AGE: 49           

      



                                REPORT TYPE: PROGRESS NOTE SEX: F               

  



                                                                



                                ADMITTING PHYSICIAN: Derian Ferrara MD        

         



                                ATTENDING PHYSICIAN: Derian Ferrara MD        

         



                                                                



                                                                



                                DATE: 2020                 



                                                                



                                The patient is seen in the room. I reviewed the 

chart. At this time, troponin                 



                                is borderline high. The patient is already start

ed on Lovenox. The patient                 



                                                    came with hypertension and w

ith normal saline the patient improved. The patient

                                                    



                                had enteritis before. However, the patient also 

has troponins borderline high                 



                                and Lovenox was started and we will continue to 

follow the patient. Possibly,                 



                                        we have to rule out the possibility of a

n acute myocardial infarction. On this 

                                        



                                lady, a troponin borderline high shows there is 

indication of some myocardial                 



                                ischemia.                       



                                                                



                                From my point of view, the patient is awake and 

alert. We will continue the                 



                                present medication as ordered by Dr. Multani and pr

ary physician intensivist.                 



                                                                



                                Dictated By: DINH Soto MD               

  



                                                                



                                WT: PN:LCortesHIM/BALPA/NTS                 



                                DD: 2020 13:37:18                 



                                DT: 2020 14:57:10                 



                                Conf#: [id number]/DID#: [id number]                 



                                                                



                                Authenticated by Savanna Gianes MD On  01:10:35 PM                 



                                                                



                                                                



                                                                



                                                                



                                                                



                                Electronically Signed by DINH Gaines MD on

 20 at 1310                 



                                                                



                                                                



                                                                



                                                                



                                                                



                                                                



                                                                



                                                                



                                                                



                                                                



                                                                



                                                                



                                                                



                                                                



                                                                



                                PATIENT NAME: TYLER JUDD ACCOUNT #: [id number]

158                 

 

                2020 13:29:00-00:00 [id number]                       [address] [address]                 



                                                                



                                PATIENT NAME: TYLER JUDD ADMIT DATE: 20

                 



                                ACCOUNT NO: [id number] ROOM NO: [place] [address]       

          



                                MEDICAL RECORD NO: [id number] AGE: 49           

      



                                REPORT TYPE: PROGRESS NOTE  SEX: F              

   



                                                                



                                ADMITTING PHYSICIAN: Derian Ferrara MD        

         



                                ATTENDING PHYSICIAN: Derian Ferrara MD        

         



                                                                



                                                                



                                DATE: 2020                 



                                                                



                                SUBJECTIVE: The patient is awake, alert. The pat

ient is feeling better. The                 



                                patient's blood pressure is 110/80. The patient'

s normal saline is going                 



                                intravenously. The patient is off of dopamine an

d epinephrine and IV normal                 



                                saline is continusly infusedd 100cc of normal sa

line / hr. The patient was                 



                                transferred from [facility] where she had s

ome significant enteritis;                 



                                        however, the patient was very severely h

ypotensive and was started on dopamine 

                                        



                                and epinephrine. At this time, my point of view,

 the patient is quite stable.                 



                                 She is off of all vasopressors. She is more ronny

rt and able to answer all the                 



                                questions. I will continue to follow the patient

 cardiac wise and the patient                 



                                may be transferred out of ICU. I will leave the 

decision to the primary care                 



                                and intensivist, borderline increase in troponin

 is noted and investigated                 



                                further..                       



                                                                



                                Dictated By: DINH Soto MD               

  



                                                                



                                WT: PN:EDIN/AMIRAPA/NTS                 



                                DD: 2020 13:29:41                 



                                DT: 2020 14:43:35                 



                                Conf#: [id number]/DID#: [id number]                 



                                                                



                                Authenticated and Edited by Savanna Gaines MD On 20 3:24:06 PM                 



                                                                



                                                                



                                                                



                                                                



                                                                



                                Electronically Signed by DINH Gaines MD on

 20 at 1526                 



                                                                



                                                                



                                                                



                                                                



                                                                



                                                                



                                                                



                                                                



                                                                



                                                                



                                                                



                                                                



                                                                



                                                                



                                PATIENT NAME: TYLER JUDD ACCOUNT #: [id number]

158                 

 

                2020 10:32:00-00:00                                 [facility]        

         



                                Hospitalist Progress Note                 



                                REPORT#:[id number] REPORT STATUS: Signed         

        



                                DATE:20 TIME:1032                 



                                                                



                                PATIENT: TYLER JUDD UNIT #: [id number]       

          



                                ACCOUNT#: [id number] ROOM/BED: [place] [address]      

           



                                : 68 AGE: 49 SEX: F ATTEND: Sanjiv Ferrara MD                 



                                ADM DT: 20 AUTHOR: Bee Multani MD        

         



                                                                



                                * ALL edits or amendments must be made on the [employer] document *                 



                                                                



                                                                



                                Subjective                      



                                Chief Complaint:                 



                                No complaints                   



                                                                



                                Review of Systems                 



                                Respiratory:                    



                                Denies: SOB.                    



                                Cardiovascular:                 



                                Denies: chest pain.                 



                                GI:                             



                                Denies: abdominal pain, diarrhea, nausea, vomiti

ng.                 



                                Heme:                           



                                Denies: bleeding.                 



                                Neuro:                          



                                Denies: headache.                 



                                                                



                                Objective                       



                                                                



                                General                         



                                VS/I O:                         



                                Vital Signs:                    



                                 Date Time Temp Pulse Resp B/P B/P Pulse O2 O2 F

low FiO2                 



                                 Mean Ox Delivery Rate                 



                                  0741  96 Room air 21                 



                                  0710 36.4 60 29 112/58 76 100 Room air   

              



                                  0646 60 29 112/58 79                 



                                  0631 60  28 114/61 83 100                

 



                                  0619 64 26 144/77 105 99                 



                                  0615 62 23 136/73 98 98                 



                                  0601 63 30 136/74 97 96                 



                                  0545 59 28 120/57 83 100                 



                                  0530 58 34 120/58 84 100                 



                                  0515 56 30 115/55 77 100                 



                                  0500 63  21 105/54 77 100                

 



                                  0445 56 25 113/56 80 100                 



                                  0430 58 28 105/59 74 100                 



                                  0416 55 20 98/56 72 100                 



                                  0400 59                  



                                 02/29 0400 24 95/51 70 100                 



                                 02/ 0349 37.1 20                 



                                 02 0346 56 24 102/52 72 99                 



                                 02/ 0330  67 38 132/63 91 99                 



                                 02/ 0315 61 27 101/55 73 99                 



                                 02/ 0300 61 26 92/51 66 98                 



                                 / 0245 62 21 99/54 74  98                 



                                  0231 62 25 108/55 78 99                 



                                 02/ 0215 67 29 123/62 87 98                 



                                 / 0200 64 29 120/57 82 100                 



                                  0145  61 27 100/58 76 99                 



                                 02/ 0130 61 34 98/53 70 99                 



                                  0115 60 27 99/58 75 99                 



                                  0100 60 29 96/52 69 99                 



                                 02 0045 58 27 101/56 73 100                 



                                  0030 61 34 99/56 73 99                 



                                  0015 59 29 94/52 70 99                 



                                  0011  60 26 98/48 69 99                 



                                 02 0006 62 29 83/40 56 99                 



                                  0000 36.8 24                 



                                  0000 62 31 89/51 66 100                 



                                  2358  62 25 92/51 66 100                 



                                  2345 64 27 102/58 77 99                 



                                 02 2330 63 28 96/55 72 100                 



                                  2316 64 25 100/55 73 99                 



                                  2300 63 27 119/58 82 95                 



                                  2245 60 18 97/56 74 99                 



                                  2230 58 25 99/55 72 98                 



                                  2215  56 21 97/54 74 99                 



                                  2200 56 25 105/62 78 99                 



                                  2145 57 32 101/65 78 100                 



                                  2130 57 26 101/58 77 98                 



                                  2116 55 38 105/54 74 95                 



                                  2115 58 42 95                 



                                  2104 58 18 91/50 67 99                 



                                  2100  59 25 99                 



                                 2 99 Room air 0.466500 5 60 27 100                 



                                 2030  61 19 100                 



                                 2015 61 24 100                 



                                 2000 37.1                 



                                 2000 20                  



                                 2000 62 32  100                 



                                  1945 60 38 100                 



                                  1934 61 104/53 75 100                 



                                 02 1916 61 117/53 76 99                 



                                  1900  55 22 104/59 77 100                

 



                                 02/28 1845 53 23 108/57 80 100                 



                                  1816 53 25 108/54 78 99                 



                                  1802 53 20 109/56 80  99                 



                                  1730 53 24 93/54 68                 



                                  1730 36.4 53 24 93/54 67 100 Room air    

             



                                  1700 57 22 110/55 73 100 Room air        

         



                                  1639 57 22 100/57 71 100 Room air        

         



                                  1631 54 102/59 73 97 Room air            

     



                                  1615 58 23 105/62 76 100 Room air        

         



                                  1558 54 110/58 75 100 Room air           

      



                                  1431 57 115/56 75 100 Room air           

      



                                  1409 99 Room air  21                 



                                  1408 57 21 92/52 65 100 Room air         

        



                                  1345 57 22 98/54 68 100                 



                                  1330 57 102/57 72 100 Room air           

      



                                  1246 59 102/57 72                 



                                  1215 59 21 106/57 73 98                 



                                  1200 57 25 106/57 73 99                 



                                  1145 56 107/55 72 100                 



                                  1130 52 27 113/59 77 100                 



                                  1116 53 105/51 69 99                 



                                  1101 51 93/52 65 99                 



                                  1045 52 101/60 73 99                 



                                                                



                                24 hour I O ending at 0700:                 



                                  0700  1900                 



                                 Intake Total 4572.60  408.20                 



                                 Output Total 1250 2300                 



                                 Balance 3322.60 -1891.80                 



                                                                



                                 Intake, IV 3432.60 408.20                 



                                 Intake, Oral 1140                 



                                 Number 0                       



                                 Bowel                          



                                 Movements                      



                                 Number Voids 0                 



                                 Output, Urine 1250 2300                 



                                                                



                                Patient Weight                  



                                                                



                                Weight (lb):                    



                                Weight (oz):                    



                                Weight (kg): 110.455                 



                                                                



                                                                



                                Physical Exam                   



                                General appearance: alert, awake                

 



                                Head/Eyes: atraumatic, clear cornea, EOMI, xenia

l conjunctiva/sclera, normal                 



                                eyelids/periorb., normocephalic, PERRL          

       



                                Cardiovascular: normal capillary refill, regular

 rate rhythm                 



                                Respiratory: aerating well, no distress         

        



                                        Abdomen: non-tender, normal bowel sounds

, soft, no distention, no guarding, no 

                                        



                                hernial, no mass/organomegaly, no rebound       

          



                                Extremities: moves all, normal capillary refill,

 normal range of motion, no                 



                                edema                           



                                Neuro/CNS: alert, oriented X 3                 



                                Psychiatry: normal affect                 



                                                                



                                Results                         



                                Findings/Data:                  



                                Laboratory Tests                 



                                                     



                                 0500 1240                      



                                 Chemistry                      



                                 Sodium (134 - 147 mmol/L) 143                 



                                 Potassium (3.4 - 5.0 mmol/L) 4.2               

  



                                 Chloride (100 - 108 mmol/L)  114 H             

    



                                 Carbon Dioxide (21 - 32 mmol/L) 24             

    



                                 Anion Gap (4.0 - 15.0 GAP calc) 5.0            

     



                                 BUN (7 - 18 MG/DL)  17                 



                                 Creatinine (0.6 - 1.0 MG/DL) 0.9               

  



                                 Glomerular Filtr Rate (>60 estGFR) >=60 max est

imate                 



                                 Glucose (70 - 110 MG/DL)  89                 



                                 Calcium (8.5 - 10.1 MG/DL) 8.3 L               

  



                                 Troponin I (0.000 - 0.045 NG/ML) 0.436 *H      

           



                                                                



                                Laboratory Tests                 



                                                           



                                 0500                           



                                 Hematology                     



                                 WBC (3.5 - 11.0 K/mm3)  15.2 H                 



                                 RBC (4.70 - 6.10 M/mm3) 3.67 L                 



                                 Hgb (10.4 - 14.9 G/DL) 11.3                 



                                 Hct (31.5 - 44.1 %) 35.5                 



                                 MCV (84.5 - 98.6 Fl) 96.7                 



                                 MCH (27.0 - 34.2 pg) 30.8                 



                                 MCHC (31.5 - 34.0 G/DL) 31.8                 



                                 RDW (11.5 - 14.5 SD) 14.2                 



                                 Plt Count (150 - 450 K/mm3) 242.0              

   



                                 MPV (7.0 - 10.5 fL) 10.20                 



                                 Neut % (Auto) (40 - 76 %) 78.8 H               

  



                                 Lymph % (Auto) (20.5 - 51.1 %) 13.1 L          

       



                                 Mono % (Auto) (1.7 - 9.3 %) 6.2                

 



                                 Eos % (Auto) (0.0 - 6.0 %) 1.6                 



                                 Baso % (Auto) (0.0 - 2.0 %) 0.3                

 



                                 Neut # (Auto) (1.8 - 7.6 K/mm3) 11.94 H        

         



                                 Lymph # (Auto) (0.6 - 3.2 K/mm3) 2.0           

      



                                 Mono # (Auto) (0.3 - 1.1 K/mm3) 0.9            

     



                                  Eos # (Auto) (0.0 - 0.4 K/mm3) 0.2            

     



                                 Baso # (Auto) (0.0 - 0.1 K/mm3) 0.1            

     



                                 Add Manual Diff (CRITERIA DIFF/SCN) NO         

        



                                                                



                                                                



                                                                



                                                                



                                Diagnosis, Assessment Plan                 



                                                                



                                Free Text DxA P Notes                 



                                Free Text DxA P Notes:                 



                                                                



                                ASSESSMENT AND PLAN:                 



                                1. Elevated cardiac enzymes. The patient is star

gianfranco on                 



                                weight-based Lovenox. started the patient on asp

irin, statin.                 



                                Hold beta-blocker because of bradycardia, start 

lisinopril                 



                                        Per cardiology, they feel the elevated c

ardiac enzymes are secondary to demand 

                                        



                                ischemia and not an STEMI                 



                                2. Symptomatic bradycardia with hypotension. Res

olved                 



                                Off Levophed, off dopamine                 



                                3. Leukocytosis, etiology is unclear, However, t

he patient is pancultured.                 



                                Continue the empiric antibiotics, ceftriaxone , 

ID consulted                 



                                4. History of bipolar. The patient takes lithium

. Lithium level is noted                 



                                                                



                                                                



                                Patient is stable, possible transfer to [facility]

y this afternoon                 



                                                                



                                DNR status noted                 



                                                                



                                Electronically Signed by Bee Multani MD on  at 1036                 



                                                                



                                RPT #: [id number]                 



                                ***END OF REPORT***                 



                                                                



                                                                

 

                2020 21:26:00-00:00                                 [facility]



                                [facility] ([facility])        

         



                                Pharmacy Prog.Note-Vancomycin                 



                                REPORT#:[id number] REPORT STATUS: Signed         

        



                                DATE:20 TIME:                 



                                                                



                                PATIENT: TYLER JUDD UNIT #: [id number]       

          



                                ACCOUNT#: [id number] ROOM/BED: [place] [address]      

           



                                : 68 AGE: 49 SEX: F ATTEND: Sanjiv Ferrara MD                 



                                ADM DT: 20 AUTHOR: Shirin Garcia [facility]   

              



                                                                



                                * ALL edits or amendments must be made on the el

[employer]/computer document *                 



                                                                



                                                                



                                Vancomycin                      



                                                                



                                Vancomycin                      



                                Treatment plan: ONE TIME DOSE                 



                                Rationale:                      



                                 WOULD LIKE ONE TIME DOSE OF 2gm VANCO

MYCIN. HE WILL FOLLOW UP                 



                                        TOMORROW AND EVALUATE PATIENT IF THEY NE

ED CONTINUATION OF THERAPY - 100% RBTO 

                                        



                                .                     



                                                                



                                Electronically Signed by Shirin Garcia [facility] on

 20 at 2127                 



                                                                



                                RPT #: [id number]                 



                                ***END OF REPORT***                 



                                                                



                                                                

 

                2020 20:27:00-00:00                                 [facility] ([facility])        

         



                                Infect Disease Consult Note                 



                                REPORT#:[id number] REPORT STATUS: Signed         

        



                                DATE:20 TIME:                 



                                                                



                                PATIENT: TYLER JUDD UNIT #: [id number]       

          



                                ACCOUNT#: [id number] ROOM/BED: L.ICU07-1      

           



                                : [birth year] AGE: 49 SEX: F ATTEND: Sanjiv Ferrara MD                 



                                ADM DT: 20 AUTHOR: Serge Kennedy MD                 



                                                                



                                * ALL edits or amendments must be made on the [employer]/computer document *                 



                                                                



                                                                



                                History of Present Illness                 



                                Requesting Clinician: Dr. Multani                 



                                Reason for consult:                 



                                Sepsis                          



                                Chief complaint:                 



                                Chest pain                      



                                HPI:                            



                                48 yo female with h/o COPD, bipolar disorder, ob

esity who presented with                 



                                worsening intermittent sharp/pressure chest pain

 for 2-3 days. Pt also had                 



                                liquid diarrhea for 3 days, which is now resolve

d. She denied fever, chills,                 



                                cough, sore throat, dysuria, vomiting. Pt was ad

mitted for hypotension due to                 



                                NSTEMI vs septic shock as WBC was elevated.     

            



                                                                



                                History - Adult longitudinal                 



                                Past medical history:                 



                                Reports: COPD, Depression/mood disorder, Hyperte

nsion.                 



                                Past surgical history:                 



                                Reports: Cholecystectomy.                 



                                Additional surgical history:                 



                                ankle surgery, jaw reconstruction, ear sx       

          



                                Smoking status for patients 13 years old or olde

r: Current every day smoker                 



                                Other social history: Local resident            

     



                                Allergies:                      



                                Coded Allergies:                 



                                Penicillins (Severe, SWELLING 16)         

        



                                                                



                                Ambulatory status: Independent                 



                                                                



                                Review of Systems                 



                                Constitutional:                 



                                Denies: chills, fever.                 



                                Skin:                           



                                Denies: rash.                   



                                Allergy/Immun:                  



                                Denies hives                    



                                Eyes:                           



                                Denies discharge                 



                                ENT:                            



                                Denies: sinus problem, sore throat.             

    



                                Respiratory:                    



                                Reports: SOB.                   



                                Cardiovascular:                 



                                Reports: chest pain.                 



                                GI:                             



                                Reports: diarrhea. Denies: nausea, vomiting.    

             



                                :                             



                                Denies: dysuria.                 



                                Musculoskeletal:                 



                                Denies: extremity swelling.                 



                                Neuro:                          



                                Denies: seizure.                 



                                                                



                                Objective                       



                                                                



                                Physical Exam                   



                                General appearance: obese, alert, awake, oriente

d                 



                                Head/eyes: atraumatic, clear cornea, EOMI, normo

cephalic                 



                                ENT: moist mucosal membranes, normal nose, xenia

l pharynx, normal sinus                 



                                Neck: full range of motion, non-tender, normal t

hyroid                 



                                Cardiovascular: regular rate rhythm             

    



                                Respiratory: symmetric expansion, no distress   

              



                                Abdomen: non-tender, normal bowel sounds, soft, 

no distention, no guarding                 



                                Extremities: no clubbing, no cyanosis, no edema 

                



                                Musculoskeletal: no joint swelling              

   



                                Neuro/CNS: alert, oriented X 3, normal speech   

              



                                Skin: intact, no rash                 



                                Psychiatry: normal affect, normal mood          

       



                                                                



                                                                



                                Diagnosis, Assessment Plan                 



                                                                



                                Free Text DxA P Notes                 



                                Free text DxA P notes:                 



                                Assessment:                     



                                                    1. Hypotension, most likely 

due to NSTEMI although septic shock also within the

                                                    



                                differential.                   



                                2. Probable septic shock, r/o bacteremia. No res

piratory or urinary symptoms.                 



                                CXR showed no pneumonia. She had diarrhea but it

 is resolving.                 



                                3. Acute kidney failure.                 



                                4. Morbid obesity.                 



                                5. COPD.                        



                                                                



                                Plan:                           



                                1. No recent hospitalization per pt, thus this c

an be treated as community                 



                                acquired infection.                 



                                2. Increase ceftriaxone to 2 g IV daily.        

         



                                3. Vancomycin IV x 1 dose.                 



                                4. F/u blood cultures.                 



                                5. If diarrhea persists, will order stool cultur

e and C. difficile.                 



                                                                



                                I spent 45 minutes evaluating and examining the 

patient. Thank you for this                 



                                consult.                        



                                                                



                                Electronically Signed by Serge Kennedy MD on 20 at 2037                 



                                                                



                                RPT #: [id number]                 



                                ***END OF REPORT***                 



                                                                



                                                                

 

                2020 11:32:00-00:00 [id number]                       [place] [address]                 



                                                                



                                PATIENT NAME: TYLER JUDD ADMIT DATE: 20

                 



                                ACCOUNT NO: [id number] ROOM NO: [place] [address]       

          



                                MEDICAL RECORD NO: [id number] AGE: 49           

      



                                REPORT TYPE: CONSULTATION SEX: F                

 



                                                                



                                ADMITTING PHYSICIAN: Derian Ferrara MD        

         



                                ATTENDING PHYSICIAN: Derian Ferrara MD        

         



                                                                



                                                                



                                CONSULTATION DATE: 2020                 



                                                                



                                CONSULTING PHYSICIAN: DINH Soto MD      

           



                                                                



                                                    I am on-call for cardiology 

at [facility]. I saw this patient stat

                                                    



                                consult from the hospitalist. The patient was se

en in the ER for diagnosis.                 



                                1. Obesity.                     



                                2. Sepsis.                      



                                3. Hypovolemia.                 



                                        4. History of enteritis, multiple loose 

bowel movements for the last 3 days to 

                                        



                                4 days.                         



                                5. History of lap band surgery was undone.      

           



                                6. History of hypertension.                 



                                7. Possible bronchitis.                 



                                                                



                                HISTORY OF PRESENT ILLNESS: The patient is trans

ferred from [facility].                 



                                She stayed for 2 days for diarrhea. At this time

, I was asked to evaluate the                 



                                patient because of borderline troponin, loss of 

bradycardia 50 per minute and                 



                                                    the patient's blood pressure

 90/70. I examined the patient, reviewed the chart.

                                                    



                                 At this time, EKG does not show any acute ische

flaco changes and the patient's                 



                                troponin is borderline high and WBC count 25,000

 per cubic mm. The patient's                 



                                chest x-ray was normal and the patient's laborat

ory evaluation shows the                 



                                        patient's GFR is about 39 and the patien

t's creatinine is about 1.5 and BUN is 

                                        



                                about 34.                       



                                                                



                                        The patient's echocardiogram are reviewe

d, but it shows only ejection fraction 

                                        



                                65% and without any significant valvular abnorma

lities. No evidence of                 



                                pericardial effusion and no evidence of wall mot

ion abnormalities. At this                 



                                time, the patient did not have a myocardial infa

rction. Borderline increased                 



                                troponin, troponin leak. The patient has got sep

sis and hypotension and I                 



                                        ordered a 500 mL saline bolus followed 2

50 mL normal saline at 3:00 hours, and 

                                        



                                slowly taper out dopamine and Levophed. The zaki

ent will go to ICU. I will                 



                                follow the patient there.                 



                                                                



                                At this time, the patient is in quite stable con

dition. The patient does not                 



                                                    need further cardiac evaluat

ion. We will repeat the troponin in the morning and

                                                    



                                follow the patient along with other physicians i

ncluding the hospitalist.                 



                                                                



                                The patient is quite comfortable.               

  



                                                                



                                Dictated By: DINH Soto MD               

  



                                                                



                                                                



                                PATIENT NAME: TYLER JUDD ACCOUNT #: [id number]

158                 



                                                                



                                                                



                                                                



                                WT: CON:L.HIM/MILTON/NTS                 



                                DD: 2020 11:32:35                 



                                DT: 2020 15:10:23                 



                                Conf#: [id number]/DID#: [id number]                 



                                                                



                                Authenticated by Savanna Gaines MD On  10:48:16 AM                 



                                                                



                                                                



                                                                



                                                                



                                                                



                                Electronically Signed by DINH Gaines MD on

 20 at 1048                 



                                                                



                                                                



                                                                



                                                                



                                                                



                                                                



                                                                



                                                                



                                                                



                                                                



                                                                



                                                                



                                                                



                                                                



                                                                



                                                                



                                                                



                                                                



                                                                



                                                                



                                                                



                                                                



                                                                



                                                                



                                                                



                                                                



                                                                



                                                                



                                                                



                                                                



                                                                



                                                                



                                                                



                                                                



                                                                



                                                                



                                                                



                                                                



                                                                



                                                                



                                                                



                                                                



                                                                



                                PATIENT NAME: TYLER JUDD ACCOUNT #: [id number]

158                 

 

                2020 08:46:00-00:00                                 [facility] ([facility])        

         



                                Hospitalist History Physical                 



                                REPORT#:[id number] REPORT STATUS: Signed         

        



                                DATE:20 TIME:0846                 



                                                                



                                PATIENT: TYLER JUDD UNIT #: [id number]       

          



                                ACCOUNT#: [id number] ROOM/BED: [place] [address]      

           



                                : 68 AGE: 49 SEX: F ATTEND: Sanjiv Ferrara MD                 



                                ADM DT: 20 AUTHOR: Bee Multani MD        

         



                                                                



                                * ALL edits or amendments must be made on the CareToSave/computer document *                 



                                                                



                                                                



                                History of Present Illness                 



                                                                



                                HPI                             



                                Chief complaint:                 



                                CHEST PAIN, weakness                 



                                                                



                                History                         



                                                                



                                Medication/Allergy-Vaccine Hx                 



                                Home Medications:                 



                                ALBUTEROL (PROAIR HFA 90 MCG/ACT) 1 PUFF INH RTQ

4H                 



                                ALPRAZolam (XANAX) 0.5 MG PO Q8H PRN PRN ANXIETY

                 



                                CARVEDILOL (COREG) 25 MG PO BID MEALS           

      



                                CHOLECALCIFEROL (VITAMIN D3) (VITAMIN D3) 1,000 

UNITS PO DAILY                 



                                GABAPENTIN (NEURONTIN) 100 MG PO TID            

     



                                LITHIUM CARBONATE  MG PO DAILY PM         

        



                                MELOXICAM (MOBIC) 7.5 MG PO DAILY               

  



                                MIRTAZAPINE (REMERON) 30 MG PO DAILY            

     



                                MULTIVITAMIN (MULTI-DAY VITAMIN) 1 TAB PO DAILY 

                



                                ZIPRASIDONE (GEODON) 40 MG PO BID               

  



                                                                



                                Discontinued Medications                 



                                CIPROFLOXACIN (CIPRO) 750 MG PO Q12H            

     



                                 Discontinued reason: Patient stopped taking    

             



                                HYDROcodone/APAP (NORCO 10/325) 1 TAB PO Q4H PRN

 PRN PAIN                 



                                 Discontinued reason: Patient stopped taking    

             



                                MELOXICAM (MOBIC) 7.5 MG PO BID                 



                                 Discontinued reason: Patient stopped taking    

             



                                                                



                                Allergies:                      



                                Coded Allergies:                 



                                Penicillins (Severe, SWELLING 16)         

        



                                                                



                                                                



                                Objective                       



                                                                



                                Physical Exam                   



                                General appearance: alert, awake                

 



                                                                



                                                                



                                Diagnosis, Assessment Plan                 



                                                                



                                Free Text DxA P Notes                 



                                Free Text DxA P Notes:                 



                                H/P                             



                                dictation ID [id number]                 



                                                                



                                Electronically Signed by Bee Multani MD on  at 0846                 



                                                                



                                RPT #: [id number]                 



                                ***END OF REPORT***                 



                                                                



                                                                

 

                2020 08:43:00-00:00 [id number]                       [address] [address]                 



                                                                



                                PATIENT NAME: TYLER JUDD ADMIT DATE: 20

                 



                                ACCOUNT NO: [id number] ROOM NO: [place] [address]       

          



                                MEDICAL RECORD NO: [id number] AGE: 49           

      



                                REPORT TYPE: HISTORY AND PHYSICAL SEX: F        

         



                                                                



                                ADMITTING PHYSICIAN: Derian Ferrara MD        

         



                                ATTENDING PHYSICIAN: Derian Ferrara MD        

         



                                                                



                                                                



                                ADMISSION DATE: 2020                 



                                                                



                                PRIMARY CARE PHYSICIAN: Unknown.                

 



                                                                



                                CHIEF COMPLAINT: Generalized weakness and chest 

pain.                 



                                                                



                                HISTORY OF PRESENT ILLNESS: The patient is a 49-

year-old female with past                 



                                medical history of bipolar disorder, schizoaffec

tive disorder, COPD, restless                 



                                        leg syndrome, and bronchitis. The patien

t was last well 2 days ago. Yesterday, 

                                        



                                she had an episode of chest pain, lasted about 5

 minutes, retrosternal,                 



                                nonradiating, described as sharp. The patient sa

t down to rest. Subsequently,                 



                                        chest pain went away. She reported at th

at time that she had some shortness of 

                                        



                                                    breath and some dizziness an

d subsequently when the pain went away, the patient

                                                    



                                                    went outside to pick her ana maria

l, but she could not make it because she was so 

weak                                                



                                                    and she actually sat down in

 front of her house. She called 911 EMS and she was

                                                    



                                taken to [facility]. At [facility], she was                 



                                                    noticed to be hypotensive, s

ystolic blood pressure in the 70s, temperature 

36.9,                                               



                                        respiratory rate of 20, and heart rate o

f 50. Her labs were abnormal with REG. 

                                        



                                Creatinine of 1.9.  Troponin was 0.421 NT-BNP wa

s 2. The patient was given                 



                                IV fluids, normal saline 3 L boluses and she was

 given ceftriaxone 2 g IV and                 



                                        transferred to [facility] f

or further evaluation. In the ER here, 

                                        



                                                    the patient was noted to be 

hypotensive 93/48 and she was still bradycardic 

down                                                



                                to 248. The patient's EKG shows sinus jon at 5

0 with prolonged QT of 508,                 



                                Q-wave in II, III, and aVF. The patient's tropon

ins were elevated at 0.55 and                 



                                the next one was 0.359 here. The patient was giv

en Lovenox 1 mg/kg bodyweight                 



                                every 12 hours, was started on dopamine drip and

 Levophed. The patient at the                 



                                bedside is complaining of weakness. She denies a

ny chest pain.                 



                                                                



                                PAST MEDICAL HISTORY: As mentioned above.       

          



                                                                



                                PAST SURGICAL HISTORY:                 



                                1. Cholecystectomy.                 



                                2. Right ankle surgery.                 



                                                                



                                SOCIAL HISTORY: Smokes 2 packs of cigarettes a d

ay. Denies any alcohol. She                 



                                does meth.                      



                                                                



                                FAMILY HISTORY: The father had an MI.           

      



                                                                



                                ALLERGIES: PENICILLIN AND RISPERIDONE.          

       



                                                                



                                REVIEW OF SYSTEMS:                 



                                                                



                                PATIENT NAME: TYLER JUDD ACCOUNT #: [id number]

158                 



                                                                



                                                                



                                                                



                                CONSTITUTIONAL: Positive for weakness. Negative 

for fevers.                 



                                CARDIOVASCULAR: Positive for chest pain. Positiv

e for shortness of breath,                 



                                negative for any pitting edema.                 



                                                                



                                REVIEW OF SYSTEMS: A 14-point review of system w

as done and was otherwise                 



                                negative except as mentioned above.             

    



                                                                



                                PHYSICAL EXAMINATION:                 



                                VITAL SIGNS: The patient is afebrile, temperatur

e 36.7, heart rate 55,                 



                                                    respiratory rate of 17, bloo

d pressure 97/82, O2 saturation is 96% on room air.

                                                    



                                GENERAL: The patient is awake and alert, oriente

d x3.                 



                                HEENT: Head is normocephalic and atraumatic. Pup

ils equal and reactive to                 



                                light.                          



                                NECK: No cervical adenopathy or thyromegaly. No 

jugular venous distention.                 



                                CHEST: Clear to auscultation bilaterally. No whe

ezing or rhonchi.                 



                                CARDIOVASCULAR: S1 and S2. No murmur or added so

unds. The patient is                 



                                bradycardic.                    



                                ABDOMEN: Soft, nontender, and nondistended. No o

rganomegaly.                 



                                EXTREMITIES: No cyanosis, clubbing, or edema.   

              



                                NEUROLOGICAL: Nonfocal. Noted that the patient i

s obese.                 



                                                                



                                LABS AND IMAGING: CBC: WBC 24.2, hemoglobin 11.5

, and platelet count of 234.                 



                                CMP: Sodium 135, potassium 4.0, creatinine 1.5. 

 Troponins 0.55 and 0.399.                 



                                        Chest x-ray shows no acute cardiopulmona

ry disease. EKG with both bradycardia, 

                                        



                                sinus jon 50, , Q-waves in II, III, and 

aVF.                 



                                                                



                                ASSESSMENT AND PLAN:                 



                                1. Non-ST elevation myocardial infarction. The p

atient is started on                 



                                weight-based Lovenox. I have called the cardiolo

gist, Dr. Gaines who                 



                                evaluated the patient, started the patient on as

pirin, statin. We will hold                 



                                beta blockers and ACE inhibitor secondary to hyp

otension and acute kidney                 



                                injury.                         



                                2. Symptomatic bradycardia with hypotension. Nor

mal sinus rhythm, but the                 



                                patient is symptomatic with hypotension. At this

 point, I will get a stat                 



                                echocardiogram. This is likely secondary to non-

ST-elevation myocardial                 



                                infarction. Continue management for non-ST-eleva

tion myocardial infarction.                 



                                We will continue dopamine drip to titrate for he

art rate of 50. Also on                 



                                Levophed                        



                                3. Leukocytosis, etiology is unclear, may be rel

ated to non-ST-                 



                                elevation myocardial infarction. However, the pa

tient is pancultured.                 



                                Continue the empiric antibiotics, ceftriaxone 1 

g q. 24 hours.                 



                                4. History of bipolar. The patient takes lithium

. I will check a lithium                 



                                level to make sure that some of the symptoms are

 not related to Lithium                 



                                toxicity.                       



                                                                



                                Other comorbidities, we will resume the patient'

s home medications                 



                                and adjust as needed.                 



                                                                



                                Deep venous thrombosis prophylaxis. The patient 

is on                 



                                Lovenox.                        



                                                                



                                Addendum- she says she is DNR, also wanted to ea

t so NPO order was dcd                 



                                                                



                                Critical time spent: I spent 50 mins evaluating 

patient including history and                 



                                                                



                                PATIENT NAME: TYLER JUDD ACCOUNT #: [id number]

158                 



                                                                



                                                                



                                                                



                                examination, medication management and review of

 labs and imaging and                 



                                discussing with the staff                 



                                                                



                                Dictated By: Bee Multani MD                 



                                                                



                                WT: HP:L.HIM/NICOL/NTS                 



                                DD: 2020 08:43:38                 



                                                                



                                DT: 2020 12:45:32                 



                                Conf#: [id number]/DID#: [id number]                 



                                                                



                                Authenticated and Edited by Bee Multani MD O

n 20 7:01:30 PM                 



                                                                



                                                                



                                                                



                                                                



                                                                



                                Electronically Signed by Bee Multani MD on  at 1903                 



                                                                



                                                                



                                                                



                                                                



                                                                



                                                                



                                                                



                                                                



                                                                



                                                                



                                                                



                                                                



                                                                



                                                                



                                                                



                                                                



                                                                



                                                                



                                                                



                                                                



                                                                



                                                                



                                                                



                                                                



                                                                



                                                                



                                                                



                                                                



                                                                



                                                                



                                                                



                                                                



                                                                



                                                                



                                                                



                                                                



                                                                



                                PATIENT NAME: TYLER JUDD ACCOUNT #: [id number]

[id number]                 

 

                2020 06:37:00-00:00 [id number]                       [address] [address]                 



                                                                



                                PATIENT NAME: TYLER JUDD ADMIT DATE: 20

                 



                                ACCOUNT NO: [id number] ROOM NO: L.S201        

         



                                MEDICAL RECORD NO: [id number] AGE: 49           

      



                                REPORT TYPE: eELECTROCARDIOGRAM SEX: F          

       



                                                                



                                ADMITTING PHYSICIAN: Derian Ferrara MD        

         



                                ATTENDING PHYSICIAN: Derian Ferrara MD        

         



                                                                



                                                                



                                Order:                          



                                [id number]                   



                                Test Reason : (Not Selected)                 



                                 Test Date/Time Stamp:                 



                                 06:37:22                 



                                Blood Pressure : 081/039 mmHG                 



                                Vent. Rate : 058 BPM Atrial Rate : 058 BPM      

           



                                 P-R Int : 154 ms QRS Dur : 088 ms              

   



                                 QT Int : 528 ms P-R-T Axes : 089 065 180 degree

s                 



                                 QTc Int : 518 ms                 



                                                                



                                Sinus bradycardia                 



                                Nonspecific ST and T wave abnormality           

      



                                Prolonged QT                    



                                Abnormal ECG                    



                                When compared with ECG of 2020 06:33, (Un

confirmed)                 



                                T wave inversion less evident in Lateral leads  

               



                                QT has lengthened                 



                                Confirmed by SAVANNA RODRIGUEZ MD () on 3

/2020 3:25:18 PM                 



                                                                



                                Referred By: Self Referred Confirmed by:SAVANNA GEORGE MD                 



                                                                



                                                                



                                                                



                                                                



                                                                



                                Electronically Signed by DINH Gaines MD on

 20 at 1525                 



                                                                



                                                                



                                                                



                                                                



                                                                



                                                                



                                                                



                                                                



                                                                



                                                                



                                                                



                                                                



                                                                



                                                                



                                                                



                                                                



                                PATIENT NAME: TYLER JUDD ACCOUNT #: [id number]

[id number]                 

 

                2020 06:33:00-00:00 [id number]                       [place] [address]                 



                                                                



                                PATIENT NAME: TYLER JUDD ADMIT DATE: 20

                 



                                ACCOUNT NO: [id number] ROOM NO: [facility]        

         



                                MEDICAL RECORD NO: [id number] AGE: 49           

      



                                REPORT TYPE: eELECTROCARDIOGRAM SEX: F          

       



                                                                



                                ADMITTING PHYSICIAN: Derian Ferrara MD        

         



                                ATTENDING PHYSICIAN: Derian Ferrara MD        

         



                                                                



                                                                



                                Order:                          



                                [id number]                   



                                Test Reason : (Not Selected)                 



                                 Test Date/Time Stamp:                 



                                 06:33:44                 



                                Blood Pressure : 081/039 mmHG                 



                                Vent. Rate : 061 BPM Atrial Rate : 061 BPM      

           



                                 P-R Int : 142 ms QRS Dur : 086 ms              

   



                                 QT Int : 436 ms P-R-T Axes : 090 065 180 degree

s                 



                                 QTc Int : 438 ms                 



                                                                



                                Normal sinus rhythm with sinus arrhythmia       

          



                                ST and T wave abnormality, consider lateral isch

emia                 



                                Abnormal ECG                    



                                When compared with ECG of 2020 03:20, (Un

confirmed)                 



                                ST now depressed in Inferior leads              

   



                                Nonspecific T wave abnormality now evident in In

ferior leads                 



                                T wave inversion now evident in Lateral leads   

              



                                QT has shortened                 



                                Confirmed by SAVANNA RODRIGUEZ MD () on 3

/2020 3:25:24 PM                 



                                                                



                                Referred By: Self Referred Confirmed by:SAVANNA GEORGE MD                 



                                                                



                                                                



                                                                



                                                                



                                                                



                                Electronically Signed by DINH Gaines MD on

 20 at 1525                 



                                                                



                                                                



                                                                



                                                                



                                                                



                                                                



                                                                



                                                                



                                                                



                                                                



                                                                



                                                                



                                                                



                                                                



                                                                



                                PATIENT NAME: TYLER JUDD ACCOUNT #: [id number]

158                 

 

                2020 03:34:00-00:00                                 [facility] ([facility]        

         



                                EMERGENCY PROVIDER REPORT                 



                                REPORT#:[id number] REPORT STATUS: Signed         

        



                                DATE:20 TIME:033                 



                                                                



                                PATIENT: TYLER JUDD UNIT #: [id number]       

          



                                ACCOUNT#: [id number] ROOM/BED: [place] [address]      

           



                                : 66 AGE: 49 SEX: F PCP PHYS: No Primar

y or Family Physician                 



                                SERVICE DT: 20 AUTHOR: Kristy Quiros MD  

               



                                                                



                                * ALL edits or amendments must be made on the CareToSave/computer document *                 



                                                                



                                                                



                                HPI-Chest Pain 40 and Over                 



                                                                



                                General                         



                                Confirmed Patient Yes                 



                                Patient Type New patient                 



                                Initial Greet Date/Time 20 0323           

      



                                                                



                                Presentation                    



                                Chief Complaint Shortness of breath, Diarrhea   

              



                                Hx Obtained From Patient                 



                                Sudden in Onset? Yes                 



                                Onset Occurred Today                 



                                Symptom Duration Since onset                 



                                Progression since Onset Unchanged               

  



                                )( Migration/Movement None                 



                                Severity: Onset Moderate                 



                                Severity: Current Mild                 



                                Exacerbated by Nothing                 



                                Relieved by Nothing                 



                                                                



                                Context                         



                                Immunization Status                 



                                 General Unknown                 



                                                                



                                Free Text HPI Notes                 



                                Free Text HPI Notes                 



                                        48 y/o F w/ PMHx of HTN, COPD, bipolar d

isorder, and schizophrenia presents to 

                                        



                                ED from [facility] for low blood pressure n

oted for 2 days. Also w/ SOB,                 



                                and diarrhea x 2 days. Per EMS, pt was bradycard

ic and with troponin of 0.42.                 



                                Denies fever, chills and N/V.                 



                                                                



                                                    Portions of this section wer

e scribed by Génesis Matias on 20 at 

0553                                                



                                                                



                                Risk-Chest Pain 40 and Over                 



                                                                



                                Risk Stratification                 



                                )( Coronary Artery Disease Risk factors reviewed

, Hypertension                 



                                )( Thoracic Aortic Dissection Risk factors revie

wed, Hypertension                 



                                )( Pulmonary Embolism Risk factors reviewed     

            



                                )( AMI-Aspirin                  



                                 Aspirin Last 24 Hrs Not indicated              

   



                                )( HEART for MACE                 



                                 )( HEART for MACE Response Value               

  



                                 History Mod index of suspicion 1               

  



                                 ECG Interpretation Nonspec repol disturb 1     

            



                                 Age  Age 45 - 65 1                 



                                 Risk Factors for CAD 1-2 CAD risk factors 1    

             



                                 Troponin 1 to 3x NL troponin 1                 



                                  Total 5                       



                                                                



                                                                



                                                    Portions of this section wer

e scribed by Génesis Matias on 20 at 

0454                                                



                                                                



                                Review of Systems                 



                                                                



                                ROS Statements                  



                                All systems rev neg except as marked.           

      



                                                                



                                Free Text ROS Notes                 



                                Free Text ROS Notes                 



                                -Constitutional                 



                                Denies: Fever. Chills.                 



                                -GI                             



                                Denies: Nausea, Vomiting. Abdominal pain. consti

pation                 



                                + diarrhea                      



                                -                             



                                Denies: Dysuria, Hematuria,                 



                                -Musculoskeletal                 



                                Denies: Extremity pain, Ext Swelling            

     



                                -Skin                           



                                Denies: Rash, Swelling.                 



                                -Neurologic                     



                                Denies: Numbness, Tingling.                 



                                -Eyes                           



                                Denies:visual loss, blurred vision              

   



                                -Respiratory                    



                                Denies: dyspnea on exertion                 



                                + SOB                           



                                -Cardiovascular                 



                                Denies: Chest pain, Dyspnea on exertion, Edema. 

                



                                -Allergy:                       



                                Denies Hives, Itching                 



                                                                



                                                                



                                                    Portions of this section caesar vargas scribed by Génesis Matias on 20 at 

0338                                                



                                                                



                                Past Medical History - Adult                 



                                Stated Complaint FROM [facility]; HYPOTENSI

ON                 



                                Allergies                       



                                Coded Allergies:                 



                                Penicillins (Severe, SWELLING 16)         

        



                                                                



                                Home Medications                 



                                Discontinued Scripts                 



                                CIPROFLOXACIN (CIPRO) 750 MG PO Q12H            

     



                                 CIPROFLOXACIN (CIPRO) 750 MG PO Q12H #14 TAB   

              



                                 Prov: 16                 



                                 DC: 20 0347 Patient stopped taking       

          



                                HYDROcodone/APAP (NORCO 10/325) 1 TAB PO Q4H PRN

 PRN PAIN                 



                                 HYDROcodone/APAP (NORCO 10/325) 1 TAB PO Q4H HI

N PRN PAIN #30 TAB                 



                                 Prov: 16                 



                                 DC: 20 0347 Patient stopped taking       

          



                                                                



                                Reported Medications                 



                                LITHIUM CARBONATE  MG PO DAILY PM         

        



                                CHOLECALCIFEROL (VITAMIN D3) (VITAMIN D3) 1,000 

UNITS PO DAILY                 



                                MULTIVITAMIN (MULTI-DAY VITAMIN) 1 TAB PO DAILY 

                



                                GABAPENTIN (NEURONTIN) 100 MG PO TID            

     



                                ALPRAZolam (XANAX) 0.5 MG PO Q8H PRN PRN ANXIETY

                 



                                MIRTAZAPINE (REMERON) 30 MG PO DAILY            

     



                                ZIPRASIDONE (GEODON) 40 MG PO BID               

  



                                CARVEDILOL (COREG) 25 MG PO BID MEALS           

      



                                MELOXICAM (MOBIC) 7.5 MG PO DAILY               

  



                                ALBUTEROL (PROAIR HFA 90 MCG/ACT) 1 PUFF INH RTQ

4H                 



                                                                



                                Discontinued Reported Medications               

  



                                MELOXICAM (MOBIC) 7.5 MG PO BID                 



                                                                



                                                                



                                Review of Nursing Notes Rev avail, and agree    

             



                                Past Medical History:                 



                                Reports: COPD, Depression/mood disorder, Hyperte

nsion.                 



                                Past Surgical History:                 



                                Reports: Cholecystectomy.                 



                                Additional Surgical History                 



                                ankle surgery, jaw reconstruction, ear sx       

          



                                Smoking status for patients 13 years old or olde

r: Unknown,if ever smoked                 



                                Other Social History Local resident             

    



                                Ambulatory Status Independent                 



                                                                



                                                    Portions of this section caesar vargas scribed by Génesis Matias on 20 at 

0457                                                



                                                                



                                Physical Exam                   



                                                                



                                Vital Signs                     



                                Vital Signs                     



                                First Documented:                 



                                  Result Date Time                 



                                 Pulse Ox 100  0318                 



                                 B/P 93/48 318                 



                                 B/P Mean  63 8                 



                                 O2 Delivery Room air 318                

 



                                 Temp 36.4 318                 



                                 Pulse 48  0318                 



                                 Resp 22 318                 



                                                                



                                Last Documented:                 



                                 Result Date Time                 



                                 Pulse Ox 100  0431                 



                                 B/P 102/58  0431                 



                                 B/P Mean 72  043                 



                                 O2 Delivery Room air  043                

 



                                  Pulse 48  043                 



                                 Resp 18 431                 



                                 Temp 36.4 318                 



                                                                



                                                                



                                Review of Vital Signs Reviewed                 



                                                                



                                Focused PE                      



                                General/Const **                 



                                 General/Const Awake, Alert                 



                                 Appearance/Presentation                 



                                 Obese, morbidly.                 



                                Resp/Chest **                   



                                 Respiratory/Chest Atraumatic, Breath sounds NL,

 Breath sounds = bilat, No                 



                                respiratory distress, No rales, No rhonchi, No w

heezing                 



                                Cardiovascular **                 



                                 Cardiovascular Heart sounds NL, No gallop, No m

urmurs                 



                                 Heart Rate/Rhythm                 



                                 Bradycardia.                   



                                                                



                                Free Text PE Notes                 



                                Free Text PE Notes                 



                                 General/Const                  



                                 General/Const Awake, Alert                 



                                MS Head                         



                                 Head Atraumatic, Normocephalic                 



                                Eyes                            



                                 Eyes Atraumatic, No scleral icterus            

     



                                MS Neck                         



                                 Neck Atraumatic, Full range of motion          

       



                                Resp/Chest CTAB, -w                 



                                Cardiovascular                  



                                 good cap refill                 



                                Ext: moving all 4 ext, no swelling/ cyanosis not

ed on UE Bl                 



                                Skin                            



                                 Skin no apparent rashes, Dry, Intact           

      



                                Neurologic                      



                                 Neurologic Oriented X3, Speech NL              

   



                                Psych nl affect and mood                 



                                                                



                                                                



                                                    Portions of this section caesar vargas scribed by Génesis Matias on 20 at 

0513                                                



                                                                



                                Interpretation Diagnostics                 



                                                                



                                Lab Results Interpretation                 



                                Results                         



                                Laboratory Tests                 



                                                                



                                                                



                                                                



                                20 0342:                  



                                [Embedded Image Not Available]                 



                                Laboratory Tests:                 



                                                  



                                  034 0342 0342                 



                                 Chemistry                      



                                 Sodium (134 - 147 mmol/L) 135                 



                                 Potassium (3.4 - 5.0 mmol/L)  4.0              

   



                                 Chloride (100 - 108 mmol/L) 106                

 



                                 Carbon Dioxide (21 - 32 mmol/L) 22             

    



                                 Anion Gap (4.0 - 15.0 GAP calc) 7.0            

     



                                 BUN (7 - 18 MG/DL) 34 H                 



                                 Creatinine (0.6 - 1.0 MG/DL) 1.5 H             

    



                                  Glomerular Filtr Rate (>60 estGFR) 39 L       

          



                                 Glucose (70 - 110 MG/DL) 97                 



                                 Lactic Acid (0.4 - 2.0 mmol/L)  0.8            

     



                                 Calcium (8.5 - 10.1 MG/DL) 8.9                 



                                 Troponin I (0.000 - 0.045 NG/ML) 0.555 *H      

           



                                 Hematology                     



                                 WBC (3.5 - 11.0 K/mm3) 24.2 H                 



                                 RBC (4.70 - 6.10 M/mm3) 3.75 L                 



                                 Hgb (10.4 - 14.9 G/DL) 11.5                 



                                 Hct (31.5 - 44.1 %) 35.2                 



                                 MCV (84.5 - 98.6 Fl) 93.9                 



                                 MCH (27.0 - 34.2 pg)  30.7                 



                                 MCHC (31.5 - 34.0 G/DL) 32.7                 



                                 RDW (11.5 - 14.5 SD) 13.8                 



                                 Plt Count (150 - 450 K/mm3) 234.0              

   



                                 MPV (7.0 - 10.5 fL) 9.80                 



                                 Neut % (Auto) (40 - 76 %)  82.9 H              

   



                                 Lymph % (Auto) (20.5 - 51.1 %) 8.3 L           

      



                                 Mono % (Auto) (1.7 - 9.3 %) 7.3                

 



                                 Eos % (Auto) (0.0 - 6.0 %) 1.4                 



                                 Baso % (Auto) (0.0 - 2.0 %) 0.1                

 



                                 Neut # (Auto) (1.8 - 7.6 K/mm3) 20.08 H        

         



                                  Lymph # (Auto) (0.6 - 3.2 K/mm3) 2.0          

       



                                 Mono # (Auto) (0.3 - 1.1 K/mm3) 1.8 H          

       



                                 Eos # (Auto) (0.0 - 0.4 K/mm3)  0.3            

     



                                 Baso # (Auto) (0.0 - 0.1 K/mm3) 0.0            

     



                                 Add Manual Diff (CRITERIA DIFF/SCN) NO         

        



                                                                



                                Microbiology:                   



                                  Date/Time Procedure - Status                 



                                 Source Growth                  



                                 452 MRSA Screen - ORD                 



                                 NASAL                          



                                  340 Blood Culture - RECD               

  



                                 BLOOD                          



                                                                



                                Recent Impressions:                 



                                RADIOLOGY - XR CHEST 1 V 430             

    



                                *** Report Impression - Status: SIGNED Entered: 

20206                 



                                                                



                                IMPRESSION: Mild cardiomegaly, without acute pul

monary process.                 



                                Impression By: Jose March M.D.      

           



                                                                



                                                                



                                 Lab Statement                  



                                Laboratory studies reviewed and considered in [facility] medical decision-making.                 



                                                                



                                 Imaging Statement                 



                                Radiographic studies reviewed and considered in 

the medical decision-making.                 



                                                                



                                                                



                                Point of Care Testing                 



                                Pulse Oximetry                  



                                 Pulse Ox % 100                 



                                 On: Room air                   



                                 Interpretation Interpreted by me, Pulse oximetr

y normal                 



                                 Time 0318                      



                                                                



                                ECG #1 Interpretation                 



                                ECG Documented in MUSE Yes                 



                                Date 20                   



                                Time 0427                       



                                Interpreted by and reviewed by me, ED physician 

                



                                NL ECG Interpretation No STEMI                 



                                Rate 50                         



                                Rhythm Bradycardia                 



                                                                



                                                    Portions of this section wer

e scribed by Génesis Matias on 20 at 

0553                                                



                                                                



                                Procedures                      



                                                                



                                Central Line Placement #1                 



                                Time 0355                       



                                Procedure Performed by ED physician             

    



                                Consent/Setup/Site Prep Verified correct patient

, Informed consent provided,                 



                                Consent from patient, Oxygen administered, Pulse

 oximeter applied, Cardiac                 



                                monitor applied, Hand hygiene observed          

       



                                Skin Preparation Agent Hibiclens - Chlorhexidine

                 



                                Local Anesthesia Lidocaine 1%                 



                                Side/Location/Ultrasound Internal jugular R, Ult

rasound assisted                 



                                                    Catheter/Lumen/Technique Tri

ple lumen, Seldinger technique, Guide wire removed,

                                                    



                                                    Good blood return, Secured w

 catheter device, Secured with tape (and stat lock)

                                                    



                                Number of Attempts 1                 



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                 



                                                    

________________________________________________________________________________
                                                    



                                The patient was admitted to Dr. Cohen's team on 

the inpatient unit.                 



                                                                



                                REASON FOR ADMISSION:                 



                                The patient was admitted for suicidal ideation w

ith a chief complaint of "I                 



                                am tired of doing all this." The patient is a 47

-year-old female with a past                 



                                psychiatric history of bipolar disorder and schi

zophrenia. She presented                 



                                with suicidal ideation without a plan going on f

or over 3 days prior to                 



                                admission.                      



                                                                



                                ADMITTING DIAGNOSES:                 



                                Major depressive disorder, recurrent episode, se

sara with mixed features, and                 



                                methamphetamine use disorder.                 



                                                                



                                FINAL DIAGNOSES:                 



                                AXIS I: Bipolar 2 disorder, current depressive e

pisode with psychotic                 



                                features, and methamphetamine use disorder.     

            



                                AXIS II: None.                  



                                AXIS III: Obesity, chronic obstructive pulmonary

 disease, and neuropathy.                 



                                AXIS IV: Poor family relationship, history of dr

ug abuse, unemployed, on                 



                                disability.                     



                                                                



                                PRINCIPAL PROCEDURE:                 



                                Psychopharmacotherapy.                 



                                                                



                                SPECIAL PROCEDURES:                 



                                None.                           



                                                                



                                HOSPITAL COURSE:                 



                                Ms. Tyler Judd is a 47-year-old  fem

ronny, who was admitted to Dr. Cohen's team from 2017 to . The patient was on                 



                                unit restrictions along with elopement and stand

suzette PICU precautions. The                 



                                patient on admission was only started on trazodo

ne 50 mg at bedtime p.r.n.                 



                                for sleep and Vistaril 50 mg q. 6 hours p.r.n. f

or anxiety. The patient did                 



                                state that she had medication that she is taking

 at home of lithium 400 mg                 



                                daily, Abilify 30 mg, gabapentin, unknown dose, 

and trazodone, unknown dose.                 



                                We did not start her home medications as she abebe

d they were not working. She                 



                                had stopped taking them a few weeks prior to [facility] admitted. Due to her                 



                                presenting symptoms and her past psychiatric his

tory of bipolar 2, we have                 



                                since started the patient on Latuda 20 mg with l

unch. She desired to stop                 



                                the lithium and Abilify. She states that she had

 an allergy to risperdal and                 



                                she would not take medications that would cause 

her to gain weight. The                 



                                patient did not have any side effects to the Lat

uda. Over the next few days,                 



                                we increased the dose from 20 to 40 mg with lunc

h. We increase the trazodone                 



                                to 100 mg at night for sleep and these changes t

o his medications help                 



                                resolve the patient's presenting symptoms of nancy

cidal ideation, improvement                 



                                in depression, improved sleep, improved appetite

, and improved concentration.                 



                                 She is no longer reporting feelings of hopeless

ness. The patient was                 



                                cooperative during the day.                 



                                                                



                                ASSESSMENT:                     



                                                                



                                  [facility]                 



                                 Discharge Summary                 



                                She did not attend group therapy. On the day of 

discharge, she was deemed                 



                                suitable for discharge because she denied suicid

al and homicidal ideations.                 



                                                                



                                She denied audio and visual hallucinations. Her 

mood had improved. Her                 



                                affect had improved her sleep. Judgment and insi

ght had all improved. The                 



                                patient plans to return home. She will continue 

psychotropic medications and                 



                                followup with an outpatient psychiatrist.       

          



                                                                



                                MENTAL STATUS EXAM ON DISCHARGE:                

 



                                The patient appeared well-groomed, well-nourishe

d 47-year-old female. She                 



                                made good eye contact. She had no psychomotor re

tardation or activation.                 



                                She did have a calm attitude. Her speech was reg

ular rate and rhythm with                 



                                good volume. Her mood was good. Her affect was c

ongruent and euthymic.                 



                                Regarding perception, she denied any audio or vi

sual hallucinations. She                 



                                denies any delusions. Thought process was linear

 and goal directed. She                 



                                denied suicidal and homicidal ideations. Insight

 and judgment were fair.                 



                                She is alert and oriented to person, place, time

, and circumstance. Her                 



                                memory and attention are grossly intact. Abstrac

tion is intact. Fund of                 



                                knowledge was appropriate for her age and educat

ion. Her gait was normal.                 



                                                                



                                LABORATORY DATA:                 



                                No relevant labs upon discharge.                

 



                                                                



                                DRUG REACTIONS/INTERACTIONS:                 



                                None.                           



                                                                



                                DISCHARGE MEDICATIONS:                 



                                Were:                           



                                                                



                                 1. Latuda 40 mg per day with lunch.            

     



                                 2. Gabapentin 1200 mg at night, 300 mg with sharon

akfast, and 300 mg wtih                 



                                 lunch.                         



                                                                



                                DISCHARGE INSTRUCTIONS:                 



                                The patient was provided with standard discharge

 instructions. No                 



                                restrictions on diet or physical activity. The p

atient was given driving                 



                                                    restrictions as she is going

 to continue taking gabapentin may cause 

drowsiness.                                         



                                                                



                                FOLLOWUP:                       



                                The patient is scheduled a followup appointment 

with [facility] andhas been                 



                                given specific instructions on how to get to the

 appointment.                 



                                                                



                                DISPOSITION:                    



                                Ms. Tyler Judd is currently stable and tolera

ting all her medications and                 



                                will be discharging to her home. The patient's p

rognosis is poor as                 



                                she has a history of noncompliance; however, if 

she is able to be compliant                 



                                                                



                                 [facility]                 



                                 Discharge Summary                 



                                She did not attend group therapy. On the day of 

discharge, she was deemed                 



                                suitable for discharge because she denied suicid

al and homicidal ideations.                 



                                                                



                                She denied audio and visual hallucinations. Her 

mood had improved. Her                 



                                affect had improved her sleep. Judgment and insi

ght had all improved. The                 



                                patient plans to return home. She will continue 

psychotropic medications and                 



                                followup with an outpatient psychiatrist.       

          



                                                                



                                MENTAL STATUS EXAM ON DISCHARGE:                

 



                                The patient appeared well-groomed, well-nourishe

d 47-year-old female. She                 



                                made good eye contact. She had no psychomotor re

tardation or activation.                 



                                She did have a calm attitude. Her speech was reg

ular rate and rhythm with                 



                                good volume. Her mood was good. Her affect was c

ongruent and euthymic.                 



                                Regarding perception, she denied any audio or vi

sual hallucinations. She                 



                                denies any delusions. Thought process was linear

 and goal directed. She                 



                                denied suicidal and homicidal ideations. Insight

 and judgment were fair.                 



                                She is alert and oriented to person, place, time

, and circumstance.  Her                 



                                memory and attention are grossly intact. Abstrac

tion is intact. Fund of                 



                                knowledge was appropriate for her age and educat

ion. Her gait was normal.                 



                                                                



                                LABORATORY DATA:                 



                                No relevant labs upon discharge.                

 



                                                                



                                DRUG REACTIONS/INTERACTIONS:                 



                                None.                           



                                                                



                                DISCHARGE MEDICATIONS:                 



                                Were:                           



                                                                



                                 1. Latuda 40 mg per day with lunch.            

     



                                 2. Gabapentin 1200 mg at night, 300 mg with sharon

akfast, and 300 mg wtih                 



                                 lunch.                         



                                                                



                                DISCHARGE INSTRUCTIONS:                 



                                The patient was provided with standard discharge

 instructions. No                 



                                restrictions on diet or physical activity. The p

atient was given driving                 



                                                    restrictions as she is going

 to continue taking gabapentin may cause 

drowsiness.                                         



                                                                



                                FOLLOWUP:                       



                                The patient is scheduled a followup appointment 

with [facility] andhas been                 



                                given specific instructions on how to get to the

 appointment.                 



                                                                



                                DISPOSITION:                    



                                Ms. Tyler Judd is currently stable and tolera

ting all her medications and                 



                                will be discharging to her home. The patient's p

rognosis is poor as                 



                                she has a history of noncompliance; however, if 

she is able to be compliant                 



                                                                



                                 [facility]                 



                                 Discharge Summary                 



                                with her psychotropic medications and consistent

 with outpatient followup,                 



                                she should do very well. She has been encouraged

 to abstain from illicit                 



                                drug use and to not discontinue any psychotropic

 medications without the                 



                                guidance of her outpatient psychiatrist. The [facility]

ient has also met with the                 



                                 on the unit to obtain appropriate 

followup. The importance of                 



                                following through with this plan has been review

ed with the patient, with the                 



                                understanding that compliance will be crucial to

 her recovery. She has been                 



                                                                



                                                    given the [facility] 

hotline #[phone] and information about [facility]

                                                    



                                [facility] [place] if she wishes to obtain therapy.

                 



                                                                



                                                                



                                                                



                                                                



                                MD THUAN Roman/MIKE                      



                                DD: 2018 21:40                 



                                TD: 01/10/2018 00:53                 



                                Job #: [id number]                 



                                                                



                                Electronically Authenticated and Edited by:     

            



                                Salvatore Brownlee MD On 01/10/2018 08:48 PM [facility]                 



                                 Discharge Summary                 



                                with her psychotropic medications and consistent

 with outpatient followup,                 



                                she should do very well. She has been encouraged

 to abstain from illicit                 



                                drug use and to not discontinue any psychotropic

 medications without the                 



                                guidance of her outpatient psychiatrist. The [facility]

ient has also met with the                 



                                 on the unit to obtain appropriate 

followup. The importance of                 



                                following through with this plan has been review

ed with the patient, with the                 



                                understanding that compliance will be crucial to

 her recovery. She has been                 



                                                                



                                                    given the [facility] 

hotline #[phone] and information about [facility] if she wishes to obtain therapy.

                 



                                                                



                                                                



                                                                



                                                                



                                MD PRINCESS Roman                      



                                DD: 2018 21:40                 



                                TD: 01/10/2018 00:53                 



                                Job #: [id number]                 



                                                                



                                Electronically Authenticated and Edited by:     

            



                                Salvatore Brownlee MD On 01/10/2018 08:48 PM CST       

          



                                Electronically Authenticated by:                

 



                                Venkata Cohen MD On 2018 04:18 PM CST  

               

 

                2017 21:03:34-00:00  [facility]



                                 Psych Eval                     



                                                                



                                PATIENT NAME: TYLER JUDD PHYSICIAN: Salvatore Brownlee MD                 



                                 ACCOUNT #: [id number] Admitted:                

 



                                 MR NUMBER: [id number] DISCHARGED:                

 



                                                    

________________________________________________________________________________
                                                    



                                Psych Eval                      



                                Patient Name: TYLER JUDD Date of            

     



                                Service:                        



                                Patient ID: [id number] YOB: 1970 

                



                                Clinician: Salvatore Brownlee MD User Field 1: J        

         



                                Encounter Visit: [facility] User Field 3: J            

     



                                Referring Venkata Cohen MD                 



                                Clinician:                      



                                                                



                                ATTENDING:                      



                                Venkata Cohen MD                 



                                                                



                                INFORMANT:                      



                                Information was gathered from the patient, as we

ll as the outside hospital                 



                                record, [facility] medical record and [facility] epic.

                 



                                                                



                                CHIEF COMPLAINT:                 



                                "Tired of doing all of this."                 



                                                                



                                HISTORY OF PRESENT ILLNESS:                 



                                Tyler Judd is a 47-year-old female with a pas

t psychiatric history of                 



                                bipolar disorder and schizophrenia, presented to

 [facility] with 3                 



                                days of suicidal ideation without a plan. She st

ates that she has been down,                 



                                but she would not describe what led to her suici

herber ideation. Although the                 



                                medical chart mentions that she had a recent str

essor of finding out that her                 



                                boyfriend has not been faithful. This occurred i

n the context of                 



                                methamphetamine intoxication. The patient states

 that she has had suicidal                 



                                ideation in the past on occasion since the age o

f 11. She has never                 



                                attempted suicide. She describes being compliant

 with her home medications                 



                                of lithium 400 mg at bedtime, Abilify 30 mg at b

edtime, trazodone 100 mg at                 



                                bedtime, but has not had these medications for a

pproximately 3 days. The                 



                                patient currently denies any auditory and visual

 hallucinations, but states                 



                                that occasionally she will have visual hallucina

tions of her mother.                 



                                Previously, she endorsed audio hallucinations of

 others laughing at her and                 



                                the emergency department records do state that s

he was having visual                 



                                hallucinations of demons.                 



                                                                



                                PSYCHIATRIC REVIEW OF SYSTEMS:                 



                                The patient endorses poor sleep, as she has trou

ble falling and staying                 



                                asleep, poor concentration, poor energy, anhedon

ia, psychomotor retardation,                 



                                guilt, and suicidal ideation. The patient does a

lso state that she has had                 



                                episodes of alli and said yes to all the alli 

screening questions. She                 



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                 



                                                                



                                Case dicussed with pt's nurse.                 



                                                                



                                                                



                                                                



                                                                



                                Norma Judge MD                 



                                                                



                                PSN/CTV                         



                                DD: 2017 14:48                 



                                TD: 2017 17:46                 



                                Job #: [id number]                 



                                                                



                                Electronically Authenticated and Edited by:     

            



                                Norma Judge MD On 2017 07:41 AM CS

T

## 2025-03-11 NOTE — EKG
Test Date:    2025-03-07               Test Time:    15:22:02

Technician:   JONAS                                     

                                                     

MEASUREMENT RESULTS:                                       

Intervals:                                           

Rate:         54                                     

ME:           164                                    

QRSD:         94                                     

QT:           442                                    

QTc:          419                                    

Axis:                                                

P:            69                                     

ME:           164                                    

QRS:          65                                     

T:            55                                     

                                                     

INTERPRETIVE STATEMENTS:                                       

                                                     

Sinus bradycardia

Otherwise normal ECG

Compared to ECG 06/05/2023 17:05:20

Sinus rhythm no longer present



Electronically Signed On 03-11-25 11:04:13 CDT by Max Jones